# Patient Record
Sex: MALE | Race: OTHER | HISPANIC OR LATINO | URBAN - METROPOLITAN AREA
[De-identification: names, ages, dates, MRNs, and addresses within clinical notes are randomized per-mention and may not be internally consistent; named-entity substitution may affect disease eponyms.]

---

## 2017-04-08 ENCOUNTER — EMERGENCY (EMERGENCY)
Facility: HOSPITAL | Age: 67
LOS: 1 days | Discharge: HOME | End: 2017-04-08
Attending: EMERGENCY MEDICINE | Admitting: EMERGENCY MEDICINE
Payer: COMMERCIAL

## 2017-04-08 VITALS
DIASTOLIC BLOOD PRESSURE: 70 MMHG | RESPIRATION RATE: 18 BRPM | SYSTOLIC BLOOD PRESSURE: 122 MMHG | HEART RATE: 76 BPM | OXYGEN SATURATION: 96 %

## 2017-04-08 VITALS
TEMPERATURE: 99 F | HEART RATE: 88 BPM | OXYGEN SATURATION: 95 % | DIASTOLIC BLOOD PRESSURE: 65 MMHG | SYSTOLIC BLOOD PRESSURE: 138 MMHG | RESPIRATION RATE: 18 BRPM

## 2017-04-08 DIAGNOSIS — J45.901 UNSPECIFIED ASTHMA WITH (ACUTE) EXACERBATION: ICD-10-CM

## 2017-04-08 DIAGNOSIS — Z79.899 OTHER LONG TERM (CURRENT) DRUG THERAPY: ICD-10-CM

## 2017-04-08 DIAGNOSIS — I10 ESSENTIAL (PRIMARY) HYPERTENSION: ICD-10-CM

## 2017-04-08 DIAGNOSIS — E11.9 TYPE 2 DIABETES MELLITUS WITHOUT COMPLICATIONS: ICD-10-CM

## 2017-04-08 DIAGNOSIS — Z98.890 OTHER SPECIFIED POSTPROCEDURAL STATES: Chronic | ICD-10-CM

## 2017-04-08 DIAGNOSIS — Z98.89 OTHER SPECIFIED POSTPROCEDURAL STATES: Chronic | ICD-10-CM

## 2017-04-08 DIAGNOSIS — Z98.890 OTHER SPECIFIED POSTPROCEDURAL STATES: ICD-10-CM

## 2017-04-08 DIAGNOSIS — E78.00 PURE HYPERCHOLESTEROLEMIA, UNSPECIFIED: ICD-10-CM

## 2017-04-08 DIAGNOSIS — J18.9 PNEUMONIA, UNSPECIFIED ORGANISM: ICD-10-CM

## 2017-04-08 DIAGNOSIS — Z87.442 PERSONAL HISTORY OF URINARY CALCULI: Chronic | ICD-10-CM

## 2017-04-08 DIAGNOSIS — R06.02 SHORTNESS OF BREATH: ICD-10-CM

## 2017-04-08 LAB
ANION GAP SERPL CALC-SCNC: 16 MMOL/L — SIGNIFICANT CHANGE UP (ref 5–17)
BASOPHILS # BLD AUTO: 0 K/UL — SIGNIFICANT CHANGE UP (ref 0–0.2)
BASOPHILS NFR BLD AUTO: 0.3 % — SIGNIFICANT CHANGE UP (ref 0–2)
BUN SERPL-MCNC: 16 MG/DL — SIGNIFICANT CHANGE UP (ref 7–23)
CALCIUM SERPL-MCNC: 9.2 MG/DL — SIGNIFICANT CHANGE UP (ref 8.4–10.5)
CHLORIDE SERPL-SCNC: 103 MMOL/L — SIGNIFICANT CHANGE UP (ref 96–108)
CO2 SERPL-SCNC: 23 MMOL/L — SIGNIFICANT CHANGE UP (ref 22–31)
CREAT SERPL-MCNC: 1.12 MG/DL — SIGNIFICANT CHANGE UP (ref 0.5–1.3)
EOSINOPHIL # BLD AUTO: 0.2 K/UL — SIGNIFICANT CHANGE UP (ref 0–0.5)
EOSINOPHIL NFR BLD AUTO: 2.1 % — SIGNIFICANT CHANGE UP (ref 0–6)
GAS PNL BLDV: SIGNIFICANT CHANGE UP
GLUCOSE SERPL-MCNC: 139 MG/DL — HIGH (ref 70–99)
HCT VFR BLD CALC: 35.8 % — LOW (ref 39–50)
HGB BLD-MCNC: 12.4 G/DL — LOW (ref 13–17)
LYMPHOCYTES # BLD AUTO: 1.7 K/UL — SIGNIFICANT CHANGE UP (ref 1–3.3)
LYMPHOCYTES # BLD AUTO: 16.8 % — SIGNIFICANT CHANGE UP (ref 13–44)
MCHC RBC-ENTMCNC: 31.8 PG — SIGNIFICANT CHANGE UP (ref 27–34)
MCHC RBC-ENTMCNC: 34.5 GM/DL — SIGNIFICANT CHANGE UP (ref 32–36)
MCV RBC AUTO: 92.1 FL — SIGNIFICANT CHANGE UP (ref 80–100)
MONOCYTES # BLD AUTO: 0.8 K/UL — SIGNIFICANT CHANGE UP (ref 0–0.9)
MONOCYTES NFR BLD AUTO: 7.8 % — SIGNIFICANT CHANGE UP (ref 2–14)
NEUTROPHILS # BLD AUTO: 7.4 K/UL — SIGNIFICANT CHANGE UP (ref 1.8–7.4)
NEUTROPHILS NFR BLD AUTO: 73 % — SIGNIFICANT CHANGE UP (ref 43–77)
PLATELET # BLD AUTO: 267 K/UL — SIGNIFICANT CHANGE UP (ref 150–400)
POTASSIUM SERPL-MCNC: 3.8 MMOL/L — SIGNIFICANT CHANGE UP (ref 3.5–5.3)
POTASSIUM SERPL-SCNC: 3.8 MMOL/L — SIGNIFICANT CHANGE UP (ref 3.5–5.3)
RBC # BLD: 3.89 M/UL — LOW (ref 4.2–5.8)
RBC # FLD: 12.4 % — SIGNIFICANT CHANGE UP (ref 10.3–14.5)
SODIUM SERPL-SCNC: 142 MMOL/L — SIGNIFICANT CHANGE UP (ref 135–145)
WBC # BLD: 10.1 K/UL — SIGNIFICANT CHANGE UP (ref 3.8–10.5)
WBC # FLD AUTO: 10.1 K/UL — SIGNIFICANT CHANGE UP (ref 3.8–10.5)

## 2017-04-08 PROCEDURE — 94640 AIRWAY INHALATION TREATMENT: CPT

## 2017-04-08 PROCEDURE — 96374 THER/PROPH/DIAG INJ IV PUSH: CPT

## 2017-04-08 PROCEDURE — 84132 ASSAY OF SERUM POTASSIUM: CPT

## 2017-04-08 PROCEDURE — 93010 ELECTROCARDIOGRAM REPORT: CPT

## 2017-04-08 PROCEDURE — 82803 BLOOD GASES ANY COMBINATION: CPT

## 2017-04-08 PROCEDURE — 85027 COMPLETE CBC AUTOMATED: CPT

## 2017-04-08 PROCEDURE — 83605 ASSAY OF LACTIC ACID: CPT

## 2017-04-08 PROCEDURE — 71020: CPT | Mod: 26

## 2017-04-08 PROCEDURE — 84295 ASSAY OF SERUM SODIUM: CPT

## 2017-04-08 PROCEDURE — 99284 EMERGENCY DEPT VISIT MOD MDM: CPT | Mod: 25

## 2017-04-08 PROCEDURE — 85014 HEMATOCRIT: CPT

## 2017-04-08 PROCEDURE — 93005 ELECTROCARDIOGRAM TRACING: CPT

## 2017-04-08 PROCEDURE — 82330 ASSAY OF CALCIUM: CPT

## 2017-04-08 PROCEDURE — 80048 BASIC METABOLIC PNL TOTAL CA: CPT

## 2017-04-08 PROCEDURE — 82435 ASSAY OF BLOOD CHLORIDE: CPT

## 2017-04-08 PROCEDURE — 71046 X-RAY EXAM CHEST 2 VIEWS: CPT

## 2017-04-08 PROCEDURE — 82947 ASSAY GLUCOSE BLOOD QUANT: CPT

## 2017-04-08 RX ORDER — IPRATROPIUM/ALBUTEROL SULFATE 18-103MCG
3 AEROSOL WITH ADAPTER (GRAM) INHALATION ONCE
Qty: 0 | Refills: 0 | Status: COMPLETED | OUTPATIENT
Start: 2017-04-08 | End: 2017-04-08

## 2017-04-08 RX ORDER — SODIUM CHLORIDE 9 MG/ML
1000 INJECTION INTRAMUSCULAR; INTRAVENOUS; SUBCUTANEOUS ONCE
Qty: 0 | Refills: 0 | Status: COMPLETED | OUTPATIENT
Start: 2017-04-08 | End: 2017-04-08

## 2017-04-08 RX ORDER — ALBUTEROL 90 UG/1
4 AEROSOL, METERED ORAL
Qty: 1 | Refills: 0 | OUTPATIENT
Start: 2017-04-08 | End: 2017-05-08

## 2017-04-08 RX ADMIN — Medication 3 MILLILITER(S): at 20:20

## 2017-04-08 RX ADMIN — Medication 3 MILLILITER(S): at 20:41

## 2017-04-08 RX ADMIN — Medication 3 MILLILITER(S): at 20:21

## 2017-04-08 RX ADMIN — Medication 100 MILLIGRAM(S): at 21:13

## 2017-04-08 RX ADMIN — SODIUM CHLORIDE 2000 MILLILITER(S): 9 INJECTION INTRAMUSCULAR; INTRAVENOUS; SUBCUTANEOUS at 20:20

## 2017-04-08 RX ADMIN — Medication 60 MILLIGRAM(S): at 20:20

## 2017-04-08 NOTE — ED PROVIDER NOTE - PROGRESS NOTE DETAILS
still coughing, wheezing improved, left lower lobe PNA will give doxy and d/c. given instructions to avoid high carb/sugar foods due to steroids and DM -Rene DO

## 2017-04-08 NOTE — ED PROVIDER NOTE - PHYSICAL EXAMINATION
Gen: NAD, AOx3  Head: NCAT  HEENT: PERRL, oral mucosa moist, normal conjunctiva, neck supple  Lung: no crackles +wheezing on forced expiration b/l, no respiratory distress, +dry hacking cough  CV: rrr, no murmur, Normal perfusion  Abd: soft, NTND, no CVA tenderness  MSK: No edema, no visible deformities  Neuro: No focal neurologic deficits  Skin: No rash   Psych: normal affect

## 2017-04-08 NOTE — ED PROVIDER NOTE - ATTENDING CONTRIBUTION TO CARE
I have seen and evaluated this patient with the resident.   I agree with the findings  unless other wise stated.  I have made appropriate changes in documentations where needed, After my face to face bedside evaluation, I am further  noting: likely bronchitis with mild asthma exacerbation, no suspicion for cardiac etiology. will check basic labs, EKG, CXR, duoneb/steroids reasses On xray LLL pneumonia pts overall appearance well low risk with acceptable criteria for outpatient treatment Plan d/w pt and family in details and strict return to ED instructions--Schmidt

## 2017-04-08 NOTE — ED ADULT NURSE NOTE - OBJECTIVE STATEMENT
68 yo mal 68 yo male c/o of diff breathing. 66 yo male c/o of diff breathing. x 1week. Presents with dry cough. C/o of chest pain from coughing so frequently. No fever chills, vomiting or diarrhea noted. Reports he has to sleep with several pillows at night. Slight wheeze. Upon ausculation. Recently placed on Mucinex by PCP with little improvement in condition. No edema noted to extremities. MD at bedside. Labs drawn and sent. EKG performed. X-ray done. Safety and support maintained.

## 2017-04-08 NOTE — ED PROVIDER NOTE - MEDICAL DECISION MAKING DETAILS
likely bronchitis with mild asthma exacerbation, no suspicion for cardiac etiology. will check basic labs, EKG, CXR, duoneb/steroids reasses

## 2017-04-08 NOTE — ED PROVIDER NOTE - CARE PLAN
Principal Discharge DX:	Pneumonia of left lower lobe due to infectious organism  Instructions for follow-up, activity and diet:	1. Return to ED for worsening, progressive or any other concerning symptoms   2. Follow up with your primary care doctor in 2-3days   3. Take 40mg of prednisone once a day for 4 days   4. Take 100mg of Doxycycline twice a day for 10 days. Avoid sun exposure, use birth control while taking this medication   5. Take 4-6 puffs of albuterol every 4-6 hours for the next 24-48hours then use as needed for shortness of breathe or cough  Secondary Diagnosis:	Asthma exacerbation

## 2017-04-08 NOTE — ED PROVIDER NOTE - OBJECTIVE STATEMENT
68yo M with difficulty breathing x1 week, +cough nonproductive. +chest congestion. no fever/chills. no sore/throat/running nose. +chest wall and abd pain with coughing, no pain at rest. no vomiting/diarrhea. PCP gave mucinex and tessalon pearls. w/o improvement. +exertional SOB. +orthopnea 3-4 pillows which is new. no LE swelling. using asthma pump 4-5x/day. cough worse at night. and in the cold. had flu vaccine. unknown if UTD pneumonia/Tdap. multiple sick contacts at home.    PMH- HTN, DM, HLD, asthma  PCP- Ysabel Mena  PSH- umbilical hernia repair    ROS:  no n/v/d/c. no abd pain. no rash. no bleeding. no urinary complaints. no weakness. no vision changes. no HA. no neck/back pain. no extremity swelling.

## 2017-04-08 NOTE — ED ADULT NURSE REASSESSMENT NOTE - NS ED NURSE REASSESS COMMENT FT1
Patient d/c in stable condition. No distress noted. Patient reports improvement after breathing treatment. D/c plan reviewed with patient and family. Pt verbalizes understanding. Ambulates with steady gait.

## 2017-04-08 NOTE — ED PROVIDER NOTE - PLAN OF CARE
1. Return to ED for worsening, progressive or any other concerning symptoms   2. Follow up with your primary care doctor in 2-3days   3. Take 40mg of prednisone once a day for 4 days   4. Take 100mg of Doxycycline twice a day for 10 days. Avoid sun exposure, use birth control while taking this medication   5. Take 4-6 puffs of albuterol every 4-6 hours for the next 24-48hours then use as needed for shortness of breathe or cough

## 2018-04-26 ENCOUNTER — APPOINTMENT (OUTPATIENT)
Dept: UROLOGY | Facility: CLINIC | Age: 68
End: 2018-04-26
Payer: MEDICARE

## 2018-04-26 VITALS
HEART RATE: 77 BPM | BODY MASS INDEX: 30.53 KG/M2 | RESPIRATION RATE: 17 BRPM | SYSTOLIC BLOOD PRESSURE: 165 MMHG | DIASTOLIC BLOOD PRESSURE: 96 MMHG | TEMPERATURE: 98.4 F | HEIGHT: 66 IN | WEIGHT: 190 LBS

## 2018-04-26 DIAGNOSIS — Z86.79 PERSONAL HISTORY OF OTHER DISEASES OF THE CIRCULATORY SYSTEM: ICD-10-CM

## 2018-04-26 DIAGNOSIS — Z87.442 PERSONAL HISTORY OF URINARY CALCULI: ICD-10-CM

## 2018-04-26 DIAGNOSIS — Z86.39 PERSONAL HISTORY OF OTHER ENDOCRINE, NUTRITIONAL AND METABOLIC DISEASE: ICD-10-CM

## 2018-04-26 DIAGNOSIS — N40.0 BENIGN PROSTATIC HYPERPLASIA WITHOUT LOWER URINARY TRACT SYMPMS: ICD-10-CM

## 2018-04-26 LAB
ALBUMIN SERPL ELPH-MCNC: 4.4 G/DL
ALP BLD-CCNC: 96 U/L
ALT SERPL-CCNC: 39 U/L
ANION GAP SERPL CALC-SCNC: 17 MMOL/L
APPEARANCE: CLEAR
AST SERPL-CCNC: 30 U/L
BACTERIA: NEGATIVE
BASOPHILS # BLD AUTO: 0.02 K/UL
BASOPHILS NFR BLD AUTO: 0.2 %
BILIRUB SERPL-MCNC: 0.7 MG/DL
BILIRUBIN URINE: NEGATIVE
BLOOD URINE: NEGATIVE
BUN SERPL-MCNC: 14 MG/DL
CALCIUM SERPL-MCNC: 9.2 MG/DL
CHLORIDE SERPL-SCNC: 102 MMOL/L
CO2 SERPL-SCNC: 24 MMOL/L
COLOR: YELLOW
CREAT SERPL-MCNC: 1.11 MG/DL
EOSINOPHIL # BLD AUTO: 0.12 K/UL
EOSINOPHIL NFR BLD AUTO: 1.4 %
GLUCOSE QUALITATIVE U: NEGATIVE MG/DL
GLUCOSE SERPL-MCNC: 136 MG/DL
HBA1C MFR BLD HPLC: 7 %
HCT VFR BLD CALC: 40.2 %
HGB BLD-MCNC: 12.8 G/DL
IMM GRANULOCYTES NFR BLD AUTO: 0.3 %
KETONES URINE: NEGATIVE
LEUKOCYTE ESTERASE URINE: NEGATIVE
LYMPHOCYTES # BLD AUTO: 2.03 K/UL
LYMPHOCYTES NFR BLD AUTO: 23.3 %
MAN DIFF?: NORMAL
MCHC RBC-ENTMCNC: 29.8 PG
MCHC RBC-ENTMCNC: 31.8 GM/DL
MCV RBC AUTO: 93.5 FL
MICROSCOPIC-UA: NORMAL
MONOCYTES # BLD AUTO: 0.48 K/UL
MONOCYTES NFR BLD AUTO: 5.5 %
NEUTROPHILS # BLD AUTO: 6.04 K/UL
NEUTROPHILS NFR BLD AUTO: 69.3 %
NITRITE URINE: NEGATIVE
PH URINE: 7.5
PLATELET # BLD AUTO: 264 K/UL
POTASSIUM SERPL-SCNC: 4.1 MMOL/L
PROT SERPL-MCNC: 7 G/DL
PROTEIN URINE: NEGATIVE MG/DL
RBC # BLD: 4.3 M/UL
RBC # FLD: 14.4 %
RED BLOOD CELLS URINE: 1 /HPF
SODIUM SERPL-SCNC: 143 MMOL/L
SPECIFIC GRAVITY URINE: 1.01
SQUAMOUS EPITHELIAL CELLS: 0 /HPF
UROBILINOGEN URINE: NEGATIVE MG/DL
WBC # FLD AUTO: 8.72 K/UL
WHITE BLOOD CELLS URINE: 0 /HPF

## 2018-04-26 PROCEDURE — 99204 OFFICE O/P NEW MOD 45 MIN: CPT

## 2018-04-26 RX ORDER — OMEPRAZOLE 40 MG/1
40 CAPSULE, DELAYED RELEASE ORAL
Qty: 30 | Refills: 0 | Status: ACTIVE | COMMUNITY
Start: 2017-10-31

## 2018-04-26 RX ORDER — FLUTICASONE PROPIONATE 50 UG/1
50 SPRAY, METERED NASAL
Qty: 16 | Refills: 0 | Status: ACTIVE | COMMUNITY
Start: 2017-11-29

## 2018-04-26 RX ORDER — MELOXICAM 7.5 MG/1
7.5 TABLET ORAL
Qty: 30 | Refills: 0 | Status: ACTIVE | COMMUNITY
Start: 2017-11-29

## 2018-04-26 RX ORDER — ATORVASTATIN CALCIUM 20 MG/1
20 TABLET, FILM COATED ORAL
Qty: 90 | Refills: 0 | Status: ACTIVE | COMMUNITY
Start: 2017-12-23

## 2018-04-26 RX ORDER — BLOOD SUGAR DIAGNOSTIC
STRIP MISCELLANEOUS
Qty: 100 | Refills: 0 | Status: ACTIVE | COMMUNITY
Start: 2018-04-23

## 2018-04-26 RX ORDER — METFORMIN HYDROCHLORIDE 500 MG/1
500 TABLET, COATED ORAL
Refills: 0 | Status: ACTIVE | COMMUNITY

## 2018-04-26 RX ORDER — BLOOD-GLUCOSE METER
W/DEVICE KIT MISCELLANEOUS
Qty: 1 | Refills: 0 | Status: ACTIVE | COMMUNITY
Start: 2017-12-23

## 2018-04-26 RX ORDER — LANCETS
EACH MISCELLANEOUS
Qty: 100 | Refills: 0 | Status: ACTIVE | COMMUNITY
Start: 2018-03-26

## 2018-04-26 RX ORDER — MONTELUKAST 10 MG/1
10 TABLET, FILM COATED ORAL
Qty: 90 | Refills: 0 | Status: ACTIVE | COMMUNITY
Start: 2017-12-23

## 2018-04-26 RX ORDER — TAMSULOSIN HYDROCHLORIDE 0.4 MG/1
0.4 CAPSULE ORAL
Refills: 0 | Status: ACTIVE | COMMUNITY

## 2018-04-26 RX ORDER — AMLODIPINE AND VALSARTAN 5; 160 MG/1; MG/1
5-160 TABLET, FILM COATED ORAL
Qty: 90 | Refills: 0 | Status: COMPLETED | COMMUNITY
Start: 2017-12-23 | End: 2018-04-26

## 2018-04-26 RX ORDER — ATORVASTATIN CALCIUM 20 MG/1
20 TABLET, FILM COATED ORAL
Refills: 0 | Status: COMPLETED | COMMUNITY
End: 2018-04-26

## 2018-04-26 RX ORDER — METFORMIN HYDROCHLORIDE 500 MG/1
500 TABLET, COATED ORAL
Qty: 180 | Refills: 0 | Status: ACTIVE | COMMUNITY
Start: 2017-12-02

## 2018-04-26 RX ORDER — TAMSULOSIN HYDROCHLORIDE 0.4 MG/1
0.4 CAPSULE ORAL
Qty: 90 | Refills: 0 | Status: ACTIVE | COMMUNITY
Start: 2018-01-20

## 2018-04-26 RX ORDER — AMLODIPINE AND VALSARTAN 10; 160 MG/1; MG/1
10-160 TABLET, FILM COATED ORAL
Refills: 0 | Status: ACTIVE | COMMUNITY

## 2018-04-28 LAB
BACTERIA UR CULT: NORMAL
CORE LAB FLUID CYTOLOGY: NORMAL
PSA SERPL-MCNC: 0.49 NG/ML
TESTOST SERPL-MCNC: 277.2 NG/DL

## 2018-06-02 LAB
BASOPHILS # BLD AUTO: 0.02 K/UL
BASOPHILS NFR BLD AUTO: 0.3 %
EOSINOPHIL # BLD AUTO: 0.16 K/UL
EOSINOPHIL NFR BLD AUTO: 2.1 %
HCT VFR BLD CALC: 37.8 %
HGB BLD-MCNC: 12 G/DL
IMM GRANULOCYTES NFR BLD AUTO: 0.3 %
LYMPHOCYTES # BLD AUTO: 2.42 K/UL
LYMPHOCYTES NFR BLD AUTO: 32.4 %
MAN DIFF?: NORMAL
MCHC RBC-ENTMCNC: 29.5 PG
MCHC RBC-ENTMCNC: 31.7 GM/DL
MCV RBC AUTO: 92.9 FL
MONOCYTES # BLD AUTO: 0.55 K/UL
MONOCYTES NFR BLD AUTO: 7.4 %
NEUTROPHILS # BLD AUTO: 4.29 K/UL
NEUTROPHILS NFR BLD AUTO: 57.5 %
PLATELET # BLD AUTO: 253 K/UL
RBC # BLD: 4.07 M/UL
RBC # FLD: 14.6 %
WBC # FLD AUTO: 7.46 K/UL

## 2018-06-14 ENCOUNTER — APPOINTMENT (OUTPATIENT)
Dept: UROLOGY | Facility: CLINIC | Age: 68
End: 2018-06-14
Payer: MEDICARE

## 2018-06-14 VITALS
DIASTOLIC BLOOD PRESSURE: 84 MMHG | TEMPERATURE: 97.6 F | SYSTOLIC BLOOD PRESSURE: 174 MMHG | RESPIRATION RATE: 17 BRPM | HEART RATE: 70 BPM

## 2018-06-14 LAB
APPEARANCE: CLEAR
BACTERIA: NEGATIVE
BILIRUBIN URINE: NEGATIVE
BLOOD URINE: NEGATIVE
COLOR: YELLOW
GLUCOSE QUALITATIVE U: NEGATIVE MG/DL
HYALINE CASTS: 0 /LPF
KETONES URINE: NEGATIVE
LEUKOCYTE ESTERASE URINE: NEGATIVE
MICROSCOPIC-UA: NORMAL
NITRITE URINE: NEGATIVE
PH URINE: 6.5
PROTEIN URINE: NEGATIVE MG/DL
RED BLOOD CELLS URINE: 2 /HPF
SPECIFIC GRAVITY URINE: 1.02
SQUAMOUS EPITHELIAL CELLS: 1 /HPF
UROBILINOGEN URINE: NEGATIVE MG/DL
WHITE BLOOD CELLS URINE: 2 /HPF

## 2018-06-14 PROCEDURE — 99214 OFFICE O/P EST MOD 30 MIN: CPT

## 2018-06-16 LAB
BACTERIA UR CULT: NORMAL
CORE LAB FLUID CYTOLOGY: NORMAL

## 2018-07-05 ENCOUNTER — APPOINTMENT (OUTPATIENT)
Dept: UROLOGY | Facility: CLINIC | Age: 68
End: 2018-07-05
Payer: MEDICARE

## 2018-07-05 DIAGNOSIS — D64.9 ANEMIA, UNSPECIFIED: ICD-10-CM

## 2018-07-05 DIAGNOSIS — Z84.1 FAMILY HISTORY OF DISORDERS OF KIDNEY AND URETER: ICD-10-CM

## 2018-07-05 DIAGNOSIS — Z80.42 FAMILY HISTORY OF MALIGNANT NEOPLASM OF PROSTATE: ICD-10-CM

## 2018-07-05 PROCEDURE — 99214 OFFICE O/P EST MOD 30 MIN: CPT

## 2018-07-08 LAB
APPEARANCE: CLEAR
BACTERIA UR CULT: NORMAL
BACTERIA: NEGATIVE
BASOPHILS # BLD AUTO: 0.01 K/UL
BASOPHILS NFR BLD AUTO: 0.1 %
BILIRUBIN URINE: NEGATIVE
BLOOD URINE: NEGATIVE
CALCIUM OXALATE CRYSTALS: ABNORMAL
COLOR: YELLOW
EOSINOPHIL # BLD AUTO: 0.26 K/UL
EOSINOPHIL NFR BLD AUTO: 3 %
GLUCOSE QUALITATIVE U: NEGATIVE MG/DL
HCT VFR BLD CALC: 38 %
HGB BLD-MCNC: 11.7 G/DL
HYALINE CASTS: 1 /LPF
IMM GRANULOCYTES NFR BLD AUTO: 0.2 %
KETONES URINE: NEGATIVE
LEUKOCYTE ESTERASE URINE: NEGATIVE
LYMPHOCYTES # BLD AUTO: 2.12 K/UL
LYMPHOCYTES NFR BLD AUTO: 24.5 %
MAN DIFF?: NORMAL
MCHC RBC-ENTMCNC: 28.6 PG
MCHC RBC-ENTMCNC: 30.8 GM/DL
MCV RBC AUTO: 92.9 FL
MICROSCOPIC-UA: NORMAL
MONOCYTES # BLD AUTO: 0.54 K/UL
MONOCYTES NFR BLD AUTO: 6.3 %
NEUTROPHILS # BLD AUTO: 5.69 K/UL
NEUTROPHILS NFR BLD AUTO: 65.9 %
NITRITE URINE: NEGATIVE
PH URINE: 6
PLATELET # BLD AUTO: 282 K/UL
PROTEIN URINE: NEGATIVE MG/DL
RBC # BLD: 4.09 M/UL
RBC # FLD: 14.5 %
RED BLOOD CELLS URINE: 3 /HPF
SPECIFIC GRAVITY URINE: 1.02
SQUAMOUS EPITHELIAL CELLS: 1 /HPF
UROBILINOGEN URINE: NEGATIVE MG/DL
WBC # FLD AUTO: 8.64 K/UL
WHITE BLOOD CELLS URINE: 1 /HPF

## 2018-07-10 LAB — CORE LAB FLUID CYTOLOGY: NORMAL

## 2018-10-10 PROBLEM — J45.909 UNSPECIFIED ASTHMA, UNCOMPLICATED: Chronic | Status: ACTIVE | Noted: 2017-04-08

## 2018-10-10 PROBLEM — E11.9 TYPE 2 DIABETES MELLITUS WITHOUT COMPLICATIONS: Chronic | Status: ACTIVE | Noted: 2017-04-08

## 2018-10-10 PROBLEM — E78.00 PURE HYPERCHOLESTEROLEMIA, UNSPECIFIED: Chronic | Status: ACTIVE | Noted: 2017-04-08

## 2018-10-10 PROBLEM — N20.0 CALCULUS OF KIDNEY: Chronic | Status: ACTIVE | Noted: 2017-04-08

## 2018-10-30 ENCOUNTER — APPOINTMENT (OUTPATIENT)
Dept: UROLOGY | Facility: CLINIC | Age: 68
End: 2018-10-30
Payer: MEDICARE

## 2018-10-30 LAB
APPEARANCE: CLEAR
BACTERIA: NEGATIVE
BILIRUBIN URINE: NEGATIVE
BLOOD URINE: NEGATIVE
COLOR: YELLOW
GLUCOSE QUALITATIVE U: NEGATIVE MG/DL
KETONES URINE: NEGATIVE
LEUKOCYTE ESTERASE URINE: NEGATIVE
MICROSCOPIC-UA: NORMAL
NITRITE URINE: NEGATIVE
PH URINE: 6.5
PROTEIN URINE: NEGATIVE MG/DL
RED BLOOD CELLS URINE: 1 /HPF
SPECIFIC GRAVITY URINE: 1.02
SQUAMOUS EPITHELIAL CELLS: 1 /HPF
UROBILINOGEN URINE: NEGATIVE MG/DL
WHITE BLOOD CELLS URINE: 1 /HPF

## 2018-10-30 PROCEDURE — 99214 OFFICE O/P EST MOD 30 MIN: CPT

## 2018-10-31 LAB — BACTERIA UR CULT: NORMAL

## 2018-11-03 LAB — CORE LAB FLUID CYTOLOGY: NORMAL

## 2018-11-04 ENCOUNTER — FORM ENCOUNTER (OUTPATIENT)
Age: 68
End: 2018-11-04

## 2018-11-05 ENCOUNTER — APPOINTMENT (OUTPATIENT)
Dept: UROLOGY | Facility: CLINIC | Age: 68
End: 2018-11-05
Payer: MEDICARE

## 2018-11-05 ENCOUNTER — OUTPATIENT (OUTPATIENT)
Dept: OUTPATIENT SERVICES | Facility: HOSPITAL | Age: 68
LOS: 1 days | End: 2018-11-05
Payer: COMMERCIAL

## 2018-11-05 ENCOUNTER — APPOINTMENT (OUTPATIENT)
Dept: CT IMAGING | Facility: IMAGING CENTER | Age: 68
End: 2018-11-05
Payer: MEDICARE

## 2018-11-05 VITALS
SYSTOLIC BLOOD PRESSURE: 149 MMHG | HEART RATE: 75 BPM | TEMPERATURE: 98.3 F | RESPIRATION RATE: 17 BRPM | DIASTOLIC BLOOD PRESSURE: 81 MMHG

## 2018-11-05 DIAGNOSIS — Z87.442 PERSONAL HISTORY OF URINARY CALCULI: Chronic | ICD-10-CM

## 2018-11-05 DIAGNOSIS — Z98.89 OTHER SPECIFIED POSTPROCEDURAL STATES: Chronic | ICD-10-CM

## 2018-11-05 DIAGNOSIS — Z98.890 OTHER SPECIFIED POSTPROCEDURAL STATES: Chronic | ICD-10-CM

## 2018-11-05 DIAGNOSIS — N40.1 BENIGN PROSTATIC HYPERPLASIA WITH LOWER URINARY TRACT SYMPTOMS: ICD-10-CM

## 2018-11-05 DIAGNOSIS — R31.0 GROSS HEMATURIA: ICD-10-CM

## 2018-11-05 DIAGNOSIS — R35.0 FREQUENCY OF MICTURITION: ICD-10-CM

## 2018-11-05 PROCEDURE — 52000 CYSTOURETHROSCOPY: CPT

## 2018-11-05 PROCEDURE — 74178 CT ABD&PLV WO CNTR FLWD CNTR: CPT | Mod: 26

## 2018-11-05 PROCEDURE — 82565 ASSAY OF CREATININE: CPT

## 2018-11-05 PROCEDURE — 74178 CT ABD&PLV WO CNTR FLWD CNTR: CPT

## 2018-11-05 PROCEDURE — 99215 OFFICE O/P EST HI 40 MIN: CPT | Mod: 25

## 2018-11-12 DIAGNOSIS — R31.0 GROSS HEMATURIA: ICD-10-CM

## 2018-11-12 DIAGNOSIS — N21.0 CALCULUS IN BLADDER: ICD-10-CM

## 2018-11-12 DIAGNOSIS — Q62.5 DUPLICATION OF URETER: ICD-10-CM

## 2018-11-12 DIAGNOSIS — N40.1 BENIGN PROSTATIC HYPERPLASIA WITH LOWER URINARY TRACT SYMPTOMS: ICD-10-CM

## 2018-11-12 DIAGNOSIS — R39.15 URGENCY OF URINATION: ICD-10-CM

## 2018-11-12 DIAGNOSIS — N20.0 CALCULUS OF KIDNEY: ICD-10-CM

## 2018-11-14 LAB
APPEARANCE: CLEAR
BACTERIA UR CULT: NORMAL
BACTERIA: NEGATIVE
BILIRUBIN URINE: NEGATIVE
BLOOD URINE: NEGATIVE
COLOR: YELLOW
CORE LAB FLUID CYTOLOGY: NORMAL
GLUCOSE QUALITATIVE U: NEGATIVE MG/DL
KETONES URINE: NEGATIVE
LEUKOCYTE ESTERASE URINE: NEGATIVE
MICROSCOPIC-UA: NORMAL
NITRITE URINE: NEGATIVE
PH URINE: 6.5
PROTEIN URINE: NEGATIVE MG/DL
RED BLOOD CELLS URINE: 1 /HPF
SPECIFIC GRAVITY URINE: 1.03
SQUAMOUS EPITHELIAL CELLS: 0 /HPF
UROBILINOGEN URINE: NEGATIVE MG/DL
WHITE BLOOD CELLS URINE: 0 /HPF

## 2018-11-21 ENCOUNTER — APPOINTMENT (OUTPATIENT)
Dept: UROLOGY | Facility: CLINIC | Age: 68
End: 2018-11-21
Payer: MEDICARE

## 2018-11-21 DIAGNOSIS — R39.9 UNSPECIFIED SYMPTOMS AND SIGNS INVOLVING THE GENITOURINARY SYSTEM: ICD-10-CM

## 2018-11-21 PROCEDURE — 99215 OFFICE O/P EST HI 40 MIN: CPT

## 2018-11-25 LAB
ALBUMIN SERPL ELPH-MCNC: 4.4 G/DL
ALP BLD-CCNC: 95 U/L
ALT SERPL-CCNC: 27 U/L
ANION GAP SERPL CALC-SCNC: 16 MMOL/L
APPEARANCE: CLEAR
AST SERPL-CCNC: 22 U/L
BACTERIA UR CULT: NORMAL
BACTERIA: NEGATIVE
BASOPHILS # BLD AUTO: 0.02 K/UL
BASOPHILS NFR BLD AUTO: 0.3 %
BILIRUB SERPL-MCNC: 0.7 MG/DL
BILIRUBIN URINE: NEGATIVE
BLOOD URINE: NEGATIVE
BUN SERPL-MCNC: 16 MG/DL
CALCIUM SERPL-MCNC: 9.3 MG/DL
CHLORIDE SERPL-SCNC: 104 MMOL/L
CO2 SERPL-SCNC: 24 MMOL/L
COLOR: YELLOW
CORE LAB FLUID CYTOLOGY: NORMAL
CREAT SERPL-MCNC: 1.12 MG/DL
EOSINOPHIL # BLD AUTO: 0.12 K/UL
EOSINOPHIL NFR BLD AUTO: 1.6 %
GLUCOSE QUALITATIVE U: NEGATIVE MG/DL
GLUCOSE SERPL-MCNC: 114 MG/DL
HCT VFR BLD CALC: 40 %
HGB BLD-MCNC: 13.1 G/DL
HYALINE CASTS: 0 /LPF
IMM GRANULOCYTES NFR BLD AUTO: 0.1 %
KETONES URINE: NEGATIVE
LEUKOCYTE ESTERASE URINE: NEGATIVE
LYMPHOCYTES # BLD AUTO: 2.34 K/UL
LYMPHOCYTES NFR BLD AUTO: 31.2 %
MAN DIFF?: NORMAL
MCHC RBC-ENTMCNC: 30.1 PG
MCHC RBC-ENTMCNC: 32.8 GM/DL
MCV RBC AUTO: 92 FL
MICROSCOPIC-UA: NORMAL
MONOCYTES # BLD AUTO: 0.6 K/UL
MONOCYTES NFR BLD AUTO: 8 %
NEUTROPHILS # BLD AUTO: 4.4 K/UL
NEUTROPHILS NFR BLD AUTO: 58.8 %
NITRITE URINE: NEGATIVE
PH URINE: 6.5
PLATELET # BLD AUTO: 232 K/UL
POTASSIUM SERPL-SCNC: 4.7 MMOL/L
PROT SERPL-MCNC: 6.9 G/DL
PROTEIN URINE: NEGATIVE MG/DL
PSA SERPL-MCNC: 0.33 NG/ML
RBC # BLD: 4.35 M/UL
RBC # FLD: 14.7 %
RED BLOOD CELLS URINE: 0 /HPF
SODIUM SERPL-SCNC: 144 MMOL/L
SPECIFIC GRAVITY URINE: 1.02
SQUAMOUS EPITHELIAL CELLS: 0 /HPF
TESTOST SERPL-MCNC: 339 NG/DL
UROBILINOGEN URINE: NEGATIVE MG/DL
WBC # FLD AUTO: 7.49 K/UL
WHITE BLOOD CELLS URINE: 0 /HPF

## 2018-12-03 ENCOUNTER — OUTPATIENT (OUTPATIENT)
Dept: OUTPATIENT SERVICES | Facility: HOSPITAL | Age: 68
LOS: 1 days | End: 2018-12-03
Payer: MEDICARE

## 2018-12-03 VITALS
SYSTOLIC BLOOD PRESSURE: 140 MMHG | WEIGHT: 190.04 LBS | DIASTOLIC BLOOD PRESSURE: 84 MMHG | RESPIRATION RATE: 16 BRPM | HEIGHT: 63 IN | TEMPERATURE: 99 F | OXYGEN SATURATION: 95 % | HEART RATE: 83 BPM

## 2018-12-03 DIAGNOSIS — N21.0 CALCULUS IN BLADDER: ICD-10-CM

## 2018-12-03 DIAGNOSIS — I10 ESSENTIAL (PRIMARY) HYPERTENSION: ICD-10-CM

## 2018-12-03 DIAGNOSIS — N40.0 BENIGN PROSTATIC HYPERPLASIA WITHOUT LOWER URINARY TRACT SYMPTOMS: ICD-10-CM

## 2018-12-03 DIAGNOSIS — Z87.442 PERSONAL HISTORY OF URINARY CALCULI: Chronic | ICD-10-CM

## 2018-12-03 DIAGNOSIS — E11.9 TYPE 2 DIABETES MELLITUS WITHOUT COMPLICATIONS: ICD-10-CM

## 2018-12-03 DIAGNOSIS — Z98.890 OTHER SPECIFIED POSTPROCEDURAL STATES: Chronic | ICD-10-CM

## 2018-12-03 DIAGNOSIS — Z98.89 OTHER SPECIFIED POSTPROCEDURAL STATES: Chronic | ICD-10-CM

## 2018-12-03 LAB
APPEARANCE UR: CLEAR — SIGNIFICANT CHANGE UP
BILIRUB UR-MCNC: NEGATIVE — SIGNIFICANT CHANGE UP
BLOOD UR QL VISUAL: NEGATIVE — SIGNIFICANT CHANGE UP
COLOR SPEC: SIGNIFICANT CHANGE UP
GLUCOSE UR-MCNC: 70 — SIGNIFICANT CHANGE UP
HBA1C BLD-MCNC: 6.4 % — HIGH (ref 4–5.6)
KETONES UR-MCNC: NEGATIVE — SIGNIFICANT CHANGE UP
LEUKOCYTE ESTERASE UR-ACNC: NEGATIVE — SIGNIFICANT CHANGE UP
NITRITE UR-MCNC: NEGATIVE — SIGNIFICANT CHANGE UP
PH UR: 8 — SIGNIFICANT CHANGE UP (ref 5–8)
PROT UR-MCNC: 10 — SIGNIFICANT CHANGE UP
SP GR SPEC: 1.02 — SIGNIFICANT CHANGE UP (ref 1–1.04)
UROBILINOGEN FLD QL: NORMAL — SIGNIFICANT CHANGE UP

## 2018-12-03 PROCEDURE — 93010 ELECTROCARDIOGRAM REPORT: CPT

## 2018-12-03 RX ORDER — LOVASTATIN 20 MG
0 TABLET ORAL
Qty: 0 | Refills: 0 | COMMUNITY

## 2018-12-03 RX ORDER — ALBUTEROL 90 UG/1
0 AEROSOL, METERED ORAL
Qty: 0 | Refills: 0 | COMMUNITY

## 2018-12-03 RX ORDER — METFORMIN HYDROCHLORIDE 850 MG/1
0 TABLET ORAL
Qty: 0 | Refills: 0 | COMMUNITY

## 2018-12-03 RX ORDER — TAMSULOSIN HYDROCHLORIDE 0.4 MG/1
0 CAPSULE ORAL
Qty: 0 | Refills: 0 | COMMUNITY

## 2018-12-03 NOTE — H&P PST ADULT - PROBLEM SELECTOR PLAN 1
Scheduled for surgery 12/14/18   Preop instructions provided and patient verbalizes understanding.  Labs done and results pending.   OR Booking notified of KAYLA precautions, DM   Repeat FBG ordered am DOS

## 2018-12-03 NOTE — H&P PST ADULT - MUSCULOSKELETAL
details… detailed exam no joint erythema/no joint warmth/no calf tenderness/normal strength/ROM intact/no joint swelling

## 2018-12-03 NOTE — H&P PST ADULT - NEUROLOGICAL DETAILS
normal strength/sensation intact/responds to pain/alert and oriented x 3/responds to verbal commands

## 2018-12-03 NOTE — H&P PST ADULT - NEGATIVE ENMT SYMPTOMS
no nasal congestion/no hearing difficulty/no vertigo/no sinus symptoms/no throat pain/no dysphagia/no tinnitus

## 2018-12-03 NOTE — H&P PST ADULT - GENITOURINARY COMMENTS
Hx of microscopic hematuria and bladder stones for past 3 months - pt to have surgery to remove. Pt denies fever or pain

## 2018-12-03 NOTE — H&P PST ADULT - PMH
BPH (benign prostatic hyperplasia)    HTN (hypertension)    Hyperlipemia Asthma    Bladder stone    BPH (benign prostatic hyperplasia)    Diabetes    HTN (hypertension)    HTN (hypertension)    Hypercholesteremia    Hyperlipemia    Kidney stone

## 2018-12-03 NOTE — H&P PST ADULT - HISTORY OF PRESENT ILLNESS
Pt is a 68 yr old male Pt is a 68 yr old male scheduled for Cysto laser Lithotripsy of Bladder stone suhail Lay 12/14/18. Pt c/o of incomplete bladder emptying and bladder stone for past 3 months. Pt speaks Greek and translation was used.

## 2018-12-03 NOTE — H&P PST ADULT - FAMILY HISTORY
Father  Still living? Unknown  Family history of diabetes mellitus, Age at diagnosis: Age Unknown     Sibling  Still living? Unknown  Family history of diabetes mellitus, Age at diagnosis: Age Unknown     Mother  Still living? Unknown  Family history of cancer, Age at diagnosis: Age Unknown

## 2018-12-03 NOTE — H&P PST ADULT - NSANTHOSAYNRD_GEN_A_CORE
No. KAYLA screening performed.  STOP BANG Legend: 0-2 = LOW Risk; 3-4 = INTERMEDIATE Risk; 5-8 = HIGH Risk

## 2018-12-05 LAB
BACTERIA UR CULT: SIGNIFICANT CHANGE UP
SPECIMEN SOURCE: SIGNIFICANT CHANGE UP

## 2018-12-13 ENCOUNTER — TRANSCRIPTION ENCOUNTER (OUTPATIENT)
Age: 68
End: 2018-12-13

## 2018-12-14 ENCOUNTER — OUTPATIENT (OUTPATIENT)
Dept: OUTPATIENT SERVICES | Facility: HOSPITAL | Age: 68
LOS: 1 days | Discharge: HOME | End: 2018-12-14
Payer: MEDICARE

## 2018-12-14 ENCOUNTER — APPOINTMENT (OUTPATIENT)
Dept: UROLOGY | Facility: AMBULATORY SURGERY CENTER | Age: 68
End: 2018-12-14

## 2018-12-14 VITALS
TEMPERATURE: 98 F | SYSTOLIC BLOOD PRESSURE: 157 MMHG | OXYGEN SATURATION: 96 % | DIASTOLIC BLOOD PRESSURE: 85 MMHG | HEIGHT: 63 IN | RESPIRATION RATE: 15 BRPM | HEART RATE: 78 BPM | WEIGHT: 190.04 LBS

## 2018-12-14 VITALS
DIASTOLIC BLOOD PRESSURE: 76 MMHG | OXYGEN SATURATION: 96 % | RESPIRATION RATE: 16 BRPM | HEART RATE: 71 BPM | SYSTOLIC BLOOD PRESSURE: 126 MMHG

## 2018-12-14 DIAGNOSIS — Z87.442 PERSONAL HISTORY OF URINARY CALCULI: Chronic | ICD-10-CM

## 2018-12-14 DIAGNOSIS — N21.0 CALCULUS IN BLADDER: ICD-10-CM

## 2018-12-14 DIAGNOSIS — Z98.890 OTHER SPECIFIED POSTPROCEDURAL STATES: Chronic | ICD-10-CM

## 2018-12-14 DIAGNOSIS — Z98.89 OTHER SPECIFIED POSTPROCEDURAL STATES: Chronic | ICD-10-CM

## 2018-12-14 LAB — GLUCOSE BLDC GLUCOMTR-MCNC: 101 MG/DL — HIGH (ref 70–99)

## 2018-12-14 PROCEDURE — 52318 REMOVE BLADDER STONE: CPT

## 2018-12-14 RX ORDER — SODIUM CHLORIDE 9 MG/ML
1000 INJECTION, SOLUTION INTRAVENOUS
Qty: 0 | Refills: 0 | Status: DISCONTINUED | OUTPATIENT
Start: 2018-12-14 | End: 2018-12-29

## 2018-12-14 NOTE — ASU DISCHARGE PLAN (ADULT/PEDIATRIC). - SPECIAL INSTRUCTIONS
You may take over the counter tylenol and/or ibuprofen as directed for pain.     Drink plenty of fluids. You may notice blood in your urine for several days, which is normal.    Dr. Lay would like to see you in 2 weeks. Please call the office to schedule/confirm an appointment.    [facility]  [address] [place], [place] [address] [phone]

## 2018-12-14 NOTE — ASU DISCHARGE PLAN (ADULT/PEDIATRIC). - NURSING INSTRUCTIONS
DO NOT take any Tylenol (Acetaminophen) or narcotics containing Tylenol until after  11:20 . You received Tylenol during your operation and it can cause damage to your liver if too much is taken within a 24 hour time period.

## 2018-12-14 NOTE — ASU PATIENT PROFILE, ADULT - PMH
Asthma    Bladder stone    BPH (benign prostatic hyperplasia)    Diabetes    HTN (hypertension)    HTN (hypertension)    Hypercholesteremia    Hyperlipemia    Kidney stone

## 2018-12-14 NOTE — ASU DISCHARGE PLAN (ADULT/PEDIATRIC). - NOTIFY
Bleeding that does not stop/Unable to Urinate/Fever greater than 101/Persistent Nausea and Vomiting/Pain not relieved by Medications Bleeding that does not stop/Pain not relieved by Medications/Swelling that continues/Persistent Nausea and Vomiting/Fever greater than 101/Unable to Urinate

## 2018-12-14 NOTE — ASU DISCHARGE PLAN (ADULT/PEDIATRIC). - MEDICATION SUMMARY - MEDICATIONS TO TAKE
I will START or STAY ON the medications listed below when I get home from the hospital:    finasteride 1 mg oral tablet  -- 1 tab(s) by mouth once a day AM  -- Indication: For BPH    Flomax 0.4 mg oral capsule  -- 1 cap(s) by mouth once a day PM  -- Indication: For BPH    metFORMIN 500 mg oral tablet  -- 1 tab(s) by mouth 2 times a day  -- Indication: For DM    atorvastatin 20 mg oral tablet  -- 1 tab(s) by mouth once a day PM  -- Indication: For HLD    amlodipine-valsartan 5 mg-160 mg oral tablet  -- 1 tab(s) by mouth once a day AM  -- Indication: For HTN    cefadroxil 500 mg oral capsule  -- 1 cap(s) by mouth every 12 hours   -- Finish all this medication unless otherwise directed by prescriber.    -- Indication: For prevent UTI    montelukast 10 mg oral tablet  -- 1 tab(s) by mouth once a day PM  -- Indication: For home med    omeprazole 40 mg oral delayed release capsule  -- 1 cap(s) by mouth once a day PM  -- Indication: For GERD    oxybutynin 10 mg/24 hr oral tablet, extended release  -- 1 tab(s) by mouth once a day AM  -- Indication: For OAB

## 2018-12-15 ENCOUNTER — EMERGENCY (EMERGENCY)
Facility: HOSPITAL | Age: 68
LOS: 1 days | Discharge: HOME | End: 2018-12-15
Attending: EMERGENCY MEDICINE
Payer: COMMERCIAL

## 2018-12-15 VITALS
RESPIRATION RATE: 18 BRPM | DIASTOLIC BLOOD PRESSURE: 79 MMHG | OXYGEN SATURATION: 95 % | SYSTOLIC BLOOD PRESSURE: 132 MMHG | HEART RATE: 78 BPM | TEMPERATURE: 98 F

## 2018-12-15 VITALS
DIASTOLIC BLOOD PRESSURE: 94 MMHG | RESPIRATION RATE: 18 BRPM | SYSTOLIC BLOOD PRESSURE: 178 MMHG | WEIGHT: 188.94 LBS | OXYGEN SATURATION: 95 % | TEMPERATURE: 98 F | HEIGHT: 68 IN | HEART RATE: 84 BPM

## 2018-12-15 DIAGNOSIS — Z98.89 OTHER SPECIFIED POSTPROCEDURAL STATES: Chronic | ICD-10-CM

## 2018-12-15 DIAGNOSIS — Z87.442 PERSONAL HISTORY OF URINARY CALCULI: Chronic | ICD-10-CM

## 2018-12-15 DIAGNOSIS — Z98.890 OTHER SPECIFIED POSTPROCEDURAL STATES: Chronic | ICD-10-CM

## 2018-12-15 LAB
APPEARANCE UR: ABNORMAL
BACTERIA # UR AUTO: NEGATIVE — SIGNIFICANT CHANGE UP
BILIRUB UR-MCNC: NEGATIVE — SIGNIFICANT CHANGE UP
COLOR SPEC: COLORLESS — SIGNIFICANT CHANGE UP
DIFF PNL FLD: ABNORMAL
EPI CELLS # UR: 0 /HPF — SIGNIFICANT CHANGE UP
GLUCOSE UR QL: NEGATIVE — SIGNIFICANT CHANGE UP
HYALINE CASTS # UR AUTO: 2 /LPF — SIGNIFICANT CHANGE UP (ref 0–2)
KETONES UR-MCNC: NEGATIVE — SIGNIFICANT CHANGE UP
LEUKOCYTE ESTERASE UR-ACNC: NEGATIVE — SIGNIFICANT CHANGE UP
NITRITE UR-MCNC: NEGATIVE — SIGNIFICANT CHANGE UP
PH UR: 7 — SIGNIFICANT CHANGE UP (ref 5–8)
PROT UR-MCNC: ABNORMAL
RBC CASTS # UR COMP ASSIST: 1399 /HPF — HIGH (ref 0–4)
SP GR SPEC: 1.01 — SIGNIFICANT CHANGE UP (ref 1.01–1.02)
UROBILINOGEN FLD QL: NEGATIVE — SIGNIFICANT CHANGE UP
WBC UR QL: 3 /HPF — SIGNIFICANT CHANGE UP (ref 0–5)

## 2018-12-15 PROCEDURE — 81001 URINALYSIS AUTO W/SCOPE: CPT

## 2018-12-15 PROCEDURE — 99283 EMERGENCY DEPT VISIT LOW MDM: CPT | Mod: 25

## 2018-12-15 PROCEDURE — 87086 URINE CULTURE/COLONY COUNT: CPT

## 2018-12-15 PROCEDURE — 51702 INSERT TEMP BLADDER CATH: CPT

## 2018-12-15 NOTE — ED ADULT NURSE NOTE - NSIMPLEMENTINTERV_GEN_ALL_ED
Implemented All Universal Safety Interventions:  [place] to call system. Call bell, personal items and telephone within reach. Instruct patient to call for assistance. Room bathroom lighting operational. Non-slip footwear when patient is off stretcher. Physically safe environment: no spills, clutter or unnecessary equipment. Stretcher in lowest position, wheels locked, appropriate side rails in place.

## 2018-12-15 NOTE — ED ADULT NURSE REASSESSMENT NOTE - NS ED NURSE REASSESS COMMENT FT1
discharge teaching was provided to patient on mace care and switching leg bag and mace drainage bag at night. verbal understanding by wife and patient on how to use mace bag and leg bag.

## 2018-12-15 NOTE — ED PROVIDER NOTE - INGUINAL REGION
Patient Seen in: [facility] Emergency Department    History   Patient presents with:  Chest Pain Angina (cardiovascular)    Stated Complaint: chest pain    HPI    63-year-old female presents to the emergency department complaining of sudden onset of Smokeless tobacco: Current User                        Review of Systems    Positive for stated complaint: chest pain  Other systems are as noted in HPI. Constitutional and vital signs reviewed.       All other systems reviewed and negative except as not units/mL. TROPONIN I - Normal   CBC WITH DIFFERENTIAL WITH PLATELET    Narrative: The following orders were created for panel order CBC WITH DIFFERENTIAL WITH PLATELET.   Procedure                               Abnormality         Status will be started on Lovenox. If the stress echo is normal she will be discharged home with clinic follow-up on Friday for pro time and evaluation. MDM     Acute chest pain of uncertain etiology. Venous thromboembolism by history.   Benign positional drea no swelling

## 2018-12-15 NOTE — ED PROVIDER NOTE - MEDICAL DECISION MAKING DETAILS
Pt w/ lithotripsy today now p/w urinary retention, pt also has hx BPH, retention likely 2/2 to procedure vs prostate enlargement, will obtain bladder scan volume, place urine

## 2018-12-15 NOTE — ED PROVIDER NOTE - ATTENDING CONTRIBUTION TO CARE
68 yom s/p lithotripsy earlier today for renal stone, states urinated after procedure but then didn't urinate for 8 hrs. mild suprapubic pain, no f/c. no penile pain. no n/v/d    ROS:   constitutional - no fever, no chills  eyes - no visual changes, no redness  eent - no sore throat, no nasal congestion  cvs - no chest pain, no leg swelling  resp - no shortness of breath, no cough  gi - no abdominal pain, no vomiting, no diarrhea  gu - no dysuria, no hematuria  msk - no acute back pain, no joint swelling  skin - no rashes, no jaundice  neuro - no headache, no focal weakness  psych - no acute mental health issue     Physical Exam:   constitutional - well appearing, awake and alert, oriented x3  head - no external evidence of trauma  cvs - rrr, no murmurs, no peripheral edema  resp - breath sounds clear and equal bilat  gi - abdomen soft and nontender, no rigidity, guarding or rebound, bowel sounds present  msk - moving all extremities spontaneously  neuro - alert and oriented x3, no focal deficits, CNs 2-12 grossly intact  skin- no jaundice, warm and dry  psych - mood and affect wnl, no apparent risk to self or others   mild suprapubic fullness    mace placed w/o incident/ pt will call uro to inform of events in ED and need for earlier f/u. educated on how to empty mace by nursing staff. additional verbal instructions regarding diagnosis, return precautions and follow up plan given to pt and/or family. HUY Palomares MD

## 2018-12-15 NOTE — ED ADULT NURSE NOTE - OBJECTIVE STATEMENT
69 yo F w/ PMHx of Asthma, Bladder stone, BPH. Diabetes, HTN, Hypercholesteremia, Hyperlipemia, Kidney stone presents to ED c/o urinary retention. Pt states he had lithotripsy earlier today for kidney stones, was able to urinate after procedure, but has been unable to urinate for past 8 hours other than small drops. Pt complains of 7/10 suprapubic pain/tenderness. Denies burning w/ urination, constipation, diarrhea. Denies taking any medications at home for symptoms prior to arrival. Pt denies any CP, SOB, N/V, fever, chills, HA, dizziness, weakness. Pt A&Ox4, lungs CTA, +central pulses. Abdomen soft, not tender, not distended. Ambulating w/ steady gait, safety and comfort maintained, no acute distress noted at this time.

## 2018-12-15 NOTE — ED PROVIDER NOTE - NSFOLLOWUPINSTRUCTIONS_ED_ALL_ED_FT
Please follow up with your urologist at your next scheduled appointment to have your mace removed    Please return to the ED if you begin developing worsening pain, if you begin getting fevers/chills, or if you are unable to urinate through your mace

## 2018-12-15 NOTE — ED ADULT NURSE REASSESSMENT NOTE - NS ED NURSE REASSESS COMMENT FT1
Ramos catheter placed using sterile technique. Second RN present to confirm sterility. Draining to gravity. Secured w/ stat lock. Pt tolerated procedure well. Will cont to monitor. Ramos catheter placed using sterile technique. Second RN present to confirm sterility. Draining to gravity. Secured w/ stat lock. Pt tolerated procedure well. Will cont to monitor. Approx 700 mL solange urine drained, UA & UC sent. Pt bladder scanned, revealed 649 mL urine in bladder.   Ramos catheter placed using sterile technique. Second RN present to confirm sterility. Draining to gravity. Secured w/ stat lock. Pt tolerated procedure well. Will cont to monitor. Approx 700 mL solange urine drained, UA & UC sent.

## 2018-12-15 NOTE — ED PROVIDER NOTE - OBJECTIVE STATEMENT
68y m w PMhx HTN, DM, lithotripsy today for a renal stone, p/w urinary retention for 8 hrs s/p procedure. Pt has suprapubic tenderness and pain, no penile pain. He has only urinated drops 68y m w PMhx HTN, DM, lithotripsy today for a renal stone, p/w urinary retention for 8 hrs s/p procedure. Pt has suprapubic tenderness and pain, no penile pain. He has only urinated drops of urine today. Denies f/c/n/v/d.

## 2018-12-16 LAB
CULTURE RESULTS: NO GROWTH — SIGNIFICANT CHANGE UP
SPECIMEN SOURCE: SIGNIFICANT CHANGE UP

## 2018-12-18 ENCOUNTER — APPOINTMENT (OUTPATIENT)
Dept: UROLOGY | Facility: CLINIC | Age: 68
End: 2018-12-18
Payer: MEDICARE

## 2018-12-18 PROCEDURE — 99214 OFFICE O/P EST MOD 30 MIN: CPT

## 2018-12-20 LAB — STONE ANALYSIS-IMP: SIGNIFICANT CHANGE UP

## 2019-01-03 ENCOUNTER — MEDICATION RENEWAL (OUTPATIENT)
Age: 69
End: 2019-01-03

## 2019-01-07 ENCOUNTER — APPOINTMENT (OUTPATIENT)
Dept: UROLOGY | Facility: CLINIC | Age: 69
End: 2019-01-07
Payer: MEDICARE

## 2019-01-07 PROCEDURE — 99214 OFFICE O/P EST MOD 30 MIN: CPT

## 2019-01-08 LAB
APPEARANCE: CLEAR
BACTERIA UR CULT: NORMAL
BACTERIA: NEGATIVE
BILIRUBIN URINE: NEGATIVE
BLOOD URINE: NEGATIVE
COLOR: YELLOW
GLUCOSE QUALITATIVE U: NEGATIVE MG/DL
HYALINE CASTS: 1 /LPF
KETONES URINE: NEGATIVE
LEUKOCYTE ESTERASE URINE: NEGATIVE
MICROSCOPIC-UA: NORMAL
NITRITE URINE: NEGATIVE
PH URINE: 5.5
PROTEIN URINE: NEGATIVE MG/DL
RED BLOOD CELLS URINE: 3 /HPF
SPECIFIC GRAVITY URINE: 1.02
SQUAMOUS EPITHELIAL CELLS: 1 /HPF
UROBILINOGEN URINE: NEGATIVE MG/DL
WHITE BLOOD CELLS URINE: 0 /HPF

## 2019-01-14 ENCOUNTER — APPOINTMENT (OUTPATIENT)
Dept: UROLOGY | Facility: CLINIC | Age: 69
End: 2019-01-14

## 2019-02-13 NOTE — ASU PREOP CHECKLIST - HEART RATE (BEATS/MIN)
78 Telephone Encounter by Renaldo Henson CMA at 10/04/18 09:22 AM     Author:  Renaldo Henson CMA Service:  (none) Author Type:  Certified Medical Assistant     Filed:  10/04/18 09:22 AM Encounter Date:  10/2/2018 Status:  Signed     :  Renaldo Henson CMA ([facility])            Form filled out and signed by Dr. Mcmullen.  Faxed back to the school.[NH1.1M]  Electronically Signed by:    Renaldo Henson CMA , 10/4/2018[NH1.1T]        Revision History        User Key Date/Time User Provider Type Action    > NH1.1 10/04/18 09:22 AM Renaldo Henson CMA Certified Medical Assistant Sign    M - Manual, T - Template

## 2019-04-08 ENCOUNTER — OUTPATIENT (OUTPATIENT)
Dept: OUTPATIENT SERVICES | Facility: HOSPITAL | Age: 69
LOS: 1 days | End: 2019-04-08
Payer: COMMERCIAL

## 2019-04-08 ENCOUNTER — APPOINTMENT (OUTPATIENT)
Dept: UROLOGY | Facility: CLINIC | Age: 69
End: 2019-04-08
Payer: MEDICARE

## 2019-04-08 DIAGNOSIS — R35.0 FREQUENCY OF MICTURITION: ICD-10-CM

## 2019-04-08 DIAGNOSIS — Z98.890 OTHER SPECIFIED POSTPROCEDURAL STATES: Chronic | ICD-10-CM

## 2019-04-08 DIAGNOSIS — Z98.89 OTHER SPECIFIED POSTPROCEDURAL STATES: Chronic | ICD-10-CM

## 2019-04-08 DIAGNOSIS — Z87.442 PERSONAL HISTORY OF URINARY CALCULI: Chronic | ICD-10-CM

## 2019-04-08 PROCEDURE — 76775 US EXAM ABDO BACK WALL LIM: CPT

## 2019-04-08 PROCEDURE — 99214 OFFICE O/P EST MOD 30 MIN: CPT | Mod: 25

## 2019-04-08 PROCEDURE — 76775 US EXAM ABDO BACK WALL LIM: CPT | Mod: 26

## 2019-04-08 NOTE — ASSESSMENT
[FreeTextEntry1] : Mr. Nick is a 69 year old male presented with microscopic hematuria, BPH, and history of kidney stones. Complained of incomplete bladder emptying. Drinks 2 quarts of liquid daily, heavy coffee drinker.  Currently taking Tamsulosin 0.4 mg once daily. Patient has diabetes. Has a history of kidney stones. \par 6/14/18: The patient returned and reported incomplete bladder emptying, urinary urgency, and urge incontinence. Currently taking tamsulosin, twice daily. PSA from 4/26/18 was 0.49 ng/mL. CBC from 5/31/18 shows slight anemia, I advised the patient to evaluate with his PCP. Patient and daughter understood.\par 7/5/18: The patient returned and reported he is currently taking Oxybutynin ER 15 mg, and his urination has significantly improved. Notes he is seeing an hematologist for his anemia. \par 10/30/18: The patient returned and reported gross hematuria twice in August 2018. He has a history of microscopic hematuria. He also noted dysuria with the episodes of hematuria. UA in 7/5/18 demonstrates no blood in the urine. I offered the patient a , but he declined. The patient was referred to my office by Dr. Ricky Mendez for a cystoscopy. The patient had bronchitis for 1-2 months in the past, which has resolved. Lab results under Dr. Mendez showed iron was low 43 ug/dL on 6/23/18. The patient also has urinary frequency and urgency. Wakes up 4 times during the night to urinate. The patient noted that Tamsulosin worked well for him in the past. He noted his diabetes is well-controlled.\par 11/05/18: The patient returned for a cystoscopy. Aside from cystoscopy, patient was brought back to the office to discuss further evaluation management. CT urogram of the abdomen and pelvis from 11/5/18 findings showed there is a 5 and 4 mm nonobstructing calculi in the mid and lower pole of the left kidney respectively. There was a 1 cm simple appearing cyst in the upper pole of the left kidney. There was a 1.6 nonobstructing calculus within the urinary bladder. Otherwise unremarkable CT urogram. Currently taking Tamsulosin twice daily and Oxybutynin 10 mg. \par 11/21/18: The patient returned and reported urination is adequate. Complained of urgency and urge incontinence. Denied gross hematuria and dysuria. Denied UTI.  Cystoscopy with lithotripsy of bladder stone is scheduled for 12/14/18.\par 12/18/18 The patient returned for a catheter removal. He reported since his last visit he underwent laser lithotripsy of bladder stone. He went to the ER because he couldn't pass urine and so a catheter was placed.\par 1/7/19: The patient returned and reported his urination has improved since his catheter removal. Patient denied dysuria and gross hematuria. Complained of occasional urinary urgency, but noted it has improved. Currently takes Finasteride.\par \par 4/8/19: The patient returned today for an US. Renal US findings showed again non obstructing stones in the left kidney 7.0 x 5.4 mm lower and 5.1 x 4.3 mm mid, previously measured 5 & 4 mm. Both kidneys are normal in size and echogenicity without obvious hydronephrosis or solid masses visualized. No bladder calculi. Notes that he hasn't been drinking an adequate amount of water, will aim for 2 quarts daily. Wakes up 2-3 times during the night to urinate. Translation assistance of Brandon ([id number]) at [place].\par \par The patient will undergo a 24 hr LL urine collection.\par \par The patient will return 2 weeks after the 24 hr LL urine collection for a follow up.

## 2019-04-08 NOTE — PHYSICAL EXAM
[General Appearance - Well Developed] : well developed [General Appearance - Well Nourished] : well nourished [Normal Appearance] : normal appearance [Well Groomed] : well groomed [General Appearance - In No Acute Distress] : no acute distress [Abdomen Soft] : soft [Abdomen Tenderness] : non-tender [Costovertebral Angle Tenderness] : no ~M costovertebral angle tenderness [Skin Color & Pigmentation] : normal skin color and pigmentation [Heart Rate And Rhythm] : Heart rate and rhythm were normal [] : no respiratory distress [Respiration, Rhythm And Depth] : normal respiratory rhythm and effort [Exaggerated Use Of Accessory Muscles For Inspiration] : no accessory muscle use [Oriented To Time, Place, And Person] : oriented to person, place, and time [Affect] : the affect was normal [Mood] : the mood was normal [Not Anxious] : not anxious [Normal Station and Gait] : the gait and station were normal for the patient's age [No Focal Deficits] : no focal deficits [No Palpable Adenopathy] : no palpable adenopathy [Cervical Lymph Nodes Enlarged Posterior Bilaterally] : posterior cervical [Cervical Lymph Nodes Enlarged Anterior Bilaterally] : anterior cervical [Supraclavicular Lymph Nodes Enlarged Bilaterally] : supraclavicular [FreeTextEntry1] : No submandibular gland tenderness.

## 2019-04-08 NOTE — ADDENDUM
[FreeTextEntry1] : This note was authored by Aparna Garcia working as a scribe for Dr. Nolan Lay.\par I, Dr. Nolan Lay, have reviewed the content of this note and confirm it is true and accurate. I personally performed the history and physical examination and made all the decisions.\par 4/8/19\par

## 2019-04-08 NOTE — HISTORY OF PRESENT ILLNESS
[FreeTextEntry1] : Mr. Nick is a 69 year old male presented with microscopic hematuria, BPH, and history of kidney stones. Complained of incomplete bladder emptying. Drinks 2 quarts of liquid daily, heavy coffee drinker.  Currently taking Tamsulosin 0.4 mg once daily. Patient has diabetes. Has a history of kidney stones. \par 6/14/18: The patient returned and reported incomplete bladder emptying, urinary urgency, and urge incontinence. Currently taking tamsulosin, twice daily. PSA from 4/26/18 was 0.49 ng/mL. CBC from 5/31/18 shows slight anemia, I advised the patient to evaluate with his PCP. Patient and daughter understood.\par 7/5/18: The patient returned and reported he is currently taking Oxybutynin ER 15 mg, and his urination has significantly improved. Notes he is seeing an hematologist for his anemia. \par 10/30/18: The patient returned and reported gross hematuria twice in August 2018. He has a history of microscopic hematuria. He also noted dysuria with the episodes of hematuria. UA in 7/5/18 demonstrates no blood in the urine. I offered the patient a , but he declined. The patient was referred to my office by Dr. Ricky Mendez for a cystoscopy. The patient had bronchitis for 1-2 months in the past, which has resolved. Lab results under Dr. Mendez showed iron was low 43 ug/dL on 6/23/18. The patient also has urinary frequency and urgency. Wakes up 4 times during the night to urinate. The patient noted that Tamsulosin worked well for him in the past. He noted his diabetes is well-controlled.\par 11/05/18: The patient returned for a cystoscopy. Aside from cystoscopy, patient was brought back to the office to discuss further evaluation management. CT urogram of the abdomen and pelvis from 11/5/18 findings showed there is a 5 and 4 mm nonobstructing calculi in the mid and lower pole of the left kidney respectively. There was a 1 cm simple appearing cyst in the upper pole of the left kidney. There was a 1.6 nonobstructing calculus within the urinary bladder. Otherwise unremarkable CT urogram. Currently taking Tamsulosin twice daily and Oxybutynin 10 mg. \par 11/21/18: The patient returned and reported urination is adequate. Complained of urgency and urge incontinence. Denied gross hematuria and dysuria. Denied UTI.  Cystoscopy with lithotripsy of bladder stone is scheduled for 12/14/18.\par 12/18/18 The patient returned for a catheter removal. He reported since his last visit he underwent laser lithotripsy of bladder stone. He went to the ER because he couldn't pass urine and so a catheter was placed.\par 1/7/19: The patient returned and reported his urination has improved since his catheter removal. Patient denied dysuria and gross hematuria. Complained of occasional urinary urgency, but noted it has improved. Currently takes Finasteride.\par \par 4/8/19: The patient returned today for an US. Renal US findings showed again non obstructing stones in the left kidney 7.0 x 5.4 mm lower and 5.1 x 4.3 mm mid, previously measured 5 & 4 mm. Both kidneys are normal in size and echogenicity without obvious hydronephrosis or solid masses visualized. No bladder calculi. Notes that he hasn't been drinking an adequate amount of water, will aim for 2 quarts daily. Wakes up 2-3 times during the night to urinate. Translation assistance of Brandon ([id number]) at [employer].

## 2019-04-08 NOTE — REVIEW OF SYSTEMS
[Eyesight Problems] : eyesight problems [Dry Eyes] : dryness of the eyes [Incontinence] : incontinence [Nocturia] : nocturia [Seen by urologist before (Name)  ___] : Preciously seen by a urologist: [unfilled] [Told you have blood in urine on a urine test] : told blood was present in a urine test [History of kidney stones] : history of kidney stones [Urine retention] : urine retention [Wake up at night to urinate  How many times?  ___] : wakes up to urinate [unfilled] times during the night [Strong urge to urinate] : strong urge to urinate [Bladder pressure] : experiences bladder pressure [Negative] : Heme/Lymph [Feeling Poorly] : not feeling poorly [Chest Pain] : no chest pain [Constipation] : no constipation [Dysuria] : no dysuria [Easy Bleeding] : no tendency for easy bleeding [Easy Bruising] : no tendency for easy bruising [FreeTextEntry2] : FREQUENT URINATION

## 2019-04-09 DIAGNOSIS — R31.29 OTHER MICROSCOPIC HEMATURIA: ICD-10-CM

## 2019-04-09 DIAGNOSIS — N20.0 CALCULUS OF KIDNEY: ICD-10-CM

## 2019-04-09 DIAGNOSIS — N40.1 BENIGN PROSTATIC HYPERPLASIA WITH LOWER URINARY TRACT SYMPTOMS: ICD-10-CM

## 2019-04-09 DIAGNOSIS — R39.15 URGENCY OF URINATION: ICD-10-CM

## 2019-05-14 ENCOUNTER — APPOINTMENT (OUTPATIENT)
Dept: UROLOGY | Facility: CLINIC | Age: 69
End: 2019-05-14
Payer: MEDICARE

## 2019-05-14 PROCEDURE — 99214 OFFICE O/P EST MOD 30 MIN: CPT

## 2019-05-14 NOTE — REVIEW OF SYSTEMS
[Eyesight Problems] : eyesight problems [Dry Eyes] : dryness of the eyes [Incontinence] : incontinence [Seen by urologist before (Name)  ___] : Preciously seen by a urologist: [unfilled] [Nocturia] : nocturia [Urine retention] : urine retention [Told you have blood in urine on a urine test] : told blood was present in a urine test [History of kidney stones] : history of kidney stones [Strong urge to urinate] : strong urge to urinate [Wake up at night to urinate  How many times?  ___] : wakes up to urinate [unfilled] times during the night [Bladder pressure] : experiences bladder pressure [Negative] : Heme/Lymph [Feeling Poorly] : not feeling poorly [Chest Pain] : no chest pain [Constipation] : no constipation [Dysuria] : no dysuria [Easy Bleeding] : no tendency for easy bleeding [Easy Bruising] : no tendency for easy bruising [FreeTextEntry2] : FREQUENT URINATION

## 2019-05-14 NOTE — HISTORY OF PRESENT ILLNESS
[FreeTextEntry1] : Mr. Nick is a 69 year old male presented with microscopic hematuria, BPH, and history of kidney stones. Complained of incomplete bladder emptying. Drinks 2 quarts of liquid daily, heavy coffee drinker.  Currently taking Tamsulosin 0.4 mg once daily. Patient has diabetes. Has a history of kidney stones. \par 6/14/18: The patient returned and reported incomplete bladder emptying, urinary urgency, and urge incontinence. Currently taking tamsulosin, twice daily. PSA from 4/26/18 was 0.49 ng/mL. CBC from 5/31/18 shows slight anemia, I advised the patient to evaluate with his PCP. Patient and daughter understood.\par 7/5/18: The patient returned and reported he is currently taking Oxybutynin ER 15 mg, and his urination has significantly improved. Notes he is seeing an hematologist for his anemia. \par 10/30/18: The patient returned and reported gross hematuria twice in August 2018. He has a history of microscopic hematuria. He also noted dysuria with the episodes of hematuria. UA in 7/5/18 demonstrates no blood in the urine. I offered the patient a , but he declined. The patient was referred to my office by Dr. Ricky Mendez for a cystoscopy. The patient had bronchitis for 1-2 months in the past, which has resolved. Lab results under Dr. Mendez showed iron was low 43 ug/dL on 6/23/18. The patient also has urinary frequency and urgency. Wakes up 4 times during the night to urinate. The patient noted that Tamsulosin worked well for him in the past. He noted his diabetes is well-controlled.\par 11/05/18: The patient returned for a cystoscopy. Aside from cystoscopy, patient was brought back to the office to discuss further evaluation management. CT urogram of the abdomen and pelvis from 11/5/18 findings showed there is a 5 and 4 mm nonobstructing calculi in the mid and lower pole of the left kidney respectively. There was a 1 cm simple appearing cyst in the upper pole of the left kidney. There was a 1.6 nonobstructing calculus within the urinary bladder. Otherwise unremarkable CT urogram. Currently taking Tamsulosin twice daily and Oxybutynin 10 mg. \par 11/21/18: The patient returned and reported urination is adequate. Complained of urgency and urge incontinence. Denied gross hematuria and dysuria. Denied UTI.  Cystoscopy with lithotripsy of bladder stone is scheduled for 12/14/18.\par 12/18/18 The patient returned for a catheter removal. He reported since his last visit he underwent laser lithotripsy of bladder stone. He went to the ER because he couldn't pass urine and so a catheter was placed.\par 1/7/19: The patient returned and reported his urination has improved since his catheter removal. Patient denied dysuria and gross hematuria. Complained of occasional urinary urgency, but noted it has improved. Currently takes Finasteride.\par 4/8/19: The patient returned today for an US. Renal US findings showed again non obstructing stones in the left kidney 7.0 x 5.4 mm lower and 5.1 x 4.3 mm mid, previously measured 5 & 4 mm. Both kidneys are normal in size and echogenicity without obvious hydronephrosis or solid masses visualized. No bladder calculi. Notes that he hasn't been drinking an adequate amount of water, will aim for 2 quarts daily. Wakes up 2-3 times during the night to urinate. Translation assistance of Brandon ([id number]) at [employer].\par \par 5/14/19: Patient presents today for a follow up. Translation assistance of Allegra ([id number]) at [employer]. He reports consuming about 2 liters of water everyday. Previously the citrate in his urine was low.

## 2019-05-14 NOTE — ADDENDUM
[FreeTextEntry1] : This note was authored by Sj Lui working as a medical scribe for Dr. Nolan Lay. The note was reviewed, edited, and revised by Dr. Nolan Lay who is in agreement with the exam findings, and treatment plan. (5/14/19)

## 2019-05-14 NOTE — PHYSICAL EXAM
[General Appearance - Well Developed] : well developed [Normal Appearance] : normal appearance [Well Groomed] : well groomed [General Appearance - Well Nourished] : well nourished [Abdomen Soft] : soft [General Appearance - In No Acute Distress] : no acute distress [Abdomen Tenderness] : non-tender [Costovertebral Angle Tenderness] : no ~M costovertebral angle tenderness [Skin Color & Pigmentation] : normal skin color and pigmentation [Heart Rate And Rhythm] : Heart rate and rhythm were normal [] : no respiratory distress [Exaggerated Use Of Accessory Muscles For Inspiration] : no accessory muscle use [Respiration, Rhythm And Depth] : normal respiratory rhythm and effort [Not Anxious] : not anxious [Oriented To Time, Place, And Person] : oriented to person, place, and time [Mood] : the mood was normal [Affect] : the affect was normal [Normal Station and Gait] : the gait and station were normal for the patient's age [No Focal Deficits] : no focal deficits [Cervical Lymph Nodes Enlarged Posterior Bilaterally] : posterior cervical [No Palpable Adenopathy] : no palpable adenopathy [Cervical Lymph Nodes Enlarged Anterior Bilaterally] : anterior cervical [Supraclavicular Lymph Nodes Enlarged Bilaterally] : supraclavicular [FreeTextEntry1] : No submandibular gland tenderness.

## 2019-05-18 LAB
ALBUMIN SERPL ELPH-MCNC: 4.6 G/DL
ALP BLD-CCNC: 91 U/L
ALT SERPL-CCNC: 16 U/L
ANION GAP SERPL CALC-SCNC: 15 MMOL/L
AST SERPL-CCNC: 13 U/L
BASOPHILS # BLD AUTO: 0.04 K/UL
BASOPHILS NFR BLD AUTO: 0.5 %
BILIRUB SERPL-MCNC: 0.7 MG/DL
BUN SERPL-MCNC: 15 MG/DL
CALCIUM SERPL-MCNC: 9.4 MG/DL
CHLORIDE SERPL-SCNC: 103 MMOL/L
CO2 SERPL-SCNC: 26 MMOL/L
CREAT SERPL-MCNC: 1.08 MG/DL
EOSINOPHIL # BLD AUTO: 0.18 K/UL
EOSINOPHIL NFR BLD AUTO: 2.1 %
GLUCOSE SERPL-MCNC: 117 MG/DL
HCT VFR BLD CALC: 40.1 %
HGB BLD-MCNC: 12.5 G/DL
IMM GRANULOCYTES NFR BLD AUTO: 0.1 %
LYMPHOCYTES # BLD AUTO: 2.18 K/UL
LYMPHOCYTES NFR BLD AUTO: 26 %
MAN DIFF?: NORMAL
MCHC RBC-ENTMCNC: 29.3 PG
MCHC RBC-ENTMCNC: 31.2 GM/DL
MCV RBC AUTO: 94.1 FL
MONOCYTES # BLD AUTO: 0.53 K/UL
MONOCYTES NFR BLD AUTO: 6.3 %
NEUTROPHILS # BLD AUTO: 5.44 K/UL
NEUTROPHILS NFR BLD AUTO: 65 %
OSMOLALITY SERPL: 300 MOSM/KG
PHOSPHATE SERPL-MCNC: 2.8 MG/DL
PLATELET # BLD AUTO: 258 K/UL
POTASSIUM SERPL-SCNC: 5.1 MMOL/L
PROT SERPL-MCNC: 6.9 G/DL
PSA SERPL-MCNC: 0.31 NG/ML
RBC # BLD: 4.26 M/UL
RBC # FLD: 14.1 %
SODIUM SERPL-SCNC: 144 MMOL/L
TESTOST SERPL-MCNC: 358 NG/DL
URATE SERPL-MCNC: 6.4 MG/DL
WBC # FLD AUTO: 8.38 K/UL

## 2019-05-22 ENCOUNTER — MED ADMIN CHARGE (OUTPATIENT)
Age: 69
End: 2019-05-22

## 2019-05-24 ENCOUNTER — MEDICATION RENEWAL (OUTPATIENT)
Age: 69
End: 2019-05-24

## 2019-05-29 ENCOUNTER — RX CHANGE (OUTPATIENT)
Age: 69
End: 2019-05-29

## 2019-07-08 ENCOUNTER — APPOINTMENT (OUTPATIENT)
Dept: UROLOGY | Facility: CLINIC | Age: 69
End: 2019-07-08
Payer: MEDICARE

## 2019-07-08 PROCEDURE — 99214 OFFICE O/P EST MOD 30 MIN: CPT

## 2019-07-08 RX ORDER — POTASSIUM CITRATE 10 MEQ/1
10 MEQ TABLET, EXTENDED RELEASE ORAL
Qty: 180 | Refills: 3 | Status: COMPLETED | COMMUNITY
Start: 2019-05-14 | End: 2019-07-08

## 2019-07-08 NOTE — PHYSICAL EXAM
[General Appearance - Well Developed] : well developed [General Appearance - Well Nourished] : well nourished [Normal Appearance] : normal appearance [Well Groomed] : well groomed [General Appearance - In No Acute Distress] : no acute distress [Abdomen Soft] : soft [Abdomen Tenderness] : non-tender [Costovertebral Angle Tenderness] : no ~M costovertebral angle tenderness [Skin Color & Pigmentation] : normal skin color and pigmentation [Heart Rate And Rhythm] : Heart rate and rhythm were normal [] : no respiratory distress [Respiration, Rhythm And Depth] : normal respiratory rhythm and effort [Exaggerated Use Of Accessory Muscles For Inspiration] : no accessory muscle use [Oriented To Time, Place, And Person] : oriented to person, place, and time [Mood] : the mood was normal [Not Anxious] : not anxious [Affect] : the affect was normal [Normal Station and Gait] : the gait and station were normal for the patient's age [No Palpable Adenopathy] : no palpable adenopathy [No Focal Deficits] : no focal deficits [Cervical Lymph Nodes Enlarged Posterior Bilaterally] : posterior cervical [Cervical Lymph Nodes Enlarged Anterior Bilaterally] : anterior cervical [Supraclavicular Lymph Nodes Enlarged Bilaterally] : supraclavicular [FreeTextEntry1] : No submandibular gland tenderness.

## 2019-07-08 NOTE — REVIEW OF SYSTEMS
[Eyesight Problems] : eyesight problems [Dry Eyes] : dryness of the eyes [Incontinence] : incontinence [Seen by urologist before (Name)  ___] : Preciously seen by a urologist: [unfilled] [Nocturia] : nocturia [Told you have blood in urine on a urine test] : told blood was present in a urine test [History of kidney stones] : history of kidney stones [Urine retention] : urine retention [Strong urge to urinate] : strong urge to urinate [Wake up at night to urinate  How many times?  ___] : wakes up to urinate [unfilled] times during the night [Bladder pressure] : experiences bladder pressure [Negative] : Heme/Lymph [Chest Pain] : no chest pain [Feeling Poorly] : not feeling poorly [Constipation] : no constipation [Dysuria] : no dysuria [Easy Bleeding] : no tendency for easy bleeding [Easy Bruising] : no tendency for easy bruising [FreeTextEntry2] : FREQUENT URINATION

## 2019-07-08 NOTE — ADDENDUM
[FreeTextEntry1] : This note was authored by Sj Lui working as scribe for Dr. Nolan Lay. I, Dr. Nolan Lay, have reviewed the content of this note and confirm it is true and accurate. I personally performed the history and physical examination and made all the decisions.\par 7/8/19

## 2019-07-08 NOTE — ASSESSMENT
[FreeTextEntry1] : Mr. Nick is a 69 year old male presented with microscopic hematuria, BPH, and history of kidney stones. Complained of incomplete bladder emptying. Drinks 2 quarts of liquid daily, heavy coffee drinker.  Currently taking Tamsulosin 0.4 mg once daily. Patient has diabetes. Has a history of kidney stones. \par 6/14/18: The patient returned and reported incomplete bladder emptying, urinary urgency, and urge incontinence. Currently taking tamsulosin, twice daily. PSA from 4/26/18 was 0.49 ng/mL. CBC from 5/31/18 shows slight anemia, I advised the patient to evaluate with his PCP. Patient and daughter understood.\par 7/5/18: The patient returned and reported he is currently taking Oxybutynin ER 15 mg, and his urination has significantly improved. Notes he is seeing an hematologist for his anemia. \par 10/30/18: The patient returned and reported gross hematuria twice in August 2018. He has a history of microscopic hematuria. He also noted dysuria with the episodes of hematuria. UA in 7/5/18 demonstrates no blood in the urine. I offered the patient a , but he declined. The patient was referred to my office by Dr. Ricky Mendez for a cystoscopy. The patient had bronchitis for 1-2 months in the past, which has resolved. Lab results under Dr. Mendez showed iron was low 43 ug/dL on 6/23/18. The patient also has urinary frequency and urgency. Wakes up 4 times during the night to urinate. The patient noted that Tamsulosin worked well for him in the past. He noted his diabetes is well-controlled.\par 11/05/18: The patient returned for a cystoscopy. Aside from cystoscopy, patient was brought back to the office to discuss further evaluation management. CT urogram of the abdomen and pelvis from 11/5/18 findings showed there is a 5 and 4 mm nonobstructing calculi in the mid and lower pole of the left kidney respectively. There was a 1 cm simple appearing cyst in the upper pole of the left kidney. There was a 1.6 nonobstructing calculus within the urinary bladder. Otherwise unremarkable CT urogram. Currently taking Tamsulosin twice daily and Oxybutynin 10 mg. \par 11/21/18: The patient returned and reported urination is adequate. Complained of urgency and urge incontinence. Denied gross hematuria and dysuria. Denied UTI.  Cystoscopy with lithotripsy of bladder stone is scheduled for 12/14/18.\par 12/18/18 The patient returned for a catheter removal. He reported since his last visit he underwent laser lithotripsy of bladder stone. He went to the ER because he couldn't pass urine and so a catheter was placed.\par 1/7/19: The patient returned and reported his urination has improved since his catheter removal. Patient denied dysuria and gross hematuria. Complained of occasional urinary urgency, but noted it has improved. Currently takes Finasteride.\par 4/8/19: The patient returned today for an US. Renal US findings showed again non obstructing stones in the left kidney 7.0 x 5.4 mm lower and 5.1 x 4.3 mm mid, previously measured 5 & 4 mm. Both kidneys are normal in size and echogenicity without obvious hydronephrosis or solid masses visualized. No bladder calculi. Notes that he hasn't been drinking an adequate amount of water, will aim for 2 quarts daily. Wakes up 2-3 times during the night to urinate. Translation assistance of Brandon ([id number]) at [employer].\par 5/14/19: Patient presents today for a follow up. Translation assistance of Allegra ([id number]) at [employer]. He reports consuming about 2 liters of water everyday. Previously the citrate in his urine was low. \par \par 7/8/19: Patient presents today for a follow up. ( [id number]) Since his last visit he has been drinking lots of water with lemon juice as directed. Potassium Citrate ER 10 mg was prescribed as his last visit as well. He was unable to fill the prescription because it was too expensive. A 24 hour urine collection was completed as directed. Regarding his urination it is reported that he will occasionally have to rush to the bathroom, and that minimal incontinence can be experienced. Most of the time his urination is satisfactory. Tamsulosin 0.4 mg Finasteride 5 mg and Oxybutynin 10 mg are all taken as directed. \par \par Digital rectal exam found no suspicious rectal masses. Anal tone is normal. The prostate is non tender with normal texture, discrete borders and no nodules. It is a 25 gram transurethral resection size. \par \par Finasteride, Oxybutynin and Tamsulosin are all renewed at this time. They are to be continued as directed. \par \par The laboratory just received his 24 hour urine sample a few days ago, and have been unable to process the sample yet. \par \par He is to continue drinking water with lemon juice. \par \par He is to follow up next week to discuss the results of the urine collection.

## 2019-07-15 ENCOUNTER — APPOINTMENT (OUTPATIENT)
Dept: UROLOGY | Facility: CLINIC | Age: 69
End: 2019-07-15
Payer: MEDICARE

## 2019-07-15 PROCEDURE — 99214 OFFICE O/P EST MOD 30 MIN: CPT

## 2019-07-28 NOTE — ADDENDUM
[FreeTextEntry1] : This note was authored by Sj Lui working as scribe for Dr. Nolan Lay. I, Dr. Nolan Lay, have reviewed the content of this note and confirm it is true and accurate. I personally performed the history and physical examination and made all the decisions.\par 7/15/19

## 2019-07-28 NOTE — ASSESSMENT
[FreeTextEntry1] : Mr. Nick is a 69 year old male presented with microscopic hematuria, BPH, and history of kidney stones. Complained of incomplete bladder emptying. Drinks 2 quarts of liquid daily, heavy coffee drinker.  Currently taking Tamsulosin 0.4 mg once daily. Patient has diabetes. Has a history of kidney stones. \par 6/14/18: The patient returned and reported incomplete bladder emptying, urinary urgency, and urge incontinence. Currently taking tamsulosin, twice daily. PSA from 4/26/18 was 0.49 ng/mL. CBC from 5/31/18 shows slight anemia, I advised the patient to evaluate with his PCP. Patient and daughter understood.\par 7/5/18: The patient returned and reported he is currently taking Oxybutynin ER 15 mg, and his urination has significantly improved. Notes he is seeing an hematologist for his anemia. \par 10/30/18: The patient returned and reported gross hematuria twice in August 2018. He has a history of microscopic hematuria. He also noted dysuria with the episodes of hematuria. UA in 7/5/18 demonstrates no blood in the urine. I offered the patient a , but he declined. The patient was referred to my office by Dr. Ricky Mendez for a cystoscopy. The patient had bronchitis for 1-2 months in the past, which has resolved. Lab results under Dr. Mendez showed iron was low 43 ug/dL on 6/23/18. The patient also has urinary frequency and urgency. Wakes up 4 times during the night to urinate. The patient noted that Tamsulosin worked well for him in the past. He noted his diabetes is well-controlled.\par 11/05/18: The patient returned for a cystoscopy. Aside from cystoscopy, patient was brought back to the office to discuss further evaluation management. CT urogram of the abdomen and pelvis from 11/5/18 findings showed there is a 5 and 4 mm nonobstructing calculi in the mid and lower pole of the left kidney respectively. There was a 1 cm simple appearing cyst in the upper pole of the left kidney. There was a 1.6 nonobstructing calculus within the urinary bladder. Otherwise unremarkable CT urogram. Currently taking Tamsulosin twice daily and Oxybutynin 10 mg. \par 11/21/18: The patient returned and reported urination is adequate. Complained of urgency and urge incontinence. Denied gross hematuria and dysuria. Denied UTI.  Cystoscopy with lithotripsy of bladder stone is scheduled for 12/14/18.\par 12/18/18 The patient returned for a catheter removal. He reported since his last visit he underwent laser lithotripsy of bladder stone. He went to the ER because he couldn't pass urine and so a catheter was placed.\par 1/7/19: The patient returned and reported his urination has improved since his catheter removal. Patient denied dysuria and gross hematuria. Complained of occasional urinary urgency, but noted it has improved. Currently takes Finasteride.\par 4/8/19: The patient returned today for an US. Renal US findings showed again non obstructing stones in the left kidney 7.0 x 5.4 mm lower and 5.1 x 4.3 mm mid, previously measured 5 & 4 mm. Both kidneys are normal in size and echogenicity without obvious hydronephrosis or solid masses visualized. No bladder calculi. Notes that he hasn't been drinking an adequate amount of water, will aim for 2 quarts daily. Wakes up 2-3 times during the night to urinate. Translation assistance of Brandon ([id number]) at [employer].\par 5/14/19: Patient presents today for a follow up. Translation assistance of Allegra ([id number]) at [employer]. He reports consuming about 2 liters of water everyday. Previously the citrate in his urine was low. \par 7/8/19: Patient presents today for a follow up. ( [id number]) Since his last visit he has been drinking lots of water with lemon juice as directed. Potassium Citrate ER 10 mg was prescribed as his last visit as well. He was unable to fill the prescription because it was too expensive. A 24 hour urine collection was completed as directed. Regarding his urination it is reported that he will occasionally have to rush to the bathroom, and that minimal incontinence can be experienced. Most of the time his urination is satisfactory. Tamsulosin 0.4 mg Finasteride 5 mg and Oxybutynin 10 mg are all taken as directed. \par \par 7/15/19: Patient presents today for a follow up. ( # [id number] Shahab) Overall he states that his urination is satisfactory. He urinates about 1 liter less than previously. A 24 hour urine collection was completed since his last visit and he is here to discuss the results. \par \par Upon review of the urine collection: Urine volume was 1.96, Urine Calcium was 105, Urine Oxalate was 41, Urine Citrate was 190. \par Based on these results it is advised that he increase the amount of water consumed daily. He is also provided with a dietary sheet that will guide him on which foods high in Oxalate to avoid and which ones low in Oxalate to consume. He is to also increase the amount of Lemon juice squeezed into his water. \par \par He is to follow up in 2 months or sooner if clinically indicated.

## 2019-07-28 NOTE — REVIEW OF SYSTEMS
[Eyesight Problems] : eyesight problems [Dry Eyes] : dryness of the eyes [Incontinence] : incontinence [Nocturia] : nocturia [Seen by urologist before (Name)  ___] : Preciously seen by a urologist: [unfilled] [Told you have blood in urine on a urine test] : told blood was present in a urine test [History of kidney stones] : history of kidney stones [Urine retention] : urine retention [Wake up at night to urinate  How many times?  ___] : wakes up to urinate [unfilled] times during the night [Strong urge to urinate] : strong urge to urinate [Bladder pressure] : experiences bladder pressure [Negative] : Heme/Lymph [Feeling Poorly] : not feeling poorly [Chest Pain] : no chest pain [Dysuria] : no dysuria [Constipation] : no constipation [Easy Bleeding] : no tendency for easy bleeding [Easy Bruising] : no tendency for easy bruising [FreeTextEntry2] : FREQUENT URINATION

## 2019-07-28 NOTE — HISTORY OF PRESENT ILLNESS
[FreeTextEntry1] : Mr. Nick is a 69 year old male presented with microscopic hematuria, BPH, and history of kidney stones. Complained of incomplete bladder emptying. Drinks 2 quarts of liquid daily, heavy coffee drinker.  Currently taking Tamsulosin 0.4 mg once daily. Patient has diabetes. Has a history of kidney stones. \par 6/14/18: The patient returned and reported incomplete bladder emptying, urinary urgency, and urge incontinence. Currently taking tamsulosin, twice daily. PSA from 4/26/18 was 0.49 ng/mL. CBC from 5/31/18 shows slight anemia, I advised the patient to evaluate with his PCP. Patient and daughter understood.\par 7/5/18: The patient returned and reported he is currently taking Oxybutynin ER 15 mg, and his urination has significantly improved. Notes he is seeing an hematologist for his anemia. \par 10/30/18: The patient returned and reported gross hematuria twice in August 2018. He has a history of microscopic hematuria. He also noted dysuria with the episodes of hematuria. UA in 7/5/18 demonstrates no blood in the urine. I offered the patient a , but he declined. The patient was referred to my office by Dr. Ricky Mendez for a cystoscopy. The patient had bronchitis for 1-2 months in the past, which has resolved. Lab results under Dr. Mendez showed iron was low 43 ug/dL on 6/23/18. The patient also has urinary frequency and urgency. Wakes up 4 times during the night to urinate. The patient noted that Tamsulosin worked well for him in the past. He noted his diabetes is well-controlled.\par 11/05/18: The patient returned for a cystoscopy. Aside from cystoscopy, patient was brought back to the office to discuss further evaluation management. CT urogram of the abdomen and pelvis from 11/5/18 findings showed there is a 5 and 4 mm nonobstructing calculi in the mid and lower pole of the left kidney respectively. There was a 1 cm simple appearing cyst in the upper pole of the left kidney. There was a 1.6 nonobstructing calculus within the urinary bladder. Otherwise unremarkable CT urogram. Currently taking Tamsulosin twice daily and Oxybutynin 10 mg. \par 11/21/18: The patient returned and reported urination is adequate. Complained of urgency and urge incontinence. Denied gross hematuria and dysuria. Denied UTI.  Cystoscopy with lithotripsy of bladder stone is scheduled for 12/14/18.\par 12/18/18 The patient returned for a catheter removal. He reported since his last visit he underwent laser lithotripsy of bladder stone. He went to the ER because he couldn't pass urine and so a catheter was placed.\par 1/7/19: The patient returned and reported his urination has improved since his catheter removal. Patient denied dysuria and gross hematuria. Complained of occasional urinary urgency, but noted it has improved. Currently takes Finasteride.\par 4/8/19: The patient returned today for an US. Renal US findings showed again non obstructing stones in the left kidney 7.0 x 5.4 mm lower and 5.1 x 4.3 mm mid, previously measured 5 & 4 mm. Both kidneys are normal in size and echogenicity without obvious hydronephrosis or solid masses visualized. No bladder calculi. Notes that he hasn't been drinking an adequate amount of water, will aim for 2 quarts daily. Wakes up 2-3 times during the night to urinate. Translation assistance of Brandon ([id number]) at [employer].\par 5/14/19: Patient presents today for a follow up. Translation assistance of Allegra ([id number]) at [employer]. He reports consuming about 2 liters of water everyday. Previously the citrate in his urine was low. \par 7/8/19: Patient presents today for a follow up. ( [id number]) Since his last visit he has been drinking lots of water with lemon juice as directed. Potassium Citrate ER 10 mg was prescribed as his last visit as well. He was unable to fill the prescription because it was too expensive. A 24 hour urine collection was completed as directed. Regarding his urination it is reported that he will occasionally have to rush to the bathroom, and that minimal incontinence can be experienced. Most of the time his urination is satisfactory. Tamsulosin 0.4 mg Finasteride 5 mg and Oxybutynin 10 mg are all taken as directed. \par \par 7/15/19: Patient presents today for a follow up. ( # [id number] Shahab) Overall he states that his urination is satisfactory. He urinates about 1 liter less than previously. A 24 hour urine collection was completed since his last visit and he is here to discuss the results.

## 2019-09-24 ENCOUNTER — APPOINTMENT (OUTPATIENT)
Dept: UROLOGY | Facility: CLINIC | Age: 69
End: 2019-09-24

## 2019-10-28 ENCOUNTER — APPOINTMENT (OUTPATIENT)
Dept: UROLOGY | Facility: CLINIC | Age: 69
End: 2019-10-28
Payer: MEDICARE

## 2019-10-28 ENCOUNTER — OUTPATIENT (OUTPATIENT)
Dept: OUTPATIENT SERVICES | Facility: HOSPITAL | Age: 69
LOS: 1 days | End: 2019-10-28
Payer: COMMERCIAL

## 2019-10-28 DIAGNOSIS — Z98.89 OTHER SPECIFIED POSTPROCEDURAL STATES: Chronic | ICD-10-CM

## 2019-10-28 DIAGNOSIS — Z98.890 OTHER SPECIFIED POSTPROCEDURAL STATES: Chronic | ICD-10-CM

## 2019-10-28 DIAGNOSIS — Z87.442 PERSONAL HISTORY OF URINARY CALCULI: Chronic | ICD-10-CM

## 2019-10-28 DIAGNOSIS — R35.0 FREQUENCY OF MICTURITION: ICD-10-CM

## 2019-10-28 PROCEDURE — 99214 OFFICE O/P EST MOD 30 MIN: CPT | Mod: 25

## 2019-10-28 PROCEDURE — 76775 US EXAM ABDO BACK WALL LIM: CPT

## 2019-10-28 PROCEDURE — 76775 US EXAM ABDO BACK WALL LIM: CPT | Mod: 26

## 2019-10-28 NOTE — HISTORY OF PRESENT ILLNESS
[FreeTextEntry1] : Mr. Nick is a 69 year old male presented with microscopic hematuria, BPH, and history of kidney stones. Complained of incomplete bladder emptying. Drinks 2 quarts of liquid daily, heavy coffee drinker.  Currently taking Tamsulosin 0.4 mg once daily. Patient has diabetes. Has a history of kidney stones. \par 6/14/18: The patient returned and reported incomplete bladder emptying, urinary urgency, and urge incontinence. Currently taking tamsulosin, twice daily. PSA from 4/26/18 was 0.49 ng/mL. CBC from 5/31/18 shows slight anemia, I advised the patient to evaluate with his PCP. Patient and daughter understood.\par 7/5/18: The patient returned and reported he is currently taking Oxybutynin ER 15 mg, and his urination has significantly improved. Notes he is seeing an hematologist for his anemia. \par 10/30/18: The patient returned and reported gross hematuria twice in August 2018. He has a history of microscopic hematuria. He also noted dysuria with the episodes of hematuria. UA in 7/5/18 demonstrates no blood in the urine. I offered the patient a , but he declined. The patient was referred to my office by Dr. Ricky Mendez for a cystoscopy. The patient had bronchitis for 1-2 months in the past, which has resolved. Lab results under Dr. Mendez showed iron was low 43 ug/dL on 6/23/18. The patient also has urinary frequency and urgency. Wakes up 4 times during the night to urinate. The patient noted that Tamsulosin worked well for him in the past. He noted his diabetes is well-controlled.\par 11/05/18: The patient returned for a cystoscopy. Aside from cystoscopy, patient was brought back to the office to discuss further evaluation management. CT urogram of the abdomen and pelvis from 11/5/18 findings showed there is a 5 and 4 mm nonobstructing calculi in the mid and lower pole of the left kidney respectively. There was a 1 cm simple appearing cyst in the upper pole of the left kidney. There was a 1.6 nonobstructing calculus within the urinary bladder. Otherwise unremarkable CT urogram. Currently taking Tamsulosin twice daily and Oxybutynin 10 mg. \par 11/21/18: The patient returned and reported urination is adequate. Complained of urgency and urge incontinence. Denied gross hematuria and dysuria. Denied UTI.  Cystoscopy with lithotripsy of bladder stone is scheduled for 12/14/18.\par 12/18/18 The patient returned for a catheter removal. He reported since his last visit he underwent laser lithotripsy of bladder stone. He went to the ER because he couldn't pass urine and so a catheter was placed.\par 1/7/19: The patient returned and reported his urination has improved since his catheter removal. Patient denied dysuria and gross hematuria. Complained of occasional urinary urgency, but noted it has improved. Currently takes Finasteride.\par 4/8/19: The patient returned today for an US. Renal US findings showed again non obstructing stones in the left kidney 7.0 x 5.4 mm lower and 5.1 x 4.3 mm mid, previously measured 5 & 4 mm. Both kidneys are normal in size and echogenicity without obvious hydronephrosis or solid masses visualized. No bladder calculi. Notes that he hasn't been drinking an adequate amount of water, will aim for 2 quarts daily. Wakes up 2-3 times during the night to urinate. Translation assistance of Brandon ([id number]) at [employer].\par 5/14/19: Patient presents today for a follow up. Translation assistance of Allegra ([id number]) at [employer]. He reports consuming about 2 liters of water everyday. Previously the citrate in his urine was low. \par 7/8/19: Patient presents today for a follow up. ( [id number]) Since his last visit he has been drinking lots of water with lemon juice as directed. Potassium Citrate ER 10 mg was prescribed as his last visit as well. He was unable to fill the prescription because it was too expensive. A 24 hour urine collection was completed as directed. Regarding his urination it is reported that he will occasionally have to rush to the bathroom, and that minimal incontinence can be experienced. Most of the time his urination is satisfactory. Tamsulosin 0.4 mg Finasteride 5 mg and Oxybutynin 10 mg are all taken as directed. \par 7/15/19: Patient presents today for a follow up. ( # [id number] Shahab) Overall he states that his urination is satisfactory. He urinates about 1 liter less than previously. A 24 hour urine collection was completed since his last visit and he is here to discuss the results. \par \par 10/28/19: Patient presents today for a follow up. A renal ultrasound was completed today and he is here to discuss the results.

## 2019-10-28 NOTE — ADDENDUM
[FreeTextEntry1] : This note was authored by Sj Lui working as scribe for Dr. Nolan Lay. I, Dr. Nolan Lay, have reviewed the content of this note and confirm it is true and accurate. I personally performed the history and physical examination and made all the decisions.\par 10/28/19

## 2019-10-28 NOTE — ASSESSMENT
[FreeTextEntry1] : Mr. Nick is a 69 year old male presented with microscopic hematuria, BPH, and history of kidney stones. Complained of incomplete bladder emptying. Drinks 2 quarts of liquid daily, heavy coffee drinker.  Currently taking Tamsulosin 0.4 mg once daily. Patient has diabetes. Has a history of kidney stones. \par 6/14/18: The patient returned and reported incomplete bladder emptying, urinary urgency, and urge incontinence. Currently taking tamsulosin, twice daily. PSA from 4/26/18 was 0.49 ng/mL. CBC from 5/31/18 shows slight anemia, I advised the patient to evaluate with his PCP. Patient and daughter understood.\par 7/5/18: The patient returned and reported he is currently taking Oxybutynin ER 15 mg, and his urination has significantly improved. Notes he is seeing an hematologist for his anemia. \par 10/30/18: The patient returned and reported gross hematuria twice in August 2018. He has a history of microscopic hematuria. He also noted dysuria with the episodes of hematuria. UA in 7/5/18 demonstrates no blood in the urine. I offered the patient a , but he declined. The patient was referred to my office by Dr. Ricky Mendez for a cystoscopy. The patient had bronchitis for 1-2 months in the past, which has resolved. Lab results under Dr. Mendez showed iron was low 43 ug/dL on 6/23/18. The patient also has urinary frequency and urgency. Wakes up 4 times during the night to urinate. The patient noted that Tamsulosin worked well for him in the past. He noted his diabetes is well-controlled.\par 11/05/18: The patient returned for a cystoscopy. Aside from cystoscopy, patient was brought back to the office to discuss further evaluation management. CT urogram of the abdomen and pelvis from 11/5/18 findings showed there is a 5 and 4 mm nonobstructing calculi in the mid and lower pole of the left kidney respectively. There was a 1 cm simple appearing cyst in the upper pole of the left kidney. There was a 1.6 nonobstructing calculus within the urinary bladder. Otherwise unremarkable CT urogram. Currently taking Tamsulosin twice daily and Oxybutynin 10 mg. \par 11/21/18: The patient returned and reported urination is adequate. Complained of urgency and urge incontinence. Denied gross hematuria and dysuria. Denied UTI.  Cystoscopy with lithotripsy of bladder stone is scheduled for 12/14/18.\par 12/18/18 The patient returned for a catheter removal. He reported since his last visit he underwent laser lithotripsy of bladder stone. He went to the ER because he couldn't pass urine and so a catheter was placed.\par 1/7/19: The patient returned and reported his urination has improved since his catheter removal. Patient denied dysuria and gross hematuria. Complained of occasional urinary urgency, but noted it has improved. Currently takes Finasteride.\par 4/8/19: The patient returned today for an US. Renal US findings showed again non obstructing stones in the left kidney 7.0 x 5.4 mm lower and 5.1 x 4.3 mm mid, previously measured 5 & 4 mm. Both kidneys are normal in size and echogenicity without obvious hydronephrosis or solid masses visualized. No bladder calculi. Notes that he hasn't been drinking an adequate amount of water, will aim for 2 quarts daily. Wakes up 2-3 times during the night to urinate. Translation assistance of Brandon ([id number]) at "[employer]\par 5/14/19: Patient presents today for a follow up. Translation assistance of Allegra ([id number]) at "[employer] He reports consuming about 2 liters of water everyday. Previously the citrate in his urine was low. \par 7/8/19: Patient presents today for a follow up. ( [id number]) Since his last visit he has been drinking lots of water with lemon juice as directed. Potassium Citrate ER 10 mg was prescribed as his last visit as well. He was unable to fill the prescription because it was too expensive. A 24 hour urine collection was completed as directed. Regarding his urination it is reported that he will occasionally have to rush to the bathroom, and that minimal incontinence can be experienced. Most of the time his urination is satisfactory. Tamsulosin 0.4 mg Finasteride 5 mg and Oxybutynin 10 mg are all taken as directed. \par 7/15/19: Patient presents today for a follow up. ( # [id number] Shahab) Overall he states that his urination is satisfactory. He urinates about 1 liter less than previously. A 24 hour urine collection was completed since his last visit and he is here to discuss the results. \par \par 10/28/19: Patient presents today for a follow up. A renal ultrasound was completed today and he is here to discuss the results. \par \par Upon review of the renal ultrasound it is determined that: There is a 2.8 x 2.2 x 2.0 cm anterior mid vascular lesion in the right kidney. Again visualized a 7.0 x 6.4 x 8.0 cm non obstructing stone in the mid left kidney and a 1.3 x 1.o cm simple exophytic upper pole cyst. Both kidneys are normal in size and echogenicity without obvious hydronephrosis visualized. \par \par After reviewing the renal ultrasound I would like for him to undergo a CT scan of the abdomen and pelvis. \par \par He is to follow up once the CT scan is obtained to discuss the results.

## 2019-10-31 DIAGNOSIS — N40.1 BENIGN PROSTATIC HYPERPLASIA WITH LOWER URINARY TRACT SYMPTOMS: ICD-10-CM

## 2019-10-31 DIAGNOSIS — N20.0 CALCULUS OF KIDNEY: ICD-10-CM

## 2019-10-31 DIAGNOSIS — N21.0 CALCULUS IN BLADDER: ICD-10-CM

## 2019-10-31 DIAGNOSIS — R31.29 OTHER MICROSCOPIC HEMATURIA: ICD-10-CM

## 2019-11-08 ENCOUNTER — FORM ENCOUNTER (OUTPATIENT)
Age: 69
End: 2019-11-08

## 2019-11-09 ENCOUNTER — OUTPATIENT (OUTPATIENT)
Dept: OUTPATIENT SERVICES | Facility: HOSPITAL | Age: 69
LOS: 1 days | End: 2019-11-09
Payer: COMMERCIAL

## 2019-11-09 ENCOUNTER — APPOINTMENT (OUTPATIENT)
Dept: CT IMAGING | Facility: IMAGING CENTER | Age: 69
End: 2019-11-09

## 2019-11-09 DIAGNOSIS — Z98.89 OTHER SPECIFIED POSTPROCEDURAL STATES: Chronic | ICD-10-CM

## 2019-11-09 DIAGNOSIS — R31.0 GROSS HEMATURIA: ICD-10-CM

## 2019-11-09 DIAGNOSIS — Z98.890 OTHER SPECIFIED POSTPROCEDURAL STATES: Chronic | ICD-10-CM

## 2019-11-09 DIAGNOSIS — Z87.442 PERSONAL HISTORY OF URINARY CALCULI: Chronic | ICD-10-CM

## 2019-11-09 PROCEDURE — 74178 CT ABD&PLV WO CNTR FLWD CNTR: CPT | Mod: 26

## 2019-11-09 PROCEDURE — 74178 CT ABD&PLV WO CNTR FLWD CNTR: CPT

## 2019-11-20 ENCOUNTER — APPOINTMENT (OUTPATIENT)
Dept: UROLOGY | Facility: CLINIC | Age: 69
End: 2019-11-20
Payer: MEDICARE

## 2019-11-20 PROCEDURE — 99214 OFFICE O/P EST MOD 30 MIN: CPT

## 2019-11-20 NOTE — ADDENDUM
[FreeTextEntry1] : This note was authored by Gonzalo Grissom working as scribe for Dr. Nolan Lay. I, Dr. Nolan Lay, have reviewed the content of this note and confirm it is true and accurate. I personally performed the history and physical examination and made all the decisions.\par 11/20/19

## 2019-11-20 NOTE — REVIEW OF SYSTEMS
[Dry Eyes] : dryness of the eyes [Eyesight Problems] : eyesight problems [Incontinence] : incontinence [Nocturia] : nocturia [Seen by urologist before (Name)  ___] : Preciously seen by a urologist: [unfilled] [Told you have blood in urine on a urine test] : told blood was present in a urine test [History of kidney stones] : history of kidney stones [Wake up at night to urinate  How many times?  ___] : wakes up to urinate [unfilled] times during the night [Urine retention] : urine retention [Strong urge to urinate] : strong urge to urinate [Bladder pressure] : experiences bladder pressure [Negative] : Endocrine [Feeling Poorly] : not feeling poorly [Chest Pain] : no chest pain [Easy Bleeding] : no tendency for easy bleeding [Constipation] : no constipation [Dysuria] : no dysuria [FreeTextEntry2] : HIGH BLOOD PRESSURE  [Easy Bruising] : no tendency for easy bruising

## 2019-11-20 NOTE — HISTORY OF PRESENT ILLNESS
[FreeTextEntry1] : Mr. Nick is a 69 year old male presented with microscopic hematuria, BPH, and history of kidney stones. Complained of incomplete bladder emptying. Drinks 2 quarts of liquid daily, heavy coffee drinker.  Currently taking Tamsulosin 0.4 mg once daily. Patient has diabetes. Has a history of kidney stones. \par 6/14/18: The patient returned and reported incomplete bladder emptying, urinary urgency, and urge incontinence. Currently taking tamsulosin, twice daily. PSA from 4/26/18 was 0.49 ng/mL. CBC from 5/31/18 shows slight anemia, I advised the patient to evaluate with his PCP. Patient and daughter understood.\par 7/5/18: The patient returned and reported he is currently taking Oxybutynin ER 15 mg, and his urination has significantly improved. Notes he is seeing an hematologist for his anemia. \par 10/30/18: The patient returned and reported gross hematuria twice in August 2018. He has a history of microscopic hematuria. He also noted dysuria with the episodes of hematuria. UA in 7/5/18 demonstrates no blood in the urine. I offered the patient a , but he declined. The patient was referred to my office by Dr. Ricky Mendez for a cystoscopy. The patient had bronchitis for 1-2 months in the past, which has resolved. Lab results under Dr. Mendez showed iron was low 43 ug/dL on 6/23/18. The patient also has urinary frequency and urgency. Wakes up 4 times during the night to urinate. The patient noted that Tamsulosin worked well for him in the past. He noted his diabetes is well-controlled.\par 11/05/18: The patient returned for a cystoscopy. Aside from cystoscopy, patient was brought back to the office to discuss further evaluation management. CT urogram of the abdomen and pelvis from 11/5/18 findings showed there is a 5 and 4 mm nonobstructing calculi in the mid and lower pole of the left kidney respectively. There was a 1 cm simple appearing cyst in the upper pole of the left kidney. There was a 1.6 nonobstructing calculus within the urinary bladder. Otherwise unremarkable CT urogram. Currently taking Tamsulosin twice daily and Oxybutynin 10 mg. \par 11/21/18: The patient returned and reported urination is adequate. Complained of urgency and urge incontinence. Denied gross hematuria and dysuria. Denied UTI.  Cystoscopy with lithotripsy of bladder stone is scheduled for 12/14/18.\par 12/18/18 The patient returned for a catheter removal. He reported since his last visit he underwent laser lithotripsy of bladder stone. He went to the ER because he couldn't pass urine and so a catheter was placed.\par 1/7/19: The patient returned and reported his urination has improved since his catheter removal. Patient denied dysuria and gross hematuria. Complained of occasional urinary urgency, but noted it has improved. Currently takes Finasteride.\par 4/8/19: The patient returned today for an US. Renal US findings showed again non obstructing stones in the left kidney 7.0 x 5.4 mm lower and 5.1 x 4.3 mm mid, previously measured 5 & 4 mm. Both kidneys are normal in size and echogenicity without obvious hydronephrosis or solid masses visualized. No bladder calculi. Notes that he hasn't been drinking an adequate amount of water, will aim for 2 quarts daily. Wakes up 2-3 times during the night to urinate. Translation assistance of Brandon ([id number]) at [employer].\par 5/14/19: Patient presents today for a follow up. Translation assistance of Allegra ([id number]) at [employer]. He reports consuming about 2 liters of water everyday. Previously the citrate in his urine was low. \par 7/8/19: Patient presents today for a follow up. ( [id number]) Since his last visit he has been drinking lots of water with lemon juice as directed. Potassium Citrate ER 10 mg was prescribed as his last visit as well. He was unable to fill the prescription because it was too expensive. A 24 hour urine collection was completed as directed. Regarding his urination it is reported that he will occasionally have to rush to the bathroom, and that minimal incontinence can be experienced. Most of the time his urination is satisfactory. Tamsulosin 0.4 mg Finasteride 5 mg and Oxybutynin 10 mg are all taken as directed. \par 7/15/19: Patient presents today for a follow up. ( # [id number] Shahab) Overall he states that his urination is satisfactory. He urinates about 1 liter less than previously. A 24 hour urine collection was completed since his last visit and he is here to discuss the results. \par 10/28/19: Patient presents today for a follow up. A renal ultrasound was completed today and he is here to discuss the results. \par 11/20/19: Patient presents today for follow-up to discuss CT urogram results. He has been taking potassium citrate and tamsulosin as directed. He offers no urinary complaints.

## 2019-11-20 NOTE — ASSESSMENT
[FreeTextEntry1] : Mr. Nick is a 69 year old male presented with microscopic hematuria, BPH, and history of kidney stones. Complained of incomplete bladder emptying. Drinks 2 quarts of liquid daily, heavy coffee drinker.  Currently taking Tamsulosin 0.4 mg once daily. Patient has diabetes. Has a history of kidney stones. \par 6/14/18: The patient returned and reported incomplete bladder emptying, urinary urgency, and urge incontinence. Currently taking tamsulosin, twice daily. PSA from 4/26/18 was 0.49 ng/mL. CBC from 5/31/18 shows slight anemia, I advised the patient to evaluate with his PCP. Patient and daughter understood.\par 7/5/18: The patient returned and reported he is currently taking Oxybutynin ER 15 mg, and his urination has significantly improved. Notes he is seeing an hematologist for his anemia. \par 10/30/18: The patient returned and reported gross hematuria twice in August 2018. He has a history of microscopic hematuria. He also noted dysuria with the episodes of hematuria. UA in 7/5/18 demonstrates no blood in the urine. I offered the patient a , but he declined. The patient was referred to my office by Dr. Ricky Mendez for a cystoscopy. The patient had bronchitis for 1-2 months in the past, which has resolved. Lab results under Dr. Mendez showed iron was low 43 ug/dL on 6/23/18. The patient also has urinary frequency and urgency. Wakes up 4 times during the night to urinate. The patient noted that Tamsulosin worked well for him in the past. He noted his diabetes is well-controlled.\par 11/05/18: The patient returned for a cystoscopy. Aside from cystoscopy, patient was brought back to the office to discuss further evaluation management. CT urogram of the abdomen and pelvis from 11/5/18 findings showed there is a 5 and 4 mm nonobstructing calculi in the mid and lower pole of the left kidney respectively. There was a 1 cm simple appearing cyst in the upper pole of the left kidney. There was a 1.6 nonobstructing calculus within the urinary bladder. Otherwise unremarkable CT urogram. Currently taking Tamsulosin twice daily and Oxybutynin 10 mg. \par 11/21/18: The patient returned and reported urination is adequate. Complained of urgency and urge incontinence. Denied gross hematuria and dysuria. Denied UTI.  Cystoscopy with lithotripsy of bladder stone is scheduled for 12/14/18.\par 12/18/18 The patient returned for a catheter removal. He reported since his last visit he underwent laser lithotripsy of bladder stone. He went to the ER because he couldn't pass urine and so a catheter was placed.\par 1/7/19: The patient returned and reported his urination has improved since his catheter removal. Patient denied dysuria and gross hematuria. Complained of occasional urinary urgency, but noted it has improved. Currently takes Finasteride.\par 4/8/19: The patient returned today for an US. Renal US findings showed again non obstructing stones in the left kidney 7.0 x 5.4 mm lower and 5.1 x 4.3 mm mid, previously measured 5 & 4 mm. Both kidneys are normal in size and echogenicity without obvious hydronephrosis or solid masses visualized. No bladder calculi. Notes that he hasn't been drinking an adequate amount of water, will aim for 2 quarts daily. Wakes up 2-3 times during the night to urinate. Translation assistance of Brandon ([id number]) at [facility].\par 5/14/19: Patient presents today for a follow up. Translation assistance of Allegra ([id number]) at [facility]. He reports consuming about 2 liters of water everyday. Previously the citrate in his urine was low. \par 7/8/19: Patient presents today for a follow up. ( [id number]) Since his last visit he has been drinking lots of water with lemon juice as directed. Potassium Citrate ER 10 mg was prescribed as his last visit as well. He was unable to fill the prescription because it was too expensive. A 24 hour urine collection was completed as directed. Regarding his urination it is reported that he will occasionally have to rush to the bathroom, and that minimal incontinence can be experienced. Most of the time his urination is satisfactory. Tamsulosin 0.4 mg Finasteride 5 mg and Oxybutynin 10 mg are all taken as directed. \par 7/15/19: Patient presents today for a follow up. ( # [id number] Shahab) Overall he states that his urination is satisfactory. He urinates about 1 liter less than previously. A 24 hour urine collection was completed since his last visit and he is here to discuss the results. \par 10/28/19: Patient presents today for a follow up. A renal ultrasound was completed today and he is here to discuss the results. \par 11/20/19: Patient presents today for follow-up to discuss CT urogram results. He has been taking potassium citrate and tamsulosin as directed. He offers no urinary complaints. Patient was instructed to see general surgery for his left hernia. Patient defers surgery at the moment and understands that he should seek medical attention if he has significant pain.\par \par CT urogram results demonstrate: No suspicious renal mass. Punctate renal calculi. No obstruction. Hypodensities in left kidney were discussed.\par \par Patient will follow-up in 3 months.\par \par

## 2019-11-20 NOTE — PHYSICAL EXAM
[General Appearance - Well Developed] : well developed [General Appearance - Well Nourished] : well nourished [Normal Appearance] : normal appearance [Well Groomed] : well groomed [General Appearance - In No Acute Distress] : no acute distress [Abdomen Tenderness] : non-tender [Abdomen Soft] : soft [Costovertebral Angle Tenderness] : no ~M costovertebral angle tenderness [Skin Color & Pigmentation] : normal skin color and pigmentation [] : no respiratory distress [Heart Rate And Rhythm] : Heart rate and rhythm were normal [Exaggerated Use Of Accessory Muscles For Inspiration] : no accessory muscle use [Respiration, Rhythm And Depth] : normal respiratory rhythm and effort [Oriented To Time, Place, And Person] : oriented to person, place, and time [Affect] : the affect was normal [Mood] : the mood was normal [Normal Station and Gait] : the gait and station were normal for the patient's age [Not Anxious] : not anxious [No Focal Deficits] : no focal deficits [No Palpable Adenopathy] : no palpable adenopathy [Cervical Lymph Nodes Enlarged Posterior Bilaterally] : posterior cervical [Cervical Lymph Nodes Enlarged Anterior Bilaterally] : anterior cervical [Supraclavicular Lymph Nodes Enlarged Bilaterally] : supraclavicular [FreeTextEntry1] : No submandibular gland tenderness.

## 2020-01-01 ENCOUNTER — OUTPATIENT (OUTPATIENT)
Dept: OUTPATIENT SERVICES | Facility: HOSPITAL | Age: 70
LOS: 1 days | End: 2020-01-01
Payer: COMMERCIAL

## 2020-01-01 ENCOUNTER — APPOINTMENT (OUTPATIENT)
Dept: UROLOGY | Facility: CLINIC | Age: 70
End: 2020-01-01

## 2020-01-01 ENCOUNTER — APPOINTMENT (OUTPATIENT)
Dept: UROLOGY | Facility: CLINIC | Age: 70
End: 2020-01-01
Payer: MEDICARE

## 2020-01-01 ENCOUNTER — TRANSCRIPTION ENCOUNTER (OUTPATIENT)
Age: 70
End: 2020-01-01

## 2020-01-01 ENCOUNTER — NON-APPOINTMENT (OUTPATIENT)
Age: 70
End: 2020-01-01

## 2020-01-01 VITALS
HEART RATE: 81 BPM | BODY MASS INDEX: 30.53 KG/M2 | DIASTOLIC BLOOD PRESSURE: 81 MMHG | HEIGHT: 66 IN | SYSTOLIC BLOOD PRESSURE: 157 MMHG | RESPIRATION RATE: 18 BRPM | WEIGHT: 190 LBS

## 2020-01-01 VITALS
WEIGHT: 190 LBS | HEIGHT: 66 IN | HEART RATE: 76 BPM | SYSTOLIC BLOOD PRESSURE: 135 MMHG | DIASTOLIC BLOOD PRESSURE: 76 MMHG | BODY MASS INDEX: 30.53 KG/M2 | RESPIRATION RATE: 17 BRPM

## 2020-01-01 VITALS
DIASTOLIC BLOOD PRESSURE: 80 MMHG | HEIGHT: 66 IN | BODY MASS INDEX: 30.53 KG/M2 | WEIGHT: 190 LBS | TEMPERATURE: 97 F | RESPIRATION RATE: 17 BRPM | HEART RATE: 71 BPM | SYSTOLIC BLOOD PRESSURE: 160 MMHG

## 2020-01-01 VITALS — TEMPERATURE: 98.9 F

## 2020-01-01 DIAGNOSIS — R31.0 GROSS HEMATURIA: ICD-10-CM

## 2020-01-01 DIAGNOSIS — N21.0 CALCULUS IN BLADDER: ICD-10-CM

## 2020-01-01 DIAGNOSIS — N40.1 BENIGN PROSTATIC HYPERPLASIA WITH LOWER URINARY TRACT SYMPTOMS: ICD-10-CM

## 2020-01-01 DIAGNOSIS — Z98.890 OTHER SPECIFIED POSTPROCEDURAL STATES: Chronic | ICD-10-CM

## 2020-01-01 DIAGNOSIS — Z87.442 PERSONAL HISTORY OF URINARY CALCULI: Chronic | ICD-10-CM

## 2020-01-01 DIAGNOSIS — Z98.89 OTHER SPECIFIED POSTPROCEDURAL STATES: Chronic | ICD-10-CM

## 2020-01-01 DIAGNOSIS — R35.0 FREQUENCY OF MICTURITION: ICD-10-CM

## 2020-01-01 DIAGNOSIS — Q62.5 DUPLICATION OF URETER: ICD-10-CM

## 2020-01-01 DIAGNOSIS — R31.29 OTHER MICROSCOPIC HEMATURIA: ICD-10-CM

## 2020-01-01 DIAGNOSIS — E11.9 TYPE 2 DIABETES MELLITUS WITHOUT COMPLICATIONS: ICD-10-CM

## 2020-01-01 DIAGNOSIS — R39.15 URGENCY OF URINATION: ICD-10-CM

## 2020-01-01 DIAGNOSIS — N20.0 CALCULUS OF KIDNEY: ICD-10-CM

## 2020-01-01 LAB
APPEARANCE: CLEAR
BACTERIA UR CULT: NORMAL
BACTERIA: NEGATIVE
BILIRUBIN URINE: NEGATIVE
BLOOD URINE: NEGATIVE
COLOR: COLORLESS
COLOR: COLORLESS
COLOR: NORMAL
GLUCOSE QUALITATIVE U: NEGATIVE
HYALINE CASTS: 0 /LPF
KETONES URINE: NEGATIVE
LEUKOCYTE ESTERASE URINE: NEGATIVE
MICROSCOPIC-UA: NORMAL
NITRITE URINE: NEGATIVE
PH URINE: 7.5
PH URINE: 8
PH URINE: 8
PROTEIN URINE: NEGATIVE
RED BLOOD CELLS URINE: 1 /HPF
RED BLOOD CELLS URINE: 1 /HPF
RED BLOOD CELLS URINE: 2 /HPF
SPECIFIC GRAVITY URINE: 1.01
SPECIFIC GRAVITY URINE: 1.01
SPECIFIC GRAVITY URINE: 1.02
SQUAMOUS EPITHELIAL CELLS: 0 /HPF
URINE CYTOLOGY: NORMAL
UROBILINOGEN URINE: NORMAL
WHITE BLOOD CELLS URINE: 0 /HPF

## 2020-01-01 PROCEDURE — 99072 ADDL SUPL MATRL&STAF TM PHE: CPT

## 2020-01-01 PROCEDURE — 76775 US EXAM ABDO BACK WALL LIM: CPT

## 2020-01-01 PROCEDURE — 88112 CYTOPATH CELL ENHANCE TECH: CPT | Mod: 26

## 2020-01-01 PROCEDURE — 99214 OFFICE O/P EST MOD 30 MIN: CPT

## 2020-01-01 PROCEDURE — 76775 US EXAM ABDO BACK WALL LIM: CPT | Mod: 26

## 2020-01-01 PROCEDURE — 99214 OFFICE O/P EST MOD 30 MIN: CPT | Mod: 25

## 2020-01-01 RX ORDER — OXYBUTYNIN CHLORIDE 15 MG/1
15 TABLET, EXTENDED RELEASE ORAL DAILY
Qty: 30 | Refills: 11 | Status: ACTIVE | COMMUNITY
Start: 2018-06-14 | End: 1900-01-01

## 2020-01-01 RX ORDER — OXYBUTYNIN CHLORIDE 10 MG/1
10 TABLET, EXTENDED RELEASE ORAL
Qty: 60 | Refills: 11 | Status: ACTIVE | COMMUNITY
Start: 2020-01-01 | End: 1900-01-01

## 2020-02-24 ENCOUNTER — APPOINTMENT (OUTPATIENT)
Dept: UROLOGY | Facility: CLINIC | Age: 70
End: 2020-02-24
Payer: MEDICARE

## 2020-02-24 VITALS
HEART RATE: 78 BPM | BODY MASS INDEX: 30.53 KG/M2 | WEIGHT: 190 LBS | SYSTOLIC BLOOD PRESSURE: 149 MMHG | HEIGHT: 66 IN | DIASTOLIC BLOOD PRESSURE: 81 MMHG | TEMPERATURE: 98 F | RESPIRATION RATE: 17 BRPM

## 2020-02-24 PROCEDURE — 99214 OFFICE O/P EST MOD 30 MIN: CPT

## 2020-02-25 LAB
ANION GAP SERPL CALC-SCNC: 15 MMOL/L
APPEARANCE: CLEAR
BASOPHILS # BLD AUTO: 0.04 K/UL
BASOPHILS NFR BLD AUTO: 0.5 %
BILIRUBIN URINE: NEGATIVE
BLOOD URINE: NEGATIVE
BUN SERPL-MCNC: 17 MG/DL
CALCIUM SERPL-MCNC: 9.3 MG/DL
CHLORIDE SERPL-SCNC: 102 MMOL/L
CO2 SERPL-SCNC: 24 MMOL/L
COLOR: NORMAL
CREAT SERPL-MCNC: 1.1 MG/DL
EOSINOPHIL # BLD AUTO: 0.19 K/UL
EOSINOPHIL NFR BLD AUTO: 2.3 %
GLUCOSE QUALITATIVE U: NEGATIVE
GLUCOSE SERPL-MCNC: 130 MG/DL
HCT VFR BLD CALC: 40 %
HGB BLD-MCNC: 12.9 G/DL
IMM GRANULOCYTES NFR BLD AUTO: 0.2 %
KETONES URINE: NEGATIVE
LEUKOCYTE ESTERASE URINE: NEGATIVE
LYMPHOCYTES # BLD AUTO: 2.1 K/UL
LYMPHOCYTES NFR BLD AUTO: 25.6 %
MAN DIFF?: NORMAL
MCHC RBC-ENTMCNC: 29.9 PG
MCHC RBC-ENTMCNC: 32.3 GM/DL
MCV RBC AUTO: 92.8 FL
MONOCYTES # BLD AUTO: 0.57 K/UL
MONOCYTES NFR BLD AUTO: 7 %
NEUTROPHILS # BLD AUTO: 5.28 K/UL
NEUTROPHILS NFR BLD AUTO: 64.4 %
NITRITE URINE: NEGATIVE
PH URINE: 7
PLATELET # BLD AUTO: 259 K/UL
POTASSIUM SERPL-SCNC: 4.4 MMOL/L
PROTEIN URINE: NEGATIVE
PSA SERPL-MCNC: 0.18 NG/ML
RBC # BLD: 4.31 M/UL
RBC # FLD: 13.8 %
SODIUM SERPL-SCNC: 141 MMOL/L
SPECIFIC GRAVITY URINE: 1.02
UROBILINOGEN URINE: NORMAL
WBC # FLD AUTO: 8.2 K/UL

## 2020-02-26 LAB — URINE CYTOLOGY: NORMAL

## 2020-02-26 NOTE — ASSESSMENT
[FreeTextEntry1] : 2/24/20: Patient presents today for a follow up. Pt reports waking up about 4 times at night to urinate. He also admits to urinary urgency and a mild amount of urinary leakage. He denies dysuria, hematuria. Pt reports he has been taking Tamsulosin only once a day. \par \par The patient produced a urine sample which will be sent for urinalysis, urine cytology, and urine culture.\par \par Blood work today included comprehensive metabolic panel, CBC, PSA, and testosterone.\par \par Advised patient to increase Tamsulosin to twice a day after meals. \par \par RTC in 1 month.\par \par

## 2020-02-26 NOTE — PHYSICAL EXAM
[General Appearance - Well Developed] : well developed [General Appearance - Well Nourished] : well nourished [Normal Appearance] : normal appearance [Well Groomed] : well groomed [General Appearance - In No Acute Distress] : no acute distress [Abdomen Soft] : soft [Costovertebral Angle Tenderness] : no ~M costovertebral angle tenderness [Abdomen Tenderness] : non-tender [Skin Color & Pigmentation] : normal skin color and pigmentation [Heart Rate And Rhythm] : Heart rate and rhythm were normal [Respiration, Rhythm And Depth] : normal respiratory rhythm and effort [] : no respiratory distress [Exaggerated Use Of Accessory Muscles For Inspiration] : no accessory muscle use [Oriented To Time, Place, And Person] : oriented to person, place, and time [Affect] : the affect was normal [Mood] : the mood was normal [Not Anxious] : not anxious [Normal Station and Gait] : the gait and station were normal for the patient's age [No Focal Deficits] : no focal deficits [No Palpable Adenopathy] : no palpable adenopathy [Cervical Lymph Nodes Enlarged Posterior Bilaterally] : posterior cervical [Cervical Lymph Nodes Enlarged Anterior Bilaterally] : anterior cervical [Supraclavicular Lymph Nodes Enlarged Bilaterally] : supraclavicular [FreeTextEntry1] : No submandibular gland tenderness.

## 2020-02-26 NOTE — HISTORY OF PRESENT ILLNESS
[FreeTextEntry1] : Mr. Nick is a 69 year old male presented with microscopic hematuria, BPH, and history of kidney stones. Complained of incomplete bladder emptying. Drinks 2 quarts of liquid daily, heavy coffee drinker.  Currently taking Tamsulosin 0.4 mg once daily. Patient has diabetes. Has a history of kidney stones. \par 6/14/18: The patient returned and reported incomplete bladder emptying, urinary urgency, and urge incontinence. Currently taking tamsulosin, twice daily. PSA from 4/26/18 was 0.49 ng/mL. CBC from 5/31/18 shows slight anemia, I advised the patient to evaluate with his PCP. Patient and daughter understood.\par 7/5/18: The patient returned and reported he is currently taking Oxybutynin ER 15 mg, and his urination has significantly improved. Notes he is seeing an hematologist for his anemia. \par 10/30/18: The patient returned and reported gross hematuria twice in August 2018. He has a history of microscopic hematuria. He also noted dysuria with the episodes of hematuria. UA in 7/5/18 demonstrates no blood in the urine. I offered the patient a , but he declined. The patient was referred to my office by Dr. Ricky Mendez for a cystoscopy. The patient had bronchitis for 1-2 months in the past, which has resolved. Lab results under Dr. Mendez showed iron was low 43 ug/dL on 6/23/18. The patient also has urinary frequency and urgency. Wakes up 4 times during the night to urinate. The patient noted that Tamsulosin worked well for him in the past. He noted his diabetes is well-controlled.\par 11/05/18: The patient returned for a cystoscopy. Aside from cystoscopy, patient was brought back to the office to discuss further evaluation management. CT urogram of the abdomen and pelvis from 11/5/18 findings showed there is a 5 and 4 mm nonobstructing calculi in the mid and lower pole of the left kidney respectively. There was a 1 cm simple appearing cyst in the upper pole of the left kidney. There was a 1.6 nonobstructing calculus within the urinary bladder. Otherwise unremarkable CT urogram. Currently taking Tamsulosin twice daily and Oxybutynin 10 mg. \par 11/21/18: The patient returned and reported urination is adequate. Complained of urgency and urge incontinence. Denied gross hematuria and dysuria. Denied UTI.  Cystoscopy with lithotripsy of bladder stone is scheduled for 12/14/18.\par 12/18/18 The patient returned for a catheter removal. He reported since his last visit he underwent laser lithotripsy of bladder stone. He went to the ER because he couldn't pass urine and so a catheter was placed.\par 1/7/19: The patient returned and reported his urination has improved since his catheter removal. Patient denied dysuria and gross hematuria. Complained of occasional urinary urgency, but noted it has improved. Currently takes Finasteride.\par 4/8/19: The patient returned today for an US. Renal US findings showed again non obstructing stones in the left kidney 7.0 x 5.4 mm lower and 5.1 x 4.3 mm mid, previously measured 5 & 4 mm. Both kidneys are normal in size and echogenicity without obvious hydronephrosis or solid masses visualized. No bladder calculi. Notes that he hasn't been drinking an adequate amount of water, will aim for 2 quarts daily. Wakes up 2-3 times during the night to urinate. Translation assistance of Brandon ([id number]) at [facility].\par 5/14/19: Patient presents today for a follow up. Translation assistance of Allegra ([id number]) at [facility]. He reports consuming about 2 liters of water everyday. Previously the citrate in his urine was low. \par 7/8/19: Patient presents today for a follow up. ( [id number]) Since his last visit he has been drinking lots of water with lemon juice as directed. Potassium Citrate ER 10 mg was prescribed as his last visit as well. He was unable to fill the prescription because it was too expensive. A 24 hour urine collection was completed as directed. Regarding his urination it is reported that he will occasionally have to rush to the bathroom, and that minimal incontinence can be experienced. Most of the time his urination is satisfactory. Tamsulosin 0.4 mg Finasteride 5 mg and Oxybutynin 10 mg are all taken as directed. \par 7/15/19: Patient presents today for a follow up. ( # [id number] Shahab) Overall he states that his urination is satisfactory. He urinates about 1 liter less than previously. A 24 hour urine collection was completed since his last visit and he is here to discuss the results. \par 10/28/19: Patient presents today for a follow up. A renal ultrasound was completed today and he is here to discuss the results. \par 11/20/19: Patient presents today for follow-up to discuss CT urogram results. He has been taking potassium citrate and tamsulosin as directed. He offers no urinary complaints. \par \par 2/24/20: Patient presents today for a follow up. Pt reports waking up about 4 times at night to urinate. He also admits to urinary urgency and a mild amount of urinary leakage. He denies dysuria, hematuria. Pt reports he has been taking Tamsulosin only once a day.

## 2020-03-11 NOTE — ASU PREOP CHECKLIST - ALLERGY BAND ON
PCP: Chio Franco PA-C    Chief Complaint   Patient presents with   • NEW PATIENT     RUQ PAIN   • Abdominal Pain     CRAMPING DULL PAIN IN RQP AREA; DEFERRED TO BACK AT ONE POINT IN TIME. PT STILL HAS GALLBLADDER   • Diarrhea     HAS ALWAYS HAD LOOSE STOOL; HAS HAD BRIGHT RED BLOOD IN THE PAST WITH NO ADDITIONAL PAIN ASSOCIATE WITH THIS ISSUE        History of Present Illness:   Chaparro Villegas is a 30 y.o. male who presents to the GI clinic for assess for chronic ruq pain and abnormal imaging. Pain located in the ruq began around 6/2019. The pain comes and goes and is not progressively worsening. The pain is achy and may last minutes or hours. May worsen after eating or exercise. He has been taking cbd oil which may help a little. No vomiting or gib loss. For this pain he has had an u/s gallbladder following a ct scan which may suggest a benign blood vessel growth such as hemangioma. He does have some anxiety about this finding.    Past Medical History:   Diagnosis Date   • Headache        No past surgical history on file.      Current Outpatient Medications:   •  CBD oil (cannabidiol) capsule, Take 60 capsules by mouth Every Morning. For anxiety, Disp: , Rfl:     Allergies   Allergen Reactions   • Penicillins Anaphylaxis       Family History   Problem Relation Age of Onset   • Arthritis Mother    • Colon polyps Mother    • Migraines Mother    • Colon polyps Father    • Hypertension Father    • Learning disabilities Brother    • Mental illness Brother    • Stroke Maternal Aunt    • Mental illness Paternal Uncle    • Arthritis Maternal Grandmother    • Migraines Maternal Grandmother    • Osteoporosis Maternal Grandmother    • Breast cancer Paternal Grandmother    • Diabetes Paternal Grandmother    • Mental illness Paternal Grandmother    • Stroke Paternal Grandfather    • Heart attack Paternal Grandfather        Social History     Socioeconomic History   • Marital status: Single     Spouse name: Not on file    • Number of children: Not on file   • Years of education: Not on file   • Highest education level: Not on file   Tobacco Use   • Smoking status: Former Smoker     Last attempt to quit: 2013     Years since quittin.1   • Smokeless tobacco: Never Used   Substance and Sexual Activity   • Alcohol use: No     Frequency: Never   • Drug use: No       Review of Systems   Constitutional: Positive for fatigue.   Gastrointestinal: Positive for abdominal pain.   Musculoskeletal: Positive for arthralgias.   Neurological: Positive for dizziness (NECK MOVEMENT VERTIGO; LOOKING UP AND DOWN CAUSES DIZZINESS) and headaches (TENSION HEADACHES; NECK MOVEMENT VERTIGO).   Psychiatric/Behavioral: The patient is nervous/anxious.         DEPRESSIVE ISSUES     All other systems reviewed and are negative.    There were no vitals filed for this visit.    Physical Exam  General Appearance:  Vitals as above. no acute distress  Head/face:  Normocephalic, atraumatic  Eyes:   EOMI, no conjunctivitis or icterus   Nose/Sinuses:  Nares patent bilaterally without discharge or lesions  Mouth/Throat:  Posterior pharynx is pink without drainage or exudates;  dentition is in good condition and repair  Neck:  trachea is midline, no thyromegaly  Lungs:  Clear to auscultation bilaterally  Heart:  Regular rate and rhythm without murmur, gallop or rub  Abdomen:  Soft, non-tender to palpation, no obvious masses, bowel sounds positive in four quadrants; no abdominal bruits; no guarding or rebound tenderness  Neurologic:  Alert; no focal deficits; age appropriate behavior and speech  Psychiatric: mood and affect are congruent  Vascular: extremities without edema  Skin: no rash or cyanosis.      Assessment/Plan  1.) Abnormal finding on radiologic exam, 3 cm lesion ruq  I'm not convinced this lesion is causing his pain but the location fits. I think we should better characterize the lesion with MRI liver protocol with dedicated arterial and venous washout.  Will also assess repeat cmp, afp, cea, hep b titers.    2.) Chronic ruq pain  Workup: u/s gallbladder/ ct a/p with iv contrast  Pain may be stretching of hepatic capsule of above lesion but typically this is seen with lesions > 5 cm. His stress may be playing a role. Will have him start prilosec otc for a month trial. Will assess cmp. I do not support use of cbd oil for this indication.        Marciano Soliman MD  3/11/2020  Answers for HPI/ROS submitted by the patient on 3/9/2020   Abdominal pain  What is the primary reason for your visit?: Abdominal Pain  Chronicity: new  Onset: more than 1 month ago  Onset quality: undetermined  Frequency: daily  Episode duration: 4 hours  Progression since onset: waxing and waning  Pain location: RUQ  Pain - numeric: 3/10  Pain quality: aching, dull  Radiates to: does not radiate  Aggravated by: certain positions, eating  Relieved by: certain positions, palpation  Diagnostic workup: CT scan, ultrasound     no known allergies

## 2020-03-26 ENCOUNTER — APPOINTMENT (OUTPATIENT)
Dept: UROLOGY | Facility: CLINIC | Age: 70
End: 2020-03-26

## 2020-07-14 ENCOUNTER — LABORATORY RESULT (OUTPATIENT)
Age: 70
End: 2020-07-14

## 2020-07-14 ENCOUNTER — APPOINTMENT (OUTPATIENT)
Dept: UROLOGY | Facility: CLINIC | Age: 70
End: 2020-07-14
Payer: MEDICARE

## 2020-07-14 VITALS
HEIGHT: 66 IN | RESPIRATION RATE: 17 BRPM | SYSTOLIC BLOOD PRESSURE: 124 MMHG | WEIGHT: 190 LBS | HEART RATE: 77 BPM | BODY MASS INDEX: 30.53 KG/M2 | DIASTOLIC BLOOD PRESSURE: 69 MMHG

## 2020-07-14 VITALS — TEMPERATURE: 97.5 F

## 2020-07-14 PROCEDURE — 99214 OFFICE O/P EST MOD 30 MIN: CPT

## 2020-07-14 PROCEDURE — 88112 CYTOPATH CELL ENHANCE TECH: CPT | Mod: 26

## 2020-07-14 RX ORDER — POTASSIUM CITRATE 10 MEQ/1
10 MEQ TABLET, EXTENDED RELEASE ORAL
Qty: 60 | Refills: 11 | Status: ACTIVE | COMMUNITY
Start: 2019-07-15

## 2020-07-14 RX ORDER — TAMSULOSIN HYDROCHLORIDE 0.4 MG/1
0.4 CAPSULE ORAL
Qty: 180 | Refills: 3 | Status: ACTIVE | COMMUNITY
Start: 2018-04-26 | End: 1900-01-01

## 2020-07-19 LAB
ALP BLD-CCNC: 84 U/L
ANION GAP SERPL CALC-SCNC: 15 MMOL/L
APPEARANCE: ABNORMAL
BASOPHILS # BLD AUTO: 0.04 K/UL
BASOPHILS NFR BLD AUTO: 0.4 %
BILIRUBIN URINE: NEGATIVE
BLOOD URINE: NORMAL
BUN SERPL-MCNC: 14 MG/DL
CALCIUM SERPL-MCNC: 9.7 MG/DL
CHLORIDE SERPL-SCNC: 103 MMOL/L
CO2 SERPL-SCNC: 24 MMOL/L
COLOR: ABNORMAL
CREAT SERPL-MCNC: 1.16 MG/DL
EOSINOPHIL # BLD AUTO: 0.14 K/UL
EOSINOPHIL NFR BLD AUTO: 1.6 %
ESTIMATED AVERAGE GLUCOSE: 131 MG/DL
GLUCOSE QUALITATIVE U: NEGATIVE
GLUCOSE SERPL-MCNC: 115 MG/DL
HBA1C MFR BLD HPLC: 6.2 %
HCT VFR BLD CALC: 41.1 %
HGB BLD-MCNC: 12.5 G/DL
IMM GRANULOCYTES NFR BLD AUTO: 0.2 %
KETONES URINE: NEGATIVE
LEUKOCYTE ESTERASE URINE: NEGATIVE
LYMPHOCYTES # BLD AUTO: 1.7 K/UL
LYMPHOCYTES NFR BLD AUTO: 18.9 %
MAN DIFF?: NORMAL
MCHC RBC-ENTMCNC: 29.6 PG
MCHC RBC-ENTMCNC: 30.4 GM/DL
MCV RBC AUTO: 97.2 FL
MONOCYTES # BLD AUTO: 0.7 K/UL
MONOCYTES NFR BLD AUTO: 7.8 %
NEUTROPHILS # BLD AUTO: 6.38 K/UL
NEUTROPHILS NFR BLD AUTO: 71.1 %
NITRITE URINE: NEGATIVE
OSMOLALITY SERPL: 299 MOSMOL/KG
PH URINE: 6
PLATELET # BLD AUTO: 264 K/UL
POTASSIUM SERPL-SCNC: 4.7 MMOL/L
PROTEIN URINE: NORMAL
PSA SERPL-MCNC: 0.18 NG/ML
RBC # BLD: 4.23 M/UL
RBC # FLD: 14.2 %
SODIUM SERPL-SCNC: 142 MMOL/L
SPECIFIC GRAVITY URINE: 1.03
TESTOST SERPL-MCNC: 240 NG/DL
URINE CYTOLOGY: NORMAL
UROBILINOGEN URINE: NORMAL
WBC # FLD AUTO: 8.98 K/UL

## 2020-07-26 NOTE — PHYSICAL EXAM
[General Appearance - Well Developed] : well developed [Normal Appearance] : normal appearance [General Appearance - Well Nourished] : well nourished [Well Groomed] : well groomed [General Appearance - In No Acute Distress] : no acute distress [Abdomen Tenderness] : non-tender [Abdomen Soft] : soft [Skin Color & Pigmentation] : normal skin color and pigmentation [Costovertebral Angle Tenderness] : no ~M costovertebral angle tenderness [Heart Rate And Rhythm] : Heart rate and rhythm were normal [] : no respiratory distress [Respiration, Rhythm And Depth] : normal respiratory rhythm and effort [Exaggerated Use Of Accessory Muscles For Inspiration] : no accessory muscle use [Affect] : the affect was normal [Oriented To Time, Place, And Person] : oriented to person, place, and time [Not Anxious] : not anxious [Mood] : the mood was normal [Normal Station and Gait] : the gait and station were normal for the patient's age [No Focal Deficits] : no focal deficits [No Palpable Adenopathy] : no palpable adenopathy [Cervical Lymph Nodes Enlarged Posterior Bilaterally] : posterior cervical [Supraclavicular Lymph Nodes Enlarged Bilaterally] : supraclavicular [Cervical Lymph Nodes Enlarged Anterior Bilaterally] : anterior cervical [FreeTextEntry1] : Smile Symmetric. Tongue is Midline. hand  5/5 bilaterally.

## 2020-07-26 NOTE — REVIEW OF SYSTEMS
[Eyesight Problems] : eyesight problems [Dry Eyes] : dryness of the eyes [Incontinence] : incontinence [Seen by urologist before (Name)  ___] : Preciously seen by a urologist: [unfilled] [Nocturia] : nocturia [Told you have blood in urine on a urine test] : told blood was present in a urine test [History of kidney stones] : history of kidney stones [Urine retention] : urine retention [Wake up at night to urinate  How many times?  ___] : wakes up to urinate [unfilled] times during the night [Strong urge to urinate] : strong urge to urinate [Bladder pressure] : experiences bladder pressure [Negative] : Heme/Lymph [Feeling Poorly] : not feeling poorly [Easy Bleeding] : no tendency for easy bleeding [Chest Pain] : no chest pain [Constipation] : no constipation [Dysuria] : no dysuria [Easy Bruising] : no tendency for easy bruising [FreeTextEntry2] : FREQUENT URINATION

## 2020-07-26 NOTE — HISTORY OF PRESENT ILLNESS
[FreeTextEntry1] : Mr. Nick is a 69 year old male presented with microscopic hematuria, BPH, and history of kidney stones. Complained of incomplete bladder emptying. Drinks 2 quarts of liquid daily, heavy coffee drinker.  Currently taking Tamsulosin 0.4 mg once daily. Patient has diabetes. Has a history of kidney stones. \par 6/14/18: The patient returned and reported incomplete bladder emptying, urinary urgency, and urge incontinence. Currently taking tamsulosin, twice daily. PSA from 4/26/18 was 0.49 ng/mL. CBC from 5/31/18 shows slight anemia, I advised the patient to evaluate with his PCP. Patient and daughter understood.\par 7/5/18: The patient returned and reported he is currently taking Oxybutynin ER 15 mg, and his urination has significantly improved. Notes he is seeing an hematologist for his anemia. \par 10/30/18: The patient returned and reported gross hematuria twice in August 2018. He has a history of microscopic hematuria. He also noted dysuria with the episodes of hematuria. UA in 7/5/18 demonstrates no blood in the urine. I offered the patient a , but he declined. The patient was referred to my office by Dr. Ricky Mendez for a cystoscopy. The patient had bronchitis for 1-2 months in the past, which has resolved. Lab results under Dr. Mendez showed iron was low 43 ug/dL on 6/23/18. The patient also has urinary frequency and urgency. Wakes up 4 times during the night to urinate. The patient noted that Tamsulosin worked well for him in the past. He noted his diabetes is well-controlled.\par 11/05/18: The patient returned for a cystoscopy. Aside from cystoscopy, patient was brought back to the office to discuss further evaluation management. CT urogram of the abdomen and pelvis from 11/5/18 findings showed there is a 5 and 4 mm nonobstructing calculi in the mid and lower pole of the left kidney respectively. There was a 1 cm simple appearing cyst in the upper pole of the left kidney. There was a 1.6 nonobstructing calculus within the urinary bladder. Otherwise unremarkable CT urogram. Currently taking Tamsulosin twice daily and Oxybutynin 10 mg. \par 11/21/18: The patient returned and reported urination is adequate. Complained of urgency and urge incontinence. Denied gross hematuria and dysuria. Denied UTI.  Cystoscopy with lithotripsy of bladder stone is scheduled for 12/14/18.\par 12/18/18 The patient returned for a catheter removal. He reported since his last visit he underwent laser lithotripsy of bladder stone. He went to the ER because he couldn't pass urine and so a catheter was placed.\par 1/7/19: The patient returned and reported his urination has improved since his catheter removal. Patient denied dysuria and gross hematuria. Complained of occasional urinary urgency, but noted it has improved. Currently takes Finasteride.\par 4/8/19: The patient returned today for an US. Renal US findings showed again non obstructing stones in the left kidney 7.0 x 5.4 mm lower and 5.1 x 4.3 mm mid, previously measured 5 & 4 mm. Both kidneys are normal in size and echogenicity without obvious hydronephrosis or solid masses visualized. No bladder calculi. Notes that he hasn't been drinking an adequate amount of water, will aim for 2 quarts daily. Wakes up 2-3 times during the night to urinate. Translation assistance of Brandon ([id number]) at [employer].\par 5/14/19: Patient presents today for a follow up. Translation assistance of Allegra ([id number]) at [employer]. He reports consuming about 2 liters of water everyday. Previously the citrate in his urine was low. \par 7/8/19: Patient presents today for a follow up. ( [id number]) Since his last visit he has been drinking lots of water with lemon juice as directed. Potassium Citrate ER 10 mg was prescribed as his last visit as well. He was unable to fill the prescription because it was too expensive. A 24 hour urine collection was completed as directed. Regarding his urination it is reported that he will occasionally have to rush to the bathroom, and that minimal incontinence can be experienced. Most of the time his urination is satisfactory. Tamsulosin 0.4 mg Finasteride 5 mg and Oxybutynin 10 mg are all taken as directed. \par 7/15/19: Patient presents today for a follow up. ( # [id number] Shahab) Overall he states that his urination is satisfactory. He urinates about 1 liter less than previously. A 24 hour urine collection was completed since his last visit and he is here to discuss the results. \par 10/28/19: Patient presents today for a follow up. A renal ultrasound was completed today and he is here to discuss the results. \par 11/20/19: Patient presents today for follow-up to discuss CT urogram results. He has been taking potassium citrate and tamsulosin as directed. He offers no urinary complaints. \par \par 2/24/20: Patient presents today for a follow up. Pt reports waking up about 4 times at night to urinate. He also admits to urinary urgency and a mild amount of urinary leakage. He denies dysuria, hematuria. Pt reports he has been taking Tamsulosin only once a day.

## 2020-11-10 NOTE — ADDENDUM
[FreeTextEntry1] : I, Tatiana Rodriguezin, acted solely as a scribe for Dr. Nolan Lay on this date 11/09/2020.\par \par All medical record entries made by the Scribe were at my, Dr. Nolan Lay, direction and personally dictated by me on 11/09/2020. I have reviewed the chart and agree that the record accurately reflects my personal performance of the history, physical exam, assessment and plan.  I have also personally directed, reviewed and agreed with the chart.

## 2020-11-10 NOTE — ASSESSMENT
[FreeTextEntry1] : Mr. Nick is a 70 year old male presented with microscopic hematuria, BPH, and history of kidney stones. Complained of incomplete bladder emptying. Drinks 2 quarts of liquid daily, heavy coffee drinker.  Currently taking Tamsulosin 0.4 mg once daily. Patient has diabetes. Has a history of kidney stones. \par 6/14/18: The patient returned and reported incomplete bladder emptying, urinary urgency, and urge incontinence. Currently taking tamsulosin, twice daily. PSA from 4/26/18 was 0.49 ng/mL. CBC from 5/31/18 shows slight anemia, I advised the patient to evaluate with his PCP. Patient and daughter understood.\par 7/5/18: The patient returned and reported he is currently taking Oxybutynin ER 15 mg, and his urination has significantly improved. Notes he is seeing an hematologist for his anemia. \par 10/30/18: The patient returned and reported gross hematuria twice in August 2018. He has a history of microscopic hematuria. He also noted dysuria with the episodes of hematuria. UA in 7/5/18 demonstrates no blood in the urine. I offered the patient a , but he declined. The patient was referred to my office by Dr. Ricky Mendez for a cystoscopy. The patient had bronchitis for 1-2 months in the past, which has resolved. Lab results under Dr. Mendez showed iron was low 43 ug/dL on 6/23/18. The patient also has urinary frequency and urgency. Wakes up 4 times during the night to urinate. The patient noted that Tamsulosin worked well for him in the past. He noted his diabetes is well-controlled.\par 11/05/18: The patient returned for a cystoscopy. Aside from cystoscopy, patient was brought back to the office to discuss further evaluation management. CT urogram of the abdomen and pelvis from 11/5/18 findings showed there is a 5 and 4 mm nonobstructing calculi in the mid and lower pole of the left kidney respectively. There was a 1 cm simple appearing cyst in the upper pole of the left kidney. There was a 1.6 nonobstructing calculus within the urinary bladder. Otherwise unremarkable CT urogram. Currently taking Tamsulosin twice daily and Oxybutynin 10 mg. \par 11/21/18: The patient returned and reported urination is adequate. Complained of urgency and urge incontinence. Denied gross hematuria and dysuria. Denied UTI.  Cystoscopy with lithotripsy of bladder stone is scheduled for 12/14/18.\par 12/18/18 The patient returned for a catheter removal. He reported since his last visit he underwent laser lithotripsy of bladder stone. He went to the ER because he couldn't pass urine and so a catheter was placed.\par 1/7/19: The patient returned and reported his urination has improved since his catheter removal. Patient denied dysuria and gross hematuria. Complained of occasional urinary urgency, but noted it has improved. Currently takes Finasteride.\par 4/8/19: The patient returned today for an US. Renal US findings showed again non obstructing stones in the left kidney 7.0 x 5.4 mm lower and 5.1 x 4.3 mm mid, previously measured 5 & 4 mm. Both kidneys are normal in size and echogenicity without obvious hydronephrosis or solid masses visualized. No bladder calculi. Notes that he hasn't been drinking an adequate amount of water, will aim for 2 quarts daily. Wakes up 2-3 times during the night to urinate. Translation assistance of Brandon ([id number]) at [employer]par 5/14/19: Patient presents today for a follow up. Translation assistance of Allegra ([id number]) at [employer] He reports consuming about 2 liters of water everyday. Previously the citrate in his urine was low. \par 7/8/19: Patient presents today for a follow up. ( [id number]) Since his last visit he has been drinking lots of water with lemon juice as directed. Potassium Citrate ER 10 mg was prescribed as his last visit as well. He was unable to fill the prescription because it was too expensive. A 24 hour urine collection was completed as directed. Regarding his urination it is reported that he will occasionally have to rush to the bathroom, and that minimal incontinence can be experienced. Most of the time his urination is satisfactory. Tamsulosin 0.4 mg Finasteride 5 mg and Oxybutynin 10 mg are all taken as directed. \par 7/15/19: Patient presents today for a follow up. ( # [id number] Shahab) Overall he states that his urination is satisfactory. He urinates about 1 liter less than previously. A 24 hour urine collection was completed since his last visit and he is here to discuss the results. \par 10/28/19: Patient presents today for a follow up. A renal ultrasound was completed today and he is here to discuss the results. \par 11/20/19: Patient presents today for follow-up to discuss CT urogram results. He has been taking potassium citrate and tamsulosin as directed. He offers no urinary complaints. \par \par 2/24/20: Patient presents today for a follow up. Pt reports waking up about 4 times at night to urinate. He also admits to urinary urgency and a mild amount of urinary leakage. He denies dysuria, hematuria. Pt reports he has been taking Tamsulosin only once a day. The patient produced a urine sample which will be sent for urinalysis, urine cytology, and urine culture. Blood work today included comprehensive metabolic panel, CBC, PSA, and testosterone. Advised patient to increase Tamsulosin to twice a day after meals. RTC in 1 month.\par \par 07/14/2020: Patient presents today for a follow up. ( [id number] Jeovany) Pt is on Tamsulosin BID, Finasteride, and Oxybutynin. Nocturia x3-4 with good volume. Daytime urination is normal. DM is being managed well. Pt has a good fluid intake and drinks lemon water. Denies chest pain, constipation, dysuria, hematuria, pushing or straining and urgency. US renal today again visualized a hyperechoic lesion in the right midpole measuring 2.2 cm x 2.1 cm with vascularity. Previously mentioned 7 mm stone in the left kidney was not visualized in this exam. There is an exophytic cyst on the left upper pole measuring 0.85 cm x 1.45 cm with no vascularity. Both kidneys are normal in size and echogenicity without hydronephrosis, stones visualized. Prescribed Desmopressin Acetate qhs. Pt will resume K Citrate for stones. Continue Tamsulosin. \par \par 11/09/2020: Patient presents today for follow up. Had renal US today and here to discuss results. Currently takes Desmopressin 0.1mg and Tamsulosin 0.4mg BID. No constipation. Denies gross hematuria, but complains of urinary frequency and incontinence. \par \par US renal findings today: There is a solid 2.3 x 2.1cm renal mass again visualized mid to lower pole of the right kidney, stable in size and nonvascular. No solid masses present in the left kidney. A small 1.4cm simple nonvascular cyst is present in the upper pole cortex of the left kidney. Both kidneys are normal in size and echogenicity without hydronephrosis, stones or solid masses visualized. \par \par I prescribed the patient Oxybutynin 10mg once daily for his urinary frequency and incontinence. I informed the patient of dry mouth and constipation as a side effect. Recommended stool softeners to control constipation. \par \par RTO in 2 weeks for reassessment.

## 2020-11-10 NOTE — HISTORY OF PRESENT ILLNESS
[FreeTextEntry1] : Mr. Nick is a 70 year old male presented with microscopic hematuria, BPH, and history of kidney stones. Complained of incomplete bladder emptying. Drinks 2 quarts of liquid daily, heavy coffee drinker.  Currently taking Tamsulosin 0.4 mg once daily. Patient has diabetes. Has a history of kidney stones. \par 6/14/18: The patient returned and reported incomplete bladder emptying, urinary urgency, and urge incontinence. Currently taking tamsulosin, twice daily. PSA from 4/26/18 was 0.49 ng/mL. CBC from 5/31/18 shows slight anemia, I advised the patient to evaluate with his PCP. Patient and daughter understood.\par 7/5/18: The patient returned and reported he is currently taking Oxybutynin ER 15 mg, and his urination has significantly improved. Notes he is seeing an hematologist for his anemia. \par 10/30/18: The patient returned and reported gross hematuria twice in August 2018. He has a history of microscopic hematuria. He also noted dysuria with the episodes of hematuria. UA in 7/5/18 demonstrates no blood in the urine. I offered the patient a , but he declined. The patient was referred to my office by Dr. Ricky Mendez for a cystoscopy. The patient had bronchitis for 1-2 months in the past, which has resolved. Lab results under Dr. Mendez showed iron was low 43 ug/dL on 6/23/18. The patient also has urinary frequency and urgency. Wakes up 4 times during the night to urinate. The patient noted that Tamsulosin worked well for him in the past. He noted his diabetes is well-controlled.\par 11/05/18: The patient returned for a cystoscopy. Aside from cystoscopy, patient was brought back to the office to discuss further evaluation management. CT urogram of the abdomen and pelvis from 11/5/18 findings showed there is a 5 and 4 mm nonobstructing calculi in the mid and lower pole of the left kidney respectively. There was a 1 cm simple appearing cyst in the upper pole of the left kidney. There was a 1.6 nonobstructing calculus within the urinary bladder. Otherwise unremarkable CT urogram. Currently taking Tamsulosin twice daily and Oxybutynin 10 mg. \par 11/21/18: The patient returned and reported urination is adequate. Complained of urgency and urge incontinence. Denied gross hematuria and dysuria. Denied UTI.  Cystoscopy with lithotripsy of bladder stone is scheduled for 12/14/18.\par 12/18/18 The patient returned for a catheter removal. He reported since his last visit he underwent laser lithotripsy of bladder stone. He went to the ER because he couldn't pass urine and so a catheter was placed.\par 1/7/19: The patient returned and reported his urination has improved since his catheter removal. Patient denied dysuria and gross hematuria. Complained of occasional urinary urgency, but noted it has improved. Currently takes Finasteride.\par 4/8/19: The patient returned today for an US. Renal US findings showed again non obstructing stones in the left kidney 7.0 x 5.4 mm lower and 5.1 x 4.3 mm mid, previously measured 5 & 4 mm. Both kidneys are normal in size and echogenicity without obvious hydronephrosis or solid masses visualized. No bladder calculi. Notes that he hasn't been drinking an adequate amount of water, will aim for 2 quarts daily. Wakes up 2-3 times during the night to urinate. Translation assistance of Brandon ([id number]) at [employer].\par 5/14/19: Patient presents today for a follow up. Translation assistance of Allegra ([id number]) at [employer]. He reports consuming about 2 liters of water everyday. Previously the citrate in his urine was low. \par 7/8/19: Patient presents today for a follow up. ( [id number]) Since his last visit he has been drinking lots of water with lemon juice as directed. Potassium Citrate ER 10 mg was prescribed as his last visit as well. He was unable to fill the prescription because it was too expensive. A 24 hour urine collection was completed as directed. Regarding his urination it is reported that he will occasionally have to rush to the bathroom, and that minimal incontinence can be experienced. Most of the time his urination is satisfactory. Tamsulosin 0.4 mg Finasteride 5 mg and Oxybutynin 10 mg are all taken as directed. \par 7/15/19: Patient presents today for a follow up. ( # [id number] Shahab) Overall he states that his urination is satisfactory. He urinates about 1 liter less than previously. A 24 hour urine collection was completed since his last visit and he is here to discuss the results. \par 10/28/19: Patient presents today for a follow up. A renal ultrasound was completed today and he is here to discuss the results. \par 11/20/19: Patient presents today for follow-up to discuss CT urogram results. He has been taking potassium citrate and tamsulosin as directed. He offers no urinary complaints. \par \par 2/24/20: Patient presents today for a follow up. Pt reports waking up about 4 times at night to urinate. He also admits to urinary urgency and a mild amount of urinary leakage. He denies dysuria, hematuria. Pt reports he has been taking Tamsulosin only once a day. \par \par 07/14/2020: Patient presents today for a follow up. ( [id number] Jeovany) Pt is on Tamsulosin BID, Finasteride, and Oxybutynin. Nocturia x3-4 with good volume. Daytime urination is normal. DM is being managed well. Pt has a good fluid intake and drinks lemon water. Denies chest pain, constipation, dysuria, hematuria, pushing or straining and urgency. US renal today again visualized a hyperechoic lesion in the right midpole measuring 2.2 cm x 2.1 cm with vascularity. Previously mentioned 7 mm stone in the left kidney was not visualized in this exam. There is an exophytic cyst on the left upper pole measuring 0.85 cm x 1.45 cm with no vascularity. Both kidneys are normal in size and echogenicity without hydronephrosis, stones visualized. Prescribed Desmopressin Acetate qhs. Pt will resume K Citrate for stones. Continue Tamsulosin. \par \par 11/09/2020: Patient presents today for follow up. Had renal US today and here to discuss results. Currently takes Desmopressin 0.1mg and Tamsulosin 0.4mg BID. No constipation. Denies gross hematuria, but complains of urinary frequency and incontinence.

## 2020-11-23 NOTE — PHYSICAL EXAM
[General Appearance - Well Developed] : well developed [Normal Appearance] : normal appearance [General Appearance - In No Acute Distress] : no acute distress [Abdomen Soft] : soft [Abdomen Tenderness] : non-tender [Skin Color & Pigmentation] : normal skin color and pigmentation [Edema] : no peripheral edema [] : no respiratory distress [Respiration, Rhythm And Depth] : normal respiratory rhythm and effort [Exaggerated Use Of Accessory Muscles For Inspiration] : no accessory muscle use [Oriented To Time, Place, And Person] : oriented to person, place, and time [Affect] : the affect was normal [Mood] : the mood was normal [Not Anxious] : not anxious [No Focal Deficits] : no focal deficits

## 2020-11-23 NOTE — ASSESSMENT
[FreeTextEntry1] : Mr. Nick is a 70 year old male presented with microscopic hematuria, BPH, and history of kidney stones. Complained of incomplete bladder emptying. Drinks 2 quarts of liquid daily, heavy coffee drinker.  Currently taking Tamsulosin 0.4 mg once daily. Patient has diabetes. Has a history of kidney stones. \par 6/14/18: The patient returned and reported incomplete bladder emptying, urinary urgency, and urge incontinence. Currently taking tamsulosin, twice daily. PSA from 4/26/18 was 0.49 ng/mL. CBC from 5/31/18 shows slight anemia, I advised the patient to evaluate with his PCP. Patient and daughter understood.\par 7/5/18: The patient returned and reported he is currently taking Oxybutynin ER 15 mg, and his urination has significantly improved. Notes he is seeing an hematologist for his anemia. \par 10/30/18: The patient returned and reported gross hematuria twice in August 2018. He has a history of microscopic hematuria. He also noted dysuria with the episodes of hematuria. UA in 7/5/18 demonstrates no blood in the urine. I offered the patient a , but he declined. The patient was referred to my office by Dr. Ricky Mendez for a cystoscopy. The patient had bronchitis for 1-2 months in the past, which has resolved. Lab results under Dr. Mendez showed iron was low 43 ug/dL on 6/23/18. The patient also has urinary frequency and urgency. Wakes up 4 times during the night to urinate. The patient noted that Tamsulosin worked well for him in the past. He noted his diabetes is well-controlled.\par 11/05/18: The patient returned for a cystoscopy. Aside from cystoscopy, patient was brought back to the office to discuss further evaluation management. CT urogram of the abdomen and pelvis from 11/5/18 findings showed there is a 5 and 4 mm nonobstructing calculi in the mid and lower pole of the left kidney respectively. There was a 1 cm simple appearing cyst in the upper pole of the left kidney. There was a 1.6 nonobstructing calculus within the urinary bladder. Otherwise unremarkable CT urogram. Currently taking Tamsulosin twice daily and Oxybutynin 10 mg. \par 11/21/18: The patient returned and reported urination is adequate. Complained of urgency and urge incontinence. Denied gross hematuria and dysuria. Denied UTI.  Cystoscopy with lithotripsy of bladder stone is scheduled for 12/14/18.\par 12/18/18 The patient returned for a catheter removal. He reported since his last visit he underwent laser lithotripsy of bladder stone. He went to the ER because he couldn't pass urine and so a catheter was placed.\par 1/7/19: The patient returned and reported his urination has improved since his catheter removal. Patient denied dysuria and gross hematuria. Complained of occasional urinary urgency, but noted it has improved. Currently takes Finasteride.\par 4/8/19: The patient returned today for an US. Renal US findings showed again non obstructing stones in the left kidney 7.0 x 5.4 mm lower and 5.1 x 4.3 mm mid, previously measured 5 & 4 mm. Both kidneys are normal in size and echogenicity without obvious hydronephrosis or solid masses visualized. No bladder calculi. Notes that he hasn't been drinking an adequate amount of water, will aim for 2 quarts daily. Wakes up 2-3 times during the night to urinate. Translation assistance of Brandon ([id number]) at [employer].\par 5/14/19: Patient presents today for a follow up. Translation assistance of Allegra ([id number]) at [employer]. He reports consuming about 2 liters of water everyday. Previously the citrate in his urine was low. \par 7/8/19: Patient presents today for a follow up. ( [id number]) Since his last visit he has been drinking lots of water with lemon juice as directed. Potassium Citrate ER 10 mg was prescribed as his last visit as well. He was unable to fill the prescription because it was too expensive. A 24 hour urine collection was completed as directed. Regarding his urination it is reported that he will occasionally have to rush to the bathroom, and that minimal incontinence can be experienced. Most of the time his urination is satisfactory. Tamsulosin 0.4 mg Finasteride 5 mg and Oxybutynin 10 mg are all taken as directed. \par 7/15/19: Patient presents today for a follow up. ( # [id number] Shahab) Overall he states that his urination is satisfactory. He urinates about 1 liter less than previously. A 24 hour urine collection was completed since his last visit and he is here to discuss the results. \par 10/28/19: Patient presents today for a follow up. A renal ultrasound was completed today and he is here to discuss the results. \par 11/20/19: Patient presents today for follow-up to discuss CT urogram results. He has been taking potassium citrate and tamsulosin as directed. He offers no urinary complaints. \par \par 2/24/20: Patient presents today for a follow up. Pt reports waking up about 4 times at night to urinate. He also admits to urinary urgency and a mild amount of urinary leakage. He denies dysuria, hematuria. Pt reports he has been taking Tamsulosin only once a day. The patient produced a urine sample which will be sent for urinalysis, urine cytology, and urine culture. Blood work today included comprehensive metabolic panel, CBC, PSA, and testosterone. Advised patient to increase Tamsulosin to twice a day after meals. RTC in 1 month.\par \par 07/14/2020: Patient presents today for a follow up. ( [id number] Jeovany) Pt is on Tamsulosin BID, Finasteride, and Oxybutynin. Nocturia x3-4 with good volume. Daytime urination is normal. DM is being managed well. Pt has a good fluid intake and drinks lemon water. Denies chest pain, constipation, dysuria, hematuria, pushing or straining and urgency. US renal today again visualized a hyperechoic lesion in the right midpole measuring 2.2 cm x 2.1 cm with vascularity. Previously mentioned 7 mm stone in the left kidney was not visualized in this exam. There is an exophytic cyst on the left upper pole measuring 0.85 cm x 1.45 cm with no vascularity. Both kidneys are normal in size and echogenicity without hydronephrosis, stones visualized. Prescribed Desmopressin Acetate qhs. Pt will resume K Citrate for stones. Continue Tamsulosin. \par \par 11/09/2020: Patient presents today for follow up. Had renal US today and here to discuss results. Currently takes Desmopressin 0.1mg and Tamsulosin 0.4mg BID. No constipation. Denies gross hematuria, but complains of urinary frequency and incontinence. US renal findings today: There is a solid 2.3 x 2.1cm renal mass again visualized mid to lower pole of the right kidney, stable in size and nonvascular. No solid masses present in the left kidney. A small 1.4cm simple nonvascular cyst is present in the upper pole cortex of the left kidney. Both kidneys are normal in size and echogenicity without hydronephrosis, stones or solid masses visualized. I prescribed the patient Oxybutynin 10mg once daily for his urinary frequency and incontinence. \par \par 11/23/2020: Patient presents today for follow up. Last visit, was started on Oxybutynin, but is still unable to hold his urine and does leak. Additionally takes Tamsulosin BID and Desmopressin. H/o kidney stones, but has not had any pains. \par \par D/c Oxybutynin. Will prescribe Myrbetriq 25mg, one tablet daily. I informed the patient risk of increasing blood pressure and urinary retention. \par \par The patient produced a urine sample which will be sent for urinalysis, urine cytology, and urine culture. \par \par RTO in 2 weeks for reassessment.

## 2020-11-23 NOTE — HISTORY OF PRESENT ILLNESS
[FreeTextEntry1] : Mr. Nick is a 70 year old male presented with microscopic hematuria, BPH, and history of kidney stones. Complained of incomplete bladder emptying. Drinks 2 quarts of liquid daily, heavy coffee drinker.  Currently taking Tamsulosin 0.4 mg once daily. Patient has diabetes. Has a history of kidney stones. \par 6/14/18: The patient returned and reported incomplete bladder emptying, urinary urgency, and urge incontinence. Currently taking tamsulosin, twice daily. PSA from 4/26/18 was 0.49 ng/mL. CBC from 5/31/18 shows slight anemia, I advised the patient to evaluate with his PCP. Patient and daughter understood.\par 7/5/18: The patient returned and reported he is currently taking Oxybutynin ER 15 mg, and his urination has significantly improved. Notes he is seeing an hematologist for his anemia. \par 10/30/18: The patient returned and reported gross hematuria twice in August 2018. He has a history of microscopic hematuria. He also noted dysuria with the episodes of hematuria. UA in 7/5/18 demonstrates no blood in the urine. I offered the patient a , but he declined. The patient was referred to my office by Dr. Ricky Mendez for a cystoscopy. The patient had bronchitis for 1-2 months in the past, which has resolved. Lab results under Dr. Mendez showed iron was low 43 ug/dL on 6/23/18. The patient also has urinary frequency and urgency. Wakes up 4 times during the night to urinate. The patient noted that Tamsulosin worked well for him in the past. He noted his diabetes is well-controlled.\par 11/05/18: The patient returned for a cystoscopy. Aside from cystoscopy, patient was brought back to the office to discuss further evaluation management. CT urogram of the abdomen and pelvis from 11/5/18 findings showed there is a 5 and 4 mm nonobstructing calculi in the mid and lower pole of the left kidney respectively. There was a 1 cm simple appearing cyst in the upper pole of the left kidney. There was a 1.6 nonobstructing calculus within the urinary bladder. Otherwise unremarkable CT urogram. Currently taking Tamsulosin twice daily and Oxybutynin 10 mg. \par 11/21/18: The patient returned and reported urination is adequate. Complained of urgency and urge incontinence. Denied gross hematuria and dysuria. Denied UTI.  Cystoscopy with lithotripsy of bladder stone is scheduled for 12/14/18.\par 12/18/18 The patient returned for a catheter removal. He reported since his last visit he underwent laser lithotripsy of bladder stone. He went to the ER because he couldn't pass urine and so a catheter was placed.\par 1/7/19: The patient returned and reported his urination has improved since his catheter removal. Patient denied dysuria and gross hematuria. Complained of occasional urinary urgency, but noted it has improved. Currently takes Finasteride.\par 4/8/19: The patient returned today for an US. Renal US findings showed again non obstructing stones in the left kidney 7.0 x 5.4 mm lower and 5.1 x 4.3 mm mid, previously measured 5 & 4 mm. Both kidneys are normal in size and echogenicity without obvious hydronephrosis or solid masses visualized. No bladder calculi. Notes that he hasn't been drinking an adequate amount of water, will aim for 2 quarts daily. Wakes up 2-3 times during the night to urinate. Translation assistance of Brandon ([id number]) at [employer].\par 5/14/19: Patient presents today for a follow up. Translation assistance of Allegra ([id number]) at [employer]. He reports consuming about 2 liters of water everyday. Previously the citrate in his urine was low. \par 7/8/19: Patient presents today for a follow up. ( [id number]) Since his last visit he has been drinking lots of water with lemon juice as directed. Potassium Citrate ER 10 mg was prescribed as his last visit as well. He was unable to fill the prescription because it was too expensive. A 24 hour urine collection was completed as directed. Regarding his urination it is reported that he will occasionally have to rush to the bathroom, and that minimal incontinence can be experienced. Most of the time his urination is satisfactory. Tamsulosin 0.4 mg Finasteride 5 mg and Oxybutynin 10 mg are all taken as directed. \par 7/15/19: Patient presents today for a follow up. ( # [id number] Shahab) Overall he states that his urination is satisfactory. He urinates about 1 liter less than previously. A 24 hour urine collection was completed since his last visit and he is here to discuss the results. \par 10/28/19: Patient presents today for a follow up. A renal ultrasound was completed today and he is here to discuss the results. \par 11/20/19: Patient presents today for follow-up to discuss CT urogram results. He has been taking potassium citrate and tamsulosin as directed. He offers no urinary complaints. \par \par 2/24/20: Patient presents today for a follow up. Pt reports waking up about 4 times at night to urinate. He also admits to urinary urgency and a mild amount of urinary leakage. He denies dysuria, hematuria. Pt reports he has been taking Tamsulosin only once a day. \par \par 07/14/2020: Patient presents today for a follow up. ( [id number] Jeovany) Pt is on Tamsulosin BID, Finasteride, and Oxybutynin. Nocturia x3-4 with good volume. Daytime urination is normal. DM is being managed well. Pt has a good fluid intake and drinks lemon water. Denies chest pain, constipation, dysuria, hematuria, pushing or straining and urgency. US renal today again visualized a hyperechoic lesion in the right midpole measuring 2.2 cm x 2.1 cm with vascularity. Previously mentioned 7 mm stone in the left kidney was not visualized in this exam. There is an exophytic cyst on the left upper pole measuring 0.85 cm x 1.45 cm with no vascularity. Both kidneys are normal in size and echogenicity without hydronephrosis, stones visualized. Prescribed Desmopressin Acetate qhs. Pt will resume K Citrate for stones. Continue Tamsulosin. \par \par 11/09/2020: Patient presents today for follow up. Had renal US today and here to discuss results. Currently takes Desmopressin 0.1mg and Tamsulosin 0.4mg BID. No constipation. Denies gross hematuria, but complains of urinary frequency and incontinence. US renal findings today: There is a solid 2.3 x 2.1cm renal mass again visualized mid to lower pole of the right kidney, stable in size and nonvascular. No solid masses present in the left kidney. A small 1.4cm simple nonvascular cyst is present in the upper pole cortex of the left kidney. Both kidneys are normal in size and echogenicity without hydronephrosis, stones or solid masses visualized. I prescribed the patient Oxybutynin 10mg once daily for his urinary frequency and incontinence. \par \par 11/23/2020: Patient presents today for follow up. Last visit, was started on Oxybutynin, but is still unable to hold his urine and does leak. Additionally takes Tamsulosin BID and Desmopressin. H/o kidney stones, but has not had any pains.

## 2020-11-23 NOTE — REASON FOR VISIT
[Follow-up Visit ___] : a follow-up visit  for [unfilled] [[employer] ] : provided by [employer]   [FreeTextEntry1] : [id number] [FreeTextEntry2] : [employer] [TWNoteComboBox1] : [place]ian

## 2020-11-23 NOTE — ADDENDUM
[FreeTextEntry1] : I, Tatiana Rodriguezin, acted solely as a scribe for Dr. Nolan Lay on this date 11/23/2020.\par \par All medical record entries made by the Scribe were at my, Dr. Nolan Lay, direction and personally dictated by me on 11/23/2020. I have reviewed the chart and agree that the record accurately reflects my personal performance of the history, physical exam, assessment and plan.  I have also personally directed, reviewed and agreed with the chart.

## 2020-12-27 NOTE — HISTORY OF PRESENT ILLNESS
[FreeTextEntry1] : Mr. Nick is a 70 year old male presented with microscopic hematuria, BPH, and history of kidney stones. Complained of incomplete bladder emptying. Drinks 2 quarts of liquid daily, heavy coffee drinker.  Currently taking Tamsulosin 0.4 mg once daily. Patient has diabetes. Has a history of kidney stones. \par 6/14/18: The patient returned and reported incomplete bladder emptying, urinary urgency, and urge incontinence. Currently taking tamsulosin, twice daily. PSA from 4/26/18 was 0.49 ng/mL. CBC from 5/31/18 shows slight anemia, I advised the patient to evaluate with his PCP. Patient and daughter understood.\par 7/5/18: The patient returned and reported he is currently taking Oxybutynin ER 15 mg, and his urination has significantly improved. Notes he is seeing an hematologist for his anemia. \par 10/30/18: The patient returned and reported gross hematuria twice in August 2018. He has a history of microscopic hematuria. He also noted dysuria with the episodes of hematuria. UA in 7/5/18 demonstrates no blood in the urine. I offered the patient a , but he declined. The patient was referred to my office by Dr. Ricky Mendez for a cystoscopy. The patient had bronchitis for 1-2 months in the past, which has resolved. Lab results under Dr. Mendez showed iron was low 43 ug/dL on 6/23/18. The patient also has urinary frequency and urgency. Wakes up 4 times during the night to urinate. The patient noted that Tamsulosin worked well for him in the past. He noted his diabetes is well-controlled.\par 11/05/18: The patient returned for a cystoscopy. Aside from cystoscopy, patient was brought back to the office to discuss further evaluation management. CT urogram of the abdomen and pelvis from 11/5/18 findings showed there is a 5 and 4 mm nonobstructing calculi in the mid and lower pole of the left kidney respectively. There was a 1 cm simple appearing cyst in the upper pole of the left kidney. There was a 1.6 nonobstructing calculus within the urinary bladder. Otherwise unremarkable CT urogram. Currently taking Tamsulosin twice daily and Oxybutynin 10 mg. \par 11/21/18: The patient returned and reported urination is adequate. Complained of urgency and urge incontinence. Denied gross hematuria and dysuria. Denied UTI.  Cystoscopy with lithotripsy of bladder stone is scheduled for 12/14/18.\par 12/18/18 The patient returned for a catheter removal. He reported since his last visit he underwent laser lithotripsy of bladder stone. He went to the ER because he couldn't pass urine and so a catheter was placed.\par 1/7/19: The patient returned and reported his urination has improved since his catheter removal. Patient denied dysuria and gross hematuria. Complained of occasional urinary urgency, but noted it has improved. Currently takes Finasteride.\par 4/8/19: The patient returned today for an US. Renal US findings showed again non obstructing stones in the left kidney 7.0 x 5.4 mm lower and 5.1 x 4.3 mm mid, previously measured 5 & 4 mm. Both kidneys are normal in size and echogenicity without obvious hydronephrosis or solid masses visualized. No bladder calculi. Notes that he hasn't been drinking an adequate amount of water, will aim for 2 quarts daily. Wakes up 2-3 times during the night to urinate. Translation assistance of Brandon ([id number]) at [employer].\par 5/14/19: Patient presents today for a follow up. Translation assistance of Allegra ([id number]) at [employer]. He reports consuming about 2 liters of water everyday. Previously the citrate in his urine was low. \par 7/8/19: Patient presents today for a follow up. ( [id number]) Since his last visit he has been drinking lots of water with lemon juice as directed. Potassium Citrate ER 10 mg was prescribed as his last visit as well. He was unable to fill the prescription because it was too expensive. A 24 hour urine collection was completed as directed. Regarding his urination it is reported that he will occasionally have to rush to the bathroom, and that minimal incontinence can be experienced. Most of the time his urination is satisfactory. Tamsulosin 0.4 mg Finasteride 5 mg and Oxybutynin 10 mg are all taken as directed. \par 7/15/19: Patient presents today for a follow up. ( # [id number] Shahab) Overall he states that his urination is satisfactory. He urinates about 1 liter less than previously. A 24 hour urine collection was completed since his last visit and he is here to discuss the results. \par 10/28/19: Patient presents today for a follow up. A renal ultrasound was completed today and he is here to discuss the results. \par 11/20/19: Patient presents today for follow-up to discuss CT urogram results. He has been taking potassium citrate and tamsulosin as directed. He offers no urinary complaints. \par \par 2/24/20: Patient presents today for a follow up. Pt reports waking up about 4 times at night to urinate. He also admits to urinary urgency and a mild amount of urinary leakage. He denies dysuria, hematuria. Pt reports he has been taking Tamsulosin only once a day. \par \par 07/14/2020: Patient presents today for a follow up. ( [id number] Jeovany) Pt is on Tamsulosin BID, Finasteride, and Oxybutynin. Nocturia x3-4 with good volume. Daytime urination is normal. DM is being managed well. Pt has a good fluid intake and drinks lemon water. Denies chest pain, constipation, dysuria, hematuria, pushing or straining and urgency. US renal today again visualized a hyperechoic lesion in the right midpole measuring 2.2 cm x 2.1 cm with vascularity. Previously mentioned 7 mm stone in the left kidney was not visualized in this exam. There is an exophytic cyst on the left upper pole measuring 0.85 cm x 1.45 cm with no vascularity. Both kidneys are normal in size and echogenicity without hydronephrosis, stones visualized. Prescribed Desmopressin Acetate qhs. Pt will resume K Citrate for stones. Continue Tamsulosin. \par \par 11/09/2020: Patient presents today for follow up. Had renal US today and here to discuss results. Currently takes Desmopressin 0.1mg and Tamsulosin 0.4mg BID. No constipation. Denies gross hematuria, but complains of urinary frequency and incontinence. US renal findings today: There is a solid 2.3 x 2.1cm renal mass again visualized mid to lower pole of the right kidney, stable in size and nonvascular. No solid masses present in the left kidney. A small 1.4cm simple nonvascular cyst is present in the upper pole cortex of the left kidney. Both kidneys are normal in size and echogenicity without hydronephrosis, stones or solid masses visualized. I prescribed the patient Oxybutynin 10mg once daily for his urinary frequency and incontinence. \par \par 11/23/2020: Patient presents today for follow up. Last visit, was started on Oxybutynin, but is still unable to hold his urine and does leak. Additionally takes Tamsulosin BID and Desmopressin. H/o kidney stones, but has not had any pains. \par \par 12/21/2020: Patient presents today for follow up. Pt was prescribed Myrbetriq last visit, however was unable to afford it. He is currently taking Oxybutynin ER 15mg one daily, Tamsulosin BID, potassium citrate, and Finasteride. He continues to have trouble holding urine. Does have dry mouth. Stream is normal.

## 2020-12-27 NOTE — ADDENDUM
[FreeTextEntry1] : I, Tatiana Rodriguezin, acted solely as a scribe for Dr. Nolan Lay on this date 12/21/2020.\par \par All medical record entries made by the Scribe were at my, Dr. Nolan Lay, direction and personally dictated by me on 12/21/2020. I have reviewed the chart and agree that the record accurately reflects my personal performance of the history, physical exam, assessment and plan.  I have also personally directed, reviewed and agreed with the chart.

## 2020-12-27 NOTE — PHYSICAL EXAM
[General Appearance - Well Developed] : well developed [Normal Appearance] : normal appearance [General Appearance - In No Acute Distress] : no acute distress [Abdomen Soft] : soft [Abdomen Tenderness] : non-tender [Skin Color & Pigmentation] : normal skin color and pigmentation [Edema] : no peripheral edema [] : no respiratory distress [Respiration, Rhythm And Depth] : normal respiratory rhythm and effort [Exaggerated Use Of Accessory Muscles For Inspiration] : no accessory muscle use [Oriented To Time, Place, And Person] : oriented to person, place, and time [Affect] : the affect was normal [Mood] : the mood was normal [Not Anxious] : not anxious [Normal Station and Gait] : the gait and station were normal for the patient's age [No Focal Deficits] : no focal deficits [Cervical Lymph Nodes Enlarged Posterior Bilaterally] : posterior cervical [Cervical Lymph Nodes Enlarged Anterior Bilaterally] : anterior cervical [FreeTextEntry1] : No breast tenderness.

## 2020-12-27 NOTE — ASSESSMENT
[FreeTextEntry1] : Mr. Nick is a 70 year old male presented with microscopic hematuria, BPH, and history of kidney stones. Complained of incomplete bladder emptying. Drinks 2 quarts of liquid daily, heavy coffee drinker.  Currently taking Tamsulosin 0.4 mg once daily. Patient has diabetes. Has a history of kidney stones. \par 6/14/18: The patient returned and reported incomplete bladder emptying, urinary urgency, and urge incontinence. Currently taking tamsulosin, twice daily. PSA from 4/26/18 was 0.49 ng/mL. CBC from 5/31/18 shows slight anemia, I advised the patient to evaluate with his PCP. Patient and daughter understood.\par 7/5/18: The patient returned and reported he is currently taking Oxybutynin ER 15 mg, and his urination has significantly improved. Notes he is seeing an hematologist for his anemia. \par 10/30/18: The patient returned and reported gross hematuria twice in August 2018. He has a history of microscopic hematuria. He also noted dysuria with the episodes of hematuria. UA in 7/5/18 demonstrates no blood in the urine. I offered the patient a , but he declined. The patient was referred to my office by Dr. Ricky Mendez for a cystoscopy. The patient had bronchitis for 1-2 months in the past, which has resolved. Lab results under Dr. Mendez showed iron was low 43 ug/dL on 6/23/18. The patient also has urinary frequency and urgency. Wakes up 4 times during the night to urinate. The patient noted that Tamsulosin worked well for him in the past. He noted his diabetes is well-controlled.\par 11/05/18: The patient returned for a cystoscopy. Aside from cystoscopy, patient was brought back to the office to discuss further evaluation management. CT urogram of the abdomen and pelvis from 11/5/18 findings showed there is a 5 and 4 mm nonobstructing calculi in the mid and lower pole of the left kidney respectively. There was a 1 cm simple appearing cyst in the upper pole of the left kidney. There was a 1.6 nonobstructing calculus within the urinary bladder. Otherwise unremarkable CT urogram. Currently taking Tamsulosin twice daily and Oxybutynin 10 mg. \par 11/21/18: The patient returned and reported urination is adequate. Complained of urgency and urge incontinence. Denied gross hematuria and dysuria. Denied UTI.  Cystoscopy with lithotripsy of bladder stone is scheduled for 12/14/18.\par 12/18/18 The patient returned for a catheter removal. He reported since his last visit he underwent laser lithotripsy of bladder stone. He went to the ER because he couldn't pass urine and so a catheter was placed.\par 1/7/19: The patient returned and reported his urination has improved since his catheter removal. Patient denied dysuria and gross hematuria. Complained of occasional urinary urgency, but noted it has improved. Currently takes Finasteride.\par 4/8/19: The patient returned today for an US. Renal US findings showed again non obstructing stones in the left kidney 7.0 x 5.4 mm lower and 5.1 x 4.3 mm mid, previously measured 5 & 4 mm. Both kidneys are normal in size and echogenicity without obvious hydronephrosis or solid masses visualized. No bladder calculi. Notes that he hasn't been drinking an adequate amount of water, will aim for 2 quarts daily. Wakes up 2-3 times during the night to urinate. Translation assistance of Brandon ([id number]) at [facility].\par 5/14/19: Patient presents today for a follow up. Translation assistance of Allegra ([id number]) at [facility]. He reports consuming about 2 liters of water everyday. Previously the citrate in his urine was low. \par 7/8/19: Patient presents today for a follow up. ( [id number]) Since his last visit he has been drinking lots of water with lemon juice as directed. Potassium Citrate ER 10 mg was prescribed as his last visit as well. He was unable to fill the prescription because it was too expensive. A 24 hour urine collection was completed as directed. Regarding his urination it is reported that he will occasionally have to rush to the bathroom, and that minimal incontinence can be experienced. Most of the time his urination is satisfactory. Tamsulosin 0.4 mg Finasteride 5 mg and Oxybutynin 10 mg are all taken as directed. \par 7/15/19: Patient presents today for a follow up. ( # [id number] Shahab) Overall he states that his urination is satisfactory. He urinates about 1 liter less than previously. A 24 hour urine collection was completed since his last visit and he is here to discuss the results. \par 10/28/19: Patient presents today for a follow up. A renal ultrasound was completed today and he is here to discuss the results. \par 11/20/19: Patient presents today for follow-up to discuss CT urogram results. He has been taking potassium citrate and tamsulosin as directed. He offers no urinary complaints. \par \par 2/24/20: Patient presents today for a follow up. Pt reports waking up about 4 times at night to urinate. He also admits to urinary urgency and a mild amount of urinary leakage. He denies dysuria, hematuria. Pt reports he has been taking Tamsulosin only once a day. The patient produced a urine sample which will be sent for urinalysis, urine cytology, and urine culture. Blood work today included comprehensive metabolic panel, CBC, PSA, and testosterone. Advised patient to increase Tamsulosin to twice a day after meals. RTC in 1 month.\par \par 07/14/2020: Patient presents today for a follow up. ( [id number] Jeovany) Pt is on Tamsulosin BID, Finasteride, and Oxybutynin. Nocturia x3-4 with good volume. Daytime urination is normal. DM is being managed well. Pt has a good fluid intake and drinks lemon water. Denies chest pain, constipation, dysuria, hematuria, pushing or straining and urgency. US renal today again visualized a hyperechoic lesion in the right midpole measuring 2.2 cm x 2.1 cm with vascularity. Previously mentioned 7 mm stone in the left kidney was not visualized in this exam. There is an exophytic cyst on the left upper pole measuring 0.85 cm x 1.45 cm with no vascularity. Both kidneys are normal in size and echogenicity without hydronephrosis, stones visualized. Prescribed Desmopressin Acetate qhs. Pt will resume K Citrate for stones. Continue Tamsulosin. \par \par 11/09/2020: Patient presents today for follow up. Had renal US today and here to discuss results. Currently takes Desmopressin 0.1mg and Tamsulosin 0.4mg BID. No constipation. Denies gross hematuria, but complains of urinary frequency and incontinence. US renal findings today: There is a solid 2.3 x 2.1cm renal mass again visualized mid to lower pole of the right kidney, stable in size and nonvascular. No solid masses present in the left kidney. A small 1.4cm simple nonvascular cyst is present in the upper pole cortex of the left kidney. Both kidneys are normal in size and echogenicity without hydronephrosis, stones or solid masses visualized. I prescribed the patient Oxybutynin 10mg once daily for his urinary frequency and incontinence. \par \par 11/23/2020: Patient presents today for follow up. Last visit, was started on Oxybutynin, but is still unable to hold his urine and does leak. Additionally takes Tamsulosin BID and Desmopressin. H/o kidney stones, but has not had any pains. \par \par 12/21/2020: Patient presents today for follow up. Pt was prescribed Myrbetriq last visit, however was unable to afford it. He is currently taking Oxybutynin ER 15mg one daily, Tamsulosin BID, potassium citrate, and Finasteride. He continues to have trouble holding urine. Does have dry mouth. Stream is normal. \par \par Patient had been on Oxybutynin ER 10mg in the past and was increased to 15mg a while ago. Will increase to Oxybutynin ER to 10mg twice daily today. \par \par The patient produced a urine sample which will be sent for urinalysis, urine cytology, and urine culture. \par \par

## 2021-01-01 ENCOUNTER — APPOINTMENT (OUTPATIENT)
Dept: UROLOGY | Facility: CLINIC | Age: 71
End: 2021-01-01
Payer: MEDICARE

## 2021-01-01 ENCOUNTER — APPOINTMENT (OUTPATIENT)
Dept: UROLOGY | Facility: CLINIC | Age: 71
End: 2021-01-01

## 2021-01-01 ENCOUNTER — TRANSCRIPTION ENCOUNTER (OUTPATIENT)
Age: 71
End: 2021-01-01

## 2021-01-01 ENCOUNTER — INPATIENT (INPATIENT)
Facility: HOSPITAL | Age: 71
LOS: 57 days | DRG: 3 | End: 2021-10-09
Attending: STUDENT IN AN ORGANIZED HEALTH CARE EDUCATION/TRAINING PROGRAM | Admitting: HOSPITALIST
Payer: COMMERCIAL

## 2021-01-01 ENCOUNTER — RX RENEWAL (OUTPATIENT)
Age: 71
End: 2021-01-01

## 2021-01-01 VITALS
DIASTOLIC BLOOD PRESSURE: 73 MMHG | RESPIRATION RATE: 17 BRPM | BODY MASS INDEX: 30.05 KG/M2 | SYSTOLIC BLOOD PRESSURE: 139 MMHG | HEART RATE: 84 BPM | TEMPERATURE: 98 F | HEIGHT: 66 IN | WEIGHT: 187 LBS

## 2021-01-01 VITALS
RESPIRATION RATE: 18 BRPM | TEMPERATURE: 102 F | DIASTOLIC BLOOD PRESSURE: 76 MMHG | SYSTOLIC BLOOD PRESSURE: 138 MMHG | HEIGHT: 68 IN | WEIGHT: 179.9 LBS | OXYGEN SATURATION: 87 % | HEART RATE: 99 BPM

## 2021-01-01 VITALS
HEIGHT: 66 IN | TEMPERATURE: 97.7 F | WEIGHT: 187 LBS | HEART RATE: 87 BPM | SYSTOLIC BLOOD PRESSURE: 154 MMHG | BODY MASS INDEX: 30.05 KG/M2 | DIASTOLIC BLOOD PRESSURE: 89 MMHG

## 2021-01-01 VITALS
WEIGHT: 187 LBS | DIASTOLIC BLOOD PRESSURE: 81 MMHG | HEIGHT: 66 IN | SYSTOLIC BLOOD PRESSURE: 152 MMHG | HEART RATE: 81 BPM | TEMPERATURE: 98 F | BODY MASS INDEX: 30.05 KG/M2

## 2021-01-01 VITALS
BODY MASS INDEX: 30.53 KG/M2 | RESPIRATION RATE: 17 BRPM | TEMPERATURE: 98.3 F | HEIGHT: 66 IN | SYSTOLIC BLOOD PRESSURE: 149 MMHG | HEART RATE: 78 BPM | WEIGHT: 190 LBS | DIASTOLIC BLOOD PRESSURE: 83 MMHG

## 2021-01-01 VITALS — OXYGEN SATURATION: 98 %

## 2021-01-01 DIAGNOSIS — Z87.442 PERSONAL HISTORY OF URINARY CALCULI: Chronic | ICD-10-CM

## 2021-01-01 DIAGNOSIS — R82.89 OTHER ABNRM FNDNGS ON CYTOLOGICAL: ICD-10-CM

## 2021-01-01 DIAGNOSIS — Z51.5 ENCOUNTER FOR PALLIATIVE CARE: ICD-10-CM

## 2021-01-01 DIAGNOSIS — Z98.890 OTHER SPECIFIED POSTPROCEDURAL STATES: Chronic | ICD-10-CM

## 2021-01-01 DIAGNOSIS — N40.0 BENIGN PROSTATIC HYPERPLASIA WITHOUT LOWER URINARY TRACT SYMPTOMS: ICD-10-CM

## 2021-01-01 DIAGNOSIS — N20.0 CALCULUS OF KIDNEY: ICD-10-CM

## 2021-01-01 DIAGNOSIS — U07.1 COVID-19: ICD-10-CM

## 2021-01-01 DIAGNOSIS — I46.9 CARDIAC ARREST, CAUSE UNSPECIFIED: ICD-10-CM

## 2021-01-01 DIAGNOSIS — Z71.89 OTHER SPECIFIED COUNSELING: ICD-10-CM

## 2021-01-01 DIAGNOSIS — J96.01 ACUTE RESPIRATORY FAILURE WITH HYPOXIA: ICD-10-CM

## 2021-01-01 DIAGNOSIS — R39.15 URGENCY OF URINATION: ICD-10-CM

## 2021-01-01 DIAGNOSIS — R35.1 NOCTURIA: ICD-10-CM

## 2021-01-01 DIAGNOSIS — R45.1 RESTLESSNESS AND AGITATION: ICD-10-CM

## 2021-01-01 DIAGNOSIS — N40.1 BENIGN PROSTATIC HYPERPLASIA WITH LOWER URINARY TRACT SYMPMS: ICD-10-CM

## 2021-01-01 DIAGNOSIS — R53.2 FUNCTIONAL QUADRIPLEGIA: ICD-10-CM

## 2021-01-01 DIAGNOSIS — E11.9 TYPE 2 DIABETES MELLITUS W/OUT COMPLICATIONS: ICD-10-CM

## 2021-01-01 DIAGNOSIS — Z29.9 ENCOUNTER FOR PROPHYLACTIC MEASURES, UNSPECIFIED: ICD-10-CM

## 2021-01-01 DIAGNOSIS — Z98.89 OTHER SPECIFIED POSTPROCEDURAL STATES: Chronic | ICD-10-CM

## 2021-01-01 DIAGNOSIS — D64.9 ANEMIA, UNSPECIFIED: ICD-10-CM

## 2021-01-01 DIAGNOSIS — R31.0 GROSS HEMATURIA: ICD-10-CM

## 2021-01-01 DIAGNOSIS — N21.0 CALCULUS IN BLADDER: ICD-10-CM

## 2021-01-01 DIAGNOSIS — R35.0 FREQUENCY OF MICTURITION: ICD-10-CM

## 2021-01-01 DIAGNOSIS — E11.9 TYPE 2 DIABETES MELLITUS WITHOUT COMPLICATIONS: ICD-10-CM

## 2021-01-01 DIAGNOSIS — I10 ESSENTIAL (PRIMARY) HYPERTENSION: ICD-10-CM

## 2021-01-01 DIAGNOSIS — K13.79 OTHER LESIONS OF ORAL MUCOSA: ICD-10-CM

## 2021-01-01 DIAGNOSIS — R74.01 ELEVATION OF LEVELS OF LIVER TRANSAMINASE LEVELS: ICD-10-CM

## 2021-01-01 DIAGNOSIS — R31.29 OTHER MICROSCOPIC HEMATURIA: ICD-10-CM

## 2021-01-01 DIAGNOSIS — Q62.5 DUPLICATION OF URETER: ICD-10-CM

## 2021-01-01 DIAGNOSIS — J45.909 UNSPECIFIED ASTHMA, UNCOMPLICATED: ICD-10-CM

## 2021-01-01 DIAGNOSIS — Z93.0 TRACHEOSTOMY STATUS: ICD-10-CM

## 2021-01-01 DIAGNOSIS — N13.8 BENIGN PROSTATIC HYPERPLASIA WITH LOWER URINARY TRACT SYMPMS: ICD-10-CM

## 2021-01-01 DIAGNOSIS — R78.81 BACTEREMIA: ICD-10-CM

## 2021-01-01 DIAGNOSIS — I82.409 ACUTE EMBOLISM AND THROMBOSIS OF UNSPECIFIED DEEP VEINS OF UNSPECIFIED LOWER EXTREMITY: ICD-10-CM

## 2021-01-01 LAB
-  AMIKACIN: SIGNIFICANT CHANGE UP
-  AMIKACIN: SIGNIFICANT CHANGE UP
-  AMOXICILLIN/CLAVULANIC ACID: SIGNIFICANT CHANGE UP
-  AMOXICILLIN/CLAVULANIC ACID: SIGNIFICANT CHANGE UP
-  AMPICILLIN/SULBACTAM: SIGNIFICANT CHANGE UP
-  AMPICILLIN: SIGNIFICANT CHANGE UP
-  AMPICILLIN: SIGNIFICANT CHANGE UP
-  AZTREONAM: SIGNIFICANT CHANGE UP
-  AZTREONAM: SIGNIFICANT CHANGE UP
-  CEFAZOLIN: SIGNIFICANT CHANGE UP
-  CEFEPIME: SIGNIFICANT CHANGE UP
-  CEFEPIME: SIGNIFICANT CHANGE UP
-  CEFOXITIN: SIGNIFICANT CHANGE UP
-  CEFOXITIN: SIGNIFICANT CHANGE UP
-  CEFTRIAXONE: SIGNIFICANT CHANGE UP
-  CEFTRIAXONE: SIGNIFICANT CHANGE UP
-  CIPROFLOXACIN: SIGNIFICANT CHANGE UP
-  CIPROFLOXACIN: SIGNIFICANT CHANGE UP
-  CLINDAMYCIN: SIGNIFICANT CHANGE UP
-  DAPTOMYCIN: SIGNIFICANT CHANGE UP
-  ERTAPENEM: SIGNIFICANT CHANGE UP
-  ERTAPENEM: SIGNIFICANT CHANGE UP
-  ERYTHROMYCIN: SIGNIFICANT CHANGE UP
-  GENTAMICIN: SIGNIFICANT CHANGE UP
-  IMIPENEM: SIGNIFICANT CHANGE UP
-  IMIPENEM: SIGNIFICANT CHANGE UP
-  LEVOFLOXACIN: SIGNIFICANT CHANGE UP
-  LEVOFLOXACIN: SIGNIFICANT CHANGE UP
-  MEROPENEM: SIGNIFICANT CHANGE UP
-  MEROPENEM: SIGNIFICANT CHANGE UP
-  OXACILLIN: SIGNIFICANT CHANGE UP
-  PENICILLIN: SIGNIFICANT CHANGE UP
-  PIPERACILLIN/TAZOBACTAM: SIGNIFICANT CHANGE UP
-  PIPERACILLIN/TAZOBACTAM: SIGNIFICANT CHANGE UP
-  RIFAMPIN: SIGNIFICANT CHANGE UP
-  STAPHYLOCOCCUS EPIDERMIDIS, METHICILLIN RESISTANT: SIGNIFICANT CHANGE UP
-  STAPHYLOCOCCUS EPIDERMIDIS, METHICILLIN RESISTANT: SIGNIFICANT CHANGE UP
-  TETRACYCLINE: SIGNIFICANT CHANGE UP
-  TOBRAMYCIN: SIGNIFICANT CHANGE UP
-  TOBRAMYCIN: SIGNIFICANT CHANGE UP
-  TRIMETHOPRIM/SULFAMETHOXAZOLE: SIGNIFICANT CHANGE UP
-  VANCOMYCIN: SIGNIFICANT CHANGE UP
A1C WITH ESTIMATED AVERAGE GLUCOSE RESULT: 7.3 % — HIGH (ref 4–5.6)
A1C WITH ESTIMATED AVERAGE GLUCOSE RESULT: 7.9 % — HIGH (ref 4–5.6)
ALBUMIN SERPL ELPH-MCNC: 1.9 G/DL — LOW (ref 3.3–5)
ALBUMIN SERPL ELPH-MCNC: 2 G/DL — LOW (ref 3.3–5)
ALBUMIN SERPL ELPH-MCNC: 2 G/DL — LOW (ref 3.3–5)
ALBUMIN SERPL ELPH-MCNC: 2.1 G/DL — LOW (ref 3.3–5)
ALBUMIN SERPL ELPH-MCNC: 2.2 G/DL — LOW (ref 3.3–5)
ALBUMIN SERPL ELPH-MCNC: 2.3 G/DL — LOW (ref 3.3–5)
ALBUMIN SERPL ELPH-MCNC: 2.4 G/DL — LOW (ref 3.3–5)
ALBUMIN SERPL ELPH-MCNC: 2.5 G/DL — LOW (ref 3.3–5)
ALBUMIN SERPL ELPH-MCNC: 2.6 G/DL — LOW (ref 3.3–5)
ALBUMIN SERPL ELPH-MCNC: 2.7 G/DL — LOW (ref 3.3–5)
ALBUMIN SERPL ELPH-MCNC: 2.8 G/DL — LOW (ref 3.3–5)
ALBUMIN SERPL ELPH-MCNC: 2.8 G/DL — LOW (ref 3.3–5)
ALBUMIN SERPL ELPH-MCNC: 2.9 G/DL — LOW (ref 3.3–5)
ALBUMIN SERPL ELPH-MCNC: 3 G/DL — LOW (ref 3.3–5)
ALBUMIN SERPL ELPH-MCNC: 3 G/DL — LOW (ref 3.3–5)
ALBUMIN SERPL ELPH-MCNC: 3.1 G/DL — LOW (ref 3.3–5)
ALBUMIN SERPL ELPH-MCNC: 3.2 G/DL — LOW (ref 3.3–5)
ALBUMIN SERPL ELPH-MCNC: 3.2 G/DL — LOW (ref 3.3–5)
ALBUMIN SERPL ELPH-MCNC: 3.3 G/DL — SIGNIFICANT CHANGE UP (ref 3.3–5)
ALBUMIN SERPL ELPH-MCNC: 3.4 G/DL — SIGNIFICANT CHANGE UP (ref 3.3–5)
ALBUMIN SERPL ELPH-MCNC: 3.5 G/DL — SIGNIFICANT CHANGE UP (ref 3.3–5)
ALBUMIN SERPL ELPH-MCNC: 3.5 G/DL — SIGNIFICANT CHANGE UP (ref 3.3–5)
ALBUMIN SERPL ELPH-MCNC: 3.6 G/DL — SIGNIFICANT CHANGE UP (ref 3.3–5)
ALBUMIN SERPL ELPH-MCNC: 3.9 G/DL — SIGNIFICANT CHANGE UP (ref 3.3–5)
ALP BLD-CCNC: 96 U/L
ALP BONE SERPL-MCNC: 25 % — SIGNIFICANT CHANGE UP (ref 12–68)
ALP FLD-CCNC: 1414 IU/L — HIGH (ref 48–121)
ALP INTEST CFR SERPL: 0 % — SIGNIFICANT CHANGE UP (ref 0–18)
ALP LIVER SERPL-CCNC: 75 % — SIGNIFICANT CHANGE UP (ref 13–88)
ALP SERPL-CCNC: 101 U/L — SIGNIFICANT CHANGE UP (ref 40–120)
ALP SERPL-CCNC: 102 U/L — SIGNIFICANT CHANGE UP (ref 40–120)
ALP SERPL-CCNC: 1035 U/L — HIGH (ref 40–120)
ALP SERPL-CCNC: 1055 U/L — HIGH (ref 40–120)
ALP SERPL-CCNC: 106 U/L — SIGNIFICANT CHANGE UP (ref 40–120)
ALP SERPL-CCNC: 113 U/L — SIGNIFICANT CHANGE UP (ref 40–120)
ALP SERPL-CCNC: 1145 U/L — HIGH (ref 40–120)
ALP SERPL-CCNC: 123 U/L — HIGH (ref 40–120)
ALP SERPL-CCNC: 1253 U/L — HIGH (ref 40–120)
ALP SERPL-CCNC: 1298 U/L — HIGH (ref 40–120)
ALP SERPL-CCNC: 133 U/L — HIGH (ref 40–120)
ALP SERPL-CCNC: 1331 U/L — HIGH (ref 40–120)
ALP SERPL-CCNC: 1357 U/L — HIGH (ref 40–120)
ALP SERPL-CCNC: 1367 U/L — HIGH (ref 40–120)
ALP SERPL-CCNC: 1493 U/L — HIGH (ref 40–120)
ALP SERPL-CCNC: 1516 U/L — HIGH (ref 40–120)
ALP SERPL-CCNC: 1517 U/L — HIGH (ref 40–120)
ALP SERPL-CCNC: 1534 U/L — HIGH (ref 40–120)
ALP SERPL-CCNC: 1575 U/L — HIGH (ref 40–120)
ALP SERPL-CCNC: 1592 U/L — HIGH (ref 40–120)
ALP SERPL-CCNC: 1613 U/L — HIGH (ref 40–120)
ALP SERPL-CCNC: 162 U/L — HIGH (ref 40–120)
ALP SERPL-CCNC: 1624 U/L — HIGH (ref 40–120)
ALP SERPL-CCNC: 1660 U/L — HIGH (ref 40–120)
ALP SERPL-CCNC: 1663 U/L — HIGH (ref 40–120)
ALP SERPL-CCNC: 1689 U/L — HIGH (ref 40–120)
ALP SERPL-CCNC: 1838 U/L — HIGH (ref 40–120)
ALP SERPL-CCNC: 1877 U/L — HIGH (ref 40–120)
ALP SERPL-CCNC: 1917 U/L — HIGH (ref 40–120)
ALP SERPL-CCNC: 1943 U/L — HIGH (ref 40–120)
ALP SERPL-CCNC: 198 U/L — HIGH (ref 40–120)
ALP SERPL-CCNC: 2004 U/L — HIGH (ref 40–120)
ALP SERPL-CCNC: 2141 U/L — HIGH (ref 40–120)
ALP SERPL-CCNC: 2219 U/L — HIGH (ref 40–120)
ALP SERPL-CCNC: 2236 U/L — HIGH (ref 40–120)
ALP SERPL-CCNC: 2375 U/L — HIGH (ref 40–120)
ALP SERPL-CCNC: 2409 U/L — HIGH (ref 40–120)
ALP SERPL-CCNC: 2421 U/L — HIGH (ref 40–120)
ALP SERPL-CCNC: 2487 U/L — HIGH (ref 40–120)
ALP SERPL-CCNC: 2526 U/L — HIGH (ref 40–120)
ALP SERPL-CCNC: 2543 U/L — HIGH (ref 40–120)
ALP SERPL-CCNC: 2587 U/L — HIGH (ref 40–120)
ALP SERPL-CCNC: 2591 U/L — HIGH (ref 40–120)
ALP SERPL-CCNC: 2654 U/L — HIGH (ref 40–120)
ALP SERPL-CCNC: 267 U/L — HIGH (ref 40–120)
ALP SERPL-CCNC: 2694 U/L — HIGH (ref 40–120)
ALP SERPL-CCNC: 2701 U/L — HIGH (ref 40–120)
ALP SERPL-CCNC: 350 U/L — HIGH (ref 40–120)
ALP SERPL-CCNC: 430 U/L — HIGH (ref 40–120)
ALP SERPL-CCNC: 432 U/L — HIGH (ref 40–120)
ALP SERPL-CCNC: 463 U/L — HIGH (ref 40–120)
ALP SERPL-CCNC: 520 U/L — HIGH (ref 40–120)
ALP SERPL-CCNC: 567 U/L — HIGH (ref 40–120)
ALP SERPL-CCNC: 584 U/L — HIGH (ref 40–120)
ALP SERPL-CCNC: 604 U/L — HIGH (ref 40–120)
ALP SERPL-CCNC: 64 U/L — SIGNIFICANT CHANGE UP (ref 40–120)
ALP SERPL-CCNC: 66 U/L — SIGNIFICANT CHANGE UP (ref 40–120)
ALP SERPL-CCNC: 664 U/L — HIGH (ref 40–120)
ALP SERPL-CCNC: 666 U/L — HIGH (ref 40–120)
ALP SERPL-CCNC: 68 U/L — SIGNIFICANT CHANGE UP (ref 40–120)
ALP SERPL-CCNC: 694 U/L — HIGH (ref 40–120)
ALP SERPL-CCNC: 71 U/L — SIGNIFICANT CHANGE UP (ref 40–120)
ALP SERPL-CCNC: 72 U/L — SIGNIFICANT CHANGE UP (ref 40–120)
ALP SERPL-CCNC: 743 U/L — HIGH (ref 40–120)
ALP SERPL-CCNC: 82 U/L — SIGNIFICANT CHANGE UP (ref 40–120)
ALP SERPL-CCNC: 82 U/L — SIGNIFICANT CHANGE UP (ref 40–120)
ALP SERPL-CCNC: 83 U/L — SIGNIFICANT CHANGE UP (ref 40–120)
ALP SERPL-CCNC: 845 U/L — HIGH (ref 40–120)
ALP SERPL-CCNC: 865 U/L — HIGH (ref 40–120)
ALP SERPL-CCNC: 89 U/L — SIGNIFICANT CHANGE UP (ref 40–120)
ALP SERPL-CCNC: 95 U/L — SIGNIFICANT CHANGE UP (ref 40–120)
ALT FLD-CCNC: 102 U/L — HIGH (ref 10–45)
ALT FLD-CCNC: 102 U/L — HIGH (ref 10–45)
ALT FLD-CCNC: 103 U/L — HIGH (ref 10–45)
ALT FLD-CCNC: 107 U/L — HIGH (ref 10–45)
ALT FLD-CCNC: 107 U/L — HIGH (ref 10–45)
ALT FLD-CCNC: 110 U/L — HIGH (ref 10–45)
ALT FLD-CCNC: 112 U/L — HIGH (ref 10–45)
ALT FLD-CCNC: 116 U/L — HIGH (ref 10–45)
ALT FLD-CCNC: 116 U/L — HIGH (ref 10–45)
ALT FLD-CCNC: 119 U/L — HIGH (ref 10–45)
ALT FLD-CCNC: 121 U/L — HIGH (ref 10–45)
ALT FLD-CCNC: 122 U/L — HIGH (ref 10–45)
ALT FLD-CCNC: 124 U/L — HIGH (ref 10–45)
ALT FLD-CCNC: 126 U/L — HIGH (ref 10–45)
ALT FLD-CCNC: 131 U/L — HIGH (ref 10–45)
ALT FLD-CCNC: 133 U/L — HIGH (ref 10–45)
ALT FLD-CCNC: 136 U/L — HIGH (ref 10–45)
ALT FLD-CCNC: 137 U/L — HIGH (ref 10–45)
ALT FLD-CCNC: 139 U/L — HIGH (ref 10–45)
ALT FLD-CCNC: 14 U/L — SIGNIFICANT CHANGE UP (ref 10–45)
ALT FLD-CCNC: 147 U/L — HIGH (ref 10–45)
ALT FLD-CCNC: 15 U/L — SIGNIFICANT CHANGE UP (ref 10–45)
ALT FLD-CCNC: 150 U/L — HIGH (ref 10–45)
ALT FLD-CCNC: 152 U/L — HIGH (ref 10–45)
ALT FLD-CCNC: 155 U/L — HIGH (ref 10–45)
ALT FLD-CCNC: 159 U/L — HIGH (ref 10–45)
ALT FLD-CCNC: 16 U/L — SIGNIFICANT CHANGE UP (ref 10–45)
ALT FLD-CCNC: 166 U/L — HIGH (ref 10–45)
ALT FLD-CCNC: 167 U/L — HIGH (ref 10–45)
ALT FLD-CCNC: 168 U/L — HIGH (ref 10–45)
ALT FLD-CCNC: 17 U/L — SIGNIFICANT CHANGE UP (ref 10–45)
ALT FLD-CCNC: 171 U/L — HIGH (ref 10–45)
ALT FLD-CCNC: 173 U/L — HIGH (ref 10–45)
ALT FLD-CCNC: 18 U/L — SIGNIFICANT CHANGE UP (ref 10–45)
ALT FLD-CCNC: 19 U/L — SIGNIFICANT CHANGE UP (ref 10–45)
ALT FLD-CCNC: 19 U/L — SIGNIFICANT CHANGE UP (ref 10–45)
ALT FLD-CCNC: 193 U/L — HIGH (ref 10–45)
ALT FLD-CCNC: 20 U/L — SIGNIFICANT CHANGE UP (ref 10–45)
ALT FLD-CCNC: 200 U/L — HIGH (ref 10–45)
ALT FLD-CCNC: 202 U/L — HIGH (ref 10–45)
ALT FLD-CCNC: 208 U/L — HIGH (ref 10–45)
ALT FLD-CCNC: 21 U/L — SIGNIFICANT CHANGE UP (ref 10–45)
ALT FLD-CCNC: 217 U/L — HIGH (ref 10–45)
ALT FLD-CCNC: 218 U/L — HIGH (ref 10–45)
ALT FLD-CCNC: 220 U/L — HIGH (ref 10–45)
ALT FLD-CCNC: 225 U/L — HIGH (ref 10–45)
ALT FLD-CCNC: 229 U/L — HIGH (ref 10–45)
ALT FLD-CCNC: 23 U/L — SIGNIFICANT CHANGE UP (ref 10–45)
ALT FLD-CCNC: 242 U/L — HIGH (ref 10–45)
ALT FLD-CCNC: 259 U/L — HIGH (ref 10–45)
ALT FLD-CCNC: 268 U/L — HIGH (ref 10–45)
ALT FLD-CCNC: 268 U/L — HIGH (ref 10–45)
ALT FLD-CCNC: 272 U/L — HIGH (ref 10–45)
ALT FLD-CCNC: 281 U/L — HIGH (ref 10–45)
ALT FLD-CCNC: 29 U/L — SIGNIFICANT CHANGE UP (ref 10–45)
ALT FLD-CCNC: 33 U/L — SIGNIFICANT CHANGE UP (ref 10–45)
ALT FLD-CCNC: 46 U/L — HIGH (ref 10–45)
ALT FLD-CCNC: 54 U/L — HIGH (ref 10–45)
ALT FLD-CCNC: 63 U/L — HIGH (ref 10–45)
ALT FLD-CCNC: 72 U/L — HIGH (ref 10–45)
ALT FLD-CCNC: 90 U/L — HIGH (ref 10–45)
ALT FLD-CCNC: 93 U/L — HIGH (ref 10–45)
ALT FLD-CCNC: 95 U/L — HIGH (ref 10–45)
ALT FLD-CCNC: 96 U/L — HIGH (ref 10–45)
ALT FLD-CCNC: 97 U/L — HIGH (ref 10–45)
AMMONIA BLD-MCNC: 40 UMOL/L — SIGNIFICANT CHANGE UP (ref 11–55)
AMYLASE P1 CFR SERPL: 16 U/L — LOW (ref 25–125)
ANION GAP SERPL CALC-SCNC: 10 MMOL/L — SIGNIFICANT CHANGE UP (ref 5–17)
ANION GAP SERPL CALC-SCNC: 11 MMOL/L — SIGNIFICANT CHANGE UP (ref 5–17)
ANION GAP SERPL CALC-SCNC: 12 MMOL/L — SIGNIFICANT CHANGE UP (ref 5–17)
ANION GAP SERPL CALC-SCNC: 13 MMOL/L
ANION GAP SERPL CALC-SCNC: 13 MMOL/L — SIGNIFICANT CHANGE UP (ref 5–17)
ANION GAP SERPL CALC-SCNC: 14 MMOL/L — SIGNIFICANT CHANGE UP (ref 5–17)
ANION GAP SERPL CALC-SCNC: 15 MMOL/L
ANION GAP SERPL CALC-SCNC: 15 MMOL/L — SIGNIFICANT CHANGE UP (ref 5–17)
ANION GAP SERPL CALC-SCNC: 16 MMOL/L
ANION GAP SERPL CALC-SCNC: 16 MMOL/L — SIGNIFICANT CHANGE UP (ref 5–17)
ANION GAP SERPL CALC-SCNC: 16 MMOL/L — SIGNIFICANT CHANGE UP (ref 5–17)
ANION GAP SERPL CALC-SCNC: 17 MMOL/L — SIGNIFICANT CHANGE UP (ref 5–17)
ANION GAP SERPL CALC-SCNC: 17 MMOL/L — SIGNIFICANT CHANGE UP (ref 5–17)
ANION GAP SERPL CALC-SCNC: 18 MMOL/L — HIGH (ref 5–17)
ANION GAP SERPL CALC-SCNC: 20 MMOL/L — HIGH (ref 5–17)
ANION GAP SERPL CALC-SCNC: 23 MMOL/L — HIGH (ref 5–17)
ANION GAP SERPL CALC-SCNC: 6 MMOL/L — SIGNIFICANT CHANGE UP (ref 5–17)
ANION GAP SERPL CALC-SCNC: 7 MMOL/L — SIGNIFICANT CHANGE UP (ref 5–17)
ANION GAP SERPL CALC-SCNC: 8 MMOL/L — SIGNIFICANT CHANGE UP (ref 5–17)
ANION GAP SERPL CALC-SCNC: 8 MMOL/L — SIGNIFICANT CHANGE UP (ref 5–17)
ANION GAP SERPL CALC-SCNC: 9 MMOL/L — SIGNIFICANT CHANGE UP (ref 5–17)
APPEARANCE UR: ABNORMAL
APPEARANCE UR: CLEAR — SIGNIFICANT CHANGE UP
APPEARANCE: CLEAR
APTT BLD: 23 SEC — LOW (ref 27.5–35.5)
APTT BLD: 24 SEC — LOW (ref 27.5–35.5)
APTT BLD: 25.3 SEC — LOW (ref 27.5–35.5)
APTT BLD: 27.1 SEC — LOW (ref 27.5–35.5)
APTT BLD: 27.1 SEC — LOW (ref 27.5–35.5)
APTT BLD: 28 SEC — SIGNIFICANT CHANGE UP (ref 27.5–35.5)
APTT BLD: 28.2 SEC — SIGNIFICANT CHANGE UP (ref 27.5–35.5)
APTT BLD: 28.2 SEC — SIGNIFICANT CHANGE UP (ref 27.5–35.5)
APTT BLD: 28.4 SEC — SIGNIFICANT CHANGE UP (ref 27.5–35.5)
APTT BLD: 28.6 SEC — SIGNIFICANT CHANGE UP (ref 27.5–35.5)
APTT BLD: 29.4 SEC — SIGNIFICANT CHANGE UP (ref 27.5–35.5)
APTT BLD: 29.4 SEC — SIGNIFICANT CHANGE UP (ref 27.5–35.5)
APTT BLD: 30.2 SEC — SIGNIFICANT CHANGE UP (ref 27.5–35.5)
APTT BLD: 30.4 SEC — SIGNIFICANT CHANGE UP (ref 27.5–35.5)
APTT BLD: 30.7 SEC — SIGNIFICANT CHANGE UP (ref 27.5–35.5)
APTT BLD: 31.3 SEC — SIGNIFICANT CHANGE UP (ref 27.5–35.5)
APTT BLD: 34 SEC — SIGNIFICANT CHANGE UP (ref 27.5–35.5)
APTT BLD: 34.8 SEC — SIGNIFICANT CHANGE UP (ref 27.5–35.5)
APTT BLD: 34.9 SEC — SIGNIFICANT CHANGE UP (ref 27.5–35.5)
APTT BLD: 35.2 SEC — SIGNIFICANT CHANGE UP (ref 27.5–35.5)
APTT BLD: 36.6 SEC — HIGH (ref 27.5–35.5)
APTT BLD: 36.9 SEC — HIGH (ref 27.5–35.5)
APTT BLD: 37.3 SEC — HIGH (ref 27.5–35.5)
APTT BLD: 38 SEC — HIGH (ref 27.5–35.5)
APTT BLD: 38.6 SEC — HIGH (ref 27.5–35.5)
APTT BLD: 38.7 SEC — HIGH (ref 27.5–35.5)
APTT BLD: 39.2 SEC — HIGH (ref 27.5–35.5)
APTT BLD: 39.5 SEC — HIGH (ref 27.5–35.5)
APTT BLD: 39.7 SEC — HIGH (ref 27.5–35.5)
APTT BLD: 39.7 SEC — HIGH (ref 27.5–35.5)
APTT BLD: 42.1 SEC — HIGH (ref 27.5–35.5)
APTT BLD: 44.3 SEC — HIGH (ref 27.5–35.5)
APTT BLD: 46.2 SEC — HIGH (ref 27.5–35.5)
APTT BLD: 47.2 SEC — HIGH (ref 27.5–35.5)
APTT BLD: 49.5 SEC — HIGH (ref 27.5–35.5)
APTT BLD: 49.5 SEC — HIGH (ref 27.5–35.5)
APTT BLD: 50.4 SEC — HIGH (ref 27.5–35.5)
APTT BLD: 50.8 SEC — HIGH (ref 27.5–35.5)
APTT BLD: 51.2 SEC — HIGH (ref 27.5–35.5)
APTT BLD: 52.3 SEC — HIGH (ref 27.5–35.5)
APTT BLD: 52.9 SEC — HIGH (ref 27.5–35.5)
AST SERPL-CCNC: 101 U/L — HIGH (ref 10–40)
AST SERPL-CCNC: 101 U/L — HIGH (ref 10–40)
AST SERPL-CCNC: 107 U/L — HIGH (ref 10–40)
AST SERPL-CCNC: 107 U/L — HIGH (ref 10–40)
AST SERPL-CCNC: 112 U/L — HIGH (ref 10–40)
AST SERPL-CCNC: 115 U/L — HIGH (ref 10–40)
AST SERPL-CCNC: 118 U/L — HIGH (ref 10–40)
AST SERPL-CCNC: 122 U/L — HIGH (ref 10–40)
AST SERPL-CCNC: 125 U/L — HIGH (ref 10–40)
AST SERPL-CCNC: 129 U/L — HIGH (ref 10–40)
AST SERPL-CCNC: 132 U/L — HIGH (ref 10–40)
AST SERPL-CCNC: 133 U/L — HIGH (ref 10–40)
AST SERPL-CCNC: 142 U/L — HIGH (ref 10–40)
AST SERPL-CCNC: 144 U/L — HIGH (ref 10–40)
AST SERPL-CCNC: 145 U/L — HIGH (ref 10–40)
AST SERPL-CCNC: 147 U/L — HIGH (ref 10–40)
AST SERPL-CCNC: 156 U/L — HIGH (ref 10–40)
AST SERPL-CCNC: 156 U/L — HIGH (ref 10–40)
AST SERPL-CCNC: 157 U/L — HIGH (ref 10–40)
AST SERPL-CCNC: 160 U/L — HIGH (ref 10–40)
AST SERPL-CCNC: 161 U/L — HIGH (ref 10–40)
AST SERPL-CCNC: 162 U/L — HIGH (ref 10–40)
AST SERPL-CCNC: 169 U/L — HIGH (ref 10–40)
AST SERPL-CCNC: 181 U/L — HIGH (ref 10–40)
AST SERPL-CCNC: 189 U/L — HIGH (ref 10–40)
AST SERPL-CCNC: 199 U/L — HIGH (ref 10–40)
AST SERPL-CCNC: 206 U/L — HIGH (ref 10–40)
AST SERPL-CCNC: 21 U/L — SIGNIFICANT CHANGE UP (ref 10–40)
AST SERPL-CCNC: 213 U/L — HIGH (ref 10–40)
AST SERPL-CCNC: 222 U/L — HIGH (ref 10–40)
AST SERPL-CCNC: 228 U/L — HIGH (ref 10–40)
AST SERPL-CCNC: 23 U/L — SIGNIFICANT CHANGE UP (ref 10–40)
AST SERPL-CCNC: 237 U/L — HIGH (ref 10–40)
AST SERPL-CCNC: 238 U/L — HIGH (ref 10–40)
AST SERPL-CCNC: 243 U/L — HIGH (ref 10–40)
AST SERPL-CCNC: 259 U/L — HIGH (ref 10–40)
AST SERPL-CCNC: 26 U/L — SIGNIFICANT CHANGE UP (ref 10–40)
AST SERPL-CCNC: 269 U/L — HIGH (ref 10–40)
AST SERPL-CCNC: 28 U/L — SIGNIFICANT CHANGE UP (ref 10–40)
AST SERPL-CCNC: 28 U/L — SIGNIFICANT CHANGE UP (ref 10–40)
AST SERPL-CCNC: 288 U/L — HIGH (ref 10–40)
AST SERPL-CCNC: 30 U/L — SIGNIFICANT CHANGE UP (ref 10–40)
AST SERPL-CCNC: 31 U/L — SIGNIFICANT CHANGE UP (ref 10–40)
AST SERPL-CCNC: 31 U/L — SIGNIFICANT CHANGE UP (ref 10–40)
AST SERPL-CCNC: 32 U/L — SIGNIFICANT CHANGE UP (ref 10–40)
AST SERPL-CCNC: 33 U/L — SIGNIFICANT CHANGE UP (ref 10–40)
AST SERPL-CCNC: 34 U/L — SIGNIFICANT CHANGE UP (ref 10–40)
AST SERPL-CCNC: 351 U/L — HIGH (ref 10–40)
AST SERPL-CCNC: 36 U/L — SIGNIFICANT CHANGE UP (ref 10–40)
AST SERPL-CCNC: 376 U/L — HIGH (ref 10–40)
AST SERPL-CCNC: 40 U/L — SIGNIFICANT CHANGE UP (ref 10–40)
AST SERPL-CCNC: 41 U/L — HIGH (ref 10–40)
AST SERPL-CCNC: 43 U/L — HIGH (ref 10–40)
AST SERPL-CCNC: 44 U/L — HIGH (ref 10–40)
AST SERPL-CCNC: 47 U/L — HIGH (ref 10–40)
AST SERPL-CCNC: 50 U/L — HIGH (ref 10–40)
AST SERPL-CCNC: 56 U/L — HIGH (ref 10–40)
AST SERPL-CCNC: 60 U/L — HIGH (ref 10–40)
AST SERPL-CCNC: 61 U/L — HIGH (ref 10–40)
AST SERPL-CCNC: 65 U/L — HIGH (ref 10–40)
AST SERPL-CCNC: 65 U/L — HIGH (ref 10–40)
AST SERPL-CCNC: 66 U/L — HIGH (ref 10–40)
AST SERPL-CCNC: 70 U/L — HIGH (ref 10–40)
AST SERPL-CCNC: 70 U/L — HIGH (ref 10–40)
AST SERPL-CCNC: 73 U/L — HIGH (ref 10–40)
AST SERPL-CCNC: 87 U/L — HIGH (ref 10–40)
AST SERPL-CCNC: 90 U/L — HIGH (ref 10–40)
AST SERPL-CCNC: 94 U/L — HIGH (ref 10–40)
AST SERPL-CCNC: 95 U/L — HIGH (ref 10–40)
BACTERIA # UR AUTO: 0 — SIGNIFICANT CHANGE UP
BACTERIA # UR AUTO: NEGATIVE — SIGNIFICANT CHANGE UP
BACTERIA UR CULT: NORMAL
BACTERIA: NEGATIVE
BASE EXCESS BLDA CALC-SCNC: -1.8 MMOL/L — SIGNIFICANT CHANGE UP (ref -2–2)
BASE EXCESS BLDA CALC-SCNC: -1.9 MMOL/L — SIGNIFICANT CHANGE UP (ref -2–2)
BASE EXCESS BLDA CALC-SCNC: 0.5 MMOL/L — SIGNIFICANT CHANGE UP (ref -2–3)
BASE EXCESS BLDV CALC-SCNC: -0.4 MMOL/L — SIGNIFICANT CHANGE UP (ref -2–2)
BASE EXCESS BLDV CALC-SCNC: 11.8 MMOL/L — HIGH (ref -2–2)
BASE EXCESS BLDV CALC-SCNC: 12.1 MMOL/L — HIGH (ref -2–2)
BASE EXCESS BLDV CALC-SCNC: 13.3 MMOL/L — HIGH (ref -2–2)
BASE EXCESS BLDV CALC-SCNC: 3.2 MMOL/L — HIGH (ref -2–2)
BASE EXCESS BLDV CALC-SCNC: 3.5 MMOL/L — HIGH (ref -2–2)
BASOPHILS # BLD AUTO: 0.01 K/UL — SIGNIFICANT CHANGE UP (ref 0–0.2)
BASOPHILS # BLD AUTO: 0.02 K/UL — SIGNIFICANT CHANGE UP (ref 0–0.2)
BASOPHILS # BLD AUTO: 0.03 K/UL — SIGNIFICANT CHANGE UP (ref 0–0.2)
BASOPHILS # BLD AUTO: 0.04 K/UL — SIGNIFICANT CHANGE UP (ref 0–0.2)
BASOPHILS # BLD AUTO: 0.05 K/UL — SIGNIFICANT CHANGE UP (ref 0–0.2)
BASOPHILS # BLD AUTO: 0.06 K/UL — SIGNIFICANT CHANGE UP (ref 0–0.2)
BASOPHILS # BLD AUTO: 0.07 K/UL — SIGNIFICANT CHANGE UP (ref 0–0.2)
BASOPHILS # BLD AUTO: 0.07 K/UL — SIGNIFICANT CHANGE UP (ref 0–0.2)
BASOPHILS NFR BLD AUTO: 0.1 % — SIGNIFICANT CHANGE UP (ref 0–2)
BASOPHILS NFR BLD AUTO: 0.2 % — SIGNIFICANT CHANGE UP (ref 0–2)
BASOPHILS NFR BLD AUTO: 0.3 % — SIGNIFICANT CHANGE UP (ref 0–2)
BASOPHILS NFR BLD AUTO: 0.4 % — SIGNIFICANT CHANGE UP (ref 0–2)
BASOPHILS NFR BLD AUTO: 0.5 % — SIGNIFICANT CHANGE UP (ref 0–2)
BILIRUB DIRECT SERPL-MCNC: 0.1 MG/DL — SIGNIFICANT CHANGE UP (ref 0–0.2)
BILIRUB DIRECT SERPL-MCNC: 0.2 MG/DL — SIGNIFICANT CHANGE UP (ref 0–0.2)
BILIRUB DIRECT SERPL-MCNC: 3.5 MG/DL — HIGH (ref 0–0.2)
BILIRUB INDIRECT FLD-MCNC: 0.3 MG/DL — SIGNIFICANT CHANGE UP (ref 0.2–1)
BILIRUB INDIRECT FLD-MCNC: 0.4 MG/DL — SIGNIFICANT CHANGE UP (ref 0.2–1)
BILIRUB INDIRECT FLD-MCNC: 0.4 MG/DL — SIGNIFICANT CHANGE UP (ref 0.2–1)
BILIRUB INDIRECT FLD-MCNC: 0.8 MG/DL — SIGNIFICANT CHANGE UP (ref 0.2–1)
BILIRUB SERPL-MCNC: 0.4 MG/DL — SIGNIFICANT CHANGE UP (ref 0.2–1.2)
BILIRUB SERPL-MCNC: 0.5 MG/DL — SIGNIFICANT CHANGE UP (ref 0.2–1.2)
BILIRUB SERPL-MCNC: 0.5 MG/DL — SIGNIFICANT CHANGE UP (ref 0.2–1.2)
BILIRUB SERPL-MCNC: 0.6 MG/DL — SIGNIFICANT CHANGE UP (ref 0.2–1.2)
BILIRUB SERPL-MCNC: 0.7 MG/DL — SIGNIFICANT CHANGE UP (ref 0.2–1.2)
BILIRUB SERPL-MCNC: 0.8 MG/DL — SIGNIFICANT CHANGE UP (ref 0.2–1.2)
BILIRUB SERPL-MCNC: 0.9 MG/DL — SIGNIFICANT CHANGE UP (ref 0.2–1.2)
BILIRUB SERPL-MCNC: 1 MG/DL — SIGNIFICANT CHANGE UP (ref 0.2–1.2)
BILIRUB SERPL-MCNC: 1.1 MG/DL — SIGNIFICANT CHANGE UP (ref 0.2–1.2)
BILIRUB SERPL-MCNC: 1.2 MG/DL — SIGNIFICANT CHANGE UP (ref 0.2–1.2)
BILIRUB SERPL-MCNC: 1.2 MG/DL — SIGNIFICANT CHANGE UP (ref 0.2–1.2)
BILIRUB SERPL-MCNC: 1.3 MG/DL — HIGH (ref 0.2–1.2)
BILIRUB SERPL-MCNC: 1.4 MG/DL — HIGH (ref 0.2–1.2)
BILIRUB SERPL-MCNC: 1.4 MG/DL — HIGH (ref 0.2–1.2)
BILIRUB SERPL-MCNC: 1.5 MG/DL — HIGH (ref 0.2–1.2)
BILIRUB SERPL-MCNC: 1.5 MG/DL — HIGH (ref 0.2–1.2)
BILIRUB SERPL-MCNC: 1.6 MG/DL — HIGH (ref 0.2–1.2)
BILIRUB SERPL-MCNC: 1.8 MG/DL — HIGH (ref 0.2–1.2)
BILIRUB SERPL-MCNC: 1.9 MG/DL — HIGH (ref 0.2–1.2)
BILIRUB SERPL-MCNC: 2 MG/DL — HIGH (ref 0.2–1.2)
BILIRUB SERPL-MCNC: 2.3 MG/DL — HIGH (ref 0.2–1.2)
BILIRUB SERPL-MCNC: 2.5 MG/DL — HIGH (ref 0.2–1.2)
BILIRUB SERPL-MCNC: 2.5 MG/DL — HIGH (ref 0.2–1.2)
BILIRUB SERPL-MCNC: 2.7 MG/DL — HIGH (ref 0.2–1.2)
BILIRUB SERPL-MCNC: 2.8 MG/DL — HIGH (ref 0.2–1.2)
BILIRUB SERPL-MCNC: 2.8 MG/DL — HIGH (ref 0.2–1.2)
BILIRUB SERPL-MCNC: 3 MG/DL — HIGH (ref 0.2–1.2)
BILIRUB SERPL-MCNC: 4 MG/DL — HIGH (ref 0.2–1.2)
BILIRUB SERPL-MCNC: 4 MG/DL — HIGH (ref 0.2–1.2)
BILIRUB SERPL-MCNC: 4.1 MG/DL — HIGH (ref 0.2–1.2)
BILIRUB SERPL-MCNC: 4.1 MG/DL — HIGH (ref 0.2–1.2)
BILIRUB SERPL-MCNC: 4.2 MG/DL — HIGH (ref 0.2–1.2)
BILIRUB SERPL-MCNC: 4.2 MG/DL — HIGH (ref 0.2–1.2)
BILIRUB SERPL-MCNC: 4.4 MG/DL — HIGH (ref 0.2–1.2)
BILIRUB SERPL-MCNC: 4.5 MG/DL — HIGH (ref 0.2–1.2)
BILIRUB SERPL-MCNC: 4.6 MG/DL — HIGH (ref 0.2–1.2)
BILIRUB SERPL-MCNC: 4.9 MG/DL — HIGH (ref 0.2–1.2)
BILIRUB SERPL-MCNC: 4.9 MG/DL — HIGH (ref 0.2–1.2)
BILIRUB SERPL-MCNC: 5.1 MG/DL — HIGH (ref 0.2–1.2)
BILIRUB SERPL-MCNC: 6.4 MG/DL — HIGH (ref 0.2–1.2)
BILIRUB SERPL-MCNC: 6.8 MG/DL — HIGH (ref 0.2–1.2)
BILIRUB SERPL-MCNC: 7.8 MG/DL — HIGH (ref 0.2–1.2)
BILIRUB UR-MCNC: ABNORMAL
BILIRUB UR-MCNC: ABNORMAL
BILIRUB UR-MCNC: NEGATIVE — SIGNIFICANT CHANGE UP
BILIRUBIN URINE: NEGATIVE
BLD GP AB SCN SERPL QL: NEGATIVE — SIGNIFICANT CHANGE UP
BLOOD URINE: NEGATIVE
BUN SERPL-MCNC: 14 MG/DL
BUN SERPL-MCNC: 14 MG/DL
BUN SERPL-MCNC: 16 MG/DL — SIGNIFICANT CHANGE UP (ref 7–23)
BUN SERPL-MCNC: 17 MG/DL — SIGNIFICANT CHANGE UP (ref 7–23)
BUN SERPL-MCNC: 18 MG/DL — SIGNIFICANT CHANGE UP (ref 7–23)
BUN SERPL-MCNC: 18 MG/DL — SIGNIFICANT CHANGE UP (ref 7–23)
BUN SERPL-MCNC: 19 MG/DL — SIGNIFICANT CHANGE UP (ref 7–23)
BUN SERPL-MCNC: 19 MG/DL — SIGNIFICANT CHANGE UP (ref 7–23)
BUN SERPL-MCNC: 20 MG/DL
BUN SERPL-MCNC: 23 MG/DL — SIGNIFICANT CHANGE UP (ref 7–23)
BUN SERPL-MCNC: 23 MG/DL — SIGNIFICANT CHANGE UP (ref 7–23)
BUN SERPL-MCNC: 24 MG/DL — HIGH (ref 7–23)
BUN SERPL-MCNC: 25 MG/DL — HIGH (ref 7–23)
BUN SERPL-MCNC: 26 MG/DL — HIGH (ref 7–23)
BUN SERPL-MCNC: 28 MG/DL — HIGH (ref 7–23)
BUN SERPL-MCNC: 30 MG/DL — HIGH (ref 7–23)
BUN SERPL-MCNC: 30 MG/DL — HIGH (ref 7–23)
BUN SERPL-MCNC: 31 MG/DL — HIGH (ref 7–23)
BUN SERPL-MCNC: 34 MG/DL — HIGH (ref 7–23)
BUN SERPL-MCNC: 34 MG/DL — HIGH (ref 7–23)
BUN SERPL-MCNC: 37 MG/DL — HIGH (ref 7–23)
BUN SERPL-MCNC: 37 MG/DL — HIGH (ref 7–23)
BUN SERPL-MCNC: 38 MG/DL — HIGH (ref 7–23)
BUN SERPL-MCNC: 40 MG/DL — HIGH (ref 7–23)
BUN SERPL-MCNC: 41 MG/DL — HIGH (ref 7–23)
BUN SERPL-MCNC: 41 MG/DL — HIGH (ref 7–23)
BUN SERPL-MCNC: 42 MG/DL — HIGH (ref 7–23)
BUN SERPL-MCNC: 43 MG/DL — HIGH (ref 7–23)
BUN SERPL-MCNC: 44 MG/DL — HIGH (ref 7–23)
BUN SERPL-MCNC: 44 MG/DL — HIGH (ref 7–23)
BUN SERPL-MCNC: 45 MG/DL — HIGH (ref 7–23)
BUN SERPL-MCNC: 47 MG/DL — HIGH (ref 7–23)
BUN SERPL-MCNC: 48 MG/DL — HIGH (ref 7–23)
BUN SERPL-MCNC: 49 MG/DL — HIGH (ref 7–23)
BUN SERPL-MCNC: 50 MG/DL — HIGH (ref 7–23)
BUN SERPL-MCNC: 51 MG/DL — HIGH (ref 7–23)
BUN SERPL-MCNC: 51 MG/DL — HIGH (ref 7–23)
BUN SERPL-MCNC: 53 MG/DL — HIGH (ref 7–23)
BUN SERPL-MCNC: 54 MG/DL — HIGH (ref 7–23)
BUN SERPL-MCNC: 55 MG/DL — HIGH (ref 7–23)
BUN SERPL-MCNC: 59 MG/DL — HIGH (ref 7–23)
BUN SERPL-MCNC: 60 MG/DL — HIGH (ref 7–23)
BUN SERPL-MCNC: 61 MG/DL — HIGH (ref 7–23)
BUN SERPL-MCNC: 63 MG/DL — HIGH (ref 7–23)
BUN SERPL-MCNC: 64 MG/DL — HIGH (ref 7–23)
BUN SERPL-MCNC: 66 MG/DL — HIGH (ref 7–23)
BUN SERPL-MCNC: 76 MG/DL — HIGH (ref 7–23)
CA-I SERPL-SCNC: 1.11 MMOL/L — LOW (ref 1.15–1.33)
CA-I SERPL-SCNC: 1.11 MMOL/L — LOW (ref 1.15–1.33)
CA-I SERPL-SCNC: 1.12 MMOL/L — SIGNIFICANT CHANGE UP (ref 1.12–1.3)
CA-I SERPL-SCNC: 1.13 MMOL/L — LOW (ref 1.15–1.33)
CA-I SERPL-SCNC: 1.31 MMOL/L — SIGNIFICANT CHANGE UP (ref 1.15–1.33)
CALCIUM SERPL-MCNC: 10 MG/DL — SIGNIFICANT CHANGE UP (ref 8.4–10.5)
CALCIUM SERPL-MCNC: 10.1 MG/DL — SIGNIFICANT CHANGE UP (ref 8.4–10.5)
CALCIUM SERPL-MCNC: 7.9 MG/DL — LOW (ref 8.4–10.5)
CALCIUM SERPL-MCNC: 8 MG/DL — LOW (ref 8.4–10.5)
CALCIUM SERPL-MCNC: 8.2 MG/DL — LOW (ref 8.4–10.5)
CALCIUM SERPL-MCNC: 8.4 MG/DL — SIGNIFICANT CHANGE UP (ref 8.4–10.5)
CALCIUM SERPL-MCNC: 8.5 MG/DL — SIGNIFICANT CHANGE UP (ref 8.4–10.5)
CALCIUM SERPL-MCNC: 8.6 MG/DL — SIGNIFICANT CHANGE UP (ref 8.4–10.5)
CALCIUM SERPL-MCNC: 8.7 MG/DL — SIGNIFICANT CHANGE UP (ref 8.4–10.5)
CALCIUM SERPL-MCNC: 8.8 MG/DL — SIGNIFICANT CHANGE UP (ref 8.4–10.5)
CALCIUM SERPL-MCNC: 8.9 MG/DL — SIGNIFICANT CHANGE UP (ref 8.4–10.5)
CALCIUM SERPL-MCNC: 9 MG/DL — SIGNIFICANT CHANGE UP (ref 8.4–10.5)
CALCIUM SERPL-MCNC: 9.1 MG/DL
CALCIUM SERPL-MCNC: 9.1 MG/DL — SIGNIFICANT CHANGE UP (ref 8.4–10.5)
CALCIUM SERPL-MCNC: 9.2 MG/DL — SIGNIFICANT CHANGE UP (ref 8.4–10.5)
CALCIUM SERPL-MCNC: 9.3 MG/DL
CALCIUM SERPL-MCNC: 9.3 MG/DL — SIGNIFICANT CHANGE UP (ref 8.4–10.5)
CALCIUM SERPL-MCNC: 9.4 MG/DL — SIGNIFICANT CHANGE UP (ref 8.4–10.5)
CALCIUM SERPL-MCNC: 9.5 MG/DL
CALCIUM SERPL-MCNC: 9.5 MG/DL — SIGNIFICANT CHANGE UP (ref 8.4–10.5)
CALCIUM SERPL-MCNC: 9.7 MG/DL — SIGNIFICANT CHANGE UP (ref 8.4–10.5)
CALCIUM SERPL-MCNC: 9.8 MG/DL — SIGNIFICANT CHANGE UP (ref 8.4–10.5)
CALCIUM SERPL-MCNC: 9.8 MG/DL — SIGNIFICANT CHANGE UP (ref 8.4–10.5)
CHLORIDE BLDV-SCNC: 102 MMOL/L — SIGNIFICANT CHANGE UP (ref 96–108)
CHLORIDE BLDV-SCNC: 109 MMOL/L — HIGH (ref 96–108)
CHLORIDE BLDV-SCNC: 98 MMOL/L — SIGNIFICANT CHANGE UP (ref 96–108)
CHLORIDE BLDV-SCNC: 99 MMOL/L — SIGNIFICANT CHANGE UP (ref 96–108)
CHLORIDE BLDV-SCNC: 99 MMOL/L — SIGNIFICANT CHANGE UP (ref 96–108)
CHLORIDE SERPL-SCNC: 100 MMOL/L
CHLORIDE SERPL-SCNC: 100 MMOL/L — SIGNIFICANT CHANGE UP (ref 96–108)
CHLORIDE SERPL-SCNC: 101 MMOL/L
CHLORIDE SERPL-SCNC: 101 MMOL/L — SIGNIFICANT CHANGE UP (ref 96–108)
CHLORIDE SERPL-SCNC: 102 MMOL/L — SIGNIFICANT CHANGE UP (ref 96–108)
CHLORIDE SERPL-SCNC: 103 MMOL/L — SIGNIFICANT CHANGE UP (ref 96–108)
CHLORIDE SERPL-SCNC: 104 MMOL/L — SIGNIFICANT CHANGE UP (ref 96–108)
CHLORIDE SERPL-SCNC: 105 MMOL/L — SIGNIFICANT CHANGE UP (ref 96–108)
CHLORIDE SERPL-SCNC: 106 MMOL/L — SIGNIFICANT CHANGE UP (ref 96–108)
CHLORIDE SERPL-SCNC: 107 MMOL/L — SIGNIFICANT CHANGE UP (ref 96–108)
CHLORIDE SERPL-SCNC: 108 MMOL/L — SIGNIFICANT CHANGE UP (ref 96–108)
CHLORIDE SERPL-SCNC: 109 MMOL/L — HIGH (ref 96–108)
CHLORIDE SERPL-SCNC: 110 MMOL/L — HIGH (ref 96–108)
CHLORIDE SERPL-SCNC: 111 MMOL/L — HIGH (ref 96–108)
CHLORIDE SERPL-SCNC: 112 MMOL/L — HIGH (ref 96–108)
CHLORIDE SERPL-SCNC: 112 MMOL/L — HIGH (ref 96–108)
CHLORIDE SERPL-SCNC: 113 MMOL/L — HIGH (ref 96–108)
CHLORIDE SERPL-SCNC: 113 MMOL/L — HIGH (ref 96–108)
CHLORIDE SERPL-SCNC: 115 MMOL/L — HIGH (ref 96–108)
CHLORIDE SERPL-SCNC: 93 MMOL/L — LOW (ref 96–108)
CHLORIDE SERPL-SCNC: 93 MMOL/L — LOW (ref 96–108)
CHLORIDE SERPL-SCNC: 94 MMOL/L — LOW (ref 96–108)
CHLORIDE SERPL-SCNC: 95 MMOL/L — LOW (ref 96–108)
CHLORIDE SERPL-SCNC: 96 MMOL/L — SIGNIFICANT CHANGE UP (ref 96–108)
CHLORIDE SERPL-SCNC: 97 MMOL/L — SIGNIFICANT CHANGE UP (ref 96–108)
CHLORIDE SERPL-SCNC: 98 MMOL/L — SIGNIFICANT CHANGE UP (ref 96–108)
CHLORIDE SERPL-SCNC: 99 MMOL/L
CHLORIDE SERPL-SCNC: 99 MMOL/L — SIGNIFICANT CHANGE UP (ref 96–108)
CHOLEST SERPL-MCNC: 125 MG/DL — SIGNIFICANT CHANGE UP
CHOLEST SERPL-MCNC: 396 MG/DL — HIGH
CK MB BLD-MCNC: 2.5 % — SIGNIFICANT CHANGE UP (ref 0–3.5)
CK MB CFR SERPL CALC: 1.5 NG/ML — SIGNIFICANT CHANGE UP (ref 0–6.7)
CK MB CFR SERPL CALC: 4.2 NG/ML — SIGNIFICANT CHANGE UP (ref 0–6.7)
CK SERPL-CCNC: 29 U/L — LOW (ref 30–200)
CK SERPL-CCNC: 44 U/L — SIGNIFICANT CHANGE UP (ref 30–200)
CK SERPL-CCNC: 59 U/L — SIGNIFICANT CHANGE UP (ref 30–200)
CK SERPL-CCNC: 62 U/L — SIGNIFICANT CHANGE UP (ref 30–200)
CK SERPL-CCNC: 64 U/L — SIGNIFICANT CHANGE UP (ref 30–200)
CK SERPL-CCNC: 79 U/L — SIGNIFICANT CHANGE UP (ref 30–200)
CK SERPL-CCNC: 80 U/L — SIGNIFICANT CHANGE UP (ref 30–200)
CMV IGM FLD-ACNC: <8 AU/ML — SIGNIFICANT CHANGE UP
CMV IGM SERPL QL: NEGATIVE — SIGNIFICANT CHANGE UP
CO2 BLDA-SCNC: 22 MMOL/L — SIGNIFICANT CHANGE UP (ref 22–30)
CO2 BLDA-SCNC: 24 MMOL/L — SIGNIFICANT CHANGE UP (ref 22–30)
CO2 BLDA-SCNC: 27 MMOL/L — HIGH (ref 19–24)
CO2 BLDV-SCNC: 25 MMOL/L — SIGNIFICANT CHANGE UP (ref 22–30)
CO2 BLDV-SCNC: 32 MMOL/L — HIGH (ref 22–26)
CO2 BLDV-SCNC: 33 MMOL/L — HIGH (ref 22–26)
CO2 BLDV-SCNC: 40 MMOL/L — HIGH (ref 22–26)
CO2 BLDV-SCNC: 41 MMOL/L — HIGH (ref 22–26)
CO2 BLDV-SCNC: 42 MMOL/L — HIGH (ref 22–26)
CO2 SERPL-SCNC: 17 MMOL/L — LOW (ref 22–31)
CO2 SERPL-SCNC: 17 MMOL/L — LOW (ref 22–31)
CO2 SERPL-SCNC: 18 MMOL/L — LOW (ref 22–31)
CO2 SERPL-SCNC: 19 MMOL/L — LOW (ref 22–31)
CO2 SERPL-SCNC: 20 MMOL/L — LOW (ref 22–31)
CO2 SERPL-SCNC: 20 MMOL/L — LOW (ref 22–31)
CO2 SERPL-SCNC: 21 MMOL/L — LOW (ref 22–31)
CO2 SERPL-SCNC: 22 MMOL/L
CO2 SERPL-SCNC: 22 MMOL/L — SIGNIFICANT CHANGE UP (ref 22–31)
CO2 SERPL-SCNC: 23 MMOL/L — SIGNIFICANT CHANGE UP (ref 22–31)
CO2 SERPL-SCNC: 24 MMOL/L — SIGNIFICANT CHANGE UP (ref 22–31)
CO2 SERPL-SCNC: 25 MMOL/L
CO2 SERPL-SCNC: 25 MMOL/L — SIGNIFICANT CHANGE UP (ref 22–31)
CO2 SERPL-SCNC: 26 MMOL/L
CO2 SERPL-SCNC: 26 MMOL/L — SIGNIFICANT CHANGE UP (ref 22–31)
CO2 SERPL-SCNC: 27 MMOL/L — SIGNIFICANT CHANGE UP (ref 22–31)
CO2 SERPL-SCNC: 28 MMOL/L — SIGNIFICANT CHANGE UP (ref 22–31)
CO2 SERPL-SCNC: 29 MMOL/L — SIGNIFICANT CHANGE UP (ref 22–31)
CO2 SERPL-SCNC: 30 MMOL/L — SIGNIFICANT CHANGE UP (ref 22–31)
CO2 SERPL-SCNC: 31 MMOL/L — SIGNIFICANT CHANGE UP (ref 22–31)
CO2 SERPL-SCNC: 32 MMOL/L — HIGH (ref 22–31)
CO2 SERPL-SCNC: 32 MMOL/L — HIGH (ref 22–31)
CO2 SERPL-SCNC: 33 MMOL/L — HIGH (ref 22–31)
CO2 SERPL-SCNC: 34 MMOL/L — HIGH (ref 22–31)
CO2 SERPL-SCNC: 36 MMOL/L — HIGH (ref 22–31)
CO2 SERPL-SCNC: 37 MMOL/L — HIGH (ref 22–31)
COLOR SPEC: ABNORMAL
COLOR SPEC: ABNORMAL
COLOR SPEC: SIGNIFICANT CHANGE UP
COLOR SPEC: YELLOW — SIGNIFICANT CHANGE UP
COLOR: COLORLESS
COLOR: NORMAL
COMMENT - URINE: SIGNIFICANT CHANGE UP
COMMENT - URINE: SIGNIFICANT CHANGE UP
COVID-19 SPIKE DOMAIN AB INTERP: NEGATIVE — SIGNIFICANT CHANGE UP
COVID-19 SPIKE DOMAIN ANTIBODY RESULT: 0.4 U/ML — SIGNIFICANT CHANGE UP
CREAT SERPL-MCNC: 0.47 MG/DL — LOW (ref 0.5–1.3)
CREAT SERPL-MCNC: 0.53 MG/DL — SIGNIFICANT CHANGE UP (ref 0.5–1.3)
CREAT SERPL-MCNC: 0.53 MG/DL — SIGNIFICANT CHANGE UP (ref 0.5–1.3)
CREAT SERPL-MCNC: 0.54 MG/DL — SIGNIFICANT CHANGE UP (ref 0.5–1.3)
CREAT SERPL-MCNC: 0.55 MG/DL — SIGNIFICANT CHANGE UP (ref 0.5–1.3)
CREAT SERPL-MCNC: 0.56 MG/DL — SIGNIFICANT CHANGE UP (ref 0.5–1.3)
CREAT SERPL-MCNC: 0.59 MG/DL — SIGNIFICANT CHANGE UP (ref 0.5–1.3)
CREAT SERPL-MCNC: 0.59 MG/DL — SIGNIFICANT CHANGE UP (ref 0.5–1.3)
CREAT SERPL-MCNC: 0.6 MG/DL — SIGNIFICANT CHANGE UP (ref 0.5–1.3)
CREAT SERPL-MCNC: 0.62 MG/DL — SIGNIFICANT CHANGE UP (ref 0.5–1.3)
CREAT SERPL-MCNC: 0.63 MG/DL — SIGNIFICANT CHANGE UP (ref 0.5–1.3)
CREAT SERPL-MCNC: 0.64 MG/DL — SIGNIFICANT CHANGE UP (ref 0.5–1.3)
CREAT SERPL-MCNC: 0.68 MG/DL — SIGNIFICANT CHANGE UP (ref 0.5–1.3)
CREAT SERPL-MCNC: 0.69 MG/DL — SIGNIFICANT CHANGE UP (ref 0.5–1.3)
CREAT SERPL-MCNC: 0.69 MG/DL — SIGNIFICANT CHANGE UP (ref 0.5–1.3)
CREAT SERPL-MCNC: 0.7 MG/DL — SIGNIFICANT CHANGE UP (ref 0.5–1.3)
CREAT SERPL-MCNC: 0.71 MG/DL — SIGNIFICANT CHANGE UP (ref 0.5–1.3)
CREAT SERPL-MCNC: 0.71 MG/DL — SIGNIFICANT CHANGE UP (ref 0.5–1.3)
CREAT SERPL-MCNC: 0.72 MG/DL — SIGNIFICANT CHANGE UP (ref 0.5–1.3)
CREAT SERPL-MCNC: 0.72 MG/DL — SIGNIFICANT CHANGE UP (ref 0.5–1.3)
CREAT SERPL-MCNC: 0.75 MG/DL — SIGNIFICANT CHANGE UP (ref 0.5–1.3)
CREAT SERPL-MCNC: 0.77 MG/DL — SIGNIFICANT CHANGE UP (ref 0.5–1.3)
CREAT SERPL-MCNC: 0.78 MG/DL — SIGNIFICANT CHANGE UP (ref 0.5–1.3)
CREAT SERPL-MCNC: 0.81 MG/DL — SIGNIFICANT CHANGE UP (ref 0.5–1.3)
CREAT SERPL-MCNC: 0.82 MG/DL — SIGNIFICANT CHANGE UP (ref 0.5–1.3)
CREAT SERPL-MCNC: 0.83 MG/DL — SIGNIFICANT CHANGE UP (ref 0.5–1.3)
CREAT SERPL-MCNC: 0.83 MG/DL — SIGNIFICANT CHANGE UP (ref 0.5–1.3)
CREAT SERPL-MCNC: 0.85 MG/DL — SIGNIFICANT CHANGE UP (ref 0.5–1.3)
CREAT SERPL-MCNC: 0.85 MG/DL — SIGNIFICANT CHANGE UP (ref 0.5–1.3)
CREAT SERPL-MCNC: 0.86 MG/DL — SIGNIFICANT CHANGE UP (ref 0.5–1.3)
CREAT SERPL-MCNC: 0.86 MG/DL — SIGNIFICANT CHANGE UP (ref 0.5–1.3)
CREAT SERPL-MCNC: 0.88 MG/DL — SIGNIFICANT CHANGE UP (ref 0.5–1.3)
CREAT SERPL-MCNC: 0.88 MG/DL — SIGNIFICANT CHANGE UP (ref 0.5–1.3)
CREAT SERPL-MCNC: 0.89 MG/DL — SIGNIFICANT CHANGE UP (ref 0.5–1.3)
CREAT SERPL-MCNC: 0.9 MG/DL — SIGNIFICANT CHANGE UP (ref 0.5–1.3)
CREAT SERPL-MCNC: 0.91 MG/DL — SIGNIFICANT CHANGE UP (ref 0.5–1.3)
CREAT SERPL-MCNC: 0.91 MG/DL — SIGNIFICANT CHANGE UP (ref 0.5–1.3)
CREAT SERPL-MCNC: 0.92 MG/DL — SIGNIFICANT CHANGE UP (ref 0.5–1.3)
CREAT SERPL-MCNC: 0.93 MG/DL — SIGNIFICANT CHANGE UP (ref 0.5–1.3)
CREAT SERPL-MCNC: 0.94 MG/DL — SIGNIFICANT CHANGE UP (ref 0.5–1.3)
CREAT SERPL-MCNC: 0.95 MG/DL — SIGNIFICANT CHANGE UP (ref 0.5–1.3)
CREAT SERPL-MCNC: 0.97 MG/DL — SIGNIFICANT CHANGE UP (ref 0.5–1.3)
CREAT SERPL-MCNC: 0.99 MG/DL — SIGNIFICANT CHANGE UP (ref 0.5–1.3)
CREAT SERPL-MCNC: 1 MG/DL — SIGNIFICANT CHANGE UP (ref 0.5–1.3)
CREAT SERPL-MCNC: 1.01 MG/DL — SIGNIFICANT CHANGE UP (ref 0.5–1.3)
CREAT SERPL-MCNC: 1.02 MG/DL — SIGNIFICANT CHANGE UP (ref 0.5–1.3)
CREAT SERPL-MCNC: 1.04 MG/DL — SIGNIFICANT CHANGE UP (ref 0.5–1.3)
CREAT SERPL-MCNC: 1.05 MG/DL — SIGNIFICANT CHANGE UP (ref 0.5–1.3)
CREAT SERPL-MCNC: 1.06 MG/DL
CREAT SERPL-MCNC: 1.06 MG/DL
CREAT SERPL-MCNC: 1.06 MG/DL — SIGNIFICANT CHANGE UP (ref 0.5–1.3)
CREAT SERPL-MCNC: 1.08 MG/DL — SIGNIFICANT CHANGE UP (ref 0.5–1.3)
CREAT SERPL-MCNC: 1.09 MG/DL — SIGNIFICANT CHANGE UP (ref 0.5–1.3)
CREAT SERPL-MCNC: 1.11 MG/DL — SIGNIFICANT CHANGE UP (ref 0.5–1.3)
CREAT SERPL-MCNC: 1.11 MG/DL — SIGNIFICANT CHANGE UP (ref 0.5–1.3)
CREAT SERPL-MCNC: 1.13 MG/DL — SIGNIFICANT CHANGE UP (ref 0.5–1.3)
CREAT SERPL-MCNC: 1.16 MG/DL — SIGNIFICANT CHANGE UP (ref 0.5–1.3)
CREAT SERPL-MCNC: 1.19 MG/DL — SIGNIFICANT CHANGE UP (ref 0.5–1.3)
CREAT SERPL-MCNC: 1.19 MG/DL — SIGNIFICANT CHANGE UP (ref 0.5–1.3)
CREAT SERPL-MCNC: 1.21 MG/DL
CREAT SERPL-MCNC: 1.31 MG/DL — HIGH (ref 0.5–1.3)
CREAT SERPL-MCNC: 1.5 MG/DL — HIGH (ref 0.5–1.3)
CRP SERPL-MCNC: 15 MG/L — HIGH (ref 0–4)
CRP SERPL-MCNC: 17 MG/L — HIGH (ref 0–4)
CRP SERPL-MCNC: 22 MG/L — HIGH (ref 0–4)
CRP SERPL-MCNC: 34 MG/L — HIGH (ref 0–4)
CRP SERPL-MCNC: 47 MG/L — HIGH (ref 0–4)
CRP SERPL-MCNC: 52 MG/L — HIGH (ref 0–4)
CRP SERPL-MCNC: 6 MG/L — HIGH (ref 0–4)
CRP SERPL-MCNC: 9 MG/L — HIGH (ref 0–4)
CRP SERPL-MCNC: <3 MG/L — SIGNIFICANT CHANGE UP (ref 0–4)
CRP SERPL-MCNC: <3 MG/L — SIGNIFICANT CHANGE UP (ref 0–4)
CULTURE RESULTS: NO GROWTH — SIGNIFICANT CHANGE UP
CULTURE RESULTS: NO GROWTH — SIGNIFICANT CHANGE UP
CULTURE RESULTS: SIGNIFICANT CHANGE UP
D DIMER BLD IA.RAPID-MCNC: 1143 NG/ML DDU — HIGH
D DIMER BLD IA.RAPID-MCNC: 1224 NG/ML DDU — HIGH
D DIMER BLD IA.RAPID-MCNC: 1270 NG/ML DDU — HIGH
D DIMER BLD IA.RAPID-MCNC: 1336 NG/ML DDU — HIGH
D DIMER BLD IA.RAPID-MCNC: 1359 NG/ML DDU — HIGH
D DIMER BLD IA.RAPID-MCNC: 2006 NG/ML DDU — HIGH
D DIMER BLD IA.RAPID-MCNC: 213 NG/ML DDU — SIGNIFICANT CHANGE UP
D DIMER BLD IA.RAPID-MCNC: 251 NG/ML DDU — HIGH
D DIMER BLD IA.RAPID-MCNC: 272 NG/ML DDU — HIGH
D DIMER BLD IA.RAPID-MCNC: 301 NG/ML DDU — HIGH
D DIMER BLD IA.RAPID-MCNC: 3588 NG/ML DDU — HIGH
D DIMER BLD IA.RAPID-MCNC: 368 NG/ML DDU — HIGH
D DIMER BLD IA.RAPID-MCNC: 378 NG/ML DDU — HIGH
D DIMER BLD IA.RAPID-MCNC: 388 NG/ML DDU — HIGH
D DIMER BLD IA.RAPID-MCNC: 423 NG/ML DDU — HIGH
D DIMER BLD IA.RAPID-MCNC: 432 NG/ML DDU — HIGH
D DIMER BLD IA.RAPID-MCNC: 436 NG/ML DDU — HIGH
D DIMER BLD IA.RAPID-MCNC: 551 NG/ML DDU — HIGH
D DIMER BLD IA.RAPID-MCNC: 674 NG/ML DDU — HIGH
D DIMER BLD IA.RAPID-MCNC: 719 NG/ML DDU — HIGH
D DIMER BLD IA.RAPID-MCNC: 883 NG/ML DDU — HIGH
D DIMER BLD IA.RAPID-MCNC: 899 NG/ML DDU — HIGH
DIFF PNL FLD: ABNORMAL
DIFF PNL FLD: NEGATIVE — SIGNIFICANT CHANGE UP
DIFF PNL FLD: NEGATIVE — SIGNIFICANT CHANGE UP
EBV EA AB SER IA-ACNC: <5 U/ML — SIGNIFICANT CHANGE UP
EBV EA AB TITR SER IF: POSITIVE
EBV EA IGG SER-ACNC: NEGATIVE — SIGNIFICANT CHANGE UP
EBV NA IGG SER IA-ACNC: 81.6 U/ML — HIGH
EBV PATRN SPEC IB-IMP: SIGNIFICANT CHANGE UP
EBV VCA IGG AVIDITY SER QL IA: POSITIVE
EBV VCA IGM SER IA-ACNC: 125 U/ML — HIGH
EBV VCA IGM SER IA-ACNC: <10 U/ML — SIGNIFICANT CHANGE UP
EBV VCA IGM TITR FLD: NEGATIVE — SIGNIFICANT CHANGE UP
EOSINOPHIL # BLD AUTO: 0 K/UL — SIGNIFICANT CHANGE UP (ref 0–0.5)
EOSINOPHIL # BLD AUTO: 0.01 K/UL — SIGNIFICANT CHANGE UP (ref 0–0.5)
EOSINOPHIL # BLD AUTO: 0.05 K/UL — SIGNIFICANT CHANGE UP (ref 0–0.5)
EOSINOPHIL # BLD AUTO: 0.05 K/UL — SIGNIFICANT CHANGE UP (ref 0–0.5)
EOSINOPHIL # BLD AUTO: 0.07 K/UL — SIGNIFICANT CHANGE UP (ref 0–0.5)
EOSINOPHIL # BLD AUTO: 0.08 K/UL — SIGNIFICANT CHANGE UP (ref 0–0.5)
EOSINOPHIL # BLD AUTO: 0.09 K/UL — SIGNIFICANT CHANGE UP (ref 0–0.5)
EOSINOPHIL # BLD AUTO: 0.1 K/UL — SIGNIFICANT CHANGE UP (ref 0–0.5)
EOSINOPHIL # BLD AUTO: 0.14 K/UL — SIGNIFICANT CHANGE UP (ref 0–0.5)
EOSINOPHIL # BLD AUTO: 0.15 K/UL — SIGNIFICANT CHANGE UP (ref 0–0.5)
EOSINOPHIL # BLD AUTO: 0.16 K/UL — SIGNIFICANT CHANGE UP (ref 0–0.5)
EOSINOPHIL # BLD AUTO: 0.19 K/UL — SIGNIFICANT CHANGE UP (ref 0–0.5)
EOSINOPHIL # BLD AUTO: 0.21 K/UL — SIGNIFICANT CHANGE UP (ref 0–0.5)
EOSINOPHIL # BLD AUTO: 0.25 K/UL — SIGNIFICANT CHANGE UP (ref 0–0.5)
EOSINOPHIL # BLD AUTO: 0.29 K/UL — SIGNIFICANT CHANGE UP (ref 0–0.5)
EOSINOPHIL # BLD AUTO: 0.29 K/UL — SIGNIFICANT CHANGE UP (ref 0–0.5)
EOSINOPHIL # BLD AUTO: 0.38 K/UL — SIGNIFICANT CHANGE UP (ref 0–0.5)
EOSINOPHIL # BLD AUTO: 0.39 K/UL — SIGNIFICANT CHANGE UP (ref 0–0.5)
EOSINOPHIL # BLD AUTO: 0.43 K/UL — SIGNIFICANT CHANGE UP (ref 0–0.5)
EOSINOPHIL # BLD AUTO: 0.45 K/UL — SIGNIFICANT CHANGE UP (ref 0–0.5)
EOSINOPHIL # BLD AUTO: 0.47 K/UL — SIGNIFICANT CHANGE UP (ref 0–0.5)
EOSINOPHIL # BLD AUTO: 0.52 K/UL — HIGH (ref 0–0.5)
EOSINOPHIL # BLD AUTO: 0.53 K/UL — HIGH (ref 0–0.5)
EOSINOPHIL # BLD AUTO: 0.54 K/UL — HIGH (ref 0–0.5)
EOSINOPHIL # BLD AUTO: 0.62 K/UL — HIGH (ref 0–0.5)
EOSINOPHIL # BLD AUTO: 0.7 K/UL — HIGH (ref 0–0.5)
EOSINOPHIL # BLD AUTO: 0.71 K/UL — HIGH (ref 0–0.5)
EOSINOPHIL # BLD AUTO: 0.74 K/UL — HIGH (ref 0–0.5)
EOSINOPHIL # BLD AUTO: 0.77 K/UL — HIGH (ref 0–0.5)
EOSINOPHIL # BLD AUTO: 0.77 K/UL — HIGH (ref 0–0.5)
EOSINOPHIL # BLD AUTO: 0.81 K/UL — HIGH (ref 0–0.5)
EOSINOPHIL # BLD AUTO: 0.96 K/UL — HIGH (ref 0–0.5)
EOSINOPHIL # BLD AUTO: 1.01 K/UL — HIGH (ref 0–0.5)
EOSINOPHIL # BLD AUTO: 1.17 K/UL — HIGH (ref 0–0.5)
EOSINOPHIL NFR BLD AUTO: 0 % — SIGNIFICANT CHANGE UP (ref 0–6)
EOSINOPHIL NFR BLD AUTO: 0.1 % — SIGNIFICANT CHANGE UP (ref 0–6)
EOSINOPHIL NFR BLD AUTO: 0.4 % — SIGNIFICANT CHANGE UP (ref 0–6)
EOSINOPHIL NFR BLD AUTO: 0.5 % — SIGNIFICANT CHANGE UP (ref 0–6)
EOSINOPHIL NFR BLD AUTO: 0.6 % — SIGNIFICANT CHANGE UP (ref 0–6)
EOSINOPHIL NFR BLD AUTO: 0.7 % — SIGNIFICANT CHANGE UP (ref 0–6)
EOSINOPHIL NFR BLD AUTO: 1 % — SIGNIFICANT CHANGE UP (ref 0–6)
EOSINOPHIL NFR BLD AUTO: 1 % — SIGNIFICANT CHANGE UP (ref 0–6)
EOSINOPHIL NFR BLD AUTO: 1.2 % — SIGNIFICANT CHANGE UP (ref 0–6)
EOSINOPHIL NFR BLD AUTO: 1.5 % — SIGNIFICANT CHANGE UP (ref 0–6)
EOSINOPHIL NFR BLD AUTO: 1.6 % — SIGNIFICANT CHANGE UP (ref 0–6)
EOSINOPHIL NFR BLD AUTO: 1.9 % — SIGNIFICANT CHANGE UP (ref 0–6)
EOSINOPHIL NFR BLD AUTO: 10.3 % — HIGH (ref 0–6)
EOSINOPHIL NFR BLD AUTO: 11.1 % — HIGH (ref 0–6)
EOSINOPHIL NFR BLD AUTO: 11.3 % — HIGH (ref 0–6)
EOSINOPHIL NFR BLD AUTO: 13.1 % — HIGH (ref 0–6)
EOSINOPHIL NFR BLD AUTO: 2 % — SIGNIFICANT CHANGE UP (ref 0–6)
EOSINOPHIL NFR BLD AUTO: 2.2 % — SIGNIFICANT CHANGE UP (ref 0–6)
EOSINOPHIL NFR BLD AUTO: 3.2 % — SIGNIFICANT CHANGE UP (ref 0–6)
EOSINOPHIL NFR BLD AUTO: 3.2 % — SIGNIFICANT CHANGE UP (ref 0–6)
EOSINOPHIL NFR BLD AUTO: 3.6 % — SIGNIFICANT CHANGE UP (ref 0–6)
EOSINOPHIL NFR BLD AUTO: 4 % — SIGNIFICANT CHANGE UP (ref 0–6)
EOSINOPHIL NFR BLD AUTO: 4.1 % — SIGNIFICANT CHANGE UP (ref 0–6)
EOSINOPHIL NFR BLD AUTO: 4.5 % — SIGNIFICANT CHANGE UP (ref 0–6)
EOSINOPHIL NFR BLD AUTO: 4.7 % — SIGNIFICANT CHANGE UP (ref 0–6)
EOSINOPHIL NFR BLD AUTO: 5.8 % — SIGNIFICANT CHANGE UP (ref 0–6)
EOSINOPHIL NFR BLD AUTO: 6.1 % — HIGH (ref 0–6)
EOSINOPHIL NFR BLD AUTO: 6.2 % — HIGH (ref 0–6)
EOSINOPHIL NFR BLD AUTO: 7.1 % — HIGH (ref 0–6)
EOSINOPHIL NFR BLD AUTO: 9.2 % — HIGH (ref 0–6)
EOSINOPHIL NFR BLD AUTO: 9.2 % — HIGH (ref 0–6)
EOSINOPHIL NFR BLD AUTO: 9.3 % — HIGH (ref 0–6)
EPI CELLS # UR: 1 /HPF — SIGNIFICANT CHANGE UP
EPI CELLS # UR: 2 /HPF — SIGNIFICANT CHANGE UP
EPI CELLS # UR: 3 /HPF — SIGNIFICANT CHANGE UP
EPI CELLS # UR: 5 /HPF — SIGNIFICANT CHANGE UP
ERYTHROCYTE [SEDIMENTATION RATE] IN BLOOD: 80 MM/HR — HIGH (ref 0–20)
ESTIMATED AVERAGE GLUCOSE: 146 MG/DL
ESTIMATED AVERAGE GLUCOSE: 163 MG/DL — HIGH (ref 68–114)
ESTIMATED AVERAGE GLUCOSE: 180 MG/DL — HIGH (ref 68–114)
FERRITIN SERPL-MCNC: 258 NG/ML — SIGNIFICANT CHANGE UP (ref 30–400)
FERRITIN SERPL-MCNC: 267 NG/ML — SIGNIFICANT CHANGE UP (ref 30–400)
FERRITIN SERPL-MCNC: 275 NG/ML — SIGNIFICANT CHANGE UP (ref 30–400)
FERRITIN SERPL-MCNC: 277 NG/ML — SIGNIFICANT CHANGE UP (ref 30–400)
FERRITIN SERPL-MCNC: 302 NG/ML — SIGNIFICANT CHANGE UP (ref 30–400)
FERRITIN SERPL-MCNC: 317 NG/ML — SIGNIFICANT CHANGE UP (ref 30–400)
FERRITIN SERPL-MCNC: 331 NG/ML — SIGNIFICANT CHANGE UP (ref 30–400)
FERRITIN SERPL-MCNC: 344 NG/ML — SIGNIFICANT CHANGE UP (ref 30–400)
FERRITIN SERPL-MCNC: 386 NG/ML — SIGNIFICANT CHANGE UP (ref 30–400)
FERRITIN SERPL-MCNC: 395 NG/ML — SIGNIFICANT CHANGE UP (ref 30–400)
FERRITIN SERPL-MCNC: 417 NG/ML — HIGH (ref 30–400)
FERRITIN SERPL-MCNC: 430 NG/ML — HIGH (ref 30–400)
FERRITIN SERPL-MCNC: 432 NG/ML — HIGH (ref 30–400)
FERRITIN SERPL-MCNC: 459 NG/ML — HIGH (ref 30–400)
FERRITIN SERPL-MCNC: 475 NG/ML — HIGH (ref 30–400)
FERRITIN SERPL-MCNC: 482 NG/ML — HIGH (ref 30–400)
FERRITIN SERPL-MCNC: 489 NG/ML — HIGH (ref 30–400)
FERRITIN SERPL-MCNC: 499 NG/ML — HIGH (ref 30–400)
FERRITIN SERPL-MCNC: 547 NG/ML — HIGH (ref 30–400)
FERRITIN SERPL-MCNC: 689 NG/ML — HIGH (ref 30–400)
FUNGITELL: 49 PG/ML — SIGNIFICANT CHANGE UP
FUNGITELL: 87 PG/ML — HIGH
FUNGITELL: <31 PG/ML — SIGNIFICANT CHANGE UP
GAS PNL BLDA: SIGNIFICANT CHANGE UP
GAS PNL BLDV: 134 MMOL/L — LOW (ref 135–145)
GAS PNL BLDV: 137 MMOL/L — SIGNIFICANT CHANGE UP (ref 136–145)
GAS PNL BLDV: 138 MMOL/L — SIGNIFICANT CHANGE UP (ref 136–145)
GAS PNL BLDV: 138 MMOL/L — SIGNIFICANT CHANGE UP (ref 136–145)
GAS PNL BLDV: 140 MMOL/L — SIGNIFICANT CHANGE UP (ref 136–145)
GAS PNL BLDV: SIGNIFICANT CHANGE UP
GLUCOSE BLDC GLUCOMTR-MCNC: 101 MG/DL — HIGH (ref 70–99)
GLUCOSE BLDC GLUCOMTR-MCNC: 101 MG/DL — HIGH (ref 70–99)
GLUCOSE BLDC GLUCOMTR-MCNC: 103 MG/DL — HIGH (ref 70–99)
GLUCOSE BLDC GLUCOMTR-MCNC: 104 MG/DL — HIGH (ref 70–99)
GLUCOSE BLDC GLUCOMTR-MCNC: 109 MG/DL — HIGH (ref 70–99)
GLUCOSE BLDC GLUCOMTR-MCNC: 112 MG/DL — HIGH (ref 70–99)
GLUCOSE BLDC GLUCOMTR-MCNC: 113 MG/DL — HIGH (ref 70–99)
GLUCOSE BLDC GLUCOMTR-MCNC: 116 MG/DL — HIGH (ref 70–99)
GLUCOSE BLDC GLUCOMTR-MCNC: 117 MG/DL — HIGH (ref 70–99)
GLUCOSE BLDC GLUCOMTR-MCNC: 119 MG/DL — HIGH (ref 70–99)
GLUCOSE BLDC GLUCOMTR-MCNC: 120 MG/DL — HIGH (ref 70–99)
GLUCOSE BLDC GLUCOMTR-MCNC: 121 MG/DL — HIGH (ref 70–99)
GLUCOSE BLDC GLUCOMTR-MCNC: 123 MG/DL — HIGH (ref 70–99)
GLUCOSE BLDC GLUCOMTR-MCNC: 123 MG/DL — HIGH (ref 70–99)
GLUCOSE BLDC GLUCOMTR-MCNC: 125 MG/DL — HIGH (ref 70–99)
GLUCOSE BLDC GLUCOMTR-MCNC: 126 MG/DL — HIGH (ref 70–99)
GLUCOSE BLDC GLUCOMTR-MCNC: 128 MG/DL — HIGH (ref 70–99)
GLUCOSE BLDC GLUCOMTR-MCNC: 129 MG/DL — HIGH (ref 70–99)
GLUCOSE BLDC GLUCOMTR-MCNC: 130 MG/DL — HIGH (ref 70–99)
GLUCOSE BLDC GLUCOMTR-MCNC: 131 MG/DL — HIGH (ref 70–99)
GLUCOSE BLDC GLUCOMTR-MCNC: 132 MG/DL — HIGH (ref 70–99)
GLUCOSE BLDC GLUCOMTR-MCNC: 133 MG/DL — HIGH (ref 70–99)
GLUCOSE BLDC GLUCOMTR-MCNC: 133 MG/DL — HIGH (ref 70–99)
GLUCOSE BLDC GLUCOMTR-MCNC: 135 MG/DL — HIGH (ref 70–99)
GLUCOSE BLDC GLUCOMTR-MCNC: 136 MG/DL — HIGH (ref 70–99)
GLUCOSE BLDC GLUCOMTR-MCNC: 136 MG/DL — HIGH (ref 70–99)
GLUCOSE BLDC GLUCOMTR-MCNC: 137 MG/DL — HIGH (ref 70–99)
GLUCOSE BLDC GLUCOMTR-MCNC: 138 MG/DL — HIGH (ref 70–99)
GLUCOSE BLDC GLUCOMTR-MCNC: 138 MG/DL — HIGH (ref 70–99)
GLUCOSE BLDC GLUCOMTR-MCNC: 139 MG/DL — HIGH (ref 70–99)
GLUCOSE BLDC GLUCOMTR-MCNC: 139 MG/DL — HIGH (ref 70–99)
GLUCOSE BLDC GLUCOMTR-MCNC: 141 MG/DL — HIGH (ref 70–99)
GLUCOSE BLDC GLUCOMTR-MCNC: 141 MG/DL — HIGH (ref 70–99)
GLUCOSE BLDC GLUCOMTR-MCNC: 142 MG/DL — HIGH (ref 70–99)
GLUCOSE BLDC GLUCOMTR-MCNC: 142 MG/DL — HIGH (ref 70–99)
GLUCOSE BLDC GLUCOMTR-MCNC: 143 MG/DL — HIGH (ref 70–99)
GLUCOSE BLDC GLUCOMTR-MCNC: 143 MG/DL — HIGH (ref 70–99)
GLUCOSE BLDC GLUCOMTR-MCNC: 147 MG/DL — HIGH (ref 70–99)
GLUCOSE BLDC GLUCOMTR-MCNC: 147 MG/DL — HIGH (ref 70–99)
GLUCOSE BLDC GLUCOMTR-MCNC: 148 MG/DL — HIGH (ref 70–99)
GLUCOSE BLDC GLUCOMTR-MCNC: 148 MG/DL — HIGH (ref 70–99)
GLUCOSE BLDC GLUCOMTR-MCNC: 149 MG/DL — HIGH (ref 70–99)
GLUCOSE BLDC GLUCOMTR-MCNC: 151 MG/DL — HIGH (ref 70–99)
GLUCOSE BLDC GLUCOMTR-MCNC: 152 MG/DL — HIGH (ref 70–99)
GLUCOSE BLDC GLUCOMTR-MCNC: 152 MG/DL — HIGH (ref 70–99)
GLUCOSE BLDC GLUCOMTR-MCNC: 155 MG/DL — HIGH (ref 70–99)
GLUCOSE BLDC GLUCOMTR-MCNC: 155 MG/DL — HIGH (ref 70–99)
GLUCOSE BLDC GLUCOMTR-MCNC: 158 MG/DL — HIGH (ref 70–99)
GLUCOSE BLDC GLUCOMTR-MCNC: 159 MG/DL — HIGH (ref 70–99)
GLUCOSE BLDC GLUCOMTR-MCNC: 160 MG/DL — HIGH (ref 70–99)
GLUCOSE BLDC GLUCOMTR-MCNC: 161 MG/DL — HIGH (ref 70–99)
GLUCOSE BLDC GLUCOMTR-MCNC: 162 MG/DL — HIGH (ref 70–99)
GLUCOSE BLDC GLUCOMTR-MCNC: 164 MG/DL — HIGH (ref 70–99)
GLUCOSE BLDC GLUCOMTR-MCNC: 166 MG/DL — HIGH (ref 70–99)
GLUCOSE BLDC GLUCOMTR-MCNC: 167 MG/DL — HIGH (ref 70–99)
GLUCOSE BLDC GLUCOMTR-MCNC: 167 MG/DL — HIGH (ref 70–99)
GLUCOSE BLDC GLUCOMTR-MCNC: 168 MG/DL — HIGH (ref 70–99)
GLUCOSE BLDC GLUCOMTR-MCNC: 168 MG/DL — HIGH (ref 70–99)
GLUCOSE BLDC GLUCOMTR-MCNC: 169 MG/DL — HIGH (ref 70–99)
GLUCOSE BLDC GLUCOMTR-MCNC: 170 MG/DL — HIGH (ref 70–99)
GLUCOSE BLDC GLUCOMTR-MCNC: 171 MG/DL — HIGH (ref 70–99)
GLUCOSE BLDC GLUCOMTR-MCNC: 171 MG/DL — HIGH (ref 70–99)
GLUCOSE BLDC GLUCOMTR-MCNC: 172 MG/DL — HIGH (ref 70–99)
GLUCOSE BLDC GLUCOMTR-MCNC: 173 MG/DL — HIGH (ref 70–99)
GLUCOSE BLDC GLUCOMTR-MCNC: 174 MG/DL — HIGH (ref 70–99)
GLUCOSE BLDC GLUCOMTR-MCNC: 174 MG/DL — HIGH (ref 70–99)
GLUCOSE BLDC GLUCOMTR-MCNC: 175 MG/DL — HIGH (ref 70–99)
GLUCOSE BLDC GLUCOMTR-MCNC: 176 MG/DL — HIGH (ref 70–99)
GLUCOSE BLDC GLUCOMTR-MCNC: 177 MG/DL — HIGH (ref 70–99)
GLUCOSE BLDC GLUCOMTR-MCNC: 178 MG/DL — HIGH (ref 70–99)
GLUCOSE BLDC GLUCOMTR-MCNC: 179 MG/DL — HIGH (ref 70–99)
GLUCOSE BLDC GLUCOMTR-MCNC: 180 MG/DL — HIGH (ref 70–99)
GLUCOSE BLDC GLUCOMTR-MCNC: 181 MG/DL — HIGH (ref 70–99)
GLUCOSE BLDC GLUCOMTR-MCNC: 182 MG/DL — HIGH (ref 70–99)
GLUCOSE BLDC GLUCOMTR-MCNC: 183 MG/DL — HIGH (ref 70–99)
GLUCOSE BLDC GLUCOMTR-MCNC: 183 MG/DL — HIGH (ref 70–99)
GLUCOSE BLDC GLUCOMTR-MCNC: 184 MG/DL — HIGH (ref 70–99)
GLUCOSE BLDC GLUCOMTR-MCNC: 185 MG/DL — HIGH (ref 70–99)
GLUCOSE BLDC GLUCOMTR-MCNC: 186 MG/DL — HIGH (ref 70–99)
GLUCOSE BLDC GLUCOMTR-MCNC: 189 MG/DL — HIGH (ref 70–99)
GLUCOSE BLDC GLUCOMTR-MCNC: 191 MG/DL — HIGH (ref 70–99)
GLUCOSE BLDC GLUCOMTR-MCNC: 192 MG/DL — HIGH (ref 70–99)
GLUCOSE BLDC GLUCOMTR-MCNC: 193 MG/DL — HIGH (ref 70–99)
GLUCOSE BLDC GLUCOMTR-MCNC: 194 MG/DL — HIGH (ref 70–99)
GLUCOSE BLDC GLUCOMTR-MCNC: 195 MG/DL — HIGH (ref 70–99)
GLUCOSE BLDC GLUCOMTR-MCNC: 195 MG/DL — HIGH (ref 70–99)
GLUCOSE BLDC GLUCOMTR-MCNC: 196 MG/DL — HIGH (ref 70–99)
GLUCOSE BLDC GLUCOMTR-MCNC: 197 MG/DL — HIGH (ref 70–99)
GLUCOSE BLDC GLUCOMTR-MCNC: 197 MG/DL — HIGH (ref 70–99)
GLUCOSE BLDC GLUCOMTR-MCNC: 198 MG/DL — HIGH (ref 70–99)
GLUCOSE BLDC GLUCOMTR-MCNC: 199 MG/DL — HIGH (ref 70–99)
GLUCOSE BLDC GLUCOMTR-MCNC: 199 MG/DL — HIGH (ref 70–99)
GLUCOSE BLDC GLUCOMTR-MCNC: 200 MG/DL — HIGH (ref 70–99)
GLUCOSE BLDC GLUCOMTR-MCNC: 201 MG/DL — HIGH (ref 70–99)
GLUCOSE BLDC GLUCOMTR-MCNC: 202 MG/DL — HIGH (ref 70–99)
GLUCOSE BLDC GLUCOMTR-MCNC: 203 MG/DL — HIGH (ref 70–99)
GLUCOSE BLDC GLUCOMTR-MCNC: 203 MG/DL — HIGH (ref 70–99)
GLUCOSE BLDC GLUCOMTR-MCNC: 204 MG/DL — HIGH (ref 70–99)
GLUCOSE BLDC GLUCOMTR-MCNC: 205 MG/DL — HIGH (ref 70–99)
GLUCOSE BLDC GLUCOMTR-MCNC: 205 MG/DL — HIGH (ref 70–99)
GLUCOSE BLDC GLUCOMTR-MCNC: 208 MG/DL — HIGH (ref 70–99)
GLUCOSE BLDC GLUCOMTR-MCNC: 210 MG/DL — HIGH (ref 70–99)
GLUCOSE BLDC GLUCOMTR-MCNC: 211 MG/DL — HIGH (ref 70–99)
GLUCOSE BLDC GLUCOMTR-MCNC: 212 MG/DL — HIGH (ref 70–99)
GLUCOSE BLDC GLUCOMTR-MCNC: 213 MG/DL — HIGH (ref 70–99)
GLUCOSE BLDC GLUCOMTR-MCNC: 215 MG/DL — HIGH (ref 70–99)
GLUCOSE BLDC GLUCOMTR-MCNC: 215 MG/DL — HIGH (ref 70–99)
GLUCOSE BLDC GLUCOMTR-MCNC: 216 MG/DL — HIGH (ref 70–99)
GLUCOSE BLDC GLUCOMTR-MCNC: 218 MG/DL — HIGH (ref 70–99)
GLUCOSE BLDC GLUCOMTR-MCNC: 220 MG/DL — HIGH (ref 70–99)
GLUCOSE BLDC GLUCOMTR-MCNC: 221 MG/DL — HIGH (ref 70–99)
GLUCOSE BLDC GLUCOMTR-MCNC: 223 MG/DL — HIGH (ref 70–99)
GLUCOSE BLDC GLUCOMTR-MCNC: 223 MG/DL — HIGH (ref 70–99)
GLUCOSE BLDC GLUCOMTR-MCNC: 224 MG/DL — HIGH (ref 70–99)
GLUCOSE BLDC GLUCOMTR-MCNC: 225 MG/DL — HIGH (ref 70–99)
GLUCOSE BLDC GLUCOMTR-MCNC: 226 MG/DL — HIGH (ref 70–99)
GLUCOSE BLDC GLUCOMTR-MCNC: 226 MG/DL — HIGH (ref 70–99)
GLUCOSE BLDC GLUCOMTR-MCNC: 227 MG/DL — HIGH (ref 70–99)
GLUCOSE BLDC GLUCOMTR-MCNC: 229 MG/DL — HIGH (ref 70–99)
GLUCOSE BLDC GLUCOMTR-MCNC: 230 MG/DL — HIGH (ref 70–99)
GLUCOSE BLDC GLUCOMTR-MCNC: 231 MG/DL — HIGH (ref 70–99)
GLUCOSE BLDC GLUCOMTR-MCNC: 232 MG/DL — HIGH (ref 70–99)
GLUCOSE BLDC GLUCOMTR-MCNC: 232 MG/DL — HIGH (ref 70–99)
GLUCOSE BLDC GLUCOMTR-MCNC: 233 MG/DL — HIGH (ref 70–99)
GLUCOSE BLDC GLUCOMTR-MCNC: 234 MG/DL — HIGH (ref 70–99)
GLUCOSE BLDC GLUCOMTR-MCNC: 234 MG/DL — HIGH (ref 70–99)
GLUCOSE BLDC GLUCOMTR-MCNC: 235 MG/DL — HIGH (ref 70–99)
GLUCOSE BLDC GLUCOMTR-MCNC: 241 MG/DL — HIGH (ref 70–99)
GLUCOSE BLDC GLUCOMTR-MCNC: 243 MG/DL — HIGH (ref 70–99)
GLUCOSE BLDC GLUCOMTR-MCNC: 243 MG/DL — HIGH (ref 70–99)
GLUCOSE BLDC GLUCOMTR-MCNC: 244 MG/DL — HIGH (ref 70–99)
GLUCOSE BLDC GLUCOMTR-MCNC: 246 MG/DL — HIGH (ref 70–99)
GLUCOSE BLDC GLUCOMTR-MCNC: 246 MG/DL — HIGH (ref 70–99)
GLUCOSE BLDC GLUCOMTR-MCNC: 247 MG/DL — HIGH (ref 70–99)
GLUCOSE BLDC GLUCOMTR-MCNC: 250 MG/DL — HIGH (ref 70–99)
GLUCOSE BLDC GLUCOMTR-MCNC: 250 MG/DL — HIGH (ref 70–99)
GLUCOSE BLDC GLUCOMTR-MCNC: 251 MG/DL — HIGH (ref 70–99)
GLUCOSE BLDC GLUCOMTR-MCNC: 252 MG/DL — HIGH (ref 70–99)
GLUCOSE BLDC GLUCOMTR-MCNC: 254 MG/DL — HIGH (ref 70–99)
GLUCOSE BLDC GLUCOMTR-MCNC: 256 MG/DL — HIGH (ref 70–99)
GLUCOSE BLDC GLUCOMTR-MCNC: 257 MG/DL — HIGH (ref 70–99)
GLUCOSE BLDC GLUCOMTR-MCNC: 257 MG/DL — HIGH (ref 70–99)
GLUCOSE BLDC GLUCOMTR-MCNC: 261 MG/DL — HIGH (ref 70–99)
GLUCOSE BLDC GLUCOMTR-MCNC: 263 MG/DL — HIGH (ref 70–99)
GLUCOSE BLDC GLUCOMTR-MCNC: 264 MG/DL — HIGH (ref 70–99)
GLUCOSE BLDC GLUCOMTR-MCNC: 264 MG/DL — HIGH (ref 70–99)
GLUCOSE BLDC GLUCOMTR-MCNC: 267 MG/DL — HIGH (ref 70–99)
GLUCOSE BLDC GLUCOMTR-MCNC: 269 MG/DL — HIGH (ref 70–99)
GLUCOSE BLDC GLUCOMTR-MCNC: 269 MG/DL — HIGH (ref 70–99)
GLUCOSE BLDC GLUCOMTR-MCNC: 272 MG/DL — HIGH (ref 70–99)
GLUCOSE BLDC GLUCOMTR-MCNC: 274 MG/DL — HIGH (ref 70–99)
GLUCOSE BLDC GLUCOMTR-MCNC: 276 MG/DL — HIGH (ref 70–99)
GLUCOSE BLDC GLUCOMTR-MCNC: 277 MG/DL — HIGH (ref 70–99)
GLUCOSE BLDC GLUCOMTR-MCNC: 277 MG/DL — HIGH (ref 70–99)
GLUCOSE BLDC GLUCOMTR-MCNC: 279 MG/DL — HIGH (ref 70–99)
GLUCOSE BLDC GLUCOMTR-MCNC: 281 MG/DL — HIGH (ref 70–99)
GLUCOSE BLDC GLUCOMTR-MCNC: 281 MG/DL — HIGH (ref 70–99)
GLUCOSE BLDC GLUCOMTR-MCNC: 282 MG/DL — HIGH (ref 70–99)
GLUCOSE BLDC GLUCOMTR-MCNC: 284 MG/DL — HIGH (ref 70–99)
GLUCOSE BLDC GLUCOMTR-MCNC: 287 MG/DL — HIGH (ref 70–99)
GLUCOSE BLDC GLUCOMTR-MCNC: 290 MG/DL — HIGH (ref 70–99)
GLUCOSE BLDC GLUCOMTR-MCNC: 291 MG/DL — HIGH (ref 70–99)
GLUCOSE BLDC GLUCOMTR-MCNC: 299 MG/DL — HIGH (ref 70–99)
GLUCOSE BLDC GLUCOMTR-MCNC: 302 MG/DL — HIGH (ref 70–99)
GLUCOSE BLDC GLUCOMTR-MCNC: 304 MG/DL — HIGH (ref 70–99)
GLUCOSE BLDC GLUCOMTR-MCNC: 307 MG/DL — HIGH (ref 70–99)
GLUCOSE BLDC GLUCOMTR-MCNC: 310 MG/DL — HIGH (ref 70–99)
GLUCOSE BLDC GLUCOMTR-MCNC: 315 MG/DL — HIGH (ref 70–99)
GLUCOSE BLDC GLUCOMTR-MCNC: 318 MG/DL — HIGH (ref 70–99)
GLUCOSE BLDC GLUCOMTR-MCNC: 323 MG/DL — HIGH (ref 70–99)
GLUCOSE BLDC GLUCOMTR-MCNC: 354 MG/DL — HIGH (ref 70–99)
GLUCOSE BLDC GLUCOMTR-MCNC: 76 MG/DL — SIGNIFICANT CHANGE UP (ref 70–99)
GLUCOSE BLDC GLUCOMTR-MCNC: 77 MG/DL — SIGNIFICANT CHANGE UP (ref 70–99)
GLUCOSE BLDC GLUCOMTR-MCNC: 78 MG/DL — SIGNIFICANT CHANGE UP (ref 70–99)
GLUCOSE BLDC GLUCOMTR-MCNC: 79 MG/DL — SIGNIFICANT CHANGE UP (ref 70–99)
GLUCOSE BLDC GLUCOMTR-MCNC: 82 MG/DL — SIGNIFICANT CHANGE UP (ref 70–99)
GLUCOSE BLDC GLUCOMTR-MCNC: 85 MG/DL — SIGNIFICANT CHANGE UP (ref 70–99)
GLUCOSE BLDC GLUCOMTR-MCNC: 90 MG/DL — SIGNIFICANT CHANGE UP (ref 70–99)
GLUCOSE BLDC GLUCOMTR-MCNC: 97 MG/DL — SIGNIFICANT CHANGE UP (ref 70–99)
GLUCOSE BLDV-MCNC: 176 MG/DL — HIGH (ref 70–99)
GLUCOSE BLDV-MCNC: 241 MG/DL — HIGH (ref 70–99)
GLUCOSE BLDV-MCNC: 246 MG/DL — HIGH (ref 70–99)
GLUCOSE BLDV-MCNC: 251 MG/DL — HIGH (ref 70–99)
GLUCOSE BLDV-MCNC: 260 MG/DL — HIGH (ref 70–99)
GLUCOSE QUALITATIVE U: NEGATIVE
GLUCOSE SERPL-MCNC: 104 MG/DL — HIGH (ref 70–99)
GLUCOSE SERPL-MCNC: 113 MG/DL — HIGH (ref 70–99)
GLUCOSE SERPL-MCNC: 115 MG/DL
GLUCOSE SERPL-MCNC: 116 MG/DL
GLUCOSE SERPL-MCNC: 117 MG/DL — HIGH (ref 70–99)
GLUCOSE SERPL-MCNC: 119 MG/DL — HIGH (ref 70–99)
GLUCOSE SERPL-MCNC: 120 MG/DL
GLUCOSE SERPL-MCNC: 125 MG/DL — HIGH (ref 70–99)
GLUCOSE SERPL-MCNC: 133 MG/DL — HIGH (ref 70–99)
GLUCOSE SERPL-MCNC: 135 MG/DL — HIGH (ref 70–99)
GLUCOSE SERPL-MCNC: 135 MG/DL — HIGH (ref 70–99)
GLUCOSE SERPL-MCNC: 141 MG/DL — HIGH (ref 70–99)
GLUCOSE SERPL-MCNC: 149 MG/DL — HIGH (ref 70–99)
GLUCOSE SERPL-MCNC: 150 MG/DL — HIGH (ref 70–99)
GLUCOSE SERPL-MCNC: 153 MG/DL — HIGH (ref 70–99)
GLUCOSE SERPL-MCNC: 155 MG/DL — HIGH (ref 70–99)
GLUCOSE SERPL-MCNC: 156 MG/DL — HIGH (ref 70–99)
GLUCOSE SERPL-MCNC: 157 MG/DL — HIGH (ref 70–99)
GLUCOSE SERPL-MCNC: 158 MG/DL — HIGH (ref 70–99)
GLUCOSE SERPL-MCNC: 160 MG/DL — HIGH (ref 70–99)
GLUCOSE SERPL-MCNC: 162 MG/DL — HIGH (ref 70–99)
GLUCOSE SERPL-MCNC: 163 MG/DL — HIGH (ref 70–99)
GLUCOSE SERPL-MCNC: 164 MG/DL — HIGH (ref 70–99)
GLUCOSE SERPL-MCNC: 175 MG/DL — HIGH (ref 70–99)
GLUCOSE SERPL-MCNC: 175 MG/DL — HIGH (ref 70–99)
GLUCOSE SERPL-MCNC: 177 MG/DL — HIGH (ref 70–99)
GLUCOSE SERPL-MCNC: 178 MG/DL — HIGH (ref 70–99)
GLUCOSE SERPL-MCNC: 181 MG/DL — HIGH (ref 70–99)
GLUCOSE SERPL-MCNC: 185 MG/DL — HIGH (ref 70–99)
GLUCOSE SERPL-MCNC: 191 MG/DL — HIGH (ref 70–99)
GLUCOSE SERPL-MCNC: 192 MG/DL — HIGH (ref 70–99)
GLUCOSE SERPL-MCNC: 193 MG/DL — HIGH (ref 70–99)
GLUCOSE SERPL-MCNC: 194 MG/DL — HIGH (ref 70–99)
GLUCOSE SERPL-MCNC: 198 MG/DL — HIGH (ref 70–99)
GLUCOSE SERPL-MCNC: 199 MG/DL — HIGH (ref 70–99)
GLUCOSE SERPL-MCNC: 209 MG/DL — HIGH (ref 70–99)
GLUCOSE SERPL-MCNC: 211 MG/DL — HIGH (ref 70–99)
GLUCOSE SERPL-MCNC: 215 MG/DL — HIGH (ref 70–99)
GLUCOSE SERPL-MCNC: 216 MG/DL — HIGH (ref 70–99)
GLUCOSE SERPL-MCNC: 217 MG/DL — HIGH (ref 70–99)
GLUCOSE SERPL-MCNC: 223 MG/DL — HIGH (ref 70–99)
GLUCOSE SERPL-MCNC: 224 MG/DL — HIGH (ref 70–99)
GLUCOSE SERPL-MCNC: 224 MG/DL — HIGH (ref 70–99)
GLUCOSE SERPL-MCNC: 230 MG/DL — HIGH (ref 70–99)
GLUCOSE SERPL-MCNC: 232 MG/DL — HIGH (ref 70–99)
GLUCOSE SERPL-MCNC: 233 MG/DL — HIGH (ref 70–99)
GLUCOSE SERPL-MCNC: 234 MG/DL — HIGH (ref 70–99)
GLUCOSE SERPL-MCNC: 247 MG/DL — HIGH (ref 70–99)
GLUCOSE SERPL-MCNC: 247 MG/DL — HIGH (ref 70–99)
GLUCOSE SERPL-MCNC: 248 MG/DL — HIGH (ref 70–99)
GLUCOSE SERPL-MCNC: 251 MG/DL — HIGH (ref 70–99)
GLUCOSE SERPL-MCNC: 254 MG/DL — HIGH (ref 70–99)
GLUCOSE SERPL-MCNC: 255 MG/DL — HIGH (ref 70–99)
GLUCOSE SERPL-MCNC: 255 MG/DL — HIGH (ref 70–99)
GLUCOSE SERPL-MCNC: 258 MG/DL — HIGH (ref 70–99)
GLUCOSE SERPL-MCNC: 262 MG/DL — HIGH (ref 70–99)
GLUCOSE SERPL-MCNC: 262 MG/DL — HIGH (ref 70–99)
GLUCOSE SERPL-MCNC: 264 MG/DL — HIGH (ref 70–99)
GLUCOSE SERPL-MCNC: 271 MG/DL — HIGH (ref 70–99)
GLUCOSE SERPL-MCNC: 275 MG/DL — HIGH (ref 70–99)
GLUCOSE SERPL-MCNC: 279 MG/DL — HIGH (ref 70–99)
GLUCOSE SERPL-MCNC: 286 MG/DL — HIGH (ref 70–99)
GLUCOSE SERPL-MCNC: 291 MG/DL — HIGH (ref 70–99)
GLUCOSE SERPL-MCNC: 293 MG/DL — HIGH (ref 70–99)
GLUCOSE SERPL-MCNC: 298 MG/DL — HIGH (ref 70–99)
GLUCOSE SERPL-MCNC: 312 MG/DL — HIGH (ref 70–99)
GLUCOSE SERPL-MCNC: 323 MG/DL — HIGH (ref 70–99)
GLUCOSE SERPL-MCNC: 605 MG/DL — CRITICAL HIGH (ref 70–99)
GLUCOSE SERPL-MCNC: 72 MG/DL — SIGNIFICANT CHANGE UP (ref 70–99)
GLUCOSE SERPL-MCNC: 73 MG/DL — SIGNIFICANT CHANGE UP (ref 70–99)
GLUCOSE SERPL-MCNC: 79 MG/DL — SIGNIFICANT CHANGE UP (ref 70–99)
GLUCOSE SERPL-MCNC: 90 MG/DL — SIGNIFICANT CHANGE UP (ref 70–99)
GLUCOSE SERPL-MCNC: 99 MG/DL — SIGNIFICANT CHANGE UP (ref 70–99)
GLUCOSE UR QL: ABNORMAL
GLUCOSE UR QL: NEGATIVE — SIGNIFICANT CHANGE UP
GRAM STN FLD: SIGNIFICANT CHANGE UP
GRAN CASTS # UR COMP ASSIST: ABNORMAL /LPF
HAV IGM SER-ACNC: SIGNIFICANT CHANGE UP
HBA1C MFR BLD HPLC: 6.7 %
HBV CORE IGM SER-ACNC: SIGNIFICANT CHANGE UP
HBV SURFACE AG SER-ACNC: SIGNIFICANT CHANGE UP
HCO3 BLDA-SCNC: 21 MMOL/L — SIGNIFICANT CHANGE UP (ref 21–29)
HCO3 BLDA-SCNC: 22 MMOL/L — SIGNIFICANT CHANGE UP (ref 21–29)
HCO3 BLDA-SCNC: 25 MMOL/L — SIGNIFICANT CHANGE UP (ref 21–28)
HCO3 BLDV-SCNC: 24 MMOL/L — SIGNIFICANT CHANGE UP (ref 21–29)
HCO3 BLDV-SCNC: 31 MMOL/L — HIGH (ref 22–29)
HCO3 BLDV-SCNC: 31 MMOL/L — HIGH (ref 22–29)
HCO3 BLDV-SCNC: 38 MMOL/L — HIGH (ref 22–29)
HCO3 BLDV-SCNC: 39 MMOL/L — HIGH (ref 22–29)
HCO3 BLDV-SCNC: 40 MMOL/L — HIGH (ref 22–29)
HCT VFR BLD CALC: 20.1 % — CRITICAL LOW (ref 39–50)
HCT VFR BLD CALC: 21.6 % — LOW (ref 39–50)
HCT VFR BLD CALC: 23.5 % — LOW (ref 39–50)
HCT VFR BLD CALC: 23.5 % — LOW (ref 39–50)
HCT VFR BLD CALC: 23.7 % — LOW (ref 39–50)
HCT VFR BLD CALC: 23.8 % — LOW (ref 39–50)
HCT VFR BLD CALC: 23.9 % — LOW (ref 39–50)
HCT VFR BLD CALC: 23.9 % — LOW (ref 39–50)
HCT VFR BLD CALC: 24.1 % — LOW (ref 39–50)
HCT VFR BLD CALC: 24.3 % — LOW (ref 39–50)
HCT VFR BLD CALC: 24.4 % — LOW (ref 39–50)
HCT VFR BLD CALC: 24.6 % — LOW (ref 39–50)
HCT VFR BLD CALC: 24.8 % — LOW (ref 39–50)
HCT VFR BLD CALC: 25 % — LOW (ref 39–50)
HCT VFR BLD CALC: 25 % — LOW (ref 39–50)
HCT VFR BLD CALC: 25.2 % — LOW (ref 39–50)
HCT VFR BLD CALC: 25.3 % — LOW (ref 39–50)
HCT VFR BLD CALC: 25.3 % — LOW (ref 39–50)
HCT VFR BLD CALC: 25.5 % — LOW (ref 39–50)
HCT VFR BLD CALC: 25.5 % — LOW (ref 39–50)
HCT VFR BLD CALC: 25.6 % — LOW (ref 39–50)
HCT VFR BLD CALC: 25.7 % — LOW (ref 39–50)
HCT VFR BLD CALC: 25.8 % — LOW (ref 39–50)
HCT VFR BLD CALC: 25.8 % — LOW (ref 39–50)
HCT VFR BLD CALC: 25.9 % — LOW (ref 39–50)
HCT VFR BLD CALC: 26 % — LOW (ref 39–50)
HCT VFR BLD CALC: 26 % — LOW (ref 39–50)
HCT VFR BLD CALC: 26.2 % — LOW (ref 39–50)
HCT VFR BLD CALC: 26.4 % — LOW (ref 39–50)
HCT VFR BLD CALC: 26.4 % — LOW (ref 39–50)
HCT VFR BLD CALC: 26.5 % — LOW (ref 39–50)
HCT VFR BLD CALC: 26.5 % — LOW (ref 39–50)
HCT VFR BLD CALC: 26.6 % — LOW (ref 39–50)
HCT VFR BLD CALC: 27.1 % — LOW (ref 39–50)
HCT VFR BLD CALC: 27.1 % — LOW (ref 39–50)
HCT VFR BLD CALC: 27.4 % — LOW (ref 39–50)
HCT VFR BLD CALC: 27.4 % — LOW (ref 39–50)
HCT VFR BLD CALC: 27.5 % — LOW (ref 39–50)
HCT VFR BLD CALC: 27.6 % — LOW (ref 39–50)
HCT VFR BLD CALC: 28 % — LOW (ref 39–50)
HCT VFR BLD CALC: 28.3 % — LOW (ref 39–50)
HCT VFR BLD CALC: 28.3 % — LOW (ref 39–50)
HCT VFR BLD CALC: 28.4 % — LOW (ref 39–50)
HCT VFR BLD CALC: 28.5 % — LOW (ref 39–50)
HCT VFR BLD CALC: 28.5 % — LOW (ref 39–50)
HCT VFR BLD CALC: 28.6 % — LOW (ref 39–50)
HCT VFR BLD CALC: 28.6 % — LOW (ref 39–50)
HCT VFR BLD CALC: 29 % — LOW (ref 39–50)
HCT VFR BLD CALC: 29.9 % — LOW (ref 39–50)
HCT VFR BLD CALC: 30.9 % — LOW (ref 39–50)
HCT VFR BLD CALC: 33.5 % — LOW (ref 39–50)
HCT VFR BLD CALC: 34.1 % — LOW (ref 39–50)
HCT VFR BLD CALC: 36.3 % — LOW (ref 39–50)
HCT VFR BLD CALC: 37.2 % — LOW (ref 39–50)
HCT VFR BLD CALC: 38.8 % — LOW (ref 39–50)
HCT VFR BLD CALC: 39.6 % — SIGNIFICANT CHANGE UP (ref 39–50)
HCT VFR BLD CALC: 40.1 % — SIGNIFICANT CHANGE UP (ref 39–50)
HCT VFR BLD CALC: 40.3 % — SIGNIFICANT CHANGE UP (ref 39–50)
HCT VFR BLD CALC: 40.4 % — SIGNIFICANT CHANGE UP (ref 39–50)
HCT VFR BLD CALC: 41.2 % — SIGNIFICANT CHANGE UP (ref 39–50)
HCT VFR BLD CALC: 41.2 % — SIGNIFICANT CHANGE UP (ref 39–50)
HCT VFR BLD CALC: 41.3 % — SIGNIFICANT CHANGE UP (ref 39–50)
HCT VFR BLD CALC: 41.8 % — SIGNIFICANT CHANGE UP (ref 39–50)
HCT VFR BLD CALC: 41.9 % — SIGNIFICANT CHANGE UP (ref 39–50)
HCT VFR BLD CALC: 42.3 % — SIGNIFICANT CHANGE UP (ref 39–50)
HCT VFR BLD CALC: 42.3 % — SIGNIFICANT CHANGE UP (ref 39–50)
HCT VFR BLD CALC: 43 % — SIGNIFICANT CHANGE UP (ref 39–50)
HCT VFR BLD CALC: 50.7 % — HIGH (ref 39–50)
HCT VFR BLDA CALC: 23 % — LOW (ref 39–51)
HCT VFR BLDA CALC: 24 % — LOW (ref 39–51)
HCT VFR BLDA CALC: 25 % — LOW (ref 39–51)
HCT VFR BLDA CALC: 26 % — LOW (ref 39–51)
HCT VFR BLDA CALC: 37 % — LOW (ref 39–50)
HCV AB S/CO SERPL IA: 0.08 S/CO — SIGNIFICANT CHANGE UP (ref 0–0.99)
HCV AB S/CO SERPL IA: 0.22 S/CO — SIGNIFICANT CHANGE UP (ref 0–0.99)
HCV AB SERPL-IMP: SIGNIFICANT CHANGE UP
HCV AB SERPL-IMP: SIGNIFICANT CHANGE UP
HDLC SERPL-MCNC: 33 MG/DL — LOW
HDLC SERPL-MCNC: 8 MG/DL — LOW
HEV AB FLD QL: NEGATIVE — SIGNIFICANT CHANGE UP
HGB BLD CALC-MCNC: 12.1 G/DL — LOW (ref 13–17)
HGB BLD CALC-MCNC: 7.8 G/DL — LOW (ref 12.6–17.4)
HGB BLD CALC-MCNC: 8 G/DL — LOW (ref 12.6–17.4)
HGB BLD CALC-MCNC: 8.2 G/DL — LOW (ref 12.6–17.4)
HGB BLD CALC-MCNC: 8.7 G/DL — LOW (ref 12.6–17.4)
HGB BLD-MCNC: 10.3 G/DL — LOW (ref 13–17)
HGB BLD-MCNC: 10.5 G/DL — LOW (ref 13–17)
HGB BLD-MCNC: 11.7 G/DL — LOW (ref 13–17)
HGB BLD-MCNC: 11.9 G/DL — LOW (ref 13–17)
HGB BLD-MCNC: 12.6 G/DL — LOW (ref 13–17)
HGB BLD-MCNC: 12.8 G/DL — LOW (ref 13–17)
HGB BLD-MCNC: 12.8 G/DL — LOW (ref 13–17)
HGB BLD-MCNC: 13.3 G/DL — SIGNIFICANT CHANGE UP (ref 13–17)
HGB BLD-MCNC: 13.6 G/DL — SIGNIFICANT CHANGE UP (ref 13–17)
HGB BLD-MCNC: 13.7 G/DL — SIGNIFICANT CHANGE UP (ref 13–17)
HGB BLD-MCNC: 13.7 G/DL — SIGNIFICANT CHANGE UP (ref 13–17)
HGB BLD-MCNC: 13.8 G/DL — SIGNIFICANT CHANGE UP (ref 13–17)
HGB BLD-MCNC: 14.2 G/DL — SIGNIFICANT CHANGE UP (ref 13–17)
HGB BLD-MCNC: 16.5 G/DL — SIGNIFICANT CHANGE UP (ref 13–17)
HGB BLD-MCNC: 6 G/DL — CRITICAL LOW (ref 13–17)
HGB BLD-MCNC: 6.7 G/DL — CRITICAL LOW (ref 13–17)
HGB BLD-MCNC: 6.8 G/DL — CRITICAL LOW (ref 13–17)
HGB BLD-MCNC: 6.9 G/DL — CRITICAL LOW (ref 13–17)
HGB BLD-MCNC: 7 G/DL — CRITICAL LOW (ref 13–17)
HGB BLD-MCNC: 7.1 G/DL — LOW (ref 13–17)
HGB BLD-MCNC: 7.3 G/DL — LOW (ref 13–17)
HGB BLD-MCNC: 7.3 G/DL — LOW (ref 13–17)
HGB BLD-MCNC: 7.4 G/DL — LOW (ref 13–17)
HGB BLD-MCNC: 7.5 G/DL — LOW (ref 13–17)
HGB BLD-MCNC: 7.5 G/DL — LOW (ref 13–17)
HGB BLD-MCNC: 7.6 G/DL — LOW (ref 13–17)
HGB BLD-MCNC: 7.7 G/DL — LOW (ref 13–17)
HGB BLD-MCNC: 7.7 G/DL — LOW (ref 13–17)
HGB BLD-MCNC: 7.8 G/DL — LOW (ref 13–17)
HGB BLD-MCNC: 7.9 G/DL — LOW (ref 13–17)
HGB BLD-MCNC: 8 G/DL — LOW (ref 13–17)
HGB BLD-MCNC: 8.1 G/DL — LOW (ref 13–17)
HGB BLD-MCNC: 8.1 G/DL — LOW (ref 13–17)
HGB BLD-MCNC: 8.2 G/DL — LOW (ref 13–17)
HGB BLD-MCNC: 8.3 G/DL — LOW (ref 13–17)
HGB BLD-MCNC: 8.5 G/DL — LOW (ref 13–17)
HGB BLD-MCNC: 8.6 G/DL — LOW (ref 13–17)
HGB BLD-MCNC: 8.8 G/DL — LOW (ref 13–17)
HGB BLD-MCNC: 8.9 G/DL — LOW (ref 13–17)
HGB BLD-MCNC: 9.4 G/DL — LOW (ref 13–17)
HGB BLD-MCNC: 9.8 G/DL — LOW (ref 13–17)
HOROWITZ INDEX BLDA+IHG-RTO: 100 — SIGNIFICANT CHANGE UP
HOROWITZ INDEX BLDA+IHG-RTO: 80 — SIGNIFICANT CHANGE UP
HOROWITZ INDEX BLDV+IHG-RTO: 100 — SIGNIFICANT CHANGE UP
HOROWITZ INDEX BLDV+IHG-RTO: 80 — SIGNIFICANT CHANGE UP
HYALINE CASTS # UR AUTO: 2 /LPF — SIGNIFICANT CHANGE UP (ref 0–2)
HYALINE CASTS # UR AUTO: 21 /LPF — HIGH (ref 0–2)
HYALINE CASTS # UR AUTO: 3 /LPF — HIGH (ref 0–2)
HYALINE CASTS # UR AUTO: 5 /LPF — HIGH (ref 0–2)
HYALINE CASTS # UR AUTO: 9 /LPF — HIGH (ref 0–2)
HYALINE CASTS: 0 /LPF
HYALINE CASTS: 1 /LPF
IMM GRANULOCYTES NFR BLD AUTO: 0.2 % — SIGNIFICANT CHANGE UP (ref 0–1.5)
IMM GRANULOCYTES NFR BLD AUTO: 0.4 % — SIGNIFICANT CHANGE UP (ref 0–1.5)
IMM GRANULOCYTES NFR BLD AUTO: 0.6 % — SIGNIFICANT CHANGE UP (ref 0–1.5)
IMM GRANULOCYTES NFR BLD AUTO: 0.6 % — SIGNIFICANT CHANGE UP (ref 0–1.5)
IMM GRANULOCYTES NFR BLD AUTO: 0.9 % — SIGNIFICANT CHANGE UP (ref 0–1.5)
IMM GRANULOCYTES NFR BLD AUTO: 1 % — SIGNIFICANT CHANGE UP (ref 0–1.5)
IMM GRANULOCYTES NFR BLD AUTO: 1 % — SIGNIFICANT CHANGE UP (ref 0–1.5)
IMM GRANULOCYTES NFR BLD AUTO: 1.1 % — SIGNIFICANT CHANGE UP (ref 0–1.5)
IMM GRANULOCYTES NFR BLD AUTO: 1.1 % — SIGNIFICANT CHANGE UP (ref 0–1.5)
IMM GRANULOCYTES NFR BLD AUTO: 1.2 % — SIGNIFICANT CHANGE UP (ref 0–1.5)
IMM GRANULOCYTES NFR BLD AUTO: 1.2 % — SIGNIFICANT CHANGE UP (ref 0–1.5)
IMM GRANULOCYTES NFR BLD AUTO: 1.3 % — SIGNIFICANT CHANGE UP (ref 0–1.5)
IMM GRANULOCYTES NFR BLD AUTO: 1.4 % — SIGNIFICANT CHANGE UP (ref 0–1.5)
IMM GRANULOCYTES NFR BLD AUTO: 1.5 % — SIGNIFICANT CHANGE UP (ref 0–1.5)
IMM GRANULOCYTES NFR BLD AUTO: 1.6 % — HIGH (ref 0–1.5)
IMM GRANULOCYTES NFR BLD AUTO: 1.6 % — HIGH (ref 0–1.5)
IMM GRANULOCYTES NFR BLD AUTO: 1.7 % — HIGH (ref 0–1.5)
IMM GRANULOCYTES NFR BLD AUTO: 1.9 % — HIGH (ref 0–1.5)
IMM GRANULOCYTES NFR BLD AUTO: 2 % — HIGH (ref 0–1.5)
IMM GRANULOCYTES NFR BLD AUTO: 2.1 % — HIGH (ref 0–1.5)
IMM GRANULOCYTES NFR BLD AUTO: 2.2 % — HIGH (ref 0–1.5)
IMM GRANULOCYTES NFR BLD AUTO: 2.3 % — HIGH (ref 0–1.5)
IMM GRANULOCYTES NFR BLD AUTO: 2.3 % — HIGH (ref 0–1.5)
IMM GRANULOCYTES NFR BLD AUTO: 2.4 % — HIGH (ref 0–1.5)
IMM GRANULOCYTES NFR BLD AUTO: 2.5 % — HIGH (ref 0–1.5)
IMM GRANULOCYTES NFR BLD AUTO: 2.8 % — HIGH (ref 0–1.5)
IMM GRANULOCYTES NFR BLD AUTO: 3 % — HIGH (ref 0–1.5)
IMM GRANULOCYTES NFR BLD AUTO: 3.6 % — HIGH (ref 0–1.5)
INR BLD: 0.97 RATIO — SIGNIFICANT CHANGE UP (ref 0.88–1.16)
INR BLD: 0.98 RATIO — SIGNIFICANT CHANGE UP (ref 0.88–1.16)
INR BLD: 0.99 RATIO — SIGNIFICANT CHANGE UP (ref 0.88–1.16)
INR BLD: 1.01 RATIO — SIGNIFICANT CHANGE UP (ref 0.88–1.16)
INR BLD: 1.01 RATIO — SIGNIFICANT CHANGE UP (ref 0.88–1.16)
INR BLD: 1.02 RATIO — SIGNIFICANT CHANGE UP (ref 0.88–1.16)
INR BLD: 1.04 RATIO — SIGNIFICANT CHANGE UP (ref 0.88–1.16)
INR BLD: 1.04 RATIO — SIGNIFICANT CHANGE UP (ref 0.88–1.16)
INR BLD: 1.05 RATIO — SIGNIFICANT CHANGE UP (ref 0.88–1.16)
INR BLD: 1.06 RATIO — SIGNIFICANT CHANGE UP (ref 0.88–1.16)
INR BLD: 1.07 RATIO — SIGNIFICANT CHANGE UP (ref 0.88–1.16)
INR BLD: 1.08 RATIO — SIGNIFICANT CHANGE UP (ref 0.88–1.16)
INR BLD: 1.09 RATIO — SIGNIFICANT CHANGE UP (ref 0.88–1.16)
INR BLD: 1.1 RATIO — SIGNIFICANT CHANGE UP (ref 0.88–1.16)
INR BLD: 1.11 RATIO — SIGNIFICANT CHANGE UP (ref 0.88–1.16)
INR BLD: 1.12 RATIO — SIGNIFICANT CHANGE UP (ref 0.88–1.16)
INR BLD: 1.13 RATIO — SIGNIFICANT CHANGE UP (ref 0.88–1.16)
INR BLD: 1.16 RATIO — SIGNIFICANT CHANGE UP (ref 0.88–1.16)
INR BLD: 1.18 RATIO — HIGH (ref 0.88–1.16)
INR BLD: 1.21 RATIO — HIGH (ref 0.88–1.16)
INR BLD: 1.22 RATIO — HIGH (ref 0.88–1.16)
IRON SATN MFR SERPL: 33 % — SIGNIFICANT CHANGE UP (ref 16–55)
IRON SATN MFR SERPL: 71 UG/DL — SIGNIFICANT CHANGE UP (ref 45–165)
KETONES UR-MCNC: NEGATIVE — SIGNIFICANT CHANGE UP
KETONES UR-MCNC: SIGNIFICANT CHANGE UP
KETONES URINE: NEGATIVE
LACTATE BLDA-MCNC: 1.6 MMOL/L — SIGNIFICANT CHANGE UP (ref 0.7–2)
LACTATE BLDV-MCNC: 1.1 MMOL/L — SIGNIFICANT CHANGE UP (ref 0.7–2)
LACTATE BLDV-MCNC: 1.3 MMOL/L — SIGNIFICANT CHANGE UP (ref 0.7–2)
LACTATE BLDV-MCNC: 1.5 MMOL/L — SIGNIFICANT CHANGE UP (ref 0.7–2)
LACTATE BLDV-MCNC: 1.6 MMOL/L — SIGNIFICANT CHANGE UP (ref 0.7–2)
LACTATE BLDV-MCNC: 1.6 MMOL/L — SIGNIFICANT CHANGE UP (ref 0.7–2)
LACTATE SERPL-SCNC: 1.2 MMOL/L — SIGNIFICANT CHANGE UP (ref 0.7–2)
LACTATE SERPL-SCNC: 1.7 MMOL/L — SIGNIFICANT CHANGE UP (ref 0.7–2)
LACTATE SERPL-SCNC: 2.8 MMOL/L — HIGH (ref 0.7–2)
LDH SERPL L TO P-CCNC: 300 U/L — HIGH (ref 50–242)
LDH SERPL L TO P-CCNC: 486 U/L — HIGH (ref 50–242)
LDH SERPL L TO P-CCNC: 512 U/L — HIGH (ref 50–242)
LDH SERPL L TO P-CCNC: 542 U/L — HIGH (ref 50–242)
LDH SERPL L TO P-CCNC: 546 U/L — HIGH (ref 50–242)
LDH SERPL L TO P-CCNC: 554 U/L — HIGH (ref 50–242)
LDH SERPL L TO P-CCNC: 597 U/L — HIGH (ref 50–242)
LDH SERPL L TO P-CCNC: 732 U/L — HIGH (ref 50–242)
LEUKOCYTE ESTERASE UR-ACNC: NEGATIVE — SIGNIFICANT CHANGE UP
LEUKOCYTE ESTERASE URINE: NEGATIVE
LIDOCAIN IGE QN: 12 U/L — SIGNIFICANT CHANGE UP (ref 7–60)
LIPID PNL WITH DIRECT LDL SERPL: 336 MG/DL — HIGH
LIPID PNL WITH DIRECT LDL SERPL: SIGNIFICANT CHANGE UP MG/DL
LMWH PPP CHRO-ACNC: 1.71 IU/ML — HIGH (ref 0.5–1.1)
LMWH PPP CHRO-ACNC: 1.79 IU/ML — HIGH (ref 0.5–1.1)
LYMPHOCYTES # BLD AUTO: 0.4 K/UL — LOW (ref 1–3.3)
LYMPHOCYTES # BLD AUTO: 0.46 K/UL — LOW (ref 1–3.3)
LYMPHOCYTES # BLD AUTO: 0.48 K/UL — LOW (ref 1–3.3)
LYMPHOCYTES # BLD AUTO: 0.54 K/UL — LOW (ref 1–3.3)
LYMPHOCYTES # BLD AUTO: 0.55 K/UL — LOW (ref 1–3.3)
LYMPHOCYTES # BLD AUTO: 0.56 K/UL — LOW (ref 1–3.3)
LYMPHOCYTES # BLD AUTO: 0.58 K/UL — LOW (ref 1–3.3)
LYMPHOCYTES # BLD AUTO: 0.61 K/UL — LOW (ref 1–3.3)
LYMPHOCYTES # BLD AUTO: 0.65 K/UL — LOW (ref 1–3.3)
LYMPHOCYTES # BLD AUTO: 0.66 K/UL — LOW (ref 1–3.3)
LYMPHOCYTES # BLD AUTO: 0.66 K/UL — LOW (ref 1–3.3)
LYMPHOCYTES # BLD AUTO: 0.7 K/UL — LOW (ref 1–3.3)
LYMPHOCYTES # BLD AUTO: 0.7 K/UL — LOW (ref 1–3.3)
LYMPHOCYTES # BLD AUTO: 0.75 K/UL — LOW (ref 1–3.3)
LYMPHOCYTES # BLD AUTO: 0.78 K/UL — LOW (ref 1–3.3)
LYMPHOCYTES # BLD AUTO: 0.82 K/UL — LOW (ref 1–3.3)
LYMPHOCYTES # BLD AUTO: 0.83 K/UL — LOW (ref 1–3.3)
LYMPHOCYTES # BLD AUTO: 0.83 K/UL — LOW (ref 1–3.3)
LYMPHOCYTES # BLD AUTO: 0.87 K/UL — LOW (ref 1–3.3)
LYMPHOCYTES # BLD AUTO: 0.88 K/UL — LOW (ref 1–3.3)
LYMPHOCYTES # BLD AUTO: 0.88 K/UL — LOW (ref 1–3.3)
LYMPHOCYTES # BLD AUTO: 0.91 K/UL — LOW (ref 1–3.3)
LYMPHOCYTES # BLD AUTO: 0.96 K/UL — LOW (ref 1–3.3)
LYMPHOCYTES # BLD AUTO: 1.01 K/UL — SIGNIFICANT CHANGE UP (ref 1–3.3)
LYMPHOCYTES # BLD AUTO: 1.03 K/UL — SIGNIFICANT CHANGE UP (ref 1–3.3)
LYMPHOCYTES # BLD AUTO: 1.15 K/UL — SIGNIFICANT CHANGE UP (ref 1–3.3)
LYMPHOCYTES # BLD AUTO: 1.18 K/UL — SIGNIFICANT CHANGE UP (ref 1–3.3)
LYMPHOCYTES # BLD AUTO: 1.22 K/UL — SIGNIFICANT CHANGE UP (ref 1–3.3)
LYMPHOCYTES # BLD AUTO: 1.24 K/UL — SIGNIFICANT CHANGE UP (ref 1–3.3)
LYMPHOCYTES # BLD AUTO: 1.29 K/UL — SIGNIFICANT CHANGE UP (ref 1–3.3)
LYMPHOCYTES # BLD AUTO: 1.3 K/UL — SIGNIFICANT CHANGE UP (ref 1–3.3)
LYMPHOCYTES # BLD AUTO: 1.32 K/UL — SIGNIFICANT CHANGE UP (ref 1–3.3)
LYMPHOCYTES # BLD AUTO: 1.37 K/UL — SIGNIFICANT CHANGE UP (ref 1–3.3)
LYMPHOCYTES # BLD AUTO: 1.48 K/UL — SIGNIFICANT CHANGE UP (ref 1–3.3)
LYMPHOCYTES # BLD AUTO: 1.5 K/UL — SIGNIFICANT CHANGE UP (ref 1–3.3)
LYMPHOCYTES # BLD AUTO: 1.62 K/UL — SIGNIFICANT CHANGE UP (ref 1–3.3)
LYMPHOCYTES # BLD AUTO: 10 % — LOW (ref 13–44)
LYMPHOCYTES # BLD AUTO: 10.6 % — LOW (ref 13–44)
LYMPHOCYTES # BLD AUTO: 10.6 % — LOW (ref 13–44)
LYMPHOCYTES # BLD AUTO: 10.8 % — LOW (ref 13–44)
LYMPHOCYTES # BLD AUTO: 11.7 % — LOW (ref 13–44)
LYMPHOCYTES # BLD AUTO: 17 % — SIGNIFICANT CHANGE UP (ref 13–44)
LYMPHOCYTES # BLD AUTO: 2.15 K/UL — SIGNIFICANT CHANGE UP (ref 1–3.3)
LYMPHOCYTES # BLD AUTO: 24.2 % — SIGNIFICANT CHANGE UP (ref 13–44)
LYMPHOCYTES # BLD AUTO: 4.3 % — LOW (ref 13–44)
LYMPHOCYTES # BLD AUTO: 4.4 % — LOW (ref 13–44)
LYMPHOCYTES # BLD AUTO: 4.5 % — LOW (ref 13–44)
LYMPHOCYTES # BLD AUTO: 5.3 % — LOW (ref 13–44)
LYMPHOCYTES # BLD AUTO: 5.8 % — LOW (ref 13–44)
LYMPHOCYTES # BLD AUTO: 6.1 % — LOW (ref 13–44)
LYMPHOCYTES # BLD AUTO: 6.1 % — LOW (ref 13–44)
LYMPHOCYTES # BLD AUTO: 6.2 % — LOW (ref 13–44)
LYMPHOCYTES # BLD AUTO: 6.3 % — LOW (ref 13–44)
LYMPHOCYTES # BLD AUTO: 6.4 % — LOW (ref 13–44)
LYMPHOCYTES # BLD AUTO: 6.5 % — LOW (ref 13–44)
LYMPHOCYTES # BLD AUTO: 6.6 % — LOW (ref 13–44)
LYMPHOCYTES # BLD AUTO: 6.8 % — LOW (ref 13–44)
LYMPHOCYTES # BLD AUTO: 6.9 % — LOW (ref 13–44)
LYMPHOCYTES # BLD AUTO: 7.4 % — LOW (ref 13–44)
LYMPHOCYTES # BLD AUTO: 7.4 % — LOW (ref 13–44)
LYMPHOCYTES # BLD AUTO: 7.5 % — LOW (ref 13–44)
LYMPHOCYTES # BLD AUTO: 7.8 % — LOW (ref 13–44)
LYMPHOCYTES # BLD AUTO: 7.8 % — LOW (ref 13–44)
LYMPHOCYTES # BLD AUTO: 8 % — LOW (ref 13–44)
LYMPHOCYTES # BLD AUTO: 8.1 % — LOW (ref 13–44)
LYMPHOCYTES # BLD AUTO: 8.2 % — LOW (ref 13–44)
LYMPHOCYTES # BLD AUTO: 8.2 % — LOW (ref 13–44)
LYMPHOCYTES # BLD AUTO: 8.3 % — LOW (ref 13–44)
LYMPHOCYTES # BLD AUTO: 8.5 % — LOW (ref 13–44)
LYMPHOCYTES # BLD AUTO: 9.2 % — LOW (ref 13–44)
LYMPHOCYTES # BLD AUTO: 9.3 % — LOW (ref 13–44)
LYMPHOCYTES # BLD AUTO: 9.3 % — LOW (ref 13–44)
LYMPHOCYTES # BLD AUTO: 9.7 % — LOW (ref 13–44)
LYMPHOCYTES # BLD AUTO: 9.8 % — LOW (ref 13–44)
MAGNESIUM SERPL-MCNC: 1.9 MG/DL — SIGNIFICANT CHANGE UP (ref 1.6–2.6)
MAGNESIUM SERPL-MCNC: 2 MG/DL — SIGNIFICANT CHANGE UP (ref 1.6–2.6)
MAGNESIUM SERPL-MCNC: 2.1 MG/DL — SIGNIFICANT CHANGE UP (ref 1.6–2.6)
MAGNESIUM SERPL-MCNC: 2.2 MG/DL — SIGNIFICANT CHANGE UP (ref 1.6–2.6)
MAGNESIUM SERPL-MCNC: 2.3 MG/DL — SIGNIFICANT CHANGE UP (ref 1.6–2.6)
MAGNESIUM SERPL-MCNC: 2.4 MG/DL — SIGNIFICANT CHANGE UP (ref 1.6–2.6)
MAGNESIUM SERPL-MCNC: 2.5 MG/DL — SIGNIFICANT CHANGE UP (ref 1.6–2.6)
MAGNESIUM SERPL-MCNC: 2.6 MG/DL — SIGNIFICANT CHANGE UP (ref 1.6–2.6)
MAGNESIUM SERPL-MCNC: 2.7 MG/DL — HIGH (ref 1.6–2.6)
MAGNESIUM SERPL-MCNC: 2.8 MG/DL — HIGH (ref 1.6–2.6)
MAGNESIUM SERPL-MCNC: 2.9 MG/DL — HIGH (ref 1.6–2.6)
MAGNESIUM SERPL-MCNC: 4.8 MG/DL — HIGH (ref 1.6–2.6)
MCHC RBC-ENTMCNC: 27.7 GM/DL — LOW (ref 32–36)
MCHC RBC-ENTMCNC: 27.7 GM/DL — LOW (ref 32–36)
MCHC RBC-ENTMCNC: 28.3 GM/DL — LOW (ref 32–36)
MCHC RBC-ENTMCNC: 28.6 GM/DL — LOW (ref 32–36)
MCHC RBC-ENTMCNC: 28.7 GM/DL — LOW (ref 32–36)
MCHC RBC-ENTMCNC: 28.7 PG — SIGNIFICANT CHANGE UP (ref 27–34)
MCHC RBC-ENTMCNC: 28.8 GM/DL — LOW (ref 32–36)
MCHC RBC-ENTMCNC: 28.8 GM/DL — LOW (ref 32–36)
MCHC RBC-ENTMCNC: 28.8 PG — SIGNIFICANT CHANGE UP (ref 27–34)
MCHC RBC-ENTMCNC: 28.9 GM/DL — LOW (ref 32–36)
MCHC RBC-ENTMCNC: 28.9 PG — SIGNIFICANT CHANGE UP (ref 27–34)
MCHC RBC-ENTMCNC: 28.9 PG — SIGNIFICANT CHANGE UP (ref 27–34)
MCHC RBC-ENTMCNC: 29 GM/DL — LOW (ref 32–36)
MCHC RBC-ENTMCNC: 29 PG — SIGNIFICANT CHANGE UP (ref 27–34)
MCHC RBC-ENTMCNC: 29.1 GM/DL — LOW (ref 32–36)
MCHC RBC-ENTMCNC: 29.2 GM/DL — LOW (ref 32–36)
MCHC RBC-ENTMCNC: 29.2 PG — SIGNIFICANT CHANGE UP (ref 27–34)
MCHC RBC-ENTMCNC: 29.2 PG — SIGNIFICANT CHANGE UP (ref 27–34)
MCHC RBC-ENTMCNC: 29.3 GM/DL — LOW (ref 32–36)
MCHC RBC-ENTMCNC: 29.3 GM/DL — LOW (ref 32–36)
MCHC RBC-ENTMCNC: 29.3 PG — SIGNIFICANT CHANGE UP (ref 27–34)
MCHC RBC-ENTMCNC: 29.4 GM/DL — LOW (ref 32–36)
MCHC RBC-ENTMCNC: 29.4 GM/DL — LOW (ref 32–36)
MCHC RBC-ENTMCNC: 29.4 PG — SIGNIFICANT CHANGE UP (ref 27–34)
MCHC RBC-ENTMCNC: 29.5 GM/DL — LOW (ref 32–36)
MCHC RBC-ENTMCNC: 29.5 GM/DL — LOW (ref 32–36)
MCHC RBC-ENTMCNC: 29.5 PG — SIGNIFICANT CHANGE UP (ref 27–34)
MCHC RBC-ENTMCNC: 29.6 GM/DL — LOW (ref 32–36)
MCHC RBC-ENTMCNC: 29.6 GM/DL — LOW (ref 32–36)
MCHC RBC-ENTMCNC: 29.6 PG — SIGNIFICANT CHANGE UP (ref 27–34)
MCHC RBC-ENTMCNC: 29.7 GM/DL — LOW (ref 32–36)
MCHC RBC-ENTMCNC: 29.7 GM/DL — LOW (ref 32–36)
MCHC RBC-ENTMCNC: 29.7 PG — SIGNIFICANT CHANGE UP (ref 27–34)
MCHC RBC-ENTMCNC: 29.8 GM/DL — LOW (ref 32–36)
MCHC RBC-ENTMCNC: 29.8 PG — SIGNIFICANT CHANGE UP (ref 27–34)
MCHC RBC-ENTMCNC: 29.9 GM/DL — LOW (ref 32–36)
MCHC RBC-ENTMCNC: 29.9 GM/DL — LOW (ref 32–36)
MCHC RBC-ENTMCNC: 29.9 PG — SIGNIFICANT CHANGE UP (ref 27–34)
MCHC RBC-ENTMCNC: 30 GM/DL — LOW (ref 32–36)
MCHC RBC-ENTMCNC: 30 PG — SIGNIFICANT CHANGE UP (ref 27–34)
MCHC RBC-ENTMCNC: 30.1 GM/DL — LOW (ref 32–36)
MCHC RBC-ENTMCNC: 30.1 PG — SIGNIFICANT CHANGE UP (ref 27–34)
MCHC RBC-ENTMCNC: 30.2 GM/DL — LOW (ref 32–36)
MCHC RBC-ENTMCNC: 30.3 GM/DL — LOW (ref 32–36)
MCHC RBC-ENTMCNC: 30.3 PG — SIGNIFICANT CHANGE UP (ref 27–34)
MCHC RBC-ENTMCNC: 30.4 PG — SIGNIFICANT CHANGE UP (ref 27–34)
MCHC RBC-ENTMCNC: 30.5 GM/DL — LOW (ref 32–36)
MCHC RBC-ENTMCNC: 30.5 PG — SIGNIFICANT CHANGE UP (ref 27–34)
MCHC RBC-ENTMCNC: 30.5 PG — SIGNIFICANT CHANGE UP (ref 27–34)
MCHC RBC-ENTMCNC: 30.6 PG — SIGNIFICANT CHANGE UP (ref 27–34)
MCHC RBC-ENTMCNC: 30.7 GM/DL — LOW (ref 32–36)
MCHC RBC-ENTMCNC: 30.7 PG — SIGNIFICANT CHANGE UP (ref 27–34)
MCHC RBC-ENTMCNC: 30.7 PG — SIGNIFICANT CHANGE UP (ref 27–34)
MCHC RBC-ENTMCNC: 30.8 GM/DL — LOW (ref 32–36)
MCHC RBC-ENTMCNC: 30.8 PG — SIGNIFICANT CHANGE UP (ref 27–34)
MCHC RBC-ENTMCNC: 30.9 GM/DL — LOW (ref 32–36)
MCHC RBC-ENTMCNC: 30.9 GM/DL — LOW (ref 32–36)
MCHC RBC-ENTMCNC: 30.9 PG — SIGNIFICANT CHANGE UP (ref 27–34)
MCHC RBC-ENTMCNC: 31 GM/DL — LOW (ref 32–36)
MCHC RBC-ENTMCNC: 31 GM/DL — LOW (ref 32–36)
MCHC RBC-ENTMCNC: 31 PG — SIGNIFICANT CHANGE UP (ref 27–34)
MCHC RBC-ENTMCNC: 31 PG — SIGNIFICANT CHANGE UP (ref 27–34)
MCHC RBC-ENTMCNC: 31.1 GM/DL — LOW (ref 32–36)
MCHC RBC-ENTMCNC: 31.3 PG — SIGNIFICANT CHANGE UP (ref 27–34)
MCHC RBC-ENTMCNC: 31.4 GM/DL — LOW (ref 32–36)
MCHC RBC-ENTMCNC: 31.4 PG — SIGNIFICANT CHANGE UP (ref 27–34)
MCHC RBC-ENTMCNC: 31.4 PG — SIGNIFICANT CHANGE UP (ref 27–34)
MCHC RBC-ENTMCNC: 31.5 GM/DL — LOW (ref 32–36)
MCHC RBC-ENTMCNC: 31.5 GM/DL — LOW (ref 32–36)
MCHC RBC-ENTMCNC: 31.5 PG — SIGNIFICANT CHANGE UP (ref 27–34)
MCHC RBC-ENTMCNC: 31.6 PG — SIGNIFICANT CHANGE UP (ref 27–34)
MCHC RBC-ENTMCNC: 31.7 GM/DL — LOW (ref 32–36)
MCHC RBC-ENTMCNC: 31.7 GM/DL — LOW (ref 32–36)
MCHC RBC-ENTMCNC: 31.7 PG — SIGNIFICANT CHANGE UP (ref 27–34)
MCHC RBC-ENTMCNC: 31.7 PG — SIGNIFICANT CHANGE UP (ref 27–34)
MCHC RBC-ENTMCNC: 31.8 GM/DL — LOW (ref 32–36)
MCHC RBC-ENTMCNC: 31.9 GM/DL — LOW (ref 32–36)
MCHC RBC-ENTMCNC: 31.9 PG — SIGNIFICANT CHANGE UP (ref 27–34)
MCHC RBC-ENTMCNC: 32 PG — SIGNIFICANT CHANGE UP (ref 27–34)
MCHC RBC-ENTMCNC: 32.2 GM/DL — SIGNIFICANT CHANGE UP (ref 32–36)
MCHC RBC-ENTMCNC: 32.3 GM/DL — SIGNIFICANT CHANGE UP (ref 32–36)
MCHC RBC-ENTMCNC: 32.4 GM/DL — SIGNIFICANT CHANGE UP (ref 32–36)
MCHC RBC-ENTMCNC: 32.4 PG — SIGNIFICANT CHANGE UP (ref 27–34)
MCHC RBC-ENTMCNC: 32.5 GM/DL — SIGNIFICANT CHANGE UP (ref 32–36)
MCHC RBC-ENTMCNC: 32.5 GM/DL — SIGNIFICANT CHANGE UP (ref 32–36)
MCHC RBC-ENTMCNC: 32.8 GM/DL — SIGNIFICANT CHANGE UP (ref 32–36)
MCHC RBC-ENTMCNC: 32.8 GM/DL — SIGNIFICANT CHANGE UP (ref 32–36)
MCHC RBC-ENTMCNC: 33 GM/DL — SIGNIFICANT CHANGE UP (ref 32–36)
MCHC RBC-ENTMCNC: 33.5 GM/DL — SIGNIFICANT CHANGE UP (ref 32–36)
MCHC RBC-ENTMCNC: 33.6 GM/DL — SIGNIFICANT CHANGE UP (ref 32–36)
MCV RBC AUTO: 100 FL — SIGNIFICANT CHANGE UP (ref 80–100)
MCV RBC AUTO: 100 FL — SIGNIFICANT CHANGE UP (ref 80–100)
MCV RBC AUTO: 100.4 FL — HIGH (ref 80–100)
MCV RBC AUTO: 101.3 FL — HIGH (ref 80–100)
MCV RBC AUTO: 101.7 FL — HIGH (ref 80–100)
MCV RBC AUTO: 101.8 FL — HIGH (ref 80–100)
MCV RBC AUTO: 102 FL — HIGH (ref 80–100)
MCV RBC AUTO: 102.6 FL — HIGH (ref 80–100)
MCV RBC AUTO: 102.7 FL — HIGH (ref 80–100)
MCV RBC AUTO: 103 FL — HIGH (ref 80–100)
MCV RBC AUTO: 103.4 FL — HIGH (ref 80–100)
MCV RBC AUTO: 103.5 FL — HIGH (ref 80–100)
MCV RBC AUTO: 104.2 FL — HIGH (ref 80–100)
MCV RBC AUTO: 104.8 FL — HIGH (ref 80–100)
MCV RBC AUTO: 105.9 FL — HIGH (ref 80–100)
MCV RBC AUTO: 106.2 FL — HIGH (ref 80–100)
MCV RBC AUTO: 106.2 FL — HIGH (ref 80–100)
MCV RBC AUTO: 106.7 FL — HIGH (ref 80–100)
MCV RBC AUTO: 106.8 FL — HIGH (ref 80–100)
MCV RBC AUTO: 107.1 FL — HIGH (ref 80–100)
MCV RBC AUTO: 107.2 FL — HIGH (ref 80–100)
MCV RBC AUTO: 107.2 FL — HIGH (ref 80–100)
MCV RBC AUTO: 107.5 FL — HIGH (ref 80–100)
MCV RBC AUTO: 107.8 FL — HIGH (ref 80–100)
MCV RBC AUTO: 107.9 FL — HIGH (ref 80–100)
MCV RBC AUTO: 108.3 FL — HIGH (ref 80–100)
MCV RBC AUTO: 109.1 FL — HIGH (ref 80–100)
MCV RBC AUTO: 109.3 FL — HIGH (ref 80–100)
MCV RBC AUTO: 109.6 FL — HIGH (ref 80–100)
MCV RBC AUTO: 109.7 FL — HIGH (ref 80–100)
MCV RBC AUTO: 110 FL — HIGH (ref 80–100)
MCV RBC AUTO: 111.3 FL — HIGH (ref 80–100)
MCV RBC AUTO: 111.7 FL — HIGH (ref 80–100)
MCV RBC AUTO: 87.4 FL — SIGNIFICANT CHANGE UP (ref 80–100)
MCV RBC AUTO: 87.8 FL — SIGNIFICANT CHANGE UP (ref 80–100)
MCV RBC AUTO: 88.8 FL — SIGNIFICANT CHANGE UP (ref 80–100)
MCV RBC AUTO: 88.8 FL — SIGNIFICANT CHANGE UP (ref 80–100)
MCV RBC AUTO: 89.2 FL — SIGNIFICANT CHANGE UP (ref 80–100)
MCV RBC AUTO: 89.2 FL — SIGNIFICANT CHANGE UP (ref 80–100)
MCV RBC AUTO: 89.3 FL — SIGNIFICANT CHANGE UP (ref 80–100)
MCV RBC AUTO: 89.9 FL — SIGNIFICANT CHANGE UP (ref 80–100)
MCV RBC AUTO: 90 FL — SIGNIFICANT CHANGE UP (ref 80–100)
MCV RBC AUTO: 90.2 FL — SIGNIFICANT CHANGE UP (ref 80–100)
MCV RBC AUTO: 90.6 FL — SIGNIFICANT CHANGE UP (ref 80–100)
MCV RBC AUTO: 90.6 FL — SIGNIFICANT CHANGE UP (ref 80–100)
MCV RBC AUTO: 91.8 FL — SIGNIFICANT CHANGE UP (ref 80–100)
MCV RBC AUTO: 92.4 FL — SIGNIFICANT CHANGE UP (ref 80–100)
MCV RBC AUTO: 92.8 FL — SIGNIFICANT CHANGE UP (ref 80–100)
MCV RBC AUTO: 93 FL — SIGNIFICANT CHANGE UP (ref 80–100)
MCV RBC AUTO: 93.2 FL — SIGNIFICANT CHANGE UP (ref 80–100)
MCV RBC AUTO: 93.4 FL — SIGNIFICANT CHANGE UP (ref 80–100)
MCV RBC AUTO: 93.9 FL — SIGNIFICANT CHANGE UP (ref 80–100)
MCV RBC AUTO: 93.9 FL — SIGNIFICANT CHANGE UP (ref 80–100)
MCV RBC AUTO: 94.2 FL — SIGNIFICANT CHANGE UP (ref 80–100)
MCV RBC AUTO: 94.8 FL — SIGNIFICANT CHANGE UP (ref 80–100)
MCV RBC AUTO: 95.4 FL — SIGNIFICANT CHANGE UP (ref 80–100)
MCV RBC AUTO: 95.6 FL — SIGNIFICANT CHANGE UP (ref 80–100)
MCV RBC AUTO: 95.7 FL — SIGNIFICANT CHANGE UP (ref 80–100)
MCV RBC AUTO: 96.6 FL — SIGNIFICANT CHANGE UP (ref 80–100)
MCV RBC AUTO: 97 FL — SIGNIFICANT CHANGE UP (ref 80–100)
MCV RBC AUTO: 97.4 FL — SIGNIFICANT CHANGE UP (ref 80–100)
MCV RBC AUTO: 97.7 FL — SIGNIFICANT CHANGE UP (ref 80–100)
MCV RBC AUTO: 97.9 FL — SIGNIFICANT CHANGE UP (ref 80–100)
MCV RBC AUTO: 98.4 FL — SIGNIFICANT CHANGE UP (ref 80–100)
MCV RBC AUTO: 98.5 FL — SIGNIFICANT CHANGE UP (ref 80–100)
MCV RBC AUTO: 98.5 FL — SIGNIFICANT CHANGE UP (ref 80–100)
MCV RBC AUTO: 99.2 FL — SIGNIFICANT CHANGE UP (ref 80–100)
MCV RBC AUTO: 99.6 FL — SIGNIFICANT CHANGE UP (ref 80–100)
METHOD TYPE: SIGNIFICANT CHANGE UP
MICROSCOPIC-UA: NORMAL
MONOCYTES # BLD AUTO: 0.3 K/UL — SIGNIFICANT CHANGE UP (ref 0–0.9)
MONOCYTES # BLD AUTO: 0.37 K/UL — SIGNIFICANT CHANGE UP (ref 0–0.9)
MONOCYTES # BLD AUTO: 0.39 K/UL — SIGNIFICANT CHANGE UP (ref 0–0.9)
MONOCYTES # BLD AUTO: 0.4 K/UL — SIGNIFICANT CHANGE UP (ref 0–0.9)
MONOCYTES # BLD AUTO: 0.43 K/UL — SIGNIFICANT CHANGE UP (ref 0–0.9)
MONOCYTES # BLD AUTO: 0.5 K/UL — SIGNIFICANT CHANGE UP (ref 0–0.9)
MONOCYTES # BLD AUTO: 0.53 K/UL — SIGNIFICANT CHANGE UP (ref 0–0.9)
MONOCYTES # BLD AUTO: 0.54 K/UL — SIGNIFICANT CHANGE UP (ref 0–0.9)
MONOCYTES # BLD AUTO: 0.55 K/UL — SIGNIFICANT CHANGE UP (ref 0–0.9)
MONOCYTES # BLD AUTO: 0.56 K/UL — SIGNIFICANT CHANGE UP (ref 0–0.9)
MONOCYTES # BLD AUTO: 0.58 K/UL — SIGNIFICANT CHANGE UP (ref 0–0.9)
MONOCYTES # BLD AUTO: 0.59 K/UL — SIGNIFICANT CHANGE UP (ref 0–0.9)
MONOCYTES # BLD AUTO: 0.59 K/UL — SIGNIFICANT CHANGE UP (ref 0–0.9)
MONOCYTES # BLD AUTO: 0.61 K/UL — SIGNIFICANT CHANGE UP (ref 0–0.9)
MONOCYTES # BLD AUTO: 0.61 K/UL — SIGNIFICANT CHANGE UP (ref 0–0.9)
MONOCYTES # BLD AUTO: 0.62 K/UL — SIGNIFICANT CHANGE UP (ref 0–0.9)
MONOCYTES # BLD AUTO: 0.64 K/UL — SIGNIFICANT CHANGE UP (ref 0–0.9)
MONOCYTES # BLD AUTO: 0.65 K/UL — SIGNIFICANT CHANGE UP (ref 0–0.9)
MONOCYTES # BLD AUTO: 0.67 K/UL — SIGNIFICANT CHANGE UP (ref 0–0.9)
MONOCYTES # BLD AUTO: 0.67 K/UL — SIGNIFICANT CHANGE UP (ref 0–0.9)
MONOCYTES # BLD AUTO: 0.7 K/UL — SIGNIFICANT CHANGE UP (ref 0–0.9)
MONOCYTES # BLD AUTO: 0.71 K/UL — SIGNIFICANT CHANGE UP (ref 0–0.9)
MONOCYTES # BLD AUTO: 0.72 K/UL — SIGNIFICANT CHANGE UP (ref 0–0.9)
MONOCYTES # BLD AUTO: 0.72 K/UL — SIGNIFICANT CHANGE UP (ref 0–0.9)
MONOCYTES # BLD AUTO: 0.73 K/UL — SIGNIFICANT CHANGE UP (ref 0–0.9)
MONOCYTES # BLD AUTO: 0.76 K/UL — SIGNIFICANT CHANGE UP (ref 0–0.9)
MONOCYTES # BLD AUTO: 0.82 K/UL — SIGNIFICANT CHANGE UP (ref 0–0.9)
MONOCYTES # BLD AUTO: 0.88 K/UL — SIGNIFICANT CHANGE UP (ref 0–0.9)
MONOCYTES # BLD AUTO: 0.88 K/UL — SIGNIFICANT CHANGE UP (ref 0–0.9)
MONOCYTES # BLD AUTO: 0.91 K/UL — HIGH (ref 0–0.9)
MONOCYTES # BLD AUTO: 0.95 K/UL — HIGH (ref 0–0.9)
MONOCYTES # BLD AUTO: 0.99 K/UL — HIGH (ref 0–0.9)
MONOCYTES # BLD AUTO: 1.04 K/UL — HIGH (ref 0–0.9)
MONOCYTES # BLD AUTO: 1.08 K/UL — HIGH (ref 0–0.9)
MONOCYTES # BLD AUTO: 1.24 K/UL — HIGH (ref 0–0.9)
MONOCYTES NFR BLD AUTO: 2.9 % — SIGNIFICANT CHANGE UP (ref 2–14)
MONOCYTES NFR BLD AUTO: 3.6 % — SIGNIFICANT CHANGE UP (ref 2–14)
MONOCYTES NFR BLD AUTO: 3.6 % — SIGNIFICANT CHANGE UP (ref 2–14)
MONOCYTES NFR BLD AUTO: 3.8 % — SIGNIFICANT CHANGE UP (ref 2–14)
MONOCYTES NFR BLD AUTO: 3.9 % — SIGNIFICANT CHANGE UP (ref 2–14)
MONOCYTES NFR BLD AUTO: 4.2 % — SIGNIFICANT CHANGE UP (ref 2–14)
MONOCYTES NFR BLD AUTO: 4.3 % — SIGNIFICANT CHANGE UP (ref 2–14)
MONOCYTES NFR BLD AUTO: 4.3 % — SIGNIFICANT CHANGE UP (ref 2–14)
MONOCYTES NFR BLD AUTO: 4.6 % — SIGNIFICANT CHANGE UP (ref 2–14)
MONOCYTES NFR BLD AUTO: 4.6 % — SIGNIFICANT CHANGE UP (ref 2–14)
MONOCYTES NFR BLD AUTO: 4.8 % — SIGNIFICANT CHANGE UP (ref 2–14)
MONOCYTES NFR BLD AUTO: 5 % — SIGNIFICANT CHANGE UP (ref 2–14)
MONOCYTES NFR BLD AUTO: 5.1 % — SIGNIFICANT CHANGE UP (ref 2–14)
MONOCYTES NFR BLD AUTO: 5.2 % — SIGNIFICANT CHANGE UP (ref 2–14)
MONOCYTES NFR BLD AUTO: 5.2 % — SIGNIFICANT CHANGE UP (ref 2–14)
MONOCYTES NFR BLD AUTO: 5.3 % — SIGNIFICANT CHANGE UP (ref 2–14)
MONOCYTES NFR BLD AUTO: 5.7 % — SIGNIFICANT CHANGE UP (ref 2–14)
MONOCYTES NFR BLD AUTO: 5.9 % — SIGNIFICANT CHANGE UP (ref 2–14)
MONOCYTES NFR BLD AUTO: 5.9 % — SIGNIFICANT CHANGE UP (ref 2–14)
MONOCYTES NFR BLD AUTO: 6 % — SIGNIFICANT CHANGE UP (ref 2–14)
MONOCYTES NFR BLD AUTO: 6.3 % — SIGNIFICANT CHANGE UP (ref 2–14)
MONOCYTES NFR BLD AUTO: 6.4 % — SIGNIFICANT CHANGE UP (ref 2–14)
MONOCYTES NFR BLD AUTO: 6.6 % — SIGNIFICANT CHANGE UP (ref 2–14)
MONOCYTES NFR BLD AUTO: 6.8 % — SIGNIFICANT CHANGE UP (ref 2–14)
MONOCYTES NFR BLD AUTO: 7 % — SIGNIFICANT CHANGE UP (ref 2–14)
MONOCYTES NFR BLD AUTO: 7.1 % — SIGNIFICANT CHANGE UP (ref 2–14)
MONOCYTES NFR BLD AUTO: 7.7 % — SIGNIFICANT CHANGE UP (ref 2–14)
MONOCYTES NFR BLD AUTO: 7.9 % — SIGNIFICANT CHANGE UP (ref 2–14)
MONOCYTES NFR BLD AUTO: 8 % — SIGNIFICANT CHANGE UP (ref 2–14)
MONOCYTES NFR BLD AUTO: 8.1 % — SIGNIFICANT CHANGE UP (ref 2–14)
MONOCYTES NFR BLD AUTO: 8.1 % — SIGNIFICANT CHANGE UP (ref 2–14)
MONOCYTES NFR BLD AUTO: 8.2 % — SIGNIFICANT CHANGE UP (ref 2–14)
MONOCYTES NFR BLD AUTO: 8.2 % — SIGNIFICANT CHANGE UP (ref 2–14)
MONOCYTES NFR BLD AUTO: 8.3 % — SIGNIFICANT CHANGE UP (ref 2–14)
MONOCYTES NFR BLD AUTO: 8.5 % — SIGNIFICANT CHANGE UP (ref 2–14)
MONOCYTES NFR BLD AUTO: 9 % — SIGNIFICANT CHANGE UP (ref 2–14)
MONOCYTES NFR BLD AUTO: 9.1 % — SIGNIFICANT CHANGE UP (ref 2–14)
MRSA PCR RESULT.: SIGNIFICANT CHANGE UP
NEUTROPHILS # BLD AUTO: 10.08 K/UL — HIGH (ref 1.8–7.4)
NEUTROPHILS # BLD AUTO: 10.26 K/UL — HIGH (ref 1.8–7.4)
NEUTROPHILS # BLD AUTO: 10.97 K/UL — HIGH (ref 1.8–7.4)
NEUTROPHILS # BLD AUTO: 11.27 K/UL — HIGH (ref 1.8–7.4)
NEUTROPHILS # BLD AUTO: 11.4 K/UL — HIGH (ref 1.8–7.4)
NEUTROPHILS # BLD AUTO: 11.48 K/UL — HIGH (ref 1.8–7.4)
NEUTROPHILS # BLD AUTO: 11.9 K/UL — HIGH (ref 1.8–7.4)
NEUTROPHILS # BLD AUTO: 12.22 K/UL — HIGH (ref 1.8–7.4)
NEUTROPHILS # BLD AUTO: 13.49 K/UL — HIGH (ref 1.8–7.4)
NEUTROPHILS # BLD AUTO: 13.86 K/UL — HIGH (ref 1.8–7.4)
NEUTROPHILS # BLD AUTO: 13.92 K/UL — HIGH (ref 1.8–7.4)
NEUTROPHILS # BLD AUTO: 14.3 K/UL — HIGH (ref 1.8–7.4)
NEUTROPHILS # BLD AUTO: 14.49 K/UL — HIGH (ref 1.8–7.4)
NEUTROPHILS # BLD AUTO: 3.5 K/UL — SIGNIFICANT CHANGE UP (ref 1.8–7.4)
NEUTROPHILS # BLD AUTO: 5.59 K/UL — SIGNIFICANT CHANGE UP (ref 1.8–7.4)
NEUTROPHILS # BLD AUTO: 5.61 K/UL — SIGNIFICANT CHANGE UP (ref 1.8–7.4)
NEUTROPHILS # BLD AUTO: 5.68 K/UL — SIGNIFICANT CHANGE UP (ref 1.8–7.4)
NEUTROPHILS # BLD AUTO: 5.89 K/UL — SIGNIFICANT CHANGE UP (ref 1.8–7.4)
NEUTROPHILS # BLD AUTO: 5.89 K/UL — SIGNIFICANT CHANGE UP (ref 1.8–7.4)
NEUTROPHILS # BLD AUTO: 6.01 K/UL — SIGNIFICANT CHANGE UP (ref 1.8–7.4)
NEUTROPHILS # BLD AUTO: 6.4 K/UL — SIGNIFICANT CHANGE UP (ref 1.8–7.4)
NEUTROPHILS # BLD AUTO: 6.56 K/UL — SIGNIFICANT CHANGE UP (ref 1.8–7.4)
NEUTROPHILS # BLD AUTO: 6.86 K/UL — SIGNIFICANT CHANGE UP (ref 1.8–7.4)
NEUTROPHILS # BLD AUTO: 7.54 K/UL — HIGH (ref 1.8–7.4)
NEUTROPHILS # BLD AUTO: 7.61 K/UL — HIGH (ref 1.8–7.4)
NEUTROPHILS # BLD AUTO: 7.79 K/UL — HIGH (ref 1.8–7.4)
NEUTROPHILS # BLD AUTO: 7.98 K/UL — HIGH (ref 1.8–7.4)
NEUTROPHILS # BLD AUTO: 8.16 K/UL — HIGH (ref 1.8–7.4)
NEUTROPHILS # BLD AUTO: 8.45 K/UL — HIGH (ref 1.8–7.4)
NEUTROPHILS # BLD AUTO: 9.11 K/UL — HIGH (ref 1.8–7.4)
NEUTROPHILS # BLD AUTO: 9.25 K/UL — HIGH (ref 1.8–7.4)
NEUTROPHILS # BLD AUTO: 9.29 K/UL — HIGH (ref 1.8–7.4)
NEUTROPHILS # BLD AUTO: 9.54 K/UL — HIGH (ref 1.8–7.4)
NEUTROPHILS # BLD AUTO: 9.73 K/UL — HIGH (ref 1.8–7.4)
NEUTROPHILS # BLD AUTO: 9.78 K/UL — HIGH (ref 1.8–7.4)
NEUTROPHILS # BLD AUTO: 9.95 K/UL — HIGH (ref 1.8–7.4)
NEUTROPHILS # BLD AUTO: 9.98 K/UL — HIGH (ref 1.8–7.4)
NEUTROPHILS NFR BLD AUTO: 67.5 % — SIGNIFICANT CHANGE UP (ref 43–77)
NEUTROPHILS NFR BLD AUTO: 69.4 % — SIGNIFICANT CHANGE UP (ref 43–77)
NEUTROPHILS NFR BLD AUTO: 69.6 % — SIGNIFICANT CHANGE UP (ref 43–77)
NEUTROPHILS NFR BLD AUTO: 70.3 % — SIGNIFICANT CHANGE UP (ref 43–77)
NEUTROPHILS NFR BLD AUTO: 70.4 % — SIGNIFICANT CHANGE UP (ref 43–77)
NEUTROPHILS NFR BLD AUTO: 71.9 % — SIGNIFICANT CHANGE UP (ref 43–77)
NEUTROPHILS NFR BLD AUTO: 73.9 % — SIGNIFICANT CHANGE UP (ref 43–77)
NEUTROPHILS NFR BLD AUTO: 74.3 % — SIGNIFICANT CHANGE UP (ref 43–77)
NEUTROPHILS NFR BLD AUTO: 74.7 % — SIGNIFICANT CHANGE UP (ref 43–77)
NEUTROPHILS NFR BLD AUTO: 75.1 % — SIGNIFICANT CHANGE UP (ref 43–77)
NEUTROPHILS NFR BLD AUTO: 76.7 % — SIGNIFICANT CHANGE UP (ref 43–77)
NEUTROPHILS NFR BLD AUTO: 77.1 % — HIGH (ref 43–77)
NEUTROPHILS NFR BLD AUTO: 77.3 % — HIGH (ref 43–77)
NEUTROPHILS NFR BLD AUTO: 77.3 % — HIGH (ref 43–77)
NEUTROPHILS NFR BLD AUTO: 78.2 % — HIGH (ref 43–77)
NEUTROPHILS NFR BLD AUTO: 78.3 % — HIGH (ref 43–77)
NEUTROPHILS NFR BLD AUTO: 78.8 % — HIGH (ref 43–77)
NEUTROPHILS NFR BLD AUTO: 78.9 % — HIGH (ref 43–77)
NEUTROPHILS NFR BLD AUTO: 79.8 % — HIGH (ref 43–77)
NEUTROPHILS NFR BLD AUTO: 80.4 % — HIGH (ref 43–77)
NEUTROPHILS NFR BLD AUTO: 81 % — HIGH (ref 43–77)
NEUTROPHILS NFR BLD AUTO: 81.2 % — HIGH (ref 43–77)
NEUTROPHILS NFR BLD AUTO: 82.4 % — HIGH (ref 43–77)
NEUTROPHILS NFR BLD AUTO: 82.5 % — HIGH (ref 43–77)
NEUTROPHILS NFR BLD AUTO: 82.6 % — HIGH (ref 43–77)
NEUTROPHILS NFR BLD AUTO: 83.7 % — HIGH (ref 43–77)
NEUTROPHILS NFR BLD AUTO: 84 % — HIGH (ref 43–77)
NEUTROPHILS NFR BLD AUTO: 84.4 % — HIGH (ref 43–77)
NEUTROPHILS NFR BLD AUTO: 86 % — HIGH (ref 43–77)
NEUTROPHILS NFR BLD AUTO: 86.4 % — HIGH (ref 43–77)
NEUTROPHILS NFR BLD AUTO: 87.8 % — HIGH (ref 43–77)
NEUTROPHILS NFR BLD AUTO: 87.9 % — HIGH (ref 43–77)
NEUTROPHILS NFR BLD AUTO: 87.9 % — HIGH (ref 43–77)
NEUTROPHILS NFR BLD AUTO: 88.5 % — HIGH (ref 43–77)
NEUTROPHILS NFR BLD AUTO: 88.6 % — HIGH (ref 43–77)
NEUTROPHILS NFR BLD AUTO: 89.2 % — HIGH (ref 43–77)
NEUTROPHILS NFR BLD AUTO: 89.8 % — HIGH (ref 43–77)
NITRITE UR-MCNC: NEGATIVE — SIGNIFICANT CHANGE UP
NITRITE URINE: NEGATIVE
NON HDL CHOLESTEROL: 388 MG/DL — HIGH
NON HDL CHOLESTEROL: 92 MG/DL — SIGNIFICANT CHANGE UP
NRBC # BLD: 0 /100 WBCS — SIGNIFICANT CHANGE UP (ref 0–0)
NRBC # BLD: 1 /100 WBCS — HIGH (ref 0–0)
NRBC # BLD: 2 /100 WBCS — HIGH (ref 0–0)
NT-PROBNP SERPL-SCNC: 1763 PG/ML — HIGH (ref 0–300)
OB PNL STL: NEGATIVE — SIGNIFICANT CHANGE UP
ORGANISM # SPEC MICROSCOPIC CNT: SIGNIFICANT CHANGE UP
OSMOLALITY SERPL: 286 MOSMOL/KG
OSMOLALITY SERPL: 291 MOSMOL/KG
OSMOLALITY SERPL: 294 MOSMOL/KG
OSMOLALITY UR: 462 MOSM/KG
PCO2 BLDA: 33 MMHG — SIGNIFICANT CHANGE UP (ref 32–46)
PCO2 BLDA: 39 MMHG — SIGNIFICANT CHANGE UP (ref 32–46)
PCO2 BLDA: 41 MMHG — SIGNIFICANT CHANGE UP (ref 35–48)
PCO2 BLDV: 39 MMHG — SIGNIFICANT CHANGE UP (ref 35–50)
PCO2 BLDV: 58 MMHG — HIGH (ref 42–55)
PCO2 BLDV: 65 MMHG — HIGH (ref 42–55)
PCO2 BLDV: 66 MMHG — HIGH (ref 42–55)
PCO2 BLDV: 66 MMHG — HIGH (ref 42–55)
PCO2 BLDV: 68 MMHG — HIGH (ref 42–55)
PH BLDA: 7.38 — SIGNIFICANT CHANGE UP (ref 7.35–7.45)
PH BLDA: 7.4 — SIGNIFICANT CHANGE UP (ref 7.35–7.45)
PH BLDA: 7.43 — SIGNIFICANT CHANGE UP (ref 7.35–7.45)
PH BLDV: 7.27 — LOW (ref 7.32–7.43)
PH BLDV: 7.33 — SIGNIFICANT CHANGE UP (ref 7.32–7.43)
PH BLDV: 7.38 — SIGNIFICANT CHANGE UP (ref 7.32–7.43)
PH BLDV: 7.38 — SIGNIFICANT CHANGE UP (ref 7.32–7.43)
PH BLDV: 7.39 — SIGNIFICANT CHANGE UP (ref 7.32–7.43)
PH BLDV: 7.4 — SIGNIFICANT CHANGE UP (ref 7.35–7.45)
PH UR: 5.5 — SIGNIFICANT CHANGE UP (ref 5–8)
PH UR: 6 — SIGNIFICANT CHANGE UP (ref 5–8)
PH UR: 6 — SIGNIFICANT CHANGE UP (ref 5–8)
PH UR: 6.5 — SIGNIFICANT CHANGE UP (ref 5–8)
PH UR: 6.5 — SIGNIFICANT CHANGE UP (ref 5–8)
PH UR: 7 — SIGNIFICANT CHANGE UP (ref 5–8)
PH URINE: 7
PH URINE: 7
PH URINE: 7.5
PH URINE: 7.5
PHOSPHATE SERPL-MCNC: 1.8 MG/DL — LOW (ref 2.5–4.5)
PHOSPHATE SERPL-MCNC: 2.1 MG/DL — LOW (ref 2.5–4.5)
PHOSPHATE SERPL-MCNC: 2.2 MG/DL — LOW (ref 2.5–4.5)
PHOSPHATE SERPL-MCNC: 2.3 MG/DL — LOW (ref 2.5–4.5)
PHOSPHATE SERPL-MCNC: 2.5 MG/DL — SIGNIFICANT CHANGE UP (ref 2.5–4.5)
PHOSPHATE SERPL-MCNC: 2.5 MG/DL — SIGNIFICANT CHANGE UP (ref 2.5–4.5)
PHOSPHATE SERPL-MCNC: 2.6 MG/DL — SIGNIFICANT CHANGE UP (ref 2.5–4.5)
PHOSPHATE SERPL-MCNC: 2.6 MG/DL — SIGNIFICANT CHANGE UP (ref 2.5–4.5)
PHOSPHATE SERPL-MCNC: 2.7 MG/DL — SIGNIFICANT CHANGE UP (ref 2.5–4.5)
PHOSPHATE SERPL-MCNC: 2.9 MG/DL — SIGNIFICANT CHANGE UP (ref 2.5–4.5)
PHOSPHATE SERPL-MCNC: 2.9 MG/DL — SIGNIFICANT CHANGE UP (ref 2.5–4.5)
PHOSPHATE SERPL-MCNC: 3 MG/DL — SIGNIFICANT CHANGE UP (ref 2.5–4.5)
PHOSPHATE SERPL-MCNC: 3.1 MG/DL — SIGNIFICANT CHANGE UP (ref 2.5–4.5)
PHOSPHATE SERPL-MCNC: 3.1 MG/DL — SIGNIFICANT CHANGE UP (ref 2.5–4.5)
PHOSPHATE SERPL-MCNC: 3.2 MG/DL — SIGNIFICANT CHANGE UP (ref 2.5–4.5)
PHOSPHATE SERPL-MCNC: 3.3 MG/DL — SIGNIFICANT CHANGE UP (ref 2.5–4.5)
PHOSPHATE SERPL-MCNC: 3.4 MG/DL — SIGNIFICANT CHANGE UP (ref 2.5–4.5)
PHOSPHATE SERPL-MCNC: 3.5 MG/DL — SIGNIFICANT CHANGE UP (ref 2.5–4.5)
PHOSPHATE SERPL-MCNC: 3.6 MG/DL — SIGNIFICANT CHANGE UP (ref 2.5–4.5)
PHOSPHATE SERPL-MCNC: 3.8 MG/DL — SIGNIFICANT CHANGE UP (ref 2.5–4.5)
PHOSPHATE SERPL-MCNC: 3.9 MG/DL — SIGNIFICANT CHANGE UP (ref 2.5–4.5)
PHOSPHATE SERPL-MCNC: 4 MG/DL — SIGNIFICANT CHANGE UP (ref 2.5–4.5)
PHOSPHATE SERPL-MCNC: 4.1 MG/DL — SIGNIFICANT CHANGE UP (ref 2.5–4.5)
PHOSPHATE SERPL-MCNC: 4.2 MG/DL — SIGNIFICANT CHANGE UP (ref 2.5–4.5)
PHOSPHATE SERPL-MCNC: 4.3 MG/DL — SIGNIFICANT CHANGE UP (ref 2.5–4.5)
PHOSPHATE SERPL-MCNC: 4.4 MG/DL — SIGNIFICANT CHANGE UP (ref 2.5–4.5)
PHOSPHATE SERPL-MCNC: 4.4 MG/DL — SIGNIFICANT CHANGE UP (ref 2.5–4.5)
PHOSPHATE SERPL-MCNC: 4.5 MG/DL — SIGNIFICANT CHANGE UP (ref 2.5–4.5)
PHOSPHATE SERPL-MCNC: 4.6 MG/DL — HIGH (ref 2.5–4.5)
PHOSPHATE SERPL-MCNC: 4.6 MG/DL — HIGH (ref 2.5–4.5)
PHOSPHATE SERPL-MCNC: 4.7 MG/DL — HIGH (ref 2.5–4.5)
PHOSPHATE SERPL-MCNC: 4.7 MG/DL — HIGH (ref 2.5–4.5)
PHOSPHATE SERPL-MCNC: 4.8 MG/DL — HIGH (ref 2.5–4.5)
PHOSPHATE SERPL-MCNC: 4.8 MG/DL — HIGH (ref 2.5–4.5)
PHOSPHATE SERPL-MCNC: 7.3 MG/DL — HIGH (ref 2.5–4.5)
PLATELET # BLD AUTO: 153 K/UL — SIGNIFICANT CHANGE UP (ref 150–400)
PLATELET # BLD AUTO: 158 K/UL — SIGNIFICANT CHANGE UP (ref 150–400)
PLATELET # BLD AUTO: 162 K/UL — SIGNIFICANT CHANGE UP (ref 150–400)
PLATELET # BLD AUTO: 165 K/UL — SIGNIFICANT CHANGE UP (ref 150–400)
PLATELET # BLD AUTO: 179 K/UL — SIGNIFICANT CHANGE UP (ref 150–400)
PLATELET # BLD AUTO: 180 K/UL — SIGNIFICANT CHANGE UP (ref 150–400)
PLATELET # BLD AUTO: 181 K/UL — SIGNIFICANT CHANGE UP (ref 150–400)
PLATELET # BLD AUTO: 181 K/UL — SIGNIFICANT CHANGE UP (ref 150–400)
PLATELET # BLD AUTO: 185 K/UL — SIGNIFICANT CHANGE UP (ref 150–400)
PLATELET # BLD AUTO: 186 K/UL — SIGNIFICANT CHANGE UP (ref 150–400)
PLATELET # BLD AUTO: 194 K/UL — SIGNIFICANT CHANGE UP (ref 150–400)
PLATELET # BLD AUTO: 197 K/UL — SIGNIFICANT CHANGE UP (ref 150–400)
PLATELET # BLD AUTO: 200 K/UL — SIGNIFICANT CHANGE UP (ref 150–400)
PLATELET # BLD AUTO: 209 K/UL — SIGNIFICANT CHANGE UP (ref 150–400)
PLATELET # BLD AUTO: 211 K/UL — SIGNIFICANT CHANGE UP (ref 150–400)
PLATELET # BLD AUTO: 214 K/UL — SIGNIFICANT CHANGE UP (ref 150–400)
PLATELET # BLD AUTO: 214 K/UL — SIGNIFICANT CHANGE UP (ref 150–400)
PLATELET # BLD AUTO: 217 K/UL — SIGNIFICANT CHANGE UP (ref 150–400)
PLATELET # BLD AUTO: 219 K/UL — SIGNIFICANT CHANGE UP (ref 150–400)
PLATELET # BLD AUTO: 220 K/UL — SIGNIFICANT CHANGE UP (ref 150–400)
PLATELET # BLD AUTO: 223 K/UL — SIGNIFICANT CHANGE UP (ref 150–400)
PLATELET # BLD AUTO: 228 K/UL — SIGNIFICANT CHANGE UP (ref 150–400)
PLATELET # BLD AUTO: 232 K/UL — SIGNIFICANT CHANGE UP (ref 150–400)
PLATELET # BLD AUTO: 235 K/UL — SIGNIFICANT CHANGE UP (ref 150–400)
PLATELET # BLD AUTO: 235 K/UL — SIGNIFICANT CHANGE UP (ref 150–400)
PLATELET # BLD AUTO: 238 K/UL — SIGNIFICANT CHANGE UP (ref 150–400)
PLATELET # BLD AUTO: 239 K/UL — SIGNIFICANT CHANGE UP (ref 150–400)
PLATELET # BLD AUTO: 249 K/UL — SIGNIFICANT CHANGE UP (ref 150–400)
PLATELET # BLD AUTO: 251 K/UL — SIGNIFICANT CHANGE UP (ref 150–400)
PLATELET # BLD AUTO: 259 K/UL — SIGNIFICANT CHANGE UP (ref 150–400)
PLATELET # BLD AUTO: 263 K/UL — SIGNIFICANT CHANGE UP (ref 150–400)
PLATELET # BLD AUTO: 267 K/UL — SIGNIFICANT CHANGE UP (ref 150–400)
PLATELET # BLD AUTO: 269 K/UL — SIGNIFICANT CHANGE UP (ref 150–400)
PLATELET # BLD AUTO: 272 K/UL — SIGNIFICANT CHANGE UP (ref 150–400)
PLATELET # BLD AUTO: 273 K/UL — SIGNIFICANT CHANGE UP (ref 150–400)
PLATELET # BLD AUTO: 278 K/UL — SIGNIFICANT CHANGE UP (ref 150–400)
PLATELET # BLD AUTO: 279 K/UL — SIGNIFICANT CHANGE UP (ref 150–400)
PLATELET # BLD AUTO: 280 K/UL — SIGNIFICANT CHANGE UP (ref 150–400)
PLATELET # BLD AUTO: 281 K/UL — SIGNIFICANT CHANGE UP (ref 150–400)
PLATELET # BLD AUTO: 291 K/UL — SIGNIFICANT CHANGE UP (ref 150–400)
PLATELET # BLD AUTO: 291 K/UL — SIGNIFICANT CHANGE UP (ref 150–400)
PLATELET # BLD AUTO: 293 K/UL — SIGNIFICANT CHANGE UP (ref 150–400)
PLATELET # BLD AUTO: 293 K/UL — SIGNIFICANT CHANGE UP (ref 150–400)
PLATELET # BLD AUTO: 294 K/UL — SIGNIFICANT CHANGE UP (ref 150–400)
PLATELET # BLD AUTO: 294 K/UL — SIGNIFICANT CHANGE UP (ref 150–400)
PLATELET # BLD AUTO: 295 K/UL — SIGNIFICANT CHANGE UP (ref 150–400)
PLATELET # BLD AUTO: 297 K/UL — SIGNIFICANT CHANGE UP (ref 150–400)
PLATELET # BLD AUTO: 297 K/UL — SIGNIFICANT CHANGE UP (ref 150–400)
PLATELET # BLD AUTO: 298 K/UL — SIGNIFICANT CHANGE UP (ref 150–400)
PLATELET # BLD AUTO: 299 K/UL — SIGNIFICANT CHANGE UP (ref 150–400)
PLATELET # BLD AUTO: 301 K/UL — SIGNIFICANT CHANGE UP (ref 150–400)
PLATELET # BLD AUTO: 303 K/UL — SIGNIFICANT CHANGE UP (ref 150–400)
PLATELET # BLD AUTO: 312 K/UL — SIGNIFICANT CHANGE UP (ref 150–400)
PLATELET # BLD AUTO: 315 K/UL — SIGNIFICANT CHANGE UP (ref 150–400)
PLATELET # BLD AUTO: 316 K/UL — SIGNIFICANT CHANGE UP (ref 150–400)
PLATELET # BLD AUTO: 317 K/UL — SIGNIFICANT CHANGE UP (ref 150–400)
PLATELET # BLD AUTO: 325 K/UL — SIGNIFICANT CHANGE UP (ref 150–400)
PLATELET # BLD AUTO: 325 K/UL — SIGNIFICANT CHANGE UP (ref 150–400)
PLATELET # BLD AUTO: 328 K/UL — SIGNIFICANT CHANGE UP (ref 150–400)
PLATELET # BLD AUTO: 329 K/UL — SIGNIFICANT CHANGE UP (ref 150–400)
PLATELET # BLD AUTO: 336 K/UL — SIGNIFICANT CHANGE UP (ref 150–400)
PLATELET # BLD AUTO: 337 K/UL — SIGNIFICANT CHANGE UP (ref 150–400)
PLATELET # BLD AUTO: 338 K/UL — SIGNIFICANT CHANGE UP (ref 150–400)
PLATELET # BLD AUTO: 341 K/UL — SIGNIFICANT CHANGE UP (ref 150–400)
PLATELET # BLD AUTO: 380 K/UL — SIGNIFICANT CHANGE UP (ref 150–400)
PLATELET # BLD AUTO: 394 K/UL — SIGNIFICANT CHANGE UP (ref 150–400)
PLATELET # BLD AUTO: 394 K/UL — SIGNIFICANT CHANGE UP (ref 150–400)
PLATELET # BLD AUTO: 415 K/UL — HIGH (ref 150–400)
PLATELET # BLD AUTO: 418 K/UL — HIGH (ref 150–400)
PLATELET # BLD AUTO: 458 K/UL — HIGH (ref 150–400)
PLATELET # BLD AUTO: 497 K/UL — HIGH (ref 150–400)
PLATELET # BLD AUTO: 548 K/UL — HIGH (ref 150–400)
PLATELET # BLD AUTO: 556 K/UL — HIGH (ref 150–400)
PLATELET # BLD AUTO: 620 K/UL — HIGH (ref 150–400)
PO2 BLDA: 125 MMHG — HIGH (ref 83–108)
PO2 BLDA: 152 MMHG — HIGH (ref 74–108)
PO2 BLDA: 62 MMHG — LOW (ref 74–108)
PO2 BLDV: 36 MMHG — SIGNIFICANT CHANGE UP (ref 25–45)
PO2 BLDV: 41 MMHG — SIGNIFICANT CHANGE UP (ref 25–45)
PO2 BLDV: 43 MMHG — SIGNIFICANT CHANGE UP (ref 25–45)
PO2 BLDV: 46 MMHG — HIGH (ref 25–45)
PO2 BLDV: 48 MMHG — HIGH (ref 25–45)
PO2 BLDV: 51 MMHG — HIGH (ref 25–45)
POTASSIUM BLDV-SCNC: 4.4 MMOL/L — SIGNIFICANT CHANGE UP (ref 3.5–5.3)
POTASSIUM BLDV-SCNC: 4.6 MMOL/L — SIGNIFICANT CHANGE UP (ref 3.5–5.1)
POTASSIUM BLDV-SCNC: 5.2 MMOL/L — HIGH (ref 3.5–5.1)
POTASSIUM SERPL-MCNC: 3 MMOL/L — LOW (ref 3.5–5.3)
POTASSIUM SERPL-MCNC: 3.4 MMOL/L — LOW (ref 3.5–5.3)
POTASSIUM SERPL-MCNC: 3.5 MMOL/L — SIGNIFICANT CHANGE UP (ref 3.5–5.3)
POTASSIUM SERPL-MCNC: 3.6 MMOL/L — SIGNIFICANT CHANGE UP (ref 3.5–5.3)
POTASSIUM SERPL-MCNC: 3.7 MMOL/L — SIGNIFICANT CHANGE UP (ref 3.5–5.3)
POTASSIUM SERPL-MCNC: 3.7 MMOL/L — SIGNIFICANT CHANGE UP (ref 3.5–5.3)
POTASSIUM SERPL-MCNC: 3.8 MMOL/L — SIGNIFICANT CHANGE UP (ref 3.5–5.3)
POTASSIUM SERPL-MCNC: 3.9 MMOL/L — SIGNIFICANT CHANGE UP (ref 3.5–5.3)
POTASSIUM SERPL-MCNC: 3.9 MMOL/L — SIGNIFICANT CHANGE UP (ref 3.5–5.3)
POTASSIUM SERPL-MCNC: 4 MMOL/L — SIGNIFICANT CHANGE UP (ref 3.5–5.3)
POTASSIUM SERPL-MCNC: 4.1 MMOL/L — SIGNIFICANT CHANGE UP (ref 3.5–5.3)
POTASSIUM SERPL-MCNC: 4.2 MMOL/L — SIGNIFICANT CHANGE UP (ref 3.5–5.3)
POTASSIUM SERPL-MCNC: 4.3 MMOL/L — SIGNIFICANT CHANGE UP (ref 3.5–5.3)
POTASSIUM SERPL-MCNC: 4.4 MMOL/L — SIGNIFICANT CHANGE UP (ref 3.5–5.3)
POTASSIUM SERPL-MCNC: 4.5 MMOL/L — SIGNIFICANT CHANGE UP (ref 3.5–5.3)
POTASSIUM SERPL-MCNC: 4.7 MMOL/L — SIGNIFICANT CHANGE UP (ref 3.5–5.3)
POTASSIUM SERPL-MCNC: 4.7 MMOL/L — SIGNIFICANT CHANGE UP (ref 3.5–5.3)
POTASSIUM SERPL-MCNC: 4.9 MMOL/L — SIGNIFICANT CHANGE UP (ref 3.5–5.3)
POTASSIUM SERPL-MCNC: 4.9 MMOL/L — SIGNIFICANT CHANGE UP (ref 3.5–5.3)
POTASSIUM SERPL-MCNC: 5 MMOL/L — SIGNIFICANT CHANGE UP (ref 3.5–5.3)
POTASSIUM SERPL-MCNC: 5.1 MMOL/L — SIGNIFICANT CHANGE UP (ref 3.5–5.3)
POTASSIUM SERPL-MCNC: 5.1 MMOL/L — SIGNIFICANT CHANGE UP (ref 3.5–5.3)
POTASSIUM SERPL-MCNC: 5.4 MMOL/L — HIGH (ref 3.5–5.3)
POTASSIUM SERPL-SCNC: 3 MMOL/L — LOW (ref 3.5–5.3)
POTASSIUM SERPL-SCNC: 3.4 MMOL/L — LOW (ref 3.5–5.3)
POTASSIUM SERPL-SCNC: 3.5 MMOL/L — SIGNIFICANT CHANGE UP (ref 3.5–5.3)
POTASSIUM SERPL-SCNC: 3.6 MMOL/L — SIGNIFICANT CHANGE UP (ref 3.5–5.3)
POTASSIUM SERPL-SCNC: 3.7 MMOL/L — SIGNIFICANT CHANGE UP (ref 3.5–5.3)
POTASSIUM SERPL-SCNC: 3.7 MMOL/L — SIGNIFICANT CHANGE UP (ref 3.5–5.3)
POTASSIUM SERPL-SCNC: 3.8 MMOL/L — SIGNIFICANT CHANGE UP (ref 3.5–5.3)
POTASSIUM SERPL-SCNC: 3.9 MMOL/L — SIGNIFICANT CHANGE UP (ref 3.5–5.3)
POTASSIUM SERPL-SCNC: 3.9 MMOL/L — SIGNIFICANT CHANGE UP (ref 3.5–5.3)
POTASSIUM SERPL-SCNC: 4 MMOL/L — SIGNIFICANT CHANGE UP (ref 3.5–5.3)
POTASSIUM SERPL-SCNC: 4.1 MMOL/L — SIGNIFICANT CHANGE UP (ref 3.5–5.3)
POTASSIUM SERPL-SCNC: 4.2 MMOL/L — SIGNIFICANT CHANGE UP (ref 3.5–5.3)
POTASSIUM SERPL-SCNC: 4.3 MMOL/L
POTASSIUM SERPL-SCNC: 4.3 MMOL/L — SIGNIFICANT CHANGE UP (ref 3.5–5.3)
POTASSIUM SERPL-SCNC: 4.4 MMOL/L
POTASSIUM SERPL-SCNC: 4.4 MMOL/L — SIGNIFICANT CHANGE UP (ref 3.5–5.3)
POTASSIUM SERPL-SCNC: 4.5 MMOL/L
POTASSIUM SERPL-SCNC: 4.5 MMOL/L — SIGNIFICANT CHANGE UP (ref 3.5–5.3)
POTASSIUM SERPL-SCNC: 4.7 MMOL/L — SIGNIFICANT CHANGE UP (ref 3.5–5.3)
POTASSIUM SERPL-SCNC: 4.7 MMOL/L — SIGNIFICANT CHANGE UP (ref 3.5–5.3)
POTASSIUM SERPL-SCNC: 4.9 MMOL/L — SIGNIFICANT CHANGE UP (ref 3.5–5.3)
POTASSIUM SERPL-SCNC: 4.9 MMOL/L — SIGNIFICANT CHANGE UP (ref 3.5–5.3)
POTASSIUM SERPL-SCNC: 5 MMOL/L — SIGNIFICANT CHANGE UP (ref 3.5–5.3)
POTASSIUM SERPL-SCNC: 5.1 MMOL/L — SIGNIFICANT CHANGE UP (ref 3.5–5.3)
POTASSIUM SERPL-SCNC: 5.1 MMOL/L — SIGNIFICANT CHANGE UP (ref 3.5–5.3)
POTASSIUM SERPL-SCNC: 5.4 MMOL/L — HIGH (ref 3.5–5.3)
PROCALCITONIN SERPL-MCNC: 0.05 NG/ML — SIGNIFICANT CHANGE UP (ref 0.02–0.1)
PROCALCITONIN SERPL-MCNC: 0.07 NG/ML — SIGNIFICANT CHANGE UP (ref 0.02–0.1)
PROCALCITONIN SERPL-MCNC: 0.08 NG/ML — SIGNIFICANT CHANGE UP (ref 0.02–0.1)
PROCALCITONIN SERPL-MCNC: 0.14 NG/ML — HIGH (ref 0.02–0.1)
PROCALCITONIN SERPL-MCNC: 0.18 NG/ML — HIGH (ref 0.02–0.1)
PROCALCITONIN SERPL-MCNC: 0.19 NG/ML — HIGH (ref 0.02–0.1)
PROCALCITONIN SERPL-MCNC: 0.2 NG/ML — HIGH (ref 0.02–0.1)
PROCALCITONIN SERPL-MCNC: 0.26 NG/ML — HIGH (ref 0.02–0.1)
PROCALCITONIN SERPL-MCNC: 0.28 NG/ML — HIGH (ref 0.02–0.1)
PROCALCITONIN SERPL-MCNC: 0.31 NG/ML — HIGH (ref 0.02–0.1)
PROCALCITONIN SERPL-MCNC: 0.32 NG/ML — HIGH (ref 0.02–0.1)
PROCALCITONIN SERPL-MCNC: 0.36 NG/ML — HIGH (ref 0.02–0.1)
PROCALCITONIN SERPL-MCNC: 0.5 NG/ML — HIGH (ref 0.02–0.1)
PROCALCITONIN SERPL-MCNC: 1 NG/ML — HIGH (ref 0.02–0.1)
PROLACTIN SERPL-MCNC: 29.7 NG/ML — HIGH (ref 4.1–18.4)
PROT SERPL-MCNC: 4.9 G/DL — LOW (ref 6–8.3)
PROT SERPL-MCNC: 4.9 G/DL — LOW (ref 6–8.3)
PROT SERPL-MCNC: 5 G/DL — LOW (ref 6–8.3)
PROT SERPL-MCNC: 5.2 G/DL — LOW (ref 6–8.3)
PROT SERPL-MCNC: 5.2 G/DL — LOW (ref 6–8.3)
PROT SERPL-MCNC: 5.4 G/DL — LOW (ref 6–8.3)
PROT SERPL-MCNC: 5.5 G/DL — LOW (ref 6–8.3)
PROT SERPL-MCNC: 5.6 G/DL — LOW (ref 6–8.3)
PROT SERPL-MCNC: 5.7 G/DL — LOW (ref 6–8.3)
PROT SERPL-MCNC: 5.7 G/DL — LOW (ref 6–8.3)
PROT SERPL-MCNC: 5.8 G/DL — LOW (ref 6–8.3)
PROT SERPL-MCNC: 5.9 G/DL — LOW (ref 6–8.3)
PROT SERPL-MCNC: 6 G/DL — SIGNIFICANT CHANGE UP (ref 6–8.3)
PROT SERPL-MCNC: 6.1 G/DL — SIGNIFICANT CHANGE UP (ref 6–8.3)
PROT SERPL-MCNC: 6.1 G/DL — SIGNIFICANT CHANGE UP (ref 6–8.3)
PROT SERPL-MCNC: 6.2 G/DL — SIGNIFICANT CHANGE UP (ref 6–8.3)
PROT SERPL-MCNC: 6.3 G/DL — SIGNIFICANT CHANGE UP (ref 6–8.3)
PROT SERPL-MCNC: 6.4 G/DL — SIGNIFICANT CHANGE UP (ref 6–8.3)
PROT SERPL-MCNC: 6.5 G/DL — SIGNIFICANT CHANGE UP (ref 6–8.3)
PROT SERPL-MCNC: 6.6 G/DL — SIGNIFICANT CHANGE UP (ref 6–8.3)
PROT SERPL-MCNC: 6.7 G/DL — SIGNIFICANT CHANGE UP (ref 6–8.3)
PROT SERPL-MCNC: 6.8 G/DL — SIGNIFICANT CHANGE UP (ref 6–8.3)
PROT SERPL-MCNC: 6.9 G/DL — SIGNIFICANT CHANGE UP (ref 6–8.3)
PROT SERPL-MCNC: 7 G/DL — SIGNIFICANT CHANGE UP (ref 6–8.3)
PROT SERPL-MCNC: 7 G/DL — SIGNIFICANT CHANGE UP (ref 6–8.3)
PROT SERPL-MCNC: 7.6 G/DL — SIGNIFICANT CHANGE UP (ref 6–8.3)
PROT UR-MCNC: ABNORMAL
PROTEIN URINE: NEGATIVE
PROTEIN URINE: NORMAL
PROTHROM AB SERPL-ACNC: 11.7 SEC — SIGNIFICANT CHANGE UP (ref 10.6–13.6)
PROTHROM AB SERPL-ACNC: 11.8 SEC — SIGNIFICANT CHANGE UP (ref 10.6–13.6)
PROTHROM AB SERPL-ACNC: 11.9 SEC — SIGNIFICANT CHANGE UP (ref 10.6–13.6)
PROTHROM AB SERPL-ACNC: 12.1 SEC — SIGNIFICANT CHANGE UP (ref 10.6–13.6)
PROTHROM AB SERPL-ACNC: 12.1 SEC — SIGNIFICANT CHANGE UP (ref 10.6–13.6)
PROTHROM AB SERPL-ACNC: 12.2 SEC — SIGNIFICANT CHANGE UP (ref 10.6–13.6)
PROTHROM AB SERPL-ACNC: 12.4 SEC — SIGNIFICANT CHANGE UP (ref 10.6–13.6)
PROTHROM AB SERPL-ACNC: 12.5 SEC — SIGNIFICANT CHANGE UP (ref 10.6–13.6)
PROTHROM AB SERPL-ACNC: 12.6 SEC — SIGNIFICANT CHANGE UP (ref 10.6–13.6)
PROTHROM AB SERPL-ACNC: 12.7 SEC — SIGNIFICANT CHANGE UP (ref 10.6–13.6)
PROTHROM AB SERPL-ACNC: 12.8 SEC — SIGNIFICANT CHANGE UP (ref 10.6–13.6)
PROTHROM AB SERPL-ACNC: 12.9 SEC — SIGNIFICANT CHANGE UP (ref 10.6–13.6)
PROTHROM AB SERPL-ACNC: 13 SEC — SIGNIFICANT CHANGE UP (ref 10.6–13.6)
PROTHROM AB SERPL-ACNC: 13.1 SEC — SIGNIFICANT CHANGE UP (ref 10.6–13.6)
PROTHROM AB SERPL-ACNC: 13.3 SEC — SIGNIFICANT CHANGE UP (ref 10.6–13.6)
PROTHROM AB SERPL-ACNC: 13.4 SEC — SIGNIFICANT CHANGE UP (ref 10.6–13.6)
PROTHROM AB SERPL-ACNC: 13.5 SEC — SIGNIFICANT CHANGE UP (ref 10.6–13.6)
PROTHROM AB SERPL-ACNC: 13.8 SEC — HIGH (ref 10.6–13.6)
PROTHROM AB SERPL-ACNC: 14 SEC — HIGH (ref 10.6–13.6)
PROTHROM AB SERPL-ACNC: 14.4 SEC — HIGH (ref 10.6–13.6)
PROTHROM AB SERPL-ACNC: 14.5 SEC — HIGH (ref 10.6–13.6)
PSA SERPL-MCNC: 0.18 NG/ML
RBC # BLD: 1.96 M/UL — LOW (ref 4.2–5.8)
RBC # BLD: 2.13 M/UL — LOW (ref 4.2–5.8)
RBC # BLD: 2.19 M/UL — LOW (ref 4.2–5.8)
RBC # BLD: 2.21 M/UL — LOW (ref 4.2–5.8)
RBC # BLD: 2.22 M/UL — LOW (ref 4.2–5.8)
RBC # BLD: 2.22 M/UL — LOW (ref 4.2–5.8)
RBC # BLD: 2.26 M/UL — LOW (ref 4.2–5.8)
RBC # BLD: 2.27 M/UL — LOW (ref 4.2–5.8)
RBC # BLD: 2.27 M/UL — LOW (ref 4.2–5.8)
RBC # BLD: 2.29 M/UL — LOW (ref 4.2–5.8)
RBC # BLD: 2.3 M/UL — LOW (ref 4.2–5.8)
RBC # BLD: 2.36 M/UL — LOW (ref 4.2–5.8)
RBC # BLD: 2.37 M/UL — LOW (ref 4.2–5.8)
RBC # BLD: 2.39 M/UL — LOW (ref 4.2–5.8)
RBC # BLD: 2.39 M/UL — LOW (ref 4.2–5.8)
RBC # BLD: 2.4 M/UL — LOW (ref 4.2–5.8)
RBC # BLD: 2.41 M/UL — LOW (ref 4.2–5.8)
RBC # BLD: 2.46 M/UL — LOW (ref 4.2–5.8)
RBC # BLD: 2.47 M/UL — LOW (ref 4.2–5.8)
RBC # BLD: 2.51 M/UL — LOW (ref 4.2–5.8)
RBC # BLD: 2.52 M/UL — LOW (ref 4.2–5.8)
RBC # BLD: 2.53 M/UL — LOW (ref 4.2–5.8)
RBC # BLD: 2.54 M/UL — LOW (ref 4.2–5.8)
RBC # BLD: 2.55 M/UL — LOW (ref 4.2–5.8)
RBC # BLD: 2.57 M/UL — LOW (ref 4.2–5.8)
RBC # BLD: 2.57 M/UL — LOW (ref 4.2–5.8)
RBC # BLD: 2.58 M/UL — LOW (ref 4.2–5.8)
RBC # BLD: 2.59 M/UL — LOW (ref 4.2–5.8)
RBC # BLD: 2.6 M/UL — LOW (ref 4.2–5.8)
RBC # BLD: 2.63 M/UL — LOW (ref 4.2–5.8)
RBC # BLD: 2.64 M/UL — LOW (ref 4.2–5.8)
RBC # BLD: 2.64 M/UL — LOW (ref 4.2–5.8)
RBC # BLD: 2.65 M/UL — LOW (ref 4.2–5.8)
RBC # BLD: 2.66 M/UL — LOW (ref 4.2–5.8)
RBC # BLD: 2.66 M/UL — LOW (ref 4.2–5.8)
RBC # BLD: 2.67 M/UL — LOW (ref 4.2–5.8)
RBC # BLD: 2.68 M/UL — LOW (ref 4.2–5.8)
RBC # BLD: 2.69 M/UL — LOW (ref 4.2–5.8)
RBC # BLD: 2.69 M/UL — LOW (ref 4.2–5.8)
RBC # BLD: 2.7 M/UL — LOW (ref 4.2–5.8)
RBC # BLD: 2.7 M/UL — LOW (ref 4.2–5.8)
RBC # BLD: 2.71 M/UL — LOW (ref 4.2–5.8)
RBC # BLD: 2.75 M/UL — LOW (ref 4.2–5.8)
RBC # BLD: 2.8 M/UL — LOW (ref 4.2–5.8)
RBC # BLD: 2.82 M/UL — LOW (ref 4.2–5.8)
RBC # BLD: 2.91 M/UL — LOW (ref 4.2–5.8)
RBC # BLD: 3.01 M/UL — LOW (ref 4.2–5.8)
RBC # BLD: 3.2 M/UL — LOW (ref 4.2–5.8)
RBC # BLD: 3.33 M/UL — LOW (ref 4.2–5.8)
RBC # BLD: 3.51 M/UL — LOW (ref 4.2–5.8)
RBC # BLD: 3.63 M/UL — LOW (ref 4.2–5.8)
RBC # BLD: 3.96 M/UL — LOW (ref 4.2–5.8)
RBC # BLD: 4.04 M/UL — LOW (ref 4.2–5.8)
RBC # BLD: 4.31 M/UL — SIGNIFICANT CHANGE UP (ref 4.2–5.8)
RBC # BLD: 4.34 M/UL — SIGNIFICANT CHANGE UP (ref 4.2–5.8)
RBC # BLD: 4.46 M/UL — SIGNIFICANT CHANGE UP (ref 4.2–5.8)
RBC # BLD: 4.49 M/UL — SIGNIFICANT CHANGE UP (ref 4.2–5.8)
RBC # BLD: 4.52 M/UL — SIGNIFICANT CHANGE UP (ref 4.2–5.8)
RBC # BLD: 4.53 M/UL — SIGNIFICANT CHANGE UP (ref 4.2–5.8)
RBC # BLD: 4.54 M/UL — SIGNIFICANT CHANGE UP (ref 4.2–5.8)
RBC # BLD: 4.56 M/UL — SIGNIFICANT CHANGE UP (ref 4.2–5.8)
RBC # BLD: 4.68 M/UL — SIGNIFICANT CHANGE UP (ref 4.2–5.8)
RBC # BLD: 4.69 M/UL — SIGNIFICANT CHANGE UP (ref 4.2–5.8)
RBC # BLD: 4.72 M/UL — SIGNIFICANT CHANGE UP (ref 4.2–5.8)
RBC # BLD: 4.74 M/UL — SIGNIFICANT CHANGE UP (ref 4.2–5.8)
RBC # BLD: 4.84 M/UL — SIGNIFICANT CHANGE UP (ref 4.2–5.8)
RBC # BLD: 5.62 M/UL — SIGNIFICANT CHANGE UP (ref 4.2–5.8)
RBC # FLD: 13.5 % — SIGNIFICANT CHANGE UP (ref 10.3–14.5)
RBC # FLD: 13.6 % — SIGNIFICANT CHANGE UP (ref 10.3–14.5)
RBC # FLD: 13.7 % — SIGNIFICANT CHANGE UP (ref 10.3–14.5)
RBC # FLD: 13.8 % — SIGNIFICANT CHANGE UP (ref 10.3–14.5)
RBC # FLD: 13.9 % — SIGNIFICANT CHANGE UP (ref 10.3–14.5)
RBC # FLD: 13.9 % — SIGNIFICANT CHANGE UP (ref 10.3–14.5)
RBC # FLD: 14 % — SIGNIFICANT CHANGE UP (ref 10.3–14.5)
RBC # FLD: 14 % — SIGNIFICANT CHANGE UP (ref 10.3–14.5)
RBC # FLD: 14.3 % — SIGNIFICANT CHANGE UP (ref 10.3–14.5)
RBC # FLD: 14.3 % — SIGNIFICANT CHANGE UP (ref 10.3–14.5)
RBC # FLD: 14.4 % — SIGNIFICANT CHANGE UP (ref 10.3–14.5)
RBC # FLD: 14.6 % — HIGH (ref 10.3–14.5)
RBC # FLD: 15 % — HIGH (ref 10.3–14.5)
RBC # FLD: 15.2 % — HIGH (ref 10.3–14.5)
RBC # FLD: 15.6 % — HIGH (ref 10.3–14.5)
RBC # FLD: 16.2 % — HIGH (ref 10.3–14.5)
RBC # FLD: 16.3 % — HIGH (ref 10.3–14.5)
RBC # FLD: 16.3 % — HIGH (ref 10.3–14.5)
RBC # FLD: 16.4 % — HIGH (ref 10.3–14.5)
RBC # FLD: 17.5 % — HIGH (ref 10.3–14.5)
RBC # FLD: 18.2 % — HIGH (ref 10.3–14.5)
RBC # FLD: 18.6 % — HIGH (ref 10.3–14.5)
RBC # FLD: 18.6 % — HIGH (ref 10.3–14.5)
RBC # FLD: 18.8 % — HIGH (ref 10.3–14.5)
RBC # FLD: 19.2 % — HIGH (ref 10.3–14.5)
RBC # FLD: 19.6 % — HIGH (ref 10.3–14.5)
RBC # FLD: 19.9 % — HIGH (ref 10.3–14.5)
RBC # FLD: 20.1 % — HIGH (ref 10.3–14.5)
RBC # FLD: 20.7 % — HIGH (ref 10.3–14.5)
RBC # FLD: 20.7 % — HIGH (ref 10.3–14.5)
RBC # FLD: 20.8 % — HIGH (ref 10.3–14.5)
RBC # FLD: 21.1 % — HIGH (ref 10.3–14.5)
RBC # FLD: 21.2 % — HIGH (ref 10.3–14.5)
RBC # FLD: 21.3 % — HIGH (ref 10.3–14.5)
RBC # FLD: 21.4 % — HIGH (ref 10.3–14.5)
RBC # FLD: 21.4 % — HIGH (ref 10.3–14.5)
RBC # FLD: 21.5 % — HIGH (ref 10.3–14.5)
RBC # FLD: 21.6 % — HIGH (ref 10.3–14.5)
RBC # FLD: 22.1 % — HIGH (ref 10.3–14.5)
RBC # FLD: 22.4 % — HIGH (ref 10.3–14.5)
RBC # FLD: 22.5 % — HIGH (ref 10.3–14.5)
RBC # FLD: 22.5 % — HIGH (ref 10.3–14.5)
RBC # FLD: 23.1 % — HIGH (ref 10.3–14.5)
RBC # FLD: 23.2 % — HIGH (ref 10.3–14.5)
RBC # FLD: 23.3 % — HIGH (ref 10.3–14.5)
RBC # FLD: 23.5 % — HIGH (ref 10.3–14.5)
RBC # FLD: 24.3 % — HIGH (ref 10.3–14.5)
RBC # FLD: 24.3 % — HIGH (ref 10.3–14.5)
RBC # FLD: 24.8 % — HIGH (ref 10.3–14.5)
RBC # FLD: 24.8 % — HIGH (ref 10.3–14.5)
RBC # FLD: 25.2 % — HIGH (ref 10.3–14.5)
RBC # FLD: 25.2 % — HIGH (ref 10.3–14.5)
RBC # FLD: 25.6 % — HIGH (ref 10.3–14.5)
RBC # FLD: 25.7 % — HIGH (ref 10.3–14.5)
RBC # FLD: 26.1 % — HIGH (ref 10.3–14.5)
RBC # FLD: 26.4 % — HIGH (ref 10.3–14.5)
RBC # FLD: 26.5 % — HIGH (ref 10.3–14.5)
RBC # FLD: 26.7 % — HIGH (ref 10.3–14.5)
RBC # FLD: 26.9 % — HIGH (ref 10.3–14.5)
RBC # FLD: 26.9 % — HIGH (ref 10.3–14.5)
RBC # FLD: 27 % — HIGH (ref 10.3–14.5)
RBC # FLD: 27.1 % — HIGH (ref 10.3–14.5)
RBC # FLD: 27.3 % — HIGH (ref 10.3–14.5)
RBC # FLD: 27.4 % — HIGH (ref 10.3–14.5)
RBC # FLD: 27.6 % — HIGH (ref 10.3–14.5)
RBC # FLD: 27.7 % — HIGH (ref 10.3–14.5)
RBC # FLD: 27.9 % — HIGH (ref 10.3–14.5)
RBC # FLD: SIGNIFICANT CHANGE UP (ref 10.3–14.5)
RBC CASTS # UR COMP ASSIST: 10 /HPF — HIGH (ref 0–4)
RBC CASTS # UR COMP ASSIST: 10 /HPF — HIGH (ref 0–4)
RBC CASTS # UR COMP ASSIST: 13 /HPF — HIGH (ref 0–4)
RBC CASTS # UR COMP ASSIST: 15 /HPF — HIGH (ref 0–4)
RBC CASTS # UR COMP ASSIST: 2 /HPF — SIGNIFICANT CHANGE UP (ref 0–4)
RBC CASTS # UR COMP ASSIST: 29 /HPF — HIGH (ref 0–4)
RED BLOOD CELLS URINE: 1 /HPF
RED BLOOD CELLS URINE: 2 /HPF
RETICS #: 160.3 K/UL — HIGH (ref 25–125)
RETICS #: 178 K/UL — HIGH (ref 25–125)
RETICS/RBC NFR: 6.9 % — HIGH (ref 0.5–2.5)
RETICS/RBC NFR: 7.1 % — HIGH (ref 0.5–2.5)
RH IG SCN BLD-IMP: POSITIVE — SIGNIFICANT CHANGE UP
S AUREUS DNA NOSE QL NAA+PROBE: DETECTED
S AUREUS DNA NOSE QL NAA+PROBE: SIGNIFICANT CHANGE UP
SAO2 % BLDA: 100 % — HIGH (ref 94–98)
SAO2 % BLDA: 91 % — LOW (ref 92–96)
SAO2 % BLDA: 98 % — HIGH (ref 92–96)
SAO2 % BLDV: 67.6 % — SIGNIFICANT CHANGE UP (ref 67–88)
SAO2 % BLDV: 70.3 % — SIGNIFICANT CHANGE UP (ref 67–88)
SAO2 % BLDV: 76.2 % — SIGNIFICANT CHANGE UP (ref 67–88)
SAO2 % BLDV: 77.1 % — SIGNIFICANT CHANGE UP (ref 67–88)
SAO2 % BLDV: 80.2 % — SIGNIFICANT CHANGE UP (ref 67–88)
SAO2 % BLDV: 82 % — SIGNIFICANT CHANGE UP (ref 67–88)
SARS-COV-2 IGG+IGM SERPL QL IA: 0.4 U/ML — SIGNIFICANT CHANGE UP
SARS-COV-2 IGG+IGM SERPL QL IA: NEGATIVE — SIGNIFICANT CHANGE UP
SARS-COV-2 RNA SPEC QL NAA+PROBE: DETECTED
SARS-COV-2 RNA SPEC QL NAA+PROBE: SIGNIFICANT CHANGE UP
SARS-COV-2 RNA SPEC QL NAA+PROBE: SIGNIFICANT CHANGE UP
SODIUM SERPL-SCNC: 131 MMOL/L — LOW (ref 135–145)
SODIUM SERPL-SCNC: 133 MMOL/L — LOW (ref 135–145)
SODIUM SERPL-SCNC: 134 MMOL/L — LOW (ref 135–145)
SODIUM SERPL-SCNC: 134 MMOL/L — LOW (ref 135–145)
SODIUM SERPL-SCNC: 135 MMOL/L — SIGNIFICANT CHANGE UP (ref 135–145)
SODIUM SERPL-SCNC: 135 MMOL/L — SIGNIFICANT CHANGE UP (ref 135–145)
SODIUM SERPL-SCNC: 136 MMOL/L — SIGNIFICANT CHANGE UP (ref 135–145)
SODIUM SERPL-SCNC: 137 MMOL/L
SODIUM SERPL-SCNC: 137 MMOL/L — SIGNIFICANT CHANGE UP (ref 135–145)
SODIUM SERPL-SCNC: 138 MMOL/L
SODIUM SERPL-SCNC: 138 MMOL/L — SIGNIFICANT CHANGE UP (ref 135–145)
SODIUM SERPL-SCNC: 139 MMOL/L — SIGNIFICANT CHANGE UP (ref 135–145)
SODIUM SERPL-SCNC: 140 MMOL/L
SODIUM SERPL-SCNC: 140 MMOL/L — SIGNIFICANT CHANGE UP (ref 135–145)
SODIUM SERPL-SCNC: 141 MMOL/L — SIGNIFICANT CHANGE UP (ref 135–145)
SODIUM SERPL-SCNC: 142 MMOL/L — SIGNIFICANT CHANGE UP (ref 135–145)
SODIUM SERPL-SCNC: 143 MMOL/L — SIGNIFICANT CHANGE UP (ref 135–145)
SODIUM SERPL-SCNC: 144 MMOL/L — SIGNIFICANT CHANGE UP (ref 135–145)
SODIUM SERPL-SCNC: 145 MMOL/L — SIGNIFICANT CHANGE UP (ref 135–145)
SODIUM SERPL-SCNC: 146 MMOL/L — HIGH (ref 135–145)
SODIUM SERPL-SCNC: 147 MMOL/L — HIGH (ref 135–145)
SODIUM SERPL-SCNC: 148 MMOL/L — HIGH (ref 135–145)
SODIUM SERPL-SCNC: 148 MMOL/L — HIGH (ref 135–145)
SODIUM SERPL-SCNC: 149 MMOL/L — HIGH (ref 135–145)
SODIUM SERPL-SCNC: 151 MMOL/L — HIGH (ref 135–145)
SODIUM SERPL-SCNC: 153 MMOL/L — HIGH (ref 135–145)
SODIUM SERPL-SCNC: 154 MMOL/L — HIGH (ref 135–145)
SP GR SPEC: 1.01 — SIGNIFICANT CHANGE UP (ref 1.01–1.02)
SP GR SPEC: 1.01 — SIGNIFICANT CHANGE UP (ref 1.01–1.02)
SP GR SPEC: 1.02 — SIGNIFICANT CHANGE UP (ref 1.01–1.02)
SP GR SPEC: 1.03 — HIGH (ref 1.01–1.02)
SPECIFIC GRAVITY URINE: 1.01
SPECIFIC GRAVITY URINE: 1.01
SPECIFIC GRAVITY URINE: 1.02
SPECIFIC GRAVITY URINE: 1.02
SPECIMEN SOURCE: SIGNIFICANT CHANGE UP
SQUAMOUS EPITHELIAL CELLS: 0 /HPF
TESTOST SERPL-MCNC: 312 NG/DL
TIBC SERPL-MCNC: 215 UG/DL — LOW (ref 220–430)
TRIGL SERPL-MCNC: 168 MG/DL — HIGH
TRIGL SERPL-MCNC: 194 MG/DL — HIGH
TRIGL SERPL-MCNC: 216 MG/DL — HIGH
TRIGL SERPL-MCNC: 231 MG/DL — HIGH
TRIGL SERPL-MCNC: 234 MG/DL — HIGH
TRIGL SERPL-MCNC: 258 MG/DL — HIGH
TRIGL SERPL-MCNC: 281 MG/DL — HIGH
TRIGL SERPL-MCNC: 292 MG/DL — HIGH
TRIGL SERPL-MCNC: 343 MG/DL — HIGH
TRIGL SERPL-MCNC: 375 MG/DL — HIGH
TRIGL SERPL-MCNC: 427 MG/DL — HIGH
TRIGL SERPL-MCNC: 464 MG/DL — HIGH
TRIGL SERPL-MCNC: 483 MG/DL — HIGH
TRIGL SERPL-MCNC: 538 MG/DL — HIGH
TRIGL SERPL-MCNC: 593 MG/DL — HIGH
TRIGL SERPL-MCNC: 632 MG/DL — HIGH
TROPONIN T, HIGH SENSITIVITY RESULT: 15 NG/L — SIGNIFICANT CHANGE UP (ref 0–51)
TROPONIN T, HIGH SENSITIVITY RESULT: 91 NG/L — HIGH (ref 0–51)
UIBC SERPL-MCNC: 144 UG/DL — SIGNIFICANT CHANGE UP (ref 110–370)
URINE CYTOLOGY: NORMAL
UROBILINOGEN FLD QL: ABNORMAL
UROBILINOGEN FLD QL: NEGATIVE — SIGNIFICANT CHANGE UP
UROBILINOGEN URINE: NORMAL
VANCOMYCIN FLD-MCNC: 13.2 UG/ML — SIGNIFICANT CHANGE UP
VANCOMYCIN FLD-MCNC: 13.9 UG/ML — SIGNIFICANT CHANGE UP
VANCOMYCIN FLD-MCNC: 16.2 UG/ML — SIGNIFICANT CHANGE UP
VANCOMYCIN FLD-MCNC: 16.7 UG/ML — SIGNIFICANT CHANGE UP
VANCOMYCIN FLD-MCNC: 17.6 UG/ML — SIGNIFICANT CHANGE UP
VANCOMYCIN FLD-MCNC: 19.3 UG/ML — SIGNIFICANT CHANGE UP
VANCOMYCIN FLD-MCNC: 27.6 UG/ML
VANCOMYCIN TROUGH SERPL-MCNC: 12.2 UG/ML — SIGNIFICANT CHANGE UP (ref 10–20)
VANCOMYCIN TROUGH SERPL-MCNC: 12.3 UG/ML — SIGNIFICANT CHANGE UP (ref 10–20)
VANCOMYCIN TROUGH SERPL-MCNC: 14.4 UG/ML — SIGNIFICANT CHANGE UP (ref 10–20)
VANCOMYCIN TROUGH SERPL-MCNC: 15.7 UG/ML — SIGNIFICANT CHANGE UP (ref 10–20)
VANCOMYCIN TROUGH SERPL-MCNC: 16 UG/ML — SIGNIFICANT CHANGE UP (ref 10–20)
VANCOMYCIN TROUGH SERPL-MCNC: 16 UG/ML — SIGNIFICANT CHANGE UP (ref 10–20)
VANCOMYCIN TROUGH SERPL-MCNC: 16.9 UG/ML — SIGNIFICANT CHANGE UP (ref 10–20)
VANCOMYCIN TROUGH SERPL-MCNC: 17.2 UG/ML — SIGNIFICANT CHANGE UP (ref 10–20)
VANCOMYCIN TROUGH SERPL-MCNC: 17.5 UG/ML — SIGNIFICANT CHANGE UP (ref 10–20)
VANCOMYCIN TROUGH SERPL-MCNC: 18.2 UG/ML — SIGNIFICANT CHANGE UP (ref 10–20)
VANCOMYCIN TROUGH SERPL-MCNC: 18.6 UG/ML — SIGNIFICANT CHANGE UP (ref 10–20)
VANCOMYCIN TROUGH SERPL-MCNC: 18.7 UG/ML — SIGNIFICANT CHANGE UP (ref 10–20)
VANCOMYCIN TROUGH SERPL-MCNC: 19.6 UG/ML — SIGNIFICANT CHANGE UP (ref 10–20)
VANCOMYCIN TROUGH SERPL-MCNC: 19.7 UG/ML — SIGNIFICANT CHANGE UP (ref 10–20)
VANCOMYCIN TROUGH SERPL-MCNC: 19.8 UG/ML — SIGNIFICANT CHANGE UP (ref 10–20)
VANCOMYCIN TROUGH SERPL-MCNC: 20.8 UG/ML — HIGH (ref 10–20)
VANCOMYCIN TROUGH SERPL-MCNC: 21 UG/ML — HIGH (ref 10–20)
VANCOMYCIN TROUGH SERPL-MCNC: 21.5 UG/ML — HIGH (ref 10–20)
VANCOMYCIN TROUGH SERPL-MCNC: 24.7 UG/ML — HIGH (ref 10–20)
VANCOMYCIN TROUGH SERPL-MCNC: 5.1 UG/ML — LOW (ref 10–20)
VANCOMYCIN TROUGH SERPL-MCNC: 9.7 UG/ML — LOW (ref 10–20)
WBC # BLD: 10.31 K/UL — SIGNIFICANT CHANGE UP (ref 3.8–10.5)
WBC # BLD: 10.42 K/UL — SIGNIFICANT CHANGE UP (ref 3.8–10.5)
WBC # BLD: 10.88 K/UL — HIGH (ref 3.8–10.5)
WBC # BLD: 10.96 K/UL — HIGH (ref 3.8–10.5)
WBC # BLD: 10.99 K/UL — HIGH (ref 3.8–10.5)
WBC # BLD: 11 K/UL — HIGH (ref 3.8–10.5)
WBC # BLD: 11.06 K/UL — HIGH (ref 3.8–10.5)
WBC # BLD: 11.09 K/UL — HIGH (ref 3.8–10.5)
WBC # BLD: 11.12 K/UL — HIGH (ref 3.8–10.5)
WBC # BLD: 11.17 K/UL — HIGH (ref 3.8–10.5)
WBC # BLD: 11.19 K/UL — HIGH (ref 3.8–10.5)
WBC # BLD: 11.32 K/UL — HIGH (ref 3.8–10.5)
WBC # BLD: 11.5 K/UL — HIGH (ref 3.8–10.5)
WBC # BLD: 11.57 K/UL — HIGH (ref 3.8–10.5)
WBC # BLD: 11.76 K/UL — HIGH (ref 3.8–10.5)
WBC # BLD: 11.76 K/UL — HIGH (ref 3.8–10.5)
WBC # BLD: 12 K/UL — HIGH (ref 3.8–10.5)
WBC # BLD: 12.05 K/UL — HIGH (ref 3.8–10.5)
WBC # BLD: 12.07 K/UL — HIGH (ref 3.8–10.5)
WBC # BLD: 12.21 K/UL — HIGH (ref 3.8–10.5)
WBC # BLD: 12.26 K/UL — HIGH (ref 3.8–10.5)
WBC # BLD: 12.29 K/UL — HIGH (ref 3.8–10.5)
WBC # BLD: 12.56 K/UL — HIGH (ref 3.8–10.5)
WBC # BLD: 12.67 K/UL — HIGH (ref 3.8–10.5)
WBC # BLD: 12.72 K/UL — HIGH (ref 3.8–10.5)
WBC # BLD: 12.75 K/UL — HIGH (ref 3.8–10.5)
WBC # BLD: 12.87 K/UL — HIGH (ref 3.8–10.5)
WBC # BLD: 12.88 K/UL — HIGH (ref 3.8–10.5)
WBC # BLD: 12.9 K/UL — HIGH (ref 3.8–10.5)
WBC # BLD: 13.27 K/UL — HIGH (ref 3.8–10.5)
WBC # BLD: 13.39 K/UL — HIGH (ref 3.8–10.5)
WBC # BLD: 13.69 K/UL — HIGH (ref 3.8–10.5)
WBC # BLD: 13.69 K/UL — HIGH (ref 3.8–10.5)
WBC # BLD: 13.94 K/UL — HIGH (ref 3.8–10.5)
WBC # BLD: 14.02 K/UL — HIGH (ref 3.8–10.5)
WBC # BLD: 14.17 K/UL — HIGH (ref 3.8–10.5)
WBC # BLD: 14.34 K/UL — HIGH (ref 3.8–10.5)
WBC # BLD: 14.9 K/UL — HIGH (ref 3.8–10.5)
WBC # BLD: 15.1 K/UL — HIGH (ref 3.8–10.5)
WBC # BLD: 15.15 K/UL — HIGH (ref 3.8–10.5)
WBC # BLD: 15.23 K/UL — HIGH (ref 3.8–10.5)
WBC # BLD: 15.27 K/UL — HIGH (ref 3.8–10.5)
WBC # BLD: 15.32 K/UL — HIGH (ref 3.8–10.5)
WBC # BLD: 15.61 K/UL — HIGH (ref 3.8–10.5)
WBC # BLD: 15.75 K/UL — HIGH (ref 3.8–10.5)
WBC # BLD: 16.42 K/UL — HIGH (ref 3.8–10.5)
WBC # BLD: 16.49 K/UL — HIGH (ref 3.8–10.5)
WBC # BLD: 16.49 K/UL — HIGH (ref 3.8–10.5)
WBC # BLD: 16.56 K/UL — HIGH (ref 3.8–10.5)
WBC # BLD: 16.86 K/UL — HIGH (ref 3.8–10.5)
WBC # BLD: 17.34 K/UL — HIGH (ref 3.8–10.5)
WBC # BLD: 17.62 K/UL — HIGH (ref 3.8–10.5)
WBC # BLD: 17.72 K/UL — HIGH (ref 3.8–10.5)
WBC # BLD: 20.13 K/UL — HIGH (ref 3.8–10.5)
WBC # BLD: 21.25 K/UL — HIGH (ref 3.8–10.5)
WBC # BLD: 23.67 K/UL — HIGH (ref 3.8–10.5)
WBC # BLD: 24.79 K/UL — HIGH (ref 3.8–10.5)
WBC # BLD: 3.2 K/UL — LOW (ref 3.8–10.5)
WBC # BLD: 5.04 K/UL — SIGNIFICANT CHANGE UP (ref 3.8–10.5)
WBC # BLD: 7.1 K/UL — SIGNIFICANT CHANGE UP (ref 3.8–10.5)
WBC # BLD: 7.51 K/UL — SIGNIFICANT CHANGE UP (ref 3.8–10.5)
WBC # BLD: 7.56 K/UL — SIGNIFICANT CHANGE UP (ref 3.8–10.5)
WBC # BLD: 7.56 K/UL — SIGNIFICANT CHANGE UP (ref 3.8–10.5)
WBC # BLD: 7.67 K/UL — SIGNIFICANT CHANGE UP (ref 3.8–10.5)
WBC # BLD: 8.39 K/UL — SIGNIFICANT CHANGE UP (ref 3.8–10.5)
WBC # BLD: 8.47 K/UL — SIGNIFICANT CHANGE UP (ref 3.8–10.5)
WBC # BLD: 8.66 K/UL — SIGNIFICANT CHANGE UP (ref 3.8–10.5)
WBC # BLD: 8.7 K/UL — SIGNIFICANT CHANGE UP (ref 3.8–10.5)
WBC # BLD: 8.83 K/UL — SIGNIFICANT CHANGE UP (ref 3.8–10.5)
WBC # BLD: 8.91 K/UL — SIGNIFICANT CHANGE UP (ref 3.8–10.5)
WBC # BLD: 8.97 K/UL — SIGNIFICANT CHANGE UP (ref 3.8–10.5)
WBC # BLD: 9.1 K/UL — SIGNIFICANT CHANGE UP (ref 3.8–10.5)
WBC # BLD: 9.22 K/UL — SIGNIFICANT CHANGE UP (ref 3.8–10.5)
WBC # BLD: 9.96 K/UL — SIGNIFICANT CHANGE UP (ref 3.8–10.5)
WBC # FLD AUTO: 10.31 K/UL — SIGNIFICANT CHANGE UP (ref 3.8–10.5)
WBC # FLD AUTO: 10.42 K/UL — SIGNIFICANT CHANGE UP (ref 3.8–10.5)
WBC # FLD AUTO: 10.88 K/UL — HIGH (ref 3.8–10.5)
WBC # FLD AUTO: 10.96 K/UL — HIGH (ref 3.8–10.5)
WBC # FLD AUTO: 10.99 K/UL — HIGH (ref 3.8–10.5)
WBC # FLD AUTO: 11 K/UL — HIGH (ref 3.8–10.5)
WBC # FLD AUTO: 11.06 K/UL — HIGH (ref 3.8–10.5)
WBC # FLD AUTO: 11.09 K/UL — HIGH (ref 3.8–10.5)
WBC # FLD AUTO: 11.12 K/UL — HIGH (ref 3.8–10.5)
WBC # FLD AUTO: 11.17 K/UL — HIGH (ref 3.8–10.5)
WBC # FLD AUTO: 11.19 K/UL — HIGH (ref 3.8–10.5)
WBC # FLD AUTO: 11.32 K/UL — HIGH (ref 3.8–10.5)
WBC # FLD AUTO: 11.5 K/UL — HIGH (ref 3.8–10.5)
WBC # FLD AUTO: 11.57 K/UL — HIGH (ref 3.8–10.5)
WBC # FLD AUTO: 11.76 K/UL — HIGH (ref 3.8–10.5)
WBC # FLD AUTO: 11.76 K/UL — HIGH (ref 3.8–10.5)
WBC # FLD AUTO: 12 K/UL — HIGH (ref 3.8–10.5)
WBC # FLD AUTO: 12.05 K/UL — HIGH (ref 3.8–10.5)
WBC # FLD AUTO: 12.07 K/UL — HIGH (ref 3.8–10.5)
WBC # FLD AUTO: 12.21 K/UL — HIGH (ref 3.8–10.5)
WBC # FLD AUTO: 12.26 K/UL — HIGH (ref 3.8–10.5)
WBC # FLD AUTO: 12.29 K/UL — HIGH (ref 3.8–10.5)
WBC # FLD AUTO: 12.56 K/UL — HIGH (ref 3.8–10.5)
WBC # FLD AUTO: 12.67 K/UL — HIGH (ref 3.8–10.5)
WBC # FLD AUTO: 12.72 K/UL — HIGH (ref 3.8–10.5)
WBC # FLD AUTO: 12.75 K/UL — HIGH (ref 3.8–10.5)
WBC # FLD AUTO: 12.87 K/UL — HIGH (ref 3.8–10.5)
WBC # FLD AUTO: 12.88 K/UL — HIGH (ref 3.8–10.5)
WBC # FLD AUTO: 12.9 K/UL — HIGH (ref 3.8–10.5)
WBC # FLD AUTO: 13.27 K/UL — HIGH (ref 3.8–10.5)
WBC # FLD AUTO: 13.39 K/UL — HIGH (ref 3.8–10.5)
WBC # FLD AUTO: 13.69 K/UL — HIGH (ref 3.8–10.5)
WBC # FLD AUTO: 13.69 K/UL — HIGH (ref 3.8–10.5)
WBC # FLD AUTO: 13.94 K/UL — HIGH (ref 3.8–10.5)
WBC # FLD AUTO: 14.02 K/UL — HIGH (ref 3.8–10.5)
WBC # FLD AUTO: 14.17 K/UL — HIGH (ref 3.8–10.5)
WBC # FLD AUTO: 14.34 K/UL — HIGH (ref 3.8–10.5)
WBC # FLD AUTO: 14.9 K/UL — HIGH (ref 3.8–10.5)
WBC # FLD AUTO: 15.1 K/UL — HIGH (ref 3.8–10.5)
WBC # FLD AUTO: 15.15 K/UL — HIGH (ref 3.8–10.5)
WBC # FLD AUTO: 15.23 K/UL — HIGH (ref 3.8–10.5)
WBC # FLD AUTO: 15.27 K/UL — HIGH (ref 3.8–10.5)
WBC # FLD AUTO: 15.32 K/UL — HIGH (ref 3.8–10.5)
WBC # FLD AUTO: 15.61 K/UL — HIGH (ref 3.8–10.5)
WBC # FLD AUTO: 15.75 K/UL — HIGH (ref 3.8–10.5)
WBC # FLD AUTO: 16.42 K/UL — HIGH (ref 3.8–10.5)
WBC # FLD AUTO: 16.49 K/UL — HIGH (ref 3.8–10.5)
WBC # FLD AUTO: 16.49 K/UL — HIGH (ref 3.8–10.5)
WBC # FLD AUTO: 16.56 K/UL — HIGH (ref 3.8–10.5)
WBC # FLD AUTO: 16.86 K/UL — HIGH (ref 3.8–10.5)
WBC # FLD AUTO: 17.34 K/UL — HIGH (ref 3.8–10.5)
WBC # FLD AUTO: 17.62 K/UL — HIGH (ref 3.8–10.5)
WBC # FLD AUTO: 17.72 K/UL — HIGH (ref 3.8–10.5)
WBC # FLD AUTO: 20.13 K/UL — HIGH (ref 3.8–10.5)
WBC # FLD AUTO: 21.25 K/UL — HIGH (ref 3.8–10.5)
WBC # FLD AUTO: 23.67 K/UL — HIGH (ref 3.8–10.5)
WBC # FLD AUTO: 24.79 K/UL — HIGH (ref 3.8–10.5)
WBC # FLD AUTO: 3.2 K/UL — LOW (ref 3.8–10.5)
WBC # FLD AUTO: 5.04 K/UL — SIGNIFICANT CHANGE UP (ref 3.8–10.5)
WBC # FLD AUTO: 7.1 K/UL — SIGNIFICANT CHANGE UP (ref 3.8–10.5)
WBC # FLD AUTO: 7.51 K/UL — SIGNIFICANT CHANGE UP (ref 3.8–10.5)
WBC # FLD AUTO: 7.56 K/UL — SIGNIFICANT CHANGE UP (ref 3.8–10.5)
WBC # FLD AUTO: 7.56 K/UL — SIGNIFICANT CHANGE UP (ref 3.8–10.5)
WBC # FLD AUTO: 7.67 K/UL — SIGNIFICANT CHANGE UP (ref 3.8–10.5)
WBC # FLD AUTO: 8.39 K/UL — SIGNIFICANT CHANGE UP (ref 3.8–10.5)
WBC # FLD AUTO: 8.47 K/UL — SIGNIFICANT CHANGE UP (ref 3.8–10.5)
WBC # FLD AUTO: 8.66 K/UL — SIGNIFICANT CHANGE UP (ref 3.8–10.5)
WBC # FLD AUTO: 8.7 K/UL — SIGNIFICANT CHANGE UP (ref 3.8–10.5)
WBC # FLD AUTO: 8.83 K/UL — SIGNIFICANT CHANGE UP (ref 3.8–10.5)
WBC # FLD AUTO: 8.91 K/UL — SIGNIFICANT CHANGE UP (ref 3.8–10.5)
WBC # FLD AUTO: 8.97 K/UL — SIGNIFICANT CHANGE UP (ref 3.8–10.5)
WBC # FLD AUTO: 9.1 K/UL — SIGNIFICANT CHANGE UP (ref 3.8–10.5)
WBC # FLD AUTO: 9.22 K/UL — SIGNIFICANT CHANGE UP (ref 3.8–10.5)
WBC # FLD AUTO: 9.96 K/UL — SIGNIFICANT CHANGE UP (ref 3.8–10.5)
WBC UR QL: 1 /HPF — SIGNIFICANT CHANGE UP (ref 0–5)
WBC UR QL: 19 /HPF — HIGH (ref 0–5)
WBC UR QL: 5 /HPF — SIGNIFICANT CHANGE UP (ref 0–5)
WBC UR QL: 8 /HPF — HIGH (ref 0–5)
WHITE BLOOD CELLS URINE: 0 /HPF

## 2021-01-01 PROCEDURE — 99291 CRITICAL CARE FIRST HOUR: CPT

## 2021-01-01 PROCEDURE — 99072 ADDL SUPL MATRL&STAF TM PHE: CPT

## 2021-01-01 PROCEDURE — 93010 ELECTROCARDIOGRAM REPORT: CPT

## 2021-01-01 PROCEDURE — 71045 X-RAY EXAM CHEST 1 VIEW: CPT | Mod: 26

## 2021-01-01 PROCEDURE — 99233 SBSQ HOSP IP/OBS HIGH 50: CPT | Mod: GC

## 2021-01-01 PROCEDURE — 31600 PLANNED TRACHEOSTOMY: CPT | Mod: 78

## 2021-01-01 PROCEDURE — 99024 POSTOP FOLLOW-UP VISIT: CPT

## 2021-01-01 PROCEDURE — 93975 VASCULAR STUDY: CPT | Mod: 26

## 2021-01-01 PROCEDURE — 93010 ELECTROCARDIOGRAM REPORT: CPT | Mod: 77

## 2021-01-01 PROCEDURE — 36620 INSERTION CATHETER ARTERY: CPT

## 2021-01-01 PROCEDURE — 70450 CT HEAD/BRAIN W/O DYE: CPT | Mod: 26,MA

## 2021-01-01 PROCEDURE — 99232 SBSQ HOSP IP/OBS MODERATE 35: CPT

## 2021-01-01 PROCEDURE — 70450 CT HEAD/BRAIN W/O DYE: CPT | Mod: 26

## 2021-01-01 PROCEDURE — 99232 SBSQ HOSP IP/OBS MODERATE 35: CPT | Mod: GC

## 2021-01-01 PROCEDURE — 42961 CONTROL THROAT BLEEDING: CPT

## 2021-01-01 PROCEDURE — 99223 1ST HOSP IP/OBS HIGH 75: CPT

## 2021-01-01 PROCEDURE — 70551 MRI BRAIN STEM W/O DYE: CPT | Mod: 26

## 2021-01-01 PROCEDURE — 99222 1ST HOSP IP/OBS MODERATE 55: CPT | Mod: GC

## 2021-01-01 PROCEDURE — 93321 DOPPLER ECHO F-UP/LMTD STD: CPT | Mod: 26

## 2021-01-01 PROCEDURE — 93306 TTE W/DOPPLER COMPLETE: CPT | Mod: 26

## 2021-01-01 PROCEDURE — 99292 CRITICAL CARE ADDL 30 MIN: CPT | Mod: 25

## 2021-01-01 PROCEDURE — 74177 CT ABD & PELVIS W/CONTRAST: CPT | Mod: 26

## 2021-01-01 PROCEDURE — 88112 CYTOPATH CELL ENHANCE TECH: CPT | Mod: 26

## 2021-01-01 PROCEDURE — 76705 ECHO EXAM OF ABDOMEN: CPT | Mod: 26,RT

## 2021-01-01 PROCEDURE — 43246 EGD PLACE GASTROSTOMY TUBE: CPT | Mod: GC

## 2021-01-01 PROCEDURE — 99232 SBSQ HOSP IP/OBS MODERATE 35: CPT | Mod: GC,25

## 2021-01-01 PROCEDURE — 99497 ADVNCD CARE PLAN 30 MIN: CPT | Mod: GC,25

## 2021-01-01 PROCEDURE — 95718 EEG PHYS/QHP 2-12 HR W/VEEG: CPT

## 2021-01-01 PROCEDURE — 93308 TTE F-UP OR LMTD: CPT | Mod: 26,GC

## 2021-01-01 PROCEDURE — 99214 OFFICE O/P EST MOD 30 MIN: CPT

## 2021-01-01 PROCEDURE — 99222 1ST HOSP IP/OBS MODERATE 55: CPT | Mod: 25

## 2021-01-01 PROCEDURE — 42960 CONTROL THROAT BLEEDING: CPT | Mod: 58

## 2021-01-01 PROCEDURE — 93970 EXTREMITY STUDY: CPT | Mod: 26,59

## 2021-01-01 PROCEDURE — 93308 TTE F-UP OR LMTD: CPT | Mod: 26

## 2021-01-01 PROCEDURE — 99233 SBSQ HOSP IP/OBS HIGH 50: CPT | Mod: 25

## 2021-01-01 PROCEDURE — 99233 SBSQ HOSP IP/OBS HIGH 50: CPT

## 2021-01-01 PROCEDURE — 93971 EXTREMITY STUDY: CPT | Mod: 26,GC

## 2021-01-01 PROCEDURE — 36620 INSERTION CATHETER ARTERY: CPT | Mod: GC

## 2021-01-01 PROCEDURE — 43753 TX GASTRO INTUB W/ASP: CPT | Mod: 59

## 2021-01-01 PROCEDURE — 71260 CT THORAX DX C+: CPT | Mod: 26

## 2021-01-01 PROCEDURE — 36556 INSERT NON-TUNNEL CV CATH: CPT | Mod: GC

## 2021-01-01 PROCEDURE — 99231 SBSQ HOSP IP/OBS SF/LOW 25: CPT

## 2021-01-01 PROCEDURE — 36620 INSERTION CATHETER ARTERY: CPT | Mod: GC,59

## 2021-01-01 PROCEDURE — 76604 US EXAM CHEST: CPT | Mod: 26,GC

## 2021-01-01 PROCEDURE — 99291 CRITICAL CARE FIRST HOUR: CPT | Mod: 25

## 2021-01-01 PROCEDURE — 99221 1ST HOSP IP/OBS SF/LOW 40: CPT

## 2021-01-01 PROCEDURE — 93925 LOWER EXTREMITY STUDY: CPT | Mod: 26

## 2021-01-01 PROCEDURE — 12345: CPT | Mod: NC

## 2021-01-01 PROCEDURE — 95720 EEG PHY/QHP EA INCR W/VEEG: CPT

## 2021-01-01 PROCEDURE — 99222 1ST HOSP IP/OBS MODERATE 55: CPT

## 2021-01-01 PROCEDURE — 93970 EXTREMITY STUDY: CPT | Mod: 26

## 2021-01-01 RX ORDER — PROPOFOL 10 MG/ML
20 INJECTION, EMULSION INTRAVENOUS
Qty: 500 | Refills: 0 | Status: DISCONTINUED | OUTPATIENT
Start: 2021-01-01 | End: 2021-01-01

## 2021-01-01 RX ORDER — INSULIN LISPRO 100/ML
VIAL (ML) SUBCUTANEOUS EVERY 6 HOURS
Refills: 0 | Status: DISCONTINUED | OUTPATIENT
Start: 2021-01-01 | End: 2021-01-01

## 2021-01-01 RX ORDER — VANCOMYCIN HCL 1 G
500 VIAL (EA) INTRAVENOUS
Refills: 0 | Status: DISCONTINUED | OUTPATIENT
Start: 2021-01-01 | End: 2021-01-01

## 2021-01-01 RX ORDER — MIDAZOLAM HYDROCHLORIDE 1 MG/ML
2 INJECTION, SOLUTION INTRAMUSCULAR; INTRAVENOUS ONCE
Refills: 0 | Status: DISCONTINUED | OUTPATIENT
Start: 2021-01-01 | End: 2021-01-01

## 2021-01-01 RX ORDER — HYDROMORPHONE HYDROCHLORIDE 2 MG/ML
1 INJECTION INTRAMUSCULAR; INTRAVENOUS; SUBCUTANEOUS ONCE
Refills: 0 | Status: DISCONTINUED | OUTPATIENT
Start: 2021-01-01 | End: 2021-01-01

## 2021-01-01 RX ORDER — ACETAZOLAMIDE 250 MG/1
500 TABLET ORAL ONCE
Refills: 0 | Status: DISCONTINUED | OUTPATIENT
Start: 2021-01-01 | End: 2021-01-01

## 2021-01-01 RX ORDER — DEXMEDETOMIDINE HYDROCHLORIDE IN 0.9% SODIUM CHLORIDE 4 UG/ML
0.2 INJECTION INTRAVENOUS
Qty: 200 | Refills: 0 | Status: DISCONTINUED | OUTPATIENT
Start: 2021-01-01 | End: 2021-01-01

## 2021-01-01 RX ORDER — INSULIN LISPRO 100/ML
VIAL (ML) SUBCUTANEOUS
Refills: 0 | Status: DISCONTINUED | OUTPATIENT
Start: 2021-01-01 | End: 2021-01-01

## 2021-01-01 RX ORDER — DEXTROSE 50 % IN WATER 50 %
15 SYRINGE (ML) INTRAVENOUS ONCE
Refills: 0 | Status: DISCONTINUED | OUTPATIENT
Start: 2021-01-01 | End: 2021-01-01

## 2021-01-01 RX ORDER — PHENYLEPHRINE HYDROCHLORIDE 10 MG/ML
0.5 INJECTION INTRAVENOUS
Qty: 40 | Refills: 0 | Status: DISCONTINUED | OUTPATIENT
Start: 2021-01-01 | End: 2021-01-01

## 2021-01-01 RX ORDER — METHADONE HYDROCHLORIDE 40 MG/1
10 TABLET ORAL
Refills: 0 | Status: DISCONTINUED | OUTPATIENT
Start: 2021-01-01 | End: 2021-01-01

## 2021-01-01 RX ORDER — MORPHINE SULFATE 50 MG/1
2 CAPSULE, EXTENDED RELEASE ORAL EVERY 4 HOURS
Refills: 0 | Status: DISCONTINUED | OUTPATIENT
Start: 2021-01-01 | End: 2021-01-01

## 2021-01-01 RX ORDER — HYDROMORPHONE HYDROCHLORIDE 2 MG/ML
1 INJECTION INTRAMUSCULAR; INTRAVENOUS; SUBCUTANEOUS
Refills: 0 | Status: DISCONTINUED | OUTPATIENT
Start: 2021-01-01 | End: 2021-01-01

## 2021-01-01 RX ORDER — MIDODRINE HYDROCHLORIDE 2.5 MG/1
40 TABLET ORAL EVERY 8 HOURS
Refills: 0 | Status: DISCONTINUED | OUTPATIENT
Start: 2021-01-01 | End: 2021-01-01

## 2021-01-01 RX ORDER — MEROPENEM 1 G/30ML
1000 INJECTION INTRAVENOUS EVERY 8 HOURS
Refills: 0 | Status: DISCONTINUED | OUTPATIENT
Start: 2021-01-01 | End: 2021-01-01

## 2021-01-01 RX ORDER — VASOPRESSIN 20 [USP'U]/ML
0.04 INJECTION INTRAVENOUS
Qty: 50 | Refills: 0 | Status: DISCONTINUED | OUTPATIENT
Start: 2021-01-01 | End: 2021-01-01

## 2021-01-01 RX ORDER — OMEPRAZOLE 10 MG/1
1 CAPSULE, DELAYED RELEASE ORAL
Qty: 0 | Refills: 0 | DISCHARGE

## 2021-01-01 RX ORDER — CISATRACURIUM BESYLATE 2 MG/ML
2 INJECTION INTRAVENOUS
Qty: 200 | Refills: 0 | Status: DISCONTINUED | OUTPATIENT
Start: 2021-01-01 | End: 2021-01-01

## 2021-01-01 RX ORDER — METFORMIN HYDROCHLORIDE 850 MG/1
1 TABLET ORAL
Qty: 0 | Refills: 0 | DISCHARGE

## 2021-01-01 RX ORDER — URSODIOL 250 MG/1
500 TABLET, FILM COATED ORAL
Refills: 0 | Status: DISCONTINUED | OUTPATIENT
Start: 2021-01-01 | End: 2021-01-01

## 2021-01-01 RX ORDER — VANCOMYCIN HCL 1 G
1000 VIAL (EA) INTRAVENOUS ONCE
Refills: 0 | Status: COMPLETED | OUTPATIENT
Start: 2021-01-01 | End: 2021-01-01

## 2021-01-01 RX ORDER — METHADONE HYDROCHLORIDE 40 MG/1
20 TABLET ORAL EVERY 8 HOURS
Refills: 0 | Status: DISCONTINUED | OUTPATIENT
Start: 2021-01-01 | End: 2021-01-01

## 2021-01-01 RX ORDER — VANCOMYCIN HCL 1 G
1000 VIAL (EA) INTRAVENOUS EVERY 12 HOURS
Refills: 0 | Status: DISCONTINUED | OUTPATIENT
Start: 2021-01-01 | End: 2021-01-01

## 2021-01-01 RX ORDER — VANCOMYCIN HCL 1 G
1500 VIAL (EA) INTRAVENOUS
Refills: 0 | Status: DISCONTINUED | OUTPATIENT
Start: 2021-01-01 | End: 2021-01-01

## 2021-01-01 RX ORDER — IBUPROFEN 200 MG
400 TABLET ORAL ONCE
Refills: 0 | Status: COMPLETED | OUTPATIENT
Start: 2021-01-01 | End: 2021-01-01

## 2021-01-01 RX ORDER — REMDESIVIR 5 MG/ML
100 INJECTION INTRAVENOUS EVERY 24 HOURS
Refills: 0 | Status: COMPLETED | OUTPATIENT
Start: 2021-01-01 | End: 2021-01-01

## 2021-01-01 RX ORDER — POTASSIUM CHLORIDE 20 MEQ
40 PACKET (EA) ORAL ONCE
Refills: 0 | Status: COMPLETED | OUTPATIENT
Start: 2021-01-01 | End: 2021-01-01

## 2021-01-01 RX ORDER — DEXMEDETOMIDINE HYDROCHLORIDE IN 0.9% SODIUM CHLORIDE 4 UG/ML
0.5 INJECTION INTRAVENOUS
Qty: 200 | Refills: 0 | Status: DISCONTINUED | OUTPATIENT
Start: 2021-01-01 | End: 2021-01-01

## 2021-01-01 RX ORDER — FENTANYL CITRATE 50 UG/ML
2 INJECTION INTRAVENOUS
Qty: 5000 | Refills: 0 | Status: DISCONTINUED | OUTPATIENT
Start: 2021-01-01 | End: 2021-01-01

## 2021-01-01 RX ORDER — POLYETHYLENE GLYCOL 3350 17 G/17G
17 POWDER, FOR SOLUTION ORAL DAILY
Refills: 0 | Status: DISCONTINUED | OUTPATIENT
Start: 2021-01-01 | End: 2021-01-01

## 2021-01-01 RX ORDER — DESMOPRESSIN ACETATE 0.2 MG/1
0.2 TABLET ORAL
Qty: 30 | Refills: 11 | Status: ACTIVE | COMMUNITY
Start: 2021-01-01 | End: 1900-01-01

## 2021-01-01 RX ORDER — SODIUM CHLORIDE 9 MG/ML
10 INJECTION INTRAMUSCULAR; INTRAVENOUS; SUBCUTANEOUS
Refills: 0 | Status: DISCONTINUED | OUTPATIENT
Start: 2021-01-01 | End: 2021-01-01

## 2021-01-01 RX ORDER — FUROSEMIDE 40 MG
20 TABLET ORAL DAILY
Refills: 0 | Status: DISCONTINUED | OUTPATIENT
Start: 2021-01-01 | End: 2021-01-01

## 2021-01-01 RX ORDER — PIPERACILLIN AND TAZOBACTAM 4; .5 G/20ML; G/20ML
3.38 INJECTION, POWDER, LYOPHILIZED, FOR SOLUTION INTRAVENOUS ONCE
Refills: 0 | Status: COMPLETED | OUTPATIENT
Start: 2021-01-01 | End: 2021-01-01

## 2021-01-01 RX ORDER — OXYBUTYNIN CHLORIDE 5 MG
10 TABLET ORAL DAILY
Refills: 0 | Status: DISCONTINUED | OUTPATIENT
Start: 2021-01-01 | End: 2021-01-01

## 2021-01-01 RX ORDER — ALBUMIN HUMAN 25 %
250 VIAL (ML) INTRAVENOUS ONCE
Refills: 0 | Status: COMPLETED | OUTPATIENT
Start: 2021-01-01 | End: 2021-01-01

## 2021-01-01 RX ORDER — BUMETANIDE 0.25 MG/ML
1 INJECTION INTRAMUSCULAR; INTRAVENOUS
Refills: 0 | Status: DISCONTINUED | OUTPATIENT
Start: 2021-01-01 | End: 2021-01-01

## 2021-01-01 RX ORDER — NOREPINEPHRINE BITARTRATE/D5W 8 MG/250ML
0.05 PLASTIC BAG, INJECTION (ML) INTRAVENOUS
Qty: 16 | Refills: 0 | Status: DISCONTINUED | OUTPATIENT
Start: 2021-01-01 | End: 2021-01-01

## 2021-01-01 RX ORDER — VANCOMYCIN HCL 1 G
500 VIAL (EA) INTRAVENOUS ONCE
Refills: 0 | Status: DISCONTINUED | OUTPATIENT
Start: 2021-01-01 | End: 2021-01-01

## 2021-01-01 RX ORDER — BUDESONIDE AND FORMOTEROL FUMARATE DIHYDRATE 160; 4.5 UG/1; UG/1
2 AEROSOL RESPIRATORY (INHALATION)
Qty: 0 | Refills: 0 | DISCHARGE

## 2021-01-01 RX ORDER — VANCOMYCIN HCL 1 G
500 VIAL (EA) INTRAVENOUS EVERY 12 HOURS
Refills: 0 | Status: DISCONTINUED | OUTPATIENT
Start: 2021-01-01 | End: 2021-01-01

## 2021-01-01 RX ORDER — MIDAZOLAM HYDROCHLORIDE 1 MG/ML
0.02 INJECTION, SOLUTION INTRAMUSCULAR; INTRAVENOUS
Qty: 100 | Refills: 0 | Status: DISCONTINUED | OUTPATIENT
Start: 2021-01-01 | End: 2021-01-01

## 2021-01-01 RX ORDER — MAGNESIUM SULFATE 500 MG/ML
2 VIAL (ML) INJECTION ONCE
Refills: 0 | Status: COMPLETED | OUTPATIENT
Start: 2021-01-01 | End: 2021-01-01

## 2021-01-01 RX ORDER — FUROSEMIDE 40 MG
40 TABLET ORAL ONCE
Refills: 0 | Status: COMPLETED | OUTPATIENT
Start: 2021-01-01 | End: 2021-01-01

## 2021-01-01 RX ORDER — FUROSEMIDE 40 MG
20 TABLET ORAL ONCE
Refills: 0 | Status: COMPLETED | OUTPATIENT
Start: 2021-01-01 | End: 2021-01-01

## 2021-01-01 RX ORDER — ATORVASTATIN CALCIUM 80 MG/1
20 TABLET, FILM COATED ORAL AT BEDTIME
Refills: 0 | Status: DISCONTINUED | OUTPATIENT
Start: 2021-01-01 | End: 2021-01-01

## 2021-01-01 RX ORDER — VANCOMYCIN HCL 1 G
VIAL (EA) INTRAVENOUS
Refills: 0 | Status: DISCONTINUED | OUTPATIENT
Start: 2021-01-01 | End: 2021-01-01

## 2021-01-01 RX ORDER — CALCIUM GLUCONATE 100 MG/ML
2 VIAL (ML) INTRAVENOUS ONCE
Refills: 0 | Status: COMPLETED | OUTPATIENT
Start: 2021-01-01 | End: 2021-01-01

## 2021-01-01 RX ORDER — FENTANYL CITRATE 50 UG/ML
200 INJECTION INTRAVENOUS ONCE
Refills: 0 | Status: DISCONTINUED | OUTPATIENT
Start: 2021-01-01 | End: 2021-01-01

## 2021-01-01 RX ORDER — NOREPINEPHRINE BITARTRATE/D5W 8 MG/250ML
0.05 PLASTIC BAG, INJECTION (ML) INTRAVENOUS
Qty: 8 | Refills: 0 | Status: DISCONTINUED | OUTPATIENT
Start: 2021-01-01 | End: 2021-01-01

## 2021-01-01 RX ORDER — ACETAMINOPHEN 500 MG
650 TABLET ORAL ONCE
Refills: 0 | Status: COMPLETED | OUTPATIENT
Start: 2021-01-01 | End: 2021-01-01

## 2021-01-01 RX ORDER — FUROSEMIDE 40 MG
20 TABLET ORAL ONCE
Refills: 0 | Status: DISCONTINUED | OUTPATIENT
Start: 2021-01-01 | End: 2021-01-01

## 2021-01-01 RX ORDER — MONTELUKAST 4 MG/1
10 TABLET, CHEWABLE ORAL DAILY
Refills: 0 | Status: DISCONTINUED | OUTPATIENT
Start: 2021-01-01 | End: 2021-01-01

## 2021-01-01 RX ORDER — FENTANYL CITRATE 50 UG/ML
100 INJECTION INTRAVENOUS ONCE
Refills: 0 | Status: DISCONTINUED | OUTPATIENT
Start: 2021-01-01 | End: 2021-01-01

## 2021-01-01 RX ORDER — SODIUM CHLORIDE 9 MG/ML
1000 INJECTION, SOLUTION INTRAVENOUS
Refills: 0 | Status: DISCONTINUED | OUTPATIENT
Start: 2021-01-01 | End: 2021-01-01

## 2021-01-01 RX ORDER — SODIUM CHLORIDE 9 MG/ML
3 INJECTION INTRAMUSCULAR; INTRAVENOUS; SUBCUTANEOUS EVERY 6 HOURS
Refills: 0 | Status: DISCONTINUED | OUTPATIENT
Start: 2021-01-01 | End: 2021-01-01

## 2021-01-01 RX ORDER — DESIPRAMINE 10 MG/1
10 TABLET, FILM COATED ORAL
Qty: 60 | Refills: 11 | Status: ACTIVE | COMMUNITY
Start: 2021-01-01 | End: 1900-01-01

## 2021-01-01 RX ORDER — CEFEPIME 1 G/1
2000 INJECTION, POWDER, FOR SOLUTION INTRAMUSCULAR; INTRAVENOUS EVERY 8 HOURS
Refills: 0 | Status: DISCONTINUED | OUTPATIENT
Start: 2021-01-01 | End: 2021-01-01

## 2021-01-01 RX ORDER — ACETAMINOPHEN 500 MG
650 TABLET ORAL EVERY 6 HOURS
Refills: 0 | Status: DISCONTINUED | OUTPATIENT
Start: 2021-01-01 | End: 2021-01-01

## 2021-01-01 RX ORDER — SODIUM CHLORIDE 9 MG/ML
1000 INJECTION INTRAMUSCULAR; INTRAVENOUS; SUBCUTANEOUS ONCE
Refills: 0 | Status: COMPLETED | OUTPATIENT
Start: 2021-01-01 | End: 2021-01-01

## 2021-01-01 RX ORDER — PHENYLEPHRINE HYDROCHLORIDE 10 MG/ML
0.5 INJECTION INTRAVENOUS
Qty: 160 | Refills: 0 | Status: DISCONTINUED | OUTPATIENT
Start: 2021-01-01 | End: 2021-01-01

## 2021-01-01 RX ORDER — METHADONE HYDROCHLORIDE 40 MG/1
30 TABLET ORAL EVERY 8 HOURS
Refills: 0 | Status: DISCONTINUED | OUTPATIENT
Start: 2021-01-01 | End: 2021-01-01

## 2021-01-01 RX ORDER — VANCOMYCIN HCL 1 G
750 VIAL (EA) INTRAVENOUS ONCE
Refills: 0 | Status: COMPLETED | OUTPATIENT
Start: 2021-01-01 | End: 2021-01-01

## 2021-01-01 RX ORDER — FENTANYL CITRATE 50 UG/ML
50 INJECTION INTRAVENOUS ONCE
Refills: 0 | Status: DISCONTINUED | OUTPATIENT
Start: 2021-01-01 | End: 2021-01-01

## 2021-01-01 RX ORDER — METHADONE HYDROCHLORIDE 40 MG/1
10 TABLET ORAL EVERY 8 HOURS
Refills: 0 | Status: DISCONTINUED | OUTPATIENT
Start: 2021-01-01 | End: 2021-01-01

## 2021-01-01 RX ORDER — HEPARIN SODIUM 5000 [USP'U]/ML
5000 INJECTION INTRAVENOUS; SUBCUTANEOUS EVERY 12 HOURS
Refills: 0 | Status: DISCONTINUED | OUTPATIENT
Start: 2021-01-01 | End: 2021-01-01

## 2021-01-01 RX ORDER — ALBUTEROL 90 UG/1
2 AEROSOL, METERED ORAL EVERY 8 HOURS
Refills: 0 | Status: DISCONTINUED | OUTPATIENT
Start: 2021-01-01 | End: 2021-01-01

## 2021-01-01 RX ORDER — DIAZEPAM 5 MG
10 TABLET ORAL EVERY 8 HOURS
Refills: 0 | Status: DISCONTINUED | OUTPATIENT
Start: 2021-01-01 | End: 2021-01-01

## 2021-01-01 RX ORDER — PANTOPRAZOLE SODIUM 20 MG/1
40 TABLET, DELAYED RELEASE ORAL EVERY 12 HOURS
Refills: 0 | Status: DISCONTINUED | OUTPATIENT
Start: 2021-01-01 | End: 2021-01-01

## 2021-01-01 RX ORDER — MIDAZOLAM HYDROCHLORIDE 1 MG/ML
4 INJECTION, SOLUTION INTRAMUSCULAR; INTRAVENOUS ONCE
Refills: 0 | Status: DISCONTINUED | OUTPATIENT
Start: 2021-01-01 | End: 2021-01-01

## 2021-01-01 RX ORDER — METOCLOPRAMIDE HCL 10 MG
5 TABLET ORAL DAILY
Refills: 0 | Status: DISCONTINUED | OUTPATIENT
Start: 2021-01-01 | End: 2021-01-01

## 2021-01-01 RX ORDER — CISATRACURIUM BESYLATE 2 MG/ML
3 INJECTION INTRAVENOUS
Qty: 200 | Refills: 0 | Status: DISCONTINUED | OUTPATIENT
Start: 2021-01-01 | End: 2021-01-01

## 2021-01-01 RX ORDER — MULTIVIT-MIN/FERROUS GLUCONATE 9 MG/15 ML
15 LIQUID (ML) ORAL DAILY
Refills: 0 | Status: DISCONTINUED | OUTPATIENT
Start: 2021-01-01 | End: 2021-01-01

## 2021-01-01 RX ORDER — HYDROMORPHONE HYDROCHLORIDE 2 MG/ML
0.5 INJECTION INTRAMUSCULAR; INTRAVENOUS; SUBCUTANEOUS EVERY 4 HOURS
Refills: 0 | Status: DISCONTINUED | OUTPATIENT
Start: 2021-01-01 | End: 2021-01-01

## 2021-01-01 RX ORDER — ALBUTEROL 90 UG/1
2 AEROSOL, METERED ORAL EVERY 6 HOURS
Refills: 0 | Status: DISCONTINUED | OUTPATIENT
Start: 2021-01-01 | End: 2021-01-01

## 2021-01-01 RX ORDER — AMPICILLIN SODIUM AND SULBACTAM SODIUM 250; 125 MG/ML; MG/ML
INJECTION, POWDER, FOR SUSPENSION INTRAMUSCULAR; INTRAVENOUS
Refills: 0 | Status: COMPLETED | OUTPATIENT
Start: 2021-01-01 | End: 2021-01-01

## 2021-01-01 RX ORDER — HYDROMORPHONE HYDROCHLORIDE 2 MG/ML
0.5 INJECTION INTRAMUSCULAR; INTRAVENOUS; SUBCUTANEOUS ONCE
Refills: 0 | Status: DISCONTINUED | OUTPATIENT
Start: 2021-01-01 | End: 2021-01-01

## 2021-01-01 RX ORDER — FENTANYL CITRATE 50 UG/ML
25 INJECTION INTRAVENOUS ONCE
Refills: 0 | Status: DISCONTINUED | OUTPATIENT
Start: 2021-01-01 | End: 2021-01-01

## 2021-01-01 RX ORDER — HUMAN INSULIN 100 [IU]/ML
6 INJECTION, SUSPENSION SUBCUTANEOUS EVERY 6 HOURS
Refills: 0 | Status: DISCONTINUED | OUTPATIENT
Start: 2021-01-01 | End: 2021-01-01

## 2021-01-01 RX ORDER — VANCOMYCIN HCL 1 G
750 VIAL (EA) INTRAVENOUS EVERY 12 HOURS
Refills: 0 | Status: DISCONTINUED | OUTPATIENT
Start: 2021-01-01 | End: 2021-01-01

## 2021-01-01 RX ORDER — CHLORHEXIDINE GLUCONATE 213 G/1000ML
15 SOLUTION TOPICAL EVERY 12 HOURS
Refills: 0 | Status: DISCONTINUED | OUTPATIENT
Start: 2021-01-01 | End: 2021-01-01

## 2021-01-01 RX ORDER — DAPTOMYCIN 500 MG/10ML
700 INJECTION, POWDER, LYOPHILIZED, FOR SOLUTION INTRAVENOUS ONCE
Refills: 0 | Status: COMPLETED | OUTPATIENT
Start: 2021-01-01 | End: 2021-01-01

## 2021-01-01 RX ORDER — TAMSULOSIN HYDROCHLORIDE 0.4 MG/1
1 CAPSULE ORAL
Qty: 0 | Refills: 0 | DISCHARGE

## 2021-01-01 RX ORDER — PHENYLEPHRINE HYDROCHLORIDE 10 MG/ML
2 INJECTION INTRAVENOUS
Qty: 160 | Refills: 0 | Status: DISCONTINUED | OUTPATIENT
Start: 2021-01-01 | End: 2021-01-01

## 2021-01-01 RX ORDER — FINASTERIDE 5 MG/1
1 TABLET, FILM COATED ORAL
Qty: 0 | Refills: 0 | DISCHARGE

## 2021-01-01 RX ORDER — HUMAN INSULIN 100 [IU]/ML
8 INJECTION, SUSPENSION SUBCUTANEOUS ONCE
Refills: 0 | Status: COMPLETED | OUTPATIENT
Start: 2021-01-01 | End: 2021-01-01

## 2021-01-01 RX ORDER — DEXTROSE 50 % IN WATER 50 %
25 SYRINGE (ML) INTRAVENOUS ONCE
Refills: 0 | Status: DISCONTINUED | OUTPATIENT
Start: 2021-01-01 | End: 2021-01-01

## 2021-01-01 RX ORDER — BUMETANIDE 0.25 MG/ML
1 INJECTION INTRAMUSCULAR; INTRAVENOUS
Qty: 20 | Refills: 0 | Status: DISCONTINUED | OUTPATIENT
Start: 2021-01-01 | End: 2021-01-01

## 2021-01-01 RX ORDER — AMIODARONE HYDROCHLORIDE 400 MG/1
0.5 TABLET ORAL
Qty: 900 | Refills: 0 | Status: COMPLETED | OUTPATIENT
Start: 2021-01-01 | End: 2021-01-01

## 2021-01-01 RX ORDER — HALOPERIDOL DECANOATE 100 MG/ML
1 INJECTION INTRAMUSCULAR ONCE
Refills: 0 | Status: COMPLETED | OUTPATIENT
Start: 2021-01-01 | End: 2021-01-01

## 2021-01-01 RX ORDER — HUMAN INSULIN 100 [IU]/ML
10 INJECTION, SUSPENSION SUBCUTANEOUS EVERY 6 HOURS
Refills: 0 | Status: DISCONTINUED | OUTPATIENT
Start: 2021-01-01 | End: 2021-01-01

## 2021-01-01 RX ORDER — POTASSIUM CHLORIDE 20 MEQ
20 PACKET (EA) ORAL ONCE
Refills: 0 | Status: COMPLETED | OUTPATIENT
Start: 2021-01-01 | End: 2021-01-01

## 2021-01-01 RX ORDER — ACETAMINOPHEN 500 MG
650 TABLET ORAL EVERY 4 HOURS
Refills: 0 | Status: DISCONTINUED | OUTPATIENT
Start: 2021-01-01 | End: 2021-01-01

## 2021-01-01 RX ORDER — MONTELUKAST 4 MG/1
1 TABLET, CHEWABLE ORAL
Qty: 0 | Refills: 0 | DISCHARGE

## 2021-01-01 RX ORDER — ACETAMINOPHEN 500 MG
1000 TABLET ORAL ONCE
Refills: 0 | Status: COMPLETED | OUTPATIENT
Start: 2021-01-01 | End: 2021-01-01

## 2021-01-01 RX ORDER — POTASSIUM PHOSPHATE, MONOBASIC POTASSIUM PHOSPHATE, DIBASIC 236; 224 MG/ML; MG/ML
15 INJECTION, SOLUTION INTRAVENOUS ONCE
Refills: 0 | Status: COMPLETED | OUTPATIENT
Start: 2021-01-01 | End: 2021-01-01

## 2021-01-01 RX ORDER — PROPOFOL 10 MG/ML
5 INJECTION, EMULSION INTRAVENOUS
Qty: 500 | Refills: 0 | Status: DISCONTINUED | OUTPATIENT
Start: 2021-01-01 | End: 2021-01-01

## 2021-01-01 RX ORDER — SODIUM,POTASSIUM PHOSPHATES 278-250MG
2 POWDER IN PACKET (EA) ORAL ONCE
Refills: 0 | Status: DISCONTINUED | OUTPATIENT
Start: 2021-01-01 | End: 2021-01-01

## 2021-01-01 RX ORDER — HUMAN INSULIN 100 [IU]/ML
5 INJECTION, SUSPENSION SUBCUTANEOUS EVERY 6 HOURS
Refills: 0 | Status: DISCONTINUED | OUTPATIENT
Start: 2021-01-01 | End: 2021-01-01

## 2021-01-01 RX ORDER — SODIUM CHLORIDE 9 MG/ML
5 INJECTION INTRAMUSCULAR; INTRAVENOUS; SUBCUTANEOUS EVERY 6 HOURS
Refills: 0 | Status: DISCONTINUED | OUTPATIENT
Start: 2021-01-01 | End: 2021-01-01

## 2021-01-01 RX ORDER — HEPARIN SODIUM 5000 [USP'U]/ML
950 INJECTION INTRAVENOUS; SUBCUTANEOUS
Qty: 25000 | Refills: 0 | Status: DISCONTINUED | OUTPATIENT
Start: 2021-01-01 | End: 2021-01-01

## 2021-01-01 RX ORDER — METHADONE HYDROCHLORIDE 40 MG/1
5 TABLET ORAL
Refills: 0 | Status: DISCONTINUED | OUTPATIENT
Start: 2021-01-01 | End: 2021-01-01

## 2021-01-01 RX ORDER — OXYBUTYNIN CHLORIDE 5 MG
1 TABLET ORAL
Qty: 0 | Refills: 0 | DISCHARGE

## 2021-01-01 RX ORDER — LACTULOSE 10 G/15ML
10 SOLUTION ORAL EVERY 6 HOURS
Refills: 0 | Status: DISCONTINUED | OUTPATIENT
Start: 2021-01-01 | End: 2021-01-01

## 2021-01-01 RX ORDER — FINASTERIDE 5 MG/1
5 TABLET, FILM COATED ORAL DAILY
Qty: 90 | Refills: 3 | Status: ACTIVE | COMMUNITY
Start: 2018-11-05 | End: 1900-01-01

## 2021-01-01 RX ORDER — MIDODRINE HYDROCHLORIDE 2.5 MG/1
20 TABLET ORAL EVERY 8 HOURS
Refills: 0 | Status: DISCONTINUED | OUTPATIENT
Start: 2021-01-01 | End: 2021-01-01

## 2021-01-01 RX ORDER — CISATRACURIUM BESYLATE 2 MG/ML
10 INJECTION INTRAVENOUS ONCE
Refills: 0 | Status: COMPLETED | OUTPATIENT
Start: 2021-01-01 | End: 2021-01-01

## 2021-01-01 RX ORDER — AMPICILLIN SODIUM AND SULBACTAM SODIUM 250; 125 MG/ML; MG/ML
3 INJECTION, POWDER, FOR SUSPENSION INTRAMUSCULAR; INTRAVENOUS EVERY 6 HOURS
Refills: 0 | Status: COMPLETED | OUTPATIENT
Start: 2021-01-01 | End: 2021-01-01

## 2021-01-01 RX ORDER — TAMSULOSIN HYDROCHLORIDE 0.4 MG/1
0.4 CAPSULE ORAL
Refills: 0 | Status: DISCONTINUED | OUTPATIENT
Start: 2021-01-01 | End: 2021-01-01

## 2021-01-01 RX ORDER — VANCOMYCIN HCL 1 G
1200 VIAL (EA) INTRAVENOUS EVERY 12 HOURS
Refills: 0 | Status: DISCONTINUED | OUTPATIENT
Start: 2021-01-01 | End: 2021-01-01

## 2021-01-01 RX ORDER — REMDESIVIR 5 MG/ML
200 INJECTION INTRAVENOUS EVERY 24 HOURS
Refills: 0 | Status: COMPLETED | OUTPATIENT
Start: 2021-01-01 | End: 2021-01-01

## 2021-01-01 RX ORDER — PANTOPRAZOLE SODIUM 20 MG/1
40 TABLET, DELAYED RELEASE ORAL DAILY
Refills: 0 | Status: DISCONTINUED | OUTPATIENT
Start: 2021-01-01 | End: 2021-01-01

## 2021-01-01 RX ORDER — HUMAN INSULIN 100 [IU]/ML
8 INJECTION, SUSPENSION SUBCUTANEOUS EVERY 6 HOURS
Refills: 0 | Status: DISCONTINUED | OUTPATIENT
Start: 2021-01-01 | End: 2021-01-01

## 2021-01-01 RX ORDER — BUMETANIDE 0.25 MG/ML
0.5 INJECTION INTRAMUSCULAR; INTRAVENOUS
Qty: 20 | Refills: 0 | Status: DISCONTINUED | OUTPATIENT
Start: 2021-01-01 | End: 2021-01-01

## 2021-01-01 RX ORDER — AMLODIPINE BESYLATE 2.5 MG/1
5 TABLET ORAL DAILY
Refills: 0 | Status: DISCONTINUED | OUTPATIENT
Start: 2021-01-01 | End: 2021-01-01

## 2021-01-01 RX ORDER — MIDODRINE HYDROCHLORIDE 2.5 MG/1
10 TABLET ORAL EVERY 8 HOURS
Refills: 0 | Status: DISCONTINUED | OUTPATIENT
Start: 2021-01-01 | End: 2021-01-01

## 2021-01-01 RX ORDER — AMIODARONE HYDROCHLORIDE 400 MG/1
150 TABLET ORAL ONCE
Refills: 0 | Status: COMPLETED | OUTPATIENT
Start: 2021-01-01 | End: 2021-01-01

## 2021-01-01 RX ORDER — NALOXEGOL OXALATE 12.5 MG/1
25 TABLET, FILM COATED ORAL DAILY
Refills: 0 | Status: DISCONTINUED | OUTPATIENT
Start: 2021-01-01 | End: 2021-01-01

## 2021-01-01 RX ORDER — DEXTROSE 50 % IN WATER 50 %
12.5 SYRINGE (ML) INTRAVENOUS ONCE
Refills: 0 | Status: DISCONTINUED | OUTPATIENT
Start: 2021-01-01 | End: 2021-01-01

## 2021-01-01 RX ORDER — CHLORHEXIDINE GLUCONATE 213 G/1000ML
1 SOLUTION TOPICAL
Refills: 0 | Status: DISCONTINUED | OUTPATIENT
Start: 2021-01-01 | End: 2021-01-01

## 2021-01-01 RX ORDER — AMPICILLIN SODIUM AND SULBACTAM SODIUM 250; 125 MG/ML; MG/ML
3 INJECTION, POWDER, FOR SUSPENSION INTRAMUSCULAR; INTRAVENOUS ONCE
Refills: 0 | Status: COMPLETED | OUTPATIENT
Start: 2021-01-01 | End: 2021-01-01

## 2021-01-01 RX ORDER — SODIUM,POTASSIUM PHOSPHATES 278-250MG
2 POWDER IN PACKET (EA) ORAL ONCE
Refills: 0 | Status: COMPLETED | OUTPATIENT
Start: 2021-01-01 | End: 2021-01-01

## 2021-01-01 RX ORDER — HUMAN INSULIN 100 [IU]/ML
6 INJECTION, SUSPENSION SUBCUTANEOUS ONCE
Refills: 0 | Status: COMPLETED | OUTPATIENT
Start: 2021-01-01 | End: 2021-01-01

## 2021-01-01 RX ORDER — HUMAN INSULIN 100 [IU]/ML
16 INJECTION, SUSPENSION SUBCUTANEOUS EVERY 6 HOURS
Refills: 0 | Status: DISCONTINUED | OUTPATIENT
Start: 2021-01-01 | End: 2021-01-01

## 2021-01-01 RX ORDER — CHOLECALCIFEROL (VITAMIN D3) 125 MCG
2000 CAPSULE ORAL DAILY
Refills: 0 | Status: DISCONTINUED | OUTPATIENT
Start: 2021-01-01 | End: 2021-01-01

## 2021-01-01 RX ORDER — ACETAZOLAMIDE 250 MG/1
250 TABLET ORAL ONCE
Refills: 0 | Status: COMPLETED | OUTPATIENT
Start: 2021-01-01 | End: 2021-01-01

## 2021-01-01 RX ORDER — HALOPERIDOL DECANOATE 100 MG/ML
0.5 INJECTION INTRAMUSCULAR ONCE
Refills: 0 | Status: COMPLETED | OUTPATIENT
Start: 2021-01-01 | End: 2021-01-01

## 2021-01-01 RX ORDER — REMDESIVIR 5 MG/ML
INJECTION INTRAVENOUS
Refills: 0 | Status: COMPLETED | OUTPATIENT
Start: 2021-01-01 | End: 2021-01-01

## 2021-01-01 RX ORDER — DIAZEPAM 5 MG
15 TABLET ORAL EVERY 8 HOURS
Refills: 0 | Status: DISCONTINUED | OUTPATIENT
Start: 2021-01-01 | End: 2021-01-01

## 2021-01-01 RX ORDER — PANTOPRAZOLE SODIUM 20 MG/1
40 TABLET, DELAYED RELEASE ORAL
Refills: 0 | Status: DISCONTINUED | OUTPATIENT
Start: 2021-01-01 | End: 2021-01-01

## 2021-01-01 RX ORDER — MIRABEGRON 25 MG/1
25 TABLET, FILM COATED, EXTENDED RELEASE ORAL
Qty: 30 | Refills: 11 | Status: COMPLETED | COMMUNITY
Start: 2020-01-01 | End: 2021-01-01

## 2021-01-01 RX ORDER — FENTANYL CITRATE 50 UG/ML
50 INJECTION INTRAVENOUS EVERY 4 HOURS
Refills: 0 | Status: DISCONTINUED | OUTPATIENT
Start: 2021-01-01 | End: 2021-01-01

## 2021-01-01 RX ORDER — METOPROLOL TARTRATE 50 MG
5 TABLET ORAL ONCE
Refills: 0 | Status: COMPLETED | OUTPATIENT
Start: 2021-01-01 | End: 2021-01-01

## 2021-01-01 RX ORDER — METHADONE HYDROCHLORIDE 40 MG/1
30 TABLET ORAL ONCE
Refills: 0 | Status: DISCONTINUED | OUTPATIENT
Start: 2021-01-01 | End: 2021-01-01

## 2021-01-01 RX ORDER — HUMAN INSULIN 100 [IU]/ML
2 INJECTION, SUSPENSION SUBCUTANEOUS EVERY 6 HOURS
Refills: 0 | Status: DISCONTINUED | OUTPATIENT
Start: 2021-01-01 | End: 2021-01-01

## 2021-01-01 RX ORDER — METOPROLOL TARTRATE 50 MG
25 TABLET ORAL ONCE
Refills: 0 | Status: COMPLETED | OUTPATIENT
Start: 2021-01-01 | End: 2021-01-01

## 2021-01-01 RX ORDER — BUMETANIDE 0.25 MG/ML
1 INJECTION INTRAMUSCULAR; INTRAVENOUS ONCE
Refills: 0 | Status: COMPLETED | OUTPATIENT
Start: 2021-01-01 | End: 2021-01-01

## 2021-01-01 RX ORDER — VANCOMYCIN HCL 1 G
1000 VIAL (EA) INTRAVENOUS
Refills: 0 | Status: DISCONTINUED | OUTPATIENT
Start: 2021-01-01 | End: 2021-01-01

## 2021-01-01 RX ORDER — CISATRACURIUM BESYLATE 2 MG/ML
16 INJECTION INTRAVENOUS ONCE
Refills: 0 | Status: COMPLETED | OUTPATIENT
Start: 2021-01-01 | End: 2021-01-01

## 2021-01-01 RX ORDER — BUMETANIDE 0.25 MG/ML
2 INJECTION INTRAMUSCULAR; INTRAVENOUS EVERY 8 HOURS
Refills: 0 | Status: DISCONTINUED | OUTPATIENT
Start: 2021-01-01 | End: 2021-01-01

## 2021-01-01 RX ORDER — HUMAN INSULIN 100 [IU]/ML
14 INJECTION, SUSPENSION SUBCUTANEOUS EVERY 6 HOURS
Refills: 0 | Status: DISCONTINUED | OUTPATIENT
Start: 2021-01-01 | End: 2021-01-01

## 2021-01-01 RX ORDER — DESIPRAMINE HYDROCHLORIDE 100 MG/1
1 TABLET ORAL
Qty: 0 | Refills: 0 | DISCHARGE

## 2021-01-01 RX ORDER — MULTIVIT-MIN/FERROUS GLUCONATE 9 MG/15 ML
30 LIQUID (ML) ORAL DAILY
Refills: 0 | Status: DISCONTINUED | OUTPATIENT
Start: 2021-01-01 | End: 2021-01-01

## 2021-01-01 RX ORDER — IPRATROPIUM/ALBUTEROL SULFATE 18-103MCG
3 AEROSOL WITH ADAPTER (GRAM) INHALATION EVERY 6 HOURS
Refills: 0 | Status: DISCONTINUED | OUTPATIENT
Start: 2021-01-01 | End: 2021-01-01

## 2021-01-01 RX ORDER — DEXAMETHASONE 0.5 MG/5ML
6 ELIXIR ORAL DAILY
Refills: 0 | Status: COMPLETED | OUTPATIENT
Start: 2021-01-01 | End: 2021-01-01

## 2021-01-01 RX ORDER — PHENYLEPHRINE HYDROCHLORIDE 10 MG/ML
4 INJECTION INTRAVENOUS
Qty: 40 | Refills: 0 | Status: DISCONTINUED | OUTPATIENT
Start: 2021-01-01 | End: 2021-01-01

## 2021-01-01 RX ORDER — FINASTERIDE 5 MG/1
5 TABLET, FILM COATED ORAL DAILY
Refills: 0 | Status: DISCONTINUED | OUTPATIENT
Start: 2021-01-01 | End: 2021-01-01

## 2021-01-01 RX ORDER — DEXAMETHASONE 0.5 MG/5ML
6 ELIXIR ORAL ONCE
Refills: 0 | Status: COMPLETED | OUTPATIENT
Start: 2021-01-01 | End: 2021-01-01

## 2021-01-01 RX ORDER — INSULIN LISPRO 100/ML
5 VIAL (ML) SUBCUTANEOUS
Refills: 0 | Status: DISCONTINUED | OUTPATIENT
Start: 2021-01-01 | End: 2021-01-01

## 2021-01-01 RX ORDER — BUMETANIDE 0.25 MG/ML
2 INJECTION INTRAMUSCULAR; INTRAVENOUS
Refills: 0 | Status: DISCONTINUED | OUTPATIENT
Start: 2021-01-01 | End: 2021-01-01

## 2021-01-01 RX ORDER — ACETAZOLAMIDE 250 MG/1
500 TABLET ORAL ONCE
Refills: 0 | Status: COMPLETED | OUTPATIENT
Start: 2021-01-01 | End: 2021-01-01

## 2021-01-01 RX ORDER — ATORVASTATIN CALCIUM 80 MG/1
1 TABLET, FILM COATED ORAL
Qty: 0 | Refills: 0 | DISCHARGE

## 2021-01-01 RX ORDER — NOREPINEPHRINE BITARTRATE/D5W 8 MG/250ML
0.02 PLASTIC BAG, INJECTION (ML) INTRAVENOUS
Qty: 8 | Refills: 0 | Status: DISCONTINUED | OUTPATIENT
Start: 2021-01-01 | End: 2021-01-01

## 2021-01-01 RX ORDER — PIPERACILLIN AND TAZOBACTAM 4; .5 G/20ML; G/20ML
3.38 INJECTION, POWDER, LYOPHILIZED, FOR SOLUTION INTRAVENOUS EVERY 8 HOURS
Refills: 0 | Status: DISCONTINUED | OUTPATIENT
Start: 2021-01-01 | End: 2021-01-01

## 2021-01-01 RX ORDER — FLUTICASONE PROPIONATE 50 MCG
2 SPRAY, SUSPENSION NASAL
Qty: 0 | Refills: 0 | DISCHARGE

## 2021-01-01 RX ORDER — ENOXAPARIN SODIUM 100 MG/ML
40 INJECTION SUBCUTANEOUS DAILY
Refills: 0 | Status: DISCONTINUED | OUTPATIENT
Start: 2021-01-01 | End: 2021-01-01

## 2021-01-01 RX ORDER — TAMSULOSIN HYDROCHLORIDE 0.4 MG/1
0.4 CAPSULE ORAL
Qty: 30 | Refills: 11 | Status: COMPLETED | COMMUNITY
Start: 2018-10-30 | End: 2021-01-01

## 2021-01-01 RX ORDER — HYDROMORPHONE HYDROCHLORIDE 2 MG/ML
1 INJECTION INTRAMUSCULAR; INTRAVENOUS; SUBCUTANEOUS EVERY 4 HOURS
Refills: 0 | Status: DISCONTINUED | OUTPATIENT
Start: 2021-01-01 | End: 2021-01-01

## 2021-01-01 RX ORDER — GLUCAGON INJECTION, SOLUTION 0.5 MG/.1ML
1 INJECTION, SOLUTION SUBCUTANEOUS ONCE
Refills: 0 | Status: DISCONTINUED | OUTPATIENT
Start: 2021-01-01 | End: 2021-01-01

## 2021-01-01 RX ORDER — HUMAN INSULIN 100 [IU]/ML
5 INJECTION, SUSPENSION SUBCUTANEOUS ONCE
Refills: 0 | Status: COMPLETED | OUTPATIENT
Start: 2021-01-01 | End: 2021-01-01

## 2021-01-01 RX ORDER — KETAMINE HYDROCHLORIDE 100 MG/ML
2 INJECTION INTRAMUSCULAR; INTRAVENOUS
Qty: 1000 | Refills: 0 | Status: DISCONTINUED | OUTPATIENT
Start: 2021-01-01 | End: 2021-01-01

## 2021-01-01 RX ORDER — FENTANYL CITRATE 50 UG/ML
25 INJECTION INTRAVENOUS EVERY 4 HOURS
Refills: 0 | Status: DISCONTINUED | OUTPATIENT
Start: 2021-01-01 | End: 2021-01-01

## 2021-01-01 RX ORDER — INSULIN GLARGINE 100 [IU]/ML
20 INJECTION, SOLUTION SUBCUTANEOUS AT BEDTIME
Refills: 0 | Status: DISCONTINUED | OUTPATIENT
Start: 2021-01-01 | End: 2021-01-01

## 2021-01-01 RX ORDER — SENNA PLUS 8.6 MG/1
10 TABLET ORAL AT BEDTIME
Refills: 0 | Status: DISCONTINUED | OUTPATIENT
Start: 2021-01-01 | End: 2021-01-01

## 2021-01-01 RX ORDER — HUMAN INSULIN 100 [IU]/ML
4 INJECTION, SUSPENSION SUBCUTANEOUS EVERY 6 HOURS
Refills: 0 | Status: DISCONTINUED | OUTPATIENT
Start: 2021-01-01 | End: 2021-01-01

## 2021-01-01 RX ORDER — DAPTOMYCIN 500 MG/10ML
700 INJECTION, POWDER, LYOPHILIZED, FOR SOLUTION INTRAVENOUS EVERY 24 HOURS
Refills: 0 | Status: DISCONTINUED | OUTPATIENT
Start: 2021-01-01 | End: 2021-01-01

## 2021-01-01 RX ORDER — INSULIN GLARGINE 100 [IU]/ML
15 INJECTION, SOLUTION SUBCUTANEOUS AT BEDTIME
Refills: 0 | Status: DISCONTINUED | OUTPATIENT
Start: 2021-01-01 | End: 2021-01-01

## 2021-01-01 RX ORDER — HALOPERIDOL DECANOATE 100 MG/ML
1 INJECTION INTRAMUSCULAR EVERY 6 HOURS
Refills: 0 | Status: DISCONTINUED | OUTPATIENT
Start: 2021-01-01 | End: 2021-01-01

## 2021-01-01 RX ORDER — SODIUM CHLORIDE 9 MG/ML
500 INJECTION, SOLUTION INTRAVENOUS ONCE
Refills: 0 | Status: COMPLETED | OUTPATIENT
Start: 2021-01-01 | End: 2021-01-01

## 2021-01-01 RX ORDER — ALBUMIN HUMAN 25 %
100 VIAL (ML) INTRAVENOUS ONCE
Refills: 0 | Status: DISCONTINUED | OUTPATIENT
Start: 2021-01-01 | End: 2021-01-01

## 2021-01-01 RX ORDER — QUETIAPINE FUMARATE 200 MG/1
25 TABLET, FILM COATED ORAL EVERY 12 HOURS
Refills: 0 | Status: DISCONTINUED | OUTPATIENT
Start: 2021-01-01 | End: 2021-01-01

## 2021-01-01 RX ORDER — INSULIN LISPRO 100/ML
VIAL (ML) SUBCUTANEOUS AT BEDTIME
Refills: 0 | Status: DISCONTINUED | OUTPATIENT
Start: 2021-01-01 | End: 2021-01-01

## 2021-01-01 RX ORDER — METHADONE HYDROCHLORIDE 40 MG/1
5 TABLET ORAL EVERY 12 HOURS
Refills: 0 | Status: DISCONTINUED | OUTPATIENT
Start: 2021-01-01 | End: 2021-01-01

## 2021-01-01 RX ORDER — HEPARIN SODIUM 5000 [USP'U]/ML
11 INJECTION INTRAVENOUS; SUBCUTANEOUS
Qty: 25000 | Refills: 0 | Status: DISCONTINUED | OUTPATIENT
Start: 2021-01-01 | End: 2021-01-01

## 2021-01-01 RX ORDER — BACLOFEN 100 %
10 POWDER (GRAM) MISCELLANEOUS EVERY 8 HOURS
Refills: 0 | Status: DISCONTINUED | OUTPATIENT
Start: 2021-01-01 | End: 2021-01-01

## 2021-01-01 RX ORDER — VANCOMYCIN HCL 1 G
1000 VIAL (EA) INTRAVENOUS EVERY 24 HOURS
Refills: 0 | Status: DISCONTINUED | OUTPATIENT
Start: 2021-01-01 | End: 2021-01-01

## 2021-01-01 RX ORDER — ENOXAPARIN SODIUM 100 MG/ML
80 INJECTION SUBCUTANEOUS EVERY 12 HOURS
Refills: 0 | Status: DISCONTINUED | OUTPATIENT
Start: 2021-01-01 | End: 2021-01-01

## 2021-01-01 RX ORDER — ALBUTEROL 90 UG/1
2 AEROSOL, METERED ORAL ONCE
Refills: 0 | Status: COMPLETED | OUTPATIENT
Start: 2021-01-01 | End: 2021-01-01

## 2021-01-01 RX ORDER — PHENYLEPHRINE HYDROCHLORIDE 10 MG/ML
0.4 INJECTION INTRAVENOUS
Qty: 40 | Refills: 0 | Status: DISCONTINUED | OUTPATIENT
Start: 2021-01-01 | End: 2021-01-01

## 2021-01-01 RX ORDER — FUROSEMIDE 40 MG
40 TABLET ORAL DAILY
Refills: 0 | Status: COMPLETED | OUTPATIENT
Start: 2021-01-01 | End: 2021-01-01

## 2021-01-01 RX ORDER — HUMAN INSULIN 100 [IU]/ML
7 INJECTION, SUSPENSION SUBCUTANEOUS EVERY 6 HOURS
Refills: 0 | Status: DISCONTINUED | OUTPATIENT
Start: 2021-01-01 | End: 2021-01-01

## 2021-01-01 RX ORDER — POTASSIUM PHOSPHATE, MONOBASIC POTASSIUM PHOSPHATE, DIBASIC 236; 224 MG/ML; MG/ML
30 INJECTION, SOLUTION INTRAVENOUS ONCE
Refills: 0 | Status: COMPLETED | OUTPATIENT
Start: 2021-01-01 | End: 2021-01-01

## 2021-01-01 RX ORDER — AMLODIPINE AND VALSARTAN 5; 320 MG/1; MG/1
1 TABLET, FILM COATED ORAL
Qty: 0 | Refills: 0 | DISCHARGE

## 2021-01-01 RX ORDER — VANCOMYCIN HCL 1 G
500 VIAL (EA) INTRAVENOUS ONCE
Refills: 0 | Status: COMPLETED | OUTPATIENT
Start: 2021-01-01 | End: 2021-01-01

## 2021-01-01 RX ORDER — FENTANYL CITRATE 50 UG/ML
0.5 INJECTION INTRAVENOUS
Qty: 5000 | Refills: 0 | Status: DISCONTINUED | OUTPATIENT
Start: 2021-01-01 | End: 2021-01-01

## 2021-01-01 RX ORDER — QUETIAPINE FUMARATE 200 MG/1
25 TABLET, FILM COATED ORAL
Refills: 0 | Status: DISCONTINUED | OUTPATIENT
Start: 2021-01-01 | End: 2021-01-01

## 2021-01-01 RX ORDER — POTASSIUM CHLORIDE 20 MEQ
20 PACKET (EA) ORAL
Refills: 0 | Status: COMPLETED | OUTPATIENT
Start: 2021-01-01 | End: 2021-01-01

## 2021-01-01 RX ORDER — ACETAZOLAMIDE 250 MG/1
250 TABLET ORAL ONCE
Refills: 0 | Status: DISCONTINUED | OUTPATIENT
Start: 2021-01-01 | End: 2021-01-01

## 2021-01-01 RX ORDER — DEXTROSE 50 % IN WATER 50 %
25 SYRINGE (ML) INTRAVENOUS ONCE
Refills: 0 | Status: COMPLETED | OUTPATIENT
Start: 2021-01-01 | End: 2021-01-01

## 2021-01-01 RX ORDER — HEPARIN SODIUM 5000 [USP'U]/ML
4 INJECTION INTRAVENOUS; SUBCUTANEOUS
Qty: 25000 | Refills: 0 | Status: DISCONTINUED | OUTPATIENT
Start: 2021-01-01 | End: 2021-01-01

## 2021-01-01 RX ORDER — SENNA PLUS 8.6 MG/1
20 TABLET ORAL AT BEDTIME
Refills: 0 | Status: DISCONTINUED | OUTPATIENT
Start: 2021-01-01 | End: 2021-01-01

## 2021-01-01 RX ORDER — VANCOMYCIN HCL 1 G
750 VIAL (EA) INTRAVENOUS
Refills: 0 | Status: DISCONTINUED | OUTPATIENT
Start: 2021-01-01 | End: 2021-01-01

## 2021-01-01 RX ORDER — TOCILIZUMAB 20 MG/ML
600 INJECTION, SOLUTION, CONCENTRATE INTRAVENOUS ONCE
Refills: 0 | Status: COMPLETED | OUTPATIENT
Start: 2021-01-01 | End: 2021-01-01

## 2021-01-01 RX ORDER — BUMETANIDE 0.25 MG/ML
2 INJECTION INTRAMUSCULAR; INTRAVENOUS ONCE
Refills: 0 | Status: COMPLETED | OUTPATIENT
Start: 2021-01-01 | End: 2021-01-01

## 2021-01-01 RX ORDER — PROPOFOL 10 MG/ML
25 INJECTION, EMULSION INTRAVENOUS
Qty: 1000 | Refills: 0 | Status: DISCONTINUED | OUTPATIENT
Start: 2021-01-01 | End: 2021-01-01

## 2021-01-01 RX ORDER — DIAZEPAM 5 MG
20 TABLET ORAL EVERY 8 HOURS
Refills: 0 | Status: DISCONTINUED | OUTPATIENT
Start: 2021-01-01 | End: 2021-01-01

## 2021-01-01 RX ORDER — ACETAMINOPHEN 500 MG
975 TABLET ORAL ONCE
Refills: 0 | Status: COMPLETED | OUTPATIENT
Start: 2021-01-01 | End: 2021-01-01

## 2021-01-01 RX ORDER — AMIODARONE HYDROCHLORIDE 400 MG/1
1 TABLET ORAL
Qty: 900 | Refills: 0 | Status: DISCONTINUED | OUTPATIENT
Start: 2021-01-01 | End: 2021-01-01

## 2021-01-01 RX ORDER — DEXAMETHASONE 0.5 MG/5ML
6 ELIXIR ORAL DAILY
Refills: 0 | Status: DISCONTINUED | OUTPATIENT
Start: 2021-01-01 | End: 2021-01-01

## 2021-01-01 RX ORDER — HUMAN INSULIN 100 [IU]/ML
12 INJECTION, SUSPENSION SUBCUTANEOUS EVERY 6 HOURS
Refills: 0 | Status: DISCONTINUED | OUTPATIENT
Start: 2021-01-01 | End: 2021-01-01

## 2021-01-01 RX ORDER — ALBUTEROL 90 UG/1
2 AEROSOL, METERED ORAL EVERY 4 HOURS
Refills: 0 | Status: DISCONTINUED | OUTPATIENT
Start: 2021-01-01 | End: 2021-01-01

## 2021-01-01 RX ORDER — BUDESONIDE AND FORMOTEROL FUMARATE DIHYDRATE 160; 4.5 UG/1; UG/1
2 AEROSOL RESPIRATORY (INHALATION)
Refills: 0 | Status: DISCONTINUED | OUTPATIENT
Start: 2021-01-01 | End: 2021-01-01

## 2021-01-01 RX ORDER — MIDAZOLAM HYDROCHLORIDE 1 MG/ML
2 INJECTION, SOLUTION INTRAMUSCULAR; INTRAVENOUS EVERY 4 HOURS
Refills: 0 | Status: DISCONTINUED | OUTPATIENT
Start: 2021-01-01 | End: 2021-01-01

## 2021-01-01 RX ORDER — KETAMINE HYDROCHLORIDE 100 MG/ML
0.5 INJECTION INTRAMUSCULAR; INTRAVENOUS
Qty: 1000 | Refills: 0 | Status: DISCONTINUED | OUTPATIENT
Start: 2021-01-01 | End: 2021-01-01

## 2021-01-01 RX ORDER — METHADONE HYDROCHLORIDE 40 MG/1
5 TABLET ORAL EVERY 8 HOURS
Refills: 0 | Status: DISCONTINUED | OUTPATIENT
Start: 2021-01-01 | End: 2021-01-01

## 2021-01-01 RX ORDER — DEXAMETHASONE 0.5 MG/5ML
10 ELIXIR ORAL DAILY
Refills: 0 | Status: DISCONTINUED | OUTPATIENT
Start: 2021-01-01 | End: 2021-01-01

## 2021-01-01 RX ORDER — HEPARIN SODIUM 5000 [USP'U]/ML
400 INJECTION INTRAVENOUS; SUBCUTANEOUS
Qty: 25000 | Refills: 0 | Status: DISCONTINUED | OUTPATIENT
Start: 2021-01-01 | End: 2021-01-01

## 2021-01-01 RX ORDER — METHADONE HYDROCHLORIDE 40 MG/1
15 TABLET ORAL EVERY 8 HOURS
Refills: 0 | Status: DISCONTINUED | OUTPATIENT
Start: 2021-01-01 | End: 2021-01-01

## 2021-01-01 RX ORDER — KETAMINE HYDROCHLORIDE 100 MG/ML
0.25 INJECTION INTRAMUSCULAR; INTRAVENOUS
Qty: 1000 | Refills: 0 | Status: DISCONTINUED | OUTPATIENT
Start: 2021-01-01 | End: 2021-01-01

## 2021-01-01 RX ORDER — BUMETANIDE 0.25 MG/ML
1 INJECTION INTRAMUSCULAR; INTRAVENOUS EVERY 8 HOURS
Refills: 0 | Status: DISCONTINUED | OUTPATIENT
Start: 2021-01-01 | End: 2021-01-01

## 2021-01-01 RX ORDER — DEXAMETHASONE 0.5 MG/5ML
10 ELIXIR ORAL DAILY
Refills: 0 | Status: COMPLETED | OUTPATIENT
Start: 2021-01-01 | End: 2021-01-01

## 2021-01-01 RX ORDER — MIDODRINE HYDROCHLORIDE 2.5 MG/1
30 TABLET ORAL EVERY 8 HOURS
Refills: 0 | Status: DISCONTINUED | OUTPATIENT
Start: 2021-01-01 | End: 2021-01-01

## 2021-01-01 RX ORDER — VALSARTAN 80 MG/1
160 TABLET ORAL DAILY
Refills: 0 | Status: DISCONTINUED | OUTPATIENT
Start: 2021-01-01 | End: 2021-01-01

## 2021-01-01 RX ORDER — METOPROLOL TARTRATE 50 MG
12.5 TABLET ORAL
Refills: 0 | Status: DISCONTINUED | OUTPATIENT
Start: 2021-01-01 | End: 2021-01-01

## 2021-01-01 RX ORDER — DESMOPRESSIN ACETATE 0.1 MG/1
0.1 TABLET ORAL
Qty: 30 | Refills: 11 | Status: COMPLETED | COMMUNITY
Start: 2020-07-14 | End: 2021-01-01

## 2021-01-01 RX ORDER — IPRATROPIUM/ALBUTEROL SULFATE 18-103MCG
1 AEROSOL WITH ADAPTER (GRAM) INHALATION
Refills: 0 | Status: DISCONTINUED | OUTPATIENT
Start: 2021-01-01 | End: 2021-01-01

## 2021-01-01 RX ORDER — ASCORBIC ACID 60 MG
500 TABLET,CHEWABLE ORAL DAILY
Refills: 0 | Status: DISCONTINUED | OUTPATIENT
Start: 2021-01-01 | End: 2021-01-01

## 2021-01-01 RX ORDER — DAPTOMYCIN 500 MG/10ML
INJECTION, POWDER, LYOPHILIZED, FOR SOLUTION INTRAVENOUS
Refills: 0 | Status: DISCONTINUED | OUTPATIENT
Start: 2021-01-01 | End: 2021-01-01

## 2021-01-01 RX ORDER — HYDROMORPHONE HYDROCHLORIDE 2 MG/ML
2 INJECTION INTRAMUSCULAR; INTRAVENOUS; SUBCUTANEOUS ONCE
Refills: 0 | Status: DISCONTINUED | OUTPATIENT
Start: 2021-01-01 | End: 2021-01-01

## 2021-01-01 RX ORDER — TADALAFIL 5 MG/1
5 TABLET ORAL
Qty: 30 | Refills: 11 | Status: ACTIVE | COMMUNITY
Start: 2021-01-01 | End: 1900-01-01

## 2021-01-01 RX ORDER — SODIUM CHLORIDE 9 MG/ML
1000 INJECTION INTRAMUSCULAR; INTRAVENOUS; SUBCUTANEOUS ONCE
Refills: 0 | Status: DISCONTINUED | OUTPATIENT
Start: 2021-01-01 | End: 2021-01-01

## 2021-01-01 RX ORDER — QUETIAPINE FUMARATE 200 MG/1
25 TABLET, FILM COATED ORAL DAILY
Refills: 0 | Status: DISCONTINUED | OUTPATIENT
Start: 2021-01-01 | End: 2021-01-01

## 2021-01-01 RX ORDER — MIDAZOLAM HYDROCHLORIDE 1 MG/ML
1 INJECTION, SOLUTION INTRAMUSCULAR; INTRAVENOUS ONCE
Refills: 0 | Status: DISCONTINUED | OUTPATIENT
Start: 2021-01-01 | End: 2021-01-01

## 2021-01-01 RX ORDER — INSULIN LISPRO 100/ML
5 VIAL (ML) SUBCUTANEOUS ONCE
Refills: 0 | Status: COMPLETED | OUTPATIENT
Start: 2021-01-01 | End: 2021-01-01

## 2021-01-01 RX ORDER — INSULIN LISPRO 100/ML
VIAL (ML) SUBCUTANEOUS EVERY 4 HOURS
Refills: 0 | Status: DISCONTINUED | OUTPATIENT
Start: 2021-01-01 | End: 2021-01-01

## 2021-01-01 RX ADMIN — Medication 2: at 12:08

## 2021-01-01 RX ADMIN — METHADONE HYDROCHLORIDE 20 MILLIGRAM(S): 40 TABLET ORAL at 22:05

## 2021-01-01 RX ADMIN — CHLORHEXIDINE GLUCONATE 1 APPLICATION(S): 213 SOLUTION TOPICAL at 06:23

## 2021-01-01 RX ADMIN — ENOXAPARIN SODIUM 80 MILLIGRAM(S): 100 INJECTION SUBCUTANEOUS at 17:39

## 2021-01-01 RX ADMIN — Medication 2: at 00:04

## 2021-01-01 RX ADMIN — Medication 2: at 00:59

## 2021-01-01 RX ADMIN — ALBUTEROL 2 PUFF(S): 90 AEROSOL, METERED ORAL at 06:39

## 2021-01-01 RX ADMIN — HUMAN INSULIN 10 UNIT(S): 100 INJECTION, SUSPENSION SUBCUTANEOUS at 18:00

## 2021-01-01 RX ADMIN — Medication 30 MILLILITER(S): at 13:20

## 2021-01-01 RX ADMIN — Medication 30 MILLILITER(S): at 11:53

## 2021-01-01 RX ADMIN — Medication 3: at 11:53

## 2021-01-01 RX ADMIN — BUDESONIDE AND FORMOTEROL FUMARATE DIHYDRATE 2 PUFF(S): 160; 4.5 AEROSOL RESPIRATORY (INHALATION) at 17:26

## 2021-01-01 RX ADMIN — FENTANYL CITRATE 8.16 MICROGRAM(S)/KG/HR: 50 INJECTION INTRAVENOUS at 08:00

## 2021-01-01 RX ADMIN — Medication 5 MILLIGRAM(S): at 05:00

## 2021-01-01 RX ADMIN — NALOXEGOL OXALATE 25 MILLIGRAM(S): 12.5 TABLET, FILM COATED ORAL at 12:25

## 2021-01-01 RX ADMIN — ALBUTEROL 2 PUFF(S): 90 AEROSOL, METERED ORAL at 22:43

## 2021-01-01 RX ADMIN — PANTOPRAZOLE SODIUM 40 MILLIGRAM(S): 20 TABLET, DELAYED RELEASE ORAL at 17:51

## 2021-01-01 RX ADMIN — HUMAN INSULIN 7 UNIT(S): 100 INJECTION, SUSPENSION SUBCUTANEOUS at 01:18

## 2021-01-01 RX ADMIN — ENOXAPARIN SODIUM 80 MILLIGRAM(S): 100 INJECTION SUBCUTANEOUS at 06:54

## 2021-01-01 RX ADMIN — FENTANYL CITRATE 100 MICROGRAM(S): 50 INJECTION INTRAVENOUS at 10:35

## 2021-01-01 RX ADMIN — Medication 30 MILLILITER(S): at 17:41

## 2021-01-01 RX ADMIN — HUMAN INSULIN 10 UNIT(S): 100 INJECTION, SUSPENSION SUBCUTANEOUS at 05:25

## 2021-01-01 RX ADMIN — CHLORHEXIDINE GLUCONATE 15 MILLILITER(S): 213 SOLUTION TOPICAL at 05:32

## 2021-01-01 RX ADMIN — HUMAN INSULIN 10 UNIT(S): 100 INJECTION, SUSPENSION SUBCUTANEOUS at 05:17

## 2021-01-01 RX ADMIN — FENTANYL CITRATE 8.16 MICROGRAM(S)/KG/HR: 50 INJECTION INTRAVENOUS at 11:02

## 2021-01-01 RX ADMIN — Medication 10 MILLIGRAM(S): at 15:06

## 2021-01-01 RX ADMIN — ALBUTEROL 2 PUFF(S): 90 AEROSOL, METERED ORAL at 05:44

## 2021-01-01 RX ADMIN — BUDESONIDE AND FORMOTEROL FUMARATE DIHYDRATE 2 PUFF(S): 160; 4.5 AEROSOL RESPIRATORY (INHALATION) at 05:46

## 2021-01-01 RX ADMIN — BUMETANIDE 1 MILLIGRAM(S): 0.25 INJECTION INTRAMUSCULAR; INTRAVENOUS at 05:24

## 2021-01-01 RX ADMIN — METHADONE HYDROCHLORIDE 20 MILLIGRAM(S): 40 TABLET ORAL at 15:06

## 2021-01-01 RX ADMIN — Medication 6 MILLIGRAM(S): at 05:39

## 2021-01-01 RX ADMIN — HUMAN INSULIN 8 UNIT(S): 100 INJECTION, SUSPENSION SUBCUTANEOUS at 05:25

## 2021-01-01 RX ADMIN — Medication 62.5 MILLIMOLE(S): at 03:58

## 2021-01-01 RX ADMIN — PIPERACILLIN AND TAZOBACTAM 25 GRAM(S): 4; .5 INJECTION, POWDER, LYOPHILIZED, FOR SOLUTION INTRAVENOUS at 02:18

## 2021-01-01 RX ADMIN — Medication 1 DROP(S): at 13:53

## 2021-01-01 RX ADMIN — ATORVASTATIN CALCIUM 20 MILLIGRAM(S): 80 TABLET, FILM COATED ORAL at 22:04

## 2021-01-01 RX ADMIN — PHENYLEPHRINE HYDROCHLORIDE 15.3 MICROGRAM(S)/KG/MIN: 10 INJECTION INTRAVENOUS at 04:12

## 2021-01-01 RX ADMIN — METHADONE HYDROCHLORIDE 20 MILLIGRAM(S): 40 TABLET ORAL at 05:02

## 2021-01-01 RX ADMIN — METHADONE HYDROCHLORIDE 10 MILLIGRAM(S): 40 TABLET ORAL at 05:40

## 2021-01-01 RX ADMIN — Medication 1 DROP(S): at 06:13

## 2021-01-01 RX ADMIN — BUDESONIDE AND FORMOTEROL FUMARATE DIHYDRATE 2 PUFF(S): 160; 4.5 AEROSOL RESPIRATORY (INHALATION) at 05:49

## 2021-01-01 RX ADMIN — Medication 102 MILLIGRAM(S): at 05:21

## 2021-01-01 RX ADMIN — Medication 20 MILLIGRAM(S): at 05:47

## 2021-01-01 RX ADMIN — FENTANYL CITRATE 50 MICROGRAM(S): 50 INJECTION INTRAVENOUS at 18:00

## 2021-01-01 RX ADMIN — Medication 3 MILLILITER(S): at 17:18

## 2021-01-01 RX ADMIN — Medication 100 MILLIEQUIVALENT(S): at 01:30

## 2021-01-01 RX ADMIN — ALBUTEROL 2 PUFF(S): 90 AEROSOL, METERED ORAL at 22:13

## 2021-01-01 RX ADMIN — CHLORHEXIDINE GLUCONATE 1 APPLICATION(S): 213 SOLUTION TOPICAL at 05:06

## 2021-01-01 RX ADMIN — Medication 650 MILLIGRAM(S): at 22:46

## 2021-01-01 RX ADMIN — Medication 5 MILLIGRAM(S): at 17:19

## 2021-01-01 RX ADMIN — METHADONE HYDROCHLORIDE 30 MILLIGRAM(S): 40 TABLET ORAL at 05:35

## 2021-01-01 RX ADMIN — Medication 3: at 08:07

## 2021-01-01 RX ADMIN — URSODIOL 500 MILLIGRAM(S): 250 TABLET, FILM COATED ORAL at 05:22

## 2021-01-01 RX ADMIN — Medication 650 MILLIGRAM(S): at 21:37

## 2021-01-01 RX ADMIN — DEXMEDETOMIDINE HYDROCHLORIDE IN 0.9% SODIUM CHLORIDE 4.08 MICROGRAM(S)/KG/HR: 4 INJECTION INTRAVENOUS at 13:06

## 2021-01-01 RX ADMIN — PANTOPRAZOLE SODIUM 40 MILLIGRAM(S): 20 TABLET, DELAYED RELEASE ORAL at 05:02

## 2021-01-01 RX ADMIN — HUMAN INSULIN 10 UNIT(S): 100 INJECTION, SUSPENSION SUBCUTANEOUS at 23:58

## 2021-01-01 RX ADMIN — CHLORHEXIDINE GLUCONATE 15 MILLILITER(S): 213 SOLUTION TOPICAL at 17:51

## 2021-01-01 RX ADMIN — BUDESONIDE AND FORMOTEROL FUMARATE DIHYDRATE 2 PUFF(S): 160; 4.5 AEROSOL RESPIRATORY (INHALATION) at 05:39

## 2021-01-01 RX ADMIN — TAMSULOSIN HYDROCHLORIDE 0.4 MILLIGRAM(S): 0.4 CAPSULE ORAL at 09:01

## 2021-01-01 RX ADMIN — Medication 500 MILLIGRAM(S): at 12:10

## 2021-01-01 RX ADMIN — Medication 4: at 18:14

## 2021-01-01 RX ADMIN — METHADONE HYDROCHLORIDE 10 MILLIGRAM(S): 40 TABLET ORAL at 13:00

## 2021-01-01 RX ADMIN — MIDODRINE HYDROCHLORIDE 40 MILLIGRAM(S): 2.5 TABLET ORAL at 14:23

## 2021-01-01 RX ADMIN — Medication 200 GRAM(S): at 16:51

## 2021-01-01 RX ADMIN — BUDESONIDE AND FORMOTEROL FUMARATE DIHYDRATE 2 PUFF(S): 160; 4.5 AEROSOL RESPIRATORY (INHALATION) at 05:53

## 2021-01-01 RX ADMIN — PIPERACILLIN AND TAZOBACTAM 25 GRAM(S): 4; .5 INJECTION, POWDER, LYOPHILIZED, FOR SOLUTION INTRAVENOUS at 10:46

## 2021-01-01 RX ADMIN — BUDESONIDE AND FORMOTEROL FUMARATE DIHYDRATE 2 PUFF(S): 160; 4.5 AEROSOL RESPIRATORY (INHALATION) at 05:35

## 2021-01-01 RX ADMIN — Medication 4: at 12:03

## 2021-01-01 RX ADMIN — Medication 3.83 MICROGRAM(S)/KG/MIN: at 20:40

## 2021-01-01 RX ADMIN — URSODIOL 500 MILLIGRAM(S): 250 TABLET, FILM COATED ORAL at 06:49

## 2021-01-01 RX ADMIN — ALBUTEROL 2 PUFF(S): 90 AEROSOL, METERED ORAL at 05:39

## 2021-01-01 RX ADMIN — Medication 650 MILLIGRAM(S): at 17:12

## 2021-01-01 RX ADMIN — MIDODRINE HYDROCHLORIDE 40 MILLIGRAM(S): 2.5 TABLET ORAL at 05:08

## 2021-01-01 RX ADMIN — Medication 250 MILLIGRAM(S): at 01:03

## 2021-01-01 RX ADMIN — CHLORHEXIDINE GLUCONATE 15 MILLILITER(S): 213 SOLUTION TOPICAL at 17:39

## 2021-01-01 RX ADMIN — Medication 1000 MILLIGRAM(S): at 10:00

## 2021-01-01 RX ADMIN — Medication 200 GRAM(S): at 13:19

## 2021-01-01 RX ADMIN — Medication 40 MILLIEQUIVALENT(S): at 01:52

## 2021-01-01 RX ADMIN — ENOXAPARIN SODIUM 80 MILLIGRAM(S): 100 INJECTION SUBCUTANEOUS at 05:36

## 2021-01-01 RX ADMIN — METHADONE HYDROCHLORIDE 30 MILLIGRAM(S): 40 TABLET ORAL at 05:11

## 2021-01-01 RX ADMIN — MIDAZOLAM HYDROCHLORIDE 2 MILLIGRAM(S): 1 INJECTION, SOLUTION INTRAMUSCULAR; INTRAVENOUS at 16:55

## 2021-01-01 RX ADMIN — ALBUTEROL 2 PUFF(S): 90 AEROSOL, METERED ORAL at 17:13

## 2021-01-01 RX ADMIN — PANTOPRAZOLE SODIUM 40 MILLIGRAM(S): 20 TABLET, DELAYED RELEASE ORAL at 11:41

## 2021-01-01 RX ADMIN — VASOPRESSIN 2.4 UNIT(S)/MIN: 20 INJECTION INTRAVENOUS at 08:28

## 2021-01-01 RX ADMIN — ALBUTEROL 2 PUFF(S): 90 AEROSOL, METERED ORAL at 13:27

## 2021-01-01 RX ADMIN — PROPOFOL 12.2 MICROGRAM(S)/KG/MIN: 10 INJECTION, EMULSION INTRAVENOUS at 01:14

## 2021-01-01 RX ADMIN — Medication 1 DROP(S): at 08:12

## 2021-01-01 RX ADMIN — Medication 2000 UNIT(S): at 12:12

## 2021-01-01 RX ADMIN — KETAMINE HYDROCHLORIDE 16.3 MG/KG/HR: 100 INJECTION INTRAMUSCULAR; INTRAVENOUS at 08:28

## 2021-01-01 RX ADMIN — ALBUTEROL 2 PUFF(S): 90 AEROSOL, METERED ORAL at 14:29

## 2021-01-01 RX ADMIN — METHADONE HYDROCHLORIDE 5 MILLIGRAM(S): 40 TABLET ORAL at 21:12

## 2021-01-01 RX ADMIN — HEPARIN SODIUM 5000 UNIT(S): 5000 INJECTION INTRAVENOUS; SUBCUTANEOUS at 17:01

## 2021-01-01 RX ADMIN — AMPICILLIN SODIUM AND SULBACTAM SODIUM 200 GRAM(S): 250; 125 INJECTION, POWDER, FOR SUSPENSION INTRAMUSCULAR; INTRAVENOUS at 18:15

## 2021-01-01 RX ADMIN — ALBUTEROL 2 PUFF(S): 90 AEROSOL, METERED ORAL at 05:46

## 2021-01-01 RX ADMIN — ALBUTEROL 2 PUFF(S): 90 AEROSOL, METERED ORAL at 05:26

## 2021-01-01 RX ADMIN — ALBUTEROL 2 PUFF(S): 90 AEROSOL, METERED ORAL at 05:16

## 2021-01-01 RX ADMIN — URSODIOL 500 MILLIGRAM(S): 250 TABLET, FILM COATED ORAL at 05:52

## 2021-01-01 RX ADMIN — Medication 1: at 05:15

## 2021-01-01 RX ADMIN — Medication 1 DROP(S): at 14:48

## 2021-01-01 RX ADMIN — HUMAN INSULIN 10 UNIT(S): 100 INJECTION, SUSPENSION SUBCUTANEOUS at 23:57

## 2021-01-01 RX ADMIN — ALBUTEROL 2 PUFF(S): 90 AEROSOL, METERED ORAL at 21:56

## 2021-01-01 RX ADMIN — ENOXAPARIN SODIUM 80 MILLIGRAM(S): 100 INJECTION SUBCUTANEOUS at 17:18

## 2021-01-01 RX ADMIN — CHLORHEXIDINE GLUCONATE 15 MILLILITER(S): 213 SOLUTION TOPICAL at 05:11

## 2021-01-01 RX ADMIN — FENTANYL CITRATE 6.12 MICROGRAM(S)/KG/HR: 50 INJECTION INTRAVENOUS at 17:10

## 2021-01-01 RX ADMIN — HUMAN INSULIN 12 UNIT(S): 100 INJECTION, SUSPENSION SUBCUTANEOUS at 17:23

## 2021-01-01 RX ADMIN — HUMAN INSULIN 10 UNIT(S): 100 INJECTION, SUSPENSION SUBCUTANEOUS at 12:24

## 2021-01-01 RX ADMIN — Medication 0.5 MILLIGRAM(S): at 01:12

## 2021-01-01 RX ADMIN — HUMAN INSULIN 5 UNIT(S): 100 INJECTION, SUSPENSION SUBCUTANEOUS at 05:14

## 2021-01-01 RX ADMIN — Medication 1 APPLICATION(S): at 06:08

## 2021-01-01 RX ADMIN — Medication 1: at 17:30

## 2021-01-01 RX ADMIN — HUMAN INSULIN 5 UNIT(S): 100 INJECTION, SUSPENSION SUBCUTANEOUS at 15:23

## 2021-01-01 RX ADMIN — KETAMINE HYDROCHLORIDE 16.3 MG/KG/HR: 100 INJECTION INTRAMUSCULAR; INTRAVENOUS at 20:39

## 2021-01-01 RX ADMIN — ALBUTEROL 2 PUFF(S): 90 AEROSOL, METERED ORAL at 14:06

## 2021-01-01 RX ADMIN — HUMAN INSULIN 7 UNIT(S): 100 INJECTION, SUSPENSION SUBCUTANEOUS at 06:32

## 2021-01-01 RX ADMIN — POLYETHYLENE GLYCOL 3350 17 GRAM(S): 17 POWDER, FOR SOLUTION ORAL at 11:53

## 2021-01-01 RX ADMIN — Medication 100 MILLIEQUIVALENT(S): at 14:20

## 2021-01-01 RX ADMIN — METHADONE HYDROCHLORIDE 20 MILLIGRAM(S): 40 TABLET ORAL at 13:49

## 2021-01-01 RX ADMIN — POTASSIUM PHOSPHATE, MONOBASIC POTASSIUM PHOSPHATE, DIBASIC 62.5 MILLIMOLE(S): 236; 224 INJECTION, SOLUTION INTRAVENOUS at 06:00

## 2021-01-01 RX ADMIN — Medication 250 MILLIGRAM(S): at 05:07

## 2021-01-01 RX ADMIN — PANTOPRAZOLE SODIUM 40 MILLIGRAM(S): 20 TABLET, DELAYED RELEASE ORAL at 17:01

## 2021-01-01 RX ADMIN — DEXMEDETOMIDINE HYDROCHLORIDE IN 0.9% SODIUM CHLORIDE 4.08 MICROGRAM(S)/KG/HR: 4 INJECTION INTRAVENOUS at 19:53

## 2021-01-01 RX ADMIN — Medication 2000 UNIT(S): at 11:10

## 2021-01-01 RX ADMIN — HUMAN INSULIN 8 UNIT(S): 100 INJECTION, SUSPENSION SUBCUTANEOUS at 18:04

## 2021-01-01 RX ADMIN — HUMAN INSULIN 5 UNIT(S): 100 INJECTION, SUSPENSION SUBCUTANEOUS at 00:50

## 2021-01-01 RX ADMIN — Medication 4: at 18:01

## 2021-01-01 RX ADMIN — HUMAN INSULIN 10 UNIT(S): 100 INJECTION, SUSPENSION SUBCUTANEOUS at 11:53

## 2021-01-01 RX ADMIN — ALBUTEROL 2 PUFF(S): 90 AEROSOL, METERED ORAL at 17:06

## 2021-01-01 RX ADMIN — BUDESONIDE AND FORMOTEROL FUMARATE DIHYDRATE 2 PUFF(S): 160; 4.5 AEROSOL RESPIRATORY (INHALATION) at 21:01

## 2021-01-01 RX ADMIN — CISATRACURIUM BESYLATE 14.7 MICROGRAM(S)/KG/MIN: 2 INJECTION INTRAVENOUS at 17:11

## 2021-01-01 RX ADMIN — ENOXAPARIN SODIUM 80 MILLIGRAM(S): 100 INJECTION SUBCUTANEOUS at 17:01

## 2021-01-01 RX ADMIN — Medication 250 MILLIGRAM(S): at 11:30

## 2021-01-01 RX ADMIN — CHLORHEXIDINE GLUCONATE 15 MILLILITER(S): 213 SOLUTION TOPICAL at 05:46

## 2021-01-01 RX ADMIN — URSODIOL 500 MILLIGRAM(S): 250 TABLET, FILM COATED ORAL at 06:13

## 2021-01-01 RX ADMIN — DEXMEDETOMIDINE HYDROCHLORIDE IN 0.9% SODIUM CHLORIDE 4.08 MICROGRAM(S)/KG/HR: 4 INJECTION INTRAVENOUS at 05:08

## 2021-01-01 RX ADMIN — Medication 30 MILLILITER(S): at 11:11

## 2021-01-01 RX ADMIN — KETAMINE HYDROCHLORIDE 16.3 MG/KG/HR: 100 INJECTION INTRAMUSCULAR; INTRAVENOUS at 21:15

## 2021-01-01 RX ADMIN — BUMETANIDE 1 MILLIGRAM(S): 0.25 INJECTION INTRAMUSCULAR; INTRAVENOUS at 17:17

## 2021-01-01 RX ADMIN — HUMAN INSULIN 10 UNIT(S): 100 INJECTION, SUSPENSION SUBCUTANEOUS at 00:51

## 2021-01-01 RX ADMIN — ALBUTEROL 2 PUFF(S): 90 AEROSOL, METERED ORAL at 14:16

## 2021-01-01 RX ADMIN — ALBUTEROL 2 PUFF(S): 90 AEROSOL, METERED ORAL at 05:29

## 2021-01-01 RX ADMIN — Medication 1 DROP(S): at 13:16

## 2021-01-01 RX ADMIN — ALBUTEROL 2 PUFF(S): 90 AEROSOL, METERED ORAL at 22:10

## 2021-01-01 RX ADMIN — POLYETHYLENE GLYCOL 3350 17 GRAM(S): 17 POWDER, FOR SOLUTION ORAL at 11:30

## 2021-01-01 RX ADMIN — MONTELUKAST 10 MILLIGRAM(S): 4 TABLET, CHEWABLE ORAL at 18:21

## 2021-01-01 RX ADMIN — MONTELUKAST 10 MILLIGRAM(S): 4 TABLET, CHEWABLE ORAL at 11:46

## 2021-01-01 RX ADMIN — AMPICILLIN SODIUM AND SULBACTAM SODIUM 200 GRAM(S): 250; 125 INJECTION, POWDER, FOR SUSPENSION INTRAMUSCULAR; INTRAVENOUS at 00:18

## 2021-01-01 RX ADMIN — BUDESONIDE AND FORMOTEROL FUMARATE DIHYDRATE 2 PUFF(S): 160; 4.5 AEROSOL RESPIRATORY (INHALATION) at 05:37

## 2021-01-01 RX ADMIN — ENOXAPARIN SODIUM 80 MILLIGRAM(S): 100 INJECTION SUBCUTANEOUS at 17:48

## 2021-01-01 RX ADMIN — ALBUTEROL 2 PUFF(S): 90 AEROSOL, METERED ORAL at 05:27

## 2021-01-01 RX ADMIN — HUMAN INSULIN 2 UNIT(S): 100 INJECTION, SUSPENSION SUBCUTANEOUS at 05:27

## 2021-01-01 RX ADMIN — CHLORHEXIDINE GLUCONATE 15 MILLILITER(S): 213 SOLUTION TOPICAL at 18:08

## 2021-01-01 RX ADMIN — HUMAN INSULIN 10 UNIT(S): 100 INJECTION, SUSPENSION SUBCUTANEOUS at 11:45

## 2021-01-01 RX ADMIN — BUMETANIDE 1 MILLIGRAM(S): 0.25 INJECTION INTRAMUSCULAR; INTRAVENOUS at 17:00

## 2021-01-01 RX ADMIN — BUDESONIDE AND FORMOTEROL FUMARATE DIHYDRATE 2 PUFF(S): 160; 4.5 AEROSOL RESPIRATORY (INHALATION) at 17:47

## 2021-01-01 RX ADMIN — ENOXAPARIN SODIUM 80 MILLIGRAM(S): 100 INJECTION SUBCUTANEOUS at 05:39

## 2021-01-01 RX ADMIN — Medication 10 MILLIGRAM(S): at 06:28

## 2021-01-01 RX ADMIN — BUDESONIDE AND FORMOTEROL FUMARATE DIHYDRATE 2 PUFF(S): 160; 4.5 AEROSOL RESPIRATORY (INHALATION) at 08:17

## 2021-01-01 RX ADMIN — Medication 1 MILLIGRAM(S): at 17:33

## 2021-01-01 RX ADMIN — ALBUTEROL 2 PUFF(S): 90 AEROSOL, METERED ORAL at 14:31

## 2021-01-01 RX ADMIN — MEROPENEM 100 MILLIGRAM(S): 1 INJECTION INTRAVENOUS at 21:07

## 2021-01-01 RX ADMIN — METHADONE HYDROCHLORIDE 5 MILLIGRAM(S): 40 TABLET ORAL at 21:18

## 2021-01-01 RX ADMIN — METHADONE HYDROCHLORIDE 30 MILLIGRAM(S): 40 TABLET ORAL at 21:28

## 2021-01-01 RX ADMIN — Medication 250 MILLIGRAM(S): at 06:27

## 2021-01-01 RX ADMIN — MIDODRINE HYDROCHLORIDE 10 MILLIGRAM(S): 2.5 TABLET ORAL at 22:52

## 2021-01-01 RX ADMIN — MEROPENEM 100 MILLIGRAM(S): 1 INJECTION INTRAVENOUS at 05:11

## 2021-01-01 RX ADMIN — METHADONE HYDROCHLORIDE 10 MILLIGRAM(S): 40 TABLET ORAL at 21:12

## 2021-01-01 RX ADMIN — METHADONE HYDROCHLORIDE 10 MILLIGRAM(S): 40 TABLET ORAL at 13:38

## 2021-01-01 RX ADMIN — ALBUTEROL 2 PUFF(S): 90 AEROSOL, METERED ORAL at 17:11

## 2021-01-01 RX ADMIN — FENTANYL CITRATE 50 MICROGRAM(S): 50 INJECTION INTRAVENOUS at 21:22

## 2021-01-01 RX ADMIN — Medication 2 MILLIGRAM(S): at 05:39

## 2021-01-01 RX ADMIN — SENNA PLUS 10 MILLILITER(S): 8.6 TABLET ORAL at 21:00

## 2021-01-01 RX ADMIN — MIDODRINE HYDROCHLORIDE 30 MILLIGRAM(S): 2.5 TABLET ORAL at 14:19

## 2021-01-01 RX ADMIN — ALBUTEROL 2 PUFF(S): 90 AEROSOL, METERED ORAL at 14:38

## 2021-01-01 RX ADMIN — CHLORHEXIDINE GLUCONATE 15 MILLILITER(S): 213 SOLUTION TOPICAL at 17:06

## 2021-01-01 RX ADMIN — PIPERACILLIN AND TAZOBACTAM 200 GRAM(S): 4; .5 INJECTION, POWDER, LYOPHILIZED, FOR SOLUTION INTRAVENOUS at 09:58

## 2021-01-01 RX ADMIN — METHADONE HYDROCHLORIDE 10 MILLIGRAM(S): 40 TABLET ORAL at 06:48

## 2021-01-01 RX ADMIN — SODIUM CHLORIDE 2000 MILLILITER(S): 9 INJECTION, SOLUTION INTRAVENOUS at 18:33

## 2021-01-01 RX ADMIN — ALBUTEROL 2 PUFF(S): 90 AEROSOL, METERED ORAL at 15:22

## 2021-01-01 RX ADMIN — Medication 2000 UNIT(S): at 12:29

## 2021-01-01 RX ADMIN — HUMAN INSULIN 7 UNIT(S): 100 INJECTION, SUSPENSION SUBCUTANEOUS at 11:32

## 2021-01-01 RX ADMIN — MONTELUKAST 10 MILLIGRAM(S): 4 TABLET, CHEWABLE ORAL at 11:35

## 2021-01-01 RX ADMIN — AMPICILLIN SODIUM AND SULBACTAM SODIUM 200 GRAM(S): 250; 125 INJECTION, POWDER, FOR SUSPENSION INTRAMUSCULAR; INTRAVENOUS at 17:23

## 2021-01-01 RX ADMIN — MIDODRINE HYDROCHLORIDE 30 MILLIGRAM(S): 2.5 TABLET ORAL at 05:23

## 2021-01-01 RX ADMIN — Medication 40 MILLIGRAM(S): at 05:42

## 2021-01-01 RX ADMIN — MIDAZOLAM HYDROCHLORIDE 2 MILLIGRAM(S): 1 INJECTION, SOLUTION INTRAMUSCULAR; INTRAVENOUS at 00:00

## 2021-01-01 RX ADMIN — POLYETHYLENE GLYCOL 3350 17 GRAM(S): 17 POWDER, FOR SOLUTION ORAL at 11:46

## 2021-01-01 RX ADMIN — FENTANYL CITRATE 50 MICROGRAM(S): 50 INJECTION INTRAVENOUS at 14:30

## 2021-01-01 RX ADMIN — HYDROMORPHONE HYDROCHLORIDE 1 MILLIGRAM(S): 2 INJECTION INTRAMUSCULAR; INTRAVENOUS; SUBCUTANEOUS at 11:00

## 2021-01-01 RX ADMIN — Medication 10 MILLIGRAM(S): at 13:01

## 2021-01-01 RX ADMIN — ALBUTEROL 2 PUFF(S): 90 AEROSOL, METERED ORAL at 14:10

## 2021-01-01 RX ADMIN — METHADONE HYDROCHLORIDE 15 MILLIGRAM(S): 40 TABLET ORAL at 06:30

## 2021-01-01 RX ADMIN — Medication 3.83 MICROGRAM(S)/KG/MIN: at 16:58

## 2021-01-01 RX ADMIN — BUDESONIDE AND FORMOTEROL FUMARATE DIHYDRATE 2 PUFF(S): 160; 4.5 AEROSOL RESPIRATORY (INHALATION) at 17:34

## 2021-01-01 RX ADMIN — Medication 1 MILLIGRAM(S): at 06:22

## 2021-01-01 RX ADMIN — AMIODARONE HYDROCHLORIDE 16.7 MG/MIN: 400 TABLET ORAL at 19:26

## 2021-01-01 RX ADMIN — Medication 1: at 06:32

## 2021-01-01 RX ADMIN — URSODIOL 500 MILLIGRAM(S): 250 TABLET, FILM COATED ORAL at 06:04

## 2021-01-01 RX ADMIN — BUMETANIDE 1 MILLIGRAM(S): 0.25 INJECTION INTRAMUSCULAR; INTRAVENOUS at 17:56

## 2021-01-01 RX ADMIN — HUMAN INSULIN 10 UNIT(S): 100 INJECTION, SUSPENSION SUBCUTANEOUS at 01:18

## 2021-01-01 RX ADMIN — AMPICILLIN SODIUM AND SULBACTAM SODIUM 200 GRAM(S): 250; 125 INJECTION, POWDER, FOR SUSPENSION INTRAMUSCULAR; INTRAVENOUS at 12:22

## 2021-01-01 RX ADMIN — HYDROMORPHONE HYDROCHLORIDE 0.5 MILLIGRAM(S): 2 INJECTION INTRAMUSCULAR; INTRAVENOUS; SUBCUTANEOUS at 10:45

## 2021-01-01 RX ADMIN — MIDAZOLAM HYDROCHLORIDE 2 MILLIGRAM(S): 1 INJECTION, SOLUTION INTRAMUSCULAR; INTRAVENOUS at 12:44

## 2021-01-01 RX ADMIN — Medication 6 MILLIGRAM(S): at 06:55

## 2021-01-01 RX ADMIN — Medication 4: at 11:49

## 2021-01-01 RX ADMIN — Medication 650 MILLIGRAM(S): at 16:02

## 2021-01-01 RX ADMIN — Medication 1 DROP(S): at 13:50

## 2021-01-01 RX ADMIN — BUMETANIDE 1 MILLIGRAM(S): 0.25 INJECTION INTRAMUSCULAR; INTRAVENOUS at 13:06

## 2021-01-01 RX ADMIN — FENTANYL CITRATE 50 MICROGRAM(S): 50 INJECTION INTRAVENOUS at 02:02

## 2021-01-01 RX ADMIN — HUMAN INSULIN 10 UNIT(S): 100 INJECTION, SUSPENSION SUBCUTANEOUS at 00:01

## 2021-01-01 RX ADMIN — MIDODRINE HYDROCHLORIDE 20 MILLIGRAM(S): 2.5 TABLET ORAL at 05:12

## 2021-01-01 RX ADMIN — METHADONE HYDROCHLORIDE 30 MILLIGRAM(S): 40 TABLET ORAL at 21:01

## 2021-01-01 RX ADMIN — PANTOPRAZOLE SODIUM 40 MILLIGRAM(S): 20 TABLET, DELAYED RELEASE ORAL at 12:11

## 2021-01-01 RX ADMIN — Medication 50 GRAM(S): at 08:53

## 2021-01-01 RX ADMIN — CHLORHEXIDINE GLUCONATE 15 MILLILITER(S): 213 SOLUTION TOPICAL at 05:17

## 2021-01-01 RX ADMIN — ENOXAPARIN SODIUM 40 MILLIGRAM(S): 100 INJECTION SUBCUTANEOUS at 11:22

## 2021-01-01 RX ADMIN — MIDODRINE HYDROCHLORIDE 30 MILLIGRAM(S): 2.5 TABLET ORAL at 21:19

## 2021-01-01 RX ADMIN — Medication 2: at 00:44

## 2021-01-01 RX ADMIN — MIDODRINE HYDROCHLORIDE 40 MILLIGRAM(S): 2.5 TABLET ORAL at 21:10

## 2021-01-01 RX ADMIN — PANTOPRAZOLE SODIUM 40 MILLIGRAM(S): 20 TABLET, DELAYED RELEASE ORAL at 11:54

## 2021-01-01 RX ADMIN — SENNA PLUS 10 MILLILITER(S): 8.6 TABLET ORAL at 21:53

## 2021-01-01 RX ADMIN — DEXMEDETOMIDINE HYDROCHLORIDE IN 0.9% SODIUM CHLORIDE 4.08 MICROGRAM(S)/KG/HR: 4 INJECTION INTRAVENOUS at 08:20

## 2021-01-01 RX ADMIN — ALBUTEROL 2 PUFF(S): 90 AEROSOL, METERED ORAL at 05:10

## 2021-01-01 RX ADMIN — QUETIAPINE FUMARATE 25 MILLIGRAM(S): 200 TABLET, FILM COATED ORAL at 13:36

## 2021-01-01 RX ADMIN — CHLORHEXIDINE GLUCONATE 15 MILLILITER(S): 213 SOLUTION TOPICAL at 05:36

## 2021-01-01 RX ADMIN — CHLORHEXIDINE GLUCONATE 15 MILLILITER(S): 213 SOLUTION TOPICAL at 05:25

## 2021-01-01 RX ADMIN — FENTANYL CITRATE 100 MICROGRAM(S): 50 INJECTION INTRAVENOUS at 14:13

## 2021-01-01 RX ADMIN — Medication 30 MILLILITER(S): at 11:08

## 2021-01-01 RX ADMIN — Medication 250 MILLIGRAM(S): at 15:03

## 2021-01-01 RX ADMIN — CHLORHEXIDINE GLUCONATE 15 MILLILITER(S): 213 SOLUTION TOPICAL at 05:14

## 2021-01-01 RX ADMIN — ALBUTEROL 2 PUFF(S): 90 AEROSOL, METERED ORAL at 22:59

## 2021-01-01 RX ADMIN — Medication 2: at 11:24

## 2021-01-01 RX ADMIN — ENOXAPARIN SODIUM 80 MILLIGRAM(S): 100 INJECTION SUBCUTANEOUS at 06:10

## 2021-01-01 RX ADMIN — CHLORHEXIDINE GLUCONATE 15 MILLILITER(S): 213 SOLUTION TOPICAL at 17:52

## 2021-01-01 RX ADMIN — BUMETANIDE 2 MILLIGRAM(S): 0.25 INJECTION INTRAMUSCULAR; INTRAVENOUS at 22:05

## 2021-01-01 RX ADMIN — Medication 40 MILLIGRAM(S): at 05:32

## 2021-01-01 RX ADMIN — CHLORHEXIDINE GLUCONATE 15 MILLILITER(S): 213 SOLUTION TOPICAL at 17:04

## 2021-01-01 RX ADMIN — KETAMINE HYDROCHLORIDE 16.3 MG/KG/HR: 100 INJECTION INTRAMUSCULAR; INTRAVENOUS at 05:15

## 2021-01-01 RX ADMIN — URSODIOL 500 MILLIGRAM(S): 250 TABLET, FILM COATED ORAL at 06:57

## 2021-01-01 RX ADMIN — METHADONE HYDROCHLORIDE 10 MILLIGRAM(S): 40 TABLET ORAL at 05:36

## 2021-01-01 RX ADMIN — HYDROMORPHONE HYDROCHLORIDE 1 MILLIGRAM(S): 2 INJECTION INTRAMUSCULAR; INTRAVENOUS; SUBCUTANEOUS at 17:45

## 2021-01-01 RX ADMIN — Medication 2: at 17:54

## 2021-01-01 RX ADMIN — VASOPRESSIN 2.4 UNIT(S)/MIN: 20 INJECTION INTRAVENOUS at 08:23

## 2021-01-01 RX ADMIN — METHADONE HYDROCHLORIDE 20 MILLIGRAM(S): 40 TABLET ORAL at 22:41

## 2021-01-01 RX ADMIN — BUDESONIDE AND FORMOTEROL FUMARATE DIHYDRATE 2 PUFF(S): 160; 4.5 AEROSOL RESPIRATORY (INHALATION) at 22:04

## 2021-01-01 RX ADMIN — QUETIAPINE FUMARATE 25 MILLIGRAM(S): 200 TABLET, FILM COATED ORAL at 13:03

## 2021-01-01 RX ADMIN — Medication 2000 UNIT(S): at 11:46

## 2021-01-01 RX ADMIN — CHLORHEXIDINE GLUCONATE 15 MILLILITER(S): 213 SOLUTION TOPICAL at 05:40

## 2021-01-01 RX ADMIN — Medication 125 MILLILITER(S): at 17:41

## 2021-01-01 RX ADMIN — Medication 2: at 05:34

## 2021-01-01 RX ADMIN — METHADONE HYDROCHLORIDE 20 MILLIGRAM(S): 40 TABLET ORAL at 22:01

## 2021-01-01 RX ADMIN — Medication 1: at 01:27

## 2021-01-01 RX ADMIN — HUMAN INSULIN 10 UNIT(S): 100 INJECTION, SUSPENSION SUBCUTANEOUS at 18:15

## 2021-01-01 RX ADMIN — Medication 2: at 11:13

## 2021-01-01 RX ADMIN — MIDAZOLAM HYDROCHLORIDE 2 MILLIGRAM(S): 1 INJECTION, SOLUTION INTRAMUSCULAR; INTRAVENOUS at 18:33

## 2021-01-01 RX ADMIN — HYDROMORPHONE HYDROCHLORIDE 0.5 MILLIGRAM(S): 2 INJECTION INTRAMUSCULAR; INTRAVENOUS; SUBCUTANEOUS at 12:04

## 2021-01-01 RX ADMIN — Medication 2: at 17:32

## 2021-01-01 RX ADMIN — PROPOFOL 2.45 MICROGRAM(S)/KG/MIN: 10 INJECTION, EMULSION INTRAVENOUS at 15:38

## 2021-01-01 RX ADMIN — METHADONE HYDROCHLORIDE 30 MILLIGRAM(S): 40 TABLET ORAL at 14:19

## 2021-01-01 RX ADMIN — Medication 250 MILLIGRAM(S): at 06:00

## 2021-01-01 RX ADMIN — MIDODRINE HYDROCHLORIDE 40 MILLIGRAM(S): 2.5 TABLET ORAL at 13:52

## 2021-01-01 RX ADMIN — URSODIOL 500 MILLIGRAM(S): 250 TABLET, FILM COATED ORAL at 17:56

## 2021-01-01 RX ADMIN — ALBUTEROL 2 PUFF(S): 90 AEROSOL, METERED ORAL at 09:27

## 2021-01-01 RX ADMIN — Medication 1 APPLICATION(S): at 14:28

## 2021-01-01 RX ADMIN — ALBUTEROL 2 PUFF(S): 90 AEROSOL, METERED ORAL at 13:31

## 2021-01-01 RX ADMIN — Medication 6 MILLIGRAM(S): at 05:35

## 2021-01-01 RX ADMIN — INSULIN GLARGINE 15 UNIT(S): 100 INJECTION, SOLUTION SUBCUTANEOUS at 21:49

## 2021-01-01 RX ADMIN — NALOXEGOL OXALATE 25 MILLIGRAM(S): 12.5 TABLET, FILM COATED ORAL at 11:30

## 2021-01-01 RX ADMIN — Medication 300 MILLIGRAM(S): at 09:37

## 2021-01-01 RX ADMIN — Medication 2 MILLIGRAM(S): at 17:24

## 2021-01-01 RX ADMIN — Medication 400 MILLIGRAM(S): at 10:49

## 2021-01-01 RX ADMIN — Medication 3: at 11:22

## 2021-01-01 RX ADMIN — Medication 2: at 06:43

## 2021-01-01 RX ADMIN — HUMAN INSULIN 2 UNIT(S): 100 INJECTION, SUSPENSION SUBCUTANEOUS at 23:10

## 2021-01-01 RX ADMIN — ALBUTEROL 2 PUFF(S): 90 AEROSOL, METERED ORAL at 05:17

## 2021-01-01 RX ADMIN — BUMETANIDE 1 MILLIGRAM(S): 0.25 INJECTION INTRAMUSCULAR; INTRAVENOUS at 11:53

## 2021-01-01 RX ADMIN — DAPTOMYCIN 128 MILLIGRAM(S): 500 INJECTION, POWDER, LYOPHILIZED, FOR SOLUTION INTRAVENOUS at 14:34

## 2021-01-01 RX ADMIN — METHADONE HYDROCHLORIDE 30 MILLIGRAM(S): 40 TABLET ORAL at 22:19

## 2021-01-01 RX ADMIN — Medication 2000 UNIT(S): at 11:01

## 2021-01-01 RX ADMIN — Medication 2.5 MILLIGRAM(S): at 07:09

## 2021-01-01 RX ADMIN — ENOXAPARIN SODIUM 80 MILLIGRAM(S): 100 INJECTION SUBCUTANEOUS at 05:00

## 2021-01-01 RX ADMIN — ENOXAPARIN SODIUM 80 MILLIGRAM(S): 100 INJECTION SUBCUTANEOUS at 05:41

## 2021-01-01 RX ADMIN — HUMAN INSULIN 10 UNIT(S): 100 INJECTION, SUSPENSION SUBCUTANEOUS at 00:13

## 2021-01-01 RX ADMIN — METHADONE HYDROCHLORIDE 15 MILLIGRAM(S): 40 TABLET ORAL at 05:27

## 2021-01-01 RX ADMIN — Medication 250 MILLIGRAM(S): at 11:49

## 2021-01-01 RX ADMIN — NALOXEGOL OXALATE 25 MILLIGRAM(S): 12.5 TABLET, FILM COATED ORAL at 11:52

## 2021-01-01 RX ADMIN — AMPICILLIN SODIUM AND SULBACTAM SODIUM 200 GRAM(S): 250; 125 INJECTION, POWDER, FOR SUSPENSION INTRAMUSCULAR; INTRAVENOUS at 23:57

## 2021-01-01 RX ADMIN — MONTELUKAST 10 MILLIGRAM(S): 4 TABLET, CHEWABLE ORAL at 11:57

## 2021-01-01 RX ADMIN — CHLORHEXIDINE GLUCONATE 15 MILLILITER(S): 213 SOLUTION TOPICAL at 05:05

## 2021-01-01 RX ADMIN — BUDESONIDE AND FORMOTEROL FUMARATE DIHYDRATE 2 PUFF(S): 160; 4.5 AEROSOL RESPIRATORY (INHALATION) at 05:17

## 2021-01-01 RX ADMIN — BUDESONIDE AND FORMOTEROL FUMARATE DIHYDRATE 2 PUFF(S): 160; 4.5 AEROSOL RESPIRATORY (INHALATION) at 21:38

## 2021-01-01 RX ADMIN — HEPARIN SODIUM 5000 UNIT(S): 5000 INJECTION INTRAVENOUS; SUBCUTANEOUS at 05:25

## 2021-01-01 RX ADMIN — Medication 100 MILLIGRAM(S): at 01:11

## 2021-01-01 RX ADMIN — DEXMEDETOMIDINE HYDROCHLORIDE IN 0.9% SODIUM CHLORIDE 4.08 MICROGRAM(S)/KG/HR: 4 INJECTION INTRAVENOUS at 11:12

## 2021-01-01 RX ADMIN — Medication 1 MILLIGRAM(S): at 17:44

## 2021-01-01 RX ADMIN — Medication 15 MILLILITER(S): at 13:04

## 2021-01-01 RX ADMIN — ACETAZOLAMIDE 500 MILLIGRAM(S): 250 TABLET ORAL at 02:04

## 2021-01-01 RX ADMIN — Medication 10 MILLIGRAM(S): at 22:01

## 2021-01-01 RX ADMIN — FENTANYL CITRATE 50 MICROGRAM(S): 50 INJECTION INTRAVENOUS at 16:44

## 2021-01-01 RX ADMIN — METHADONE HYDROCHLORIDE 15 MILLIGRAM(S): 40 TABLET ORAL at 14:11

## 2021-01-01 RX ADMIN — Medication 2000 UNIT(S): at 11:51

## 2021-01-01 RX ADMIN — VASOPRESSIN 2.4 UNIT(S)/MIN: 20 INJECTION INTRAVENOUS at 21:15

## 2021-01-01 RX ADMIN — FENTANYL CITRATE 100 MICROGRAM(S): 50 INJECTION INTRAVENOUS at 14:30

## 2021-01-01 RX ADMIN — BUDESONIDE AND FORMOTEROL FUMARATE DIHYDRATE 2 PUFF(S): 160; 4.5 AEROSOL RESPIRATORY (INHALATION) at 17:17

## 2021-01-01 RX ADMIN — POLYETHYLENE GLYCOL 3350 17 GRAM(S): 17 POWDER, FOR SOLUTION ORAL at 11:27

## 2021-01-01 RX ADMIN — MIDODRINE HYDROCHLORIDE 40 MILLIGRAM(S): 2.5 TABLET ORAL at 21:28

## 2021-01-01 RX ADMIN — AMIODARONE HYDROCHLORIDE 600 MILLIGRAM(S): 400 TABLET ORAL at 02:50

## 2021-01-01 RX ADMIN — Medication 1 DROP(S): at 22:15

## 2021-01-01 RX ADMIN — BUMETANIDE 5 MG/HR: 0.25 INJECTION INTRAMUSCULAR; INTRAVENOUS at 02:44

## 2021-01-01 RX ADMIN — MIDAZOLAM HYDROCHLORIDE 2 MILLIGRAM(S): 1 INJECTION, SOLUTION INTRAMUSCULAR; INTRAVENOUS at 03:42

## 2021-01-01 RX ADMIN — Medication 10 MILLIGRAM(S): at 21:09

## 2021-01-01 RX ADMIN — Medication 50 GRAM(S): at 02:48

## 2021-01-01 RX ADMIN — ATORVASTATIN CALCIUM 20 MILLIGRAM(S): 80 TABLET, FILM COATED ORAL at 21:28

## 2021-01-01 RX ADMIN — MIDAZOLAM HYDROCHLORIDE 2 MILLIGRAM(S): 1 INJECTION, SOLUTION INTRAMUSCULAR; INTRAVENOUS at 22:41

## 2021-01-01 RX ADMIN — METHADONE HYDROCHLORIDE 5 MILLIGRAM(S): 40 TABLET ORAL at 05:24

## 2021-01-01 RX ADMIN — Medication 650 MILLIGRAM(S): at 13:00

## 2021-01-01 RX ADMIN — Medication 10 MILLIGRAM(S): at 17:13

## 2021-01-01 RX ADMIN — Medication 2 MILLIGRAM(S): at 06:28

## 2021-01-01 RX ADMIN — Medication 2 MILLIGRAM(S): at 14:28

## 2021-01-01 RX ADMIN — ALBUTEROL 2 PUFF(S): 90 AEROSOL, METERED ORAL at 00:46

## 2021-01-01 RX ADMIN — CISATRACURIUM BESYLATE 10 MILLIGRAM(S): 2 INJECTION INTRAVENOUS at 05:15

## 2021-01-01 RX ADMIN — CHLORHEXIDINE GLUCONATE 15 MILLILITER(S): 213 SOLUTION TOPICAL at 05:41

## 2021-01-01 RX ADMIN — MONTELUKAST 10 MILLIGRAM(S): 4 TABLET, CHEWABLE ORAL at 17:13

## 2021-01-01 RX ADMIN — Medication 10 MILLIGRAM(S): at 13:05

## 2021-01-01 RX ADMIN — Medication 2: at 23:35

## 2021-01-01 RX ADMIN — HUMAN INSULIN 2 UNIT(S): 100 INJECTION, SUSPENSION SUBCUTANEOUS at 00:34

## 2021-01-01 RX ADMIN — MIDODRINE HYDROCHLORIDE 10 MILLIGRAM(S): 2.5 TABLET ORAL at 21:12

## 2021-01-01 RX ADMIN — METHADONE HYDROCHLORIDE 30 MILLIGRAM(S): 40 TABLET ORAL at 06:00

## 2021-01-01 RX ADMIN — ENOXAPARIN SODIUM 80 MILLIGRAM(S): 100 INJECTION SUBCUTANEOUS at 05:53

## 2021-01-01 RX ADMIN — ENOXAPARIN SODIUM 80 MILLIGRAM(S): 100 INJECTION SUBCUTANEOUS at 05:16

## 2021-01-01 RX ADMIN — Medication 1 MILLIGRAM(S): at 05:24

## 2021-01-01 RX ADMIN — CHLORHEXIDINE GLUCONATE 1 APPLICATION(S): 213 SOLUTION TOPICAL at 05:22

## 2021-01-01 RX ADMIN — BUDESONIDE AND FORMOTEROL FUMARATE DIHYDRATE 2 PUFF(S): 160; 4.5 AEROSOL RESPIRATORY (INHALATION) at 17:19

## 2021-01-01 RX ADMIN — SENNA PLUS 10 MILLILITER(S): 8.6 TABLET ORAL at 21:43

## 2021-01-01 RX ADMIN — ALBUTEROL 2 PUFF(S): 90 AEROSOL, METERED ORAL at 13:23

## 2021-01-01 RX ADMIN — MIDODRINE HYDROCHLORIDE 40 MILLIGRAM(S): 2.5 TABLET ORAL at 22:23

## 2021-01-01 RX ADMIN — CHLORHEXIDINE GLUCONATE 15 MILLILITER(S): 213 SOLUTION TOPICAL at 17:12

## 2021-01-01 RX ADMIN — TAMSULOSIN HYDROCHLORIDE 0.4 MILLIGRAM(S): 0.4 CAPSULE ORAL at 05:39

## 2021-01-01 RX ADMIN — METHADONE HYDROCHLORIDE 30 MILLIGRAM(S): 40 TABLET ORAL at 21:19

## 2021-01-01 RX ADMIN — Medication 100 MILLIGRAM(S): at 00:58

## 2021-01-01 RX ADMIN — PANTOPRAZOLE SODIUM 40 MILLIGRAM(S): 20 TABLET, DELAYED RELEASE ORAL at 11:22

## 2021-01-01 RX ADMIN — BUDESONIDE AND FORMOTEROL FUMARATE DIHYDRATE 2 PUFF(S): 160; 4.5 AEROSOL RESPIRATORY (INHALATION) at 05:09

## 2021-01-01 RX ADMIN — FENTANYL CITRATE 8.16 MICROGRAM(S)/KG/HR: 50 INJECTION INTRAVENOUS at 08:28

## 2021-01-01 RX ADMIN — PANTOPRAZOLE SODIUM 40 MILLIGRAM(S): 20 TABLET, DELAYED RELEASE ORAL at 05:36

## 2021-01-01 RX ADMIN — Medication 2: at 05:33

## 2021-01-01 RX ADMIN — CHLORHEXIDINE GLUCONATE 15 MILLILITER(S): 213 SOLUTION TOPICAL at 06:53

## 2021-01-01 RX ADMIN — Medication 30 MILLILITER(S): at 11:28

## 2021-01-01 RX ADMIN — FENTANYL CITRATE 100 MICROGRAM(S): 50 INJECTION INTRAVENOUS at 14:16

## 2021-01-01 RX ADMIN — VALSARTAN 160 MILLIGRAM(S): 80 TABLET ORAL at 05:39

## 2021-01-01 RX ADMIN — CISATRACURIUM BESYLATE 9.79 MICROGRAM(S)/KG/MIN: 2 INJECTION INTRAVENOUS at 08:00

## 2021-01-01 RX ADMIN — CHLORHEXIDINE GLUCONATE 15 MILLILITER(S): 213 SOLUTION TOPICAL at 06:07

## 2021-01-01 RX ADMIN — ENOXAPARIN SODIUM 80 MILLIGRAM(S): 100 INJECTION SUBCUTANEOUS at 05:03

## 2021-01-01 RX ADMIN — HUMAN INSULIN 16 UNIT(S): 100 INJECTION, SUSPENSION SUBCUTANEOUS at 17:17

## 2021-01-01 RX ADMIN — BUDESONIDE AND FORMOTEROL FUMARATE DIHYDRATE 2 PUFF(S): 160; 4.5 AEROSOL RESPIRATORY (INHALATION) at 17:20

## 2021-01-01 RX ADMIN — PANTOPRAZOLE SODIUM 40 MILLIGRAM(S): 20 TABLET, DELAYED RELEASE ORAL at 11:45

## 2021-01-01 RX ADMIN — Medication 2000 UNIT(S): at 13:47

## 2021-01-01 RX ADMIN — URSODIOL 500 MILLIGRAM(S): 250 TABLET, FILM COATED ORAL at 17:01

## 2021-01-01 RX ADMIN — BUDESONIDE AND FORMOTEROL FUMARATE DIHYDRATE 2 PUFF(S): 160; 4.5 AEROSOL RESPIRATORY (INHALATION) at 05:20

## 2021-01-01 RX ADMIN — Medication 6: at 05:27

## 2021-01-01 RX ADMIN — VASOPRESSIN 2.4 UNIT(S)/MIN: 20 INJECTION INTRAVENOUS at 22:00

## 2021-01-01 RX ADMIN — HUMAN INSULIN 10 UNIT(S): 100 INJECTION, SUSPENSION SUBCUTANEOUS at 13:02

## 2021-01-01 RX ADMIN — Medication 1: at 05:48

## 2021-01-01 RX ADMIN — CISATRACURIUM BESYLATE 14.7 MICROGRAM(S)/KG/MIN: 2 INJECTION INTRAVENOUS at 09:24

## 2021-01-01 RX ADMIN — Medication 4: at 00:32

## 2021-01-01 RX ADMIN — Medication 2 MILLIGRAM(S): at 11:19

## 2021-01-01 RX ADMIN — CISATRACURIUM BESYLATE 14.7 MICROGRAM(S)/KG/MIN: 2 INJECTION INTRAVENOUS at 01:14

## 2021-01-01 RX ADMIN — ALBUTEROL 2 PUFF(S): 90 AEROSOL, METERED ORAL at 00:10

## 2021-01-01 RX ADMIN — BUDESONIDE AND FORMOTEROL FUMARATE DIHYDRATE 2 PUFF(S): 160; 4.5 AEROSOL RESPIRATORY (INHALATION) at 17:43

## 2021-01-01 RX ADMIN — ENOXAPARIN SODIUM 80 MILLIGRAM(S): 100 INJECTION SUBCUTANEOUS at 17:30

## 2021-01-01 RX ADMIN — HUMAN INSULIN 10 UNIT(S): 100 INJECTION, SUSPENSION SUBCUTANEOUS at 13:24

## 2021-01-01 RX ADMIN — FENTANYL CITRATE 25 MICROGRAM(S): 50 INJECTION INTRAVENOUS at 16:30

## 2021-01-01 RX ADMIN — AMPICILLIN SODIUM AND SULBACTAM SODIUM 200 GRAM(S): 250; 125 INJECTION, POWDER, FOR SUSPENSION INTRAMUSCULAR; INTRAVENOUS at 11:22

## 2021-01-01 RX ADMIN — Medication 4: at 18:00

## 2021-01-01 RX ADMIN — Medication 1 DROP(S): at 13:26

## 2021-01-01 RX ADMIN — Medication 2: at 23:58

## 2021-01-01 RX ADMIN — Medication 250 MILLIGRAM(S): at 11:12

## 2021-01-01 RX ADMIN — BUMETANIDE 1 MILLIGRAM(S): 0.25 INJECTION INTRAMUSCULAR; INTRAVENOUS at 05:38

## 2021-01-01 RX ADMIN — METHADONE HYDROCHLORIDE 30 MILLIGRAM(S): 40 TABLET ORAL at 05:24

## 2021-01-01 RX ADMIN — Medication 1 DROP(S): at 05:22

## 2021-01-01 RX ADMIN — MIDAZOLAM HYDROCHLORIDE 2 MILLIGRAM(S): 1 INJECTION, SOLUTION INTRAMUSCULAR; INTRAVENOUS at 04:09

## 2021-01-01 RX ADMIN — Medication 400 MILLIGRAM(S): at 09:44

## 2021-01-01 RX ADMIN — MIDAZOLAM HYDROCHLORIDE 1.63 MG/KG/HR: 1 INJECTION, SOLUTION INTRAMUSCULAR; INTRAVENOUS at 11:18

## 2021-01-01 RX ADMIN — Medication 1: at 12:04

## 2021-01-01 RX ADMIN — HUMAN INSULIN 2 UNIT(S): 100 INJECTION, SUSPENSION SUBCUTANEOUS at 05:42

## 2021-01-01 RX ADMIN — FENTANYL CITRATE 50 MICROGRAM(S): 50 INJECTION INTRAVENOUS at 05:15

## 2021-01-01 RX ADMIN — Medication 15 MILLIGRAM(S): at 21:12

## 2021-01-01 RX ADMIN — Medication 1 DROP(S): at 13:01

## 2021-01-01 RX ADMIN — METHADONE HYDROCHLORIDE 5 MILLIGRAM(S): 40 TABLET ORAL at 18:16

## 2021-01-01 RX ADMIN — CHLORHEXIDINE GLUCONATE 15 MILLILITER(S): 213 SOLUTION TOPICAL at 18:11

## 2021-01-01 RX ADMIN — Medication 2 MILLIGRAM(S): at 05:30

## 2021-01-01 RX ADMIN — Medication 2000 UNIT(S): at 11:31

## 2021-01-01 RX ADMIN — MIDAZOLAM HYDROCHLORIDE 4 MILLIGRAM(S): 1 INJECTION, SOLUTION INTRAMUSCULAR; INTRAVENOUS at 03:17

## 2021-01-01 RX ADMIN — HUMAN INSULIN 2 UNIT(S): 100 INJECTION, SUSPENSION SUBCUTANEOUS at 05:36

## 2021-01-01 RX ADMIN — Medication 2: at 18:30

## 2021-01-01 RX ADMIN — METHADONE HYDROCHLORIDE 20 MILLIGRAM(S): 40 TABLET ORAL at 05:33

## 2021-01-01 RX ADMIN — HUMAN INSULIN 5 UNIT(S): 100 INJECTION, SUSPENSION SUBCUTANEOUS at 11:12

## 2021-01-01 RX ADMIN — VASOPRESSIN 2.4 UNIT(S)/MIN: 20 INJECTION INTRAVENOUS at 21:08

## 2021-01-01 RX ADMIN — Medication 2: at 17:17

## 2021-01-01 RX ADMIN — DAPTOMYCIN 128 MILLIGRAM(S): 500 INJECTION, POWDER, LYOPHILIZED, FOR SOLUTION INTRAVENOUS at 12:28

## 2021-01-01 RX ADMIN — SENNA PLUS 20 MILLILITER(S): 8.6 TABLET ORAL at 22:38

## 2021-01-01 RX ADMIN — URSODIOL 500 MILLIGRAM(S): 250 TABLET, FILM COATED ORAL at 05:42

## 2021-01-01 RX ADMIN — HUMAN INSULIN 10 UNIT(S): 100 INJECTION, SUSPENSION SUBCUTANEOUS at 05:54

## 2021-01-01 RX ADMIN — FENTANYL CITRATE 8.16 MICROGRAM(S)/KG/HR: 50 INJECTION INTRAVENOUS at 06:00

## 2021-01-01 RX ADMIN — MIDODRINE HYDROCHLORIDE 40 MILLIGRAM(S): 2.5 TABLET ORAL at 14:17

## 2021-01-01 RX ADMIN — FENTANYL CITRATE 50 MICROGRAM(S): 50 INJECTION INTRAVENOUS at 06:53

## 2021-01-01 RX ADMIN — Medication 3.83 MICROGRAM(S)/KG/MIN: at 19:00

## 2021-01-01 RX ADMIN — AMPICILLIN SODIUM AND SULBACTAM SODIUM 200 GRAM(S): 250; 125 INJECTION, POWDER, FOR SUSPENSION INTRAMUSCULAR; INTRAVENOUS at 12:31

## 2021-01-01 RX ADMIN — Medication 500 MILLIGRAM(S): at 11:43

## 2021-01-01 RX ADMIN — MIDODRINE HYDROCHLORIDE 10 MILLIGRAM(S): 2.5 TABLET ORAL at 21:53

## 2021-01-01 RX ADMIN — POLYETHYLENE GLYCOL 3350 17 GRAM(S): 17 POWDER, FOR SOLUTION ORAL at 18:21

## 2021-01-01 RX ADMIN — FENTANYL CITRATE 50 MICROGRAM(S): 50 INJECTION INTRAVENOUS at 02:20

## 2021-01-01 RX ADMIN — ALBUTEROL 2 PUFF(S): 90 AEROSOL, METERED ORAL at 05:22

## 2021-01-01 RX ADMIN — TAMSULOSIN HYDROCHLORIDE 0.4 MILLIGRAM(S): 0.4 CAPSULE ORAL at 17:40

## 2021-01-01 RX ADMIN — AMPICILLIN SODIUM AND SULBACTAM SODIUM 200 GRAM(S): 250; 125 INJECTION, POWDER, FOR SUSPENSION INTRAMUSCULAR; INTRAVENOUS at 11:07

## 2021-01-01 RX ADMIN — Medication 4: at 05:25

## 2021-01-01 RX ADMIN — METHADONE HYDROCHLORIDE 20 MILLIGRAM(S): 40 TABLET ORAL at 14:26

## 2021-01-01 RX ADMIN — SENNA PLUS 20 MILLILITER(S): 8.6 TABLET ORAL at 21:07

## 2021-01-01 RX ADMIN — CHLORHEXIDINE GLUCONATE 1 APPLICATION(S): 213 SOLUTION TOPICAL at 05:05

## 2021-01-01 RX ADMIN — KETAMINE HYDROCHLORIDE 2.04 MG/KG/HR: 100 INJECTION INTRAMUSCULAR; INTRAVENOUS at 17:11

## 2021-01-01 RX ADMIN — FENTANYL CITRATE 50 MICROGRAM(S): 50 INJECTION INTRAVENOUS at 16:16

## 2021-01-01 RX ADMIN — ALBUTEROL 2 PUFF(S): 90 AEROSOL, METERED ORAL at 05:20

## 2021-01-01 RX ADMIN — REMDESIVIR 500 MILLIGRAM(S): 5 INJECTION INTRAVENOUS at 10:29

## 2021-01-01 RX ADMIN — Medication 4: at 05:32

## 2021-01-01 RX ADMIN — Medication 6: at 11:05

## 2021-01-01 RX ADMIN — ALBUTEROL 2 PUFF(S): 90 AEROSOL, METERED ORAL at 23:19

## 2021-01-01 RX ADMIN — METHADONE HYDROCHLORIDE 10 MILLIGRAM(S): 40 TABLET ORAL at 05:49

## 2021-01-01 RX ADMIN — Medication 3 MILLILITER(S): at 17:36

## 2021-01-01 RX ADMIN — Medication 2000 UNIT(S): at 11:21

## 2021-01-01 RX ADMIN — Medication 1: at 00:30

## 2021-01-01 RX ADMIN — NALOXEGOL OXALATE 25 MILLIGRAM(S): 12.5 TABLET, FILM COATED ORAL at 11:40

## 2021-01-01 RX ADMIN — Medication 2: at 23:24

## 2021-01-01 RX ADMIN — ENOXAPARIN SODIUM 40 MILLIGRAM(S): 100 INJECTION SUBCUTANEOUS at 11:13

## 2021-01-01 RX ADMIN — Medication 650 MILLIGRAM(S): at 14:06

## 2021-01-01 RX ADMIN — HUMAN INSULIN 10 UNIT(S): 100 INJECTION, SUSPENSION SUBCUTANEOUS at 00:34

## 2021-01-01 RX ADMIN — MIDAZOLAM HYDROCHLORIDE 2 MILLIGRAM(S): 1 INJECTION, SOLUTION INTRAMUSCULAR; INTRAVENOUS at 02:10

## 2021-01-01 RX ADMIN — Medication 6 MILLIGRAM(S): at 19:09

## 2021-01-01 RX ADMIN — METHADONE HYDROCHLORIDE 20 MILLIGRAM(S): 40 TABLET ORAL at 13:18

## 2021-01-01 RX ADMIN — HUMAN INSULIN 10 UNIT(S): 100 INJECTION, SUSPENSION SUBCUTANEOUS at 23:53

## 2021-01-01 RX ADMIN — Medication 2 MILLIGRAM(S): at 00:03

## 2021-01-01 RX ADMIN — Medication 3.83 MICROGRAM(S)/KG/MIN: at 21:48

## 2021-01-01 RX ADMIN — URSODIOL 500 MILLIGRAM(S): 250 TABLET, FILM COATED ORAL at 17:44

## 2021-01-01 RX ADMIN — Medication 650 MILLIGRAM(S): at 00:39

## 2021-01-01 RX ADMIN — MIDAZOLAM HYDROCHLORIDE 1.63 MG/KG/HR: 1 INJECTION, SOLUTION INTRAMUSCULAR; INTRAVENOUS at 11:12

## 2021-01-01 RX ADMIN — Medication 40 MILLIEQUIVALENT(S): at 15:26

## 2021-01-01 RX ADMIN — Medication 2000 UNIT(S): at 11:59

## 2021-01-01 RX ADMIN — ENOXAPARIN SODIUM 80 MILLIGRAM(S): 100 INJECTION SUBCUTANEOUS at 05:02

## 2021-01-01 RX ADMIN — BUDESONIDE AND FORMOTEROL FUMARATE DIHYDRATE 2 PUFF(S): 160; 4.5 AEROSOL RESPIRATORY (INHALATION) at 05:44

## 2021-01-01 RX ADMIN — METHADONE HYDROCHLORIDE 10 MILLIGRAM(S): 40 TABLET ORAL at 05:23

## 2021-01-01 RX ADMIN — ENOXAPARIN SODIUM 80 MILLIGRAM(S): 100 INJECTION SUBCUTANEOUS at 05:30

## 2021-01-01 RX ADMIN — VALSARTAN 160 MILLIGRAM(S): 80 TABLET ORAL at 05:27

## 2021-01-01 RX ADMIN — FENTANYL CITRATE 8.16 MICROGRAM(S)/KG/HR: 50 INJECTION INTRAVENOUS at 17:16

## 2021-01-01 RX ADMIN — Medication 3: at 22:35

## 2021-01-01 RX ADMIN — Medication 4: at 18:08

## 2021-01-01 RX ADMIN — ALBUTEROL 2 PUFF(S): 90 AEROSOL, METERED ORAL at 05:47

## 2021-01-01 RX ADMIN — CISATRACURIUM BESYLATE 14.7 MICROGRAM(S)/KG/MIN: 2 INJECTION INTRAVENOUS at 07:46

## 2021-01-01 RX ADMIN — Medication 250 MILLIGRAM(S): at 17:51

## 2021-01-01 RX ADMIN — Medication 3.83 MICROGRAM(S)/KG/MIN: at 08:18

## 2021-01-01 RX ADMIN — MIDODRINE HYDROCHLORIDE 40 MILLIGRAM(S): 2.5 TABLET ORAL at 05:21

## 2021-01-01 RX ADMIN — Medication 1 MILLIGRAM(S): at 13:38

## 2021-01-01 RX ADMIN — Medication 500 MILLIGRAM(S): at 13:17

## 2021-01-01 RX ADMIN — MONTELUKAST 10 MILLIGRAM(S): 4 TABLET, CHEWABLE ORAL at 11:01

## 2021-01-01 RX ADMIN — Medication 2: at 17:05

## 2021-01-01 RX ADMIN — MIDODRINE HYDROCHLORIDE 20 MILLIGRAM(S): 2.5 TABLET ORAL at 21:02

## 2021-01-01 RX ADMIN — ENOXAPARIN SODIUM 80 MILLIGRAM(S): 100 INJECTION SUBCUTANEOUS at 17:04

## 2021-01-01 RX ADMIN — PANTOPRAZOLE SODIUM 40 MILLIGRAM(S): 20 TABLET, DELAYED RELEASE ORAL at 11:32

## 2021-01-01 RX ADMIN — Medication 62.5 MILLIMOLE(S): at 09:52

## 2021-01-01 RX ADMIN — Medication 2: at 09:08

## 2021-01-01 RX ADMIN — Medication 2000 UNIT(S): at 11:27

## 2021-01-01 RX ADMIN — MIDAZOLAM HYDROCHLORIDE 2 MILLIGRAM(S): 1 INJECTION, SOLUTION INTRAMUSCULAR; INTRAVENOUS at 05:55

## 2021-01-01 RX ADMIN — HUMAN INSULIN 7 UNIT(S): 100 INJECTION, SUSPENSION SUBCUTANEOUS at 17:12

## 2021-01-01 RX ADMIN — Medication 500 MILLIGRAM(S): at 17:36

## 2021-01-01 RX ADMIN — URSODIOL 500 MILLIGRAM(S): 250 TABLET, FILM COATED ORAL at 05:26

## 2021-01-01 RX ADMIN — Medication 2 MILLIGRAM(S): at 05:25

## 2021-01-01 RX ADMIN — PANTOPRAZOLE SODIUM 40 MILLIGRAM(S): 20 TABLET, DELAYED RELEASE ORAL at 11:31

## 2021-01-01 RX ADMIN — CISATRACURIUM BESYLATE 14.7 MICROGRAM(S)/KG/MIN: 2 INJECTION INTRAVENOUS at 21:47

## 2021-01-01 RX ADMIN — Medication 650 MILLIGRAM(S): at 21:38

## 2021-01-01 RX ADMIN — BUDESONIDE AND FORMOTEROL FUMARATE DIHYDRATE 2 PUFF(S): 160; 4.5 AEROSOL RESPIRATORY (INHALATION) at 05:29

## 2021-01-01 RX ADMIN — MIDODRINE HYDROCHLORIDE 40 MILLIGRAM(S): 2.5 TABLET ORAL at 21:52

## 2021-01-01 RX ADMIN — HUMAN INSULIN 10 UNIT(S): 100 INJECTION, SUSPENSION SUBCUTANEOUS at 11:22

## 2021-01-01 RX ADMIN — NALOXEGOL OXALATE 25 MILLIGRAM(S): 12.5 TABLET, FILM COATED ORAL at 11:03

## 2021-01-01 RX ADMIN — HUMAN INSULIN 7 UNIT(S): 100 INJECTION, SUSPENSION SUBCUTANEOUS at 12:08

## 2021-01-01 RX ADMIN — Medication 102 MILLIGRAM(S): at 12:15

## 2021-01-01 RX ADMIN — Medication 6: at 23:30

## 2021-01-01 RX ADMIN — Medication 300 MILLIGRAM(S): at 07:48

## 2021-01-01 RX ADMIN — MIDODRINE HYDROCHLORIDE 10 MILLIGRAM(S): 2.5 TABLET ORAL at 05:40

## 2021-01-01 RX ADMIN — MIDAZOLAM HYDROCHLORIDE 1.63 MG/KG/HR: 1 INJECTION, SOLUTION INTRAMUSCULAR; INTRAVENOUS at 16:57

## 2021-01-01 RX ADMIN — METHADONE HYDROCHLORIDE 5 MILLIGRAM(S): 40 TABLET ORAL at 06:12

## 2021-01-01 RX ADMIN — ALBUTEROL 2 PUFF(S): 90 AEROSOL, METERED ORAL at 13:06

## 2021-01-01 RX ADMIN — Medication 250 MILLIGRAM(S): at 23:56

## 2021-01-01 RX ADMIN — CHLORHEXIDINE GLUCONATE 1 APPLICATION(S): 213 SOLUTION TOPICAL at 04:51

## 2021-01-01 RX ADMIN — MEROPENEM 100 MILLIGRAM(S): 1 INJECTION INTRAVENOUS at 05:06

## 2021-01-01 RX ADMIN — CHLORHEXIDINE GLUCONATE 15 MILLILITER(S): 213 SOLUTION TOPICAL at 21:18

## 2021-01-01 RX ADMIN — Medication 2000 UNIT(S): at 11:25

## 2021-01-01 RX ADMIN — NALOXEGOL OXALATE 25 MILLIGRAM(S): 12.5 TABLET, FILM COATED ORAL at 12:12

## 2021-01-01 RX ADMIN — CHLORHEXIDINE GLUCONATE 15 MILLILITER(S): 213 SOLUTION TOPICAL at 17:42

## 2021-01-01 RX ADMIN — BUDESONIDE AND FORMOTEROL FUMARATE DIHYDRATE 2 PUFF(S): 160; 4.5 AEROSOL RESPIRATORY (INHALATION) at 17:11

## 2021-01-01 RX ADMIN — Medication 2 MILLIGRAM(S): at 11:51

## 2021-01-01 RX ADMIN — MIDAZOLAM HYDROCHLORIDE 1.63 MG/KG/HR: 1 INJECTION, SOLUTION INTRAMUSCULAR; INTRAVENOUS at 20:40

## 2021-01-01 RX ADMIN — Medication 650 MILLIGRAM(S): at 15:20

## 2021-01-01 RX ADMIN — ALBUTEROL 2 PUFF(S): 90 AEROSOL, METERED ORAL at 13:02

## 2021-01-01 RX ADMIN — METHADONE HYDROCHLORIDE 20 MILLIGRAM(S): 40 TABLET ORAL at 21:08

## 2021-01-01 RX ADMIN — METHADONE HYDROCHLORIDE 30 MILLIGRAM(S): 40 TABLET ORAL at 13:55

## 2021-01-01 RX ADMIN — CHLORHEXIDINE GLUCONATE 1 APPLICATION(S): 213 SOLUTION TOPICAL at 05:08

## 2021-01-01 RX ADMIN — Medication 20 MILLIGRAM(S): at 13:00

## 2021-01-01 RX ADMIN — METHADONE HYDROCHLORIDE 10 MILLIGRAM(S): 40 TABLET ORAL at 05:26

## 2021-01-01 RX ADMIN — Medication 2 MILLIGRAM(S): at 17:20

## 2021-01-01 RX ADMIN — METHADONE HYDROCHLORIDE 30 MILLIGRAM(S): 40 TABLET ORAL at 21:57

## 2021-01-01 RX ADMIN — BUMETANIDE 1 MILLIGRAM(S): 0.25 INJECTION INTRAMUSCULAR; INTRAVENOUS at 05:17

## 2021-01-01 RX ADMIN — SENNA PLUS 10 MILLILITER(S): 8.6 TABLET ORAL at 21:19

## 2021-01-01 RX ADMIN — POLYETHYLENE GLYCOL 3350 17 GRAM(S): 17 POWDER, FOR SOLUTION ORAL at 11:24

## 2021-01-01 RX ADMIN — METHADONE HYDROCHLORIDE 30 MILLIGRAM(S): 40 TABLET ORAL at 22:00

## 2021-01-01 RX ADMIN — Medication 30 MILLILITER(S): at 13:06

## 2021-01-01 RX ADMIN — PANTOPRAZOLE SODIUM 40 MILLIGRAM(S): 20 TABLET, DELAYED RELEASE ORAL at 11:10

## 2021-01-01 RX ADMIN — Medication 1 MILLIGRAM(S): at 05:19

## 2021-01-01 RX ADMIN — CISATRACURIUM BESYLATE 14.7 MICROGRAM(S)/KG/MIN: 2 INJECTION INTRAVENOUS at 19:00

## 2021-01-01 RX ADMIN — VASOPRESSIN 2.4 UNIT(S)/MIN: 20 INJECTION INTRAVENOUS at 11:25

## 2021-01-01 RX ADMIN — SENNA PLUS 10 MILLILITER(S): 8.6 TABLET ORAL at 22:26

## 2021-01-01 RX ADMIN — METHADONE HYDROCHLORIDE 30 MILLIGRAM(S): 40 TABLET ORAL at 13:26

## 2021-01-01 RX ADMIN — URSODIOL 500 MILLIGRAM(S): 250 TABLET, FILM COATED ORAL at 17:07

## 2021-01-01 RX ADMIN — Medication 6 MILLIGRAM(S): at 05:18

## 2021-01-01 RX ADMIN — HUMAN INSULIN 8 UNIT(S): 100 INJECTION, SUSPENSION SUBCUTANEOUS at 00:54

## 2021-01-01 RX ADMIN — AMPICILLIN SODIUM AND SULBACTAM SODIUM 200 GRAM(S): 250; 125 INJECTION, POWDER, FOR SUSPENSION INTRAMUSCULAR; INTRAVENOUS at 00:01

## 2021-01-01 RX ADMIN — MIDODRINE HYDROCHLORIDE 10 MILLIGRAM(S): 2.5 TABLET ORAL at 13:18

## 2021-01-01 RX ADMIN — METHADONE HYDROCHLORIDE 5 MILLIGRAM(S): 40 TABLET ORAL at 21:40

## 2021-01-01 RX ADMIN — ALBUTEROL 2 PUFF(S): 90 AEROSOL, METERED ORAL at 06:07

## 2021-01-01 RX ADMIN — HUMAN INSULIN 2 UNIT(S): 100 INJECTION, SUSPENSION SUBCUTANEOUS at 17:25

## 2021-01-01 RX ADMIN — ENOXAPARIN SODIUM 80 MILLIGRAM(S): 100 INJECTION SUBCUTANEOUS at 17:15

## 2021-01-01 RX ADMIN — Medication 1 DROP(S): at 21:12

## 2021-01-01 RX ADMIN — MEROPENEM 100 MILLIGRAM(S): 1 INJECTION INTRAVENOUS at 06:11

## 2021-01-01 RX ADMIN — Medication 2000 UNIT(S): at 11:02

## 2021-01-01 RX ADMIN — BUMETANIDE 5 MG/HR: 0.25 INJECTION INTRAMUSCULAR; INTRAVENOUS at 11:18

## 2021-01-01 RX ADMIN — ENOXAPARIN SODIUM 80 MILLIGRAM(S): 100 INJECTION SUBCUTANEOUS at 17:40

## 2021-01-01 RX ADMIN — Medication 2: at 00:02

## 2021-01-01 RX ADMIN — METHADONE HYDROCHLORIDE 20 MILLIGRAM(S): 40 TABLET ORAL at 14:17

## 2021-01-01 RX ADMIN — Medication 40 MILLIEQUIVALENT(S): at 02:21

## 2021-01-01 RX ADMIN — ALBUTEROL 2 PUFF(S): 90 AEROSOL, METERED ORAL at 13:08

## 2021-01-01 RX ADMIN — KETAMINE HYDROCHLORIDE 2.04 MG/KG/HR: 100 INJECTION INTRAMUSCULAR; INTRAVENOUS at 11:39

## 2021-01-01 RX ADMIN — Medication 20 MILLIGRAM(S): at 02:10

## 2021-01-01 RX ADMIN — Medication 4: at 18:04

## 2021-01-01 RX ADMIN — POLYETHYLENE GLYCOL 3350 17 GRAM(S): 17 POWDER, FOR SOLUTION ORAL at 11:23

## 2021-01-01 RX ADMIN — Medication 2000 UNIT(S): at 11:45

## 2021-01-01 RX ADMIN — PROPOFOL 12.2 MICROGRAM(S)/KG/MIN: 10 INJECTION, EMULSION INTRAVENOUS at 05:12

## 2021-01-01 RX ADMIN — Medication 3: at 07:56

## 2021-01-01 RX ADMIN — ENOXAPARIN SODIUM 80 MILLIGRAM(S): 100 INJECTION SUBCUTANEOUS at 05:27

## 2021-01-01 RX ADMIN — PANTOPRAZOLE SODIUM 40 MILLIGRAM(S): 20 TABLET, DELAYED RELEASE ORAL at 17:35

## 2021-01-01 RX ADMIN — CHLORHEXIDINE GLUCONATE 1 APPLICATION(S): 213 SOLUTION TOPICAL at 05:17

## 2021-01-01 RX ADMIN — BUDESONIDE AND FORMOTEROL FUMARATE DIHYDRATE 2 PUFF(S): 160; 4.5 AEROSOL RESPIRATORY (INHALATION) at 05:41

## 2021-01-01 RX ADMIN — NALOXEGOL OXALATE 25 MILLIGRAM(S): 12.5 TABLET, FILM COATED ORAL at 11:42

## 2021-01-01 RX ADMIN — BUDESONIDE AND FORMOTEROL FUMARATE DIHYDRATE 2 PUFF(S): 160; 4.5 AEROSOL RESPIRATORY (INHALATION) at 17:24

## 2021-01-01 RX ADMIN — AMLODIPINE BESYLATE 5 MILLIGRAM(S): 2.5 TABLET ORAL at 05:40

## 2021-01-01 RX ADMIN — DEXMEDETOMIDINE HYDROCHLORIDE IN 0.9% SODIUM CHLORIDE 4.08 MICROGRAM(S)/KG/HR: 4 INJECTION INTRAVENOUS at 11:18

## 2021-01-01 RX ADMIN — Medication 4: at 11:13

## 2021-01-01 RX ADMIN — MIDAZOLAM HYDROCHLORIDE 1.63 MG/KG/HR: 1 INJECTION, SOLUTION INTRAMUSCULAR; INTRAVENOUS at 10:42

## 2021-01-01 RX ADMIN — HUMAN INSULIN 10 UNIT(S): 100 INJECTION, SUSPENSION SUBCUTANEOUS at 18:13

## 2021-01-01 RX ADMIN — Medication 4: at 05:27

## 2021-01-01 RX ADMIN — LACTULOSE 10 GRAM(S): 10 SOLUTION ORAL at 13:06

## 2021-01-01 RX ADMIN — BUDESONIDE AND FORMOTEROL FUMARATE DIHYDRATE 2 PUFF(S): 160; 4.5 AEROSOL RESPIRATORY (INHALATION) at 17:02

## 2021-01-01 RX ADMIN — Medication 2: at 10:43

## 2021-01-01 RX ADMIN — Medication 30 MILLILITER(S): at 11:57

## 2021-01-01 RX ADMIN — HUMAN INSULIN 5 UNIT(S): 100 INJECTION, SUSPENSION SUBCUTANEOUS at 21:11

## 2021-01-01 RX ADMIN — VASOPRESSIN 2.4 UNIT(S)/MIN: 20 INJECTION INTRAVENOUS at 11:01

## 2021-01-01 RX ADMIN — FINASTERIDE 5 MILLIGRAM(S): 5 TABLET, FILM COATED ORAL at 17:12

## 2021-01-01 RX ADMIN — BUDESONIDE AND FORMOTEROL FUMARATE DIHYDRATE 2 PUFF(S): 160; 4.5 AEROSOL RESPIRATORY (INHALATION) at 21:33

## 2021-01-01 RX ADMIN — BUMETANIDE 1 MILLIGRAM(S): 0.25 INJECTION INTRAMUSCULAR; INTRAVENOUS at 22:00

## 2021-01-01 RX ADMIN — ALBUTEROL 2 PUFF(S): 90 AEROSOL, METERED ORAL at 17:33

## 2021-01-01 RX ADMIN — Medication 20 MILLIGRAM(S): at 17:29

## 2021-01-01 RX ADMIN — POLYETHYLENE GLYCOL 3350 17 GRAM(S): 17 POWDER, FOR SOLUTION ORAL at 11:02

## 2021-01-01 RX ADMIN — CHLORHEXIDINE GLUCONATE 1 APPLICATION(S): 213 SOLUTION TOPICAL at 05:19

## 2021-01-01 RX ADMIN — ENOXAPARIN SODIUM 80 MILLIGRAM(S): 100 INJECTION SUBCUTANEOUS at 18:37

## 2021-01-01 RX ADMIN — CHLORHEXIDINE GLUCONATE 1 APPLICATION(S): 213 SOLUTION TOPICAL at 06:13

## 2021-01-01 RX ADMIN — Medication 250 MILLIGRAM(S): at 17:11

## 2021-01-01 RX ADMIN — BUMETANIDE 1 MILLIGRAM(S): 0.25 INJECTION INTRAMUSCULAR; INTRAVENOUS at 07:59

## 2021-01-01 RX ADMIN — MIDODRINE HYDROCHLORIDE 40 MILLIGRAM(S): 2.5 TABLET ORAL at 05:01

## 2021-01-01 RX ADMIN — Medication 1 MILLIGRAM(S): at 22:37

## 2021-01-01 RX ADMIN — FENTANYL CITRATE 2.04 MICROGRAM(S)/KG/HR: 50 INJECTION INTRAVENOUS at 08:07

## 2021-01-01 RX ADMIN — METHADONE HYDROCHLORIDE 10 MILLIGRAM(S): 40 TABLET ORAL at 20:17

## 2021-01-01 RX ADMIN — ALBUTEROL 2 PUFF(S): 90 AEROSOL, METERED ORAL at 11:34

## 2021-01-01 RX ADMIN — VALSARTAN 160 MILLIGRAM(S): 80 TABLET ORAL at 05:43

## 2021-01-01 RX ADMIN — ENOXAPARIN SODIUM 40 MILLIGRAM(S): 100 INJECTION SUBCUTANEOUS at 11:14

## 2021-01-01 RX ADMIN — ALBUTEROL 2 PUFF(S): 90 AEROSOL, METERED ORAL at 05:49

## 2021-01-01 RX ADMIN — MIDODRINE HYDROCHLORIDE 20 MILLIGRAM(S): 2.5 TABLET ORAL at 14:04

## 2021-01-01 RX ADMIN — BUDESONIDE AND FORMOTEROL FUMARATE DIHYDRATE 2 PUFF(S): 160; 4.5 AEROSOL RESPIRATORY (INHALATION) at 21:56

## 2021-01-01 RX ADMIN — Medication 250 MILLIGRAM(S): at 05:06

## 2021-01-01 RX ADMIN — CHLORHEXIDINE GLUCONATE 15 MILLILITER(S): 213 SOLUTION TOPICAL at 17:40

## 2021-01-01 RX ADMIN — KETAMINE HYDROCHLORIDE 16.3 MG/KG/HR: 100 INJECTION INTRAMUSCULAR; INTRAVENOUS at 21:11

## 2021-01-01 RX ADMIN — CHLORHEXIDINE GLUCONATE 15 MILLILITER(S): 213 SOLUTION TOPICAL at 05:26

## 2021-01-01 RX ADMIN — NALOXEGOL OXALATE 25 MILLIGRAM(S): 12.5 TABLET, FILM COATED ORAL at 12:24

## 2021-01-01 RX ADMIN — BUDESONIDE AND FORMOTEROL FUMARATE DIHYDRATE 2 PUFF(S): 160; 4.5 AEROSOL RESPIRATORY (INHALATION) at 18:07

## 2021-01-01 RX ADMIN — URSODIOL 500 MILLIGRAM(S): 250 TABLET, FILM COATED ORAL at 18:29

## 2021-01-01 RX ADMIN — ALBUTEROL 2 PUFF(S): 90 AEROSOL, METERED ORAL at 14:33

## 2021-01-01 RX ADMIN — ALBUTEROL 2 PUFF(S): 90 AEROSOL, METERED ORAL at 21:40

## 2021-01-01 RX ADMIN — DAPTOMYCIN 128 MILLIGRAM(S): 500 INJECTION, POWDER, LYOPHILIZED, FOR SOLUTION INTRAVENOUS at 13:08

## 2021-01-01 RX ADMIN — BUDESONIDE AND FORMOTEROL FUMARATE DIHYDRATE 2 PUFF(S): 160; 4.5 AEROSOL RESPIRATORY (INHALATION) at 06:45

## 2021-01-01 RX ADMIN — Medication 40 MILLIEQUIVALENT(S): at 05:21

## 2021-01-01 RX ADMIN — MONTELUKAST 10 MILLIGRAM(S): 4 TABLET, CHEWABLE ORAL at 11:31

## 2021-01-01 RX ADMIN — METHADONE HYDROCHLORIDE 10 MILLIGRAM(S): 40 TABLET ORAL at 21:09

## 2021-01-01 RX ADMIN — NALOXEGOL OXALATE 25 MILLIGRAM(S): 12.5 TABLET, FILM COATED ORAL at 13:05

## 2021-01-01 RX ADMIN — Medication 2 MILLIGRAM(S): at 11:02

## 2021-01-01 RX ADMIN — METHADONE HYDROCHLORIDE 10 MILLIGRAM(S): 40 TABLET ORAL at 06:40

## 2021-01-01 RX ADMIN — HEPARIN SODIUM 8 UNIT(S)/HR: 5000 INJECTION INTRAVENOUS; SUBCUTANEOUS at 11:41

## 2021-01-01 RX ADMIN — BUDESONIDE AND FORMOTEROL FUMARATE DIHYDRATE 2 PUFF(S): 160; 4.5 AEROSOL RESPIRATORY (INHALATION) at 18:04

## 2021-01-01 RX ADMIN — HALOPERIDOL DECANOATE 0.5 MILLIGRAM(S): 100 INJECTION INTRAMUSCULAR at 18:17

## 2021-01-01 RX ADMIN — NALOXEGOL OXALATE 25 MILLIGRAM(S): 12.5 TABLET, FILM COATED ORAL at 12:01

## 2021-01-01 RX ADMIN — CHLORHEXIDINE GLUCONATE 15 MILLILITER(S): 213 SOLUTION TOPICAL at 06:03

## 2021-01-01 RX ADMIN — Medication 2: at 17:21

## 2021-01-01 RX ADMIN — CISATRACURIUM BESYLATE 14.7 MICROGRAM(S)/KG/MIN: 2 INJECTION INTRAVENOUS at 08:28

## 2021-01-01 RX ADMIN — Medication 1 DROP(S): at 21:52

## 2021-01-01 RX ADMIN — AMPICILLIN SODIUM AND SULBACTAM SODIUM 200 GRAM(S): 250; 125 INJECTION, POWDER, FOR SUSPENSION INTRAMUSCULAR; INTRAVENOUS at 11:31

## 2021-01-01 RX ADMIN — METHADONE HYDROCHLORIDE 5 MILLIGRAM(S): 40 TABLET ORAL at 10:18

## 2021-01-01 RX ADMIN — POLYETHYLENE GLYCOL 3350 17 GRAM(S): 17 POWDER, FOR SOLUTION ORAL at 11:32

## 2021-01-01 RX ADMIN — Medication 6 MILLIGRAM(S): at 05:17

## 2021-01-01 RX ADMIN — Medication 30 MILLILITER(S): at 14:57

## 2021-01-01 RX ADMIN — HUMAN INSULIN 7 UNIT(S): 100 INJECTION, SUSPENSION SUBCUTANEOUS at 17:42

## 2021-01-01 RX ADMIN — PIPERACILLIN AND TAZOBACTAM 25 GRAM(S): 4; .5 INJECTION, POWDER, LYOPHILIZED, FOR SOLUTION INTRAVENOUS at 17:42

## 2021-01-01 RX ADMIN — NALOXEGOL OXALATE 25 MILLIGRAM(S): 12.5 TABLET, FILM COATED ORAL at 12:10

## 2021-01-01 RX ADMIN — MIDODRINE HYDROCHLORIDE 40 MILLIGRAM(S): 2.5 TABLET ORAL at 05:52

## 2021-01-01 RX ADMIN — CHLORHEXIDINE GLUCONATE 15 MILLILITER(S): 213 SOLUTION TOPICAL at 17:29

## 2021-01-01 RX ADMIN — Medication 20 MILLIGRAM(S): at 05:45

## 2021-01-01 RX ADMIN — Medication 102 MILLIGRAM(S): at 08:00

## 2021-01-01 RX ADMIN — MONTELUKAST 10 MILLIGRAM(S): 4 TABLET, CHEWABLE ORAL at 11:15

## 2021-01-01 RX ADMIN — HUMAN INSULIN 10 UNIT(S): 100 INJECTION, SUSPENSION SUBCUTANEOUS at 00:45

## 2021-01-01 RX ADMIN — URSODIOL 500 MILLIGRAM(S): 250 TABLET, FILM COATED ORAL at 18:16

## 2021-01-01 RX ADMIN — MORPHINE SULFATE 2 MILLIGRAM(S): 50 CAPSULE, EXTENDED RELEASE ORAL at 13:20

## 2021-01-01 RX ADMIN — AMPICILLIN SODIUM AND SULBACTAM SODIUM 200 GRAM(S): 250; 125 INJECTION, POWDER, FOR SUSPENSION INTRAMUSCULAR; INTRAVENOUS at 17:51

## 2021-01-01 RX ADMIN — Medication 4: at 05:48

## 2021-01-01 RX ADMIN — Medication 2: at 12:23

## 2021-01-01 RX ADMIN — MONTELUKAST 10 MILLIGRAM(S): 4 TABLET, CHEWABLE ORAL at 11:13

## 2021-01-01 RX ADMIN — FENTANYL CITRATE 50 MICROGRAM(S): 50 INJECTION INTRAVENOUS at 17:00

## 2021-01-01 RX ADMIN — METHADONE HYDROCHLORIDE 20 MILLIGRAM(S): 40 TABLET ORAL at 21:52

## 2021-01-01 RX ADMIN — Medication 10 MILLIGRAM(S): at 13:18

## 2021-01-01 RX ADMIN — HUMAN INSULIN 6 UNIT(S): 100 INJECTION, SUSPENSION SUBCUTANEOUS at 17:13

## 2021-01-01 RX ADMIN — URSODIOL 500 MILLIGRAM(S): 250 TABLET, FILM COATED ORAL at 05:01

## 2021-01-01 RX ADMIN — BUDESONIDE AND FORMOTEROL FUMARATE DIHYDRATE 2 PUFF(S): 160; 4.5 AEROSOL RESPIRATORY (INHALATION) at 18:49

## 2021-01-01 RX ADMIN — ENOXAPARIN SODIUM 80 MILLIGRAM(S): 100 INJECTION SUBCUTANEOUS at 05:10

## 2021-01-01 RX ADMIN — CHLORHEXIDINE GLUCONATE 1 APPLICATION(S): 213 SOLUTION TOPICAL at 06:10

## 2021-01-01 RX ADMIN — HUMAN INSULIN 10 UNIT(S): 100 INJECTION, SUSPENSION SUBCUTANEOUS at 12:23

## 2021-01-01 RX ADMIN — Medication 100 MILLIGRAM(S): at 00:50

## 2021-01-01 RX ADMIN — SENNA PLUS 10 MILLILITER(S): 8.6 TABLET ORAL at 23:21

## 2021-01-01 RX ADMIN — AMIODARONE HYDROCHLORIDE 33.3 MG/MIN: 400 TABLET ORAL at 08:00

## 2021-01-01 RX ADMIN — MONTELUKAST 10 MILLIGRAM(S): 4 TABLET, CHEWABLE ORAL at 11:11

## 2021-01-01 RX ADMIN — HUMAN INSULIN 10 UNIT(S): 100 INJECTION, SUSPENSION SUBCUTANEOUS at 17:52

## 2021-01-01 RX ADMIN — CHLORHEXIDINE GLUCONATE 15 MILLILITER(S): 213 SOLUTION TOPICAL at 06:16

## 2021-01-01 RX ADMIN — NALOXEGOL OXALATE 25 MILLIGRAM(S): 12.5 TABLET, FILM COATED ORAL at 11:12

## 2021-01-01 RX ADMIN — ENOXAPARIN SODIUM 80 MILLIGRAM(S): 100 INJECTION SUBCUTANEOUS at 05:05

## 2021-01-01 RX ADMIN — ENOXAPARIN SODIUM 80 MILLIGRAM(S): 100 INJECTION SUBCUTANEOUS at 17:22

## 2021-01-01 RX ADMIN — CHLORHEXIDINE GLUCONATE 15 MILLILITER(S): 213 SOLUTION TOPICAL at 18:28

## 2021-01-01 RX ADMIN — KETAMINE HYDROCHLORIDE 2.04 MG/KG/HR: 100 INJECTION INTRAMUSCULAR; INTRAVENOUS at 14:43

## 2021-01-01 RX ADMIN — MIDAZOLAM HYDROCHLORIDE 2 MILLIGRAM(S): 1 INJECTION, SOLUTION INTRAMUSCULAR; INTRAVENOUS at 18:30

## 2021-01-01 RX ADMIN — METHADONE HYDROCHLORIDE 10 MILLIGRAM(S): 40 TABLET ORAL at 05:27

## 2021-01-01 RX ADMIN — Medication 5 UNIT(S): at 13:00

## 2021-01-01 RX ADMIN — KETAMINE HYDROCHLORIDE 16.3 MG/KG/HR: 100 INJECTION INTRAMUSCULAR; INTRAVENOUS at 05:59

## 2021-01-01 RX ADMIN — Medication 6: at 11:10

## 2021-01-01 RX ADMIN — VALSARTAN 160 MILLIGRAM(S): 80 TABLET ORAL at 06:55

## 2021-01-01 RX ADMIN — BUDESONIDE AND FORMOTEROL FUMARATE DIHYDRATE 2 PUFF(S): 160; 4.5 AEROSOL RESPIRATORY (INHALATION) at 17:58

## 2021-01-01 RX ADMIN — Medication 650 MILLIGRAM(S): at 13:20

## 2021-01-01 RX ADMIN — FENTANYL CITRATE 8.16 MICROGRAM(S)/KG/HR: 50 INJECTION INTRAVENOUS at 10:08

## 2021-01-01 RX ADMIN — Medication 1 APPLICATION(S): at 13:01

## 2021-01-01 RX ADMIN — PANTOPRAZOLE SODIUM 40 MILLIGRAM(S): 20 TABLET, DELAYED RELEASE ORAL at 05:23

## 2021-01-01 RX ADMIN — Medication 15 MILLIGRAM(S): at 05:16

## 2021-01-01 RX ADMIN — SODIUM CHLORIDE 3 MILLILITER(S): 9 INJECTION INTRAMUSCULAR; INTRAVENOUS; SUBCUTANEOUS at 17:38

## 2021-01-01 RX ADMIN — Medication 50 MILLIEQUIVALENT(S): at 05:58

## 2021-01-01 RX ADMIN — PANTOPRAZOLE SODIUM 40 MILLIGRAM(S): 20 TABLET, DELAYED RELEASE ORAL at 11:12

## 2021-01-01 RX ADMIN — HUMAN INSULIN 8 UNIT(S): 100 INJECTION, SUSPENSION SUBCUTANEOUS at 12:02

## 2021-01-01 RX ADMIN — Medication 40 MILLIEQUIVALENT(S): at 03:57

## 2021-01-01 RX ADMIN — PANTOPRAZOLE SODIUM 40 MILLIGRAM(S): 20 TABLET, DELAYED RELEASE ORAL at 11:01

## 2021-01-01 RX ADMIN — ALBUTEROL 2 PUFF(S): 90 AEROSOL, METERED ORAL at 11:02

## 2021-01-01 RX ADMIN — KETAMINE HYDROCHLORIDE 2.04 MG/KG/HR: 100 INJECTION INTRAMUSCULAR; INTRAVENOUS at 05:09

## 2021-01-01 RX ADMIN — FENTANYL CITRATE 8.16 MICROGRAM(S)/KG/HR: 50 INJECTION INTRAVENOUS at 07:34

## 2021-01-01 RX ADMIN — NALOXEGOL OXALATE 25 MILLIGRAM(S): 12.5 TABLET, FILM COATED ORAL at 11:47

## 2021-01-01 RX ADMIN — Medication 7.65 MICROGRAM(S)/KG/MIN: at 19:53

## 2021-01-01 RX ADMIN — Medication 15 MILLILITER(S): at 12:09

## 2021-01-01 RX ADMIN — Medication 1000 MILLIGRAM(S): at 18:51

## 2021-01-01 RX ADMIN — MEROPENEM 100 MILLIGRAM(S): 1 INJECTION INTRAVENOUS at 14:05

## 2021-01-01 RX ADMIN — AMPICILLIN SODIUM AND SULBACTAM SODIUM 200 GRAM(S): 250; 125 INJECTION, POWDER, FOR SUSPENSION INTRAMUSCULAR; INTRAVENOUS at 17:52

## 2021-01-01 RX ADMIN — NALOXEGOL OXALATE 25 MILLIGRAM(S): 12.5 TABLET, FILM COATED ORAL at 17:07

## 2021-01-01 RX ADMIN — Medication 4: at 00:47

## 2021-01-01 RX ADMIN — METHADONE HYDROCHLORIDE 10 MILLIGRAM(S): 40 TABLET ORAL at 14:04

## 2021-01-01 RX ADMIN — Medication 250 MILLIGRAM(S): at 05:39

## 2021-01-01 RX ADMIN — Medication 250 MILLIGRAM(S): at 12:02

## 2021-01-01 RX ADMIN — QUETIAPINE FUMARATE 25 MILLIGRAM(S): 200 TABLET, FILM COATED ORAL at 22:39

## 2021-01-01 RX ADMIN — ALBUTEROL 2 PUFF(S): 90 AEROSOL, METERED ORAL at 05:37

## 2021-01-01 RX ADMIN — MIDODRINE HYDROCHLORIDE 20 MILLIGRAM(S): 2.5 TABLET ORAL at 06:40

## 2021-01-01 RX ADMIN — Medication 2: at 05:08

## 2021-01-01 RX ADMIN — URSODIOL 500 MILLIGRAM(S): 250 TABLET, FILM COATED ORAL at 17:21

## 2021-01-01 RX ADMIN — Medication 8: at 17:56

## 2021-01-01 RX ADMIN — HYDROMORPHONE HYDROCHLORIDE 1 MILLIGRAM(S): 2 INJECTION INTRAMUSCULAR; INTRAVENOUS; SUBCUTANEOUS at 11:59

## 2021-01-01 RX ADMIN — MIDAZOLAM HYDROCHLORIDE 2 MILLIGRAM(S): 1 INJECTION, SOLUTION INTRAMUSCULAR; INTRAVENOUS at 04:59

## 2021-01-01 RX ADMIN — ENOXAPARIN SODIUM 80 MILLIGRAM(S): 100 INJECTION SUBCUTANEOUS at 17:57

## 2021-01-01 RX ADMIN — Medication 3.83 MICROGRAM(S)/KG/MIN: at 07:30

## 2021-01-01 RX ADMIN — KETAMINE HYDROCHLORIDE 16.3 MG/KG/HR: 100 INJECTION INTRAMUSCULAR; INTRAVENOUS at 10:42

## 2021-01-01 RX ADMIN — BUDESONIDE AND FORMOTEROL FUMARATE DIHYDRATE 2 PUFF(S): 160; 4.5 AEROSOL RESPIRATORY (INHALATION) at 17:33

## 2021-01-01 RX ADMIN — CHLORHEXIDINE GLUCONATE 1 APPLICATION(S): 213 SOLUTION TOPICAL at 04:50

## 2021-01-01 RX ADMIN — TAMSULOSIN HYDROCHLORIDE 0.4 MILLIGRAM(S): 0.4 CAPSULE ORAL at 05:27

## 2021-01-01 RX ADMIN — BUDESONIDE AND FORMOTEROL FUMARATE DIHYDRATE 2 PUFF(S): 160; 4.5 AEROSOL RESPIRATORY (INHALATION) at 17:08

## 2021-01-01 RX ADMIN — HUMAN INSULIN 12 UNIT(S): 100 INJECTION, SUSPENSION SUBCUTANEOUS at 05:33

## 2021-01-01 RX ADMIN — Medication 2 MILLIGRAM(S): at 05:36

## 2021-01-01 RX ADMIN — BUDESONIDE AND FORMOTEROL FUMARATE DIHYDRATE 2 PUFF(S): 160; 4.5 AEROSOL RESPIRATORY (INHALATION) at 10:23

## 2021-01-01 RX ADMIN — SODIUM CHLORIDE 1000 MILLILITER(S): 9 INJECTION INTRAMUSCULAR; INTRAVENOUS; SUBCUTANEOUS at 15:10

## 2021-01-01 RX ADMIN — Medication 1 DROP(S): at 05:03

## 2021-01-01 RX ADMIN — CHLORHEXIDINE GLUCONATE 1 APPLICATION(S): 213 SOLUTION TOPICAL at 07:10

## 2021-01-01 RX ADMIN — ALBUTEROL 2 PUFF(S): 90 AEROSOL, METERED ORAL at 22:11

## 2021-01-01 RX ADMIN — CHLORHEXIDINE GLUCONATE 15 MILLILITER(S): 213 SOLUTION TOPICAL at 05:28

## 2021-01-01 RX ADMIN — CHLORHEXIDINE GLUCONATE 1 APPLICATION(S): 213 SOLUTION TOPICAL at 06:14

## 2021-01-01 RX ADMIN — HUMAN INSULIN 14 UNIT(S): 100 INJECTION, SUSPENSION SUBCUTANEOUS at 18:05

## 2021-01-01 RX ADMIN — CHLORHEXIDINE GLUCONATE 15 MILLILITER(S): 213 SOLUTION TOPICAL at 17:56

## 2021-01-01 RX ADMIN — ALBUTEROL 2 PUFF(S): 90 AEROSOL, METERED ORAL at 05:53

## 2021-01-01 RX ADMIN — Medication 8: at 05:36

## 2021-01-01 RX ADMIN — PIPERACILLIN AND TAZOBACTAM 25 GRAM(S): 4; .5 INJECTION, POWDER, LYOPHILIZED, FOR SOLUTION INTRAVENOUS at 05:47

## 2021-01-01 RX ADMIN — Medication 1: at 04:02

## 2021-01-01 RX ADMIN — HUMAN INSULIN 10 UNIT(S): 100 INJECTION, SUSPENSION SUBCUTANEOUS at 05:27

## 2021-01-01 RX ADMIN — Medication 30 MILLILITER(S): at 11:33

## 2021-01-01 RX ADMIN — HUMAN INSULIN 10 UNIT(S): 100 INJECTION, SUSPENSION SUBCUTANEOUS at 17:55

## 2021-01-01 RX ADMIN — Medication 3 MILLILITER(S): at 05:16

## 2021-01-01 RX ADMIN — HUMAN INSULIN 2 UNIT(S): 100 INJECTION, SUSPENSION SUBCUTANEOUS at 00:56

## 2021-01-01 RX ADMIN — BUDESONIDE AND FORMOTEROL FUMARATE DIHYDRATE 2 PUFF(S): 160; 4.5 AEROSOL RESPIRATORY (INHALATION) at 17:35

## 2021-01-01 RX ADMIN — ENOXAPARIN SODIUM 80 MILLIGRAM(S): 100 INJECTION SUBCUTANEOUS at 05:12

## 2021-01-01 RX ADMIN — HUMAN INSULIN 10 UNIT(S): 100 INJECTION, SUSPENSION SUBCUTANEOUS at 17:43

## 2021-01-01 RX ADMIN — Medication 2: at 06:15

## 2021-01-01 RX ADMIN — PHENYLEPHRINE HYDROCHLORIDE 7.65 MICROGRAM(S)/KG/MIN: 10 INJECTION INTRAVENOUS at 07:56

## 2021-01-01 RX ADMIN — CHLORHEXIDINE GLUCONATE 15 MILLILITER(S): 213 SOLUTION TOPICAL at 17:16

## 2021-01-01 RX ADMIN — CHLORHEXIDINE GLUCONATE 1 APPLICATION(S): 213 SOLUTION TOPICAL at 05:26

## 2021-01-01 RX ADMIN — BUDESONIDE AND FORMOTEROL FUMARATE DIHYDRATE 2 PUFF(S): 160; 4.5 AEROSOL RESPIRATORY (INHALATION) at 22:16

## 2021-01-01 RX ADMIN — AMPICILLIN SODIUM AND SULBACTAM SODIUM 200 GRAM(S): 250; 125 INJECTION, POWDER, FOR SUSPENSION INTRAMUSCULAR; INTRAVENOUS at 06:13

## 2021-01-01 RX ADMIN — NALOXEGOL OXALATE 25 MILLIGRAM(S): 12.5 TABLET, FILM COATED ORAL at 11:09

## 2021-01-01 RX ADMIN — Medication 10 MILLIGRAM(S): at 22:51

## 2021-01-01 RX ADMIN — CHLORHEXIDINE GLUCONATE 15 MILLILITER(S): 213 SOLUTION TOPICAL at 17:53

## 2021-01-01 RX ADMIN — Medication 4 MILLIGRAM(S): at 15:18

## 2021-01-01 RX ADMIN — Medication 1: at 17:13

## 2021-01-01 RX ADMIN — HALOPERIDOL DECANOATE 0.5 MILLIGRAM(S): 100 INJECTION INTRAMUSCULAR at 08:44

## 2021-01-01 RX ADMIN — Medication 30 MILLILITER(S): at 11:12

## 2021-01-01 RX ADMIN — ALBUTEROL 2 PUFF(S): 90 AEROSOL, METERED ORAL at 22:32

## 2021-01-01 RX ADMIN — Medication 300 MILLIGRAM(S): at 06:05

## 2021-01-01 RX ADMIN — BUDESONIDE AND FORMOTEROL FUMARATE DIHYDRATE 2 PUFF(S): 160; 4.5 AEROSOL RESPIRATORY (INHALATION) at 17:38

## 2021-01-01 RX ADMIN — ALBUTEROL 2 PUFF(S): 90 AEROSOL, METERED ORAL at 21:59

## 2021-01-01 RX ADMIN — Medication 1 MILLIGRAM(S): at 17:06

## 2021-01-01 RX ADMIN — URSODIOL 500 MILLIGRAM(S): 250 TABLET, FILM COATED ORAL at 18:13

## 2021-01-01 RX ADMIN — CISATRACURIUM BESYLATE 14.7 MICROGRAM(S)/KG/MIN: 2 INJECTION INTRAVENOUS at 00:58

## 2021-01-01 RX ADMIN — HUMAN INSULIN 10 UNIT(S): 100 INJECTION, SUSPENSION SUBCUTANEOUS at 00:21

## 2021-01-01 RX ADMIN — HYDROMORPHONE HYDROCHLORIDE 2 MILLIGRAM(S): 2 INJECTION INTRAMUSCULAR; INTRAVENOUS; SUBCUTANEOUS at 15:39

## 2021-01-01 RX ADMIN — NALOXEGOL OXALATE 25 MILLIGRAM(S): 12.5 TABLET, FILM COATED ORAL at 11:26

## 2021-01-01 RX ADMIN — Medication 1 MILLIGRAM(S): at 22:07

## 2021-01-01 RX ADMIN — ALBUTEROL 2 PUFF(S): 90 AEROSOL, METERED ORAL at 12:55

## 2021-01-01 RX ADMIN — BUDESONIDE AND FORMOTEROL FUMARATE DIHYDRATE 2 PUFF(S): 160; 4.5 AEROSOL RESPIRATORY (INHALATION) at 05:43

## 2021-01-01 RX ADMIN — Medication 1 DROP(S): at 14:19

## 2021-01-01 RX ADMIN — Medication 6: at 00:34

## 2021-01-01 RX ADMIN — POLYETHYLENE GLYCOL 3350 17 GRAM(S): 17 POWDER, FOR SOLUTION ORAL at 11:13

## 2021-01-01 RX ADMIN — NALOXEGOL OXALATE 25 MILLIGRAM(S): 12.5 TABLET, FILM COATED ORAL at 11:18

## 2021-01-01 RX ADMIN — URSODIOL 500 MILLIGRAM(S): 250 TABLET, FILM COATED ORAL at 17:17

## 2021-01-01 RX ADMIN — URSODIOL 500 MILLIGRAM(S): 250 TABLET, FILM COATED ORAL at 18:01

## 2021-01-01 RX ADMIN — FENTANYL CITRATE 50 MICROGRAM(S): 50 INJECTION INTRAVENOUS at 08:00

## 2021-01-01 RX ADMIN — DEXMEDETOMIDINE HYDROCHLORIDE IN 0.9% SODIUM CHLORIDE 4.08 MICROGRAM(S)/KG/HR: 4 INJECTION INTRAVENOUS at 10:00

## 2021-01-01 RX ADMIN — ENOXAPARIN SODIUM 80 MILLIGRAM(S): 100 INJECTION SUBCUTANEOUS at 17:42

## 2021-01-01 RX ADMIN — Medication 2 MILLIGRAM(S): at 23:53

## 2021-01-01 RX ADMIN — Medication 400 MILLIGRAM(S): at 17:06

## 2021-01-01 RX ADMIN — BUDESONIDE AND FORMOTEROL FUMARATE DIHYDRATE 2 PUFF(S): 160; 4.5 AEROSOL RESPIRATORY (INHALATION) at 05:26

## 2021-01-01 RX ADMIN — QUETIAPINE FUMARATE 25 MILLIGRAM(S): 200 TABLET, FILM COATED ORAL at 23:20

## 2021-01-01 RX ADMIN — ALBUTEROL 2 PUFF(S): 90 AEROSOL, METERED ORAL at 21:43

## 2021-01-01 RX ADMIN — PIPERACILLIN AND TAZOBACTAM 200 GRAM(S): 4; .5 INJECTION, POWDER, LYOPHILIZED, FOR SOLUTION INTRAVENOUS at 21:28

## 2021-01-01 RX ADMIN — HUMAN INSULIN 2 UNIT(S): 100 INJECTION, SUSPENSION SUBCUTANEOUS at 01:28

## 2021-01-01 RX ADMIN — Medication 2 MILLIGRAM(S): at 13:23

## 2021-01-01 RX ADMIN — DAPTOMYCIN 128 MILLIGRAM(S): 500 INJECTION, POWDER, LYOPHILIZED, FOR SOLUTION INTRAVENOUS at 12:11

## 2021-01-01 RX ADMIN — ALBUTEROL 2 PUFF(S): 90 AEROSOL, METERED ORAL at 11:45

## 2021-01-01 RX ADMIN — HUMAN INSULIN 7 UNIT(S): 100 INJECTION, SUSPENSION SUBCUTANEOUS at 06:10

## 2021-01-01 RX ADMIN — CHLORHEXIDINE GLUCONATE 1 APPLICATION(S): 213 SOLUTION TOPICAL at 05:52

## 2021-01-01 RX ADMIN — CHLORHEXIDINE GLUCONATE 15 MILLILITER(S): 213 SOLUTION TOPICAL at 05:52

## 2021-01-01 RX ADMIN — AMLODIPINE BESYLATE 5 MILLIGRAM(S): 2.5 TABLET ORAL at 05:27

## 2021-01-01 RX ADMIN — Medication 6 MILLIGRAM(S): at 05:46

## 2021-01-01 RX ADMIN — CHLORHEXIDINE GLUCONATE 1 APPLICATION(S): 213 SOLUTION TOPICAL at 05:42

## 2021-01-01 RX ADMIN — BUDESONIDE AND FORMOTEROL FUMARATE DIHYDRATE 2 PUFF(S): 160; 4.5 AEROSOL RESPIRATORY (INHALATION) at 05:25

## 2021-01-01 RX ADMIN — HUMAN INSULIN 12 UNIT(S): 100 INJECTION, SUSPENSION SUBCUTANEOUS at 06:25

## 2021-01-01 RX ADMIN — KETAMINE HYDROCHLORIDE 2.04 MG/KG/HR: 100 INJECTION INTRAMUSCULAR; INTRAVENOUS at 17:56

## 2021-01-01 RX ADMIN — POLYETHYLENE GLYCOL 3350 17 GRAM(S): 17 POWDER, FOR SOLUTION ORAL at 11:04

## 2021-01-01 RX ADMIN — CHLORHEXIDINE GLUCONATE 15 MILLILITER(S): 213 SOLUTION TOPICAL at 17:11

## 2021-01-01 RX ADMIN — METHADONE HYDROCHLORIDE 30 MILLIGRAM(S): 40 TABLET ORAL at 16:09

## 2021-01-01 RX ADMIN — POLYETHYLENE GLYCOL 3350 17 GRAM(S): 17 POWDER, FOR SOLUTION ORAL at 11:11

## 2021-01-01 RX ADMIN — POLYETHYLENE GLYCOL 3350 17 GRAM(S): 17 POWDER, FOR SOLUTION ORAL at 11:31

## 2021-01-01 RX ADMIN — Medication 30 MILLILITER(S): at 11:01

## 2021-01-01 RX ADMIN — Medication 400 MILLIGRAM(S): at 11:48

## 2021-01-01 RX ADMIN — HYDROMORPHONE HYDROCHLORIDE 2 MILLIGRAM(S): 2 INJECTION INTRAMUSCULAR; INTRAVENOUS; SUBCUTANEOUS at 16:00

## 2021-01-01 RX ADMIN — POTASSIUM PHOSPHATE, MONOBASIC POTASSIUM PHOSPHATE, DIBASIC 62.5 MILLIMOLE(S): 236; 224 INJECTION, SOLUTION INTRAVENOUS at 02:21

## 2021-01-01 RX ADMIN — ALBUTEROL 2 PUFF(S): 90 AEROSOL, METERED ORAL at 17:47

## 2021-01-01 RX ADMIN — Medication 1: at 05:41

## 2021-01-01 RX ADMIN — Medication 2000 UNIT(S): at 12:24

## 2021-01-01 RX ADMIN — Medication 2: at 21:52

## 2021-01-01 RX ADMIN — FENTANYL CITRATE 8.16 MICROGRAM(S)/KG/HR: 50 INJECTION INTRAVENOUS at 21:14

## 2021-01-01 RX ADMIN — Medication 250 MILLIGRAM(S): at 12:26

## 2021-01-01 RX ADMIN — KETAMINE HYDROCHLORIDE 2.04 MG/KG/HR: 100 INJECTION INTRAMUSCULAR; INTRAVENOUS at 21:47

## 2021-01-01 RX ADMIN — BUDESONIDE AND FORMOTEROL FUMARATE DIHYDRATE 2 PUFF(S): 160; 4.5 AEROSOL RESPIRATORY (INHALATION) at 08:48

## 2021-01-01 RX ADMIN — HUMAN INSULIN 7 UNIT(S): 100 INJECTION, SUSPENSION SUBCUTANEOUS at 00:04

## 2021-01-01 RX ADMIN — Medication 1 DROP(S): at 22:00

## 2021-01-01 RX ADMIN — CHLORHEXIDINE GLUCONATE 15 MILLILITER(S): 213 SOLUTION TOPICAL at 17:23

## 2021-01-01 RX ADMIN — MIDAZOLAM HYDROCHLORIDE 2 MILLIGRAM(S): 1 INJECTION, SOLUTION INTRAMUSCULAR; INTRAVENOUS at 14:30

## 2021-01-01 RX ADMIN — ENOXAPARIN SODIUM 80 MILLIGRAM(S): 100 INJECTION SUBCUTANEOUS at 17:16

## 2021-01-01 RX ADMIN — ALBUTEROL 2 PUFF(S): 90 AEROSOL, METERED ORAL at 21:58

## 2021-01-01 RX ADMIN — HUMAN INSULIN 7 UNIT(S): 100 INJECTION, SUSPENSION SUBCUTANEOUS at 17:30

## 2021-01-01 RX ADMIN — CHLORHEXIDINE GLUCONATE 1 APPLICATION(S): 213 SOLUTION TOPICAL at 06:49

## 2021-01-01 RX ADMIN — CHLORHEXIDINE GLUCONATE 1 APPLICATION(S): 213 SOLUTION TOPICAL at 06:08

## 2021-01-01 RX ADMIN — Medication 2: at 01:03

## 2021-01-01 RX ADMIN — POLYETHYLENE GLYCOL 3350 17 GRAM(S): 17 POWDER, FOR SOLUTION ORAL at 12:11

## 2021-01-01 RX ADMIN — CHLORHEXIDINE GLUCONATE 1 APPLICATION(S): 213 SOLUTION TOPICAL at 05:25

## 2021-01-01 RX ADMIN — Medication 5: at 20:30

## 2021-01-01 RX ADMIN — Medication 7.65 MICROGRAM(S)/KG/MIN: at 08:28

## 2021-01-01 RX ADMIN — FENTANYL CITRATE 100 MICROGRAM(S): 50 INJECTION INTRAVENOUS at 14:15

## 2021-01-01 RX ADMIN — Medication 15 MILLILITER(S): at 11:49

## 2021-01-01 RX ADMIN — CHLORHEXIDINE GLUCONATE 15 MILLILITER(S): 213 SOLUTION TOPICAL at 05:00

## 2021-01-01 RX ADMIN — CHLORHEXIDINE GLUCONATE 15 MILLILITER(S): 213 SOLUTION TOPICAL at 18:17

## 2021-01-01 RX ADMIN — BUMETANIDE 1 MILLIGRAM(S): 0.25 INJECTION INTRAMUSCULAR; INTRAVENOUS at 17:19

## 2021-01-01 RX ADMIN — Medication 1 APPLICATION(S): at 13:19

## 2021-01-01 RX ADMIN — DEXMEDETOMIDINE HYDROCHLORIDE IN 0.9% SODIUM CHLORIDE 4.08 MICROGRAM(S)/KG/HR: 4 INJECTION INTRAVENOUS at 16:13

## 2021-01-01 RX ADMIN — Medication 100 MILLIGRAM(S): at 00:51

## 2021-01-01 RX ADMIN — Medication 1 DROP(S): at 06:12

## 2021-01-01 RX ADMIN — MEROPENEM 100 MILLIGRAM(S): 1 INJECTION INTRAVENOUS at 13:09

## 2021-01-01 RX ADMIN — ALBUTEROL 2 PUFF(S): 90 AEROSOL, METERED ORAL at 21:15

## 2021-01-01 RX ADMIN — Medication 10 MILLIGRAM(S): at 11:15

## 2021-01-01 RX ADMIN — Medication 6 MILLIGRAM(S): at 05:07

## 2021-01-01 RX ADMIN — Medication 1 MILLIGRAM(S): at 06:48

## 2021-01-01 RX ADMIN — HUMAN INSULIN 5 UNIT(S): 100 INJECTION, SUSPENSION SUBCUTANEOUS at 00:29

## 2021-01-01 RX ADMIN — ALBUTEROL 2 PUFF(S): 90 AEROSOL, METERED ORAL at 19:05

## 2021-01-01 RX ADMIN — Medication 650 MILLIGRAM(S): at 00:22

## 2021-01-01 RX ADMIN — Medication 4: at 23:19

## 2021-01-01 RX ADMIN — PANTOPRAZOLE SODIUM 40 MILLIGRAM(S): 20 TABLET, DELAYED RELEASE ORAL at 11:44

## 2021-01-01 RX ADMIN — Medication 2000 UNIT(S): at 13:05

## 2021-01-01 RX ADMIN — Medication 10 MILLIGRAM(S): at 05:04

## 2021-01-01 RX ADMIN — MONTELUKAST 10 MILLIGRAM(S): 4 TABLET, CHEWABLE ORAL at 11:12

## 2021-01-01 RX ADMIN — TAMSULOSIN HYDROCHLORIDE 0.4 MILLIGRAM(S): 0.4 CAPSULE ORAL at 05:43

## 2021-01-01 RX ADMIN — PANTOPRAZOLE SODIUM 40 MILLIGRAM(S): 20 TABLET, DELAYED RELEASE ORAL at 05:25

## 2021-01-01 RX ADMIN — Medication 4: at 11:17

## 2021-01-01 RX ADMIN — Medication 3: at 12:17

## 2021-01-01 RX ADMIN — BUDESONIDE AND FORMOTEROL FUMARATE DIHYDRATE 2 PUFF(S): 160; 4.5 AEROSOL RESPIRATORY (INHALATION) at 05:45

## 2021-01-01 RX ADMIN — CHLORHEXIDINE GLUCONATE 15 MILLILITER(S): 213 SOLUTION TOPICAL at 06:10

## 2021-01-01 RX ADMIN — Medication 1 APPLICATION(S): at 00:45

## 2021-01-01 RX ADMIN — BUDESONIDE AND FORMOTEROL FUMARATE DIHYDRATE 2 PUFF(S): 160; 4.5 AEROSOL RESPIRATORY (INHALATION) at 09:08

## 2021-01-01 RX ADMIN — Medication 1 APPLICATION(S): at 21:09

## 2021-01-01 RX ADMIN — Medication 650 MILLIGRAM(S): at 16:16

## 2021-01-01 RX ADMIN — Medication 15 MILLIGRAM(S): at 13:03

## 2021-01-01 RX ADMIN — MEROPENEM 100 MILLIGRAM(S): 1 INJECTION INTRAVENOUS at 13:54

## 2021-01-01 RX ADMIN — Medication 30 MILLILITER(S): at 17:36

## 2021-01-01 RX ADMIN — Medication 10 MILLIGRAM(S): at 13:37

## 2021-01-01 RX ADMIN — HUMAN INSULIN 5 UNIT(S): 100 INJECTION, SUSPENSION SUBCUTANEOUS at 17:38

## 2021-01-01 RX ADMIN — Medication 2000 UNIT(S): at 11:19

## 2021-01-01 RX ADMIN — Medication 10 MILLIGRAM(S): at 13:47

## 2021-01-01 RX ADMIN — Medication 2 MILLIGRAM(S): at 01:16

## 2021-01-01 RX ADMIN — LACTULOSE 10 GRAM(S): 10 SOLUTION ORAL at 17:13

## 2021-01-01 RX ADMIN — FENTANYL CITRATE 2.04 MICROGRAM(S)/KG/HR: 50 INJECTION INTRAVENOUS at 17:02

## 2021-01-01 RX ADMIN — Medication 650 MILLIGRAM(S): at 16:14

## 2021-01-01 RX ADMIN — Medication 4: at 12:04

## 2021-01-01 RX ADMIN — PROPOFOL 2.45 MICROGRAM(S)/KG/MIN: 10 INJECTION, EMULSION INTRAVENOUS at 11:45

## 2021-01-01 RX ADMIN — URSODIOL 500 MILLIGRAM(S): 250 TABLET, FILM COATED ORAL at 05:18

## 2021-01-01 RX ADMIN — Medication 2000 UNIT(S): at 11:12

## 2021-01-01 RX ADMIN — ALBUTEROL 2 PUFF(S): 90 AEROSOL, METERED ORAL at 11:18

## 2021-01-01 RX ADMIN — Medication 2 MILLIGRAM(S): at 22:04

## 2021-01-01 RX ADMIN — BUDESONIDE AND FORMOTEROL FUMARATE DIHYDRATE 2 PUFF(S): 160; 4.5 AEROSOL RESPIRATORY (INHALATION) at 06:03

## 2021-01-01 RX ADMIN — BUDESONIDE AND FORMOTEROL FUMARATE DIHYDRATE 2 PUFF(S): 160; 4.5 AEROSOL RESPIRATORY (INHALATION) at 17:05

## 2021-01-01 RX ADMIN — MIDODRINE HYDROCHLORIDE 10 MILLIGRAM(S): 2.5 TABLET ORAL at 05:46

## 2021-01-01 RX ADMIN — SENNA PLUS 20 MILLILITER(S): 8.6 TABLET ORAL at 22:30

## 2021-01-01 RX ADMIN — Medication 40 MILLIGRAM(S): at 06:47

## 2021-01-01 RX ADMIN — KETAMINE HYDROCHLORIDE 2.04 MG/KG/HR: 100 INJECTION INTRAMUSCULAR; INTRAVENOUS at 11:25

## 2021-01-01 RX ADMIN — SODIUM CHLORIDE 3 MILLILITER(S): 9 INJECTION INTRAMUSCULAR; INTRAVENOUS; SUBCUTANEOUS at 05:21

## 2021-01-01 RX ADMIN — ENOXAPARIN SODIUM 80 MILLIGRAM(S): 100 INJECTION SUBCUTANEOUS at 17:29

## 2021-01-01 RX ADMIN — MIDODRINE HYDROCHLORIDE 40 MILLIGRAM(S): 2.5 TABLET ORAL at 13:03

## 2021-01-01 RX ADMIN — METHADONE HYDROCHLORIDE 30 MILLIGRAM(S): 40 TABLET ORAL at 05:26

## 2021-01-01 RX ADMIN — Medication 2: at 17:22

## 2021-01-01 RX ADMIN — ENOXAPARIN SODIUM 80 MILLIGRAM(S): 100 INJECTION SUBCUTANEOUS at 17:23

## 2021-01-01 RX ADMIN — Medication 1 APPLICATION(S): at 22:52

## 2021-01-01 RX ADMIN — HUMAN INSULIN 12 UNIT(S): 100 INJECTION, SUSPENSION SUBCUTANEOUS at 17:25

## 2021-01-01 RX ADMIN — ALBUTEROL 2 PUFF(S): 90 AEROSOL, METERED ORAL at 23:45

## 2021-01-01 RX ADMIN — DEXMEDETOMIDINE HYDROCHLORIDE IN 0.9% SODIUM CHLORIDE 4.08 MICROGRAM(S)/KG/HR: 4 INJECTION INTRAVENOUS at 21:30

## 2021-01-01 RX ADMIN — Medication 1: at 06:08

## 2021-01-01 RX ADMIN — Medication 3.83 MICROGRAM(S)/KG/MIN: at 22:00

## 2021-01-01 RX ADMIN — FENTANYL CITRATE 100 MICROGRAM(S): 50 INJECTION INTRAVENOUS at 13:48

## 2021-01-01 RX ADMIN — HUMAN INSULIN 2 UNIT(S): 100 INJECTION, SUSPENSION SUBCUTANEOUS at 17:29

## 2021-01-01 RX ADMIN — NALOXEGOL OXALATE 25 MILLIGRAM(S): 12.5 TABLET, FILM COATED ORAL at 11:21

## 2021-01-01 RX ADMIN — Medication 250 MILLIGRAM(S): at 20:00

## 2021-01-01 RX ADMIN — BUDESONIDE AND FORMOTEROL FUMARATE DIHYDRATE 2 PUFF(S): 160; 4.5 AEROSOL RESPIRATORY (INHALATION) at 21:49

## 2021-01-01 RX ADMIN — Medication 1 MILLIGRAM(S): at 23:21

## 2021-01-01 RX ADMIN — FENTANYL CITRATE 50 MICROGRAM(S): 50 INJECTION INTRAVENOUS at 18:31

## 2021-01-01 RX ADMIN — Medication 1 MILLIGRAM(S): at 18:16

## 2021-01-01 RX ADMIN — MEROPENEM 100 MILLIGRAM(S): 1 INJECTION INTRAVENOUS at 05:03

## 2021-01-01 RX ADMIN — SENNA PLUS 20 MILLILITER(S): 8.6 TABLET ORAL at 21:08

## 2021-01-01 RX ADMIN — Medication 2 MILLIGRAM(S): at 05:02

## 2021-01-01 RX ADMIN — Medication 1 DROP(S): at 05:06

## 2021-01-01 RX ADMIN — CISATRACURIUM BESYLATE 10 MILLIGRAM(S): 2 INJECTION INTRAVENOUS at 09:24

## 2021-01-01 RX ADMIN — METHADONE HYDROCHLORIDE 5 MILLIGRAM(S): 40 TABLET ORAL at 13:26

## 2021-01-01 RX ADMIN — TOCILIZUMAB 100 MILLIGRAM(S): 20 INJECTION, SOLUTION, CONCENTRATE INTRAVENOUS at 22:22

## 2021-01-01 RX ADMIN — DEXMEDETOMIDINE HYDROCHLORIDE IN 0.9% SODIUM CHLORIDE 10.2 MICROGRAM(S)/KG/HR: 4 INJECTION INTRAVENOUS at 07:50

## 2021-01-01 RX ADMIN — HYDROMORPHONE HYDROCHLORIDE 0.5 MILLIGRAM(S): 2 INJECTION INTRAMUSCULAR; INTRAVENOUS; SUBCUTANEOUS at 05:00

## 2021-01-01 RX ADMIN — PANTOPRAZOLE SODIUM 40 MILLIGRAM(S): 20 TABLET, DELAYED RELEASE ORAL at 11:02

## 2021-01-01 RX ADMIN — HUMAN INSULIN 6 UNIT(S): 100 INJECTION, SUSPENSION SUBCUTANEOUS at 11:18

## 2021-01-01 RX ADMIN — BUMETANIDE 1 MILLIGRAM(S): 0.25 INJECTION INTRAMUSCULAR; INTRAVENOUS at 18:36

## 2021-01-01 RX ADMIN — FENTANYL CITRATE 100 MICROGRAM(S): 50 INJECTION INTRAVENOUS at 15:00

## 2021-01-01 RX ADMIN — REMDESIVIR 500 MILLIGRAM(S): 5 INJECTION INTRAVENOUS at 12:23

## 2021-01-01 RX ADMIN — Medication 1 DROP(S): at 21:51

## 2021-01-01 RX ADMIN — Medication 1 DROP(S): at 14:11

## 2021-01-01 RX ADMIN — SENNA PLUS 10 MILLILITER(S): 8.6 TABLET ORAL at 21:35

## 2021-01-01 RX ADMIN — Medication 250 MILLIGRAM(S): at 05:19

## 2021-01-01 RX ADMIN — MEROPENEM 100 MILLIGRAM(S): 1 INJECTION INTRAVENOUS at 08:20

## 2021-01-01 RX ADMIN — METHADONE HYDROCHLORIDE 5 MILLIGRAM(S): 40 TABLET ORAL at 19:37

## 2021-01-01 RX ADMIN — HUMAN INSULIN 7 UNIT(S): 100 INJECTION, SUSPENSION SUBCUTANEOUS at 11:14

## 2021-01-01 RX ADMIN — Medication 4: at 05:31

## 2021-01-01 RX ADMIN — CISATRACURIUM BESYLATE 14.7 MICROGRAM(S)/KG/MIN: 2 INJECTION INTRAVENOUS at 08:40

## 2021-01-01 RX ADMIN — ALBUTEROL 2 PUFF(S): 90 AEROSOL, METERED ORAL at 11:14

## 2021-01-01 RX ADMIN — QUETIAPINE FUMARATE 25 MILLIGRAM(S): 200 TABLET, FILM COATED ORAL at 21:16

## 2021-01-01 RX ADMIN — FENTANYL CITRATE 25 MICROGRAM(S): 50 INJECTION INTRAVENOUS at 20:23

## 2021-01-01 RX ADMIN — ALBUTEROL 2 PUFF(S): 90 AEROSOL, METERED ORAL at 22:33

## 2021-01-01 RX ADMIN — Medication 15 MILLILITER(S): at 11:45

## 2021-01-01 RX ADMIN — ALBUTEROL 2 PUFF(S): 90 AEROSOL, METERED ORAL at 17:19

## 2021-01-01 RX ADMIN — CHLORHEXIDINE GLUCONATE 1 APPLICATION(S): 213 SOLUTION TOPICAL at 06:16

## 2021-01-01 RX ADMIN — ALBUTEROL 2 PUFF(S): 90 AEROSOL, METERED ORAL at 06:45

## 2021-01-01 RX ADMIN — Medication 1 DROP(S): at 21:22

## 2021-01-01 RX ADMIN — PANTOPRAZOLE SODIUM 40 MILLIGRAM(S): 20 TABLET, DELAYED RELEASE ORAL at 11:03

## 2021-01-01 RX ADMIN — ALBUTEROL 2 PUFF(S): 90 AEROSOL, METERED ORAL at 22:07

## 2021-01-01 RX ADMIN — HUMAN INSULIN 10 UNIT(S): 100 INJECTION, SUSPENSION SUBCUTANEOUS at 23:47

## 2021-01-01 RX ADMIN — HUMAN INSULIN 2 UNIT(S): 100 INJECTION, SUSPENSION SUBCUTANEOUS at 17:18

## 2021-01-01 RX ADMIN — CISATRACURIUM BESYLATE 14.7 MICROGRAM(S)/KG/MIN: 2 INJECTION INTRAVENOUS at 21:14

## 2021-01-01 RX ADMIN — METHADONE HYDROCHLORIDE 30 MILLIGRAM(S): 40 TABLET ORAL at 06:04

## 2021-01-01 RX ADMIN — HUMAN INSULIN 5 UNIT(S): 100 INJECTION, SUSPENSION SUBCUTANEOUS at 17:22

## 2021-01-01 RX ADMIN — CHLORHEXIDINE GLUCONATE 15 MILLILITER(S): 213 SOLUTION TOPICAL at 17:47

## 2021-01-01 RX ADMIN — Medication 6: at 05:24

## 2021-01-01 RX ADMIN — CHLORHEXIDINE GLUCONATE 15 MILLILITER(S): 213 SOLUTION TOPICAL at 17:50

## 2021-01-01 RX ADMIN — MEROPENEM 100 MILLIGRAM(S): 1 INJECTION INTRAVENOUS at 20:47

## 2021-01-01 RX ADMIN — Medication 650 MILLIGRAM(S): at 15:54

## 2021-01-01 RX ADMIN — Medication 2 MILLIGRAM(S): at 23:09

## 2021-01-01 RX ADMIN — MIDODRINE HYDROCHLORIDE 40 MILLIGRAM(S): 2.5 TABLET ORAL at 21:24

## 2021-01-01 RX ADMIN — CHLORHEXIDINE GLUCONATE 15 MILLILITER(S): 213 SOLUTION TOPICAL at 17:22

## 2021-01-01 RX ADMIN — KETAMINE HYDROCHLORIDE 16.3 MG/KG/HR: 100 INJECTION INTRAMUSCULAR; INTRAVENOUS at 22:42

## 2021-01-01 RX ADMIN — PANTOPRAZOLE SODIUM 40 MILLIGRAM(S): 20 TABLET, DELAYED RELEASE ORAL at 18:01

## 2021-01-01 RX ADMIN — HUMAN INSULIN 10 UNIT(S): 100 INJECTION, SUSPENSION SUBCUTANEOUS at 06:43

## 2021-01-01 RX ADMIN — CHLORHEXIDINE GLUCONATE 1 APPLICATION(S): 213 SOLUTION TOPICAL at 05:02

## 2021-01-01 RX ADMIN — Medication 4: at 18:16

## 2021-01-01 RX ADMIN — ALBUTEROL 2 PUFF(S): 90 AEROSOL, METERED ORAL at 22:49

## 2021-01-01 RX ADMIN — KETAMINE HYDROCHLORIDE 16.3 MG/KG/HR: 100 INJECTION INTRAMUSCULAR; INTRAVENOUS at 12:54

## 2021-01-01 RX ADMIN — ATORVASTATIN CALCIUM 20 MILLIGRAM(S): 80 TABLET, FILM COATED ORAL at 21:01

## 2021-01-01 RX ADMIN — Medication 2000 UNIT(S): at 11:55

## 2021-01-01 RX ADMIN — AMPICILLIN SODIUM AND SULBACTAM SODIUM 200 GRAM(S): 250; 125 INJECTION, POWDER, FOR SUSPENSION INTRAMUSCULAR; INTRAVENOUS at 23:35

## 2021-01-01 RX ADMIN — URSODIOL 500 MILLIGRAM(S): 250 TABLET, FILM COATED ORAL at 18:14

## 2021-01-01 RX ADMIN — Medication 1 DROP(S): at 13:03

## 2021-01-01 RX ADMIN — HUMAN INSULIN 10 UNIT(S): 100 INJECTION, SUSPENSION SUBCUTANEOUS at 05:32

## 2021-01-01 RX ADMIN — PANTOPRAZOLE SODIUM 40 MILLIGRAM(S): 20 TABLET, DELAYED RELEASE ORAL at 11:51

## 2021-01-01 RX ADMIN — Medication 4: at 00:54

## 2021-01-01 RX ADMIN — Medication 20 MILLIGRAM(S): at 18:34

## 2021-01-01 RX ADMIN — REMDESIVIR 500 MILLIGRAM(S): 5 INJECTION INTRAVENOUS at 09:56

## 2021-01-01 RX ADMIN — HUMAN INSULIN 8 UNIT(S): 100 INJECTION, SUSPENSION SUBCUTANEOUS at 11:11

## 2021-01-01 RX ADMIN — HYDROMORPHONE HYDROCHLORIDE 0.5 MILLIGRAM(S): 2 INJECTION INTRAMUSCULAR; INTRAVENOUS; SUBCUTANEOUS at 04:15

## 2021-01-01 RX ADMIN — DAPTOMYCIN 128 MILLIGRAM(S): 500 INJECTION, POWDER, LYOPHILIZED, FOR SOLUTION INTRAVENOUS at 13:46

## 2021-01-01 RX ADMIN — BUDESONIDE AND FORMOTEROL FUMARATE DIHYDRATE 2 PUFF(S): 160; 4.5 AEROSOL RESPIRATORY (INHALATION) at 21:53

## 2021-01-01 RX ADMIN — BUDESONIDE AND FORMOTEROL FUMARATE DIHYDRATE 2 PUFF(S): 160; 4.5 AEROSOL RESPIRATORY (INHALATION) at 05:12

## 2021-01-01 RX ADMIN — ENOXAPARIN SODIUM 80 MILLIGRAM(S): 100 INJECTION SUBCUTANEOUS at 05:28

## 2021-01-01 RX ADMIN — CHLORHEXIDINE GLUCONATE 15 MILLILITER(S): 213 SOLUTION TOPICAL at 05:23

## 2021-01-01 RX ADMIN — METHADONE HYDROCHLORIDE 10 MILLIGRAM(S): 40 TABLET ORAL at 23:21

## 2021-01-01 RX ADMIN — HYDROMORPHONE HYDROCHLORIDE 0.5 MILLIGRAM(S): 2 INJECTION INTRAMUSCULAR; INTRAVENOUS; SUBCUTANEOUS at 18:16

## 2021-01-01 RX ADMIN — Medication 2 MILLIGRAM(S): at 23:24

## 2021-01-01 RX ADMIN — ALBUTEROL 2 PUFF(S): 90 AEROSOL, METERED ORAL at 21:44

## 2021-01-01 RX ADMIN — METHADONE HYDROCHLORIDE 20 MILLIGRAM(S): 40 TABLET ORAL at 05:40

## 2021-01-01 RX ADMIN — PANTOPRAZOLE SODIUM 40 MILLIGRAM(S): 20 TABLET, DELAYED RELEASE ORAL at 12:31

## 2021-01-01 RX ADMIN — MIDODRINE HYDROCHLORIDE 30 MILLIGRAM(S): 2.5 TABLET ORAL at 13:15

## 2021-01-01 RX ADMIN — MIDAZOLAM HYDROCHLORIDE 2 MILLIGRAM(S): 1 INJECTION, SOLUTION INTRAMUSCULAR; INTRAVENOUS at 13:40

## 2021-01-01 RX ADMIN — Medication 2: at 08:27

## 2021-01-01 RX ADMIN — Medication 2 MILLIGRAM(S): at 17:15

## 2021-01-01 RX ADMIN — HUMAN INSULIN 10 UNIT(S): 100 INJECTION, SUSPENSION SUBCUTANEOUS at 05:01

## 2021-01-01 RX ADMIN — Medication 30 MILLILITER(S): at 11:22

## 2021-01-01 RX ADMIN — HEPARIN SODIUM 8 UNIT(S)/HR: 5000 INJECTION INTRAVENOUS; SUBCUTANEOUS at 07:58

## 2021-01-01 RX ADMIN — BUDESONIDE AND FORMOTEROL FUMARATE DIHYDRATE 2 PUFF(S): 160; 4.5 AEROSOL RESPIRATORY (INHALATION) at 17:49

## 2021-01-01 RX ADMIN — KETAMINE HYDROCHLORIDE 16.3 MG/KG/HR: 100 INJECTION INTRAMUSCULAR; INTRAVENOUS at 20:58

## 2021-01-01 RX ADMIN — ALBUTEROL 2 PUFF(S): 90 AEROSOL, METERED ORAL at 05:45

## 2021-01-01 RX ADMIN — MONTELUKAST 10 MILLIGRAM(S): 4 TABLET, CHEWABLE ORAL at 11:33

## 2021-01-01 RX ADMIN — CHLORHEXIDINE GLUCONATE 15 MILLILITER(S): 213 SOLUTION TOPICAL at 06:39

## 2021-01-01 RX ADMIN — DEXMEDETOMIDINE HYDROCHLORIDE IN 0.9% SODIUM CHLORIDE 4.08 MICROGRAM(S)/KG/HR: 4 INJECTION INTRAVENOUS at 15:48

## 2021-01-01 RX ADMIN — URSODIOL 500 MILLIGRAM(S): 250 TABLET, FILM COATED ORAL at 05:04

## 2021-01-01 RX ADMIN — Medication 1: at 11:21

## 2021-01-01 RX ADMIN — Medication 4: at 17:52

## 2021-01-01 RX ADMIN — MIDAZOLAM HYDROCHLORIDE 1.63 MG/KG/HR: 1 INJECTION, SOLUTION INTRAMUSCULAR; INTRAVENOUS at 21:39

## 2021-01-01 RX ADMIN — Medication 40 MILLIEQUIVALENT(S): at 10:19

## 2021-01-01 RX ADMIN — Medication 1: at 18:38

## 2021-01-01 RX ADMIN — MIDODRINE HYDROCHLORIDE 10 MILLIGRAM(S): 2.5 TABLET ORAL at 05:17

## 2021-01-01 RX ADMIN — NALOXEGOL OXALATE 25 MILLIGRAM(S): 12.5 TABLET, FILM COATED ORAL at 11:56

## 2021-01-01 RX ADMIN — CHLORHEXIDINE GLUCONATE 1 APPLICATION(S): 213 SOLUTION TOPICAL at 06:55

## 2021-01-01 RX ADMIN — ENOXAPARIN SODIUM 80 MILLIGRAM(S): 100 INJECTION SUBCUTANEOUS at 05:23

## 2021-01-01 RX ADMIN — HUMAN INSULIN 10 UNIT(S): 100 INJECTION, SUSPENSION SUBCUTANEOUS at 11:28

## 2021-01-01 RX ADMIN — Medication 30 MILLILITER(S): at 11:51

## 2021-01-01 RX ADMIN — Medication 2000 UNIT(S): at 11:40

## 2021-01-01 RX ADMIN — CHLORHEXIDINE GLUCONATE 15 MILLILITER(S): 213 SOLUTION TOPICAL at 17:07

## 2021-01-01 RX ADMIN — Medication 2 MILLIGRAM(S): at 00:33

## 2021-01-01 RX ADMIN — Medication 2: at 18:14

## 2021-01-01 RX ADMIN — Medication 7.65 MICROGRAM(S)/KG/MIN: at 22:42

## 2021-01-01 RX ADMIN — CISATRACURIUM BESYLATE 14.7 MICROGRAM(S)/KG/MIN: 2 INJECTION INTRAVENOUS at 11:40

## 2021-01-01 RX ADMIN — SENNA PLUS 20 MILLILITER(S): 8.6 TABLET ORAL at 21:28

## 2021-01-01 RX ADMIN — PANTOPRAZOLE SODIUM 40 MILLIGRAM(S): 20 TABLET, DELAYED RELEASE ORAL at 11:46

## 2021-01-01 RX ADMIN — Medication 1: at 11:25

## 2021-01-01 RX ADMIN — KETAMINE HYDROCHLORIDE 16.3 MG/KG/HR: 100 INJECTION INTRAMUSCULAR; INTRAVENOUS at 00:49

## 2021-01-01 RX ADMIN — Medication 650 MILLIGRAM(S): at 15:00

## 2021-01-01 RX ADMIN — ALBUTEROL 2 PUFF(S): 90 AEROSOL, METERED ORAL at 00:09

## 2021-01-01 RX ADMIN — ENOXAPARIN SODIUM 80 MILLIGRAM(S): 100 INJECTION SUBCUTANEOUS at 05:06

## 2021-01-01 RX ADMIN — HUMAN INSULIN 10 UNIT(S): 100 INJECTION, SUSPENSION SUBCUTANEOUS at 17:47

## 2021-01-01 RX ADMIN — BUMETANIDE 1 MILLIGRAM(S): 0.25 INJECTION INTRAMUSCULAR; INTRAVENOUS at 05:40

## 2021-01-01 RX ADMIN — HUMAN INSULIN 5 UNIT(S): 100 INJECTION, SUSPENSION SUBCUTANEOUS at 05:26

## 2021-01-01 RX ADMIN — SODIUM CHLORIDE 3 MILLILITER(S): 9 INJECTION INTRAMUSCULAR; INTRAVENOUS; SUBCUTANEOUS at 17:18

## 2021-01-01 RX ADMIN — Medication 10 MILLIGRAM(S): at 06:47

## 2021-01-01 RX ADMIN — Medication 2: at 12:31

## 2021-01-01 RX ADMIN — FENTANYL CITRATE 50 MICROGRAM(S): 50 INJECTION INTRAVENOUS at 13:15

## 2021-01-01 RX ADMIN — Medication 400 MILLIGRAM(S): at 01:22

## 2021-01-01 RX ADMIN — Medication 3.83 MICROGRAM(S)/KG/MIN: at 17:10

## 2021-01-01 RX ADMIN — HUMAN INSULIN 10 UNIT(S): 100 INJECTION, SUSPENSION SUBCUTANEOUS at 11:25

## 2021-01-01 RX ADMIN — FENTANYL CITRATE 8.16 MICROGRAM(S)/KG/HR: 50 INJECTION INTRAVENOUS at 07:57

## 2021-01-01 RX ADMIN — METHADONE HYDROCHLORIDE 30 MILLIGRAM(S): 40 TABLET ORAL at 13:02

## 2021-01-01 RX ADMIN — ALBUTEROL 2 PUFF(S): 90 AEROSOL, METERED ORAL at 18:49

## 2021-01-01 RX ADMIN — Medication 2 MILLIGRAM(S): at 05:31

## 2021-01-01 RX ADMIN — FENTANYL CITRATE 50 MICROGRAM(S): 50 INJECTION INTRAVENOUS at 08:15

## 2021-01-01 RX ADMIN — HEPARIN SODIUM 4 UNIT(S)/HR: 5000 INJECTION INTRAVENOUS; SUBCUTANEOUS at 15:59

## 2021-01-01 RX ADMIN — Medication 10 MILLIGRAM(S): at 21:16

## 2021-01-01 RX ADMIN — KETAMINE HYDROCHLORIDE 2.04 MG/KG/HR: 100 INJECTION INTRAMUSCULAR; INTRAVENOUS at 05:23

## 2021-01-01 RX ADMIN — BUDESONIDE AND FORMOTEROL FUMARATE DIHYDRATE 2 PUFF(S): 160; 4.5 AEROSOL RESPIRATORY (INHALATION) at 17:28

## 2021-01-01 RX ADMIN — QUETIAPINE FUMARATE 25 MILLIGRAM(S): 200 TABLET, FILM COATED ORAL at 11:02

## 2021-01-01 RX ADMIN — CHLORHEXIDINE GLUCONATE 15 MILLILITER(S): 213 SOLUTION TOPICAL at 17:05

## 2021-01-01 RX ADMIN — HYDROMORPHONE HYDROCHLORIDE 1 MILLIGRAM(S): 2 INJECTION INTRAMUSCULAR; INTRAVENOUS; SUBCUTANEOUS at 20:11

## 2021-01-01 RX ADMIN — HYDROMORPHONE HYDROCHLORIDE 1 MILLIGRAM(S): 2 INJECTION INTRAMUSCULAR; INTRAVENOUS; SUBCUTANEOUS at 11:25

## 2021-01-01 RX ADMIN — Medication 1: at 05:14

## 2021-01-01 RX ADMIN — Medication 6 MILLIGRAM(S): at 21:36

## 2021-01-01 RX ADMIN — Medication 1 MILLIGRAM(S): at 17:53

## 2021-01-01 RX ADMIN — HUMAN INSULIN 2 UNIT(S): 100 INJECTION, SUSPENSION SUBCUTANEOUS at 05:31

## 2021-01-01 RX ADMIN — ALBUTEROL 2 PUFF(S): 90 AEROSOL, METERED ORAL at 21:34

## 2021-01-01 RX ADMIN — MIDODRINE HYDROCHLORIDE 40 MILLIGRAM(S): 2.5 TABLET ORAL at 22:05

## 2021-01-01 RX ADMIN — MIDODRINE HYDROCHLORIDE 40 MILLIGRAM(S): 2.5 TABLET ORAL at 14:26

## 2021-01-01 RX ADMIN — HEPARIN SODIUM 8 UNIT(S)/HR: 5000 INJECTION INTRAVENOUS; SUBCUTANEOUS at 19:36

## 2021-01-01 RX ADMIN — AMLODIPINE BESYLATE 5 MILLIGRAM(S): 2.5 TABLET ORAL at 07:05

## 2021-01-01 RX ADMIN — CHLORHEXIDINE GLUCONATE 1 APPLICATION(S): 213 SOLUTION TOPICAL at 06:17

## 2021-01-01 RX ADMIN — PANTOPRAZOLE SODIUM 40 MILLIGRAM(S): 20 TABLET, DELAYED RELEASE ORAL at 18:15

## 2021-01-01 RX ADMIN — BUDESONIDE AND FORMOTEROL FUMARATE DIHYDRATE 2 PUFF(S): 160; 4.5 AEROSOL RESPIRATORY (INHALATION) at 05:14

## 2021-01-01 RX ADMIN — METHADONE HYDROCHLORIDE 10 MILLIGRAM(S): 40 TABLET ORAL at 13:03

## 2021-01-01 RX ADMIN — HUMAN INSULIN 8 UNIT(S): 100 INJECTION, SUSPENSION SUBCUTANEOUS at 23:06

## 2021-01-01 RX ADMIN — Medication 2 MILLIGRAM(S): at 18:13

## 2021-01-01 RX ADMIN — METHADONE HYDROCHLORIDE 10 MILLIGRAM(S): 40 TABLET ORAL at 22:42

## 2021-01-01 RX ADMIN — HUMAN INSULIN 7 UNIT(S): 100 INJECTION, SUSPENSION SUBCUTANEOUS at 12:13

## 2021-01-01 RX ADMIN — SENNA PLUS 10 MILLILITER(S): 8.6 TABLET ORAL at 21:11

## 2021-01-01 RX ADMIN — Medication 2 MILLIGRAM(S): at 17:02

## 2021-01-01 RX ADMIN — Medication 15 MILLILITER(S): at 12:29

## 2021-01-01 RX ADMIN — NALOXEGOL OXALATE 25 MILLIGRAM(S): 12.5 TABLET, FILM COATED ORAL at 11:02

## 2021-01-01 RX ADMIN — Medication 1 MILLIGRAM(S): at 05:42

## 2021-01-01 RX ADMIN — Medication 1 DROP(S): at 22:06

## 2021-01-01 RX ADMIN — Medication 40 MILLIEQUIVALENT(S): at 05:17

## 2021-01-01 RX ADMIN — FENTANYL CITRATE 50 MICROGRAM(S): 50 INJECTION INTRAVENOUS at 18:15

## 2021-01-01 RX ADMIN — HUMAN INSULIN 10 UNIT(S): 100 INJECTION, SUSPENSION SUBCUTANEOUS at 01:02

## 2021-01-01 RX ADMIN — HEPARIN SODIUM 8 UNIT(S)/HR: 5000 INJECTION INTRAVENOUS; SUBCUTANEOUS at 07:37

## 2021-01-01 RX ADMIN — KETAMINE HYDROCHLORIDE 16.3 MG/KG/HR: 100 INJECTION INTRAMUSCULAR; INTRAVENOUS at 21:40

## 2021-01-01 RX ADMIN — CHLORHEXIDINE GLUCONATE 15 MILLILITER(S): 213 SOLUTION TOPICAL at 06:13

## 2021-01-01 RX ADMIN — Medication 40 MILLIGRAM(S): at 17:45

## 2021-01-01 RX ADMIN — Medication 2: at 11:30

## 2021-01-01 RX ADMIN — MIDODRINE HYDROCHLORIDE 40 MILLIGRAM(S): 2.5 TABLET ORAL at 22:06

## 2021-01-01 RX ADMIN — AMIODARONE HYDROCHLORIDE 16.7 MG/MIN: 400 TABLET ORAL at 11:29

## 2021-01-01 RX ADMIN — METHADONE HYDROCHLORIDE 10 MILLIGRAM(S): 40 TABLET ORAL at 13:02

## 2021-01-01 RX ADMIN — CISATRACURIUM BESYLATE 16 MILLIGRAM(S): 2 INJECTION INTRAVENOUS at 21:28

## 2021-01-01 RX ADMIN — DAPTOMYCIN 128 MILLIGRAM(S): 500 INJECTION, POWDER, LYOPHILIZED, FOR SOLUTION INTRAVENOUS at 11:50

## 2021-01-01 RX ADMIN — HUMAN INSULIN 10 UNIT(S): 100 INJECTION, SUSPENSION SUBCUTANEOUS at 18:02

## 2021-01-01 RX ADMIN — Medication 6: at 17:38

## 2021-01-01 RX ADMIN — AMPICILLIN SODIUM AND SULBACTAM SODIUM 200 GRAM(S): 250; 125 INJECTION, POWDER, FOR SUSPENSION INTRAMUSCULAR; INTRAVENOUS at 23:22

## 2021-01-01 RX ADMIN — PANTOPRAZOLE SODIUM 40 MILLIGRAM(S): 20 TABLET, DELAYED RELEASE ORAL at 11:24

## 2021-01-01 RX ADMIN — Medication 2000 UNIT(S): at 13:40

## 2021-01-01 RX ADMIN — PIPERACILLIN AND TAZOBACTAM 25 GRAM(S): 4; .5 INJECTION, POWDER, LYOPHILIZED, FOR SOLUTION INTRAVENOUS at 13:02

## 2021-01-01 RX ADMIN — URSODIOL 500 MILLIGRAM(S): 250 TABLET, FILM COATED ORAL at 05:14

## 2021-01-01 RX ADMIN — ALBUTEROL 2 PUFF(S): 90 AEROSOL, METERED ORAL at 15:24

## 2021-01-01 RX ADMIN — URSODIOL 500 MILLIGRAM(S): 250 TABLET, FILM COATED ORAL at 06:22

## 2021-01-01 RX ADMIN — SENNA PLUS 10 MILLILITER(S): 8.6 TABLET ORAL at 22:07

## 2021-01-01 RX ADMIN — Medication 6: at 23:07

## 2021-01-01 RX ADMIN — ALBUTEROL 2 PUFF(S): 90 AEROSOL, METERED ORAL at 11:57

## 2021-01-01 RX ADMIN — INSULIN GLARGINE 20 UNIT(S): 100 INJECTION, SOLUTION SUBCUTANEOUS at 22:38

## 2021-01-01 RX ADMIN — HYDROMORPHONE HYDROCHLORIDE 1 MILLIGRAM(S): 2 INJECTION INTRAMUSCULAR; INTRAVENOUS; SUBCUTANEOUS at 10:26

## 2021-01-01 RX ADMIN — CHLORHEXIDINE GLUCONATE 15 MILLILITER(S): 213 SOLUTION TOPICAL at 17:19

## 2021-01-01 RX ADMIN — NALOXEGOL OXALATE 25 MILLIGRAM(S): 12.5 TABLET, FILM COATED ORAL at 12:43

## 2021-01-01 RX ADMIN — PROPOFOL 2.45 MICROGRAM(S)/KG/MIN: 10 INJECTION, EMULSION INTRAVENOUS at 07:41

## 2021-01-01 RX ADMIN — TAMSULOSIN HYDROCHLORIDE 0.4 MILLIGRAM(S): 0.4 CAPSULE ORAL at 21:43

## 2021-01-01 RX ADMIN — QUETIAPINE FUMARATE 25 MILLIGRAM(S): 200 TABLET, FILM COATED ORAL at 20:53

## 2021-01-01 RX ADMIN — BUDESONIDE AND FORMOTEROL FUMARATE DIHYDRATE 2 PUFF(S): 160; 4.5 AEROSOL RESPIRATORY (INHALATION) at 18:38

## 2021-01-01 RX ADMIN — CHLORHEXIDINE GLUCONATE 15 MILLILITER(S): 213 SOLUTION TOPICAL at 18:36

## 2021-01-01 RX ADMIN — MIDAZOLAM HYDROCHLORIDE 2 MILLIGRAM(S): 1 INJECTION, SOLUTION INTRAMUSCULAR; INTRAVENOUS at 05:26

## 2021-01-01 RX ADMIN — FENTANYL CITRATE 8.16 MICROGRAM(S)/KG/HR: 50 INJECTION INTRAVENOUS at 01:07

## 2021-01-01 RX ADMIN — HUMAN INSULIN 10 UNIT(S): 100 INJECTION, SUSPENSION SUBCUTANEOUS at 23:17

## 2021-01-01 RX ADMIN — POLYETHYLENE GLYCOL 3350 17 GRAM(S): 17 POWDER, FOR SOLUTION ORAL at 13:01

## 2021-01-01 RX ADMIN — BUDESONIDE AND FORMOTEROL FUMARATE DIHYDRATE 2 PUFF(S): 160; 4.5 AEROSOL RESPIRATORY (INHALATION) at 15:27

## 2021-01-01 RX ADMIN — CHLORHEXIDINE GLUCONATE 1 APPLICATION(S): 213 SOLUTION TOPICAL at 05:32

## 2021-01-01 RX ADMIN — Medication 2: at 17:02

## 2021-01-01 RX ADMIN — Medication 30 MILLILITER(S): at 11:46

## 2021-01-01 RX ADMIN — QUETIAPINE FUMARATE 25 MILLIGRAM(S): 200 TABLET, FILM COATED ORAL at 05:05

## 2021-01-01 RX ADMIN — Medication 2 MILLIGRAM(S): at 08:18

## 2021-01-01 RX ADMIN — HUMAN INSULIN 10 UNIT(S): 100 INJECTION, SUSPENSION SUBCUTANEOUS at 18:17

## 2021-01-01 RX ADMIN — METHADONE HYDROCHLORIDE 5 MILLIGRAM(S): 40 TABLET ORAL at 21:48

## 2021-01-01 RX ADMIN — Medication 2: at 08:17

## 2021-01-01 RX ADMIN — MIDODRINE HYDROCHLORIDE 10 MILLIGRAM(S): 2.5 TABLET ORAL at 13:00

## 2021-01-01 RX ADMIN — ALBUTEROL 2 PUFF(S): 90 AEROSOL, METERED ORAL at 11:48

## 2021-01-01 RX ADMIN — MIDODRINE HYDROCHLORIDE 40 MILLIGRAM(S): 2.5 TABLET ORAL at 13:49

## 2021-01-01 RX ADMIN — Medication 2: at 12:10

## 2021-01-01 RX ADMIN — Medication 10 MILLIGRAM(S): at 05:28

## 2021-01-01 RX ADMIN — NALOXEGOL OXALATE 25 MILLIGRAM(S): 12.5 TABLET, FILM COATED ORAL at 13:03

## 2021-01-01 RX ADMIN — HUMAN INSULIN 14 UNIT(S): 100 INJECTION, SUSPENSION SUBCUTANEOUS at 23:32

## 2021-01-01 RX ADMIN — Medication 3: at 17:29

## 2021-01-01 RX ADMIN — SODIUM CHLORIDE 1000 MILLILITER(S): 9 INJECTION INTRAMUSCULAR; INTRAVENOUS; SUBCUTANEOUS at 00:44

## 2021-01-01 RX ADMIN — HUMAN INSULIN 10 UNIT(S): 100 INJECTION, SUSPENSION SUBCUTANEOUS at 06:49

## 2021-01-01 RX ADMIN — CISATRACURIUM BESYLATE 14.7 MICROGRAM(S)/KG/MIN: 2 INJECTION INTRAVENOUS at 22:42

## 2021-01-01 RX ADMIN — ACETAZOLAMIDE 250 MILLIGRAM(S): 250 TABLET ORAL at 05:27

## 2021-01-01 RX ADMIN — Medication 1: at 17:40

## 2021-01-01 RX ADMIN — HUMAN INSULIN 12 UNIT(S): 100 INJECTION, SUSPENSION SUBCUTANEOUS at 00:57

## 2021-01-01 RX ADMIN — FINASTERIDE 5 MILLIGRAM(S): 5 TABLET, FILM COATED ORAL at 12:23

## 2021-01-01 RX ADMIN — Medication 102 MILLIGRAM(S): at 05:10

## 2021-01-01 RX ADMIN — METHADONE HYDROCHLORIDE 5 MILLIGRAM(S): 40 TABLET ORAL at 05:06

## 2021-01-01 RX ADMIN — Medication 2: at 08:26

## 2021-01-01 RX ADMIN — FENTANYL CITRATE 100 MICROGRAM(S): 50 INJECTION INTRAVENOUS at 16:37

## 2021-01-01 RX ADMIN — Medication 2: at 23:16

## 2021-01-01 RX ADMIN — ENOXAPARIN SODIUM 80 MILLIGRAM(S): 100 INJECTION SUBCUTANEOUS at 05:40

## 2021-01-01 RX ADMIN — Medication 1: at 08:36

## 2021-01-01 RX ADMIN — HUMAN INSULIN 10 UNIT(S): 100 INJECTION, SUSPENSION SUBCUTANEOUS at 05:07

## 2021-01-01 RX ADMIN — Medication 10 MILLIGRAM(S): at 22:30

## 2021-01-01 RX ADMIN — KETAMINE HYDROCHLORIDE 16.3 MG/KG/HR: 100 INJECTION INTRAMUSCULAR; INTRAVENOUS at 00:33

## 2021-01-01 RX ADMIN — Medication 2000 UNIT(S): at 11:03

## 2021-01-01 RX ADMIN — Medication 5 MILLIGRAM(S): at 11:12

## 2021-01-01 RX ADMIN — Medication 3: at 17:12

## 2021-01-01 RX ADMIN — AMPICILLIN SODIUM AND SULBACTAM SODIUM 200 GRAM(S): 250; 125 INJECTION, POWDER, FOR SUSPENSION INTRAMUSCULAR; INTRAVENOUS at 05:18

## 2021-01-01 RX ADMIN — Medication 30 MILLILITER(S): at 17:29

## 2021-01-01 RX ADMIN — Medication 4: at 11:32

## 2021-01-01 RX ADMIN — Medication 30 MILLILITER(S): at 11:56

## 2021-01-01 RX ADMIN — QUETIAPINE FUMARATE 25 MILLIGRAM(S): 200 TABLET, FILM COATED ORAL at 11:03

## 2021-01-01 RX ADMIN — CHLORHEXIDINE GLUCONATE 1 APPLICATION(S): 213 SOLUTION TOPICAL at 03:30

## 2021-01-01 RX ADMIN — Medication 650 MILLIGRAM(S): at 21:00

## 2021-01-01 RX ADMIN — CHLORHEXIDINE GLUCONATE 15 MILLILITER(S): 213 SOLUTION TOPICAL at 17:36

## 2021-01-01 RX ADMIN — URSODIOL 500 MILLIGRAM(S): 250 TABLET, FILM COATED ORAL at 05:25

## 2021-01-01 RX ADMIN — MEROPENEM 100 MILLIGRAM(S): 1 INJECTION INTRAVENOUS at 14:35

## 2021-01-01 RX ADMIN — PROPOFOL 2.45 MICROGRAM(S)/KG/MIN: 10 INJECTION, EMULSION INTRAVENOUS at 17:37

## 2021-01-01 RX ADMIN — Medication 8: at 18:46

## 2021-01-01 RX ADMIN — PANTOPRAZOLE SODIUM 40 MILLIGRAM(S): 20 TABLET, DELAYED RELEASE ORAL at 11:52

## 2021-01-01 RX ADMIN — VASOPRESSIN 2.4 UNIT(S)/MIN: 20 INJECTION INTRAVENOUS at 00:58

## 2021-01-01 RX ADMIN — METHADONE HYDROCHLORIDE 10 MILLIGRAM(S): 40 TABLET ORAL at 08:15

## 2021-01-01 RX ADMIN — Medication 50 MILLIEQUIVALENT(S): at 00:42

## 2021-01-01 RX ADMIN — BUMETANIDE 1 MILLIGRAM(S): 0.25 INJECTION INTRAMUSCULAR; INTRAVENOUS at 17:11

## 2021-01-01 RX ADMIN — Medication 1: at 11:32

## 2021-01-01 RX ADMIN — FENTANYL CITRATE 50 MICROGRAM(S): 50 INJECTION INTRAVENOUS at 22:41

## 2021-01-01 RX ADMIN — METHADONE HYDROCHLORIDE 10 MILLIGRAM(S): 40 TABLET ORAL at 13:26

## 2021-01-01 RX ADMIN — NALOXEGOL OXALATE 25 MILLIGRAM(S): 12.5 TABLET, FILM COATED ORAL at 11:10

## 2021-01-01 RX ADMIN — Medication 3.83 MICROGRAM(S)/KG/MIN: at 08:00

## 2021-01-01 RX ADMIN — Medication 250 MILLIGRAM(S): at 05:47

## 2021-01-01 RX ADMIN — ALBUTEROL 2 PUFF(S): 90 AEROSOL, METERED ORAL at 06:26

## 2021-01-01 RX ADMIN — METHADONE HYDROCHLORIDE 5 MILLIGRAM(S): 40 TABLET ORAL at 22:50

## 2021-01-01 RX ADMIN — DAPTOMYCIN 128 MILLIGRAM(S): 500 INJECTION, POWDER, LYOPHILIZED, FOR SOLUTION INTRAVENOUS at 11:29

## 2021-01-01 RX ADMIN — Medication 1 DROP(S): at 05:35

## 2021-01-01 RX ADMIN — Medication 2: at 06:26

## 2021-01-01 RX ADMIN — Medication 2: at 17:42

## 2021-01-01 RX ADMIN — Medication 650 MILLIGRAM(S): at 20:20

## 2021-01-01 RX ADMIN — HUMAN INSULIN 10 UNIT(S): 100 INJECTION, SUSPENSION SUBCUTANEOUS at 01:04

## 2021-01-01 RX ADMIN — URSODIOL 500 MILLIGRAM(S): 250 TABLET, FILM COATED ORAL at 17:36

## 2021-01-01 RX ADMIN — SENNA PLUS 20 MILLILITER(S): 8.6 TABLET ORAL at 21:20

## 2021-01-01 RX ADMIN — FENTANYL CITRATE 50 MICROGRAM(S): 50 INJECTION INTRAVENOUS at 19:00

## 2021-01-01 RX ADMIN — MIDAZOLAM HYDROCHLORIDE 1.63 MG/KG/HR: 1 INJECTION, SOLUTION INTRAMUSCULAR; INTRAVENOUS at 10:34

## 2021-01-01 RX ADMIN — Medication 0.5 MILLIGRAM(S): at 11:11

## 2021-01-01 RX ADMIN — AMPICILLIN SODIUM AND SULBACTAM SODIUM 200 GRAM(S): 250; 125 INJECTION, POWDER, FOR SUSPENSION INTRAMUSCULAR; INTRAVENOUS at 11:25

## 2021-01-01 RX ADMIN — MIDODRINE HYDROCHLORIDE 30 MILLIGRAM(S): 2.5 TABLET ORAL at 13:58

## 2021-01-01 RX ADMIN — Medication 4: at 17:00

## 2021-01-01 RX ADMIN — Medication 2: at 23:57

## 2021-01-01 RX ADMIN — Medication 6: at 11:19

## 2021-01-01 RX ADMIN — BUDESONIDE AND FORMOTEROL FUMARATE DIHYDRATE 2 PUFF(S): 160; 4.5 AEROSOL RESPIRATORY (INHALATION) at 17:09

## 2021-01-01 RX ADMIN — METHADONE HYDROCHLORIDE 10 MILLIGRAM(S): 40 TABLET ORAL at 21:16

## 2021-01-01 RX ADMIN — METHADONE HYDROCHLORIDE 20 MILLIGRAM(S): 40 TABLET ORAL at 13:05

## 2021-01-01 RX ADMIN — BUDESONIDE AND FORMOTEROL FUMARATE DIHYDRATE 2 PUFF(S): 160; 4.5 AEROSOL RESPIRATORY (INHALATION) at 09:07

## 2021-01-01 RX ADMIN — ALBUTEROL 2 PUFF(S): 90 AEROSOL, METERED ORAL at 05:11

## 2021-01-01 RX ADMIN — Medication 50 MILLIEQUIVALENT(S): at 03:06

## 2021-01-01 RX ADMIN — CHLORHEXIDINE GLUCONATE 1 APPLICATION(S): 213 SOLUTION TOPICAL at 05:14

## 2021-01-01 RX ADMIN — INSULIN GLARGINE 20 UNIT(S): 100 INJECTION, SOLUTION SUBCUTANEOUS at 22:04

## 2021-01-01 RX ADMIN — HUMAN INSULIN 14 UNIT(S): 100 INJECTION, SUSPENSION SUBCUTANEOUS at 11:24

## 2021-01-01 RX ADMIN — BUDESONIDE AND FORMOTEROL FUMARATE DIHYDRATE 2 PUFF(S): 160; 4.5 AEROSOL RESPIRATORY (INHALATION) at 21:55

## 2021-01-01 RX ADMIN — Medication 8: at 01:14

## 2021-01-01 RX ADMIN — Medication 1 APPLICATION(S): at 05:31

## 2021-01-01 RX ADMIN — METHADONE HYDROCHLORIDE 20 MILLIGRAM(S): 40 TABLET ORAL at 21:42

## 2021-01-01 RX ADMIN — FENTANYL CITRATE 25 MICROGRAM(S): 50 INJECTION INTRAVENOUS at 05:32

## 2021-01-01 RX ADMIN — MIDODRINE HYDROCHLORIDE 30 MILLIGRAM(S): 2.5 TABLET ORAL at 21:57

## 2021-01-01 RX ADMIN — ATORVASTATIN CALCIUM 20 MILLIGRAM(S): 80 TABLET, FILM COATED ORAL at 21:52

## 2021-01-01 RX ADMIN — Medication 1 MILLIGRAM(S): at 13:23

## 2021-01-01 RX ADMIN — Medication 3: at 12:13

## 2021-01-01 RX ADMIN — Medication 40 MILLIGRAM(S): at 16:30

## 2021-01-01 RX ADMIN — ALBUTEROL 2 PUFF(S): 90 AEROSOL, METERED ORAL at 23:56

## 2021-01-01 RX ADMIN — ENOXAPARIN SODIUM 40 MILLIGRAM(S): 100 INJECTION SUBCUTANEOUS at 11:04

## 2021-01-01 RX ADMIN — TAMSULOSIN HYDROCHLORIDE 0.4 MILLIGRAM(S): 0.4 CAPSULE ORAL at 06:55

## 2021-01-01 RX ADMIN — PANTOPRAZOLE SODIUM 40 MILLIGRAM(S): 20 TABLET, DELAYED RELEASE ORAL at 06:39

## 2021-01-01 RX ADMIN — BUDESONIDE AND FORMOTEROL FUMARATE DIHYDRATE 2 PUFF(S): 160; 4.5 AEROSOL RESPIRATORY (INHALATION) at 05:38

## 2021-01-01 RX ADMIN — FINASTERIDE 5 MILLIGRAM(S): 5 TABLET, FILM COATED ORAL at 11:12

## 2021-01-01 RX ADMIN — NALOXEGOL OXALATE 25 MILLIGRAM(S): 12.5 TABLET, FILM COATED ORAL at 11:24

## 2021-01-01 RX ADMIN — Medication 250 MILLIGRAM(S): at 05:09

## 2021-01-01 RX ADMIN — FENTANYL CITRATE 50 MICROGRAM(S): 50 INJECTION INTRAVENOUS at 17:39

## 2021-01-01 RX ADMIN — POLYETHYLENE GLYCOL 3350 17 GRAM(S): 17 POWDER, FOR SOLUTION ORAL at 11:45

## 2021-01-01 RX ADMIN — NALOXEGOL OXALATE 25 MILLIGRAM(S): 12.5 TABLET, FILM COATED ORAL at 11:28

## 2021-01-01 RX ADMIN — Medication 2: at 17:40

## 2021-01-01 RX ADMIN — MIDODRINE HYDROCHLORIDE 40 MILLIGRAM(S): 2.5 TABLET ORAL at 05:24

## 2021-01-01 RX ADMIN — ALBUTEROL 2 PUFF(S): 90 AEROSOL, METERED ORAL at 11:23

## 2021-01-01 RX ADMIN — Medication 1: at 00:38

## 2021-01-01 RX ADMIN — Medication 650 MILLIGRAM(S): at 02:05

## 2021-01-01 RX ADMIN — KETAMINE HYDROCHLORIDE 16.3 MG/KG/HR: 100 INJECTION INTRAMUSCULAR; INTRAVENOUS at 08:22

## 2021-01-01 RX ADMIN — HUMAN INSULIN 10 UNIT(S): 100 INJECTION, SUSPENSION SUBCUTANEOUS at 00:44

## 2021-01-01 RX ADMIN — CHLORHEXIDINE GLUCONATE 1 APPLICATION(S): 213 SOLUTION TOPICAL at 05:43

## 2021-01-01 RX ADMIN — Medication 650 MILLIGRAM(S): at 11:57

## 2021-01-01 RX ADMIN — Medication 2: at 06:11

## 2021-01-01 RX ADMIN — HUMAN INSULIN 7 UNIT(S): 100 INJECTION, SUSPENSION SUBCUTANEOUS at 00:30

## 2021-01-01 RX ADMIN — Medication 650 MILLIGRAM(S): at 12:00

## 2021-01-01 RX ADMIN — HUMAN INSULIN 7 UNIT(S): 100 INJECTION, SUSPENSION SUBCUTANEOUS at 05:15

## 2021-01-01 RX ADMIN — DEXMEDETOMIDINE HYDROCHLORIDE IN 0.9% SODIUM CHLORIDE 4.08 MICROGRAM(S)/KG/HR: 4 INJECTION INTRAVENOUS at 10:59

## 2021-01-01 RX ADMIN — HEPARIN SODIUM 9 UNIT(S)/HR: 5000 INJECTION INTRAVENOUS; SUBCUTANEOUS at 08:13

## 2021-01-01 RX ADMIN — URSODIOL 500 MILLIGRAM(S): 250 TABLET, FILM COATED ORAL at 17:12

## 2021-01-01 RX ADMIN — HUMAN INSULIN 7 UNIT(S): 100 INJECTION, SUSPENSION SUBCUTANEOUS at 00:38

## 2021-01-01 RX ADMIN — Medication 2000 UNIT(S): at 11:47

## 2021-01-01 RX ADMIN — Medication 650 MILLIGRAM(S): at 00:43

## 2021-01-01 RX ADMIN — REMDESIVIR 500 MILLIGRAM(S): 5 INJECTION INTRAVENOUS at 11:11

## 2021-01-01 RX ADMIN — Medication 3: at 11:50

## 2021-01-01 RX ADMIN — ENOXAPARIN SODIUM 80 MILLIGRAM(S): 100 INJECTION SUBCUTANEOUS at 05:47

## 2021-01-01 RX ADMIN — Medication 1 DROP(S): at 21:26

## 2021-01-01 RX ADMIN — FENTANYL CITRATE 8.16 MICROGRAM(S)/KG/HR: 50 INJECTION INTRAVENOUS at 22:43

## 2021-01-01 RX ADMIN — Medication 2: at 01:02

## 2021-01-01 RX ADMIN — KETAMINE HYDROCHLORIDE 16.3 MG/KG/HR: 100 INJECTION INTRAMUSCULAR; INTRAVENOUS at 22:00

## 2021-01-01 RX ADMIN — Medication 1 DROP(S): at 13:48

## 2021-01-01 RX ADMIN — BUDESONIDE AND FORMOTEROL FUMARATE DIHYDRATE 2 PUFF(S): 160; 4.5 AEROSOL RESPIRATORY (INHALATION) at 05:23

## 2021-01-01 RX ADMIN — FENTANYL CITRATE 100 MICROGRAM(S): 50 INJECTION INTRAVENOUS at 10:10

## 2021-01-01 RX ADMIN — CHLORHEXIDINE GLUCONATE 15 MILLILITER(S): 213 SOLUTION TOPICAL at 18:19

## 2021-01-01 RX ADMIN — DEXMEDETOMIDINE HYDROCHLORIDE IN 0.9% SODIUM CHLORIDE 4.08 MICROGRAM(S)/KG/HR: 4 INJECTION INTRAVENOUS at 18:04

## 2021-01-01 RX ADMIN — HUMAN INSULIN 10 UNIT(S): 100 INJECTION, SUSPENSION SUBCUTANEOUS at 11:48

## 2021-01-01 RX ADMIN — Medication 7.65 MICROGRAM(S)/KG/MIN: at 11:17

## 2021-01-01 RX ADMIN — CHLORHEXIDINE GLUCONATE 15 MILLILITER(S): 213 SOLUTION TOPICAL at 17:33

## 2021-01-01 RX ADMIN — ENOXAPARIN SODIUM 80 MILLIGRAM(S): 100 INJECTION SUBCUTANEOUS at 17:12

## 2021-01-01 RX ADMIN — SODIUM CHLORIDE 3 MILLILITER(S): 9 INJECTION INTRAMUSCULAR; INTRAVENOUS; SUBCUTANEOUS at 11:10

## 2021-01-01 RX ADMIN — METHADONE HYDROCHLORIDE 5 MILLIGRAM(S): 40 TABLET ORAL at 13:47

## 2021-01-01 RX ADMIN — BUDESONIDE AND FORMOTEROL FUMARATE DIHYDRATE 2 PUFF(S): 160; 4.5 AEROSOL RESPIRATORY (INHALATION) at 05:48

## 2021-01-01 RX ADMIN — Medication 1: at 00:58

## 2021-01-01 RX ADMIN — MIDAZOLAM HYDROCHLORIDE 2 MILLIGRAM(S): 1 INJECTION, SOLUTION INTRAMUSCULAR; INTRAVENOUS at 11:52

## 2021-01-01 RX ADMIN — CHLORHEXIDINE GLUCONATE 15 MILLILITER(S): 213 SOLUTION TOPICAL at 05:39

## 2021-01-01 RX ADMIN — PHENYLEPHRINE HYDROCHLORIDE 12.2 MICROGRAM(S)/KG/MIN: 10 INJECTION INTRAVENOUS at 19:00

## 2021-01-01 RX ADMIN — Medication 25 MILLILITER(S): at 02:48

## 2021-01-01 RX ADMIN — Medication 2: at 17:55

## 2021-01-01 RX ADMIN — ALBUTEROL 2 PUFF(S): 90 AEROSOL, METERED ORAL at 11:29

## 2021-01-01 RX ADMIN — METHADONE HYDROCHLORIDE 5 MILLIGRAM(S): 40 TABLET ORAL at 05:19

## 2021-01-01 RX ADMIN — MIDAZOLAM HYDROCHLORIDE 2 MILLIGRAM(S): 1 INJECTION, SOLUTION INTRAMUSCULAR; INTRAVENOUS at 20:16

## 2021-01-01 RX ADMIN — HUMAN INSULIN 10 UNIT(S): 100 INJECTION, SUSPENSION SUBCUTANEOUS at 01:15

## 2021-01-01 RX ADMIN — Medication 3.83 MICROGRAM(S)/KG/MIN: at 08:13

## 2021-01-01 RX ADMIN — BUDESONIDE AND FORMOTEROL FUMARATE DIHYDRATE 2 PUFF(S): 160; 4.5 AEROSOL RESPIRATORY (INHALATION) at 12:55

## 2021-01-01 RX ADMIN — BUDESONIDE AND FORMOTEROL FUMARATE DIHYDRATE 2 PUFF(S): 160; 4.5 AEROSOL RESPIRATORY (INHALATION) at 17:23

## 2021-01-01 RX ADMIN — PROPOFOL 12.2 MICROGRAM(S)/KG/MIN: 10 INJECTION, EMULSION INTRAVENOUS at 21:07

## 2021-01-01 RX ADMIN — HUMAN INSULIN 7 UNIT(S): 100 INJECTION, SUSPENSION SUBCUTANEOUS at 17:40

## 2021-01-01 RX ADMIN — Medication 125 MILLILITER(S): at 04:22

## 2021-01-01 RX ADMIN — Medication 40 MILLIGRAM(S): at 08:27

## 2021-01-01 RX ADMIN — Medication 2 MILLIGRAM(S): at 05:52

## 2021-01-01 RX ADMIN — Medication 250 MILLIGRAM(S): at 18:37

## 2021-01-01 RX ADMIN — CHLORHEXIDINE GLUCONATE 15 MILLILITER(S): 213 SOLUTION TOPICAL at 05:06

## 2021-01-01 RX ADMIN — Medication 2000 UNIT(S): at 18:21

## 2021-01-01 RX ADMIN — Medication 30 MILLILITER(S): at 01:18

## 2021-01-01 RX ADMIN — Medication 10 MILLIGRAM(S): at 14:52

## 2021-01-01 RX ADMIN — Medication 1 MILLIGRAM(S): at 21:35

## 2021-01-01 RX ADMIN — HEPARIN SODIUM 9.5 UNIT(S)/HR: 5000 INJECTION INTRAVENOUS; SUBCUTANEOUS at 00:50

## 2021-01-01 RX ADMIN — Medication 50 MILLIEQUIVALENT(S): at 05:13

## 2021-01-01 RX ADMIN — Medication 3.83 MICROGRAM(S)/KG/MIN: at 10:42

## 2021-01-01 RX ADMIN — Medication 2 MILLIGRAM(S): at 21:06

## 2021-01-01 RX ADMIN — PHENYLEPHRINE HYDROCHLORIDE 15.3 MICROGRAM(S)/KG/MIN: 10 INJECTION INTRAVENOUS at 21:19

## 2021-01-01 RX ADMIN — HUMAN INSULIN 10 UNIT(S): 100 INJECTION, SUSPENSION SUBCUTANEOUS at 23:35

## 2021-01-01 RX ADMIN — Medication 1 MILLIGRAM(S): at 05:22

## 2021-01-01 RX ADMIN — FENTANYL CITRATE 50 MICROGRAM(S): 50 INJECTION INTRAVENOUS at 14:45

## 2021-01-01 RX ADMIN — PANTOPRAZOLE SODIUM 40 MILLIGRAM(S): 20 TABLET, DELAYED RELEASE ORAL at 13:04

## 2021-01-01 RX ADMIN — METHADONE HYDROCHLORIDE 10 MILLIGRAM(S): 40 TABLET ORAL at 13:35

## 2021-01-01 RX ADMIN — HYDROMORPHONE HYDROCHLORIDE 0.5 MILLIGRAM(S): 2 INJECTION INTRAMUSCULAR; INTRAVENOUS; SUBCUTANEOUS at 16:20

## 2021-01-01 RX ADMIN — Medication 10 MILLIGRAM(S): at 11:35

## 2021-01-01 RX ADMIN — HYDROMORPHONE HYDROCHLORIDE 1 MILLIGRAM(S): 2 INJECTION INTRAMUSCULAR; INTRAVENOUS; SUBCUTANEOUS at 06:04

## 2021-01-01 RX ADMIN — AMPICILLIN SODIUM AND SULBACTAM SODIUM 200 GRAM(S): 250; 125 INJECTION, POWDER, FOR SUSPENSION INTRAMUSCULAR; INTRAVENOUS at 11:46

## 2021-01-01 RX ADMIN — Medication 30 MILLILITER(S): at 11:03

## 2021-01-01 RX ADMIN — ACETAZOLAMIDE 500 MILLIGRAM(S): 250 TABLET ORAL at 06:45

## 2021-01-01 RX ADMIN — CHLORHEXIDINE GLUCONATE 1 APPLICATION(S): 213 SOLUTION TOPICAL at 05:37

## 2021-01-01 RX ADMIN — HEPARIN SODIUM 9.5 UNIT(S)/HR: 5000 INJECTION INTRAVENOUS; SUBCUTANEOUS at 08:18

## 2021-01-01 RX ADMIN — Medication 2: at 00:48

## 2021-01-01 RX ADMIN — CHLORHEXIDINE GLUCONATE 15 MILLILITER(S): 213 SOLUTION TOPICAL at 18:01

## 2021-01-01 RX ADMIN — Medication 30 MILLILITER(S): at 11:21

## 2021-01-01 RX ADMIN — METHADONE HYDROCHLORIDE 10 MILLIGRAM(S): 40 TABLET ORAL at 22:38

## 2021-01-01 RX ADMIN — HUMAN INSULIN 10 UNIT(S): 100 INJECTION, SUSPENSION SUBCUTANEOUS at 18:09

## 2021-01-01 RX ADMIN — CHLORHEXIDINE GLUCONATE 1 APPLICATION(S): 213 SOLUTION TOPICAL at 05:53

## 2021-01-01 RX ADMIN — MIDODRINE HYDROCHLORIDE 30 MILLIGRAM(S): 2.5 TABLET ORAL at 05:27

## 2021-01-01 RX ADMIN — Medication 500 MILLIGRAM(S): at 12:30

## 2021-01-01 RX ADMIN — Medication 30 MILLILITER(S): at 22:38

## 2021-01-01 RX ADMIN — PANTOPRAZOLE SODIUM 40 MILLIGRAM(S): 20 TABLET, DELAYED RELEASE ORAL at 18:13

## 2021-01-01 RX ADMIN — Medication 2000 UNIT(S): at 11:11

## 2021-01-01 RX ADMIN — TAMSULOSIN HYDROCHLORIDE 0.4 MILLIGRAM(S): 0.4 CAPSULE ORAL at 17:30

## 2021-01-01 RX ADMIN — HYDROMORPHONE HYDROCHLORIDE 1 MILLIGRAM(S): 2 INJECTION INTRAMUSCULAR; INTRAVENOUS; SUBCUTANEOUS at 17:55

## 2021-01-01 RX ADMIN — MIDODRINE HYDROCHLORIDE 20 MILLIGRAM(S): 2.5 TABLET ORAL at 13:02

## 2021-01-01 RX ADMIN — METHADONE HYDROCHLORIDE 5 MILLIGRAM(S): 40 TABLET ORAL at 05:22

## 2021-01-01 RX ADMIN — Medication 1: at 17:42

## 2021-01-01 RX ADMIN — METHADONE HYDROCHLORIDE 20 MILLIGRAM(S): 40 TABLET ORAL at 05:51

## 2021-01-01 RX ADMIN — ALBUTEROL 2 PUFF(S): 90 AEROSOL, METERED ORAL at 13:05

## 2021-01-01 RX ADMIN — Medication 6 MILLIGRAM(S): at 06:54

## 2021-01-01 RX ADMIN — URSODIOL 500 MILLIGRAM(S): 250 TABLET, FILM COATED ORAL at 05:02

## 2021-01-01 RX ADMIN — MIDODRINE HYDROCHLORIDE 20 MILLIGRAM(S): 2.5 TABLET ORAL at 13:48

## 2021-01-01 RX ADMIN — ALBUTEROL 2 PUFF(S): 90 AEROSOL, METERED ORAL at 11:43

## 2021-01-01 RX ADMIN — ALBUTEROL 2 PUFF(S): 90 AEROSOL, METERED ORAL at 05:40

## 2021-01-01 RX ADMIN — MIDODRINE HYDROCHLORIDE 30 MILLIGRAM(S): 2.5 TABLET ORAL at 22:00

## 2021-01-01 RX ADMIN — ALBUTEROL 2 PUFF(S): 90 AEROSOL, METERED ORAL at 21:39

## 2021-01-01 RX ADMIN — Medication 40 MILLIGRAM(S): at 11:12

## 2021-01-01 RX ADMIN — BUDESONIDE AND FORMOTEROL FUMARATE DIHYDRATE 2 PUFF(S): 160; 4.5 AEROSOL RESPIRATORY (INHALATION) at 17:13

## 2021-01-01 RX ADMIN — Medication 1 MILLIGRAM(S): at 05:04

## 2021-01-01 RX ADMIN — URSODIOL 500 MILLIGRAM(S): 250 TABLET, FILM COATED ORAL at 17:06

## 2021-01-01 RX ADMIN — Medication 100 MILLIEQUIVALENT(S): at 16:51

## 2021-01-01 RX ADMIN — METHADONE HYDROCHLORIDE 10 MILLIGRAM(S): 40 TABLET ORAL at 05:16

## 2021-01-01 RX ADMIN — ALBUTEROL 2 PUFF(S): 90 AEROSOL, METERED ORAL at 15:19

## 2021-01-01 RX ADMIN — CHLORHEXIDINE GLUCONATE 15 MILLILITER(S): 213 SOLUTION TOPICAL at 17:35

## 2021-01-01 RX ADMIN — Medication 3: at 11:55

## 2021-01-01 RX ADMIN — Medication 1: at 05:11

## 2021-01-01 RX ADMIN — DEXMEDETOMIDINE HYDROCHLORIDE IN 0.9% SODIUM CHLORIDE 4.08 MICROGRAM(S)/KG/HR: 4 INJECTION INTRAVENOUS at 00:27

## 2021-01-01 RX ADMIN — Medication 2 MILLIGRAM(S): at 17:01

## 2021-01-01 RX ADMIN — KETAMINE HYDROCHLORIDE 16.3 MG/KG/HR: 100 INJECTION INTRAMUSCULAR; INTRAVENOUS at 12:47

## 2021-01-01 RX ADMIN — ALBUTEROL 2 PUFF(S): 90 AEROSOL, METERED ORAL at 21:33

## 2021-01-01 RX ADMIN — QUETIAPINE FUMARATE 25 MILLIGRAM(S): 200 TABLET, FILM COATED ORAL at 13:08

## 2021-01-01 RX ADMIN — CHLORHEXIDINE GLUCONATE 15 MILLILITER(S): 213 SOLUTION TOPICAL at 05:18

## 2021-01-01 RX ADMIN — MIDODRINE HYDROCHLORIDE 40 MILLIGRAM(S): 2.5 TABLET ORAL at 13:25

## 2021-01-01 RX ADMIN — HUMAN INSULIN 10 UNIT(S): 100 INJECTION, SUSPENSION SUBCUTANEOUS at 05:23

## 2021-01-01 RX ADMIN — CHLORHEXIDINE GLUCONATE 1 APPLICATION(S): 213 SOLUTION TOPICAL at 05:23

## 2021-01-01 RX ADMIN — CHLORHEXIDINE GLUCONATE 15 MILLILITER(S): 213 SOLUTION TOPICAL at 05:47

## 2021-01-01 RX ADMIN — HUMAN INSULIN 16 UNIT(S): 100 INJECTION, SUSPENSION SUBCUTANEOUS at 00:58

## 2021-01-01 RX ADMIN — Medication 1 MILLIGRAM(S): at 05:13

## 2021-01-01 RX ADMIN — CISATRACURIUM BESYLATE 14.7 MICROGRAM(S)/KG/MIN: 2 INJECTION INTRAVENOUS at 01:17

## 2021-01-01 RX ADMIN — FENTANYL CITRATE 8.16 MICROGRAM(S)/KG/HR: 50 INJECTION INTRAVENOUS at 20:39

## 2021-01-01 RX ADMIN — SENNA PLUS 10 MILLILITER(S): 8.6 TABLET ORAL at 22:52

## 2021-01-01 RX ADMIN — MIDAZOLAM HYDROCHLORIDE 2 MILLIGRAM(S): 1 INJECTION, SOLUTION INTRAMUSCULAR; INTRAVENOUS at 07:49

## 2021-01-01 RX ADMIN — HYDROMORPHONE HYDROCHLORIDE 0.5 MILLIGRAM(S): 2 INJECTION INTRAMUSCULAR; INTRAVENOUS; SUBCUTANEOUS at 14:15

## 2021-01-01 RX ADMIN — URSODIOL 500 MILLIGRAM(S): 250 TABLET, FILM COATED ORAL at 17:26

## 2021-01-01 RX ADMIN — Medication 6 MILLIGRAM(S): at 05:12

## 2021-01-01 RX ADMIN — URSODIOL 500 MILLIGRAM(S): 250 TABLET, FILM COATED ORAL at 17:51

## 2021-01-01 RX ADMIN — METHADONE HYDROCHLORIDE 20 MILLIGRAM(S): 40 TABLET ORAL at 13:01

## 2021-01-01 RX ADMIN — Medication 1 DROP(S): at 21:57

## 2021-01-01 RX ADMIN — HYDROMORPHONE HYDROCHLORIDE 0.5 MILLIGRAM(S): 2 INJECTION INTRAMUSCULAR; INTRAVENOUS; SUBCUTANEOUS at 18:09

## 2021-01-01 RX ADMIN — AMPICILLIN SODIUM AND SULBACTAM SODIUM 200 GRAM(S): 250; 125 INJECTION, POWDER, FOR SUSPENSION INTRAMUSCULAR; INTRAVENOUS at 05:00

## 2021-01-01 RX ADMIN — Medication 1 DROP(S): at 05:47

## 2021-01-01 RX ADMIN — CHLORHEXIDINE GLUCONATE 15 MILLILITER(S): 213 SOLUTION TOPICAL at 05:01

## 2021-01-01 RX ADMIN — MIDODRINE HYDROCHLORIDE 40 MILLIGRAM(S): 2.5 TABLET ORAL at 05:02

## 2021-01-01 RX ADMIN — DEXMEDETOMIDINE HYDROCHLORIDE IN 0.9% SODIUM CHLORIDE 4.08 MICROGRAM(S)/KG/HR: 4 INJECTION INTRAVENOUS at 00:30

## 2021-01-01 RX ADMIN — PANTOPRAZOLE SODIUM 40 MILLIGRAM(S): 20 TABLET, DELAYED RELEASE ORAL at 17:21

## 2021-01-01 RX ADMIN — PANTOPRAZOLE SODIUM 40 MILLIGRAM(S): 20 TABLET, DELAYED RELEASE ORAL at 13:06

## 2021-01-01 RX ADMIN — HUMAN INSULIN 10 UNIT(S): 100 INJECTION, SUSPENSION SUBCUTANEOUS at 18:08

## 2021-01-01 RX ADMIN — CHLORHEXIDINE GLUCONATE 15 MILLILITER(S): 213 SOLUTION TOPICAL at 05:03

## 2021-01-01 RX ADMIN — ALBUTEROL 2 PUFF(S): 90 AEROSOL, METERED ORAL at 05:48

## 2021-01-01 RX ADMIN — Medication 250 MILLIGRAM(S): at 18:00

## 2021-01-01 RX ADMIN — METHADONE HYDROCHLORIDE 5 MILLIGRAM(S): 40 TABLET ORAL at 15:13

## 2021-01-01 RX ADMIN — HUMAN INSULIN 10 UNIT(S): 100 INJECTION, SUSPENSION SUBCUTANEOUS at 05:36

## 2021-01-01 RX ADMIN — PANTOPRAZOLE SODIUM 40 MILLIGRAM(S): 20 TABLET, DELAYED RELEASE ORAL at 05:28

## 2021-01-01 RX ADMIN — Medication 100 MILLIGRAM(S): at 02:21

## 2021-01-01 RX ADMIN — Medication 2: at 12:19

## 2021-01-01 RX ADMIN — Medication 30 MILLILITER(S): at 12:53

## 2021-01-01 RX ADMIN — HUMAN INSULIN 2 UNIT(S): 100 INJECTION, SUSPENSION SUBCUTANEOUS at 00:20

## 2021-01-01 RX ADMIN — Medication 100 MILLIGRAM(S): at 00:48

## 2021-01-01 RX ADMIN — Medication 1 MILLIGRAM(S): at 06:26

## 2021-01-01 RX ADMIN — METHADONE HYDROCHLORIDE 10 MILLIGRAM(S): 40 TABLET ORAL at 14:28

## 2021-01-01 RX ADMIN — BUDESONIDE AND FORMOTEROL FUMARATE DIHYDRATE 2 PUFF(S): 160; 4.5 AEROSOL RESPIRATORY (INHALATION) at 08:56

## 2021-01-01 RX ADMIN — ENOXAPARIN SODIUM 80 MILLIGRAM(S): 100 INJECTION SUBCUTANEOUS at 05:08

## 2021-01-01 RX ADMIN — Medication 1 MILLIGRAM(S): at 06:04

## 2021-01-01 RX ADMIN — Medication 2: at 18:13

## 2021-01-01 RX ADMIN — Medication 2: at 17:19

## 2021-01-01 RX ADMIN — PANTOPRAZOLE SODIUM 40 MILLIGRAM(S): 20 TABLET, DELAYED RELEASE ORAL at 11:04

## 2021-01-01 RX ADMIN — ALBUTEROL 2 PUFF(S): 90 AEROSOL, METERED ORAL at 21:45

## 2021-01-01 RX ADMIN — BUDESONIDE AND FORMOTEROL FUMARATE DIHYDRATE 2 PUFF(S): 160; 4.5 AEROSOL RESPIRATORY (INHALATION) at 06:10

## 2021-01-01 RX ADMIN — CHLORHEXIDINE GLUCONATE 15 MILLILITER(S): 213 SOLUTION TOPICAL at 03:30

## 2021-01-01 RX ADMIN — HEPARIN SODIUM 8 UNIT(S)/HR: 5000 INJECTION INTRAVENOUS; SUBCUTANEOUS at 08:06

## 2021-01-01 RX ADMIN — MORPHINE SULFATE 2 MILLIGRAM(S): 50 CAPSULE, EXTENDED RELEASE ORAL at 13:35

## 2021-01-01 RX ADMIN — DAPTOMYCIN 128 MILLIGRAM(S): 500 INJECTION, POWDER, LYOPHILIZED, FOR SOLUTION INTRAVENOUS at 11:52

## 2021-01-01 RX ADMIN — FENTANYL CITRATE 8.16 MICROGRAM(S)/KG/HR: 50 INJECTION INTRAVENOUS at 19:27

## 2021-01-01 RX ADMIN — HUMAN INSULIN 14 UNIT(S): 100 INJECTION, SUSPENSION SUBCUTANEOUS at 05:49

## 2021-01-01 RX ADMIN — AMPICILLIN SODIUM AND SULBACTAM SODIUM 200 GRAM(S): 250; 125 INJECTION, POWDER, FOR SUSPENSION INTRAMUSCULAR; INTRAVENOUS at 05:14

## 2021-01-01 RX ADMIN — ALBUTEROL 2 PUFF(S): 90 AEROSOL, METERED ORAL at 21:09

## 2021-01-01 RX ADMIN — BUMETANIDE 1 MILLIGRAM(S): 0.25 INJECTION INTRAMUSCULAR; INTRAVENOUS at 17:12

## 2021-01-01 RX ADMIN — ENOXAPARIN SODIUM 80 MILLIGRAM(S): 100 INJECTION SUBCUTANEOUS at 05:37

## 2021-01-01 RX ADMIN — Medication 12.5 MILLIGRAM(S): at 18:16

## 2021-01-01 RX ADMIN — Medication 15 MILLIGRAM(S): at 05:18

## 2021-01-01 RX ADMIN — Medication 3: at 17:09

## 2021-01-01 RX ADMIN — METHADONE HYDROCHLORIDE 10 MILLIGRAM(S): 40 TABLET ORAL at 13:04

## 2021-01-01 RX ADMIN — ATORVASTATIN CALCIUM 20 MILLIGRAM(S): 80 TABLET, FILM COATED ORAL at 21:43

## 2021-01-01 RX ADMIN — METHADONE HYDROCHLORIDE 30 MILLIGRAM(S): 40 TABLET ORAL at 16:48

## 2021-01-01 RX ADMIN — METHADONE HYDROCHLORIDE 20 MILLIGRAM(S): 40 TABLET ORAL at 22:52

## 2021-01-01 RX ADMIN — Medication 1 MILLIGRAM(S): at 22:39

## 2021-01-01 RX ADMIN — Medication 10 MILLIGRAM(S): at 11:12

## 2021-01-01 RX ADMIN — HUMAN INSULIN 2 UNIT(S): 100 INJECTION, SUSPENSION SUBCUTANEOUS at 05:40

## 2021-01-01 RX ADMIN — HUMAN INSULIN 8 UNIT(S): 100 INJECTION, SUSPENSION SUBCUTANEOUS at 13:07

## 2021-01-01 RX ADMIN — Medication 1 APPLICATION(S): at 13:07

## 2021-01-01 RX ADMIN — Medication 7.65 MICROGRAM(S)/KG/MIN: at 05:02

## 2021-01-01 RX ADMIN — ACETAZOLAMIDE 250 MILLIGRAM(S): 250 TABLET ORAL at 02:35

## 2021-01-01 RX ADMIN — CHLORHEXIDINE GLUCONATE 15 MILLILITER(S): 213 SOLUTION TOPICAL at 05:55

## 2021-01-01 RX ADMIN — URSODIOL 500 MILLIGRAM(S): 250 TABLET, FILM COATED ORAL at 17:27

## 2021-01-01 RX ADMIN — CHLORHEXIDINE GLUCONATE 15 MILLILITER(S): 213 SOLUTION TOPICAL at 05:10

## 2021-01-01 RX ADMIN — Medication 4: at 17:13

## 2021-01-01 RX ADMIN — Medication 6: at 11:17

## 2021-01-01 RX ADMIN — HUMAN INSULIN 12 UNIT(S): 100 INJECTION, SUSPENSION SUBCUTANEOUS at 11:17

## 2021-01-01 RX ADMIN — HUMAN INSULIN 2 UNIT(S): 100 INJECTION, SUSPENSION SUBCUTANEOUS at 11:54

## 2021-01-01 RX ADMIN — POTASSIUM PHOSPHATE, MONOBASIC POTASSIUM PHOSPHATE, DIBASIC 83.33 MILLIMOLE(S): 236; 224 INJECTION, SOLUTION INTRAVENOUS at 02:04

## 2021-01-01 RX ADMIN — DEXMEDETOMIDINE HYDROCHLORIDE IN 0.9% SODIUM CHLORIDE 4.08 MICROGRAM(S)/KG/HR: 4 INJECTION INTRAVENOUS at 03:15

## 2021-01-01 RX ADMIN — Medication 2 MILLIGRAM(S): at 11:11

## 2021-01-01 RX ADMIN — Medication 2: at 23:56

## 2021-01-01 RX ADMIN — URSODIOL 500 MILLIGRAM(S): 250 TABLET, FILM COATED ORAL at 06:25

## 2021-01-01 RX ADMIN — Medication 650 MILLIGRAM(S): at 16:30

## 2021-01-01 RX ADMIN — Medication 1 DROP(S): at 14:57

## 2021-01-01 RX ADMIN — FENTANYL CITRATE 8.16 MICROGRAM(S)/KG/HR: 50 INJECTION INTRAVENOUS at 10:42

## 2021-01-01 RX ADMIN — URSODIOL 500 MILLIGRAM(S): 250 TABLET, FILM COATED ORAL at 17:53

## 2021-01-01 RX ADMIN — Medication 6 MILLIGRAM(S): at 05:40

## 2021-01-01 RX ADMIN — Medication 1 DROP(S): at 21:38

## 2021-01-01 RX ADMIN — ALBUTEROL 2 PUFF(S): 90 AEROSOL, METERED ORAL at 23:20

## 2021-01-01 RX ADMIN — Medication 30 MILLILITER(S): at 12:24

## 2021-01-01 RX ADMIN — MEROPENEM 100 MILLIGRAM(S): 1 INJECTION INTRAVENOUS at 14:00

## 2021-01-01 RX ADMIN — CISATRACURIUM BESYLATE 14.7 MICROGRAM(S)/KG/MIN: 2 INJECTION INTRAVENOUS at 05:09

## 2021-01-01 RX ADMIN — CHLORHEXIDINE GLUCONATE 15 MILLILITER(S): 213 SOLUTION TOPICAL at 17:17

## 2021-01-01 RX ADMIN — DAPTOMYCIN 128 MILLIGRAM(S): 500 INJECTION, POWDER, LYOPHILIZED, FOR SOLUTION INTRAVENOUS at 13:32

## 2021-01-01 RX ADMIN — Medication 6 MILLIGRAM(S): at 05:11

## 2021-01-01 RX ADMIN — HYDROMORPHONE HYDROCHLORIDE 0.5 MILLIGRAM(S): 2 INJECTION INTRAMUSCULAR; INTRAVENOUS; SUBCUTANEOUS at 12:57

## 2021-01-01 RX ADMIN — Medication 30 MILLILITER(S): at 15:06

## 2021-01-01 RX ADMIN — URSODIOL 500 MILLIGRAM(S): 250 TABLET, FILM COATED ORAL at 05:28

## 2021-01-01 RX ADMIN — HALOPERIDOL DECANOATE 0.5 MILLIGRAM(S): 100 INJECTION INTRAMUSCULAR at 23:17

## 2021-01-01 RX ADMIN — SODIUM CHLORIDE 1000 MILLILITER(S): 9 INJECTION INTRAMUSCULAR; INTRAVENOUS; SUBCUTANEOUS at 16:30

## 2021-01-01 RX ADMIN — Medication 2000 UNIT(S): at 11:24

## 2021-01-01 RX ADMIN — Medication 1 MILLIGRAM(S): at 15:30

## 2021-01-01 RX ADMIN — METHADONE HYDROCHLORIDE 10 MILLIGRAM(S): 40 TABLET ORAL at 16:31

## 2021-01-01 RX ADMIN — ENOXAPARIN SODIUM 40 MILLIGRAM(S): 100 INJECTION SUBCUTANEOUS at 12:22

## 2021-01-01 RX ADMIN — NALOXEGOL OXALATE 25 MILLIGRAM(S): 12.5 TABLET, FILM COATED ORAL at 11:23

## 2021-01-01 RX ADMIN — CHLORHEXIDINE GLUCONATE 1 APPLICATION(S): 213 SOLUTION TOPICAL at 06:42

## 2021-01-01 RX ADMIN — Medication 1 MILLIGRAM(S): at 13:02

## 2021-01-01 RX ADMIN — HUMAN INSULIN 12 UNIT(S): 100 INJECTION, SUSPENSION SUBCUTANEOUS at 00:47

## 2021-01-01 RX ADMIN — Medication 3.83 MICROGRAM(S)/KG/MIN: at 05:09

## 2021-01-01 RX ADMIN — AMPICILLIN SODIUM AND SULBACTAM SODIUM 200 GRAM(S): 250; 125 INJECTION, POWDER, FOR SUSPENSION INTRAMUSCULAR; INTRAVENOUS at 00:51

## 2021-01-01 RX ADMIN — PIPERACILLIN AND TAZOBACTAM 25 GRAM(S): 4; .5 INJECTION, POWDER, LYOPHILIZED, FOR SOLUTION INTRAVENOUS at 01:00

## 2021-01-01 RX ADMIN — BUDESONIDE AND FORMOTEROL FUMARATE DIHYDRATE 2 PUFF(S): 160; 4.5 AEROSOL RESPIRATORY (INHALATION) at 17:36

## 2021-01-01 RX ADMIN — Medication 1 MILLIGRAM(S): at 06:13

## 2021-01-01 RX ADMIN — HUMAN INSULIN 4 UNIT(S): 100 INJECTION, SUSPENSION SUBCUTANEOUS at 11:50

## 2021-01-01 RX ADMIN — Medication 30 MILLILITER(S): at 12:03

## 2021-01-01 RX ADMIN — PANTOPRAZOLE SODIUM 40 MILLIGRAM(S): 20 TABLET, DELAYED RELEASE ORAL at 17:08

## 2021-01-01 RX ADMIN — AMPICILLIN SODIUM AND SULBACTAM SODIUM 200 GRAM(S): 250; 125 INJECTION, POWDER, FOR SUSPENSION INTRAMUSCULAR; INTRAVENOUS at 18:02

## 2021-01-01 RX ADMIN — Medication 1 APPLICATION(S): at 05:08

## 2021-01-01 RX ADMIN — Medication 4: at 17:22

## 2021-01-01 RX ADMIN — SENNA PLUS 10 MILLILITER(S): 8.6 TABLET ORAL at 21:12

## 2021-01-01 RX ADMIN — HUMAN INSULIN 8 UNIT(S): 100 INJECTION, SUSPENSION SUBCUTANEOUS at 23:15

## 2021-01-01 RX ADMIN — MIDODRINE HYDROCHLORIDE 10 MILLIGRAM(S): 2.5 TABLET ORAL at 22:01

## 2021-01-01 RX ADMIN — ALBUTEROL 2 PUFF(S): 90 AEROSOL, METERED ORAL at 14:04

## 2021-01-01 RX ADMIN — HYDROMORPHONE HYDROCHLORIDE 0.5 MILLIGRAM(S): 2 INJECTION INTRAMUSCULAR; INTRAVENOUS; SUBCUTANEOUS at 08:06

## 2021-01-01 RX ADMIN — FENTANYL CITRATE 50 MICROGRAM(S): 50 INJECTION INTRAVENOUS at 04:59

## 2021-01-01 RX ADMIN — Medication 6 MILLIGRAM(S): at 05:42

## 2021-01-01 RX ADMIN — FENTANYL CITRATE 25 MICROGRAM(S): 50 INJECTION INTRAVENOUS at 22:25

## 2021-01-01 RX ADMIN — METHADONE HYDROCHLORIDE 10 MILLIGRAM(S): 40 TABLET ORAL at 14:22

## 2021-01-01 RX ADMIN — Medication 250 MILLIGRAM(S): at 07:33

## 2021-01-01 RX ADMIN — HYDROMORPHONE HYDROCHLORIDE 1 MILLIGRAM(S): 2 INJECTION INTRAMUSCULAR; INTRAVENOUS; SUBCUTANEOUS at 17:23

## 2021-01-01 RX ADMIN — METHADONE HYDROCHLORIDE 15 MILLIGRAM(S): 40 TABLET ORAL at 21:25

## 2021-01-01 RX ADMIN — ALBUTEROL 2 PUFF(S): 90 AEROSOL, METERED ORAL at 13:03

## 2021-01-01 RX ADMIN — Medication 40 MILLIEQUIVALENT(S): at 06:30

## 2021-01-01 RX ADMIN — ALBUTEROL 2 PUFF(S): 90 AEROSOL, METERED ORAL at 16:10

## 2021-01-01 RX ADMIN — ALBUTEROL 2 PUFF(S): 90 AEROSOL, METERED ORAL at 15:34

## 2021-01-01 RX ADMIN — Medication 650 MILLIGRAM(S): at 10:41

## 2021-01-01 RX ADMIN — HUMAN INSULIN 16 UNIT(S): 100 INJECTION, SUSPENSION SUBCUTANEOUS at 11:13

## 2021-01-01 RX ADMIN — ALBUTEROL 2 PUFF(S): 90 AEROSOL, METERED ORAL at 05:41

## 2021-01-01 RX ADMIN — HUMAN INSULIN 10 UNIT(S): 100 INJECTION, SUSPENSION SUBCUTANEOUS at 17:22

## 2021-01-01 RX ADMIN — Medication 1: at 06:09

## 2021-01-01 RX ADMIN — Medication 100 MILLIEQUIVALENT(S): at 01:12

## 2021-01-01 RX ADMIN — Medication 975 MILLIGRAM(S): at 17:49

## 2021-01-01 RX ADMIN — METHADONE HYDROCHLORIDE 15 MILLIGRAM(S): 40 TABLET ORAL at 22:05

## 2021-01-01 RX ADMIN — CHLORHEXIDINE GLUCONATE 1 APPLICATION(S): 213 SOLUTION TOPICAL at 06:07

## 2021-01-01 RX ADMIN — Medication 2 MILLIGRAM(S): at 11:30

## 2021-01-01 RX ADMIN — MEROPENEM 100 MILLIGRAM(S): 1 INJECTION INTRAVENOUS at 15:02

## 2021-01-01 RX ADMIN — HUMAN INSULIN 10 UNIT(S): 100 INJECTION, SUSPENSION SUBCUTANEOUS at 05:21

## 2021-01-01 RX ADMIN — HUMAN INSULIN 8 UNIT(S): 100 INJECTION, SUSPENSION SUBCUTANEOUS at 18:30

## 2021-01-01 RX ADMIN — Medication 10 MILLIGRAM(S): at 05:52

## 2021-01-01 RX ADMIN — DEXMEDETOMIDINE HYDROCHLORIDE IN 0.9% SODIUM CHLORIDE 10.2 MICROGRAM(S)/KG/HR: 4 INJECTION INTRAVENOUS at 07:56

## 2021-01-01 RX ADMIN — AMLODIPINE BESYLATE 5 MILLIGRAM(S): 2.5 TABLET ORAL at 05:43

## 2021-01-01 RX ADMIN — Medication 6: at 23:23

## 2021-01-01 RX ADMIN — HUMAN INSULIN 10 UNIT(S): 100 INJECTION, SUSPENSION SUBCUTANEOUS at 23:08

## 2021-01-01 RX ADMIN — BUDESONIDE AND FORMOTEROL FUMARATE DIHYDRATE 2 PUFF(S): 160; 4.5 AEROSOL RESPIRATORY (INHALATION) at 06:26

## 2021-01-01 RX ADMIN — BUDESONIDE AND FORMOTEROL FUMARATE DIHYDRATE 2 PUFF(S): 160; 4.5 AEROSOL RESPIRATORY (INHALATION) at 17:15

## 2021-01-01 RX ADMIN — ALBUTEROL 2 PUFF(S): 90 AEROSOL, METERED ORAL at 13:57

## 2021-01-01 RX ADMIN — METHADONE HYDROCHLORIDE 30 MILLIGRAM(S): 40 TABLET ORAL at 13:05

## 2021-01-01 RX ADMIN — Medication 2 MILLIGRAM(S): at 17:08

## 2021-01-01 RX ADMIN — Medication 250 MILLIGRAM(S): at 17:43

## 2021-01-01 RX ADMIN — FENTANYL CITRATE 50 MICROGRAM(S): 50 INJECTION INTRAVENOUS at 06:02

## 2021-01-01 RX ADMIN — ALBUTEROL 2 PUFF(S): 90 AEROSOL, METERED ORAL at 18:07

## 2021-01-01 RX ADMIN — SENNA PLUS 10 MILLILITER(S): 8.6 TABLET ORAL at 21:10

## 2021-01-01 RX ADMIN — MONTELUKAST 10 MILLIGRAM(S): 4 TABLET, CHEWABLE ORAL at 11:24

## 2021-01-01 RX ADMIN — MIDODRINE HYDROCHLORIDE 10 MILLIGRAM(S): 2.5 TABLET ORAL at 13:05

## 2021-01-01 RX ADMIN — Medication 1 MILLIGRAM(S): at 13:10

## 2021-01-01 RX ADMIN — Medication 2 MILLIGRAM(S): at 14:11

## 2021-01-01 RX ADMIN — QUETIAPINE FUMARATE 25 MILLIGRAM(S): 200 TABLET, FILM COATED ORAL at 12:11

## 2021-01-01 RX ADMIN — ENOXAPARIN SODIUM 80 MILLIGRAM(S): 100 INJECTION SUBCUTANEOUS at 17:24

## 2021-01-01 RX ADMIN — ENOXAPARIN SODIUM 80 MILLIGRAM(S): 100 INJECTION SUBCUTANEOUS at 05:25

## 2021-01-01 RX ADMIN — FENTANYL CITRATE 50 MICROGRAM(S): 50 INJECTION INTRAVENOUS at 21:49

## 2021-01-01 RX ADMIN — ENOXAPARIN SODIUM 80 MILLIGRAM(S): 100 INJECTION SUBCUTANEOUS at 18:28

## 2021-01-01 RX ADMIN — Medication 650 MILLIGRAM(S): at 22:50

## 2021-01-01 RX ADMIN — Medication 2000 UNIT(S): at 11:43

## 2021-01-01 RX ADMIN — Medication 10 MILLIGRAM(S): at 05:40

## 2021-01-01 RX ADMIN — BUMETANIDE 1 MILLIGRAM(S): 0.25 INJECTION INTRAMUSCULAR; INTRAVENOUS at 05:11

## 2021-01-01 RX ADMIN — Medication 250 MILLIGRAM(S): at 05:51

## 2021-01-01 RX ADMIN — VALSARTAN 160 MILLIGRAM(S): 80 TABLET ORAL at 09:01

## 2021-01-01 RX ADMIN — CHLORHEXIDINE GLUCONATE 1 APPLICATION(S): 213 SOLUTION TOPICAL at 04:28

## 2021-01-01 RX ADMIN — PIPERACILLIN AND TAZOBACTAM 25 GRAM(S): 4; .5 INJECTION, POWDER, LYOPHILIZED, FOR SOLUTION INTRAVENOUS at 08:34

## 2021-01-01 RX ADMIN — ALBUTEROL 2 PUFF(S): 90 AEROSOL, METERED ORAL at 05:43

## 2021-01-01 RX ADMIN — METHADONE HYDROCHLORIDE 5 MILLIGRAM(S): 40 TABLET ORAL at 14:08

## 2021-01-01 RX ADMIN — HUMAN INSULIN 16 UNIT(S): 100 INJECTION, SUSPENSION SUBCUTANEOUS at 11:59

## 2021-01-01 RX ADMIN — KETAMINE HYDROCHLORIDE 16.3 MG/KG/HR: 100 INJECTION INTRAMUSCULAR; INTRAVENOUS at 21:08

## 2021-01-01 RX ADMIN — MIDODRINE HYDROCHLORIDE 10 MILLIGRAM(S): 2.5 TABLET ORAL at 21:41

## 2021-01-01 RX ADMIN — FENTANYL CITRATE 8.16 MICROGRAM(S)/KG/HR: 50 INJECTION INTRAVENOUS at 08:41

## 2021-01-01 RX ADMIN — Medication 250 MILLIGRAM(S): at 17:13

## 2021-01-01 RX ADMIN — ALBUTEROL 2 PUFF(S): 90 AEROSOL, METERED ORAL at 06:16

## 2021-01-01 RX ADMIN — HUMAN INSULIN 2 UNIT(S): 100 INJECTION, SUSPENSION SUBCUTANEOUS at 12:10

## 2021-01-01 RX ADMIN — FENTANYL CITRATE 2.04 MICROGRAM(S)/KG/HR: 50 INJECTION INTRAVENOUS at 19:00

## 2021-01-01 RX ADMIN — DEXMEDETOMIDINE HYDROCHLORIDE IN 0.9% SODIUM CHLORIDE 10.2 MICROGRAM(S)/KG/HR: 4 INJECTION INTRAVENOUS at 04:12

## 2021-01-01 RX ADMIN — Medication 7.65 MICROGRAM(S)/KG/MIN: at 11:02

## 2021-01-01 RX ADMIN — ALBUTEROL 2 PUFF(S): 90 AEROSOL, METERED ORAL at 06:24

## 2021-01-01 RX ADMIN — VASOPRESSIN 2.4 UNIT(S)/MIN: 20 INJECTION INTRAVENOUS at 02:10

## 2021-01-01 RX ADMIN — Medication 2 MILLIGRAM(S): at 05:00

## 2021-01-01 RX ADMIN — CHLORHEXIDINE GLUCONATE 15 MILLILITER(S): 213 SOLUTION TOPICAL at 17:27

## 2021-01-01 RX ADMIN — Medication 50 MILLIEQUIVALENT(S): at 02:47

## 2021-01-01 RX ADMIN — SODIUM CHLORIDE 100 MILLILITER(S): 9 INJECTION, SOLUTION INTRAVENOUS at 11:09

## 2021-01-01 RX ADMIN — PANTOPRAZOLE SODIUM 40 MILLIGRAM(S): 20 TABLET, DELAYED RELEASE ORAL at 05:52

## 2021-01-01 RX ADMIN — PIPERACILLIN AND TAZOBACTAM 25 GRAM(S): 4; .5 INJECTION, POWDER, LYOPHILIZED, FOR SOLUTION INTRAVENOUS at 13:08

## 2021-01-01 RX ADMIN — BUDESONIDE AND FORMOTEROL FUMARATE DIHYDRATE 2 PUFF(S): 160; 4.5 AEROSOL RESPIRATORY (INHALATION) at 06:16

## 2021-01-01 RX ADMIN — Medication 30 MILLILITER(S): at 11:10

## 2021-01-01 RX ADMIN — BUDESONIDE AND FORMOTEROL FUMARATE DIHYDRATE 2 PUFF(S): 160; 4.5 AEROSOL RESPIRATORY (INHALATION) at 08:38

## 2021-01-01 RX ADMIN — ALBUTEROL 2 PUFF(S): 90 AEROSOL, METERED ORAL at 14:23

## 2021-01-01 RX ADMIN — Medication 102 MILLIGRAM(S): at 15:04

## 2021-01-01 RX ADMIN — Medication 2: at 05:17

## 2021-01-01 RX ADMIN — ALBUTEROL 2 PUFF(S): 90 AEROSOL, METERED ORAL at 05:51

## 2021-01-01 RX ADMIN — METHADONE HYDROCHLORIDE 20 MILLIGRAM(S): 40 TABLET ORAL at 05:05

## 2021-01-01 RX ADMIN — BUDESONIDE AND FORMOTEROL FUMARATE DIHYDRATE 2 PUFF(S): 160; 4.5 AEROSOL RESPIRATORY (INHALATION) at 09:43

## 2021-01-01 RX ADMIN — PHENYLEPHRINE HYDROCHLORIDE 7.65 MICROGRAM(S)/KG/MIN: 10 INJECTION INTRAVENOUS at 19:27

## 2021-01-01 RX ADMIN — Medication 650 MILLIGRAM(S): at 00:04

## 2021-01-01 RX ADMIN — SENNA PLUS 10 MILLILITER(S): 8.6 TABLET ORAL at 21:03

## 2021-01-01 RX ADMIN — HUMAN INSULIN 7 UNIT(S): 100 INJECTION, SUSPENSION SUBCUTANEOUS at 00:59

## 2021-01-01 RX ADMIN — METHADONE HYDROCHLORIDE 20 MILLIGRAM(S): 40 TABLET ORAL at 22:30

## 2021-01-01 RX ADMIN — HUMAN INSULIN 2 UNIT(S): 100 INJECTION, SUSPENSION SUBCUTANEOUS at 11:44

## 2021-01-01 RX ADMIN — Medication 250 MILLIGRAM(S): at 00:00

## 2021-01-01 RX ADMIN — Medication 20 MILLIGRAM(S): at 16:48

## 2021-01-01 RX ADMIN — CHLORHEXIDINE GLUCONATE 1 APPLICATION(S): 213 SOLUTION TOPICAL at 06:12

## 2021-01-01 RX ADMIN — Medication 2: at 00:50

## 2021-01-01 RX ADMIN — KETAMINE HYDROCHLORIDE 16.3 MG/KG/HR: 100 INJECTION INTRAMUSCULAR; INTRAVENOUS at 11:02

## 2021-01-01 RX ADMIN — MIDAZOLAM HYDROCHLORIDE 2 MILLIGRAM(S): 1 INJECTION, SOLUTION INTRAMUSCULAR; INTRAVENOUS at 02:29

## 2021-01-01 RX ADMIN — ALBUTEROL 2 PUFF(S): 90 AEROSOL, METERED ORAL at 15:37

## 2021-01-01 RX ADMIN — Medication 1 DROP(S): at 05:17

## 2021-01-01 RX ADMIN — DEXMEDETOMIDINE HYDROCHLORIDE IN 0.9% SODIUM CHLORIDE 4.08 MICROGRAM(S)/KG/HR: 4 INJECTION INTRAVENOUS at 18:48

## 2021-01-01 RX ADMIN — SODIUM CHLORIDE 50 MILLILITER(S): 9 INJECTION, SOLUTION INTRAVENOUS at 00:17

## 2021-01-01 RX ADMIN — HUMAN INSULIN 7 UNIT(S): 100 INJECTION, SUSPENSION SUBCUTANEOUS at 11:26

## 2021-01-01 RX ADMIN — METHADONE HYDROCHLORIDE 5 MILLIGRAM(S): 40 TABLET ORAL at 05:13

## 2021-01-01 RX ADMIN — URSODIOL 500 MILLIGRAM(S): 250 TABLET, FILM COATED ORAL at 05:35

## 2021-01-01 RX ADMIN — PROPOFOL 2.45 MICROGRAM(S)/KG/MIN: 10 INJECTION, EMULSION INTRAVENOUS at 16:54

## 2021-01-01 RX ADMIN — HUMAN INSULIN 10 UNIT(S): 100 INJECTION, SUSPENSION SUBCUTANEOUS at 11:19

## 2021-01-01 RX ADMIN — HUMAN INSULIN 10 UNIT(S): 100 INJECTION, SUSPENSION SUBCUTANEOUS at 17:08

## 2021-01-01 RX ADMIN — ENOXAPARIN SODIUM 80 MILLIGRAM(S): 100 INJECTION SUBCUTANEOUS at 17:50

## 2021-01-01 RX ADMIN — HUMAN INSULIN 10 UNIT(S): 100 INJECTION, SUSPENSION SUBCUTANEOUS at 17:34

## 2021-01-01 RX ADMIN — Medication 2 MILLIGRAM(S): at 23:56

## 2021-01-01 RX ADMIN — HUMAN INSULIN 10 UNIT(S): 100 INJECTION, SUSPENSION SUBCUTANEOUS at 12:43

## 2021-01-01 RX ADMIN — HYDROMORPHONE HYDROCHLORIDE 1 MILLIGRAM(S): 2 INJECTION INTRAMUSCULAR; INTRAVENOUS; SUBCUTANEOUS at 17:27

## 2021-01-01 RX ADMIN — Medication 650 MILLIGRAM(S): at 17:15

## 2021-01-01 RX ADMIN — Medication 3.83 MICROGRAM(S)/KG/MIN: at 08:42

## 2021-01-01 RX ADMIN — Medication 3.83 MICROGRAM(S)/KG/MIN: at 08:20

## 2021-01-01 RX ADMIN — MIDODRINE HYDROCHLORIDE 40 MILLIGRAM(S): 2.5 TABLET ORAL at 14:56

## 2021-01-01 RX ADMIN — METHADONE HYDROCHLORIDE 10 MILLIGRAM(S): 40 TABLET ORAL at 13:46

## 2021-01-01 RX ADMIN — Medication 3: at 01:17

## 2021-01-01 RX ADMIN — Medication 1 MILLIGRAM(S): at 10:17

## 2021-01-01 RX ADMIN — Medication 650 MILLIGRAM(S): at 15:32

## 2021-01-01 RX ADMIN — Medication 5: at 12:11

## 2021-01-01 RX ADMIN — PIPERACILLIN AND TAZOBACTAM 200 GRAM(S): 4; .5 INJECTION, POWDER, LYOPHILIZED, FOR SOLUTION INTRAVENOUS at 12:44

## 2021-01-01 RX ADMIN — Medication 4: at 23:35

## 2021-01-01 RX ADMIN — Medication 1 APPLICATION(S): at 13:05

## 2021-01-01 RX ADMIN — HUMAN INSULIN 10 UNIT(S): 100 INJECTION, SUSPENSION SUBCUTANEOUS at 23:36

## 2021-01-01 RX ADMIN — ENOXAPARIN SODIUM 80 MILLIGRAM(S): 100 INJECTION SUBCUTANEOUS at 17:32

## 2021-01-01 RX ADMIN — Medication 250 MILLIGRAM(S): at 17:50

## 2021-01-01 RX ADMIN — ALBUTEROL 2 PUFF(S): 90 AEROSOL, METERED ORAL at 06:34

## 2021-01-01 RX ADMIN — Medication 1000 MILLIGRAM(S): at 07:09

## 2021-01-01 RX ADMIN — CHLORHEXIDINE GLUCONATE 1 APPLICATION(S): 213 SOLUTION TOPICAL at 05:47

## 2021-01-01 RX ADMIN — AMPICILLIN SODIUM AND SULBACTAM SODIUM 200 GRAM(S): 250; 125 INJECTION, POWDER, FOR SUSPENSION INTRAMUSCULAR; INTRAVENOUS at 23:08

## 2021-01-01 RX ADMIN — SENNA PLUS 10 MILLILITER(S): 8.6 TABLET ORAL at 22:41

## 2021-01-01 RX ADMIN — PANTOPRAZOLE SODIUM 40 MILLIGRAM(S): 20 TABLET, DELAYED RELEASE ORAL at 12:09

## 2021-01-01 RX ADMIN — CHLORHEXIDINE GLUCONATE 15 MILLILITER(S): 213 SOLUTION TOPICAL at 17:01

## 2021-01-01 RX ADMIN — BUDESONIDE AND FORMOTEROL FUMARATE DIHYDRATE 2 PUFF(S): 160; 4.5 AEROSOL RESPIRATORY (INHALATION) at 06:33

## 2021-01-01 RX ADMIN — HUMAN INSULIN 8 UNIT(S): 100 INJECTION, SUSPENSION SUBCUTANEOUS at 17:00

## 2021-01-01 RX ADMIN — HUMAN INSULIN 8 UNIT(S): 100 INJECTION, SUSPENSION SUBCUTANEOUS at 05:20

## 2021-01-01 RX ADMIN — Medication 25 MILLIGRAM(S): at 12:29

## 2021-01-01 RX ADMIN — Medication 7.65 MICROGRAM(S)/KG/MIN: at 19:00

## 2021-01-01 RX ADMIN — Medication 1 DROP(S): at 05:30

## 2021-01-01 RX ADMIN — POLYETHYLENE GLYCOL 3350 17 GRAM(S): 17 POWDER, FOR SOLUTION ORAL at 11:59

## 2021-01-01 RX ADMIN — HYDROMORPHONE HYDROCHLORIDE 0.5 MILLIGRAM(S): 2 INJECTION INTRAMUSCULAR; INTRAVENOUS; SUBCUTANEOUS at 23:00

## 2021-01-01 RX ADMIN — HUMAN INSULIN 10 UNIT(S): 100 INJECTION, SUSPENSION SUBCUTANEOUS at 06:15

## 2021-01-01 RX ADMIN — INSULIN GLARGINE 20 UNIT(S): 100 INJECTION, SOLUTION SUBCUTANEOUS at 22:15

## 2021-01-01 RX ADMIN — URSODIOL 500 MILLIGRAM(S): 250 TABLET, FILM COATED ORAL at 18:33

## 2021-01-01 RX ADMIN — CHLORHEXIDINE GLUCONATE 1 APPLICATION(S): 213 SOLUTION TOPICAL at 08:06

## 2021-01-01 RX ADMIN — Medication 2000 UNIT(S): at 11:53

## 2021-01-01 RX ADMIN — BUDESONIDE AND FORMOTEROL FUMARATE DIHYDRATE 2 PUFF(S): 160; 4.5 AEROSOL RESPIRATORY (INHALATION) at 08:27

## 2021-01-01 RX ADMIN — Medication 7.65 MICROGRAM(S)/KG/MIN: at 21:30

## 2021-01-01 RX ADMIN — MIDAZOLAM HYDROCHLORIDE 1.63 MG/KG/HR: 1 INJECTION, SOLUTION INTRAMUSCULAR; INTRAVENOUS at 08:20

## 2021-01-01 RX ADMIN — FENTANYL CITRATE 25 MICROGRAM(S): 50 INJECTION INTRAVENOUS at 20:57

## 2021-01-01 RX ADMIN — SODIUM CHLORIDE 50 MILLILITER(S): 9 INJECTION, SOLUTION INTRAVENOUS at 02:02

## 2021-01-01 RX ADMIN — TAMSULOSIN HYDROCHLORIDE 0.4 MILLIGRAM(S): 0.4 CAPSULE ORAL at 17:14

## 2021-01-01 RX ADMIN — Medication 1 DROP(S): at 05:04

## 2021-01-01 RX ADMIN — Medication 8: at 05:26

## 2021-01-01 RX ADMIN — HUMAN INSULIN 10 UNIT(S): 100 INJECTION, SUSPENSION SUBCUTANEOUS at 05:24

## 2021-01-01 RX ADMIN — Medication 1 DROP(S): at 05:01

## 2021-01-01 RX ADMIN — HUMAN INSULIN 2 UNIT(S): 100 INJECTION, SUSPENSION SUBCUTANEOUS at 11:06

## 2021-01-01 RX ADMIN — Medication 250 MILLIGRAM(S): at 00:10

## 2021-01-01 RX ADMIN — Medication 250 MILLIGRAM(S): at 17:56

## 2021-01-01 RX ADMIN — AMLODIPINE BESYLATE 5 MILLIGRAM(S): 2.5 TABLET ORAL at 09:01

## 2021-01-01 RX ADMIN — POLYETHYLENE GLYCOL 3350 17 GRAM(S): 17 POWDER, FOR SOLUTION ORAL at 11:03

## 2021-01-01 RX ADMIN — HYDROMORPHONE HYDROCHLORIDE 1 MILLIGRAM(S): 2 INJECTION INTRAMUSCULAR; INTRAVENOUS; SUBCUTANEOUS at 11:43

## 2021-01-01 RX ADMIN — Medication 4: at 00:44

## 2021-01-01 RX ADMIN — BUMETANIDE 1 MILLIGRAM(S): 0.25 INJECTION INTRAMUSCULAR; INTRAVENOUS at 05:23

## 2021-01-01 RX ADMIN — METHADONE HYDROCHLORIDE 10 MILLIGRAM(S): 40 TABLET ORAL at 21:40

## 2021-01-01 RX ADMIN — HUMAN INSULIN 10 UNIT(S): 100 INJECTION, SUSPENSION SUBCUTANEOUS at 05:51

## 2021-01-01 RX ADMIN — PHENYLEPHRINE HYDROCHLORIDE 15.3 MICROGRAM(S)/KG/MIN: 10 INJECTION INTRAVENOUS at 08:00

## 2021-01-01 RX ADMIN — HUMAN INSULIN 5 UNIT(S): 100 INJECTION, SUSPENSION SUBCUTANEOUS at 11:15

## 2021-01-01 RX ADMIN — Medication 3.06 MICROGRAM(S)/KG/MIN: at 11:30

## 2021-01-01 RX ADMIN — HALOPERIDOL DECANOATE 1 MILLIGRAM(S): 100 INJECTION INTRAMUSCULAR at 10:30

## 2021-01-01 RX ADMIN — AMLODIPINE BESYLATE 5 MILLIGRAM(S): 2.5 TABLET ORAL at 06:55

## 2021-01-01 RX ADMIN — ENOXAPARIN SODIUM 80 MILLIGRAM(S): 100 INJECTION SUBCUTANEOUS at 17:51

## 2021-01-01 RX ADMIN — ALBUTEROL 2 PUFF(S): 90 AEROSOL, METERED ORAL at 16:21

## 2021-01-01 RX ADMIN — ALBUTEROL 2 PUFF(S): 90 AEROSOL, METERED ORAL at 22:54

## 2021-01-01 RX ADMIN — Medication 6 MILLIGRAM(S): at 21:31

## 2021-01-01 RX ADMIN — CHLORHEXIDINE GLUCONATE 15 MILLILITER(S): 213 SOLUTION TOPICAL at 17:18

## 2021-01-01 RX ADMIN — ALBUTEROL 2 PUFF(S): 90 AEROSOL, METERED ORAL at 05:13

## 2021-01-01 RX ADMIN — BUMETANIDE 2 MILLIGRAM(S): 0.25 INJECTION INTRAMUSCULAR; INTRAVENOUS at 13:24

## 2021-01-01 RX ADMIN — Medication 1 DROP(S): at 05:16

## 2021-01-01 RX ADMIN — Medication 2 PACKET(S): at 10:40

## 2021-01-01 RX ADMIN — QUETIAPINE FUMARATE 25 MILLIGRAM(S): 200 TABLET, FILM COATED ORAL at 05:42

## 2021-01-01 RX ADMIN — HUMAN INSULIN 10 UNIT(S): 100 INJECTION, SUSPENSION SUBCUTANEOUS at 17:24

## 2021-01-01 RX ADMIN — QUETIAPINE FUMARATE 25 MILLIGRAM(S): 200 TABLET, FILM COATED ORAL at 10:10

## 2021-01-01 RX ADMIN — Medication 650 MILLIGRAM(S): at 18:39

## 2021-01-01 RX ADMIN — MEROPENEM 100 MILLIGRAM(S): 1 INJECTION INTRAVENOUS at 21:20

## 2021-01-01 RX ADMIN — Medication 2: at 05:25

## 2021-01-01 RX ADMIN — KETAMINE HYDROCHLORIDE 2.04 MG/KG/HR: 100 INJECTION INTRAMUSCULAR; INTRAVENOUS at 01:14

## 2021-01-01 RX ADMIN — Medication 20 MILLIGRAM(S): at 11:53

## 2021-01-01 RX ADMIN — CHLORHEXIDINE GLUCONATE 1 APPLICATION(S): 213 SOLUTION TOPICAL at 02:06

## 2021-01-01 RX ADMIN — FENTANYL CITRATE 50 MICROGRAM(S): 50 INJECTION INTRAVENOUS at 21:30

## 2021-01-01 RX ADMIN — BUDESONIDE AND FORMOTEROL FUMARATE DIHYDRATE 2 PUFF(S): 160; 4.5 AEROSOL RESPIRATORY (INHALATION) at 06:39

## 2021-01-01 RX ADMIN — PANTOPRAZOLE SODIUM 40 MILLIGRAM(S): 20 TABLET, DELAYED RELEASE ORAL at 19:19

## 2021-01-01 RX ADMIN — HUMAN INSULIN 10 UNIT(S): 100 INJECTION, SUSPENSION SUBCUTANEOUS at 11:46

## 2021-01-01 RX ADMIN — Medication 1 DROP(S): at 21:43

## 2021-01-01 RX ADMIN — Medication 2000 UNIT(S): at 11:23

## 2021-01-01 RX ADMIN — Medication 1 DROP(S): at 22:31

## 2021-01-01 RX ADMIN — KETAMINE HYDROCHLORIDE 16.3 MG/KG/HR: 100 INJECTION INTRAMUSCULAR; INTRAVENOUS at 08:41

## 2021-01-01 RX ADMIN — HUMAN INSULIN 7 UNIT(S): 100 INJECTION, SUSPENSION SUBCUTANEOUS at 05:27

## 2021-01-01 RX ADMIN — CHLORHEXIDINE GLUCONATE 1 APPLICATION(S): 213 SOLUTION TOPICAL at 05:09

## 2021-01-01 RX ADMIN — Medication 1: at 00:56

## 2021-01-01 RX ADMIN — Medication 2: at 00:03

## 2021-01-01 RX ADMIN — HEPARIN SODIUM 9 UNIT(S)/HR: 5000 INJECTION INTRAVENOUS; SUBCUTANEOUS at 15:19

## 2021-01-01 RX ADMIN — CHLORHEXIDINE GLUCONATE 1 APPLICATION(S): 213 SOLUTION TOPICAL at 06:53

## 2021-01-01 RX ADMIN — Medication 20 MILLIGRAM(S): at 21:54

## 2021-01-01 RX ADMIN — Medication 2 MILLIGRAM(S): at 05:26

## 2021-01-01 RX ADMIN — SENNA PLUS 10 MILLILITER(S): 8.6 TABLET ORAL at 22:06

## 2021-01-01 RX ADMIN — Medication 100 MILLIGRAM(S): at 20:15

## 2021-01-01 RX ADMIN — Medication 100 MILLIGRAM(S): at 01:21

## 2021-01-01 RX ADMIN — DEXMEDETOMIDINE HYDROCHLORIDE IN 0.9% SODIUM CHLORIDE 4.08 MICROGRAM(S)/KG/HR: 4 INJECTION INTRAVENOUS at 11:50

## 2021-01-01 RX ADMIN — AMPICILLIN SODIUM AND SULBACTAM SODIUM 200 GRAM(S): 250; 125 INJECTION, POWDER, FOR SUSPENSION INTRAMUSCULAR; INTRAVENOUS at 05:36

## 2021-01-01 RX ADMIN — CHLORHEXIDINE GLUCONATE 1 APPLICATION(S): 213 SOLUTION TOPICAL at 06:04

## 2021-01-01 RX ADMIN — CHLORHEXIDINE GLUCONATE 15 MILLILITER(S): 213 SOLUTION TOPICAL at 06:14

## 2021-01-01 RX ADMIN — FENTANYL CITRATE 8.16 MICROGRAM(S)/KG/HR: 50 INJECTION INTRAVENOUS at 21:39

## 2021-01-01 RX ADMIN — FENTANYL CITRATE 8.16 MICROGRAM(S)/KG/HR: 50 INJECTION INTRAVENOUS at 19:00

## 2021-01-01 RX ADMIN — Medication 650 MILLIGRAM(S): at 20:59

## 2021-01-01 RX ADMIN — METHADONE HYDROCHLORIDE 30 MILLIGRAM(S): 40 TABLET ORAL at 05:18

## 2021-01-01 RX ADMIN — Medication 7.65 MICROGRAM(S)/KG/MIN: at 21:15

## 2021-01-01 RX ADMIN — ALBUTEROL 2 PUFF(S): 90 AEROSOL, METERED ORAL at 06:02

## 2021-01-01 RX ADMIN — Medication 4: at 17:25

## 2021-01-01 RX ADMIN — HYDROMORPHONE HYDROCHLORIDE 0.5 MILLIGRAM(S): 2 INJECTION INTRAMUSCULAR; INTRAVENOUS; SUBCUTANEOUS at 12:19

## 2021-01-01 RX ADMIN — FENTANYL CITRATE 8.16 MICROGRAM(S)/KG/HR: 50 INJECTION INTRAVENOUS at 16:58

## 2021-01-01 RX ADMIN — METHADONE HYDROCHLORIDE 10 MILLIGRAM(S): 40 TABLET ORAL at 06:27

## 2021-01-01 RX ADMIN — ALBUTEROL 2 PUFF(S): 90 AEROSOL, METERED ORAL at 21:42

## 2021-01-01 RX ADMIN — HUMAN INSULIN 10 UNIT(S): 100 INJECTION, SUSPENSION SUBCUTANEOUS at 11:31

## 2021-01-01 RX ADMIN — CHLORHEXIDINE GLUCONATE 15 MILLILITER(S): 213 SOLUTION TOPICAL at 04:22

## 2021-01-01 RX ADMIN — Medication 1: at 17:28

## 2021-01-01 RX ADMIN — Medication 2000 UNIT(S): at 12:25

## 2021-01-01 RX ADMIN — METHADONE HYDROCHLORIDE 30 MILLIGRAM(S): 40 TABLET ORAL at 22:15

## 2021-01-01 RX ADMIN — Medication 2 MILLIGRAM(S): at 05:11

## 2021-01-01 RX ADMIN — HUMAN INSULIN 12 UNIT(S): 100 INJECTION, SUSPENSION SUBCUTANEOUS at 11:59

## 2021-01-01 RX ADMIN — Medication 110 MILLIGRAM(S): at 06:52

## 2021-01-01 RX ADMIN — ALBUTEROL 2 PUFF(S): 90 AEROSOL, METERED ORAL at 06:06

## 2021-01-01 RX ADMIN — POLYETHYLENE GLYCOL 3350 17 GRAM(S): 17 POWDER, FOR SOLUTION ORAL at 13:06

## 2021-01-01 RX ADMIN — FENTANYL CITRATE 50 MICROGRAM(S): 50 INJECTION INTRAVENOUS at 01:30

## 2021-01-01 RX ADMIN — BUDESONIDE AND FORMOTEROL FUMARATE DIHYDRATE 2 PUFF(S): 160; 4.5 AEROSOL RESPIRATORY (INHALATION) at 06:35

## 2021-01-01 RX ADMIN — Medication 1: at 00:20

## 2021-01-01 RX ADMIN — Medication 2 MILLIGRAM(S): at 06:14

## 2021-01-01 RX ADMIN — Medication 83.33 MILLIMOLE(S): at 02:51

## 2021-01-01 RX ADMIN — PHENYLEPHRINE HYDROCHLORIDE 7.65 MICROGRAM(S)/KG/MIN: 10 INJECTION INTRAVENOUS at 11:15

## 2021-01-01 RX ADMIN — MONTELUKAST 10 MILLIGRAM(S): 4 TABLET, CHEWABLE ORAL at 12:23

## 2021-01-01 RX ADMIN — Medication 250 MILLIGRAM(S): at 01:31

## 2021-01-01 RX ADMIN — AMIODARONE HYDROCHLORIDE 600 MILLIGRAM(S): 400 TABLET ORAL at 06:30

## 2021-01-01 RX ADMIN — KETAMINE HYDROCHLORIDE 16.3 MG/KG/HR: 100 INJECTION INTRAMUSCULAR; INTRAVENOUS at 08:18

## 2021-01-01 RX ADMIN — Medication 1 DROP(S): at 14:27

## 2021-01-01 RX ADMIN — Medication 650 MILLIGRAM(S): at 19:07

## 2021-01-01 RX ADMIN — Medication 2 MILLIGRAM(S): at 17:50

## 2021-01-01 RX ADMIN — HUMAN INSULIN 7 UNIT(S): 100 INJECTION, SUSPENSION SUBCUTANEOUS at 17:58

## 2021-01-01 RX ADMIN — HUMAN INSULIN 10 UNIT(S): 100 INJECTION, SUSPENSION SUBCUTANEOUS at 17:02

## 2021-01-01 RX ADMIN — Medication 1 DROP(S): at 14:00

## 2021-01-01 RX ADMIN — ALBUTEROL 2 PUFF(S): 90 AEROSOL, METERED ORAL at 21:41

## 2021-01-01 RX ADMIN — Medication 250 MILLIGRAM(S): at 05:18

## 2021-01-01 RX ADMIN — METHADONE HYDROCHLORIDE 10 MILLIGRAM(S): 40 TABLET ORAL at 21:03

## 2021-01-01 RX ADMIN — Medication 30 MILLILITER(S): at 11:24

## 2021-01-01 RX ADMIN — HUMAN INSULIN 2 UNIT(S): 100 INJECTION, SUSPENSION SUBCUTANEOUS at 21:50

## 2021-01-01 RX ADMIN — Medication 4: at 00:13

## 2021-01-01 RX ADMIN — ALBUTEROL 2 PUFF(S): 90 AEROSOL, METERED ORAL at 22:36

## 2021-01-01 RX ADMIN — HUMAN INSULIN 10 UNIT(S): 100 INJECTION, SUSPENSION SUBCUTANEOUS at 18:46

## 2021-01-01 RX ADMIN — Medication 1 DROP(S): at 06:44

## 2021-01-01 RX ADMIN — Medication 6 MILLIGRAM(S): at 05:00

## 2021-01-01 RX ADMIN — Medication 125 MILLILITER(S): at 20:02

## 2021-01-01 RX ADMIN — PIPERACILLIN AND TAZOBACTAM 25 GRAM(S): 4; .5 INJECTION, POWDER, LYOPHILIZED, FOR SOLUTION INTRAVENOUS at 16:13

## 2021-01-01 RX ADMIN — CHLORHEXIDINE GLUCONATE 1 APPLICATION(S): 213 SOLUTION TOPICAL at 05:01

## 2021-01-01 RX ADMIN — AMIODARONE HYDROCHLORIDE 600 MILLIGRAM(S): 400 TABLET ORAL at 01:45

## 2021-01-01 RX ADMIN — Medication 3.83 MICROGRAM(S)/KG/MIN: at 21:39

## 2021-01-01 RX ADMIN — METHADONE HYDROCHLORIDE 5 MILLIGRAM(S): 40 TABLET ORAL at 05:00

## 2021-01-01 RX ADMIN — ALBUTEROL 2 PUFF(S): 90 AEROSOL, METERED ORAL at 21:08

## 2021-01-01 RX ADMIN — FENTANYL CITRATE 50 MICROGRAM(S): 50 INJECTION INTRAVENOUS at 18:33

## 2021-01-01 RX ADMIN — REMDESIVIR 500 MILLIGRAM(S): 5 INJECTION INTRAVENOUS at 11:14

## 2021-01-01 RX ADMIN — Medication 250 MILLIGRAM(S): at 05:15

## 2021-01-01 RX ADMIN — HUMAN INSULIN 2 UNIT(S): 100 INJECTION, SUSPENSION SUBCUTANEOUS at 18:46

## 2021-01-01 RX ADMIN — MIDODRINE HYDROCHLORIDE 30 MILLIGRAM(S): 2.5 TABLET ORAL at 21:09

## 2021-01-01 RX ADMIN — HEPARIN SODIUM 8 UNIT(S)/HR: 5000 INJECTION INTRAVENOUS; SUBCUTANEOUS at 10:18

## 2021-01-01 RX ADMIN — MIDODRINE HYDROCHLORIDE 40 MILLIGRAM(S): 2.5 TABLET ORAL at 05:15

## 2021-01-01 RX ADMIN — Medication 2 MILLIGRAM(S): at 11:10

## 2021-01-01 RX ADMIN — FENTANYL CITRATE 25 MICROGRAM(S): 50 INJECTION INTRAVENOUS at 04:40

## 2021-01-01 RX ADMIN — Medication 2 MILLIGRAM(S): at 12:25

## 2021-01-01 RX ADMIN — Medication 40 MILLIEQUIVALENT(S): at 08:07

## 2021-01-01 RX ADMIN — Medication 2 MILLIGRAM(S): at 17:55

## 2021-01-01 RX ADMIN — Medication 1 DROP(S): at 21:54

## 2021-01-01 RX ADMIN — Medication 110 MILLIGRAM(S): at 19:29

## 2021-01-01 RX ADMIN — Medication 650 MILLIGRAM(S): at 14:33

## 2021-01-01 RX ADMIN — KETAMINE HYDROCHLORIDE 2.04 MG/KG/HR: 100 INJECTION INTRAMUSCULAR; INTRAVENOUS at 08:07

## 2021-01-01 RX ADMIN — HYDROMORPHONE HYDROCHLORIDE 0.5 MILLIGRAM(S): 2 INJECTION INTRAMUSCULAR; INTRAVENOUS; SUBCUTANEOUS at 08:30

## 2021-01-01 RX ADMIN — CISATRACURIUM BESYLATE 14.7 MICROGRAM(S)/KG/MIN: 2 INJECTION INTRAVENOUS at 22:00

## 2021-01-01 RX ADMIN — MIDODRINE HYDROCHLORIDE 40 MILLIGRAM(S): 2.5 TABLET ORAL at 05:07

## 2021-01-01 RX ADMIN — AMPICILLIN SODIUM AND SULBACTAM SODIUM 200 GRAM(S): 250; 125 INJECTION, POWDER, FOR SUSPENSION INTRAMUSCULAR; INTRAVENOUS at 06:42

## 2021-01-01 RX ADMIN — KETAMINE HYDROCHLORIDE 16.3 MG/KG/HR: 100 INJECTION INTRAMUSCULAR; INTRAVENOUS at 05:27

## 2021-01-01 RX ADMIN — Medication 2000 UNIT(S): at 13:03

## 2021-01-01 RX ADMIN — PIPERACILLIN AND TAZOBACTAM 25 GRAM(S): 4; .5 INJECTION, POWDER, LYOPHILIZED, FOR SOLUTION INTRAVENOUS at 21:28

## 2021-01-01 RX ADMIN — CISATRACURIUM BESYLATE 14.7 MICROGRAM(S)/KG/MIN: 2 INJECTION INTRAVENOUS at 11:25

## 2021-01-01 RX ADMIN — CISATRACURIUM BESYLATE 14.7 MICROGRAM(S)/KG/MIN: 2 INJECTION INTRAVENOUS at 02:09

## 2021-01-01 RX ADMIN — Medication 2 MILLIGRAM(S): at 06:40

## 2021-01-01 RX ADMIN — KETAMINE HYDROCHLORIDE 2.04 MG/KG/HR: 100 INJECTION INTRAMUSCULAR; INTRAVENOUS at 15:12

## 2021-01-01 RX ADMIN — Medication 50 MILLIEQUIVALENT(S): at 05:51

## 2021-01-01 RX ADMIN — BUMETANIDE 2 MILLIGRAM(S): 0.25 INJECTION INTRAMUSCULAR; INTRAVENOUS at 21:07

## 2021-01-01 RX ADMIN — Medication 10 MILLIGRAM(S): at 13:02

## 2021-01-01 RX ADMIN — BUDESONIDE AND FORMOTEROL FUMARATE DIHYDRATE 2 PUFF(S): 160; 4.5 AEROSOL RESPIRATORY (INHALATION) at 17:29

## 2021-01-01 RX ADMIN — Medication 1 DROP(S): at 05:24

## 2021-01-01 RX ADMIN — MIDODRINE HYDROCHLORIDE 40 MILLIGRAM(S): 2.5 TABLET ORAL at 05:25

## 2021-01-01 RX ADMIN — ENOXAPARIN SODIUM 80 MILLIGRAM(S): 100 INJECTION SUBCUTANEOUS at 05:31

## 2021-01-01 RX ADMIN — HUMAN INSULIN 2 UNIT(S): 100 INJECTION, SUSPENSION SUBCUTANEOUS at 05:48

## 2021-01-01 RX ADMIN — PANTOPRAZOLE SODIUM 40 MILLIGRAM(S): 20 TABLET, DELAYED RELEASE ORAL at 11:29

## 2021-01-01 RX ADMIN — Medication 20 MILLIGRAM(S): at 05:41

## 2021-01-01 RX ADMIN — HUMAN INSULIN 10 UNIT(S): 100 INJECTION, SUSPENSION SUBCUTANEOUS at 12:00

## 2021-01-01 RX ADMIN — HYDROMORPHONE HYDROCHLORIDE 0.5 MILLIGRAM(S): 2 INJECTION INTRAMUSCULAR; INTRAVENOUS; SUBCUTANEOUS at 10:24

## 2021-01-01 RX ADMIN — INSULIN GLARGINE 20 UNIT(S): 100 INJECTION, SOLUTION SUBCUTANEOUS at 21:56

## 2021-01-01 RX ADMIN — SENNA PLUS 10 MILLILITER(S): 8.6 TABLET ORAL at 21:40

## 2021-01-01 RX ADMIN — Medication 250 MILLIGRAM(S): at 17:18

## 2021-01-01 RX ADMIN — Medication 3 MILLILITER(S): at 11:11

## 2021-01-01 RX ADMIN — MIDODRINE HYDROCHLORIDE 10 MILLIGRAM(S): 2.5 TABLET ORAL at 16:32

## 2021-01-01 RX ADMIN — ENOXAPARIN SODIUM 80 MILLIGRAM(S): 100 INJECTION SUBCUTANEOUS at 17:17

## 2021-01-01 RX ADMIN — Medication 10 MILLIGRAM(S): at 22:41

## 2021-01-01 RX ADMIN — Medication 30 MILLILITER(S): at 12:00

## 2021-01-01 RX ADMIN — SODIUM CHLORIDE 50 MILLILITER(S): 9 INJECTION, SOLUTION INTRAVENOUS at 15:53

## 2021-01-01 RX ADMIN — PROPOFOL 2.45 MICROGRAM(S)/KG/MIN: 10 INJECTION, EMULSION INTRAVENOUS at 09:36

## 2021-01-01 RX ADMIN — Medication 20 MILLIGRAM(S): at 15:22

## 2021-01-01 RX ADMIN — CHLORHEXIDINE GLUCONATE 1 APPLICATION(S): 213 SOLUTION TOPICAL at 06:50

## 2021-01-01 RX ADMIN — Medication 0.25 MILLIGRAM(S): at 15:52

## 2021-01-01 RX ADMIN — Medication 2000 UNIT(S): at 11:52

## 2021-01-01 RX ADMIN — BUDESONIDE AND FORMOTEROL FUMARATE DIHYDRATE 2 PUFF(S): 160; 4.5 AEROSOL RESPIRATORY (INHALATION) at 21:29

## 2021-01-01 RX ADMIN — Medication 650 MILLIGRAM(S): at 16:19

## 2021-01-01 RX ADMIN — CISATRACURIUM BESYLATE 14.7 MICROGRAM(S)/KG/MIN: 2 INJECTION INTRAVENOUS at 11:07

## 2021-01-01 RX ADMIN — HYDROMORPHONE HYDROCHLORIDE 0.5 MILLIGRAM(S): 2 INJECTION INTRAMUSCULAR; INTRAVENOUS; SUBCUTANEOUS at 03:54

## 2021-01-01 RX ADMIN — Medication 6 MILLIGRAM(S): at 05:25

## 2021-01-01 RX ADMIN — METHADONE HYDROCHLORIDE 15 MILLIGRAM(S): 40 TABLET ORAL at 13:23

## 2021-01-01 RX ADMIN — QUETIAPINE FUMARATE 25 MILLIGRAM(S): 200 TABLET, FILM COATED ORAL at 21:14

## 2021-01-01 RX ADMIN — Medication 30 MILLILITER(S): at 11:04

## 2021-01-01 RX ADMIN — HUMAN INSULIN 10 UNIT(S): 100 INJECTION, SUSPENSION SUBCUTANEOUS at 06:10

## 2021-01-01 RX ADMIN — Medication 2: at 12:42

## 2021-01-01 RX ADMIN — MIDODRINE HYDROCHLORIDE 40 MILLIGRAM(S): 2.5 TABLET ORAL at 14:12

## 2021-01-01 RX ADMIN — Medication 2: at 05:27

## 2021-01-01 RX ADMIN — METHADONE HYDROCHLORIDE 10 MILLIGRAM(S): 40 TABLET ORAL at 05:51

## 2021-01-01 RX ADMIN — KETAMINE HYDROCHLORIDE 16.3 MG/KG/HR: 100 INJECTION INTRAMUSCULAR; INTRAVENOUS at 00:58

## 2021-01-01 RX ADMIN — POLYETHYLENE GLYCOL 3350 17 GRAM(S): 17 POWDER, FOR SOLUTION ORAL at 11:41

## 2021-01-01 RX ADMIN — Medication 20 MILLIGRAM(S): at 16:31

## 2021-01-01 RX ADMIN — MIDODRINE HYDROCHLORIDE 40 MILLIGRAM(S): 2.5 TABLET ORAL at 22:40

## 2021-01-01 RX ADMIN — METHADONE HYDROCHLORIDE 10 MILLIGRAM(S): 40 TABLET ORAL at 21:54

## 2021-01-01 RX ADMIN — HUMAN INSULIN 10 UNIT(S): 100 INJECTION, SUSPENSION SUBCUTANEOUS at 12:27

## 2021-01-01 RX ADMIN — BUDESONIDE AND FORMOTEROL FUMARATE DIHYDRATE 2 PUFF(S): 160; 4.5 AEROSOL RESPIRATORY (INHALATION) at 18:35

## 2021-01-01 RX ADMIN — BUDESONIDE AND FORMOTEROL FUMARATE DIHYDRATE 2 PUFF(S): 160; 4.5 AEROSOL RESPIRATORY (INHALATION) at 06:30

## 2021-01-01 RX ADMIN — CHLORHEXIDINE GLUCONATE 1 APPLICATION(S): 213 SOLUTION TOPICAL at 05:36

## 2021-01-01 RX ADMIN — CHLORHEXIDINE GLUCONATE 15 MILLILITER(S): 213 SOLUTION TOPICAL at 17:44

## 2021-01-01 RX ADMIN — ALBUTEROL 2 PUFF(S): 90 AEROSOL, METERED ORAL at 22:19

## 2021-01-01 RX ADMIN — Medication 400 MILLIGRAM(S): at 07:08

## 2021-01-01 RX ADMIN — Medication 1 APPLICATION(S): at 22:02

## 2021-01-01 RX ADMIN — MIDAZOLAM HYDROCHLORIDE 1.63 MG/KG/HR: 1 INJECTION, SOLUTION INTRAMUSCULAR; INTRAVENOUS at 05:10

## 2021-01-01 RX ADMIN — BUDESONIDE AND FORMOTEROL FUMARATE DIHYDRATE 2 PUFF(S): 160; 4.5 AEROSOL RESPIRATORY (INHALATION) at 18:01

## 2021-01-01 RX ADMIN — Medication 650 MILLIGRAM(S): at 01:00

## 2021-01-01 RX ADMIN — Medication 2: at 18:10

## 2021-01-01 RX ADMIN — Medication 250 MILLIGRAM(S): at 17:17

## 2021-01-01 RX ADMIN — Medication 40 MILLIGRAM(S): at 06:23

## 2021-01-01 RX ADMIN — BUMETANIDE 2 MILLIGRAM(S): 0.25 INJECTION INTRAMUSCULAR; INTRAVENOUS at 02:30

## 2021-01-01 RX ADMIN — FENTANYL CITRATE 8.16 MICROGRAM(S)/KG/HR: 50 INJECTION INTRAVENOUS at 08:19

## 2021-01-01 RX ADMIN — QUETIAPINE FUMARATE 25 MILLIGRAM(S): 200 TABLET, FILM COATED ORAL at 20:59

## 2021-01-01 RX ADMIN — Medication 2: at 05:41

## 2021-01-01 RX ADMIN — Medication 1 DROP(S): at 05:26

## 2021-01-01 RX ADMIN — HUMAN INSULIN 10 UNIT(S): 100 INJECTION, SUSPENSION SUBCUTANEOUS at 06:03

## 2021-01-01 RX ADMIN — KETAMINE HYDROCHLORIDE 16.3 MG/KG/HR: 100 INJECTION INTRAMUSCULAR; INTRAVENOUS at 08:00

## 2021-01-01 RX ADMIN — CHLORHEXIDINE GLUCONATE 15 MILLILITER(S): 213 SOLUTION TOPICAL at 05:35

## 2021-01-01 RX ADMIN — Medication 250 MILLIGRAM(S): at 17:07

## 2021-01-01 RX ADMIN — Medication 1: at 11:42

## 2021-01-01 RX ADMIN — Medication 250 MILLIGRAM(S): at 17:39

## 2021-01-01 RX ADMIN — Medication 2000 UNIT(S): at 11:08

## 2021-01-01 RX ADMIN — BUMETANIDE 1 MILLIGRAM(S): 0.25 INJECTION INTRAMUSCULAR; INTRAVENOUS at 05:30

## 2021-01-01 RX ADMIN — ALBUTEROL 2 PUFF(S): 90 AEROSOL, METERED ORAL at 23:58

## 2021-01-01 RX ADMIN — URSODIOL 500 MILLIGRAM(S): 250 TABLET, FILM COATED ORAL at 18:12

## 2021-01-01 RX ADMIN — ENOXAPARIN SODIUM 80 MILLIGRAM(S): 100 INJECTION SUBCUTANEOUS at 17:06

## 2021-01-01 RX ADMIN — Medication 2: at 05:07

## 2021-01-01 RX ADMIN — Medication 1 DROP(S): at 14:23

## 2021-01-01 RX ADMIN — Medication 4: at 01:12

## 2021-01-01 RX ADMIN — BUDESONIDE AND FORMOTEROL FUMARATE DIHYDRATE 2 PUFF(S): 160; 4.5 AEROSOL RESPIRATORY (INHALATION) at 17:31

## 2021-01-01 RX ADMIN — POLYETHYLENE GLYCOL 3350 17 GRAM(S): 17 POWDER, FOR SOLUTION ORAL at 12:23

## 2021-01-01 RX ADMIN — METHADONE HYDROCHLORIDE 5 MILLIGRAM(S): 40 TABLET ORAL at 06:25

## 2021-01-01 RX ADMIN — PANTOPRAZOLE SODIUM 40 MILLIGRAM(S): 20 TABLET, DELAYED RELEASE ORAL at 11:43

## 2021-01-01 RX ADMIN — ALBUTEROL 2 PUFF(S): 90 AEROSOL, METERED ORAL at 13:53

## 2021-01-01 RX ADMIN — Medication 1000 MILLIGRAM(S): at 02:00

## 2021-01-01 RX ADMIN — CHLORHEXIDINE GLUCONATE 15 MILLILITER(S): 213 SOLUTION TOPICAL at 18:14

## 2021-01-01 RX ADMIN — Medication 1 DROP(S): at 05:39

## 2021-01-01 RX ADMIN — POLYETHYLENE GLYCOL 3350 17 GRAM(S): 17 POWDER, FOR SOLUTION ORAL at 11:52

## 2021-01-01 RX ADMIN — Medication 30 MILLILITER(S): at 11:25

## 2021-01-01 RX ADMIN — Medication 2 MILLIGRAM(S): at 11:56

## 2021-01-01 RX ADMIN — MIDODRINE HYDROCHLORIDE 40 MILLIGRAM(S): 2.5 TABLET ORAL at 13:27

## 2021-01-01 RX ADMIN — CHLORHEXIDINE GLUCONATE 1 APPLICATION(S): 213 SOLUTION TOPICAL at 21:46

## 2021-01-01 RX ADMIN — Medication 4: at 05:20

## 2021-01-01 RX ADMIN — HUMAN INSULIN 8 UNIT(S): 100 INJECTION, SUSPENSION SUBCUTANEOUS at 17:13

## 2021-01-01 RX ADMIN — Medication 4: at 23:46

## 2021-01-01 RX ADMIN — HUMAN INSULIN 10 UNIT(S): 100 INJECTION, SUSPENSION SUBCUTANEOUS at 11:08

## 2021-01-01 RX ADMIN — HYDROMORPHONE HYDROCHLORIDE 0.5 MILLIGRAM(S): 2 INJECTION INTRAMUSCULAR; INTRAVENOUS; SUBCUTANEOUS at 14:30

## 2021-01-01 RX ADMIN — DEXMEDETOMIDINE HYDROCHLORIDE IN 0.9% SODIUM CHLORIDE 4.08 MICROGRAM(S)/KG/HR: 4 INJECTION INTRAVENOUS at 22:15

## 2021-01-01 RX ADMIN — Medication 102 MILLIGRAM(S): at 05:47

## 2021-01-01 RX ADMIN — METHADONE HYDROCHLORIDE 10 MILLIGRAM(S): 40 TABLET ORAL at 21:38

## 2021-01-01 RX ADMIN — Medication 1 DROP(S): at 13:04

## 2021-01-01 RX ADMIN — Medication 2 MILLIGRAM(S): at 18:01

## 2021-01-01 RX ADMIN — URSODIOL 500 MILLIGRAM(S): 250 TABLET, FILM COATED ORAL at 05:16

## 2021-01-01 RX ADMIN — Medication 40 MILLIEQUIVALENT(S): at 14:26

## 2021-01-01 RX ADMIN — Medication 250 MILLIGRAM(S): at 05:22

## 2021-01-01 RX ADMIN — KETAMINE HYDROCHLORIDE 2.04 MG/KG/HR: 100 INJECTION INTRAMUSCULAR; INTRAVENOUS at 02:09

## 2021-01-01 RX ADMIN — ALBUTEROL 2 PUFF(S): 90 AEROSOL, METERED ORAL at 13:32

## 2021-01-01 RX ADMIN — MONTELUKAST 10 MILLIGRAM(S): 4 TABLET, CHEWABLE ORAL at 11:44

## 2021-01-01 RX ADMIN — Medication 650 MILLIGRAM(S): at 13:30

## 2021-01-01 RX ADMIN — DEXMEDETOMIDINE HYDROCHLORIDE IN 0.9% SODIUM CHLORIDE 4.08 MICROGRAM(S)/KG/HR: 4 INJECTION INTRAVENOUS at 12:43

## 2021-01-01 RX ADMIN — SENNA PLUS 20 MILLILITER(S): 8.6 TABLET ORAL at 21:11

## 2021-01-01 RX ADMIN — BUDESONIDE AND FORMOTEROL FUMARATE DIHYDRATE 2 PUFF(S): 160; 4.5 AEROSOL RESPIRATORY (INHALATION) at 05:54

## 2021-01-01 RX ADMIN — CHLORHEXIDINE GLUCONATE 1 APPLICATION(S): 213 SOLUTION TOPICAL at 06:00

## 2021-01-01 RX ADMIN — Medication 6 MILLIGRAM(S): at 06:00

## 2021-01-01 RX ADMIN — CHLORHEXIDINE GLUCONATE 1 APPLICATION(S): 213 SOLUTION TOPICAL at 17:57

## 2021-01-01 RX ADMIN — FENTANYL CITRATE 8.16 MICROGRAM(S)/KG/HR: 50 INJECTION INTRAVENOUS at 21:59

## 2021-01-01 RX ADMIN — ALBUTEROL 2 PUFF(S): 90 AEROSOL, METERED ORAL at 00:20

## 2021-01-01 RX ADMIN — CISATRACURIUM BESYLATE 9.79 MICROGRAM(S)/KG/MIN: 2 INJECTION INTRAVENOUS at 07:50

## 2021-01-01 RX ADMIN — Medication 1 DROP(S): at 21:06

## 2021-01-01 RX ADMIN — Medication 2: at 00:21

## 2021-01-01 RX ADMIN — BUDESONIDE AND FORMOTEROL FUMARATE DIHYDRATE 2 PUFF(S): 160; 4.5 AEROSOL RESPIRATORY (INHALATION) at 09:02

## 2021-01-01 RX ADMIN — KETAMINE HYDROCHLORIDE 2.04 MG/KG/HR: 100 INJECTION INTRAMUSCULAR; INTRAVENOUS at 01:17

## 2021-01-01 RX ADMIN — PHENYLEPHRINE HYDROCHLORIDE 12.2 MICROGRAM(S)/KG/MIN: 10 INJECTION INTRAVENOUS at 16:55

## 2021-01-01 RX ADMIN — Medication 30 MILLILITER(S): at 12:11

## 2021-01-01 RX ADMIN — Medication 4: at 17:24

## 2021-01-01 RX ADMIN — HUMAN INSULIN 10 UNIT(S): 100 INJECTION, SUSPENSION SUBCUTANEOUS at 00:47

## 2021-01-01 RX ADMIN — DEXMEDETOMIDINE HYDROCHLORIDE IN 0.9% SODIUM CHLORIDE 4.08 MICROGRAM(S)/KG/HR: 4 INJECTION INTRAVENOUS at 16:00

## 2021-01-01 RX ADMIN — METHADONE HYDROCHLORIDE 10 MILLIGRAM(S): 40 TABLET ORAL at 22:25

## 2021-01-01 RX ADMIN — BUMETANIDE 1 MILLIGRAM(S): 0.25 INJECTION INTRAMUSCULAR; INTRAVENOUS at 17:16

## 2021-01-01 RX ADMIN — BUDESONIDE AND FORMOTEROL FUMARATE DIHYDRATE 2 PUFF(S): 160; 4.5 AEROSOL RESPIRATORY (INHALATION) at 05:59

## 2021-01-01 RX ADMIN — Medication 2 MILLIGRAM(S): at 05:08

## 2021-01-01 RX ADMIN — MIDODRINE HYDROCHLORIDE 30 MILLIGRAM(S): 2.5 TABLET ORAL at 05:30

## 2021-01-01 RX ADMIN — Medication 30 MILLILITER(S): at 11:07

## 2021-01-01 RX ADMIN — Medication 2000 UNIT(S): at 11:33

## 2021-01-01 RX ADMIN — HALOPERIDOL DECANOATE 1 MILLIGRAM(S): 100 INJECTION INTRAMUSCULAR at 09:37

## 2021-01-01 RX ADMIN — MIDAZOLAM HYDROCHLORIDE 1.63 MG/KG/HR: 1 INJECTION, SOLUTION INTRAMUSCULAR; INTRAVENOUS at 20:58

## 2021-01-01 RX ADMIN — AMPICILLIN SODIUM AND SULBACTAM SODIUM 200 GRAM(S): 250; 125 INJECTION, POWDER, FOR SUSPENSION INTRAMUSCULAR; INTRAVENOUS at 18:12

## 2021-01-01 RX ADMIN — DEXMEDETOMIDINE HYDROCHLORIDE IN 0.9% SODIUM CHLORIDE 10.2 MICROGRAM(S)/KG/HR: 4 INJECTION INTRAVENOUS at 19:27

## 2021-01-01 RX ADMIN — FINASTERIDE 5 MILLIGRAM(S): 5 TABLET, FILM COATED ORAL at 11:34

## 2021-01-01 RX ADMIN — Medication 6: at 00:28

## 2021-01-01 RX ADMIN — KETAMINE HYDROCHLORIDE 16.3 MG/KG/HR: 100 INJECTION INTRAMUSCULAR; INTRAVENOUS at 05:04

## 2021-01-01 RX ADMIN — Medication 20 MILLIEQUIVALENT(S): at 17:45

## 2021-01-01 RX ADMIN — FENTANYL CITRATE 50 MICROGRAM(S): 50 INJECTION INTRAVENOUS at 16:52

## 2021-01-01 RX ADMIN — METHADONE HYDROCHLORIDE 5 MILLIGRAM(S): 40 TABLET ORAL at 05:17

## 2021-01-01 RX ADMIN — Medication 200 GRAM(S): at 02:11

## 2021-01-01 RX ADMIN — HYDROMORPHONE HYDROCHLORIDE 1 MILLIGRAM(S): 2 INJECTION INTRAMUSCULAR; INTRAVENOUS; SUBCUTANEOUS at 15:00

## 2021-01-01 RX ADMIN — METHADONE HYDROCHLORIDE 20 MILLIGRAM(S): 40 TABLET ORAL at 05:37

## 2021-01-01 RX ADMIN — Medication 4: at 17:49

## 2021-01-01 RX ADMIN — HYDROMORPHONE HYDROCHLORIDE 1 MILLIGRAM(S): 2 INJECTION INTRAMUSCULAR; INTRAVENOUS; SUBCUTANEOUS at 11:54

## 2021-01-01 RX ADMIN — QUETIAPINE FUMARATE 25 MILLIGRAM(S): 200 TABLET, FILM COATED ORAL at 12:30

## 2021-01-01 RX ADMIN — FENTANYL CITRATE 25 MICROGRAM(S): 50 INJECTION INTRAVENOUS at 22:50

## 2021-01-01 RX ADMIN — DEXMEDETOMIDINE HYDROCHLORIDE IN 0.9% SODIUM CHLORIDE 10.2 MICROGRAM(S)/KG/HR: 4 INJECTION INTRAVENOUS at 08:00

## 2021-01-01 RX ADMIN — NALOXEGOL OXALATE 25 MILLIGRAM(S): 12.5 TABLET, FILM COATED ORAL at 12:30

## 2021-01-01 RX ADMIN — URSODIOL 500 MILLIGRAM(S): 250 TABLET, FILM COATED ORAL at 18:25

## 2021-01-01 RX ADMIN — Medication 2 MILLIGRAM(S): at 23:45

## 2021-01-01 RX ADMIN — ALBUTEROL 2 PUFF(S): 90 AEROSOL, METERED ORAL at 05:35

## 2021-01-01 RX ADMIN — SODIUM CHLORIDE 1000 MILLILITER(S): 9 INJECTION INTRAMUSCULAR; INTRAVENOUS; SUBCUTANEOUS at 07:23

## 2021-01-01 RX ADMIN — Medication 10 MILLIGRAM(S): at 21:00

## 2021-01-01 RX ADMIN — FENTANYL CITRATE 100 MICROGRAM(S): 50 INJECTION INTRAVENOUS at 08:53

## 2021-01-01 RX ADMIN — HYDROMORPHONE HYDROCHLORIDE 0.5 MILLIGRAM(S): 2 INJECTION INTRAMUSCULAR; INTRAVENOUS; SUBCUTANEOUS at 18:24

## 2021-01-01 RX ADMIN — Medication 2: at 05:51

## 2021-01-01 RX ADMIN — Medication 2000 UNIT(S): at 11:09

## 2021-01-01 RX ADMIN — BUDESONIDE AND FORMOTEROL FUMARATE DIHYDRATE 2 PUFF(S): 160; 4.5 AEROSOL RESPIRATORY (INHALATION) at 05:28

## 2021-01-01 RX ADMIN — FENTANYL CITRATE 50 MICROGRAM(S): 50 INJECTION INTRAVENOUS at 18:46

## 2021-01-01 RX ADMIN — Medication 1 DROP(S): at 22:41

## 2021-01-01 RX ADMIN — HUMAN INSULIN 10 UNIT(S): 100 INJECTION, SUSPENSION SUBCUTANEOUS at 18:14

## 2021-01-01 RX ADMIN — MIDAZOLAM HYDROCHLORIDE 1.63 MG/KG/HR: 1 INJECTION, SOLUTION INTRAMUSCULAR; INTRAVENOUS at 10:08

## 2021-01-01 RX ADMIN — Medication 2: at 18:02

## 2021-01-01 RX ADMIN — ENOXAPARIN SODIUM 80 MILLIGRAM(S): 100 INJECTION SUBCUTANEOUS at 17:41

## 2021-01-01 RX ADMIN — Medication 4: at 06:49

## 2021-01-01 RX ADMIN — Medication 650 MILLIGRAM(S): at 21:15

## 2021-01-01 RX ADMIN — Medication 1 DROP(S): at 22:04

## 2021-01-01 RX ADMIN — Medication 10 MILLIGRAM(S): at 14:27

## 2021-01-01 RX ADMIN — CHLORHEXIDINE GLUCONATE 1 APPLICATION(S): 213 SOLUTION TOPICAL at 04:23

## 2021-01-01 RX ADMIN — HUMAN INSULIN 10 UNIT(S): 100 INJECTION, SUSPENSION SUBCUTANEOUS at 05:35

## 2021-01-01 RX ADMIN — Medication 2: at 23:09

## 2021-01-01 RX ADMIN — HYDROMORPHONE HYDROCHLORIDE 0.5 MILLIGRAM(S): 2 INJECTION INTRAMUSCULAR; INTRAVENOUS; SUBCUTANEOUS at 22:22

## 2021-01-01 RX ADMIN — FENTANYL CITRATE 2.04 MICROGRAM(S)/KG/HR: 50 INJECTION INTRAVENOUS at 02:09

## 2021-01-01 RX ADMIN — HUMAN INSULIN 7 UNIT(S): 100 INJECTION, SUSPENSION SUBCUTANEOUS at 05:36

## 2021-01-01 RX ADMIN — ALBUTEROL 2 PUFF(S): 90 AEROSOL, METERED ORAL at 21:12

## 2021-01-01 RX ADMIN — MIDAZOLAM HYDROCHLORIDE 2 MILLIGRAM(S): 1 INJECTION, SOLUTION INTRAMUSCULAR; INTRAVENOUS at 07:39

## 2021-01-01 RX ADMIN — POLYETHYLENE GLYCOL 3350 17 GRAM(S): 17 POWDER, FOR SOLUTION ORAL at 11:21

## 2021-01-01 RX ADMIN — Medication 6 MILLIGRAM(S): at 05:30

## 2021-01-01 RX ADMIN — BUDESONIDE AND FORMOTEROL FUMARATE DIHYDRATE 2 PUFF(S): 160; 4.5 AEROSOL RESPIRATORY (INHALATION) at 06:02

## 2021-01-01 RX ADMIN — ALBUTEROL 2 PUFF(S): 90 AEROSOL, METERED ORAL at 15:11

## 2021-01-01 RX ADMIN — Medication 1: at 17:08

## 2021-01-01 RX ADMIN — Medication 1 MILLIGRAM(S): at 18:13

## 2021-01-01 RX ADMIN — ALBUTEROL 2 PUFF(S): 90 AEROSOL, METERED ORAL at 05:32

## 2021-01-01 RX ADMIN — CHLORHEXIDINE GLUCONATE 1 APPLICATION(S): 213 SOLUTION TOPICAL at 06:51

## 2021-01-01 RX ADMIN — URSODIOL 500 MILLIGRAM(S): 250 TABLET, FILM COATED ORAL at 18:09

## 2021-01-01 RX ADMIN — VASOPRESSIN 2.4 UNIT(S)/MIN: 20 INJECTION INTRAVENOUS at 19:00

## 2021-01-01 RX ADMIN — HUMAN INSULIN 5 UNIT(S): 100 INJECTION, SUSPENSION SUBCUTANEOUS at 05:35

## 2021-01-01 RX ADMIN — SENNA PLUS 20 MILLILITER(S): 8.6 TABLET ORAL at 21:17

## 2021-01-01 RX ADMIN — HYDROMORPHONE HYDROCHLORIDE 1 MILLIGRAM(S): 2 INJECTION INTRAMUSCULAR; INTRAVENOUS; SUBCUTANEOUS at 15:20

## 2021-01-01 RX ADMIN — Medication 30 MILLILITER(S): at 11:44

## 2021-01-01 RX ADMIN — Medication 1 APPLICATION(S): at 21:17

## 2021-01-01 RX ADMIN — Medication 2 MILLIGRAM(S): at 05:27

## 2021-01-01 RX ADMIN — Medication 2 MILLIGRAM(S): at 17:21

## 2021-01-01 RX ADMIN — CHLORHEXIDINE GLUCONATE 1 APPLICATION(S): 213 SOLUTION TOPICAL at 05:39

## 2021-01-01 RX ADMIN — HUMAN INSULIN 7 UNIT(S): 100 INJECTION, SUSPENSION SUBCUTANEOUS at 17:08

## 2021-01-01 RX ADMIN — Medication 20 MILLIGRAM(S): at 21:40

## 2021-01-01 RX ADMIN — Medication 2: at 17:24

## 2021-01-01 RX ADMIN — KETAMINE HYDROCHLORIDE 16.3 MG/KG/HR: 100 INJECTION INTRAMUSCULAR; INTRAVENOUS at 08:20

## 2021-01-01 RX ADMIN — ALBUTEROL 2 PUFF(S): 90 AEROSOL, METERED ORAL at 15:13

## 2021-01-01 RX ADMIN — POTASSIUM PHOSPHATE, MONOBASIC POTASSIUM PHOSPHATE, DIBASIC 62.5 MILLIMOLE(S): 236; 224 INJECTION, SOLUTION INTRAVENOUS at 02:48

## 2021-01-01 RX ADMIN — Medication 250 MILLIGRAM(S): at 17:16

## 2021-01-01 RX ADMIN — NALOXEGOL OXALATE 25 MILLIGRAM(S): 12.5 TABLET, FILM COATED ORAL at 11:46

## 2021-01-01 RX ADMIN — Medication 6: at 11:57

## 2021-01-01 RX ADMIN — DEXMEDETOMIDINE HYDROCHLORIDE IN 0.9% SODIUM CHLORIDE 4.08 MICROGRAM(S)/KG/HR: 4 INJECTION INTRAVENOUS at 05:02

## 2021-01-01 RX ADMIN — HUMAN INSULIN 10 UNIT(S): 100 INJECTION, SUSPENSION SUBCUTANEOUS at 06:37

## 2021-01-01 RX ADMIN — ALBUTEROL 2 PUFF(S): 90 AEROSOL, METERED ORAL at 06:33

## 2021-01-01 RX ADMIN — SENNA PLUS 20 MILLILITER(S): 8.6 TABLET ORAL at 23:16

## 2021-01-01 RX ADMIN — MIDODRINE HYDROCHLORIDE 40 MILLIGRAM(S): 2.5 TABLET ORAL at 06:14

## 2021-01-01 RX ADMIN — HYDROMORPHONE HYDROCHLORIDE 1 MILLIGRAM(S): 2 INJECTION INTRAMUSCULAR; INTRAVENOUS; SUBCUTANEOUS at 09:22

## 2021-01-01 RX ADMIN — HUMAN INSULIN 10 UNIT(S): 100 INJECTION, SUSPENSION SUBCUTANEOUS at 06:54

## 2021-01-01 RX ADMIN — URSODIOL 500 MILLIGRAM(S): 250 TABLET, FILM COATED ORAL at 17:47

## 2021-01-01 RX ADMIN — METHADONE HYDROCHLORIDE 10 MILLIGRAM(S): 40 TABLET ORAL at 05:30

## 2021-01-01 RX ADMIN — PHENYLEPHRINE HYDROCHLORIDE 7.65 MICROGRAM(S)/KG/MIN: 10 INJECTION INTRAVENOUS at 07:50

## 2021-01-01 RX ADMIN — Medication 2 MILLIGRAM(S): at 17:06

## 2021-01-01 RX ADMIN — Medication 1 MILLIGRAM(S): at 18:15

## 2021-01-01 RX ADMIN — Medication 4: at 12:25

## 2021-01-01 RX ADMIN — HUMAN INSULIN 2 UNIT(S): 100 INJECTION, SUSPENSION SUBCUTANEOUS at 11:14

## 2021-01-01 RX ADMIN — CHLORHEXIDINE GLUCONATE 15 MILLILITER(S): 213 SOLUTION TOPICAL at 05:19

## 2021-01-01 RX ADMIN — Medication 3: at 17:13

## 2021-01-01 RX ADMIN — ALBUTEROL 2 PUFF(S): 90 AEROSOL, METERED ORAL at 05:28

## 2021-01-01 RX ADMIN — BUMETANIDE 2 MILLIGRAM(S): 0.25 INJECTION INTRAMUSCULAR; INTRAVENOUS at 02:02

## 2021-01-01 RX ADMIN — BUDESONIDE AND FORMOTEROL FUMARATE DIHYDRATE 2 PUFF(S): 160; 4.5 AEROSOL RESPIRATORY (INHALATION) at 18:05

## 2021-01-01 RX ADMIN — Medication 1 APPLICATION(S): at 05:41

## 2021-01-01 RX ADMIN — Medication 2 MILLIGRAM(S): at 17:52

## 2021-01-01 RX ADMIN — FENTANYL CITRATE 50 MICROGRAM(S): 50 INJECTION INTRAVENOUS at 22:56

## 2021-01-01 RX ADMIN — ALBUTEROL 2 PUFF(S): 90 AEROSOL, METERED ORAL at 21:57

## 2021-01-01 RX ADMIN — BUDESONIDE AND FORMOTEROL FUMARATE DIHYDRATE 2 PUFF(S): 160; 4.5 AEROSOL RESPIRATORY (INHALATION) at 05:13

## 2021-01-01 RX ADMIN — HEPARIN SODIUM 6 UNIT(S)/HR: 5000 INJECTION INTRAVENOUS; SUBCUTANEOUS at 00:30

## 2021-01-01 RX ADMIN — HEPARIN SODIUM 9.5 UNIT(S)/HR: 5000 INJECTION INTRAVENOUS; SUBCUTANEOUS at 12:50

## 2021-01-01 RX ADMIN — Medication 8: at 01:18

## 2021-01-01 RX ADMIN — HUMAN INSULIN 10 UNIT(S): 100 INJECTION, SUSPENSION SUBCUTANEOUS at 18:19

## 2021-01-01 RX ADMIN — METHADONE HYDROCHLORIDE 20 MILLIGRAM(S): 40 TABLET ORAL at 14:56

## 2021-01-01 RX ADMIN — MIDODRINE HYDROCHLORIDE 40 MILLIGRAM(S): 2.5 TABLET ORAL at 14:06

## 2021-01-01 RX ADMIN — AMPICILLIN SODIUM AND SULBACTAM SODIUM 200 GRAM(S): 250; 125 INJECTION, POWDER, FOR SUSPENSION INTRAMUSCULAR; INTRAVENOUS at 06:53

## 2021-01-01 RX ADMIN — ALBUTEROL 2 PUFF(S): 90 AEROSOL, METERED ORAL at 11:30

## 2021-01-01 RX ADMIN — HUMAN INSULIN 10 UNIT(S): 100 INJECTION, SUSPENSION SUBCUTANEOUS at 23:24

## 2021-01-01 RX ADMIN — Medication 2: at 05:22

## 2021-01-01 RX ADMIN — HUMAN INSULIN 7 UNIT(S): 100 INJECTION, SUSPENSION SUBCUTANEOUS at 12:06

## 2021-01-01 RX ADMIN — Medication 2 MILLIGRAM(S): at 05:07

## 2021-01-01 RX ADMIN — Medication 1: at 08:19

## 2021-01-01 RX ADMIN — METHADONE HYDROCHLORIDE 5 MILLIGRAM(S): 40 TABLET ORAL at 18:18

## 2021-01-01 RX ADMIN — HUMAN INSULIN 10 UNIT(S): 100 INJECTION, SUSPENSION SUBCUTANEOUS at 12:31

## 2021-01-01 RX ADMIN — Medication 10 MILLIGRAM(S): at 05:30

## 2021-01-01 RX ADMIN — KETAMINE HYDROCHLORIDE 16.3 MG/KG/HR: 100 INJECTION INTRAMUSCULAR; INTRAVENOUS at 14:28

## 2021-01-01 RX ADMIN — METHADONE HYDROCHLORIDE 10 MILLIGRAM(S): 40 TABLET ORAL at 22:23

## 2021-01-01 RX ADMIN — Medication 2 MILLIGRAM(S): at 23:58

## 2021-01-01 RX ADMIN — VASOPRESSIN 2.4 UNIT(S)/MIN: 20 INJECTION INTRAVENOUS at 08:06

## 2021-01-01 RX ADMIN — Medication 4: at 00:18

## 2021-01-01 RX ADMIN — Medication 30 MILLILITER(S): at 13:02

## 2021-01-01 RX ADMIN — FENTANYL CITRATE 50 MICROGRAM(S): 50 INJECTION INTRAVENOUS at 01:05

## 2021-01-01 RX ADMIN — Medication 1 DROP(S): at 21:01

## 2021-01-01 RX ADMIN — HUMAN INSULIN 10 UNIT(S): 100 INJECTION, SUSPENSION SUBCUTANEOUS at 17:04

## 2021-01-01 RX ADMIN — HUMAN INSULIN 10 UNIT(S): 100 INJECTION, SUSPENSION SUBCUTANEOUS at 12:08

## 2021-01-01 RX ADMIN — SENNA PLUS 10 MILLILITER(S): 8.6 TABLET ORAL at 22:15

## 2021-01-01 RX ADMIN — METHADONE HYDROCHLORIDE 20 MILLIGRAM(S): 40 TABLET ORAL at 05:23

## 2021-01-01 RX ADMIN — Medication 650 MILLIGRAM(S): at 16:44

## 2021-01-01 RX ADMIN — Medication 1 APPLICATION(S): at 05:52

## 2021-01-01 RX ADMIN — PROPOFOL 2.45 MICROGRAM(S)/KG/MIN: 10 INJECTION, EMULSION INTRAVENOUS at 13:30

## 2021-01-01 RX ADMIN — HUMAN INSULIN 10 UNIT(S): 100 INJECTION, SUSPENSION SUBCUTANEOUS at 17:32

## 2021-01-01 RX ADMIN — HYDROMORPHONE HYDROCHLORIDE 1 MILLIGRAM(S): 2 INJECTION INTRAMUSCULAR; INTRAVENOUS; SUBCUTANEOUS at 14:57

## 2021-01-01 RX ADMIN — METHADONE HYDROCHLORIDE 5 MILLIGRAM(S): 40 TABLET ORAL at 13:22

## 2021-01-01 RX ADMIN — Medication 2: at 05:42

## 2021-01-01 RX ADMIN — HYDROMORPHONE HYDROCHLORIDE 1 MILLIGRAM(S): 2 INJECTION INTRAMUSCULAR; INTRAVENOUS; SUBCUTANEOUS at 14:27

## 2021-01-01 RX ADMIN — BUDESONIDE AND FORMOTEROL FUMARATE DIHYDRATE 2 PUFF(S): 160; 4.5 AEROSOL RESPIRATORY (INHALATION) at 17:37

## 2021-01-01 RX ADMIN — FINASTERIDE 5 MILLIGRAM(S): 5 TABLET, FILM COATED ORAL at 11:15

## 2021-01-01 RX ADMIN — POLYETHYLENE GLYCOL 3350 17 GRAM(S): 17 POWDER, FOR SOLUTION ORAL at 11:47

## 2021-01-01 RX ADMIN — Medication 1 APPLICATION(S): at 15:07

## 2021-01-01 RX ADMIN — HUMAN INSULIN 10 UNIT(S): 100 INJECTION, SUSPENSION SUBCUTANEOUS at 17:01

## 2021-01-01 RX ADMIN — QUETIAPINE FUMARATE 25 MILLIGRAM(S): 200 TABLET, FILM COATED ORAL at 06:47

## 2021-01-01 RX ADMIN — Medication 1: at 01:19

## 2021-01-01 RX ADMIN — Medication 2: at 01:11

## 2021-01-01 RX ADMIN — HUMAN INSULIN 2 UNIT(S): 100 INJECTION, SUSPENSION SUBCUTANEOUS at 17:55

## 2021-01-01 RX ADMIN — AMPICILLIN SODIUM AND SULBACTAM SODIUM 200 GRAM(S): 250; 125 INJECTION, POWDER, FOR SUSPENSION INTRAMUSCULAR; INTRAVENOUS at 17:33

## 2021-01-01 RX ADMIN — ALBUTEROL 2 PUFF(S): 90 AEROSOL, METERED ORAL at 14:59

## 2021-01-01 RX ADMIN — Medication 2000 UNIT(S): at 12:10

## 2021-01-01 RX ADMIN — ALBUTEROL 2 PUFF(S): 90 AEROSOL, METERED ORAL at 00:26

## 2021-01-01 RX ADMIN — FENTANYL CITRATE 100 MICROGRAM(S): 50 INJECTION INTRAVENOUS at 09:00

## 2021-01-01 RX ADMIN — Medication 650 MILLIGRAM(S): at 01:33

## 2021-01-01 RX ADMIN — ALBUTEROL 2 PUFF(S): 90 AEROSOL, METERED ORAL at 05:58

## 2021-01-01 RX ADMIN — Medication 2 MILLIGRAM(S): at 17:58

## 2021-01-01 RX ADMIN — ENOXAPARIN SODIUM 40 MILLIGRAM(S): 100 INJECTION SUBCUTANEOUS at 11:34

## 2021-01-01 RX ADMIN — CHLORHEXIDINE GLUCONATE 15 MILLILITER(S): 213 SOLUTION TOPICAL at 04:50

## 2021-01-01 RX ADMIN — Medication 2 MILLIGRAM(S): at 17:35

## 2021-01-01 RX ADMIN — TAMSULOSIN HYDROCHLORIDE 0.4 MILLIGRAM(S): 0.4 CAPSULE ORAL at 17:12

## 2021-01-01 RX ADMIN — BUMETANIDE 1 MILLIGRAM(S): 0.25 INJECTION INTRAMUSCULAR; INTRAVENOUS at 17:44

## 2021-01-01 RX ADMIN — HUMAN INSULIN 8 UNIT(S): 100 INJECTION, SUSPENSION SUBCUTANEOUS at 05:42

## 2021-01-01 RX ADMIN — HYDROMORPHONE HYDROCHLORIDE 1 MILLIGRAM(S): 2 INJECTION INTRAMUSCULAR; INTRAVENOUS; SUBCUTANEOUS at 09:02

## 2021-01-01 RX ADMIN — CHLORHEXIDINE GLUCONATE 1 APPLICATION(S): 213 SOLUTION TOPICAL at 05:18

## 2021-01-01 RX ADMIN — FENTANYL CITRATE 50 MICROGRAM(S): 50 INJECTION INTRAVENOUS at 13:04

## 2021-01-01 RX ADMIN — Medication 2 MILLIGRAM(S): at 21:25

## 2021-01-12 NOTE — PHYSICAL EXAM
[General Appearance - Well Developed] : well developed [Normal Appearance] : normal appearance [General Appearance - In No Acute Distress] : no acute distress [Abdomen Soft] : soft [Abdomen Tenderness] : non-tender [Edema] : no peripheral edema [] : no respiratory distress [Respiration, Rhythm And Depth] : normal respiratory rhythm and effort [Exaggerated Use Of Accessory Muscles For Inspiration] : no accessory muscle use [Affect] : the affect was normal [Oriented To Time, Place, And Person] : oriented to person, place, and time [Mood] : the mood was normal [Not Anxious] : not anxious [Normal Station and Gait] : the gait and station were normal for the patient's age [No Focal Deficits] : no focal deficits

## 2021-01-18 NOTE — ASSESSMENT
[FreeTextEntry1] : 4/26/2018: Mr. Nick is a 70 year old male presented with microscopic hematuria, BPH, and history of kidney stones. Complained of incomplete bladder emptying. Drinks 2 quarts of liquid daily, heavy coffee drinker.  Currently taking Tamsulosin 0.4 mg once daily. Patient has diabetes. Has a history of kidney stones. \par 6/14/18: The patient returned and reported incomplete bladder emptying, urinary urgency, and urge incontinence. Currently taking tamsulosin, twice daily. PSA from 4/26/18 was 0.49 ng/mL. CBC from 5/31/18 shows slight anemia, I advised the patient to evaluate with his PCP. Patient and daughter understood.\par 7/5/18: The patient returned and reported he is currently taking Oxybutynin ER 15 mg, and his urination has significantly improved. Notes he is seeing an hematologist for his anemia. \par 10/30/18: The patient returned and reported gross hematuria twice in August 2018. He has a history of microscopic hematuria. He also noted dysuria with the episodes of hematuria. UA in 7/5/18 demonstrates no blood in the urine. I offered the patient a , but he declined. The patient was referred to my office by Dr. Ricky Mendez for a cystoscopy. The patient had bronchitis for 1-2 months in the past, which has resolved. Lab results under Dr. Mendez showed iron was low 43 ug/dL on 6/23/18. The patient also has urinary frequency and urgency. Wakes up 4 times during the night to urinate. The patient noted that Tamsulosin worked well for him in the past. He noted his diabetes is well-controlled.\par 11/05/18: The patient returned for a cystoscopy. Aside from cystoscopy, patient was brought back to the office to discuss further evaluation management. CT urogram of the abdomen and pelvis from 11/5/18 findings showed there is a 5 and 4 mm nonobstructing calculi in the mid and lower pole of the left kidney respectively. There was a 1 cm simple appearing cyst in the upper pole of the left kidney. There was a 1.6 nonobstructing calculus within the urinary bladder. Otherwise unremarkable CT urogram. Currently taking Tamsulosin twice daily and Oxybutynin 10 mg. \par 11/21/18: The patient returned and reported urination is adequate. Complained of urgency and urge incontinence. Denied gross hematuria and dysuria. Denied UTI.  Cystoscopy with lithotripsy of bladder stone is scheduled for 12/14/18.\par 12/18/18 The patient returned for a catheter removal. He reported since his last visit he underwent laser lithotripsy of bladder stone. He went to the ER because he couldn't pass urine and so a catheter was placed.\par 1/7/19: The patient returned and reported his urination has improved since his catheter removal. Patient denied dysuria and gross hematuria. Complained of occasional urinary urgency, but noted it has improved. Currently takes Finasteride.\par 4/8/19: The patient returned today for an US. Renal US findings showed again non obstructing stones in the left kidney 7.0 x 5.4 mm lower and 5.1 x 4.3 mm mid, previously measured 5 & 4 mm. Both kidneys are normal in size and echogenicity without obvious hydronephrosis or solid masses visualized. No bladder calculi. Notes that he hasn't been drinking an adequate amount of water, will aim for 2 quarts daily. Wakes up 2-3 times during the night to urinate. Translation assistance of Brandon ([id number]) at [facility].\par 5/14/19: Patient presents today for a follow up. Translation assistance of Allegra ([id number]) at [facility]. He reports consuming about 2 liters of water everyday. Previously the citrate in his urine was low. \par 7/8/19: Patient presents today for a follow up. ( [id number]) Since his last visit he has been drinking lots of water with lemon juice as directed. Potassium Citrate ER 10 mg was prescribed as his last visit as well. He was unable to fill the prescription because it was too expensive. A 24 hour urine collection was completed as directed. Regarding his urination it is reported that he will occasionally have to rush to the bathroom, and that minimal incontinence can be experienced. Most of the time his urination is satisfactory. Tamsulosin 0.4 mg Finasteride 5 mg and Oxybutynin 10 mg are all taken as directed. \par 7/15/19: Patient presents today for a follow up. ( # [id number] Shahab) Overall he states that his urination is satisfactory. He urinates about 1 liter less than previously. A 24 hour urine collection was completed since his last visit and he is here to discuss the results. \par 10/28/19: Patient presents today for a follow up. A renal ultrasound was completed today and he is here to discuss the results. \par 11/20/19: Patient presents today for follow-up to discuss CT urogram results. He has been taking potassium citrate and tamsulosin as directed. He offers no urinary complaints. \par \par 2/24/20: Patient presents today for a follow up. Pt reports waking up about 4 times at night to urinate. He also admits to urinary urgency and a mild amount of urinary leakage. He denies dysuria, hematuria. Pt reports he has been taking Tamsulosin only once a day. The patient produced a urine sample which will be sent for urinalysis, urine cytology, and urine culture. Blood work today included comprehensive metabolic panel, CBC, PSA, and testosterone. Advised patient to increase Tamsulosin to twice a day after meals. RTC in 1 month.\par \par 07/14/2020: Patient presents today for a follow up. ( [id number] Jeovany) Pt is on Tamsulosin BID, Finasteride, and Oxybutynin. Nocturia x3-4 with good volume. Daytime urination is normal. DM is being managed well. Pt has a good fluid intake and drinks lemon water. Denies chest pain, constipation, dysuria, hematuria, pushing or straining and urgency. US renal today again visualized a hyperechoic lesion in the right midpole measuring 2.2 cm x 2.1 cm with vascularity. Previously mentioned 7 mm stone in the left kidney was not visualized in this exam. There is an exophytic cyst on the left upper pole measuring 0.85 cm x 1.45 cm with no vascularity. Both kidneys are normal in size and echogenicity without hydronephrosis, stones visualized. Prescribed Desmopressin Acetate qhs. Pt will resume K Citrate for stones. Continue Tamsulosin. \par \par 11/09/2020: Patient presents today for follow up. Had renal US today and here to discuss results. Currently takes Desmopressin 0.1mg and Tamsulosin 0.4mg BID. No constipation. Denies gross hematuria, but complains of urinary frequency and incontinence. US renal findings today: There is a solid 2.3 x 2.1cm renal mass again visualized mid to lower pole of the right kidney, stable in size and nonvascular. No solid masses present in the left kidney. A small 1.4cm simple nonvascular cyst is present in the upper pole cortex of the left kidney. Both kidneys are normal in size and echogenicity without hydronephrosis, stones or solid masses visualized. I prescribed the patient Oxybutynin 10mg once daily for his urinary frequency and incontinence. \par \par 11/23/2020: Patient presents today for follow up. Last visit, was started on Oxybutynin, but is still unable to hold his urine and does leak. Additionally takes Tamsulosin BID and Desmopressin. H/o kidney stones, but has not had any pains. \par \par 12/21/2020: Patient presents today for follow up. Pt was prescribed Myrbetriq last visit, however was unable to afford it. He is currently taking Oxybutynin ER 15mg one daily, Tamsulosin BID, potassium citrate, and Finasteride. He continues to have trouble holding urine. Does have dry mouth. Stream is normal. \par \par Patient had been on Oxybutynin ER 10mg in the past and was increased to 15mg a while ago. Will increase to Oxybutynin ER to 10mg twice daily today. The patient produced a urine sample which will be sent for urinalysis, urine cytology, and urine culture. \par \par \par 01/12/2021: The patient presents today for a follow up of his urinary frequency and urgency. He has been taking oxybutynin ER 15 mg twice daily even though he was prescribed 10 mg BID. He takes tamsulosin 0.4 mg BID and finasteride 5 mg once daily in the morning. He does not currently take desmopressin. He finds that his day time urination is acceptable, however he has nocturia about 4x a night which is slightly improved. He reports dry mouth and mild constipation. \par \par I prescribed the patient Desipramine 10 mg once daily at bedtime. I informed the patient that in larger dosages, desipramine was previously used as an antidepressant. Side effects can include dry mouth and constipation. \par \par The patient produced a urine sample which will be sent for urinalysis, urine cytology, and urine culture. \par \par Patient will RTO in 3 weeks.

## 2021-01-18 NOTE — ADDENDUM
[FreeTextEntry1] : This note was authored by Jazzmine Mcginnis working as a scribe for Dr.Gary Lay. I, Dr. Nolan Lay have reviewed the content of this note and confirm it is true and accurate. I personally performed the history and physical examination and made all the decisions 01/12/2021.

## 2021-03-09 NOTE — PHYSICAL EXAM
[General Appearance - Well Developed] : well developed [General Appearance - Well Nourished] : well nourished [Normal Appearance] : normal appearance [Well Groomed] : well groomed [General Appearance - In No Acute Distress] : no acute distress [Abdomen Soft] : soft [Abdomen Tenderness] : non-tender [Skin Color & Pigmentation] : normal skin color and pigmentation [Edema] : no peripheral edema [] : no respiratory distress [Respiration, Rhythm And Depth] : normal respiratory rhythm and effort [Exaggerated Use Of Accessory Muscles For Inspiration] : no accessory muscle use [Oriented To Time, Place, And Person] : oriented to person, place, and time [Affect] : the affect was normal [Mood] : the mood was normal [Not Anxious] : not anxious [Normal Station and Gait] : the gait and station were normal for the patient's age [No Focal Deficits] : no focal deficits

## 2021-03-14 PROBLEM — Q62.5: Status: ACTIVE | Noted: 2018-11-05

## 2021-03-14 NOTE — HISTORY OF PRESENT ILLNESS
[FreeTextEntry1] : 4/26/2018: Mr. Nick is a 71 year old male presented with microscopic hematuria, BPH, and history of kidney stones. Complained of incomplete bladder emptying. Drinks 2 quarts of liquid daily, heavy coffee drinker.  Currently taking Tamsulosin 0.4 mg once daily. Patient has diabetes. Has a history of kidney stones. \par 6/14/18: The patient returned and reported incomplete bladder emptying, urinary urgency, and urge incontinence. Currently taking tamsulosin, twice daily. PSA from 4/26/18 was 0.49 ng/mL. CBC from 5/31/18 shows slight anemia, I advised the patient to evaluate with his PCP. Patient and daughter understood.\par 7/5/18: The patient returned and reported he is currently taking Oxybutynin ER 15 mg, and his urination has significantly improved. Notes he is seeing an hematologist for his anemia. \par 10/30/18: The patient returned and reported gross hematuria twice in August 2018. He has a history of microscopic hematuria. He also noted dysuria with the episodes of hematuria. UA in 7/5/18 demonstrates no blood in the urine. I offered the patient a , but he declined. The patient was referred to my office by Dr. Ricky Mendez for a cystoscopy. The patient had bronchitis for 1-2 months in the past, which has resolved. Lab results under Dr. Mendez showed iron was low 43 ug/dL on 6/23/18. The patient also has urinary frequency and urgency. Wakes up 4 times during the night to urinate. The patient noted that Tamsulosin worked well for him in the past. He noted his diabetes is well-controlled.\par 11/05/18: The patient returned for a cystoscopy. Aside from cystoscopy, patient was brought back to the office to discuss further evaluation management. CT urogram of the abdomen and pelvis from 11/5/18 findings showed there is a 5 and 4 mm nonobstructing calculi in the mid and lower pole of the left kidney respectively. There was a 1 cm simple appearing cyst in the upper pole of the left kidney. There was a 1.6 nonobstructing calculus within the urinary bladder. Otherwise unremarkable CT urogram. Currently taking Tamsulosin twice daily and Oxybutynin 10 mg. \par 11/21/18: The patient returned and reported urination is adequate. Complained of urgency and urge incontinence. Denied gross hematuria and dysuria. Denied UTI.  Cystoscopy with lithotripsy of bladder stone is scheduled for 12/14/18.\par 12/18/18 The patient returned for a catheter removal. He reported since his last visit he underwent laser lithotripsy of bladder stone. He went to the ER because he couldn't pass urine and so a catheter was placed.\par 1/7/19: The patient returned and reported his urination has improved since his catheter removal. Patient denied dysuria and gross hematuria. Complained of occasional urinary urgency, but noted it has improved. Currently takes Finasteride.\par 4/8/19: The patient returned today for an US. Renal US findings showed again non obstructing stones in the left kidney 7.0 x 5.4 mm lower and 5.1 x 4.3 mm mid, previously measured 5 & 4 mm. Both kidneys are normal in size and echogenicity without obvious hydronephrosis or solid masses visualized. No bladder calculi. Notes that he hasn't been drinking an adequate amount of water, will aim for 2 quarts daily. Wakes up 2-3 times during the night to urinate. Translation assistance of Brandon ([id number]) at [employer].\par 5/14/19: Patient presents today for a follow up. Translation assistance of Allegra ([id number]) at [employer]. He reports consuming about 2 liters of water everyday. Previously the citrate in his urine was low. \par 7/8/19: Patient presents today for a follow up. ( [id number]) Since his last visit he has been drinking lots of water with lemon juice as directed. Potassium Citrate ER 10 mg was prescribed as his last visit as well. He was unable to fill the prescription because it was too expensive. A 24 hour urine collection was completed as directed. Regarding his urination it is reported that he will occasionally have to rush to the bathroom, and that minimal incontinence can be experienced. Most of the time his urination is satisfactory. Tamsulosin 0.4 mg Finasteride 5 mg and Oxybutynin 10 mg are all taken as directed. \par 7/15/19: Patient presents today for a follow up. ( # [id number] Shahab) Overall he states that his urination is satisfactory. He urinates about 1 liter less than previously. A 24 hour urine collection was completed since his last visit and he is here to discuss the results. \par 10/28/19: Patient presents today for a follow up. A renal ultrasound was completed today and he is here to discuss the results. \par 11/20/19: Patient presents today for follow-up to discuss CT urogram results. He has been taking potassium citrate and tamsulosin as directed. He offers no urinary complaints. \par \par 2/24/20: Patient presents today for a follow up. Pt reports waking up about 4 times at night to urinate. He also admits to urinary urgency and a mild amount of urinary leakage. He denies dysuria, hematuria. Pt reports he has been taking Tamsulosin only once a day. \par \par 07/14/2020: Patient presents today for a follow up. ( [id number] Jeovany) Pt is on Tamsulosin BID, Finasteride, and Oxybutynin. Nocturia x3-4 with good volume. Daytime urination is normal. DM is being managed well. Pt has a good fluid intake and drinks lemon water. Denies chest pain, constipation, dysuria, hematuria, pushing or straining and urgency. US renal today again visualized a hyperechoic lesion in the right midpole measuring 2.2 cm x 2.1 cm with vascularity. Previously mentioned 7 mm stone in the left kidney was not visualized in this exam. There is an exophytic cyst on the left upper pole measuring 0.85 cm x 1.45 cm with no vascularity. Both kidneys are normal in size and echogenicity without hydronephrosis, stones visualized. Prescribed Desmopressin Acetate qhs. Pt will resume K Citrate for stones. Continue Tamsulosin. \par \par 11/09/2020: Patient presents today for follow up. Had renal US today and here to discuss results. Currently takes Desmopressin 0.1mg and Tamsulosin 0.4mg BID. No constipation. Denies gross hematuria, but complains of urinary frequency and incontinence. US renal findings today: There is a solid 2.3 x 2.1cm renal mass again visualized mid to lower pole of the right kidney, stable in size and nonvascular. No solid masses present in the left kidney. A small 1.4cm simple nonvascular cyst is present in the upper pole cortex of the left kidney. Both kidneys are normal in size and echogenicity without hydronephrosis, stones or solid masses visualized. I prescribed the patient Oxybutynin 10mg once daily for his urinary frequency and incontinence. \par \par 11/23/2020: Patient presents today for follow up. Last visit, was started on Oxybutynin, but is still unable to hold his urine and does leak. Additionally takes Tamsulosin BID and Desmopressin. H/o kidney stones, but has not had any pains. \par \par 12/21/2020: Patient presents today for follow up. Pt was prescribed Myrbetriq last visit, however was unable to afford it. He is currently taking Oxybutynin ER 15mg one daily, Tamsulosin BID, potassium citrate, and Finasteride. He continues to have trouble holding urine. Does have dry mouth. Stream is normal. \par \par 01/12/2021: The patient presents today for a follow up of his urinary frequency and urgency. He has been taking oxybutynin ER 15 mg twice daily even though he was prescribed 10 mg BID. He takes tamsulosin 0.4 mg BID and finasteride 5 mg once daily in the morning. He does not currently take desmopressin. He finds that his day time urination is acceptable, however he has nocturia about 4x a night which is slightly improved. He reports dry mouth and mild constipation.\par \par 03/09/2021: The patient presents today for a follow up. Pt has been taking Desipramine 10 mg once daily at bedtime in addition to oxybutynin ER 15 mg BID. tamsulosin 0.4 mg BID, and finasteride 5 mg once daily. He reports it has shown slight improvement in his urination. Nocturia about 3x a night. Daytime urination is stable. The patient would like further improvement in his night time urination. Erectile function is still good.

## 2021-03-14 NOTE — ASSESSMENT
[FreeTextEntry1] : 4/26/2018: Mr. Nick is a 71 year old male presented with microscopic hematuria, BPH, and history of kidney stones. Complained of incomplete bladder emptying. Drinks 2 quarts of liquid daily, heavy coffee drinker.  Currently taking Tamsulosin 0.4 mg once daily. Patient has diabetes. Has a history of kidney stones. \par 6/14/18: The patient returned and reported incomplete bladder emptying, urinary urgency, and urge incontinence. Currently taking tamsulosin, twice daily. PSA from 4/26/18 was 0.49 ng/mL. CBC from 5/31/18 shows slight anemia, I advised the patient to evaluate with his PCP. Patient and daughter understood.\par 7/5/18: The patient returned and reported he is currently taking Oxybutynin ER 15 mg, and his urination has significantly improved. Notes he is seeing an hematologist for his anemia. \par 10/30/18: The patient returned and reported gross hematuria twice in August 2018. He has a history of microscopic hematuria. He also noted dysuria with the episodes of hematuria. UA in 7/5/18 demonstrates no blood in the urine. I offered the patient a , but he declined. The patient was referred to my office by Dr. Ricky Mendez for a cystoscopy. The patient had bronchitis for 1-2 months in the past, which has resolved. Lab results under Dr. Mendez showed iron was low 43 ug/dL on 6/23/18. The patient also has urinary frequency and urgency. Wakes up 4 times during the night to urinate. The patient noted that Tamsulosin worked well for him in the past. He noted his diabetes is well-controlled.\par 11/05/18: The patient returned for a cystoscopy. Aside from cystoscopy, patient was brought back to the office to discuss further evaluation management. CT urogram of the abdomen and pelvis from 11/5/18 findings showed there is a 5 and 4 mm nonobstructing calculi in the mid and lower pole of the left kidney respectively. There was a 1 cm simple appearing cyst in the upper pole of the left kidney. There was a 1.6 nonobstructing calculus within the urinary bladder. Otherwise unremarkable CT urogram. Currently taking Tamsulosin twice daily and Oxybutynin 10 mg. \par 11/21/18: The patient returned and reported urination is adequate. Complained of urgency and urge incontinence. Denied gross hematuria and dysuria. Denied UTI.  Cystoscopy with lithotripsy of bladder stone is scheduled for 12/14/18.\par 12/18/18 The patient returned for a catheter removal. He reported since his last visit he underwent laser lithotripsy of bladder stone. He went to the ER because he couldn't pass urine and so a catheter was placed.\par 1/7/19: The patient returned and reported his urination has improved since his catheter removal. Patient denied dysuria and gross hematuria. Complained of occasional urinary urgency, but noted it has improved. Currently takes Finasteride.\par 4/8/19: The patient returned today for an US. Renal US findings showed again non obstructing stones in the left kidney 7.0 x 5.4 mm lower and 5.1 x 4.3 mm mid, previously measured 5 & 4 mm. Both kidneys are normal in size and echogenicity without obvious hydronephrosis or solid masses visualized. No bladder calculi. Notes that he hasn't been drinking an adequate amount of water, will aim for 2 quarts daily. Wakes up 2-3 times during the night to urinate. Translation assistance of Brandon ([id number]) at [facility].\par 5/14/19: Patient presents today for a follow up. Translation assistance of Allegra ([id number]) at [facility]. He reports consuming about 2 liters of water everyday. Previously the citrate in his urine was low. \par 7/8/19: Patient presents today for a follow up. ( [id number]) Since his last visit he has been drinking lots of water with lemon juice as directed. Potassium Citrate ER 10 mg was prescribed as his last visit as well. He was unable to fill the prescription because it was too expensive. A 24 hour urine collection was completed as directed. Regarding his urination it is reported that he will occasionally have to rush to the bathroom, and that minimal incontinence can be experienced. Most of the time his urination is satisfactory. Tamsulosin 0.4 mg Finasteride 5 mg and Oxybutynin 10 mg are all taken as directed. \par 7/15/19: Patient presents today for a follow up. ( # [id number] Shahab) Overall he states that his urination is satisfactory. He urinates about 1 liter less than previously. A 24 hour urine collection was completed since his last visit and he is here to discuss the results. \par 10/28/19: Patient presents today for a follow up. A renal ultrasound was completed today and he is here to discuss the results. \par 11/20/19: Patient presents today for follow-up to discuss CT urogram results. He has been taking potassium citrate and tamsulosin as directed. He offers no urinary complaints. \par \par 2/24/20: Patient presents today for a follow up. Pt reports waking up about 4 times at night to urinate. He also admits to urinary urgency and a mild amount of urinary leakage. He denies dysuria, hematuria. Pt reports he has been taking Tamsulosin only once a day. The patient produced a urine sample which will be sent for urinalysis, urine cytology, and urine culture. Blood work today included comprehensive metabolic panel, CBC, PSA, and testosterone. Advised patient to increase Tamsulosin to twice a day after meals. RTC in 1 month.\par \par 07/14/2020: Patient presents today for a follow up. ( [id number] Jeovany) Pt is on Tamsulosin BID, Finasteride, and Oxybutynin. Nocturia x3-4 with good volume. Daytime urination is normal. DM is being managed well. Pt has a good fluid intake and drinks lemon water. Denies chest pain, constipation, dysuria, hematuria, pushing or straining and urgency. US renal today again visualized a hyperechoic lesion in the right midpole measuring 2.2 cm x 2.1 cm with vascularity. Previously mentioned 7 mm stone in the left kidney was not visualized in this exam. There is an exophytic cyst on the left upper pole measuring 0.85 cm x 1.45 cm with no vascularity. Both kidneys are normal in size and echogenicity without hydronephrosis, stones visualized. Prescribed Desmopressin Acetate qhs. Pt will resume K Citrate for stones. Continue Tamsulosin. \par \par 11/09/2020: Patient presents today for follow up. Had renal US today and here to discuss results. Currently takes Desmopressin 0.1mg and Tamsulosin 0.4mg BID. No constipation. Denies gross hematuria, but complains of urinary frequency and incontinence. US renal findings today: There is a solid 2.3 x 2.1cm renal mass again visualized mid to lower pole of the right kidney, stable in size and nonvascular. No solid masses present in the left kidney. A small 1.4cm simple nonvascular cyst is present in the upper pole cortex of the left kidney. Both kidneys are normal in size and echogenicity without hydronephrosis, stones or solid masses visualized. I prescribed the patient Oxybutynin 10mg once daily for his urinary frequency and incontinence. \par \par 11/23/2020: Patient presents today for follow up. Last visit, was started on Oxybutynin, but is still unable to hold his urine and does leak. Additionally takes Tamsulosin BID and Desmopressin. H/o kidney stones, but has not had any pains. \par \par 12/21/2020: Patient presents today for follow up. Pt was prescribed Myrbetriq last visit, however was unable to afford it. He is currently taking Oxybutynin ER 15mg one daily, Tamsulosin BID, potassium citrate, and Finasteride. He continues to have trouble holding urine. Does have dry mouth. Stream is normal. \par \par Patient had been on Oxybutynin ER 10mg in the past and was increased to 15mg a while ago. Will increase to Oxybutynin ER to 10mg twice daily today. The patient produced a urine sample which will be sent for urinalysis, urine cytology, and urine culture. \par \par \par 01/12/2021: The patient presents today for a follow up of his urinary frequency and urgency. He has been taking oxybutynin ER 15 mg twice daily even though he was prescribed 10 mg BID. He takes tamsulosin 0.4 mg BID and finasteride 5 mg once daily in the morning. He does not currently take desmopressin. He finds that his day time urination is acceptable, however he has nocturia about 4x a night which is slightly improved. He reports dry mouth and mild constipation. \par \par I prescribed the patient Desipramine 10 mg once daily at bedtime. I informed the patient that in larger dosages, desipramine was previously used as an antidepressant. Side effects can include dry mouth and constipation. \par The patient produced a urine sample which will be sent for urinalysis, urine cytology, and urine culture. \par Patient will RTO in 3 weeks. \par \par 03/09/2021: The patient presents today for a follow up. Pt has been taking Desipramine 10 mg once daily at bedtime in addition to oxybutynin ER 15 mg BID. tamsulosin 0.4 mg BID, and finasteride 5 mg once daily. He reports it has shown slight improvement in his urination. Nocturia about 3x a night. Daytime urination is stable. The patient would like further improvement in his night time urination. Erectile function is still good. \par \par I am prescribing the patient tadalafil 5 mg once daily. I informed him he should expect to see improvement in about one week.I informed him of the possible side effects of heart burn, tingling feeling on the skin, back ache, and on rare occasion visual and auditory problems. \par \par Patient will RTO in two weeks to evaluate the efficacy of tadalafil on his urination.\par \par Preparation, in person office visit, and coordination of care: 21 minutes.

## 2021-03-14 NOTE — REVIEW OF SYSTEMS
[Nocturia] : nocturia [Feeling Poorly] : not feeling poorly [Difficulty Walking] : no difficulty walking [Erectile Dysfunction] : no erectile dysfunction

## 2021-05-01 NOTE — ASSESSMENT
[FreeTextEntry1] : 4/26/2018: Mr. Nick is a 71 year old male presented with microscopic hematuria, BPH, and history of kidney stones. Complained of incomplete bladder emptying. Drinks 2 quarts of liquid daily, heavy coffee drinker.  Currently taking Tamsulosin 0.4 mg once daily. Patient has diabetes. Has a history of kidney stones. \par 6/14/18: The patient returned and reported incomplete bladder emptying, urinary urgency, and urge incontinence. Currently taking tamsulosin, twice daily. PSA from 4/26/18 was 0.49 ng/mL. CBC from 5/31/18 shows slight anemia, I advised the patient to evaluate with his PCP. Patient and daughter understood.\par 7/5/18: The patient returned and reported he is currently taking Oxybutynin ER 15 mg, and his urination has significantly improved. Notes he is seeing an hematologist for his anemia. \par 10/30/18: The patient returned and reported gross hematuria twice in August 2018. He has a history of microscopic hematuria. He also noted dysuria with the episodes of hematuria. UA in 7/5/18 demonstrates no blood in the urine. I offered the patient a , but he declined. The patient was referred to my office by Dr. Ricky Mendez for a cystoscopy. The patient had bronchitis for 1-2 months in the past, which has resolved. Lab results under Dr. Mendez showed iron was low 43 ug/dL on 6/23/18. The patient also has urinary frequency and urgency. Wakes up 4 times during the night to urinate. The patient noted that Tamsulosin worked well for him in the past. He noted his diabetes is well-controlled.\par 11/05/18: The patient returned for a cystoscopy. Aside from cystoscopy, patient was brought back to the office to discuss further evaluation management. CT urogram of the abdomen and pelvis from 11/5/18 findings showed there is a 5 and 4 mm nonobstructing calculi in the mid and lower pole of the left kidney respectively. There was a 1 cm simple appearing cyst in the upper pole of the left kidney. There was a 1.6 nonobstructing calculus within the urinary bladder. Otherwise unremarkable CT urogram. Currently taking Tamsulosin twice daily and Oxybutynin 10 mg. \par 11/21/18: The patient returned and reported urination is adequate. Complained of urgency and urge incontinence. Denied gross hematuria and dysuria. Denied UTI.  Cystoscopy with lithotripsy of bladder stone is scheduled for 12/14/18.\par 12/18/18 The patient returned for a catheter removal. He reported since his last visit he underwent laser lithotripsy of bladder stone. He went to the ER because he couldn't pass urine and so a catheter was placed.\par 1/7/19: The patient returned and reported his urination has improved since his catheter removal. Patient denied dysuria and gross hematuria. Complained of occasional urinary urgency, but noted it has improved. Currently takes Finasteride.\par 4/8/19: The patient returned today for an US. Renal US findings showed again non obstructing stones in the left kidney 7.0 x 5.4 mm lower and 5.1 x 4.3 mm mid, previously measured 5 & 4 mm. Both kidneys are normal in size and echogenicity without obvious hydronephrosis or solid masses visualized. No bladder calculi. Notes that he hasn't been drinking an adequate amount of water, will aim for 2 quarts daily. Wakes up 2-3 times during the night to urinate. Translation assistance of [place] ([id number]) at [facility]tras.\par 5/14/19: Patient presents today for a follow up. Translation assistance of [employer] ([id number]) at [facility]tras. He reports consuming about 2 liters of water everyday. Previously the citrate in his urine was low. \par 7/8/19: Patient presents today for a follow up. ( [id number]) Since his last visit he has been drinking lots of water with lemon juice as directed. Potassium Citrate ER 10 mg was prescribed as his last visit as well. He was unable to fill the prescription because it was too expensive. A 24 hour urine collection was completed as directed. Regarding his urination it is reported that he will occasionally have to rush to the bathroom, and that minimal incontinence can be experienced. Most of the time his urination is satisfactory. Tamsulosin 0.4 mg Finasteride 5 mg and Oxybutynin 10 mg are all taken as directed. \par 7/15/19: Patient presents today for a follow up. ( # [id number] Shahab) Overall he states that his urination is satisfactory. He urinates about 1 liter less than previously. A 24 hour urine collection was completed since his last visit and he is here to discuss the results. \par 10/28/19: Patient presents today for a follow up. A renal ultrasound was completed today and he is here to discuss the results. \par 11/20/19: Patient presents today for follow-up to discuss CT urogram results. He has been taking potassium citrate and tamsulosin as directed. He offers no urinary complaints. \par \par 2/24/20: Patient presents today for a follow up. Pt reports waking up about 4 times at night to urinate. He also admits to urinary urgency and a mild amount of urinary leakage. He denies dysuria, hematuria. Pt reports he has been taking Tamsulosin only once a day. The patient produced a urine sample which will be sent for urinalysis, urine cytology, and urine culture. Blood work today included comprehensive metabolic panel, CBC, PSA, and testosterone. Advised patient to increase Tamsulosin to twice a day after meals. RTC in 1 month.\par \par 07/14/2020: Patient presents today for a follow up. ( [id number] Jeovany) Pt is on Tamsulosin BID, Finasteride, and Oxybutynin. Nocturia x3-4 with good volume. Daytime urination is normal. DM is being managed well. Pt has a good fluid intake and drinks lemon water. Denies chest pain, constipation, dysuria, hematuria, pushing or straining and urgency. US renal today again visualized a hyperechoic lesion in the right midpole measuring 2.2 cm x 2.1 cm with vascularity. Previously mentioned 7 mm stone in the left kidney was not visualized in this exam. There is an exophytic cyst on the left upper pole measuring 0.85 cm x 1.45 cm with no vascularity. Both kidneys are normal in size and echogenicity without hydronephrosis, stones visualized. Prescribed Desmopressin Acetate qhs. Pt will resume K Citrate for stones. Continue Tamsulosin. \par \par 11/09/2020: Patient presents today for follow up. Had renal US today and here to discuss results. Currently takes Desmopressin 0.1mg and Tamsulosin 0.4mg BID. No constipation. Denies gross hematuria, but complains of urinary frequency and incontinence. US renal findings today: There is a solid 2.3 x 2.1cm renal mass again visualized mid to lower pole of the right kidney, stable in size and nonvascular. No solid masses present in the left kidney. A small 1.4cm simple nonvascular cyst is present in the upper pole cortex of the left kidney. Both kidneys are normal in size and echogenicity without hydronephrosis, stones or solid masses visualized. I prescribed the patient Oxybutynin 10mg once daily for his urinary frequency and incontinence. \par \par 11/23/2020: Patient presents today for follow up. Last visit, was started on Oxybutynin, but is still unable to hold his urine and does leak. Additionally takes Tamsulosin BID and Desmopressin. H/o kidney stones, but has not had any pains. \par \par 12/21/2020: Patient presents today for follow up. Pt was prescribed Myrbetriq last visit, however was unable to afford it. He is currently taking Oxybutynin ER 15mg one daily, Tamsulosin BID, potassium citrate, and Finasteride. He continues to have trouble holding urine. Does have dry mouth. Stream is normal. \par \par 01/12/2021: The patient presents today for a follow up of his urinary frequency and urgency. He has been taking oxybutynin ER 15 mg twice daily even though he was prescribed 10 mg BID. He takes tamsulosin 0.4 mg BID and finasteride 5 mg once daily in the morning. He does not currently take desmopressin. He finds that his day time urination is acceptable, however he has nocturia about 4x a night which is slightly improved. He reports dry mouth and mild constipation. \par \par 03/09/2021: The patient presents today for a follow up. Pt has been taking Desipramine 10 mg once daily at bedtime in addition to oxybutynin ER 15 mg BID. tamsulosin 0.4 mg BID, and finasteride 5 mg once daily. He reports it has shown slight improvement in his urination. Nocturia about 3x a night. Daytime urination is stable. The patient would like further improvement in his night time urination. Erectile function is still good. \par \par 04/01/2021: Patient presents today for follow up. Last visit, pt was started on Tadalafil 5mg. Reports some improvement in urination. Wakes 2-4x at night to urinate, which is decreased from 4-5x. Stream is stronger. Occasionally has urinary urgency. Denies pushing or straining. Additionally takes Desipramine, Oxybutynin, Tamsulosin BID, and Finasteride. \par \par I instructed the patient to increase Desipramine 10mg from once daily to twice daily. Continue Tadalafil, Oxybutynin, Tamsulosin BID, and Finasteride. \par \par RTO in 3 weeks for reassessment. \par \par Preparation, in-person office visit, and coordination of care took: 30 minutes

## 2021-05-01 NOTE — HISTORY OF PRESENT ILLNESS
[FreeTextEntry1] : 4/26/2018: Mr. Nick is a 71 year old male presented with microscopic hematuria, BPH, and history of kidney stones. Complained of incomplete bladder emptying. Drinks 2 quarts of liquid daily, heavy coffee drinker.  Currently taking Tamsulosin 0.4 mg once daily. Patient has diabetes. Has a history of kidney stones. \par 6/14/18: The patient returned and reported incomplete bladder emptying, urinary urgency, and urge incontinence. Currently taking tamsulosin, twice daily. PSA from 4/26/18 was 0.49 ng/mL. CBC from 5/31/18 shows slight anemia, I advised the patient to evaluate with his PCP. Patient and daughter understood.\par 7/5/18: The patient returned and reported he is currently taking Oxybutynin ER 15 mg, and his urination has significantly improved. Notes he is seeing an hematologist for his anemia. \par 10/30/18: The patient returned and reported gross hematuria twice in August 2018. He has a history of microscopic hematuria. He also noted dysuria with the episodes of hematuria. UA in 7/5/18 demonstrates no blood in the urine. I offered the patient a , but he declined. The patient was referred to my office by Dr. Ricky Mendez for a cystoscopy. The patient had bronchitis for 1-2 months in the past, which has resolved. Lab results under Dr. Mendez showed iron was low 43 ug/dL on 6/23/18. The patient also has urinary frequency and urgency. Wakes up 4 times during the night to urinate. The patient noted that Tamsulosin worked well for him in the past. He noted his diabetes is well-controlled.\par 11/05/18: The patient returned for a cystoscopy. Aside from cystoscopy, patient was brought back to the office to discuss further evaluation management. CT urogram of the abdomen and pelvis from 11/5/18 findings showed there is a 5 and 4 mm nonobstructing calculi in the mid and lower pole of the left kidney respectively. There was a 1 cm simple appearing cyst in the upper pole of the left kidney. There was a 1.6 nonobstructing calculus within the urinary bladder. Otherwise unremarkable CT urogram. Currently taking Tamsulosin twice daily and Oxybutynin 10 mg. \par 11/21/18: The patient returned and reported urination is adequate. Complained of urgency and urge incontinence. Denied gross hematuria and dysuria. Denied UTI.  Cystoscopy with lithotripsy of bladder stone is scheduled for 12/14/18.\par 12/18/18 The patient returned for a catheter removal. He reported since his last visit he underwent laser lithotripsy of bladder stone. He went to the ER because he couldn't pass urine and so a catheter was placed.\par 1/7/19: The patient returned and reported his urination has improved since his catheter removal. Patient denied dysuria and gross hematuria. Complained of occasional urinary urgency, but noted it has improved. Currently takes Finasteride.\par 4/8/19: The patient returned today for an US. Renal US findings showed again non obstructing stones in the left kidney 7.0 x 5.4 mm lower and 5.1 x 4.3 mm mid, previously measured 5 & 4 mm. Both kidneys are normal in size and echogenicity without obvious hydronephrosis or solid masses visualized. No bladder calculi. Notes that he hasn't been drinking an adequate amount of water, will aim for 2 quarts daily. Wakes up 2-3 times during the night to urinate. Translation assistance of Brandon ([id number]) at [employer].\par 5/14/19: Patient presents today for a follow up. Translation assistance of [employer] ([id number]) at [employer]. He reports consuming about 2 liters of water everyday. Previously the citrate in his urine was low. \par 7/8/19: Patient presents today for a follow up. ( [id number]) Since his last visit he has been drinking lots of water with lemon juice as directed. Potassium Citrate ER 10 mg was prescribed as his last visit as well. He was unable to fill the prescription because it was too expensive. A 24 hour urine collection was completed as directed. Regarding his urination it is reported that he will occasionally have to rush to the bathroom, and that minimal incontinence can be experienced. Most of the time his urination is satisfactory. Tamsulosin 0.4 mg Finasteride 5 mg and Oxybutynin 10 mg are all taken as directed. \par 7/15/19: Patient presents today for a follow up. ( # [id number] Shahab) Overall he states that his urination is satisfactory. He urinates about 1 liter less than previously. A 24 hour urine collection was completed since his last visit and he is here to discuss the results. \par 10/28/19: Patient presents today for a follow up. A renal ultrasound was completed today and he is here to discuss the results. \par 11/20/19: Patient presents today for follow-up to discuss CT urogram results. He has been taking potassium citrate and tamsulosin as directed. He offers no urinary complaints. \par \par 2/24/20: Patient presents today for a follow up. Pt reports waking up about 4 times at night to urinate. He also admits to urinary urgency and a mild amount of urinary leakage. He denies dysuria, hematuria. Pt reports he has been taking Tamsulosin only once a day. \par \par 07/14/2020: Patient presents today for a follow up. ( [id number] Jeovany) Pt is on Tamsulosin BID, Finasteride, and Oxybutynin. Nocturia x3-4 with good volume. Daytime urination is normal. DM is being managed well. Pt has a good fluid intake and drinks lemon water. Denies chest pain, constipation, dysuria, hematuria, pushing or straining and urgency. US renal today again visualized a hyperechoic lesion in the right midpole measuring 2.2 cm x 2.1 cm with vascularity. Previously mentioned 7 mm stone in the left kidney was not visualized in this exam. There is an exophytic cyst on the left upper pole measuring 0.85 cm x 1.45 cm with no vascularity. Both kidneys are normal in size and echogenicity without hydronephrosis, stones visualized. Prescribed Desmopressin Acetate qhs. Pt will resume K Citrate for stones. Continue Tamsulosin. \par \par 11/09/2020: Patient presents today for follow up. Had renal US today and here to discuss results. Currently takes Desmopressin 0.1mg and Tamsulosin 0.4mg BID. No constipation. Denies gross hematuria, but complains of urinary frequency and incontinence. US renal findings today: There is a solid 2.3 x 2.1cm renal mass again visualized mid to lower pole of the right kidney, stable in size and nonvascular. No solid masses present in the left kidney. A small 1.4cm simple nonvascular cyst is present in the upper pole cortex of the left kidney. Both kidneys are normal in size and echogenicity without hydronephrosis, stones or solid masses visualized. I prescribed the patient Oxybutynin 10mg once daily for his urinary frequency and incontinence. \par \par 11/23/2020: Patient presents today for follow up. Last visit, was started on Oxybutynin, but is still unable to hold his urine and does leak. Additionally takes Tamsulosin BID and Desmopressin. H/o kidney stones, but has not had any pains. \par \par 12/21/2020: Patient presents today for follow up. Pt was prescribed Myrbetriq last visit, however was unable to afford it. He is currently taking Oxybutynin ER 15mg one daily, Tamsulosin BID, potassium citrate, and Finasteride. He continues to have trouble holding urine. Does have dry mouth. Stream is normal. \par \par 01/12/2021: The patient presents today for a follow up of his urinary frequency and urgency. He has been taking oxybutynin ER 15 mg twice daily even though he was prescribed 10 mg BID. He takes tamsulosin 0.4 mg BID and finasteride 5 mg once daily in the morning. He does not currently take desmopressin. He finds that his day time urination is acceptable, however he has nocturia about 4x a night which is slightly improved. He reports dry mouth and mild constipation.\par \par 03/09/2021: The patient presents today for a follow up. Pt has been taking Desipramine 10 mg once daily at bedtime in addition to oxybutynin ER 15 mg BID. tamsulosin 0.4 mg BID, and finasteride 5 mg once daily. He reports it has shown slight improvement in his urination. Nocturia about 3x a night. Daytime urination is stable. The patient would like further improvement in his night time urination. Erectile function is still good. \par \par 04/01/2021: Patient presents today for follow up. Last visit, pt was started on Tadalafil 5mg. Reports some improvement in urination. Wakes 2-4x at night to urinate, which is decreased from 4-5x. Stream is stronger. Occasionally has urinary urgency. Denies pushing or straining. Additionally takes Desipramine, Oxybutynin, Tamsulosin BID, and Finasteride.

## 2021-05-01 NOTE — ADDENDUM
[FreeTextEntry1] : I, Tatiana Rodriguezin, acted solely as a scribe for Dr. Nolan Lay on this date 04/01/2021.\par \par All medical record entries made by the Scribe were at my, Dr. Nolan Lay, direction and personally dictated by me on 04/01/2021. I have reviewed the chart and agree that the record accurately reflects my personal performance of the history, physical exam, assessment and plan.  I have also personally directed, reviewed and agreed with the chart.

## 2021-05-04 PROBLEM — R31.29 MICROSCOPIC HEMATURIA: Status: ACTIVE | Noted: 2018-04-26

## 2021-05-05 NOTE — ADDENDUM
[FreeTextEntry1] : This note was authored by Jazzmine Mcginnis working as a scribe for Dr.Gary Lay. I, Dr. Nolan Lay have reviewed the content of this note and confirm it is true and accurate. I personally performed the history and physical examination and made all the decisions 05/04/2021.

## 2021-05-05 NOTE — HISTORY OF PRESENT ILLNESS
[FreeTextEntry1] : 4/26/2018: Mr. Nick is a 71 year old male presented with microscopic hematuria, BPH, and history of kidney stones. Complained of incomplete bladder emptying. Drinks 2 quarts of liquid daily, heavy coffee drinker.  Currently taking Tamsulosin 0.4 mg once daily. Patient has diabetes. Has a history of kidney stones. \par 6/14/18: The patient returned and reported incomplete bladder emptying, urinary urgency, and urge incontinence. Currently taking tamsulosin, twice daily. PSA from 4/26/18 was 0.49 ng/mL. CBC from 5/31/18 shows slight anemia, I advised the patient to evaluate with his PCP. Patient and daughter understood.\par 7/5/18: The patient returned and reported he is currently taking Oxybutynin ER 15 mg, and his urination has significantly improved. Notes he is seeing an hematologist for his anemia. \par 10/30/18: The patient returned and reported gross hematuria twice in August 2018. He has a history of microscopic hematuria. He also noted dysuria with the episodes of hematuria. UA in 7/5/18 demonstrates no blood in the urine. I offered the patient a , but he declined. The patient was referred to my office by Dr. Ricky Mendez for a cystoscopy. The patient had bronchitis for 1-2 months in the past, which has resolved. Lab results under Dr. Mendez showed iron was low 43 ug/dL on 6/23/18. The patient also has urinary frequency and urgency. Wakes up 4 times during the night to urinate. The patient noted that Tamsulosin worked well for him in the past. He noted his diabetes is well-controlled.\par 11/05/18: The patient returned for a cystoscopy. Aside from cystoscopy, patient was brought back to the office to discuss further evaluation management. CT urogram of the abdomen and pelvis from 11/5/18 findings showed there is a 5 and 4 mm nonobstructing calculi in the mid and lower pole of the left kidney respectively. There was a 1 cm simple appearing cyst in the upper pole of the left kidney. There was a 1.6 nonobstructing calculus within the urinary bladder. Otherwise unremarkable CT urogram. Currently taking Tamsulosin twice daily and Oxybutynin 10 mg. \par 11/21/18: The patient returned and reported urination is adequate. Complained of urgency and urge incontinence. Denied gross hematuria and dysuria. Denied UTI.  Cystoscopy with lithotripsy of bladder stone is scheduled for 12/14/18.\par 12/18/18 The patient returned for a catheter removal. He reported since his last visit he underwent laser lithotripsy of bladder stone. He went to the ER because he couldn't pass urine and so a catheter was placed.\par 1/7/19: The patient returned and reported his urination has improved since his catheter removal. Patient denied dysuria and gross hematuria. Complained of occasional urinary urgency, but noted it has improved. Currently takes Finasteride.\par 4/8/19: The patient returned today for an US. Renal US findings showed again non obstructing stones in the left kidney 7.0 x 5.4 mm lower and 5.1 x 4.3 mm mid, previously measured 5 & 4 mm. Both kidneys are normal in size and echogenicity without obvious hydronephrosis or solid masses visualized. No bladder calculi. Notes that he hasn't been drinking an adequate amount of water, will aim for 2 quarts daily. Wakes up 2-3 times during the night to urinate. Translation assistance of Brandon ([id number]) at [employer].\par 5/14/19: Patient presents today for a follow up. Translation assistance of Allegra ([id number]) at [employer]. He reports consuming about 2 liters of water everyday. Previously the citrate in his urine was low. \par 7/8/19: Patient presents today for a follow up. ( [id number]) Since his last visit he has been drinking lots of water with lemon juice as directed. Potassium Citrate ER 10 mg was prescribed as his last visit as well. He was unable to fill the prescription because it was too expensive. A 24 hour urine collection was completed as directed. Regarding his urination it is reported that he will occasionally have to rush to the bathroom, and that minimal incontinence can be experienced. Most of the time his urination is satisfactory. Tamsulosin 0.4 mg Finasteride 5 mg and Oxybutynin 10 mg are all taken as directed. \par 7/15/19: Patient presents today for a follow up. ( # [id number] Shahab) Overall he states that his urination is satisfactory. He urinates about 1 liter less than previously. A 24 hour urine collection was completed since his last visit and he is here to discuss the results. \par 10/28/19: Patient presents today for a follow up. A renal ultrasound was completed today and he is here to discuss the results. \par 11/20/19: Patient presents today for follow-up to discuss CT urogram results. He has been taking potassium citrate and tamsulosin as directed. He offers no urinary complaints. \par \par 2/24/20: Patient presents today for a follow up. Pt reports waking up about 4 times at night to urinate. He also admits to urinary urgency and a mild amount of urinary leakage. He denies dysuria, hematuria. Pt reports he has been taking Tamsulosin only once a day. \par \par 07/14/2020: Patient presents today for a follow up. ( [id number] Jeovany) Pt is on Tamsulosin BID, Finasteride, and Oxybutynin. Nocturia x3-4 with good volume. Daytime urination is normal. DM is being managed well. Pt has a good fluid intake and drinks lemon water. Denies chest pain, constipation, dysuria, hematuria, pushing or straining and urgency. US renal today again visualized a hyperechoic lesion in the right midpole measuring 2.2 cm x 2.1 cm with vascularity. Previously mentioned 7 mm stone in the left kidney was not visualized in this exam. There is an exophytic cyst on the left upper pole measuring 0.85 cm x 1.45 cm with no vascularity. Both kidneys are normal in size and echogenicity without hydronephrosis, stones visualized. Prescribed Desmopressin Acetate qhs. Pt will resume K Citrate for stones. Continue Tamsulosin. \par \par 11/09/2020: Patient presents today for follow up. Had renal US today and here to discuss results. Currently takes Desmopressin 0.1mg and Tamsulosin 0.4mg BID. No constipation. Denies gross hematuria, but complains of urinary frequency and incontinence. US renal findings today: There is a solid 2.3 x 2.1cm renal mass again visualized mid to lower pole of the right kidney, stable in size and nonvascular. No solid masses present in the left kidney. A small 1.4cm simple nonvascular cyst is present in the upper pole cortex of the left kidney. Both kidneys are normal in size and echogenicity without hydronephrosis, stones or solid masses visualized. I prescribed the patient Oxybutynin 10mg once daily for his urinary frequency and incontinence. \par \par 11/23/2020: Patient presents today for follow up. Last visit, was started on Oxybutynin, but is still unable to hold his urine and does leak. Additionally takes Tamsulosin BID and Desmopressin. H/o kidney stones, but has not had any pains. \par \par 12/21/2020: Patient presents today for follow up. Pt was prescribed Myrbetriq last visit, however was unable to afford it. He is currently taking Oxybutynin ER 15mg one daily, Tamsulosin BID, potassium citrate, and Finasteride. He continues to have trouble holding urine. Does have dry mouth. Stream is normal. \par \par 01/12/2021: The patient presents today for a follow up of his urinary frequency and urgency. He has been taking oxybutynin ER 15 mg twice daily even though he was prescribed 10 mg BID. He takes tamsulosin 0.4 mg BID and finasteride 5 mg once daily in the morning. He does not currently take desmopressin. He finds that his day time urination is acceptable, however he has nocturia about 4x a night which is slightly improved. He reports dry mouth and mild constipation.\par \par 03/09/2021: The patient presents today for a follow up. Pt has been taking Desipramine 10 mg once daily at bedtime in addition to oxybutynin ER 15 mg BID. tamsulosin 0.4 mg BID, and finasteride 5 mg once daily. He reports it has shown slight improvement in his urination. Nocturia about 3x a night. Daytime urination is stable. The patient would like further improvement in his night time urination. Erectile function is still good. \par \par 04/01/2021: Patient presents today for follow up. Last visit, pt was started on Tadalafil 5mg. Reports some improvement in urination. Wakes 2-4x at night to urinate, which is decreased from 4-5x. Stream is stronger. Occasionally has urinary urgency. Denies pushing or straining. Additionally takes Desipramine, Oxybutynin, Tamsulosin BID, and Finasteride. \par \par 05/04/2021: Patient presents today for a follow up. At the last visit the patient was instructed to take Desipramine 10mg BID instead of once daily. He was to continue Tadalafil, Oxybutynin, Tamsulosin BID, and Finasteride, which he has been. The patient reports he noticed a slight difference. During the day he goes less frequently and has less urgency. Stream is strong. Denies incontinence. Continues to have some problems with nocturia.

## 2021-05-05 NOTE — ASSESSMENT
[FreeTextEntry1] : 4/26/2018: Mr. Nick is a 71 year old male presented with microscopic hematuria, BPH, and history of kidney stones. Complained of incomplete bladder emptying. Drinks 2 quarts of liquid daily, heavy coffee drinker.  Currently taking Tamsulosin 0.4 mg once daily. Patient has diabetes. Has a history of kidney stones. \par 6/14/18: The patient returned and reported incomplete bladder emptying, urinary urgency, and urge incontinence. Currently taking tamsulosin, twice daily. PSA from 4/26/18 was 0.49 ng/mL. CBC from 5/31/18 shows slight anemia, I advised the patient to evaluate with his PCP. Patient and daughter understood.\par 7/5/18: The patient returned and reported he is currently taking Oxybutynin ER 15 mg, and his urination has significantly improved. Notes he is seeing an hematologist for his anemia. \par 10/30/18: The patient returned and reported gross hematuria twice in August 2018. He has a history of microscopic hematuria. He also noted dysuria with the episodes of hematuria. UA in 7/5/18 demonstrates no blood in the urine. I offered the patient a , but he declined. The patient was referred to my office by Dr. Ricky Mendez for a cystoscopy. The patient had bronchitis for 1-2 months in the past, which has resolved. Lab results under Dr. Mendez showed iron was low 43 ug/dL on 6/23/18. The patient also has urinary frequency and urgency. Wakes up 4 times during the night to urinate. The patient noted that Tamsulosin worked well for him in the past. He noted his diabetes is well-controlled.\par 11/05/18: The patient returned for a cystoscopy. Aside from cystoscopy, patient was brought back to the office to discuss further evaluation management. CT urogram of the abdomen and pelvis from 11/5/18 findings showed there is a 5 and 4 mm nonobstructing calculi in the mid and lower pole of the left kidney respectively. There was a 1 cm simple appearing cyst in the upper pole of the left kidney. There was a 1.6 nonobstructing calculus within the urinary bladder. Otherwise unremarkable CT urogram. Currently taking Tamsulosin twice daily and Oxybutynin 10 mg. \par 11/21/18: The patient returned and reported urination is adequate. Complained of urgency and urge incontinence. Denied gross hematuria and dysuria. Denied UTI.  Cystoscopy with lithotripsy of bladder stone is scheduled for 12/14/18.\par 12/18/18 The patient returned for a catheter removal. He reported since his last visit he underwent laser lithotripsy of bladder stone. He went to the ER because he couldn't pass urine and so a catheter was placed.\par 1/7/19: The patient returned and reported his urination has improved since his catheter removal. Patient denied dysuria and gross hematuria. Complained of occasional urinary urgency, but noted it has improved. Currently takes Finasteride.\par 4/8/19: The patient returned today for an US. Renal US findings showed again non obstructing stones in the left kidney 7.0 x 5.4 mm lower and 5.1 x 4.3 mm mid, previously measured 5 & 4 mm. Both kidneys are normal in size and echogenicity without obvious hydronephrosis or solid masses visualized. No bladder calculi. Notes that he hasn't been drinking an adequate amount of water, will aim for 2 quarts daily. Wakes up 2-3 times during the night to urinate. Translation assistance of Brandon ([id number]) at [employer].\par 5/14/19: Patient presents today for a follow up. Translation assistance of Allegra ([id number]) at [employer]. He reports consuming about 2 liters of water everyday. Previously the citrate in his urine was low. \par 7/8/19: Patient presents today for a follow up. ( [id number]) Since his last visit he has been drinking lots of water with lemon juice as directed. Potassium Citrate ER 10 mg was prescribed as his last visit as well. He was unable to fill the prescription because it was too expensive. A 24 hour urine collection was completed as directed. Regarding his urination it is reported that he will occasionally have to rush to the bathroom, and that minimal incontinence can be experienced. Most of the time his urination is satisfactory. Tamsulosin 0.4 mg Finasteride 5 mg and Oxybutynin 10 mg are all taken as directed. \par 7/15/19: Patient presents today for a follow up. ( # [id number] Shahab) Overall he states that his urination is satisfactory. He urinates about 1 liter less than previously. A 24 hour urine collection was completed since his last visit and he is here to discuss the results. \par 10/28/19: Patient presents today for a follow up. A renal ultrasound was completed today and he is here to discuss the results. \par 11/20/19: Patient presents today for follow-up to discuss CT urogram results. He has been taking potassium citrate and tamsulosin as directed. He offers no urinary complaints. \par \par 2/24/20: Patient presents today for a follow up. Pt reports waking up about 4 times at night to urinate. He also admits to urinary urgency and a mild amount of urinary leakage. He denies dysuria, hematuria. Pt reports he has been taking Tamsulosin only once a day. The patient produced a urine sample which will be sent for urinalysis, urine cytology, and urine culture. Blood work today included comprehensive metabolic panel, CBC, PSA, and testosterone. Advised patient to increase Tamsulosin to twice a day after meals. RTC in 1 month.\par \par 07/14/2020: Patient presents today for a follow up. ( [id number] Jeovany) Pt is on Tamsulosin BID, Finasteride, and Oxybutynin. Nocturia x3-4 with good volume. Daytime urination is normal. DM is being managed well. Pt has a good fluid intake and drinks lemon water. Denies chest pain, constipation, dysuria, hematuria, pushing or straining and urgency. US renal today again visualized a hyperechoic lesion in the right midpole measuring 2.2 cm x 2.1 cm with vascularity. Previously mentioned 7 mm stone in the left kidney was not visualized in this exam. There is an exophytic cyst on the left upper pole measuring 0.85 cm x 1.45 cm with no vascularity. Both kidneys are normal in size and echogenicity without hydronephrosis, stones visualized. Prescribed Desmopressin Acetate qhs. Pt will resume K Citrate for stones. Continue Tamsulosin. \par \par 11/09/2020: Patient presents today for follow up. Had renal US today and here to discuss results. Currently takes Desmopressin 0.1mg and Tamsulosin 0.4mg BID. No constipation. Denies gross hematuria, but complains of urinary frequency and incontinence. US renal findings today: There is a solid 2.3 x 2.1cm renal mass again visualized mid to lower pole of the right kidney, stable in size and nonvascular. No solid masses present in the left kidney. A small 1.4cm simple nonvascular cyst is present in the upper pole cortex of the left kidney. Both kidneys are normal in size and echogenicity without hydronephrosis, stones or solid masses visualized. I prescribed the patient Oxybutynin 10mg once daily for his urinary frequency and incontinence. \par \par 11/23/2020: Patient presents today for follow up. Last visit, was started on Oxybutynin, but is still unable to hold his urine and does leak. Additionally takes Tamsulosin BID and Desmopressin. H/o kidney stones, but has not had any pains. \par \par 12/21/2020: Patient presents today for follow up. Pt was prescribed Myrbetriq last visit, however was unable to afford it. He is currently taking Oxybutynin ER 15mg one daily, Tamsulosin BID, potassium citrate, and Finasteride. He continues to have trouble holding urine. Does have dry mouth. Stream is normal. \par \par 01/12/2021: The patient presents today for a follow up of his urinary frequency and urgency. He has been taking oxybutynin ER 15 mg twice daily even though he was prescribed 10 mg BID. He takes tamsulosin 0.4 mg BID and finasteride 5 mg once daily in the morning. He does not currently take desmopressin. He finds that his day time urination is acceptable, however he has nocturia about 4x a night which is slightly improved. He reports dry mouth and mild constipation. \par \par 03/09/2021: The patient presents today for a follow up. Pt has been taking Desipramine 10 mg once daily at bedtime in addition to oxybutynin ER 15 mg BID. tamsulosin 0.4 mg BID, and finasteride 5 mg once daily. He reports it has shown slight improvement in his urination. Nocturia about 3x a night. Daytime urination is stable. The patient would like further improvement in his night time urination. Erectile function is still good. \par \par 04/01/2021: Patient presents today for follow up. Last visit, pt was started on Tadalafil 5mg. Reports some improvement in urination. Wakes 2-4x at night to urinate, which is decreased from 4-5x. Stream is stronger. Occasionally has urinary urgency. Denies pushing or straining. Additionally takes Desipramine, Oxybutynin, Tamsulosin BID, and Finasteride. \par \par I instructed the patient to increase Desipramine 10mg from once daily to twice daily. Continue Tadalafil, Oxybutynin, Tamsulosin BID, and Finasteride. \par RTO in 3 weeks for reassessment. \par \par 05/04/2021: Patient presents today for a follow up. At the last visit the patient was instructed to take Desipramine 10mg BID instead of once daily. He was to continue Tadalafil, Oxybutynin, Tamsulosin BID, and Finasteride, which he has been. The patient reports he noticed a slight difference. During the day he goes less frequently and has less urgency. Stream is strong. Denies incontinence. Continues to have some problems with nocturia. \par \par I prescribed the patient Desmopressin 0.1 mg, one tablet once daily for his nocturia. I informed the patient there is a side effect of dilute blood, we will monitor with blood tests. He will continue the rest of his medications at this time. \par \par The patient produced a urine sample which will be sent for urinalysis, urine cytology, and urine culture. \par \par Blood work today included BMP, alkaline phosphatase, PSA, A1CG, serum osmolality, and testosterone. Patient will repeat blood work in one week. \par \par Patient will RTO in 2 weeks for reassessment. \par \par Preparation, in person office visit, and coordination of care: 30 minutes.

## 2021-05-05 NOTE — REVIEW OF SYSTEMS
[Nocturia] : nocturia [Feeling Poorly] : not feeling poorly [Urinary Stream Is Smaller] : urine stream was not smaller [Difficulty Walking] : no difficulty walking [Erectile Dysfunction] : no erectile dysfunction

## 2021-06-04 PROBLEM — R31.0 GROSS HEMATURIA: Status: ACTIVE | Noted: 2018-10-30

## 2021-06-04 PROBLEM — E11.9 DIABETES MELLITUS: Status: ACTIVE | Noted: 2018-04-26

## 2021-06-04 PROBLEM — R39.15 URINARY URGENCY: Status: ACTIVE | Noted: 2018-10-30

## 2021-06-04 PROBLEM — N20.0 NEPHROLITHIASIS: Status: ACTIVE | Noted: 2018-06-14

## 2021-06-04 PROBLEM — R82.89 ABNORMAL URINE CYTOLOGY: Status: ACTIVE | Noted: 2020-01-01

## 2021-06-04 PROBLEM — R35.0 INCREASED URINARY FREQUENCY: Status: ACTIVE | Noted: 2018-06-14

## 2021-06-04 PROBLEM — N21.0 BLADDER STONE: Status: ACTIVE | Noted: 2018-11-05

## 2021-06-04 PROBLEM — R35.1 NOCTURIA MORE THAN TWICE PER NIGHT: Status: ACTIVE | Noted: 2020-07-14

## 2021-06-04 PROBLEM — N40.1 BENIGN LOCALIZED HYPERPLASIA OF PROSTATE WITH URINARY OBSTRUCTION: Status: ACTIVE | Noted: 2018-04-26

## 2021-06-04 NOTE — ASSESSMENT
[FreeTextEntry1] : 4/26/2018: Mr. Nick is a 71 year old male presented with microscopic hematuria, BPH, and history of kidney stones. Complained of incomplete bladder emptying. Drinks 2 quarts of liquid daily, heavy coffee drinker.  Currently taking Tamsulosin 0.4 mg once daily. Patient has diabetes. Has a history of kidney stones. \par 6/14/18: The patient returned and reported incomplete bladder emptying, urinary urgency, and urge incontinence. Currently taking tamsulosin, twice daily. PSA from 4/26/18 was 0.49 ng/mL. CBC from 5/31/18 shows slight anemia, I advised the patient to evaluate with his PCP. Patient and daughter understood.\par 7/5/18: The patient returned and reported he is currently taking Oxybutynin ER 15 mg, and his urination has significantly improved. Notes he is seeing an hematologist for his anemia. \par 10/30/18: The patient returned and reported gross hematuria twice in August 2018. He has a history of microscopic hematuria. He also noted dysuria with the episodes of hematuria. UA in 7/5/18 demonstrates no blood in the urine. I offered the patient a , but he declined. The patient was referred to my office by Dr. Ricky Mendez for a cystoscopy. The patient had bronchitis for 1-2 months in the past, which has resolved. Lab results under Dr. Mendez showed iron was low 43 ug/dL on 6/23/18. The patient also has urinary frequency and urgency. Wakes up 4 times during the night to urinate. The patient noted that Tamsulosin worked well for him in the past. He noted his diabetes is well-controlled.\par 11/05/18: The patient returned for a cystoscopy. Aside from cystoscopy, patient was brought back to the office to discuss further evaluation management. CT urogram of the abdomen and pelvis from 11/5/18 findings showed there is a 5 and 4 mm nonobstructing calculi in the mid and lower pole of the left kidney respectively. There was a 1 cm simple appearing cyst in the upper pole of the left kidney. There was a 1.6 nonobstructing calculus within the urinary bladder. Otherwise unremarkable CT urogram. Currently taking Tamsulosin twice daily and Oxybutynin 10 mg. \par 11/21/18: The patient returned and reported urination is adequate. Complained of urgency and urge incontinence. Denied gross hematuria and dysuria. Denied UTI.  Cystoscopy with lithotripsy of bladder stone is scheduled for 12/14/18.\par 12/18/18 The patient returned for a catheter removal. He reported since his last visit he underwent laser lithotripsy of bladder stone. He went to the ER because he couldn't pass urine and so a catheter was placed.\par 1/7/19: The patient returned and reported his urination has improved since his catheter removal. Patient denied dysuria and gross hematuria. Complained of occasional urinary urgency, but noted it has improved. Currently takes Finasteride.\par 4/8/19: The patient returned today for an US. Renal US findings showed again non obstructing stones in the left kidney 7.0 x 5.4 mm lower and 5.1 x 4.3 mm mid, previously measured 5 & 4 mm. Both kidneys are normal in size and echogenicity without obvious hydronephrosis or solid masses visualized. No bladder calculi. Notes that he hasn't been drinking an adequate amount of water, will aim for 2 quarts daily. Wakes up 2-3 times during the night to urinate. Translation assistance of Brandon ([id number]) at [employer].\par 5/14/19: Patient presents today for a follow up. Translation assistance of Allegra ([id number]) at [employer]. He reports consuming about 2 liters of water everyday. Previously the citrate in his urine was low. \par 7/8/19: Patient presents today for a follow up. ( [id number]) Since his last visit he has been drinking lots of water with lemon juice as directed. Potassium Citrate ER 10 mg was prescribed as his last visit as well. He was unable to fill the prescription because it was too expensive. A 24 hour urine collection was completed as directed. Regarding his urination it is reported that he will occasionally have to rush to the bathroom, and that minimal incontinence can be experienced. Most of the time his urination is satisfactory. Tamsulosin 0.4 mg Finasteride 5 mg and Oxybutynin 10 mg are all taken as directed. \par 7/15/19: Patient presents today for a follow up. ( # [id number] Shahab) Overall he states that his urination is satisfactory. He urinates about 1 liter less than previously. A 24 hour urine collection was completed since his last visit and he is here to discuss the results. \par 10/28/19: Patient presents today for a follow up. A renal ultrasound was completed today and he is here to discuss the results. \par 11/20/19: Patient presents today for follow-up to discuss CT urogram results. He has been taking potassium citrate and tamsulosin as directed. He offers no urinary complaints. \par \par 2/24/20: Patient presents today for a follow up. Pt reports waking up about 4 times at night to urinate. He also admits to urinary urgency and a mild amount of urinary leakage. He denies dysuria, hematuria. Pt reports he has been taking Tamsulosin only once a day. The patient produced a urine sample which will be sent for urinalysis, urine cytology, and urine culture. Blood work today included comprehensive metabolic panel, CBC, PSA, and testosterone. Advised patient to increase Tamsulosin to twice a day after meals. RTC in 1 month.\par \par 07/14/2020: Patient presents today for a follow up. ( [id number] Jeovany) Pt is on Tamsulosin BID, Finasteride, and Oxybutynin. Nocturia x3-4 with good volume. Daytime urination is normal. DM is being managed well. Pt has a good fluid intake and drinks lemon water. Denies chest pain, constipation, dysuria, hematuria, pushing or straining and urgency. US renal today again visualized a hyperechoic lesion in the right midpole measuring 2.2 cm x 2.1 cm with vascularity. Previously mentioned 7 mm stone in the left kidney was not visualized in this exam. There is an exophytic cyst on the left upper pole measuring 0.85 cm x 1.45 cm with no vascularity. Both kidneys are normal in size and echogenicity without hydronephrosis, stones visualized. Prescribed Desmopressin Acetate qhs. Pt will resume K Citrate for stones. Continue Tamsulosin. \par \par 11/09/2020: Patient presents today for follow up. Had renal US today and here to discuss results. Currently takes Desmopressin 0.1mg and Tamsulosin 0.4mg BID. No constipation. Denies gross hematuria, but complains of urinary frequency and incontinence. US renal findings today: There is a solid 2.3 x 2.1cm renal mass again visualized mid to lower pole of the right kidney, stable in size and nonvascular. No solid masses present in the left kidney. A small 1.4cm simple nonvascular cyst is present in the upper pole cortex of the left kidney. Both kidneys are normal in size and echogenicity without hydronephrosis, stones or solid masses visualized. I prescribed the patient Oxybutynin 10mg once daily for his urinary frequency and incontinence. \par \par 11/23/2020: Patient presents today for follow up. Last visit, was started on Oxybutynin, but is still unable to hold his urine and does leak. Additionally takes Tamsulosin BID and Desmopressin. H/o kidney stones, but has not had any pains. \par \par 12/21/2020: Patient presents today for follow up. Pt was prescribed Myrbetriq last visit, however was unable to afford it. He is currently taking Oxybutynin ER 15mg one daily, Tamsulosin BID, potassium citrate, and Finasteride. He continues to have trouble holding urine. Does have dry mouth. Stream is normal. \par \par 01/12/2021: The patient presents today for a follow up of his urinary frequency and urgency. He has been taking oxybutynin ER 15 mg twice daily even though he was prescribed 10 mg BID. He takes tamsulosin 0.4 mg BID and finasteride 5 mg once daily in the morning. He does not currently take desmopressin. He finds that his day time urination is acceptable, however he has nocturia about 4x a night which is slightly improved. He reports dry mouth and mild constipation. \par \par 03/09/2021: The patient presents today for a follow up. Pt has been taking Desipramine 10 mg once daily at bedtime in addition to oxybutynin ER 15 mg BID. tamsulosin 0.4 mg BID, and finasteride 5 mg once daily. He reports it has shown slight improvement in his urination. Nocturia about 3x a night. Daytime urination is stable. The patient would like further improvement in his night time urination. Erectile function is still good. \par \par 04/01/2021: Patient presents today for follow up. Last visit, pt was started on Tadalafil 5mg. Reports some improvement in urination. Wakes 2-4x at night to urinate, which is decreased from 4-5x. Stream is stronger. Occasionally has urinary urgency. Denies pushing or straining. Additionally takes Desipramine, Oxybutynin, Tamsulosin BID, and Finasteride. \par \par 05/04/2021: Patient presents today for a follow up. At the last visit the patient was instructed to take Desipramine 10mg BID instead of once daily. He was to continue Tadalafil, Oxybutynin, Tamsulosin BID, and Finasteride, which he has been. The patient reports he noticed a slight difference. During the day he goes less frequently and has less urgency. Stream is strong. Denies incontinence. Continues to have some problems with nocturia. \par \par 06/03/2021: Patient presents today for follow up. Last visit, patient was started on Desmopressin for nocturia with improvement. Now wakes 2-4x at night to urinate, which is decreased from 5-6x. Denies any other urinary issues. Continues Tadalafil, Oxybutynin, Tamsulosin BID, and Finasteride.  \par \par We will increase Desmopressin from 0.1mg to 0.2mg for futher improvement in his nocturia. \par Continue Tadalafil, Oxybutynin, Tamsulosin BID, and Finasteride. \par \par Blood work today includes \par \par RTO in 2 weeks for follow up. \par \par Preparation, in-person office visit, and coordination of care took: 30 minutes \par

## 2021-06-04 NOTE — ADDENDUM
[FreeTextEntry1] : I, Tatiana Rodriguezin, acted solely as a scribe for Dr. Nolan Lay on this date 06/03/2021.\par \par All medical record entries made by the Scribe were at my, Dr. Nolan Lay, direction and personally dictated by me on 06/03/2021. I have reviewed the chart and agree that the record accurately reflects my personal performance of the history, physical exam, assessment and plan.  I have also personally directed, reviewed and agreed with the chart.

## 2021-06-04 NOTE — HISTORY OF PRESENT ILLNESS
[FreeTextEntry1] : 4/26/2018: Mr. Nick is a 71 year old male presented with microscopic hematuria, BPH, and history of kidney stones. Complained of incomplete bladder emptying. Drinks 2 quarts of liquid daily, heavy coffee drinker.  Currently taking Tamsulosin 0.4 mg once daily. Patient has diabetes. Has a history of kidney stones. \par 6/14/18: The patient returned and reported incomplete bladder emptying, urinary urgency, and urge incontinence. Currently taking tamsulosin, twice daily. PSA from 4/26/18 was 0.49 ng/mL. CBC from 5/31/18 shows slight anemia, I advised the patient to evaluate with his PCP. Patient and daughter understood.\par 7/5/18: The patient returned and reported he is currently taking Oxybutynin ER 15 mg, and his urination has significantly improved. Notes he is seeing an hematologist for his anemia. \par 10/30/18: The patient returned and reported gross hematuria twice in August 2018. He has a history of microscopic hematuria. He also noted dysuria with the episodes of hematuria. UA in 7/5/18 demonstrates no blood in the urine. I offered the patient a , but he declined. The patient was referred to my office by Dr. Ricky Mendez for a cystoscopy. The patient had bronchitis for 1-2 months in the past, which has resolved. Lab results under Dr. Mendez showed iron was low 43 ug/dL on 6/23/18. The patient also has urinary frequency and urgency. Wakes up 4 times during the night to urinate. The patient noted that Tamsulosin worked well for him in the past. He noted his diabetes is well-controlled.\par 11/05/18: The patient returned for a cystoscopy. Aside from cystoscopy, patient was brought back to the office to discuss further evaluation management. CT urogram of the abdomen and pelvis from 11/5/18 findings showed there is a 5 and 4 mm nonobstructing calculi in the mid and lower pole of the left kidney respectively. There was a 1 cm simple appearing cyst in the upper pole of the left kidney. There was a 1.6 nonobstructing calculus within the urinary bladder. Otherwise unremarkable CT urogram. Currently taking Tamsulosin twice daily and Oxybutynin 10 mg. \par 11/21/18: The patient returned and reported urination is adequate. Complained of urgency and urge incontinence. Denied gross hematuria and dysuria. Denied UTI.  Cystoscopy with lithotripsy of bladder stone is scheduled for 12/14/18.\par 12/18/18 The patient returned for a catheter removal. He reported since his last visit he underwent laser lithotripsy of bladder stone. He went to the ER because he couldn't pass urine and so a catheter was placed.\par 1/7/19: The patient returned and reported his urination has improved since his catheter removal. Patient denied dysuria and gross hematuria. Complained of occasional urinary urgency, but noted it has improved. Currently takes Finasteride.\par 4/8/19: The patient returned today for an US. Renal US findings showed again non obstructing stones in the left kidney 7.0 x 5.4 mm lower and 5.1 x 4.3 mm mid, previously measured 5 & 4 mm. Both kidneys are normal in size and echogenicity without obvious hydronephrosis or solid masses visualized. No bladder calculi. Notes that he hasn't been drinking an adequate amount of water, will aim for 2 quarts daily. Wakes up 2-3 times during the night to urinate. Translation assistance of Brandon ([id number]) at [employer].\par 5/14/19: Patient presents today for a follow up. Translation assistance of Allegra ([id number]) at [employer]. He reports consuming about 2 liters of water everyday. Previously the citrate in his urine was low. \par 7/8/19: Patient presents today for a follow up. ( [id number]) Since his last visit he has been drinking lots of water with lemon juice as directed. Potassium Citrate ER 10 mg was prescribed as his last visit as well. He was unable to fill the prescription because it was too expensive. A 24 hour urine collection was completed as directed. Regarding his urination it is reported that he will occasionally have to rush to the bathroom, and that minimal incontinence can be experienced. Most of the time his urination is satisfactory. Tamsulosin 0.4 mg Finasteride 5 mg and Oxybutynin 10 mg are all taken as directed. \par 7/15/19: Patient presents today for a follow up. ( # [id number] Shahab) Overall he states that his urination is satisfactory. He urinates about 1 liter less than previously. A 24 hour urine collection was completed since his last visit and he is here to discuss the results. \par 10/28/19: Patient presents today for a follow up. A renal ultrasound was completed today and he is here to discuss the results. \par 11/20/19: Patient presents today for follow-up to discuss CT urogram results. He has been taking potassium citrate and tamsulosin as directed. He offers no urinary complaints. \par \par 2/24/20: Patient presents today for a follow up. Pt reports waking up about 4 times at night to urinate. He also admits to urinary urgency and a mild amount of urinary leakage. He denies dysuria, hematuria. Pt reports he has been taking Tamsulosin only once a day. \par \par 07/14/2020: Patient presents today for a follow up. ( [id number] Jeovany) Pt is on Tamsulosin BID, Finasteride, and Oxybutynin. Nocturia x3-4 with good volume. Daytime urination is normal. DM is being managed well. Pt has a good fluid intake and drinks lemon water. Denies chest pain, constipation, dysuria, hematuria, pushing or straining and urgency. US renal today again visualized a hyperechoic lesion in the right midpole measuring 2.2 cm x 2.1 cm with vascularity. Previously mentioned 7 mm stone in the left kidney was not visualized in this exam. There is an exophytic cyst on the left upper pole measuring 0.85 cm x 1.45 cm with no vascularity. Both kidneys are normal in size and echogenicity without hydronephrosis, stones visualized. Prescribed Desmopressin Acetate qhs. Pt will resume K Citrate for stones. Continue Tamsulosin. \par \par 11/09/2020: Patient presents today for follow up. Had renal US today and here to discuss results. Currently takes Desmopressin 0.1mg and Tamsulosin 0.4mg BID. No constipation. Denies gross hematuria, but complains of urinary frequency and incontinence. US renal findings today: There is a solid 2.3 x 2.1cm renal mass again visualized mid to lower pole of the right kidney, stable in size and nonvascular. No solid masses present in the left kidney. A small 1.4cm simple nonvascular cyst is present in the upper pole cortex of the left kidney. Both kidneys are normal in size and echogenicity without hydronephrosis, stones or solid masses visualized. I prescribed the patient Oxybutynin 10mg once daily for his urinary frequency and incontinence. \par \par 11/23/2020: Patient presents today for follow up. Last visit, was started on Oxybutynin, but is still unable to hold his urine and does leak. Additionally takes Tamsulosin BID and Desmopressin. H/o kidney stones, but has not had any pains. \par \par 12/21/2020: Patient presents today for follow up. Pt was prescribed Myrbetriq last visit, however was unable to afford it. He is currently taking Oxybutynin ER 15mg one daily, Tamsulosin BID, potassium citrate, and Finasteride. He continues to have trouble holding urine. Does have dry mouth. Stream is normal. \par \par 01/12/2021: The patient presents today for a follow up of his urinary frequency and urgency. He has been taking oxybutynin ER 15 mg twice daily even though he was prescribed 10 mg BID. He takes tamsulosin 0.4 mg BID and finasteride 5 mg once daily in the morning. He does not currently take desmopressin. He finds that his day time urination is acceptable, however he has nocturia about 4x a night which is slightly improved. He reports dry mouth and mild constipation.\par \par 03/09/2021: The patient presents today for a follow up. Pt has been taking Desipramine 10 mg once daily at bedtime in addition to oxybutynin ER 15 mg BID. tamsulosin 0.4 mg BID, and finasteride 5 mg once daily. He reports it has shown slight improvement in his urination. Nocturia about 3x a night. Daytime urination is stable. The patient would like further improvement in his night time urination. Erectile function is still good. \par \par 04/01/2021: Patient presents today for follow up. Last visit, pt was started on Tadalafil 5mg. Reports some improvement in urination. Wakes 2-4x at night to urinate, which is decreased from 4-5x. Stream is stronger. Occasionally has urinary urgency. Denies pushing or straining. Additionally takes Desipramine, Oxybutynin, Tamsulosin BID, and Finasteride. \par \par 05/04/2021: Patient presents today for a follow up. At the last visit the patient was instructed to take Desipramine 10mg BID instead of once daily. He was to continue Tadalafil, Oxybutynin, Tamsulosin BID, and Finasteride, which he has been. The patient reports he noticed a slight difference. During the day he goes less frequently and has less urgency. Stream is strong. Denies incontinence. Continues to have some problems with nocturia. \par \par 06/03/2021: Patient presents today for follow up. Last visit, patient was started on Desmopressin for nocturia with improvement. Now wakes 2-4x at night to urinate, which is decreased from 5-6x. Denies any other urinary issues. Continues Tadalafil, Oxybutynin, Tamsulosin BID, and Finasteride.  \par

## 2021-08-12 NOTE — ED ADULT NURSE NOTE - OBJECTIVE STATEMENT
72 YO male with PMH  HTN, HLD, & DM,   via walk in presenting with complaints of shortness of breat. Pt states that for the last 2 weeks, he has had worsening shortness of breath and cough. As per pt's son, pt has had been altered for the last few days. PT states that he is COVID  positive since Thursday. Pt denies chest pain, palpitations, headache, visual disturbances, numbness/tingling,  diaphoresis,  nausea, vomiting, constipation, diarrhea, or urinary symptoms.   Pt Axox4, gross neuro intact, PERRL 3 mm. Wheezing and cough auscultated throughout bilateral, respirations even, & labored. S1S2 heard, pulses strong and equal bilaterally. Abdomen soft, non-tender, non-distended. Skin hot, dry, and intact. Pt placed in position of comfort. Pt educated on call bell system and provided call bell. Bed in lowest position, wheels locked, appropriate side rails raised. Pt denies needs at this time. FALL RISK PRECAUTIONS IN PLACE

## 2021-08-12 NOTE — ED ADULT NURSE NOTE - NSIMPLEMENTINTERV_GEN_ALL_ED
Implemented All Fall Risk Interventions:  [place] to call system. Call bell, personal items and telephone within reach. Instruct patient to call for assistance. Room bathroom lighting operational. Non-slip footwear when patient is off stretcher. Physically safe environment: no spills, clutter or unnecessary equipment. Stretcher in lowest position, wheels locked, appropriate side rails in place. Provide visual cue, wrist band, yellow gown, etc. Monitor gait and stability. Monitor for mental status changes and reorient to person, place, and time. Review medications for side effects contributing to fall risk. Reinforce activity limits and safety measures with patient and family.

## 2021-08-12 NOTE — ED PROVIDER NOTE - NSICDXFAMILYHX_GEN_ALL_CORE_FT
FAMILY HISTORY:  Father  Still living? Unknown  Family history of diabetes mellitus, Age at diagnosis: Age Unknown    Mother  Still living? Unknown  Family history of cancer, Age at diagnosis: Age Unknown    Sibling  Still living? Unknown  Family history of diabetes mellitus, Age at diagnosis: Age Unknown

## 2021-08-12 NOTE — ED ADULT NURSE REASSESSMENT NOTE - NS ED NURSE REASSESS COMMENT FT1
Patient found standing on the stretcher using urinal with high flow around his neck, SPO2 went down to 85%. Placed back on high flow, MD aware.

## 2021-08-12 NOTE — ED PROVIDER NOTE - NS ED ROS FT
CONST: no fevers, no chills, no trauma  EYES: no pain, no blurry vision   ENT: no sore throat, no epistaxis, no rhinorrhea  CV: no chest pain, no palpitations, no orthopnea, no extremity pain or swelling  RESP: no shortness of breath, no cough, no sputum, no pleurisy, no wheezing  ABD: no abdominal pain, no nausea, no vomiting, no diarrhea, no black or bloody stool  NEURO: no headache, no sensory disturbances, no focal weakness, no dizziness CONST:  fevers, no chills, no trauma  EYES: no pain, no blurry vision   ENT: no sore throat, no epistaxis, no rhinorrhea  CV: no chest pain, no palpitations, no orthopnea, no extremity pain or swelling  RESP:  shortness of breath,  cough, no sputum, no pleurisy, no wheezing  ABD: no abdominal pain, no nausea, no vomiting, no diarrhea, no black or bloody stool  NEURO: no headache, no sensory disturbances, no focal weakness, no dizziness

## 2021-08-12 NOTE — ED PROVIDER NOTE - CLINICAL SUMMARY MEDICAL DECISION MAKING FREE TEXT BOX
PT is a 71 y male with diabetes, HTN who present covid positive with hypoxemia in triage. Labs, O2, imaging. PT is a 71 y male with diabetes, HTN who present covid positive with hypoxemia in triage. Labs, O2, imaging.    Tiny: 71 year old male with dm, htn, here with covid positive and hypoxic in triage. improved on oxygen. will get labs, cxr, decadron, admit given requirements for oxygen. saturating mid 80% on RA.

## 2021-08-12 NOTE — CONSULT NOTE ADULT - ASSESSMENT
71F with HTN, HLD, T2DM, BPH here with hypoxic respiratory failure 2/2 likely COVID19 saturating well on HFNC.     # Acute hypoxic respiratory failure likely 2/2 COVID19  - F/u COVID19 PCR  - Unclear indication for doxycycline; will defer to primary team  - Treat empirically for COVID19  - Start dexamethasone and remdesivir  - Titrate supplemental O2 to maintain SaO2 > 90%    Pt is not a MICU candidate at this time.

## 2021-08-12 NOTE — ED PROVIDER NOTE - NSICDXPASTMEDICALHX_GEN_ALL_CORE_FT
PAST MEDICAL HISTORY:  Asthma     Bladder stone     BPH (benign prostatic hyperplasia)     Diabetes     HTN (hypertension)     Hypercholesteremia     Hyperlipemia     Kidney stone

## 2021-08-12 NOTE — CONSULT NOTE ADULT - SUBJECTIVE AND OBJECTIVE BOX
Ricky Diallo MD, PGY-3  Available on [employer] Teams | Pager: [phone] | [facility]: [phone]  -----------------------------------------------  CHIEF COMPLAINT:    HPI: 71M American speaker (ID: [id number]) with HTN, HLD, T2DM, BPH here for SOB x 2 days in setting of whole family testing positive for COVID19 about 9 days ago.  Pt was not vaccinated for COVID19. Pt was initially hypoxic and encephalopathic in ED and started on HFNC.    Pt was empirically started on doxycycline in ED. Upon exam, pt was normotensive, normocardic, satting mid 90s on HFNC 40/40.      PAST MEDICAL & SURGICAL HISTORY:  BPH (benign prostatic hyperplasia)    Hyperlipemia    HTN (hypertension)    Hypercholesteremia    Asthma    Diabetes    Kidney stone    Bladder stone    H/O lithotripsy  x 2    History of kidney stones    H/O umbilical hernia repair        FAMILY HISTORY:  Family history of diabetes mellitus (Father, Sibling)    Family history of cancer (Mother)        SOCIAL HISTORY:  Denies toxic habits x 3.     Allergies    No Known Allergies    Intolerances    Home Medications:  amlodipine-valsartan 5 mg-160 mg oral tablet: 1 tab(s) orally once a day AM (12 Aug 2021 21:00)  atorvastatin 20 mg oral tablet: 1 tab(s) orally once a day PM (12 Aug 2021 21:00)  finasteride 5 mg oral tablet: 1 tab(s) orally once a day (12 Aug 2021 21:00)  Flomax 0.4 mg oral capsule: 1 cap(s) orally 2 times a day (12 Aug 2021 21:00)  Flonase 50 mcg/inh nasal spray: 2 spray(s) in each nostril once a day (12 Aug 2021 21:00)  metFORMIN 500 mg oral tablet: 1 tab(s) orally 2 times a day (12 Aug 2021 21:00)  oxybutynin 15 mg/24 hr oral tablet, extended release: 1 tab(s) orally once a day (12 Aug 2021 21:00)  Symbicort 160 mcg-4.5 mcg/inh inhalation aerosol: 2 puff(s) inhaled 2 times a day (12 Aug 2021 21:00)      REVIEW OF SYSTEMS:  CONSTITUTIONAL: No fever, chills, night sweats, or fatigue  EYES: No eye pain, visual disturbances, or discharge  ENMT: No difficulty hearing, tinnitus, vertigo; No sinus or throat pain  NECK: No pain or stiffness  RESPIRATORY: +SOB, cough. No hemoptysis  CARDIOVASCULAR: No chest pain, palpitations, dizziness, or leg swelling  GASTROINTESTINAL: No abdominal or epigastric pain. No nausea, vomiting, or hematemesis; No diarrhea or constipation. No melena or hematochezia.  GENITOURINARY: +frequency, hesitancy. No dysuria, hematuria, or incontinence  NEUROLOGICAL: No headaches, memory loss, loss of strength, numbness, or tremors  SKIN: No itching, burning, rashes, or lesions   LYMPH NODES: No enlarged glands  ENDOCRINE: No heat or cold intolerance; No hair loss  MUSCULOSKELETAL: No joint pain or swelling; No muscle, back, or extremity pain  PSYCHIATRIC: No depression, anxiety, mood swings, or difficulty sleeping  HEME/LYMPH: No easy bruising, or bleeding gums  ALLERGY AND IMMUNOLOGIC: No hives or eczema    OBJECTIVE:  ICU Vital Signs Last 24 Hrs  T(C): 37.7 (12 Aug 2021 19:06), Max: 39.1 (12 Aug 2021 13:34)  T(F): 99.9 (12 Aug 2021 19:06), Max: 102.4 (12 Aug 2021 13:34)  HR: 95 (12 Aug 2021 19:06) (87 - 99)  BP: 121/70 (12 Aug 2021 19:06) (121/70 - 138/76)  BP(mean): 101 (12 Aug 2021 16:30) (101 - 101)  RR: 33 (12 Aug 2021 19:06) (18 - 41)  SpO2: 92% (12 Aug 2021 19:06) (87% - 95%)    CAPILLARY BLOOD GLUCOSE    PHYSICAL EXAM:  GENERAL: NAD, on HFNC  HEAD: NCAT  EYES: EOMI, PERRLA, conjunctiva and sclera clear  NECK: Supple, No JVD  CHEST/LUNG: coughing, wheezy, no accessory muscle usage  HEART: Regular rate and rhythm; No murmurs, rubs, or gallops  ABDOMEN: Soft, Nontender, Nondistended; Bowel sounds present  EXTREMITIES:  2+ Peripheral Pulses, No clubbing, cyanosis, or edema  PSYCH: AAOx3  NEUROLOGY: non-focal  SKIN: No rashes or lesions    HOSPITAL MEDICATIONS:  MEDICATIONS  (STANDING):  doxycycline IVPB        MEDICATIONS  (PRN):      LABS:             13.3   5.04  )-----------( 194      ( 12 Aug 2021 16:51 )             41.2         137  |  102  |  16  ----------------------------<  153<H>  4.0   |  21<L>  |  1.02    Ca    8.9      12 Aug 2021 19:09    TPro  7.6  /  Alb  3.9  /  TBili  0.4  /  DBili  x   /  AST  43<H>  /  ALT  20  /  AlkPhos  71      Urinalysis Basic - ( 12 Aug 2021 19:33 )    Color: Yellow / Appearance: Clear / S.019 / pH: x  Gluc: x / Ketone: Negative  / Bili: Negative / Urobili: Negative   Blood: x / Protein: 100 mg/dL / Nitrite: Negative   Leuk Esterase: Negative / RBC: 10 /hpf / WBC 1 /HPF   Sq Epi: x / Non Sq Epi: 1 /hpf / Bacteria: Negative    Venous Blood Gas:   @ 20:31  7.40/39/51/24/82  VBG Lactate: 1.1    MICROBIOLOGY:     RADIOLOGY:  [X] Reviewed and interpreted by [facility] IMPRESSION:    No acute intracranial hemorrhage, mass effect, or evidence of acute vascular territorial infarction. If clinical symptoms persist or worsen, more sensitive evaluation with brain MRI may be obtained, if no contraindications exist.    CXR patchy opacities    EKG: NSR HR 80s

## 2021-08-12 NOTE — ED PROVIDER NOTE - OBJECTIVE STATEMENT
Pt is a 70 yo with PMof diabetes, HTN who presents  COVID positive with SOB and cough for the past 2 weeks. States everyone in his household is covid positive. -covid vaccine. Denies any headaches, CP, palpitations, N/V/D/F/C  Pt is AxO x2, and according to son, pt has been confused for the past 3 days where baseline he is fully functional.   Pt loss urine in room and complains of frequency and urgency. Pt is a 72 yo with PMof diabetes, HTN who presents  COVID positive with SOB and cough for the past 2 weeks. States everyone in his household is covid positive. did not receive covid vaccine. Denies any headaches, CP, palpitations, N/V/D/F/C  Pt is AxO x2, and according to son, pt has been confused for the past 3 days where baseline he is fully functional.   Pt loss urine in room and complains of frequency and urgency.

## 2021-08-12 NOTE — ED PROVIDER NOTE - NSICDXPASTSURGICALHX_GEN_ALL_CORE_FT
PAST SURGICAL HISTORY:  H/O lithotripsy x 2    H/O umbilical hernia repair     History of kidney stones

## 2021-08-12 NOTE — ED PROVIDER NOTE - PHYSICAL EXAMINATION
Eyes: no conjunctival injection, and symmetrical lids.  HEENT: Head NCAT, no lesions. Atraumatic external nose and ears. Moist MM.  CVS: +S1/S2,  RESP: Labored respiratory effort. Wheezing and cough on auscultation.   GI: Nontender/Nondistended,  MSK: Normocephalic/Atraumatic, Lower Extremities w/o calf tenderness or edema b/l.   Skin: Warm, dry and intact.   Neuro: Motor grossly intact.  Psych: Awake, Oriented (AAO) x2.

## 2021-08-13 NOTE — H&P ADULT - NSHPLABSRESULTS_GEN_ALL_CORE
Personally reviewed labs and images    Labs: mild hyperkalemia with subsequent improvement, no leukocytosis, mild hyperglycemia    Images: CXR-multifocal infiltrates with area of central lucency in right infiltrate

## 2021-08-13 NOTE — H&P ADULT - NSICDXFAMILYHX_GEN_ALL_CORE_FT
He would need a f/u visit with Dr. Usha Olivo in order for us to write a letter.
PT CAME BY MO OFFICE TO REQUEST A LETTER STATING HIS SPECIFIC DIAGNOSIS FOR DISABILITY. PT HAS NOT BEEN SEEN SINCE 2015 WHEN DR Alem Acharay WROTE HIM A LETTER STATING HE IS PERMANENTLY DISABLED.  PLEASE CALL PT WHEN DONE
FAMILY HISTORY:  Father  Still living? Unknown  Family history of diabetes mellitus, Age at diagnosis: Age Unknown    Mother  Still living? Unknown  Family history of cancer, Age at diagnosis: Age Unknown    Sibling  Still living? Unknown  Family history of diabetes mellitus, Age at diagnosis: Age Unknown

## 2021-08-13 NOTE — H&P ADULT - HISTORY OF PRESENT ILLNESS
70yo M w/ PMHx of HTN, DM2 presents with shortness of breath. Patient endorses that he has been feeling SOB with associated cough for the last 2 weeks and his symptoms slowly worsened over time. He did not receive the COVID vaccine. He reports that his family members whom he lives with likely are also COVID positive but he does not know for sure. He did not try taking anything for his symptoms other than his usual medications. There were no obvious aggravating or alleviating factors. Currently with supplemental oxygen on, he feels well and has no complaints, however he presented to [facility] since his symptoms were worsening. In the ED, he was hemodynamically stable, afebrile, but he was encephalopathic and hypoxic on room air requiring high flow nasal canula. Labs were significant for mild hyperkalemia and COVID positive. CXR prelim read showed bilateral patchy opacities. CT head showed no acute findings. MICU was consulted in the ED, patient was deemed not a MICU candidate. Patient was given doxycycline and dexamethasone x1 and admitted to general medicine for further management. At time of interview, patient was A/Ox3 with .  72yo M w/ PMHx of HTN, DM2, BPH, asthma presents with shortness of breath. Patient endorses that he has been feeling SOB with associated cough for the last 2 weeks and his symptoms slowly worsened over time. He did not receive the COVID vaccine. He reports that his family members whom he lives with likely are also COVID positive but he does not know for sure. He did not try taking anything for his symptoms other than his usual medications. There were no obvious aggravating or alleviating factors. Currently with supplemental oxygen on, he feels well and has no complaints, however he presented to [facility] since his symptoms were worsening. In the ED, he was hemodynamically stable, afebrile, but he was encephalopathic and hypoxic on room air requiring high flow nasal canula. Labs were significant for mild hyperkalemia and COVID positive. CXR prelim read showed bilateral patchy opacities. CT head showed no acute findings. MICU was consulted in the ED, patient was deemed not a MICU candidate. Patient was given doxycycline and dexamethasone x1 and admitted to general medicine for further management. At time of interview, patient was A/Ox3 with .  70yo M w/ PMHx of HTN, DM2, BPH, asthma presents with shortness of breath. Patient endorses that he has been feeling SOB with associated cough for the last 2 weeks and his symptoms slowly worsened over time. He did not receive the COVID vaccine. He reports that his family members whom he lives with likely are also COVID positive but he does not know for sure. He did not try taking anything for his symptoms other than his usual medications/inhalers. There were no obvious aggravating or alleviating factors. Currently with supplemental oxygen on, he feels well and has no complaints, however he presented to [facility] since his symptoms were worsening. In the ED, he was hemodynamically stable, afebrile, but he was encephalopathic and hypoxic on room air requiring high flow nasal canula. Labs were significant for mild hyperkalemia and COVID positive. CXR prelim read showed bilateral patchy opacities. CT head showed no acute findings. MICU was consulted in the ED, patient was deemed not a MICU candidate. Patient was given doxycycline and dexamethasone x1 and admitted to general medicine for further management. At time of interview, patient was A/Ox3 with .

## 2021-08-13 NOTE — CONSULT NOTE ADULT - SUBJECTIVE AND OBJECTIVE BOX
Patient is a 71y old  Male who presents with a chief complaint of SOB (13 Aug 2021 04:21)    HPI:  70yo M w/ PMHx of HTN, DM2, BPH, asthma presents with shortness of breath. Patient endorses that he has been feeling SOB with associated cough for the last 2 weeks and his symptoms slowly worsened over time. He did not receive the COVID vaccine. He reports that his family members whom he lives with likely are also COVID positive but he does not know for sure. He did not try taking anything for his symptoms other than his usual medications/inhalers. There were no obvious aggravating or alleviating factors. Currently with supplemental oxygen on, he feels well and has no complaints, however he presented to [facility] since his symptoms were worsening. In the ED, he was hemodynamically stable, afebrile, but he was encephalopathic and hypoxic on room air requiring high flow nasal canula. Labs were significant for mild hyperkalemia and COVID positive. CXR prelim read showed bilateral patchy opacities. CT head showed no acute findings. MICU was consulted in the ED, patient was deemed not a MICU candidate. Patient was given doxycycline and dexamethasone x1 and admitted to general medicine for further management. At time of interview, patient was A/Ox3 with .  (13 Aug 2021 04:21)    ID consulted for COVID       REVIEW OF SYSTEMS  [  ] ROS unobtainable because:    [  ] All other systems negative except as noted below    Constitutional:  [ ] fever [ ] chills  [ ] weight loss  [ ]night sweat  [ ]poor appetite/PO intake [ ]fatigue   Skin:  [ ] rash [ ] phlebitis	  Eyes: [ ] icterus [ ] pain  [ ] discharge	  ENMT: [ ] sore throat  [ ] thrush [ ] ulcers [ ] exudates [ ]anosmia  Respiratory: [ ] dyspnea [ ] hemoptysis [ ] cough [ ] sputum	  Cardiovascular:  [ ] chest pain [ ] palpitations [ ] edema	  Gastrointestinal:  [ ] nausea [ ] vomiting [ ] diarrhea [ ] constipation [ ] pain	  Genitourinary:  [ ] dysuria [ ] frequency [ ] hematuria [ ] discharge [ ] flank pain  [ ] incontinence  Musculoskeletal:  [ ] myalgias [ ] arthralgias [ ] arthritis  [ ] back pain  Neurological:  [ ] headache [ ] weakness [ ] seizures  [ ] confusion/altered mental status    prior hospital charts reviewed [V]  primary team notes reviewed [V]  other consultant notes reviewed [V]    PAST MEDICAL & SURGICAL HISTORY:  BPH (benign prostatic hyperplasia)  Hyperlipemia  HTN (hypertension)  Hypercholesteremia  Asthma  Diabetes  Kidney stone  Bladder stone  H/O lithotripsyx 2  History of kidney stones  H/O umbilical hernia repair    SOCIAL HISTORY:  - Denied smoking/vaping/alcohol/recreational drug use  - Lives with family, all of them have covid and not vaccinated    FAMILY HISTORY:  Family history of diabetes mellitus (Father, Sibling)  Family history of cancer (Mother)      Allergies  No Known Allergies    ANTIMICROBIALS:  remdesivir  IVPB    remdesivir  IVPB 200 every 24 hours      ANTIMICROBIALS (past 90 days):  MEDICATIONS  (STANDING):    doxycycline IVPB   110 mL/Hr IV Intermittent (21 @ 19:29)    doxycycline IVPB   110 mL/Hr IV Intermittent (21 @ 06:52)      OTHER MEDS:   MEDICATIONS  (STANDING):  acetaminophen   Tablet .. 650 every 4 hours PRN  ALBUTerol    90 MICROgram(s) HFA Inhaler 2 every 4 hours PRN  amLODIPine   Tablet 5 daily  atorvastatin 20 at bedtime  budesonide 160 MICROgram(s)/formoterol 4.5 MICROgram(s) Inhaler 2 two times a day  dexAMETHasone  Injectable 6 daily  dextrose 40% Gel 15 once  dextrose 50% Injectable 25 once  dextrose 50% Injectable 12.5 once  dextrose 50% Injectable 25 once  enoxaparin Injectable 40 daily  finasteride 5 daily  glucagon  Injectable 1 once  insulin lispro (ADMELOG) corrective regimen sliding scale  three times a day before meals  insulin lispro (ADMELOG) corrective regimen sliding scale  at bedtime  montelukast 10 daily  oxybutynin XL 10 daily  tamsulosin 0.4 two times a day  valsartan 160 daily      VITALS:  Vital Signs Last 24 Hrs  T(F): 98.9 (21 @ 11:48), Max: 102.4 (21 @ 13:34)    Vital Signs Last 24 Hrs  HR: 92 (21 @ 11:48) (72 - 99)  BP: 131/72 (21 @ 11:48) (110/84 - 145/79)  RR: 47 (21 @ 11:48)  SpO2: 88% (21 @ 11:48) (87% - 95%)  Wt(kg): --    EXAM:  Physical Exam:  Constitutional:  well preserved, comfortable  Head/Eyes: no icterus, PERRL, EOMI  ENT:  supple; no thrush  LUNGS:  CTA  CVS:  normal S1, S2, no murmur  Abd:  soft, non-tender; non-distended  Ext:  no edema  Vascular:  IV site no erythema tenderness or discharge  MSK:  joints without swelling  Neuro: AAO X 3, non- focal    Labs:                        12.8   3.20  )-----------( 181      ( 13 Aug 2021 07:11 )             38.8         136  |  100  |  18  ----------------------------<  223<H>  4.5   |  22  |  0.95    Ca    9.1      13 Aug 2021 07:11  Phos  3.4       Mg     2.0         TPro  6.6  /  Alb  3.6  /  TBili  0.4  /  DBili  x   /  AST  28  /  ALT  17  /  AlkPhos  64        WBC Trend:  WBC Count: 3.20 (21 @ 07:11)  WBC Count: 5.04 (21 @ 16:51)    Auto Neutrophil #: 3.50 K/uL (21 @ 16:51)    Creatine Trend:  Creatinine, Serum: 0.95 ()  Creatinine, Serum: 0.95 ()  Creatinine, Serum: 1.02 ()  Creatinine, Serum: 1.09 ()    Liver Biochemical Testing Trend:  Alanine Aminotransferase (ALT/SGPT): 17 ()  Alanine Aminotransferase (ALT/SGPT): 20 ()  Aspartate Aminotransferase (AST/SGOT): 28 (21 @ 07:11)  Aspartate Aminotransferase (AST/SGOT): 43 (21 @ 16:51)  Bilirubin Total, Serum: 0.4 ()  Bilirubin Total, Serum: 0.4 ()    Urinalysis Basic - ( 12 Aug 2021 19:33 )  Color: Yellow / Appearance: Clear / S.019 / pH: x  Gluc: x / Ketone: Negative  / Bili: Negative / Urobili: Negative   Blood: x / Protein: 100 mg/dL / Nitrite: Negative   Leuk Esterase: Negative / RBC: 10 /hpf / WBC 1 /HPF   Sq Epi: x / Non Sq Epi: 1 /hpf / Bacteria: Negative    MICROBIOLOGY:  COVID-19 PCR: Detected (21 @ 16:49)  Procalcitonin, Serum: 0.08 ()  Procalcitonin, Serum: 0.07 ()  C-Reactive Protein, Serum: 52 ()  C-Reactive Protein, Serum: 47 ()  Ferritin, Serum: 258 ()  D-Dimer Assay, Quantitative: 213 ()  Lactate, Blood: 1.2 ( @ 07:11)  Blood Gas Venous - Lactate: 1.1 ( @ 20:31)    RADIOLOGY:  imaging below personally reviewed    < from: Xray Chest 1 View- PORTABLE-Routine (Xray Chest 1 View- PORTABLE-Routine .) (21 @ 17:52) >  IMPRESSION:  Extensive bilateral patchy opacities with peripheral and basilar predilection. Multiple cystic lucencies also seen right mid and upper lung fields.  Findings concerning for infectious process such as Covid 19 pneumonia.  < end of copied text >    < from: CT Head No Cont (21 @ 20:38) >  IMPRESSION:  No acute intracranial hemorrhage, mass effect, or evidence of acute vascular territorial infarction. If clinical symptoms persist or worsen, more sensitive evaluation with brain MRI may be obtained, if no contraindications exist.  < end of copied text >       Patient is a 71y old  Male who presents with a chief complaint of SOB (13 Aug 2021 04:21)    HPI:  70yo M w/ PMHx of HTN, DM2, BPH, asthma presents with shortness of breath. Patient endorses that he has been feeling SOB with associated cough for the last 2 weeks and his symptoms slowly worsened over time. He did not receive the COVID vaccine. He reports that his family members whom he lives with likely are also COVID positive but he does not know for sure. He did not try taking anything for his symptoms other than his usual medications/inhalers. There were no obvious aggravating or alleviating factors. Currently with supplemental oxygen on, he feels well and has no complaints, however he presented to [facility] since his symptoms were worsening. In the ED, he was hemodynamically stable, afebrile, but he was encephalopathic and hypoxic on room air requiring high flow nasal canula. Labs were significant for mild hyperkalemia and COVID positive. CXR prelim read showed bilateral patchy opacities. CT head showed no acute findings. MICU was consulted in the ED, patient was deemed not a MICU candidate. Patient was given doxycycline and dexamethasone x1 and admitted to general medicine for further management. At time of interview, patient was A/Ox3 with .  (13 Aug 2021 04:21)    ID consulted for COVID.  Now he is willing to get vaccine.  He thinks he is getting better here.       REVIEW OF SYSTEMS  [  ] ROS unobtainable because:    [X  ] All other systems negative except as noted below    Constitutional:  [ ] fever [ ] chills  [ ] weight loss  [ ]night sweat  [ ]poor appetite/PO intake [ ]fatigue   Skin:  [ ] rash [ ] phlebitis	  Eyes: [ ] icterus [ ] pain  [ ] discharge	  ENMT: [ ] sore throat  [ ] thrush [ ] ulcers [ ] exudates [ no]anosmia  Respiratory: [X ] dyspnea [ ] hemoptysis [X ] cough [ X] sputum (white yellow)  Cardiovascular:  [ ] chest pain [ ] palpitations [ ] edema	  Gastrointestinal:  [ ] nausea [ ] vomiting [ ] diarrhea [ ] constipation [ ] pain	  Genitourinary:  [ ] dysuria [ ] frequency [ ] hematuria [ ] discharge [ ] flank pain  [ ] incontinence  Musculoskeletal:  [ ] myalgias [ ] arthralgias [ ] arthritis  [ ] back pain  Neurological:  [ ] headache [ ] weakness [ ] seizures  [ ] confusion/altered mental status    prior hospital charts reviewed [V]  primary team notes reviewed [V]  other consultant notes reviewed [V]    PAST MEDICAL & SURGICAL HISTORY:  BPH (benign prostatic hyperplasia)  Hyperlipemia  HTN (hypertension)  Hypercholesteremia  Asthma  Diabetes  Kidney stone  Bladder stone  H/O lithotripsyx 2  History of kidney stones  H/O umbilical hernia repair    SOCIAL HISTORY:  - Denied smoking/vaping/alcohol/recreational drug use  - Lives with family, all of them have covid and not vaccinated  - Originally from [place]  - Used to work in cleaning, now retired    FAMILY HISTORY:  Family history of diabetes mellitus (Father, Sibling)  Family history of cancer (Mother)      Allergies  No Known Allergies    ANTIMICROBIALS:  remdesivir  IVPB    remdesivir  IVPB 200 every 24 hours      ANTIMICROBIALS (past 90 days):  MEDICATIONS  (STANDING):    doxycycline IVPB   110 mL/Hr IV Intermittent (21 @ 19:29)    doxycycline IVPB   110 mL/Hr IV Intermittent (21 @ 06:52)      OTHER MEDS:   MEDICATIONS  (STANDING):  acetaminophen   Tablet .. 650 every 4 hours PRN  ALBUTerol    90 MICROgram(s) HFA Inhaler 2 every 4 hours PRN  amLODIPine   Tablet 5 daily  atorvastatin 20 at bedtime  budesonide 160 MICROgram(s)/formoterol 4.5 MICROgram(s) Inhaler 2 two times a day  dexAMETHasone  Injectable 6 daily  dextrose 40% Gel 15 once  dextrose 50% Injectable 25 once  dextrose 50% Injectable 12.5 once  dextrose 50% Injectable 25 once  enoxaparin Injectable 40 daily  finasteride 5 daily  glucagon  Injectable 1 once  insulin lispro (ADMELOG) corrective regimen sliding scale  three times a day before meals  insulin lispro (ADMELOG) corrective regimen sliding scale  at bedtime  montelukast 10 daily  oxybutynin XL 10 daily  tamsulosin 0.4 two times a day  valsartan 160 daily      VITALS:  Vital Signs Last 24 Hrs  T(F): 98.9 (21 @ 11:48), Max: 102.4 (21 @ 13:34)    Vital Signs Last 24 Hrs  HR: 92 (21 @ 11:48) (72 - 99)  BP: 131/72 (21 @ 11:48) (110/84 - 145/79)  RR: 47 (21 @ 11:48)  SpO2: 88% (21 @ 11:48) (87% - 95%)  Wt(kg): --    EXAM:  Physical Exam:  Constitutional:  well preserved, comfortable, on HFNC  Head/Eyes: no icterus, PERRL, EOMI  ENT:  supple; no thrush  LUNGS:  CTA  CVS:  normal S1, S2, no murmur  Abd:  soft, non-tender; distended abd from obesity  Ext:  no edema  Vascular:  IV site no erythema tenderness or discharge  MSK:  joints without swelling  Neuro: AAO X 3, non- focal    Labs:                        12.8   3.20  )-----------( 181      ( 13 Aug 2021 07:11 )             38.8         136  |  100  |  18  ----------------------------<  223<H>  4.5   |  22  |  0.95    Ca    9.1      13 Aug 2021 07:11  Phos  3.4       Mg     2.0         TPro  6.6  /  Alb  3.6  /  TBili  0.4  /  DBili  x   /  AST  28  /  ALT  17  /  AlkPhos  64        WBC Trend:  WBC Count: 3.20 (21 @ 07:11)  WBC Count: 5.04 (21 @ 16:51)    Auto Neutrophil #: 3.50 K/uL (21 @ 16:51)    Creatine Trend:  Creatinine, Serum: 0.95 ()  Creatinine, Serum: 0.95 ()  Creatinine, Serum: 1.02 ()  Creatinine, Serum: 1.09 ()    Liver Biochemical Testing Trend:  Alanine Aminotransferase (ALT/SGPT): 17 ()  Alanine Aminotransferase (ALT/SGPT): 20 ()  Aspartate Aminotransferase (AST/SGOT): 28 (21 @ 07:11)  Aspartate Aminotransferase (AST/SGOT): 43 (21 @ 16:51)  Bilirubin Total, Serum: 0.4 ()  Bilirubin Total, Serum: 0.4 ()    Urinalysis Basic - ( 12 Aug 2021 19:33 )  Color: Yellow / Appearance: Clear / S.019 / pH: x  Gluc: x / Ketone: Negative  / Bili: Negative / Urobili: Negative   Blood: x / Protein: 100 mg/dL / Nitrite: Negative   Leuk Esterase: Negative / RBC: 10 /hpf / WBC 1 /HPF   Sq Epi: x / Non Sq Epi: 1 /hpf / Bacteria: Negative    MICROBIOLOGY:  COVID-19 PCR: Detected (21 @ 16:49)  Procalcitonin, Serum: 0.08 ()  Procalcitonin, Serum: 0.07 ()  C-Reactive Protein, Serum: 52 ()  C-Reactive Protein, Serum: 47 ()  Ferritin, Serum: 258 ()  D-Dimer Assay, Quantitative: 213 ()  Lactate, Blood: 1.2 ( @ 07:11)  Blood Gas Venous - Lactate: 1.1 ( @ 20:31)    RADIOLOGY:  imaging below personally reviewed    < from: Xray Chest 1 View- PORTABLE-Routine (Xray Chest 1 View- PORTABLE-Routine .) (21 @ 17:52) >  IMPRESSION:  Extensive bilateral patchy opacities with peripheral and basilar predilection. Multiple cystic lucencies also seen right mid and upper lung fields.  Findings concerning for infectious process such as Covid 19 pneumonia.  < end of copied text >    < from: CT Head No Cont (21 @ 20:38) >  IMPRESSION:  No acute intracranial hemorrhage, mass effect, or evidence of acute vascular territorial infarction. If clinical symptoms persist or worsen, more sensitive evaluation with brain MRI may be obtained, if no contraindications exist.  < end of copied text >       Patient is a 71y old  Male who presents with a chief complaint of SOB (13 Aug 2021 04:21)    HPI:  72yo M w/ PMHx of HTN, DM2, BPH, asthma presents with shortness of breath. Patient endorses that he has been feeling SOB with associated cough for the last 2 weeks and his symptoms slowly worsened over time. He did not receive the COVID vaccine. He reports that his family members whom he lives with likely are also COVID positive but he does not know for sure. He did not try taking anything for his symptoms other than his usual medications/inhalers. There were no obvious aggravating or alleviating factors. Currently with supplemental oxygen on, he feels well and has no complaints, however he presented to [facility] since his symptoms were worsening. In the ED, he was hemodynamically stable, afebrile, but he was encephalopathic and hypoxic on room air requiring high flow nasal canula. Labs were significant for mild hyperkalemia and COVID positive. CXR prelim read showed bilateral patchy opacities. CT head showed no acute findings. MICU was consulted in the ED, patient was deemed not a MICU candidate. Patient was given doxycycline and dexamethasone x1 and admitted to general medicine for further management. At time of interview, patient was A/Ox3 with .  (13 Aug 2021 04:21)    ID consulted for COVID.  Now he is willing to get vaccine.  He thinks he is getting better here.       REVIEW OF SYSTEMS  [  ] ROS unobtainable because:    [X  ] All other systems negative except as noted below    Constitutional:  [ ] fever [ ] chills  [ ] weight loss  [ ]night sweat  [ ]poor appetite/PO intake [ ]fatigue   Skin:  [ ] rash [ ] phlebitis	  Eyes: [ ] icterus [ ] pain  [ ] discharge	  ENMT: [ ] sore throat  [ ] thrush [ ] ulcers [ ] exudates [ no]anosmia  Respiratory: [X ] dyspnea [ ] hemoptysis [X ] cough [ X] sputum (white yellow)  Cardiovascular:  [ ] chest pain [ ] palpitations [ ] edema	  Gastrointestinal:  [ ] nausea [ ] vomiting [ ] diarrhea [ ] constipation [ ] pain	  Genitourinary:  [ ] dysuria [ ] frequency [ ] hematuria [ ] discharge [ ] flank pain  [ ] incontinence  Musculoskeletal:  [ ] myalgias [ ] arthralgias [ ] arthritis  [ ] back pain  Neurological:  [ ] headache [ ] weakness [ ] seizures  [ ] confusion/altered mental status    prior hospital charts reviewed [V]  primary team notes reviewed [V]  other consultant notes reviewed [V]    PAST MEDICAL & SURGICAL HISTORY:  BPH (benign prostatic hyperplasia)  Hyperlipemia  HTN (hypertension)  Hypercholesteremia  Asthma  Diabetes  Kidney stone  Bladder stone  H/O lithotripsyx 2  History of kidney stones  H/O umbilical hernia repair    SOCIAL HISTORY:  - Denied smoking/vaping/alcohol/recreational drug use  - Lives with family, all of them have covid and not vaccinated  - Originally from [place] on [place]  - Used to work in cleaning, now retired    FAMILY HISTORY:  Family history of diabetes mellitus (Father, Sibling)  Family history of cancer (Mother)      Allergies  No Known Allergies    ANTIMICROBIALS:  remdesivir  IVPB    remdesivir  IVPB 200 every 24 hours      ANTIMICROBIALS (past 90 days):  MEDICATIONS  (STANDING):    doxycycline IVPB   110 mL/Hr IV Intermittent (21 @ 19:29)    doxycycline IVPB   110 mL/Hr IV Intermittent (21 @ 06:52)      OTHER MEDS:   MEDICATIONS  (STANDING):  acetaminophen   Tablet .. 650 every 4 hours PRN  ALBUTerol    90 MICROgram(s) HFA Inhaler 2 every 4 hours PRN  amLODIPine   Tablet 5 daily  atorvastatin 20 at bedtime  budesonide 160 MICROgram(s)/formoterol 4.5 MICROgram(s) Inhaler 2 two times a day  dexAMETHasone  Injectable 6 daily  dextrose 40% Gel 15 once  dextrose 50% Injectable 25 once  dextrose 50% Injectable 12.5 once  dextrose 50% Injectable 25 once  enoxaparin Injectable 40 daily  finasteride 5 daily  glucagon  Injectable 1 once  insulin lispro (ADMELOG) corrective regimen sliding scale  three times a day before meals  insulin lispro (ADMELOG) corrective regimen sliding scale  at bedtime  montelukast 10 daily  oxybutynin XL 10 daily  tamsulosin 0.4 two times a day  valsartan 160 daily      VITALS:  Vital Signs Last 24 Hrs  T(F): 98.9 (21 @ 11:48), Max: 102.4 (21 @ 13:34)    Vital Signs Last 24 Hrs  HR: 92 (21 @ 11:48) (72 - 99)  BP: 131/72 (21 @ 11:48) (110/84 - 145/79)  RR: 47 (21 @ 11:48)  SpO2: 88% (21 @ 11:48) (87% - 95%)  Wt(kg): --    EXAM:  Physical Exam:  Constitutional:  well preserved, comfortable, on HFNC  Head/Eyes: no icterus, PERRL, EOMI  ENT:  supple; no thrush  LUNGS:  nonlabored on high flow, fair air entry bilaterally, grossly clear   CVS:  normal rate   Abd:  soft, non-tender; distended abd from obesity  Ext:  no edema  Vascular:  IV site no erythema tenderness or discharge  MSK:  joints without swelling  Neuro: AAO X 3, non- focal    Labs:                        12.8   3.20  )-----------( 181      ( 13 Aug 2021 07:11 )             38.8         136  |  100  |  18  ----------------------------<  223<H>  4.5   |  22  |  0.95    Ca    9.1      13 Aug 2021 07:11  Phos  3.4       Mg     2.0         TPro  6.6  /  Alb  3.6  /  TBili  0.4  /  DBili  x   /  AST  28  /  ALT  17  /  AlkPhos  64        WBC Trend:  WBC Count: 3.20 (21 @ 07:11)  WBC Count: 5.04 (21 @ 16:51)    Auto Neutrophil #: 3.50 K/uL (21 @ 16:51)    Creatine Trend:  Creatinine, Serum: 0.95 ()  Creatinine, Serum: 0.95 ()  Creatinine, Serum: 1.02 ()  Creatinine, Serum: 1.09 ()    Liver Biochemical Testing Trend:  Alanine Aminotransferase (ALT/SGPT): 17 ()  Alanine Aminotransferase (ALT/SGPT): 20 ()  Aspartate Aminotransferase (AST/SGOT): 28 (21 @ 07:11)  Aspartate Aminotransferase (AST/SGOT): 43 (21 @ 16:51)  Bilirubin Total, Serum: 0.4 ()  Bilirubin Total, Serum: 0.4 (08-12)    Urinalysis Basic - ( 12 Aug 2021 19:33 )  Color: Yellow / Appearance: Clear / S.019 / pH: x  Gluc: x / Ketone: Negative  / Bili: Negative / Urobili: Negative   Blood: x / Protein: 100 mg/dL / Nitrite: Negative   Leuk Esterase: Negative / RBC: 10 /hpf / WBC 1 /HPF   Sq Epi: x / Non Sq Epi: 1 /hpf / Bacteria: Negative    MICROBIOLOGY:  COVID-19 PCR: Detected (21 @ 16:49)  Procalcitonin, Serum: 0.08 ()  Procalcitonin, Serum: 0.07 ()  C-Reactive Protein, Serum: 52 ()  C-Reactive Protein, Serum: 47 ()  Ferritin, Serum: 258 ()  D-Dimer Assay, Quantitative: 213 ()  Lactate, Blood: 1.2 ( @ 07:11)  Blood Gas Venous - Lactate: 1.1 ( @ 20:31)    RADIOLOGY:  imaging below personally reviewed  Xray Chest 1 View- PORTABLE-Routine (Xray Chest 1 View- PORTABLE-Routine .) (21 @ 17:52)   IMPRESSION:  Extensive bilateral patchy opacities with peripheral and basilar predilection. Multiple cystic lucencies also seen right mid and upper lung fields.  Findings concerning for infectious process such as Covid 19 pneumonia.    CT Head No Cont (21 @ 20:38)   IMPRESSION:  No acute intracranial hemorrhage, mass effect, or evidence of acute vascular territorial infarction. If clinical symptoms persist or worsen, more sensitive evaluation with brain MRI may be obtained, if no contraindications exist.

## 2021-08-13 NOTE — H&P ADULT - PROBLEM SELECTOR PLAN 1
-denies vaccination, CXR consistent with covid pneumonia with risk of development to acute respiratory distress syndrome  -c/w dexamethasone daily  -albuterol MDI q4h  -start remdesivir   -wean oxygen as tolerated  -daily inflammatory markers  -DV -denies vaccination, CXR consistent with covid pneumonia with risk of development to acute respiratory distress syndrome  -c/w dexamethasone daily  -albuterol MDI q4h  -start remdesivir   -wean oxygen as tolerated  -daily inflammatory markers  -DVT prophylaxis with lovenox -denies vaccination, CXR consistent with acute hypoxic respiratory failure secondary to covid pneumonia with risk of development to acute respiratory distress syndrome  -c/w dexamethasone daily  -albuterol MDI q4h  -start remdesivir   -wean oxygen as tolerated  -daily inflammatory markers  -DVT prophylaxis with lovenox  -would hold other antibiotics for now (s/p 1 dose doxycyline in ED) -denies vaccination, CXR consistent with acute hypoxic respiratory failure secondary to covid pneumonia with risk of development to acute respiratory distress syndrome  -sepsis present on admission due to COVID  -c/w dexamethasone daily  -albuterol MDI q4h  -start remdesivir   -wean oxygen as tolerated  -daily inflammatory markers  -DVT prophylaxis with lovenox  -would hold other antibiotics for now (s/p 1 dose doxycyline in ED)

## 2021-08-13 NOTE — CHART NOTE - NSCHARTNOTEFT_GEN_A_CORE
Patient seen and examined.    Vital Signs (24 Hrs):  T(C): 37.2 (08-13-21 @ 11:48), Max: 37.7 (08-12-21 @ 19:06)  HR: 93 (08-13-21 @ 15:22) (72 - 98)  BP: 131/72 (08-13-21 @ 11:48) (110/84 - 145/79)  RR: 24 (08-13-21 @ 15:22) (23 - 47)  SpO2: 92% (08-13-21 @ 15:22) (88% - 95%)  Wt(kg): --  Daily     Daily     I&O's Summary    12 Aug 2021 07:01  -  13 Aug 2021 07:00  --------------------------------------------------------  IN: 0 mL / OUT: 300 mL / NET: -300 mL    13 Aug 2021 07:01  -  13 Aug 2021 17:09  --------------------------------------------------------  IN: 120 mL / OUT: 500 mL / NET: -380 mL      Mildly tachypneic, but otherwise comfortable appearing.    On remdesivir/dexamethasone    Continue with HFNC, wean as able    Consult ID, consider TOCI

## 2021-08-13 NOTE — H&P ADULT - PROBLEM SELECTOR PLAN 2
-hold home metformin   -insulin sliding scale  -monitor glucose closely as will likely worsen in setting of steroids  -c/w atorvastatin

## 2021-08-13 NOTE — H&P ADULT - PROBLEM SELECTOR PLAN 3
-albuterol MDI q4h as above  -c/w home symbicort  -c/w home montelukast -albuterol MDI q4h as above  -c/w home Symbicort  -c/w home montelukast

## 2021-08-13 NOTE — CONSULT NOTE ADULT - ASSESSMENT
70 yo M w/ PMHx of HTN, DM2, BPH, asthma presents with shortness of breath. Patient endorses that he has been feeling SOB with associated cough for the last 2 weeks and his symptoms slowly worsened over time. He did not receive the COVID vaccine. Son is now hospitalized for COVID. COVID PCR+. CXR bilateral opacities. Bimr=149.4. Procal neg. On HFNC.    #COVID  - Remdesivir and Dexamthasone  - Trend CRP, ferritin, LDH, D-dimer q48h  - Given fever, he should get BCx  -  72 yo M w/ PMHx of HTN, DM2, BPH, asthma presents with shortness of breath. Patient endorses that he has been feeling SOB with associated cough for the last 2 weeks and his symptoms slowly worsened over time. He did not receive the COVID vaccine. Son is now hospitalized for COVID. COVID PCR+. CXR bilateral opacities. Qxnm=412.4. Procal neg. On HFNC.    #COVID  - Remdesivir and Dexamthasone  - Trend CRP, ferritin, LDH, D-dimer q48h  - Given fever, he should get BCx  - He is improving symptoms on HFNC, with CRP<75, can continue to monitor symptoms and re-assess tocilizumab again  - No antibacterial abx for now    #DM  - Check A1c  - Management per primary team    Sebastian Wong MD, PGY5  ID fellow  Pager: [phone]  [facility] pager ID: [phone] (would prefer to text page for any new consult or question, please include name/location and best call back number)  After 5pm/weekends call [phone]   70 yo M w/ PMHx of HTN, DM2, BPH, asthma presents with shortness of breath. Patient endorses that he has been feeling SOB with associated cough for the last 2 weeks and his symptoms slowly worsened over time. He did not receive the COVID vaccine. Son is now hospitalized for COVID. COVID PCR+. CXR bilateral opacities. Gtxs=062.4. Procal neg. On HFNC.    #COVID  - Remdesivir and Dexamthasone  - Trend CRP, ferritin, LDH, D-dimer q48h  - Given fever, he should get BCx  - He is improving symptoms on HFNC, with CRP<75, can continue to monitor symptoms and re-assess tocilizumab again  - No antibacterial abx for now    #DM  - Check A1c  - Management per primary team    Sebastian Wong MD, PGY5  ID fellow  Pager: [phone]  [facility] pager ID: [phone] (would prefer to text page for any new consult or question, please include name/location and best call back number)  After 5pm/weekends call [phone]    d/w Dr. ANNA Paul  Recs conveyed to primary team     72 yo M w/ PMHx of HTN, DM2, BPH, asthma presents with shortness of breath. Patient endorses that he has been feeling SOB with associated cough for the last 2 weeks and his symptoms slowly worsened over time. He did not receive the COVID vaccine. Son is now hospitalized for COVID. COVID PCR+. CXR bilateral opacities. Usgf=233.4. Procal neg. On HFNC.    #COVID  - Remdesivir and Dexamthasone  - Trend CRP, ferritin, LDH, D-dimer q48h  - Given fever, check BCx to rule out bacteremia   - He is improving symptoms on HFNC, with CRP<75 and normal ferritin, wouldn't give tocilizumab now   - No antibacterial abx for now    #DM  - Check A1c  - Management per primary team    Sebastian Wong MD, PGY5  ID fellow  Pager: [phone]  [facility] pager ID: [phone] (would prefer to text page for any new consult or question, please include name/location and best call back number)  After 5pm/weekends call [phone]    d/w Dr. ANNA Paul  Recs conveyed to primary team

## 2021-08-14 NOTE — PROGRESS NOTE ADULT - PROBLEM SELECTOR PLAN 1
-denies vaccination, CXR consistent with acute hypoxic respiratory failure secondary to covid pneumonia with risk of development to acute respiratory distress syndrome  - Patient 88% on 60 L 70% [place] NC this am. Tachypneic on exam  - continue oxygen supplementation to maintain sats >92%, increase FiO2  - ID consult for tocilizumab  - Check chest xray, ABG, alctate. MICU consult  -c/w dexamethasone daily, remdesivir   -albuterol MDI q4h  -daily inflammatory markers  -DVT prophylaxis with lovenox

## 2021-08-14 NOTE — CONSULT NOTE ADULT - ASSESSMENT
MICU is consulted for worsening hypoxia.     - On dex and remdesivir  - Toci as per ID   - increase fio2 on HFNC  - Obtain ABG 1 hour post HFNC   - Obtain pro-BNP  - cw IS     Not a MICU candidate at this time. 72yo M w/ PMHx of HTN, DM2, BPH, asthma presents with covid19. MICU is consulted for worsening hypoxia.     #COVID19  - Protecting airway. Answering questions on HFNC.   - On dex and remdesivir  - Toci as per ID   - increase fio2 on HFNC  - Obtain ABG 1 hour post HFNC   - Obtain pro-BNP  - cw IS     Not a MICU candidate at this time. 70yo M w/ PMHx of HTN, DM2, BPH, asthma presents with covid19. MICU is consulted for worsening hypoxia.     #COVID19  - Protecting airway. Answering questions on HFNC.   - Increase HFNC to keep sats >90%  - On dex and remdesivir  - Toci as per ID.   - increase fio2 on HFNC  - Obtain ABG 1 hour post HFNC   - Obtain pro-BNP  - cw IS     Continue current care. No indication to transfer to COVID-ICU at this time.

## 2021-08-14 NOTE — CONSULT NOTE ADULT - SUBJECTIVE AND OBJECTIVE BOX
Reason for Consultation:  Chief Complaint:  Date of Admission:     HPI on Admission:   HPI:  70yo M w/ PMHx of HTN, DM2, BPH, asthma presents with shortness of breath. Patient endorses that he has been feeling SOB with associated cough for the last 2 weeks and his symptoms slowly worsened over time. He did not receive the COVID vaccine. He reports that his family members whom he lives with likely are also COVID positive but he does not know for sure. He did not try taking anything for his symptoms other than his usual medications/inhalers. There were no obvious aggravating or alleviating factors. Currently with supplemental oxygen on, he feels well and has no complaints, however he presented to [facility] since his symptoms were worsening. In the ED, he was hemodynamically stable, afebrile, but he was encephalopathic and hypoxic on room air requiring high flow nasal canula. Labs were significant for mild hyperkalemia and COVID positive. CXR prelim read showed bilateral patchy opacities. CT head showed no acute findings. MICU was consulted in the ED, patient was deemed not a MICU candidate. Patient was given doxycycline and dexamethasone x1 and admitted to general medicine for further management. At time of interview, patient was A/Ox3 with .  (13 Aug 2021 04:21)    Hospital Course and Subjective:   MICU is consulted for hypoxia.      Past Medical History:   BPH (benign prostatic hyperplasia)    Hyperlipemia    HTN (hypertension)    HTN (hypertension)    Hypercholesteremia    Asthma    Diabetes    Kidney stone    Bladder stone        Past Surgical History:   H/O lithotripsy    History of kidney stones    H/O umbilical hernia repair        Medications:   acetaminophen   Tablet .. 650 milliGRAM(s) Oral every 4 hours PRN  ALBUTerol    90 MICROgram(s) HFA Inhaler 2 Puff(s) Inhalation every 4 hours PRN  amLODIPine   Tablet 5 milliGRAM(s) Oral daily  atorvastatin 20 milliGRAM(s) Oral at bedtime  budesonide 160 MICROgram(s)/formoterol 4.5 MICROgram(s) Inhaler 2 Puff(s) Inhalation two times a day  chlorhexidine 2% Cloths 1 Application(s) Topical <User Schedule>  dexAMETHasone  Injectable 6 milliGRAM(s) IV Push daily  dextrose 40% Gel 15 Gram(s) Oral once  dextrose 5%. 1000 milliLiter(s) IV Continuous <Continuous>  dextrose 5%. 1000 milliLiter(s) IV Continuous <Continuous>  dextrose 50% Injectable 25 Gram(s) IV Push once  dextrose 50% Injectable 12.5 Gram(s) IV Push once  dextrose 50% Injectable 25 Gram(s) IV Push once  enoxaparin Injectable 40 milliGRAM(s) SubCutaneous daily  finasteride 5 milliGRAM(s) Oral daily  glucagon  Injectable 1 milliGRAM(s) IntraMuscular once  insulin lispro (ADMELOG) corrective regimen sliding scale   SubCutaneous three times a day before meals  insulin lispro (ADMELOG) corrective regimen sliding scale   SubCutaneous at bedtime  montelukast 10 milliGRAM(s) Oral daily  oxybutynin XL 10 milliGRAM(s) Oral daily  remdesivir  IVPB   IV Intermittent   remdesivir  IVPB 100 milliGRAM(s) IV Intermittent every 24 hours  tamsulosin 0.4 milliGRAM(s) Oral two times a day  valsartan 160 milliGRAM(s) Oral daily      Allergies:  No Known Allergies      FAMILY HISTORY:  Family history of diabetes mellitus (Father, Sibling)    Family history of cancer (Mother)      No FH of SCD or early onset heart failure    Social History:  Smoking History: Denied  Alcohol Use: Denied   Drug Use: Denied    Review of Systems:  Constitutional: [ ] Fever [ ] Chills [ ] Fatigue [ ] Weight change   HEENT: [ ] Blurred vision [ ] Eye Pain [ ] Headache [ ] Runny nose [ ] Sore Throat   Respiratory: [ ] Cough [ ] Wheezing [ ] Shortness of breath  Cardiovascular: [ ] Chest Pain [ ] Palpitations [ ] FLEMING [ ] PND [ ] Orthopnea  Gastrointestinal: [ ] Abdominal Pain [ ] Diarrhea [ ] Constipation [ ] Hemorrhoids [ ] Nausea [ ] Vomiting  Genitourinary: [ ] Nocturia [ ] Dysuria [ ] Incontinence  Extremities: [ ] Swelling [ ] Joint Pain  Neurologic: [ ] Focal deficit [ ] Paresthesias [ ] Syncope  Lymphatic: [ ] Swelling [ ] Lymphadenopathy   Skin: [ ] Rash [ ] Ecchymoses [ ] Wounds [ ] Lesions  Psychiatry: [ ] Depression [ ] Suicidal/Homicidal Ideation [ ] Anxiety [ ] Sleep Disturbances  [ ] 10 point review of systems is otherwise negative except as mentioned above              [x ] Unable to obtain    Vital Signs Last 24 Hrs  T(C): 36.7 (14 Aug 2021 11:22), Max: 36.7 (14 Aug 2021 04:55)  T(F): 98 (14 Aug 2021 11:22), Max: 98.1 (14 Aug 2021 04:55)  HR: 92 (14 Aug 2021 11:22) (74 - 93)  BP: 138/72 (14 Aug 2021 11:22) (123/68 - 138/72)  BP(mean): --  RR: 20 (14 Aug 2021 11:22) (18 - 24)  SpO2: 88% (14 Aug 2021 11:22) (88% - 95%)    I&O's Summary    13 Aug 2021 07:01  -  14 Aug 2021 07:00  --------------------------------------------------------  IN: 280 mL / OUT: 1300 mL / NET: -1020 mL    14 Aug 2021 07:01  -  14 Aug 2021 14:23  --------------------------------------------------------  IN: 250 mL / OUT: 0 mL / NET: 250 mL        Physical Exam:  General: No distress. On HFNC   HEENT: EOMI	  Neck: JVP not elevated  Chest: Clear to auscultation bilaterally  CV: RRR. Normal S1 and S2. No murmurs, rubs, or gallops. No edema.  Abdomen: Soft, non-distended, non-tender  Skin: No rashes, ecchymoses, or skin breakdown  Extremities: Warm.  Neuro: Alert and oriented x 3. No focal deficits.  Psych: Mood and affect appropriate    Labs:                         13.3   7.10  )-----------( 228      ( 14 Aug 2021 06:18 )             39.6     08-14    131<L>  |  98  |  23  ----------------------------<  264<H>  4.3   |  18<L>  |  0.92    Ca    9.1      14 Aug 2021 06:16  Phos  3.4     08-13  Mg     2.0     08-13    TPro  6.5  /  Alb  3.4  /  TBili  0.4  /  DBili  0.1  /  AST  30  /  ALT  18  /  AlkPhos  68  08-14    PT/INR - ( 14 Aug 2021 06:16 )   PT: 11.9 sec;   INR: 0.99 ratio     HPI on Admission:   HPI:  72yo M w/ PMHx of HTN, DM2, BPH, asthma presents with shortness of breath. Patient endorses that he has been feeling SOB with associated cough for the last 2 weeks and his symptoms slowly worsened over time. He did not receive the COVID vaccine. He reports that his family members whom he lives with likely are also COVID positive but he does not know for sure. He did not try taking anything for his symptoms other than his usual medications/inhalers. There were no obvious aggravating or alleviating factors. Currently with supplemental oxygen on, he feels well and has no complaints, however he presented to [facility] since his symptoms were worsening. In the ED, he was hemodynamically stable, afebrile, but he was encephalopathic and hypoxic on room air requiring high flow nasal canula. Labs were significant for mild hyperkalemia and COVID positive. CXR prelim read showed bilateral patchy opacities. CT head showed no acute findings. MICU was consulted in the ED, patient was deemed not a MICU candidate. Patient was given doxycycline and dexamethasone x1 and admitted to general medicine for further management. At time of interview, patient was A/Ox3 with .  (13 Aug 2021 04:21)    Hospital Course and Subjective:   MICU is consulted for hypoxia.      Past Medical History:   BPH (benign prostatic hyperplasia)    Hyperlipemia    HTN (hypertension)    HTN (hypertension)    Hypercholesteremia    Asthma    Diabetes    Kidney stone    Bladder stone        Past Surgical History:   H/O lithotripsy    History of kidney stones    H/O umbilical hernia repair        Medications:   acetaminophen   Tablet .. 650 milliGRAM(s) Oral every 4 hours PRN  ALBUTerol    90 MICROgram(s) HFA Inhaler 2 Puff(s) Inhalation every 4 hours PRN  amLODIPine   Tablet 5 milliGRAM(s) Oral daily  atorvastatin 20 milliGRAM(s) Oral at bedtime  budesonide 160 MICROgram(s)/formoterol 4.5 MICROgram(s) Inhaler 2 Puff(s) Inhalation two times a day  chlorhexidine 2% Cloths 1 Application(s) Topical <User Schedule>  dexAMETHasone  Injectable 6 milliGRAM(s) IV Push daily  dextrose 40% Gel 15 Gram(s) Oral once  dextrose 5%. 1000 milliLiter(s) IV Continuous <Continuous>  dextrose 5%. 1000 milliLiter(s) IV Continuous <Continuous>  dextrose 50% Injectable 25 Gram(s) IV Push once  dextrose 50% Injectable 12.5 Gram(s) IV Push once  dextrose 50% Injectable 25 Gram(s) IV Push once  enoxaparin Injectable 40 milliGRAM(s) SubCutaneous daily  finasteride 5 milliGRAM(s) Oral daily  glucagon  Injectable 1 milliGRAM(s) IntraMuscular once  insulin lispro (ADMELOG) corrective regimen sliding scale   SubCutaneous three times a day before meals  insulin lispro (ADMELOG) corrective regimen sliding scale   SubCutaneous at bedtime  montelukast 10 milliGRAM(s) Oral daily  oxybutynin XL 10 milliGRAM(s) Oral daily  remdesivir  IVPB   IV Intermittent   remdesivir  IVPB 100 milliGRAM(s) IV Intermittent every 24 hours  tamsulosin 0.4 milliGRAM(s) Oral two times a day  valsartan 160 milliGRAM(s) Oral daily      Allergies:  No Known Allergies      FAMILY HISTORY:  Family history of diabetes mellitus (Father, Sibling)    Family history of cancer (Mother)      No FH of SCD or early onset heart failure    Social History:  Smoking History: Denied  Alcohol Use: Denied   Drug Use: Denied    Review of Systems:  Constitutional: [ ] Fever [ ] Chills [ ] Fatigue [ ] Weight change   HEENT: [ ] Blurred vision [ ] Eye Pain [ ] Headache [ ] Runny nose [ ] Sore Throat   Respiratory: [ ] Cough [ ] Wheezing [ ] Shortness of breath  Cardiovascular: [ ] Chest Pain [ ] Palpitations [ ] FLEMING [ ] PND [ ] Orthopnea  Gastrointestinal: [ ] Abdominal Pain [ ] Diarrhea [ ] Constipation [ ] Hemorrhoids [ ] Nausea [ ] Vomiting  Genitourinary: [ ] Nocturia [ ] Dysuria [ ] Incontinence  Extremities: [ ] Swelling [ ] Joint Pain  Neurologic: [ ] Focal deficit [ ] Paresthesias [ ] Syncope  Lymphatic: [ ] Swelling [ ] Lymphadenopathy   Skin: [ ] Rash [ ] Ecchymoses [ ] Wounds [ ] Lesions  Psychiatry: [ ] Depression [ ] Suicidal/Homicidal Ideation [ ] Anxiety [ ] Sleep Disturbances  [ ] 10 point review of systems is otherwise negative except as mentioned above              [x ] Unable to obtain    Vital Signs Last 24 Hrs  T(C): 36.7 (14 Aug 2021 11:22), Max: 36.7 (14 Aug 2021 04:55)  T(F): 98 (14 Aug 2021 11:22), Max: 98.1 (14 Aug 2021 04:55)  HR: 92 (14 Aug 2021 11:22) (74 - 93)  BP: 138/72 (14 Aug 2021 11:22) (123/68 - 138/72)  BP(mean): --  RR: 20 (14 Aug 2021 11:22) (18 - 24)  SpO2: 88% (14 Aug 2021 11:22) (88% - 95%)    I&O's Summary    13 Aug 2021 07:01  -  14 Aug 2021 07:00  --------------------------------------------------------  IN: 280 mL / OUT: 1300 mL / NET: -1020 mL    14 Aug 2021 07:01  -  14 Aug 2021 14:23  --------------------------------------------------------  IN: 250 mL / OUT: 0 mL / NET: 250 mL        Physical Exam:  General: No distress. On HFNC   HEENT: EOMI	  Neck: JVP not elevated  Chest: Clear to auscultation bilaterally  CV: RRR. Normal S1 and S2. No murmurs, rubs, or gallops. No edema.  Abdomen: Soft, non-distended, non-tender  Skin: No rashes, ecchymoses, or skin breakdown  Extremities: Warm.  Neuro: Alert and oriented x 3. No focal deficits.  Psych: Mood and affect appropriate    Labs:                         13.3   7.10  )-----------( 228      ( 14 Aug 2021 06:18 )             39.6     08-14    131<L>  |  98  |  23  ----------------------------<  264<H>  4.3   |  18<L>  |  0.92    Ca    9.1      14 Aug 2021 06:16  Phos  3.4     08-13  Mg     2.0     08-13    TPro  6.5  /  Alb  3.4  /  TBili  0.4  /  DBili  0.1  /  AST  30  /  ALT  18  /  AlkPhos  68  08-14    PT/INR - ( 14 Aug 2021 06:16 )   PT: 11.9 sec;   INR: 0.99 ratio

## 2021-08-14 NOTE — CHART NOTE - NSCHARTNOTEFT_GEN_A_CORE
Medicine NP ([id number])    MICU notified([id number]) consult per Dr. Cao request due to worsened hypoxia/tachypnea  -ABG ordered  -CXR urgent portable ordered   -ID service called re. followup for Toci ;await callback  -Increase HFNC FIO2 to 100% then obtain ABG

## 2021-08-14 NOTE — PHYSICAL THERAPY INITIAL EVALUATION ADULT - PERTINENT HX OF CURRENT PROBLEM, REHAB EVAL
70 y/o Haitian speaking M with h/o HTN, DM2, BPH, asthma p/w SOB. Pt reports his family members whom he lives with are also likely COVID positive. Pt a/w COVID pneumonia. CHEST XRAY: Extensive bilateral patchy opacities with peripheral and basilar predilection. Multiple cystic lucencies also seen right mid and upper lung fields. CT HEAD (-).

## 2021-08-14 NOTE — PROGRESS NOTE ADULT - PROBLEM SELECTOR PLAN 5
-c/w oxybutynin  -c/w tamsulosin  -c/w finasteride   -of note patient reports taking desipramine and prednisone for "urinary problems" and that they are prescribed by a neurologist, desipramine likely for overactive bladder but unclear the role of the prednisone, need to clarify these medications, would otherwise hold prednisone regardless in setting of dexamethasone

## 2021-08-14 NOTE — PROGRESS NOTE ADULT - SUBJECTIVE AND OBJECTIVE BOX
[facility] Division of Hospital Medicine  Amaury Cao MD  Pager (DELILAH-KARLEY, 1X-0E): [phone]  Other Times:  [phone]    Patient is a 71y old  Male who presents with a chief complaint of SOB (14 Aug 2021 14:21)      SUBJECTIVE / OVERNIGHT EVENTS:    Patient was examined this morning Appears uncomfortable on exam. Continues to be dyspneic.       ADDITIONAL REVIEW OF SYSTEMS:    MEDICATIONS  (STANDING):  amLODIPine   Tablet 5 milliGRAM(s) Oral daily  atorvastatin 20 milliGRAM(s) Oral at bedtime  budesonide 160 MICROgram(s)/formoterol 4.5 MICROgram(s) Inhaler 2 Puff(s) Inhalation two times a day  chlorhexidine 2% Cloths 1 Application(s) Topical <User Schedule>  dexAMETHasone  Injectable 6 milliGRAM(s) IV Push daily  dextrose 40% Gel 15 Gram(s) Oral once  dextrose 5%. 1000 milliLiter(s) (50 mL/Hr) IV Continuous <Continuous>  dextrose 5%. 1000 milliLiter(s) (100 mL/Hr) IV Continuous <Continuous>  dextrose 50% Injectable 25 Gram(s) IV Push once  dextrose 50% Injectable 12.5 Gram(s) IV Push once  dextrose 50% Injectable 25 Gram(s) IV Push once  enoxaparin Injectable 40 milliGRAM(s) SubCutaneous daily  finasteride 5 milliGRAM(s) Oral daily  glucagon  Injectable 1 milliGRAM(s) IntraMuscular once  insulin lispro (ADMELOG) corrective regimen sliding scale   SubCutaneous three times a day before meals  insulin lispro (ADMELOG) corrective regimen sliding scale   SubCutaneous at bedtime  montelukast 10 milliGRAM(s) Oral daily  oxybutynin XL 10 milliGRAM(s) Oral daily  remdesivir  IVPB   IV Intermittent   remdesivir  IVPB 100 milliGRAM(s) IV Intermittent every 24 hours  tamsulosin 0.4 milliGRAM(s) Oral two times a day  valsartan 160 milliGRAM(s) Oral daily    MEDICATIONS  (PRN):  acetaminophen   Tablet .. 650 milliGRAM(s) Oral every 4 hours PRN Temp greater or equal to 38.5C (101.3F)  ALBUTerol    90 MICROgram(s) HFA Inhaler 2 Puff(s) Inhalation every 4 hours PRN Shortness of Breath and/or Wheezing      CAPILLARY BLOOD GLUCOSE      POCT Blood Glucose.: 315 mg/dL (14 Aug 2021 17:06)  POCT Blood Glucose.: 276 mg/dL (14 Aug 2021 12:11)  POCT Blood Glucose.: 290 mg/dL (14 Aug 2021 08:03)  POCT Blood Glucose.: 168 mg/dL (13 Aug 2021 21:23)    I&O's Summary    13 Aug 2021 07:01  -  14 Aug 2021 07:00  --------------------------------------------------------  IN: 280 mL / OUT: 1300 mL / NET: -1020 mL    14 Aug 2021 07:01  -  14 Aug 2021 18:19  --------------------------------------------------------  IN: 250 mL / OUT: 0 mL / NET: 250 mL        PHYSICAL EXAM:  Vital Signs Last 24 Hrs  T(C): 36.7 (14 Aug 2021 11:22), Max: 36.7 (14 Aug 2021 04:55)  T(F): 98 (14 Aug 2021 11:22), Max: 98.1 (14 Aug 2021 04:55)  HR: 92 (14 Aug 2021 11:22) (74 - 92)  BP: 138/72 (14 Aug 2021 11:22) (123/68 - 138/72)  BP(mean): --  RR: 33 (14 Aug 2021 15:20) (18 - 33)  SpO2: 90% (14 Aug 2021 15:20) (88% - 95%)      CONSTITUTIONAL: respiratory distress, well-developed  EYES: PERRL; conjunctiva and sclera clear  ENMT: Moist oral mucosa, no pharyngeal injection or exudates;   NECK: Supple,   RESPIRATORY: tachypneic, labored breathing, faint rhonchi BL  CARDIOVASCULAR: Regular rate and rhythm, normal S1 and S2, no murmur/rub/gallop; No lower extremity edema; Peripheral pulses are 2+ bilaterally  ABDOMEN: Nontender to palpation, normoactive bowel sounds, no rebound/guarding;   MUSCULOSKELETAL:  no clubbing or cyanosis of digits; no joint swelling or tenderness to palpation  PSYCH: A+O to person, place, and time; affect appropriate  NEUROLOGY: CN 2-12 are intact and symmetric; no gross sensory/motor deficits   SKIN: No rashes; no palpable lesions        LABS:                        13.3   7.10  )-----------( 228      ( 14 Aug 2021 06:18 )             39.6     08-14    131<L>  |  98  |  23  ----------------------------<  264<H>  4.3   |  18<L>  |  0.92    Ca    9.1      14 Aug 2021 06:16  Phos  3.4     08-13  Mg     2.0     08-13    TPro  6.5  /  Alb  3.4  /  TBili  0.4  /  DBili  0.1  /  AST  30  /  ALT  18  /  AlkPhos  68  08-14    PT/INR - ( 14 Aug 2021 06:16 )   PT: 11.9 sec;   INR: 0.99 ratio               Urinalysis Basic - ( 12 Aug 2021 19:33 )    Color: Yellow / Appearance: Clear / S.019 / pH: x  Gluc: x / Ketone: Negative  / Bili: Negative / Urobili: Negative   Blood: x / Protein: 100 mg/dL / Nitrite: Negative   Leuk Esterase: Negative / RBC: 10 /hpf / WBC 1 /HPF   Sq Epi: x / Non Sq Epi: 1 /hpf / Bacteria: Negative        Culture - Urine (collected 12 Aug 2021 22:09)  Source: Clean Catch Clean Catch (Midstream)  Final Report (13 Aug 2021 22:21):    No growth        RADIOLOGY & ADDITIONAL TESTS:  Results Reviewed:   Imaging Personally Reviewed:  Electrocardiogram Personally Reviewed:    COORDINATION OF CARE:  Care Discussed with Consultants/Other Providers [Y/N]:  Prior or Outpatient Records Reviewed [Y/N]:

## 2021-08-15 NOTE — PROVIDER CONTACT NOTE (OTHER) - ACTION/TREATMENT ORDERED:
Referred by: Becky Swanson MD; Medical Diagnosis (from order):    Diagnosis Information      Diagnosis    719.45 (ICD-9-CM) - M25.551, M25.552 (ICD-10-CM) - Bilateral hip pain                Physical Therapy -  Daily Treatment Note -  Discharge Summary    Visit:  6     SUBJECTIVE                                                                                                             Patient states that she has been doing good. She states that overall she feels about 70 % improved with therapy.   Functional Change: Improved sitting tolerance, improved standing tolerance, stairs are improved, able to do more with less pain.   Current functional limitations: Standing for to long and sitting for to long.       OBJECTIVE                                                                                                                      Strength  (out of 5 unless noted, standard test position unless noted, lbs tested with hand held dynamometer)   Hip:    - Flexion:        • Left: 5        • Right: 5    - Extension:        • Left: 4        • Right: 4    - Abduction:        • Left: 4        • Right: 4    - External Rotation:        • Left: 4+        • Right: 4+  Knee:    - Flexion:        • Left: 5        • Right: 5    - Extension:        • Left: 5        • Right: 5  Ankle:    - Dorsiflexion:        • Left: 5        • Right: 5    - Plantar Flexion:        • Left: 5        • Right: 5        Comments / Details: Outcome Measures:   Lower Extremity Functional Scale: LEFS Calculated Total: 67 (0=extreme difficulty; 80=no difficulty) see flowsheet for additional documentation    TREATMENT                                                                                                                  Therapeutic Exercise:  NuStep level 5 x 6 min LE only (carpal tunnel)  Discussed HEP   Goal reassessment and objective measures.     Manual Therapy:  IT band rolling bilaterally with  in sidelying x8 mins total.      Skilled input: verbal instruction/cues    Writer verbally educated and received verbal consent for hand placement, positioning of patient, and techniques to be performed today from patient     Home Exercise Program: Access Code: [email or web address]  URL: [email or web address]  Date: 02/17/2021  Prepared by: Brock Arango    Exercises  Supine Bridge - 10 reps - 1 sets - 1x daily - 7x weekly  Clamshell - 10 reps - 1 sets - 1x daily - 7x weekly  Side Stepping with Resistance at Feet - 10 reps - 15 feet - 1x daily - 7x weekly  Mini Squat - 10 reps - 2 sets - 1x daily - 7x weekly     ASSESSMENT                                                                                                             Patient has made good progress in therapy. She reports being about 70% improved from when she started therapy. She has improved tolerance to standing, walking, and sitting. She does continue to have hip discomfort near the glute med tendon insertion and lateral hip but notes that it is better than it was. She was educated on importance of continuation of exercises to ensure she maintains the gains made in therapy. Educated patient to call clinic or MD if symptoms worsen.   Pain/symptoms after session: 1    Patient Education:   Results of above outlined education: Verbalizes understanding, Demonstrates understanding and Needs reinforcement      PLAN                                                                                                                           Discharge from skilled therapy with instructions/recommendations to continue home exercise program    Suggestions for next session as indicated: Discharge from therapy      GOALS                                                                                                                           Decrease pain/symptoms to 3/10  Improve involved strength to 4/5 hip abduction; 4/5 hip extension     The above improvements in impairments to assist in  obtaining goals listed below  Long Term Goals: to be met be end of plan of care  1. Patient will report being able to stand for 60 minutes for cook/eat a meal Status: MET  2. Patient will report no difficulty with ascending and descending stairs in order to navigate her household with laundry  Status: MET  3. Patient will report being able to sit for 45 minutes to have an easier time sitting in the car Status: MET  4. Patient will be independent with progressed and modified home exercise program.  Status: MET  5. Lower Extremity Functional Scale: Patient will complete form to reflect an improved raw score to greater than or equal to 51/80 to indicate patient reported improvement in function/disability/impairment (minimal detectable change: 9 points). Status: MET       Procedures and total treatment time documented Time Entry flowsheet.   NP aware continue to monitor

## 2021-08-15 NOTE — PROGRESS NOTE ADULT - SUBJECTIVE AND OBJECTIVE BOX
[facility] Division of Hospital Medicine  Amaury Cao MD  Pager (M-F, 3M-4L): [phone]  Other Times:  [phone]    Patient is a 71y old  Male who presents with a chief complaint of SOB (14 Aug 2021 18:19)      SUBJECTIVE / OVERNIGHT EVENTS:    Patient was examined this morning. Lethargic, on Bipap. Opens eyes to respond but not talking.     ADDITIONAL REVIEW OF SYSTEMS: unable to obtain full ros given cognitive state     MEDICATIONS  (STANDING):  amLODIPine   Tablet 5 milliGRAM(s) Oral daily  atorvastatin 20 milliGRAM(s) Oral at bedtime  budesonide 160 MICROgram(s)/formoterol 4.5 MICROgram(s) Inhaler 2 Puff(s) Inhalation two times a day  chlorhexidine 2% Cloths 1 Application(s) Topical <User Schedule>  dexAMETHasone  Injectable 6 milliGRAM(s) IV Push daily  dextrose 40% Gel 15 Gram(s) Oral once  dextrose 5%. 1000 milliLiter(s) (50 mL/Hr) IV Continuous <Continuous>  dextrose 5%. 1000 milliLiter(s) (100 mL/Hr) IV Continuous <Continuous>  dextrose 50% Injectable 25 Gram(s) IV Push once  dextrose 50% Injectable 12.5 Gram(s) IV Push once  dextrose 50% Injectable 25 Gram(s) IV Push once  enoxaparin Injectable 40 milliGRAM(s) SubCutaneous daily  finasteride 5 milliGRAM(s) Oral daily  glucagon  Injectable 1 milliGRAM(s) IntraMuscular once  insulin lispro (ADMELOG) corrective regimen sliding scale   SubCutaneous three times a day before meals  insulin lispro (ADMELOG) corrective regimen sliding scale   SubCutaneous at bedtime  montelukast 10 milliGRAM(s) Oral daily  oxybutynin XL 10 milliGRAM(s) Oral daily  remdesivir  IVPB   IV Intermittent   remdesivir  IVPB 100 milliGRAM(s) IV Intermittent every 24 hours  tamsulosin 0.4 milliGRAM(s) Oral two times a day  valsartan 160 milliGRAM(s) Oral daily    MEDICATIONS  (PRN):  acetaminophen   Tablet .. 650 milliGRAM(s) Oral every 4 hours PRN Temp greater or equal to 38.5C (101.3F)  ALBUTerol    90 MICROgram(s) HFA Inhaler 2 Puff(s) Inhalation every 4 hours PRN Shortness of Breath and/or Wheezing      CAPILLARY BLOOD GLUCOSE      POCT Blood Glucose.: 281 mg/dL (15 Aug 2021 16:51)  POCT Blood Glucose.: 264 mg/dL (15 Aug 2021 11:21)  POCT Blood Glucose.: 220 mg/dL (15 Aug 2021 08:13)  POCT Blood Glucose.: 234 mg/dL (14 Aug 2021 21:31)    I&O's Summary    14 Aug 2021 07:01  -  15 Aug 2021 07:00  --------------------------------------------------------  IN: 250 mL / OUT: 0 mL / NET: 250 mL    15 Aug 2021 07:01  -  15 Aug 2021 17:42  --------------------------------------------------------  IN: 0 mL / OUT: 475 mL / NET: -475 mL        PHYSICAL EXAM:  Vital Signs Last 24 Hrs  T(C): 36.4 (15 Aug 2021 14:36), Max: 37.2 (15 Aug 2021 11:39)  T(F): 97.5 (15 Aug 2021 14:36), Max: 99 (15 Aug 2021 11:39)  HR: 82 (15 Aug 2021 15:26) (74 - 84)  BP: 151/84 (15 Aug 2021 14:36) (128/66 - 151/84)  BP(mean): --  RR: 40 (15 Aug 2021 14:36) (20 - 49)  SpO2: 91% (15 Aug 2021 15:26) (90% - 100%)      CONSTITUTIONAL: respiratory distress, well-developed  EYES: PERRL; conjunctiva and sclera clear  ENMT: Moist oral mucosa  NECK: Supple,   RESPIRATORY: tachypneic, labored breathing, faint rhonchi BL, BiPAP mask on  CARDIOVASCULAR: Regular rate and rhythm, normal S1 and S2, no murmur/rub/gallop; No lower extremity edema; Peripheral pulses are 2+ bilaterally  ABDOMEN: Nontender to palpation, normoactive bowel sounds  PSYCH/NEURO: lethargic  SKIN: No rashes; no palpable lesions        LABS:                        13.6   9.22  )-----------( 294      ( 15 Aug 2021 15:39 )             41.9     08-15    135  |  100  |  28<H>  ----------------------------<  291<H>  4.4   |  22  |  0.90    Ca    9.2      15 Aug 2021 15:39    TPro  6.6  /  Alb  3.4  /  TBili  0.4  /  DBili  x   /  AST  31  /  ALT  15  /  AlkPhos  72  08-15    PT/INR - ( 15 Aug 2021 06:44 )   PT: 11.8 sec;   INR: 0.98 ratio                   Culture - Blood (collected 13 Aug 2021 19:12)  Source: .Blood Blood-Peripheral  Preliminary Report (14 Aug 2021 20:01):    No growth to date.    Culture - Blood (collected 13 Aug 2021 19:12)  Source: .Blood Blood-Peripheral  Preliminary Report (14 Aug 2021 20:01):    No growth to date.    Culture - Urine (collected 12 Aug 2021 22:09)  Source: Clean Catch Clean Catch (Midstream)  Final Report (13 Aug 2021 22:21):    No growth        RADIOLOGY & ADDITIONAL TESTS:  Results Reviewed:   Imaging Personally Reviewed:  Electrocardiogram Personally Reviewed:    COORDINATION OF CARE:  Care Discussed with Consultants/Other Providers [Y/N]:  Prior or Outpatient Records Reviewed [Y/N]:

## 2021-08-15 NOTE — PROGRESS NOTE ADULT - PROBLEM SELECTOR PLAN 1
-denies vaccination, CXR consistent with acute hypoxic respiratory failure secondary to covid pneumonia with risk of development to acute respiratory distress syndrome  - HiFloNC FIO2 was increase to 100% yesterday. This morning, patient is lethargic, sontiues to be tachypneic, 90% sats. Switch to BiPAP  - continue oxygen supplementation to maintain sats >92%  - Tocilizumab ordered, awaiting approval. Discussed with ID team   - Check ABG, lactate, inflammatory markers  - MICU recs appreciated  - c/w dexamethasone daily, remdesivir   - albuterol MDI q4h  - daily CMP, inflammatory markers  - DVT prophylaxis with lovenox  - Neuro checks, aspiration precautions

## 2021-08-16 NOTE — PROVIDER CONTACT NOTE (OTHER) - ACTION/TREATMENT ORDERED:
Provider notified. Provider states that BIPAP will be ordered. Respiratory notified. Will continue to monitor.

## 2021-08-16 NOTE — PROGRESS NOTE ADULT - SUBJECTIVE AND OBJECTIVE BOX
[facility] Division of Hospital Medicine  Mau Sparrow MD, NISREEN  Pager (M-F, 8A-5P): [phone]  Other Times:  [phone]    Patient is a 71y old  Male who presents with a chief complaint of SOB (16 Aug 2021 17:13)      SUBJECTIVE / OVERNIGHT EVENTS:  No events overnight. No new complaints. Per RN, the patient requires intermittent turning in bed as he becomes hypoxic in the same position even while on NC high flow O2 supplementation.     MEDICATIONS  (STANDING):  amLODIPine   Tablet 5 milliGRAM(s) Oral daily  atorvastatin 20 milliGRAM(s) Oral at bedtime  budesonide 160 MICROgram(s)/formoterol 4.5 MICROgram(s) Inhaler 2 Puff(s) Inhalation two times a day  chlorhexidine 2% Cloths 1 Application(s) Topical <User Schedule>  dexAMETHasone  Injectable 6 milliGRAM(s) IV Push daily  enoxaparin Injectable 40 milliGRAM(s) SubCutaneous daily  finasteride 5 milliGRAM(s) Oral daily  glucagon  Injectable 1 milliGRAM(s) IntraMuscular once  insulin lispro (ADMELOG) corrective regimen sliding scale   SubCutaneous three times a day before meals  insulin lispro (ADMELOG) corrective regimen sliding scale   SubCutaneous at bedtime  montelukast 10 milliGRAM(s) Oral daily  oxybutynin XL 10 milliGRAM(s) Oral daily  remdesivir  IVPB   IV Intermittent   remdesivir  IVPB 100 milliGRAM(s) IV Intermittent every 24 hours  tamsulosin 0.4 milliGRAM(s) Oral two times a day  valsartan 160 milliGRAM(s) Oral daily    MEDICATIONS  (PRN):  acetaminophen   Tablet .. 650 milliGRAM(s) Oral every 4 hours PRN Temp greater or equal to 38.5C (101.3F)  ALBUTerol    90 MICROgram(s) HFA Inhaler 2 Puff(s) Inhalation every 4 hours PRN Shortness of Breath and/or Wheezing    CAPILLARY BLOOD GLUCOSE      POCT Blood Glucose.: 252 mg/dL (16 Aug 2021 16:26)  POCT Blood Glucose.: 318 mg/dL (16 Aug 2021 11:38)  POCT Blood Glucose.: 261 mg/dL (16 Aug 2021 07:35)  POCT Blood Glucose.: 310 mg/dL (15 Aug 2021 21:28)    I&O's Summary    15 Aug 2021 07:01  -  16 Aug 2021 07:00  --------------------------------------------------------  IN: 0 mL / OUT: 1025 mL / NET: -1025 mL    16 Aug 2021 07:01  -  16 Aug 2021 18:05  --------------------------------------------------------  IN: 360 mL / OUT: 450 mL / NET: -90 mL        PHYSICAL EXAM:  Vital Signs Last 24 Hrs  T(C): 37.3 (16 Aug 2021 11:20), Max: 37.3 (16 Aug 2021 11:20)  T(F): 99.1 (16 Aug 2021 11:20), Max: 99.1 (16 Aug 2021 11:20)  HR: 83 (16 Aug 2021 16:15) (66 - 93)  BP: 127/73 (16 Aug 2021 11:20) (116/70 - 127/73)  BP(mean): --  RR: 20 (16 Aug 2021 15:38) (20 - 40)  SpO2: 93% (16 Aug 2021 16:15) (84% - 94%)    CONSTITUTIONAL: mild respiratory distress, on NCHF, well-developed  EYES: conjunctiva and sclera clear  ENMT: Moist oral mucosa  NECK: Supple  RESPIRATORY: tachypneic, faint rhonchi BL  CARDIOVASCULAR: Regular rate and rhythm, normal S1 and S2, no murmur/rub/gallop; No lower extremity edema  ABDOMEN: Nontender to palpation, normoactive bowel sounds  PSYCH/NEURO: calm, cooperative   SKIN: No rashes; no palpable lesions    LABS:                        13.8   8.66  )-----------( 329      ( 16 Aug 2021 10:21 )             41.2     08-16    133<L>  |  101  |  31<H>  ----------------------------<  293<H>  4.7   |  19<L>  |  0.89    Ca    9.3      16 Aug 2021 05:20    TPro  6.7  /  Alb  3.2<L>  /  TBili  0.5  /  DBili  0.1  /  AST  32  /  ALT  19  /  AlkPhos  82  08-16    PT/INR - ( 16 Aug 2021 05:20 )   PT: 11.8 sec;   INR: 0.98 ratio         Culture - Blood (collected 13 Aug 2021 19:12)  Source: .Blood Blood-Peripheral  Preliminary Report (14 Aug 2021 20:01):    No growth to date.    Culture - Blood (collected 13 Aug 2021 19:12)  Source: .Blood Blood-Peripheral  Preliminary Report (14 Aug 2021 20:01):    No growth to date.        RADIOLOGY & ADDITIONAL TESTS:    Lab Results Reviewed: pseudo-hyponatremia (glucose elevated), no leukocytosis

## 2021-08-16 NOTE — PROGRESS NOTE ADULT - SUBJECTIVE AND OBJECTIVE BOX
Follow Up:  COVID pneumonia    Interval History/ROS: Feels ok, better than yesterday. No pain. Cough but not much. No diarrhea. No fevers.     Allergies  No Known Allergies        ANTIMICROBIALS:  remdesivir  IVPB    remdesivir  IVPB 100 every 24 hours      OTHER MEDS:  acetaminophen   Tablet .. 650 milliGRAM(s) Oral every 4 hours PRN  ALBUTerol    90 MICROgram(s) HFA Inhaler 2 Puff(s) Inhalation every 4 hours PRN  amLODIPine   Tablet 5 milliGRAM(s) Oral daily  atorvastatin 20 milliGRAM(s) Oral at bedtime  budesonide 160 MICROgram(s)/formoterol 4.5 MICROgram(s) Inhaler 2 Puff(s) Inhalation two times a day  chlorhexidine 2% Cloths 1 Application(s) Topical <User Schedule>  dexAMETHasone  Injectable 6 milliGRAM(s) IV Push daily  dextrose 40% Gel 15 Gram(s) Oral once  dextrose 5%. 1000 milliLiter(s) IV Continuous <Continuous>  dextrose 5%. 1000 milliLiter(s) IV Continuous <Continuous>  dextrose 50% Injectable 25 Gram(s) IV Push once  dextrose 50% Injectable 12.5 Gram(s) IV Push once  dextrose 50% Injectable 25 Gram(s) IV Push once  enoxaparin Injectable 40 milliGRAM(s) SubCutaneous daily  finasteride 5 milliGRAM(s) Oral daily  glucagon  Injectable 1 milliGRAM(s) IntraMuscular once  insulin lispro (ADMELOG) corrective regimen sliding scale   SubCutaneous three times a day before meals  insulin lispro (ADMELOG) corrective regimen sliding scale   SubCutaneous at bedtime  montelukast 10 milliGRAM(s) Oral daily  oxybutynin XL 10 milliGRAM(s) Oral daily  tamsulosin 0.4 milliGRAM(s) Oral two times a day  valsartan 160 milliGRAM(s) Oral daily      Vital Signs Last 24 Hrs  T(C): 37.3 (16 Aug 2021 11:20), Max: 37.3 (16 Aug 2021 11:20)  T(F): 99.1 (16 Aug 2021 11:20), Max: 99.1 (16 Aug 2021 11:20)  HR: 83 (16 Aug 2021 16:15) (66 - 93)  BP: 127/73 (16 Aug 2021 11:20) (116/70 - 127/73)  BP(mean): --  RR: 20 (16 Aug 2021 15:38) (20 - 40)  SpO2: 93% (16 Aug 2021 16:15) (84% - 94%)    Physical Exam:  General: awake, alert, non toxic  Head: atraumatic, normocephalic  Eye: normal sclera and conjunctiva  Cardio: regular rate   Respiratory: nonlabored on high flow, grossly clear bilaterally, no wheezing  abd: soft, no tenderness  Neurologic: no focal deficit  psych: normal affect                          13.8   8.66  )-----------( 329      ( 16 Aug 2021 10:21 )             41.2       08-16    133<L>  |  101  |  31<H>  ----------------------------<  293<H>  4.7   |  19<L>  |  0.89    Ca    9.3      16 Aug 2021 05:20    TPro  6.7  /  Alb  3.2<L>  /  TBili  0.5  /  DBili  0.1  /  AST  32  /  ALT  19  /  AlkPhos  82  08-16          MICROBIOLOGY:  Culture - Blood (collected 08-13-21 @ 19:12)  Source: .Blood Blood-Peripheral  Preliminary Report (08-14-21 @ 20:01):    No growth to date.    Culture - Blood (collected 08-13-21 @ 19:12)  Source: .Blood Blood-Peripheral  Preliminary Report (08-14-21 @ 20:01):    No growth to date.    Culture - Urine (collected 08-12-21 @ 22:09)  Source: Clean Catch Clean Catch (Midstream)  Final Report (08-13-21 @ 22:21):    No growth    RADIOLOGY:  Images below reviewed personally  Xray Chest 1 View- PORTABLE-Urgent (Xray Chest 1 View- PORTABLE-Urgent .) (08.14.21 @ 15:04)   Heart is top normal in size.  Mild improvement in mid and lower right lung infiltrates.  Mild progression of mid and lower left lung infiltrates.  No pleural effusion or pneumothorax.

## 2021-08-16 NOTE — PROGRESS NOTE ADULT - PROBLEM SELECTOR PLAN 1
-acute hypoxic respiratory failure secondary to covid-19 pneumonia with risk of development to acute respiratory distress syndrome  - s/p Tocilizumab on 8/15.   - Continue HiFloNC FIO2. Doesn't seem to be tolerating BiPAP- removes it.   - ID follow up appreciated.   - c/w dexamethasone daily, remdesivir day 4  - albuterol MDI q4h  - daily CMP, inflammatory markers  - DVT prophylaxis with lovenox  - Neuro checks, aspiration precautions

## 2021-08-16 NOTE — PROGRESS NOTE ADULT - ASSESSMENT
71M with diabetes and asthma, admitted 8/12/21 with COVID pneumonia.   Not vaccinated.   Requiring high flow nasal cannula.   Received Tocilizumab 8/15.   Respiratory status tenuous but he looks comfortable, feels better and CRP downtrending.     Suggest  -Remdesivir day 4 and decadron day 5   -monitor renal function and liver enzymes   -trend inflammatory markers   -rest of care per protocol     Will follow as needed, call back sooner if change in status     Patrick Paul MD   Infectious Disease   Pager [phone]   After 5PM and on weekends please page fellow on call or call [phone] 71M with diabetes and asthma, admitted 8/12/21 with COVID pneumonia.   Not vaccinated.   Requiring high flow nasal cannula.   Received Tocilizumab 8/15.   Respiratory status tenuous but he looks comfortable, feels better and CRP downtrending.     Suggest  -Remdesivir day 4 and decadron day 5   -monitor renal function and liver enzymes   -trend inflammatory markers   -rest of care per protocol     I will be away tomorrow, returning Wed. Please call [phone] if follow up is needed tomorrow.    Patrick Paul MD   Infectious Disease   Pager [phone]   After 5PM and on weekends please page fellow on call or call [phone]

## 2021-08-17 NOTE — PROGRESS NOTE ADULT - PROBLEM SELECTOR PLAN 1
-acute hypoxic respiratory failure secondary to covid-19 pneumonia with risk of development to acute respiratory distress syndrome  - s/p Tocilizumab on 8/15.   - Continue HiFloNC FIO2. Doesn't seem to be tolerating BiPAP- removes it.   - ID follow up appreciated.   - c/w dexamethasone daily, remdesivir day 5  - albuterol MDI q4h  - daily CMP, inflammatory markers  - DVT prophylaxis with lovenox  - Neuro checks, aspiration precautions

## 2021-08-17 NOTE — CHART NOTE - NSCHARTNOTEFT_GEN_A_CORE
Pt Hyperglycemic    POCT Blood Glucose.: 299 mg/dL (17 Aug 2021 16:14)  POCT Blood Glucose.: 243 mg/dL (17 Aug 2021 11:22)  POCT Blood Glucose.: 241 mg/dL (17 Aug 2021 07:35)  POCT Blood Glucose.: 223 mg/dL (16 Aug 2021 21:21)    Increased Lantus to 20 units at bedtime and added Admelog 5 units premeal per discussion with Dr. Sparrow    [id number]

## 2021-08-17 NOTE — PROGRESS NOTE ADULT - SUBJECTIVE AND OBJECTIVE BOX
[facility] Division of Hospital Medicine  Mau Sparrow MD, NISREEN  Pager (M-F, 8A-5P): [phone]  Other Times:  [phone]    Patient is a 71y old  Male who presents with a chief complaint of SOB (16 Aug 2021 18:05)      SUBJECTIVE / OVERNIGHT EVENTS:  No events overnight. No new complaints. Denies dyspnea.     MEDICATIONS  (STANDING):  amLODIPine   Tablet 5 milliGRAM(s) Oral daily  atorvastatin 20 milliGRAM(s) Oral at bedtime  budesonide 160 MICROgram(s)/formoterol 4.5 MICROgram(s) Inhaler 2 Puff(s) Inhalation two times a day  chlorhexidine 2% Cloths 1 Application(s) Topical <User Schedule>  dexAMETHasone  Injectable 6 milliGRAM(s) IV Push daily  enoxaparin Injectable 40 milliGRAM(s) SubCutaneous daily  finasteride 5 milliGRAM(s) Oral daily  glucagon  Injectable 1 milliGRAM(s) IntraMuscular once  insulin glargine Injectable (LANTUS) 15 Unit(s) SubCutaneous at bedtime  insulin lispro (ADMELOG) corrective regimen sliding scale   SubCutaneous three times a day before meals  insulin lispro (ADMELOG) corrective regimen sliding scale   SubCutaneous at bedtime  montelukast 10 milliGRAM(s) Oral daily  oxybutynin XL 10 milliGRAM(s) Oral daily  tamsulosin 0.4 milliGRAM(s) Oral two times a day  valsartan 160 milliGRAM(s) Oral daily    MEDICATIONS  (PRN):  acetaminophen   Tablet .. 650 milliGRAM(s) Oral every 4 hours PRN Temp greater or equal to 38.5C (101.3F)  ALBUTerol    90 MICROgram(s) HFA Inhaler 2 Puff(s) Inhalation every 4 hours PRN Shortness of Breath and/or Wheezing    CAPILLARY BLOOD GLUCOSE  POCT Blood Glucose.: 299 mg/dL (17 Aug 2021 16:14)  POCT Blood Glucose.: 243 mg/dL (17 Aug 2021 11:22)  POCT Blood Glucose.: 241 mg/dL (17 Aug 2021 07:35)  POCT Blood Glucose.: 223 mg/dL (16 Aug 2021 21:21)    I&O's Summary    16 Aug 2021 07:01  -  17 Aug 2021 07:00  --------------------------------------------------------  IN: 360 mL / OUT: 850 mL / NET: -490 mL    17 Aug 2021 07:01  -  17 Aug 2021 18:46  --------------------------------------------------------  IN: 360 mL / OUT: 200 mL / NET: 160 mL        PHYSICAL EXAM:  Vital Signs Last 24 Hrs  T(C): 37.1 (17 Aug 2021 10:41), Max: 37.1 (17 Aug 2021 10:41)  T(F): 98.8 (17 Aug 2021 10:41), Max: 98.8 (17 Aug 2021 10:41)  HR: 69 (17 Aug 2021 18:18) (64 - 81)  BP: 128/76 (17 Aug 2021 10:41) (125/68 - 153/98)  BP(mean): --  RR: 24 (17 Aug 2021 11:40) (22 - 24)  SpO2: 91% (17 Aug 2021 18:18) (89% - 97%)    CONSTITUTIONAL: mild respiratory distress, on HFNC, well-developed  EYES: conjunctiva and sclera clear  ENMT: Moist oral mucosa  NECK: Supple  RESPIRATORY: tachypneic, faint rhonchi BL  CARDIOVASCULAR: Regular rate and rhythm, normal S1 and S2, no murmur/rub/gallop; No lower extremity edema  ABDOMEN: Nontender to palpation, normoactive bowel sounds  PSYCH/NEURO: calm, cooperative   SKIN: No rashes; no palpable lesions    LABS:                        13.7   11.09 )-----------( 316      ( 17 Aug 2021 05:47 )             41.8     08-17    139  |  103  |  34<H>  ----------------------------<  248<H>  4.3   |  22  |  0.93    Ca    9.4      17 Aug 2021 05:47    TPro  6.6  /  Alb  3.4  /  TBili  0.6  /  DBili  0.2  /  AST  34  /  ALT  19  /  AlkPhos  83  08-17    PT/INR - ( 17 Aug 2021 05:47 )   PT: 12.2 sec;   INR: 1.02 ratio         RADIOLOGY & ADDITIONAL TESTS:    Lab Results Reviewed: ferritin downtrending

## 2021-08-18 NOTE — CONSULT NOTE ADULT - ASSESSMENT
70yo M w/ PMHx of HTN, DM2, BPH, asthma presents with covid-19 (sp remdesivir and toci). MICU consulted for tachypnea. VSS, pt afebrile and O2 sat > 95%. Mental status intact and pt protecting airway.    Recommendations:   - sp remdesivir and toci   - cw dexamethosone 6qd   - cw IS   - cxr for possible r/o superimposed pna   - Check procal  - cw GOC discussion re: DNR DNI status       Not a MICU candidate at this time. Please reconsult should status change  72yo M w/ PMHx of HTN, DM2, BPH, asthma presents with covid-19 (sp remdesivir and toci). MICU consulted for tachypnea.     Recommendations:   - Appears stable and comfortable on BIPAP. No accessory muscle use. Acceptable ABG   - VSS, pt afebrile and O2 sat > 95%. Mental status intact and pt protecting airway.  - sp remdesivir and toci   - cw dexamethosone 6qd   - CXR wo focal consolidations  - Check LE duplex given elevated d-dimer   - cw GOC discussion re: DNR DNI status   - cw IS       Not a MICU candidate at this time. Please reconsult should status change     Case discussed with Dr. Ma

## 2021-08-18 NOTE — DIETITIAN INITIAL EVALUATION ADULT. - PROBLEM SELECTOR PLAN 1
-denies vaccination, CXR consistent with acute hypoxic respiratory failure secondary to covid pneumonia with risk of development to acute respiratory distress syndrome  -c/w dexamethasone daily  -albuterol MDI q4h  -start remdesivir   -wean oxygen as tolerated  -daily inflammatory markers  -DVT prophylaxis with lovenox  -would hold other antibiotics for now (s/p 1 dose doxycyline in ED)

## 2021-08-18 NOTE — CONSULT NOTE ADULT - SUBJECTIVE AND OBJECTIVE BOX
Reason for Consultation:  Chief Complaint:  Date of Admission:     HPI on Admission:   HPI:  72yo M w/ PMHx of HTN, DM2, BPH, asthma presents with shortness of breath. Patient endorses that he has been feeling SOB with associated cough for the last 2 weeks and his symptoms slowly worsened over time. He did not receive the COVID vaccine. He reports that his family members whom he lives with likely are also COVID positive but he does not know for sure. He did not try taking anything for his symptoms other than his usual medications/inhalers. There were no obvious aggravating or alleviating factors. Currently with supplemental oxygen on, he feels well and has no complaints, however he presented to [facility] since his symptoms were worsening. In the ED, he was hemodynamically stable, afebrile, but he was encephalopathic and hypoxic on room air requiring high flow nasal canula. Labs were significant for mild hyperkalemia and COVID positive. CXR prelim read showed bilateral patchy opacities. CT head showed no acute findings. MICU was consulted in the ED, patient was deemed not a MICU candidate. Patient was given doxycycline and dexamethasone x1 and admitted to general medicine for further management. At time of interview, patient was A/Ox3 with .  (13 Aug 2021 04:21)       Hospital Course and Subjective:   sp remdesivir and toci.    RRT called for tachypnea to 60s as reported by primary RN although 40s noted on arrival. Pt on BiPAP for past 24h without significant improvement in WOB. On arrival, VSS, pt afebrile and O2 sat > 95%. Mental status intact and pt protecting airway. Based on report from primary team, GOC ongoing, however currently full code. RR improved to 30s spontaneously. Decision was made to monitor, continue GOC, consider MICU re-consult.      Past Medical History:   BPH (benign prostatic hyperplasia)    Hyperlipemia    HTN (hypertension)    HTN (hypertension)    Hypercholesteremia    Asthma    Diabetes    Kidney stone    Bladder stone        Past Surgical History:   H/O lithotripsy    History of kidney stones    H/O umbilical hernia repair        Medications:   acetaminophen   Tablet .. 650 milliGRAM(s) Oral every 4 hours PRN  ALBUTerol    90 MICROgram(s) HFA Inhaler 2 Puff(s) Inhalation every 4 hours PRN  amLODIPine   Tablet 5 milliGRAM(s) Oral daily  atorvastatin 20 milliGRAM(s) Oral at bedtime  budesonide 160 MICROgram(s)/formoterol 4.5 MICROgram(s) Inhaler 2 Puff(s) Inhalation two times a day  chlorhexidine 2% Cloths 1 Application(s) Topical <User Schedule>  dexAMETHasone  Injectable 6 milliGRAM(s) IV Push daily  dextrose 40% Gel 15 Gram(s) Oral once  dextrose 5%. 1000 milliLiter(s) IV Continuous <Continuous>  dextrose 5%. 1000 milliLiter(s) IV Continuous <Continuous>  dextrose 50% Injectable 25 Gram(s) IV Push once  dextrose 50% Injectable 12.5 Gram(s) IV Push once  dextrose 50% Injectable 25 Gram(s) IV Push once  enoxaparin Injectable 40 milliGRAM(s) SubCutaneous daily  finasteride 5 milliGRAM(s) Oral daily  glucagon  Injectable 1 milliGRAM(s) IntraMuscular once  insulin glargine Injectable (LANTUS) 20 Unit(s) SubCutaneous at bedtime  insulin lispro (ADMELOG) corrective regimen sliding scale   SubCutaneous three times a day before meals  insulin lispro (ADMELOG) corrective regimen sliding scale   SubCutaneous at bedtime  insulin lispro Injectable (ADMELOG) 5 Unit(s) SubCutaneous three times a day before meals  montelukast 10 milliGRAM(s) Oral daily  oxybutynin XL 10 milliGRAM(s) Oral daily  tamsulosin 0.4 milliGRAM(s) Oral two times a day  valsartan 160 milliGRAM(s) Oral daily      Allergies:  No Known Allergies      FAMILY HISTORY:  Family history of diabetes mellitus (Father, Sibling)    Family history of cancer (Mother)      No FH of SCD or early onset heart failure    Social History:  Smoking History: Denied  Alcohol Use: Denied   Drug Use: Denied    Review of Systems:  Constitutional: [ ] Fever [ ] Chills [x ] Fatigue [ ] Weight change   HEENT: [ ] Blurred vision [ ] Eye Pain [ ] Headache [ ] Runny nose [ ] Sore Throat   Respiratory: [ ] Cough [ ] Wheezing [ x] Shortness of breath  Cardiovascular: [ ] Chest Pain [ ] Palpitations [ ] FLEMING [ ] PND [ ] Orthopnea  Gastrointestinal: [ ] Abdominal Pain [ ] Diarrhea [ ] Constipation [ ] Hemorrhoids [ ] Nausea [ ] Vomiting  Genitourinary: [ ] Nocturia [ ] Dysuria [ ] Incontinence  Extremities: [ ] Swelling [ ] Joint Pain  Neurologic: [ ] Focal deficit [ ] Paresthesias [ ] Syncope  Lymphatic: [ ] Swelling [ ] Lymphadenopathy   Skin: [ ] Rash [ ] Ecchymoses [ ] Wounds [ ] Lesions  Psychiatry: [ ] Depression [ ] Suicidal/Homicidal Ideation [ ] Anxiety [ ] Sleep Disturbances  [ ] 10 point review of systems is otherwise negative except as mentioned above              [ ] Unable to obtain    Vital Signs Last 24 Hrs  T(C): 36.9 (18 Aug 2021 10:51), Max: 36.9 (18 Aug 2021 10:51)  T(F): 98.5 (18 Aug 2021 10:51), Max: 98.5 (18 Aug 2021 10:51)  HR: 72 (18 Aug 2021 11:04) (57 - 82)  BP: 146/83 (18 Aug 2021 10:51) (131/77 - 160/95)  BP(mean): --  RR: 32 (18 Aug 2021 10:51) (19 - 32)  SpO2: 95% (18 Aug 2021 11:04) (91% - 100%)    I&O's Summary    17 Aug 2021 07:01  -  18 Aug 2021 07:00  --------------------------------------------------------  IN: 600 mL / OUT: 900 mL / NET: -300 mL        Physical Exam:  General: No distress. Comfortable on BIPap   HEENT: EOMI	  Neck: JVP not elevated  Chest: Clear to auscultation bilaterally. Tachypnea.   CV: RRR. Normal S1 and S2. No murmurs, rubs, or gallops. No edema.  Abdomen: Soft, non-distended, non-tender  Skin: No rashes, ecchymoses, or skin breakdown  Extremities: Warm.  Neuro: Alert and oriented x 3. No focal deficits.  Psych: Mood and affect appropriate    Labs:                         13.7   11.19 )-----------( 293      ( 18 Aug 2021 05:30 )             42.3     08-18    142  |  107  |  38<H>  ----------------------------<  224<H>  4.1   |  20<L>  |  0.93    Ca    9.3      18 Aug 2021 05:30    TPro  6.3  /  Alb  3.4  /  TBili  0.7  /  DBili  x   /  AST  31  /  ALT  20  /  AlkPhos  89  08-18    PT/INR - ( 18 Aug 2021 05:30 )   PT: 12.7 sec;   INR: 1.06 ratio                           Reason for Consultation:  Chief Complaint:  Date of Admission:     HPI on Admission:   HPI:  70yo M w/ PMHx of HTN, DM2, BPH, asthma presents with shortness of breath. Patient endorses that he has been feeling SOB with associated cough for the last 2 weeks and his symptoms slowly worsened over time. He did not receive the COVID vaccine. He reports that his family members whom he lives with likely are also COVID positive but he does not know for sure. He did not try taking anything for his symptoms other than his usual medications/inhalers. There were no obvious aggravating or alleviating factors. Currently with supplemental oxygen on, he feels well and has no complaints, however he presented to [facility] since his symptoms were worsening. In the ED, he was hemodynamically stable, afebrile, but he was encephalopathic and hypoxic on room air requiring high flow nasal canula. Labs were significant for mild hyperkalemia and COVID positive. CXR prelim read showed bilateral patchy opacities. CT head showed no acute findings. MICU was consulted in the ED, patient was deemed not a MICU candidate. Patient was given doxycycline and dexamethasone x1 and admitted to general medicine for further management. At time of interview, patient was A/Ox3 with .  (13 Aug 2021 04:21)       Hospital Course and Subjective:   sp remdesivir and toci.   RRT called for tachypnea to 60s   Pt on BiPAP for past 24h without significant improvement in WOB. On arrival, VSS, pt afebrile and O2 sat > 95%. Mental status intact and pt protecting airway. Endorses discomfort with bipap mask       Past Medical History:   BPH (benign prostatic hyperplasia)    Hyperlipemia    HTN (hypertension)    HTN (hypertension)    Hypercholesteremia    Asthma    Diabetes    Kidney stone    Bladder stone        Past Surgical History:   H/O lithotripsy    History of kidney stones    H/O umbilical hernia repair        Medications:   acetaminophen   Tablet .. 650 milliGRAM(s) Oral every 4 hours PRN  ALBUTerol    90 MICROgram(s) HFA Inhaler 2 Puff(s) Inhalation every 4 hours PRN  amLODIPine   Tablet 5 milliGRAM(s) Oral daily  atorvastatin 20 milliGRAM(s) Oral at bedtime  budesonide 160 MICROgram(s)/formoterol 4.5 MICROgram(s) Inhaler 2 Puff(s) Inhalation two times a day  chlorhexidine 2% Cloths 1 Application(s) Topical <User Schedule>  dexAMETHasone  Injectable 6 milliGRAM(s) IV Push daily  dextrose 40% Gel 15 Gram(s) Oral once  dextrose 5%. 1000 milliLiter(s) IV Continuous <Continuous>  dextrose 5%. 1000 milliLiter(s) IV Continuous <Continuous>  dextrose 50% Injectable 25 Gram(s) IV Push once  dextrose 50% Injectable 12.5 Gram(s) IV Push once  dextrose 50% Injectable 25 Gram(s) IV Push once  enoxaparin Injectable 40 milliGRAM(s) SubCutaneous daily  finasteride 5 milliGRAM(s) Oral daily  glucagon  Injectable 1 milliGRAM(s) IntraMuscular once  insulin glargine Injectable (LANTUS) 20 Unit(s) SubCutaneous at bedtime  insulin lispro (ADMELOG) corrective regimen sliding scale   SubCutaneous three times a day before meals  insulin lispro (ADMELOG) corrective regimen sliding scale   SubCutaneous at bedtime  insulin lispro Injectable (ADMELOG) 5 Unit(s) SubCutaneous three times a day before meals  montelukast 10 milliGRAM(s) Oral daily  oxybutynin XL 10 milliGRAM(s) Oral daily  tamsulosin 0.4 milliGRAM(s) Oral two times a day  valsartan 160 milliGRAM(s) Oral daily      Allergies:  No Known Allergies      FAMILY HISTORY:  Family history of diabetes mellitus (Father, Sibling)    Family history of cancer (Mother)      No FH of SCD or early onset heart failure    Social History:  Smoking History: Denied  Alcohol Use: Denied   Drug Use: Denied    Review of Systems:  Constitutional: [ ] Fever [ ] Chills [x ] Fatigue [ ] Weight change   HEENT: [ ] Blurred vision [ ] Eye Pain [ ] Headache [ ] Runny nose [ ] Sore Throat   Respiratory: [ ] Cough [ ] Wheezing [ x] Shortness of breath  Cardiovascular: [ ] Chest Pain [ ] Palpitations [ ] FLEMING [ ] PND [ ] Orthopnea  Gastrointestinal: [ ] Abdominal Pain [ ] Diarrhea [ ] Constipation [ ] Hemorrhoids [ ] Nausea [ ] Vomiting  Genitourinary: [ ] Nocturia [ ] Dysuria [ ] Incontinence  Extremities: [ ] Swelling [ ] Joint Pain  Neurologic: [ ] Focal deficit [ ] Paresthesias [ ] Syncope  Lymphatic: [ ] Swelling [ ] Lymphadenopathy   Skin: [ ] Rash [ ] Ecchymoses [ ] Wounds [ ] Lesions  Psychiatry: [ ] Depression [ ] Suicidal/Homicidal Ideation [ ] Anxiety [ ] Sleep Disturbances  [ ] 10 point review of systems is otherwise negative except as mentioned above              [ ] Unable to obtain    Vital Signs Last 24 Hrs  T(C): 36.9 (18 Aug 2021 10:51), Max: 36.9 (18 Aug 2021 10:51)  T(F): 98.5 (18 Aug 2021 10:51), Max: 98.5 (18 Aug 2021 10:51)  HR: 72 (18 Aug 2021 11:04) (57 - 82)  BP: 146/83 (18 Aug 2021 10:51) (131/77 - 160/95)  BP(mean): --  RR: 32 (18 Aug 2021 10:51) (19 - 32)  SpO2: 95% (18 Aug 2021 11:04) (91% - 100%)    I&O's Summary    17 Aug 2021 07:01  -  18 Aug 2021 07:00  --------------------------------------------------------  IN: 600 mL / OUT: 900 mL / NET: -300 mL        Physical Exam:  General: No distress. Comfortable on BIPap   HEENT: EOMI	  Neck: JVP not elevated  Chest: Clear to auscultation bilaterally. Tachypnea.   CV: RRR. Normal S1 and S2. No murmurs, rubs, or gallops. No edema.  Abdomen: Soft, non-distended, non-tender  Skin: No rashes, ecchymoses, or skin breakdown  Extremities: Warm.  Neuro: Alert and oriented x 3. No focal deficits.  Psych: Mood and affect appropriate    Labs:                         13.7   11.19 )-----------( 293      ( 18 Aug 2021 05:30 )             42.3     08-18    142  |  107  |  38<H>  ----------------------------<  224<H>  4.1   |  20<L>  |  0.93    Ca    9.3      18 Aug 2021 05:30    TPro  6.3  /  Alb  3.4  /  TBili  0.7  /  DBili  x   /  AST  31  /  ALT  20  /  AlkPhos  89  08-18

## 2021-08-18 NOTE — CHART NOTE - NSCHARTNOTEFT_GEN_A_CORE
RRT called in am for tachypnea 40's, up to 60's, O2 saturation RRT called in am for tachypnea 40's, up to 60's, O2 saturation 92- 95%, '/60's. Afebrile  Pt A&O x 2, reports SOB.    On exam, patient appeared to have increased WOB on BIPAP 14/10.  Otherwise patient alert.    During RRT, family called, daughter , Judy [phone] and she confirmed to keep patient full code,but would want to discuss with family as well.   At this point, patient more calm RR improved to 20-30's, remains on BIPAP. O2 sat > 90%  Medicine attending made aware. Concern that patient need intubation.   Called family back and confirmed again, they wish to continue full code at this time.   ABG with lactate 1.5, 7.43/37/25  MICU reevaluation consult called. Per their assessment pt Appears stable and comfortable on BIPAP. No accessory muscle use. Acceptable ABG   -VSS, pt afebrile and O2 sat > 95%. Mental status intact and pt protecting airway. No MICU candidate at this time.     CXR:  Diffuse airspace opacity seen throughout post lungs which appears to be improved when compared to previous study done on August 14, 2021 at 2:56 PM. Changes compatible with multiple focal pneumonia. Interstitial fluid cannot be ruled out as well.    Pt continued on dexamethasone. On review of labs, d-dimer increased yesterday 1000's, lovenox increased to 80 BID for tx dose AC.    LE dopplers ordered and radiology reporting patient b/l DVT. Medicine attending made aware. Will continue lovenox 80 BID.     Assessment:  71M w/ PMHx of asthma, DM, HTN who presents COVID positive w/ SOB and cough for the past 2 wks + increased confusion. Not vaccinated. Pt with increased O2 requirement, status post remdesivir, on day 6/10 dexa, status post toci 8/15 now with worsening tachypnea, hypoxia requiring continuous BIPAP, found to have bilateral DVTs, started on tx dose AC and continue on dexa for COVID tx. To be monitored closely.     Plan:  - if any further decompensation or decrease in mental status, repeat ABG, reconsult MICU  - continue dexa as ordered  - continue lovenox 80 bid  - prone as tolerated  - NPO while on BIPAP  - if remains stable, will wean off BIPAP  - follow up inflammatory markers tomorrow including D-dimer  - VSS stable

## 2021-08-18 NOTE — PROGRESS NOTE ADULT - SUBJECTIVE AND OBJECTIVE BOX
[facility] Division of Hospital Medicine  Mau Sparrow MD, NISREEN  Pager (M-F, 4H-0R): [phone]  Other Times:  [phone]    Patient is a 71y old  Male who presents with a chief complaint of SOB (18 Aug 2021 11:16)      SUBJECTIVE / OVERNIGHT EVENTS:  The patient had an RRT this morning due to worsening respiratory status on BiPAP. Was tachypnic to 40s-60s/min. If being closely followed by the MICU team. Family want FULL CODE for now; still deciding.     MEDICATIONS  (STANDING):  amLODIPine   Tablet 5 milliGRAM(s) Oral daily  atorvastatin 20 milliGRAM(s) Oral at bedtime  budesonide 160 MICROgram(s)/formoterol 4.5 MICROgram(s) Inhaler 2 Puff(s) Inhalation two times a day  chlorhexidine 2% Cloths 1 Application(s) Topical <User Schedule>  dexAMETHasone  Injectable 6 milliGRAM(s) IV Push daily  dextrose 40% Gel 15 Gram(s) Oral once  dextrose 5%. 1000 milliLiter(s) (50 mL/Hr) IV Continuous <Continuous>  dextrose 5%. 1000 milliLiter(s) (100 mL/Hr) IV Continuous <Continuous>  dextrose 50% Injectable 25 Gram(s) IV Push once  dextrose 50% Injectable 25 Gram(s) IV Push once  dextrose 50% Injectable 12.5 Gram(s) IV Push once  enoxaparin Injectable 80 milliGRAM(s) SubCutaneous every 12 hours  finasteride 5 milliGRAM(s) Oral daily  glucagon  Injectable 1 milliGRAM(s) IntraMuscular once  insulin glargine Injectable (LANTUS) 20 Unit(s) SubCutaneous at bedtime  insulin lispro (ADMELOG) corrective regimen sliding scale   SubCutaneous three times a day before meals  insulin lispro (ADMELOG) corrective regimen sliding scale   SubCutaneous at bedtime  insulin lispro Injectable (ADMELOG) 5 Unit(s) SubCutaneous three times a day before meals  montelukast 10 milliGRAM(s) Oral daily  oxybutynin XL 10 milliGRAM(s) Oral daily  tamsulosin 0.4 milliGRAM(s) Oral two times a day  valsartan 160 milliGRAM(s) Oral daily    MEDICATIONS  (PRN):  acetaminophen   Tablet .. 650 milliGRAM(s) Oral every 4 hours PRN Temp greater or equal to 38.5C (101.3F)  ALBUTerol    90 MICROgram(s) HFA Inhaler 2 Puff(s) Inhalation every 4 hours PRN Shortness of Breath and/or Wheezing    CAPILLARY BLOOD GLUCOSE      POCT Blood Glucose.: 252 mg/dL (18 Aug 2021 16:35)  POCT Blood Glucose.: 254 mg/dL (18 Aug 2021 11:32)  POCT Blood Glucose.: 257 mg/dL (18 Aug 2021 09:59)  POCT Blood Glucose.: 215 mg/dL (18 Aug 2021 07:41)  POCT Blood Glucose.: 203 mg/dL (17 Aug 2021 21:38)    I&O's Summary    17 Aug 2021 07:01  -  18 Aug 2021 07:00  --------------------------------------------------------  IN: 600 mL / OUT: 900 mL / NET: -300 mL    18 Aug 2021 07:01  -  18 Aug 2021 19:58  --------------------------------------------------------  IN: 0 mL / OUT: 850 mL / NET: -850 mL        PHYSICAL EXAM:  Vital Signs Last 24 Hrs  T(C): 36.9 (18 Aug 2021 10:51), Max: 36.9 (18 Aug 2021 10:51)  T(F): 98.5 (18 Aug 2021 10:51), Max: 98.5 (18 Aug 2021 10:51)  HR: 70 (18 Aug 2021 18:05) (57 - 82)  BP: 146/83 (18 Aug 2021 10:51) (131/77 - 160/95)  BP(mean): --  RR: 32 (18 Aug 2021 10:51) (19 - 32)  SpO2: 96% (18 Aug 2021 18:05) (93% - 100%)    CONSTITUTIONAL: moderate respiratory distress, tachypnea on BiPAP  EYES: conjunctiva and sclera clear  ENMT: Moist oral mucosa  NECK: Supple  RESPIRATORY: tachypneic, faint rhonchi BL  CARDIOVASCULAR: Regular rate and rhythm, normal S1 and S2, no murmur/rub/gallop  ABDOMEN: Nontender to palpation, normoactive bowel sounds  PSYCH/NEURO: cooperative   SKIN: No rashes; no palpable lesions        LABS:                        13.7   11.19 )-----------( 293      ( 18 Aug 2021 05:30 )             42.3     08-18    142  |  107  |  38<H>  ----------------------------<  224<H>  4.1   |  20<L>  |  0.93    Ca    9.3      18 Aug 2021 05:30    TPro  6.3  /  Alb  3.4  /  TBili  0.7  /  DBili  x   /  AST  31  /  ALT  20  /  AlkPhos  89  08-18    PT/INR - ( 18 Aug 2021 05:30 )   PT: 12.7 sec;   INR: 1.06 ratio          RADIOLOGY & ADDITIONAL TESTS:    Lab Results Reviewed    Imaging Personally Reviewed:   LE Duplex - bilateral LE DVTs  CXR- multifocal pneumonia

## 2021-08-18 NOTE — DIETITIAN INITIAL EVALUATION ADULT. - ORAL INTAKE PTA/DIET HISTORY
Pt reports good appetite and PO intake at home. Confirms NKFA. Pt reports not taking any vitamins or nutritional supplements PTA. Pt reports not following any type of diet or restriction at home, unable to provide further details. Denies monitoring BG and states taking Metformin at home; HbA1c (08/14) 7.3% - indicates good BG control.

## 2021-08-18 NOTE — DIETITIAN INITIAL EVALUATION ADULT. - PHYSCIAL ASSESSMENT
Skin: no noted pressure injuries as per documentation.  No visual signs of muscle/fat loss noted. overweight

## 2021-08-18 NOTE — PROVIDER CONTACT NOTE (CHANGE IN STATUS NOTIFICATION) - SITUATION
Pt on bipap, tachypneic at rest. RR between 45-50's most of the morning with a brief episode of tachypnea up to 60 breaths per minute. pt currently on max setting on bipap

## 2021-08-18 NOTE — DIETITIAN INITIAL EVALUATION ADULT. - REASON INDICATOR FOR ASSESSMENT
Pt seen for length of stay initial assessment.  Information obtained from: medical record, pt, and RN.   Pt [place]-Speaking - dietitian fluent in [place].   Pt disoriented to time as per documentation; unable to reach reference contact -?accuracy of information obtained.

## 2021-08-18 NOTE — DIETITIAN INITIAL EVALUATION ADULT. - ADD RECOMMEND
1. Will continue to monitor PO intake, weight, labs, skin, GI status, diet. 2. Gently encourage PO intake and provide food preferences. 3. Consider Multivitamin if medically feasible to optimize nutrient intake. 4. Provided recommendations to optimize PO and protein intake as tolerated - made aware RD remains available.

## 2021-08-18 NOTE — DIETITIAN INITIAL EVALUATION ADULT. - CONTINUE CURRENT NUTRITION CARE PLAN
1. Continue Consistent Carbohydrate with snack diet. Will continue to monitor as able and adjust as needed. 2. Recommend Glucerna Shake 240mls 3x daily (660kcals, 30g protein) and continue Sugar Free Mighty Shakes 3xday to optimize kcal and protein intake. Spoke to PA./yes

## 2021-08-18 NOTE — DIETITIAN INITIAL EVALUATION ADULT. - OTHER INFO
Confirmed information with RN. Pt reports decreased appetite and PO intake x 2 days in house, unable to provide further details. RN states pt with fair PO intake due to difficulty breathing, worsened since yesterday. Noted 0% PO intake x 1 yesterday (08/17) as per flow sheets, previously 78-80%. Pt reports drinking Sugar Free Mighty Shakes, agreed to add Glucerna. Pt denies difficulty chewing/swallowing. Pt denies nausea, vomiting, diarrhea, or constipation, reports last BM 2 days ago (08/16).     Pt denies weight changes PTA, reports UBW approximately 169 pounds. Weight as per flow sheets (08/12) 179 pounds -?accuracy. Obtained bed scale weight (08/18) 169 pounds.     Provided recommendations to optimize PO and protein intake as tolerated, recommended nutrient-dense snacks, non-perishable food, or nutritional supplements between meals and to start with protein; reviewed foods with protein and nutrient-dense snacks. Pt denies having further questions/concerns about diet and nutrition - made aware RD remains available.

## 2021-08-18 NOTE — PROGRESS NOTE ADULT - PROBLEM SELECTOR PLAN 1
-acute hypoxic respiratory failure secondary to covid-19 pneumonia with risk of development to acute respiratory distress syndrome  - s/p Tocilizumab on 8/15.   - RRT this morning (8/18) while on the BiPAP. Tachypneic. Followed closely by MICU team. If shows signs of further respiratory decompensation, will likely require intubation with mechanical ventilation. FULL CODE per family.   - ID follow up appreciated.   - c/w dexamethasone daily. Remdesivir completed.   - albuterol MDI q4h  - daily CMP, inflammatory markers  - Found to have acute bilateral LE DVTs - increased lovenox to 80mg sc BID  - Neuro checks, aspiration precautions

## 2021-08-18 NOTE — PROVIDER CONTACT NOTE (CHANGE IN STATUS NOTIFICATION) - ASSESSMENT
Pt on bipap, tachypneic at rest. RR between 45-50's most of the morning with a brief episode of tachypnea up to 60 breaths per minute. Pt has audible grunts upon exertion, and displays diaphragmatic respirations. pt currently on max setting on bipap. Other VSS, afebrile. o2 sat 95%

## 2021-08-18 NOTE — DIETITIAN INITIAL EVALUATION ADULT. - REASON FOR ADMISSION
Pt 72 y/o M with PMH as per chart: HTN, DM2, BPH, asthma, HLD, kidney stone, admitted with SOB, found with acute hypoxic respiratory failure COVID pneumonia.

## 2021-08-18 NOTE — DIETITIAN INITIAL EVALUATION ADULT. - PROBLEM SELECTOR PLAN 5
-c/w oxybutynin  -c/w tamsulosin  -c/w finasteride   -of note patient reports taking desipramine and prednisone for "urinary problems" and that they are prescribed by a neurologist, desipramine likely for overactive bladder but unclear the role of the prednisone, would clarify these medications in AM, would otherwise hold prednisone regardless in setting of dexamethasone

## 2021-08-19 NOTE — CHART NOTE - NSCHARTNOTEFT_GEN_A_CORE
The ABG in am 7.38/39/99/23. He continued to be on BIPAP 14/10 100%, however would desat when pt tries to pull off mask to 70-80% O 2 sat. Pt intermittently tachypneic 30-50's, given ativan .25 IV. D5 50 cc/hr started  	- RR continues to be 30- 50's during day, given ativan .25 mg IV  	- will start on D5 50 cchr given still bipap dependent  	- per family, only want intubation as last resort, want pt to be reoriented if mask removed. THey believe intubation would be premature. They only want BIPAP in the scenario that pt removed mask, desats and despite placing mask back on he does not recover. MICU reports it now primary team to decide about intubation and GOC, can call anesthesia if intubation is ultimate decision. Dr. Sparrow called family back. FAmily still wants intubation as last resort. Decided on giving haldol .5, psych to be consulted  	-   , pt calm at 7 pm, will hold off on 2nd dose  	- psych still has not called back- tried 4 times  	- Repeat ABG in afternoon: 7.43/37/100/25 The ABG in am 7.38/39/99/23. He continued to be on BIPAP 14/10 100%, however would desat when pt tries to pull off mask to 70-80% O 2 sat. Pt intermittently tachypneic 30-50's, given ativan .25 IV. D5 50 cc/hr started give BIPAP ongoing x 48 hours.   As patient continued to be intermittently tachypneic and patient continues ot pull mask off and desaturating, MICU called to consider intubation  Repeat ABG in afternoon: 7.43/37/100/25  Called family at this time who reports that they only want intubation as last resort, want pt to be reoriented if mask removed. They believe intubation would be premature. They only want intubation in the scenario that pt removed mask, desats and despite placing mask back on he does not recover. MICU reports it now primary team to decide about intubation and GOC, can call anesthesia if intubation is ultimate decision. Dr. Sparrow called family back. FAmily still wants intubation as last resort. Decided on giving haldol .5, qtc 440, psych to be consulted, tried 4 times without response.  Pt now calm at 7 pm, will hold on 2nd dose.    Discussed with medicine attending, if agitation continues and patient continues to pull mask off resulting in desaturation, give additional haldol IV .5 q 15 min until patient calm. Will try to avoid restraints especially on BIPAP.     Overnight team informed of these events and plan.    Plan:  - continue 1:1  - VS q 4 hours  - if agitation continues, haldol IV .5 q 15 min until calm , reassess prior to each dose  - please attempt to call psych in am  - continue BIPAP 14/10

## 2021-08-19 NOTE — PROGRESS NOTE ADULT - ASSESSMENT
71M with diabetes and asthma, admitted 8/12/21 with COVID pneumonia.   Not vaccinated.   Received Tocilizumab 8/15. Completed Remdesivir 8/17. Decadron day 8.   CRP normalized and Ferritin downtrending but requiring BIPAP now.   I think he has a PE given marked rise in D-Dimer and bilateral leg DVTs.     Suggest  -decadron day 8 of 10   -trend inflammatory markers   -supportive care and anticoagulation per protocol     Patrick Paul MD   Infectious Disease   Pager [phone]   After 5PM and on weekends please page fellow on call or call [phone]

## 2021-08-19 NOTE — CHART NOTE - NSCHARTNOTEFT_GEN_A_CORE
Internal Medicine PA Note    HPI:  72yo M w/ PMHx of HTN, DM2, BPH, asthma presents with COVID pneumonia        Patient is s/p RRT for tachypnea RR 50s - 60s, remains on BIPAP o2 sat > 90%. s/p Ativan and IV Tylenol. See RRT note for further details.   Post RRT, patient is calm, RR mid 20s - low 30s.   F/u with CBC, CMP, mag, phos, ABG  Family made aware - [facility].   Discussed with RN  Discussed with Dr. Jc ([facility])   Continue to monitor  Will endorse to AM team      Jean Guzman PA  #[phone] Internal Medicine PA Note    HPI:  70yo M w/ PMHx of HTN, DM2, BPH, asthma presents with COVID pneumonia        Patient is s/p RRT for tachypnea RR 50s - 60s, remains on BIPAP o2 sat > 90%. s/p Ativan and IV Tylenol. See RRT note for further details.   Post RRT, patient is calm, RR mid 20s - low 30s.   F/u with CBC, CMP, mag, phos, ABG  Family made aware - [facility]. Patient remains FULL CODE.    Discussed with RN  Discussed with Dr. Jc ([facility])   Continue to monitor  Will endorse to AM team      Jean Guzman PA  #[phone] Internal Medicine PA Note    HPI:  72yo M w/ PMHx of HTN, DM2, BPH, asthma presents with COVID pneumonia     RRT called for tachypnea RR 50s - 60s and increased work of breathing while on BIPAP 14/10 FiO2 100% with O2 sat > 90%.   During RRT patient received Ativan 2mg IVP + IV tylenol. Please see RRT note for further details.    Post RRT, patient's RR improved to mid 20s - low 30s, with decreased work of breathing.   F/u with CBC, CMP, mag, phos, ABG, CXR.  Family made aware - Judy (daughter), confirms patient is FULL CODE.   Given patient with multiple attempts to remove BIPAP, will order constant observation, per RRT recs.   Discussed with primary RN  Discussed with Dr. Jc ([facility])   Continue to monitor  Endorsed to AM Team.       Complete Blood Count + Automated Diff in AM (08.19.21 @ 05:38)    WBC Count: 12.05 K/uL    RBC Count: 4.84 M/uL    Hemoglobin: 14.2 g/dL    Hematocrit: 43.0 %    Mean Cell Volume: 88.8 fl    Mean Cell Hemoglobin: 29.3 pg    Mean Cell Hemoglobin Conc: 33.0 gm/dL    Red Cell Distrib Width: 13.9 %    Platelet Count - Automated: 251 K/uL    Nucleated RBC: 0 /100 WBCs    Comprehensive Metabolic Panel in AM (08.19.21 @ 05:38)    Sodium, Serum: 145 mmol/L    Potassium, Serum: 4.3 mmol/L    Chloride, Serum: 109 mmol/L    Carbon Dioxide, Serum: 19 mmol/L    Anion Gap, Serum: 17 mmol/L    Blood Urea Nitrogen, Serum: 47 mg/dL    Creatinine, Serum: 1.16 mg/dL    Glucose, Serum: 209 mg/dL    Calcium, Total Serum: 9.2 mg/dL    Protein Total, Serum: 6.3 g/dL    Albumin, Serum: 3.5 g/dL    Bilirubin Total, Serum: 0.8 mg/dL    Alkaline Phosphatase, Serum: 95 U/L    Aspartate Aminotransferase (AST/SGOT): 26 U/L    Alanine Aminotransferase (ALT/SGPT): 16 U/L      ABG - ( 19 Aug 2021 06:27 )  pH, Arterial: 7.38  pH, Blood: x     /  pCO2: 39    /  pO2: 99    / HCO3: 23    / Base Excess: -1.8  /  SaO2: 98.2        < from: Xray Chest 1 View- PORTABLE-Urgent (Xray Chest 1 View- PORTABLE-Urgent .) (08.19.21 @ 05:44) >    INTERPRETATION:  rapid response for tachypnea. bilateral airspace opacities consistent with COVID-19 history. no pneumothorax or pleural effusion. no significant change from prior scan. discussed findings with Dr. Meneses at 5:45 a.m.    < end of copied text >    Jean PA  #[phone] Internal Medicine PA Note    HPI:  70yo M w/ PMHx of HTN, DM2, BPH, asthma presents with COVID pneumonia     RRT called for tachypnea RR 50s - 60s and increased work of breathing while on BIPAP 14/10 FiO2 100% with O2 sat > 90%.   During RRT patient received Ativan 2mg IVP + IV tylenol. Please see RRT note for further details.    Post RRT, patient's RR improved to mid 20s - low 30s, with decreased work of breathing.   F/u with CBC, CMP, mag, phos, ABG, CXR.  Family made aware - Judy (daughter), confirms patient is FULL CODE.   Given patient with multiple attempts to remove BIPAP, will order constant observation, per RRT recs.   C/w tele + continuous pulse ox  Wean O2 as patient can tolerate.   Will monitor closely for any signs/symptoms of increased respiratory distress/hemodynamic instability.   Discussed with primary RN  Discussed with Dr. Jc ([facility])   Continue to monitor  Endorsed to AM Team.       Complete Blood Count + Automated Diff in AM (08.19.21 @ 05:38)    WBC Count: 12.05 K/uL    RBC Count: 4.84 M/uL    Hemoglobin: 14.2 g/dL    Hematocrit: 43.0 %    Mean Cell Volume: 88.8 fl    Mean Cell Hemoglobin: 29.3 pg    Mean Cell Hemoglobin Conc: 33.0 gm/dL    Red Cell Distrib Width: 13.9 %    Platelet Count - Automated: 251 K/uL    Nucleated RBC: 0 /100 WBCs    Comprehensive Metabolic Panel in AM (08.19.21 @ 05:38)    Sodium, Serum: 145 mmol/L    Potassium, Serum: 4.3 mmol/L    Chloride, Serum: 109 mmol/L    Carbon Dioxide, Serum: 19 mmol/L    Anion Gap, Serum: 17 mmol/L    Blood Urea Nitrogen, Serum: 47 mg/dL    Creatinine, Serum: 1.16 mg/dL    Glucose, Serum: 209 mg/dL    Calcium, Total Serum: 9.2 mg/dL    Protein Total, Serum: 6.3 g/dL    Albumin, Serum: 3.5 g/dL    Bilirubin Total, Serum: 0.8 mg/dL    Alkaline Phosphatase, Serum: 95 U/L    Aspartate Aminotransferase (AST/SGOT): 26 U/L    Alanine Aminotransferase (ALT/SGPT): 16 U/L      ABG - ( 19 Aug 2021 06:27 )  pH, Arterial: 7.38  pH, Blood: x     /  pCO2: 39    /  pO2: 99    / HCO3: 23    / Base Excess: -1.8  /  SaO2: 98.2        < from: Xray Chest 1 View- PORTABLE-Urgent (Xray Chest 1 View- PORTABLE-Urgent .) (08.19.21 @ 05:44) >    INTERPRETATION:  rapid response for tachypnea. bilateral airspace opacities consistent with COVID-19 history. no pneumothorax or pleural effusion. no significant change from prior scan. discussed findings with Dr. Meneses at 5:45 a.m.    < end of copied text >    Jean PA  #[phone]

## 2021-08-19 NOTE — PROGRESS NOTE ADULT - SUBJECTIVE AND OBJECTIVE BOX
Follow Up: COVID    Interval History/ROS: Requiring BIPAP. D-Dimer elevated, DVT found, on treatment lovenox.     Allergies  No Known Allergies        ANTIMICROBIALS:      OTHER MEDS:  acetaminophen   Tablet .. 650 milliGRAM(s) Oral every 4 hours PRN  ALBUTerol    90 MICROgram(s) HFA Inhaler 2 Puff(s) Inhalation every 4 hours PRN  amLODIPine   Tablet 5 milliGRAM(s) Oral daily  atorvastatin 20 milliGRAM(s) Oral at bedtime  budesonide 160 MICROgram(s)/formoterol 4.5 MICROgram(s) Inhaler 2 Puff(s) Inhalation two times a day  chlorhexidine 2% Cloths 1 Application(s) Topical <User Schedule>  dexAMETHasone  Injectable 6 milliGRAM(s) IV Push daily  dextrose 40% Gel 15 Gram(s) Oral once  dextrose 5%. 1000 milliLiter(s) IV Continuous <Continuous>  dextrose 5%. 1000 milliLiter(s) IV Continuous <Continuous>  dextrose 5%. 1000 milliLiter(s) IV Continuous <Continuous>  dextrose 50% Injectable 25 Gram(s) IV Push once  dextrose 50% Injectable 12.5 Gram(s) IV Push once  dextrose 50% Injectable 25 Gram(s) IV Push once  enoxaparin Injectable 80 milliGRAM(s) SubCutaneous every 12 hours  finasteride 5 milliGRAM(s) Oral daily  glucagon  Injectable 1 milliGRAM(s) IntraMuscular once  insulin glargine Injectable (LANTUS) 20 Unit(s) SubCutaneous at bedtime  insulin lispro (ADMELOG) corrective regimen sliding scale   SubCutaneous three times a day before meals  insulin lispro (ADMELOG) corrective regimen sliding scale   SubCutaneous at bedtime  insulin lispro Injectable (ADMELOG) 5 Unit(s) SubCutaneous three times a day before meals  montelukast 10 milliGRAM(s) Oral daily  oxybutynin XL 10 milliGRAM(s) Oral daily  tamsulosin 0.4 milliGRAM(s) Oral two times a day  valsartan 160 milliGRAM(s) Oral daily      Vital Signs Last 24 Hrs  T(C): 36.7 (19 Aug 2021 11:06), Max: 36.7 (19 Aug 2021 11:06)  T(F): 98.1 (19 Aug 2021 11:06), Max: 98.1 (19 Aug 2021 11:06)  HR: 77 (19 Aug 2021 11:06) (70 - 87)  BP: 123/77 (19 Aug 2021 11:06) (123/77 - 137/87)  BP(mean): --  RR: 17 (19 Aug 2021 11:06) (17 - 22)  SpO2: 97% (19 Aug 2021 11:06) (93% - 99%)    Physical Exam:  General: non toxic  Head: atraumatic, normocephalic  Cardio: regular rate   Respiratory: nonlabored on BIPAP   abd: soft, no tenderness  Skin: no rash                          14.2   12.05 )-----------( 251      ( 19 Aug 2021 05:38 )             43.0       08-19    145  |  109<H>  |  47<H>  ----------------------------<  209<H>  4.3   |  19<L>  |  1.16    Ca    9.2      19 Aug 2021 05:38  Mg     2.6     08-19    TPro  6.3  /  Alb  3.5  /  TBili  0.8  /  DBili  x   /  AST  26  /  ALT  16  /  AlkPhos  95  08-19          MICROBIOLOGY:  Culture - Blood (collected 08-13-21 @ 19:12)  Source: .Blood Blood-Peripheral  Final Report (08-18-21 @ 20:01):    No Growth Final    Culture - Blood (collected 08-13-21 @ 19:12)  Source: .Blood Blood-Peripheral  Final Report (08-18-21 @ 20:01):    No Growth Final    Culture - Urine (collected 08-12-21 @ 22:09)  Source: Clean Catch Clean Catch (Midstream)  Final Report (08-13-21 @ 22:21):    No growth    RADIOLOGY:  Images below reviewed personally  Xray Chest 1 View- PORTABLE-Urgent (Xray Chest 1 View- PORTABLE-Urgent .) (08.19.21 @ 05:44)   Bilateral airspace opacities consistent with known history of Covid 19 infection, likely not significantly changed allowing for greater degree of inspiration on the current image.    VA Duplex Lower Ext Vein Scan, Bilat (08.18.21 @ 16:21)   Acute above and below the knee deep venous thrombosis involving the LEFT popliteal, peroneal, gastrocnemius,and soleal veins.  Acute below the knee deep venous thrombosis involving the RIGHT peroneal and soleal veins.    Xray Chest 1 View- PORTABLE-Urgent (Xray Chest 1 View- PORTABLE-Urgent .) (08.14.21 @ 15:04)   Heart is top normal in size.  Mild improvement in mid and lower right lung infiltrates.  Mild progression of mid and lower left lung infiltrates.  No pleural effusion or pneumothorax.

## 2021-08-19 NOTE — RAPID RESPONSE TEAM SUMMARY - NSOTHERINTERVENTIONSRRT_GEN_ALL_CORE
Primary team to f/u on ABG, if unremarkable, can continue with ativan 1mg IVP PRN for agitation  1:1 if pt is attempting to pull off Bipap mask and frequent reorientation

## 2021-08-19 NOTE — PROGRESS NOTE ADULT - PROBLEM SELECTOR PLAN 1
-acute hypoxic respiratory failure secondary to covid-19 pneumonia with risk of development to acute respiratory distress syndrome  - s/p Tocilizumab on 8/15.   - 2 RRTs on 8/18 and 8/19 while on the BiPAP. Tachypneic. Followed closely by MICU team. Will likely require intubation with mechanical ventilation. FULL CODE per family confirmed again today with family.   - ID follow up appreciated.   - c/w dexamethasone daily. Remdesivir completed.   - albuterol MDI q4h  - daily CMP, inflammatory markers  - Found to have acute bilateral LE DVTs - increased lovenox to 80mg sc BID  - Neuro checks, aspiration precautions

## 2021-08-19 NOTE — PROGRESS NOTE ADULT - SUBJECTIVE AND OBJECTIVE BOX
[facility] Division of Hospital Medicine  Mau Sparrow MD, NISREEN  Pager (M-F, 8A-5P): [phone]  Other Times:  [phone]    Patient is a 71y old  Male who presents with a chief complaint of SOB (19 Aug 2021 15:54)      SUBJECTIVE / OVERNIGHT EVENTS:  No events overnight. Remains on BiPAP, very confused, removing it intermittently abd quickly becomes hypoxic.     MEDICATIONS  (STANDING):  amLODIPine   Tablet 5 milliGRAM(s) Oral daily  atorvastatin 20 milliGRAM(s) Oral at bedtime  budesonide 160 MICROgram(s)/formoterol 4.5 MICROgram(s) Inhaler 2 Puff(s) Inhalation two times a day  chlorhexidine 2% Cloths 1 Application(s) Topical <User Schedule>  dexAMETHasone  Injectable 6 milliGRAM(s) IV Push daily  enoxaparin Injectable 80 milliGRAM(s) SubCutaneous every 12 hours  finasteride 5 milliGRAM(s) Oral daily  glucagon  Injectable 1 milliGRAM(s) IntraMuscular once  insulin glargine Injectable (LANTUS) 20 Unit(s) SubCutaneous at bedtime  insulin lispro (ADMELOG) corrective regimen sliding scale   SubCutaneous three times a day before meals  insulin lispro (ADMELOG) corrective regimen sliding scale   SubCutaneous at bedtime  insulin lispro Injectable (ADMELOG) 5 Unit(s) SubCutaneous three times a day before meals  montelukast 10 milliGRAM(s) Oral daily  oxybutynin XL 10 milliGRAM(s) Oral daily  tamsulosin 0.4 milliGRAM(s) Oral two times a day  valsartan 160 milliGRAM(s) Oral daily    MEDICATIONS  (PRN):  acetaminophen   Tablet .. 650 milliGRAM(s) Oral every 4 hours PRN Temp greater or equal to 38.5C (101.3F)  ALBUTerol    90 MICROgram(s) HFA Inhaler 2 Puff(s) Inhalation every 4 hours PRN Shortness of Breath and/or Wheezing    CAPILLARY BLOOD GLUCOSE  POCT Blood Glucose.: 220 mg/dL (19 Aug 2021 11:47)  POCT Blood Glucose.: 194 mg/dL (19 Aug 2021 07:20)  POCT Blood Glucose.: 181 mg/dL (19 Aug 2021 05:15)  POCT Blood Glucose.: 201 mg/dL (18 Aug 2021 21:54)    I&O's Summary    18 Aug 2021 07:01  -  19 Aug 2021 07:00  --------------------------------------------------------  IN: 0 mL / OUT: 850 mL / NET: -850 mL        PHYSICAL EXAM:  Vital Signs Last 24 Hrs  T(C): 36.8 (19 Aug 2021 15:53), Max: 36.8 (19 Aug 2021 15:53)  T(F): 98.3 (19 Aug 2021 15:53), Max: 98.3 (19 Aug 2021 15:53)  HR: 80 (19 Aug 2021 15:53) (70 - 87)  BP: 126/79 (19 Aug 2021 15:53) (123/77 - 137/87)  BP(mean): --  RR: 17 (19 Aug 2021 15:53) (17 - 22)  SpO2: 93% (19 Aug 2021 15:53) (93% - 99%)    CONSTITUTIONAL: moderate respiratory distress, tachypnea on BiPAP  EYES: conjunctiva and sclera clear  ENMT: Moist oral mucosa  NECK: Supple  RESPIRATORY: tachypneic, faint rhonchi BL  CARDIOVASCULAR: Regular rate and rhythm, normal S1 and S2, no murmur/rub/gallop  ABDOMEN: Nontender to palpation, normoactive bowel sounds  PSYCH/NEURO: cooperative   SKIN: No rashes; no palpable lesions    LABS:                        14.2   12.05 )-----------( 251      ( 19 Aug 2021 05:38 )             43.0     08-19    145  |  109<H>  |  47<H>  ----------------------------<  209<H>  4.3   |  19<L>  |  1.16    Ca    9.2      19 Aug 2021 05:38  Mg     2.6     08-19    TPro  6.3  /  Alb  3.5  /  TBili  0.8  /  DBili  x   /  AST  26  /  ALT  16  /  AlkPhos  95  08-19    PT/INR - ( 18 Aug 2021 05:30 )   PT: 12.7 sec;   INR: 1.06 ratio         RADIOLOGY & ADDITIONAL TESTS:    Lab Results Reviewed     Imaging Personally Reviewed: CXR- COVID PNA, unchanged

## 2021-08-20 NOTE — BH CONSULTATION LIAISON ASSESSMENT NOTE - SUMMARY
72yo  Turkish speaking male, with no formal PPHx, w/ PMHx of HTN, DM2, BPH, asthma presents with shortness of breath. Patient endorses that he has been feeling SOB with associated cough for the last 2 weeks and his symptoms slowly worsened over time. He did not receive the COVID vaccine. He reports that his family members whom he lives with likely are also COVID positive but he does not know for sure. He did not try taking anything for his symptoms other than his usual medications/inhalers. There were no obvious aggravating or alleviating factors. Currently with supplemental oxygen on, he feels well and has no complaints, however he presented to [facility] since his symptoms were worsening, admitted to medicine for management of COVID pneumonia.  Psychiatry consulted to assess for management of agitation. 70yo  Russian speaking male, with no formal PPHx, w/ PMHx of HTN, DM2, BPH, asthma presents with shortness of breath. Patient endorses that he has been feeling SOB with associated cough for the last 2 weeks and his symptoms slowly worsened over time. He did not receive the COVID vaccine. He reports that his family members whom he lives with likely are also COVID positive but he does not know for sure. He did not try taking anything for his symptoms other than his usual medications/inhalers. There were no obvious aggravating or alleviating factors. Currently with supplemental oxygen on, he feels well and has no complaints, however he presented to [facility] since his symptoms were worsening, admitted to medicine for management of COVID pneumonia.  Psychiatry consulted to assess for management of agitation.  Seen and evaluated, restless, with bipap mask, breathing rapidly, unable to speak, per primary team, patient agitated trying to remove bipap mask causing desaturations.  Patient medicated with multiple one time doses or Haldol, Ativan with some short them positive effects.  Patient unable to engage in a meaningful interview due to respiratory distress.  Would consider using Ativan 0.5mg IV PRN q4 for acute/agitation given together with Haldol 0.5mg IV PRN q4 for acute agitation. 70yo  Angolan speaking  man, with no formal PPHx, w/ PMHx of HTN, DM2, BPH, asthma presents with shortness of breath. Patient endorses that he has been feeling SOB with associated cough for the last 2 weeks and his symptoms slowly worsened over time. He did not receive the COVID vaccine. He reports that his family members whom he lives with likely are also COVID positive but he does not know for sure. He did not try taking anything for his symptoms other than his usual medications/inhalers. There were no obvious aggravating or alleviating factors. Currently with supplemental oxygen on, he feels well and has no complaints, however he presented to [facility] since his symptoms were worsening, admitted to medicine for management of COVID pneumonia.  Psychiatry consulted to assess for management of agitation.  Seen and evaluated, restless, with bipap mask, breathing rapidly, unable to speak, per primary team, patient agitated trying to remove bipap mask causing desaturations.  Patient medicated with multiple one time doses or Haldol, Ativan with some short them positive effects.  Patient unable to engage in a meaningful interview due to respiratory distress.  Would consider using Ativan 0.5mg IV PRN q4 for acute/agitation given together with Haldol 0.5mg IV PRN q4 for acute agitation.

## 2021-08-20 NOTE — BH CONSULTATION LIAISON ASSESSMENT NOTE - CASE SUMMARY
72yo  Hungarian speaking  man, with no formal PPHx, w/ PMHx of HTN, DM2, BPH, asthma presents with shortness of breath. Patient endorses that he has been feeling SOB with associated cough for the last 2 weeks and his symptoms slowly worsened over time. He did not receive the COVID vaccine. He reports that his family members whom he lives with likely are also COVID positive but he does not know for sure. He did not try taking anything for his symptoms other than his usual medications/inhalers. There were no obvious aggravating or alleviating factors. Currently with supplemental oxygen on, he feels well and has no complaints, however he presented to [facility] since his symptoms were worsening, admitted to medicine for management of COVID pneumonia.  Psychiatry consulted to assess for management of agitation.  Seen and evaluated, restless, with bipap mask, breathing rapidly, unable to speak, per primary team, patient agitated trying to remove bipap mask causing desaturations.  Patient medicated with multiple one time doses or Haldol, Ativan with some short them positive effects.  Patient unable to engage in a meaningful interview due to respiratory distress.  I agree with using Ativan 0.5mg IV PRN q4 for acute/agitation given together with Haldol 0.5mg IV PRN q4 for acute agitation.

## 2021-08-20 NOTE — PROVIDER CONTACT NOTE (MEDICATION) - SITUATION
Pt unable to received ditropan, singular and proscar. Pt is on bipap
Pt unable to received budesonide. Pt is on bipap

## 2021-08-20 NOTE — BH CONSULTATION LIAISON ASSESSMENT NOTE - NSBHCHARTREVIEWINVESTIGATE_PSY_A_CORE FT
< from: 12 Lead ECG (08.19.21 @ 05:49) >      Ventricular Rate 82 BPM    Atrial Rate 82 BPM    P-R Interval 136 ms    QRS Duration 76 ms    Q-T Interval 384 ms    QTC Calculation(Bazett) 448 ms    R Axis 1 degrees    T Axis 99 degrees    Diagnosis Line NORMAL SINUS RHYTHM  NONSPECIFIC ST AND TWAVE ABNORMALITY  ABNORMAL ECG  WHEN COMPARED WITH ECG OF 12-AUG-2021 16:19,  NO SIGNIFICANT CHANGE WAS FOUND  Confirmed by MD ALIYAH, TYRONE ([id number]) on 8/19/2021 4:59:08 PM    < end of copied text >

## 2021-08-20 NOTE — BH CONSULTATION LIAISON ASSESSMENT NOTE - CURRENT MEDICATION
MEDICATIONS  (STANDING):  amLODIPine   Tablet 5 milliGRAM(s) Oral daily  atorvastatin 20 milliGRAM(s) Oral at bedtime  budesonide 160 MICROgram(s)/formoterol 4.5 MICROgram(s) Inhaler 2 Puff(s) Inhalation two times a day  chlorhexidine 2% Cloths 1 Application(s) Topical <User Schedule>  dexAMETHasone  Injectable 6 milliGRAM(s) IV Push daily  dextrose 40% Gel 15 Gram(s) Oral once  dextrose 5%. 1000 milliLiter(s) (50 mL/Hr) IV Continuous <Continuous>  dextrose 5%. 1000 milliLiter(s) (100 mL/Hr) IV Continuous <Continuous>  dextrose 5%. 1000 milliLiter(s) (50 mL/Hr) IV Continuous <Continuous>  dextrose 50% Injectable 25 Gram(s) IV Push once  dextrose 50% Injectable 12.5 Gram(s) IV Push once  dextrose 50% Injectable 25 Gram(s) IV Push once  enoxaparin Injectable 80 milliGRAM(s) SubCutaneous every 12 hours  finasteride 5 milliGRAM(s) Oral daily  glucagon  Injectable 1 milliGRAM(s) IntraMuscular once  insulin glargine Injectable (LANTUS) 20 Unit(s) SubCutaneous at bedtime  insulin lispro (ADMELOG) corrective regimen sliding scale   SubCutaneous three times a day before meals  insulin lispro (ADMELOG) corrective regimen sliding scale   SubCutaneous at bedtime  montelukast 10 milliGRAM(s) Oral daily  oxybutynin XL 10 milliGRAM(s) Oral daily  tamsulosin 0.4 milliGRAM(s) Oral two times a day  valsartan 160 milliGRAM(s) Oral daily    MEDICATIONS  (PRN):  acetaminophen   Tablet .. 650 milliGRAM(s) Oral every 4 hours PRN Temp greater or equal to 38.5C (101.3F)  ALBUTerol    90 MICROgram(s) HFA Inhaler 2 Puff(s) Inhalation every 4 hours PRN Shortness of Breath and/or Wheezing  haloperidol    Injectable 1 milliGRAM(s) IV Push every 6 hours PRN Agitation  LORazepam   Injectable 0.5 milliGRAM(s) IV Push every 4 hours PRN Agitation

## 2021-08-20 NOTE — BH CONSULTATION LIAISON ASSESSMENT NOTE - NSBHCHARTREVIEWLAB_PSY_A_CORE FT
16.5   10.88 )-----------( 279      ( 20 Aug 2021 06:31 )             50.7     08-20    140  |  109<H>  |  47<H>  ----------------------------<  160<H>  5.1   |  17<L>  |  0.97    Ca    9.3      20 Aug 2021 06:31  Mg     2.7     08-20    TPro  6.4  /  Alb  3.2<L>  /  TBili  0.8  /  DBili  0.1  /  AST  40  /  ALT  16  /  AlkPhos  123<H>  08-20

## 2021-08-20 NOTE — PROGRESS NOTE ADULT - PROBLEM SELECTOR PLAN 1
-acute hypoxic respiratory failure secondary to covid-19 pneumonia with risk of development to acute respiratory distress syndrome  - s/p Tocilizumab on 8/15.   - 2 RRTs on 8/18 and 8/19 while on the BiPAP. Tachypneic to 50s, delirious. Consulted psych to help optimize his medication regimen to keep him more calm. We cannot use physical restraints on [employer]. If he remains very agitated, removing BiPAP, may have to escalate to ICU.   - Ideally the patient needs to be intubated, however after multiple long discussions with the patient's family, at this time they want him to remain on BiPAP and to intubate only as a last resort. FULL CODE.   - c/w dexamethasone daily. Remdesivir completed.   - albuterol MDI q4h  - daily CMP, inflammatory markers  - Found to have acute bilateral LE DVTs - lovenox to 80mg sc BID  - Neuro checks, aspiration precautions

## 2021-08-20 NOTE — PROGRESS NOTE ADULT - SUBJECTIVE AND OBJECTIVE BOX
[facility] Division of Hospital Medicine  Mau Sparrow MD, NISREEN  Pager (M-F, 8A-5P): [phone]  Other Times:  [phone]    Patient is a 71y old  Male who presents with a chief complaint of SOB (19 Aug 2021 17:05)      SUBJECTIVE / OVERNIGHT EVENTS:  The patient is very confused, agitated, restless, frequently removing his BiPAP.     MEDICATIONS  (STANDING):  amLODIPine   Tablet 5 milliGRAM(s) Oral daily  atorvastatin 20 milliGRAM(s) Oral at bedtime  budesonide 160 MICROgram(s)/formoterol 4.5 MICROgram(s) Inhaler 2 Puff(s) Inhalation two times a day  chlorhexidine 2% Cloths 1 Application(s) Topical <User Schedule>  dexAMETHasone  Injectable 6 milliGRAM(s) IV Push daily  enoxaparin Injectable 80 milliGRAM(s) SubCutaneous every 12 hours  finasteride 5 milliGRAM(s) Oral daily  glucagon  Injectable 1 milliGRAM(s) IntraMuscular once  insulin glargine Injectable (LANTUS) 20 Unit(s) SubCutaneous at bedtime  insulin lispro (ADMELOG) corrective regimen sliding scale   SubCutaneous three times a day before meals  insulin lispro (ADMELOG) corrective regimen sliding scale   SubCutaneous at bedtime  montelukast 10 milliGRAM(s) Oral daily  oxybutynin XL 10 milliGRAM(s) Oral daily  tamsulosin 0.4 milliGRAM(s) Oral two times a day  valsartan 160 milliGRAM(s) Oral daily    MEDICATIONS  (PRN):  acetaminophen   Tablet .. 650 milliGRAM(s) Oral every 4 hours PRN Temp greater or equal to 38.5C (101.3F)  ALBUTerol    90 MICROgram(s) HFA Inhaler 2 Puff(s) Inhalation every 4 hours PRN Shortness of Breath and/or Wheezing  haloperidol    Injectable 1 milliGRAM(s) IV Push every 6 hours PRN Agitation  LORazepam   Injectable 0.5 milliGRAM(s) IV Push every 4 hours PRN Agitation    CAPILLARY BLOOD GLUCOSE  POCT Blood Glucose.: 212 mg/dL (20 Aug 2021 12:15)  POCT Blood Glucose.: 185 mg/dL (20 Aug 2021 08:05)  POCT Blood Glucose.: 223 mg/dL (19 Aug 2021 21:34)  POCT Blood Glucose.: 213 mg/dL (19 Aug 2021 17:13)    I&O's Summary    19 Aug 2021 07:01  -  20 Aug 2021 07:00  --------------------------------------------------------  IN: 0 mL / OUT: 300 mL / NET: -300 mL        PHYSICAL EXAM:  Vital Signs Last 24 Hrs  T(C): 36.5 (20 Aug 2021 11:02), Max: 36.8 (19 Aug 2021 15:53)  T(F): 97.7 (20 Aug 2021 11:02), Max: 98.3 (19 Aug 2021 15:53)  HR: 105 (20 Aug 2021 11:02) (72 - 105)  BP: 128/86 (20 Aug 2021 11:02) (126/79 - 146/97)  BP(mean): --  RR: 55 (20 Aug 2021 11:02) (17 - 55)  SpO2: 94% (20 Aug 2021 11:02) (92% - 95%)    CONSTITUTIONAL: moderate respiratory distress, tachypnea on BiPAP  EYES: conjunctiva and sclera clear  ENMT: Moist oral mucosa  NECK: Supple  RESPIRATORY: tachypneic, faint rhonchi BL  CARDIOVASCULAR: Regular rate and rhythm, normal S1 and S2, no murmur/rub/gallop  ABDOMEN: Nontender to palpation, normoactive bowel sounds  PSYCH/NEURO: cooperative   SKIN: No rashes; no palpable lesions      LABS:                        16.5   10.88 )-----------( 279      ( 20 Aug 2021 06:31 )             50.7     08-20    140  |  109<H>  |  47<H>  ----------------------------<  160<H>  5.1   |  17<L>  |  0.97    Ca    9.3      20 Aug 2021 06:31  Mg     2.7     08-20    TPro  6.4  /  Alb  3.2<L>  /  TBili  0.8  /  DBili  0.1  /  AST  40  /  ALT  16  /  AlkPhos  123<H>  08-20    PT/INR - ( 20 Aug 2021 08:24 )   PT: 12.9 sec;   INR: 1.08 ratio           RADIOLOGY & ADDITIONAL TESTS:    Lab Results Reviewed:

## 2021-08-20 NOTE — BH CONSULTATION LIAISON ASSESSMENT NOTE - NSBHCHARTREVIEWVS_PSY_A_CORE FT
Vital Signs Last 24 Hrs  T(C): 36.5 (20 Aug 2021 11:02), Max: 36.8 (19 Aug 2021 15:53)  T(F): 97.7 (20 Aug 2021 11:02), Max: 98.3 (19 Aug 2021 15:53)  HR: 105 (20 Aug 2021 11:02) (72 - 105)  BP: 128/86 (20 Aug 2021 11:02) (126/79 - 146/97)  BP(mean): --  RR: 55 (20 Aug 2021 11:02) (17 - 55)  SpO2: 94% (20 Aug 2021 11:02) (92% - 95%)

## 2021-08-20 NOTE — PROVIDER CONTACT NOTE (MEDICATION) - ASSESSMENT
Pt A+O1. Surinamese speaker. No chest pain or SOB noted. Pt is on constant observation
Pt A+O1. Cambodian speaker. No chest pain or SOB noted. Pt is on constant observation

## 2021-08-20 NOTE — CHART NOTE - NSCHARTNOTEFT_GEN_A_CORE
CHIEF COMPLAINT: SOB     HPI:  72yo  Cymro speaking male w/ PMHx of HTN, DM2, BPH, asthma presented on 8/12/21 from home with worsening shortness of breath x 2 weeks in setting of COVID-19 PNA . While managed on the medical floor, pt continues to experienced worsening hypoxic respiratory failure while on NIPPV , c/b worsening delirium/ agitation  despite medical interventions. Pt is s/post multiple RRT 's for hypoxia / agitation/ delirium and non-compliance / not tolerating NIPPV .   Today, pt's conditions has progressed and family has agreed to proceed with intubation. Post intubation pt had cardiac arrest    Now, pt is transferred to COVID-ICU for furthure monitoring and management.     Based on initial intake , pt did not receive the COVID-19 vaccine. Also, family members whom he lives with likely are also COVID-19 positive.     ICU Vital Signs Last 24 Hrs  T(C): 36.5 (20 Aug 2021 11:02), Max: 36.8 (19 Aug 2021 15:53)  T(F): 97.7 (20 Aug 2021 11:02), Max: 98.3 (19 Aug 2021 15:53)  HR: 105 (20 Aug 2021 11:02) (72 - 105)  BP: 128/86 (20 Aug 2021 11:02) (126/79 - 146/97)  RR: 55 (20 Aug 2021 11:02) (17 - 55)  SpO2: 94% (20 Aug 2021 11:02) (92% - 95%)        PAST MEDICAL & SURGICAL HISTORY:  BPH (benign prostatic hyperplasia)    Hyperlipemia    HTN (hypertension)    Hypercholesteremia    Asthma    Diabetes    Kidney stone    Bladder stone    H/O lithotripsy  x 2    History of kidney stones    H/O umbilical hernia repair        FAMILY HISTORY:  Family history of diabetes mellitus (Father, Sibling)    Family history of cancer (Mother)        SOCIAL HISTORY: Unable to obtain pt is intubated and sedated .   Advance Directives: FULL     Allergies    No Known Allergies    Intolerances        HOME MEDICATIONS:    REVIEW OF SYSTEMS:    [ x] Unable to assess ROS because ________Unable to obtain pt is intubated and sedated .         08-19 @ 07:01  -  08-20 @ 07:00  --------------------------------------------------------  IN: 0 mL / OUT: 300 mL / NET: -300 mL      CAPILLARY BLOOD GLUCOSE      POCT Blood Glucose.: 203 mg/dL (20 Aug 2021 12:56)      PHYSICAL EXAM:  General:   HEENT:   Lymph Nodes:  Neck:   Respiratory:   Cardiovascular:   Abdomen:   Extremities:   Skin:   Neurological:  Psychiatry:    LINES:     HOSPITAL MEDICATIONS:  enoxaparin Injectable 80 milliGRAM(s) SubCutaneous every 12 hours      amLODIPine   Tablet 5 milliGRAM(s) Oral daily  tamsulosin 0.4 milliGRAM(s) Oral two times a day  valsartan 160 milliGRAM(s) Oral daily    atorvastatin 20 milliGRAM(s) Oral at bedtime  dexAMETHasone  Injectable 6 milliGRAM(s) IV Push daily  dextrose 40% Gel 15 Gram(s) Oral once  dextrose 50% Injectable 25 Gram(s) IV Push once  dextrose 50% Injectable 12.5 Gram(s) IV Push once  dextrose 50% Injectable 25 Gram(s) IV Push once  finasteride 5 milliGRAM(s) Oral daily  glucagon  Injectable 1 milliGRAM(s) IntraMuscular once  insulin glargine Injectable (LANTUS) 20 Unit(s) SubCutaneous at bedtime  insulin lispro (ADMELOG) corrective regimen sliding scale   SubCutaneous three times a day before meals  insulin lispro (ADMELOG) corrective regimen sliding scale   SubCutaneous at bedtime    ALBUTerol    90 MICROgram(s) HFA Inhaler 2 Puff(s) Inhalation every 4 hours PRN  budesonide 160 MICROgram(s)/formoterol 4.5 MICROgram(s) Inhaler 2 Puff(s) Inhalation two times a day  montelukast 10 milliGRAM(s) Oral daily    acetaminophen   Tablet .. 650 milliGRAM(s) Oral every 4 hours PRN  haloperidol    Injectable 1 milliGRAM(s) IV Push every 6 hours PRN  LORazepam   Injectable 0.5 milliGRAM(s) IV Push every 4 hours PRN        oxybutynin XL 10 milliGRAM(s) Oral daily    dextrose 5%. 1000 milliLiter(s) IV Continuous <Continuous>  dextrose 5%. 1000 milliLiter(s) IV Continuous <Continuous>  dextrose 5%. 1000 milliLiter(s) IV Continuous <Continuous>      chlorhexidine 2% Cloths 1 Application(s) Topical <User Schedule>        LABS:                        16.5   10.88 )-----------( 279      ( 20 Aug 2021 06:31 )             50.7     Hgb Trend: 16.5<--, 14.2<--, 13.7<--, 13.7<--, 13.8<--  08-20    140  |  109<H>  |  47<H>  ----------------------------<  160<H>  5.1   |  17<L>  |  0.97    Ca    9.3      20 Aug 2021 06:31  Mg     2.7     08-20    TPro  6.4  /  Alb  3.2<L>  /  TBili  0.8  /  DBili  0.1  /  AST  40  /  ALT  16  /  AlkPhos  123<H>  08-20    Creatinine Trend: 0.97<--, 1.16<--, 0.93<--, 0.93<--, 0.89<--, 0.90<--  PT/INR - ( 20 Aug 2021 08:24 )   PT: 12.9 sec;   INR: 1.08 ratio             Arterial Blood Gas:  08-19 @ 17:54  7.43/37/100/25/99.1/0.5  ABG lactate: --  Arterial Blood Gas:  08-19 @ 06:27  7.38/39/99/23/98.2/-1.8  ABG lactate: --  Arterial Blood Gas:  08-19 @ 05:30  7.40/41/125/25/100.0/0.5  ABG lactate: --        MICROBIOLOGY:     Culture - Blood (08.13.21 @ 19:12)    Specimen Source: .Blood Blood-Peripheral    Culture Results:   No Growth Final        RADIOLOGY:  < from: Xray Chest 1 View- PORTABLE-Urgent (Xray Chest 1 View- PORTABLE-Urgent .) (08.19.21 @ 05:44) >      FINDINGS:    The heart size and mediastinum is not well evaluated in this projection.  Bilateral airspace opacities, likely not significantly changed allowing for a greater degree of inspiration on the current image.  There is no pneumothorax or pleural effusion.  No acute bony abnormality.    IMPRESSION:  Bilateral airspace opacities consistent with known history of Covid 19 infection, likely not significantly changed allowing for greater degree of inspiration on the current image.    < end of copied text >    < from: VA Duplex Lower Ext Vein Scan, Bilat (08.18.21 @ 16:21) >    IMPRESSION:  Acute above and below the knee deep venous thrombosis involving the LEFT popliteal, peroneal, gastrocnemius,and soleal veins.    Acute below the knee deep venous thrombosis involving the RIGHT peroneal and soleal veins.    < end of copied text >        EKG:    < from: 12 Lead ECG (08.19.21 @ 05:49) >    Diagnosis Line NORMAL SINUS RHYTHM  NONSPECIFIC ST AND TWAVE ABNORMALITY  ABNORMAL ECG  WHEN COMPARED WITH ECG OF 12-AUG-2021 16:19,  NO SIGNIFICANT CHANGE WAS FOUND    < end of copied text >    ASSESSMENT :    72yo  Cymro speaking male w/ PMHx of HTN, DM2, BPH, asthma presented on 8/12/21 from home with worsening shortness of breath x 2 weeks in setting of COVID-19 PNA now, with progressive worsening hypoxic respiratory failure , failed NIPPV support , s/p intubation and transfer to [facility] ICU for monitoring and management.     PLAN :    Neuro  -Baseline AOX3, Cymro speaking now intubated and sedated   -sedation;  -wean sedation as tolerated    -neuro checks per ICU    Pulm  Acute hypoxic respiratory failure 2/2 COVID-19 PNA now with ARDS , r/o PE, r/o other infections process   -PMHx of asthma   -failed NIPPV now, intubated ; employee  lung protective ventilation in setting of ARDS ; permissive hypercarbia   -ABG  -CXR  -wean off vent as tolerated   -completed remdesevir 8/13-8/17  -c/w dexamethasone 8/13-8/23  -c/w neb tx   -chest PT , oral care , pulmonary toileting   -Prone 16 hrs and supine 8 hrs   -with worsening hypoxia will PAN cx   -known + DVT ; r/f PE ; will c/w FULL AC   -trend inflammatory markers & D -dimer     ASTHMA   -c/w home Symbicort  -c/w home montelukast  -c/w duoneb tx      CV  Septic shock / hypovolemic shock/ distributive shock , + DVT , r/p PE   -c/w IV pressor to maintain MAP >65   -wean as tolerated   -obtain central line access and A-line for continues monitoring    -PAN cx   -POCUS   -IVF hydration   -strict i&o's   -will hold all antihypertensive in setting of shock   -TTE  -CE , proBNP   -c/w full AC on Lovenox sq       Stable BUN/Cr  -will trend daily   -rodri for strict i&o's   -daily weight   -monitor lights     BPH (benign prostatic hyperplasia).   -c/w oxybutynin  -c/w tamsulosin  -c/w finasteride    GI  Oropharyngeal dysphagia   -OGT for TF   -aspiration precaution   -oral care     ENDO  TF w/ glucerna   -FS q 6 hrs   -ISS q 6 hrs   -check a1c , lipid panel     Heme   + DVT   -likely in setting of CVOID-19 illness/ hypercoagulable state   -c/w FULL AC   -trend DD  -bleeding precaution   -repeat [facility] duplex with in the week of last   -Heme consult     ID   Septic shock in setting of COVID-19 PNA   -IV pressors support to maintain MAP>65  -PAN cx , MRSA , sputum cx , fungitel   -empiric abx     COVID-19 PNA   - s/p Tocilizumab on 8/15  -s/p remdesevir 8/13  -c/w dexamethasone 8/13-8/23   -ID consult     PPX   DVT ppx ; on FULL AC with lovenox SQ  Gi ppx; protonix IVP q d     GOC  FULL code CHIEF COMPLAINT: SOB     HPI:  72yo  Australian speaking male w/ PMHx of HTN, DM2, BPH, asthma presented on 8/12/21 from home with worsening shortness of breath x 2 weeks in setting of COVID-19 PNA . While managed on the medical floor, pt continues to experienced worsening hypoxic respiratory failure while on NIPPV , c/b worsening delirium/ agitation  despite medical interventions. Pt is s/post multiple RRT 's for hypoxia / agitation/ delirium and non-compliance / not tolerating NIPPV .   Today, pt's conditions has progressed and family has agreed to proceed with intubation. Post intubation pt is s/p  cardiac arrest ; with ROSC 6 min, VT x 2 s/p electrical shock x 2 and 2 epi's.    Now, pt is transferred to COVID-ICU for further monitoring and management.   On arrival to the unit ; pt hemodynamic instability ; chemically resuscitated .  FAmily called for GOC.      Based on initial intake , pt did not receive the COVID-19 vaccine. Also, family members whom he lives with likely are also COVID-19 positive.     ICU Vital Signs Last 24 Hrs  T(C): 36.5 (20 Aug 2021 11:02), Max: 36.8 (19 Aug 2021 15:53)  T(F): 97.7 (20 Aug 2021 11:02), Max: 98.3 (19 Aug 2021 15:53)  HR: 105 (20 Aug 2021 11:02) (72 - 105)  BP: 128/86 (20 Aug 2021 11:02) (126/79 - 146/97)  RR: 55 (20 Aug 2021 11:02) (17 - 55)  SpO2: 94% (20 Aug 2021 11:02) (92% - 95%)        PAST MEDICAL & SURGICAL HISTORY:  BPH (benign prostatic hyperplasia)    Hyperlipemia    HTN (hypertension)    Hypercholesteremia    Asthma    Diabetes    Kidney stone    Bladder stone    H/O lithotripsy  x 2    History of kidney stones    H/O umbilical hernia repair        FAMILY HISTORY:  Family history of diabetes mellitus (Father, Sibling)    Family history of cancer (Mother)        SOCIAL HISTORY: Unable to obtain pt is intubated and sedated .   Advance Directives: FULL     Allergies    No Known Allergies    Intolerances        HOME MEDICATIONS:    REVIEW OF SYSTEMS:    [ x] Unable to assess ROS because ________Unable to obtain pt is intubated and sedated .         08-19 @ 07:01  -  08-20 @ 07:00  --------------------------------------------------------  IN: 0 mL / OUT: 300 mL / NET: -300 mL      CAPILLARY BLOOD GLUCOSE      POCT Blood Glucose.: 203 mg/dL (20 Aug 2021 12:56)      PHYSICAL EXAM:  General:   HEENT:   Lymph Nodes:  Neck:   Respiratory:   Cardiovascular:   Abdomen:   Extremities:   Skin:   Neurological:  Psychiatry:    LINES:     HOSPITAL MEDICATIONS:  enoxaparin Injectable 80 milliGRAM(s) SubCutaneous every 12 hours      amLODIPine   Tablet 5 milliGRAM(s) Oral daily  tamsulosin 0.4 milliGRAM(s) Oral two times a day  valsartan 160 milliGRAM(s) Oral daily    atorvastatin 20 milliGRAM(s) Oral at bedtime  dexAMETHasone  Injectable 6 milliGRAM(s) IV Push daily  dextrose 40% Gel 15 Gram(s) Oral once  dextrose 50% Injectable 25 Gram(s) IV Push once  dextrose 50% Injectable 12.5 Gram(s) IV Push once  dextrose 50% Injectable 25 Gram(s) IV Push once  finasteride 5 milliGRAM(s) Oral daily  glucagon  Injectable 1 milliGRAM(s) IntraMuscular once  insulin glargine Injectable (LANTUS) 20 Unit(s) SubCutaneous at bedtime  insulin lispro (ADMELOG) corrective regimen sliding scale   SubCutaneous three times a day before meals  insulin lispro (ADMELOG) corrective regimen sliding scale   SubCutaneous at bedtime    ALBUTerol    90 MICROgram(s) HFA Inhaler 2 Puff(s) Inhalation every 4 hours PRN  budesonide 160 MICROgram(s)/formoterol 4.5 MICROgram(s) Inhaler 2 Puff(s) Inhalation two times a day  montelukast 10 milliGRAM(s) Oral daily    acetaminophen   Tablet .. 650 milliGRAM(s) Oral every 4 hours PRN  haloperidol    Injectable 1 milliGRAM(s) IV Push every 6 hours PRN  LORazepam   Injectable 0.5 milliGRAM(s) IV Push every 4 hours PRN        oxybutynin XL 10 milliGRAM(s) Oral daily    dextrose 5%. 1000 milliLiter(s) IV Continuous <Continuous>  dextrose 5%. 1000 milliLiter(s) IV Continuous <Continuous>  dextrose 5%. 1000 milliLiter(s) IV Continuous <Continuous>      chlorhexidine 2% Cloths 1 Application(s) Topical <User Schedule>        LABS:                        16.5   10.88 )-----------( 279      ( 20 Aug 2021 06:31 )             50.7     Hgb Trend: 16.5<--, 14.2<--, 13.7<--, 13.7<--, 13.8<--  08-20    140  |  109<H>  |  47<H>  ----------------------------<  160<H>  5.1   |  17<L>  |  0.97    Ca    9.3      20 Aug 2021 06:31  Mg     2.7     08-20    TPro  6.4  /  Alb  3.2<L>  /  TBili  0.8  /  DBili  0.1  /  AST  40  /  ALT  16  /  AlkPhos  123<H>  08-20    Creatinine Trend: 0.97<--, 1.16<--, 0.93<--, 0.93<--, 0.89<--, 0.90<--  PT/INR - ( 20 Aug 2021 08:24 )   PT: 12.9 sec;   INR: 1.08 ratio             Arterial Blood Gas:  08-19 @ 17:54  7.43/37/100/25/99.1/0.5  ABG lactate: --  Arterial Blood Gas:  08-19 @ 06:27  7.38/39/99/23/98.2/-1.8  ABG lactate: --  Arterial Blood Gas:  08-19 @ 05:30  7.40/41/125/25/100.0/0.5  ABG lactate: --        MICROBIOLOGY:     Culture - Blood (08.13.21 @ 19:12)    Specimen Source: .Blood Blood-Peripheral    Culture Results:   No Growth Final        RADIOLOGY:  < from: Xray Chest 1 View- PORTABLE-Urgent (Xray Chest 1 View- PORTABLE-Urgent .) (08.19.21 @ 05:44) >      FINDINGS:    The heart size and mediastinum is not well evaluated in this projection.  Bilateral airspace opacities, likely not significantly changed allowing for a greater degree of inspiration on the current image.  There is no pneumothorax or pleural effusion.  No acute bony abnormality.    IMPRESSION:  Bilateral airspace opacities consistent with known history of Covid 19 infection, likely not significantly changed allowing for greater degree of inspiration on the current image.    < end of copied text >    < from: VA Duplex Lower Ext Vein Scan, Bilat (08.18.21 @ 16:21) >    IMPRESSION:  Acute above and below the knee deep venous thrombosis involving the LEFT popliteal, peroneal, gastrocnemius,and soleal veins.    Acute below the knee deep venous thrombosis involving the RIGHT peroneal and soleal veins.    < end of copied text >        EKG:    < from: 12 Lead ECG (08.19.21 @ 05:49) >    Diagnosis Line NORMAL SINUS RHYTHM  NONSPECIFIC ST AND TWAVE ABNORMALITY  ABNORMAL ECG  WHEN COMPARED WITH ECG OF 12-AUG-2021 16:19,  NO SIGNIFICANT CHANGE WAS FOUND    < end of copied text >    ASSESSMENT :    72yo  Australian speaking male w/ PMHx of HTN, DM2, BPH, asthma presented on 8/12/21 from home with worsening shortness of breath x 2 weeks in setting of COVID-19 PNA now, with progressive worsening hypoxic respiratory failure , failed NIPPV support , s/p intubation and transfer to [facility] ICU for monitoring and management.     PLAN :    Neuro  -Baseline AOX3, Australian speaking now intubated and sedated   -sedation;  -wean sedation as tolerated    -neuro checks per ICU    Pulm  Acute hypoxic respiratory failure 2/2 COVID-19 PNA now with ARDS , r/o PE, r/o other infections process   -PMHx of asthma   -failed NIPPV now, intubated ; employee  lung protective ventilation in setting of ARDS ; permissive hypercarbia   -ABG  -CXR  -wean off vent as tolerated   -completed remdesevir 8/13-8/17  -c/w dexamethasone 8/13-8/23  -c/w neb tx   -chest PT , oral care , pulmonary toileting   -Prone 16 hrs and supine 8 hrs   -with worsening hypoxia will PAN cx   -known + DVT ; r/f PE ; will c/w FULL AC   -trend inflammatory markers & D -dimer     ASTHMA   -c/w home Symbicort  -c/w home montelukast  -c/w duoneb tx      CV  cardiogenic shock s/p PEA followed by VT x 2 s/p ROSC,  Septic shock / hypovolemic shock/ distributive shock , + DVT , r/o PE   -chemically resuscitated on arrival to COVID -ICU   -c/w IV pressor to maintain MAP >65   -wean as tolerated   -obtain central line access and A-line for continues monitoring    -PAN cx   -POCUS ; IVC large variable, lungs B lines b/l , with b/l consolidations. Unable to assess cardiac ; poor view   -IVF hydration   -strict i&o's   -will hold all antihypertensive in setting of shock   -TTE  -CE , proBNP   -c/w full AC on Lovenox sq       Stable BUN/Cr  -will trend daily   -mace for strict i&o's   -daily weight   -monitor lights     BPH (benign prostatic hyperplasia).   -c/w oxybutynin  -c/w tamsulosin  -c/w finasteride    GI  Oropharyngeal dysphagia   -OGT for TF   -aspiration precaution   -oral care     ENDO  TF w/ glucerna   -FS q 6 hrs   -ISS q 6 hrs   -check a1c , lipid panel     Heme   + DVT   -likely in setting of CVOID-19 illness/ hypercoagulable state   -c/w FULL AC   -trend DD  -bleeding precaution   -repeat VA duplex with in the week of last   -Heme consult     ID   Septic shock in setting of COVID-19 PNA   -IV pressors support to maintain MAP>65  -PAN cx , MRSA , sputum cx , fungitel   -empiric abx     COVID-19 PNA   - s/p Tocilizumab on 8/15  -s/p remdesevir 8/13  -c/w dexamethasone 8/13-8/23   -ID consult     PPX   DVT ppx ; on FULL AC with lovenox SQ  Gi ppx; protonix IVP q d     GOC  FULL code at this time . Spoke with son Terence CERDA ; will speak with rest of the family and notify ICU team regarding CODE status CHIEF COMPLAINT: SOB     HPI:  70yo  Burkinan speaking male w/ PMHx of HTN, DM2, BPH, asthma presented on 8/12/21 from home with worsening shortness of breath x 2 weeks in setting of COVID-19 PNA . While managed on the medical floor, pt continues to experienced worsening hypoxic respiratory failure while on NIPPV , c/b worsening delirium/ agitation  despite medical interventions. Pt is s/post multiple RRT 's for hypoxia / agitation/ delirium and non-compliance / not tolerating NIPPV .   Today, pt's conditions has progressed and family has agreed to proceed with intubation. Post intubation pt is s/p  cardiac arrest ; with ROSC 6 min, VT x 2 s/p electrical shock x 2 and 2 epi's.    Now, pt is transferred to COVID-ICU for further monitoring and management.   On arrival to the unit ; pt hemodynamic instability ; chemically resuscitated .  FAmily called for GOC.      Based on initial intake , pt did not receive the COVID-19 vaccine. Also, family members whom he lives with likely are also COVID-19 positive.     ICU Vital Signs Last 24 Hrs  T(C): 36.5 (20 Aug 2021 11:02), Max: 36.8 (19 Aug 2021 15:53)  T(F): 97.7 (20 Aug 2021 11:02), Max: 98.3 (19 Aug 2021 15:53)  HR: 105 (20 Aug 2021 11:02) (72 - 105)  BP: 128/86 (20 Aug 2021 11:02) (126/79 - 146/97)  RR: 55 (20 Aug 2021 11:02) (17 - 55)  SpO2: 94% (20 Aug 2021 11:02) (92% - 95%)        PAST MEDICAL & SURGICAL HISTORY:  BPH (benign prostatic hyperplasia)    Hyperlipemia    HTN (hypertension)    Hypercholesteremia    Asthma    Diabetes    Kidney stone    Bladder stone    H/O lithotripsy  x 2    History of kidney stones    H/O umbilical hernia repair        FAMILY HISTORY:  Family history of diabetes mellitus (Father, Sibling)    Family history of cancer (Mother)        SOCIAL HISTORY: Unable to obtain pt is intubated and sedated .   Advance Directives: FULL     Allergies    No Known Allergies    Intolerances        HOME MEDICATIONS:    REVIEW OF SYSTEMS:    [ x] Unable to assess ROS because ________Unable to obtain pt is intubated and sedated .         08-19 @ 07:01  -  08-20 @ 07:00  --------------------------------------------------------  IN: 0 mL / OUT: 300 mL / NET: -300 mL      CAPILLARY BLOOD GLUCOSE      POCT Blood Glucose.: 203 mg/dL (20 Aug 2021 12:56)      PHYSICAL EXAM:  General: intubated, sedated   HEENT:  PERRLA b/l   Neck: b/l + SQ  air in neck   Respiratory:  intubated , diminished BS b/l , + crepitus   Cardiovascular: + s1 & s2 , A fib with aberrancy   Abdomen: distended, soft , hypoactive BS x 4   Extremities: no edema , + pulses   Skin: warm and dry   Neurological: unable to assess mental status as pt is intubated and had sedation IVP. no purposeful movement. Is overbreathing the vent .   Psychiatry: unable to assess ; intubated and sedated     LINES:     HOSPITAL MEDICATIONS:  enoxaparin Injectable 80 milliGRAM(s) SubCutaneous every 12 hours      amLODIPine   Tablet 5 milliGRAM(s) Oral daily  tamsulosin 0.4 milliGRAM(s) Oral two times a day  valsartan 160 milliGRAM(s) Oral daily    atorvastatin 20 milliGRAM(s) Oral at bedtime  dexAMETHasone  Injectable 6 milliGRAM(s) IV Push daily  dextrose 40% Gel 15 Gram(s) Oral once  dextrose 50% Injectable 25 Gram(s) IV Push once  dextrose 50% Injectable 12.5 Gram(s) IV Push once  dextrose 50% Injectable 25 Gram(s) IV Push once  finasteride 5 milliGRAM(s) Oral daily  glucagon  Injectable 1 milliGRAM(s) IntraMuscular once  insulin glargine Injectable (LANTUS) 20 Unit(s) SubCutaneous at bedtime  insulin lispro (ADMELOG) corrective regimen sliding scale   SubCutaneous three times a day before meals  insulin lispro (ADMELOG) corrective regimen sliding scale   SubCutaneous at bedtime    ALBUTerol    90 MICROgram(s) HFA Inhaler 2 Puff(s) Inhalation every 4 hours PRN  budesonide 160 MICROgram(s)/formoterol 4.5 MICROgram(s) Inhaler 2 Puff(s) Inhalation two times a day  montelukast 10 milliGRAM(s) Oral daily    acetaminophen   Tablet .. 650 milliGRAM(s) Oral every 4 hours PRN  haloperidol    Injectable 1 milliGRAM(s) IV Push every 6 hours PRN  LORazepam   Injectable 0.5 milliGRAM(s) IV Push every 4 hours PRN        oxybutynin XL 10 milliGRAM(s) Oral daily    dextrose 5%. 1000 milliLiter(s) IV Continuous <Continuous>  dextrose 5%. 1000 milliLiter(s) IV Continuous <Continuous>  dextrose 5%. 1000 milliLiter(s) IV Continuous <Continuous>      chlorhexidine 2% Cloths 1 Application(s) Topical <User Schedule>        LABS:                        16.5   10.88 )-----------( 279      ( 20 Aug 2021 06:31 )             50.7     Hgb Trend: 16.5<--, 14.2<--, 13.7<--, 13.7<--, 13.8<--  08-20    140  |  109<H>  |  47<H>  ----------------------------<  160<H>  5.1   |  17<L>  |  0.97    Ca    9.3      20 Aug 2021 06:31  Mg     2.7     08-20    TPro  6.4  /  Alb  3.2<L>  /  TBili  0.8  /  DBili  0.1  /  AST  40  /  ALT  16  /  AlkPhos  123<H>  08-20    Creatinine Trend: 0.97<--, 1.16<--, 0.93<--, 0.93<--, 0.89<--, 0.90<--  PT/INR - ( 20 Aug 2021 08:24 )   PT: 12.9 sec;   INR: 1.08 ratio             Arterial Blood Gas:  08-19 @ 17:54  7.43/37/100/25/99.1/0.5  ABG lactate: --  Arterial Blood Gas:  08-19 @ 06:27  7.38/39/99/23/98.2/-1.8  ABG lactate: --  Arterial Blood Gas:  08-19 @ 05:30  7.40/41/125/25/100.0/0.5  ABG lactate: --        MICROBIOLOGY:     Culture - Blood (08.13.21 @ 19:12)    Specimen Source: .Blood Blood-Peripheral    Culture Results:   No Growth Final        RADIOLOGY:  < from: Xray Chest 1 View- PORTABLE-Urgent (Xray Chest 1 View- PORTABLE-Urgent .) (08.19.21 @ 05:44) >      FINDINGS:    The heart size and mediastinum is not well evaluated in this projection.  Bilateral airspace opacities, likely not significantly changed allowing for a greater degree of inspiration on the current image.  There is no pneumothorax or pleural effusion.  No acute bony abnormality.    IMPRESSION:  Bilateral airspace opacities consistent with known history of Covid 19 infection, likely not significantly changed allowing for greater degree of inspiration on the current image.    < end of copied text >    < from: VA Duplex Lower Ext Vein Scan, Bilat (08.18.21 @ 16:21) >    IMPRESSION:  Acute above and below the knee deep venous thrombosis involving the LEFT popliteal, peroneal, gastrocnemius,and soleal veins.    Acute below the knee deep venous thrombosis involving the RIGHT peroneal and soleal veins.    < end of copied text >        EKG:    < from: 12 Lead ECG (08.19.21 @ 05:49) >    Diagnosis Line NORMAL SINUS RHYTHM  NONSPECIFIC ST AND TWAVE ABNORMALITY  ABNORMAL ECG  WHEN COMPARED WITH ECG OF 12-AUG-2021 16:19,  NO SIGNIFICANT CHANGE WAS FOUND    < end of copied text >    ASSESSMENT :    70yo  Burkinan speaking male w/ PMHx of HTN, DM2, BPH, asthma presented on 8/12/21 from home with worsening shortness of breath x 2 weeks in setting of COVID-19 PNA now, with progressive worsening hypoxic respiratory failure , failed NIPPV support , s/[facility]ubation [facility].     PLAN :    Neuro  -Baseline AOX3, Burkinan speaking now intubated and sedated   -sedation;  -wean sedation as tolerated    -neuro checks per ICU    Pulm  Acute hypoxic respiratory failure 2/2 COVID-19 PNA now with ARDS , r/o PE, r/o other infections process   -PMHx of asthma   -failed NIPPV now, intubated ; employee  lung protective ventilation in setting of ARDS ; permissive hypercarbia   -ABG  -CXR; ARDS , L IJ TLC, OGT & ET  in place , SQ emphysema in b/l neck R>L.   -wean off vent as tolerated   -completed remdesevir 8/13-8/17  -c/w dexamethasone 8/13-8/23  -c/w neb tx   -chest PT , oral care , pulmonary toileting   -Prone 16 hrs and supine 8 hrs   -with worsening hypoxia will PAN cx   -known + DVT ; r/f PE ; will c/w FULL AC   -trend inflammatory markers & D -dimer     ASTHMA   -c/w home Symbicort  -c/w home montelukast  -c/w duoneb tx      CV  cardiogenic shock s/p PEA followed by VT x 2 s/p ROSC,  Septic shock / hypovolemic shock/ distributive shock , + DVT , r/o PE   -chemically resuscitated on arrival to COVID -ICU   -c/w IV pressor to maintain MAP >65   -wean as tolerated   -obtain central line access and A-line for continues monitoring    -PAN cx   -POCUS ; IVC large variable, lungs B lines b/l , with b/l consolidations. Unable to assess cardiac ; poor view   -IVF hydration   -strict i&o's   -will hold all antihypertensive in setting of shock   -TTE  -CE , proBNP   -c/w full AC on Lovenox sq       Stable BUN/Cr  -will trend daily   -mace for strict i&o's   -daily weight   -monitor lights     BPH (benign prostatic hyperplasia).   -c/w oxybutynin  -c/w tamsulosin  -c/w finasteride    GI  Oropharyngeal dysphagia   -OGT for TF   -aspiration precaution   -oral care     ENDO  TF w/ glucerna   -FS q 6 hrs   -ISS q 6 hrs   -check a1c , lipid panel     Heme   + DVT   -likely in setting of CVOID-19 illness/ hypercoagulable state   -c/w FULL AC   -trend DD  -bleeding precaution   -repeat [facility] duplex with in the week of last   -Heme consult     ID   Septic shock in setting of COVID-19 PNA   -IV pressors support to maintain MAP>65  -PAN cx , MRSA , sputum cx , fungitel   -empiric abx     COVID-19 PNA   - s/p Tocilizumab on 8/15  -s/p remdesevir 8/13  -c/w dexamethasone 8/13-8/23   -ID consult     PPX   DVT ppx ; on FULL AC with lovenox SQ  Gi ppx; protonix IVP q d     GOC  FULL code at this time . Spoke with son Terence CERDA ; will speak with rest of the family and notify ICU team regarding CODE status CHIEF COMPLAINT: SOB     HPI:  70yo  Gabonese speaking male w/ PMHx of HTN, DM2, BPH, asthma presented on 8/12/21 from home with worsening shortness of breath x 2 weeks in setting of COVID-19 PNA . While managed on the medical floor, pt continues to experienced worsening hypoxic respiratory failure while on NIPPV , c/b worsening delirium/ agitation  despite medical interventions. Pt is s/post multiple RRT 's for hypoxia / agitation/ delirium and non-compliance / not tolerating NIPPV .   Today, pt's conditions has progressed and family has agreed to proceed with intubation. Post intubation pt is s/p  cardiac arrest ; with ROSC 6 min, VT x 2 s/p electrical shock x 2 and 2 epi's.    Now, pt is transferred to COVID-ICU for further monitoring and management.   On arrival to the unit ; pt hemodynamic instability ; chemically resuscitated .  FAmily called for GOC.      Based on initial intake , pt did not receive the COVID-19 vaccine. Also, family members whom he lives with likely are also COVID-19 positive.     ICU Vital Signs Last 24 Hrs  T(C): 36.5 (20 Aug 2021 11:02), Max: 36.8 (19 Aug 2021 15:53)  T(F): 97.7 (20 Aug 2021 11:02), Max: 98.3 (19 Aug 2021 15:53)  HR: 105 (20 Aug 2021 11:02) (72 - 105)  BP: 128/86 (20 Aug 2021 11:02) (126/79 - 146/97)  RR: 55 (20 Aug 2021 11:02) (17 - 55)  SpO2: 94% (20 Aug 2021 11:02) (92% - 95%)        PAST MEDICAL & SURGICAL HISTORY:  BPH (benign prostatic hyperplasia)    Hyperlipemia    HTN (hypertension)    Hypercholesteremia    Asthma    Diabetes    Kidney stone    Bladder stone    H/O lithotripsy  x 2    History of kidney stones    H/O umbilical hernia repair        FAMILY HISTORY:  Family history of diabetes mellitus (Father, Sibling)    Family history of cancer (Mother)        SOCIAL HISTORY: Unable to obtain pt is intubated and sedated .   Advance Directives: FULL     Allergies    No Known Allergies    Intolerances        HOME MEDICATIONS:    REVIEW OF SYSTEMS:    [ x] Unable to assess ROS because ________Unable to obtain pt is intubated and sedated .         08-19 @ 07:01  -  08-20 @ 07:00  --------------------------------------------------------  IN: 0 mL / OUT: 300 mL / NET: -300 mL      CAPILLARY BLOOD GLUCOSE      POCT Blood Glucose.: 203 mg/dL (20 Aug 2021 12:56)      PHYSICAL EXAM:  General: intubated, sedated   HEENT:  PERRLA b/l   Neck: b/l + SQ  air in neck   Respiratory:  intubated , diminished BS b/l , + crepitus   Cardiovascular: + s1 & s2 , A fib with aberrancy   Abdomen: distended, soft , hypoactive BS x 4   Extremities: no edema , + pulses   Skin: warm and dry   Neurological: unable to assess mental status as pt is intubated and had sedation IVP. no purposeful movement. Is overbreathing the vent .   Psychiatry: unable to assess ; intubated and sedated     LINES:     HOSPITAL MEDICATIONS:  enoxaparin Injectable 80 milliGRAM(s) SubCutaneous every 12 hours      amLODIPine   Tablet 5 milliGRAM(s) Oral daily  tamsulosin 0.4 milliGRAM(s) Oral two times a day  valsartan 160 milliGRAM(s) Oral daily    atorvastatin 20 milliGRAM(s) Oral at bedtime  dexAMETHasone  Injectable 6 milliGRAM(s) IV Push daily  dextrose 40% Gel 15 Gram(s) Oral once  dextrose 50% Injectable 25 Gram(s) IV Push once  dextrose 50% Injectable 12.5 Gram(s) IV Push once  dextrose 50% Injectable 25 Gram(s) IV Push once  finasteride 5 milliGRAM(s) Oral daily  glucagon  Injectable 1 milliGRAM(s) IntraMuscular once  insulin glargine Injectable (LANTUS) 20 Unit(s) SubCutaneous at bedtime  insulin lispro (ADMELOG) corrective regimen sliding scale   SubCutaneous three times a day before meals  insulin lispro (ADMELOG) corrective regimen sliding scale   SubCutaneous at bedtime    ALBUTerol    90 MICROgram(s) HFA Inhaler 2 Puff(s) Inhalation every 4 hours PRN  budesonide 160 MICROgram(s)/formoterol 4.5 MICROgram(s) Inhaler 2 Puff(s) Inhalation two times a day  montelukast 10 milliGRAM(s) Oral daily    acetaminophen   Tablet .. 650 milliGRAM(s) Oral every 4 hours PRN  haloperidol    Injectable 1 milliGRAM(s) IV Push every 6 hours PRN  LORazepam   Injectable 0.5 milliGRAM(s) IV Push every 4 hours PRN        oxybutynin XL 10 milliGRAM(s) Oral daily    dextrose 5%. 1000 milliLiter(s) IV Continuous <Continuous>  dextrose 5%. 1000 milliLiter(s) IV Continuous <Continuous>  dextrose 5%. 1000 milliLiter(s) IV Continuous <Continuous>      chlorhexidine 2% Cloths 1 Application(s) Topical <User Schedule>        LABS:                        16.5   10.88 )-----------( 279      ( 20 Aug 2021 06:31 )             50.7     Hgb Trend: 16.5<--, 14.2<--, 13.7<--, 13.7<--, 13.8<--  08-20    140  |  109<H>  |  47<H>  ----------------------------<  160<H>  5.1   |  17<L>  |  0.97    Ca    9.3      20 Aug 2021 06:31  Mg     2.7     08-20    TPro  6.4  /  Alb  3.2<L>  /  TBili  0.8  /  DBili  0.1  /  AST  40  /  ALT  16  /  AlkPhos  123<H>  08-20    Creatinine Trend: 0.97<--, 1.16<--, 0.93<--, 0.93<--, 0.89<--, 0.90<--  PT/INR - ( 20 Aug 2021 08:24 )   PT: 12.9 sec;   INR: 1.08 ratio             Arterial Blood Gas:  08-19 @ 17:54  7.43/37/100/25/99.1/0.5  ABG lactate: --  Arterial Blood Gas:  08-19 @ 06:27  7.38/39/99/23/98.2/-1.8  ABG lactate: --  Arterial Blood Gas:  08-19 @ 05:30  7.40/41/125/25/100.0/0.5  ABG lactate: --        MICROBIOLOGY:     Culture - Blood (08.13.21 @ 19:12)    Specimen Source: .Blood Blood-Peripheral    Culture Results:   No Growth Final        RADIOLOGY:  < from: Xray Chest 1 View- PORTABLE-Urgent (Xray Chest 1 View- PORTABLE-Urgent .) (08.19.21 @ 05:44) >      FINDINGS:    The heart size and mediastinum is not well evaluated in this projection.  Bilateral airspace opacities, likely not significantly changed allowing for a greater degree of inspiration on the current image.  There is no pneumothorax or pleural effusion.  No acute bony abnormality.    IMPRESSION:  Bilateral airspace opacities consistent with known history of Covid 19 infection, likely not significantly changed allowing for greater degree of inspiration on the current image.    < end of copied text >    < from: VA Duplex Lower Ext Vein Scan, Bilat (08.18.21 @ 16:21) >    IMPRESSION:  Acute above and below the knee deep venous thrombosis involving the LEFT popliteal, peroneal, gastrocnemius,and soleal veins.    Acute below the knee deep venous thrombosis involving the RIGHT peroneal and soleal veins.    < end of copied text >        EKG:    < from: 12 Lead ECG (08.19.21 @ 05:49) >    Diagnosis Line NORMAL SINUS RHYTHM  NONSPECIFIC ST AND TWAVE ABNORMALITY  ABNORMAL ECG  WHEN COMPARED WITH ECG OF 12-AUG-2021 16:19,  NO SIGNIFICANT CHANGE WAS FOUND    < end of copied text >    ASSESSMENT :    70yo  Gabonese speaking male w/ PMHx of HTN, DM2, BPH, asthma presented on 8/12/21 from home with worsening shortness of breath x 2 weeks in setting of COVID-19 PNA now, with progressive worsening hypoxic respiratory failure , failed NIPPV support , [facility].     PLAN :    Neuro  -Baseline AOX3, Gabonese speaking now intubated and sedated   -sedation;  -wean sedation as tolerated    -neuro checks per ICU    Pulm  Acute hypoxic respiratory failure 2/2 COVID-19 PNA now with ARDS , r/o PE, r/o other infections process   -PMHx of asthma   -failed NIPPV now, intubated ; employee  lung protective ventilation in setting of ARDS ; permissive hypercarbia   -ABG  -CXR; ARDS , L IJ TLC, OGT & ET  in place , SQ emphysema in b/l neck R>L.   -wean off vent as tolerated   -completed remdesevir 8/13-8/17  -c/w dexamethasone 8/13-8/23  -c/w neb tx   -chest PT , oral care , pulmonary toileting   -Prone 16 hrs and supine 8 hrs   -with worsening hypoxia will PAN cx   -known + DVT ; r/f PE ; will c/w FULL AC   -trend inflammatory markers & D -dimer     ASTHMA   -c/w home Symbicort  -c/w home montelukast  -c/w duoneb tx      CV  cardiogenic shock s/p PEA followed by VT x 2 s/p ROSC,  Septic shock / hypovolemic shock/ distributive shock , + DVT , r/o PE   -chemically resuscitated on arrival to COVID -ICU   -c/w IV pressor to maintain MAP >65   -wean as tolerated   -obtain central line access and A-line for continues monitoring    -PAN cx   -POCUS ; IVC large variable, lungs B lines b/l , with b/l consolidations. Unable to assess cardiac ; poor view   -IVF hydration   -strict i&o's   -will hold all antihypertensive in setting of shock   -TTE  -CE , proBNP   -c/w full AC on Lovenox sq       Stable BUN/Cr  -will trend daily   -mace for strict i&o's   -daily weight   -monitor lights     BPH (benign prostatic hyperplasia).   -c/w oxybutynin  -c/w tamsulosin  -c/w finasteride    GI  Oropharyngeal dysphagia   -OGT for TF   -bowel regimen   -PPI q d   -aspiration precaution   -oral care     ENDO  TF w/ glucerna   -FS q 6 hrs   -ISS q 6 hrs & Lantus   -check a1c , lipid panel     Heme   + DVT   -likely in setting of CVOID-19 illness/ hypercoagulable state   -c/w FULL AC   -trend DD  -bleeding precaution   -repeat [facility] duplex with in the week of last   -Heme consult     ID   Septic shock in setting of COVID-19 PNA   -IV pressors support to maintain MAP>65  -PAN cx , MRSA , sputum cx , fungitel   -empiric abx     COVID-19 PNA   - s/p Tocilizumab on 8/15  -s/p remdesevir 8/13  -c/w dexamethasone 8/13-8/23   -ID consult     PPX   DVT ppx ; on FULL AC with lovenox SQ  Gi ppx; protonix IVP q d     GOC  FULL code at this time . Spoke with son Terence CERDA ; will speak with rest of the family and notify ICU team regarding CODE status CHIEF COMPLAINT: SOB     HPI:  70yo  Guyanese speaking male w/ PMHx of HTN, DM2, BPH, asthma presented on 8/12/21 from home with worsening shortness of breath x 2 weeks in setting of COVID-19 PNA . While managed on the medical floor, pt continues to experienced worsening hypoxic respiratory failure while on NIPPV , c/b worsening delirium/ agitation  despite medical interventions. Pt is s/post multiple RRT 's for hypoxia / agitation/ delirium and non-compliance / not tolerating NIPPV .   Today, pt's conditions has progressed and family has agreed to proceed with intubation. Post intubation pt is s/p  cardiac arrest ; with ROSC 6 min, VT x 2 s/p electrical shock x 2 and 2 epi's.    Now, pt is transferred to COVID-ICU for further monitoring and management.   On arrival to the unit ; pt hemodynamic instability ; chemically resuscitated .  FAmily called for GOC.      Based on initial intake , pt did not receive the COVID-19 vaccine. Also, family members whom he lives with likely are also COVID-19 positive.     ICU Vital Signs Last 24 Hrs  T(C): 36.5 (20 Aug 2021 11:02), Max: 36.8 (19 Aug 2021 15:53)  T(F): 97.7 (20 Aug 2021 11:02), Max: 98.3 (19 Aug 2021 15:53)  HR: 105 (20 Aug 2021 11:02) (72 - 105)  BP: 128/86 (20 Aug 2021 11:02) (126/79 - 146/97)  RR: 55 (20 Aug 2021 11:02) (17 - 55)  SpO2: 94% (20 Aug 2021 11:02) (92% - 95%)        PAST MEDICAL & SURGICAL HISTORY:  BPH (benign prostatic hyperplasia)    Hyperlipemia    HTN (hypertension)    Hypercholesteremia    Asthma    Diabetes    Kidney stone    Bladder stone    H/O lithotripsy  x 2    History of kidney stones    H/O umbilical hernia repair        FAMILY HISTORY:  Family history of diabetes mellitus (Father, Sibling)    Family history of cancer (Mother)        SOCIAL HISTORY: Unable to obtain pt is intubated and sedated .   Advance Directives: FULL     Allergies    No Known Allergies    Intolerances        HOME MEDICATIONS:    REVIEW OF SYSTEMS:    [ x] Unable to assess ROS because ________Unable to obtain pt is intubated and sedated .         08-19 @ 07:01  -  08-20 @ 07:00  --------------------------------------------------------  IN: 0 mL / OUT: 300 mL / NET: -300 mL      CAPILLARY BLOOD GLUCOSE      POCT Blood Glucose.: 203 mg/dL (20 Aug 2021 12:56)      PHYSICAL EXAM:  General: intubated, sedated   HEENT:  PERRLA b/l   Neck: b/l + SQ  air in neck   Respiratory:  intubated , diminished BS b/l , + crepitus   Cardiovascular: + s1 & s2 , A fib with aberrancy   Abdomen: distended, soft , hypoactive BS x 4   Extremities: no edema , + pulses   Skin: warm and dry   Neurological: unable to assess mental status as pt is intubated and had sedation IVP. no purposeful movement. Is overbreathing the vent .   Psychiatry: unable to assess ; intubated and sedated     LINES:     HOSPITAL MEDICATIONS:  enoxaparin Injectable 80 milliGRAM(s) SubCutaneous every 12 hours      amLODIPine   Tablet 5 milliGRAM(s) Oral daily  tamsulosin 0.4 milliGRAM(s) Oral two times a day  valsartan 160 milliGRAM(s) Oral daily    atorvastatin 20 milliGRAM(s) Oral at bedtime  dexAMETHasone  Injectable 6 milliGRAM(s) IV Push daily  dextrose 40% Gel 15 Gram(s) Oral once  dextrose 50% Injectable 25 Gram(s) IV Push once  dextrose 50% Injectable 12.5 Gram(s) IV Push once  dextrose 50% Injectable 25 Gram(s) IV Push once  finasteride 5 milliGRAM(s) Oral daily  glucagon  Injectable 1 milliGRAM(s) IntraMuscular once  insulin glargine Injectable (LANTUS) 20 Unit(s) SubCutaneous at bedtime  insulin lispro (ADMELOG) corrective regimen sliding scale   SubCutaneous three times a day before meals  insulin lispro (ADMELOG) corrective regimen sliding scale   SubCutaneous at bedtime    ALBUTerol    90 MICROgram(s) HFA Inhaler 2 Puff(s) Inhalation every 4 hours PRN  budesonide 160 MICROgram(s)/formoterol 4.5 MICROgram(s) Inhaler 2 Puff(s) Inhalation two times a day  montelukast 10 milliGRAM(s) Oral daily    acetaminophen   Tablet .. 650 milliGRAM(s) Oral every 4 hours PRN  haloperidol    Injectable 1 milliGRAM(s) IV Push every 6 hours PRN  LORazepam   Injectable 0.5 milliGRAM(s) IV Push every 4 hours PRN        oxybutynin XL 10 milliGRAM(s) Oral daily    dextrose 5%. 1000 milliLiter(s) IV Continuous <Continuous>  dextrose 5%. 1000 milliLiter(s) IV Continuous <Continuous>  dextrose 5%. 1000 milliLiter(s) IV Continuous <Continuous>      chlorhexidine 2% Cloths 1 Application(s) Topical <User Schedule>        LABS:                        16.5   10.88 )-----------( 279      ( 20 Aug 2021 06:31 )             50.7     Hgb Trend: 16.5<--, 14.2<--, 13.7<--, 13.7<--, 13.8<--  08-20    140  |  109<H>  |  47<H>  ----------------------------<  160<H>  5.1   |  17<L>  |  0.97    Ca    9.3      20 Aug 2021 06:31  Mg     2.7     08-20    TPro  6.4  /  Alb  3.2<L>  /  TBili  0.8  /  DBili  0.1  /  AST  40  /  ALT  16  /  AlkPhos  123<H>  08-20    Creatinine Trend: 0.97<--, 1.16<--, 0.93<--, 0.93<--, 0.89<--, 0.90<--  PT/INR - ( 20 Aug 2021 08:24 )   PT: 12.9 sec;   INR: 1.08 ratio             Arterial Blood Gas:  08-19 @ 17:54  7.43/37/100/25/99.1/0.5  ABG lactate: --  Arterial Blood Gas:  08-19 @ 06:27  7.38/39/99/23/98.2/-1.8  ABG lactate: --  Arterial Blood Gas:  08-19 @ 05:30  7.40/41/125/25/100.0/0.5  ABG lactate: --        MICROBIOLOGY:     Culture - Blood (08.13.21 @ 19:12)    Specimen Source: .Blood Blood-Peripheral    Culture Results:   No Growth Final        RADIOLOGY:  < from: Xray Chest 1 View- PORTABLE-Urgent (Xray Chest 1 View- PORTABLE-Urgent .) (08.19.21 @ 05:44) >      FINDINGS:    The heart size and mediastinum is not well evaluated in this projection.  Bilateral airspace opacities, likely not significantly changed allowing for a greater degree of inspiration on the current image.  There is no pneumothorax or pleural effusion.  No acute bony abnormality.    IMPRESSION:  Bilateral airspace opacities consistent with known history of Covid 19 infection, likely not significantly changed allowing for greater degree of inspiration on the current image.    < end of copied text >    < from: VA Duplex Lower Ext Vein Scan, Bilat (08.18.21 @ 16:21) >    IMPRESSION:  Acute above and below the knee deep venous thrombosis involving the LEFT popliteal, peroneal, gastrocnemius,and soleal veins.    Acute below the knee deep venous thrombosis involving the RIGHT peroneal and soleal veins.    < end of copied text >        EKG:    < from: 12 Lead ECG (08.19.21 @ 05:49) >    Diagnosis Line NORMAL SINUS RHYTHM  NONSPECIFIC ST AND TWAVE ABNORMALITY  ABNORMAL ECG  WHEN COMPARED WITH ECG OF 12-AUG-2021 16:19,  NO SIGNIFICANT CHANGE WAS FOUND    < end of copied text >    ASSESSMENT :    70yo  Guyanese speaking male w/ PMHx of HTN, DM2, BPH, asthma presented on 8/12/21 from home with worsening shortness of breath x 2 weeks in setting of COVID-19 PNA now, with progressive worsening hypoxic respiratory failure , failed NIPPV support , s[facility].     PLAN :    Neuro  -Baseline AOX3, Guyanese speaking now intubated and sedated   -sedation;  -wean sedation as tolerated    -neuro checks per ICU    Pulm  Acute hypoxic respiratory failure 2/2 COVID-19 PNA now with ARDS , r/o PE, r/o other infections process   -PMHx of asthma   -failed NIPPV now, intubated ; employee  lung protective ventilation in setting of ARDS ; permissive hypercarbia   -ABG  -CXR; ARDS , L IJ TLC, OGT & ET  in place , SQ emphysema in b/l neck R>L.   -wean off vent as tolerated   -completed remdesevir 8/13-8/17  -c/w dexamethasone 8/13-8/23  -c/w neb tx   -chest PT , oral care , pulmonary toileting   -Prone 16 hrs and supine 8 hrs   -with worsening hypoxia will PAN cx   -known + DVT ; r/f PE ; will c/w FULL AC   -trend inflammatory markers & D -dimer     ASTHMA   -c/w home Symbicort  -c/w home montelukast  -c/w duoneb tx      CV  cardiogenic shock s/p PEA followed by VT x 2 s/p ROSC,  Septic shock / hypovolemic shock/ distributive shock , + DVT , r/o PE   -chemically resuscitated on arrival to COVID -ICU   -c/w IV pressor to maintain MAP >65   -wean as tolerated   -obtain central line access and A-line for continues monitoring    -PAN cx   -POCUS ; IVC large variable, lungs B lines b/l , with b/l consolidations. Unable to assess cardiac ; poor view   -IVF hydration   -strict i&o's   -will hold all antihypertensive in setting of shock   -TTE  -CE , proBNP   -c/w full AC on Lovenox sq       Stable BUN/Cr  -will trend daily   -mace for strict i&o's   -daily weight   -monitor lights     BPH (benign prostatic hyperplasia).   -c/w oxybutynin  -c/w tamsulosin  -c/w finasteride    GI  Oropharyngeal dysphagia   -OGT for TF   -bowel regimen   -PPI q d   -aspiration precaution   -oral care     ENDO  TF w/ glucerna   -FS q 6 hrs   -ISS q 6 hrs & Lantus   -check a1c , lipid panel     Heme   + DVT   -likely in setting of CVOID-19 illness/ hypercoagulable state   -c/w FULL AC   -trend DD  -bleeding precaution   -repeat VA duplex with in the week of last   -Heme consult     ID   Septic shock in setting of COVID-19 PNA   -IV pressors support to maintain MAP>65  -PAN cx , MRSA , sputum cx , fungitel   -empiric abx     COVID-19 PNA   - s/p Tocilizumab on 8/15  -s/p remdesevir 8/13  -c/w dexamethasone 8/13-8/23   -ID consult     PPX   DVT ppx ; on FULL AC with lovenox SQ  Gi ppx; protonix IVP q d     GOC  FULL code at this time . Spoke with son Terence CERDA ; will speak with rest of the family and notify ICU team regarding CODE status      Attending Attestation:  Agree with above. Patient critically ill requiring frequent bedside visits with therapy change. Patient DM2, HTN, and BPH who presented with COVID-19 pneumonia. He is unfortunately unvaccinated and suffering more severe disease. He has had progressive hypoxemia despite BIPAP. His family reportedly did not want him intubated until absolutely necessary and he was intubated emergently by anesthesia today (8/20/21). He had a subsequent cardiac arrest requiring CPR and then was [facility] once "stabilized" for further monitoring and care.    1. Acute Hypoxemic Respiratory Failure - ARDS due to COVID-19. Continue mechanical ventilation. Check CXR and ABG to ensure appropriate ventilation. Given tenuous cardiac status unclear if patient is stable enough to be proned. Check sputum culture.  2. Cardiovascular - patient in shock and on multiple vasopressors with goal MAP > 65. POCUS unable to obtain cardiac views - ? mediastinal air after CPR. Upon presentation to ICU patient was hypotensive 50/30 requiring multiple pushes of Neosynephrine, Bicarb, Epi, and D50 while on 3 vasopressors. Also required amio for significant ectopy. Patient was on monitor. No further CPR needed at this time but patient at high risk for recurrent arrest (this was discussed with daughter by me).  - Official echo ordered   3. Oropharyngeal dysphagia - will place OGT and start feeds when stable  4. Renal function - previously normal but patient now with significant hypotension and increased lactate. Likely with anion gap acidosis. WIll check BMP. Place mace catheter and monitor urine output.  5. Neurology - patient currently on low dose of propofol which we will increase as able based on vasopressor requirements. Will avoid paralytics until/unless we are able to properly sedate patient as paralytics without sedation would be cruel.  6. Hem/Onc - patient with LE DVT noted on Duplex. Continue therapeutic AC with Lovenox. If renal failure will switch to Heparin.  7. GOC - patient is FULL Code - I personally discussed with patients daughter over the phone and family continues to want all interventions. We discussed patients high risk of mortality. Family requesting visit - will need to ensure family not acutely ill.    - DVT proph  - Full Code - GOC discussed  - GDE performed - unable to obtain cardiac views    Patient is critically ill and high risk of death. I have provided 90 minutes of noncontinuous critical care time independent of procedures and/or teaching services.

## 2021-08-20 NOTE — CHART NOTE - NSCHARTNOTEFT_GEN_A_CORE
: Romeo August    INDICATION: Shock, Hypoxemic Respiratory Failure    PROCEDURE:  [ ] LIMITED ECHO  [ ] LIMITED CHEST  [ ] LIMITED RETROPERITONEAL  [ ] LIMITED ABDOMINAL  [ ] LIMITED DVT      FINDINGS:  Lungs: Bilateral B-lines, subpleural consolidation noted left anterior, bilateral lung sliding, no large pleural effusion  Cardiac: Difficult views post cardiac arrest  DVT:      INTERPRETATION: : Romeo August    INDICATION: Shock, Hypoxemic Respiratory Failure    PROCEDURE:  [x ] LIMITED ECHO  [x ] LIMITED CHEST  [x ] LIMITED DVT      FINDINGS:  Lungs: Bilateral B-lines, subpleural consolidation noted left anterior, bilateral lung sliding, no large pleural effusion, air over mediastinum (?)  Cardiac: Difficult views post cardiac arrest and possibly mediastinal air, IVC 1.8 and significant variation with ventilator dyssynhcrony  DVT: no DVT noted by compression right LE, no DVT noted by standard compression of LLE      INTERPRETATION:  Alveolar interstitial pattern consistent with COVID-19 pneumonia, no evidence of pneumothorax  Unable to obtain cardiac views - possible subcutaneous emphysema/mediastinal air post CPR?   No DVT noted - prior LLE popliteal DVT noted on 8/18 not seen on current study - patient will remain on full dose AC    Images saved to [employer]

## 2021-08-20 NOTE — BH CONSULTATION LIAISON ASSESSMENT NOTE - RISK ASSESSMENT
Risk factors: acute/chronic cognitive impairments, acute/chronic medical issues, acute agitation while in the hospital 2/2 to medical issues    Protective factors: no formal PPHx, social supports    Overall, pt is a low risk of harm to self/others and does not require psychiatric admission for safety and stabilization.

## 2021-08-20 NOTE — PROVIDER CONTACT NOTE (MEDICATION) - ACTION/TREATMENT ORDERED:
No new orders at this moment. Continue monitor patient
No new orders at this moment. Continue monitor patient

## 2021-08-20 NOTE — BH CONSULTATION LIAISON ASSESSMENT NOTE - NSBHROSSYSTEMS_PSY_ALL_CORE
Respiratory... Constitutional Symptoms.../Ears/Nose/Throat/Mouth.../Cardiovascular.../Respiratory.../Gastrointestinal.../Genitourinary.../Musculoskeletal.../Skin.../Neurological.../Hematologic/Lymphatic...

## 2021-08-20 NOTE — BH CONSULTATION LIAISON ASSESSMENT NOTE - NSBHCONSULTRECOMMENDOTHER_PSY_A_CORE FT
PRN: Ativan 0.5mg IV PRN q4 for agitation given in combination with Haldol 0.5mg IV PRN q6 for agitation (monitor qtc <500ms on ekg)

## 2021-08-21 NOTE — PROGRESS NOTE ADULT - SUBJECTIVE AND OBJECTIVE BOX
CC. worsening SOB    70yo  Tajik speaking male w/ PMHx of HTN, DM2, BPH, asthma presented on 21 from home with worsening shortness of breath x 2 weeks in setting of COVID-19 PNA . While managed on the medical floor, pt continues to experienced worsening hypoxic respiratory failure while on NIPPV , c/b worsening delirium/ agitation  despite medical interventions. Pt is s/post multiple RRT 's for hypoxia / agitation/ delirium and non-compliance / not tolerating NIPPV .   Today, pt's conditions has progressed and family has agreed to proceed with intubation. Post intubation pt is s/p  cardiac arrest ; with ROSC 6 min, VT x 2 s/p electrical shock x 2 and 2 epi's. On arrival to the COVID-icu unit ; pt hemodynamic instability ; chemically resuscitated . FAmily called for GOC; pt is full code.    Based on initial intake , pt did not receive the COVID-19 vaccine. Also, family members whom he lives with are likely also COVID-19 positive.     ICU Vital Signs Last 24 Hrs  ICU Vital Signs Last 24 Hrs  T(C): 36.6 (21 Aug 2021 03:00), Max: 36.6 (21 Aug 2021 03:00)  T(F): 97.9 (21 Aug 2021 03:00), Max: 97.9 (21 Aug 2021 03:00)  HR: 60 (21 Aug 2021 06:45) (51 - 154)  BP: 148/81 (20 Aug 2021 19:00) (101/53 - 229/117)  BP(mean): 108 (20 Aug 2021 19:00) (74 - 159)  ABP: 68/44 (21 Aug 2021 06:45) (68/44 - 162/91)  ABP(mean): 55 (21 Aug 2021 06:45) (55 - 119)  RR: 36 (21 Aug 2021 06:45) (0 - 55)  SpO2: 100% (21 Aug 2021 06:45) (76% - 100%)          PAST MEDICAL & SURGICAL HISTORY:  BPH (benign prostatic hyperplasia)    Hyperlipemia    HTN (hypertension)    Hypercholesteremia    Asthma    Diabetes    Kidney stone    Bladder stone    H/O lithotripsy  x 2    History of kidney stones    H/O umbilical hernia repair        FAMILY HISTORY:  Family history of diabetes mellitus (Father, Sibling)    Family history of cancer (Mother)        SOCIAL HISTORY: Unable to obtain pt is intubated and sedated .   Advance Directives: FULL     Allergies    No Known Allergies    Intolerances        HOME MEDICATIONS:    REVIEW OF SYSTEMS:    [ x] Unable to assess ROS because ________Unable to obtain pt is intubated and sedated .       I&O's Summary    20 Aug 2021 07:01  -  21 Aug 2021 07:00  --------------------------------------------------------  IN: 5681 mL / OUT: 1350 mL / NET: 4331 mL          CAPILLARY BLOOD GLUCOSE        POCT Blood Glucose.: 171 mg/dL (21 Aug 2021 05:10)        PHYSICAL EXAM:  General: intubated, sedated   HEENT:  PERRLA b/l   Neck: b/l + SQ  air in neck   Respiratory:  intubated , diminished BS b/l , + crepitus   Cardiovascular: + s1 & s2 , NSR 60   Abdomen: mild distended, soft , hypoactive BS x 4   Extremities: no edema , + pulses   Skin: warm and dry   Neurological: unable to assess mental status as pt is intubated and had sedation IVP. no purposeful movement. Is overbreathing the vent .   Psychiatry: unable to assess ; intubated and sedated     LINES: L LIJTLC , L radial gricel      HOSPITAL MEDICATIONS:  MEDICATIONS  (STANDING):  ALBUTerol    90 MICROgram(s) HFA Inhaler 2 Puff(s) Inhalation every 6 hours  budesonide 160 MICROgram(s)/formoterol 4.5 MICROgram(s) Inhaler 2 Puff(s) Inhalation two times a day  chlorhexidine 0.12% Liquid 15 milliLiter(s) Oral Mucosa every 12 hours  chlorhexidine 4% Liquid 1 Application(s) Topical <User Schedule>  cholecalciferol 2000 Unit(s) Oral daily  dexAMETHasone  Injectable 6 milliGRAM(s) IV Push daily  enoxaparin Injectable 80 milliGRAM(s) SubCutaneous every 12 hours  fentaNYL   Infusion... 0.5 MICROgram(s)/kG/Hr (2.04 mL/Hr) IV Continuous <Continuous>  insulin glargine Injectable (LANTUS) 20 Unit(s) SubCutaneous at bedtime  insulin lispro (ADMELOG) corrective regimen sliding scale   SubCutaneous every 4 hours  meropenem  IVPB 1000 milliGRAM(s) IV Intermittent every 8 hours  montelukast 10 milliGRAM(s) Oral daily  multivitamin/minerals Oral Solution (WELLESSE) 30 milliLiter(s) Enteral Tube daily  norepinephrine Infusion 0.05 MICROgram(s)/kG/Min (7.65 mL/Hr) IV Continuous <Continuous>  norepinephrine Infusion 0.05 MICROgram(s)/kG/Min (3.83 mL/Hr) IV Continuous <Continuous>  pantoprazole  Injectable 40 milliGRAM(s) IV Push daily  phenylephrine    Infusion 0.4 MICROgram(s)/kG/Min (12.2 mL/Hr) IV Continuous <Continuous>  polyethylene glycol 3350 17 Gram(s) Oral daily  propofol Infusion 5.004 MICROgram(s)/kG/Min (2.45 mL/Hr) IV Continuous <Continuous>  senna Syrup 20 milliLiter(s) Oral at bedtime  vasopressin Infusion 0.04 Unit(s)/Min (2.4 mL/Hr) IV Continuous <Continuous>    MEDICATIONS  (PRN):          oxybutynin XL 10 milliGRAM(s) Oral daily    dextrose 5%. 1000 milliLiter(s) IV Continuous <Continuous>  dextrose 5%. 1000 milliLiter(s) IV Continuous <Continuous>  dextrose 5%. 1000 milliLiter(s) IV Continuous <Continuous>      chlorhexidine 2% Cloths 1 Application(s) Topical <User Schedule>        LABS:                                             13.3   21.25 )-----------( 328      ( 21 Aug 2021 00:28 )             41.3     Hgb Trend: 13.3<--, 13.3<--, 16.5<--, 14.2<--, 13.7<--  08-21    146<H>  |  115<H>  |  40<H>  ----------------------------<  286<H>  4.2   |  17<L>  |  1.04    Ca    8.4      21 Aug 2021 00:28  Phos  3.3     08  Mg     2.7         TPro  5.0<L>  /  Alb  2.6<L>  /  TBili  0.8  /  DBili  x   /  AST  36  /  ALT  29  /  AlkPhos  106      Creatinine Trend: 1.04<--, 1.31<--, 0.97<--, 1.16<--, 0.93<--, 0.93<--  PT/INR - ( 21 Aug 2021 00:28 )   PT: 12.5 sec;   INR: 1.04 ratio         PTT - ( 21 Aug 2021 00:28 )  PTT:28.2 sec  CARDIAC MARKERS ( 20 Aug 2021 15:53 )  x     / x     / 59 U/L / x     / 1.5 ng/mL      Urinalysis Basic - ( 20 Aug 2021 16:27 )    Color: Light Yellow / Appearance: Clear / S.017 / pH: x  Gluc: x / Ketone: Trace  / Bili: Negative / Urobili: Negative   Blood: x / Protein: 30 mg/dL / Nitrite: Negative   Leuk Esterase: Negative / RBC: 10 /hpf / WBC 5 /HPF   Sq Epi: x / Non Sq Epi: 3 /hpf / Bacteria: Negative                 Arterial Blood Gas:  Blood Gas Profile - Arterial (21 @ 00:21)    pH, Arterial: 7.39    pCO2, Arterial: 33 mmHg    pO2, Arterial: 95 mmHg    HCO3, Arterial: 20 mmol/L    Base Excess, Arterial: -4.1 mmol/L    Oxygen Saturation, Arterial: 98.8 %    Total CO2, Arterial: 21 mmol/L    FIO2, Arterial: 100    Blood Gas Source Arterial: Arterial       @ 17:54  7.43/37/100/25/99.1/0.5  ABG lactate: --  Arterial Blood Gas:   @ 06:27  7.38/39/99/23/98.2/-1.8  ABG lactate: --  Arterial Blood Gas:   @ 05:30  7.40/41/125/25/100.0/0.5  ABG lactate: --        MICROBIOLOGY:     Culture - Blood (21 @ 19:12)    Specimen Source: .Blood Blood-Peripheral    Culture Results:   No Growth Final        RADIOLOGY:      < from: Xray Chest 1 View-PORTABLE IMMEDIATE (Xray Chest 1 View-PORTABLE IMMEDIATE .) (21 @ 15:11) >  EXAM:  XR CHEST PORTABLE IMMED 1V                            PROCEDURE DATE:  2021            INTERPRETATION:  CLINICAL INFORMATION: Rapid response    EXAM: Frontal view of the chest.    COMPARISON: Chest radiograph from 2021.    FINDINGS:    Endotracheal tube with tip above the evette. Left IJ catheter tip in the SVC. Enteric tube traveling below the diaphragm, with tip not seen on this projection.    The heart is normal in size.    Bilateral patchy opacities, minimally increased from 2021.    No pleural effusion or pneumothorax.    Subcutaneous emphysema within the visualized soft tissues of the neck.    IMPRESSION:  Mild pulmonary edema, minimally increased from 2021.    < end of copied text >    < from: Xray Chest 1 View- PORTABLE-Urgent (Xray Chest 1 View- PORTABLE-Urgent .) (21 @ 05:44) >      FINDINGS:    The heart size and mediastinum is not well evaluated in this projection.  Bilateral airspace opacities, likely not significantly changed allowing for a greater degree of inspiration on the current image.  There is no pneumothorax or pleural effusion.  No acute bony abnormality.    IMPRESSION:  Bilateral airspace opacities consistent with known history of Covid 19 infection, likely not significantly changed allowing for greater degree of inspiration on the current image.    < end of copied text >    < from: VA Duplex Lower Ext Vein Scan, Bilat (21 @ 16:21) >    IMPRESSION:  Acute above and below the knee deep venous thrombosis involving the LEFT popliteal, peroneal, gastrocnemius,and soleal veins.    Acute below the knee deep venous thrombosis involving the RIGHT peroneal and soleal veins.    < end of copied text >        EKG:    < from: 12 Lead ECG (21 @ 05:49) >    Diagnosis Line NORMAL SINUS RHYTHM  NONSPECIFIC ST AND TWAVE ABNORMALITY  ABNORMAL ECG  WHEN COMPARED WITH ECG OF 12-AUG-2021 16:19,  NO SIGNIFICANT CHANGE WAS FOUND    < end of copied text >

## 2021-08-21 NOTE — CHART NOTE - NSCHARTNOTEFT_GEN_A_CORE
Nutrition Follow Up Note  Patient seen for: consult received for tube feeding    Hospital course as per chart: Pt is a 70 yo male with PMH of HTN, DM2, BPH, and asthma who presented on 8/12/21 from home with worsening SOB x 2 weeks in setting of COVID-19 PNA. Admitted 8/12. "While managed on the medical floor, pt continued to experience worsening hypoxic respiratory failure while on NIPPV, c/b worsening delirium/agitation despite medical interventions. Pt is s/post multiple RRT's for hypoxia/agitation/delirium and non-compliance/not tolerating NIPPV." Pt's conditions progressed and family agreed to proceed with intubation. S/p intubation yesterday (8/20), post-intubation pt s/p cardiac arrest "with ROSC 6 min, VT x 2 s/p electrical shock x2 and 2 epi's. On arrival to the COVID-icu unit; pt hemodynamic instability; chemically resuscitated."     Chart reviewed, events noted.    Source: [] Patient       [x] EMR        [] RN        [] Family at bedside       [] Other:    -If unable to interview patient: [x] Trach/Vent/BiPAP  [x] Disoriented/confused/inappropriate to interview    Nutrition-Related Events:   - Pressors:  [] Yes    [] No   - Propofol:  [] Yes    [] No         - Rate: __mL/hr. If maintained x 24 hours, propofol will provide:     Diet Order:   Diet, NPO with Tube Feed:   Tube Feeding Modality: Orogastric  Glucerna 1.2 Stephen (GLUCERNARTH)  Total Volume for 24 Hours (mL): 240  Continuous  Starting Tube Feed Rate {mL per Hour}: 10  Until Goal Tube Feed Rate (mL per Hour): 10  Tube Feed Duration (in Hours): 24  Tube Feed Start Time: 04:45 (08-21-21)    EN Order Provides:    - Total volume: 240 ml    - Kcal:  288 (3.5 kcal/kg based on dosing wt 81.6 kg)    - 14.4 Gm Protein  (0.2 g/kg based on dosing wt 81.6 kg)    - mL free water: 193  Current Pump Rate:  EN provision: ___% EN volume goal provided in past __ days    Is current diet order appropriate/adequate? [] Yes  [x]  No:     Nutrition-related concerns:    GI:  Last BM ___.   Bowel Regimen? [] Yes   [] No  IV Fluids:    Weights:   Daily     MEDICATIONS  (STANDING):  cholecalciferol  dexAMETHasone  Injectable  insulin glargine Injectable (LANTUS)  insulin lispro (ADMELOG) corrective regimen sliding scale  meropenem  IVPB  multivitamin/minerals Oral Solution (WELLESSE)  norepinephrine Infusion  norepinephrine Infusion  pantoprazole  Injectable  phenylephrine    Infusion  polyethylene glycol 3350  senna Syrup  vasopressin Infusion    Pertinent Labs: 08-21 @ 00:28: Na 146<H>, BUN 40<H>, Cr 1.04, <H>, K+ 4.2, Phos 3.3, Mg 2.7<H>, Alk Phos 106, ALT/SGPT 29, AST/SGOT 36, HbA1c --  08-20 @ 15:53: Na 149<H>, BUN 48<H>, Cr 1.31<H>, <HH>, K+ 3.7, Phos 7.3<H>, Mg 4.8<H>, Alk Phos 102, ALT/SGPT 33, AST/SGOT 47<H>, HbA1c --    A1C with Estimated Average Glucose Result: 7.3 % (08-14-21 @ 14:36)    Finger Sticks:  POCT Blood Glucose.: 171 mg/dL (08-21 @ 05:10)  POCT Blood Glucose.: 186 mg/dL (08-21 @ 03:59)  POCT Blood Glucose.: 272 mg/dL (08-20 @ 22:34)  POCT Blood Glucose.: 203 mg/dL (08-20 @ 12:56)  POCT Blood Glucose.: 212 mg/dL (08-20 @ 12:15)    Skin per nursing documentation: no pressure injuries  Edema: none    Estimated needs recalculated in setting of intubation; based on ( )   Estimated Energy Needs:   Estimated Protein Needs:  Estimated Fluid Needs:   [place] State Equation:    Previous Nutrition Diagnosis: Inadequate Oral Intake  Nutrition Diagnosis is: [x] ongoing  [] resolved [] not applicable   Pt now intubated and sedated, nutrition provided via EN    New Nutrition Diagnosis: [] Not applicable    Nutrition Interventions:     Education Provided:       [] Yes:  [x] No: inappropriate at this time, pt intubated and sedated       Recommendations:         [] Continue current diet order            [] Add oral nutrition supplement:     [] Discontinue current diet order. Recommend:      [] Add micronutrient supplementation:      [] Continue current micronutrient supplementation:      [] Other:     Monitoring and Evaluation:   Continue to monitor nutritional intake, tolerance to diet prescription, weights, labs, skin integrity    RD remains available upon request and will follow up per protocol.  Nancie Cardoso MS, RD [facility] Pager #[phone] Nutrition Follow Up Note  Patient seen for: consult received for tube feeding    Hospital course as per chart: Pt is a 70 yo male with PMH of HTN, DM2, BPH, and asthma who presented on 8/12/21 from home with worsening SOB x 2 weeks in setting of COVID-19 PNA. Admitted 8/12. "While managed on the medical floor, pt continued to experience worsening hypoxic respiratory failure while on NIPPV, c/b worsening delirium/agitation despite medical interventions. Pt is s/post multiple RRT's for hypoxia/agitation/delirium and non-compliance/not tolerating NIPPV." Pt's conditions progressed and family agreed to proceed with intubation. S/p intubation yesterday (8/20), post-intubation pt s/p cardiac arrest "with ROSC 6 min, VT x 2 s/p electrical shock x2 and 2 epi's. On arrival to the COVID-icu unit; pt hemodynamic instability; chemically resuscitated."     Chart reviewed, events noted.    Source: [] Patient       [x] EMR        [x] RN        [] Family at bedside       [] Other:    -If unable to interview patient: [x] Trach/Vent/BiPAP  [x] Disoriented/confused/inappropriate to interview    Nutrition-Related Events:   - Pressors:  [x] Yes    [] No   - Propofol:  [x] Yes    [] No         - Rate: __mL/hr. If maintained x 24 hours, propofol will provide:     Diet Order:   Diet, NPO with Tube Feed:   Tube Feeding Modality: Orogastric  Glucerna 1.2 Stephen (GLUCERNARTH)  Total Volume for 24 Hours (mL): 240  Continuous  Starting Tube Feed Rate {mL per Hour}: 10  Until Goal Tube Feed Rate (mL per Hour): 10  Tube Feed Duration (in Hours): 24  Tube Feed Start Time: 04:45 (08-21-21)    EN Order Provides:    - Total volume: 240 ml    - Kcal:  288 (3.5 kcal/kg based on dosing wt 81.6 kg)    - 14.4 Gm Protein  (0.2 g/kg based on dosing wt 81.6 kg)    - mL free water: 193  Current Pump Rate:  EN provision: ___% EN volume goal provided in past __ days    Is current diet order appropriate/adequate? [] Yes  [x]  No:     Nutrition-related concerns:    GI:  Last BM unknown, none documented, per RN no BMs so far this shift.  Bowel Regimen? [x] Yes   [] No    Weights: no new weights to assess  Will continue to monitor and trend weights as able    MEDICATIONS  (STANDING):  cholecalciferol  dexAMETHasone  Injectable  insulin glargine Injectable (LANTUS)  insulin lispro (ADMELOG) corrective regimen sliding scale  meropenem  IVPB  multivitamin/minerals Oral Solution (WELLESSE)  norepinephrine Infusion  norepinephrine Infusion  pantoprazole  Injectable  phenylephrine    Infusion  polyethylene glycol 3350  senna Syrup  vasopressin Infusion    Pertinent Labs: 08-21 @ 00:28: Na 146<H>, BUN 40<H>, Cr 1.04, <H>, K+ 4.2, Phos 3.3, Mg 2.7<H>, Alk Phos 106, ALT/SGPT 29, AST/SGOT 36, HbA1c --  08-20 @ 15:53: Na 149<H>, BUN 48<H>, Cr 1.31<H>, <HH>, K+ 3.7, Phos 7.3<H>, Mg 4.8<H>, Alk Phos 102, ALT/SGPT 33, AST/SGOT 47<H>, HbA1c --    A1C with Estimated Average Glucose Result: 7.3 % (08-14-21 @ 14:36)    Finger Sticks:  POCT Blood Glucose.: 171 mg/dL (08-21 @ 05:10)  POCT Blood Glucose.: 186 mg/dL (08-21 @ 03:59)  POCT Blood Glucose.: 272 mg/dL (08-20 @ 22:34)  POCT Blood Glucose.: 203 mg/dL (08-20 @ 12:56)  POCT Blood Glucose.: 212 mg/dL (08-20 @ 12:15)    Skin per nursing documentation: no pressure injuries  Edema: none    Estimated needs recalculated in setting of intubation; based on ( )   Estimated Energy Needs:   Estimated Protein Needs:  Estimated Fluid Needs:   [place] State Equation:    Previous Nutrition Diagnosis: Inadequate Oral Intake  Nutrition Diagnosis is: [x] ongoing  [] resolved [] not applicable   Pt now intubated and sedated, nutrition currently provided via EN    New Nutrition Diagnosis: Not applicable    Nutrition Interventions:     Education Provided:       [] Yes:  [x] No: inappropriate at this time, pt intubated and sedated    Recommendations:     Monitoring and Evaluation: Monitor enteral nutrition provision and tolerance, weight, labs, skin, GI status    RD remains available upon request and will follow up per protocol.  Nancie Cardoso MS RD CDN [facility] Pager #[phone] Nutrition Follow Up Note  Patient seen for: consult received for tube feeding    Hospital course as per chart: Pt is a 70 yo male with PMH of HTN, DM2, BPH, and asthma who presented on 8/12/21 from home with worsening SOB x 2 weeks in setting of COVID-19 PNA. Admitted 8/12. "While managed on the medical floor, pt continued to experience worsening hypoxic respiratory failure while on NIPPV, c/b worsening delirium/agitation despite medical interventions. Pt is s/post multiple RRT's for hypoxia/agitation/delirium and non-compliance/not tolerating NIPPV." Pt's conditions progressed and family agreed to proceed with intubation. S/p intubation yesterday (8/20), post-intubation pt s/p cardiac arrest "with ROSC 6 min, VT x 2 s/p electrical shock x2 and 2 epi's. On arrival to the COVID-icu unit; pt hemodynamic instability; chemically resuscitated." OGT placed yesterday 8/20.     Chart reviewed, events noted.    Source: [] Patient       [x] EMR        [x] RN        [] Family at bedside       [] Other:    -If unable to interview patient: [x] Trach/Vent/BiPAP  [x] Disoriented/confused/inappropriate to interview    Nutrition-Related Events:   - Pressors:  [x] Yes: norepinephrine, phenylephrine, vasopressin    [] No   - Propofol:  [x] Yes    [] No         - Rate: 24.6mL/hr. If maintained x 24 hours, propofol will provide: 649 kcal    Diet Order:   Diet, NPO with Tube Feed:   Tube Feeding Modality: Orogastric  Glucerna 1.2 Stephen (GLUCERNARTH)  Total Volume for 24 Hours (mL): 240  Continuous  Starting Tube Feed Rate {mL per Hour}: 10  Until Goal Tube Feed Rate (mL per Hour): 10  Tube Feed Duration (in Hours): 24  Tube Feed Start Time: 04:45 (08-21-21)    EN Order Provides:    - Total volume: 240 ml    - Kcal:  288 (3.5 kcal/kg based on dosing wt 81.6 kg)    - 14.4 Gm Protein  (0.2 g/kg based on dosing wt 81.6 kg)    - mL free water: 193  Current Pump Rate: 10 ml/hr  EN provision: 20 ml provided x 24 hours per flowsheets    Is current diet order appropriate/adequate? [] Yes  [x]  No:     GI:  Last BM unknown, none documented, per RN no BMs so far this shift.  Bowel Regimen? [x] Yes   [] No    Weights: no new weights to assess  Will continue to monitor and trend weights as able    MEDICATIONS  (STANDING):  cholecalciferol  dexAMETHasone  Injectable  insulin glargine Injectable (LANTUS)  insulin lispro (ADMELOG) corrective regimen sliding scale  meropenem  IVPB  multivitamin/minerals Oral Solution (WELLESSE)  norepinephrine Infusion  norepinephrine Infusion  pantoprazole  Injectable  phenylephrine    Infusion  polyethylene glycol 3350  senna Syrup  vasopressin Infusion    Pertinent Labs: 08-21 @ 00:28: Na 146<H>, BUN 40<H>, Cr 1.04, <H>, K+ 4.2, Phos 3.3, Mg 2.7<H>, Alk Phos 106, ALT/SGPT 29, AST/SGOT 36, HbA1c --  08-20 @ 15:53: Na 149<H>, BUN 48<H>, Cr 1.31<H>, <HH>, K+ 3.7, Phos 7.3<H>, Mg 4.8<H>, Alk Phos 102, ALT/SGPT 33, AST/SGOT 47<H>, HbA1c --    A1C with Estimated Average Glucose Result: 7.3 % (08-14-21 @ 14:36)    Finger Sticks:  POCT Blood Glucose.: 171 mg/dL (08-21 @ 05:10)  POCT Blood Glucose.: 186 mg/dL (08-21 @ 03:59)  POCT Blood Glucose.: 272 mg/dL (08-20 @ 22:34)  POCT Blood Glucose.: 203 mg/dL (08-20 @ 12:56)  POCT Blood Glucose.: 212 mg/dL (08-20 @ 12:15)    Skin per nursing documentation: no pressure injuries  Edema: none    Estimated needs recalculated in setting of intubation; based on dosing weight 81.6 kg  Estimated Energy Needs (20-25 kcal/kg): 8312-7809 kcal/kg  Estimated Protein Needs (1.2-2 g/kg):  g/day  The [place] State Equation (PSU) 2003b was used to calculate resting energy expenditure: 1813    Previous Nutrition Diagnosis: Inadequate Oral Intake  Nutrition Diagnosis is: [x] ongoing  [] resolved [] not applicable   Pt now intubated and sedated, nutrition currently provided via EN    New Nutrition Diagnosis: Not applicable    Nutrition Interventions:     Education Provided:       [] Yes:  [x] No: inappropriate at this time, pt intubated and sedated    Recommendations:   1) Recommend change to Vital AF 1.2 at 10 ml/hr x 24 hours as medically feasible in setting of multiple vasopressors (provides 240 ml total volume, 288 kcal, 18 g protein, 195 ml water, defer free water flushes to team).   2) Continue multivitamin/minerals as medically feasible    Monitoring and Evaluation: Monitor enteral nutrition provision and tolerance, weight, labs, skin, GI status    RD remains available upon request and will follow up per protocol.  Nancie Cardoso MS RD CDN [facility] Pager #[phone] Nutrition Follow Up Note  Patient seen for: consult received for tube feeding    Hospital course as per chart: Pt is a 70 yo male with PMH of HTN, DM2, BPH, and asthma who presented on 8/12/21 from home with worsening SOB x 2 weeks in setting of COVID-19 PNA. Admitted 8/12. "While managed on the medical floor, pt continued to experience worsening hypoxic respiratory failure while on NIPPV, c/b worsening delirium/agitation despite medical interventions. Pt is s/post multiple RRT's for hypoxia/agitation/delirium and non-compliance/not tolerating NIPPV." Pt's conditions progressed and family agreed to proceed with intubation. S/p intubation yesterday (8/20), post-intubation pt s/p cardiac arrest "with ROSC 6 min, VT x 2 s/p electrical shock x2 and 2 epi's. On arrival to the COVID-icu unit; pt hemodynamic instability; chemically resuscitated." OGT placed yesterday 8/20.     Chart reviewed, events noted.    Source: [] Patient       [x] EMR        [x] RN        [] Family at bedside       [] Other:    -If unable to interview patient: [x] Trach/Vent/BiPAP  [x] Disoriented/confused/inappropriate to interview    Nutrition-Related Events:   - Pressors:  [x] Yes: norepinephrine, phenylephrine, vasopressin    [] No   - Propofol:  [x] Yes    [] No         - Rate: 24.6mL/hr. If maintained x 24 hours, propofol will provide: 649 kcal    Diet Order:   Diet, NPO with Tube Feed:   Tube Feeding Modality: Orogastric  Glucerna 1.2 Stephen (GLUCERNARTH)  Total Volume for 24 Hours (mL): 240  Continuous  Starting Tube Feed Rate {mL per Hour}: 10  Until Goal Tube Feed Rate (mL per Hour): 10  Tube Feed Duration (in Hours): 24  Tube Feed Start Time: 04:45 (08-21-21)    EN Order Provides:    - Total volume: 240 ml    - Kcal:  288 (3.5 kcal/kg based on dosing wt 81.6 kg) w/ propofol --> 937 kcal (11 kcal/kg based on dosing wt 81.6 kg)    - 14.4 Gm Protein  (0.2 g/kg based on dosing wt 81.6 kg)    - mL free water: 193  Current Pump Rate: 10 ml/hr  EN provision: 20 ml provided x 24 hours per flowsheets    Is current diet order appropriate/adequate? [] Yes  [x]  No:     GI:  Last BM unknown, none documented, per RN no BMs so far this shift.  Bowel Regimen? [x] Yes   [] No    Weights: no new weights to assess  Will continue to monitor and trend weights as able    MEDICATIONS  (STANDING):  cholecalciferol  dexAMETHasone  Injectable  insulin glargine Injectable (LANTUS)  insulin lispro (ADMELOG) corrective regimen sliding scale  meropenem  IVPB  multivitamin/minerals Oral Solution (WELLESSE)  norepinephrine Infusion  norepinephrine Infusion  pantoprazole  Injectable  phenylephrine    Infusion  polyethylene glycol 3350  senna Syrup  vasopressin Infusion    Pertinent Labs: 08-21 @ 00:28: Na 146<H>, BUN 40<H>, Cr 1.04, <H>, K+ 4.2, Phos 3.3, Mg 2.7<H>, Alk Phos 106, ALT/SGPT 29, AST/SGOT 36, HbA1c --  08-20 @ 15:53: Na 149<H>, BUN 48<H>, Cr 1.31<H>, <HH>, K+ 3.7, Phos 7.3<H>, Mg 4.8<H>, Alk Phos 102, ALT/SGPT 33, AST/SGOT 47<H>, HbA1c --    A1C with Estimated Average Glucose Result: 7.3 % (08-14-21 @ 14:36)    Finger Sticks:  POCT Blood Glucose.: 171 mg/dL (08-21 @ 05:10)  POCT Blood Glucose.: 186 mg/dL (08-21 @ 03:59)  POCT Blood Glucose.: 272 mg/dL (08-20 @ 22:34)  POCT Blood Glucose.: 203 mg/dL (08-20 @ 12:56)  POCT Blood Glucose.: 212 mg/dL (08-20 @ 12:15)    Skin per nursing documentation: no pressure injuries  Edema: none    Estimated needs recalculated in setting of intubation; based on dosing weight 81.6 kg  Estimated Energy Needs (15-20 kcal/kg): 2702-3522 kcal/kg  Estimated Protein Needs (1.2-2 g/kg):  g/day  The [place] State Equation (PSU) 2003b was used to calculate resting energy expenditure: 1813    Previous Nutrition Diagnosis: Inadequate Oral Intake  Nutrition Diagnosis is: [x] ongoing  [] resolved [] not applicable   Pt now intubated and sedated, nutrition currently provided via EN    New Nutrition Diagnosis: Not applicable    Nutrition Interventions:     Education Provided:       [] Yes:  [x] No: inappropriate at this time, pt intubated and sedated    Recommendations:   1) Recommend feeds of Vital AF 1.2 starting at 10 ml/hr x 24 hours, advancing as medically feasible to goal rate 20 ml/hr x 24 hours with No Carb Prosource daily  - At goal, regimen provides 1225 kcal (with consideration for calories from propofol), 389 ml water  - Defer free water flushes to team  - RD remains available to adjust TF regimen PRN  2) Continue multivitamin/minerals as medically feasible    Monitoring and Evaluation: Monitor enteral nutrition provision and tolerance, weight, labs, skin, GI status    RD remains available upon request and will follow up per protocol.  Nancie Cardoso MS RD CDN [facility] Pager #[phone] Nutrition Follow Up Note  Patient seen for: consult received for tube feeding    Hospital course as per chart: Pt is a 72 yo male with PMH of HTN, DM2, BPH, and asthma who presented on 8/12/21 from home with worsening SOB x 2 weeks in setting of COVID-19 PNA. Admitted 8/12. "While managed on the medical floor, pt continued to experience worsening hypoxic respiratory failure while on NIPPV, c/b worsening delirium/agitation despite medical interventions. Pt is s/post multiple RRT's for hypoxia/agitation/delirium and non-compliance/not tolerating NIPPV." Pt's conditions progressed and family agreed to proceed with intubation. S/p intubation yesterday (8/20), post-intubation pt s/p cardiac arrest "with ROSC 6 min, VT x 2 s/p electrical shock x2 and 2 epi's. On arrival to the COVID-icu unit; pt hemodynamic instability; chemically resuscitated." OGT placed yesterday 8/20.     Chart reviewed, events noted.    Source: [] Patient       [x] EMR        [x] RN        [] Family at bedside       [] Other:    -If unable to interview patient: [x] Trach/Vent/BiPAP  [x] Disoriented/confused/inappropriate to interview    Nutrition-Related Events:   - Pressors:  [x] Yes: norepinephrine, phenylephrine, vasopressin    [] No   - Propofol:  [x] Yes    [] No         - Rate: 24.6mL/hr. If maintained x 24 hours, propofol will provide: 649 kcal    Diet Order:   Diet, NPO with Tube Feed:   Tube Feeding Modality: Orogastric  Glucerna 1.2 Stephen (GLUCERNARTH)  Total Volume for 24 Hours (mL): 240  Continuous  Starting Tube Feed Rate {mL per Hour}: 10  Until Goal Tube Feed Rate (mL per Hour): 10  Tube Feed Duration (in Hours): 24  Tube Feed Start Time: 04:45 (08-21-21)    EN Order Provides:    - Total volume: 240 ml    - Kcal:  288 (3.5 kcal/kg based on dosing wt 81.6 kg) w/ propofol --> 937 kcal (11 kcal/kg based on dosing wt 81.6 kg)    - 14.4 Gm Protein  (0.2 g/kg based on dosing wt 81.6 kg)    - mL free water: 193  Current Pump Rate: 10 ml/hr  EN provision: 20 ml provided x 24 hours per flowsheets    Is current diet order appropriate/adequate? [] Yes  [x]  No:     GI:  Last BM unknown, none documented, per RN no BMs so far this shift.  Bowel Regimen? [x] Yes   [] No    Weights: no new weights to assess  Will continue to monitor and trend weights as able    MEDICATIONS  (STANDING):  cholecalciferol  dexAMETHasone  Injectable  insulin glargine Injectable (LANTUS)  insulin lispro (ADMELOG) corrective regimen sliding scale  meropenem  IVPB  multivitamin/minerals Oral Solution (WELLESSE)  norepinephrine Infusion  norepinephrine Infusion  pantoprazole  Injectable  phenylephrine    Infusion  polyethylene glycol 3350  senna Syrup  vasopressin Infusion    Pertinent Labs: 08-21 @ 00:28: Na 146<H>, BUN 40<H>, Cr 1.04, <H>, K+ 4.2, Phos 3.3, Mg 2.7<H>, Alk Phos 106, ALT/SGPT 29, AST/SGOT 36, HbA1c --  08-20 @ 15:53: Na 149<H>, BUN 48<H>, Cr 1.31<H>, <HH>, K+ 3.7, Phos 7.3<H>, Mg 4.8<H>, Alk Phos 102, ALT/SGPT 33, AST/SGOT 47<H>, HbA1c --    A1C with Estimated Average Glucose Result: 7.3 % (08-14-21 @ 14:36)    Finger Sticks:  POCT Blood Glucose.: 171 mg/dL (08-21 @ 05:10)  POCT Blood Glucose.: 186 mg/dL (08-21 @ 03:59)  POCT Blood Glucose.: 272 mg/dL (08-20 @ 22:34)  POCT Blood Glucose.: 203 mg/dL (08-20 @ 12:56)  POCT Blood Glucose.: 212 mg/dL (08-20 @ 12:15)    Skin per nursing documentation: no pressure injuries  Edema: none    Estimated needs recalculated in setting of intubation; based on dosing weight 81.6 kg  Estimated Energy Needs (15-20 kcal/kg): 1980-1367 kcal/kg  Estimated Protein Needs (1.2-2 g/kg):  g/day  The [place] State Equation (PSU) 2003b was used to calculate resting energy expenditure: 1813    Previous Nutrition Diagnosis: Inadequate Oral Intake  Nutrition Diagnosis is: [x] ongoing  [] resolved [] not applicable   Pt now intubated and sedated, nutrition currently provided via EN    New Nutrition Diagnosis: Not applicable    Nutrition Interventions:     Education Provided:       [] Yes:  [x] No: inappropriate at this time, pt intubated and sedated    Recommendations:   1) Recommend feeds of Vital AF 1.2 starting at 10 ml/hr x 24 hours, advancing as medically feasible to goal rate 20 ml/hr x 24 hours with No Carb Prosource TF 3 daily  - At goal, regimen provides 1345 kcal (with consideration for calories from propofol), 69 g protein, 389 ml water (~16 kcal/kg, 0.8 g/kg protein based on dosing wt 81.6 kg)  - Defer free water flushes to team  - RD remains available to adjust TF regimen PRN  2) Continue multivitamin/minerals as medically feasible    Monitoring and Evaluation: Monitor enteral nutrition provision and tolerance, weight, labs, skin, GI status    RD remains available upon request and will follow up per protocol.  Nancie Cardoso MS RD CDN [facility] Pager #[phone]

## 2021-08-21 NOTE — PROGRESS NOTE ADULT - ATTENDING COMMENTS
Agree with above. Patient critically ill requiring frequent bedside visits with therapy change. Patient DM2, HTN, and BPH who presented with COVID-19 pneumonia. He is unfortunately unvaccinated and suffering more severe disease. He has had progressive hypoxemia despite BIPAP. His family reportedly did not want him intubated until absolutely necessary and he was intubated emergently by anesthesia 8/20/21. He had a subsequent cardiac arrest requiring CPR and then was transferred to [facility]ICU once "stabilized" for further monitoring and care.    1. Acute Hypoxemic Respiratory Failure - ARDS due to COVID-19. Continue mechanical ventilation. Plan for proning today for ARDS.   2. Cardiovascular - shock state improved - currently down to 2 vasopressors. No significant ectopy at this time.   - Official echo noted - unable to obtain views  3. Oropharyngeal dysphagia - feeds via OGT  4. Renal function - improved. Continue to monitor urine output  5. Neurology - continue sedatives and paralytics  6. Infectious Disease - continue antibiotics. check nasal MRSA. Trend lactate  7. Hem/Onc - patient with LE DVT noted on Duplex. Continue therapeutic AC with Lovenox.   8. GOC - patient is FULL Code     Long term prognosis very poor.

## 2021-08-21 NOTE — PROGRESS NOTE ADULT - ASSESSMENT
72yo  Italian speaking male w/ PMHx of HTN, DM2, BPH, asthma presented on 8/12/21 from home with worsening shortness of breath x 2 weeks in setting of COVID-19 PNA now, with progressive worsening hypoxic respiratory failure , failed NIPPV support , s/p intubation s/p  cardiac arrest ; with ROSC 6 min, VT x 2 s/p electrical shock x 2 and 2 epi's. and transfer to [facility] ICU for monitoring and management.     PLAN :    Neuro  -Baseline AOX3, Italian speaking now intubated and sedated   -sedation; Propofol @ 50, Fent @ 0.5  -wean sedation as tolerated    -neuro checks per ICU    Pulm  Acute hypoxic respiratory failure 2/2 COVID-19 PNA now in ARDS   ARDS protocol ; permissive hypercarbia  -AC 36/400/100/14  -ABG; 7.39/33/95/20/98; will repeat ABG if no improvement P/F R will prone if tolerated   -CXR; ARDS , L IJ TLC, OGT & ET  in place , SQ emphysema in b/l neck R>L.   -repeat CXR to eval extension of SQ air   -wean  vent settings as tolerated   -completed remdesevir 8/13-8/17  -c/w dexamethasone 8/13-8/23  -c/w neb tx   -chest PT , oral care , pulmonary toileting   -Prone 16 hrs and supine 8 hrs     r/o PE  Pt with + DVT , high r/f PE in setting of hypercoagulable state with COVID -19 infection   -Pt not hemodynamically  stable for CTA chest .   -it would not change  management as pt is already on full AC w/ Lovenox 80 BID for + DVT:( stable renal functions )  -bleeding precaution   -trend DD,   - CE, ProBNP   -vent support   -TTE 8/20 ;  there are no interpretable images 2/2 SQ air       r/o other intrapulmonary infections process   - PAN cx , MRSA , UA, sputum cx   -Broad spectrum abx     ASTHMA   -c/w home Symbicort  -c/w home montelukast  -c/w duoneb tx      CV  Cardiogenic shock s/p PEA followed by VT x 2 s/p ROSC,  Septic shock / hypovolemic shock/ distributive shock , + DVT , r/o PE  -pt is post cardiac arrest post intubation with 6 minutes achieved ROSC; c/b VT x 2 s/p electrical shock   x2   -on arrival to ICU s/p chemically resuscitated with no loss of pulse   -resuscitated with 2 L NS bolus ; was able to wean down off 3 max dose pressors  to Vaso 0.04 and Jaspal 1.9   -over night was challenged w/ lasix 40 IVP x 1 for low urine output 20-50 ml /hr ; responded well  ml however drop in BP / HR; increase in pressor requirements and added Levo for dmitry 50's  ; s/p 1 L NS bolus ; with great improvement; weaned off jaspal , titrate Levo as tolerated to MAP >65 , and VAso at 0.04   -PAN cx sent , broad spectrum abx   -procal 0.08  -POCUS ; IVC 1.6 variable with respirations, unable to assess cardiac function   -assess for fluid resuscitation   -strict i&o's   -will hold all antihypertensive in setting of shock   -TTE; limited to due SQ air   -CE , proBNP   -c/w full AC on Lovenox sq 80 BID for + DVT and presumed PE   -will repeat duplex       TAYLOR likely 2/2 hypovolemia   -IVF resuscitation / hydration   -trend BUN/Cr, K   -mace for strict i&o's   -daily weight   -monitor lights     BPH (benign prostatic hyperplasia).   -holding all meds due to hypotension .  -mace placed for i&o's   -will resume when stable     GI  Oropharyngeal dysphagia   -OGT for TF   -bowel regimen   -PPI q d   -aspiration precaution   -oral care     ENDO  TF w/ glucerna   -FS q 6 hrs   -ISS q 6 hrs & Lantus   -check a1c , lipid panel     Heme   + DVT , presumed  PE  - in setting of CVOID-19 illness/ hypercoagulable state   -c/w FULL AC 80 BID   -DD 1144--> 3588 ; will trend   -bleeding precaution   -repeat VA duplex pending   -Heme consult     ID   Septic shock in setting of COVID-19 PNA   -IV pressors support to maintain MAP>65  -UA negative 8/20  -PAN cx , MRSA , sputum cx , fungitel   -empiric abx ; meropenem 1g TID (8/20-) , Vanco 1 g x 1 (8/20) ; will check level and dose while waiting for MRSA results  -if worsening will add fungal coverage      COVID-19 PNA   - s/p Tocilizumab on 8/15  -s/p remdesevir 8/13-8/17  -c/w dexamethasone 8/13-8/23   -trend inflammatory markers   -ID consult . on board     PPX   DVT ppx ; on FULL AC with lovenox SQ  Gi ppx; protonix IVP q d     GOC  FULL code at this time .   D/w son and 2 daughters      72yo  Sammarinese speaking male w/ PMHx of HTN, DM2, BPH, asthma presented on 8/12/21 from home with worsening shortness of breath x 2 weeks in setting of COVID-19 PNA now, with progressive worsening hypoxic respiratory failure , failed NIPPV support , s/p intubation s/p  cardiac arrest ; with ROSC 6 min, VT x 2 s/p electrical shock x 2 and 2 epi's. and transfer to [facility] ICU for monitoring and management.     PLAN :    Neuro  -Baseline AOX3, Sammarinese speaking now intubated and sedated   -sedation; Propofol @ 50, Fent @ 0.5  -wean sedation as tolerated    -neuro checks per ICU    Pulm  Acute hypoxic respiratory failure 2/2 COVID-19 PNA now in ARDS   ARDS protocol ; permissive hypercarbia  -AC 36/400/100/14  -ABG; 7.39/33/95/20/98; will repeat ABG if no improvement P/F R will prone if tolerated   -CXR; ARDS , L IJ TLC, OGT & ET  in place , SQ emphysema in b/l neck R>L.   -repeat CXR to eval extension of SQ air   -wean  vent settings as tolerated   -completed remdesevir 8/13-8/17  -c/w dexamethasone 8/13-8/23  -c/w neb tx   -chest PT , oral care , pulmonary toileting   -Prone 16 hrs and supine 8 hrs     r/o PE  Pt with + DVT , high r/f PE in setting of hypercoagulable state with COVID -19 infection   -Pt not hemodynamically  stable for CTA chest .   -it would not change  management as pt is already on full AC w/ Lovenox 80 BID for + DVT:( stable renal functions )  -bleeding precaution   -trend DD,   - CE, ProBNP   -vent support   -TTE 8/20 ;  there are no interpretable images 2/2 SQ air       r/o other intrapulmonary infections process   - PAN cx , MRSA , UA, sputum cx   -Broad spectrum abx     ASTHMA   -c/w home Symbicort  -c/w home montelukast  -c/w duoneb tx      CV  Cardiogenic shock s/p PEA followed by VT x 2 s/p ROSC,  Septic shock / hypovolemic shock/ distributive shock , + DVT , r/o PE, new onset A fib   -pt is post cardiac arrest post intubation with 6 minutes achieved ROSC; c/b VT x 2 s/p electrical shock   x2   -new onset of A fib RVR w/ hemodynamic instability s/p amio 300 x1 ; converted to NSR   -on arrival to ICU s/p chemically resuscitated with no loss of pulse   -resuscitated with 2 L NS bolus ; was able to wean down off 3 max dose pressors  to Vaso 0.04 and Jaspal 1.9   -over night was challenged w/ lasix 40 IVP x 1 for low urine output 20-50 ml /hr ; responded well  ml however drop in BP / HR; increase in pressor requirements and added Levo for dmitry 50's  ; s/p 1 L NS bolus ; with great improvement; weaned off jaspal , titrate Levo as tolerated to MAP >65 , and VAso at 0.04   -PAN cx sent , broad spectrum abx   -procal 0.08  -POCUS ; IVC 1.6 variable with respirations, unable to assess cardiac function   -assess for fluid resuscitation   -strict i&o's   -will hold all antihypertensive in setting of shock   -TTE; limited to due SQ air   -CE , proBNP   -c/w full AC on Lovenox sq 80 BID for + DVT and presumed PE   -will repeat duplex       TAYLOR likely 2/2 hypovolemia   -IVF resuscitation / hydration   -trend BUN/Cr, K   -mace for strict i&o's   -daily weight   -monitor lights     BPH (benign prostatic hyperplasia).   -holding all meds due to hypotension .  -mace placed for i&o's   -will resume when stable     GI  Oropharyngeal dysphagia   -OGT for TF   -bowel regimen   -PPI q d   -aspiration precaution   -oral care     ENDO  TF w/ glucerna   -FS q 6 hrs   -ISS q 6 hrs & Lantus   -check a1c , lipid panel     Heme   + DVT , presumed  PE  - in setting of CVOID-19 illness/ hypercoagulable state   -c/w FULL AC 80 BID   -DD 1144--> 3588 ; will trend   -bleeding precaution   -repeat VA duplex pending   -Heme consult     ID   Septic shock in setting of COVID-19 PNA   -IV pressors support to maintain MAP>65  -UA negative 8/20  -PAN cx , MRSA , sputum cx , fungitel   -empiric abx ; meropenem 1g TID (8/20-) , Vanco 1 g x 1 (8/20) ; will check level and dose while waiting for MRSA results  -if worsening will add fungal coverage  -trend WBc  -trend T curve       COVID-19 PNA   - s/p Tocilizumab on 8/15  -s/p remdesevir 8/13-8/17  -c/w dexamethasone 8/13-8/23   -trend inflammatory markers   -ID consult . on board     PPX   DVT ppx ; on FULL AC with lovenox SQ  Gi ppx; protonix IVP q d     GOC  FULL code at this time .   D/w son and 2 daughters      70yo  Botswanan speaking male w/ PMHx of HTN, DM2, BPH, asthma presented on 8/12/21 from home with worsening shortness of breath x 2 weeks in setting of COVID-19 PNA now, with progressive worsening hypoxic respiratory failure , failed NIPPV support , s/p intubation s/p  cardiac arrest ; with ROSC 6 min, VT x 2 s/p electrical shock x 2 and 2 epi's. and transfer to [facility] ICU for monitoring and management.     PLAN :    Neuro  -Baseline AOX3, Botswanan speaking now intubated and sedated   -sedation; Propofol @ 50, Fent @ 0.5  -wean sedation as tolerated    -neuro checks per ICU    Pulm  Acute hypoxic respiratory failure 2/2 COVID-19 PNA now in ARDS   ARDS protocol ; permissive hypercarbia  -AC 36/400/100/14  -ABG; 7.39/33/95/20/98; will repeat ABG if no improvement P/F R will prone if tolerated   -CXR; ARDS , L IJ TLC, OGT & ET  in place , SQ emphysema in b/l neck R>L.   -repeat CXR to eval extension of SQ air ; preliminary results with R >L neck SQ air , pneumomediastinum   -wean  vent settings as tolerated   -completed remdesevir 8/13-8/17  -c/w dexamethasone 8/13-8/23  -c/w neb tx   -chest PT , oral care , pulmonary toileting   -Prone 16 hrs and supine 8 hrs     r/o PE  Pt with + DVT , high r/f PE in setting of hypercoagulable state with COVID -19 infection   -Pt not hemodynamically  stable for CTA chest .   -it would not change  management as pt is already on full AC w/ Lovenox 80 BID for + DVT:( stable renal functions )  -bleeding precaution   -trend DD,   - CE, ProBNP   -vent support   -TTE 8/20 ;  there are no interpretable images 2/2 SQ air       r/o other intrapulmonary infections process   - PAN cx , MRSA , UA, sputum cx   -Broad spectrum abx     ASTHMA   -c/w home Symbicort  -c/w home montelukast  -c/w duoneb tx      CV  Cardiogenic shock s/p PEA followed by VT x 2 s/p ROSC,  Septic shock / hypovolemic shock/ distributive shock , + DVT , r/o PE, new onset A fib   -pt is post cardiac arrest post intubation with 6 minutes achieved ROSC; c/b VT x 2 s/p electrical shock   x2   -new onset of A fib RVR w/ hemodynamic instability s/p amio 300 x1 ; converted to NSR   -on arrival to ICU s/p chemically resuscitated with no loss of pulse   -resuscitated with 2 L NS bolus ; was able to wean down off 3 max dose pressors  to Vaso 0.04 and Jaspal 1.9   -over night was challenged w/ lasix 40 IVP x 1 for low urine output 20-50 ml /hr ; responded well  ml however drop in BP / HR; increase in pressor requirements and added Levo for dmitry 50's  ; s/p 1 L NS bolus ; with great improvement; weaned off jaspal , titrate Levo as tolerated to MAP >65 , and VAso at 0.04   -PAN cx sent , broad spectrum abx   -procal 0.08  -POCUS ; IVC 1.6 variable with respirations, unable to assess cardiac function   -assess for fluid resuscitation   -strict i&o's   -will hold all antihypertensive in setting of shock   -TTE; limited to due SQ air   -CE , proBNP   -c/w full AC on Lovenox sq 80 BID for + DVT and presumed PE   -will repeat duplex       TAYLOR likely 2/2 hypovolemia   -IVF resuscitation / hydration   -trend BUN/Cr, K   -mace for strict i&o's   -daily weight   -monitor lights     BPH (benign prostatic hyperplasia).   -holding all meds due to hypotension .  -mace placed for i&o's   -will resume when stable     GI  Oropharyngeal dysphagia   -OGT for TF   -bowel regimen   -PPI q d   -aspiration precaution   -oral care   -LFT's stable     ENDO  A1c 7.3  TF w/ glucerna   -FS q 6 hrs   -ISS q 6 hrs & Lantus   -repeat  a1c , lipid panel   -pending lipid panel ; will start STATIN     Heme   + DVT , presumed  PE  - in setting of CVOID-19 illness/ hypercoagulable state   -c/w FULL AC 80 BID   -DD 1144--> 3588 ; will trend   -bleeding precaution   -repeat VA duplex pending   -Heme consult     ID   Septic shock in setting of COVID-19 PNA   -IV pressors support to maintain MAP>65  -UA negative 8/20  -PAN cx , MRSA , sputum cx , fungitel   -empiric abx ; meropenem 1g TID (8/20-) , Vanco 1 g x 1 (8/20) ; will check level and dose while waiting for MRSA results  -if worsening will add fungal coverage  -trend WBc  -trend T curve       COVID-19 PNA   - s/p Tocilizumab on 8/15  -s/p remdesevir 8/13-8/17  -c/w dexamethasone 8/13-8/23   -trend inflammatory markers   -ID consult . on board     PPX   DVT ppx ; on FULL AC with lovenox SQ  Gi ppx; protonix IVP q d     GOC  FULL code at this time .   D/w son and 2 daughters      70yo  [place] speaking male w/ PMHx of HTN, DM2, BPH, asthma presented on 8/12/21 from home with worsening shortness of breath x 2 weeks in setting of COVID-19 PNA now, with progressive worsening hypoxic respiratory failure , failed NIPPV support , s/p intubation s/p  cardiac arrest ; with ROSC 6 min, VT x 2 s/p electrical shock x 2 and 2 epi's. and transfer to [facility] ICU for monitoring and management.     PLAN :    Neuro  -Baseline AOX3, [place] speaking now intubated and sedated   -sedation; Propofol @ 50, Fent @ 0.5  -wean sedation as tolerated   -in setting of worsening PF ratio ; will employee paralysis and prone   -nimbex 10mg IVP x1, followed by gtt    -neuro checks per ICU    Pulm  Acute hypoxic respiratory failure 2/2 COVID-19 PNA now in ARDS   ARDS protocol ; permissive hypercarbia  -AC 36/400/100/14  -ABG; 7.39/33/95/20/98; will repeat ABG if no improvement P/F R will prone if tolerated   -CXR; ARDS , L IJ TLC, OGT & ET  in place , SQ emphysema in b/l neck R>L.   -repeat CXR to eval extension of SQ air ; preliminary results with R >L neck SQ air , pneumomediastinum   -wean  vent settings as tolerated   -completed remdesevir 8/13-8/17  -c/w dexamethasone 8/13-8/23  -c/w neb tx   -chest PT , oral care , pulmonary toileting   -worsening P/F r 71 will Prone 18 hrs and supine 6 hrs     r/o PE  Pt with + DVT , high r/f PE in setting of hypercoagulable state with COVID -19 infection   -Pt not hemodynamically  stable for CTA chest .   -it would not change  management as pt is already on full AC w/ Lovenox 80 BID for + DVT:( stable renal functions )  -bleeding precaution   -trend DD,   - CE, ProBNP   -vent support   -TTE 8/20 ;  there are no interpretable images 2/2 SQ air       r/o other intrapulmonary infections process   - PAN cx , MRSA , UA, sputum cx   -Broad spectrum abx     ASTHMA   -c/w home Symbicort  -c/w home montelukast  -c/w duoneb tx      CV  Cardiogenic shock s/p PEA followed by VT x 2 s/p ROSC,  Septic shock / hypovolemic shock/ distributive shock , + DVT , r/o PE, new onset A fib   -pt is post cardiac arrest post intubation with 6 minutes achieved ROSC; c/b VT x 2 s/p electrical shock   x2   -new onset of A fib RVR w/ hemodynamic instability s/p amio 300 x1 ; converted to NSR   -on arrival to ICU s/p chemically resuscitated with no loss of pulse   -resuscitated with 2 L NS bolus ; was able to wean down off 3 max dose pressors  to Vaso 0.04 and Jaspal 1.9   -over night was challenged w/ lasix 40 IVP x 1 for low urine output 20-50 ml /hr ; responded well  ml however drop in BP / HR; increase in pressor requirements and added Levo for dmitry 50's  ; s/p 1 L NS bolus ; with great improvement; weaned off jaspal , titrate Levo as tolerated to MAP >65 , and VAso at 0.04   -PAN cx sent , broad spectrum abx   -procal 0.08  -POCUS ; IVC 1.6 variable with respirations, unable to assess cardiac function   -assess for fluid resuscitation   -strict i&o's   -will hold all antihypertensive in setting of shock   -TTE; limited to due SQ air   -CE , proBNP   -EKG; NSR @ 67, prolonged ; will get daily EKG and PRN , avoid conduction slowing agents . Give Mg 2 g IVPB x 1   -c/w full AC on Lovenox sq 80 BID for + DVT and presumed PE   -will repeat duplex       TAYLOR likely 2/2 hypovolemia   -IVF resuscitation / hydration   -trend BUN/Cr, K   -mace for strict i&o's   -daily weight   -monitor lights     BPH (benign prostatic hyperplasia).   -holding all meds due to hypotension .  -mace placed for i&o's   -will resume when stable     GI  Oropharyngeal dysphagia   -OGT for TF   -bowel regimen   -PPI q d   -aspiration precaution   -oral care   -LFT's stable     ENDO  A1c 7.3  TF w/ glucerna   -FS q 6 hrs   -ISS q 6 hrs & Lantus   -repeat  a1c , lipid panel   -pending lipid panel ; will start STATIN     Heme   + DVT , presumed  PE  - in setting of CVOID-19 illness/ hypercoagulable state   -c/w FULL AC 80 BID   -DD 1144--> 3588 ; will trend   -bleeding precaution   -repeat VA duplex pending   -Heme consult     ID   Septic shock in setting of COVID-19 PNA   -IV pressors support to maintain MAP>65  -UA negative 8/20  -PAN cx , MRSA , sputum cx , fungitel   -empiric abx ; meropenem 1g TID (8/20-) , Vanco 1 g x 1 (8/20) ; will check level and dose while waiting for MRSA results  -if worsening will add fungal coverage  -trend WBc  -trend T curve       COVID-19 PNA   - s/p Tocilizumab on 8/15  -s/p remdesevir 8/13-8/17  -c/w dexamethasone 8/13-8/23   -trend inflammatory markers   -ID consult . on board     PPX   DVT ppx ; on FULL AC with lovenox SQ  Gi ppx; protonix IVP q d     GOC  FULL code at this time .   D/w son and 2 daughters      70yo  Icelandic speaking male w/ PMHx of HTN, DM2, BPH, asthma presented on 8/12/21 from home with worsening shortness of breath x 2 weeks in setting of COVID-19 PNA now, with progressive worsening hypoxic respiratory failure , failed NIPPV support , s/p intubation s/p  cardiac arrest ; with ROSC 6 min, VT x 2 s/p electrical shock x 2 and 2 epi's. and transfer to [facility] ICU for monitoring and management.     PLAN :    Neuro  -Baseline AOX3, Icelandic speaking now intubated and sedated   -sedation; Propofol @ 50, Fent @ 0.5  -wean sedation as tolerated   -in setting of worsening PF ratio ; will employee paralysis and prone   -nimbex 10mg IVP x1, followed by gtt  goal TOF 2/4.   -neuro checks per ICU    Pulm  Acute hypoxic respiratory failure 2/2 COVID-19 PNA now in ARDS   ARDS protocol ; permissive hypercarbia  -AC 36/400/100/14  -ABG; 7.39/33/95/20/98; will repeat ABG if no improvement P/F R will prone if tolerated   -CXR; ARDS , L IJ TLC, OGT & ET  in place , SQ emphysema in b/l neck R>L.   -repeat CXR to eval extension of SQ air ; preliminary results with R >L neck SQ air , pneumomediastinum   -wean  vent settings as tolerated   -completed remdesevir 8/13-8/17  -c/w dexamethasone 8/13-8/23  -c/w neb tx   -chest PT , oral care , pulmonary toileting   -worsening P/F r 71 will Prone 18 hrs and supine 6 hrs     r/o PE  Pt with + DVT , high r/f PE in setting of hypercoagulable state with COVID -19 infection   -Pt not hemodynamically  stable for CTA chest .   -it would not change  management as pt is already on full AC w/ Lovenox 80 BID for + DVT:( stable renal functions )  -bleeding precaution   -trend DD,   - CE, ProBNP   -vent support   -TTE 8/20 ;  there are no interpretable images 2/2 SQ air       r/o other intrapulmonary infections process   - PAN cx , MRSA , UA, sputum cx   -Broad spectrum abx     ASTHMA   -c/w home Symbicort  -c/w home montelukast  -c/w duoneb tx      CV  Cardiogenic shock s/p PEA followed by VT x 2 s/p ROSC,  Septic shock / hypovolemic shock/ distributive shock , + DVT , r/o PE, new onset A fib   -pt is post cardiac arrest post intubation with 6 minutes achieved ROSC; c/b VT x 2 s/p electrical shock   x2   -new onset of A fib RVR w/ hemodynamic instability s/p amio 300 x1 ; converted to NSR   -on arrival to ICU s/p chemically resuscitated with no loss of pulse   -resuscitated with 2 L NS bolus ; was able to wean down off 3 max dose pressors  to Vaso 0.04 and Jaspal 1.9   -over night was challenged w/ lasix 40 IVP x 1 for low urine output 20-50 ml /hr ; responded well  ml however drop in BP / HR; increase in pressor requirements and added Levo for dmitry 50's  ; s/p 1 L NS bolus ; with great improvement; weaned off jaspal , titrate Levo as tolerated to MAP >65 , and VAso at 0.04   -PAN cx sent , broad spectrum abx   -procal 0.08  -POCUS ; IVC 1.6 variable with respirations, unable to assess cardiac function   -assess for fluid resuscitation   -strict i&o's   -will hold all antihypertensive in setting of shock   -TTE; limited to due SQ air   -CE , proBNP   -EKG; NSR @ 67, prolonged ; ( prior ) downtrending ; will get daily EKG and PRN , avoid conduction slowing agents . Give Mg 2 g IVPB x 1   -c/w full AC on Lovenox sq 80 BID for + DVT and presumed PE   -will repeat duplex       TAYLOR likely 2/2 hypovolemia   -IVF resuscitation / hydration   -trend BUN/Cr, K   -mace for strict i&o's   -daily weight   -monitor lights     BPH (benign prostatic hyperplasia).   -holding all meds due to hypotension .  -mace placed for i&o's   -will resume when stable     GI  Oropharyngeal dysphagia   -OGT for TF   -bowel regimen   -PPI q d   -aspiration precaution   -oral care   -LFT's stable     ENDO  A1c 7.3  TF w/ glucerna   -FS q 6 hrs   -ISS q 6 hrs & Lantus   -repeat  a1c , lipid panel   -pending lipid panel ; will start STATIN     Heme   + DVT , presumed  PE  - in setting of CVOID-19 illness/ hypercoagulable state   -c/w FULL AC 80 BID   -DD 1144--> 3588 ; will trend   -bleeding precaution   -repeat VA duplex pending   -Heme consult     ID   Septic shock in setting of COVID-19 PNA   -IV pressors support to maintain MAP>65  -UA negative 8/20  -PAN cx , MRSA , sputum cx , fungitel   -empiric abx ; meropenem 1g TID (8/20-) , Vanco 1 g x 1 (8/20) ; will check level @ 4 pm and dose while waiting for MRSA results  -if worsening will add fungal coverage  -trend WBc  -trend T curve       COVID-19 PNA   - s/p Tocilizumab on 8/15  -s/p remdesevir 8/13-8/17  -c/w dexamethasone 8/13-8/23   -trend inflammatory markers   -ID consult . on board     PPX   DVT ppx ; on FULL AC with lovenox SQ  Gi ppx; protonix IVP q d     GOC  FULL code at this time .   D/w son and 2 daughters

## 2021-08-22 NOTE — PROGRESS NOTE ADULT - SUBJECTIVE AND OBJECTIVE BOX
CC. worsening SOB    72yo  Greek speaking male w/ PMHx of HTN, DM2, BPH, asthma presented on 21 from home with worsening shortness of breath x 2 weeks in setting of COVID-19 PNA . While managed on the medical floor, pt continues to experienced worsening hypoxic respiratory failure while on NIPPV , c/b worsening delirium/ agitation  despite medical interventions. Pt is s/post multiple RRT 's for hypoxia / agitation/ delirium and non-compliance / not tolerating NIPPV .   Today, pt's conditions has progressed and family has agreed to proceed with intubation. Post intubation pt is s/p  cardiac arrest ; with ROSC 6 min, VT x 2 s/p electrical shock x 2 and 2 epi's. On arrival to the COVID-icu unit ; pt hemodynamic instability ; chemically resuscitated . FAmily called for GOC; pt is full code.    Based on initial intake , pt did not receive the COVID-19 vaccine. Also, family members whom he lives with are likely also COVID-19 positive.       ICU Vital Signs Last 24 Hrs  T(C): 36.5 (22 Aug 2021 06:00), Max: 37.2 (21 Aug 2021 23:00)  T(F): 97.7 (22 Aug 2021 06:00), Max: 99 (21 Aug 2021 23:00)  HR: 57 (22 Aug 2021 06:15) (49 - 78)  ABP: 118/58 (22 Aug 2021 06:15) (-48/-48 - 231/98)  ABP(mean): 80 (22 Aug 2021 06:15) (-48 - 146)  RR: 32 (22 Aug 2021 06:15) (10 - 36)  SpO2: 100% (22 Aug 2021 06:15) (90% - 100%)      PAST MEDICAL & SURGICAL HISTORY:  BPH (benign prostatic hyperplasia)    Hyperlipemia    HTN (hypertension)    Hypercholesteremia    Asthma    Diabetes    Kidney stone    Bladder stone    H/O lithotripsy  x 2    History of kidney stones    H/O umbilical hernia repair        FAMILY HISTORY:  Family history of diabetes mellitus (Father, Sibling)    Family history of cancer (Mother)        SOCIAL HISTORY: Unable to obtain pt is intubated and sedated .   Advance Directives: FULL     Allergies    No Known Allergies    Intolerances        HOME MEDICATIONS:    REVIEW OF SYSTEMS:    [ x] Unable to assess ROS because ________Unable to obtain pt is intubated and sedated .       I&O's Summary    20 Aug 2021 07:01  -  21 Aug 2021 07:00  --------------------------------------------------------  IN: 5681 mL / OUT: 1350 mL / NET: 4331 mL          CAPILLARY BLOOD GLUCOSE        POCT Blood Glucose.: 171 mg/dL (21 Aug 2021 05:10)        PHYSICAL EXAM:  General: intubated, sedated   HEENT:  PERRLA b/l   Neck: b/l + SQ  air in neck   Respiratory:  intubated , diminished BS b/l , + crepitus   Cardiovascular: + s1 & s2 , NSR 60   Abdomen: mild distended, soft , hypoactive BS x 4   Extremities: no edema , + pulses   Skin: warm and dry   Neurological: unable to assess mental status as pt is intubated and had sedation IVP. no purposeful movement. Is overbreathing the vent .   Psychiatry: unable to assess ; intubated and sedated     LINES: L [facility] , L radial gricel      HOSPITAL MEDICATIONS:  MEDICATIONS  (STANDING):  ALBUTerol    90 MICROgram(s) HFA Inhaler 2 Puff(s) Inhalation every 6 hours  budesonide 160 MICROgram(s)/formoterol 4.5 MICROgram(s) Inhaler 2 Puff(s) Inhalation two times a day  chlorhexidine 0.12% Liquid 15 milliLiter(s) Oral Mucosa every 12 hours  chlorhexidine 4% Liquid 1 Application(s) Topical <User Schedule>  cholecalciferol 2000 Unit(s) Oral daily  dexAMETHasone  Injectable 6 milliGRAM(s) IV Push daily  enoxaparin Injectable 80 milliGRAM(s) SubCutaneous every 12 hours  fentaNYL   Infusion... 0.5 MICROgram(s)/kG/Hr (2.04 mL/Hr) IV Continuous <Continuous>  insulin glargine Injectable (LANTUS) 20 Unit(s) SubCutaneous at bedtime  insulin lispro (ADMELOG) corrective regimen sliding scale   SubCutaneous every 4 hours  meropenem  IVPB 1000 milliGRAM(s) IV Intermittent every 8 hours  montelukast 10 milliGRAM(s) Oral daily  multivitamin/minerals Oral Solution (WELLESSE) 30 milliLiter(s) Enteral Tube daily  norepinephrine Infusion 0.05 MICROgram(s)/kG/Min (7.65 mL/Hr) IV Continuous <Continuous>  norepinephrine Infusion 0.05 MICROgram(s)/kG/Min (3.83 mL/Hr) IV Continuous <Continuous>  pantoprazole  Injectable 40 milliGRAM(s) IV Push daily  phenylephrine    Infusion 0.4 MICROgram(s)/kG/Min (12.2 mL/Hr) IV Continuous <Continuous>  polyethylene glycol 3350 17 Gram(s) Oral daily  propofol Infusion 5.004 MICROgram(s)/kG/Min (2.45 mL/Hr) IV Continuous <Continuous>  senna Syrup 20 milliLiter(s) Oral at bedtime  vasopressin Infusion 0.04 Unit(s)/Min (2.4 mL/Hr) IV Continuous <Continuous>    MEDICATIONS  (PRN):          oxybutynin XL 10 milliGRAM(s) Oral daily    dextrose 5%. 1000 milliLiter(s) IV Continuous <Continuous>  dextrose 5%. 1000 milliLiter(s) IV Continuous <Continuous>  dextrose 5%. 1000 milliLiter(s) IV Continuous <Continuous>      chlorhexidine 2% Cloths 1 Application(s) Topical <User Schedule>        LABS:                                             13.3   21.25 )-----------( 328      ( 21 Aug 2021 00:28 )             41.3     Hgb Trend: 13.3<--, 13.3<--, 16.5<--, 14.2<--, 13.7<--  08    146<H>  |  115<H>  |  40<H>  ----------------------------<  286<H>  4.2   |  17<L>  |  1.04    Ca    8.4      21 Aug 2021 00:28  Phos  3.3       Mg     2.7         TPro  5.0<L>  /  Alb  2.6<L>  /  TBili  0.8  /  DBili  x   /  AST  36  /  ALT  29  /  AlkPhos  106      Creatinine Trend: 1.04<--, 1.31<--, 0.97<--, 1.16<--, 0.93<--, 0.93<--  PT/INR - ( 21 Aug 2021 00:28 )   PT: 12.5 sec;   INR: 1.04 ratio         PTT - ( 21 Aug 2021 00:28 )  PTT:28.2 sec  CARDIAC MARKERS ( 20 Aug 2021 15:53 )  x     / x     / 59 U/L / x     / 1.5 ng/mL      Urinalysis Basic - ( 20 Aug 2021 16:27 )    Color: Light Yellow / Appearance: Clear / S.017 / pH: x  Gluc: x / Ketone: Trace  / Bili: Negative / Urobili: Negative   Blood: x / Protein: 30 mg/dL / Nitrite: Negative   Leuk Esterase: Negative / RBC: 10 /hpf / WBC 5 /HPF   Sq Epi: x / Non Sq Epi: 3 /hpf / Bacteria: Negative                 Arterial Blood Gas:  Blood Gas Profile - Arterial (21 @ 00:21)    pH, Arterial: 7.39    pCO2, Arterial: 33 mmHg    pO2, Arterial: 95 mmHg    HCO3, Arterial: 20 mmol/L    Base Excess, Arterial: -4.1 mmol/L    Oxygen Saturation, Arterial: 98.8 %    Total CO2, Arterial: 21 mmol/L    FIO2, Arterial: 100    Blood Gas Source Arterial: Arterial       @ 17:54  7.43/37/100/25/99.1/0.5  ABG lactate: --  Arterial Blood Gas:   @ 06:27  7.38/39/99/23/98.2/-1.8  ABG lactate: --  Arterial Blood Gas:   @ 05:30  7.40/41/125/25/100.0/0.5  ABG lactate: --        MICROBIOLOGY:     Culture - Blood (21 @ 19:12)    Specimen Source: .Blood Blood-Peripheral    Culture Results:   No Growth Final        RADIOLOGY:      < from: Xray Chest 1 View-PORTABLE IMMEDIATE (Xray Chest 1 View-PORTABLE IMMEDIATE .) (21 @ 15:11) >  EXAM:  XR CHEST PORTABLE IMMED 1V                            PROCEDURE DATE:  2021            INTERPRETATION:  CLINICAL INFORMATION: Rapid response    EXAM: Frontal view of the chest.    COMPARISON: Chest radiograph from 2021.    FINDINGS:    Endotracheal tube with tip above the evette. Left IJ catheter tip in the SVC. Enteric tube traveling below the diaphragm, with tip not seen on this projection.    The heart is normal in size.    Bilateral patchy opacities, minimally increased from 2021.    No pleural effusion or pneumothorax.    Subcutaneous emphysema within the visualized soft tissues of the neck.    IMPRESSION:  Mild pulmonary edema, minimally increased from 2021.    < end of copied text >    < from: Xray Chest 1 View- PORTABLE-Urgent (Xray Chest 1 View- PORTABLE-Urgent .) (21 @ 05:44) >      FINDINGS:    The heart size and mediastinum is not well evaluated in this projection.  Bilateral airspace opacities, likely not significantly changed allowing for a greater degree of inspiration on the current image.  There is no pneumothorax or pleural effusion.  No acute bony abnormality.    IMPRESSION:  Bilateral airspace opacities consistent with known history of Covid 19 infection, likely not significantly changed allowing for greater degree of inspiration on the current image.    < end of copied text >    < from: VA Duplex Lower Ext Vein Scan, Bilat (21 @ 16:21) >    IMPRESSION:  Acute above and below the knee deep venous thrombosis involving the LEFT popliteal, peroneal, gastrocnemius,and soleal veins.    Acute below the knee deep venous thrombosis involving the RIGHT peroneal and soleal veins.    < end of copied text >        EKG:    < from: 12 Lead ECG (21 @ 05:49) >    Diagnosis Line NORMAL SINUS RHYTHM  NONSPECIFIC ST AND TWAVE ABNORMALITY  ABNORMAL ECG  WHEN COMPARED WITH ECG OF 12-AUG-2021 16:19,  NO SIGNIFICANT CHANGE WAS FOUND    < end of copied text >     CC. worsening SOB    72yo  Bulgarian speaking male w/ PMHx of HTN, DM2, BPH, asthma presented on 21 from home with worsening shortness of breath x 2 weeks in setting of COVID-19 PNA . While managed on the medical floor, pt continues to experienced worsening hypoxic respiratory failure while on NIPPV , c/b worsening delirium/ agitation  despite medical interventions. Pt is s/post multiple RRT 's for hypoxia / agitation/ delirium and non-compliance / not tolerating NIPPV .   Today, pt's conditions has progressed and family has agreed to proceed with intubation. Post intubation pt is s/p  cardiac arrest ; with ROSC 6 min, VT x 2 s/p electrical shock x 2 and 2 epi's. On arrival to the COVID-icu unit ; pt hemodynamic instability ; chemically resuscitated . FAmily called for GOC; pt is full code.    Based on initial intake , pt did not receive the COVID-19 vaccine. Also, family members whom he lives with are likely also COVID-19 positive.       ICU Vital Signs Last 24 Hrs  T(C): 36.5 (22 Aug 2021 06:00), Max: 37.2 (21 Aug 2021 23:00)  T(F): 97.7 (22 Aug 2021 06:00), Max: 99 (21 Aug 2021 23:00)  HR: 57 (22 Aug 2021 06:15) (49 - 78)  ABP: 118/58 (22 Aug 2021 06:15) (-48/-48 - 231/98)  ABP(mean): 80 (22 Aug 2021 06:15) (-48 - 146)  RR: 32 (22 Aug 2021 06:15) (10 - 36)  SpO2: 100% (22 Aug 2021 06:15) (90% - 100%)      PAST MEDICAL & SURGICAL HISTORY:  BPH (benign prostatic hyperplasia)    Hyperlipemia    HTN (hypertension)    Hypercholesteremia    Asthma    Diabetes    Kidney stone    Bladder stone    H/O lithotripsy  x 2    History of kidney stones    H/O umbilical hernia repair        FAMILY HISTORY:  Family history of diabetes mellitus (Father, Sibling)    Family history of cancer (Mother)        SOCIAL HISTORY: Unable to obtain pt is intubated and sedated .   Advance Directives: FULL     Allergies    No Known Allergies    Intolerances        HOME MEDICATIONS:    REVIEW OF SYSTEMS:    [ x] Unable to assess ROS because ________Unable to obtain pt is intubated and sedated .       I&O's Summary    20 Aug 2021 07:  -  21 Aug 2021 07:00  --------------------------------------------------------  IN: 5681 mL / OUT: 1350 mL / NET: 4331 mL    21 Aug 2021 07:  -  22 Aug 2021 06:29  --------------------------------------------------------  IN: 2112.8 mL / OUT: 2115 mL / NET: -2.3 mL          CAPILLARY BLOOD GLUCOSE    POCT Blood Glucose.: 196 mg/dL (21 @ 05:13)          PHYSICAL EXAM:  General: intubated, sedated and paralyzed   HEENT:  PERRLA b/l   Neck: stable b/l + SQ  air in neck   Respiratory:  intubated , diminished BS b/l , + crepitus   Cardiovascular: + s1 & s2 , SB 50's   Abdomen: mild distended, soft , hypoactive  BS x 4   Extremities: no edema , + pulses   Skin: warm and dry   Neurological: unable to assess mental status as pt is intubated and sedated    Psychiatry: unable to assess ; intubated and sedated     LINES: L [facility] , L radial gricel      HOSPITAL MEDICATIONS:    MEDICATIONS  (STANDING):  ALBUTerol    90 MICROgram(s) HFA Inhaler 2 Puff(s) Inhalation every 6 hours  budesonide 160 MICROgram(s)/formoterol 4.5 MICROgram(s) Inhaler 2 Puff(s) Inhalation two times a day  chlorhexidine 0.12% Liquid 15 milliLiter(s) Oral Mucosa every 12 hours  chlorhexidine 4% Liquid 1 Application(s) Topical <User Schedule>  cholecalciferol 2000 Unit(s) Oral daily  cisatracurium Infusion 3 MICROgram(s)/kG/Min (14.7 mL/Hr) IV Continuous <Continuous>  enoxaparin Injectable 80 milliGRAM(s) SubCutaneous every 12 hours  fentaNYL   Infusion... 0.5 MICROgram(s)/kG/Hr (2.04 mL/Hr) IV Continuous <Continuous>  insulin lispro (ADMELOG) corrective regimen sliding scale   SubCutaneous every 6 hours  insulin NPH human recombinant 5 Unit(s) SubCutaneous every 6 hours  ketamine Infusion. 0.25 mG/kG/Hr (2.04 mL/Hr) IV Continuous <Continuous>  meropenem  IVPB 1000 milliGRAM(s) IV Intermittent every 8 hours  montelukast 10 milliGRAM(s) Oral daily  multivitamin/minerals Oral Solution (WELLESSE) 30 milliLiter(s) Enteral Tube daily  norepinephrine Infusion 0.05 MICROgram(s)/kG/Min (3.83 mL/Hr) IV Continuous <Continuous>  pantoprazole  Injectable 40 milliGRAM(s) IV Push daily  polyethylene glycol 3350 17 Gram(s) Oral daily  propofol Infusion 25 MICROgram(s)/kG/Min (12.2 mL/Hr) IV Continuous <Continuous>  senna Syrup 20 milliLiter(s) Oral at bedtime  vasopressin Infusion 0.04 Unit(s)/Min (2.4 mL/Hr) IV Continuous <Continuous>    MEDICATIONS  (PRN):            LABS:                                       11.9   15.27 )-----------( 267      ( 22 Aug 2021 00:22 )             36.3     Hgb Trend: 11.9<--, 13.3<--, 13.3<--, 16.5<--, 14.2<--  08    144  |  111<H>  |  37<H>  ----------------------------<  234<H>  4.0   |  19<L>  |  0.91    Ca    8.0<L>      22 Aug 2021 00:22  Phos  2.9       Mg     2.4         TPro  5.0<L>  /  Alb  2.5<L>  /  TBili  0.6  /  DBili  x   /  AST  21  /  ALT  21  /  AlkPhos  101      Creatinine Trend: 0.91<--, 1.04<--, 1.31<--, 0.97<--, 1.16<--, 0.93<--  PT/INR - ( 22 Aug 2021 00:22 )   PT: 13.0 sec;   INR: 1.09 ratio         PTT - ( 22 Aug 2021 00:22 )  PTT:30.4 sec  CARDIAC MARKERS ( 21 Aug 2021 08:31 )  x     / x     / 80 U/L / x     / 4.2 ng/mL  CARDIAC MARKERS ( 20 Aug 2021 15:53 )  x     / x     / 59 U/L / x     / 1.5 ng/mL      Urinalysis Basic - ( 20 Aug 2021 16:27 )    Color: Light Yellow / Appearance: Clear / S.017 / pH: x  Gluc: x / Ketone: Trace  / Bili: Negative / Urobili: Negative   Blood: x / Protein: 30 mg/dL / Nitrite: Negative   Leuk Esterase: Negative / RBC: 10 /hpf / WBC 5 /HPF   Sq Epi: x / Non Sq Epi: 3 /hpf / Bacteria: Negative        Arterial Blood Gas:    Blood Gas Profile - Arterial (21 @ 01:23)    pH, Arterial: 7.46    pCO2, Arterial: 31 mmHg    pO2, Arterial: 116 mmHg    HCO3, Arterial: 22 mmol/L    Base Excess, Arterial: -1.1 mmol/L    Oxygen Saturation, Arterial: 100.0 %    Total CO2, Arterial: 23 mmol/L    FIO2, Arterial: 60    MICROBIOLOGY:     Culture - Blood (21 @ 18:41)    Specimen Source: .Blood Blood-Peripheral    Culture Results:   No growth to date.    Culture - Urine (21 @ 20:57)    Specimen Source: Catheterized Catheterized    Culture Results:   No growth    Culture - Blood (21 @ 18:41)    Specimen Source: .Blood Blood-Peripheral    Culture Results:   No growth to date.    MRSA/MSSA PCR (21 @ 09:50)    MRSA PCR Result.: NotDetec: MRSA/MSSA PCR   Staph Aureus PCR Result: NotDetec        RADIOLOGY:    < from: Xray Chest 1 View- PORTABLE-Urgent (Xray Chest 1 View- PORTABLE-Urgent .) (21 @ 07:33) >  IMPRESSION:  Bilateral patchy opacities again seen consistent with pulmonary edema.    New pneumomediastinum when compared to prior chest x-ray. Recommend CT chest for further evaluation. Findings were discussed with ordering provider.    < end of copied text >    < from: Xray Chest 1 View-PORTABLE IMMEDIATE (Xray Chest 1 View-PORTABLE IMMEDIATE .) (21 @ 15:11) >  EXAM:  XR CHEST PORTABLE IMMED 1V                            PROCEDURE DATE:  2021        INTERPRETATION:  CLINICAL INFORMATION: Rapid response    EXAM: Frontal view of the chest.    COMPARISON: Chest radiograph from 2021.    FINDINGS:    Endotracheal tube with tip above the evette. Left IJ catheter tip in the SVC. Enteric tube traveling below the diaphragm, with tip not seen on this projection.    The heart is normal in size.    Bilateral patchy opacities, minimally increased from 2021.    No pleural effusion or pneumothorax.    Subcutaneous emphysema within the visualized soft tissues of the neck.    IMPRESSION:  Mild pulmonary edema, minimally increased from 2021.    < end of copied text >    < from: Xray Chest 1 View- PORTABLE-Urgent (Xray Chest 1 View- PORTABLE-Urgent .) (21 @ 05:44) >      FINDINGS:    The heart size and mediastinum is not well evaluated in this projection.  Bilateral airspace opacities, likely not significantly changed allowing for a greater degree of inspiration on the current image.  There is no pneumothorax or pleural effusion.  No acute bony abnormality.    IMPRESSION:  Bilateral airspace opacities consistent with known history of Covid 19 infection, likely not significantly changed allowing for greater degree of inspiration on the current image.    < end of copied text >    < from: VA Duplex Lower Ext Vein Scan, Bilat (21 @ 16:21) >    IMPRESSION:  Acute above and below the knee deep venous thrombosis involving the LEFT popliteal, peroneal, gastrocnemius,and soleal veins.    Acute below the knee deep venous thrombosis involving the RIGHT peroneal and soleal veins.    < end of copied text >      EKG:      < from: 12 Lead ECG (21 @ 15:26) >  Diagnosis Line ATRIAL FIBRILLATION WITH RAPID VENTRICULAR RESPONSE WITH PREMATURE VENTRICULAR OR ABERRANTLY CONDUCTED COMPLEXES  LOW VOLTAGE QRS  RIGHT BUNDLE BRANCH BLOCK  ABNORMAL ECG    < end of copied text >    < from: 12 Lead ECG (21 @ 05:49) >    Diagnosis Line NORMAL SINUS RHYTHM  NONSPECIFIC ST AND TWAVE ABNORMALITY  ABNORMAL ECG  WHEN COMPARED WITH ECG OF 12-AUG-2021 16:19,  NO SIGNIFICANT CHANGE WAS FOUND    < end of copied text >     CC. worsening SOB    72yo  Azeri speaking male w/ PMHx of HTN, DM2, BPH, asthma presented on 21 from home with worsening shortness of breath x 2 weeks in setting of COVID-19 PNA . While managed on the medical floor, pt continues to experienced worsening hypoxic respiratory failure while on NIPPV , c/b worsening delirium/ agitation  despite medical interventions. Pt is s/post multiple RRT 's for hypoxia / agitation/ delirium and non-compliance / not tolerating NIPPV .   Today, pt's conditions has progressed and family has agreed to proceed with intubation. Post intubation pt is s/p  cardiac arrest ; with ROSC 6 min, VT x 2 s/p electrical shock x 2 and 2 epi's. On arrival to the COVID-icu unit ; pt hemodynamic instability ; chemically resuscitated . FAmily called for GOC; pt is full code.    Based on initial intake , pt did not receive the COVID-19 vaccine. Also, family members whom he lives with are likely also COVID-19 positive.       ICU Vital Signs Last 24 Hrs  T(C): 36.5 (22 Aug 2021 06:00), Max: 37.2 (21 Aug 2021 23:00)  T(F): 97.7 (22 Aug 2021 06:00), Max: 99 (21 Aug 2021 23:00)  HR: 57 (22 Aug 2021 06:15) (49 - 78)  ABP: 118/58 (22 Aug 2021 06:15) (-48/-48 - 231/98)  ABP(mean): 80 (22 Aug 2021 06:15) (-48 - 146)  RR: 32 (22 Aug 2021 06:15) (10 - 36)  SpO2: 100% (22 Aug 2021 06:15) (90% - 100%)      PAST MEDICAL & SURGICAL HISTORY:  BPH (benign prostatic hyperplasia)    Hyperlipemia    HTN (hypertension)    Hypercholesteremia    Asthma    Diabetes    Kidney stone    Bladder stone    H/O lithotripsy  x 2    History of kidney stones    H/O umbilical hernia repair        FAMILY HISTORY:  Family history of diabetes mellitus (Father, Sibling)    Family history of cancer (Mother)        SOCIAL HISTORY: Unable to obtain pt is intubated and sedated .   Advance Directives: FULL     Allergies    No Known Allergies    Intolerances        HOME MEDICATIONS:    REVIEW OF SYSTEMS:    [ x] Unable to assess ROS because ________Unable to obtain pt is intubated and sedated .       I&O's Summary    20 Aug 2021 07:  -  21 Aug 2021 07:00  --------------------------------------------------------  IN: 5681 mL / OUT: 1350 mL / NET: 4331 mL    21 Aug 2021 07:  -  22 Aug 2021 06:29  --------------------------------------------------------  IN: 2112.8 mL / OUT: 2115 mL / NET: -2.3 mL          CAPILLARY BLOOD GLUCOSE    POCT Blood Glucose.: 196 mg/dL (21 @ 05:13)          PHYSICAL EXAM:  General: intubated, sedated and paralyzed   HEENT:  PERRLA b/l   Neck: stable b/l + SQ  air in neck   Respiratory:  intubated , diminished BS b/l base , no wheezing   Cardiovascular: + s1 & s2 , SB 50's   Abdomen: mild distended, soft , hypoactive  BS x 4   Extremities: no edema , + pulses   Skin: warm and dry   Neurological: unable to assess mental status as pt is intubated and sedated    Psychiatry: unable to assess ; intubated and sedated     LINES: L [facility] , L radial gricel      HOSPITAL MEDICATIONS:    MEDICATIONS  (STANDING):  ALBUTerol    90 MICROgram(s) HFA Inhaler 2 Puff(s) Inhalation every 6 hours  budesonide 160 MICROgram(s)/formoterol 4.5 MICROgram(s) Inhaler 2 Puff(s) Inhalation two times a day  chlorhexidine 0.12% Liquid 15 milliLiter(s) Oral Mucosa every 12 hours  chlorhexidine 4% Liquid 1 Application(s) Topical <User Schedule>  cholecalciferol 2000 Unit(s) Oral daily  cisatracurium Infusion 3 MICROgram(s)/kG/Min (14.7 mL/Hr) IV Continuous <Continuous>  enoxaparin Injectable 80 milliGRAM(s) SubCutaneous every 12 hours  fentaNYL   Infusion... 0.5 MICROgram(s)/kG/Hr (2.04 mL/Hr) IV Continuous <Continuous>  insulin lispro (ADMELOG) corrective regimen sliding scale   SubCutaneous every 6 hours  insulin NPH human recombinant 5 Unit(s) SubCutaneous every 6 hours  ketamine Infusion. 0.25 mG/kG/Hr (2.04 mL/Hr) IV Continuous <Continuous>  meropenem  IVPB 1000 milliGRAM(s) IV Intermittent every 8 hours  montelukast 10 milliGRAM(s) Oral daily  multivitamin/minerals Oral Solution (WELLESSE) 30 milliLiter(s) Enteral Tube daily  norepinephrine Infusion 0.05 MICROgram(s)/kG/Min (3.83 mL/Hr) IV Continuous <Continuous>  pantoprazole  Injectable 40 milliGRAM(s) IV Push daily  polyethylene glycol 3350 17 Gram(s) Oral daily  propofol Infusion 25 MICROgram(s)/kG/Min (12.2 mL/Hr) IV Continuous <Continuous>  senna Syrup 20 milliLiter(s) Oral at bedtime  vasopressin Infusion 0.04 Unit(s)/Min (2.4 mL/Hr) IV Continuous <Continuous>    MEDICATIONS  (PRN):            LABS:                                       11.9   15.27 )-----------( 267      ( 22 Aug 2021 00:22 )             36.3     Hgb Trend: 11.9<--, 13.3<--, 13.3<--, 16.5<--, 14.2<--  08    144  |  111<H>  |  37<H>  ----------------------------<  234<H>  4.0   |  19<L>  |  0.91    Ca    8.0<L>      22 Aug 2021 00:22  Phos  2.9     08  Mg     2.4         TPro  5.0<L>  /  Alb  2.5<L>  /  TBili  0.6  /  DBili  x   /  AST  21  /  ALT  21  /  AlkPhos  101      Creatinine Trend: 0.91<--, 1.04<--, 1.31<--, 0.97<--, 1.16<--, 0.93<--  PT/INR - ( 22 Aug 2021 00:22 )   PT: 13.0 sec;   INR: 1.09 ratio         PTT - ( 22 Aug 2021 00:22 )  PTT:30.4 sec  CARDIAC MARKERS ( 21 Aug 2021 08:31 )  x     / x     / 80 U/L / x     / 4.2 ng/mL  CARDIAC MARKERS ( 20 Aug 2021 15:53 )  x     / x     / 59 U/L / x     / 1.5 ng/mL      Urinalysis Basic - ( 20 Aug 2021 16:27 )    Color: Light Yellow / Appearance: Clear / S.017 / pH: x  Gluc: x / Ketone: Trace  / Bili: Negative / Urobili: Negative   Blood: x / Protein: 30 mg/dL / Nitrite: Negative   Leuk Esterase: Negative / RBC: 10 /hpf / WBC 5 /HPF   Sq Epi: x / Non Sq Epi: 3 /hpf / Bacteria: Negative        Arterial Blood Gas:    Blood Gas Profile - Arterial (21 @ 01:23)    pH, Arterial: 7.46    pCO2, Arterial: 31 mmHg    pO2, Arterial: 116 mmHg    HCO3, Arterial: 22 mmol/L    Base Excess, Arterial: -1.1 mmol/L    Oxygen Saturation, Arterial: 100.0 %    Total CO2, Arterial: 23 mmol/L    FIO2, Arterial: 60    MICROBIOLOGY:     Culture - Blood (21 @ 18:41)    Specimen Source: .Blood Blood-Peripheral    Culture Results:   No growth to date.    Culture - Urine (21 @ 20:57)    Specimen Source: Catheterized Catheterized    Culture Results:   No growth    Culture - Blood (21 @ 18:41)    Specimen Source: .Blood Blood-Peripheral    Culture Results:   No growth to date.    MRSA/MSSA PCR (21 @ 09:50)    MRSA PCR Result.: NotDetec: MRSA/MSSA PCR   Staph Aureus PCR Result: NotDetec        RADIOLOGY:    < from: Xray Chest 1 View- PORTABLE-Urgent (Xray Chest 1 View- PORTABLE-Urgent .) (21 @ 07:33) >  IMPRESSION:  Bilateral patchy opacities again seen consistent with pulmonary edema.    New pneumomediastinum when compared to prior chest x-ray. Recommend CT chest for further evaluation. Findings were discussed with ordering provider.    < end of copied text >    < from: Xray Chest 1 View-PORTABLE IMMEDIATE (Xray Chest 1 View-PORTABLE IMMEDIATE .) (21 @ 15:11) >  EXAM:  XR CHEST PORTABLE IMMED 1V                            PROCEDURE DATE:  2021        INTERPRETATION:  CLINICAL INFORMATION: Rapid response    EXAM: Frontal view of the chest.    COMPARISON: Chest radiograph from 2021.    FINDINGS:    Endotracheal tube with tip above the evette. Left IJ catheter tip in the SVC. Enteric tube traveling below the diaphragm, with tip not seen on this projection.    The heart is normal in size.    Bilateral patchy opacities, minimally increased from 2021.    No pleural effusion or pneumothorax.    Subcutaneous emphysema within the visualized soft tissues of the neck.    IMPRESSION:  Mild pulmonary edema, minimally increased from 2021.    < end of copied text >    < from: Xray Chest 1 View- PORTABLE-Urgent (Xray Chest 1 View- PORTABLE-Urgent .) (21 @ 05:44) >      FINDINGS:    The heart size and mediastinum is not well evaluated in this projection.  Bilateral airspace opacities, likely not significantly changed allowing for a greater degree of inspiration on the current image.  There is no pneumothorax or pleural effusion.  No acute bony abnormality.    IMPRESSION:  Bilateral airspace opacities consistent with known history of Covid 19 infection, likely not significantly changed allowing for greater degree of inspiration on the current image.    < end of copied text >    < from: VA Duplex Lower Ext Vein Scan, Bilat (21 @ 16:21) >    IMPRESSION:  Acute above and below the knee deep venous thrombosis involving the LEFT popliteal, peroneal, gastrocnemius,and soleal veins.    Acute below the knee deep venous thrombosis involving the RIGHT peroneal and soleal veins.    < end of copied text >      EKG:      < from: 12 Lead ECG (21 @ 15:26) >  Diagnosis Line ATRIAL FIBRILLATION WITH RAPID VENTRICULAR RESPONSE WITH PREMATURE VENTRICULAR OR ABERRANTLY CONDUCTED COMPLEXES  LOW VOLTAGE QRS  RIGHT BUNDLE BRANCH BLOCK  ABNORMAL ECG    < end of copied text >    < from: 12 Lead ECG (21 @ 05:49) >    Diagnosis Line NORMAL SINUS RHYTHM  NONSPECIFIC ST AND TWAVE ABNORMALITY  ABNORMAL ECG  WHEN COMPARED WITH ECG OF 12-AUG-2021 16:19,  NO SIGNIFICANT CHANGE WAS FOUND    < end of copied text >

## 2021-08-22 NOTE — PROGRESS NOTE ADULT - ASSESSMENT
70yo  Norwegian speaking male w/ PMHx of HTN, DM2, BPH, asthma presented on 8/12/21 from home with worsening shortness of breath x 2 weeks in setting of COVID-19 PNA now, with progressive worsening hypoxic respiratory failure , failed NIPPV support , s/p intubation s/p  cardiac arrest ; with ROSC 6 min, VT x 2 s/p electrical shock x 2 and 2 epi's. and transfer to [facility] ICU for monitoring and management.     PLAN :    Neuro  -Baseline AOX3, Norwegian speaking now intubated and sedated   -sedation; Propofol @ 50, Fent @ 0.5  -wean sedation as tolerated   -in setting of worsening PF ratio ; will employee paralysis and prone   -nimbex 10mg IVP x1, followed by gtt  goal TOF 2/4.   -neuro checks per ICU    Pulm  Acute hypoxic respiratory failure 2/2 COVID-19 PNA now in ARDS   ARDS protocol ; permissive hypercarbia  -AC 36/400/100/14  -ABG; 7.39/33/95/20/98; will repeat ABG if no improvement P/F R will prone if tolerated   -CXR; ARDS , L IJ TLC, OGT & ET  in place , SQ emphysema in b/l neck R>L.   -repeat CXR to eval extension of SQ air ; preliminary results with R >L neck SQ air , pneumomediastinum   -wean  vent settings as tolerated   -completed remdesevir 8/13-8/17  -c/w dexamethasone 8/13-8/23  -c/w neb tx   -chest PT , oral care , pulmonary toileting   -worsening P/F r 71 will Prone 18 hrs and supine 6 hrs     r/o PE  Pt with + DVT , high r/f PE in setting of hypercoagulable state with COVID -19 infection   -Pt not hemodynamically  stable for CTA chest .   -it would not change  management as pt is already on full AC w/ Lovenox 80 BID for + DVT:( stable renal functions )  -bleeding precaution   -trend DD,   - CE, ProBNP   -vent support   -TTE 8/20 ;  there are no interpretable images 2/2 SQ air       r/o other intrapulmonary infections process   - PAN cx , MRSA , UA, sputum cx   -Broad spectrum abx     ASTHMA   -c/w home Symbicort  -c/w home montelukast  -c/w duoneb tx      CV  Cardiogenic shock s/p PEA followed by VT x 2 s/p ROSC,  Septic shock / hypovolemic shock/ distributive shock , + DVT , r/o PE, new onset A fib   -pt is post cardiac arrest post intubation with 6 minutes achieved ROSC; c/b VT x 2 s/p electrical shock   x2   -new onset of A fib RVR w/ hemodynamic instability s/p amio 300 x1 ; converted to NSR   -on arrival to ICU s/p chemically resuscitated with no loss of pulse   -resuscitated with 2 L NS bolus ; was able to wean down off 3 max dose pressors  to Vaso 0.04 and Jaspal 1.9   -over night was challenged w/ lasix 40 IVP x 1 for low urine output 20-50 ml /hr ; responded well  ml however drop in BP / HR; increase in pressor requirements and added Levo for dmitry 50's  ; s/p 1 L NS bolus ; with great improvement; weaned off jaspal , titrate Levo as tolerated to MAP >65 , and VAso at 0.04   -PAN cx sent , broad spectrum abx   -procal 0.08  -POCUS ; IVC 1.6 variable with respirations, unable to assess cardiac function   -assess for fluid resuscitation   -strict i&o's   -will hold all antihypertensive in setting of shock   -TTE; limited to due SQ air   -CE , proBNP   -EKG; NSR @ 67, prolonged ; ( prior ) downtrending ; will get daily EKG and PRN , avoid conduction slowing agents . Give Mg 2 g IVPB x 1   -c/w full AC on Lovenox sq 80 BID for + DVT and presumed PE   -will repeat duplex       TAYLOR likely 2/2 hypovolemia   -IVF resuscitation / hydration   -trend BUN/Cr, K   -mace for strict i&o's   -daily weight   -monitor lights     BPH (benign prostatic hyperplasia).   -holding all meds due to hypotension .  -mace placed for i&o's   -will resume when stable     GI  Oropharyngeal dysphagia   -OGT for TF   -bowel regimen   -PPI q d   -aspiration precaution   -oral care   -LFT's stable     ENDO  A1c 7.3  TF w/ glucerna   -FS q 6 hrs   -ISS q 6 hrs & Lantus   -repeat  a1c , lipid panel   -pending lipid panel ; will start STATIN     Heme   + DVT , presumed  PE  - in setting of CVOID-19 illness/ hypercoagulable state   -c/w FULL AC 80 BID   -DD 1144--> 3588 ; will trend   -bleeding precaution   -repeat VA duplex pending   -Heme consult     ID   Septic shock in setting of COVID-19 PNA   -IV pressors support to maintain MAP>65  -UA negative 8/20  -PAN cx , MRSA , sputum cx , fungitel   -empiric abx ; meropenem 1g TID (8/20-) , Vanco 1 g x 1 (8/20) ; will check level @ 4 pm and dose while waiting for MRSA results  -if worsening will add fungal coverage  -trend WBc  -trend T curve       COVID-19 PNA   - s/p Tocilizumab on 8/15  -s/p remdesevir 8/13-8/17  -c/w dexamethasone 8/13-8/23   -trend inflammatory markers   -ID consult . on board     PPX   DVT ppx ; on FULL AC with lovenox SQ  Gi ppx; protonix IVP q d     GOC  FULL code at this time .   D/w son and 2 daughters      70yo  Congolese speaking male w/ PMHx of HTN, DM2, BPH, asthma presented on 8/12/21 from home with worsening shortness of breath x 2 weeks in setting of COVID-19 PNA now, with progressive worsening hypoxic respiratory failure , failed NIPPV support , s/p intubation s/p  cardiac arrest ; with ROSC 6 min, VT x 2 s/p electrical shock x 2 and 2 epi's. and transfer to [facility] ICU for monitoring and management.     PLAN :    Neuro  -Baseline AOX3, Congolese speaking now intubated and sedated   -sedation; Propofol @ 40, Ketamine @ 2, Fent @ 1,   -nimbex @ 2; followed by gtt  goal TOF 2/4.   -neuro checks per ICU    Pulm  Acute hypoxic respiratory failure 2/2 COVID-19 PNA now in ARDS   ARDS protocol ; permissive hypercarbia  -pt prone (#1) 8/21 @ 10 am -improved   -ABG 7.46/31/116/22/100  -vent setting changed to AC 32/400/60/10  -CXR; ARDS , L IJ TLC, OGT & ET  in place , SQ emphysema in b/l neck R>L.   -8/21 CXR to eval extension of SQ air ; preliminary results with R >L neck SQ air , pneumomediastinum ; pending repeat CXR 8/22   -wean  vent settings as tolerated   -completed remdesevir 8/13-8/17  -c/w dexamethasone 8/13-8/23  -c/w neb tx   -chest PT , oral care , pulmonary toileting     r/o PE  Pt with + DVT , high r/f PE in setting of hypercoagulable state with COVID -19 infection   -Pt not hemodynamically  stable for CTA chest .   -it would not change  management as pt is already on full AC w/ Lovenox 80 BID for + DVT:( stable renal functions )  -bleeding precaution   -trend DD,   - CE, ProBNP   -mechanical vent support   -TTE 8/20 ;  there are no interpretable images 2/2 SQ air       r/o other intrapulmonary infections process   - Cx pending results   -c/w Broad spectrum abx     ASTHMA   -c/w home Symbicort  -c/w home montelukast  -c/w duoneb tx      CV  Cardiogenic shock s/p PEA followed by VT x 2 s/p ROSC,  Septic shock / hypovolemic shock/ distributive shock , + DVT , r/o PE, new onset A fib   -pt is post cardiac arrest post intubation with 6 minutes achieved ROSC; c/b VT x 2 s/p electrical shock   x2   -new onset of A fib RVR w/ hemodynamic instability s/p amio 300 x1 ; converted to NSR   -on arrival to ICU s/p chemically resuscitated with no loss of pulse (8/19)   -remains in Shock with pressors support ; Vaso 0.04, Levo 0.035  -titrating to MAP >65   -TTE; limited to due SQ air   -daily EKG; trend QTC    -c/w full AC on Lovenox sq 80 BID for + DVT and presumed PE   -pending repeat duplex       TAYLOR   -resolved   -c/t trend BUN/Cr, K   -mace for strict i&o's ; making good urine , now net even   -daily weight   -monitor lights     BPH (benign prostatic hyperplasia).   -holding all meds due to hypotension .  -mace placed for i&o's   -will resume when stable     GI  Oropharyngeal dysphagia   -OGT for TF @ 10 ml / hr while in prone .  -bowel regimen   -PPI q d   -aspiration precaution   -oral care   -LFT's stable     Hypertriglyceridemia   -likely in setting of propofol use   -titrate propofol off   -repeat triglycerides daily       ENDO  A1c 7.8  TF w/ glucerna   -FS q 6 hrs   -ISS & NPH q 6 hrs       Heme   + DVT , presumed  PE  - in setting of CVOID-19 illness/ hypercoagulable state   -c/w FULL AC 80 BID   -DD 1144--> 3588-->1336  ; will trend M/W/F  -bleeding precaution   -repeat VA duplex pending       ID   Septic shock in setting of COVID-19 PNA   -IV pressors; levo 0.035, Vaso 0.04  support to maintain MAP>65  -UA & Ucx negative 8/20  -MRSA negative 8/21   -BC negative x 2 8/20  -sputum cx pending collection   - fungitel pending results   -empiric abx ; meropenem 1g TID (8/20-) , Vanco 1 g x 1 (8/21)   -if worsening will add fungal coverage  -trend WBc 21-->15  -trend T curve       COVID-19 PNA   - s/p Tocilizumab on 8/15  -s/p remdesevir 8/13-8/17  -c/w dexamethasone 8/13-8/23   -trend inflammatory markers   -ID consult . on board     PPX   DVT ppx ; on FULL AC with lovenox SQ  Gi ppx; protonix IVP q d     GOC  FULL code at this time .   D/w son and 2 daughters      70yo  Romanian speaking male w/ PMHx of HTN, DM2, BPH, asthma presented on 8/12/21 from home with worsening shortness of breath x 2 weeks in setting of COVID-19 PNA now, with progressive worsening hypoxic respiratory failure , failed NIPPV support , s/p intubation s/p  cardiac arrest ; with ROSC 6 min, VT x 2 s/p electrical shock x 2 and 2 epi's. and transfer to [facility] ICU for monitoring and management.     PLAN :    Neuro  -Baseline AOX3, Romanian speaking now intubated and sedated   -sedation; Propofol @ 40, Ketamine @ 2, Fent @ 1, ; titrated propofol down to 10, ketamine up to 3, fentanyl  @ 2.   -nimbex @ 2; followed by gtt  goal TOF 2/4. ; if ABG remains stable will start weaning off paralytics   -neuro checks per ICU    Pulm  Acute hypoxic respiratory failure 2/2 COVID-19 PNA now in ARDS   ARDS protocol ; permissive hypercarbia  -pt prone (#1) 8/21 @ 10 am -improved   -ABG 7.46/31/116/22/100  -vent setting changed to AC 32/400/60/10  -CXR; ARDS , L IJ TLC, OGT & ET  in place , SQ emphysema in b/l neck R>L.   -8/21 CXR to eval extension of SQ air ; preliminary results with R >L neck SQ air , pneumomediastinum ; pending repeat CXR 8/22   -wean  vent settings as tolerated   -completed remdesevir 8/13-8/17  -c/w dexamethasone 8/13-8/23  -c/w neb tx   -chest PT , oral care , pulmonary toileting     r/o PE  Pt with + DVT , high r/f PE in setting of hypercoagulable state with COVID -19 infection   -Pt not hemodynamically  stable for CTA chest .   -it would not change  management as pt is already on full AC w/ Lovenox 80 BID for + DVT:( stable renal functions )  -bleeding precaution   -trend DD,   - CE, ProBNP   -mechanical vent support   -TTE 8/20 ;  there are no interpretable images 2/2 SQ air       r/o other intrapulmonary infections process   - Cx pending results   -c/w Broad spectrum abx     ASTHMA   -c/w home Symbicort  -c/w home montelukast  -c/w duoneb tx      CV  Cardiogenic shock s/p PEA followed by VT x 2 s/p ROSC,  Septic shock / hypovolemic shock/ distributive shock , + DVT , r/o PE, new onset A fib   -pt is post cardiac arrest post intubation with 6 minutes achieved ROSC; c/b VT x 2 s/p electrical shock   x2   -new onset of A fib RVR w/ hemodynamic instability s/p amio 300 x1 ; converted to NSR   -on arrival to ICU s/p chemically resuscitated with no loss of pulse (8/19)   -remains in Shock with pressors support ; Vaso 0.04, Levo 0.035  -titrating to MAP >65   -TTE; limited to due SQ air   -daily EKG; trend QTC    -c/w full AC on Lovenox sq 80 BID for + DVT and presumed PE   -pending repeat duplex       TAYLOR   -resolved   -c/t trend BUN/Cr, K   -mace for strict i&o's ; making good urine , now net even   -daily weight   -monitor lights     BPH (benign prostatic hyperplasia).   -holding all meds due to hypotension .  -mace placed for i&o's   -will resume when stable     GI  Oropharyngeal dysphagia   -OGT for TF @ 10 ml / hr while in prone .  -bowel regimen   -PPI q d   -aspiration precaution   -oral care   -LFT's stable     Hypertriglyceridemia   -likely in setting of propofol use   -titrate propofol off   -repeat triglycerides daily       ENDO  A1c 7.8  TF w/ glucerna   -FS q 6 hrs   -ISS & NPH q 6 hrs       Heme   + DVT , presumed  PE  - in setting of CVOID-19 illness/ hypercoagulable state   -c/w FULL AC 80 BID   -DD 1144--> 3588-->1336  ; will trend M/W/F  -bleeding precaution   -repeat VA duplex pending       ID   Septic shock in setting of COVID-19 PNA   -IV pressors; levo 0.035, Vaso 0.04  support to maintain MAP>65  -UA & Ucx negative 8/20  -MRSA negative 8/21   -BC negative x 2 8/20  -sputum cx pending collection   - fungitel pending results   -empiric abx ; meropenem 1g TID (8/20-) , Vanco 1 g x 1 (8/21)   -if worsening will add fungal coverage  -trend WBc 21-->15  -trend T curve       COVID-19 PNA   - s/p Tocilizumab on 8/15  -s/p remdesevir 8/13-8/17  -c/w dexamethasone 8/13-8/23   -trend inflammatory markers   -ID consult . on board     PPX   DVT ppx ; on FULL AC with lovenox SQ  Gi ppx; protonix IVP q d     GOC  FULL code at this time .   D/w son and 2 daughters      72yo  Swiss speaking male w/ PMHx of HTN, DM2, BPH, asthma presented on 8/12/21 from home with worsening shortness of breath x 2 weeks in setting of COVID-19 PNA now, with progressive worsening hypoxic respiratory failure , failed NIPPV support , s/p intubation s/p  cardiac arrest ; with ROSC 6 min, VT x 2 s/p electrical shock x 2 and 2 epi's. and transfer to [facility] ICU for monitoring and management.     PLAN :    Neuro  -Baseline AOX3, Swiss speaking now intubated and sedated   -sedation; received on  Propofol @ 40, Ketamine @ 2, Fent @ 1, ; titrated propofol off, ketamine up to 3, fentanyl  @ 2.   -nimbex @ 2; followed by gtt  goal TOF 2/4. ; if ABG remains stable will start weaning off paralytics   -neuro checks per ICU    Pulm  Acute hypoxic respiratory failure 2/2 COVID-19 PNA now in ARDS   ARDS protocol ; permissive hypercarbia  -pt prone (#1) 8/21 @ 10 am -improved   -ABG 7.46/31/116/22/100  -vent setting changed to AC 32/400/60/10  -CXR; ARDS , L IJ TLC, OGT & ET  in place , SQ emphysema in b/l neck R>L.   -8/21 CXR to eval extension of SQ air ; preliminary results with R >L neck SQ air , pneumomediastinum ; pending repeat CXR 8/22   -wean  vent settings as tolerated   -completed remdesevir 8/13-8/17  -c/w dexamethasone 8/13-8/23  -c/w neb tx   -chest PT , oral care , pulmonary toileting     r/o PE  Pt with + DVT , high r/f PE in setting of hypercoagulable state with COVID -19 infection   -Pt not hemodynamically  stable for CTA chest .   -it would not change  management as pt is already on full AC w/ Lovenox 80 BID for + DVT:( stable renal functions )  -bleeding precaution   -trend DD,   - CE, ProBNP   -mechanical vent support   -TTE 8/20 ;  there are no interpretable images 2/2 SQ air       r/o other intrapulmonary infections process   - Cx pending results   -c/w Broad spectrum abx     ASTHMA   -c/w home Symbicort  -c/w home montelukast  -c/w duoneb tx      CV  Cardiogenic shock s/p PEA followed by VT x 2 s/p ROSC,  Septic shock / hypovolemic shock/ distributive shock , + DVT , r/o PE, new onset A fib   -pt is post cardiac arrest post intubation with 6 minutes achieved ROSC; c/b VT x 2 s/p electrical shock   x2   -new onset of A fib RVR w/ hemodynamic instability s/p amio 300 x1 ; converted to NSR   -on arrival to ICU s/p chemically resuscitated with no loss of pulse (8/19)   -remains in Shock with pressors support ; Vaso 0.04, Levo 0.035  -titrating to MAP >65   -TTE; limited to due SQ air   -daily EKG; trend QTC    -c/w full AC on Lovenox sq 80 BID for + DVT and presumed PE   -pending repeat duplex       TAYLOR   -resolved   -c/t trend BUN/Cr, K   -mace for strict i&o's ; making good urine , now net even   -daily weight   -monitor lights     BPH (benign prostatic hyperplasia).   -holding all meds due to hypotension .  -mace placed for i&o's   -will resume when stable     GI  Oropharyngeal dysphagia   -OGT for TF @ 10 ml / hr while in prone .  -bowel regimen   -PPI q d   -aspiration precaution   -oral care   -LFT's stable     Hypertriglyceridemia   -likely in setting of propofol use   -titrate propofol off   -repeat triglycerides daily       ENDO  A1c 7.8  TF w/ glucerna   -FS q 6 hrs   -ISS & NPH q 6 hrs       Heme   + DVT , presumed  PE  - in setting of CVOID-19 illness/ hypercoagulable state   -c/w FULL AC 80 BID   -DD 1144--> 3588-->1336  ; will trend M/W/F  -bleeding precaution   -repeat VA duplex pending       ID   Septic shock in setting of COVID-19 PNA   -IV pressors; levo 0.035, Vaso 0.04  support to maintain MAP>65  -UA & Ucx negative 8/20  -MRSA negative 8/21   -BC negative x 2 8/20  -sputum cx pending collection   - fungitel pending results   -empiric abx ; meropenem 1g TID (8/20-) , Vanco 1 g x 1 (8/21)   -if worsening will add fungal coverage  -trend WBc 21-->15  -trend T curve       COVID-19 PNA   - s/p Tocilizumab on 8/15  -s/p remdesevir 8/13-8/17  -c/w dexamethasone 8/13-8/23   -trend inflammatory markers   -ID consult . on board     PPX   DVT ppx ; on FULL AC with lovenox SQ  Gi ppx; protonix IVP q d     GOC  FULL code at this time .   D/w son and 2 daughters      72yo  Kittitian speaking male w/ PMHx of HTN, DM2, BPH, asthma presented on 8/12/21 from home with worsening shortness of breath x 2 weeks in setting of COVID-19 PNA now, with progressive worsening hypoxic respiratory failure , failed NIPPV support , s/p intubation s/p  cardiac arrest ; with ROSC 6 min, VT x 2 s/p electrical shock x 2 and 2 epi's. and transfer to [facility] ICU for monitoring and management.     PLAN :    Neuro  -Baseline AOX3, Kittitian speaking now intubated and sedated   -sedation; received on  Propofol @ 40, Ketamine @ 2, Fent @ 1, ; titrated propofol off, ketamine up to 3, fentanyl  @ 2.   -nimbex @ 2; followed by gtt  goal TOF 2/4. ; if ABG remains stable will start weaning off paralytics   -neuro checks per ICU    Pulm  Acute hypoxic respiratory failure 2/2 COVID-19 PNA now in ARDS   ARDS protocol ; permissive hypercarbia  -pt prone (#1) 8/21 @ 10 am - ABG improved   -pt prone (#2) 8/22 @ 2;30pm   -ABG  -vent setting changed to AC 32/400/60/10  -CXR  -8/21 CXR to eval extension of SQ air ; preliminary results with R >L neck SQ air , pneumomediastinum ; pending repeat CXR 8/22   -wean  vent settings as tolerated   -completed remdesevir 8/13-8/17  -c/w dexamethasone 8/13-8/23  -c/w neb tx   -chest PT , oral care , pulmonary toileting     r/o PE  Pt with + DVT , high r/f PE in setting of hypercoagulable state with COVID -19 infection   -Pt not hemodynamically  stable for CTA chest .   -it would not change  management as pt is already on full AC w/ Lovenox 80 BID for + DVT:( stable renal functions )  -bleeding precaution   -trend DD,   - CE, ProBNP   -mechanical vent support   -TTE 8/20 ;  there are no interpretable images 2/2 SQ air       r/o other intrapulmonary infections process   - Cx pending results   -c/w Broad spectrum abx     ASTHMA   -c/w home Symbicort  -c/w home montelukast  -c/w duoneb tx      CV  Cardiogenic shock s/p PEA followed by VT x 2 s/p ROSC,  Septic shock / hypovolemic shock/ distributive shock , + DVT , r/o PE, new onset A fib   -pt is post cardiac arrest post intubation with 6 minutes achieved ROSC; c/b VT x 2 s/p electrical shock   x2   -new onset of A fib RVR w/ hemodynamic instability s/p amio 300 x1 ; converted to NSR   -on arrival to ICU s/p chemically resuscitated with no loss of pulse (8/19)   -remains in Shock with pressors support ; Vaso 0.04, Levo 0.035  -titrating to MAP >65   -TTE; limited to due SQ air   -daily EKG; trend QTC    -c/w full AC on Lovenox sq 80 BID for + DVT and presumed PE   -pending repeat duplex       TAYLOR   -resolved   -c/t trend BUN/Cr, K   -mace for strict i&o's ; making good urine , now net even   -daily weight   -monitor lights     BPH (benign prostatic hyperplasia).   -holding all meds due to hypotension .  -mace placed for i&o's   -will resume when stable     GI  Oropharyngeal dysphagia   -OGT for TF @ 10 ml / hr while in prone .  -bowel regimen   -PPI q d   -aspiration precaution   -oral care   -LFT's stable     Hypertriglyceridemia   -likely in setting of propofol use   -titrate propofol off   -repeat triglycerides daily       ENDO  A1c 7.8  TF w/ glucerna   -FS q 6 hrs   -ISS & NPH q 6 hrs       Heme   + DVT , presumed  PE  - in setting of CVOID-19 illness/ hypercoagulable state   -c/w FULL AC 80 BID   -DD 1144--> 3588-->1336  ; will trend M/W/F  -bleeding precaution   -repeat VA duplex pending       ID   Septic shock in setting of COVID-19 PNA   -IV pressors; levo 0.035, Vaso 0.04  support to maintain MAP>65  -UA & Ucx negative 8/20  -MRSA negative 8/21   -BC negative x 2 8/20  -sputum cx pending collection   - fungitel pending results   -empiric abx ; meropenem 1g TID (8/20-) , Vanco 1 g x 1 (8/21)   -if worsening will add fungal coverage  -trend WBc 21-->15  -trend T curve       COVID-19 PNA   - s/p Tocilizumab on 8/15  -s/p remdesevir 8/13-8/17  -c/w dexamethasone 8/13-8/23   -trend inflammatory markers   -ID consult . on board     PPX   DVT ppx ; on FULL AC with lovenox SQ  Gi ppx; protonix IVP q d     GOC  FULL code at this time .   D/w son and 2 daughters

## 2021-08-22 NOTE — PROGRESS NOTE ADULT - ATTENDING COMMENTS
Agree with above. Patient critically ill requiring frequent bedside visits with therapy change. Patient DM2, HTN, and BPH who presented with COVID-19 pneumonia. He is unfortunately unvaccinated and suffering more severe disease. He has had progressive hypoxemia despite BIPAP. His family reportedly did not want him intubated until absolutely necessary and he was intubated emergently by anesthesia 8/20/21. He had a subsequent cardiac arrest requiring CPR and then was transferred to [facility]ICU for further monitoring and care.    1. Acute Hypoxemic Respiratory Failure - ARDS due to COVID-19. Continue mechanical ventilation. Continue proning today for ARDS.   2. Cardiovascular - shock state improved - continued decrease in vasopressor requirements. No significant ectopy at this time.   - Official echo noted - unable to obtain views  3. Oropharyngeal dysphagia - feeds via OGT  4. Renal function - improved. Continue to monitor urine output  5. Neurology - continue sedatives and paralytics  6. Infectious Disease - continue antibiotics. trend lactate  7. Hem/Onc - patient with LE DVT noted on Duplex. Continue therapeutic AC with Lovenox.   8. Endo - increase NPH for hyperglycemia with goal FS < 180  9. GOC - patient is FULL Code     Long term prognosis very poor.

## 2021-08-23 NOTE — CONSULT NOTE ADULT - ASSESSMENT
Impression:    High risk for pressure injury   Pressure injury prophylaxis    Recommend:  1.) topical therapy; apply prophylactic foam dressing on bony prominences  2.) Utilize Spry proning system for proning  3.) Maintain on an alternating air with low air loss surface  4.) Turn and reposition q 2 hours  5.) Incontinence management - incontinence pads, cleanser, pericare BID  6.) Offload heels/feet with complete cair air fluidized boots    Care as per medicine. Will not actively follow but will remain available. Please recall for new issues.  Upon discharge [facility] [address] [place] [address] [phone]  Discussed with clinical nurse  Thank you for this consult  Neisha Cohen, NP-C, CWOCN [id number]

## 2021-08-23 NOTE — PROGRESS NOTE ADULT - SUBJECTIVE AND OBJECTIVE BOX
Follow Up: COVID    Interval History/ROS: Remains intubated. Afebrile. Meropenem started.     Allergies  No Known Allergies        ANTIMICROBIALS:  meropenem  IVPB 1000 every 8 hours      OTHER MEDS:  ALBUTerol    90 MICROgram(s) HFA Inhaler 2 Puff(s) Inhalation every 6 hours  budesonide 160 MICROgram(s)/formoterol 4.5 MICROgram(s) Inhaler 2 Puff(s) Inhalation two times a day  chlorhexidine 0.12% Liquid 15 milliLiter(s) Oral Mucosa every 12 hours  chlorhexidine 4% Liquid 1 Application(s) Topical <User Schedule>  cholecalciferol 2000 Unit(s) Oral daily  cisatracurium Infusion 3 MICROgram(s)/kG/Min IV Continuous <Continuous>  enoxaparin Injectable 80 milliGRAM(s) SubCutaneous every 12 hours  fentaNYL   Infusion... 0.5 MICROgram(s)/kG/Hr IV Continuous <Continuous>  insulin lispro (ADMELOG) corrective regimen sliding scale   SubCutaneous every 6 hours  insulin NPH human recombinant 7 Unit(s) SubCutaneous every 6 hours  ketamine Infusion. 0.25 mG/kG/Hr IV Continuous <Continuous>  montelukast 10 milliGRAM(s) Oral daily  multivitamin/minerals Oral Solution (WELLESSE) 30 milliLiter(s) Enteral Tube daily  norepinephrine Infusion 0.05 MICROgram(s)/kG/Min IV Continuous <Continuous>  pantoprazole  Injectable 40 milliGRAM(s) IV Push daily  polyethylene glycol 3350 17 Gram(s) Oral daily  senna Syrup 20 milliLiter(s) Oral at bedtime  vasopressin Infusion 0.04 Unit(s)/Min IV Continuous <Continuous>      Vital Signs Last 24 Hrs  T(C): 36.7 (23 Aug 2021 07:15), Max: 37.6 (22 Aug 2021 18:00)  T(F): 98.1 (23 Aug 2021 07:15), Max: 99.7 (22 Aug 2021 18:00)  HR: 78 (23 Aug 2021 15:45) (71 - 91)  BP: --  BP(mean): --  RR: 28 (23 Aug 2021 15:45) (28 - 32)  SpO2: 97% (23 Aug 2021 15:45) (90% - 100%)    Physical Exam:  General: intubated sedated   Head: atraumatic, normocephalic  Cardio: regular rate   Respiratory: mechanically ventilated   abd: soft, nondistended   : mace  vascular: left IJ CVC, left radial arterial line. no phlebitis   Skin: no rash                          12.8   16.49 )-----------( 259      ( 23 Aug 2021 00:36 )             40.4       08-23    144  |  112<H>  |  41<H>  ----------------------------<  177<H>  4.1   |  18<L>  |  0.95    Ca    8.6      23 Aug 2021 00:36  Phos  4.0     08-23  Mg     2.4     08-23    TPro  5.5<L>  /  Alb  3.0<L>  /  TBili  0.6  /  DBili  x   /  AST  23  /  ALT  23  /  AlkPhos  113  08-23          MICROBIOLOGY:  Culture - Sputum (collected 08-22-21 @ 17:32)  Source: .Sputum Sputum  Gram Stain (08-22-21 @ 19:31):    Numerous polymorphonuclear leukocytes per low power field    Few Squamous epithelial cells per low power field    Few Gram positive cocci in pairs per oil power field  Preliminary Report (08-23-21 @ 16:38):    Normal Respiratory Liane present    Culture - Urine (collected 08-20-21 @ 20:57)  Source: Catheterized Catheterized  Final Report (08-21-21 @ 19:20):    No growth    Culture - Blood (collected 08-20-21 @ 20:56)  Source: .Blood Blood-Peripheral  Preliminary Report (08-21-21 @ 21:01):    No growth to date.    Culture - Blood (collected 08-20-21 @ 18:41)  Source: .Blood Blood-Peripheral  Preliminary Report (08-21-21 @ 19:02):    No growth to date.      RADIOLOGY:  Images below reviewed personally  Xray Chest 1 View- PORTABLE-Urgent (Xray Chest 1 View- PORTABLE-Urgent .) (08.22.21 @ 10:30)   Impression: ETT tip above evette. NG tip below diaphragm. Left IJ tip at the SVC. Heart unremarkable. Diffuse opacification of the lungs, mild-moderate. Pneumomediastinum resolved.

## 2021-08-23 NOTE — PROGRESS NOTE ADULT - ATTENDING COMMENTS
Agree with above  COVID unvaccinated with ARDS pneumonia  c/b cardiac arrest and intubation  Now on 28/400/50%/+10  D/C antibiotics  Now supined  Check anti-Xa level  I/O net even, target net negative 1L over next 24 hours  Paralyzed on ketamine and fent  c/w vaso / Levo to maintain MAP =65    I have personally provided 50 minutes of critical care time.

## 2021-08-23 NOTE — CONSULT NOTE ADULT - SUBJECTIVE AND OBJECTIVE BOX
Wound Surgery Consult NOte:    HPI:  70yo M w/ PMHx of HTN, DM2, BPH, asthma presents with shortness of breath. Patient endorses that he has been feeling SOB with associated cough for the last 2 weeks and his symptoms slowly worsened over time. He did not receive the COVID vaccine. He reports that his family members whom he lives with likely are also COVID positive but he does not know for sure. He did not try taking anything for his symptoms other than his usual medications/inhalers. There were no obvious aggravating or alleviating factors. Currently with supplemental oxygen on, he feels well and has no complaints, however he presented to [facility] since his symptoms were worsening. In the ED, he was hemodynamically stable, afebrile, but he was encephalopathic and hypoxic on room air requiring high flow nasal canula. Labs were significant for mild hyperkalemia and COVID positive. CXR prelim read showed bilateral patchy opacities. CT head showed no acute findings. MICU was consulted in the ED, patient was deemed not a MICU candidate. Patient was given doxycycline and dexamethasone x1 and admitted to general medicine for further management. At time of interview, patient was A/Ox3 with .  (13 Aug 2021 04:21)    Request for wound care consult for this patient who is high risk for pressure injury development due to need for proning. Mr. Nick is s/p proning and has not pressure injuries noted.    PAST MEDICAL & SURGICAL HISTORY:  BPH (benign prostatic hyperplasia)  Hyperlipemia  HTN (hypertension)  Hypercholesteremia  Asthma  Diabetes  Kidney stone  Bladder stone  H/O lithotripsy x 2  History of kidney stones  H/O umbilical hernia repair    MEDICATIONS  (STANDING):  ALBUTerol    90 MICROgram(s) HFA Inhaler 2 Puff(s) Inhalation every 6 hours  budesonide 160 MICROgram(s)/formoterol 4.5 MICROgram(s) Inhaler 2 Puff(s) Inhalation two times a day  chlorhexidine 0.12% Liquid 15 milliLiter(s) Oral Mucosa every 12 hours  chlorhexidine 4% Liquid 1 Application(s) Topical <User Schedule>  cholecalciferol 2000 Unit(s) Oral daily  cisatracurium Infusion 3 MICROgram(s)/kG/Min (14.7 mL/Hr) IV Continuous <Continuous>  enoxaparin Injectable 80 milliGRAM(s) SubCutaneous every 12 hours  fentaNYL   Infusion... 0.5 MICROgram(s)/kG/Hr (2.04 mL/Hr) IV Continuous <Continuous>  insulin lispro (ADMELOG) corrective regimen sliding scale   SubCutaneous every 6 hours  insulin NPH human recombinant 7 Unit(s) SubCutaneous every 6 hours  ketamine Infusion. 0.25 mG/kG/Hr (2.04 mL/Hr) IV Continuous <Continuous>  meropenem  IVPB 1000 milliGRAM(s) IV Intermittent every 8 hours  montelukast 10 milliGRAM(s) Oral daily  multivitamin/minerals Oral Solution (WELLESSE) 30 milliLiter(s) Enteral Tube daily  norepinephrine Infusion 0.05 MICROgram(s)/kG/Min (3.83 mL/Hr) IV Continuous <Continuous>  pantoprazole  Injectable 40 milliGRAM(s) IV Push daily  polyethylene glycol 3350 17 Gram(s) Oral daily  senna Syrup 20 milliLiter(s) Oral at bedtime  vasopressin Infusion 0.04 Unit(s)/Min (2.4 mL/Hr) IV Continuous <Continuous>    MEDICATIONS  (PRN):    Allergies    No Known Allergies    Intolerances    Vital Signs Last 24 Hrs  T(C): 36.7 (23 Aug 2021 07:15), Max: 37.6 (22 Aug 2021 18:00)  T(F): 98.1 (23 Aug 2021 07:15), Max: 99.7 (22 Aug 2021 18:00)  HR: 76 (23 Aug 2021 13:45) (71 - 87)  BP: --  BP(mean): --  RR: 28 (23 Aug 2021 13:45) (28 - 32)  SpO2: 94% (23 Aug 2021 13:45) (90% - 100%)    Physical Exam:  General: sedated, Obese   ENMT: orally intubaed  Respiratory: vented  Gastrointestinal: soft NT/ND  Neurology: nonverbal, not following commands  Musculoskeletal: no contractures  Vascular: BLE edema equal  Skin:  skin intact, moist w/ good turgor  No odor, erythema, increased warmth, tenderness, induration, fluctuance    LABS:  08-23    144  |  112<H>  |  41<H>  ----------------------------<  177<H>  4.1   |  18<L>  |  0.95    Ca    8.6      23 Aug 2021 00:36  Phos  4.0     08-23  Mg     2.4     08-23    TPro  5.5<L>  /  Alb  3.0<L>  /  TBili  0.6  /  DBili  x   /  AST  23  /  ALT  23  /  AlkPhos  113  08-23                          12.8   16.49 )-----------( 259      ( 23 Aug 2021 00:36 )             40.4     PT/INR - ( 22 Aug 2021 00:22 )   PT: 13.0 sec;   INR: 1.09 ratio         PTT - ( 22 Aug 2021 00:22 )  PTT:30.4 sec

## 2021-08-23 NOTE — PROGRESS NOTE ADULT - SUBJECTIVE AND OBJECTIVE BOX
72yo  Portuguese speaking male w/ PMHx of HTN, DM2, BPH, asthma presented on 8/12/21 from home with worsening shortness of breath x 2 weeks in setting of COVID-19 PNA . While managed on the medical floor, pt continues to experienced worsening hypoxic respiratory failure while on NIPPV, c/b worsening delirium/ agitation despite medical interventions. Pt is s/post multiple RRT 's for hypoxia / agitation/ delirium and non-compliance / not tolerating NIPPV .   Patient developed worsening respiratory failured and family has agreed to proceed with intubation. Post intubation pt is s/p cardiac arrest ; with ROSC 6 min, VT x 2 s/p electrical shock x 2 and 2 epi's. On arrival to the COVID-icu unit ; pt hemodynamic instability; chemically resuscitated . Family called for GOC; pt is full code.    Based on initial report, pt did not receive the COVID-19 vaccine. Also, family members whom he lives with are likely also COVID-19 positive.     INTERVAL HPI/OVER NIGHT EVENTS: Patient remained stable, continued to be proned overnight.    SUBJECTIVE: Patient seen and examined at bedside.       VITAL SIGNS:  ICU Vital Signs Last 24 Hrs  T(C): 37.1 (23 Aug 2021 04:00), Max: 37.6 (22 Aug 2021 18:00)  T(F): 98.8 (23 Aug 2021 04:00), Max: 99.7 (22 Aug 2021 18:00)  HR: 77 (23 Aug 2021 07:00) (61 - 87)  ABP: 97/51 (23 Aug 2021 07:00) (96/49 - 347/347)  ABP(mean): 68 (23 Aug 2021 07:00) (66 - 347)  RR: 28 (23 Aug 2021 07:00) (28 - 32)  SpO2: 96% (23 Aug 2021 07:00) (92% - 100%)    Mode: AC/ CMV (Assist Control/ Continuous Mandatory Ventilation), RR (machine): 28, TV (machine): 400, FiO2: 50, PEEP: 10,  Plateau pressure: 32  P/F ratio: 160    08-22 @ 07:01  -  08-23 @ 07:00  --------------------------------------------------------  IN: 1535.5 mL / OUT: 1565 mL / NET: -29.5 mL      POCT Blood Glucose.: 196 mg/dL (23 Aug 2021 06:07)    ECG: Ventricular Rate 119 BPM    Atrial Rate 119 BPM    QRS Duration 118 ms    Q-T Interval 366 ms    QTC Calculation(Bazett) 514 ms    R Axis 72 degrees    T Axis 71 degrees    Diagnosis Line ATRIAL FIBRILLATION WITH RAPID VENTRICULAR RESPONSE WITH PREMATURE VENTRICULAR OR ABERRANTLY CONDUCTED COMPLEXES  LOW VOLTAGE QRS  RIGHT BUNDLE BRANCH BLOCK  ABNORMAL ECG  Confirmed by TOD Mclaughlin Moussa ([id number]) on 8/21/2021 5:37:38 PM      PHYSICAL EXAM:  General: intubated, sedated and paralyzed, proned  HEENT: pupils sluggish   Neck: stable   Respiratory:  intubated, no mech. vent, diminished BS b/l base, no wheezing   Cardiovascular: + s1 & s2,   Abdomen: non distended, soft , hypoactive  BS x 4   Extremities: no edema , + pulses   Skin: warm and dry   Neurological: unable to assess mental status as pt is intubated and sedated and paralyzed   Psychiatry: unable to assess ; intubated and sedated     LINES: L LIJ TLC 8/20, L radial gricel 8/20    MEDICATIONS:  MEDICATIONS  (STANDING):  ALBUTerol    90 MICROgram(s) HFA Inhaler 2 Puff(s) Inhalation every 6 hours  budesonide 160 MICROgram(s)/formoterol 4.5 MICROgram(s) Inhaler 2 Puff(s) Inhalation two times a day  chlorhexidine 0.12% Liquid 15 milliLiter(s) Oral Mucosa every 12 hours  chlorhexidine 4% Liquid 1 Application(s) Topical <User Schedule>  cholecalciferol 2000 Unit(s) Oral daily  cisatracurium Infusion 3 MICROgram(s)/kG/Min (14.7 mL/Hr) IV Continuous <Continuous>  enoxaparin Injectable 80 milliGRAM(s) SubCutaneous every 12 hours  fentaNYL   Infusion... 0.5 MICROgram(s)/kG/Hr (2.04 mL/Hr) IV Continuous <Continuous>  insulin lispro (ADMELOG) corrective regimen sliding scale   SubCutaneous every 6 hours  insulin NPH human recombinant 7 Unit(s) SubCutaneous every 6 hours  ketamine Infusion. 0.25 mG/kG/Hr (2.04 mL/Hr) IV Continuous <Continuous>  meropenem  IVPB 1000 milliGRAM(s) IV Intermittent every 8 hours  montelukast 10 milliGRAM(s) Oral daily  multivitamin/minerals Oral Solution (WELLESSE) 30 milliLiter(s) Enteral Tube daily  norepinephrine Infusion 0.05 MICROgram(s)/kG/Min (3.83 mL/Hr) IV Continuous <Continuous>  pantoprazole  Injectable 40 milliGRAM(s) IV Push daily  polyethylene glycol 3350 17 Gram(s) Oral daily  senna Syrup 20 milliLiter(s) Oral at bedtime  vasopressin Infusion 0.04 Unit(s)/Min (2.4 mL/Hr) IV Continuous <Continuous>    MEDICATIONS  (PRN):      ALLERGIES:  No Known Allergies    LABS:                        12.8   16.49 )-----------( 259      ( 23 Aug 2021 00:36 )             40.4     08-23    144  |  112<H>  |  41<H>  ----------------------------<  177<H>  4.1   |  18<L>  |  0.95    Ca    8.6      23 Aug 2021 00:36  Phos  4.0     08-23  Mg     2.4     08-23    TPro  5.5<L>  /  Alb  3.0<L>  /  TBili  0.6  /  DBili  x   /  AST  23  /  ALT  23  /  AlkPhos  113  08-23    PT/INR - ( 22 Aug 2021 00:22 )   PT: 13.0 sec;   INR: 1.09 ratio         PTT - ( 22 Aug 2021 00:22 )  PTT:30.4 sec      RADIOLOGY & ADDITIONAL TESTS: Reviewed.    Assessment and Plan:   · Assessment	  72yo  Portuguese speaking male w/ PMHx of HTN, DM2, BPH, asthma presented on 8/12/21 from home with worsening shortness of breath x 2 weeks in setting of COVID-19 PNA now, with progressive worsening hypoxic respiratory failure , failed NIPPV support , s/p intubation and s/p  cardiac arrest afterwards; with ROSC 6 min, VT x 2 s/p electrical shock x 2 and 2 epi's. and transfer to [facility] ICU for monitoring and management. Patient remains intubated for respiratory failure, currently sedated, paralyzed and proned.     PLAN :    Neuro  -Baseline AOX3, Portuguese speaking now intubated, sedated and paralyzed  -sedation; Ketamine @ 3, Fent @ 2; titrated propofol off as pt with high triglycerides,  -nimbex @ 2; followed by gtt  goal TOF 2/4. ; if ABG remains stable will start weaning off paralytics   -neuro checks per ICU    Pulm  Acute hypoxic respiratory failure 2/2 COVID-19 PNA now in ARDS   ARDS protocol ; permissive hypercarbia  -pt prone (#1) 8/21 @ 10 am - ABG improved   -pt prone (#2) 8/22 @ 2;30pm   -pt proned overnight to be resupined this AM 8/23 at 8:30 AM  -vent setting changed to AC 28/400/50/10  -8/21 CXR to eval extension of SQ air ; preliminary results with R >L neck SQ air , pneumomediastinum ;  repeat CXR 8/22 with Diffuse opacification of the lungs, mild-moderate. Pneumomediastinum resolved.     -wean  vent settings as tolerated   -completed remdesevir 8/13-8/17  -c/w dexamethasone 8/13-8/23  -c/w neb tx   -chest PT , oral care , pulmonary toileting     r/o PE  Pt with + DVT , high r/f PE in setting of hypercoagulable state with COVID -19 infection   -Pt not hemodynamically  stable for CTA chest .   -it would not change  management as pt is already on full AC w/ Lovenox 80 BID for + DVT: ( stable renal functions )  -bleeding precaution   -trend DD,   - CE, ProBNP   -mechanical vent support   -TTE 8/20 ;  there are no interpretable images 2/2 SQ air       r/o other intrapulmonary infections process   - Cx sputum with few gram positive cocci in pairs, MRSA negative nasal swap   -continue empiric coverage with Meropenem      ASTHMA   -c/w home Symbicort  -c/w home montelukast  -c/w duoneb tx      CV  Cardiogenic shock s/p PEA followed by VT x 2 s/p ROSC,  Septic shock / hypovolemic shock/ distributive shock , + DVT , r/o PE, new onset A fib   -pt is post cardiac arrest post intubation with 6 minutes achieved ROSC; c/b VT x 2 s/p electrical shock x2   -new onset of A fib RVR w/ hemodynamic instability s/p amio 300 x1 ; converted to NSR   -on arrival to ICU s/p chemically resuscitated with no loss of pulse (8/19)   -remains in Shock with pressors support ; Vaso 0.04, Levo added again this morning  -titrating to MAP >65   -TTE; limited to due SQ air   -daily EKG; trend QTC    -c/w full AC on Lovenox sq 80 BID for + DVT and presumed PE   -pending repeat duplex       TAYLOR   -resolved   -c/t trend BUN/Cr, K   -mace for strict i&o's ; making good urine , s/p lasix 20 mg overnight with good OU response  -daily weight   -monitor lights     BPH (benign prostatic hyperplasia).   -holding all meds due to hypotension .  -mace placed for i&o's   -will resume when stable     GI  Oropharyngeal dysphagia   -OGT for TF @ 10 ml / hr while in prone .  -bowel regimen   -PPI q d   -aspiration precaution   -oral care   -LFT's stable     Hypertriglyceridemia   -likely in setting of propofol use   -titrated off propofol  -repeat triglycerides daily       ENDO  A1c 7.8  TF w/ Glucerna   -FS q 6 hrs   -ISS & NPH q 6 hrs       Heme   + DVT , presumed  PE  - in setting of CVOID-19 illness/ hypercoagulable state   -c/w FULL AC 80 BID   -DD 1144--> 3588-->1336-->1270 this AM  ; will trend M/W/F  -bleeding precaution   -repeat [facility] duplex pending       ID   Septic shock in setting of COVID-19 PNA   -IV pressors; levo restarted this AM, Vaso 0.04  support to maintain MAP>65  -UA & Ucx negative 8/20  -MRSA negative 8/21   -BC negative x 2 8/20  -sputum cx with few gram positive cocci   - fungitel pending results   -empiric abx ; meropenem 1g TID (8/20-) , Vanco 1 g x 1 (8/21)   -if worsening will add fungal coverage  -trend WBc 21-->15-->16  -trend T curve       COVID-19 PNA   - s/p Tocilizumab on 8/15  -s/p remdesevir 8/13-8/17  -c/w dexamethasone 8/13-8/23   -trend inflammatory markers   -ID consult on board     PPX   DVT ppx ; on FULL AC with lovenox SQ  Gi ppx; protonix IVP q d     GOC  FULL code at this time .   D/w son and 2 daughters    70yo  Ukrainian speaking male w/ PMHx of HTN, DM2, BPH, asthma presented on 8/12/21 from home with worsening shortness of breath x 2 weeks in setting of COVID-19 PNA . While managed on the medical floor, pt continues to experienced worsening hypoxic respiratory failure while on NIPPV, c/b worsening delirium/ agitation despite medical interventions. Pt is s/post multiple RRT 's for hypoxia / agitation/ delirium and non-compliance / not tolerating NIPPV .   Patient developed worsening respiratory failured and family has agreed to proceed with intubation. Post intubation pt is s/p cardiac arrest ; with ROSC 6 min, VT x 2 s/p electrical shock x 2 and 2 epi's. On arrival to the COVID-icu unit ; pt hemodynamic instability; chemically resuscitated . Family called for GOC; pt is full code.    Based on initial report, pt did not receive the COVID-19 vaccine. Also, family members whom he lives with are likely also COVID-19 positive.     INTERVAL HPI/OVER NIGHT EVENTS: Patient remained stable, continued to be proned overnight.    SUBJECTIVE: Patient seen and examined at bedside.       VITAL SIGNS:  ICU Vital Signs Last 24 Hrs  T(C): 37.1 (23 Aug 2021 04:00), Max: 37.6 (22 Aug 2021 18:00)  T(F): 98.8 (23 Aug 2021 04:00), Max: 99.7 (22 Aug 2021 18:00)  HR: 77 (23 Aug 2021 07:00) (61 - 87)  ABP: 97/51 (23 Aug 2021 07:00) (96/49 - 347/347)  ABP(mean): 68 (23 Aug 2021 07:00) (66 - 347)  RR: 28 (23 Aug 2021 07:00) (28 - 32)  SpO2: 96% (23 Aug 2021 07:00) (92% - 100%)    Mode: AC/ CMV (Assist Control/ Continuous Mandatory Ventilation), RR (machine): 28, TV (machine): 400, FiO2: 50, PEEP: 10,  Plateau pressure: 32  P/F ratio: 160    08-22 @ 07:01  -  08-23 @ 07:00  --------------------------------------------------------  IN: 1535.5 mL / OUT: 1565 mL / NET: -29.5 mL      POCT Blood Glucose.: 196 mg/dL (23 Aug 2021 06:07)    ECG: Ventricular Rate 119 BPM    Atrial Rate 119 BPM    QRS Duration 118 ms    Q-T Interval 366 ms    QTC Calculation(Bazett) 514 ms    R Axis 72 degrees    T Axis 71 degrees    Diagnosis Line ATRIAL FIBRILLATION WITH RAPID VENTRICULAR RESPONSE WITH PREMATURE VENTRICULAR OR ABERRANTLY CONDUCTED COMPLEXES  LOW VOLTAGE QRS  RIGHT BUNDLE BRANCH BLOCK  ABNORMAL ECG  Confirmed by TOD Mclaughlin Moussa ([id number]) on 8/21/2021 5:37:38 PM      PHYSICAL EXAM:  General: intubated, sedated and paralyzed, proned  HEENT: pupils sluggish   Neck: stable   Respiratory:  intubated, no mech. vent, diminished BS b/l base, no wheezing   Cardiovascular: + s1 & s2,   Abdomen: non distended, soft , hypoactive  BS x 4   Extremities: no edema , + pulses   Skin: warm and dry   Neurological: unable to assess mental status as pt is intubated and sedated and paralyzed   Psychiatry: unable to assess ; intubated and sedated     LINES: L LIJ TLC 8/20, L radial gricel 8/20    MEDICATIONS:  MEDICATIONS  (STANDING):  ALBUTerol    90 MICROgram(s) HFA Inhaler 2 Puff(s) Inhalation every 6 hours  budesonide 160 MICROgram(s)/formoterol 4.5 MICROgram(s) Inhaler 2 Puff(s) Inhalation two times a day  chlorhexidine 0.12% Liquid 15 milliLiter(s) Oral Mucosa every 12 hours  chlorhexidine 4% Liquid 1 Application(s) Topical <User Schedule>  cholecalciferol 2000 Unit(s) Oral daily  cisatracurium Infusion 3 MICROgram(s)/kG/Min (14.7 mL/Hr) IV Continuous <Continuous>  enoxaparin Injectable 80 milliGRAM(s) SubCutaneous every 12 hours  fentaNYL   Infusion... 0.5 MICROgram(s)/kG/Hr (2.04 mL/Hr) IV Continuous <Continuous>  insulin lispro (ADMELOG) corrective regimen sliding scale   SubCutaneous every 6 hours  insulin NPH human recombinant 7 Unit(s) SubCutaneous every 6 hours  ketamine Infusion. 0.25 mG/kG/Hr (2.04 mL/Hr) IV Continuous <Continuous>  meropenem  IVPB 1000 milliGRAM(s) IV Intermittent every 8 hours  montelukast 10 milliGRAM(s) Oral daily  multivitamin/minerals Oral Solution (WELLESSE) 30 milliLiter(s) Enteral Tube daily  norepinephrine Infusion 0.05 MICROgram(s)/kG/Min (3.83 mL/Hr) IV Continuous <Continuous>  pantoprazole  Injectable 40 milliGRAM(s) IV Push daily  polyethylene glycol 3350 17 Gram(s) Oral daily  senna Syrup 20 milliLiter(s) Oral at bedtime  vasopressin Infusion 0.04 Unit(s)/Min (2.4 mL/Hr) IV Continuous <Continuous>    MEDICATIONS  (PRN):      ALLERGIES:  No Known Allergies    LABS:                        12.8   16.49 )-----------( 259      ( 23 Aug 2021 00:36 )             40.4     08-23    144  |  112<H>  |  41<H>  ----------------------------<  177<H>  4.1   |  18<L>  |  0.95    Ca    8.6      23 Aug 2021 00:36  Phos  4.0     08-23  Mg     2.4     08-23    TPro  5.5<L>  /  Alb  3.0<L>  /  TBili  0.6  /  DBili  x   /  AST  23  /  ALT  23  /  AlkPhos  113  08-23    PT/INR - ( 22 Aug 2021 00:22 )   PT: 13.0 sec;   INR: 1.09 ratio         PTT - ( 22 Aug 2021 00:22 )  PTT:30.4 sec      RADIOLOGY & ADDITIONAL TESTS: Reviewed.    Assessment and Plan:   · Assessment	  70yo  Ukrainian speaking male w/ PMHx of HTN, DM2, BPH, asthma presented on 8/12/21 from home with worsening shortness of breath x 2 weeks in setting of COVID-19 PNA now, with progressive worsening hypoxic respiratory failure , failed NIPPV support , s/p intubation and s/p  cardiac arrest afterwards; with ROSC 6 min, VT x 2 s/p electrical shock x 2 and 2 epi's. and transfer to [facility] ICU for monitoring and management. Patient remains intubated for respiratory failure, currently sedated, paralyzed and proned.     PLAN :    Neuro  -Baseline AOX3, Ukrainian speaking now intubated, sedated and paralyzed, trile of Nimbex  -sedation; Ketamine @ 3, Fent @ 2; titrated propofol off as pt with high triglycerides,  -nimbex @ 2; followed by gtt  goal TOF 2/4. ; if ABG remains stable will start weaning off paralytics   -neuro checks per ICU    Pulm  Acute hypoxic respiratory failure 2/2 COVID-19 PNA now in ARDS   ARDS protocol ; permissive hypercarbia  -pt prone (#1) 8/21 @ 10 am - ABG improved   -pt prone (#2) 8/22 @ 2;30pm   -pt proned overnight to be resupined this AM 8/23 at 8:30 AM  -vent setting changed to AC 28/400/50/10  -8/21 CXR to eval extension of SQ air ; preliminary results with R >L neck SQ air , pneumomediastinum ;  repeat CXR 8/22 with Diffuse opacification of the lungs, mild-moderate. Pneumomediastinum resolved.     -wean  vent settings as tolerated   -completed remdesevir 8/13-8/17  -c/w dexamethasone 8/13-8/23  -c/w neb tx   -chest PT , oral care , pulmonary toileting     r/o PE  Pt with + DVT , high r/f PE in setting of hypercoagulable state with COVID -19 infection   -Pt not hemodynamically  stable for CTA chest .   -it would not change  management as pt is already on full AC w/ Lovenox 80 BID for + DVT: ( stable renal functions )  -bleeding precaution   -trend DD,   - CE, ProBNP   -mechanical vent support   -TTE 8/20 ;  there are no interpretable images 2/2 SQ air       r/o other intrapulmonary infections process   - Cx sputum with few gram positive cocci in pairs, MRSA negative nasal swap   -continue empiric coverage with Meropenem      ASTHMA   -c/w home Symbicort  -c/w home montelukast  -c/w duoneb tx      CV  Cardiogenic shock s/p PEA followed by VT x 2 s/p ROSC,  Septic shock / hypovolemic shock/ distributive shock , + DVT , r/o PE, new onset A fib   -pt is post cardiac arrest post intubation with 6 minutes achieved ROSC; c/b VT x 2 s/p electrical shock x2   -new onset of A fib RVR w/ hemodynamic instability s/p amio 300 x1 ; converted to NSR   -on arrival to ICU s/p chemically resuscitated with no loss of pulse (8/19)   -remains in Shock with pressors support ; Vaso 0.04, Levo added again this morning  -titrating to MAP >65   -TTE; limited to due SQ air   -daily EKG; trend QTC    -c/w full AC on Lovenox sq 80 BID for + DVT and presumed PE   -pending repeat duplex       TAYLOR   -resolved   -c/t trend BUN/Cr, K   -mace for strict i&o's ; making good urine , s/p lasix 20 mg overnight with good OU response, goal of negative 1 L net fluid status in next 24 hrs, repeat Lasix IVP as needed  -daily weight   -monitor lights     BPH (benign prostatic hyperplasia).   -holding all meds due to hypotension .  -mace placed for i&o's   -will resume when stable     GI  Oropharyngeal dysphagia   -OGT for TF @ 10 ml / hr while in prone .  -bowel regimen   -PPI q d   -aspiration precaution   -oral care   -LFT's stable     Hypertriglyceridemia   -likely in setting of propofol use   -titrated off propofol  -repeat triglycerides daily       ENDO  A1c 7.8  TF w/ Glucerna   -FS q 6 hrs   -ISS & NPH q 6 hrs       Heme   + DVT , presumed  PE  - in setting of CVOID-19 illness/ hypercoagulable state   -c/w FULL AC 80 BID   -DD 1144--> 3588-->1336-->1270 this AM  ; will trend M/W/F  -bleeding precaution   -repeat [facility] duplex pending       ID   Septic shock in setting of COVID-19 PNA   -IV pressors; levo restarted this AM, Vaso 0.04  support to maintain MAP>65  -UA & Ucx negative 8/20  -MRSA negative 8/21   -BC negative x 2 8/20  -sputum cx with few gram positive cocci   - fungitel pending results   -empiric abx ; meropenem 1g TID (8/20-) , Vanco 1 g x 1 (8/21)   -if worsening will add fungal coverage  -trend WBc 21-->15-->16  -trend T curve       COVID-19 PNA   - s/p Tocilizumab on 8/15  -s/p remdesevir 8/13-8/17  -c/w dexamethasone 8/13-8/23   -trend inflammatory markers   -ID consult on board     PPX   DVT ppx ; on FULL AC with lovenox SQ  Gi ppx; protonix IVP q d     GOC  FULL code at this time .   D/w son and 2 daughters

## 2021-08-23 NOTE — PROGRESS NOTE ADULT - ASSESSMENT
71M with diabetes and asthma, admitted 8/12/21 with COVID pneumonia.   Not vaccinated.   Tocilizumab 8/15. Completed Remdesivir 8/17 and Decadron 8/22.   Decompensated, intubated 8/20, suspect PE given bilateral leg DVTs.     Suggest  -supportive care and anticoagulation per protocol   -would stop Meropenem - afebrile, blood and sputum cultures negative to date, no consolidation on XR     Discussed with medicine     Patrick Paul MD   Infectious Disease   Pager [phone]   After 5PM and on weekends please page fellow on call or call [phone]

## 2021-08-24 NOTE — PROGRESS NOTE ADULT - ASSESSMENT
71M with diabetes and asthma, admitted 8/12/21 with COVID pneumonia.   Not vaccinated.   Tocilizumab 8/15. Completed Remdesivir 8/17 and Decadron 8/22.   Decompensated, intubated 8/20, suspect PE given bilateral leg DVTs.   Fever overnight raises concern for secondary infection given immunosuppressive therapy.     Suggest  -monitor cultures   -repeat two sets of blood cultures   -repeat CXR - continued opacities, left costophrenic angle obscured   -empiric Zosyn 3.375GM IV q8h, I don't think we need to start with a carbapenem   -supportive care and anticoagulation per protocol     Discussed with ICU     Patrick Paul MD   Infectious Disease   Pager [phone]   After 5PM and on weekends please page fellow on call or call [phone] 71M with diabetes and asthma, admitted 8/12/21 with COVID pneumonia.   Not vaccinated.   Tocilizumab 8/15. Completed Remdesivir 8/17 and Decadron 8/22.   Decompensated, intubated 8/20, suspect PE given bilateral leg DVTs.   Fever overnight and increased leukocytosis raises concern for secondary infection given immunosuppressive therapy.     Suggest  -monitor cultures   -repeat two sets of blood cultures   -repeat CXR - continued opacities, left costophrenic angle obscured   -empiric Zosyn 3.375GM IV q8h, I don't think we need to start with a carbapenem   -supportive care and anticoagulation per protocol     Discussed with ICU     Patrick Paul MD   Infectious Disease   Pager [phone]   After 5PM and on weekends please page fellow on call or call [phone]

## 2021-08-24 NOTE — PROGRESS NOTE ADULT - ATTENDING COMMENTS
Agree with above  Remains intubated 28/400/50%/+10.   O/N: FiO2 titrated, with range 50–70% overnight. Febrile to 38.5°C  Removed paralytics and that required increase in oxygen requirements, so paralytics were resumed  Remains supine  Inflammatory markers increased. Will resume dexamethasone @10mg  Anti-Xa levels are therapeutic, although d-dimer increasing  Procal stable / low  Empirically resumed Zosyn, even though bacterial infection is less likely given negative cultures and meropenem course. Can F/U repeat cultures.    I have personally provided 50 minutes of critical care time.

## 2021-08-24 NOTE — PROGRESS NOTE ADULT - ASSESSMENT
· Assessment	  72yo  Greek speaking male w/ PMHx of HTN, DM2, BPH, asthma presented on 8/12/21 from home with worsening shortness of breath x 2 weeks in setting of COVID-19 PNA now, with progressive worsening hypoxic respiratory failure , failed NIPPV support , s/p intubation and s/p cardiac arrest afterwards; with ROSC after 6 min, VT x 2 s/p electrical shock x 2 and 2 epi's. and transfer to [facility] ICU for monitoring and management. Patient remains intubated for respiratory failure, currently on mechanical ventilator, sedated and paralyzed.     PLAN :    Neuro  -Baseline AOX3, Greek speaking now intubated, sedated and paralyzed, trial of Nimbex  -sedation; Ketamine @ 4, Fent @ 2; titrated propofol off as pt with high triglycerides,  -nimbex @ 3; followed by gtt  goal TOF 2/4. ; if ABG remains stable will start weaning off paralytics   -neuro checks per ICU    Pulm  Acute hypoxic respiratory failure 2/2 COVID-19 PNA now in ARDS   ARDS protocol ; permissive hypercarbia  -pt prone (#1) 8/21 @ 10 am - ABG improved   -pt prone (#2) 8/22 @ 2:30pm   -pt proned overnight to be resupined this AM 8/23 at 8:30 AM, remains supined, prone if hypoxia worsens  -vent setting changed to AC 28/400/50/10  -8/21 CXR to eval extension of SQ air ; preliminary results with R >L neck SQ air , pneumomediastinum ;  repeat CXR 8/22 with Diffuse opacification of the lungs, mild-moderate. Pneumomediastinum resolved.     -wean vent settings as tolerated   -completed remdesevir 8/13-8/17  -completed dexamethasone 8/13-8/23  -c/w neb tx   -chest PT, oral care, pulmonary toileting     r/o PE  Pt with + DVT on repeated duplex 9/23, high r/f PE in setting of hypercoagulable state with COVID -19 infection   -Pt not hemodynamically stable for CTA chest.   -it would not  as pt is already on full AC w/ Lovenox 80 BID for + DVT, Anti 10a factor level 1.7  -bleeding precaution   -trend DD,   -CE, ProBNP   -mechanical vent support   -TTE 8/20;  there are no interpretable images 2/2 SQ air     r/o other intrapulmonary infections process   - Cx sputum with normal stephane with few gram positive cocci in pairs, MRSA negative nasal swap   - empiric coverage with Meropenem stopped 3/23 after consulting with ID as blood cultures, urine cultures sputum cultures remain negative and CXR with no signs of PNA, today WBC 24 from 16 and spike of fever overnight,     ASTHMA   -c/w home Symbicort  -c/w home montelukast  -c/w duoneb tx      CV  Cardiogenic shock s/p PEA followed by VT x 2 s/p ROSC,  Septic shock / hypovolemic shock/ distributive shock , + DVT , r/o PE, new onset A fib   -pt is post cardiac arrest post intubation with 6 minutes achieved ROSC; c/b VT x 2 s/p electrical shock x2   -new onset of A fib RVR w/ hemodynamic instability s/p amio 300 x1 ; converted to NSR   -on arrival to ICU s/p chemically resuscitated with no loss of pulse (8/19)   -remains in Shock with pressors support ; Vaso 0.04, continue Levo, currently on 0.18  -titrating to MAP >65   -TTE; limited to due SQ air   -daily EKG; trend QTC    -c/w full AC on Lovenox sq 80 BID for + DVT and presumed PE   - repeated duplex on 8/23 with persisting lower extremity DVT b/l      TAYLOR   -resolved initially, Cr/BUN increased today, patient s/p Lasix on 8/23 to achieve goal of negative 1 L net fluid status  -c/t trend BUN/Cr, K  -mace for strict i&o's ; making good urine , s/p lasix on 8/23 with good OU response, goal of negative 1 L net fluid status  -daily weight   -monitor lights     BPH (benign prostatic hyperplasia).   -holding all meds due to hypotension.  -mace placed for i&o's   -will resume when stable     GI  Oropharyngeal dysphagia   -OGT for TF @ 10 ml / hr while in prone, increase to TF goal while supined.  -bowel regimen   -PPI q d   -aspiration precaution   -oral care   -LFT's increased today     Hypertriglyceridemia   -likely in setting of propofol use   -titrated off propofol  -repeat triglycerides daily      ENDO  A1c 7.8  TF w/ Glucerna   -FS q 6 hrs   -ISS & NPH q 6 hrs       Heme   + DVT , presumed  PE  - in setting of CVOID-19 illness/ hypercoagulable state   -c/w FULL AC 80 BID, anti 10A factor 1.7 on 8/23  -DD 1144--> 3588-->1336-->1270-->up trended to 2006 this AM; will trend M/W/F  -bleeding precaution   -repeat [facility] duplex pending       ID   Septic shock in setting of COVID-19 PNA   -IV pressors; levo, Vaso support to maintain MAP>65  -UA & Ucx negative 8/20  -MRSA negative 8/21   -BC negative x 2 8/20, repeat B.C. x 2 today as pt febrile over night and WBC uptrended  to 24.79 from 16.49  -sputum cx with normal stephane few gram positive cocci   - fungitel pending results   -empiric abx ; meropenem 1g TID (8/20-8/23) , Vanco 1 g x 1 (8/21), restart ABX coverage with Zosyn, case discussed with ID consultant  -if worsening will add fungal coverage  -trend WBc 21-->15-->16-->24  -trend T curve       COVID-19 PNA   - s/p Tocilizumab on 8/15  -s/p remdesevir 8/13-8/17  -c/w dexamethasone 8/13-8/23   -trend inflammatory markers   -ID consult on board     PPX   DVT ppx ; on FULL AC with lovenox SQ  Gi ppx; protonix IVP q d     GOC  FULL code at this time .   D/w son and 2 daughters  · Assessment	  72yo  Mohawk speaking male w/ PMHx of HTN, DM2, BPH, asthma presented on 8/12/21 from home with worsening shortness of breath x 2 weeks in setting of COVID-19 PNA now, with progressive worsening hypoxic respiratory failure , failed NIPPV support , s/p intubation and s/p cardiac arrest afterwards; with ROSC after 6 min, VT x 2 s/p electrical shock x 2 and 2 epi's. and transfer to [facility] ICU for monitoring and management. Patient remains intubated for respiratory failure, currently on mechanical ventilator, sedated and paralyzed.     PLAN :    Neuro  -Baseline AOX3, Mohawk speaking now intubated, sedated and paralyzed, trial of Nimbex  -sedation; Ketamine @ 4, Fent @ 2; titrated propofol off as pt with high triglycerides,  -nimbex @ 3; followed by gtt  goal TOF 2/4; if ABG remains stable will start weaning off paralytics   -neuro checks per ICU    Pulm  Acute hypoxic respiratory failure 2/2 COVID-19 PNA now in ARDS   ARDS protocol ; permissive hypercarbia  -pt prone (#1) 8/21 @ 10 am - ABG improved   -pt prone (#2) 8/22 @ 2:30pm   -pt proned overnight to be resupined this AM 8/23 at 8:30 AM, remains supined, prone if hypoxia worsens  -vent setting changed to AC 28/400/50/10  -8/21 CXR to eval extension of SQ air ; preliminary results with R >L neck SQ air , pneumomediastinum ;  repeat CXR 8/22 with Diffuse opacification of the lungs, mild-moderate. Pneumomediastinum resolved.     -wean vent settings as tolerated   -completed remdesevir 8/13-8/17  -completed dexamethasone 8/13-8/23  -c/w neb tx   -chest PT, oral care, pulmonary toileting     r/o PE  Pt with + DVT on repeated duplex 9/23, high r/f PE in setting of hypercoagulable state with COVID -19 infection   -Pt not hemodynamically stable for CTA chest.   -it would not  as pt is already on full AC w/ Lovenox 80 BID for + DVT, Anti 10a factor level 1.7  -bleeding precaution   -trend DD,   -CE, ProBNP   -mechanical vent support   -TTE 8/20;  there are no interpretable images 2/2 SQ air     r/o other intrapulmonary infections process   - Cx sputum with normal stephane with few gram positive cocci in pairs, MRSA negative nasal swap   - empiric coverage with Meropenem stopped 3/23 after consulting with ID as blood cultures, urine cultures sputum cultures remain negative and CXR with no signs of PNA, today WBC 24 from 16 and spike of fever overnight,     ASTHMA   -c/w home Symbicort  -c/w home montelukast  -c/w duoneb tx      CV  Cardiogenic shock s/p PEA followed by VT x 2 s/p ROSC,  Septic shock / hypovolemic shock/ distributive shock , + DVT , r/o PE, new onset A fib   -pt is post cardiac arrest post intubation with 6 minutes achieved ROSC; c/b VT x 2 s/p electrical shock x2   -new onset of A fib RVR w/ hemodynamic instability s/p amio 300mg x1 ; converted to NSR   -on arrival to ICU s/p chemically resuscitated with no loss of pulse (8/19)   -remains in Shock with pressors support ; Vaso 0.04, continue Levo, currently on 0.18  -titrating to MAP >65   -TTE; limited to due SQ air   -daily EKG; trend QTC    -c/w full AC on Lovenox sq 80 BID for + DVT and presumed PE   - repeated duplex on 8/23 with persisting lower extremity DVT b/l      TAYLOR   -resolved initially, Cr/BUN increased today, patient s/p Lasix on 8/23 to achieve goal of negative 1 L net fluid status  -c/t trend BUN/Cr, K  -mace for strict i&o's ; making good urine , s/p lasix on 8/23 with good OU response, goal of negative 1 L net fluid status  -daily weight   -monitor lights     BPH (benign prostatic hyperplasia).   -holding all meds due to hypotension.  -mace placed for i&o's   -will resume when stable     GI  Oropharyngeal dysphagia   -OGT for TF @ 10 ml / hr while in prone, increase to TF goal while supined.  -bowel regimen   -PPI q d   -aspiration precaution   -oral care   -LFT's increased today     Hypertriglyceridemia   -likely in setting of propofol use   -titrated off propofol  -repeat triglycerides daily      ENDO  A1c 7.8  TF w/ Glucerna   -FS q 6 hrs   -ISS & NPH q 6 hrs       Heme   + DVT , presumed  PE  - in setting of CVOID-19 illness/ hypercoagulable state   -c/w FULL AC 80 BID, anti 10A factor 1.7 on 8/23  -DD 1144--> 3588-->1336-->1270-->up trended to 2006 this AM; will trend M/W/F  -bleeding precaution   -repeat [facility] duplex pending       ID   Septic shock in setting of COVID-19 PNA   -IV pressors; levo, Vaso support to maintain MAP>65  -UA & Ucx negative 8/20  -MRSA negative 8/21   -BC negative x 2 8/20, repeat B.C. x 2 today as pt febrile over night and WBC uptrended  to 24.79 from 16.49  -sputum cx with normal stephane few gram positive cocci   - fungitel pending results   -empiric abx ; meropenem 1g TID (8/20-8/23) , Vanco 1 g x 1 (8/21), restart ABX coverage with Zosyn, case also discussed with ID consultant  -if worsening will add fungal coverage  -trend WBc 21-->15-->16-->24  -trend T curve       COVID-19 PNA   - s/p Tocilizumab on 8/15  -s/p remdesevir 8/13-8/17  -c/w dexamethasone 8/13-8/23   -trend inflammatory markers   -ID consult on board     PPX   DVT ppx ; on FULL AC with lovenox SQ  Gi ppx; protonix IVP q d     GOC  FULL code at this time.   Goals of care d/w son and 2 daughters  · Assessment	  70yo  Thai speaking male w/ PMHx of HTN, DM2, BPH, asthma presented on 8/12/21 from home with worsening shortness of breath x 2 weeks in setting of COVID-19 PNA now, with progressive worsening hypoxic respiratory failure , failed NIPPV support , s/p intubation and s/p cardiac arrest afterwards; with ROSC after 6 min, VT x 2 s/p electrical shock x 2 and 2 epi's. and transfer to [facility] ICU for monitoring and management. Patient remains intubated for respiratory failure, currently on mechanical ventilator, sedated and paralyzed.     PLAN :    Neuro  -Baseline AOX3, Thai speaking now intubated, sedated and paralyzed, trial of Nimbex  -sedation; Ketamine @ 4, Fent @ 2; titrated propofol off as pt with high triglycerides,  -nimbex @ 3; followed by gtt  goal TOF 2/4; if ABG remains stable will start weaning off paralytics, currently doing well on Nimbex of 2  -neuro checks per ICU    Pulm  Acute hypoxic respiratory failure 2/2 COVID-19 PNA now in ARDS   ARDS protocol ; permissive hypercarbia  -pt prone (#1) 8/21 @ 10 am - ABG improved   -pt prone (#2) 8/22 @ 2:30pm   -pt proned overnight to be resupined this AM 8/23 at 8:30 AM, remains supined, prone if hypoxia worsens  -vent setting changed to AC 28/400/50/10  -8/21 CXR to eval extension of SQ air ; preliminary results with R >L neck SQ air , pneumomediastinum ;  repeat CXR 8/22 with Diffuse opacification of the lungs, mild-moderate. Pneumomediastinum resolved, repeat CXR today     -wean vent settings as tolerated   -completed remdesevir 8/13-8/17  -completed dexamethasone 8/13-8/23  -c/w neb tx   -chest PT, oral care, pulmonary toileting     r/o PE  Pt with + DVT on repeated duplex 9/23, high r/f PE in setting of hypercoagulable state with COVID -19 infection   -Pt not hemodynamically stable for CTA chest.   -it would not  as pt is already on full AC w/ Lovenox 80 BID for + DVT, Anti 10a factor level 1.7  -bleeding precaution   -trend DD,   -CE, ProBNP   -mechanical vent support   -TTE 8/20;  there are no interpretable images 2/2 SQ air     r/o other intrapulmonary infections process   - Cx sputum with normal stephane with few gram positive cocci in pairs, MRSA negative nasal swap   - empiric coverage with Meropenem stopped 3/23 after consulting with ID as blood cultures, urine cultures sputum cultures remain negative and CXR with no signs of PNA, today WBC 24 from 16 and spike of fever overnight,     ASTHMA   -c/w home Symbicort  -c/w home montelukast  -c/w duoneb tx      CV  Cardiogenic shock s/p PEA followed by VT x 2 s/p ROSC,  Septic shock / hypovolemic shock/ distributive shock , + DVT , r/o PE, new onset A fib   -pt is post cardiac arrest post intubation with 6 minutes achieved ROSC; c/b VT x 2 s/p electrical shock x2   -new onset of A fib RVR w/ hemodynamic instability s/p amio 300mg x1 ; converted to NSR   -on arrival to ICU s/p chemically resuscitated with no loss of pulse (8/19)   -remains in Shock with pressors support ; Vaso 0.04, continue Levo, currently on 0.18  -titrating to MAP >65   -TTE; limited to due SQ air   -repeat EKG today with normal sinus rhythm and improved QTC,   -c/w full AC on Lovenox sq 80 BID for + DVT and presumed PE   - repeated duplex on 8/23 with persisting lower extremity DVT b/l      TAYLOR   -resolved initially, Cr/BUN increased today, patient s/p Lasix on 8/23 to achieve goal of negative 1 L net fluid status,   -c/t trend BUN/Cr, K  -mace for strict i&o's ; making good urine , s/p lasix on 8/23 with good OU response, continue goal of negative 1 L net fluid status  -daily weight   -monitor lights     BPH (benign prostatic hyperplasia).   -holding all meds due to hypotension.  -mace placed for i&o's   -will resume when stable     GI  Oropharyngeal dysphagia   -OGT for TF @ 10 ml / hr while in prone, increase to TF goal to 40 ml/hr while supined.  -bowel regimen   -PPI q d   -aspiration precaution   -oral care   -LFT's increased today     Hypertriglyceridemia   -likely in setting of propofol use   -titrated off propofol  -repeat triglycerides daily      ENDO  A1c 7.8  TF w/ Glucerna   -FS q 6 hrs   -ISS & NPH q 6 hrs       Heme   + DVT , presumed  PE  - in setting of CVOID-19 illness/ hypercoagulable state   -c/w FULL AC 80 BID, anti 10A factor 1.7 on 8/23  -DD 1144--> 3588-->1336-->1270-->up trended to 2006 this AM; will trend M/W/F  -bleeding precaution   -repeat [facility] duplex pending       ID   Septic shock in setting of COVID-19 PNA   -IV pressors; levo, Vaso support to maintain MAP>65  -UA & Ucx negative 8/20  -MRSA negative 8/21   -BC negative x 2 8/20, repeat B.C. x 2 today as pt febrile over night and WBC uptrended  to 24.79 from 16.49  -sputum cx with normal stephane few gram positive cocci   - fungitel pending results   -empiric abx ; meropenem 1g TID (8/20-8/23) , Vanco 1 g x 1 (8/21), restart ABX coverage with Zosyn, repeat CXR today, case also discussed with ID consultant  -if worsening will add fungal coverage  -trend WBc 21-->15-->16-->24  -trend T curve       COVID-19 PNA   - s/p Tocilizumab on 8/15  -s/p remdesevir 8/13-8/17  -c/w dexamethasone 8/13-8/23   -trend inflammatory markers   -ID consult on board     PPX   DVT ppx ; on FULL AC with lovenox SQ  Gi ppx; protonix IVP q d     GOC  FULL code at this time.   Goals of care d/w son and 2 daughters

## 2021-08-24 NOTE — PROGRESS NOTE ADULT - SUBJECTIVE AND OBJECTIVE BOX
72yo  Mongolian speaking male w/ PMHx of HTN, DM2, BPH, asthma presented on 8/12/21 from home with worsening shortness of breath x 2 weeks in setting of COVID-19 PNA . While managed on the medical floor, pt continues to experienced worsening hypoxic respiratory failure while on NIPPV, c/b worsening delirium/ agitation despite medical interventions. Pt is s/post multiple RRT 's for hypoxia / agitation/ delirium and non-compliance / not tolerating NIPPV .   Patient developed worsening respiratory failure and family has agreed to proceed with intubation. Post intubation pt had cardiac arrest; with ROSC after 6 min, VT x 2 s/p electrical shock x 2 and 2 epi's. On arrival to the COVID-icu unit ; pt hemodynamic instability; chemically resuscitated. Family called for GOC; pt is full code.    Based on initial report, pt did not receive the COVID-19 vaccine. Also, family members whom he lives with are likely also COVID-19 positive.     INTERVAL HPI/ OVER NIGHT EVENTS: Patient remained stable, but due to increased Fio2 requirement pt was placed back on NIMBEX gtt. Patient also spiked fever of 38.6, was given Tylenol, this AM WBC up trended to 25 from 16.    SUBJECTIVE: Patient seen and examined at bedside.       VITAL SIGNS:  ICU Vital Signs Last 24 Hrs  T(C): 38.1 (24 Aug 2021 04:00), Max: 38.5 (24 Aug 2021 00:00)  T(F): 100.6 (24 Aug 2021 04:00), Max: 101.3 (24 Aug 2021 00:00)  HR: 90 (24 Aug 2021 06:45) (71 - 100)  ABP: 98/51 (24 Aug 2021 06:45) (86/47 - 163/77)  ABP(mean): 69 (24 Aug 2021 06:45) (61 - 110)  RR: 28 (24 Aug 2021 06:00) (12 - 28)  SpO2: 98% (24 Aug 2021 06:45) (89% - 100%)    Mode: AC/ CMV (Assist Control/ Continuous Mandatory Ventilation), RR (machine): 28, TV (machine): 400, FiO2: 60, PEEP: 10, ITime: 1, MAP: 16, PIP: 32  Plateau pressure: 29  P/F ratio: 134    08-22 @ 07:01  -  08-23 @ 07:00  --------------------------------------------------------  IN: 1593.5 mL / OUT: 1580 mL / NET: 13.5 mL    08-23 @ 07:01  - 08-24 @ 06:57  --------------------------------------------------------  IN: 1371.6 mL / OUT: 2225 mL / NET: -853.3 mL      CAPILLARY BLOOD GLUCOSE  POCT Blood Glucose.: 169 mg/dL (23 Aug 2021 17:37)    ECG< from: 12 Lead ECG (08.20.21 @ 15:26) >  Ventricular Rate 119 BPM    Atrial Rate 119 BPM    QRS Duration 118 ms    Q-T Interval 366 ms    QTC Calculation(Bazett) 514 ms    R Axis 72 degrees    T Axis 71 degrees    Diagnosis Line ATRIAL FIBRILLATION WITH RAPID VENTRICULAR RESPONSE WITH PREMATURE VENTRICULAR OR ABERRANTLY CONDUCTED COMPLEXES  LOW VOLTAGE QRS  RIGHT BUNDLE BRANCH BLOCK  ABNORMAL ECG  Confirmed by TOD Mclaughlin Moussa ([id number]) on 8/21/2021 5:37:38 PM    < end of copied text >    PHYSICAL EXAM:  General: intubated, sedated and paralyzed  HEENT: pupils sluggish   Neck: stable   Respiratory:  intubated, on mechanical vent, diminished BS b/l base, no wheezing   Cardiovascular: + s1 & s2 present  Abdomen: non distended, soft , hypoactive  BS x 4   Extremities: no edema , + pulses   Skin: warm and dry  Neurological: unable to assess mental status as pt is intubated and sedated and paralyzed   Psychiatry: unable to assess; intubated and sedated     LINES: L LIJ TLC 8/20, L radial gricel 8/20      MEDICATIONS:  MEDICATIONS  (STANDING):  ALBUTerol    90 MICROgram(s) HFA Inhaler 2 Puff(s) Inhalation every 6 hours  budesonide 160 MICROgram(s)/formoterol 4.5 MICROgram(s) Inhaler 2 Puff(s) Inhalation two times a day  chlorhexidine 0.12% Liquid 15 milliLiter(s) Oral Mucosa every 12 hours  chlorhexidine 4% Liquid 1 Application(s) Topical <User Schedule>  cholecalciferol 2000 Unit(s) Oral daily  cisatracurium Infusion 3 MICROgram(s)/kG/Min (14.7 mL/Hr) IV Continuous <Continuous>  enoxaparin Injectable 80 milliGRAM(s) SubCutaneous every 12 hours  fentaNYL   Infusion... 0.5 MICROgram(s)/kG/Hr (2.04 mL/Hr) IV Continuous <Continuous>  insulin lispro (ADMELOG) corrective regimen sliding scale   SubCutaneous every 6 hours  insulin NPH human recombinant 7 Unit(s) SubCutaneous every 6 hours  ketamine Infusion. 0.25 mG/kG/Hr (2.04 mL/Hr) IV Continuous <Continuous>  montelukast 10 milliGRAM(s) Oral daily  multivitamin/minerals Oral Solution (WELLESSE) 30 milliLiter(s) Enteral Tube daily  norepinephrine Infusion 0.05 MICROgram(s)/kG/Min (3.83 mL/Hr) IV Continuous <Continuous>  pantoprazole  Injectable 40 milliGRAM(s) IV Push daily  polyethylene glycol 3350 17 Gram(s) Oral daily  senna Syrup 20 milliLiter(s) Oral at bedtime  vasopressin Infusion 0.04 Unit(s)/Min (2.4 mL/Hr) IV Continuous <Continuous>    ALLERGIES:  No Known Allergies  Intolerances      LABS:                        12.6   24.79 )-----------( 297      ( 24 Aug 2021 00:50 )             40.1     08-24    148<H>  |  111<H>  |  47<H>  ----------------------------<  150<H>  4.2   |  22  |  1.19    Ca    8.5      24 Aug 2021 00:50  Phos  4.3     08-24  Mg     2.4     08-24    TPro  5.7<L>  /  Alb  3.1<L>  /  TBili  0.7  /  DBili  x   /  AST  47<H>  /  ALT  46<H>  /  AlkPhos  162<H>  08-24    PT/INR - ( 24 Aug 2021 00:50 )   PT: 12.8 sec;   INR: 1.07 ratio         RADIOLOGY & ADDITIONAL TESTS: Reviewed. 70yo  Danish speaking male w/ PMHx of HTN, DM2, BPH, asthma presented on 8/12/21 from home with worsening shortness of breath x 2 weeks in setting of COVID-19 PNA . While managed on the medical floor, pt continues to experienced worsening hypoxic respiratory failure while on NIPPV, c/b worsening delirium/ agitation despite medical interventions. Pt is s/post multiple RRT 's for hypoxia / agitation/ delirium and non-compliance / not tolerating NIPPV .   Patient developed worsening respiratory failure and family has agreed to proceed with intubation. Post intubation pt had cardiac arrest; with ROSC after 6 min, VT x 2 s/p electrical shock x 2 and 2 epi's. On arrival to the COVID-icu unit ; pt hemodynamic instability; chemically resuscitated. Family called for GOC; pt is full code.    Based on initial report, pt did not receive the COVID-19 vaccine. Also, family members whom he lives with are likely also COVID-19 positive.     INTERVAL HPI/ OVER NIGHT EVENTS: Patient remained stable, but due to increased Fio2 requirement pt was placed back on NIMBEX gtt. Patient also spiked fever of 38.6, was given Tylenol, this AM WBC up trended to 25 from 16.    SUBJECTIVE: Patient seen and examined at bedside.       VITAL SIGNS:  ICU Vital Signs Last 24 Hrs  T(C): 38.1 (24 Aug 2021 04:00), Max: 38.5 (24 Aug 2021 00:00)  T(F): 100.6 (24 Aug 2021 04:00), Max: 101.3 (24 Aug 2021 00:00)  HR: 90 (24 Aug 2021 06:45) (71 - 100)  ABP: 98/51 (24 Aug 2021 06:45) (86/47 - 163/77)  ABP(mean): 69 (24 Aug 2021 06:45) (61 - 110)  RR: 28 (24 Aug 2021 06:00) (12 - 28)  SpO2: 98% (24 Aug 2021 06:45) (89% - 100%)    Mode: AC/ CMV (Assist Control/ Continuous Mandatory Ventilation), RR (machine): 28, TV (machine): 400, FiO2: 60, PEEP: 10, ITime: 1, MAP: 16, PIP: 32  Plateau pressure: 29  P/F ratio: 134    08-22 @ 07:01  -  08-23 @ 07:00  --------------------------------------------------------  IN: 1593.5 mL / OUT: 1580 mL / NET: 13.5 mL    08-23 @ 07:01 - 08-24 @ 06:57  --------------------------------------------------------  IN: 1371.6 mL / OUT: 2225 mL / NET: -853.3 mL      CAPILLARY BLOOD GLUCOSE  POCT Blood Glucose.: 169 mg/dL (23 Aug 2021 17:37)      PHYSICAL EXAM:  General: intubated, sedated and paralyzed  HEENT: pupils sluggish   Neck: stable   Respiratory:  intubated, on mechanical vent, diminished BS b/l base, no wheezing   Cardiovascular: + s1 & s2 present  Abdomen: non distended, soft , hypoactive  BS x 4   Extremities: no edema , + pulses   Skin: warm and dry  Neurological: unable to assess mental status as pt is intubated and sedated and paralyzed   Psychiatry: unable to assess; intubated and sedated     LINES: L LIJ TLC 8/20, L radial gricel 8/20      MEDICATIONS:  MEDICATIONS  (STANDING):  ALBUTerol    90 MICROgram(s) HFA Inhaler 2 Puff(s) Inhalation every 6 hours  budesonide 160 MICROgram(s)/formoterol 4.5 MICROgram(s) Inhaler 2 Puff(s) Inhalation two times a day  chlorhexidine 0.12% Liquid 15 milliLiter(s) Oral Mucosa every 12 hours  chlorhexidine 4% Liquid 1 Application(s) Topical <User Schedule>  cholecalciferol 2000 Unit(s) Oral daily  cisatracurium Infusion 3 MICROgram(s)/kG/Min (14.7 mL/Hr) IV Continuous <Continuous>  enoxaparin Injectable 80 milliGRAM(s) SubCutaneous every 12 hours  fentaNYL   Infusion... 0.5 MICROgram(s)/kG/Hr (2.04 mL/Hr) IV Continuous <Continuous>  insulin lispro (ADMELOG) corrective regimen sliding scale   SubCutaneous every 6 hours  insulin NPH human recombinant 7 Unit(s) SubCutaneous every 6 hours  ketamine Infusion. 0.25 mG/kG/Hr (2.04 mL/Hr) IV Continuous <Continuous>  montelukast 10 milliGRAM(s) Oral daily  multivitamin/minerals Oral Solution (WELLESSE) 30 milliLiter(s) Enteral Tube daily  norepinephrine Infusion 0.05 MICROgram(s)/kG/Min (3.83 mL/Hr) IV Continuous <Continuous>  pantoprazole  Injectable 40 milliGRAM(s) IV Push daily  polyethylene glycol 3350 17 Gram(s) Oral daily  senna Syrup 20 milliLiter(s) Oral at bedtime  vasopressin Infusion 0.04 Unit(s)/Min (2.4 mL/Hr) IV Continuous <Continuous>    ALLERGIES:  No Known Allergies  Intolerances      LABS:                        12.6   24.79 )-----------( 297      ( 24 Aug 2021 00:50 )             40.1     08-24    148<H>  |  111<H>  |  47<H>  ----------------------------<  150<H>  4.2   |  22  |  1.19    Ca    8.5      24 Aug 2021 00:50  Phos  4.3     08-24  Mg     2.4     08-24    TPro  5.7<L>  /  Alb  3.1<L>  /  TBili  0.7  /  DBili  x   /  AST  47<H>  /  ALT  46<H>  /  AlkPhos  162<H>  08-24    PT/INR - ( 24 Aug 2021 00:50 )   PT: 12.8 sec;   INR: 1.07 ratio         RADIOLOGY & ADDITIONAL TESTS: Reviewed.

## 2021-08-24 NOTE — PROGRESS NOTE ADULT - SUBJECTIVE AND OBJECTIVE BOX
Follow Up:  COVID    Interval History/ROS: Fevers overnight into this morning. Remains intubated, on pressors.     Allergies  No Known Allergies        ANTIMICROBIALS:  piperacillin/tazobactam IVPB.. 3.375 every 8 hours      OTHER MEDS:  ALBUTerol    90 MICROgram(s) HFA Inhaler 2 Puff(s) Inhalation every 6 hours  budesonide 160 MICROgram(s)/formoterol 4.5 MICROgram(s) Inhaler 2 Puff(s) Inhalation two times a day  chlorhexidine 0.12% Liquid 15 milliLiter(s) Oral Mucosa every 12 hours  chlorhexidine 4% Liquid 1 Application(s) Topical <User Schedule>  cholecalciferol 2000 Unit(s) Oral daily  cisatracurium Infusion 3 MICROgram(s)/kG/Min IV Continuous <Continuous>  dexAMETHasone  IVPB 10 milliGRAM(s) IV Intermittent daily  enoxaparin Injectable 80 milliGRAM(s) SubCutaneous every 12 hours  fentaNYL   Infusion... 0.5 MICROgram(s)/kG/Hr IV Continuous <Continuous>  insulin lispro (ADMELOG) corrective regimen sliding scale   SubCutaneous every 6 hours  insulin NPH human recombinant 7 Unit(s) SubCutaneous every 6 hours  ketamine Infusion. 0.25 mG/kG/Hr IV Continuous <Continuous>  montelukast 10 milliGRAM(s) Oral daily  multivitamin/minerals Oral Solution (WELLESSE) 30 milliLiter(s) Enteral Tube daily  norepinephrine Infusion 0.05 MICROgram(s)/kG/Min IV Continuous <Continuous>  pantoprazole  Injectable 40 milliGRAM(s) IV Push daily  polyethylene glycol 3350 17 Gram(s) Oral daily  senna Syrup 20 milliLiter(s) Oral at bedtime  vasopressin Infusion 0.04 Unit(s)/Min IV Continuous <Continuous>      Vital Signs Last 24 Hrs  T(C): 37.4 (24 Aug 2021 15:00), Max: 38.5 (24 Aug 2021 00:00)  T(F): 99.3 (24 Aug 2021 15:00), Max: 101.3 (24 Aug 2021 00:00)  HR: 84 (24 Aug 2021 16:00) (75 - 100)  BP: --  BP(mean): --  RR: 28 (24 Aug 2021 16:00) (12 - 28)  SpO2: 99% (24 Aug 2021 16:00) (89% - 100%)    Physical Exam:  General: sedated, intubated   Head: atraumatic, normocephalic  ENT: ET and OG tubes   Cardio: regular rate   Respiratory: mechanically ventilated   abd: soft, bowel sounds present, no tenderness  : mace  Musculoskeletal: no focal joint swelling   vascular: no phlebitis                           12.6   24.79 )-----------( 297      ( 24 Aug 2021 00:50 )             40.1       08-24    148<H>  |  111<H>  |  47<H>  ----------------------------<  150<H>  4.2   |  22  |  1.19    Ca    8.5      24 Aug 2021 00:50  Phos  4.3     08-24  Mg     2.4     08-24    TPro  5.7<L>  /  Alb  3.1<L>  /  TBili  0.7  /  DBili  x   /  AST  47<H>  /  ALT  46<H>  /  AlkPhos  162<H>  08-24          MICROBIOLOGY:  Culture - Sputum (collected 08-22-21 @ 17:32)  Source: .Sputum Sputum  Gram Stain (08-22-21 @ 19:31):    Numerous polymorphonuclear leukocytes per low power field    Few Squamous epithelial cells per low power field    Few Gram positive cocci in pairs per oil power field  Preliminary Report (08-23-21 @ 16:38):    Normal Respiratory Liane present    Culture - Urine (collected 08-20-21 @ 20:57)  Source: Catheterized Catheterized  Final Report (08-21-21 @ 19:20):    No growth    Culture - Blood (collected 08-20-21 @ 20:56)  Source: .Blood Blood-Peripheral  Preliminary Report (08-21-21 @ 21:01):    No growth to date.    Culture - Blood (collected 08-20-21 @ 18:41)  Source: .Blood Blood-Peripheral  Preliminary Report (08-21-21 @ 19:02):    No growth to date.    RADIOLOGY:  Images below reviewed personally  Xray Chest 1 View- PORTABLE-Urgent (Xray Chest 1 View- PORTABLE-Urgent .) (08.24.21 @ 12:05)   Diffuse opacities in bilateral lungs with increased obscuration of the left hemidiaphragm. New focal consolidation cannot be ruled out. Recommend CT chest for further evaluation.  Small right pleural effusion.    VA Duplex Lower Ext Vein Scan, Bilat (08.23.21 @ 15:28)   There is persistent right below the knee DVT affecting the peroneal and soleal veins without proximal propagation.  There is DVT affecting the left popliteal vein above the knee, and there is below the knee DVT affecting the posterior tibial peroneal trunk, peroneal, gastrocnemius and soleal veins.

## 2021-08-25 NOTE — PROGRESS NOTE ADULT - SUBJECTIVE AND OBJECTIVE BOX
Follow Up: COVID    Interval History/ROS: Afebrile overnight. Remains intubated. Off pressors.     Allergies  No Known Allergies        ANTIMICROBIALS:  piperacillin/tazobactam IVPB.. 3.375 every 8 hours      OTHER MEDS:  ALBUTerol    90 MICROgram(s) HFA Inhaler 2 Puff(s) Inhalation every 6 hours  budesonide 160 MICROgram(s)/formoterol 4.5 MICROgram(s) Inhaler 2 Puff(s) Inhalation two times a day  chlorhexidine 0.12% Liquid 15 milliLiter(s) Oral Mucosa every 12 hours  chlorhexidine 4% Liquid 1 Application(s) Topical <User Schedule>  cholecalciferol 2000 Unit(s) Oral daily  cisatracurium Infusion 3 MICROgram(s)/kG/Min IV Continuous <Continuous>  dexAMETHasone  IVPB 10 milliGRAM(s) IV Intermittent daily  enoxaparin Injectable 80 milliGRAM(s) SubCutaneous every 12 hours  fentaNYL   Infusion... 0.5 MICROgram(s)/kG/Hr IV Continuous <Continuous>  insulin lispro (ADMELOG) corrective regimen sliding scale   SubCutaneous every 6 hours  insulin NPH human recombinant 7 Unit(s) SubCutaneous every 6 hours  ketamine Infusion. 0.25 mG/kG/Hr IV Continuous <Continuous>  montelukast 10 milliGRAM(s) Oral daily  multivitamin/minerals Oral Solution (WELLESSE) 30 milliLiter(s) Enteral Tube daily  norepinephrine Infusion 0.05 MICROgram(s)/kG/Min IV Continuous <Continuous>  pantoprazole  Injectable 40 milliGRAM(s) IV Push daily  polyethylene glycol 3350 17 Gram(s) Oral daily  senna Syrup 20 milliLiter(s) Oral at bedtime  vasopressin Infusion 0.04 Unit(s)/Min IV Continuous <Continuous>      Vital Signs Last 24 Hrs  T(C): 36.7 (25 Aug 2021 08:00), Max: 37.5 (24 Aug 2021 17:00)  T(F): 98.1 (25 Aug 2021 08:00), Max: 99.5 (24 Aug 2021 17:00)  HR: 87 (25 Aug 2021 14:00) (70 - 90)  BP: --  BP(mean): --  RR: 19 (25 Aug 2021 14:00) (0 - 28)  SpO2: 97% (25 Aug 2021 14:00) (88% - 100%)    Physical Exam:  General: intubated, sedated   Head: atraumatic, normocephalic  ENT: ET and OG tubes   Cardio: regular rate   Respiratory: mechanically ventilated   abd: soft, nondistended   : mace  Musculoskeletal: no focal joint swelling, no edema  vascular: no phlebitis   Skin: no rash  Neurologic: no focal deficit  psych: normal affect                          11.7   17.62 )-----------( 235      ( 25 Aug 2021 00:07 )             37.2       08-25    145  |  112<H>  |  48<H>  ----------------------------<  217<H>  4.0   |  22  |  1.01    Ca    8.7      25 Aug 2021 00:07  Phos  4.1     08-25  Mg     2.5     08-25    TPro  5.5<L>  /  Alb  2.8<L>  /  TBili  1.0  /  DBili  x   /  AST  50<H>  /  ALT  54<H>  /  AlkPhos  198<H>  08-25          MICROBIOLOGY:  v          RADIOLOGY:  Images below reviewed personally   Follow Up: COVID    Interval History/ROS: Afebrile overnight. Remains intubated. Off pressors.     Allergies  No Known Allergies        ANTIMICROBIALS:  piperacillin/tazobactam IVPB.. 3.375 every 8 hours      OTHER MEDS:  ALBUTerol    90 MICROgram(s) HFA Inhaler 2 Puff(s) Inhalation every 6 hours  budesonide 160 MICROgram(s)/formoterol 4.5 MICROgram(s) Inhaler 2 Puff(s) Inhalation two times a day  chlorhexidine 0.12% Liquid 15 milliLiter(s) Oral Mucosa every 12 hours  chlorhexidine 4% Liquid 1 Application(s) Topical <User Schedule>  cholecalciferol 2000 Unit(s) Oral daily  cisatracurium Infusion 3 MICROgram(s)/kG/Min IV Continuous <Continuous>  dexAMETHasone  IVPB 10 milliGRAM(s) IV Intermittent daily  enoxaparin Injectable 80 milliGRAM(s) SubCutaneous every 12 hours  fentaNYL   Infusion... 0.5 MICROgram(s)/kG/Hr IV Continuous <Continuous>  insulin lispro (ADMELOG) corrective regimen sliding scale   SubCutaneous every 6 hours  insulin NPH human recombinant 7 Unit(s) SubCutaneous every 6 hours  ketamine Infusion. 0.25 mG/kG/Hr IV Continuous <Continuous>  montelukast 10 milliGRAM(s) Oral daily  multivitamin/minerals Oral Solution (WELLESSE) 30 milliLiter(s) Enteral Tube daily  norepinephrine Infusion 0.05 MICROgram(s)/kG/Min IV Continuous <Continuous>  pantoprazole  Injectable 40 milliGRAM(s) IV Push daily  polyethylene glycol 3350 17 Gram(s) Oral daily  senna Syrup 20 milliLiter(s) Oral at bedtime  vasopressin Infusion 0.04 Unit(s)/Min IV Continuous <Continuous>      Vital Signs Last 24 Hrs  T(C): 36.7 (25 Aug 2021 08:00), Max: 37.5 (24 Aug 2021 17:00)  T(F): 98.1 (25 Aug 2021 08:00), Max: 99.5 (24 Aug 2021 17:00)  HR: 87 (25 Aug 2021 14:00) (70 - 90)  BP: --  BP(mean): --  RR: 19 (25 Aug 2021 14:00) (0 - 28)  SpO2: 97% (25 Aug 2021 14:00) (88% - 100%)    Physical Exam:  General: intubated, sedated   Head: atraumatic, normocephalic  ENT: ET and OG tubes   Cardio: regular rate   Respiratory: mechanically ventilated   abd: soft, nondistended   : mace  Musculoskeletal: no focal joint swelling  vascular: left IJ CVC, left radial arterial line, peripheral IVs right arm, no phlebitis   Skin: no rash                          11.7   17.62 )-----------( 235      ( 25 Aug 2021 00:07 )             37.2       08-25    145  |  112<H>  |  48<H>  ----------------------------<  217<H>  4.0   |  22  |  1.01    Ca    8.7      25 Aug 2021 00:07  Phos  4.1     08-25  Mg     2.5     08-25    TPro  5.5<L>  /  Alb  2.8<L>  /  TBili  1.0  /  DBili  x   /  AST  50<H>  /  ALT  54<H>  /  AlkPhos  198<H>  08-25          MICROBIOLOGY:  Culture - Blood (collected 08-24-21 @ 13:20)  Source: .Blood Blood-Peripheral  Gram Stain (08-25-21 @ 13:51):    Growth in aerobic bottle: Gram Positive Cocci in Clusters    Growth in anaerobic bottle: Gram Positive Cocci in Clusters  Preliminary Report (08-25-21 @ 13:51):    Growth in aerobic bottle: Gram Positive Cocci in Clusters    Growth in anaerobic bottle: Gram Positive Cocci in Clusters    ***Blood Panel PCR results on this specimen are available    approximately 3 hours after the Gram stain result.***    Gram stain, PCR, and/or culture results may not always    correspond due to difference in methodologies.    ************************************************************    This PCR assay was performed by multiplex PCR. This    Assay tests for 66 bacterial and resistance gene targets.    Please refer to the [facility] test directory    at [email or web address] for details.  Organism: Blood Culture PCR (08-25-21 @ 13:35)  Organism: Blood Culture PCR (08-25-21 @ 13:35)      -  Staphylococcus epidermidis, Methicillin resistant: Detec      Method Type: PCR    Culture - Blood (collected 08-24-21 @ 13:20)  Source: .Blood Blood-Peripheral  Gram Stain (08-25-21 @ 13:52):    Growth in anaerobic bottle: Gram Positive Cocci in Clusters  Preliminary Report (08-25-21 @ 13:52):    Growth in anaerobic bottle: Gram Positive Cocci in Clusters    Culture - Sputum (collected 08-22-21 @ 17:32)  Source: .Sputum Sputum  Gram Stain (08-22-21 @ 19:31):    Numerous polymorphonuclear leukocytes per low power field    Few Squamous epithelial cells per low power field    Few Gram positive cocci in pairs per oil power field  Final Report (08-24-21 @ 18:02):    Normal Respiratory Liane present    Culture - Urine (collected 08-20-21 @ 20:57)  Source: Catheterized Catheterized  Final Report (08-21-21 @ 19:20):    No growth    Culture - Blood (collected 08-20-21 @ 20:56)  Source: .Blood Blood-Peripheral  Preliminary Report (08-21-21 @ 21:01):    No growth to date.    Culture - Blood (collected 08-20-21 @ 18:41)  Source: .Blood Blood-Peripheral  Preliminary Report (08-21-21 @ 19:02):    No growth to date.    RADIOLOGY:  Images below reviewed personally  Xray Chest 1 View- PORTABLE-Urgent (Xray Chest 1 View- PORTABLE-Urgent .) (08.24.21 @ 12:05)   Diffuse opacities in bilateral lungs with increased obscuration of the left hemidiaphragm. New focal consolidation cannot be ruled out. Recommend CT chest for further evaluation.  Small right pleural effusion.

## 2021-08-25 NOTE — PROGRESS NOTE ADULT - SUBJECTIVE AND OBJECTIVE BOX
CHIEF COMPLAINT:    Interval Events: Received Lasix 20mg x 1 , weaned off of norepinephrine    REVIEW OF SYSTEMS:  Constitutional: [ ] fevers [ ] chills [ ] weight loss [ ] weight gain  HEENT: [ ] dry eyes [ ] eye irritation [ ] postnasal drip [ ] nasal congestion  CV: [ ] chest pain [ ] orthopnea [ ] palpitations [ ] murmur  Resp: [ ] cough [ ] shortness of breath [ ] dyspnea [ ] wheezing [ ] sputum [ ] hemoptysis  GI: [ ] nausea [ ] vomiting [ ] diarrhea [ ] constipation [ ] abd pain [ ] dysphagia   : [ ] dysuria [ ] nocturia [ ] hematuria [ ] increased urinary frequency  Musculoskeletal: [ ] back pain [ ] myalgias [ ] arthralgias [ ] fracture  Skin: [ ] rash [ ] itch  Neurological: [ ] headache [ ] dizziness [ ] syncope [ ] weakness [ ] numbness  Psychiatric: [ ] anxiety [ ] depression  Endocrine: [ ] diabetes [ ] thyroid problem  Hematologic/Lymphatic: [ ] anemia [ ] bleeding problem  Allergic/Immunologic: [ ] itchy eyes [ ] nasal discharge [ ] hives [ ] angioedema  [ ] All other systems negative  [ ] Unable to assess ROS because ________    OBJECTIVE:  ICU Vital Signs Last 24 Hrs  T(C): 36.8 (25 Aug 2021 04:00), Max: 38 (24 Aug 2021 10:00)  T(F): 98.2 (25 Aug 2021 04:00), Max: 100.4 (24 Aug 2021 10:00)  HR: 82 (25 Aug 2021 07:00) (70 - 100)  BP: --  BP(mean): --  ABP: 145/60 (25 Aug 2021 07:00) (74/39 - 169/74)  ABP(mean): 87 (25 Aug 2021 07:00) (52 - 109)  RR: 28 (25 Aug 2021 07:00) (0 - 28)  SpO2: 97% (25 Aug 2021 07:00) (88% - 100%)    Mode: AC/ CMV (Assist Control/ Continuous Mandatory Ventilation), RR (machine): 28, TV (machine): 400, FiO2: 50, PEEP: 10, ITime: 1, MAP: 16, PIP: 32  I & O's    08-24 @ 07:01  -  08-25 @ 07:00  --------------------------------------------------------  IN: 2364.7 mL / OUT: 2260 mL / NET: 104.7 mL      CAPILLARY BLOOD GLUCOSE      POCT Blood Glucose.: 162 mg/dL (25 Aug 2021 05:54)      PHYSICAL EXAM:  General:   HEENT:   Lymph Nodes:  Neck:   Respiratory:   Cardiovascular:   Abdomen:   Extremities:   Skin:   Neurological:  Psychiatry:    LINES:    HOSPITAL MEDICATIONS:  MEDICATIONS  (STANDING):  ALBUTerol    90 MICROgram(s) HFA Inhaler 2 Puff(s) Inhalation every 6 hours  budesonide 160 MICROgram(s)/formoterol 4.5 MICROgram(s) Inhaler 2 Puff(s) Inhalation two times a day  chlorhexidine 0.12% Liquid 15 milliLiter(s) Oral Mucosa every 12 hours  chlorhexidine 4% Liquid 1 Application(s) Topical <User Schedule>  cholecalciferol 2000 Unit(s) Oral daily  cisatracurium Infusion 3 MICROgram(s)/kG/Min (14.7 mL/Hr) IV Continuous <Continuous>  dexAMETHasone  IVPB 10 milliGRAM(s) IV Intermittent daily  enoxaparin Injectable 80 milliGRAM(s) SubCutaneous every 12 hours  fentaNYL   Infusion... 0.5 MICROgram(s)/kG/Hr (2.04 mL/Hr) IV Continuous <Continuous>  insulin lispro (ADMELOG) corrective regimen sliding scale   SubCutaneous every 6 hours  insulin NPH human recombinant 7 Unit(s) SubCutaneous every 6 hours  ketamine Infusion. 0.25 mG/kG/Hr (2.04 mL/Hr) IV Continuous <Continuous>  montelukast 10 milliGRAM(s) Oral daily  multivitamin/minerals Oral Solution (WELLESSE) 30 milliLiter(s) Enteral Tube daily  norepinephrine Infusion 0.05 MICROgram(s)/kG/Min (3.83 mL/Hr) IV Continuous <Continuous>  pantoprazole  Injectable 40 milliGRAM(s) IV Push daily  piperacillin/tazobactam IVPB.. 3.375 Gram(s) IV Intermittent every 8 hours  polyethylene glycol 3350 17 Gram(s) Oral daily  senna Syrup 20 milliLiter(s) Oral at bedtime  vasopressin Infusion 0.04 Unit(s)/Min (2.4 mL/Hr) IV Continuous <Continuous>    MEDICATIONS  (PRN):      LABS:                        11.7   17.62 )-----------( 235      ( 25 Aug 2021 00:07 )             37.2     Hgb Trend: 11.7<--, 12.6<--, 12.8<--, 11.9<--, 13.3<--  08-25    145  |  112<H>  |  48<H>  ----------------------------<  217<H>  4.0   |  22  |  1.01    Ca    8.7      25 Aug 2021 00:07  Phos  4.1     08-25  Mg     2.5     08-25    TPro  5.5<L>  /  Alb  2.8<L>  /  TBili  1.0  /  DBili  x   /  AST  50<H>  /  ALT  54<H>  /  AlkPhos  198<H>  08-25    Creatinine Trend: 1.01<--, 1.19<--, 0.95<--, 0.91<--, 1.04<--, 1.31<--  PT/INR - ( 25 Aug 2021 00:07 )   PT: 12.9 sec;   INR: 1.08 ratio         PTT - ( 25 Aug 2021 00:07 )  PTT:37.3 sec    Arterial Blood Gas:  08-24 @ 23:56  7.39/42/63/25/93.4/0.3  ABG lactate: --  Arterial Blood Gas:  08-24 @ 05:26  7.39/41/67/25/94.0/-0.2  ABG lactate: --  Arterial Blood Gas:  08-24 @ 00:34  7.36/46/67/26/93.8/0.1  ABG lactate: --  Arterial Blood Gas:  08-23 @ 10:20  7.39/40/67/24/93.9/-0.7  ABG lactate: --        MICROBIOLOGY:     RADIOLOGY:  [ ] Reviewed and interpreted by me    EKG: CHIEF COMPLAINT:    Interval Events: Received Lasix 20mg x 1 , weaned off of norepinephrine    REVIEW OF SYSTEMS:  Constitutional: [ ] fevers [ ] chills [ ] weight loss [ ] weight gain  HEENT: [ ] dry eyes [ ] eye irritation [ ] postnasal drip [ ] nasal congestion  CV: [ ] chest pain [ ] orthopnea [ ] palpitations [ ] murmur  Resp: [ ] cough [ ] shortness of breath [ ] dyspnea [ ] wheezing [ ] sputum [ ] hemoptysis  GI: [ ] nausea [ ] vomiting [ ] diarrhea [ ] constipation [ ] abd pain [ ] dysphagia   : [ ] dysuria [ ] nocturia [ ] hematuria [ ] increased urinary frequency  Musculoskeletal: [ ] back pain [ ] myalgias [ ] arthralgias [ ] fracture  Skin: [ ] rash [ ] itch  Neurological: [ ] headache [ ] dizziness [ ] syncope [ ] weakness [ ] numbness  Psychiatric: [ ] anxiety [ ] depression  Endocrine: [ ] diabetes [ ] thyroid problem  Hematologic/Lymphatic: [ ] anemia [ ] bleeding problem  Allergic/Immunologic: [ ] itchy eyes [ ] nasal discharge [ ] hives [ ] angioedema  [ ] All other systems negative  [ ] Unable to assess ROS because ________    OBJECTIVE:  ICU Vital Signs Last 24 Hrs  T(C): 36.8 (25 Aug 2021 04:00), Max: 38 (24 Aug 2021 10:00)  T(F): 98.2 (25 Aug 2021 04:00), Max: 100.4 (24 Aug 2021 10:00)  HR: 82 (25 Aug 2021 07:00) (70 - 100)  BP: --  BP(mean): --  ABP: 145/60 (25 Aug 2021 07:00) (74/39 - 169/74)  ABP(mean): 87 (25 Aug 2021 07:00) (52 - 109)  RR: 28 (25 Aug 2021 07:00) (0 - 28)  SpO2: 97% (25 Aug 2021 07:00) (88% - 100%)    Mode: AC/ CMV (Assist Control/ Continuous Mandatory Ventilation), RR (machine): 28, TV (machine): 400, FiO2: 50, PEEP: 10, ITime: 1, MAP: 16, PIP: 32  I & O's    08-24 @ 07:01  -  08-25 @ 07:00  --------------------------------------------------------  IN: 2364.7 mL / OUT: 2260 mL / NET: 104.7 mL      CAPILLARY BLOOD GLUCOSE  POCT Blood Glucose.: 162 mg/dL (25 Aug 2021 05:54)      PHYSICAL EXAM:  General:   HEENT:   Lymph Nodes:  Neck:   Respiratory:   Cardiovascular:   Abdomen:   Extremities:   Skin:   Neurological:  Psychiatry:    LINES:    HOSPITAL MEDICATIONS:  MEDICATIONS  (STANDING):  ALBUTerol    90 MICROgram(s) HFA Inhaler 2 Puff(s) Inhalation every 6 hours  budesonide 160 MICROgram(s)/formoterol 4.5 MICROgram(s) Inhaler 2 Puff(s) Inhalation two times a day  chlorhexidine 0.12% Liquid 15 milliLiter(s) Oral Mucosa every 12 hours  chlorhexidine 4% Liquid 1 Application(s) Topical <User Schedule>  cholecalciferol 2000 Unit(s) Oral daily  cisatracurium Infusion 3 MICROgram(s)/kG/Min (14.7 mL/Hr) IV Continuous <Continuous>  dexAMETHasone  IVPB 10 milliGRAM(s) IV Intermittent daily  enoxaparin Injectable 80 milliGRAM(s) SubCutaneous every 12 hours  fentaNYL   Infusion... 0.5 MICROgram(s)/kG/Hr (2.04 mL/Hr) IV Continuous <Continuous>  insulin lispro (ADMELOG) corrective regimen sliding scale   SubCutaneous every 6 hours  insulin NPH human recombinant 7 Unit(s) SubCutaneous every 6 hours  ketamine Infusion. 0.25 mG/kG/Hr (2.04 mL/Hr) IV Continuous <Continuous>  montelukast 10 milliGRAM(s) Oral daily  multivitamin/minerals Oral Solution (WELLESSE) 30 milliLiter(s) Enteral Tube daily  norepinephrine Infusion 0.05 MICROgram(s)/kG/Min (3.83 mL/Hr) IV Continuous <Continuous>  pantoprazole  Injectable 40 milliGRAM(s) IV Push daily  piperacillin/tazobactam IVPB.. 3.375 Gram(s) IV Intermittent every 8 hours  polyethylene glycol 3350 17 Gram(s) Oral daily  senna Syrup 20 milliLiter(s) Oral at bedtime  vasopressin Infusion 0.04 Unit(s)/Min (2.4 mL/Hr) IV Continuous <Continuous>    MEDICATIONS  (PRN):      LABS:                        11.7   17.62 )-----------( 235      ( 25 Aug 2021 00:07 )             37.2     Hgb Trend: 11.7<--, 12.6<--, 12.8<--, 11.9<--, 13.3<--  08-25    145  |  112<H>  |  48<H>  ----------------------------<  217<H>  4.0   |  22  |  1.01    Ca    8.7      25 Aug 2021 00:07  Phos  4.1     08-25  Mg     2.5     08-25    TPro  5.5<L>  /  Alb  2.8<L>  /  TBili  1.0  /  DBili  x   /  AST  50<H>  /  ALT  54<H>  /  AlkPhos  198<H>  08-25    Creatinine Trend: 1.01<--, 1.19<--, 0.95<--, 0.91<--, 1.04<--, 1.31<--  PT/INR - ( 25 Aug 2021 00:07 )   PT: 12.9 sec;   INR: 1.08 ratio         PTT - ( 25 Aug 2021 00:07 )  PTT:37.3 sec    Arterial Blood Gas:  08-24 @ 23:56  7.39/42/63/25/93.4/0.3  ABG lactate: --  Arterial Blood Gas:  08-24 @ 05:26  7.39/41/67/25/94.0/-0.2  ABG lactate: --  Arterial Blood Gas:  08-24 @ 00:34  7.36/46/67/26/93.8/0.1  ABG lactate: --  Arterial Blood Gas:  08-23 @ 10:20  7.39/40/67/24/93.9/-0.7  ABG lactate: --    MICROBIOLOGY:     Culture - Blood (collected 24 Aug 2021 13:20)  Source: .Blood Blood-Peripheral  Gram Stain (25 Aug 2021 13:51):    Growth in aerobic bottle: Gram Positive Cocci in Clusters    Growth in anaerobic bottle: Gram Positive Cocci in Clusters  Preliminary Report (25 Aug 2021 13:51):    Growth in aerobic bottle: Gram Positive Cocci in Clusters    Growth in anaerobic bottle: Gram Positive Cocci in Clusters    ***Blood Panel PCR results on this specimen are available    approximately 3 hours after the Gram stain result.***    Gram stain, PCR, and/or culture results may not always    correspond due to difference in methodologies.    ************************************************************    This PCR assay was performed by multiplex PCR. This    Assay tests for 66 bacterial and resistance gene targets.    Please refer to the [facility] test directory    at [email or web address] for details.  Organism: Blood Culture PCR (25 Aug 2021 13:35)  Organism: Blood Culture PCR (25 Aug 2021 13:35)    Culture - Blood (collected 24 Aug 2021 13:20)  Source: .Blood Blood-Peripheral  Gram Stain (25 Aug 2021 13:52):    Growth in anaerobic bottle: Gram Positive Cocci in Clusters  Preliminary Report (25 Aug 2021 13:52):    Growth in anaerobic bottle: Gram Positive Cocci in Clusters    Culture - Sputum (collected 22 Aug 2021 17:32)  Source: .Sputum Sputum  Gram Stain (22 Aug 2021 19:31):    Numerous polymorphonuclear leukocytes per low power field    Few Squamous epithelial cells per low power field    Few Gram positive cocci in pairs per oil power field  Final Report (24 Aug 2021 18:02):    Normal Respiratory Liane present      RADIOLOGY:  < from: CT Head No Cont (08.12.21 @ 20:38) >  EXAM:  CT BRAIN                        PROCEDURE DATE:  08/12/2021    INTERPRETATION:  CLINICAL INFORMATION: Confusion.  TECHNIQUE: Noncontrast axial CT images were acquired through the head. Two-dimensional sagittal and coronal reformats were obtaine  COMPARISON: None  FINDINGS:  There is no acute intracranial hemorrhage, extra-axial fluid collection, hydrocephalus, mass effect or midline shift.  Mild patchy hypodensities within the periventricular and subcortical white matter, although nonspecific, likely reflect chronic microvascular disease. Cerebral volume loss contributes to mild prominence of the ventricles and sulci. Basal cisterns are patent.  Paranasal sinuses and mastoid air cells are clear. Calvarium is intact.    IMPRESSION:  No acute intracranial hemorrhage, mass effect, or evidence of acute vascular territorial infarction. If clinical symptoms persist or worsen, more sensitive evaluation with brain MRI may be obtained, if no contraindications exist.          EKG: CC: Hypoxic Respiratory Failure 2/2 Covid    72yo  [place] speaking male w/ PMHx of HTN, DM2, BPH, asthma presented on 8/12/21 from home with worsening shortness of breath x 2 weeks in setting of COVID-19 PNA . While managed on the medical floor, pt continues to experienced worsening hypoxic respiratory failure while on NIPPV, c/b worsening delirium/ agitation despite medical interventions. Pt is s/post multiple RRT 's for hypoxia / agitation/ delirium and non-compliance / not tolerating NIPPV . Patient developed worsening respiratory failure and family has agreed to proceed with intubation. Post intubation pt had cardiac arrest; with ROSC after 6 min, VT x 2 s/p electrical shock x 2 and 2 epi's. On arrival to the COVID-icu unit ; pt hemodynamic instability; chemically resuscitated. Family called for GOC; pt is full code.  Based on initial report, pt did not receive the COVID-19 vaccine. Also, family members whom he lives with are likely also COVID-19 positive.     INTERVAL: Received Lasix 20 Mg x 1 overnight  now net neg 500cc    Mode: AC/ CMV 28, , FiO2: 50, PEEP: 10,  PIP: 31  Plateau pressure: 28 P/F ratio: 134      REVIEW OF SYSTEMS:  [x ] Unable to assess ROS because __pt is sedated and chemically paralyzed     OBJECTIVE:  ICU Vital Signs Last 24 Hrs  T(C): 36.8 (25 Aug 2021 04:00), Max: 38 (24 Aug 2021 10:00)  T(F): 98.2 (25 Aug 2021 04:00), Max: 100.4 (24 Aug 2021 10:00)  HR: 82 (25 Aug 2021 07:00) (70 - 100)  BP: --  BP(mean): --  ABP: 145/60 (25 Aug 2021 07:00) (74/39 - 169/74)  ABP(mean): 87 (25 Aug 2021 07:00) (52 - 109)  RR: 28 (25 Aug 2021 07:00) (0 - 28)  SpO2: 97% (25 Aug 2021 07:00) (88% - 100%)    Mode: AC/ CMV (Assist Control/ Continuous Mandatory Ventilation), RR (machine): 28, TV (machine): 400, FiO2: 50, PEEP: 10, ITime: 1, MAP: 16, PIP: 32  I & O's    08-24 @ 07:01  -  08-25 @ 07:00  --------------------------------------------------------  IN: 2364.7 mL / OUT: 2260 mL / NET: 104.7 mL    CAPILLARY BLOOD GLUCOSE  POCT Blood Glucose.: 162 mg/dL (25 Aug 2021 05:54)      PHYSICAL EXAM:  General: intubated, sedated and paralyzed  HEENT: pupils sluggish   Neck: stable   Respiratory:  intubated, on mechanical vent, diminished BS b/l base, no wheezing   Cardiovascular: + s1 & s2 present  Abdomen: non distended, soft , hypoactive  BS x 4   Extremities: no edema , + pulses   Skin: warm and dry  Neurological: unable to assess mental status as pt is intubated and sedated and paralyzed   Psychiatry: unable to assess; intubated and sedated     LINES: L LIJ TLC 8/20, L radial gricel 8/20      HOSPITAL MEDICATIONS:  MEDICATIONS  (STANDING):  ALBUTerol    90 MICROgram(s) HFA Inhaler 2 Puff(s) Inhalation every 6 hours  budesonide 160 MICROgram(s)/formoterol 4.5 MICROgram(s) Inhaler 2 Puff(s) Inhalation two times a day  chlorhexidine 0.12% Liquid 15 milliLiter(s) Oral Mucosa every 12 hours  chlorhexidine 4% Liquid 1 Application(s) Topical <User Schedule>  cholecalciferol 2000 Unit(s) Oral daily  cisatracurium Infusion 3 MICROgram(s)/kG/Min (14.7 mL/Hr) IV Continuous <Continuous>  dexAMETHasone  IVPB 10 milliGRAM(s) IV Intermittent daily  enoxaparin Injectable 80 milliGRAM(s) SubCutaneous every 12 hours  fentaNYL   Infusion... 0.5 MICROgram(s)/kG/Hr (2.04 mL/Hr) IV Continuous <Continuous>  insulin lispro (ADMELOG) corrective regimen sliding scale   SubCutaneous every 6 hours  insulin NPH human recombinant 7 Unit(s) SubCutaneous every 6 hours  ketamine Infusion. 0.25 mG/kG/Hr (2.04 mL/Hr) IV Continuous <Continuous>  montelukast 10 milliGRAM(s) Oral daily  multivitamin/minerals Oral Solution (WELLESSE) 30 milliLiter(s) Enteral Tube daily  norepinephrine Infusion 0.05 MICROgram(s)/kG/Min (3.83 mL/Hr) IV Continuous <Continuous>  pantoprazole  Injectable 40 milliGRAM(s) IV Push daily  piperacillin/tazobactam IVPB.. 3.375 Gram(s) IV Intermittent every 8 hours  polyethylene glycol 3350 17 Gram(s) Oral daily  senna Syrup 20 milliLiter(s) Oral at bedtime  vasopressin Infusion 0.04 Unit(s)/Min (2.4 mL/Hr) IV Continuous <Continuous>    MEDICATIONS  (PRN):      LABS:                        11.7   17.62 )-----------( 235      ( 25 Aug 2021 00:07 )             37.2     Hgb Trend: 11.7<--, 12.6<--, 12.8<--, 11.9<--, 13.3<--  08-25    145  |  112<H>  |  48<H>  ----------------------------<  217<H>  4.0   |  22  |  1.01    Ca    8.7      25 Aug 2021 00:07  Phos  4.1     08-25  Mg     2.5     08-25    TPro  5.5<L>  /  Alb  2.8<L>  /  TBili  1.0  /  DBili  x   /  AST  50<H>  /  ALT  54<H>  /  AlkPhos  198<H>  08-25    Creatinine Trend: 1.01<--, 1.19<--, 0.95<--, 0.91<--, 1.04<--, 1.31<--  PT/INR - ( 25 Aug 2021 00:07 )   PT: 12.9 sec;   INR: 1.08 ratio    PTT - ( 25 Aug 2021 00:07 )  PTT:37.3 sec    Arterial Blood Gas:  08-24 @ 23:56  7.39/42/63/25/93.4/0.3  ABG lactate: --  Arterial Blood Gas:  08-24 @ 05:26  7.39/41/67/25/94.0/-0.2  ABG lactate: --  Arterial Blood Gas:  08-24 @ 00:34  7.36/46/67/26/93.8/0.1  ABG lactate: --  Arterial Blood Gas:  08-23 @ 10:20  7.39/40/67/24/93.9/-0.7  ABG lactate: --    MICROBIOLOGY:   Culture - Blood (collected 24 Aug 2021 13:20)  Source: .Blood Blood-Peripheral  Gram Stain (25 Aug 2021 13:51):    Growth in aerobic bottle: Gram Positive Cocci in Clusters    Growth in anaerobic bottle: Gram Positive Cocci in Clusters  Preliminary Report (25 Aug 2021 13:51):    Growth in aerobic bottle: Gram Positive Cocci in Clusters    Growth in anaerobic bottle: Gram Positive Cocci in Clusters    ***Blood Panel PCR results on this specimen are available    approximately 3 hours after the Gram stain result.***    Gram stain, PCR, and/or culture results may not always    correspond due to difference in methodologies.    ************************************************************    This PCR assay was performed by multiplex PCR. This    Assay tests for 66 bacterial and resistance gene targets.    Please refer to the [facility] test directory    at [email or web address] for details.  Organism: Blood Culture PCR (25 Aug 2021 13:35)  Organism: Blood Culture PCR (25 Aug 2021 13:35)    Culture - Blood (collected 24 Aug 2021 13:20)  Source: .Blood Blood-Peripheral  Gram Stain (25 Aug 2021 13:52):    Growth in anaerobic bottle: Gram Positive Cocci in Clusters  Preliminary Report (25 Aug 2021 13:52):    Growth in anaerobic bottle: Gram Positive Cocci in Clusters    Culture - Sputum (collected 22 Aug 2021 17:32)  Source: .Sputum Sputum  Gram Stain (22 Aug 2021 19:31):    Numerous polymorphonuclear leukocytes per low power field    Few Squamous epithelial cells per low power field    Few Gram positive cocci in pairs per oil power field  Final Report (24 Aug 2021 18:02):    Normal Respiratory Liane present    Culture - Blood (collected 24 Aug 2021 13:20)  Source: .Blood Blood-Peripheral  Gram Stain (25 Aug 2021 13:51):    Growth in aerobic bottle: Gram Positive Cocci in Clusters    Growth in anaerobic bottle: Gram Positive Cocci in Clusters  Preliminary Report (25 Aug 2021 13:51):    Growth in aerobic bottle: Gram Positive Cocci in Clusters    Growth in anaerobic bottle: Gram Positive Cocci in Clusters    ***Blood Panel PCR results on this specimen are available    approximately 3 hours after the Gram stain result.***    Gram stain, PCR, and/or culture results may not always    correspond due to difference in methodologies.    ************************************************************    This PCR assay was performed by multiplex PCR. This    Assay tests for 66 bacterial and resistance gene targets.    Please refer to the [facility] test directory    at [email or web address] for details.  Organism: Blood Culture PCR (25 Aug 2021 13:35)  Organism: Blood Culture PCR (25 Aug 2021 13:35)    Culture - Blood (collected 24 Aug 2021 13:20)  Source: .Blood Blood-Peripheral  Gram Stain (25 Aug 2021 13:52):    Growth in anaerobic bottle: Gram Positive Cocci in Clusters  Preliminary Report (25 Aug 2021 13:52):    Growth in anaerobic bottle: Gram Positive Cocci in Clusters    Culture - Sputum (collected 22 Aug 2021 17:32)  Source: .Sputum Sputum  Gram Stain (22 Aug 2021 19:31):    Numerous polymorphonuclear leukocytes per low power field    Few Squamous epithelial cells per low power field    Few Gram positive cocci in pairs per oil power field  Final Report (24 Aug 2021 18:02):    Normal Respiratory Liane present      RADIOLOGY:  < from: CT Head No Cont (08.12.21 @ 20:38) >  EXAM:  CT BRAIN                        PROCEDURE DATE:  08/12/2021    INTERPRETATION:  CLINICAL INFORMATION: Confusion.  TECHNIQUE: Noncontrast axial CT images were acquired through the head. Two-dimensional sagittal and coronal reformats were obtaine  COMPARISON: None  FINDINGS:  There is no acute intracranial hemorrhage, extra-axial fluid collection, hydrocephalus, mass effect or midline shift.  Mild patchy hypodensities within the periventricular and subcortical white matter, although nonspecific, likely reflect chronic microvascular disease. Cerebral volume loss contributes to mild prominence of the ventricles and sulci. Basal cisterns are patent.  Paranasal sinuses and mastoid air cells are clear. Calvarium is intact.    IMPRESSION:  No acute intracranial hemorrhage, mass effect, or evidence of acute vascular territorial infarction. If clinical symptoms persist or worsen, more sensitive evaluation with brain MRI may be obtained, if no contraindications exist.        eho< from: Limited Transthoracic Echo (w/Cont) (08.20.21 @ 15:27) >  -----  Observations:  Aortic Valve/Aorta:  Left Ventricle: Endocardial visualization enhanced with  intravenous injection of Ultrasonic Enhancing Agent  (Definity).  Despite the use of Definity, there are no interpretable  images.  ------------------------------------------------------------------------  Conclusions:  Endocardial visualization enhanced with intravenous  injection of Ultrasonic Enhancing Agent (Definity).  Despite the use of Definity, there are no interpretable  images.  ------------------------------------------------------------------------  Confirmed on  8/20/2021 - 17:21:48 by Madan Cordova MD, FASE  ------------------------------------------------------------------------    < end of copied text >

## 2021-08-25 NOTE — PROGRESS NOTE ADULT - ASSESSMENT
70yo  Hebrew speaking male w/ PMHx of HTN, DM2, BPH, asthma presented on 8/12/21 from home with worsening shortness of breath x 2 weeks in setting of COVID-19 PNA now, with progressive worsening hypoxic respiratory failure , failed NIPPV support , s/p intubation and s/p cardiac arrest afterwards; with ROSC after 6 min, VT x 2 s/p electrical shock x 2 and 2 epi's. and transfer to [facility] ICU for monitoring and management. Patient remains intubated for respiratory failure, currently on mechanical ventilator, sedated and paralyzed.     PLAN :  Neuro: sedated with Ketamine, Fentanyl, and paralyzed with Nimbex- baseline mental status A&O x 3  -remains off of propofol- triglycerides trending down  -c/w ketamine  -c/w fentanyl  -neuro checks per ICU    Pulm: Hypoxic Respiratory Failure 2/2 COVID PNA c/b ARDS  ARDS protocol ; permissive hypercarbia  -pt prone (#1) 8/21 @ 10 am - ABG improved , (#2) 8/22 @ 2:30pm   -pt proned overnight to be resupined this AM 8/23 at 8:30 AM, remains in the supine position since- will  prone if hypoxia worsens- however stable at this time  - AC 28/400/50/10- pPlat 28 / PP 31- will f/u to assess possibility of weaning down on the vent at all  -"8/21 CXR to eval extension of SQ air ; preliminary results with R >L neck SQ air , pneumomediastinum ;  repeat CXR 8/22 with Diffuse opacification of the lungs, mild-moderate. Pneumomediastinum resolved,"     -wean vent settings as tolerated   -completed remdesevir 8/13-8/17  -completed dexamethasone 8/13-8/23  -c/w neb tx   -chest PT, oral care, pulmonary toileting     r/o PE  Pt with + DVT on repeated duplex 9/23, high r/f PE in setting of hypercoagulable state with COVID -19 infection   -Pt not hemodynamically stable for CTA chest.   -it would not  as pt is already on full AC w/ Lovenox 80 BID for + DVT, Anti 10a factor level 1.7  -bleeding precaution   -trend DD,   -CE, ProBNP   -mechanical vent support   -TTE 8/20;  there are no interpretable images 2/2 SQ air     r/o other intrapulmonary infections process   - Cx sputum with normal stephane with few gram positive cocci in pairs, MRSA negative nasal swap   - empiric coverage with Meropenem stopped 3/23 after consulting with ID as blood cultures, urine cultures sputum cultures remain negative and CXR with no signs of PNA, today WBC 24 from 16 and spike of fever overnight,     ASTHMA   -c/w home Symbicort  -c/w home montelukast  -c/w duoneb tx    CV  Cardiogenic shock s/p PEA followed by VT x 2 s/p ROSC,  Septic shock / hypovolemic shock/ distributive shock , + DVT , r/o PE, new onset A fib   -pt is post cardiac arrest post intubation with 6 minutes achieved ROSC; c/b VT x 2 s/p electrical shock x2   -new onset of A fib RVR w/ hemodynamic instability s/p amio 300mg x1 ; converted to NSR   -on arrival to ICU s/p chemically resuscitated with no loss of pulse (8/19)   -remains in Shock with pressors support ; Vaso 0.04, continue Levo, currently on 0.18  -titrating to MAP >65   -TTE; limited to due SQ air   -c/w full AC on Lovenox sq 80 BID for + DVT and presumed PE   - repeated duplex on 8/23 with persisting lower extremity DVT b/l  -c/w Vasopressin, add Norepinephrine if pt becomes hypotensive    /Renal" TAYLOR resolved  -s/p lasix 20mg IV overnight- -500cc  -c/w trending renal function and electrolytes/   optimize electrolyte balance   -daily weight   BPH (benign prostatic hyperplasia).   -holding all meds due to hypotension.  -mace placed c/w strict  i&o's       GI  Oropharyngeal dysphagia   -OGT for TF @ 10 ml / hr while in prone, increase to TF goal to 40 ml/hr while supined.  -bowel regimen   -PPI q d   -aspiration precaution   -oral care   -LFT's increased today     Hypertriglyceridemia   -likely in setting of propofol use   -titrated off propofol  -repeat triglycerides daily      ENDO  A1c 7.8  TF w/ Glucerna   -FS q 6 hrs   -ISS & NPH q 6 hrs       Heme   + DVT , presumed  PE  - in setting of CVOID-19 illness/ hypercoagulable state   -c/w FULL AC 80 BID, anti 10A factor 1.7 on 8/23  -DD 1144--> 3588-->1336-->1270-->up trended to 2006 this AM; will trend M/W/F  -bleeding precaution   -repeat [facility] duplex pending       ID   Septic shock in setting of COVID-19 PNA   -IV pressors; levo, Vaso support to maintain MAP>65  -UA & Ucx negative 8/20  -MRSA negative 8/21   -BC negative x 2 8/20, repeat B.C. x 2 today as pt febrile over night and WBC uptrended  to 24.79 from 16.49  -sputum cx with normal stephane few gram positive cocci   - fungitel pending results   -empiric abx ; meropenem 1g TID (8/20-8/23) , Vanco 1 g x 1 (8/21), restart ABX coverage with Zosyn, repeat CXR today, case also discussed with ID consultant  -if worsening will add fungal coverage  -trend WBc 21-->15-->16-->24  -trend T curve       COVID-19 PNA   - s/p Tocilizumab on 8/15  -s/p remdesevir 8/13-8/17  -c/w dexamethasone 8/13-8/23   -trend inflammatory markers   -ID consult on board     PPX   DVT ppx ; on FULL AC with lovenox SQ  Gi ppx; protonix IVP q d     GOC  FULL code at this time.   Goals of care d/w son and 2 daughters    70yo  Sinhala speaking male w/ PMHx of HTN, DM2, BPH, asthma presented on 8/12/21 from home with worsening shortness of breath x 2 weeks in setting of COVID-19 PNA now, with progressive worsening hypoxic respiratory failure , failed NIPPV support , s/p intubation and s/p cardiac arrest afterwards; with ROSC after 6 min, VT x 2 s/p electrical shock x 2 and 2 epi's. and transfer to [facility] ICU for monitoring and management. Patient remains intubated for respiratory failure, currently on mechanical ventilator, sedated and paralyzed.     PLAN :  Neuro: sedated with Ketamine, Fentanyl, and paralyzed with Nimbex- baseline mental status A&O x 3  -remains off of propofol- triglycerides trending down  -c/w ketamine  -c/w fentanyl  -neuro checks per ICU    Pulm: Hypoxic Respiratory Failure 2/2 COVID PNA c/b ARDS  ARDS protocol ; permissive hypercarbia  -pt prone (#1) 8/21 @ 10 am - ABG improved , (#2) 8/22 @ 2:30pm   -pt proned overnight to be resupined this AM 8/23 at 8:30 AM, remains in the supine position since- will  prone if hypoxia worsens- however stable at this time  - AC 28/400/50/10- pPlat 28 / PP 31- will f/u to assess possibility of weaning down on the vent at all  -"8/21 CXR to eval extension of SQ air ; preliminary results with R >L neck SQ air , pneumomediastinum ;  repeat CXR 8/22 with Diffuse opacification of the lungs, mild-moderate. Pneumomediastinum resolved,"     -wean vent settings as tolerated   -completed remdesevir 8/13-8/17  -completed dexamethasone 8/13-8/23  -c/w neb tx   -chest PT, oral care, pulmonary toileting     r/o PE  Pt with + DVT on repeated duplex 9/23, high r/f PE in setting of hypercoagulable state with COVID -19 infection   -Pt not hemodynamically stable for CTA chest.   -it would not  as pt is already on full AC w/ Lovenox 80 BID for + DVT, Anti 10a factor level 1.7  -bleeding precaution   -trend DD,   -TTE 8/20;  there are no interpretable images 2/2 SQ air,     HX ASTHMA   -c/w home Symbicort  -c/w home montelukast  -c/w duoneb tx    CV  Cardiogenic shock s/p PEA followed by VT x 2 s/p ROSC,  Septic shock / hypovolemic shock/ distributive shock , + DVT , r/o PE, new onset A fib   -s/p  cardiac arrest post intubation with 6 minutes achieved ROSC; c/b VT x 2 s/p electrical shock x2   -new onset of A fib RVR w/ hemodynamic instability s/p amio 300mg x1 ; converted to NSR   -on arrival to ICU s/p chemically resuscitated with no loss of pulse (8/19)   -remains in Shock with pressors support ; Vaso 0.04, add Norepinephrine back if needed , titrate  to maintain a MAP >65   -TTE; limited to due SQ air   -c/w full AC on Lovenox sq 80 BID for + DVT and presumed PE   - repeated duplex on 8/23 with persisting lower extremity DVT b/l  -c/w Vasopressin, add Norepinephrine if pt becomes hypotensive    /Renal" TAYLOR resolved  -s/p lasix 20mg IV overnight- -500cc  -c/w trending renal function and electrolytes/   optimize electrolyte balance   -daily weight   BPH (benign prostatic hyperplasia).   -c/w holding all meds due to hypotension.  -c/w mace - strict  i&o's       GI Oropharyngeal dysphagia   -OGT for TF- tolerating well- increase goal ot 60cc/hr-  -c/w bowel regimen   -PPI qd   -aspiration precaution   -oral care      Hypertriglyceridemia   -likely in setting of propofol use   -titrated off propofol  -repeat triglycerides daily      ENDO  A1c 7.8  TF w/ Glucerna   -FS q 6 hrs   -ISS & NPH q 6 hrs   -endocrine consult appreciated      Heme   + DVT , presumed  PE- in setting of CVOID-19 illness/ hypercoagulable state   -c/w FULL AC 80 BID Lovenox, anti 10A factor 1.7 on 8/23  -c/w trending Ddimer   -bleeding precaution     ID   Septic shock in setting of COVID-19 PNA   -IV pressors; levo, Vaso support to maintain MAP>65  -UA & Ucx negative 8/20, repeat today 8/25 if UA + change mace and draw urine culture  -MRSA negative 8/21   -BC negative x 2 8/20, repeat B.C. x 2 today as pt febrile over night and WBC uptrended  to 24.79 from 16.49  -sputum copious amount- send sputum cx  - fungitel pending results   -empiric abx ; meropenem 1g TID (8/20-8/23) , Vanco 1 g x 1 (8/21), restart ABX coverage with Zosyn d/c today   -8/24 BC x 2 - +MRSA- d/c zosyn / start Vanco    COVID-19 PNA   - s/p Tocilizumab on 8/15  -s/p remdesevir 8/13-8/17  -c/w dexamethasone 8/13-8/23   -trend inflammatory markers   -ID consult on board     PPX   DVT ppx ; on FULL AC with lovenox SQ  Gi ppx; protonix IVP q d     GOC  FULL code at this time.   Goals of care d/w son and 2 daughters

## 2021-08-25 NOTE — DIETITIAN NUTRITION RISK NOTIFICATION - TREATMENT: THE FOLLOWING DIET HAS BEEN RECOMMENDED
Diet, NPO with Tube Feed:   Tube Feeding Modality: Nasogastric  Glucerna 1.2 Stephen (GLUCERNARTH)  Continuous  Increase Tube Feed Rate by (mL): 10     Every 3 hours  Until Goal Tube Feed Rate (mL per Hour): 40  Tube Feed Duration (in Hours): 24  Tube Feed Start Time: 13:24 (08-24-21 @ 13:24) [Active]

## 2021-08-25 NOTE — PROGRESS NOTE ADULT - ATTENDING COMMENTS
Agree with above  Remains intubated 28/450/50%/+10. p:F 126, plateau 26  Sedation with ketamine / fentanyl  Pressors with vaso / Levo  Trial off Nimbex and determine mental status  c/w dexamethasone @10  afebrile MAP =65  MRSA blood culture positive, add vancomycin  c/w therapeutic Lovenox, anti-Xa confirms therapeutic levels    I have personally provided 60 minutes of critical care time.

## 2021-08-25 NOTE — CHART NOTE - NSCHARTNOTEFT_GEN_A_CORE
Nutrition Follow Up Note  Patient seen for: Nutrition Follow up on 5ICU    Chart reviewed, events noted. "70 y/o Male with PMH diabetes and asthma, not COVID vaccinated admitted 8/12/21 with COVID pneumonia, intubated 8/20." Pt now weaned off of norepinephrine and propofol.    Source:     [x] EMR        [x] RN          -If unable to interview patient: [x] Trach/Vent/BiPAP     Nutrition-Related Events:   - Pressors:  [x] Yes - vasopressin, weaned off levophed early this morning   - Propofol:     [x] No - stopped on 8/22 secondary to elevated triglycerides, pt ordered for nimbex, fentanyl, ketamine    Diet Order:   Diet, NPO with Tube Feed:   Tube Feeding Modality: Nasogastric  Glucerna 1.2 Stephen (GLUCERNARTH)  Continuous  Increase Tube Feed Rate by (mL): 10     Every 3 hours  Until Goal Tube Feed Rate (mL per Hour): 40  Tube Feed Duration (in Hours): 24  Tube Feed Start Time: 13:24 (08-24-21)    EN Order Provides:    - Total volume: 960 ml    - Kcal:  1152  (14 kcal/kg based on dosing weight of 81.6 g)    - Gm Protein: 58 gm  (0.7 g/kg based on dosing weight of 81.6 kg)    - mL free water: 773 ml  Current Pump Rate: 40 ml/hr    EN provision:   (08/22): 240 ml (100% of 10/hr goal)  (08/23): 230 ml (95% of 10 ml/hr goal)  (08/24): 540 ml (56% of 40ml/hr goal)  (08/25): 480 ml (50% of 40 ml/hr goal so far today)    -Noted pt was previously ordered for Glucerna 1.2 @ 10 ml/hr and rate was increased to goal of 40 yesterday 8/24.    Is current diet order adequate? [x] No, pt not meeting within 75% EER at this time.    Nutrition-related concerns:  - Per discussion with nurse pt tolerating enteral nutrition at this time  - No GI intolerance noted at this time, however pt has not had a bowel movement in 4 days    GI:  Last BM 8/21.   Bowel Regimen? [x] Yes     No daily weights noted; will monitor trends as available    MEDICATIONS  (STANDING):  cholecalciferol  dexAMETHasone  IVPB  insulin lispro (ADMELOG) corrective regimen sliding scale  insulin NPH human recombinant  multivitamin/minerals Oral Solution (WELLESSE)  norepinephrine Infusion  pantoprazole  Injectable  polyethylene glycol 3350  senna Syrup  vancomycin  IVPB  vancomycin  IVPB  vasopressin Infusion    Pertinent Labs: 08-25 @ 00:07: Na 145, BUN 48<H>, Cr 1.01, <H>, K+ 4.0, Phos 4.1, Mg 2.5, Alk Phos 198<H>, ALT/SGPT 54<H>, AST/SGOT 50<H>    A1C with Estimated Average Glucose Result: 7.9 % (08-21-21 @ 16:09)  A1C with Estimated Average Glucose Result: 7.3 % (08-14-21 @ 14:36)    Finger Sticks:  POCT Blood Glucose.: 208 mg/dL (08-25 @ 11:09)  POCT Blood Glucose.: 162 mg/dL (08-25 @ 05:54)  POCT Blood Glucose.: 196 mg/dL (08-24 @ 17:05)    Triglycerides, Serum: 375 mg/dL (08-25-21 @ 00:07)  Triglycerides, Serum: 427 mg/dL (08-24-21 @ 00:50)  Triglycerides, Serum: 464 mg/dL (08-23-21 @ 00:36)  Triglycerides, Serum: 632 mg/dL (08-21-21 @ 16:11)  --> Triglycerides seems to be downtrending, will continue to monitor trends    Skin per nursing documentation: no pressure injuries per flow sheets, noted wound care team is following as pt "high risk for pressure injury" per 8/23 wound care note  Edema: 1+ generalized per flow sheets    Estimated Energy Needs: (20-25 kcal/kg): 4179-5758 kcal based on dosing weight of 81.6 kg  Estimated Protein Needs: (1.2-1.4 g/kg):  gm based on dosing weight of 81.6 kg  Defer fluid needs to team  The [place] State Equation (PSU) 2003b was used to calculate resting energy expenditure: 1884 kcal    Previous Nutrition Diagnosis: Inadequate oral intake  Nutrition Diagnosis is: [x] ongoing, being advanced to malnutrition as below    New Nutrition Diagnosis: Moderate, acute malnutrition related to decreased ability to consume sufficient energy/protein needs secondary to current medical condition as evidenced by pt meeting <50% EER > 5 days, mild edema.    Nutrition Care Plan:  [x] In Progress    Nutrition Interventions:     Education Provided:        [x] No: pt remains intubated & sedated at this time    Recommendations:   1. Recommend continuing to increase enteral nutrition of Glucerna 1.2 by 10 ml/hr every 4 hours to NEW goal rate of 60 ml/hr x 24 hours. At goal, feeding would provide 1440 ml total volume, 1728 kcals, 86 gm protein and 1159 ml free water.   - Recommend adding 1 No Carb Prosource 1x/day as well for additional 40 kcal and 11 gm protein per packet  - Feeding at goal with 1 No Carb Prosource/day meets 21.7 kcal/kg and 1.2 g/kg protein per/day  2. Water flushes per team  3. Continue with multivitamin + Vit D supplement daily as appropriate  4. RD remains available to adjust enteral nutrition regimen as needed    Monitoring and Evaluation:   Continue to monitor nutritional intake, tolerance to diet prescription, weights, labs, skin integrity    RD remains available upon request and will follow up per protocol  Alyson Hernandez MS, RD, CDN, [facility] pgr #[phone] Nutrition Follow Up Note  Patient seen for: Nutrition Follow up on 5ICU    Chart reviewed, events noted. "70 y/o Male with PMH diabetes and asthma, not COVID vaccinated admitted 8/12/21 with COVID pneumonia, intubated 8/20." Pt now weaned off of norepinephrine and propofol.    Source:     [x] EMR        [x] RN          -If unable to interview patient: [x] Trach/Vent/BiPAP     Nutrition-Related Events:   - Pressors:  [x] Yes - vasopressin, weaned off levophed early this morning   - Propofol:     [x] No - stopped on 8/22 secondary to elevated triglycerides, pt ordered for nimbex, fentanyl, ketamine    Diet Order:   Diet, NPO with Tube Feed:   Tube Feeding Modality: Nasogastric  Glucerna 1.2 Stephen (GLUCERNARTH)  Continuous  Increase Tube Feed Rate by (mL): 10     Every 3 hours  Until Goal Tube Feed Rate (mL per Hour): 40  Tube Feed Duration (in Hours): 24  Tube Feed Start Time: 13:24 (08-24-21)    EN Order Provides:    - Total volume: 960 ml    - Kcal:  1152  (14 kcal/kg based on dosing weight of 81.6 g)    - Gm Protein: 58 gm  (0.7 g/kg based on dosing weight of 81.6 kg)    - mL free water: 773 ml  Current Pump Rate: 40 ml/hr    EN provision:   (08/22): 240 ml (100% of 10/hr goal)  (08/23): 230 ml (95% of 10 ml/hr goal)  (08/24): 540 ml (56% of 40ml/hr goal)  (08/25): 480 ml (50% of 40 ml/hr goal so far today)    -Noted pt was previously ordered for Glucerna 1.2 @ 10 ml/hr and rate was increased to goal of 40 yesterday 8/24.    Is current diet order adequate? [x] No, pt not meeting within 75% EER at this time.    Nutrition-related concerns:  - Per discussion with nurse pt tolerating enteral nutrition at this time  - No GI intolerance noted at this time, however pt has not had a bowel movement in 4 days    GI:  Last BM 8/21.   Bowel Regimen? [x] Yes     No daily weights noted; will monitor trends as available    MEDICATIONS  (STANDING):  cholecalciferol  dexAMETHasone  IVPB  insulin lispro (ADMELOG) corrective regimen sliding scale  insulin NPH human recombinant  multivitamin/minerals Oral Solution (WELLESSE)  norepinephrine Infusion  pantoprazole  Injectable  polyethylene glycol 3350  senna Syrup  vancomycin  IVPB  vancomycin  IVPB  vasopressin Infusion    Pertinent Labs: 08-25 @ 00:07: Na 145, BUN 48<H>, Cr 1.01, <H>, K+ 4.0, Phos 4.1, Mg 2.5, Alk Phos 198<H>, ALT/SGPT 54<H>, AST/SGOT 50<H>    A1C with Estimated Average Glucose Result: 7.9 % (08-21-21 @ 16:09)  A1C with Estimated Average Glucose Result: 7.3 % (08-14-21 @ 14:36)    Finger Sticks:  POCT Blood Glucose.: 208 mg/dL (08-25 @ 11:09)  POCT Blood Glucose.: 162 mg/dL (08-25 @ 05:54)  POCT Blood Glucose.: 196 mg/dL (08-24 @ 17:05)    Triglycerides, Serum: 375 mg/dL (08-25-21 @ 00:07)  Triglycerides, Serum: 427 mg/dL (08-24-21 @ 00:50)  Triglycerides, Serum: 464 mg/dL (08-23-21 @ 00:36)  Triglycerides, Serum: 632 mg/dL (08-21-21 @ 16:11)  --> Triglycerides seems to be downtrending, will continue to monitor trends    Skin per nursing documentation: no pressure injuries per flow sheets, noted wound care team is following as pt "high risk for pressure injury" per 8/23 wound care note  Edema: 1+ generalized per flow sheets    Estimated Energy Needs: (20-25 kcal/kg): 0835-1247 kcal based on dosing weight of 81.6 kg  Estimated Protein Needs: (1.2-1.4 g/kg):  gm based on dosing weight of 81.6 kg  Defer fluid needs to team  The [place] State Equation (PSU) 2003b was used to calculate resting energy expenditure: 1884 kcal    Previous Nutrition Diagnosis: Inadequate oral intake  Nutrition Diagnosis is: [x] ongoing, being advanced to malnutrition as below    New Nutrition Diagnosis: Moderate, acute malnutrition related to decreased ability to consume sufficient energy/protein needs secondary to current medical condition as evidenced by pt meeting <50% EER > 5 days, mild edema.    Nutrition Care Plan:  [x] In Progress    Nutrition Interventions:     Education Provided:        [x] No: pt remains intubated & sedated at this time    Recommendations:   1. Recommend continuing to increase enteral nutrition of Glucerna 1.2 by 10 ml/hr every 4 hours to NEW goal rate of 60 ml/hr x 24 hours. At goal, feeding would provide 1440 ml total volume, 1728 kcals, 86 gm protein and 1159 ml free water.   - Recommend adding 1 No Carb Prosource 1x/day as well for additional 40 kcal and 11 gm protein per packet  - Feeding at goal with 1 No Carb Prosource/day meets 21.7 kcal/kg and 1.2 g/kg protein per/day  2. Water flushes per team  3. Continue with multivitamin + Vit D supplement daily as appropriate  4. RD remains available to adjust enteral nutrition regimen as needed  5. Consider additional bowel regimen for pt with no bowel movement x 4 days    Monitoring and Evaluation:   Continue to monitor nutritional intake, tolerance to diet prescription, weights, labs, skin integrity    RD remains available upon request and will follow up per protocol  Alyson Hernandez, MS, RD, CDN, [facility] pgr #[phone] Nutrition Follow Up Note  Patient seen for: Nutrition Follow up on 5ICU    Chart reviewed, events noted. "70 y/o Male with PMH diabetes and asthma, not COVID vaccinated admitted 8/12/21 with COVID pneumonia, intubated 8/20." Pt now weaned off of norepinephrine and propofol.    Source:     [x] EMR        [x] RN          -If unable to interview patient: [x] Trach/Vent/BiPAP     Nutrition-Related Events:   - Pressors:  [x] Yes - vasopressin, weaned off levophed early this morning   - Propofol:     [x] No - stopped on 8/22 secondary to elevated triglycerides, pt ordered for nimbex, fentanyl, ketamine    Diet Order:   Diet, NPO with Tube Feed:   Tube Feeding Modality: Nasogastric  Glucerna 1.2 Stephen (GLUCERNARTH)  Continuous  Increase Tube Feed Rate by (mL): 10     Every 3 hours  Until Goal Tube Feed Rate (mL per Hour): 40  Tube Feed Duration (in Hours): 24  Tube Feed Start Time: 13:24 (08-24-21)    EN Order Provides:    - Total volume: 960 ml    - Kcal:  1152  (14 kcal/kg based on dosing weight of 81.6 g)    - Gm Protein: 58 gm  (0.7 g/kg based on dosing weight of 81.6 kg)    - mL free water: 773 ml  Current Pump Rate: 40 ml/hr    EN provision:   (08/22): 240 ml (100% of 10/hr goal)  (08/23): 230 ml (95% of 10 ml/hr goal)  (08/24): 540 ml (56% of 40ml/hr goal)  (08/25): 480 ml (50% of 40 ml/hr goal so far today)    -Noted pt was previously ordered for Glucerna 1.2 @ 10 ml/hr and rate was increased to goal of 40 yesterday 8/24.    Is current diet order adequate? [x] No, pt not meeting within 75% EER at this time.    Nutrition-related concerns:  - Per discussion with nurse pt tolerating enteral nutrition at this time  - No GI intolerance noted at this time, however pt has not had a bowel movement in 4 days    GI:  Last BM 8/21.   Bowel Regimen? [x] Yes     No daily weights noted; will monitor trends as available    MEDICATIONS  (STANDING):  cholecalciferol  dexAMETHasone  IVPB  insulin lispro (ADMELOG) corrective regimen sliding scale  insulin NPH human recombinant  multivitamin/minerals Oral Solution (WELLESSE)  norepinephrine Infusion  pantoprazole  Injectable  polyethylene glycol 3350  senna Syrup  vancomycin  IVPB  vancomycin  IVPB  vasopressin Infusion    Pertinent Labs: 08-25 @ 00:07: Na 145, BUN 48<H>, Cr 1.01, <H>, K+ 4.0, Phos 4.1, Mg 2.5, Alk Phos 198<H>, ALT/SGPT 54<H>, AST/SGOT 50<H>    A1C with Estimated Average Glucose Result: 7.9 % (08-21-21 @ 16:09)  A1C with Estimated Average Glucose Result: 7.3 % (08-14-21 @ 14:36)    Finger Sticks:  POCT Blood Glucose.: 208 mg/dL (08-25 @ 11:09)  POCT Blood Glucose.: 162 mg/dL (08-25 @ 05:54)  POCT Blood Glucose.: 196 mg/dL (08-24 @ 17:05)    Triglycerides, Serum: 375 mg/dL (08-25-21 @ 00:07)  Triglycerides, Serum: 427 mg/dL (08-24-21 @ 00:50)  Triglycerides, Serum: 464 mg/dL (08-23-21 @ 00:36)  Triglycerides, Serum: 632 mg/dL (08-21-21 @ 16:11)  --> Triglycerides seems to be downtrending, will continue to monitor trends    Skin per nursing documentation: no pressure injuries per flow sheets, noted wound care team is following as pt "high risk for pressure injury" per 8/23 wound care note  Edema: 1+ generalized per flow sheets    Estimated Energy Needs: (20-25 kcal/kg): 3314-0449 kcal based on dosing weight of 81.6 kg  Estimated Protein Needs: (1.2-1.4 g/kg):  gm based on dosing weight of 81.6 kg  Defer fluid needs to team  The [place] State Equation (PSU) 2003b was used to calculate resting energy expenditure: 1884 kcal    Previous Nutrition Diagnosis: Inadequate oral intake  Nutrition Diagnosis is: [x] ongoing, being advanced to malnutrition as below    New Nutrition Diagnosis: Moderate, acute malnutrition related to decreased ability to consume sufficient energy/protein needs secondary to current medical condition as evidenced by pt meeting <50% EER > 5 days, mild edema.    Nutrition Care Plan:  [x] In Progress    Nutrition Interventions:     Education Provided:        [x] No: pt remains intubated & sedated at this time    Recommendations:   1. Recommend continuing to increase enteral nutrition of Glucerna 1.2 by 10 ml/hr every 4 hours to NEW goal rate of 60 ml/hr x 24 hours. At goal, feeding would provide 1440 ml total volume, 1728 kcals, 86 gm protein and 1159 ml free water.   - Recommend adding 1 No Carb Prosource 1x/day as well for additional 40 kcal and 11 gm protein per packet  - Feeding at goal with 1 No Carb Prosource/day meets 21.7 kcal/kg and 1.2 g/kg protein per/day  2. Water flushes per team  3. Continue with multivitamin + Vit D supplement daily as appropriate  4. RD remains available to adjust enteral nutrition regimen as needed  5. Consider additional bowel regimen for pt with no bowel movement x 4 days  6. Malnutrition notification placed in chart     Monitoring and Evaluation:   Continue to monitor nutritional intake, tolerance to diet prescription, weights, labs, skin integrity    RD remains available upon request and will follow up per protocol  Alyson Hernandez, MS, RD, CDN, [facility] pgr #[phone]

## 2021-08-26 NOTE — PROGRESS NOTE ADULT - SUBJECTIVE AND OBJECTIVE BOX
Follow Up: COVID, MRSE    Interval History/ROS: Fevers resolved. Otherwise clinically unchanged.     Allergies  No Known Allergies        ANTIMICROBIALS:  vancomycin  IVPB    vancomycin  IVPB 1000 every 12 hours      OTHER MEDS:  ALBUTerol    90 MICROgram(s) HFA Inhaler 2 Puff(s) Inhalation every 6 hours  budesonide 160 MICROgram(s)/formoterol 4.5 MICROgram(s) Inhaler 2 Puff(s) Inhalation two times a day  chlorhexidine 0.12% Liquid 15 milliLiter(s) Oral Mucosa every 12 hours  chlorhexidine 4% Liquid 1 Application(s) Topical <User Schedule>  cholecalciferol 2000 Unit(s) Oral daily  cisatracurium Infusion 3 MICROgram(s)/kG/Min IV Continuous <Continuous>  dexAMETHasone  IVPB 10 milliGRAM(s) IV Intermittent daily  enoxaparin Injectable 80 milliGRAM(s) SubCutaneous every 12 hours  fentaNYL    Injectable 25 MICROGram(s) IV Push every 4 hours PRN  fentaNYL   Infusion... 1.5 MICROgram(s)/kG/Hr IV Continuous <Continuous>  furosemide   Injectable 20 milliGRAM(s) IV Push daily  insulin lispro (ADMELOG) corrective regimen sliding scale   SubCutaneous every 6 hours  insulin NPH human recombinant 7 Unit(s) SubCutaneous every 6 hours  ketamine Infusion. 0.25 mG/kG/Hr IV Continuous <Continuous>  metolazone 5 milliGRAM(s) Oral daily  midazolam Injectable 2 milliGRAM(s) IV Push every 4 hours PRN  montelukast 10 milliGRAM(s) Oral daily  multivitamin/minerals Oral Solution (WELLESSE) 30 milliLiter(s) Enteral Tube daily  norepinephrine Infusion 0.05 MICROgram(s)/kG/Min IV Continuous <Continuous>  pantoprazole  Injectable 40 milliGRAM(s) IV Push daily  polyethylene glycol 3350 17 Gram(s) Oral daily  senna Syrup 20 milliLiter(s) Oral at bedtime  vasopressin Infusion 0.04 Unit(s)/Min IV Continuous <Continuous>      Vital Signs Last 24 Hrs  T(C): 37.6 (26 Aug 2021 16:00), Max: 37.6 (26 Aug 2021 12:00)  T(F): 99.7 (26 Aug 2021 16:00), Max: 99.7 (26 Aug 2021 12:00)  HR: 103 (26 Aug 2021 16:15) (85 - 113)  BP: --  BP(mean): --  RR: 30 (26 Aug 2021 16:15) (0 - 51)  SpO2: 95% (26 Aug 2021 16:15) (88% - 100%)    Physical Exam:  General: intubated sedated   Head: atraumatic, normocephalic  Eye: normal sclera and conjunctiva  Cardio: regular rate   Respiratory: mechanically ventilated   abd: soft, bowel sounds present  : mace  Musculoskeletal: no focal joint swelling   vascular: right IJ CVC. right radial arterial line. peripheral IVs. no phlebitis                           10.5   23.67 )-----------( 214      ( 26 Aug 2021 00:28 )             34.1           146<H>  |  113<H>  |  50<H>  ----------------------------<  194<H>  4.7   |  22  |  0.92    Ca    8.9      26 Aug 2021 00:28  Phos  4.7       Mg     2.7         TPro  5.5<L>  /  Alb  2.9<L>  /  TBili  0.8  /  DBili  x   /  AST  56<H>  /  ALT  63<H>  /  AlkPhos  267<H>        Urinalysis Basic - ( 25 Aug 2021 16:30 )    Color: Yellow / Appearance: Clear / S.032 / pH: x  Gluc: x / Ketone: Negative  / Bili: Negative / Urobili: Negative   Blood: x / Protein: 30 mg/dL / Nitrite: Negative   Leuk Esterase: Negative / RBC: 13 /hpf / WBC 8 /HPF   Sq Epi: x / Non Sq Epi: 2 /hpf / Bacteria: Negative        MICROBIOLOGY:  Culture - Sputum (collected 21 @ 18:36)  Source: .Sputum Sputum  Gram Stain (21 @ 23:35):    No polymorphonuclear leukocytes per low power field    No Squamous epithelial cells per low power field    Rare Yeast like cells per oil power field    Culture - Blood (collected 21 @ 13:20)  Source: .Blood Blood-Peripheral  Gram Stain (21 @ 13:51):    Growth in aerobic bottle: Gram Positive Cocci in Clusters    Growth in anaerobic bottle: Gram Positive Cocci in Clusters  Preliminary Report (21 @ 14:50):    Growth in aerobic and anaerobic bottles: Staphylococcus epidermidis    ***Blood Panel PCR results on this specimen are available    approximately 3 hours after the Gram stain result.***    Gram stain, PCR, and/or culture results may not always    correspond due to difference in methodologies.    ************************************************************    This PCR assay was performed by multiplex PCR. This    Assay tests for 66 bacterial and resistance gene targets.    Please refer to the [facility] test directory    at [email or web address] for details.  Organism: Blood Culture PCR (21 @ 13:35)  Organism: Blood Culture PCR (21 @ 13:35)      -  Staphylococcus epidermidis, Methicillin resistant: Detec      Method Type: PCR    Culture - Blood (collected 21 @ 13:20)  Source: .Blood Blood-Peripheral  Gram Stain (21 @ 17:01):    Growth in anaerobic bottle: Gram Positive Cocci in Clusters    Growth in aerobic bottle: Gram Positive Cocci in Clusters  Preliminary Report (21 @ 14:56):    Growth in anaerobic bottle: Staphylococcus epidermidis    Growth in aerobic bottle: Gram Positive Cocci in Clusters    Culture - Sputum (collected 21 @ 17:32)  Source: .Sputum Sputum  Gram Stain (21 @ 19:31):    Numerous polymorphonuclear leukocytes per low power field    Few Squamous epithelial cells per low power field    Few Gram positive cocci in pairs per oil power field  Final Report (21 @ 18:02):    Normal Respiratory Liane present    Culture - Urine (collected 21 @ 20:57)  Source: Catheterized Catheterized  Final Report (21 @ 19:20):    No growth    Culture - Blood (collected 21 @ 20:56)  Source: .Blood Blood-Peripheral  Final Report (21 @ 21:00):    No Growth Final    Culture - Blood (collected 21 @ 18:41)  Source: .Blood Blood-Peripheral  Final Report (21 @ 19:01):    No Growth Final    RADIOLOGY:  Images below reviewed personally  Xray Chest 1 View- PORTABLE-Urgent (Xray Chest 1 View- PORTABLE-Urgent .) (21 @ 19:42)   Frontal expiratory view of the chest shows the heart to be similar in size. Endotracheal tube, left jugular line and nasogastric tube remain present. New right jugular line reaches the lower superior vena cava.  The lungs show similar bilateral patchy infiltrates and there is no evidence of pneumothorax nor definite pleural effusion.

## 2021-08-26 NOTE — PROGRESS NOTE ADULT - ASSESSMENT
71M with diabetes and asthma, not COVID vaccinated admitted 8/12/21 with COVID pneumonia.   Tocilizumab 8/15. Completed Remdesivir 8/17 and Decadron 8/22.   Decompensated, intubated 8/20.   Suspect PE given bilateral leg DVTs.   Sepsis 8/23 with high grade MRSE bacteremia. Maybe CLABSI although no clear phlebitis (left IJ 8/20, changed to right IJ 8/25). No wounds.   Guarded prognosis.     Suggest  -Vancomycin 1GM IV q12h, monitor troughs and creatinine   -monitor blood cultures - yesterday's in lab   -TTE   -supportive care and anticoagulation per protocol     Discussed with ICU     Patrick Paul MD   Infectious Disease   Pager [phone]   After 5PM and on weekends please page fellow on call or call [phone]

## 2021-08-26 NOTE — PROGRESS NOTE ADULT - ATTENDING COMMENTS
Agree with above  Remains intubated 60%/+5 peak 23 / plat 17  Increased residuals, held but now resumed  Got albumin overnight for transient hypotension  However, remains net positive, and Na rising to 146, so will start metolazone daily for gentle diuresis  Blood culture positive x 2 sets for MRSE, resumed vancomycin. Will likely need surveillance cultures  Will stay in ICU while in ARDS / COVID pneumonia    I have personally provided 50 minutes of critical care time.

## 2021-08-26 NOTE — PROGRESS NOTE ADULT - SUBJECTIVE AND OBJECTIVE BOX
INTERVAL HPI/OVERNIGHT EVENTS: Increased residual from TF > 200, held for an hour and restarted at a lower rate 40cc/hr    This is a 72yo  Nepali speaking unvaccinated male w/ PMHx of HTN, DM2, BPH, asthma presented on 21 from home with worsening shortness of breath x 2 weeks in setting of COVID-19 PNA . On the medical floor, pt with multiple RRTs for worsening hypoxic respiratory failure while on NIPPV, agitation/ delirium and non-compliance / not tolerating NIPPV c/b worsening delirium/ agitation despite medical interventions. Patient intubated for respiratory failure. Post intubation pt had cardiac arrest; with ROSC after 6 min, VT x 2 s/p electrical shock x 2 and 2 epi's. Pt transferred to [facility] ICU for closer monitoring and ventilator management. Patient remains intubated  sedated and paralyzed.     VITAL SIGNS:  ICU Vital Signs Last 24 Hrs  T(C): 36.9 (26 Aug 2021 03:00), Max: 37.3 (25 Aug 2021 19:15)  T(F): 98.4 (26 Aug 2021 03:00), Max: 99.1 (25 Aug 2021 19:15)  HR: 96 (26 Aug 2021 07:15) (78 - 113)  BP: --  BP(mean): --  ABP: 127/54 (26 Aug 2021 07:15) (78/42 - 180/72)  ABP(mean): 77 (26 Aug 2021 07:15) (55 - 110)  RR: 1 (26 Aug 2021 07:15) (0 - 51)  SpO2: 97% (26 Aug 2021 07:15) (88% - 100%)    Mode: AC/ CMV (Assist Control/ Continuous Mandatory Ventilation), RR (machine): 28, TV (machine): 400, FiO2: 60, PEEP: 8, ITime: 1, MAP: 16, PIP: 28     @ 07:01  -   @ 07:00  --------------------------------------------------------  IN: 2803.9 mL / OUT: 2145 mL / NET: 658.9 mL     @ 07: @ 08:08  --------------------------------------------------------  IN: 90 mL / OUT: 170 mL / NET: -80 mL        PHYSICAL EXAM:  GENERAL: Laying comfortably, NAD  EYES: EOMI, PERRL, no scleral icterus  NECK: No JVD  LUNG: Clear to auscultation bilaterally; No wheeze, crackles or rhonci  HEART: Regular rate and rhythm; No murmurs, rubs, or gallops  ABDOMEN: Soft, Nontender, Nondistended  EXTREMITIES:  No LE edema, 2+ Peripheral Pulses, No clubbing, cyanosis, or edema  PSYCH: AAOx3  NEUROLOGY: non-focal, strength 5/5 in all extremities, sensation intact  SKIN: No rashes or lesions              MEDICATIONS:  MEDICATIONS  (STANDING):  ALBUTerol    90 MICROgram(s) HFA Inhaler 2 Puff(s) Inhalation every 6 hours  budesonide 160 MICROgram(s)/formoterol 4.5 MICROgram(s) Inhaler 2 Puff(s) Inhalation two times a day  chlorhexidine 0.12% Liquid 15 milliLiter(s) Oral Mucosa every 12 hours  chlorhexidine 4% Liquid 1 Application(s) Topical <User Schedule>  cholecalciferol 2000 Unit(s) Oral daily  cisatracurium Infusion 3 MICROgram(s)/kG/Min (14.7 mL/Hr) IV Continuous <Continuous>  dexAMETHasone  IVPB 10 milliGRAM(s) IV Intermittent daily  enoxaparin Injectable 80 milliGRAM(s) SubCutaneous every 12 hours  fentaNYL   Infusion... 1.5 MICROgram(s)/kG/Hr (6.12 mL/Hr) IV Continuous <Continuous>  insulin lispro (ADMELOG) corrective regimen sliding scale   SubCutaneous every 6 hours  insulin NPH human recombinant 7 Unit(s) SubCutaneous every 6 hours  ketamine Infusion. 0.25 mG/kG/Hr (2.04 mL/Hr) IV Continuous <Continuous>  montelukast 10 milliGRAM(s) Oral daily  multivitamin/minerals Oral Solution (WELLESSE) 30 milliLiter(s) Enteral Tube daily  norepinephrine Infusion 0.05 MICROgram(s)/kG/Min (3.83 mL/Hr) IV Continuous <Continuous>  pantoprazole  Injectable 40 milliGRAM(s) IV Push daily  polyethylene glycol 3350 17 Gram(s) Oral daily  senna Syrup 20 milliLiter(s) Oral at bedtime  vancomycin  IVPB      vancomycin  IVPB 1000 milliGRAM(s) IV Intermittent every 12 hours  vasopressin Infusion 0.04 Unit(s)/Min (2.4 mL/Hr) IV Continuous <Continuous>    MEDICATIONS  (PRN):  fentaNYL    Injectable 25 MICROGram(s) IV Push every 4 hours PRN pain  midazolam Injectable 2 milliGRAM(s) IV Push every 4 hours PRN agitation      ALLERGIES:  Allergies    No Known Allergies    Intolerances        LABS:                        10.5   23.67 )-----------( 214      ( 26 Aug 2021 00:28 )             34.1         146<H>  |  113<H>  |  50<H>  ----------------------------<  194<H>  4.7   |  22  |  0.92    Ca    8.9      26 Aug 2021 00:28  Phos  4.7       Mg     2.7         TPro  5.5<L>  /  Alb  2.9<L>  /  TBili  0.8  /  DBili  x   /  AST  56<H>  /  ALT  63<H>  /  AlkPhos  267<H>      PT/INR - ( 26 Aug 2021 00:28 )   PT: 12.2 sec;   INR: 1.02 ratio         PTT - ( 26 Aug 2021 00:28 )  PTT:38.7 sec  Urinalysis Basic - ( 25 Aug 2021 16:30 )    Color: Yellow / Appearance: Clear / S.032 / pH: x  Gluc: x / Ketone: Negative  / Bili: Negative / Urobili: Negative   Blood: x / Protein: 30 mg/dL / Nitrite: Negative   Leuk Esterase: Negative / RBC: 13 /hpf / WBC 8 /HPF   Sq Epi: x / Non Sq Epi: 2 /hpf / Bacteria: Negative        Culture - Sputum (collected 21 @ 18:36)  Source: .Sputum Sputum  Gram Stain (21 @ 23:35):    No polymorphonuclear leukocytes per low power field    No Squamous epithelial cells per low power field    Rare Yeast like cells per oil power field      CAPILLARY BLOOD GLUCOSE      POCT Blood Glucose.: 185 mg/dL (26 Aug 2021 05:14)      RADIOLOGY & ADDITIONAL TESTS: Reviewed. INTERVAL HPI/OVERNIGHT EVENTS: Increased residual from TF > 200, held for an hour and restarted at a lower rate 40cc/hr    This is a 70yo  Armenian speaking unvaccinated male w/ PMHx of HTN, DM2, BPH, asthma presented on 21 from home with worsening shortness of breath x 2 weeks in setting of COVID-19 PNA . On the medical floor, pt with multiple RRTs for worsening hypoxic respiratory failure while on NIPPV, agitation/ delirium and non-compliance / not tolerating NIPPV c/b worsening delirium/ agitation despite medical interventions. Patient intubated for respiratory failure. Post intubation pt had cardiac arrest; with ROSC after 6 min, VT x 2 s/p electrical shock x 2 and 2 epi's. Pt transferred to [facility] ICU for closer monitoring and ventilator management. Patient remains intubated  sedated and paralyzed.     VITAL SIGNS:  ICU Vital Signs Last 24 Hrs  T(C): 36.9 (26 Aug 2021 03:00), Max: 37.3 (25 Aug 2021 19:15)  T(F): 98.4 (26 Aug 2021 03:00), Max: 99.1 (25 Aug 2021 19:15)  HR: 96 (26 Aug 2021 07:15) (78 - 113)  BP: --  BP(mean): --  ABP: 127/54 (26 Aug 2021 07:15) (78/42 - 180/72)  ABP(mean): 77 (26 Aug 2021 07:15) (55 - 110)  RR: 1 (26 Aug 2021 07:15) (0 - 51)  SpO2: 97% (26 Aug 2021 07:15) (88% - 100%)    Mode: AC/ CMV (Assist Control/ Continuous Mandatory Ventilation), RR (machine): 28, TV (machine): 400, FiO2: 60, PEEP: 8, ITime: 1, MAP: 16, PIP: 28     @ 07:01  -   @ 07:00  --------------------------------------------------------  IN: 2803.9 mL / OUT: 2145 mL / NET: 658.9 mL     @ 07: @ 08:08  --------------------------------------------------------  IN: 90 mL / OUT: 170 mL / NET: -80 mL        PHYSICAL EXAM:  General: intubated, sedated and paralyzed  HEENT: pupils sluggish   Neck: stable   Respiratory:  intubated, on mechanical vent, diminished BS b/l base, no wheezing   Cardiovascular: + s1 & s2 present  Abdomen: non distended, soft , hypoactive  BS x 4   Extremities: no edema , + pulses   Skin: warm and dry  Neurological: unable to assess mental status as pt is intubated and sedated and paralyzed   Psychiatry: unable to assess; intubated and sedated       MEDICATIONS:  MEDICATIONS  (STANDING):  ALBUTerol    90 MICROgram(s) HFA Inhaler 2 Puff(s) Inhalation every 6 hours  budesonide 160 MICROgram(s)/formoterol 4.5 MICROgram(s) Inhaler 2 Puff(s) Inhalation two times a day  chlorhexidine 0.12% Liquid 15 milliLiter(s) Oral Mucosa every 12 hours  chlorhexidine 4% Liquid 1 Application(s) Topical <User Schedule>  cholecalciferol 2000 Unit(s) Oral daily  cisatracurium Infusion 3 MICROgram(s)/kG/Min (14.7 mL/Hr) IV Continuous <Continuous>  dexAMETHasone  IVPB 10 milliGRAM(s) IV Intermittent daily  enoxaparin Injectable 80 milliGRAM(s) SubCutaneous every 12 hours  fentaNYL   Infusion... 1.5 MICROgram(s)/kG/Hr (6.12 mL/Hr) IV Continuous <Continuous>  insulin lispro (ADMELOG) corrective regimen sliding scale   SubCutaneous every 6 hours  insulin NPH human recombinant 7 Unit(s) SubCutaneous every 6 hours  ketamine Infusion. 0.25 mG/kG/Hr (2.04 mL/Hr) IV Continuous <Continuous>  montelukast 10 milliGRAM(s) Oral daily  multivitamin/minerals Oral Solution (WELLESSE) 30 milliLiter(s) Enteral Tube daily  norepinephrine Infusion 0.05 MICROgram(s)/kG/Min (3.83 mL/Hr) IV Continuous <Continuous>  pantoprazole  Injectable 40 milliGRAM(s) IV Push daily  polyethylene glycol 3350 17 Gram(s) Oral daily  senna Syrup 20 milliLiter(s) Oral at bedtime  vancomycin  IVPB      vancomycin  IVPB 1000 milliGRAM(s) IV Intermittent every 12 hours  vasopressin Infusion 0.04 Unit(s)/Min (2.4 mL/Hr) IV Continuous <Continuous>    MEDICATIONS  (PRN):  fentaNYL    Injectable 25 MICROGram(s) IV Push every 4 hours PRN pain  midazolam Injectable 2 milliGRAM(s) IV Push every 4 hours PRN agitation      ALLERGIES:  Allergies    No Known Allergies    Intolerances        LABS:                        10.5   23.67 )-----------( 214      ( 26 Aug 2021 00:28 )             34.1         146<H>  |  113<H>  |  50<H>  ----------------------------<  194<H>  4.7   |  22  |  0.92    Ca    8.9      26 Aug 2021 00:28  Phos  4.7       Mg     2.7         TPro  5.5<L>  /  Alb  2.9<L>  /  TBili  0.8  /  DBili  x   /  AST  56<H>  /  ALT  63<H>  /  AlkPhos  267<H>      PT/INR - ( 26 Aug 2021 00:28 )   PT: 12.2 sec;   INR: 1.02 ratio         PTT - ( 26 Aug 2021 00:28 )  PTT:38.7 sec  Urinalysis Basic - ( 25 Aug 2021 16:30 )    Color: Yellow / Appearance: Clear / S.032 / pH: x  Gluc: x / Ketone: Negative  / Bili: Negative / Urobili: Negative   Blood: x / Protein: 30 mg/dL / Nitrite: Negative   Leuk Esterase: Negative / RBC: 13 /hpf / WBC 8 /HPF   Sq Epi: x / Non Sq Epi: 2 /hpf / Bacteria: Negative        Culture - Sputum (collected 21 @ 18:36)  Source: .Sputum Sputum  Gram Stain (21 @ 23:35):    No polymorphonuclear leukocytes per low power field    No Squamous epithelial cells per low power field    Rare Yeast like cells per oil power field      CAPILLARY BLOOD GLUCOSE      POCT Blood Glucose.: 185 mg/dL (26 Aug 2021 05:14)      RADIOLOGY & ADDITIONAL TESTS: Reviewed.

## 2021-08-27 NOTE — CHART NOTE - NSCHARTNOTEFT_GEN_A_CORE
Nutrition Follow Up Note  Patient seen for: Malnutrition Follow Up     Chart reviewed, events noted.  "70 y/o Male with PMH diabetes and asthma, not COVID vaccinated admitted 8/12/21 with COVID pneumonia, intubated 8/20." Noted pt continues to be on Lopressor, Fentanyl and Norepinephrine was changed to phenylephrine. Noted pt also on Ketamine. Pt continues to be intubated, sedated and paralyzed.     Source: [] Patient       [x] EMR        [] RN        [] Family at bedside       [] Other:    -If unable to interview patient: [x] Trach/Vent/BiPAP  [] Disoriented/confused/inappropriate to interview    Nutrition-Related Events:   - Pressors:  [x] Yes    [] No   - Propofol:  [] Yes    [x] No             Diet Order:   Diet, NPO with Tube Feed:   Tube Feeding Modality: Nasogastric  Glucerna 1.2 Stephen (GLUCERNARTH)  Total Volume for 24 Hours (mL): 1440  Continuous  Increase Tube Feed Rate by (mL): 10     Every 3 hours  Until Goal Tube Feed Rate (mL per Hour): 60  Tube Feed Duration (in Hours): 24  Tube Feed Start Time: 13:24 (08-25-21)      EN Order Provides: at goal provides: 1728cal, 86 Gm Prot; 21 stephen/kg and ~1.05 Gm Prot/kg based on wt of 86.2kg    Nutrition-related concerns:  -noted pt was increased to current goal rate as per recommendation  -EN provision as follows: 8/25: 920ml, 8/26: 1000ml; so far today, pt has continued to be at goal rate; over the last 3 days, pt has been able to receive >60% of goal volume  -noted feeds had been held for an hour on 8/25 due to 200ml of residuals  -noted pt c 4 BMs on 8/26 after not having BMs for several days    GI:  Last BM 8/27   Bowel Regimen? [x] Yes   [] No    No new wt     MEDICATIONS  (STANDING):  cholecalciferol  dexAMETHasone  IVPB  furosemide   Injectable  insulin lispro (ADMELOG) corrective regimen sliding scale  insulin lispro Injectable (ADMELOG).  insulin NPH human recombinant  metolazone  multivitamin/minerals Oral Solution (WELLESSE)  norepinephrine Infusion  pantoprazole  Injectable  polyethylene glycol 3350  senna Syrup  vancomycin  IVPB  vancomycin  IVPB  vasopressin Infusion    Pertinent Labs: 08-27 @ 00:45: Na 143, BUN 47<H>, Cr 0.81, <H>, K+ 5.0, Phos 3.5, Mg 2.5, Alk Phos 350<H>, ALT/SGPT 72<H>, AST/SGOT 61<H>, HbA1c --    A1C with Estimated Average Glucose Result: 7.9 % (08-21-21 @ 16:09)  A1C with Estimated Average Glucose Result: 7.3 % (08-14-21 @ 14:36)    Finger Sticks:  POCT Blood Glucose.: 354 mg/dL (08-27 @ 12:10)  POCT Blood Glucose.: 210 mg/dL (08-27 @ 05:25)  POCT Blood Glucose.: 192 mg/dL (08-26 @ 17:08)    Triglycerides, Serum: 483 mg/dL (08-27-21 @ 05:51)  Triglycerides, Serum: 538 mg/dL (08-27-21 @ 00:45)  Triglycerides, Serum: 343 mg/dL (08-26-21 @ 00:28)  Triglycerides, Serum: 375 mg/dL (08-25-21 @ 00:07)  Triglycerides, Serum: 427 mg/dL (08-24-21 @ 00:50)  Triglycerides, Serum: 464 mg/dL (08-23-21 @ 00:36)  Triglycerides, Serum: 632 mg/dL (08-21-21 @ 16:11)      Skin per nursing documentation: no pressure injuries   Edema:  +1 generalized     Estimated Energy Needs: (20-25 kcal/kg): 7439-6165 kcal based on dosing weight of 81.6 kg  Estimated Protein Needs: (1.2-1.4 g/kg):  gm based on dosing weight of 81.6 kg  Defer fluid needs to team    Previous Nutrition Diagnosis: acute moderate malnutrition   Nutrition Diagnosis is: [x] ongoing-addressed c tube feeds at goal rate     New Nutrition Diagnosis: [x] Not applicable    Nutrition Care Plan:  [x] In Progress  [] Achieved  [] Not applicable         Recommendations:      1. Recommend continue c Glucerna 1.2 at 60ml/hr x24 hours. Recommend addition of 1 packet of "No Carb Prosource TF (1 pkg=11gm protein)" (together pt will receive: 1768cal, 97 Gm Prot; 21.7cal/kg and 1.2 Gm Prot/kg based on wt of 86.2kg).  2. RD remains available for further nutritional interventions as needed.     Monitoring and Evaluation:   Continue to monitor nutritional intake, tolerance to diet prescription, weights, labs, skin integrity      RD remains available upon request and will follow up per protocol  MS JENNIFER [facility], Pager #[phone]

## 2021-08-27 NOTE — PROGRESS NOTE ADULT - ASSESSMENT
70yo  Khmer speaking male w/ PMHx of HTN, DM2, BPH, asthma presented on 8/12/21 from home with worsening shortness of breath x 2 weeks in setting of COVID-19 PNA now, with progressive worsening hypoxic respiratory failure , failed NIPPV support , s/p intubation and s/p cardiac arrest afterwards; with ROSC after 6 min, VT x 2 s/p electrical shock x 2 and 2 epi's. and transfer to [facility] ICU for monitoring and management. Patient remains intubated for respiratory failure, currently on mechanical ventilator, sedated and paralyzed.     Assessment and Plans:    #Neuro:   -sedated with Ketamine 3, Fentanyl 1, and paralyzed with Nimbex2 - baseline mental status A&O x 3  -remains off of propofol- triglycerides trending down  -c/w ketamine  -c/w fentanyl  -neuro checks per ICU    #Pulm: Hypoxic Respiratory Failure 2/2 COVID PNA c/b ARDS  ARDS protocol ; permissive hypercarbia  -s/p prone multiple times  - Pt currently on PC rate 30, Ins. Pressure 22, peep 8 and fio2 60%- pPlat 28 / PP 31- will weaning down as tolerates  -8/21 CXR to eval extension of SQ air ; preliminary results with R >L neck SQ air , pneumomediastinum ;  repeat CXR 8/22 with Diffuse opacification of the lungs, mild-moderate. Pneumomediastinum resolved,  -completed remdesevir 8/13-8/17  -completed dexamethasone 8/13-8/23  -c/w neb tx   -chest PT, oral care, pulmonary toileting     r/o PE  Pt with + DVT on repeated duplex 9/23, high r/f PE in setting of hypercoagulable state with COVID -19 infection   -Pt not hemodynamically stable for CTA chest.   -it would not  as pt is already on full AC w/ Lovenox 80 BID for + DVT, Anti 10a factor level 1.7  -bleeding precaution   -trend DD,   -TTE 8/20;  there are no interpretable images 2/2 SQ air,     HX ASTHMA   -c/w home Symbicort  -c/w home montelukast  -c/w duoneb tx    #CV:  Cardiogenic shock s/p PEA followed by VT x 2 s/p ROSC,  Septic shock / hypovolemic shock/ distributive shock , + DVT , r/o PE, new onset A fib   -s/p  cardiac arrest post intubation with 6 minutes achieved ROSC; c/b VT x 2 s/p electrical shock x2   -Overnight pt in SVT/Afib s/p lopressor 5mg x 1, amio 150mg x1, converted to NSR   -remains in Shock with pressors support, changed from Norepi to phenylephrine, titrate  to maintain a MAP >65   -TTE; limited to due SQ air   -c/w full AC on Lovenox sq 80 BID for + DVT and presumed PE   -repeated duplex on 8/23 with persisting lower extremity DVT b/l       #/Renal:   -TAYLOR resolved  -c/w trending renal function and electrolytes/   -optimize electrolyte balance   -Started on Zaroxolyn with Lasix, held AM lasix dose 2/2 hypotension  -daily weight   BPH (benign prostatic hyperplasia).   -c/w holding all meds due to hypotension.  -c/w mace - strict  i&o's     #GI: Oropharyngeal dysphagia   -OGT for TF(Glucerna)- tolerating well- increase goal ot 60cc/hr-  -c/w bowel regimen, last BM 8/26  -PPI qd   -aspiration precaution   -oral care      Hypertriglyceridemia   -likely in setting of propofol use   -titrated off propofol  -repeat triglycerides daily      #ENDO:  A1c 7.8  TF w/ Glucerna   -FS q 6 hrs   -ISS & NPH q 6 hrs   -endocrine consult appreciated      #Heme:  + DVT , presumed  PE- in setting of CVOID-19 illness/ hypercoagulable state   -c/w FULL AC 80 BID Lovenox, anti 10A factor 1.7 on 8/23  -c/w trending Ddimer   -bleeding precaution     #ID:   Septic shock in setting of COVID-19 PNA   -UA & Ucx negative 8/20, repeat today 8/25 if UA + change mace and draw urine culture  -MRSA negative 8/21   -BC negative x 2 8/20, repeat B.C. x 2 today as pt febrile over night and WBC uptrended  to 24.79 from 16.49  -sputum copious amount- send sputum cx  -fungitel pending results   -empiric abx ; meropenem 1g TID (8/20-8/23) , Vanco 1 g x 1 (8/21), restart ABX coverage with Zosyn d/c today   -8/24 BC x 2 - +MRSE bacteremia- d/c zosyn / currently on Vancomycin   -sputum cx with rare yeast like cells    COVID-19 PNA   - s/p Tocilizumab on 8/15  -s/p remdesevir 8/13-8/17  -c/w dexamethasone 8/13-8/23   -trend inflammatory markers   -ID consult on board     #PPX:  DVT ppx ; on FULL AC with lovenox SQ  Gi ppx; protonix IVP q d     #GOC  FULL code at this time.   Goals of care d/w son and 2 daughters    72yo  Swedish speaking male w/ PMHx of HTN, DM2, BPH, asthma presented on 8/12/21 from home with worsening shortness of breath x 2 weeks in setting of COVID-19 PNA now, with progressive worsening hypoxic respiratory failure , failed NIPPV support , s/p intubation and s/p cardiac arrest afterwards; with ROSC after 6 min, VT x 2 s/p electrical shock x 2 and 2 epi's. and transfer to [facility] ICU for monitoring and management. Patient remains intubated for respiratory failure, currently on mechanical ventilator, sedated and paralyzed.     Assessment and Plans:    #Neuro:   -sedated with Ketamine 3, Fentanyl 1, and paralyzed with Nimbex2 - baseline mental status A&O x 3  -remains off of propofol- triglycerides trending down  -c/w ketamine  -c/w fentanyl  -neuro checks per ICU    #Pulm: Hypoxic Respiratory Failure 2/2 COVID PNA c/b ARDS  ARDS protocol ; permissive hypercarbia  -s/p prone multiple times  -(8/27) P/F = 166 currently on PC rate 30, Ins. Pressure 22, peep 8 and fio2 60%- pPlat 28 / PP 31- will weaning down as tolerates  -8/21 CXR to eval extension of SQ air ; preliminary results with R >L neck SQ air , pneumomediastinum ;  repeat CXR 8/22 with Diffuse opacification of the lungs, mild-moderate. Pneumomediastinum resolved,  -completed remdesevir 8/13-8/17  -completed dexamethasone 8/13-8/23  -c/w neb tx   -chest PT, oral care, pulmonary toileting     r/o PE  Pt with + DVT on repeated duplex 9/23, high r/f PE in setting of hypercoagulable state with COVID -19 infection   -Pt not hemodynamically stable for CTA chest.   -it would not  as pt is already on full AC w/ Lovenox 80 BID for + DVT, Anti 10a factor level 1.7  -bleeding precaution   -trend DD,   -TTE 8/20;  there are no interpretable images 2/2 SQ air,     HX ASTHMA   -c/w home Symbicort  -c/w home montelukast  -c/w duoneb tx    #CV:  Cardiogenic shock s/p PEA followed by VT x 2 s/p ROSC,  Septic shock / hypovolemic shock/ distributive shock , + DVT , r/o PE, new onset A fib   -s/p  cardiac arrest post intubation with 6 minutes achieved ROSC; c/b VT x 2 s/p electrical shock x2   -Overnight (8/26) pt in SVT/Afib s/p lopressor 5mg x 1, amio 150mg x1, converted to NSR   -remains in Shock with pressors support, titrate  to maintain a MAP >65   -TTE; limited to due SQ air   -c/w full AC on Lovenox sq 80 BID for + DVT and presumed PE   -repeated duplex on 8/23 with persisting lower extremity DVT b/l       #/Renal:   -TAYLOR resolved  -c/w trending renal function and electrolytes/   -optimize electrolyte balance   -started on Zaroxolyn with Lasix, held AM lasix dose 2/2 hypotension  -daily weight   BPH (benign prostatic hyperplasia).   -c/w holding all meds due to hypotension.  -c/w mace - strict  i&o's     #GI: Oropharyngeal dysphagia   -OGT for TF(Glucerna)- tolerating well- increase goal ot 60cc/hr-  -c/w bowel regimen, last BM 8/26  -PPI qd   -aspiration precaution   -oral care      Hypertriglyceridemia   -likely in setting of propofol use   -titrated off propofol  -repeat triglycerides daily      #ENDO:  A1c 7.8  TF w/ Glucerna   -FS q 6 hrs   -ISS & NPH q 6 hrs   -endocrine consult appreciated      #Heme:  + DVT , presumed  PE- in setting of CVOID-19 illness/ hypercoagulable state   -c/w FULL AC 80 BID Lovenox, anti 10A factor 1.7 on 8/23  -c/w trending Ddimer   -bleeding precaution     #ID:   Septic shock in setting of COVID-19 PNA   -UA & Ucx negative 8/20, repeat today 8/25 if UA + change mace and draw urine culture  -MRSA negative 8/21   -BC negative x 2 8/20, repeat B.C. x 2 today as pt febrile over night and WBC uptrended  to 24.79 from 16.49  -sputum copious amount- send sputum cx  -fungitel pending results   -empiric abx ; meropenem 1g TID (8/20-8/23) , Vanco 1 g x 1 (8/21), restart ABX coverage with Zosyn d/c today   -8/24 BC x 2 - +MRSE bacteremia- d/c zosyn / currently on Vancomycin   -sputum cx with rare yeast like cells    COVID-19 PNA   - s/p Tocilizumab on 8/15  -s/p remdesevir 8/13-8/17  -c/w dexamethasone 8/13-8/23   -trend inflammatory markers   -ID consult on board     #PPX:  DVT ppx ; on FULL AC with lovenox SQ  Gi ppx; protonix IVP q d     #GOC  FULL code at this time.   Goals of care d/w son and 2 daughters    72yo  Yoruba speaking male w/ PMHx of HTN, DM2, BPH, asthma presented on 8/12/21 from home with worsening shortness of breath x 2 weeks in setting of COVID-19 PNA now, with progressive worsening hypoxic respiratory failure , failed NIPPV support , s/p intubation and s/p cardiac arrest afterwards; with ROSC after 6 min, VT x 2 s/p electrical shock x 2 and 2 epi's. and transfer to [facility] ICU for monitoring and management. Patient remains intubated for respiratory failure, currently on mechanical ventilator, sedated and paralyzed.     Assessment and Plans:    #Neuro:   -sedated with Ketamine 3, Fentanyl 1, and paralyzed with Nimbex2 - baseline mental status A&O x 3  -remains off of propofol- triglycerides trending down  -c/w ketamine  -c/w fentanyl  -neuro checks per ICU    #Pulm: Hypoxic Respiratory Failure 2/2 COVID PNA c/b ARDS  ARDS protocol ; permissive hypercarbia  -s/p prone multiple times  -(8/27) P/F = 166 currently on PC rate 24, Ins. Pressure 22, peep 8 and fio2 50%- pPlat 28 / PP 31- will weaning down as tolerates  -8/21 CXR to eval extension of SQ air ; preliminary results with R >L neck SQ air , pneumomediastinum ;  repeat CXR 8/22 with Diffuse opacification of the lungs, mild-moderate. Pneumomediastinum resolved,  -completed remdesevir 8/13-8/17  -completed dexamethasone 8/13-8/23  -c/w neb tx   -chest PT, oral care, pulmonary toileting     r/o PE  Pt with + DVT on repeated duplex 9/23, high r/f PE in setting of hypercoagulable state with COVID -19 infection   -Pt not hemodynamically stable for CTA chest.   -it would not  as pt is already on full AC w/ Lovenox 80 BID for + DVT, Anti 10a factor level 1.7  -bleeding precaution   -trend DD,   -TTE 8/20;  there are no interpretable images 2/2 SQ air,     HX ASTHMA   -c/w home Symbicort  -c/w home montelukast  -c/w duoneb tx    #CV:  Cardiogenic shock s/p PEA followed by VT x 2 s/p ROSC,  Septic shock / hypovolemic shock/ distributive shock , + DVT , r/o PE, new onset A fib   -s/p  cardiac arrest post intubation with 6 minutes achieved ROSC; c/b VT x 2 s/p electrical shock x2   -Overnight (8/26) pt in SVT/Afib s/p lopressor 5mg x 1, amio 150mg x1, converted to NSR   -remains in Shock with pressors support, titrate  to maintain a MAP >65   -TTE; limited to due SQ air   -c/w full AC on Lovenox sq 80 BID for + DVT and presumed PE   -repeated duplex on 8/23 with persisting lower extremity DVT b/l       #/Renal:   -TAYLOR resolved  -c/w trending renal function and electrolytes/   -optimize electrolyte balance   -started on Zaroxolyn with Lasix, held AM lasix dose 2/2 hypotension  -daily weight   BPH (benign prostatic hyperplasia).   -c/w holding all meds due to hypotension.  -c/w mace - strict  i&o's     #GI: Oropharyngeal dysphagia   -OGT for TF(Glucerna)- tolerating well- increase goal ot 60cc/hr-  -c/w bowel regimen, last BM 8/26  -PPI qd   -aspiration precaution   -oral care      Hypertriglyceridemia   -likely in setting of propofol use   -titrated off propofol  -repeat triglycerides daily      #ENDO:  A1c 7.8  TF w/ Glucerna   -FS q 6 hrs   -ISS & NPH q 6 hrs   -endocrine consult appreciated      #Heme:  + DVT , presumed  PE- in setting of CVOID-19 illness/ hypercoagulable state   -c/w FULL AC 80 BID Lovenox, anti 10A factor 1.7 on 8/23  -c/w trending Ddimer   -bleeding precaution     #ID:   Septic shock in setting of COVID-19 PNA   -UA & Ucx negative 8/20, repeat today 8/25 if UA + change mace and draw urine culture  -MRSA negative 8/21   -BC negative x 2 8/20, repeat B.C. x 2 today as pt febrile over night and WBC uptrended  to 24.79 from 16.49  -sputum copious amount- send sputum cx  -fungitel pending results   -empiric abx ; meropenem 1g TID (8/20-8/23) , Vanco 1 g x 1 (8/21), restart ABX coverage with Zosyn d/c today   -8/24 BC x 2 - +MRSE bacteremia- d/c zosyn / currently on Vancomycin   -sputum cx with rare yeast like cells    COVID-19 PNA   - s/p Tocilizumab on 8/15  -s/p remdesevir 8/13-8/17  -c/w dexamethasone 8/13-8/23   -trend inflammatory markers   -ID consult on board     #PPX:  DVT ppx ; on FULL AC with lovenox SQ  Gi ppx; protonix IVP q d     #GOC  FULL code at this time.   Goals of care d/w son and 2 daughters

## 2021-08-27 NOTE — PROGRESS NOTE ADULT - SUBJECTIVE AND OBJECTIVE BOX
INTERVAL HPI/OVERNIGHT EVENTS: Overnight pt in SVT/Afib, received lopressor 5mg x 1, fentanyl 50mg x 1, and amiodarone 150x 1, changed Norepi to phenylephrine, one set blood cx sent for MRSE bacteremia, held AM lasix dose    This is a 70yo  Mongolian speaking unvaccinated male w/ PMHx of HTN, DM2, BPH, asthma presented on 21 from home with worsening shortness of breath x 2 weeks in setting of COVID-19 PNA . On the medical floor, pt with multiple RRTs for worsening hypoxic respiratory failure while on NIPPV, agitation/ delirium and non-compliance / not tolerating NIPPV c/b worsening delirium/ agitation despite medical interventions. Patient intubated for respiratory failure. Post intubation pt had cardiac arrest; with ROSC after 6 min, VT x 2 s/p electrical shock x 2 and 2 epi's. Pt transferred to [facility] ICU for closer monitoring and ventilator management. Patient remains intubated  sedated and paralyzed.      VITAL SIGNS:  ICU Vital Signs Last 24 Hrs  T(C): 37.8 (27 Aug 2021 03:00), Max: 37.8 (27 Aug 2021 03:00)  T(F): 100 (27 Aug 2021 03:00), Max: 100 (27 Aug 2021 03:00)  HR: 112 (27 Aug 2021 06:30) (95 - 133)  BP: --  BP(mean): --  ABP: 113/53 (27 Aug 2021 06:30) (91/45 - 160/68)  ABP(mean): 73 (27 Aug 2021 06:30) (60 - 95)  RR: 30 (27 Aug 2021 06:30) (0 - 32)  SpO2: 98% (27 Aug 2021 06:30) (82% - 100%)    Mode: AC/ CMV (Assist Control/ Continuous Mandatory Ventilation), RR (machine): 30, FiO2: 50, PEEP: 8, ITime: 1, MAP: 17, PC: 17, PIP: 26     @ 07:01  -   @ 07:00  --------------------------------------------------------  IN: 3085.3 mL / OUT: 3245 mL / NET: -159.7 mL     @ 07: @ 08:08  --------------------------------------------------------  IN: 98.5 mL / OUT: 100 mL / NET: -1.5 mL        PHYSICAL EXAM:  GENERAL: Laying comfortably, NAD  EYES: EOMI, PERRL, no scleral icterus  NECK: No JVD  LUNG: Clear to auscultation bilaterally; No wheeze, crackles or rhonci  HEART: Regular rate and rhythm; No murmurs, rubs, or gallops  ABDOMEN: Soft, Nontender, Nondistended  EXTREMITIES:  No LE edema, 2+ Peripheral Pulses, No clubbing, cyanosis, or edema  PSYCH: AAOx3  NEUROLOGY: non-focal, strength 5/5 in all extremities, sensation intact  SKIN: No rashes or lesions              MEDICATIONS:  MEDICATIONS  (STANDING):  ALBUTerol    90 MICROgram(s) HFA Inhaler 2 Puff(s) Inhalation every 6 hours  budesonide 160 MICROgram(s)/formoterol 4.5 MICROgram(s) Inhaler 2 Puff(s) Inhalation two times a day  chlorhexidine 0.12% Liquid 15 milliLiter(s) Oral Mucosa every 12 hours  chlorhexidine 4% Liquid 1 Application(s) Topical <User Schedule>  cholecalciferol 2000 Unit(s) Oral daily  cisatracurium Infusion 2 MICROgram(s)/kG/Min (9.79 mL/Hr) IV Continuous <Continuous>  dexAMETHasone  IVPB 10 milliGRAM(s) IV Intermittent daily  enoxaparin Injectable 80 milliGRAM(s) SubCutaneous every 12 hours  fentaNYL   Infusion... 2 MICROgram(s)/kG/Hr (8.16 mL/Hr) IV Continuous <Continuous>  furosemide   Injectable 20 milliGRAM(s) IV Push daily  insulin lispro (ADMELOG) corrective regimen sliding scale   SubCutaneous every 6 hours  insulin NPH human recombinant 7 Unit(s) SubCutaneous every 6 hours  ketamine Infusion. 0.25 mG/kG/Hr (2.04 mL/Hr) IV Continuous <Continuous>  metolazone 5 milliGRAM(s) Oral daily  montelukast 10 milliGRAM(s) Oral daily  multivitamin/minerals Oral Solution (WELLESSE) 30 milliLiter(s) Enteral Tube daily  pantoprazole  Injectable 40 milliGRAM(s) IV Push daily  phenylephrine    Infusion 2 MICROgram(s)/kG/Min (30.6 mL/Hr) IV Continuous <Continuous>  polyethylene glycol 3350 17 Gram(s) Oral daily  senna Syrup 20 milliLiter(s) Oral at bedtime  vancomycin  IVPB      vancomycin  IVPB 1000 milliGRAM(s) IV Intermittent every 12 hours  vasopressin Infusion 0.04 Unit(s)/Min (2.4 mL/Hr) IV Continuous <Continuous>    MEDICATIONS  (PRN):  fentaNYL    Injectable 25 MICROGram(s) IV Push every 4 hours PRN pain  midazolam Injectable 2 milliGRAM(s) IV Push every 4 hours PRN agitation      ALLERGIES:  Allergies    No Known Allergies    Intolerances        LABS:                        10.3   16.42 )-----------( 235      ( 27 Aug 2021 00:45 )             33.5         143  |  108  |  47<H>  ----------------------------<  262<H>  5.0   |  23  |  0.81    Ca    9.0      27 Aug 2021 00:45  Phos  3.5       Mg     2.5         TPro  5.5<L>  /  Alb  2.7<L>  /  TBili  0.5  /  DBili  x   /  AST  61<H>  /  ALT  72<H>  /  AlkPhos  350<H>      PT/INR - ( 27 Aug 2021 00:45 )   PT: 11.9 sec;   INR: 0.99 ratio         PTT - ( 27 Aug 2021 00:45 )  PTT:39.5 sec  Urinalysis Basic - ( 25 Aug 2021 16:30 )    Color: Yellow / Appearance: Clear / S.032 / pH: x  Gluc: x / Ketone: Negative  / Bili: Negative / Urobili: Negative   Blood: x / Protein: 30 mg/dL / Nitrite: Negative   Leuk Esterase: Negative / RBC: 13 /hpf / WBC 8 /HPF   Sq Epi: x / Non Sq Epi: 2 /hpf / Bacteria: Negative        CAPILLARY BLOOD GLUCOSE      POCT Blood Glucose.: 210 mg/dL (27 Aug 2021 05:25)      RADIOLOGY & ADDITIONAL TESTS: Reviewed. INTERVAL HPI/OVERNIGHT EVENTS: Overnight pt in SVT/Afib, received lopressor 5mg x 1, fentanyl 50mg x 1, and amiodarone 150x 1, changed Norepi to phenylephrine, one set blood cx sent for MRSE bacteremia, held AM lasix dose    This is a 70yo  Romansh speaking unvaccinated male w/ PMHx of HTN, DM2, BPH, asthma presented on 21 from home with worsening shortness of breath x 2 weeks in setting of COVID-19 PNA . On the medical floor, pt with multiple RRTs for worsening hypoxic respiratory failure while on NIPPV, agitation/ delirium and non-compliance / not tolerating NIPPV c/b worsening delirium/ agitation despite medical interventions. Patient intubated for respiratory failure. Post intubation pt had cardiac arrest; with ROSC after 6 min, VT x 2 s/p electrical shock x 2 and 2 epi's. Pt transferred to [facility] ICU for closer monitoring and ventilator management. Patient remains intubated  sedated and paralyzed.      VITAL SIGNS:  ICU Vital Signs Last 24 Hrs  T(C): 37.8 (27 Aug 2021 03:00), Max: 37.8 (27 Aug 2021 03:00)  T(F): 100 (27 Aug 2021 03:00), Max: 100 (27 Aug 2021 03:00)  HR: 112 (27 Aug 2021 06:30) (95 - 133)  BP: --  BP(mean): --  ABP: 113/53 (27 Aug 2021 06:30) (91/45 - 160/68)  ABP(mean): 73 (27 Aug 2021 06:30) (60 - 95)  RR: 30 (27 Aug 2021 06:30) (0 - 32)  SpO2: 98% (27 Aug 2021 06:30) (82% - 100%)    Mode: AC/ CMV (Assist Control/ Continuous Mandatory Ventilation), RR (machine): 30, FiO2: 50, PEEP: 8, ITime: 1, MAP: 17, PC: 17, PIP: 26     @ 07:01  -   @ 07:00  --------------------------------------------------------  IN: 3085.3 mL / OUT: 3245 mL / NET: -159.7 mL     @ 07: @ 08:08  --------------------------------------------------------  IN: 98.5 mL / OUT: 100 mL / NET: -1.5 mL        PHYSICAL EXAM:  General: intubated, sedated and paralyzed  HEENT: pupils sluggish   Neck: stable   Respiratory:  intubated, on mechanical vent, diminished BS b/l base, no wheezing   Cardiovascular: + s1 & s2 present  Abdomen: non distended, soft , hypoactive  BS x 4   Extremities: no edema , + pulses   Skin: warm and dry  Neurological: unable to assess mental status as pt is intubated and sedated and paralyzed   Psychiatry: unable to assess; intubated and sedated     MEDICATIONS:  MEDICATIONS  (STANDING):  ALBUTerol    90 MICROgram(s) HFA Inhaler 2 Puff(s) Inhalation every 6 hours  budesonide 160 MICROgram(s)/formoterol 4.5 MICROgram(s) Inhaler 2 Puff(s) Inhalation two times a day  chlorhexidine 0.12% Liquid 15 milliLiter(s) Oral Mucosa every 12 hours  chlorhexidine 4% Liquid 1 Application(s) Topical <User Schedule>  cholecalciferol 2000 Unit(s) Oral daily  cisatracurium Infusion 2 MICROgram(s)/kG/Min (9.79 mL/Hr) IV Continuous <Continuous>  dexAMETHasone  IVPB 10 milliGRAM(s) IV Intermittent daily  enoxaparin Injectable 80 milliGRAM(s) SubCutaneous every 12 hours  fentaNYL   Infusion... 2 MICROgram(s)/kG/Hr (8.16 mL/Hr) IV Continuous <Continuous>  furosemide   Injectable 20 milliGRAM(s) IV Push daily  insulin lispro (ADMELOG) corrective regimen sliding scale   SubCutaneous every 6 hours  insulin NPH human recombinant 7 Unit(s) SubCutaneous every 6 hours  ketamine Infusion. 0.25 mG/kG/Hr (2.04 mL/Hr) IV Continuous <Continuous>  metolazone 5 milliGRAM(s) Oral daily  montelukast 10 milliGRAM(s) Oral daily  multivitamin/minerals Oral Solution (WELLESSE) 30 milliLiter(s) Enteral Tube daily  pantoprazole  Injectable 40 milliGRAM(s) IV Push daily  phenylephrine    Infusion 2 MICROgram(s)/kG/Min (30.6 mL/Hr) IV Continuous <Continuous>  polyethylene glycol 3350 17 Gram(s) Oral daily  senna Syrup 20 milliLiter(s) Oral at bedtime  vancomycin  IVPB      vancomycin  IVPB 1000 milliGRAM(s) IV Intermittent every 12 hours  vasopressin Infusion 0.04 Unit(s)/Min (2.4 mL/Hr) IV Continuous <Continuous>    MEDICATIONS  (PRN):  fentaNYL    Injectable 25 MICROGram(s) IV Push every 4 hours PRN pain  midazolam Injectable 2 milliGRAM(s) IV Push every 4 hours PRN agitation      ALLERGIES:  Allergies    No Known Allergies    Intolerances        LABS:                        10.3   16.42 )-----------( 235      ( 27 Aug 2021 00:45 )             33.5         143  |  108  |  47<H>  ----------------------------<  262<H>  5.0   |  23  |  0.81    Ca    9.0      27 Aug 2021 00:45  Phos  3.5       Mg     2.5         TPro  5.5<L>  /  Alb  2.7<L>  /  TBili  0.5  /  DBili  x   /  AST  61<H>  /  ALT  72<H>  /  AlkPhos  350<H>      PT/INR - ( 27 Aug 2021 00:45 )   PT: 11.9 sec;   INR: 0.99 ratio         PTT - ( 27 Aug 2021 00:45 )  PTT:39.5 sec  Urinalysis Basic - ( 25 Aug 2021 16:30 )    Color: Yellow / Appearance: Clear / S.032 / pH: x  Gluc: x / Ketone: Negative  / Bili: Negative / Urobili: Negative   Blood: x / Protein: 30 mg/dL / Nitrite: Negative   Leuk Esterase: Negative / RBC: 13 /hpf / WBC 8 /HPF   Sq Epi: x / Non Sq Epi: 2 /hpf / Bacteria: Negative        CAPILLARY BLOOD GLUCOSE      POCT Blood Glucose.: 210 mg/dL (27 Aug 2021 05:25)      RADIOLOGY & ADDITIONAL TESTS: Reviewed.

## 2021-08-27 NOTE — PROGRESS NOTE ADULT - ATTENDING COMMENTS
Agree with above  Remains intubated AC/PC 30/+22/60%/+8  Overnight SVT, found AF given Lopressor and fentanyl, then went back up. Given amio and back in NSR  Small doses of Levo  Blood cultures drawn  Held Lasix    I have personally provided 50 minutes of critical care time.

## 2021-08-28 NOTE — PROGRESS NOTE ADULT - SUBJECTIVE AND OBJECTIVE BOX
ULYSSES OSPINA  MRN-[id number]  Patient is a 71y old  Male who presents with a chief complaint of SOB (27 Aug 2021 08:07)    HPI:  70yo M w/ PMHx of HTN, DM2, BPH, asthma presents with shortness of breath. Patient endorses that he has been feeling SOB with associated cough for the last 2 weeks and his symptoms slowly worsened over time. He did not receive the COVID vaccine. He reports that his family members whom he lives with likely are also COVID positive but he does not know for sure. He did not try taking anything for his symptoms other than his usual medications/inhalers. There were no obvious aggravating or alleviating factors. Currently with supplemental oxygen on, he feels well and has no complaints, however he presented to [facility] since his symptoms were worsening. In the ED, he was hemodynamically stable, afebrile, but he was encephalopathic and hypoxic on room air requiring high flow nasal canula. Labs were significant for mild hyperkalemia and COVID positive. CXR prelim read showed bilateral patchy opacities. CT head showed no acute findings. MICU was consulted in the ED, patient was deemed not a MICU candidate. Patient was given doxycycline and dexamethasone x1 and admitted to general medicine for further management. At time of interview, patient was A/Ox3 with .  (13 Aug 2021 04:21)    Today: Patient intubated, sedate and paralyzed.     REVIEW OF SYSTEMS:  unable to obtain      Physical Exam:  Vital Signs Last 24 Hrs  T(C): 37.5 (28 Aug 2021 08:00), Max: 37.9 (27 Aug 2021 19:00)  T(F): 99.5 (28 Aug 2021 08:00), Max: 100.2 (27 Aug 2021 19:00)  HR: 77 (28 Aug 2021 09:16) (70 - 134)  RR: 24 (28 Aug 2021 09:15) (23 - 30)  SpO2: 100% (28 Aug 2021 09:16) (95% - 100%)  CNS: non focal 	  Neck: no JVD  RES : clear , no wheezing                    CVS: Regular  rhythm. Normal S1/S2  Abd: Soft, non-distended. Bowel sounds present.  Skin: No rash.  Ext:  no edema, A Line    ============================I/O===========================   I&O's Detail    27 Aug 2021 07:01  -  28 Aug 2021 07:00  --------------------------------------------------------  IN:    Albumin 5%  - 250 mL: 250 mL    Amiodarone: 99.9 mL    Cisatracurium: 117.6 mL    Cisatracurium: 39.2 mL    Dexmedetomidine: 30.6 mL    FentaNYL: 147.6 mL    Glucerna: 1440 mL    IV PiggyBack: 125 mL    IV PiggyBack: 500 mL    IV PiggyBack: 150 mL    Ketamine: 295.2 mL    Norepinephrine: 285.2 mL    Phenylephrine: 116.9 mL    Vasopressin: 11 mL  Total IN: 3608.2 mL    OUT:    Indwelling Catheter - Urethral (mL): 2265 mL  Total OUT: 2265 mL    Total NET: 1343.2 mL      28 Aug 2021 07:01  -  28 Aug 2021 09:55  --------------------------------------------------------  IN:    Amiodarone: 66.6 mL    Cisatracurium: 29.4 mL    Dexmedetomidine: 30.6 mL    FentaNYL: 24.6 mL    Glucerna: 180 mL    IV PiggyBack: 62.5 mL    Phenylephrine: 82.8 mL  Total IN: 476.5 mL    OUT:    Indwelling Catheter - Urethral (mL): 235 mL  Total OUT: 235 mL    Total NET: 241.5 mL        ============================ LABS =========================                        9.8    17.72 )-----------( 249      ( 28 Aug 2021 01:11 )             30.9     08-28    146<H>  |  106  |  47<H>  ----------------------------<  323<H>  4.1   |  27  |  0.82    Ca    9.5      28 Aug 2021 01:10  Phos  2.2     08-28  Mg     2.5     08-28    TPro  5.4<L>  /  Alb  2.8<L>  /  TBili  0.7  /  DBili  x   /  AST  87<H>  /  ALT  133<H>  /  AlkPhos  430<H>  08-28    LIVER FUNCTIONS - ( 28 Aug 2021 01:10 )  Alb: 2.8 g/dL / Pro: 5.4 g/dL / ALK PHOS: 430 U/L / ALT: 133 U/L / AST: 87 U/L / GGT: x           PT/INR - ( 28 Aug 2021 01:11 )   PT: 12.6 sec;   INR: 1.05 ratio         PTT - ( 28 Aug 2021 01:11 )  PTT:34.9 sec  ABG - ( 28 Aug 2021 01:06 )  pH, Arterial: 7.50  pH, Blood: x     /  pCO2: 42    /  pO2: 69    / HCO3: 33    / Base Excess: 8.8   /  SaO2: 97.5

## 2021-08-28 NOTE — PROGRESS NOTE ADULT - ASSESSMENT
70yo  Irish speaking male w/ PMHx of HTN, DM2, BPH, asthma presented on 8/12/21 from home with worsening shortness of breath x 2 weeks in setting of COVID-19 PNA now, with progressive worsening hypoxic respiratory failure , failed NIPPV support , s/p intubation and s/p cardiac arrest afterwards; with ROSC after 6 min, VT x 2 s/p electrical shock x 2 and 2 epi's. and transfer to [facility] ICU for monitoring and management. Patient remains intubated for respiratory failure, currently on mechanical ventilator, sedated and paralyzed.     Assessment and Plans:    #Neuro:   -sedated with Precedex 0.5, Fentanyl 2, and paralyzed with Nimbex2 - baseline mental status A&O x 3  -remains off of propofol- triglycerides trending down  -c/w ketamine  -c/w fentanyl  -neuro checks per ICU    #Pulm: Hypoxic Respiratory Failure 2/2 COVID PNA c/b ARDS  ARDS protocol ; permissive hypercarbia  -s/p prone multiple times  -(8/27) P/F = 166 currently on PC rate 24, Ins. Pressure 22, peep 8 and fio2 50%- pPlat 28 / PP 31- will weaning down as tolerates  -8/21 CXR to eval extension of SQ air ; preliminary results with R >L neck SQ air , pneumomediastinum ;  repeat CXR 8/22 with Diffuse opacification of the lungs, mild-moderate. Pneumomediastinum resolved,  -completed remdesevir 8/13-8/17  -completed dexamethasone 8/13-8/23  -c/w neb tx   -chest PT, oral care, pulmonary toileting     r/o PE  Pt with + DVT on repeated duplex 9/23, high r/f PE in setting of hypercoagulable state with COVID -19 infection   -Pt not hemodynamically stable for CTA chest.   -it would not  as pt is already on full AC w/ Lovenox 80 BID for + DVT, Anti 10a factor level 1.7  -bleeding precaution   -trend DD,   -TTE 8/20;  there are no interpretable images 2/2 SQ air,     HX ASTHMA   -c/w home Symbicort  -c/w home montelukast  -c/w duoneb tx    #CV:  Cardiogenic shock s/p PEA followed by VT x 2 s/p ROSC,  Septic shock / hypovolemic shock/ distributive shock , + DVT , r/o PE, new onset A fib   -s/p  cardiac arrest post intubation with 6 minutes achieved ROSC; c/b VT x 2 s/p electrical shock x2   -Overnight (8/26) pt in SVT/Afib s/p lopressor 5mg x 1, amio 150mg x1, converted to NSR   -remains in Shock with pressors support, titrate  to maintain a MAP >65   -TTE; limited to due SQ air   -c/w full AC on Lovenox sq 80 BID for + DVT and presumed PE   -repeated duplex on 8/23 with persisting lower extremity DVT b/l       #/Renal:   -TAYLOR resolved  -c/w trending renal function and electrolytes/   -optimize electrolyte balance   -Hold diuresis for today. Lactate 2,9   -daily weight   BPH (benign prostatic hyperplasia).   -c/w holding all meds due to hypotension.  -c/w mace - strict  i&o's     #GI: Oropharyngeal dysphagia   -OGT for TF(Glucerna)- tolerating well- increase goal ot 60cc/hr-  -c/w bowel regimen, last BM 8/26  -PPI qd   -aspiration precaution   -oral care      Hypertriglyceridemia   -likely in setting of propofol use   -titrated off propofol  -repeat triglycerides daily      #ENDO:  A1c 7.8  TF w/ Glucerna   -FS q 6 hrs   -ISS & NPH q 6 hrs   -endocrine consult appreciated      #Heme:  + DVT , presumed  PE- in setting of CVOID-19 illness/ hypercoagulable state   -c/w FULL AC 80 BID Lovenox, anti 10A factor 1.7 on 8/23  -c/w trending Ddimer   -bleeding precaution     #ID:   Septic shock in setting of COVID-19 PNA   -UA & Ucx negative 8/20, repeat today 8/25 if UA + change mace and draw urine culture  -MRSA negative 8/21   -BC negative x 2 8/20, repeat B.C. x 2 today as pt febrile over night and WBC uptrended  to 24.79 from 16.49  -sputum copious amount- send sputum cx  -fungitel pending results   -empiric abx ; meropenem 1g TID (8/20-8/23) , Vanco 1 g x 1 (8/21), restart ABX coverage with Zosyn d/c today   -8/24 BC x 2 - +MRSE bacteremia- d/c zosyn / currently on Vancomycin   -sputum cx with rare yeast like cells    COVID-19 PNA   - s/p Tocilizumab on 8/15  -s/p remdesevir 8/13-8/17  -c/w dexamethasone 8/13-8/23   -trend inflammatory markers   -ID consult on board     #PPX:  DVT ppx ; on FULL AC with lovenox SQ  Gi ppx; protonix IVP q d     #GOC  FULL code at this time.   Goals of care d/w son and 2 daughters

## 2021-08-28 NOTE — PROGRESS NOTE ADULT - ATTENDING COMMENTS
1. Acute hypoxemia due to covid pneumonia and ARDs, Continue current PC  vent settings. Pt remains sedated and medically paralyzed. FIO2 decreasing.  2. ID . Pt received remdesivir, Toci, and dexamethasone. On multiple rounds of abx for bacterial pneumonia. Continue vancomycin.  3. DVT. LE. Continue therapeutic Lovenox,  4. Paroxysmal Afib. Pt received b b locker and amiodarone bolus last night. Now in sinus rhythm.

## 2021-08-29 NOTE — PROGRESS NOTE ADULT - ASSESSMENT
COVID pneumonia. Not vaccinated.   Tocilizumab 8/15. Completed Remdesivir 8/17 and Decadron 8/22.   Decompensated, intubated 8/20.   Suspect PE given bilateral leg DVTs.   Sepsis 8/23 with high grade MRSE bacteremia. Maybe CLABSI although no clear phlebitis (left IJ 8/20, changed to right IJ 8/25). No wounds.   Guarded prognosis.     Suggest  -lower Vancomycin to 1.5GM IV q24h, trough of 18.7 this morning doesn't need to be that high  -if TTE negative, aim to complete Wed 9/1 for a one week course   -monitor blood cultures   -check RUQ US - rising liver enzymes   -COVID care per protocol     Discussed with ICU     Patrick Paul MD   Infectious Disease   Pager [phone]   After 5PM and on weekends please page fellow on call or call [phone]

## 2021-08-29 NOTE — PROGRESS NOTE ADULT - ATTENDING COMMENTS
1. Acute hypoxemia due to covid pneumonia and ARDs, Continue current PC  vent settings.  Attempt to lower peep and insp pressure today did not work.Pt remains sedated and medically paralyzed. FIO2 decreasing. Trial off nimbex.  2. ID . Pt received remdesivir, Toci, and dexamethasone. On multiple rounds of abx for bacterial pneumonia. Continue vancomycin for MRSE bacteremia..  3. DVT. LE. Continue therapeutic Lovenox,  4. Paroxysmal Afib.

## 2021-08-29 NOTE — PROGRESS NOTE ADULT - ASSESSMENT
72yo  Kiswahili speaking male w/ PMHx of HTN, DM2, BPH, and asthma admitted on 8/12/21 for COVID-19-associated respiratory failure. Intubated on 8/20. VT arrest on 8/20, at the time of intubation, with ROSC after shock x2 and epi x2. Hospitalization further complicated by afib w/ RVR, now rhythm controlled on amiodarone, and BLE DVT. Proned and supinated multiple times with minimal effect. Remains intubated and paralyzed on ventilator. Not requiring any circulatory support.     #Neuro:   - baseline mental status A&O x 3  -sedated with Precedex 1, Fentanyl 2, and paralyzed with Nimbex 2   -remains off of propofol- triglycerides trending down  -neuro checks per ICU    #Pulm: Hypoxic Respiratory Failure 2/2 COVID PNA c/b ARDS  ARDS protocol ; permissive hypercarbia  -s/p prone multiple times  -(8/28) P/F = 162.5 currently on PC rate 24, Ins. Pressure 22, peep 8 and fio2 40%- pPlat 31 / PP 33 - P:F and ventilator settings relatively unchanged x24 hours (Fio2 50% --> 40%)  - 8/21 CXR to eval extension of SQ air ; preliminary results with R >L neck SQ air , pneumomediastinum ;  repeat CXR 8/22 with Diffuse opacification of the lungs, mild-moderate. Pneumomediastinum resolved,  -completed remdesevir 8/13-8/17  -completed dexamethasone 8/13-8/23  -c/w neb tx   -chest PT, oral care, pulmonary toileting     r/o PE  Pt with + DVT on repeated duplex 9/23, high r/f PE in setting of hypercoagulable state with COVID -19 infection   -Pt not hemodynamically stable for CTA chest.   -it would not  as pt is already on full AC w/ Lovenox 80 BID for + DVT, Anti 10a factor level 1.7  -TTE 8/20;  there are no interpretable images 2/2 SQ air; will need repeat     HX ASTHMA   -c/w home Symbicort  -c/w home montelukast  -c/w duoneb tx    #CV:  Cardiogenic shock s/p PEA followed by VT x 2 s/p ROSC,  Septic shock / hypovolemic shock/ distributive shock , + DVT , r/o PE, new onset A fib   -s/p  cardiac arrest post intubation with 6 minutes achieved ROSC; c/b VT x 2 s/p electrical shock x2   -Overnight (8/26) pt in SVT/Afib s/p lopressor 5mg x 1, amio 150mg x1, converted to NSR   - No longer requiring pressor support   -c/w full AC on Lovenox sq 80 BID for + DVT and presumed PE   -repeated duplex on 8/23 with persisting lower extremity DVT b/l       #/Renal:   -TAYLOR resolved  -c/w trending renal function and electrolytes/   -optimize electrolyte balance   - Diuresis held on 8/28 2/2 elevated lactate (2.8); lactate now cleared (1.8)  BPH (benign prostatic hyperplasia).   -c/w holding all meds due to hypotension.  -c/w mace - strict  i&o's     #GI: Oropharyngeal dysphagia   -OGT for TF(Glucerna)- tolerating well- increase goal ot 60cc/hr  -c/w bowel regimen, last BM 8/26  -PPI qd   -aspiration precaution   -oral care      Hypertriglyceridemia   -likely in setting of propofol use   -titrated off propofol  -repeat triglycerides daily      #ENDO:  A1c 7.8  TF w/ Glucerna   -FS q 6 hrs   -ISS & NPH q 6 hrs   -endocrine consult appreciated      #Heme:  + DVT , presumed  PE- in setting of CVOID-19 illness/ hypercoagulable state   -c/w FULL AC 80 BID Lovenox, anti 10A factor 1.7 on 8/23  -c/w trending Ddimer   -bleeding precaution   - Given temperature differential in feet, BLE arterial duplex    #ID:   Septic shock in setting of COVID-19 PNA   -UA & Ucx negative 8/20, repeat today 8/25 if UA + change mace and draw urine culture  -MRSA negative 8/21   -BC negative x 2 8/20, repeat B.C. x 2 today as pt febrile 2/28 and WBC uptrended  to 24.79 from 16.49  -sputum copious amount- send sputum cx  -fungitel pending results   -empiric abx ; meropenem 1g TID (8/20-8/23) , Vanco 1 g x 1 (8/21), restart ABX coverage with Zosyn d/c today   -8/24 BC x 2 - +MRSE bacteremia- d/c zosyn / currently on Vancomycin   -sputum cx with rare yeast like cells    COVID-19 PNA   - s/p Tocilizumab on 8/15  -s/p remdesevir 8/13-8/17  -c/w dexamethasone 8/13-8/23   -trend inflammatory markers   -ID consult on board     #PPX:  DVT ppx ; on FULL AC with lovenox SQ  Gi ppx; protonix IVP q d     #GOC  FULL code at this time.   Goals of care d/w son and 2 daughters

## 2021-08-29 NOTE — PROGRESS NOTE ADULT - SUBJECTIVE AND OBJECTIVE BOX
HISTORY  71y Male    24 HOUR EVENTS:    SUBJECTIVE/ROS:  [ ] A ten-point review of systems was otherwise negative except as noted.  [ ] Due to altered mental status/intubation, subjective information were not able to be obtained from the patient. History was obtained, to the extent possible, from review of the chart and collateral sources of information.      NEURO  RASS:     GCS:     CAM ICU:  Exam: awake, alert, oriented  Meds: cisatracurium Infusion 2 MICROgram(s)/kG/Min IV Continuous <Continuous>  dexMEDEtomidine Infusion 0.5 MICROgram(s)/kG/Hr IV Continuous <Continuous>  fentaNYL    Injectable 25 MICROGram(s) IV Push every 4 hours PRN pain  fentaNYL   Infusion... 2 MICROgram(s)/kG/Hr IV Continuous <Continuous>  midazolam Injectable 2 milliGRAM(s) IV Push every 4 hours PRN agitation    [x] Adequacy of sedation and pain control has been assessed and adjusted      RESPIRATORY  RR: 24 (08-29-21 @ 06:45) (22 - 25)  SpO2: 93% (08-29-21 @ 06:45) (92% - 100%)  Wt(kg): --  Exam: unlabored, clear to auscultation bilaterally  Mechanical Ventilation: Mode: AC/ CMV (Assist Control/ Continuous Mandatory Ventilation), RR (machine): 24, RR (patient): 24, FiO2: 40, PEEP: 8, ITime: 1, MAP: 18, PC: 22, PIP: 28  ABG - ( 29 Aug 2021 00:31 )  pH: 7.38  /  pCO2: 48    /  pO2: 65    / HCO3: 28    / Base Excess: 2.7   /  SaO2: 93.7    Lactate: x                [N/A] Extubation Readiness Assessed  Meds: ALBUTerol    90 MICROgram(s) HFA Inhaler 2 Puff(s) Inhalation every 6 hours  budesonide 160 MICROgram(s)/formoterol 4.5 MICROgram(s) Inhaler 2 Puff(s) Inhalation two times a day  montelukast 10 milliGRAM(s) Oral daily        CARDIOVASCULAR  HR: 81 (08-29-21 @ 06:45) (65 - 88)  BP: --  BP(mean): --  ABP: 149/58 (08-29-21 @ 06:45) (87/44 - 220/91)  ABP(mean): 88 (08-29-21 @ 06:45) (56 - 141)  Wt(kg): --  CVP(cm H2O): --      Exam: regular rate and rhythm  Cardiac Rhythm: sinus  Perfusion     [x]Adequate   [ ]Inadequate  Mentation   [x]Normal       [ ]Reduced  Extremities  [x]Warm         [ ]Cool  Volume Status [ ]Hypervolemic [x]Euvolemic [ ]Hypovolemic  Meds: aMIOdarone Infusion 0.5 mG/Min IV Continuous <Continuous>  phenylephrine    Infusion 0.5 MICROgram(s)/kG/Min IV Continuous <Continuous>        GI/NUTRITION  Exam: soft, nontender, nondistended, incision C/D/I  Diet:  Meds: pantoprazole  Injectable 40 milliGRAM(s) IV Push daily  polyethylene glycol 3350 17 Gram(s) Oral daily  senna Syrup 20 milliLiter(s) Oral at bedtime      GENITOURINARY  I&O's Detail    08-28 @ 07:01  -  08-29 @ 07:00  --------------------------------------------------------  IN:    Amiodarone: 66.6 mL    Amiodarone: 167 mL    Cisatracurium: 225.4 mL    Dexmedetomidine: 254.2 mL    FentaNYL: 188.6 mL    Glucerna: 1380 mL    IV PiggyBack: 62.5 mL    IV PiggyBack: 250 mL    Phenylephrine: 107.3 mL    Phenylephrine: 106.2 mL  Total IN: 2807.8 mL    OUT:    Indwelling Catheter - Urethral (mL): 1785 mL    Norepinephrine: 0 mL  Total OUT: 1785 mL    Total NET: 1022.8 mL          08-29    143  |  106  |  43<H>  ----------------------------<  251<H>  4.0   |  24  |  0.72    Ca    9.3      29 Aug 2021 00:36  Phos  4.2     08-29  Mg     2.5     08-29    TPro  5.6<L>  /  Alb  3.1<L>  /  TBili  1.4<H>  /  DBili  x   /  AST  157<H>  /  ALT  200<H>  /  AlkPhos  432<H>  08-29    [ ] Ramos catheter, indication: N/A  Meds: cholecalciferol 2000 Unit(s) Oral daily  multivitamin/minerals Oral Solution (WELLESSE) 30 milliLiter(s) Enteral Tube daily        HEMATOLOGIC  Meds: enoxaparin Injectable 80 milliGRAM(s) SubCutaneous every 12 hours    [x] VTE Prophylaxis                        9.4    13.94 )-----------( 185      ( 29 Aug 2021 00:36 )             29.9     PT/INR - ( 29 Aug 2021 00:36 )   PT: 12.1 sec;   INR: 1.01 ratio         PTT - ( 29 Aug 2021 00:36 )  PTT:39.7 sec  Transfusion     [ ] PRBC   [ ] Platelets   [ ] FFP   [ ] Cryoprecipitate      INFECTIOUS DISEASES  WBC Count: 13.94 K/uL (08-29 @ 00:36)    RECENT CULTURES:  Specimen Source: .Blood Blood  Date/Time: 08-27 @ 03:23  Culture Results:   No growth to date.  Gram Stain: --  Organism: --  Specimen Source: .Blood Blood  Date/Time: 08-25 @ 22:56  Culture Results:   No growth to date.  Gram Stain: --  Organism: --  Specimen Source: .Sputum Sputum  Date/Time: 08-25 @ 18:36  Culture Results:   Normal Respiratory Liane present  Gram Stain:   No polymorphonuclear leukocytes per low power field  No Squamous epithelial cells per low power field  Rare Yeast like cells per oil power field  Organism: --  Specimen Source: .Blood Blood-Peripheral  Date/Time: 08-24 @ 13:20  Culture Results:   Growth in anaerobic bottle: Staphylococcus epidermidis  Growth in aerobic bottle: Staphylococcus hominis "Susceptibilities not  performed"  Gram Stain:   Growth in anaerobic bottle: Gram Positive Cocci in Clusters  Growth in aerobic bottle: Gram Positive Cocci in Clusters  Organism: Blood Culture PCR  Staphylococcus epidermidis    Meds: vancomycin  IVPB      vancomycin  IVPB 1000 milliGRAM(s) IV Intermittent every 12 hours        ENDOCRINE  CAPILLARY BLOOD GLUCOSE      POCT Blood Glucose.: 233 mg/dL (29 Aug 2021 05:10)  POCT Blood Glucose.: 231 mg/dL (28 Aug 2021 23:13)  POCT Blood Glucose.: 247 mg/dL (28 Aug 2021 17:19)  POCT Blood Glucose.: 216 mg/dL (28 Aug 2021 11:30)    Meds: insulin lispro (ADMELOG) corrective regimen sliding scale   SubCutaneous every 6 hours  insulin NPH human recombinant 10 Unit(s) SubCutaneous every 6 hours        ACCESS DEVICES:  [ ] Peripheral IV  [ ] Central Venous Line	[ ] R	[ ] L	[ ] IJ	[ ] Fem	[ ] SC	Placed:   [ ] Arterial Line		[ ] R	[ ] L	[ ] Fem	[ ] Rad	[ ] Ax	Placed:   [ ] PICC:					[ ] Mediport  [ ] Urinary Catheter, Date Placed:   [x] Necessity of urinary, arterial, and venous catheters discussed    OTHER MEDICATIONS:  chlorhexidine 0.12% Liquid 15 milliLiter(s) Oral Mucosa every 12 hours  chlorhexidine 4% Liquid 1 Application(s) Topical <User Schedule>      CODE STATUS:      IMAGING: HISTORY  70yo M w/ PMHx of HTN, DM2, BPH, asthma Symbicort montelukast at home) who was admitted on 8/12 for SOB and cough, found to be COVID-19-positive (unvaccinated). Patient stated that his symptoms began 2 weeks prior to admission and worsened, finally prompting him to go to the ER. In the ER, the patient was found to be hypoxic and altered, prompting CT head, which was negative; patient is AOx3 at his baseline. Patient was given doxycycline and dexamethasone x1 and admitted to general medicine for further management. At time of interview, patient was A/Ox3 with . The patient had worsening respiratory function, and was finally intubated on 8/20. At the time of intubation, the patient had a VT arrest, requiring 2 shocks and 2 rounds of epinephrine prior to ROSC. Following his arrest, the patient developed atrial fibrillation with RVR, requiring amiodarone for rhythm control. The patient has been proned and supinated multiple times, with limited improvement of his respiratory status. The patient's hospitalization has been further complicated by bilateral lower extremity DVTs, now fully anticoagulated on Lovenox.     24 HOUR EVENTS:  - Converted to a sinus rhythm on amiodarone with a rate 100-120.   - No significant changes in respiratory status    SUBJECTIVE/ROS:  [ ] A ten-point review of systems was otherwise negative except as noted.  [x] Due to altered mental status/intubation, subjective information were not able to be obtained from the patient. History was obtained, to the extent possible, from review of the chart and collateral sources of information.      NEURO  Exam: sedated, paralyzed, pupils equal and reactive to light  Meds: cisatracurium Infusion 2 MICROgram(s)/kG/Min IV Continuous <Continuous>  dexMEDEtomidine Infusion 0.5 MICROgram(s)/kG/Hr IV Continuous <Continuous>  fentaNYL    Injectable 25 MICROGram(s) IV Push every 4 hours PRN pain  fentaNYL   Infusion... 2 MICROgram(s)/kG/Hr IV Continuous <Continuous>  midazolam Injectable 2 milliGRAM(s) IV Push every 4 hours PRN agitation    [x] Adequacy of sedation and pain control has been assessed and adjusted      RESPIRATORY  RR: 24 (08-29-21 @ 06:45) (22 - 25)  SpO2: 93% (08-29-21 @ 06:45) (92% - 100%)  Exam: coarse, ventilator dependent, peak pressure of 33 and plateau pressure of 31, P:F = 162.5  Mechanical Ventilation: Mode: AC/ CMV (Assist Control/ Continuous Mandatory Ventilation), RR (machine): 24, RR (patient): 24, FiO2: 40, PEEP: 8, ITime: 1, MAP: 18, PC: 22, PIP: 28  ABG - ( 29 Aug 2021 00:31 )  pH: 7.38  /  pCO2: 48    /  pO2: 65    / HCO3: 28    / Base Excess: 2.7   /  SaO2: 93.7      [x] Extubation Readiness Assessment: not appropriate for extubation at this time   Meds: ALBUTerol    90 MICROgram(s) HFA Inhaler 2 Puff(s) Inhalation every 6 hours  budesonide 160 MICROgram(s)/formoterol 4.5 MICROgram(s) Inhaler 2 Puff(s) Inhalation two times a day  Montelukast 10 milliGRAM(s) Oral daily      CARDIOVASCULAR  HR: 81 (08-29-21 @ 06:45) (65 - 88)  ABP: 149/58 (08-29-21 @ 06:45) (87/44 - 220/91)  ABP(mean): 88 (08-29-21 @ 06:45) (56 - 141)      Exam: tachycardic, regular rhythm with present P-waves on monitor (appears sinus)  Perfusion     [ ]Adequate   [ ]Inadequate  Mentation   [ ]Normal       [x]Reduced - sedated   Extremities  [ ]Warm         [x]Cool - Rt foot is cool compared to left; DP and PT are palpable bilaterally, but less pronounced on Rt  Volume Status [ ]Hypervolemic [x]Euvolemic [ ]Hypovolemic  Meds: aMIOdarone Infusion 0.5 mG/Min IV Continuous <Continuous>  phenylephrine    Infusion 0.5 MICROgram(s)/kG/Min IV Continuous <Continuous>      GI/NUTRITION  Exam: soft, nondistended   Diet: glucerna @ 60 mL/hr   Meds: pantoprazole  Injectable 40 milliGRAM(s) IV Push daily  polyethylene glycol 3350 17 Gram(s) Oral daily  senna Syrup 20 milliLiter(s) Oral at bedtime      GENITOURINARY  I&O's Detail    08-28 @ 07:01  -  08-29 @ 07:00  --------------------------------------------------------  IN:    Amiodarone: 66.6 mL    Amiodarone: 167 mL    Cisatracurium: 225.4 mL    Dexmedetomidine: 254.2 mL    FentaNYL: 188.6 mL    Glucerna: 1380 mL    IV PiggyBack: 62.5 mL    IV PiggyBack: 250 mL    Phenylephrine: 107.3 mL    Phenylephrine: 106.2 mL  Total IN: 2807.8 mL    OUT:    Indwelling Catheter - Urethral (mL): 1785 mL    Norepinephrine: 0 mL  Total OUT: 1785 mL    Total NET: 1022.8 mL      08-29    143  |  106  |  43<H>  ----------------------------<  251<H>  4.0   |  24  |  0.72    Ca    9.3      29 Aug 2021 00:36  Phos  4.2     08-29  Mg     2.5     08-29    TPro  5.6<L>  /  Alb  3.1<L>  /  TBili  1.4<H>  /  DBili  x   /  AST  157<H>  /  ALT  200<H>  /  AlkPhos  432<H>  08-29    [x] Ramos catheter, indication: monitoring in critical illness   Meds: cholecalciferol 2000 Unit(s) Oral daily  multivitamin/minerals Oral Solution (WELLESSE) 30 milliLiter(s) Enteral Tube daily      HEMATOLOGIC  Meds: enoxaparin Injectable 80 milliGRAM(s) SubCutaneous every 12 hours    [x] VTE Prophylaxis                        9.4    13.94 )-----------( 185      ( 29 Aug 2021 00:36 )             29.9     PT/INR - ( 29 Aug 2021 00:36 )   PT: 12.1 sec;   INR: 1.01 ratio         PTT - ( 29 Aug 2021 00:36 )  PTT:39.7 sec  Transfusion     [ ] PRBC   [ ] Platelets   [ ] FFP   [ ] Cryoprecipitate      INFECTIOUS DISEASES  WBC Count: 13.94 K/uL (08-29 @ 00:36)    RECENT CULTURES:  Specimen Source: .Blood Blood  Date/Time: 08-27 @ 03:23  Culture Results:   No growth to date.  Gram Stain: --  Organism: --  Specimen Source: .Blood Blood  Date/Time: 08-25 @ 22:56  Culture Results:   No growth to date.  Gram Stain: --  Organism: --  Specimen Source: .Sputum Sputum  Date/Time: 08-25 @ 18:36  Culture Results:   Normal Respiratory Liane present  Gram Stain:   No polymorphonuclear leukocytes per low power field  No Squamous epithelial cells per low power field  Rare Yeast like cells per oil power field  Organism: --  Specimen Source: .Blood Blood-Peripheral  Date/Time: 08-24 @ 13:20  Culture Results:   Growth in anaerobic bottle: Staphylococcus epidermidis  Growth in aerobic bottle: Staphylococcus hominis "Susceptibilities not  performed"  Gram Stain:   Growth in anaerobic bottle: Gram Positive Cocci in Clusters  Growth in aerobic bottle: Gram Positive Cocci in Clusters  Organism: Blood Culture PCR  Staphylococcus epidermidis    Meds: vancomycin  IVPB      vancomycin  IVPB 1000 milliGRAM(s) IV Intermittent every 12 hours        ENDOCRINE  CAPILLARY BLOOD GLUCOSE      POCT Blood Glucose.: 233 mg/dL (29 Aug 2021 05:10)  POCT Blood Glucose.: 231 mg/dL (28 Aug 2021 23:13)  POCT Blood Glucose.: 247 mg/dL (28 Aug 2021 17:19)  POCT Blood Glucose.: 216 mg/dL (28 Aug 2021 11:30)    Meds: insulin lispro (ADMELOG) corrective regimen sliding scale   SubCutaneous every 6 hours  insulin NPH human recombinant 10 Unit(s) SubCutaneous every 6 hours        ACCESS DEVICES:  [x] Peripheral IV  [x] Central Venous Line	[x] R	[ ] L	[x] IJ	[ ] Fem	[ ] SC	Placed: 8/25/2021  [x] Arterial Line		[x] R	[ ] L	[ ] Fem	[x] Rad	[ ] Ax	Placed: 8/25/2021  [ ] PICC:					[ ] Mediport  [x] Urinary Catheter  [x] Necessity of urinary, arterial, and venous catheters discussed    OTHER MEDICATIONS:  chlorhexidine 0.12% Liquid 15 milliLiter(s) Oral Mucosa every 12 hours  chlorhexidine 4% Liquid 1 Application(s) Topical <User Schedule>      CODE STATUS: Full       IMAGING:  BLE Venous Duplex (8/23): There is persistent right below the knee DVT affecting the peroneal and soleal veins without proximal propagation.    There is DVT affecting the left popliteal vein above the knee, and there is below the knee DVT affecting the posterior tibial peroneal trunk, peroneal, gastrocnemius and soleal veins.

## 2021-08-29 NOTE — PROGRESS NOTE ADULT - SUBJECTIVE AND OBJECTIVE BOX
Follow Up: COVID, CoNS bacteremia    Interval History/ROS: Afebrile. Remains critically ill     Allergies  No Known Allergies        ANTIMICROBIALS:  vancomycin  IVPB    vancomycin  IVPB 1000 every 12 hours      OTHER MEDS:  ALBUTerol    90 MICROgram(s) HFA Inhaler 2 Puff(s) Inhalation every 8 hours  budesonide 160 MICROgram(s)/formoterol 4.5 MICROgram(s) Inhaler 2 Puff(s) Inhalation two times a day  chlorhexidine 0.12% Liquid 15 milliLiter(s) Oral Mucosa every 12 hours  chlorhexidine 4% Liquid 1 Application(s) Topical <User Schedule>  cholecalciferol 2000 Unit(s) Oral daily  cisatracurium Infusion 2 MICROgram(s)/kG/Min IV Continuous <Continuous>  dexMEDEtomidine Infusion 0.5 MICROgram(s)/kG/Hr IV Continuous <Continuous>  enoxaparin Injectable 80 milliGRAM(s) SubCutaneous every 12 hours  fentaNYL    Injectable 25 MICROGram(s) IV Push every 4 hours PRN  fentaNYL   Infusion... 2 MICROgram(s)/kG/Hr IV Continuous <Continuous>  insulin lispro (ADMELOG) corrective regimen sliding scale   SubCutaneous every 6 hours  insulin NPH human recombinant 12 Unit(s) SubCutaneous every 6 hours  midazolam Injectable 2 milliGRAM(s) IV Push every 4 hours PRN  montelukast 10 milliGRAM(s) Oral daily  multivitamin/minerals Oral Solution (WELLESSE) 30 milliLiter(s) Enteral Tube daily  pantoprazole  Injectable 40 milliGRAM(s) IV Push daily  phenylephrine    Infusion 0.5 MICROgram(s)/kG/Min IV Continuous <Continuous>  polyethylene glycol 3350 17 Gram(s) Oral daily  senna Syrup 20 milliLiter(s) Oral at bedtime      Vital Signs Last 24 Hrs  T(C): 36.4 (29 Aug 2021 07:00), Max: 36.9 (28 Aug 2021 20:00)  T(F): 97.5 (29 Aug 2021 07:00), Max: 98.4 (28 Aug 2021 20:00)  HR: 61 (29 Aug 2021 13:00) (56 - 88)  BP: --  BP(mean): --  RR: 25 (29 Aug 2021 13:00) (22 - 25)  SpO2: 89% (29 Aug 2021 13:00) (89% - 100%)    Physical Exam:  General: intubated sedated   Eye: normal sclera and conjunctiva  ENT: ET and OG tubes   Cardio: regular rate   Respiratory: mechanically ventilated   abd: soft, bowel sounds present, no tenderness  Musculoskeletal: no focal joint swelling  vascular: anasarca. no phlebitis   Skin: no rash                          9.4    13.94 )-----------( 185      ( 29 Aug 2021 00:36 )             29.9       08-29    143  |  106  |  43<H>  ----------------------------<  251<H>  4.0   |  24  |  0.72    Ca    9.3      29 Aug 2021 00:36  Phos  4.2     08-29  Mg     2.5     08-29    TPro  5.6<L>  /  Alb  3.1<L>  /  TBili  1.4<H>  /  DBili  x   /  AST  157<H>  /  ALT  200<H>  /  AlkPhos  432<H>  08-29          MICROBIOLOGY:  Vancomycin Level, Trough: 18.7 ug/mL (08-29-21 @ 05:49)    Culture - Blood (collected 08-27-21 @ 03:23)  Source: .Blood Blood  Preliminary Report (08-28-21 @ 04:01):    No growth to date.    Culture - Blood (collected 08-25-21 @ 22:56)  Source: .Blood Blood  Preliminary Report (08-26-21 @ 23:01):    No growth to date.    Culture - Blood (collected 08-25-21 @ 22:56)  Source: .Blood Blood  Preliminary Report (08-26-21 @ 23:01):    No growth to date.    Culture - Sputum (collected 08-25-21 @ 18:36)  Source: .Sputum Sputum  Gram Stain (08-25-21 @ 23:35):    No polymorphonuclear leukocytes per low power field    No Squamous epithelial cells per low power field    Rare Yeast like cells per oil power field  Final Report (08-27-21 @ 19:33):    Normal Respiratory Liane present    Culture - Blood (collected 08-24-21 @ 13:20)  Source: .Blood Blood-Peripheral  Gram Stain (08-25-21 @ 13:51):    Growth in aerobic bottle: Gram Positive Cocci in Clusters    Growth in anaerobic bottle: Gram Positive Cocci in Clusters  Final Report (08-27-21 @ 09:44):    Growth in aerobic and anaerobic bottles: Staphylococcus epidermidis    ***Blood Panel PCR results on this specimen are available    approximately 3 hours after the Gram stain result.***    Gram stain, PCR, and/or culture results may not always    correspond due to difference in methodologies.    ************************************************************    This PCR assay was performed by multiplex PCR. This    Assay tests for 66 bacterial and resistance gene targets.    Please refer to the [facility] test directory    at [email or web address] for details.  Organism: Blood Culture PCR  Staphylococcus epidermidis (08-27-21 @ 09:44)  Organism: Staphylococcus epidermidis (08-27-21 @ 09:44)      -  Ampicillin/Sulbactam: R <=8/4      -  Cefazolin: R <=4      -  Clindamycin: R >4      -  Erythromycin: R >4      -  Gentamicin: S <=1 Should not be used as monotherapy      -  Oxacillin: R >2      -  Penicillin: R 8      -  RIF- Rifampin: S <=1 Should not be used as monotherapy      -  Tetra/Doxy: R >8      -  Trimethoprim/Sulfamethoxazole: S <=0.5/9.5      -  Vancomycin: S 2      Method Type: GEETA  Organism: Blood Culture PCR (08-27-21 @ 09:44)      -  Staphylococcus epidermidis, Methicillin resistant: Detec      Method Type: PCR    Culture - Blood (collected 08-24-21 @ 13:20)  Source: .Blood Blood-Peripheral  Gram Stain (08-25-21 @ 17:01):    Growth in anaerobic bottle: Gram Positive Cocci in Clusters    Growth in aerobic bottle: Gram Positive Cocci in Clusters  Final Report (08-26-21 @ 21:49):    Growth in anaerobic bottle: Staphylococcus epidermidis    Growth in aerobic bottle: Staphylococcus hominis "Susceptibilities not    performed"    Culture - Sputum (collected 08-22-21 @ 17:32)  Source: .Sputum Sputum  Gram Stain (08-22-21 @ 19:31):    Numerous polymorphonuclear leukocytes per low power field    Few Squamous epithelial cells per low power field    Few Gram positive cocci in pairs per oil power field  Final Report (08-24-21 @ 18:02):    Normal Respiratory Liane present    RADIOLOGY:  Images below reviewed personally  Xray Chest 1 View- PORTABLE-Urgent (Xray Chest 1 View- PORTABLE-Urgent .) (08.25.21 @ 19:42)   Frontal expiratory view of the chest shows the heart to be similar in size. Endotracheal tube, left jugular line and nasogastric tube remain present. New right jugular line reaches the lower superior vena cava.  The lungs show similar bilateral patchy infiltrates and there is no evidence of pneumothorax nor definite pleural effusion.

## 2021-08-30 NOTE — PROCEDURE NOTE - NSPOSTPRCRAD_GEN_A_CORE
post-procedure radiography performed
central line located in the superior vena cava/line adjusted to depth of insertion/no pneumothorax/post procedure radiography not performed
central line located in the superior vena cava/no pneumothorax/post-procedure radiography performed
post-procedure radiography performed

## 2021-08-30 NOTE — PROCEDURE NOTE - NSPOSTCAREGUIDE_GEN_A_CORE
Care for catheter as per unit/ICU protocols
as per unit protocols
Care for catheter as per unit/ICU protocols

## 2021-08-30 NOTE — PROGRESS NOTE ADULT - SUBJECTIVE AND OBJECTIVE BOX
INTERVAL HPI/OVERNIGHT EVENTS:    This is a 72yo  Czech speaking unvaccinated male w/ PMHx of HTN, DM2, BPH, asthma presented on 8/12/21 from home with worsening shortness of breath x 2 weeks in setting of COVID-19 PNA . On the medical floor, pt with multiple RRTs for worsening hypoxic respiratory failure while on NIPPV, agitation/ delirium and non-compliance / not tolerating NIPPV c/b worsening delirium/ agitation despite medical interventions. Patient intubated for respiratory failure. Post intubation pt had cardiac arrest; with ROSC after 6 min, VT x 2 s/p electrical shock x 2 and 2 epi's. Pt transferred to [facility] ICU for closer monitoring and ventilator management. Patient remains intubated  sedated and paralyzed.    VITAL SIGNS:  ICU Vital Signs Last 24 Hrs  T(C): 38 (30 Aug 2021 00:00), Max: 38.1 (29 Aug 2021 20:00)  T(F): 100.4 (30 Aug 2021 00:00), Max: 100.6 (29 Aug 2021 20:00)  HR: 101 (30 Aug 2021 05:54) (56 - 101)  BP: --  BP(mean): --  ABP: 126/47 (30 Aug 2021 04:00) (85/42 - 202/82)  ABP(mean): 67 (30 Aug 2021 04:00) (53 - 125)  RR: 24 (30 Aug 2021 04:00) (24 - 42)  SpO2: 93% (30 Aug 2021 05:54) (89% - 100%)    Mode: AC/ CMV (Assist Control/ Continuous Mandatory Ventilation), RR (machine): 24, FiO2: 60, PEEP: 8, ITime: 1.8, MAP: 22, PC: 22, PIP: 33    08-28 @ 07:01  -  08-29 @ 07:00  --------------------------------------------------------  IN: 2939.2 mL / OUT: 1910 mL / NET: 1029.2 mL    08-29 @ 07:01 - 08-30 @ 06:42  --------------------------------------------------------  IN: 2932.6 mL / OUT: 1795 mL / NET: 1137.6 mL        PHYSICAL EXAM:  General: intubated, sedated and paralyzed  HEENT: pupils sluggish   Neck: stable   Respiratory:  intubated, on mechanical vent, diminished BS b/l base, no wheezing   Cardiovascular: + s1 & s2 present  Abdomen: non distended, soft , hypoactive  BS x 4   Extremities: no edema , + pulses   Skin: warm and dry  Neurological: unable to assess mental status as pt is intubated and sedated and paralyzed   Psychiatry: unable to assess; intubated and sedated     MEDICATIONS:  MEDICATIONS  (STANDING):  ALBUTerol    90 MICROgram(s) HFA Inhaler 2 Puff(s) Inhalation every 8 hours  budesonide 160 MICROgram(s)/formoterol 4.5 MICROgram(s) Inhaler 2 Puff(s) Inhalation two times a day  chlorhexidine 0.12% Liquid 15 milliLiter(s) Oral Mucosa every 12 hours  chlorhexidine 4% Liquid 1 Application(s) Topical <User Schedule>  cholecalciferol 2000 Unit(s) Oral daily  dexMEDEtomidine Infusion 0.5 MICROgram(s)/kG/Hr (10.2 mL/Hr) IV Continuous <Continuous>  diazepam    Tablet 10 milliGRAM(s) Oral every 8 hours  enoxaparin Injectable 80 milliGRAM(s) SubCutaneous every 12 hours  fentaNYL   Infusion... 2 MICROgram(s)/kG/Hr (8.16 mL/Hr) IV Continuous <Continuous>  insulin lispro (ADMELOG) corrective regimen sliding scale   SubCutaneous every 6 hours  insulin NPH human recombinant 12 Unit(s) SubCutaneous every 6 hours  methadone    Tablet 10 milliGRAM(s) Oral every 8 hours  montelukast 10 milliGRAM(s) Oral daily  multivitamin/minerals Oral Solution (WELLESSE) 30 milliLiter(s) Enteral Tube daily  pantoprazole  Injectable 40 milliGRAM(s) IV Push daily  phenylephrine    Infusion 0.5 MICROgram(s)/kG/Min (7.65 mL/Hr) IV Continuous <Continuous>  polyethylene glycol 3350 17 Gram(s) Oral daily  QUEtiapine 25 milliGRAM(s) Oral daily  senna Syrup 20 milliLiter(s) Oral at bedtime  vancomycin  IVPB 1500 milliGRAM(s) IV Intermittent <User Schedule>    MEDICATIONS  (PRN):  fentaNYL    Injectable 50 MICROGram(s) IV Push every 4 hours PRN pain  midazolam Injectable 2 milliGRAM(s) IV Push every 4 hours PRN agitation      ALLERGIES:  Allergies    No Known Allergies    Intolerances        LABS:                        8.9    15.23 )-----------( 186      ( 30 Aug 2021 01:08 )             28.0     08-30    144  |  109<H>  |  37<H>  ----------------------------<  135<H>  4.0   |  24  |  0.69    Ca    9.0      30 Aug 2021 01:10  Phos  2.7     08-30  Mg     2.3     08-30    TPro  5.2<L>  /  Alb  2.7<L>  /  TBili  1.8<H>  /  DBili  x   /  AST  199<H>  /  ALT  281<H>  /  AlkPhos  463<H>  08-30    PT/INR - ( 29 Aug 2021 00:36 )   PT: 12.1 sec;   INR: 1.01 ratio         PTT - ( 30 Aug 2021 01:09 )  PTT:35.2 sec      CAPILLARY BLOOD GLUCOSE      POCT Blood Glucose.: 193 mg/dL (30 Aug 2021 06:13)      RADIOLOGY & ADDITIONAL TESTS: Reviewed. INTERVAL HPI/OVERNIGHT EVENTS: Required multiple PRN doses of fentanyl (50mch x 3) & Versed(2mg x 3), pt on Methadone, Valium and Seroquel - off Nimbex for few days    This is a 72yo  Serbian speaking unvaccinated male w/ PMHx of HTN, DM2, BPH, asthma presented on 8/12/21 from home with worsening shortness of breath x 2 weeks in setting of COVID-19 PNA . On the medical floor, pt with multiple RRTs for worsening hypoxic respiratory failure while on NIPPV, agitation/ delirium and non-compliance / not tolerating NIPPV c/b worsening delirium/ agitation despite medical interventions. Patient intubated for respiratory failure. Post intubation pt had cardiac arrest; with ROSC after 6 min, VT x 2 s/p electrical shock x 2 and 2 epi's. Pt transferred to [facility] ICU for closer monitoring and ventilator management. Patient remains intubated  sedated and paralyzed.    VITAL SIGNS:  ICU Vital Signs Last 24 Hrs  T(C): 38 (30 Aug 2021 00:00), Max: 38.1 (29 Aug 2021 20:00)  T(F): 100.4 (30 Aug 2021 00:00), Max: 100.6 (29 Aug 2021 20:00)  HR: 101 (30 Aug 2021 05:54) (56 - 101)  BP: --  BP(mean): --  ABP: 126/47 (30 Aug 2021 04:00) (85/42 - 202/82)  ABP(mean): 67 (30 Aug 2021 04:00) (53 - 125)  RR: 24 (30 Aug 2021 04:00) (24 - 42)  SpO2: 93% (30 Aug 2021 05:54) (89% - 100%)    Mode: AC/ CMV (Assist Control/ Continuous Mandatory Ventilation), RR (machine): 24, FiO2: 60, PEEP: 8, ITime: 1.8, MAP: 22, PC: 22, PIP: 33    08-28 @ 07:01  -  08-29 @ 07:00  --------------------------------------------------------  IN: 2939.2 mL / OUT: 1910 mL / NET: 1029.2 mL    08-29 @ 07:01 - 08-30 @ 06:42  --------------------------------------------------------  IN: 2932.6 mL / OUT: 1795 mL / NET: 1137.6 mL        PHYSICAL EXAM:  General: intubated, sedated and paralyzed  HEENT: pupils sluggish   Neck: stable   Respiratory:  intubated, on mechanical vent, diminished BS b/l base, no wheezing   Cardiovascular: + s1 & s2 present  Abdomen: non distended, soft , hypoactive  BS x 4   Extremities: no edema , + pulses   Skin: warm and dry  Neurological: unable to assess mental status as pt is intubated and sedated and paralyzed   Psychiatry: unable to assess; intubated and sedated     MEDICATIONS:  MEDICATIONS  (STANDING):  ALBUTerol    90 MICROgram(s) HFA Inhaler 2 Puff(s) Inhalation every 8 hours  budesonide 160 MICROgram(s)/formoterol 4.5 MICROgram(s) Inhaler 2 Puff(s) Inhalation two times a day  chlorhexidine 0.12% Liquid 15 milliLiter(s) Oral Mucosa every 12 hours  chlorhexidine 4% Liquid 1 Application(s) Topical <User Schedule>  cholecalciferol 2000 Unit(s) Oral daily  dexMEDEtomidine Infusion 0.5 MICROgram(s)/kG/Hr (10.2 mL/Hr) IV Continuous <Continuous>  diazepam    Tablet 10 milliGRAM(s) Oral every 8 hours  enoxaparin Injectable 80 milliGRAM(s) SubCutaneous every 12 hours  fentaNYL   Infusion... 2 MICROgram(s)/kG/Hr (8.16 mL/Hr) IV Continuous <Continuous>  insulin lispro (ADMELOG) corrective regimen sliding scale   SubCutaneous every 6 hours  insulin NPH human recombinant 12 Unit(s) SubCutaneous every 6 hours  methadone    Tablet 10 milliGRAM(s) Oral every 8 hours  montelukast 10 milliGRAM(s) Oral daily  multivitamin/minerals Oral Solution (WELLESSE) 30 milliLiter(s) Enteral Tube daily  pantoprazole  Injectable 40 milliGRAM(s) IV Push daily  phenylephrine    Infusion 0.5 MICROgram(s)/kG/Min (7.65 mL/Hr) IV Continuous <Continuous>  polyethylene glycol 3350 17 Gram(s) Oral daily  QUEtiapine 25 milliGRAM(s) Oral daily  senna Syrup 20 milliLiter(s) Oral at bedtime  vancomycin  IVPB 1500 milliGRAM(s) IV Intermittent <User Schedule>    MEDICATIONS  (PRN):  fentaNYL    Injectable 50 MICROGram(s) IV Push every 4 hours PRN pain  midazolam Injectable 2 milliGRAM(s) IV Push every 4 hours PRN agitation      ALLERGIES:  Allergies    No Known Allergies    Intolerances        LABS:                        8.9    15.23 )-----------( 186      ( 30 Aug 2021 01:08 )             28.0     08-30    144  |  109<H>  |  37<H>  ----------------------------<  135<H>  4.0   |  24  |  0.69    Ca    9.0      30 Aug 2021 01:10  Phos  2.7     08-30  Mg     2.3     08-30    TPro  5.2<L>  /  Alb  2.7<L>  /  TBili  1.8<H>  /  DBili  x   /  AST  199<H>  /  ALT  281<H>  /  AlkPhos  463<H>  08-30    PT/INR - ( 29 Aug 2021 00:36 )   PT: 12.1 sec;   INR: 1.01 ratio         PTT - ( 30 Aug 2021 01:09 )  PTT:35.2 sec      CAPILLARY BLOOD GLUCOSE      POCT Blood Glucose.: 193 mg/dL (30 Aug 2021 06:13)      RADIOLOGY & ADDITIONAL TESTS: Reviewed.

## 2021-08-30 NOTE — PROGRESS NOTE ADULT - SUBJECTIVE AND OBJECTIVE BOX
Follow Up: COVID, CoNS     Interval History/ROS: Remains critically ill     Allergies  No Known Allergies        ANTIMICROBIALS:  vancomycin  IVPB 1500 <User Schedule>      OTHER MEDS:  ALBUTerol    90 MICROgram(s) HFA Inhaler 2 Puff(s) Inhalation every 8 hours  budesonide 160 MICROgram(s)/formoterol 4.5 MICROgram(s) Inhaler 2 Puff(s) Inhalation two times a day  chlorhexidine 0.12% Liquid 15 milliLiter(s) Oral Mucosa every 12 hours  chlorhexidine 4% Liquid 1 Application(s) Topical <User Schedule>  cholecalciferol 2000 Unit(s) Oral daily  dexMEDEtomidine Infusion 0.5 MICROgram(s)/kG/Hr IV Continuous <Continuous>  diazepam    Tablet 10 milliGRAM(s) Oral every 8 hours  enoxaparin Injectable 80 milliGRAM(s) SubCutaneous every 12 hours  fentaNYL    Injectable 50 MICROGram(s) IV Push every 4 hours PRN  fentaNYL   Infusion... 2 MICROgram(s)/kG/Hr IV Continuous <Continuous>  insulin lispro (ADMELOG) corrective regimen sliding scale   SubCutaneous every 6 hours  insulin NPH human recombinant 12 Unit(s) SubCutaneous every 6 hours  ketamine Infusion. 2 mG/kG/Hr IV Continuous <Continuous>  methadone    Tablet 10 milliGRAM(s) Oral every 8 hours  midazolam Injectable 2 milliGRAM(s) IV Push every 4 hours PRN  montelukast 10 milliGRAM(s) Oral daily  multivitamin/minerals Oral Solution (WELLESSE) 30 milliLiter(s) Enteral Tube daily  pantoprazole  Injectable 40 milliGRAM(s) IV Push daily  phenylephrine    Infusion 0.5 MICROgram(s)/kG/Min IV Continuous <Continuous>  polyethylene glycol 3350 17 Gram(s) Oral daily  QUEtiapine 25 milliGRAM(s) Oral daily  senna Syrup 20 milliLiter(s) Oral at bedtime      Vital Signs Last 24 Hrs  T(C): 37.6 (30 Aug 2021 16:00), Max: 38.3 (30 Aug 2021 08:00)  T(F): 99.7 (30 Aug 2021 16:00), Max: 100.9 (30 Aug 2021 08:00)  HR: 56 (30 Aug 2021 16:45) (56 - 101)  BP: --  BP(mean): --  RR: 23 (30 Aug 2021 16:45) (19 - 42)  SpO2: 93% (30 Aug 2021 16:45) (80% - 100%)    Physical Exam:  General: sedated intubated   Head: atraumatic, normocephalic  ENT: ET and OG tubes   Cardio: regular rate   Respiratory: mechanically ventilated   abd: soft, no tenderness  vascular: no phlebitis   Skin: no rash                          8.9    15.23 )-----------( 186      ( 30 Aug 2021 01:08 )             28.0       08-30    144  |  109<H>  |  37<H>  ----------------------------<  135<H>  4.0   |  24  |  0.69    Ca    9.0      30 Aug 2021 01:10  Phos  2.7     08-30  Mg     2.3     08-30    TPro  5.2<L>  /  Alb  2.7<L>  /  TBili  1.8<H>  /  DBili  x   /  AST  199<H>  /  ALT  281<H>  /  AlkPhos  463<H>  08-30      MICROBIOLOGY:  Culture - Blood (collected 08-27-21 @ 03:23)  Source: .Blood Blood  Preliminary Report (08-28-21 @ 04:01):    No growth to date.    Culture - Blood (collected 08-25-21 @ 22:56)  Source: .Blood Blood  Preliminary Report (08-26-21 @ 23:01):    No growth to date.    Culture - Blood (collected 08-25-21 @ 22:56)  Source: .Blood Blood  Preliminary Report (08-26-21 @ 23:01):    No growth to date.    Culture - Sputum (collected 08-25-21 @ 18:36)  Source: .Sputum Sputum  Gram Stain (08-25-21 @ 23:35):    No polymorphonuclear leukocytes per low power field    No Squamous epithelial cells per low power field    Rare Yeast like cells per oil power field  Final Report (08-27-21 @ 19:33):    Normal Respiratory Liane present    Culture - Blood (collected 08-24-21 @ 13:20)  Source: .Blood Blood-Peripheral  Gram Stain (08-25-21 @ 13:51):    Growth in aerobic bottle: Gram Positive Cocci in Clusters    Growth in anaerobic bottle: Gram Positive Cocci in Clusters  Final Report (08-27-21 @ 09:44):    Growth in aerobic and anaerobic bottles: Staphylococcus epidermidis    ***Blood Panel PCR results on this specimen are available    approximately 3 hours after the Gram stain result.***    Gram stain, PCR, and/or culture results may not always    correspond due to difference in methodologies.    ************************************************************    This PCR assay was performed by multiplex PCR. This    Assay tests for 66 bacterial and resistance gene targets.    Please refer to the [facility] test directory    at [email or web address] for details.  Organism: Blood Culture PCR  Staphylococcus epidermidis (08-27-21 @ 09:44)  Organism: Staphylococcus epidermidis (08-27-21 @ 09:44)      -  Ampicillin/Sulbactam: R <=8/4      -  Cefazolin: R <=4      -  Clindamycin: R >4      -  Erythromycin: R >4      -  Gentamicin: S <=1 Should not be used as monotherapy      -  Oxacillin: R >2      -  Penicillin: R 8      -  RIF- Rifampin: S <=1 Should not be used as monotherapy      -  Tetra/Doxy: R >8      -  Trimethoprim/Sulfamethoxazole: S <=0.5/9.5      -  Vancomycin: S 2      Method Type: GEETA  Organism: Blood Culture PCR (08-27-21 @ 09:44)      -  Staphylococcus epidermidis, Methicillin resistant: Detec      Method Type: PCR    Culture - Blood (collected 08-24-21 @ 13:20)  Source: .Blood Blood-Peripheral  Gram Stain (08-25-21 @ 17:01):    Growth in anaerobic bottle: Gram Positive Cocci in Clusters    Growth in aerobic bottle: Gram Positive Cocci in Clusters  Final Report (08-26-21 @ 21:49):    Growth in anaerobic bottle: Staphylococcus epidermidis    Growth in aerobic bottle: Staphylococcus hominis "Susceptibilities not    performed"    RADIOLOGY:  Images below reviewed personally  VA Duplex Lower Extrem Arterial, Bilat (08.30.21 @ 16:21)   No stenosis or occlusion. All imaged vessels are patent.    Xray Chest 1 View- PORTABLE-Urgent (Xray Chest 1 View- PORTABLE-Urgent .) (08.30.21 @ 15:20)   The heart is normal in size. Small right pleural effusion. No change in appearance of the lungs when compared to previous study done on August 25, 2021 at 7:41 PM. Endotracheal tube is about 1 cm above the evette. NG tube is in the stomach however its tip is not seen on the current study. A central line is seen on the left and the tip is in superior vena cava. No pneumothorax.

## 2021-08-30 NOTE — PROCEDURE NOTE - SUPERVISORY STATEMENT
Emergent procedure. Patient sharath-arrest with undetectable blood pressure but noted pulses on exam. [facility]
No immediate complications.
No immediate complications.

## 2021-08-30 NOTE — PROGRESS NOTE ADULT - ATTENDING COMMENTS
Agree with above. Patient critically ill requiring frequent bedside visits with therapy change. Patient DM2, HTN, and BPH who presented with COVID-19 pneumonia. He is unfortunately unvaccinated and suffering more severe disease. He has had progressive hypoxemia despite BIPAP. His family reportedly did not want him intubated until absolutely necessary and he was intubated emergently by anesthesia 8/20/21. He had a subsequent cardiac arrest requiring CPR and then was transferred to [facility]ICU for further monitoring and care.    1. Acute Hypoxemic Respiratory Failure - ARDS due to COVID-19. Continue mechanical ventilation and wean as tolerated. Was proned previously.   - Currently on PEEP 8 and FiO2 80%  2. Cardiovascular - continue vasopressors as needed to maintain MAP >65. No significant ectopy at this time.   - Afib with RVR now improved  3. Oropharyngeal dysphagia - feeds via OGT  - Increased AST/ALT possibly due to COVID cholangiopathy - check RUQ sono  4. Renal function - improved. Continue to monitor urine output  5. Neurology - continue sedatives, has been off paralytics  6. Infectious Disease - continue antibiotics. repeat cultures sent.  - Persistent fevers - will change TLC  - Prior MRSE bacteremia with repeat cultures cleared  - For COVID-19 previously received Remdesivir, Dexamethasone, and Toci  7. Hem/Onc - patient with LE DVT noted on Duplex. Continue therapeutic AC with Lovenox.   8. Endo - increase NPH for hyperglycemia with goal FS < 180  9. GOC - patient is FULL Code     Patient may end up requiring tracheostomy if unable to wean further. Will attempt to lighten sedation and start PSV if able to decrease FiO2 requirements  Long term prognosis poor

## 2021-08-30 NOTE — PROGRESS NOTE ADULT - ASSESSMENT
COVID pneumonia. Not vaccinated.   Tocilizumab 8/15. Completed Remdesivir 8/17 and Decadron 8/22.   Decompensated, intubated 8/20, suspect PE given bilateral leg DVTs.   Sepsis 8/23 with high grade MRSE bacteremia. Maybe CLABSI although no clear phlebitis (left IJ 8/20, changed to right IJ 8/25). No wounds.   Fevers again overnight and today.   Poor prognosis.     Suggest  -f/u repeat blood cultures from today   -would check sputum culture also although not much secretions   -RUQ US for rising liver enzymes   -low threshold to add Zosyn back on   -continue Vancomycin 1.5GM IV q24h for MRSE bacteremia   -monitor troughs and creatinine   -f/u TTE   -COVID care per protocol     Discussed with ICU     Patrick Paul MD   Infectious Disease   Pager [phone]   After 5PM and on weekends please page fellow on call or call [phone]

## 2021-08-30 NOTE — PROGRESS NOTE ADULT - ASSESSMENT
70yo  Urdu speaking male w/ PMHx of HTN, DM2, BPH, asthma presented on 8/12/21 from home with worsening shortness of breath x 2 weeks in setting of COVID-19 PNA now, with progressive worsening hypoxic respiratory failure , failed NIPPV support , s/p intubation and s/p cardiac arrest afterwards; with ROSC after 6 min, VT x 2 s/p electrical shock x 2 and 2 epi's. and transfer to [facility] [place] ICU for monitoring and management. Patient remains intubated for respiratory failure, currently on mechanical ventilator, sedated and paralyzed.     Assessment and Plans:    #Neuro:   -sedated with Ketamine 3, Fentanyl 1, and paralyzed with Nimbex2 - baseline mental status A&O x 3  -remains off of propofol- triglycerides trending down  -c/w ketamine  -c/w fentanyl  -neuro checks per ICU    #Pulm: Hypoxic Respiratory Failure 2/2 COVID PNA c/b ARDS  ARDS protocol ; permissive hypercarbia  -s/p prone multiple times  -(8/27) P/F = 166 currently on PC rate 24, Ins. Pressure 22, peep 8 and fio2 50%- pPlat 28 / PP 31- will weaning down as tolerates  -8/21 CXR to eval extension of SQ air ; preliminary results with R >L neck SQ air , pneumomediastinum ;  repeat CXR 8/22 with Diffuse opacification of the lungs, mild-moderate. Pneumomediastinum resolved,  -completed remdesevir 8/13-8/17  -completed dexamethasone 8/13-8/23  -c/w neb tx   -chest PT, oral care, pulmonary toileting     r/o PE  Pt with + DVT on repeated duplex 9/23, high r/f PE in setting of hypercoagulable state with COVID -19 infection   -Pt not hemodynamically stable for CTA chest.   -it would not  as pt is already on full AC w/ Lovenox 80 BID for + DVT, Anti 10a factor level 1.7  -bleeding precaution   -trend DD,   -TTE 8/20;  there are no interpretable images 2/2 SQ air,     HX ASTHMA   -c/w home Symbicort  -c/w home montelukast  -c/w duoneb tx    #CV:  Cardiogenic shock s/p PEA followed by VT x 2 s/p ROSC,  Septic shock / hypovolemic shock/ distributive shock , + DVT , r/o PE, new onset A fib   -s/p  cardiac arrest post intubation with 6 minutes achieved ROSC; c/b VT x 2 s/p electrical shock x2   -Overnight (8/26) pt in SVT/Afib s/p lopressor 5mg x 1, amio 150mg x1, converted to NSR   -remains in Shock with pressors support, titrate  to maintain a MAP >65   -TTE; limited to due SQ air   -c/w full AC on Lovenox sq 80 BID for + DVT and presumed PE   -repeated duplex on 8/23 with persisting lower extremity DVT b/l       #/Renal:   -TAYLOR resolved  -c/w trending renal function and electrolytes/   -optimize electrolyte balance   -started on Zaroxolyn with Lasix, held AM lasix dose 2/2 hypotension  -daily weight   BPH (benign prostatic hyperplasia).   -c/w holding all meds due to hypotension.  -c/w mace - strict  i&o's     #GI: Oropharyngeal dysphagia   -OGT for TF(Glucerna)- tolerating well- increase goal ot 60cc/hr-  -c/w bowel regimen, last BM 8/26  -PPI qd   -aspiration precaution   -oral care      Hypertriglyceridemia   -likely in setting of propofol use   -titrated off propofol  -repeat triglycerides daily      #ENDO:  A1c 7.8  TF w/ Glucerna   -FS q 6 hrs   -ISS & NPH q 6 hrs   -endocrine consult appreciated      #Heme:  + DVT , presumed  PE- in setting of CVOID-19 illness/ hypercoagulable state   -c/w FULL AC 80 BID Lovenox, anti 10A factor 1.7 on 8/23  -c/w trending Ddimer   -bleeding precaution     #ID:   Septic shock in setting of COVID-19 PNA   -UA & Ucx negative 8/20, repeat today 8/25 if UA + change mace and draw urine culture  -MRSA negative 8/21   -BC negative x 2 8/20, repeat B.C. x 2 today as pt febrile over night and WBC uptrended  to 24.79 from 16.49  -sputum copious amount- send sputum cx  -fungitel pending results   -empiric abx ; meropenem 1g TID (8/20-8/23) , Vanco 1 g x 1 (8/21), restart ABX coverage with Zosyn d/c today   -8/24 BC x 2 - +MRSE bacteremia- d/c zosyn / currently on Vancomycin   -sputum cx with rare yeast like cells    COVID-19 PNA   - s/p Tocilizumab on 8/15  -s/p remdesevir 8/13-8/17  -c/w dexamethasone 8/13-8/23   -trend inflammatory markers   -ID consult on board     #PPX:  DVT ppx ; on FULL AC with lovenox SQ  Gi ppx; protonix IVP q d     #GOC  FULL code at this time.   Goals of care d/w son and 2 daughters    72yo  English speaking male w/ PMHx of HTN, DM2, BPH, asthma presented on 8/12/21 from home with worsening shortness of breath x 2 weeks in setting of COVID-19 PNA now, with progressive worsening hypoxic respiratory failure , failed NIPPV support , s/p intubation and s/p cardiac arrest afterwards; with ROSC after 6 min, VT x 2 s/p electrical shock x 2 and 2 epi's. and transfer to [facility] ICU for monitoring and management. Patient remains intubated for respiratory failure, currently on mechanical ventilator, sedated and paralyzed.     Assessment and Plans:    #Neuro:   -sedated with Ketamine 3, Fentanyl 1,  - baseline mental status A&O x 3  -remains off of propofol (triglycerides trending down 500s --> 231 8/30) and Nimbex  -c/w ketamine  -c/w fentanyl  -neuro checks per ICU    #Pulm: Hypoxic Respiratory Failure 2/2 COVID PNA c/b ARDS  ARDS protocol ; permissive hypercarbia  -s/p prone multiple times  -(8/27) P/F = 166 currently on PC rate 24, Ins. Pressure 22, peep 8 and fio2 50%- pPlat 28 / PP 31- will weaning down as tolerates  -8/21 CXR to eval extension of SQ air ; preliminary results with R >L neck SQ air , pneumomediastinum ;  repeat CXR 8/22 with Diffuse opacification of the lungs, mild-moderate. Pneumomediastinum resolved,  -completed remdesevir 8/13-8/17  -completed dexamethasone 8/13-8/23  -c/w neb tx   -chest PT, oral care, pulmonary toileting     r/o PE  Pt with + DVT on repeated duplex 9/23, high r/f PE in setting of hypercoagulable state with COVID -19 infection   -Pt not hemodynamically stable for CTA chest.   -it would not  as pt is already on full AC w/ Lovenox 80 BID for + DVT, Anti 10a factor level 1.7  -bleeding precaution   -trend DD,   -TTE 8/20;  there are no interpretable images 2/2 SQ air,     HX ASTHMA   -c/w home Symbicort  -c/w home montelukast  -c/w duoneb tx    #CV:  Cardiogenic shock s/p PEA followed by VT x 2 s/p ROSC,  Septic shock / hypovolemic shock/ distributive shock , + DVT , r/o PE, new onset A fib   -s/p  cardiac arrest post intubation with 6 minutes achieved ROSC; c/b VT x 2 s/p electrical shock x2   - Pt in SVT/Afib s/p lopressor 5mg x 1, amio 150mg x1, converted to NSR   -remains in Shock with pressors support, titrate  to maintain a MAP >65   -TTE; limited to due SQ air   -c/w full AC on Lovenox sq 80 BID for + DVT and presumed PE   -repeated duplex on 8/23 with persisting lower extremity DVT b/l       #/Renal:   -TAYLOR resolved  -c/w trending renal function and electrolytes/   -optimize electrolyte balance   -Was on Zaroxolyn & Lasix   -daily weight   BPH (benign prostatic hyperplasia).   -c/w holding all meds due to hypotension.  -c/w mace - strict  i&o's     #GI: Oropharyngeal dysphagia   -OGT for TF(Glucerna)- tolerating well- increase goal ot 60cc/hr-  -c/w bowel regimen, last BM 8/26  -8/30 transaminitis AST/ALT = 199/281, Alk.phos = 463  -8/30 Hyperbilirubinemia 1.8  -Triglycerides resolving  -PPI qd   -aspiration precaution   -oral care       #ENDO:  A1c 7.8  TF w/ Glucerna   -FS q 6 hrs   -ISS & NPH q 6 hrs   -endocrine consult appreciated      #Heme:  + DVT , presumed  PE- in setting of CVOID-19 illness/ hypercoagulable state   -c/w FULL AC 80 BID Lovenox, anti 10A factor 1.7 on 8/23  -c/w trending Ddimer   -bleeding precaution     #ID:   Septic shock in setting of COVID-19 PNA   -UA & Ucx negative 8/20, repeat today 8/25 if UA + change mace and draw urine culture  -MRSA negative 8/21   -BC negative x 2 8/20, repeat B.C. x 2 today as pt febrile over night and WBC uptrended  to 24.79 from 16.49  -sputum copious amount- send sputum cx  -fungitel pending results   -empiric abx ; meropenem 1g TID (8/20-8/23) , Vanco 1 g x 1 (8/21), restart ABX coverage with Zosyn d/c today   -8/24 BC x 2 - +MRSE bacteremia- d/c zosyn / currently on Vancomycin   -sputum cx with rare yeast like cells    COVID-19 PNA   - s/p Tocilizumab on 8/15  -s/p remdesevir 8/13-8/17  -c/w dexamethasone 8/13-8/23   -trend inflammatory markers   -ID consult on board     #PPX:  DVT ppx ; on FULL AC with lovenox SQ  Gi ppx; protonix IVP q d     #GOC  FULL code at this time.   Goals of care d/w son and 2 daughters    70yo  Kyrgyz speaking male w/ PMHx of HTN, DM2, BPH, asthma presented on 8/12/21 from home with worsening shortness of breath x 2 weeks in setting of COVID-19 PNA now, with progressive worsening hypoxic respiratory failure , failed NIPPV support , s/p intubation and s/p cardiac arrest afterwards; with ROSC after 6 min, VT x 2 s/p electrical shock x 2 and 2 epi's. and transfer to [facility] ICU for monitoring and management. Patient remains intubated for respiratory failure, currently on mechanical ventilator, sedated and paralyzed.     Assessment and Plans:    #Neuro:   -sedated with Precedex 1.5, Fentanyl 3,  - baseline mental status A&O x 3  -remains off of propofol (triglycerides trending down 500s --> 231 8/30) and Nimbex  -c/w fentanyl & Precedex drip + PRN pushes  -Pt on Methadone, Valium and Seroquel  -neuro checks per ICU    #Pulm: Hypoxic Respiratory Failure 2/2 COVID PNA c/b ARDS  ARDS protocol ; permissive hypercarbia  -s/p prone multiple times  -(8/30) P/F = 157 currently on PC rate 24, Ins. Pressure 22, peep 8 and fio2 80%- Peak/plt = 32/29- will weaning down as tolerates  -8/21 CXR to eval extension of SQ air ; preliminary results with R >L neck SQ air , pneumomediastinum ;  repeat CXR 8/22 with Diffuse opacification of the lungs, mild-moderate. Pneumomediastinum resolved,  -completed remdesevir 8/13-8/17  -completed dexamethasone 8/13-8/23  -c/w neb tx   -chest PT, oral care, pulmonary toileting     r/o PE  Pt with + DVT on repeated duplex 9/23, high r/f PE in setting of hypercoagulable state with COVID -19 infection   -Pt not hemodynamically stable for CTA chest.   -it would not  as pt is already on full AC w/ Lovenox 80 BID for + DVT, Anti 10a factor level 1.7  -bleeding precaution   -trend DD,   -TTE 8/20;  there are no interpretable images 2/2 SQ air,     HX ASTHMA   -c/w home Symbicort  -c/w home montelukast  -c/w duoneb tx    #CV:  Cardiogenic shock s/p PEA followed by VT x 2 s/p ROSC,  Septic shock / hypovolemic shock/ distributive shock , + DVT , r/o PE, new onset A fib   -s/p  cardiac arrest post intubation with 6 minutes achieved ROSC; c/b VT x 2 s/p electrical shock x2   - Pt in SVT/Afib s/p lopressor 5mg x 1, amio 150mg x1, converted to NSR   -remains in Shock with pressors support Phenylephrine 0.8 (8/30), titrate  to maintain a MAP >65   -TTE; limited to due SQ air   -c/w full AC on Lovenox sq 80 BID for + DVT and presumed PE   -repeated duplex on 8/23 with persisting lower extremity DVT b/l       #/Renal:   -TAYLOR resolved  -c/w trending renal function and electrolytes/   -optimize electrolyte balance   -Was on Zaroxolyn & Lasix   -daily weight   BPH (benign prostatic hyperplasia).   -c/w holding all meds due to hypotension.  -c/w mace - strict  i&o's     #GI: Oropharyngeal dysphagia   -OGT for TF(Glucerna)- tolerating well- increase goal ot 60cc/hr-  -c/w bowel regimen, last BM 8/26  -8/30 transaminitis AST/ALT = 199/281, Alk.phos = 463  -8/30 Hyperbilirubinemia 1.8  -Triglycerides 231 resolving  -PPI qd   -aspiration precaution   -oral care       #ENDO:  A1c 7.8  TF w/ Glucerna   -FS q 6 hrs   -ISS & NPH q 6 hrs   -endocrine consult appreciated      #Heme:  + DVT , presumed  PE- in setting of CVOID-19 illness/ hypercoagulable state   -c/w FULL AC 80 BID Lovenox, anti 10A factor 1.7 on 8/23  -c/w trending Ddimer   -bleeding precaution     #ID: MRSE bacteremia  Septic shock in setting of COVID-19 PNA   -UA & Ucx negative 8/20, repeat today 8/25 if UA + change mace and draw urine culture  -MRSA negative 8/21   -BC negative x 2 8/20, repeat B.C. x 2 today as pt febrile over night and WBC uptrended  to 24.79 from 16.49  -empiric abx ; meropenem 1g TID (8/20-8/23) , Vanco 1 g x 1 (8/21), restart ABX coverage with Zosyn d/c today   -8/24 BC x 2 - +MRSE bacteremia- d/c zosyn / currently on Vancomycin   -sputum cx with rare yeast like cells  -New blood cx from 8/30 pending results    COVID-19 PNA   - s/p Tocilizumab on 8/15  -s/p remdesevir 8/13-8/17  -c/w dexamethasone 8/13-8/23   -trend inflammatory markers   -ID consult on board     #PPX:  DVT ppx ; on FULL AC with lovenox SQ  Gi ppx; protonix IVP q d     #GOC  FULL code at this time.   Goals of care d/w son and 2 daughters    72yo  Kazakh speaking male w/ PMHx of HTN, DM2, BPH, asthma presented on 8/12/21 from home with worsening shortness of breath x 2 weeks in setting of COVID-19 PNA now, with progressive worsening hypoxic respiratory failure , failed NIPPV support , s/p intubation and s/p cardiac arrest afterwards; with ROSC after 6 min, VT x 2 s/p electrical shock x 2 and 2 epi's. and transfer to [facility] ICU for monitoring and management. Patient remains intubated for respiratory failure, currently on mechanical ventilator, sedated and paralyzed.     Assessment and Plans:    #Neuro:   -sedated with Precedex 1.5, Fentanyl 3, will add Ketamine and titrate down fentanyl - baseline mental status A&O x 3  -remains off of propofol (triglycerides trending down 500s --> 231 8/30) and Nimbex  -c/w fentanyl & Precedex drip + PRN pushes  -Pt on Methadone, Valium and Seroquel  -neuro checks per ICU    #Pulm: Hypoxic Respiratory Failure 2/2 COVID PNA c/b ARDS  ARDS protocol ; permissive hypercarbia  -s/p prone multiple times  -(8/30) P/F = 157 currently on PC rate 24, Ins. Pressure 22, peep 8 and fio2 80%- Peak/plt = 32/29- will weaning down as tolerates  -8/21 CXR to eval extension of SQ air ; preliminary results with R >L neck SQ air , pneumomediastinum ;  repeat CXR 8/22 with Diffuse opacification of the lungs, mild-moderate. Pneumomediastinum resolved,  -completed remdesevir 8/13-8/17  -completed dexamethasone 8/13-8/23  -c/w neb tx   -chest PT, oral care, pulmonary toileting     r/o PE  Pt with + DVT on repeated duplex 9/23, high r/f PE in setting of hypercoagulable state with COVID -19 infection   -Pt not hemodynamically stable for CTA chest.   -it would not  as pt is already on full AC w/ Lovenox 80 BID for + DVT, Anti 10a factor level 1.7  -bleeding precaution   -trend DD,   -TTE 8/20;  there are no interpretable images 2/2 SQ air,     HX ASTHMA   -c/w home Symbicort  -c/w home montelukast  -c/w duoneb tx    #CV:  Cardiogenic shock s/p PEA followed by VT x 2 s/p ROSC,  Septic shock / hypovolemic shock/ distributive shock , + DVT , r/o PE, new onset A fib   -s/p  cardiac arrest post intubation with 6 minutes achieved ROSC; c/b VT x 2 s/p electrical shock x2   - Pt in SVT/Afib s/p lopressor 5mg x 1, amio 150mg x1, converted to NSR   -remains in Shock with pressors support Phenylephrine 0.8 (8/30), titrate  to maintain a MAP >65   -TTE; limited to due SQ air   -c/w full AC on Lovenox sq 80 BID for + DVT and presumed PE   -repeated duplex on 8/23 with persisting lower extremity DVT b/l       #/Renal:   -TAYLOR resolved  -c/w trending renal function and electrolytes/   -optimize electrolyte balance   -Was on Zaroxolyn & Lasix   -daily weight   BPH (benign prostatic hyperplasia).   -c/w holding all meds due to hypotension.  -c/w mace - strict  i&o's     #GI: Oropharyngeal dysphagia   -OGT for TF(Glucerna)- tolerating well- increase goal ot 60cc/hr-  -c/w bowel regimen, last BM 8/26  -8/30 transaminitis AST/ALT = 199/281, Alk.phos = 463  -8/30 Hyperbilirubinemia 1.8  -Triglycerides 231 resolving  -PPI qd   -aspiration precaution   -oral care       #ENDO:  A1c 7.8  TF w/ Glucerna   -FS q 6 hrs   -ISS & NPH q 6 hrs   -endocrine consult appreciated      #Heme:  + DVT , presumed  PE- in setting of CVOID-19 illness/ hypercoagulable state   -c/w FULL AC 80 BID Lovenox, anti 10A factor 1.7 on 8/23  -c/w trending Ddimer   -bleeding precaution     #ID: MRSE bacteremia  Septic shock in setting of COVID-19 PNA   -UA & Ucx negative 8/20, repeat today 8/25 if UA + change mace and draw urine culture  -MRSA negative 8/21   -BC negative x 2 8/20, repeat B.C. x 2 today as pt febrile over night and WBC uptrended  to 24.79 from 16.49  -empiric abx ; meropenem 1g TID (8/20-8/23) , Vanco 1 g x 1 (8/21), restart ABX coverage with Zosyn d/c today   -8/24 BC x 2 - +MRSE bacteremia- d/c zosyn / currently on Vancomycin   -sputum cx with rare yeast like cells  -New blood cx from 8/30 pending results    COVID-19 PNA   - s/p Tocilizumab on 8/15  -s/p remdesevir 8/13-8/17  -c/w dexamethasone 8/13-8/23   -trend inflammatory markers   -ID consult on board     #PPX:  DVT ppx ; on FULL AC with lovenox SQ  Gi ppx; protonix IVP q d     #GOC  FULL code at this time.   Goals of care d/w son and 2 daughters

## 2021-08-31 NOTE — PROGRESS NOTE ADULT - ASSESSMENT
COVID pneumonia. Not vaccinated. s/p Tocilizumab, Remdesivir, Decadron.     Decompensated, intubated 8/20, suspect PE given bilateral leg DVTs.     Sepsis 8/23 with high grade MRSE bacteremia, cultures positive again 8/30.   Maybe CLABSI although no clear phlebitis (left IJ 8/20, changed to right IJ 8/25, back to left 8/30).   No open wounds.     Elevated liver enzymes. Obstructive pattern but US reassuring.     Poor prognosis.     Suggest  -repeat two sets of blood cultures tomorrow   -continue Vancomycin 1.5GM IV q24h for MRSE bacteremia   -monitor troughs and creatinine   -f/u TTE   -f/u sputum culture - if negative would stop Zosyn   -supportive care     Discussed with ICU     Patrick Paul MD   Infectious Disease   Pager [phone]   After 5PM and on weekends please page fellow on call or call [phone] COVID pneumonia. Not vaccinated. s/p Tocilizumab, Remdesivir, Decadron.     Decompensated, intubated 8/20, suspect PE given bilateral leg DVTs.     Sepsis 8/23 with high grade MRSE bacteremia, cultures positive again 8/30.   Maybe CLABSI although no clear phlebitis (left IJ 8/20, changed to right IJ 8/25, back to left 8/30).   No open wounds.     Elevated liver enzymes. Obstructive pattern but US reassuring.     Poor prognosis.     Suggest  -repeat two sets of blood cultures tomorrow   -continue Vancomycin 1.5GM IV q24h for MRSE bacteremia   -monitor troughs and creatinine, level of 12.2 this morning should be adequate   -f/u TTE   -f/u sputum culture - if negative would stop Zosyn   -supportive care     Discussed with ICU     Patrick Paul MD   Infectious Disease   Pager [phone]   After 5PM and on weekends please page fellow on call or call [phone]

## 2021-08-31 NOTE — PROGRESS NOTE ADULT - ASSESSMENT
70yo  Georgian speaking male w/ PMHx of HTN, DM2, BPH, asthma presented on 8/12/21 from home with worsening shortness of breath x 2 weeks in setting of COVID-19 PNA now, with progressive worsening hypoxic respiratory failure , failed NIPPV support , s/p intubation and s/p cardiac arrest afterwards; with ROSC after 6 min, VT x 2 s/p electrical shock x 2 and 2 epi's. and transfer to [facility] ICU for monitoring and management. Patient remains intubated for respiratory failure, currently on mechanical ventilator, sedated and paralyzed.     Assessment and Plans:    #Neuro:   -sedated with Precedex 1.5, Fentanyl 3, will add Ketamine and titrate down fentanyl - baseline mental status A&O x 3  -remains off of propofol (triglycerides trending down 500s --> 231 8/30) and Nimbex  -c/w fentanyl & Precedex drip + PRN pushes  -Pt on Methadone, Valium and Seroquel  -neuro checks per ICU    #Pulm: Hypoxic Respiratory Failure 2/2 COVID PNA c/b ARDS  ARDS protocol ; permissive hypercarbia  -s/p prone multiple times  -(8/30) P/F = 157 currently on PC rate 24, Ins. Pressure 22, peep 8 and fio2 80%- Peak/plt = 32/29- will weaning down as tolerates  -8/21 CXR to eval extension of SQ air ; preliminary results with R >L neck SQ air , pneumomediastinum ;  repeat CXR 8/22 with Diffuse opacification of the lungs, mild-moderate. Pneumomediastinum resolved,  -completed remdesevir 8/13-8/17  -completed dexamethasone 8/13-8/23  -c/w neb tx   -chest PT, oral care, pulmonary toileting     r/o PE  Pt with + DVT on repeated duplex 9/23, high r/f PE in setting of hypercoagulable state with COVID -19 infection   -Pt not hemodynamically stable for CTA chest.   -it would not  as pt is already on full AC w/ Lovenox 80 BID for + DVT, Anti 10a factor level 1.7  -bleeding precaution   -trend DD,   -TTE 8/20;  there are no interpretable images 2/2 SQ air,     HX ASTHMA   -c/w home Symbicort  -c/w home montelukast  -c/w duoneb tx    #CV:  Cardiogenic shock s/p PEA followed by VT x 2 s/p ROSC,  Septic shock / hypovolemic shock/ distributive shock , + DVT , r/o PE, new onset A fib   -s/p  cardiac arrest post intubation with 6 minutes achieved ROSC; c/b VT x 2 s/p electrical shock x2   - Pt in SVT/Afib s/p lopressor 5mg x 1, amio 150mg x1, converted to NSR   -remains in Shock with pressors support Phenylephrine 0.8 (8/30), titrate  to maintain a MAP >65   -TTE; limited to due SQ air   -c/w full AC on Lovenox sq 80 BID for + DVT and presumed PE   -repeated duplex on 8/23 with persisting lower extremity DVT b/l       #/Renal:   -TAYLOR resolved  -c/w trending renal function and electrolytes/   -optimize electrolyte balance   -Was on Zaroxolyn & Lasix   -daily weight   BPH (benign prostatic hyperplasia).   -c/w holding all meds due to hypotension.  -c/w mace - strict  i&o's     #GI: Oropharyngeal dysphagia   -OGT for TF(Glucerna)- tolerating well- increase goal ot 60cc/hr-  -c/w bowel regimen, last BM 8/26  -8/30 transaminitis AST/ALT = 199/281, Alk.phos = 463  -8/30 Hyperbilirubinemia 1.8  -Triglycerides 231 resolving  -PPI qd   -aspiration precaution   -oral care       #ENDO:  A1c 7.8  TF w/ Glucerna   -FS q 6 hrs   -ISS & NPH q 6 hrs   -endocrine consult appreciated      #Heme:  + DVT , presumed  PE- in setting of CVOID-19 illness/ hypercoagulable state   -c/w FULL AC 80 BID Lovenox, anti 10A factor 1.7 on 8/23  -c/w trending Ddimer   -bleeding precaution     #ID: MRSE bacteremia  Septic shock in setting of COVID-19 PNA   -UA & Ucx negative 8/20, repeat today 8/25 if UA + change mace and draw urine culture  -MRSA negative 8/21   -BC negative x 2 8/20, repeat B.C. x 2 today as pt febrile over night and WBC uptrended  to 24.79 from 16.49  -empiric abx ; meropenem 1g TID (8/20-8/23) , Vanco 1 g x 1 (8/21), restart ABX coverage with Zosyn d/c today   -8/24 BC x 2 - +MRSE bacteremia- d/c zosyn / currently on Vancomycin   -sputum cx with rare yeast like cells  -New blood cx from 8/30 pending results    COVID-19 PNA   - s/p Tocilizumab on 8/15  -s/p remdesevir 8/13-8/17  -c/w dexamethasone 8/13-8/23   -trend inflammatory markers   -ID consult on board     #PPX:  DVT ppx ; on FULL AC with lovenox SQ  Gi ppx; protonix IVP q d     #GOC  FULL code at this time.   Goals of care d/w son and 2 daughters    70yo  Polish speaking male w/ PMHx of HTN, DM2, BPH, asthma presented on 8/12/21 from home with worsening shortness of breath x 2 weeks in setting of COVID-19 PNA now, with progressive worsening hypoxic respiratory failure , failed NIPPV support , s/p intubation and s/p cardiac arrest afterwards; with ROSC after 6 min, VT x 2 s/p electrical shock x 2 and 2 epi's. and transfer to [facility] ICU for monitoring and management. Patient remains intubated for respiratory failure, currently on mechanical ventilator, sedated and paralyzed.     Assessment and Plans:    #Neuro:   Sedation  - Sedated with Fent and Ketamine.   - Off Prop for triglycerides and off Precedex for bradycardia.   - Versed 2 mg IVP q4 and Fent 50 mcg IVP q4 PRNs.   - On Methadone 10 q8, Valium 10 q8, and Seroquel 25 q12.   - Re-started Nimbex (8/30) due to severe hypoxia.   - Baseline mentation A+Ox4.     #Pulm: Hypoxic Respiratory Failure 2/2 COVID PNA c/b ARDS  - Mechanical Ventilation: PC 30/24/12/100%  - Status post prone/supine mutiple times.  Will reprone today if p/f<125  Pnumomediastinum  - Occured 8/21 now resolved.   Rule Out: PE  - Positive DVT suspected PE. Too unstable for CTA.   - Continue Lovenox 80 BID for full AC.   - Anti factor Xa theraputic at 1.7.   Asthma  - Home Symbicort and albuterol     #CV:  Cardiogenic shock s/p PEA followed by VT x 2 s/p ROSC,  Septic shock / hypovolemic shock/ distributive shock , + DVT , r/o PE, new onset A fib   -s/p  cardiac arrest post intubation with 6 minutes achieved ROSC; c/b VT x 2 s/p electrical shock x2   - Status post AFib with RVR treated with Amio now resolved.   Hypotension-Sepsis vs. Hypovolemic  - Levo and Vaso gtts for MAP>=65     #/Renal:   - TAYLOR resolved  - Previously on diuretics now being held due to hypotension.     #GI: Oropharyngeal dysphagia   - OGT with TF per nutrition recs  - Bowel regimen with Senna, Miralax, and Movantik  - RUQ U/S for rising LFTs.   - PPI qd     #ENDO:  A1c 7.8  - TF w/ Glucerna   - FS q 6 hrs   - ISS & NPH q 6 hrs   - endocrine consult appreciated    #Heme:  + DVT , presumed  PE- in setting of CVOID-19 illness/ hypercoagulable state   - c/w FULL AC 80 BID Lovenox, anti 10A factor 1.7 on 8/23    #ID:   MRSE Bactremia/Sepsis  - Vancomycin (8/25 - ___) for MRSE (8/24).   - Re-start Zosyn for hypotension/hypothermia (8/30 - __). Lines changed (8/30).   - BCx NGTD (8/25, 8/27) re-sent 8/30.   - MRSA PCR negative (8/21).   - RUQ ultrasound pending.   - Status post Sebastián (8/20 - 23) for empiric coverage.   - ID consult on board   COVID-19 PNA   - s/p Tocilizumab on 8/15  - s/p remdesevir 8/13-8/17  - c/w dexamethasone 8/13-8/23   - trend inflammatory markers     #PPX:  DVT ppx ; on FULL AC with lovenox SQ  Gi ppx; protonix IVP q d     #GOC  - FULL code at this time.   - Goals of care d/w son and 2 daughters

## 2021-08-31 NOTE — ADVANCED PRACTICE NURSE CONSULT - REASON FOR CONSULT
Requested by staff to assess skin status: buttocks. Staff is concerned that the pt has developed a deep tissue injury. PMH is noted:  70yo M w/ PMHx of HTN, DM2, BPH, asthma Symbicort montelukast at home) who was admitted on 8/12 for SOB and cough, found to be COVID-19-positive (unvaccinated). Patient stated that his symptoms began 2 weeks prior to admission and worsened, finally prompting him to go to the ER. In the ER, the patient was found to be hypoxic and altered, prompting CT head, which was negative; patient is AOx3 at his baseline. Patient was given doxycycline and dexamethasone x1 and admitted to general medicine for further management. At time of interview, patient was A/Ox3 with . The patient had worsening respiratory function, and was finally intubated on 8/20. At the time of intubation, the patient had a VT arrest, requiring 2 shocks and 2 rounds of epinephrine prior to ROSC. Following his arrest, the patient developed atrial fibrillation with RVR, requiring amiodarone for rhythm control. The patient has been proned and supinated multiple times, with limited improvement of his respiratory status. The patient's hospitalization has been further complicated by bilateral lower extremity DVTs, now fully anticoagulated on Lovenox.

## 2021-08-31 NOTE — ADVANCED PRACTICE NURSE CONSULT - ASSESSMENT
The pt was encountered in the 5ICU- he remains intubated, sedated , paralyzed on full ventilator support; on pressor support for hemodynamic instability. Mr Nick is on a [employer] support surface and needs  assistance with turning  and positioning.   staff have applied Complete Cair boots to off-load the heels.  The pt receives enteral feeds and is being followed by nutrition for a diagnosis of moderate protein calorie malnutrition.   Upon assessment, the pt was incontinent of a small amount of pasty brown stool and pericare was provided. On the b/l buttocks and gluteal cleft, an evolving deep tissue injury was noted. The area in question measures 8cm x 9cm x0cm. Within this area, epidermal slippage has begun and approximately 20% is open, moist pink tissue. the remaining 80% of the wound presents as intact dark skin, the skin being darker than the pts overall skin tone.  Staff had applied a foam dressing which was contaminated with stool and removed. will recommend to d/c the foam dressing and to apply Advanced CAvilon M-W-F.  This will lay down a protective coating on the skin against the incontinence.  The pt remains critically ill with multiorgan system failure - he has a hx of cardiac arrest  and cardiogenic shock, acute kidney injury which is resolving, and respiratory failure requiring intubation. Question if the skin may be failing as well or if this  may be a manifestation of skin changes that can occur secondary to Covid.

## 2021-08-31 NOTE — PROGRESS NOTE ADULT - SUBJECTIVE AND OBJECTIVE BOX
Follow Up:  COVID, Bacteremia    Interval History/ROS: Afebrile today. Remains critically ill     Allergies  No Known Allergies        ANTIMICROBIALS:  piperacillin/tazobactam IVPB.. 3.375 every 8 hours  vancomycin  IVPB 1500 <User Schedule>      OTHER MEDS:  ALBUTerol    90 MICROgram(s) HFA Inhaler 2 Puff(s) Inhalation every 8 hours  budesonide 160 MICROgram(s)/formoterol 4.5 MICROgram(s) Inhaler 2 Puff(s) Inhalation two times a day  chlorhexidine 0.12% Liquid 15 milliLiter(s) Oral Mucosa every 12 hours  chlorhexidine 4% Liquid 1 Application(s) Topical <User Schedule>  cholecalciferol 2000 Unit(s) Oral daily  cisatracurium Infusion 3 MICROgram(s)/kG/Min IV Continuous <Continuous>  diazepam    Tablet 10 milliGRAM(s) Oral every 8 hours  enoxaparin Injectable 80 milliGRAM(s) SubCutaneous every 12 hours  fentaNYL    Injectable 50 MICROGram(s) IV Push every 4 hours PRN  fentaNYL   Infusion... 2 MICROgram(s)/kG/Hr IV Continuous <Continuous>  insulin lispro (ADMELOG) corrective regimen sliding scale   SubCutaneous every 6 hours  insulin NPH human recombinant 14 Unit(s) SubCutaneous every 6 hours  ketamine Infusion. 1.998 mG/kG/Hr IV Continuous <Continuous>  methadone    Tablet 10 milliGRAM(s) Oral every 8 hours  midazolam Injectable 2 milliGRAM(s) IV Push every 4 hours PRN  multivitamin/minerals Oral Solution (WELLESSE) 30 milliLiter(s) Enteral Tube daily  naloxegol 25 milliGRAM(s) Oral daily  norepinephrine Infusion 0.05 MICROgram(s)/kG/Min IV Continuous <Continuous>  pantoprazole  Injectable 40 milliGRAM(s) IV Push daily  petrolatum Ophthalmic Ointment 1 Application(s) Both EYES every 8 hours  polyethylene glycol 3350 17 Gram(s) Oral daily  QUEtiapine 25 milliGRAM(s) Oral every 12 hours  senna Syrup 20 milliLiter(s) Oral at bedtime  vasopressin Infusion 0.04 Unit(s)/Min IV Continuous <Continuous>      Vital Signs Last 24 Hrs  T(C): 37.5 (31 Aug 2021 12:00), Max: 37.5 (31 Aug 2021 12:00)  T(F): 99.5 (31 Aug 2021 12:00), Max: 99.5 (31 Aug 2021 12:00)  HR: 91 (31 Aug 2021 15:45) (43 - 104)  BP: --  BP(mean): --  RR: 30 (31 Aug 2021 15:45) (0 - 30)  SpO2: 94% (31 Aug 2021 15:45) (70% - 96%)    Physical Exam:  General: sedated intubated   Head: atraumatic, normocephalic  ENT: ET and OG tubes   Cardio: regular rate   Respiratory: mechanically ventilated   abd: nondistended   : mace                          8.3    15.75 )-----------( 162      ( 31 Aug 2021 00:54 )             27.6       08-31    145  |  108  |  30<H>  ----------------------------<  279<H>  4.2   |  25  |  0.63    Ca    9.0      31 Aug 2021 00:54  Phos  4.6     08-31  Mg     2.3     08-31    TPro  5.0<L>  /  Alb  2.7<L>  /  TBili  1.9<H>  /  DBili  x   /  AST  144<H>  /  ALT  272<H>  /  AlkPhos  520<H>  08-31      MICROBIOLOGY:  Vancomycin Level, Trough: 12.2 ug/mL (08-31-21 @ 07:12)    Culture - Sputum (collected 08-31-21 @ 00:37)  Source: .Sputum Sputum  Gram Stain (08-31-21 @ 07:31):    Rare polymorphonuclear leukocytes per low power field    No Squamous epithelial cells per low power field    Rare Yeast like cells seen per oil power field    Second set of blood cultures 8/30 in lab     Culture - Blood (collected 08-30-21 @ 11:17)  Source: .Blood Blood-Peripheral  Gram Stain (08-31-21 @ 08:24):    Growth in aerobic bottle: Gram Positive Cocci in Clusters  Preliminary Report (08-31-21 @ 08:24):    Growth in aerobic bottle: Gram Positive Cocci in Clusters  Organism: Blood Culture PCR (08-31-21 @ 15:43)  Organism: Blood Culture PCR (08-31-21 @ 15:43)      -  Staphylococcus epidermidis, Methicillin resistant: Detec      Method Type: PCR    Culture - Blood (collected 08-27-21 @ 03:23)  Source: .Blood Blood  Preliminary Report (08-28-21 @ 04:01):    No growth to date.    Culture - Blood (collected 08-25-21 @ 22:56)  Source: .Blood Blood  Final Report (08-30-21 @ 23:01):    No Growth Final    Culture - Blood (collected 08-25-21 @ 22:56)  Source: .Blood Blood  Final Report (08-30-21 @ 23:01):    No Growth Final    Culture - Sputum (collected 08-25-21 @ 18:36)  Source: .Sputum Sputum  Gram Stain (08-25-21 @ 23:35):    No polymorphonuclear leukocytes per low power field    No Squamous epithelial cells per low power field    Rare Yeast like cells per oil power field  Final Report (08-27-21 @ 19:33):    Normal Respiratory Liane present      RADIOLOGY:  Images below reviewed personally  US Abdomen Upper Quadrant Right (08.31.21 @ 11:33)   No evidence of cholecystitis.  Right pleural effusion.  Trace perihepatic ascites.    Xray Chest 1 View- PORTABLE-Urgent (Xray Chest 1 View- PORTABLE-Urgent .) (08.30.21 @ 20:02)   The heart is normal in size. Smallright pleural effusion. Diffuse airspace opacities are seen throughout post lungs which remain unchanged when compared to previous study done the same date at 3:19 PM. Endotracheal tube is in good position. NG tube is in the stomach. A central line is seen on the left and the tip is in superior vena cava. No pneumothorax.

## 2021-08-31 NOTE — PROGRESS NOTE ADULT - ATTENDING COMMENTS
Agree with above. Patient critically ill requiring frequent bedside visits with therapy change. Patient DM2, HTN, and BPH who presented with COVID-19 pneumonia. He is unfortunately unvaccinated and suffering more severe disease. He has had progressive hypoxemia despite BIPAP. His family reportedly did not want him intubated until absolutely necessary and he was intubated emergently by anesthesia 8/20/21. He had a subsequent cardiac arrest requiring CPR and then was transferred to [facility] for further monitoring and care.    1. Acute Hypoxemic Respiratory Failure - ARDS due to COVID-19. Continue mechanical ventilation and wean as tolerated. Was proned previously.   - Currently on PEEP 8 and FiO2 100%  - May need to attempt reproning  2. Cardiovascular - continue vasopressors as needed to maintain MAP >65. No significant ectopy at this time.   - Afib with RVR now improved  3. Oropharyngeal dysphagia - feeds via OGT  - Increased AST/ALT possibly due to COVID cholangiopathy - followup RUQ sono  4. Renal function - improved. Continue to monitor urine output  5. Neurology - continue sedatives, has been off paralytics  6. Infectious Disease - continue antibiotics. repeat cultures sent.  - Persistent fevers - TLC and [place] changed 8/30  - Prior MRSE bacteremia on Vanco  - Followup repeat culture  - For COVID-19 previously received Remdesivir, Dexamethasone, and Toci  7. Hem/Onc - patient with LE DVT noted on Duplex. Continue therapeutic AC with Lovenox.   8. Endo - increase NPH for hyperglycemia with goal FS < 180  9. GOC - patient is FULL Code     Patient may end up requiring tracheostomy if stabilizes with vent requirements and unable to wean further. Long term prognosis poor

## 2021-08-31 NOTE — ADVANCED PRACTICE NURSE CONSULT - RECOMMEDATIONS
Will recommend the followin. B/l buttocks: continue to monitor, apply Advanced CAvilon M-W-F. Routine pericare between applications.  2. Turning and positioning  3. Complete Cair boots  4. Nutrition support as pt condition allows  Tx plan discussed with RN

## 2021-08-31 NOTE — BH CONSULTATION LIAISON ASSESSMENT NOTE - NSHPLANGPREFER_GEN_A_CORE
Transplant Social Work Services Progress Note      Date of Initial Social Work Evaluation: 8/26/2021  Collaborated with: Cards 2    Data: Pt is s/p heart transplant on 5/6/2021. Pt admitted 8/24 positive for Covid w/ symptoms.   Updated by medical team that pt is anticipated to be ready for discharge in next 1-2 days. Writer called pt multiple times on his cell phone and room phone with no answer. Writer attempted to contact pt's wife, Cate, via phone but she did not answer.   Anticipated pt will discharge home but wanted to review this plan with pt and wife to see if they had any concerns.     Intervention: Discharge Planning   Assessment: Writer unable to reach pt or wife to discuss discharge plan. As pt is Covid positive writer is unable to go into pt's room.   Writer will attempt to reach pt and wife tomorrow.   Education provided by SW: Ongoing Social Work support   Plan:    Discharge Plans in Progress: Anticipate home in 1-2 days     Barriers to d/c plan: Medical Readiness     Follow up Plan: SW to attempt to contact pt and wife tomorrow.      [place]

## 2021-08-31 NOTE — PROGRESS NOTE ADULT - SUBJECTIVE AND OBJECTIVE BOX
CHIEF COMPLAINT:  Patient is a 71y old  Male who presents with a chief complaint of SOB (30 Aug 2021 16:52)    HPI:  72yo M w/ PMHx of HTN, DM2, BPH, asthma presents with shortness of breath. Patient endorses that he has been feeling SOB with associated cough for the last 2 weeks and his symptoms slowly worsened over time. He did not receive the COVID vaccine. He reports that his family members whom he lives with likely are also COVID positive but he does not know for sure. He did not try taking anything for his symptoms other than his usual medications/inhalers. There were no obvious aggravating or alleviating factors. Currently with supplemental oxygen on, he feels well and has no complaints, however he presented to [facility] since his symptoms were worsening. In the ED, he was hemodynamically stable, afebrile, but he was encephalopathic and hypoxic on room air requiring high flow nasal canula. Labs were significant for mild hyperkalemia and COVID positive. CXR prelim read showed bilateral patchy opacities. CT head showed no acute findings. MICU was consulted in the ED, patient was deemed not a MICU candidate. Patient was given doxycycline and dexamethasone x1 and admitted to general medicine for further management. At time of interview, patient was A/Ox3 with .  (13 Aug 2021 04:21)      Interval Events:      REVIEW OF SYSTEMS:          OBJECTIVE:  ICU Vital Signs Last 24 Hrs  T(C): 37.3 (31 Aug 2021 07:00), Max: 38.3 (30 Aug 2021 12:00)  T(F): 99.1 (31 Aug 2021 07:00), Max: 100.9 (30 Aug 2021 12:00)  HR: 97 (31 Aug 2021 09:30) (43 - 104)  BP: --  BP(mean): --  ABP: 161/69 (31 Aug 2021 09:30) (78/40 - 175/59)  ABP(mean): 103 (31 Aug 2021 09:30) (53 - 105)  RR: 0 (31 Aug 2021 09:30) (0 - 41)  SpO2: 95% (31 Aug 2021 09:30) (70% - 97%)    Mode: AC/ CMV (Assist Control/ Continuous Mandatory Ventilation), RR (machine): 30, FiO2: 100, PEEP: 12, ITime: 0.8, MAP: 22, PC: 24, PIP: 36    08-30-21 @ 07:01  -  08-31-21 @ 07:00  --------------------------------------------------------  IN: 4240 mL / OUT: 1965 mL / NET: 2275 mL    08-31-21 @ 07:01  - 08-31-21 @ 10:10  --------------------------------------------------------  IN: 852.5 mL / OUT: 75 mL / NET: 777.5 mL      CAPILLARY BLOOD GLUCOSE      POCT Blood Glucose.: 230 mg/dL (31 Aug 2021 05:27)          PHYSICAL EXAM:          HOSPITAL MEDICATIONS:  MEDICATIONS  (STANDING):  ALBUTerol    90 MICROgram(s) HFA Inhaler 2 Puff(s) Inhalation every 8 hours  budesonide 160 MICROgram(s)/formoterol 4.5 MICROgram(s) Inhaler 2 Puff(s) Inhalation two times a day  chlorhexidine 0.12% Liquid 15 milliLiter(s) Oral Mucosa every 12 hours  chlorhexidine 4% Liquid 1 Application(s) Topical <User Schedule>  cholecalciferol 2000 Unit(s) Oral daily  cisatracurium Infusion 3 MICROgram(s)/kG/Min (14.7 mL/Hr) IV Continuous <Continuous>  diazepam    Tablet 10 milliGRAM(s) Oral every 8 hours  enoxaparin Injectable 80 milliGRAM(s) SubCutaneous every 12 hours  fentaNYL   Infusion... 2 MICROgram(s)/kG/Hr (8.16 mL/Hr) IV Continuous <Continuous>  insulin lispro (ADMELOG) corrective regimen sliding scale   SubCutaneous every 6 hours  insulin NPH human recombinant 14 Unit(s) SubCutaneous every 6 hours  ketamine Infusion. 2 mG/kG/Hr (16.3 mL/Hr) IV Continuous <Continuous>  methadone    Tablet 10 milliGRAM(s) Oral every 8 hours  montelukast 10 milliGRAM(s) Oral daily  multivitamin/minerals Oral Solution (WELLESSE) 30 milliLiter(s) Enteral Tube daily  norepinephrine Infusion 0.05 MICROgram(s)/kG/Min (7.65 mL/Hr) IV Continuous <Continuous>  pantoprazole  Injectable 40 milliGRAM(s) IV Push daily  piperacillin/tazobactam IVPB.. 3.375 Gram(s) IV Intermittent every 8 hours  polyethylene glycol 3350 17 Gram(s) Oral daily  QUEtiapine 25 milliGRAM(s) Oral daily  senna Syrup 20 milliLiter(s) Oral at bedtime  vancomycin  IVPB 1500 milliGRAM(s) IV Intermittent <User Schedule>  vasopressin Infusion 0.04 Unit(s)/Min (2.4 mL/Hr) IV Continuous <Continuous>    MEDICATIONS  (PRN):  fentaNYL    Injectable 50 MICROGram(s) IV Push every 4 hours PRN pain  midazolam Injectable 2 milliGRAM(s) IV Push every 4 hours PRN agitation      LABS:                        8.3    15.75 )-----------( 162      ( 31 Aug 2021 00:54 )             27.6     Hgb Trend: 8.3<--, 8.9<--, 9.4<--, 9.8<--, 10.3<--  08-31    145  |  108  |  30<H>  ----------------------------<  279<H>  4.2   |  25  |  0.63    Ca    9.0      31 Aug 2021 00:54  Phos  4.6     08-31  Mg     2.3     08-31    TPro  5.0<L>  /  Alb  2.7<L>  /  TBili  1.9<H>  /  DBili  x   /  AST  144<H>  /  ALT  272<H>  /  AlkPhos  520<H>  08-31    LIVER FUNCTIONS - ( 31 Aug 2021 00:54 )  Alb: 2.7 g/dL / Pro: 5.0 g/dL / ALK PHOS: 520 U/L / ALT: 272 U/L / AST: 144 U/L / GGT: x           Creatinine Trend: 0.63<--, 0.69<--, 0.72<--, 0.82<--, 0.81<--, 0.92<--  PTT - ( 30 Aug 2021 01:09 )  PTT:35.2 sec    Arterial Blood Gas:  08-31 @ 08:46  7.28/61/93/29/98.9/1.3  ABG lactate: --  Arterial Blood Gas:  08-31 @ 05:54  7.29/58/77/28/97.6/0.8  ABG lactate: --  Arterial Blood Gas:  08-31 @ 00:49  7.22/67/120/27/98.3/-0.9  ABG lactate: --  Arterial Blood Gas:  08-30 @ 20:18  7.30/53/64/26/91.7/-0.7  ABG lactate: --  Arterial Blood Gas:  08-30 @ 11:38  7.39/46/68/28/95.7/2.5  ABG lactate: --  Arterial Blood Gas:  08-30 @ 01:01  7.44/43/63/29/93.9/4.6  ABG lactate: --  Arterial Blood Gas:  08-29 @ 12:09  7.44/42/59/28/93.2/4.0  ABG lactate: --    Venous Blood Gas:  08-30 @ 15:34  7.33/58/41/31/70.3  VBG Lactate: --      MICROBIOLOGY: Reviewed    Culture - Sputum (collected 08-31-21 @ 00:37)  Source: .Sputum Sputum  Gram Stain (08-31-21 @ 07:31):    Rare polymorphonuclear leukocytes per low power field    No Squamous epithelial cells per low power field    Rare Yeast like cells seen per oil power field    Culture - Blood (collected 08-30-21 @ 11:17)  Source: .Blood Blood-Peripheral  Gram Stain (08-31-21 @ 08:24):    Growth in aerobic bottle: Gram Positive Cocci in Clusters  Preliminary Report (08-31-21 @ 08:24):    Growth in aerobic bottle: Gram Positive Cocci in Clusters        RADIOLOGY: Reviewed and interpreted by me    EKG:   CHIEF COMPLAINT:  Patient is a 71y old  Male who presents with a chief complaint of SOB (30 Aug 2021 16:52)    HPI:  70yo M w/ PMHx of HTN, DM2, BPH, asthma presents with shortness of breath. Patient endorses that he has been feeling SOB with associated cough for the last 2 weeks and his symptoms slowly worsened over time. He did not receive the COVID vaccine. He reports that his family members whom he lives with likely are also COVID positive but he does not know for sure. He did not try taking anything for his symptoms other than his usual medications/inhalers. There were no obvious aggravating or alleviating factors. Currently with supplemental oxygen on, he feels well and has no complaints, however he presented to [facility] since his symptoms were worsening. In the ED, he was hemodynamically stable, afebrile, but he was encephalopathic and hypoxic on room air requiring high flow nasal canula. Labs were significant for mild hyperkalemia and COVID positive. CXR prelim read showed bilateral patchy opacities. CT head showed no acute findings. MICU was consulted in the ED, patient was deemed not a MICU candidate. Patient was given doxycycline and dexamethasone x1 and admitted to general medicine for further management. At time of interview, patient was A/Ox3 with .  (13 Aug 2021 04:21)      Interval Events:      OBJECTIVE:  ICU Vital Signs Last 24 Hrs  T(C): 37.3 (31 Aug 2021 07:00), Max: 38.3 (30 Aug 2021 12:00)  T(F): 99.1 (31 Aug 2021 07:00), Max: 100.9 (30 Aug 2021 12:00)  HR: 97 (31 Aug 2021 09:30) (43 - 104)  BP: --  BP(mean): --  ABP: 161/69 (31 Aug 2021 09:30) (78/40 - 175/59)  ABP(mean): 103 (31 Aug 2021 09:30) (53 - 105)  RR: 0 (31 Aug 2021 09:30) (0 - 41)  SpO2: 95% (31 Aug 2021 09:30) (70% - 97%)    Mode: AC/ CMV (Assist Control/ Continuous Mandatory Ventilation), RR (machine): 30, FiO2: 100, PEEP: 12, ITime: 0.8, MAP: 22, PC: 24, PIP: 36    08-30-21 @ 07:01  -  08-31-21 @ 07:00  --------------------------------------------------------  IN: 4240 mL / OUT: 1965 mL / NET: 2275 mL    08-31-21 @ 07:01 - 08-31-21 @ 10:10  --------------------------------------------------------  IN: 852.5 mL / OUT: 75 mL / NET: 777.5 mL      CAPILLARY BLOOD GLUCOSE      POCT Blood Glucose.: 230 mg/dL (31 Aug 2021 05:27)          PHYSICAL EXAM:          HOSPITAL MEDICATIONS:  MEDICATIONS  (STANDING):  ALBUTerol    90 MICROgram(s) HFA Inhaler 2 Puff(s) Inhalation every 8 hours  budesonide 160 MICROgram(s)/formoterol 4.5 MICROgram(s) Inhaler 2 Puff(s) Inhalation two times a day  chlorhexidine 0.12% Liquid 15 milliLiter(s) Oral Mucosa every 12 hours  chlorhexidine 4% Liquid 1 Application(s) Topical <User Schedule>  cholecalciferol 2000 Unit(s) Oral daily  cisatracurium Infusion 3 MICROgram(s)/kG/Min (14.7 mL/Hr) IV Continuous <Continuous>  diazepam    Tablet 10 milliGRAM(s) Oral every 8 hours  enoxaparin Injectable 80 milliGRAM(s) SubCutaneous every 12 hours  fentaNYL   Infusion... 2 MICROgram(s)/kG/Hr (8.16 mL/Hr) IV Continuous <Continuous>  insulin lispro (ADMELOG) corrective regimen sliding scale   SubCutaneous every 6 hours  insulin NPH human recombinant 14 Unit(s) SubCutaneous every 6 hours  ketamine Infusion. 2 mG/kG/Hr (16.3 mL/Hr) IV Continuous <Continuous>  methadone    Tablet 10 milliGRAM(s) Oral every 8 hours  montelukast 10 milliGRAM(s) Oral daily  multivitamin/minerals Oral Solution (WELLESSE) 30 milliLiter(s) Enteral Tube daily  norepinephrine Infusion 0.05 MICROgram(s)/kG/Min (7.65 mL/Hr) IV Continuous <Continuous>  pantoprazole  Injectable 40 milliGRAM(s) IV Push daily  piperacillin/tazobactam IVPB.. 3.375 Gram(s) IV Intermittent every 8 hours  polyethylene glycol 3350 17 Gram(s) Oral daily  QUEtiapine 25 milliGRAM(s) Oral daily  senna Syrup 20 milliLiter(s) Oral at bedtime  vancomycin  IVPB 1500 milliGRAM(s) IV Intermittent <User Schedule>  vasopressin Infusion 0.04 Unit(s)/Min (2.4 mL/Hr) IV Continuous <Continuous>    MEDICATIONS  (PRN):  fentaNYL    Injectable 50 MICROGram(s) IV Push every 4 hours PRN pain  midazolam Injectable 2 milliGRAM(s) IV Push every 4 hours PRN agitation      LABS:                        8.3    15.75 )-----------( 162      ( 31 Aug 2021 00:54 )             27.6     Hgb Trend: 8.3<--, 8.9<--, 9.4<--, 9.8<--, 10.3<--  08-31    145  |  108  |  30<H>  ----------------------------<  279<H>  4.2   |  25  |  0.63    Ca    9.0      31 Aug 2021 00:54  Phos  4.6     08-31  Mg     2.3     08-31    TPro  5.0<L>  /  Alb  2.7<L>  /  TBili  1.9<H>  /  DBili  x   /  AST  144<H>  /  ALT  272<H>  /  AlkPhos  520<H>  08-31    LIVER FUNCTIONS - ( 31 Aug 2021 00:54 )  Alb: 2.7 g/dL / Pro: 5.0 g/dL / ALK PHOS: 520 U/L / ALT: 272 U/L / AST: 144 U/L / GGT: x           Creatinine Trend: 0.63<--, 0.69<--, 0.72<--, 0.82<--, 0.81<--, 0.92<--  PTT - ( 30 Aug 2021 01:09 )  PTT:35.2 sec    Arterial Blood Gas:  08-31 @ 08:46  7.28/61/93/29/98.9/1.3  ABG lactate: --  Arterial Blood Gas:  08-31 @ 05:54  7.29/58/77/28/97.6/0.8  ABG lactate: --  Arterial Blood Gas:  08-31 @ 00:49  7.22/67/120/27/98.3/-0.9  ABG lactate: --  Arterial Blood Gas:  08-30 @ 20:18  7.30/53/64/26/91.7/-0.7  ABG lactate: --  Arterial Blood Gas:  08-30 @ 11:38  7.39/46/68/28/95.7/2.5  ABG lactate: --  Arterial Blood Gas:  08-30 @ 01:01  7.44/43/63/29/93.9/4.6  ABG lactate: --  Arterial Blood Gas:  08-29 @ 12:09  7.44/42/59/28/93.2/4.0  ABG lactate: --    Venous Blood Gas:  08-30 @ 15:34  7.33/58/41/31/70.3  VBG Lactate: --      MICROBIOLOGY: Reviewed    Culture - Sputum (collected 08-31-21 @ 00:37)  Source: .Sputum Sputum  Gram Stain (08-31-21 @ 07:31):    Rare polymorphonuclear leukocytes per low power field    No Squamous epithelial cells per low power field    Rare Yeast like cells seen per oil power field    Culture - Blood (collected 08-30-21 @ 11:17)  Source: .Blood Blood-Peripheral  Gram Stain (08-31-21 @ 08:24):    Growth in aerobic bottle: Gram Positive Cocci in Clusters  Preliminary Report (08-31-21 @ 08:24):    Growth in aerobic bottle: Gram Positive Cocci in Clusters        RADIOLOGY: Reviewed and interpreted by me    EKG:   CHIEF COMPLAINT:  Patient is a 71y old  Male who presents with a chief complaint of SOB (30 Aug 2021 16:52)    HPI:  70yo M w/ PMHx of HTN, DM2, BPH, asthma presents with shortness of breath. Patient endorses that he has been feeling SOB with associated cough for the last 2 weeks and his symptoms slowly worsened over time. He did not receive the COVID vaccine. He reports that his family members whom he lives with likely are also COVID positive but he does not know for sure. He did not try taking anything for his symptoms other than his usual medications/inhalers. There were no obvious aggravating or alleviating factors. Currently with supplemental oxygen on, he feels well and has no complaints, however he presented to [facility] since his symptoms were worsening. In the ED, he was hemodynamically stable, afebrile, but he was encephalopathic and hypoxic on room air requiring high flow nasal canula. Labs were significant for mild hyperkalemia and COVID positive. CXR prelim read showed bilateral patchy opacities. CT head showed no acute findings. MICU was consulted in the ED, patient was deemed not a MICU candidate. Patient was given doxycycline and dexamethasone x1 and admitted to general medicine for further management. At time of interview, patient was A/Ox3 with .  (13 Aug 2021 04:21)    Interval Events:  -episode of bradycardia (junctional in 40s), hypotension, and hypothermia (tmin 34.7)-->restarted Nimbex with resolution    OBJECTIVE:  ICU Vital Signs Last 24 Hrs  T(C): 37.3 (31 Aug 2021 07:00), Max: 38.3 (30 Aug 2021 12:00)  T(F): 99.1 (31 Aug 2021 07:00), Max: 100.9 (30 Aug 2021 12:00)  HR: 97 (31 Aug 2021 09:30) (43 - 104)  BP: --  BP(mean): --  ABP: 161/69 (31 Aug 2021 09:30) (78/40 - 175/59)  ABP(mean): 103 (31 Aug 2021 09:30) (53 - 105)  RR: 0 (31 Aug 2021 09:30) (0 - 41)  SpO2: 95% (31 Aug 2021 09:30) (70% - 97%)    Mode: AC/ CMV (Assist Control/ Continuous Mandatory Ventilation), RR (machine): 30, FiO2: 100, PEEP: 12, ITime: 0.8, MAP: 22, PC: 24, PIP: 36    08-30-21 @ 07:01  -  08-31-21 @ 07:00  --------------------------------------------------------  IN: 4240 mL / OUT: 1965 mL / NET: 2275 mL    08-31-21 @ 07:01  -  08-31-21 @ 10:10  --------------------------------------------------------  IN: 852.5 mL / OUT: 75 mL / NET: 777.5 mL      CAPILLARY BLOOD GLUCOSE  POCT Blood Glucose.: 230 mg/dL (31 Aug 2021 05:27)    PHYSICAL EXAM:  General: intubated, sedated and paralyzed  HEENT: PERRL, sluggish   Neck: trachea midline, no jvd  Respiratory:  intubated, on mechanical vent, diminished BS b/l base, no wheezing   Cardiovascular: + s1 & s2 present  Abdomen: non distended, soft , hypoactive  BS x 4   Extremities: no edema , + pulses   Skin: warm and dry  Neurological: unable to assess mental status as pt is intubated and sedated and paralyzed   Psychiatry: unable to assess; intubated and sedated     HOSPITAL MEDICATIONS:  MEDICATIONS  (STANDING):  ALBUTerol    90 MICROgram(s) HFA Inhaler 2 Puff(s) Inhalation every 8 hours  budesonide 160 MICROgram(s)/formoterol 4.5 MICROgram(s) Inhaler 2 Puff(s) Inhalation two times a day  chlorhexidine 0.12% Liquid 15 milliLiter(s) Oral Mucosa every 12 hours  chlorhexidine 4% Liquid 1 Application(s) Topical <User Schedule>  cholecalciferol 2000 Unit(s) Oral daily  cisatracurium Infusion 3 MICROgram(s)/kG/Min (14.7 mL/Hr) IV Continuous <Continuous>  diazepam    Tablet 10 milliGRAM(s) Oral every 8 hours  enoxaparin Injectable 80 milliGRAM(s) SubCutaneous every 12 hours  fentaNYL   Infusion... 2 MICROgram(s)/kG/Hr (8.16 mL/Hr) IV Continuous <Continuous>  insulin lispro (ADMELOG) corrective regimen sliding scale   SubCutaneous every 6 hours  insulin NPH human recombinant 14 Unit(s) SubCutaneous every 6 hours  ketamine Infusion. 2 mG/kG/Hr (16.3 mL/Hr) IV Continuous <Continuous>  methadone    Tablet 10 milliGRAM(s) Oral every 8 hours  montelukast 10 milliGRAM(s) Oral daily  multivitamin/minerals Oral Solution (WELLESSE) 30 milliLiter(s) Enteral Tube daily  norepinephrine Infusion 0.05 MICROgram(s)/kG/Min (7.65 mL/Hr) IV Continuous <Continuous>  pantoprazole  Injectable 40 milliGRAM(s) IV Push daily  piperacillin/tazobactam IVPB.. 3.375 Gram(s) IV Intermittent every 8 hours  polyethylene glycol 3350 17 Gram(s) Oral daily  QUEtiapine 25 milliGRAM(s) Oral daily  senna Syrup 20 milliLiter(s) Oral at bedtime  vancomycin  IVPB 1500 milliGRAM(s) IV Intermittent <User Schedule>  vasopressin Infusion 0.04 Unit(s)/Min (2.4 mL/Hr) IV Continuous <Continuous>    MEDICATIONS  (PRN):  fentaNYL    Injectable 50 MICROGram(s) IV Push every 4 hours PRN pain  midazolam Injectable 2 milliGRAM(s) IV Push every 4 hours PRN agitation      LABS:                        8.3    15.75 )-----------( 162      ( 31 Aug 2021 00:54 )             27.6     Hgb Trend: 8.3<--, 8.9<--, 9.4<--, 9.8<--, 10.3<--  08-31    145  |  108  |  30<H>  ----------------------------<  279<H>  4.2   |  25  |  0.63    Ca    9.0      31 Aug 2021 00:54  Phos  4.6     08-31  Mg     2.3     08-31    TPro  5.0<L>  /  Alb  2.7<L>  /  TBili  1.9<H>  /  DBili  x   /  AST  144<H>  /  ALT  272<H>  /  AlkPhos  520<H>  08-31    LIVER FUNCTIONS - ( 31 Aug 2021 00:54 )  Alb: 2.7 g/dL / Pro: 5.0 g/dL / ALK PHOS: 520 U/L / ALT: 272 U/L / AST: 144 U/L / GGT: x           Creatinine Trend: 0.63<--, 0.69<--, 0.72<--, 0.82<--, 0.81<--, 0.92<--  PTT - ( 30 Aug 2021 01:09 )  PTT:35.2 sec    Arterial Blood Gas:  08-31 @ 08:46  7.28/61/93/29/98.9/1.3  ABG lactate: --  Arterial Blood Gas:  08-31 @ 05:54  7.29/58/77/28/97.6/0.8  ABG lactate: --  Arterial Blood Gas:  08-31 @ 00:49  7.22/67/120/27/98.3/-0.9  ABG lactate: --  Arterial Blood Gas:  08-30 @ 20:18  7.30/53/64/26/91.7/-0.7  ABG lactate: --  Arterial Blood Gas:  08-30 @ 11:38  7.39/46/68/28/95.7/2.5  ABG lactate: --  Arterial Blood Gas:  08-30 @ 01:01  7.44/43/63/29/93.9/4.6  ABG lactate: --  Arterial Blood Gas:  08-29 @ 12:09  7.44/42/59/28/93.2/4.0  ABG lactate: --    Venous Blood Gas:  08-30 @ 15:34  7.33/58/41/31/70.3  VBG Lactate: --      MICROBIOLOGY: Reviewed    Culture - Sputum (collected 08-31-21 @ 00:37)  Source: .Sputum Sputum  Gram Stain (08-31-21 @ 07:31):    Rare polymorphonuclear leukocytes per low power field    No Squamous epithelial cells per low power field    Rare Yeast like cells seen per oil power field    Culture - Blood (collected 08-30-21 @ 11:17)  Source: .Blood Blood-Peripheral  Gram Stain (08-31-21 @ 08:24):    Growth in aerobic bottle: Gram Positive Cocci in Clusters  Preliminary Report (08-31-21 @ 08:24):    Growth in aerobic bottle: Gram Positive Cocci in Clusters        RADIOLOGY: Reviewed and interpreted by me    EKG: patti

## 2021-09-01 NOTE — PROGRESS NOTE ADULT - SUBJECTIVE AND OBJECTIVE BOX
ULYSSES OSPINA  MRN-[id number]  Patient is a 71y old  Male who presents with a chief complaint of SOB (31 Aug 2021 16:26)    HPI:  70yo M w/ PMHx of HTN, DM2, BPH, asthma presents with shortness of breath. Patient endorses that he has been feeling SOB with associated cough for the last 2 weeks and his symptoms slowly worsened over time. He did not receive the COVID vaccine. He reports that his family members whom he lives with likely are also COVID positive but he does not know for sure. He did not try taking anything for his symptoms other than his usual medications/inhalers. There were no obvious aggravating or alleviating factors. Currently with supplemental oxygen on, he feels well and has no complaints, however he presented to [facility] since his symptoms were worsening. In the ED, he was hemodynamically stable, afebrile, but he was encephalopathic and hypoxic on room air requiring high flow nasal canula. Labs were significant for mild hyperkalemia and COVID positive. CXR prelim read showed bilateral patchy opacities. CT head showed no acute findings. MICU was consulted in the ED, patient was deemed not a MICU candidate. Patient was given doxycycline and dexamethasone x1 and admitted to general medicine for further management. At time of interview, patient was A/Ox3 with .  (13 Aug 2021 04:21)      24 HOUR EVENTS:    REVIEW OF SYSTEMS:    CONSTITUTIONAL: No weakness, fevers or chills  EYES/ENT: No visual changes;  No vertigo or throat pain   NECK: No pain or stiffness  RESPIRATORY: No cough, wheezing, hemoptysis; No shortness of breath  CARDIOVASCULAR: No chest pain or palpitations  GASTROINTESTINAL: No abdominal or epigastric pain. No nausea, vomiting, or hematemesis; No diarrhea or constipation. No melena or hematochezia.  GENITOURINARY: No dysuria, frequency or hematuria  NEUROLOGICAL: No numbness or weakness  SKIN: No itching, rashes      ICU Vital Signs Last 24 Hrs  T(C): 36.9 (01 Sep 2021 06:00), Max: 38 (31 Aug 2021 19:00)  T(F): 98.4 (01 Sep 2021 06:00), Max: 100.4 (31 Aug 2021 19:00)  HR: 95 (01 Sep 2021 06:30) (86 - 104)  BP: --  BP(mean): --  ABP: 116/47 (01 Sep 2021 06:30) (93/41 - 168/71)  ABP(mean): 71 (01 Sep 2021 06:30) (59 - 105)  RR: 34 (01 Sep 2021 06:30) (0 - 34)  SpO2: 91% (01 Sep 2021 06:30) (86% - 100%)    Mode: AC/ CMV (Assist Control/ Continuous Mandatory Ventilation), RR (machine): 34, TV (machine): 320, FiO2: 70, PEEP: 10, ITime: 0.7  CVP(mm Hg): --  CO: --  CI: --  PA: --  PA(mean): --  PA(direct): --  PCWP: --  LA: --  RA: --  SVR: --  SVRI: --  PVR: --  PVRI: --  I&O's Summary    30 Aug 2021 07:01  -  31 Aug 2021 07:00  --------------------------------------------------------  IN: 4240 mL / OUT: 1965 mL / NET: 2275 mL    31 Aug 2021 07:01  -  01 Sep 2021 06:46  --------------------------------------------------------  IN: 2834.3 mL / OUT: 2125 mL / NET: 709.3 mL        CAPILLARY BLOOD GLUCOSE    CAPILLARY BLOOD GLUCOSE      POCT Blood Glucose.: 177 mg/dL (31 Aug 2021 11:21)      PHYSICAL EXAM:  GENERAL: No acute distress, well-developed  HEAD:  Atraumatic, Normocephalic  EYES: EOMI, PERRLA, conjunctiva and sclera clear  NECK: Supple, no lymphadenopathy, no JVD  CHEST/LUNG: CTAB; No wheezes, rales, or rhonchi  HEART: Regular rate and rhythm. Normal S1/S2. No murmurs, rubs, or gallops  ABDOMEN: Soft, non-tender, non-distended; normal bowel sounds, no organomegaly  EXTREMITIES:  2+ peripheral pulses b/l, No clubbing, cyanosis, or edema  NEUROLOGY: A&O x 3, no focal deficits  SKIN: No rashes or lesions    ============================I/O===========================   I&O's Detail    30 Aug 2021 07:01  -  31 Aug 2021 07:00  --------------------------------------------------------  IN:    Albumin 5%  - 250 mL: 250 mL    Cisatracurium: 176.4 mL    Dexmedetomidine: 368.2 mL    Enteral Tube Flush: 210 mL    FentaNYL: 228.8 mL    Glucerna: 1440 mL    IV PiggyBack: 200 mL    IV PiggyBack: 500 mL    IV PiggyBack: 100 mL    Ketamine: 408.1 mL    Norepinephrine: 200.6 mL    Phenylephrine: 153.1 mL    Vasopressin: 4.8 mL  Total IN: 4240 mL    OUT:    Indwelling Catheter - Urethral (mL): 1965 mL  Total OUT: 1965 mL    Total NET: 2275 mL      31 Aug 2021 07:01  -  01 Sep 2021 06:46  --------------------------------------------------------  IN:    Cisatracurium: 88.2 mL    Cisatracurium: 313.6 mL    Enteral Tube Flush: 60 mL    FentaNYL: 188.6 mL    Glucerna: 520 mL    IV PiggyBack: 700 mL    Ketamine: 147 mL    Ketamine: 416.5 mL    Norepinephrine: 345.2 mL    Vasopressin: 14.4 mL    Vasopressin: 40.8 mL  Total IN: 2834.3 mL    OUT:    Indwelling Catheter - Urethral (mL): 2125 mL  Total OUT: 2125 mL    Total NET: 709.3 mL        ============================ LABS =========================                        7.7    12.75 )-----------( 179      ( 01 Sep 2021 00:53 )             25.5     09-01    141  |  107  |  24<H>  ----------------------------<  233<H>  4.5   |  28  |  0.68    Ca    9.0      01 Sep 2021 00:53  Phos  3.2     09-01  Mg     2.2     09-01    TPro  5.0<L>  /  Alb  2.7<L>  /  TBili  1.2  /  DBili  x   /  AST  107<H>  /  ALT  268<H>  /  AlkPhos  604<H>  09-01                LIVER FUNCTIONS - ( 01 Sep 2021 00:53 )  Alb: 2.7 g/dL / Pro: 5.0 g/dL / ALK PHOS: 604 U/L / ALT: 268 U/L / AST: 107 U/L / GGT: x           PT/INR - ( 01 Sep 2021 00:53 )   PT: 12.7 sec;   INR: 1.06 ratio         PTT - ( 01 Sep 2021 00:53 )  PTT:34.0 sec  ABG - ( 01 Sep 2021 05:30 )  pH, Arterial: 7.32  pH, Blood: x     /  pCO2: 61    /  pO2: 80    / HCO3: 31    / Base Excess: 4.4   /  SaO2: 98.4              Blood Gas Arterial, Lactate: 0.9 mmol/L (09-01-21 @ 05:30)  Blood Gas Arterial, Lactate: 0.9 mmol/L (09-01-21 @ 03:22)  Blood Gas Arterial, Lactate: 1.0 mmol/L (09-01-21 @ 00:47)  Blood Gas Venous - Lactate: 1.3 mmol/L (08-31-21 @ 22:58)  Blood Gas Arterial, Lactate: 0.8 mmol/L (08-31-21 @ 20:06)  Blood Gas Arterial, Lactate: 1.0 mmol/L (08-31-21 @ 17:54)  Blood Gas Arterial, Lactate: 1.2 mmol/L (08-31-21 @ 14:31)  Blood Gas Arterial, Lactate: 1.4 mmol/L (08-31-21 @ 08:46)  Blood Gas Arterial, Lactate: 1.2 mmol/L (08-31-21 @ 05:54)  Blood Gas Arterial, Lactate: 1.6 mmol/L (08-31-21 @ 00:49)  Blood Gas Arterial, Lactate: 1.5 mmol/L (08-30-21 @ 20:18)  Blood Gas Arterial, Lactate: 2.0 mmol/L (08-30-21 @ 11:38)  Blood Gas Arterial, Lactate: 1.4 mmol/L (08-30-21 @ 01:01)  Blood Gas Arterial, Lactate: 1.6 mmol/L (08-29-21 @ 12:09)      ======================Micro/Rad/Cardio=================  Telemetry: Reviewed   EKG: Reviewed  CXR: Reviewed  Culture: Reviewed   Echo: Limited Transthoracic Echo (w/Cont):   Patient name: ULYSSES OSPINA  YOB: 1950   Age: 71 (M)   MR#: [id number]  Study Date: 8/20/2021  Location: [address] [place], [place] [id number]: Thalia Rubio NILA  Study quality: difficult due to lung interfer  Referring Physician: Mau Sparrow MD  Blood Pressure: 130/85 mmHg  Height: 173 cm  Weight: 82 kg  BSA: 2 m2  ------------------------------------------------------------------------  PROCEDURE: Limited transthoracic echocardiogram with 2-D.  M-Mode and spectral and color flow Doppler. Verbal consent  was obtained for injection of  Ultrasonic Enhancing Agent  following a discussion of risks and benefits. Following  intravenous injection of Ultrasonic Enhancing Agent ,  harmonic imaging was performed.  INDICATION: Cardiac arrest due to other underlying  condition (I46.8)  ------------------------------------------------------------------------  Observations:  Aortic Valve/Aorta:  Left Ventricle: Endocardial visualization enhanced with  intravenous injection of Ultrasonic Enhancing Agent  (Definity).  Despite the use of Definity, there are no interpretable  images.  ------------------------------------------------------------------------  Conclusions:  Endocardial visualization enhanced with intravenous  injection of Ultrasonic Enhancing Agent (Definity).  Despite the use of Definity, there are no interpretable  images.  ------------------------------------------------------------------------  Confirmed on  8/20/2021 - 17:21:48 by Madan Cordova MD, FASE  ------------------------------------------------------------------------ (08-20-21 @ 15:27)    Cath:   ======================================================  PAST MEDICAL & SURGICAL HISTORY:  BPH (benign prostatic hyperplasia)    Hyperlipemia    HTN (hypertension)    Hypercholesteremia    Asthma    Diabetes    Kidney stone    Bladder stone    H/O lithotripsy  x 2    History of kidney stones    H/O umbilical hernia repair      ====================ASSESSMENT AND PLAN==============      ====================CARDIOVASCULAR==================    Mechanical Circulatory Support:  [ ] IABP   [ ] Impella 2.5   [ ] Impella CP  Settings:  Augmented Diastolic Pressure:  Impella Flow:     ==Hemodynamics==     (Date) CVP:      PCWP:        PA S/D:            Cardiac Output:             Cardiac Index:            SVR:    Preload (fluids, diuretics):  Afterload (anti-hypertensives, pressors):  Inotropes:    ==Pump==    TTE Date:  LVEF:                              Regional Wall Motion Abnormailities?:  [ ]Yes   [ ] No   If Yes, Details  Diastolic function:  RV function:   Any change from prior?: [ ] Yes   [ ] No   If Yes, Details:   Volume status:   [ ] Hypovolemia    [ ] Euvolemic    [ ] Hypervolemic    ==Coronaries==    Last ischemic workup:   Antiplatelet regimen:   Anticoagulant:   Statin:   Beta blocker:    ==Rhythm==    Current rhythm:  AM EKG Interpretation:   Anti-arrhythmic therapies:   TVP with settings:     furosemide   Injectable 20 milliGRAM(s) IV Push once  norepinephrine Infusion 0.05 MICROgram(s)/kG/Min (7.65 mL/Hr) IV Continuous <Continuous>      ====================== NEUROLOGY=====================  Sedation [ ]Yes   [ ] No   If Yes, Medication/Dose:   CAM ICU [ ] Positive  [ ] Negative    cisatracurium Infusion 3 MICROgram(s)/kG/Min (14.7 mL/Hr) IV Continuous <Continuous>  diazepam    Tablet 10 milliGRAM(s) Oral every 8 hours  fentaNYL    Injectable 50 MICROGram(s) IV Push every 4 hours PRN pain  fentaNYL   Infusion... 2 MICROgram(s)/kG/Hr (8.16 mL/Hr) IV Continuous <Continuous>  ketamine Infusion. 1.998 mG/kG/Hr (16.3 mL/Hr) IV Continuous <Continuous>  methadone    Tablet 10 milliGRAM(s) Oral every 8 hours  midazolam Injectable 2 milliGRAM(s) IV Push every 4 hours PRN agitation  QUEtiapine 25 milliGRAM(s) Oral every 12 hours    ==================== RESPIRATORY======================  [ ] Invasive Mechanical Ventilation   [ ] BIPAP    [ ] HFNC    [ ] NC   Non-invasive Mechanical Ventilation/ HFNC Settings:   Mode: AC/ CMV (Assist Control/ Continuous Mandatory Ventilation)  RR (machine): 34  TV (machine): 320  FiO2: 70  PEEP: 10  ITime: 0.7  MAP: 20  PC: 26  PIP: 34    ABG - ( 01 Sep 2021 05:30 )  pH, Arterial: 7.32  pH, Blood: x     /  pCO2: 61    /  pO2: 80    / HCO3: 31    / Base Excess: 4.4   /  SaO2: 98.4                    ALBUTerol    90 MICROgram(s) HFA Inhaler 2 Puff(s) Inhalation every 8 hours  budesonide 160 MICROgram(s)/formoterol 4.5 MICROgram(s) Inhaler 2 Puff(s) Inhalation two times a day    ===================== RENAL =========================    08-30-21 @ 07:01 - 08-31-21 @ 07:00  --------------------------------------------------------  IN: 4240 mL / OUT: 1965 mL / NET: 2275 mL    08-31-21 @ 07:01  - 09-01-21 @ 06:47  --------------------------------------------------------  IN: 2834.3 mL / OUT: 2125 mL / NET: 709.3 mL      Renal Replacement Therapy:  [ ] CRRT      [ ] IHD   Last Session:    Fluid removal:     [ ] Diuretic therapy, Regimen:       ==================== GASTROINTESTINAL===================    Diet:  Last BM:   Indication for Stress Ulcer Prophylaxis, [ ] Yes    [ ] No   If Yes, Medication:     cholecalciferol 2000 Unit(s) Oral daily  multivitamin/minerals Oral Solution (WELLESSE) 30 milliLiter(s) Enteral Tube daily  naloxegol 25 milliGRAM(s) Oral daily  pantoprazole  Injectable 40 milliGRAM(s) IV Push daily  polyethylene glycol 3350 17 Gram(s) Oral daily  senna Syrup 20 milliLiter(s) Oral at bedtime    ========================INFECTIOUS DISEASE================  T(C): 36.9 (09-01-21 @ 06:00), Max: 38 (08-31-21 @ 19:00)  WBC Count: 12.75 K/uL (09-01-21 @ 00:53)  WBC Count: 15.75 K/uL (08-31-21 @ 00:54)  WBC Count: 15.23 K/uL (08-30-21 @ 01:08)      Culture - Sputum (collected 08-31-21 @ 00:37)  Source: .Sputum Sputum  Gram Stain (08-31-21 @ 07:31):    Rare polymorphonuclear leukocytes per low power field    No Squamous epithelial cells per low power field    Rare Yeast like cells seen per oil power field  Preliminary Report (08-31-21 @ 19:05):    Normal Respiratory Liane present    Culture - Blood (collected 08-30-21 @ 18:39)  Source: .Blood Blood  Preliminary Report (08-31-21 @ 19:02):    No growth to date.    Culture - Blood (collected 08-30-21 @ 11:17)  Source: .Blood Blood-Peripheral  Gram Stain (08-31-21 @ 08:24):    Growth in aerobic bottle: Gram Positive Cocci in Clusters  Preliminary Report (08-31-21 @ 08:24):    Growth in aerobic bottle: Gram Positive Cocci in Clusters  Organism: Blood Culture PCR (08-31-21 @ 15:43)  Organism: Blood Culture PCR (08-31-21 @ 15:43)      -  Staphylococcus epidermidis, Methicillin resistant: Detec      Method Type: PCR    Culture - Blood (collected 08-27-21 @ 03:23)  Source: .Blood Blood  Final Report (09-01-21 @ 04:01):    No Growth Final    Culture - Blood (collected 08-25-21 @ 22:56)  Source: .Blood Blood  Final Report (08-30-21 @ 23:01):    No Growth Final    Culture - Blood (collected 08-25-21 @ 22:56)  Source: .Blood Blood  Final Report (08-30-21 @ 23:01):    No Growth Final    Culture - Sputum (collected 08-25-21 @ 18:36)  Source: .Sputum Sputum  Gram Stain (08-25-21 @ 23:35):    No polymorphonuclear leukocytes per low power field    No Squamous epithelial cells per low power field    Rare Yeast like cells per oil power field  Final Report (08-27-21 @ 19:33):    Normal Respiratory Liane present        Current Antibiotics/Antifungals/ Antivirals with start date:     vancomycin  IVPB 1500 milliGRAM(s) IV Intermittent <User Schedule>    ===================HEMATOLOGIC/ONC ===================  Hemoglobin: 7.7 g/dL (09-01-21 @ 00:53)  Hemoglobin: 8.3 g/dL (08-31-21 @ 00:54)  Hemoglobin: 8.9 g/dL (08-30-21 @ 01:08)    Platelet Count - Automated: 179 K/uL (09-01-21 @ 00:53)  Platelet Count - Automated: 162 K/uL (08-31-21 @ 00:54)  Platelet Count - Automated: 186 K/uL (08-30-21 @ 01:08)    Chemical VTE Prophylaxis:  [ ] Lovenox    [ ] SQH   [ ]NA  Systemic Anticogaulation:  [ ] Yes    [ ] No,  If Yes, Medication and Indication:     enoxaparin Injectable 80 milliGRAM(s) SubCutaneous every 12 hours    =======================    ENDOCRINE  =====================  Insulin drip  [ ] Yes    [ ] No  Basal Insulin [ ] Yes    [ ] No, Details:   Bolus insulin [ ] Yes    [ ] No  Sliding Scale  [ ] Yes    [ ] No    POCT Blood Glucose.: 177 mg/dL (08-31-21 @ 11:21)        insulin lispro (ADMELOG) corrective regimen sliding scale   SubCutaneous every 6 hours  insulin NPH human recombinant 14 Unit(s) SubCutaneous every 6 hours  vasopressin Infusion 0.04 Unit(s)/Min (2.4 mL/Hr) IV Continuous <Continuous>    ======================= LINES/TUBES  =====================  [place]: (Date placed)  Central Line: (Date placed)  HD Catheter: (Date placed)  Arterial Line: (Date placed)  Endotracheal Tube: (Date placed)  [place]: (Date placed)  ======================= DISPOSITION  =====================  [ ] Critical   [ ] Guarded    [ ] Stable    [ ] Maintain in CICU  [ ] Downgrade to [place]  [ ] Discharge Home   ULYSSES OSPINA  MRN-[id number]  Patient is a 71y old  Male who presents with a chief complaint of SOB (31 Aug 2021 16:26)    HPI: 71M Hx HTN, DM2, BPH, asthma presents with shortness of breath. Patient endorses that he has been feeling SOB with associated cough for the last 2 weeks and his symptoms slowly worsened over time. He did not receive the COVID vaccine. He reports that his family members whom he lives with likely are also COVID positive but he does not know for sure. He did not try taking anything for his symptoms other than his usual medications/inhalers. There were no obvious aggravating or alleviating factors. Currently with supplemental oxygen on, he feels well and has no complaints, however he presented to [facility] since his symptoms were worsening. In the ED, he was hemodynamically stable, afebrile, but he was encephalopathic and hypoxic on room air requiring high flow nasal canula. Labs were significant for mild hyperkalemia and COVID positive. CXR prelim read showed bilateral patchy opacities. CT head showed no acute findings. MICU was consulted in the ED, patient was deemed not a MICU candidate. Patient was given doxycycline and dexamethasone x1 and admitted to general medicine for further management. At time of interview, patient was A/Ox3 with .  (13 Aug 2021 04:21)      REVIEW OF SYSTEMS:  - Unable to obtain, intubated/sedated/paralyzed     ICU Vital Signs Last 24 Hrs  T(C): 36.9 (01 Sep 2021 06:00), Max: 38 (31 Aug 2021 19:00)  T(F): 98.4 (01 Sep 2021 06:00), Max: 100.4 (31 Aug 2021 19:00)  HR: 95 (01 Sep 2021 06:30) (86 - 104)  ABP: 116/47 (01 Sep 2021 06:30) (93/41 - 168/71)  ABP(mean): 71 (01 Sep 2021 06:30) (59 - 105)  RR: 34 (01 Sep 2021 06:30) (0 - 34)  SpO2: 91% (01 Sep 2021 06:30) (86% - 100%)    Mode: AC/ CMV (Assist Control/ Continuous Mandatory Ventilation)  RR (machine): 34, TV (machine): 320, FiO2: 70, PEEP: 10, ITime: 0.7      I&O's Summary    30 Aug 2021 07:01  -  31 Aug 2021 07:00  --------------------------------------------------------  IN: 4240 mL / OUT: 1965 mL / NET: 2275 mL    31 Aug 2021 07:01  -  01 Sep 2021 06:46  --------------------------------------------------------  IN: 2834.3 mL / OUT: 2125 mL / NET: 709.3 mL      CAPILLARY BLOOD GLUCOSE  POCT Blood Glucose.: 177 mg/dL (31 Aug 2021 11:21)    PHYSICAL EXAM:  General: intubated, sedated and paralyzed  HEENT: PERRL, sluggish   Neck: trachea midline, no jvd  Respiratory:  intubated, on mechanical vent, diminished BS b/l base, no wheezing   Cardiovascular: + s1 & s2 present  Abdomen: non distended, soft , hypoactive  BS x 4   Extremities: no edema , + pulses   Skin: warm and dry  Neurological: unable to assess mental status as pt is intubated and sedated and paralyzed   Psychiatry: unable to assess; intubated and sedated   ============================I/O===========================   I&O's Detail    30 Aug 2021 07:01  -  31 Aug 2021 07:00  --------------------------------------------------------  IN:    Albumin 5%  - 250 mL: 250 mL    Cisatracurium: 176.4 mL    Dexmedetomidine: 368.2 mL    Enteral Tube Flush: 210 mL    FentaNYL: 228.8 mL    Glucerna: 1440 mL    IV PiggyBack: 200 mL    IV PiggyBack: 500 mL    IV PiggyBack: 100 mL    Ketamine: 408.1 mL    Norepinephrine: 200.6 mL    Phenylephrine: 153.1 mL    Vasopressin: 4.8 mL  Total IN: 4240 mL    OUT:    Indwelling Catheter - Urethral (mL): 1965 mL  Total OUT: 1965 mL    Total NET: 2275 mL      31 Aug 2021 07:01  -  01 Sep 2021 06:46  --------------------------------------------------------  IN:    Cisatracurium: 88.2 mL    Cisatracurium: 313.6 mL    Enteral Tube Flush: 60 mL    FentaNYL: 188.6 mL    Glucerna: 520 mL    IV PiggyBack: 700 mL    Ketamine: 147 mL    Ketamine: 416.5 mL    Norepinephrine: 345.2 mL    Vasopressin: 14.4 mL    Vasopressin: 40.8 mL  Total IN: 2834.3 mL    OUT:    Indwelling Catheter - Urethral (mL): 2125 mL  Total OUT: 2125 mL    Total NET: 709.3 mL  ============================ LABS =========================                    7.7    12.75 )-----------( 179      ( 01 Sep 2021 00:53 )             25.5     09-01    141  |  107  |  24<H>  ----------------------------<  233<H>  4.5   |  28  |  0.68    Ca    9.0      01 Sep 2021 00:53  Phos  3.2     09-01  Mg     2.2     09-01    TPro  5.0<L>  /  Alb  2.7<L>  /  TBili  1.2  /  DBili  x   /  AST  107<H>  /  ALT  268<H>  /  AlkPhos  604<H>  09-01    LIVER FUNCTIONS - ( 01 Sep 2021 00:53 )  Alb: 2.7 g/dL / Pro: 5.0 g/dL / ALK PHOS: 604 U/L / ALT: 268 U/L / AST: 107 U/L / GGT: x           PT/INR - ( 01 Sep 2021 00:53 )   PT: 12.7 sec;   INR: 1.06 ratio    PTT - ( 01 Sep 2021 00:53 )  PTT:34.0 sec  ABG - ( 01 Sep 2021 05:30 )  pH, Arterial: 7.32  pH, Blood: x     /  pCO2: 61    /  pO2: 80    / HCO3: 31    / Base Excess: 4.4   /  SaO2: 98.4      Blood Gas Arterial, Lactate: 0.9 mmol/L (09-01-21 @ 05:30)  Blood Gas Arterial, Lactate: 0.9 mmol/L (09-01-21 @ 03:22)  Blood Gas Arterial, Lactate: 1.0 mmol/L (09-01-21 @ 00:47)  Blood Gas Venous - Lactate: 1.3 mmol/L (08-31-21 @ 22:58)  Blood Gas Arterial, Lactate: 0.8 mmol/L (08-31-21 @ 20:06)  Blood Gas Arterial, Lactate: 1.0 mmol/L (08-31-21 @ 17:54)  Blood Gas Arterial, Lactate: 1.2 mmol/L (08-31-21 @ 14:31)  Blood Gas Arterial, Lactate: 1.4 mmol/L (08-31-21 @ 08:46)  Blood Gas Arterial, Lactate: 1.2 mmol/L (08-31-21 @ 05:54)  Blood Gas Arterial, Lactate: 1.6 mmol/L (08-31-21 @ 00:49)  Blood Gas Arterial, Lactate: 1.5 mmol/L (08-30-21 @ 20:18)  Blood Gas Arterial, Lactate: 2.0 mmol/L (08-30-21 @ 11:38)  Blood Gas Arterial, Lactate: 1.4 mmol/L (08-30-21 @ 01:01)  Blood Gas Arterial, Lactate: 1.6 mmol/L (08-29-21 @ 12:09)  ======================Micro/Rad/Cardio=================  Telemetry: Reviewed   EKG: Reviewed  CXR: Reviewed  Culture: Reviewed   Echo: Limited Transthoracic Echo (w/Cont):   Patient name: ULYSSES OSPINA  YOB: 1950   Age: 71 (M)   MR#: [id number]  Study Date: 8/20/2021  [id number] Thalia Rubio RUST  Study quality: difficult due to lung interfer  Referring Physician: Mau Sparrow MD  Blood Pressure: 130/85 mmHg  Height: 173 cm  Weight: 82 kg  BSA: 2 m2  ------------------------------------------------------------------------  PROCEDURE: Limited transthoracic echocardiogram with 2-D.  M-Mode and spectral and color flow Doppler. Verbal consent  was obtained for injection of  Ultrasonic Enhancing Agent  following a discussion of risks and benefits. Following  intravenous injection of Ultrasonic Enhancing Agent ,  harmonic imaging was performed.  INDICATION: Cardiac arrest due to other underlying  condition (I46.8)  ------------------------------------------------------------------------  Observations:  Aortic Valve/Aorta:  Left Ventricle: Endocardial visualization enhanced with  intravenous injection of Ultrasonic Enhancing Agent  (Definity).  Despite the use of Definity, there are no interpretable  images.  ------------------------------------------------------------------------  Conclusions:  Endocardial visualization enhanced with intravenous  injection of Ultrasonic Enhancing Agent (Definity).  Despite the use of Definity, there are no interpretable  images.  ======================================================  PAST MEDICAL & SURGICAL HISTORY:  BPH (benign prostatic hyperplasia)  Hyperlipemia  HTN (hypertension)  Hypercholesteremia  Asthma  Diabetes  Kidney stone  Bladder stone  H/O lithotripsy  History of kidney stones  H/O umbilical hernia repair ULYSSES OSPNIA  MRN-[id number]  Patient is a 71y old  Male who presents with a chief complaint of SOB (31 Aug 2021 16:26)    HPI: 71M Hx HTN, DM2, BPH, asthma presents with shortness of breath. Patient endorses that he has been feeling SOB with associated cough for the last 2 weeks and his symptoms slowly worsened over time. He did not receive the COVID vaccine. He reports that his family members whom he lives with likely are also COVID positive but he does not know for sure. He did not try taking anything for his symptoms other than his usual medications/inhalers. There were no obvious aggravating or alleviating factors. Currently with supplemental oxygen on, he feels well and has no complaints, however he presented to [facility] since his symptoms were worsening. In the ED, he was hemodynamically stable, afebrile, but he was encephalopathic and hypoxic on room air requiring high flow nasal canula. Labs were significant for mild hyperkalemia and COVID positive. CXR prelim read showed bilateral patchy opacities. CT head showed no acute findings. MICU was consulted in the ED, patient was deemed not a MICU candidate. Patient was given doxycycline and dexamethasone x1 and admitted to general medicine for further management. At time of interview, patient was A/Ox3 with .  (13 Aug 2021 04:21)      REVIEW OF SYSTEMS:  - Unable to obtain, intubated/sedated/paralyzed     ICU Vital Signs Last 24 Hrs  T(C): 36.9 (01 Sep 2021 06:00), Max: 38 (31 Aug 2021 19:00)  T(F): 98.4 (01 Sep 2021 06:00), Max: 100.4 (31 Aug 2021 19:00)  HR: 95 (01 Sep 2021 06:30) (86 - 104)  ABP: 116/47 (01 Sep 2021 06:30) (93/41 - 168/71)  ABP(mean): 71 (01 Sep 2021 06:30) (59 - 105)  RR: 34 (01 Sep 2021 06:30) (0 - 34)  SpO2: 91% (01 Sep 2021 06:30) (86% - 100%)    Mode: AC/ CMV (Assist Control/ Continuous Mandatory Ventilation)  RR (machine): 34, TV (machine): 320, FiO2: 70, PEEP: 10, ITime: 0.7      I&O's Summary    30 Aug 2021 07:01  -  31 Aug 2021 07:00  --------------------------------------------------------  IN: 4240 mL / OUT: 1965 mL / NET: 2275 mL    31 Aug 2021 07:01  -  01 Sep 2021 06:46  --------------------------------------------------------  IN: 2834.3 mL / OUT: 2125 mL / NET: 709.3 mL      CAPILLARY BLOOD GLUCOSE  POCT Blood Glucose.: 177 mg/dL (31 Aug 2021 11:21)    PHYSICAL EXAM:  General: intubated, sedated/paralyzed  HEENT: PERRL, sluggish   Neck: trachea midline, no jvd  Respiratory:  diminished BS b/l base, no wheezing   Cardiovascular: NSR, S1S2 wnl, no murmurs   Abdomen: non distended, soft , hypoactive  BS x 4   Extremities: no edema , + pulses   Neurological: sedated/paralyzed   ============================I/O===========================   I&O's Detail    30 Aug 2021 07:01  -  31 Aug 2021 07:00  --------------------------------------------------------  IN:    Albumin 5%  - 250 mL: 250 mL    Cisatracurium: 176.4 mL    Dexmedetomidine: 368.2 mL    Enteral Tube Flush: 210 mL    FentaNYL: 228.8 mL    Glucerna: 1440 mL    IV PiggyBack: 200 mL    IV PiggyBack: 500 mL    IV PiggyBack: 100 mL    Ketamine: 408.1 mL    Norepinephrine: 200.6 mL    Phenylephrine: 153.1 mL    Vasopressin: 4.8 mL  Total IN: 4240 mL    OUT:    Indwelling Catheter - Urethral (mL): 1965 mL  Total OUT: 1965 mL    Total NET: 2275 mL      31 Aug 2021 07:01  -  01 Sep 2021 06:46  --------------------------------------------------------  IN:    Cisatracurium: 88.2 mL    Cisatracurium: 313.6 mL    Enteral Tube Flush: 60 mL    FentaNYL: 188.6 mL    Glucerna: 520 mL    IV PiggyBack: 700 mL    Ketamine: 147 mL    Ketamine: 416.5 mL    Norepinephrine: 345.2 mL    Vasopressin: 14.4 mL    Vasopressin: 40.8 mL  Total IN: 2834.3 mL    OUT:    Indwelling Catheter - Urethral (mL): 2125 mL  Total OUT: 2125 mL    Total NET: 709.3 mL  ============================ LABS =========================                    7.7    12.75 )-----------( 179      ( 01 Sep 2021 00:53 )             25.5     09-01    141  |  107  |  24<H>  ----------------------------<  233<H>  4.5   |  28  |  0.68    Ca    9.0      01 Sep 2021 00:53  Phos  3.2     09-01  Mg     2.2     09-01    TPro  5.0<L>  /  Alb  2.7<L>  /  TBili  1.2  /  DBili  x   /  AST  107<H>  /  ALT  268<H>  /  AlkPhos  604<H>  09-01    LIVER FUNCTIONS - ( 01 Sep 2021 00:53 )  Alb: 2.7 g/dL / Pro: 5.0 g/dL / ALK PHOS: 604 U/L / ALT: 268 U/L / AST: 107 U/L / GGT: x           PT/INR - ( 01 Sep 2021 00:53 )   PT: 12.7 sec;   INR: 1.06 ratio    PTT - ( 01 Sep 2021 00:53 )  PTT:34.0 sec  ABG - ( 01 Sep 2021 05:30 )  pH, Arterial: 7.32  pH, Blood: x     /  pCO2: 61    /  pO2: 80    / HCO3: 31    / Base Excess: 4.4   /  SaO2: 98.4      Blood Gas Arterial, Lactate: 0.9 mmol/L (09-01-21 @ 05:30)  Blood Gas Arterial, Lactate: 0.9 mmol/L (09-01-21 @ 03:22)  Blood Gas Arterial, Lactate: 1.0 mmol/L (09-01-21 @ 00:47)  Blood Gas Venous - Lactate: 1.3 mmol/L (08-31-21 @ 22:58)  Blood Gas Arterial, Lactate: 0.8 mmol/L (08-31-21 @ 20:06)  Blood Gas Arterial, Lactate: 1.0 mmol/L (08-31-21 @ 17:54)  Blood Gas Arterial, Lactate: 1.2 mmol/L (08-31-21 @ 14:31)  Blood Gas Arterial, Lactate: 1.4 mmol/L (08-31-21 @ 08:46)  Blood Gas Arterial, Lactate: 1.2 mmol/L (08-31-21 @ 05:54)  Blood Gas Arterial, Lactate: 1.6 mmol/L (08-31-21 @ 00:49)  Blood Gas Arterial, Lactate: 1.5 mmol/L (08-30-21 @ 20:18)  Blood Gas Arterial, Lactate: 2.0 mmol/L (08-30-21 @ 11:38)  Blood Gas Arterial, Lactate: 1.4 mmol/L (08-30-21 @ 01:01)  Blood Gas Arterial, Lactate: 1.6 mmol/L (08-29-21 @ 12:09)  ======================Micro/Rad/Cardio=================  Telemetry: Reviewed   EKG: Reviewed  CXR: Reviewed  Culture: Reviewed   Echo: Limited Transthoracic Echo (w/Cont):   Patient name: ULYSSES OSPINA  YOB: 1950   Age: 71 (M)   MR#: [id number]  Study Date: 8/20/2021  Location: [address] [place], [place] [id number]: Thalia Rubio [facility]  Study quality: difficult due to lung interfer  Referring Physician: Mau Sparrow MD  Blood Pressure: 130/85 mmHg  Height: 173 cm  Weight: 82 kg  BSA: 2 m2  ------------------------------------------------------------------------  PROCEDURE: Limited transthoracic echocardiogram with 2-D.  M-Mode and spectral and color flow Doppler. Verbal consent  was obtained for injection of  Ultrasonic Enhancing Agent  following a discussion of risks and benefits. Following  intravenous injection of Ultrasonic Enhancing Agent ,  harmonic imaging was performed.  INDICATION: Cardiac arrest due to other underlying  condition (I46.8)  ------------------------------------------------------------------------  Observations:  Aortic Valve/Aorta:  Left Ventricle: Endocardial visualization enhanced with  intravenous injection of Ultrasonic Enhancing Agent  (Definity).  Despite the use of Definity, there are no interpretable  images.  ------------------------------------------------------------------------  Conclusions:  Endocardial visualization enhanced with intravenous  injection of Ultrasonic Enhancing Agent (Definity).  Despite the use of Definity, there are no interpretable  images.  ======================================================  PAST MEDICAL & SURGICAL HISTORY:  BPH (benign prostatic hyperplasia)  Hyperlipemia  HTN (hypertension)  Hypercholesteremia  Asthma  Diabetes  Kidney stone  Bladder stone  H/O lithotripsy  History of kidney stones  H/O umbilical hernia repair

## 2021-09-01 NOTE — PROGRESS NOTE ADULT - SUBJECTIVE AND OBJECTIVE BOX
Follow Up: COVID, CoNS bacteremia    Interval History/ROS: Febrile overnight again. Still on pressors, critically ill.     Allergies  No Known Allergies        ANTIMICROBIALS:  vancomycin  IVPB    vancomycin  IVPB 1000 once      OTHER MEDS:  ALBUTerol    90 MICROgram(s) HFA Inhaler 2 Puff(s) Inhalation every 8 hours  budesonide 160 MICROgram(s)/formoterol 4.5 MICROgram(s) Inhaler 2 Puff(s) Inhalation two times a day  chlorhexidine 0.12% Liquid 15 milliLiter(s) Oral Mucosa every 12 hours  chlorhexidine 4% Liquid 1 Application(s) Topical <User Schedule>  cholecalciferol 2000 Unit(s) Oral daily  cisatracurium Infusion 3 MICROgram(s)/kG/Min IV Continuous <Continuous>  diazepam    Tablet 10 milliGRAM(s) Oral every 8 hours  enoxaparin Injectable 80 milliGRAM(s) SubCutaneous every 12 hours  fentaNYL    Injectable 50 MICROGram(s) IV Push every 4 hours PRN  fentaNYL   Infusion... 2 MICROgram(s)/kG/Hr IV Continuous <Continuous>  insulin lispro (ADMELOG) corrective regimen sliding scale   SubCutaneous every 6 hours  insulin NPH human recombinant 16 Unit(s) SubCutaneous every 6 hours  ketamine Infusion. 1.998 mG/kG/Hr IV Continuous <Continuous>  methadone    Tablet 20 milliGRAM(s) Oral every 8 hours  midazolam Injectable 2 milliGRAM(s) IV Push every 4 hours PRN  multivitamin/minerals Oral Solution (WELLESSE) 30 milliLiter(s) Enteral Tube daily  naloxegol 25 milliGRAM(s) Oral daily  norepinephrine Infusion 0.05 MICROgram(s)/kG/Min IV Continuous <Continuous>  pantoprazole  Injectable 40 milliGRAM(s) IV Push daily  petrolatum Ophthalmic Ointment 1 Application(s) Both EYES every 8 hours  polyethylene glycol 3350 17 Gram(s) Oral daily  senna Syrup 20 milliLiter(s) Oral at bedtime  vasopressin Infusion 0.04 Unit(s)/Min IV Continuous <Continuous>      Vital Signs Last 24 Hrs  T(C): 38 (01 Sep 2021 16:15), Max: 38.4 (01 Sep 2021 15:00)  T(F): 100.4 (01 Sep 2021 16:15), Max: 101.1 (01 Sep 2021 15:00)  HR: 89 (01 Sep 2021 16:45) (85 - 99)  BP: --  BP(mean): --  RR: 34 (01 Sep 2021 16:45) (20 - 34)  SpO2: 87% (01 Sep 2021 16:45) (86% - 100%)    Physical Exam:  General: intubated sedated   Head: atraumatic, normocephalic  ENT: ET and OG tubes   Cardio: regular rate   Respiratory: mechanically ventilated   abd: soft, no tenderness  : mace  Musculoskeletal: anasarca. passive ROM intact, no focal joint swelling  vascular: left IJ CVC, other peripheral lines, no phlebitis                           7.9    12.88 )-----------( 180      ( 01 Sep 2021 13:12 )             26.5       09-01    141  |  107  |  24<H>  ----------------------------<  233<H>  4.5   |  28  |  0.68    Ca    9.0      01 Sep 2021 00:53  Phos  3.2     09-01  Mg     2.2     09-01    TPro  5.0<L>  /  Alb  2.7<L>  /  TBili  1.2  /  DBili  x   /  AST  107<H>  /  ALT  268<H>  /  AlkPhos  604<H>  09-01      MICROBIOLOGY:  Vancomycin Level, Trough: 9.7 ug/mL (09-01-21 @ 04:34)    Culture - Sputum (collected 08-31-21 @ 00:37)  Source: .Sputum Sputum  Gram Stain (08-31-21 @ 07:31):    Rare polymorphonuclear leukocytes per low power field    No Squamous epithelial cells per low power field    Rare Yeast like cells seen per oil power field  Preliminary Report (08-31-21 @ 19:05):    Normal Respiratory Liane present    Culture - Blood (collected 08-30-21 @ 18:39)  Source: .Blood Blood  Preliminary Report (08-31-21 @ 19:02):    No growth to date.    Culture - Blood (collected 08-30-21 @ 11:17)  Source: .Blood Blood-Peripheral  Gram Stain (08-31-21 @ 08:24):    Growth in aerobic bottle: Gram Positive Cocci in Clusters  Preliminary Report (09-01-21 @ 12:18):    Growth in aerobic bottle: Staphylococcus epidermidis    Susceptibility to follow.    Growth in aerobic bottle: Staphylococcus haemolyticus    Coag Negative Staphylococcus    Single set isolate, possible contaminant. Contact    Microbiology if susceptibility testing clinically    indicated.  Organism: Blood Culture PCR (08-31-21 @ 15:43)  Organism: Blood Culture PCR (08-31-21 @ 15:43)      -  Staphylococcus epidermidis, Methicillin resistant: Detec      Method Type: PCR    Culture - Blood (collected 08-27-21 @ 03:23)  Source: .Blood Blood  Final Report (09-01-21 @ 04:01):    No Growth Final    Culture - Blood (collected 08-25-21 @ 22:56)  Source: .Blood Blood  Final Report (08-30-21 @ 23:01):    No Growth Final    Culture - Blood (collected 08-25-21 @ 22:56)  Source: .Blood Blood  Final Report (08-30-21 @ 23:01):    No Growth Final    Culture - Sputum (collected 08-25-21 @ 18:36)  Source: .Sputum Sputum  Gram Stain (08-25-21 @ 23:35):    No polymorphonuclear leukocytes per low power field    No Squamous epithelial cells per low power field    Rare Yeast like cells per oil power field  Final Report (08-27-21 @ 19:33):    Normal Respiratory Liane present    RADIOLOGY:  Images below reviewed personally  US Abdomen Upper Quadrant Right (08.31.21 @ 11:33)   No evidence of cholecystitis.  Right pleural effusion.  Trace perihepatic ascites.    Xray Chest 1 View- PORTABLE-Urgent (Xray Chest 1 View- PORTABLE-Urgent .) (08.30.21 @ 20:02)   The heart is normal in size. Smallright pleural effusion. Diffuse airspace opacities are seen throughout post lungs which remain unchanged when compared to previous study done the same date at 3:19 PM. Endotracheal tube is in good position. NG tube is in the stomach. A central line is seen on the left and the tip is in superior vena cava. No pneumothorax.

## 2021-09-01 NOTE — CHART NOTE - NSCHARTNOTEFT_GEN_A_CORE
: Cade Rosario MD, Romeo August MD    INDICATION: ARDS    PROCEDURE:  [ x ] LIMITED ECHO  [ x ] LIMITED CHEST  [ ] LIMITED RETROPERITONEAL  [ ] LIMITED ABDOMINAL  [ x ] LIMITED DVT  [ ] NEEDLE GUIDANCE VASCULAR  [ ] NEEDLE GUIDANCE THORACENTESIS  [ ] NEEDLE GUIDANCE PARACENTESIS  [ ] NEEDLE GUIDANCE PERICARDIOCENTESIS  [ ] OTHER    FINDINGS: Diffuse anterior b-lines with areas of both regular and irregular pleura. No large consolidations. Normal LV function and normal LV to RV ratio. IVC about 2 cm. No evidence of DVT.    INTERPRETATION: Findings consistent with COVID-19 ARDS, possible volume overload. : Cade Rosario MD, Romeo August MD    INDICATION: ARDS and shock    PROCEDURE:  [ x ] LIMITED ECHO  [ x ] LIMITED CHEST  [ ] LIMITED RETROPERITONEAL  [ ] LIMITED ABDOMINAL  [ x ] LIMITED DVT  [ ] NEEDLE GUIDANCE VASCULAR  [ ] NEEDLE GUIDANCE THORACENTESIS  [ ] NEEDLE GUIDANCE PARACENTESIS  [ ] NEEDLE GUIDANCE PERICARDIOCENTESIS  [ ] OTHER    FINDINGS: Diffuse anterior b-lines with areas of both regular and irregular pleura. No large consolidations. Normal LV function and normal LV to RV ratio. IVC about 2 cm. No evidence of DVT.    INTERPRETATION: Findings consistent with COVID-19 ARDS, possible volume overload.  Shock state not due to cardiogenic or obstructive shock.  No DVT noted on this exam.    Attending Attestation:  Agree with above. I was present for the entire procedure. Images reviewed    Images saved to better.

## 2021-09-01 NOTE — PROGRESS NOTE ADULT - ASSESSMENT
A/P: 71M Egyptian speaking Hx HTN, DM2, BPH, Asthma admit 8/12 Covid-19 PNA with progressive worsening hypoxic respiratory failure s/p intubation (8/20) c/b VT arrest s/p shock/epi with ROSC ~6mins, remains sedated and paralyzed     Neuro:   #Sedation  - Sedated with Fent and Ketamine.   - Off Prop for triglycerides and off Precedex for bradycardia.   - Versed 2 mg IVP q4 and Fent 50 mcg IVP q4 PRNs.   - On Methadone 10 q8, Valium 10 q8, and Seroquel 25 q12.   - Re-started Nimbex (8/30) due to severe hypoxia.   - Baseline mentation A+Ox4.     Pulm:   #Hypoxic Respiratory Failure 2/2 COVID PNA c/b ARDS  - Intubated (8/20):   - Mechanical Ventilation: PC 30/24/12/100%  - Status post prone/supine mutiple times.  Will reprone today if p/f<125  #Pnumomediastinum  - Occured 8/21 now resolved.   #Rule Out: PE  - Positive DVT suspected PE. Too unstable for CTA.   - Continue Lovenox 80 BID for full AC.   - Anti factor Xa theraputic at 1.7.   #Hx Asthma  - Home Symbicort and albuterol     CV  #Cardiogenic shock s/p PEA followed by VT x 2 s/p ROSC,  Septic shock / hypovolemic shock/ distributive shock , + DVT , r/o PE, new onset A fib   -s/p  cardiac arrest post intubation with 6 minutes achieved ROSC; c/b VT x 2 s/p electrical shock x2   - Status post AFib with RVR treated with Amio now resolved.   #Hypotension-Sepsis vs. Hypovolemic  - Levo and Vaso gtts for MAP>=65     /Renal:   #TAYLOR, resolved  - Previously on diuretics now being held due to hypotension.     GI:  #Oropharyngeal dysphagia   - OGT with TF per nutrition recs  - Bowel regimen with Senna, Miralax, and Movantik  - RUQ U/S for rising LFTs.   - PPI qd     ENDO:  #Hx T2DM, A1c 7.8  - TF w/ Glucerna   - FS q 6 hrs   - ISS & NPH q 6 hrs   - endocrine consult appreciated    Heme:  #DVT  + DVT , presumed  PE- in setting of CVOID-19 illness/ hypercoagulable state   - c/w FULL AC 80 BID Lovenox, anti 10A factor 1.7 on 8/23    ID   #MRSE Bacteremia  - c/w Vancomycin 1.5GM IV q24h x 7 dyas (8/25)  - Trough 12.2 -> 9.7 (monitor trough/SCr)  - Repeat BCx x 2 sets today per ID recs  - Sputum Cx neg -> Zosyn discontinued   - RUQ sono results noted (8/31)   - Lines changed (8/30)      #COVID-19 PNA (not vaccinated)  - s/p Toci (8/15), RDV (8/13-17), Dex (8/13-23)  - Inflammatory markers q72h               #PPX:  DVT ppx ; on FULL AC with lovenox SQ  Gi ppx; protonix IVP q d     #GOC  - FULL code at this time.   - Goals of care d/w son and 2 daughters   ======================= DISPOSITION  =====================  [X ] Critical   [ ] Guarded    [ ] Stable    [X ] Maintain in ICU  [ ] Downgrade to Medicine   [ ] Discharge Home      Naomi Michelle [facility]  ICU [id number] A/P: 71M Tuvaluan speaking Hx HTN, DM2, BPH, Asthma admit 8/12 Covid-19 PNA with progressive worsening hypoxic respiratory failure s/p intubation (8/20) c/b VT arrest s/p shock/epi with ROSC ~6mins, remains sedated and paralyzed     Neuro:   #Sedation  - Paralyzed: Nimbex 4 / Sedated: Fentanyl 2, Ketamine 3  - Remains off Propofol in setting of hypertriglyceridemia   - c/w Versed 2 mg IVP q4 and Fent 50 mcg IVP q4 PRNs  - c/w Methadone 10 TID, Valium 10 TID, Seroquel 25 BID  - Baseline mentation A+Ox4     Pulm:   #Acute Hypoxic Respiratory Failure 2/2 COVID PNA c/b ARDS  - Intubated (8/20): PRVC 34/320/10/70 p/f: 114 peak: 34 Plat: 32  - Currently prone, due to be supinated at 9am, f/u repeat ABG  - s/p Toci (8/15), RDV (8/13-17), Dex (8/13-23)  - Vent management per ABGs    #Pnumomediastinum  - Occurred 8/21, now resolved    #Rule Out: PE  - Positive DVT suspected PE. Too unstable for CTA.   - Continue Lovenox 80 BID for full AC.   - Anti factor Xa theraputic at 1.7    #Hx Asthma  - c/w home Symbicort and albuterol     CV  #Hypotension in setting of vasodilatory shock vs. distributive vs. medication induced   - c/w Levophed/Vaso, wide pulse pressures, titrate to SBP > 100  - s/p PEA vs. VT arrest upon intubation requiring shock/epi w/ ROSC  - Pending TTE, will POCUS today    #PAF, episode Afib RVR requiring Amio bolus   - in NSR, on full AC, will monitor on telemetry   - Hold further Amio for now      /Renal:   #TAYLOR, resolved  - SCr 0.68 c/w strict I/Os, target net even     GI:  #Oropharyngeal dysphagia   - Tolerating TF at goal: Glucerna 20ml/hr   - c/w bowel regimen, GI ppx: PPI   - s/p RUQ Sono: trace ascites      #Transaminase  - Downtrending, trend LFTs daily, avoid hepatotoxins     ENDO:  #Hx T2DM, A1c 7.8  - Hyperglycemic with basal/bolus coverage on TF a goal   - Increase NPH to 16u q6hr, ISS mod, trend FS closely    - Hypoglycemia protocol     Heme:  #Hypercoagulable state in setting of Covid   - [facility] Duplex (8/21): +DVT L popiteal, RLE below knee  - c/w full AC Lovenox, Anti 10A factor 1.7    ID   #MRSE Bacteremia  - c/w Vancomycin 1.5GM IV q24h x 7 dyas (8/25)  - Trough 12.2 -> 9.7 (monitor trough/SCr)  - Repeat BCx x 2 sets today per ID recs  - Sputum Cx neg -> Zosyn discontinued   - RUQ sono results noted (8/31)   - Lines changed (8/30)      #COVID-19 PNA (not vaccinated)  - s/p Toci (8/15), RDV (8/13-17), Dex (8/13-23)  - Inflammatory markers q72h    #GOC  - FULL code at this time.   - Goals of care d/w son and 2 daughters   ======================= DISPOSITION  =====================  [X] Critical   [ ] Guarded    [ ] Stable    [X] Maintain in ICU  [ ] Downgrade to Medicine   [ ] Discharge Home      Naomi Michelle [facility]  ICU [id number] A/P: 71M Ugandan speaking Hx HTN, DM2, BPH, Asthma admit 8/12 Covid-19 PNA with progressive worsening hypoxic respiratory failure s/p intubation (8/20) c/b VT arrest s/p shock/epi with ROSC ~6mins, remains sedated and paralyzed     Neuro:   #Sedation  - Paralyzed: Nimbex 4 / Sedated: Fentanyl 2, Ketamine 3  - Remains off Propofol in setting of hypertriglyceridemia   - c/w Versed 2 mg IVP q4 and Fent 50 mcg IVP q4 PRNs  - c/w Methadone 10 TID, Valium 10 TID, Seroquel 25 BID  - Baseline mentation A+Ox4     Pulm:   #Acute Hypoxic Respiratory Failure 2/2 COVID PNA c/b ARDS  - Intubated (8/20): PRVC 34/320/10/70 p/f: 114 peak: 34 Plat: 32  - Currently prone, due to be supinated at 9am, f/u repeat ABG  - s/p Toci (8/15), RDV (8/13-17), Dex (8/13-23)  - Vent management per ABGs    #Pnumomediastinum  - Occurred 8/21, now resolved    #Rule Out: PE  - Positive DVT suspected PE. Too unstable for CTA.   - Continue Lovenox 80 BID for full AC.   - Anti factor Xa theraputic at 1.7    #Hx Asthma  - c/w home Symbicort and albuterol     CV  #Hypotension in setting of vasodilatory shock vs. distributive vs. medication induced   - c/w Levophed/Vaso, wide pulse pressures, titrate to SBP > 100  - s/p PEA vs. VT arrest upon intubation requiring shock/epi w/ ROSC  - Pending TTE, will POCUS today    #PAF, episode Afib RVR requiring Amio bolus   - in NSR, on full AC, will monitor on telemetry   - Hold further Amio for now      /Renal:   #TAYLOR, resolved  - SCr 0.68 c/w strict I/Os, target net even     GI:  #Oropharyngeal dysphagia   - Tolerating TF at goal: Glucerna 20ml/hr   - c/w bowel regimen, GI ppx: PPI   - s/p RUQ Sono: trace ascites      #Transaminase  - Downtrending, trend LFTs daily, avoid hepatotoxins     ENDO:  #Hx T2DM, A1c 7.8  - Hyperglycemic with basal/bolus coverage on TF a goal   - Increase NPH to 16u q6hr, ISS mod, trend FS closely    - Hypoglycemia protocol     Heme:  #Hypercoagulable state in setting of Covid   - [facility] Duplex (8/21): +DVT L popiteal, RLE below knee  - c/w full AC Lovenox, Anti 10A factor 1.7    ID   #MRSE Bacteremia  - c/w Vancomycin 1.5GM IV q24h x 7 dyas (8/25)  - Trough 12.2 -> 9.7 (drawn too early)  - Check trough prior to next 4th dose   - Repeat BCx x 2 sets today per ID recs  - Sputum Cx neg -> Zosyn discontinued   - RUQ sono results noted (8/31)   - Lines changed (8/30)      #COVID-19 PNA (not vaccinated)  - s/p Toci (8/15), RDV (8/13-17), Dex (8/13-23)  - Inflammatory markers q72h    #GOC  - FULL code at this time.   - Goals of care d/w son and 2 daughters   ======================= DISPOSITION  =====================  [X] Critical   [ ] Guarded    [ ] Stable    [X] Maintain in ICU  [ ] Downgrade to Medicine   [ ] Discharge Home      Naomi Michelle [facility]  ICU [phone] A/P: 71M Panamanian speaking Hx HTN, DM2, BPH, Asthma admit 8/12 Covid-19 PNA with progressive worsening hypoxic respiratory failure s/p intubation (8/20) c/b VT arrest s/p shock/epi with ROSC ~6mins, remains sedated and paralyzed     Neuro:   #Sedation  - Paralyzed: Nimbex 4 / Sedated: Fentanyl 2, Ketamine 3  - Remains off Propofol in setting of hypertriglyceridemia   - c/w Versed 2 mg IVP q4 and Fent 50 mcg IVP q4 PRNs  - c/w Valium 10 TID, increase Methadone to 20 TID  - DC Seroquel given no benefits     Pulm:   #Acute Hypoxic Respiratory Failure 2/2 COVID PNA c/b ARDS  - Intubated (8/20): PRVC 34/320/10/70 p/f: 114 peak: 34 Plat: 32  - Currently prone, due to be supinated at 9am, f/u repeat ABG  - s/p Toci (8/15), RDV (8/13-17), Dex (8/13-23)  - Vent management per ABGs    #Pnumomediastinum  - Occurred 8/21, now resolved    #Rule Out: PE  - Positive DVT suspected PE. Too unstable for CTA.   - Continue Lovenox 80 BID for full AC.   - Anti factor Xa theraputic at 1.7    #Hx Asthma  - c/w home Symbicort and albuterol     CV  #Hypotension in setting of vasodilatory shock vs. distributive vs. medication induced   - c/w Levophed/Vaso, wide pulse pressures, titrate to SBP > 100  - s/p PEA vs. VT arrest upon intubation requiring shock/epi w/ ROSC  - Pending TTE, will POCUS today    #PAF, episode Afib RVR requiring Amio bolus   - in NSR, on full AC, will monitor on telemetry   - Hold further Amio for now      /Renal:   #TAYLOR, resolved  - SCr 0.68 c/w strict I/Os, target net even     GI:  #Oropharyngeal dysphagia   - Tolerating TF at goal: Glucerna 20ml/hr   - c/w bowel regimen, GI ppx: PPI   - s/p RUQ Sono: trace ascites      #Transaminase  - Downtrending, trend LFTs daily, avoid hepatotoxins     ENDO:  #Hx T2DM, A1c 7.8  - Hyperglycemic with basal/bolus coverage on TF a goal   - Increase NPH to 16u q6hr, ISS mod, trend FS closely    - Hypoglycemia protocol     Heme:  #Hypercoagulable state in setting of Covid   - [facility] Duplex (8/21): +DVT L popiteal, RLE below knee  - c/w full AC Lovenox, Anti 10A factor 1.7    ID   #MRSE Bacteremia  - c/w Vancomycin 1.5GM IV q24h x 7 dyas (8/25)  - Trough 12.2 -> 9.7 (drawn too early)  - Check trough prior to next 4th dose   - Repeat BCx x 2 sets today per ID recs  - Sputum Cx neg -> Zosyn discontinued   - RUQ sono results noted (8/31)   - Lines changed (8/30)      #COVID-19 PNA (not vaccinated)  - s/p Toci (8/15), RDV (8/13-17), Dex (8/13-23)  - Inflammatory markers q72h    #GOC  - FULL code at this time.   - Goals of care d/w son and 2 daughters   ======================= DISPOSITION  =====================  [X] Critical   [ ] Guarded    [ ] Stable    [X] Maintain in ICU  [ ] Downgrade to Medicine   [ ] Discharge Home      Naomi Michelle [facility]  ICU [phone] A/P: 71M German speaking Hx HTN, DM2, BPH, Asthma admit 8/12 Covid-19 PNA with progressive worsening hypoxic respiratory failure s/p intubation (8/20) c/b VT arrest s/p shock/epi with ROSC ~6mins, remains sedated and paralyzed     Neuro:   #Sedation  - Paralyzed: Nimbex 4 / Sedated: Fentanyl 2, Ketamine 3  - Remains off Propofol in setting of hypertriglyceridemia   - c/w Versed 2 mg IVP q4 and Fent 50 mcg IVP q4 PRNs  - c/w Valium 10 TID, increase Methadone to 20 TID  - DC Seroquel given no benefits     Pulm:   #Acute Hypoxic Respiratory Failure 2/2 COVID PNA c/b ARDS  - Intubated (8/20): PRVC 34/320/10/70 p/f: 114 peak: 34 Plat: 32  - Currently prone, due to be supinated at 9am, f/u repeat ABG  - s/p Toci (8/15), RDV (8/13-17), Dex (8/13-23)  - Vent management per ABGs    #Pnumomediastinum  - Occurred 8/21, now resolved    #Rule Out: PE  - Positive DVT suspected PE. Too unstable for CTA.   - Continue Lovenox 80 BID for full AC.   - Anti factor Xa theraputic at 1.7    #Hx Asthma  - c/w home Symbicort and albuterol     CV  #Hypotension in setting of vasodilatory shock vs. distributive vs. medication induced   - c/w Levophed/Vaso, wide pulse pressures, titrate to SBP > 100  - s/p PEA vs. VT arrest upon intubation requiring shock/epi w/ ROSC  - Pending TTE, will POCUS today    #PAF, episode Afib RVR requiring Amio bolus   - in NSR, on full AC, will monitor on telemetry   - Hold further Amio for now      /Renal:   #TAYLOR, resolved  - SCr 0.68 c/w strict I/Os, target net even     GI:  #Oropharyngeal dysphagia   - Tolerating TF at goal: Glucerna 20ml/hr   - c/w bowel regimen, GI ppx: PPI   - s/p RUQ Sono: trace ascites      #Transaminase  - Downtrending, trend LFTs daily, avoid hepatotoxins     ENDO:  #Hx T2DM, A1c 7.8  - Hyperglycemic with basal/bolus coverage on TF a goal   - Increase NPH to 16u q6hr, ISS mod, trend FS closely    - Hypoglycemia protocol     Heme:  #Hypercoagulable state in setting of Covid   - [facility] Duplex (8/21): +DVT L popiteal, RLE below knee  - c/w full AC Lovenox, Anti 10A factor 1.7    ID   #MRSE Bacteremia  - Persistent Staph BCx ->start Vanco 1gm IV q12h   - AM trough noted, 9.2 (drawn too early)  - Check trough prior to 4th dose per ID  - Sputum Cx neg -> Zosyn discontinued   - RUQ sono results noted (8/31)   - Lines changed (8/30)  - BCx x 2 in AM  - TTE pending       #COVID-19 PNA (not vaccinated)  - s/p Toci (8/15), RDV (8/13-17), Dex (8/13-23)  - Inflammatory markers q72h    #GOC  - FULL code at this time.   - Goals of care d/w son and 2 daughters   ======================= DISPOSITION  =====================  [X] Critical   [ ] Guarded    [ ] Stable    [X] Maintain in ICU  [ ] Downgrade to Medicine   [ ] Discharge Home      Naomi Michelle [facility]  ICU [phone]

## 2021-09-01 NOTE — PROGRESS NOTE ADULT - ATTENDING COMMENTS
Agree with above. Patient critically ill requiring frequent bedside visits with therapy change. Patient DM2, HTN, and BPH who presented with COVID-19 pneumonia. He is unfortunately unvaccinated and suffering more severe disease. He has had progressive hypoxemia despite BIPAP. His family reportedly did not want him intubated until absolutely necessary and he was intubated emergently by anesthesia 8/20/21. He had a subsequent cardiac arrest requiring CPR and then was transferred to [facility]ICU for further monitoring and care.    1. Acute Hypoxemic Respiratory Failure - ARDS due to COVID-19. Continue mechanical ventilation and wean as tolerated. Was proned previously.   - Currently on PEEP 8 and FiO2 100% - will adjust settings as able  - May need to attempt reproning - as he did have some improvement overnight  - Patient is not a candidate for ECMO - he has been hypoxemic on high BIPAP and vent settings since 8/14 - as well as cardiac arrest on day of intubation  2. Cardiovascular - continue vasopressors as needed to maintain MAP >65. No significant ectopy at this time.   - Afib with RVR now improved  3. 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 Renal function - improved. Continue to monitor urine output  5. Neurology - continue sedatives, has been off paralytics  6. Infectious Disease - continue antibiotics. repeat cultures sent.  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 Hem/Onc - patient with LE DVT noted on Duplex. Continue therapeutic AC with Lovenox.   8. Endo - continue NPH for hyperglycemia with goal FS < 180  9. GOC - patient is FULL Code     Patient may end up requiring tracheostomy if stabilizes with vent requirements and unable to wean further. Long term prognosis very poor. Will continue proning as long as it provides benefit. Agree with above. Patient critically ill requiring frequent bedside visits with therapy change. Patient DM2, HTN, and BPH who presented with COVID-19 pneumonia. He is unfortunately unvaccinated and suffering more severe disease. He has had progressive hypoxemia despite BIPAP. His family reportedly did not want him intubated until absolutely necessary and he was intubated emergently by anesthesia 8/20/21. He had a subsequent cardiac arrest requiring CPR and then was transferred to [facility]-ICU for further monitoring and care.    1. Acute Hypoxemic Respiratory Failure - ARDS due to COVID-19. Continue mechanical ventilation and wean as tolerated. Has been proned previously.   - Currently on PEEP 8 and FiO2 100% - will adjust settings as able   - May need to attempt reproning - as he did have some improvement overnight  - Patient is not a candidate for ECMO - he has been hypoxemic on high BIPAP and vent settings since 8/14 - as well as cardiac arrest on day of intubation  2. Cardiovascular - continue vasopressors as needed to maintain MAP >65. No significant ectopy at this time.   - Afib with RVR now improved  3. 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 Renal function - improved. Continue to monitor urine output  5. Neurology - continue sedatives, has been off paralytics  6. Infectious Disease - continue antibiotics. repeat cultures sent.  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 Hem/Onc - patient with LE DVT noted on Duplex. Continue therapeutic AC with Lovenox.   8. Endo - continue NPH for hyperglycemia with goal FS < 180  9. GOC - patient is FULL Code     Patient may end up requiring tracheostomy if stabilizes with vent requirements and unable to wean further. Long term prognosis very poor. Will continue proning as long as it provides benefit.

## 2021-09-01 NOTE — PROGRESS NOTE ADULT - ASSESSMENT
COVID pneumonia. Not vaccinated. Received Tocilizumab, Remdesivir and Decadron and still decompensated, intubated 8/20, suspect PE given bilateral leg DVTs.     Sepsis 8/23 with MRSE bacteremia (one Staph hominis).   Maybe CLABSI although no clear phlebitis (left IJ 8/20, changed to right IJ 8/25, back to left 8/30).   Unclear significance of positive cultures again 8/30, could be contaminant, had Staph haemolyticus too.   Uncommon cause of native valve endocarditis.   No open wounds.     Elevated liver enzymes. Obstructive pattern but US reassuring.     Sputum cultures negative.     Poor prognosis. Remains critically ill.     Suggest  -change Vancomycin back to 1GM IV q12h for better AUC for bacteremia   -monitor troughs and creatinine  -f/u blood cultures from this morning   -check TTE   -agree with stopping Zosyn   -supportive care     Discussed with ICU     Patrick Paul MD   Infectious Disease   Pager [phone]   After 5PM and on weekends please page fellow on call or call [phone]

## 2021-09-01 NOTE — PHARMACOTHERAPY INTERVENTION NOTE - COMMENTS
ULYSSES OSPINA, 71y Male with MRSE bacteremia, being followed by ID, was on vancomycin 1500 mg IV Q24H, trough was noted to be 9.7 mg/L on 9/1/2021 @4:30 AM, with correlating AUC of ~360.    Recommendation(s):  1) Would suggest changing vancomycin dose to 1000 mg IV Q12H, which should get into therapeutic AUC range of 400 - 600.  2) Check trough prior to 4th consecutive dose    With kind regards,  Erwin Gupta, ZainD  Infectious Diseases Clinical Pharmacist  .

## 2021-09-01 NOTE — CHART NOTE - NSCHARTNOTEFT_GEN_A_CORE
Nutrition Follow Up Note  Patient seen for: Malnutrition Follow up on 5ICU    Chart reviewed, events noted. "72 y/o Male with PMH diabetes and asthma, not COVID vaccinated admitted 21 with COVID pneumonia, intubated ." Pt remains intubated, sedated and paralyzed. Pt pending TTE.    Source:     [x] EMR          -If unable to interview patient: [x] Trach/Vent/BiPAP      Nutrition-Related Events:   - Pressors:  [x] Yes - pt ordered for vasopressin, norepinephrine   - Propofol:  [x] No        Diet Order:   Diet, NPO with Tube Feed:   Tube Feeding Modality: Nasogastric  Glucerna 1.2 Stephen (GLUCERNARTH)  Total Volume for 24 Hours (mL): 1440  Continuous  Increase Tube Feed Rate by (mL): 10     Every 3 hours  Until Goal Tube Feed Rate (mL per Hour): 60  Tube Feed Duration (in Hours): 24  Tube Feed Start Time: 13:24  No Carb Prosource TF     Qty per Day:  2 (21)    EN Order Provides: 1808 stephen, 108 Gm Prot; 22 stepehn/kg and ~1.32 Gm Prot/kg based on wt of 81.6 kg including prosource modulars 2/day    EN Provision:  : 1440 ml (100% of goal)  : 1440 ml (100% of goal)  : 1440 ml (100% of goal)  : 540 ml so far today (held for proning)    Is current diet order appropriate/adequate? [x] Yes     Nutrition-related concerns:  - pt had bee meeting 100% of goal volume over past 3 days  - feeds held early this morning/last night secondary to proning and now being titrated back up to goal  - No GI distress noted  - Last bowel movement  per flow sheets    Weights:   Daily Weight in k.5 (); daily weight noted; increase may be secondary to edema, will continue to monitor trends as able    MEDICATIONS  (STANDING):  cholecalciferol  insulin lispro (ADMELOG) corrective regimen sliding scale  insulin NPH human recombinant  multivitamin/minerals Oral Solution (WELLESSE)  naloxegol  norepinephrine Infusion  pantoprazole  Injectable  polyethylene glycol 3350  senna Syrup  vancomycin  IVPB  vasopressin Infusion    Pertinent Labs:  @ 00:53: Na 141, BUN 24<H>, Cr 0.68, <H>, K+ 4.5, Phos 3.2, Mg 2.2, Alk Phos 604<H>, ALT/SGPT 268<H>, AST/SGOT 107<H>, HbA1c --    A1C with Estimated Average Glucose Result: 7.9 % (21 @ 16:09)  A1C with Estimated Average Glucose Result: 7.3 % (21 @ 14:36)    Finger Sticks:  POCT Blood Glucose.: 177 mg/dL ( @ 11:21)    Triglycerides, Serum: 231 mg/dL (21 @ 01:10)  Triglycerides, Serum: 593 mg/dL (21 @ 01:10)  Triglycerides, Serum: 483 mg/dL (21 @ 05:51)  Triglycerides, Serum: 538 mg/dL (21 @ 00:45)  Triglycerides, Serum: 343 mg/dL (21 @ 00:28)  Triglycerides, Serum: 375 mg/dL (21 @ 00:07)  Triglycerides, Serum: 427 mg/dL (21 @ 00:50)  Triglycerides, Serum: 464 mg/dL (21 @ 00:36)  Triglycerides, Serum: 632 mg/dL (21 @ 16:11)  --> Triglycerides continue to trend down    Skin per nursing documentation: right buttock suspected deep tissue injury per flow sheets  Edema: 2+ right arm, left arm, left leg, right leg per flow sheets    Estimated Nutritional needs based on 81.6 kg:  Estimated Energy Needs: (20-25 kcal/kg): 1908-7381 kcal based on dosing weight of 81.6 kg  Estimated Protein Needs: (1.2-1.4 g/kg):  gm based on dosing weight of 81.6 kg  Defer fluid needs to team  [place] State Equation: The [place] State Equation (PSU) 2003b was used to calculate resting energy expenditure: 1946 kcal    Previous Nutrition Diagnosis: Acute, moderate malnutrition  Nutrition Diagnosis is: [x] ongoing, being addressed with enteral nutrition    New Nutrition Diagnosis: Increased nutrient need (energy, protein) related to metabolic demand as evidenced by pt with suspected deep tissue injury to buttocks.    Nutrition Care Plan:  [x] In Progress    Nutrition Interventions:     Education Provided:         [x] No: pt intubated, sedated    Recommendations:  1. Recommend as appropriate, to increase enteral nutrition of Glucerna 1.2 to NEW goal rate of Glucerna 1.2 at 65 ml/hr x 24 hours with 2 No Carb Prosource/day. At goal, feeds + ProSource x2 will provide 1560 ml total volume, 1952 kcal, 115 gm protein and 1256 ml water. Meets 24 kcal/kg and 1.4 g/kg protein based on 81.6 g/kg dosing weight  2. Free water flushes per team  3. Continue with micronutrient supplementation daily to aid in wound healing  4. RD remains available for further interventions as needed       Monitoring and Evaluation:   Continue to monitor nutritional intake, tolerance to diet prescription, weights, labs, skin integrity    RD remains available upon request and will follow up per protocol  Alyson Hernandez MS, RD, CDN, [facility] pgr #[phone] Nutrition Follow Up Note  Patient seen for: Malnutrition Follow up on 5ICU    Chart reviewed, events noted. "70 y/o Male with PMH diabetes and asthma, not COVID vaccinated admitted 21 with COVID pneumonia, intubated ." Pt remains intubated, sedated and paralyzed. Pt pending TTE; for POCUS today.    Source:     [x] EMR          -If unable to interview patient: [x] Trach/Vent/BiPAP      Nutrition-Related Events:   - Pressors:  [x] Yes - pt ordered for vasopressin, norepinephrine   - Propofol:  [x] No        Diet Order:   Diet, NPO with Tube Feed:   Tube Feeding Modality: Nasogastric  Glucerna 1.2 Stephen (GLUCERNARTH)  Total Volume for 24 Hours (mL): 1440  Continuous  Increase Tube Feed Rate by (mL): 10     Every 3 hours  Until Goal Tube Feed Rate (mL per Hour): 60  Tube Feed Duration (in Hours): 24  Tube Feed Start Time: 13:24  No Carb Prosource TF     Qty per Day:  2 (21)    EN Order Provides: 1808 stephen, 108 Gm Prot; 22 stephen/kg and ~1.32 Gm Prot/kg based on wt of 81.6 kg including prosource modulars 2/day    EN Provision:  : 1440 ml (100% of goal)  : 1440 ml (100% of goal)  : 1440 ml (100% of goal)  : 540 ml so far today (held for proning)    Is current diet order appropriate/adequate? [x] Yes     Nutrition-related concerns:  - pt had bee meeting 100% of goal volume over past 3 days  - feeds held early this morning/last night secondary to proning and now being titrated back up to goal  - No GI distress noted  - Last bowel movement  per flow sheets    Weights:   Daily Weight in k.5 (); daily weight noted; increase may be secondary to edema, will continue to monitor trends as able    MEDICATIONS  (STANDING):  cholecalciferol  insulin lispro (ADMELOG) corrective regimen sliding scale  insulin NPH human recombinant  multivitamin/minerals Oral Solution (WELLESSE)  naloxegol  norepinephrine Infusion  pantoprazole  Injectable  polyethylene glycol 3350  senna Syrup  vancomycin  IVPB  vasopressin Infusion    Pertinent Labs:  @ 00:53: Na 141, BUN 24<H>, Cr 0.68, <H>, K+ 4.5, Phos 3.2, Mg 2.2, Alk Phos 604<H>, ALT/SGPT 268<H>, AST/SGOT 107<H>, HbA1c --    A1C with Estimated Average Glucose Result: 7.9 % (21 @ 16:09)  A1C with Estimated Average Glucose Result: 7.3 % (21 @ 14:36)    Finger Sticks:  POCT Blood Glucose.: 177 mg/dL ( @ 11:21)    Triglycerides, Serum: 231 mg/dL (21 @ 01:10)  Triglycerides, Serum: 593 mg/dL (21 @ 01:10)  Triglycerides, Serum: 483 mg/dL (21 @ 05:51)  Triglycerides, Serum: 538 mg/dL (21 @ 00:45)  Triglycerides, Serum: 343 mg/dL (21 @ 00:28)  Triglycerides, Serum: 375 mg/dL (21 @ 00:07)  Triglycerides, Serum: 427 mg/dL (21 @ 00:50)  Triglycerides, Serum: 464 mg/dL (21 @ 00:36)  Triglycerides, Serum: 632 mg/dL (21 @ 16:11)  --> Triglycerides continue to trend down    Skin per nursing documentation: right buttock suspected deep tissue injury per flow sheets  Edema: 2+ right arm, left arm, left leg, right leg per flow sheets    Estimated Nutritional needs based on 81.6 kg:  Estimated Energy Needs: (20-25 kcal/kg): 1099-4603 kcal based on dosing weight of 81.6 kg  Estimated Protein Needs: (1.2-1.4 g/kg):  gm based on dosing weight of 81.6 kg  Defer fluid needs to team  [place] State Equation: The [place] State Equation (PSU) 2003b was used to calculate resting energy expenditure: 1946 kcal    Previous Nutrition Diagnosis: Acute, moderate malnutrition  Nutrition Diagnosis is: [x] ongoing, being addressed with enteral nutrition    New Nutrition Diagnosis: Increased nutrient need (energy, protein) related to metabolic demand as evidenced by pt with suspected deep tissue injury to buttocks.    Nutrition Care Plan:  [x] In Progress    Nutrition Interventions:     Education Provided:         [x] No: pt intubated, sedated    Recommendations:  1. Recommend as appropriate, to increase enteral nutrition of Glucerna 1.2 to NEW goal rate of Glucerna 1.2 at 65 ml/hr x 24 hours with 2 No Carb Prosource/day. At goal, feeds + ProSource x2 will provide 1560 ml total volume, 1952 kcal, 115 gm protein and 1256 ml water. Meets 24 kcal/kg and 1.4 g/kg protein based on 81.6 g/kg dosing weight  2. Free water flushes per team  3. Continue with micronutrient supplementation daily to aid in wound healing  4. RD remains available for further interventions as needed       Monitoring and Evaluation:   Continue to monitor nutritional intake, tolerance to diet prescription, weights, labs, skin integrity    RD remains available upon request and will follow up per protocol  Alyson Hernandez, MS, RD, CDN, [facility] pgr #[phone] Nutrition Follow Up Note  Patient seen for: Malnutrition Follow up on 5ICU    Chart reviewed, events noted. "70 y/o Male with PMH diabetes and asthma, not COVID vaccinated admitted 21 with COVID pneumonia, intubated ." Pt remains intubated, sedated and paralyzed. Pt pending TTE; for POCUS today.    Source:     [x] EMR          -If unable to interview patient: [x] Trach/Vent/BiPAP      Nutrition-Related Events:   - Pressors:  [x] Yes - pt ordered for vasopressin, norepinephrine   - Propofol:  [x] No        Diet Order:   Diet, NPO with Tube Feed:   Tube Feeding Modality: Nasogastric  Glucerna 1.2 Stephen (GLUCERNARTH)  Total Volume for 24 Hours (mL): 1440  Continuous  Increase Tube Feed Rate by (mL): 10     Every 3 hours  Until Goal Tube Feed Rate (mL per Hour): 60  Tube Feed Duration (in Hours): 24  Tube Feed Start Time: 13:24  No Carb Prosource TF     Qty per Day:  2 (21)    EN Order Provides: 1808 stephen, 108 Gm Prot; 22 stephen/kg and ~1.32 Gm Prot/kg based on wt of 81.6 kg including prosource modulars 2/day    EN Provision:  : 1440 ml (100% of goal)  : 1440 ml (100% of goal)  : 1440 ml (100% of goal)  : 540 ml so far today (held for proning)    Is current diet order appropriate/adequate? [x] Yes     Nutrition-related concerns:  - pt had bee meeting 100% of goal volume over past 3 days  - feeds held early this morning/last night secondary to proning and now being titrated back up to goal  - No GI distress noted  - Last bowel movement  per flow sheets    Weights:   Daily Weight in k.5 (); daily weight noted; increase may be secondary to edema, will continue to monitor trends as able    MEDICATIONS  (STANDING):  cholecalciferol  insulin lispro (ADMELOG) corrective regimen sliding scale  insulin NPH human recombinant  multivitamin/minerals Oral Solution (WELLESSE)  naloxegol  norepinephrine Infusion  pantoprazole  Injectable  polyethylene glycol 3350  senna Syrup  vancomycin  IVPB  vasopressin Infusion    Pertinent Labs:  @ 00:53: Na 141, BUN 24<H>, Cr 0.68, <H>, K+ 4.5, Phos 3.2, Mg 2.2, Alk Phos 604<H>, ALT/SGPT 268<H>, AST/SGOT 107<H>, HbA1c --    A1C with Estimated Average Glucose Result: 7.9 % (21 @ 16:09)  A1C with Estimated Average Glucose Result: 7.3 % (21 @ 14:36)    Finger Sticks:  POCT Blood Glucose.: 177 mg/dL ( @ 11:21)    Triglycerides, Serum: 231 mg/dL (21 @ 01:10)  Triglycerides, Serum: 593 mg/dL (21 @ 01:10)  Triglycerides, Serum: 483 mg/dL (21 @ 05:51)  Triglycerides, Serum: 538 mg/dL (21 @ 00:45)  Triglycerides, Serum: 343 mg/dL (21 @ 00:28)  Triglycerides, Serum: 375 mg/dL (21 @ 00:07)  Triglycerides, Serum: 427 mg/dL (21 @ 00:50)  Triglycerides, Serum: 464 mg/dL (21 @ 00:36)  Triglycerides, Serum: 632 mg/dL (21 @ 16:11)  --> Triglycerides continue to trend down    Skin per nursing documentation: right buttock suspected deep tissue injury per flow sheets  Edema: 2+ right arm, left arm, left leg, right leg per flow sheets    Estimated Nutritional needs based on 81.6 kg:  Estimated Energy Needs: (20-25 kcal/kg): 4265-3441 kcal based on dosing weight of 81.6 kg  Estimated Protein Needs: (1.2-1.4 g/kg):  gm based on dosing weight of 81.6 kg  Defer fluid needs to team  [place] State Equation: The [place] State Equation (PSU) 2003b was used to calculate resting energy expenditure: 1946 kcal    Previous Nutrition Diagnosis: Acute, moderate malnutrition  Nutrition Diagnosis is: [x] ongoing, being addressed with enteral nutrition    New Nutrition Diagnosis: Increased nutrient need (energy, protein) related to metabolic demand as evidenced by pt with suspected deep tissue injury to buttocks.    Nutrition Care Plan:  [x] In Progress    Nutrition Interventions:     Education Provided:         [x] No: pt intubated, sedated    Recommendations:  1. Recommend as appropriate and medically feasible, to increase enteral nutrition of Glucerna 1.2 to NEW goal rate of Glucerna 1.2 at 65 ml/hr x 24 hours with 2 No Carb Prosource/day. At goal, feeds + ProSource x2 will provide 1560 ml total volume, 1952 kcal, 115 gm protein and 1256 ml water. Meets 24 kcal/kg and 1.4 g/kg protein based on 81.6 g/kg dosing weight  2. Free water flushes per team  3. Continue with micronutrient supplementation daily to aid in wound healing  4. RD remains available for further interventions as needed       Monitoring and Evaluation:   Continue to monitor nutritional intake, tolerance to diet prescription, weights, labs, skin integrity    RD remains available upon request and will follow up per protocol  Alyson Hernandez, MS, RD, CDN, [facility] pgr #[phone]

## 2021-09-02 NOTE — PROGRESS NOTE ADULT - SUBJECTIVE AND OBJECTIVE BOX
ULYSSES OSPINA  MRN-[id number]  Patient is a 71y old  Male who presents with a chief complaint of SOB (01 Sep 2021 16:53)    HPI: 71M Hx HTN, DM2, BPH, asthma presents with shortness of breath. Patient endorses that he has been feeling SOB with associated cough for the last 2 weeks and his symptoms slowly worsened over time. He did not receive the COVID vaccine. He reports that his family members whom he lives with likely are also COVID positive but he does not know for sure. He did not try taking anything for his symptoms other than his usual medications/inhalers. There were no obvious aggravating or alleviating factors. Currently with supplemental oxygen on, he feels well and has no complaints, however he presented to [facility] since his symptoms were worsening. In the ED, he was hemodynamically stable, afebrile, but he was encephalopathic and hypoxic on room air requiring high flow nasal canula. Labs were significant for mild hyperkalemia and COVID positive. CXR prelim read showed bilateral patchy opacities. CT head showed no acute findings. MICU was consulted in the ED, patient was deemed not a MICU candidate. Patient was given doxycycline and dexamethasone x1 and admitted to general medicine for further management. At time of interview, patient was A/Ox3 with .  (13 Aug 2021 04:21)    REVIEW OF SYSTEMS:  - Unable to obtain, intubated/sedated/paralyzed     ICU Vital Signs Last 24 Hrs  T(C): 37.1 (02 Sep 2021 03:00), Max: 38.4 (01 Sep 2021 15:00)  T(F): 98.8 (02 Sep 2021 03:00), Max: 101.1 (01 Sep 2021 15:00)  HR: 84 (02 Sep 2021 06:30) (74 - 101)  ABP: 133/58 (02 Sep 2021 06:30) (84/44 - 162/76)  ABP(mean): 84 (02 Sep 2021 06:30) (55 - 102)  RR: 19 (02 Sep 2021 06:30) (19 - 35)  SpO2: 92% (02 Sep 2021 06:30) (85% - 100%)    Mode: AC/ CMV (Assist Control/ Continuous Mandatory Ventilation)  RR (machine): 34, TV (machine): 320, FiO2: 80, PEEP: 10, ITime: 0.7      I&O's Summary    31 Aug 2021 07:01  -  01 Sep 2021 07:00  --------------------------------------------------------  IN: 2927.4 mL / OUT: 2250 mL / NET: 677.4 mL    01 Sep 2021 07:01  -  02 Sep 2021 06:46  --------------------------------------------------------  IN: 2779 mL / OUT: 2785 mL / NET: -6 mL      CAPILLARY BLOOD GLUCOSE  POCT Blood Glucose.: 152 mg/dL (02 Sep 2021 05:06)      PHYSICAL EXAM:  General: intubated, sedated/paralyzed  HEENT: PERRL, sluggish   Neck: trachea midline, no jvd  Respiratory:  diminished BS b/l base, no wheezing   Cardiovascular: NSR, S1S2 wnl, no murmurs   Abdomen: non distended, soft , hypoactive  BS x 4   Extremities: no edema , + pulses   Neurological: sedated/paralyzed   ============================I/O===========================   I&O's Detail    31 Aug 2021 07:01  -  01 Sep 2021 07:00  --------------------------------------------------------  IN:    Cisatracurium: 88.2 mL    Cisatracurium: 333.2 mL    Enteral Tube Flush: 60 mL    FentaNYL: 196.8 mL    Glucerna: 540 mL    IV PiggyBack: 700 mL    Ketamine: 147 mL    Ketamine: 441 mL    Norepinephrine: 363.6 mL    Vasopressin: 43.2 mL    Vasopressin: 14.4 mL  Total IN: 2927.4 mL    OUT:    Indwelling Catheter - Urethral (mL): 2250 mL  Total OUT: 2250 mL    Total NET: 677.4 mL      01 Sep 2021 07:01  -  02 Sep 2021 06:46  --------------------------------------------------------  IN:    Cisatracurium: 450.8 mL    Enteral Tube Flush: 305 mL    FentaNYL: 73.8 mL    FentaNYL: 114.8 mL    Glucerna: 390 mL    IV PiggyBack: 600 mL    Ketamine: 563.5 mL    Norepinephrine: 225.9 mL    Vasopressin: 55.2 mL  Total IN: 2779 mL    OUT:    Indwelling Catheter - Urethral (mL): 2785 mL  Total OUT: 2785 mL    Total NET: -6 mL  ============================ LABS =========================               7.3    8.97  )-----------( 158      ( 02 Sep 2021 01:03 )             24.4     09-02    138  |  102  |  26<H>  ----------------------------<  198<H>  3.9   |  28  |  0.72    Ca    8.5      02 Sep 2021 01:03  Phos  3.5     09-02  Mg     2.2     09-02    TPro  5.2<L>  /  Alb  2.7<L>  /  TBili  1.1  /  DBili  x   /  AST  73<H>  /  ALT  217<H>  /  AlkPhos  584<H>  09-02    LIVER FUNCTIONS - ( 02 Sep 2021 01:03 )  Alb: 2.7 g/dL / Pro: 5.2 g/dL / ALK PHOS: 584 U/L / ALT: 217 U/L / AST: 73 U/L / GGT: x           PT/INR - ( 01 Sep 2021 00:53 )   PT: 12.7 sec;   INR: 1.06 ratio    PTT - ( 01 Sep 2021 00:53 )  PTT:34.0 sec  ABG - ( 02 Sep 2021 05:40 )  pH, Arterial: 7.32  pH, Blood: x     /  pCO2: 62    /  pO2: 121   / HCO3: 32    / Base Excess: 5.1   /  SaO2: 99.6      Blood Gas Arterial, Lactate: 1.0 mmol/L (09-02-21 @ 05:40)  Blood Gas Arterial, Lactate: 1.1 mmol/L (09-02-21 @ 01:53)  Blood Gas Arterial, Lactate: 1.0 mmol/L (09-02-21 @ 00:57)  Blood Gas Arterial, Lactate: 1.1 mmol/L (09-01-21 @ 19:32)  Blood Gas Arterial, Lactate: 1.0 mmol/L (09-01-21 @ 17:56)  Blood Gas Arterial, Lactate: 1.4 mmol/L (09-01-21 @ 13:08)  Blood Gas Arterial, Lactate: 1.1 mmol/L (09-01-21 @ 10:09)  Blood Gas Arterial, Lactate: 0.9 mmol/L (09-01-21 @ 05:30)  Blood Gas Arterial, Lactate: 0.9 mmol/L (09-01-21 @ 03:22)  Blood Gas Arterial, Lactate: 1.0 mmol/L (09-01-21 @ 00:47)  Blood Gas Venous - Lactate: 1.3 mmol/L (08-31-21 @ 22:58)  Blood Gas Arterial, Lactate: 0.8 mmol/L (08-31-21 @ 20:06)  Blood Gas Arterial, Lactate: 1.0 mmol/L (08-31-21 @ 17:54)  Blood Gas Arterial, Lactate: 1.2 mmol/L (08-31-21 @ 14:31)  Blood Gas Arterial, Lactate: 1.4 mmol/L (08-31-21 @ 08:46)  Blood Gas Arterial, Lactate: 1.2 mmol/L (08-31-21 @ 05:54)  Blood Gas Arterial, Lactate: 1.6 mmol/L (08-31-21 @ 00:49)  Blood Gas Arterial, Lactate: 1.5 mmol/L (08-30-21 @ 20:18)  Blood Gas Arterial, Lactate: 2.0 mmol/L (08-30-21 @ 11:38)  ======================Micro/Rad/Cardio=================  Telemetry: Reviewed   EKG: Reviewed  CXR: Reviewed  Culture: Reviewed   Echo: Limited Transthoracic Echo (w/Cont):   Patient name: ULYSSES OSPINA  YOB: 1950   Age: 71 (M)   MR#: [id number]  Study Date: 8/20/2021  [id number]  Study quality: difficult due to lung interfer  Referring Physician: Mau Sparrow MD  Blood Pressure: 130/85 mmHg  Height: 173 cm  Weight: 82 kg  BSA: 2 m2  ------------------------------------------------------------------------  PROCEDURE: Limited transthoracic echocardiogram with 2-D.  M-Mode and spectral and color flow Doppler. Verbal consent  was obtained for injection of  Ultrasonic Enhancing Agent  following a discussion of risks and benefits. Following  intravenous injection of Ultrasonic Enhancing Agent ,  harmonic imaging was performed.  INDICATION: Cardiac arrest due to other underlying  condition (I46.8)  ------------------------------------------------------------------------  Observations:  Aortic Valve/Aorta:  Left Ventricle: Endocardial visualization enhanced with  intravenous injection of Ultrasonic Enhancing Agent  (Definity).  Despite the use of Definity, there are no interpretable  images.  ------------------------------------------------------------------------  Conclusions:  Endocardial visualization enhanced with intravenous  injection of Ultrasonic Enhancing Agent (Definity).  Despite the use of Definity, there are no interpretable  images.  ======================================================  PAST MEDICAL & SURGICAL HISTORY:  BPH (benign prostatic hyperplasia)  Hyperlipemia  HTN (hypertension)  Hypercholesteremia  Asthma  Diabetes  Kidney stone  Bladder stone  H/O lithotripsy  History of kidney stones  H/O umbilical hernia repair ULYSSES OSPINA  MRN-[id number]  Patient is a 71y old  Male who presents with a chief complaint of SOB (01 Sep 2021 16:53)    HPI: 71M Hx HTN, DM2, BPH, asthma presents with shortness of breath. Patient endorses that he has been feeling SOB with associated cough for the last 2 weeks and his symptoms slowly worsened over time. He did not receive the COVID vaccine. He reports that his family members whom he lives with likely are also COVID positive but he does not know for sure. He did not try taking anything for his symptoms other than his usual medications/inhalers. There were no obvious aggravating or alleviating factors. Currently with supplemental oxygen on, he feels well and has no complaints, however he presented to [facility] since his symptoms were worsening. In the ED, he was hemodynamically stable, afebrile, but he was encephalopathic and hypoxic on room air requiring high flow nasal canula. Labs were significant for mild hyperkalemia and COVID positive. CXR prelim read showed bilateral patchy opacities. CT head showed no acute findings. MICU was consulted in the ED, patient was deemed not a MICU candidate. Patient was given doxycycline and dexamethasone x1 and admitted to general medicine for further management. At time of interview, patient was A/Ox3 with .  (13 Aug 2021 04:21)    REVIEW OF SYSTEMS:  - Unable to obtain, intubated/sedated/paralyzed     ICU Vital Signs Last 24 Hrs  T(C): 37.1 (02 Sep 2021 03:00), Max: 38.4 (01 Sep 2021 15:00)  T(F): 98.8 (02 Sep 2021 03:00), Max: 101.1 (01 Sep 2021 15:00)  HR: 84 (02 Sep 2021 06:30) (74 - 101)  ABP: 133/58 (02 Sep 2021 06:30) (84/44 - 162/76)  ABP(mean): 84 (02 Sep 2021 06:30) (55 - 102)  RR: 19 (02 Sep 2021 06:30) (19 - 35)  SpO2: 92% (02 Sep 2021 06:30) (85% - 100%)    Mode: AC/ CMV (Assist Control/ Continuous Mandatory Ventilation)  RR (machine): 34, TV (machine): 320, FiO2: 80, PEEP: 10, ITime: 0.7      I&O's Summary    31 Aug 2021 07:01  -  01 Sep 2021 07:00  --------------------------------------------------------  IN: 2927.4 mL / OUT: 2250 mL / NET: 677.4 mL    01 Sep 2021 07:01  -  02 Sep 2021 06:46  --------------------------------------------------------  IN: 2779 mL / OUT: 2785 mL / NET: -6 mL      CAPILLARY BLOOD GLUCOSE  POCT Blood Glucose.: 152 mg/dL (02 Sep 2021 05:06)      PHYSICAL EXAM:  General: intubated, sedated/paralyzed  HEENT: PERRL, sluggish   Neck: deferred, patient prone   Respiratory:  diminished BS b/l base, no wheezing   Cardiovascular: deferred, patient prone   Abdomen: deferred, patient prone   Extremities: no edema , + pulses   Neurological: sedated/paralyzed   ============================I/O===========================   I&O's Detail    31 Aug 2021 07:01  -  01 Sep 2021 07:00  --------------------------------------------------------  IN:    Cisatracurium: 88.2 mL    Cisatracurium: 333.2 mL    Enteral Tube Flush: 60 mL    FentaNYL: 196.8 mL    Glucerna: 540 mL    IV PiggyBack: 700 mL    Ketamine: 147 mL    Ketamine: 441 mL    Norepinephrine: 363.6 mL    Vasopressin: 43.2 mL    Vasopressin: 14.4 mL  Total IN: 2927.4 mL    OUT:    Indwelling Catheter - Urethral (mL): 2250 mL  Total OUT: 2250 mL    Total NET: 677.4 mL      01 Sep 2021 07:01  -  02 Sep 2021 06:46  --------------------------------------------------------  IN:    Cisatracurium: 450.8 mL    Enteral Tube Flush: 305 mL    FentaNYL: 73.8 mL    FentaNYL: 114.8 mL    Glucerna: 390 mL    IV PiggyBack: 600 mL    Ketamine: 563.5 mL    Norepinephrine: 225.9 mL    Vasopressin: 55.2 mL  Total IN: 2779 mL    OUT:    Indwelling Catheter - Urethral (mL): 2785 mL  Total OUT: 2785 mL    Total NET: -6 mL  ============================ LABS =========================               7.3    8.97  )-----------( 158      ( 02 Sep 2021 01:03 )             24.4     09-02    138  |  102  |  26<H>  ----------------------------<  198<H>  3.9   |  28  |  0.72    Ca    8.5      02 Sep 2021 01:03  Phos  3.5     09-02  Mg     2.2     09-02    TPro  5.2<L>  /  Alb  2.7<L>  /  TBili  1.1  /  DBili  x   /  AST  73<H>  /  ALT  217<H>  /  AlkPhos  584<H>  09-02    LIVER FUNCTIONS - ( 02 Sep 2021 01:03 )  Alb: 2.7 g/dL / Pro: 5.2 g/dL / ALK PHOS: 584 U/L / ALT: 217 U/L / AST: 73 U/L / GGT: x           PT/INR - ( 01 Sep 2021 00:53 )   PT: 12.7 sec;   INR: 1.06 ratio    PTT - ( 01 Sep 2021 00:53 )  PTT:34.0 sec  ABG - ( 02 Sep 2021 05:40 )  pH, Arterial: 7.32  pH, Blood: x     /  pCO2: 62    /  pO2: 121   / HCO3: 32    / Base Excess: 5.1   /  SaO2: 99.6      Blood Gas Arterial, Lactate: 1.0 mmol/L (09-02-21 @ 05:40)  Blood Gas Arterial, Lactate: 1.1 mmol/L (09-02-21 @ 01:53)  Blood Gas Arterial, Lactate: 1.0 mmol/L (09-02-21 @ 00:57)  Blood Gas Arterial, Lactate: 1.1 mmol/L (09-01-21 @ 19:32)  Blood Gas Arterial, Lactate: 1.0 mmol/L (09-01-21 @ 17:56)  Blood Gas Arterial, Lactate: 1.4 mmol/L (09-01-21 @ 13:08)  Blood Gas Arterial, Lactate: 1.1 mmol/L (09-01-21 @ 10:09)  Blood Gas Arterial, Lactate: 0.9 mmol/L (09-01-21 @ 05:30)  Blood Gas Arterial, Lactate: 0.9 mmol/L (09-01-21 @ 03:22)  Blood Gas Arterial, Lactate: 1.0 mmol/L (09-01-21 @ 00:47)  Blood Gas Venous - Lactate: 1.3 mmol/L (08-31-21 @ 22:58)  Blood Gas Arterial, Lactate: 0.8 mmol/L (08-31-21 @ 20:06)  Blood Gas Arterial, Lactate: 1.0 mmol/L (08-31-21 @ 17:54)  Blood Gas Arterial, Lactate: 1.2 mmol/L (08-31-21 @ 14:31)  Blood Gas Arterial, Lactate: 1.4 mmol/L (08-31-21 @ 08:46)  Blood Gas Arterial, Lactate: 1.2 mmol/L (08-31-21 @ 05:54)  Blood Gas Arterial, Lactate: 1.6 mmol/L (08-31-21 @ 00:49)  Blood Gas Arterial, Lactate: 1.5 mmol/L (08-30-21 @ 20:18)  Blood Gas Arterial, Lactate: 2.0 mmol/L (08-30-21 @ 11:38)  ======================Micro/Rad/Cardio=================  Telemetry: Reviewed   EKG: Reviewed  CXR: Reviewed  Culture: Reviewed   Echo: Limited Transthoracic Echo (w/Cont):   Patient name: ULYSSES OSPINA  YOB: 1950   Age: 71 (M)   MR#: [id number]  Study Date: 8/20/2021  Location: [address] [place], [place] [id number]: [facility]  Study quality: difficult due to lung interfer  Referring Physician: Mau Sparrow MD  Blood Pressure: 130/85 mmHg  Height: 173 cm  Weight: 82 kg  BSA: 2 m2  ------------------------------------------------------------------------  PROCEDURE: Limited transthoracic echocardiogram with 2-D.  M-Mode and spectral and color flow Doppler. Verbal consent  was obtained for injection of  Ultrasonic Enhancing Agent  following a discussion of risks and benefits. Following  intravenous injection of Ultrasonic Enhancing Agent ,  harmonic imaging was performed.  INDICATION: Cardiac arrest due to other underlying  condition (I46.8)  ------------------------------------------------------------------------  Observations:  Aortic Valve/Aorta:  Left Ventricle: Endocardial visualization enhanced with  intravenous injection of Ultrasonic Enhancing Agent  (Definity).  Despite the use of Definity, there are no interpretable  images.  ------------------------------------------------------------------------  Conclusions:  Endocardial visualization enhanced with intravenous  injection of Ultrasonic Enhancing Agent (Definity).  Despite the use of Definity, there are no interpretable  images.  ======================================================  PAST MEDICAL & SURGICAL HISTORY:  BPH (benign prostatic hyperplasia)  Hyperlipemia  HTN (hypertension)  Hypercholesteremia  Asthma  Diabetes  Kidney stone  Bladder stone  H/O lithotripsy  History of kidney stones  H/O umbilical hernia repair

## 2021-09-02 NOTE — PROGRESS NOTE ADULT - SUBJECTIVE AND OBJECTIVE BOX
Follow Up: COVID, Bacteremia    Interval History/ROS: Febrile again yesterday afternoon. Remains critically ill.     Allergies  No Known Allergies        ANTIMICROBIALS:  vancomycin  IVPB    vancomycin  IVPB 1000 every 12 hours      OTHER MEDS:  ALBUTerol    90 MICROgram(s) HFA Inhaler 2 Puff(s) Inhalation every 8 hours  budesonide 160 MICROgram(s)/formoterol 4.5 MICROgram(s) Inhaler 2 Puff(s) Inhalation two times a day  chlorhexidine 0.12% Liquid 15 milliLiter(s) Oral Mucosa every 12 hours  chlorhexidine 4% Liquid 1 Application(s) Topical <User Schedule>  cholecalciferol 2000 Unit(s) Oral daily  cisatracurium Infusion 3 MICROgram(s)/kG/Min IV Continuous <Continuous>  diazepam    Tablet 10 milliGRAM(s) Oral every 8 hours  enoxaparin Injectable 80 milliGRAM(s) SubCutaneous every 12 hours  fentaNYL    Injectable 50 MICROGram(s) IV Push every 4 hours PRN  fentaNYL   Infusion... 2 MICROgram(s)/kG/Hr IV Continuous <Continuous>  insulin lispro (ADMELOG) corrective regimen sliding scale   SubCutaneous every 6 hours  insulin NPH human recombinant 16 Unit(s) SubCutaneous every 6 hours  ketamine Infusion. 1.998 mG/kG/Hr IV Continuous <Continuous>  methadone    Tablet 20 milliGRAM(s) Oral every 8 hours  multivitamin/minerals Oral Solution (WELLESSE) 30 milliLiter(s) Enteral Tube daily  naloxegol 25 milliGRAM(s) Oral daily  norepinephrine Infusion 0.05 MICROgram(s)/kG/Min IV Continuous <Continuous>  pantoprazole  Injectable 40 milliGRAM(s) IV Push daily  petrolatum Ophthalmic Ointment 1 Application(s) Both EYES every 8 hours  polyethylene glycol 3350 17 Gram(s) Oral daily  senna Syrup 20 milliLiter(s) Oral at bedtime  vasopressin Infusion 0.04 Unit(s)/Min IV Continuous <Continuous>      Vital Signs Last 24 Hrs  T(C): 36.5 (02 Sep 2021 13:00), Max: 38 (01 Sep 2021 16:15)  T(F): 97.7 (02 Sep 2021 13:00), Max: 100.4 (01 Sep 2021 16:15)  HR: 83 (02 Sep 2021 15:01) (74 - 101)  BP: --  BP(mean): --  RR: 34 (02 Sep 2021 15:00) (19 - 35)  SpO2: 99% (02 Sep 2021 15:01) (85% - 100%)    Physical Exam:  General: intubated sedated   Head: atraumatic, normocephalic  ENT: ET and OG tubes   Cardio: regular rate   Respiratory: mechanically ventilated   abd: soft, nondistended   : mace  Musculoskeletal: anasarca. no focal joint swelling  vascular: no phlebitis                           7.3    8.97  )-----------( 158      ( 02 Sep 2021 01:03 )             24.4       09-02    138  |  102  |  26<H>  ----------------------------<  198<H>  3.9   |  28  |  0.72    Ca    8.5      02 Sep 2021 01:03  Phos  3.5     09-02  Mg     2.2     09-02    TPro  5.2<L>  /  Alb  2.7<L>  /  TBili  1.1  /  DBili  x   /  AST  73<H>  /  ALT  217<H>  /  AlkPhos  584<H>  09-02      MICROBIOLOGY:  Culture - Blood (collected 09-01-21 @ 05:05)  Source: .Blood Blood-Peripheral  Preliminary Report (09-02-21 @ 06:01):    No growth to date.    Culture - Blood (collected 09-01-21 @ 05:05)  Source: .Blood Blood-Peripheral  Preliminary Report (09-02-21 @ 06:01):    No growth to date.    Culture - Sputum (collected 08-31-21 @ 00:37)  Source: .Sputum Sputum  Gram Stain (08-31-21 @ 07:31):    Rare polymorphonuclear leukocytes per low power field    No Squamous epithelial cells per low power field    Rare Yeast like cells seen per oil power field  Final Report (09-01-21 @ 17:20):    Normal Respiratory Liane present    Culture - Blood (collected 08-30-21 @ 18:39)  Source: .Blood Blood  Preliminary Report (08-31-21 @ 19:02):    No growth to date.    Culture - Blood (collected 08-30-21 @ 11:17)  Source: .Blood Blood-Peripheral  Gram Stain (08-31-21 @ 08:24):    Growth in aerobic bottle: Gram Positive Cocci in Clusters  Preliminary Report (09-01-21 @ 12:18):    Growth in aerobic bottle: Staphylococcus epidermidis    Susceptibility to follow.    Growth in aerobic bottle: Staphylococcus haemolyticus    Coag Negative Staphylococcus    Single set isolate, possible contaminant. Contact    Microbiology if susceptibility testing clinically    indicated.  Organism: Blood Culture PCR (08-31-21 @ 15:43)  Organism: Blood Culture PCR (08-31-21 @ 15:43)      -  Staphylococcus epidermidis, Methicillin resistant: Detec      Method Type: PCR    Culture - Blood (collected 08-27-21 @ 03:23)  Source: .Blood Blood  Final Report (09-01-21 @ 04:01):    No Growth Final    RADIOLOGY:  Images below reviewed personally  US Abdomen Upper Quadrant Right (08.31.21 @ 11:33)   No evidence of cholecystitis.  Right pleural effusion.  Trace perihepatic ascites.    Xray Chest 1 View- PORTABLE-Urgent (Xray Chest 1 View- PORTABLE-Urgent .) (08.30.21 @ 20:02)   The heart is normal in size. Smallright pleural effusion. Diffuse airspace opacities are seen throughout post lungs which remain unchanged when compared to previous study done the same date at 3:19 PM. Endotracheal tube is in good position. NG tube is in the stomach. A central line is seen on the left and the tip is in superior vena cava. No pneumothorax.

## 2021-09-02 NOTE — PROGRESS NOTE ADULT - ASSESSMENT
A/P: 71M Greek speaking Hx HTN, DM2, BPH, Asthma admit 8/12 Covid-19 PNA with progressive worsening hypoxic respiratory failure s/p intubation (8/20) c/b VT arrest s/p shock/epi with ROSC ~6mins, remains sedated and paralyzed.  Course c/b persistent MRSE bacteremia despite abx pending further work up.     Neuro:   #Sedation  - Paralyzed: Nimbex 4 / Sedated: Fentanyl 2, Ketamine 3  - Remains off Propofol in setting of hypertriglyceridemia   - c/w Versed 2 mg IVP q4 and Fent 50 mcg IVP q4 PRNs  - c/w Valium 10 TID, increase Methadone to 20 TID  - DC Seroquel given no benefits     Pulm:   #Acute Hypoxic Respiratory Failure 2/2 COVID PNA c/b ARDS  - Intubated (8/20): PRVC 34/320/10/70 p/f: 114 peak: 34 Plat: 32  - Currently prone, due to be supinated at 9am, f/u repeat ABG  - s/p Toci (8/15), RDV (8/13-17), Dex (8/13-23)  - Vent management per ABGs    #Pnumomediastinum  - Occurred 8/21, now resolved    #Rule Out: PE  - Positive DVT suspected PE. Too unstable for CTA.   - Continue Lovenox 80 BID for full AC.   - Anti factor Xa theraputic at 1.7    #Hx Asthma  - c/w home Symbicort and albuterol     CV  #Hypotension in setting of vasodilatory shock vs. distributive vs. medication induced   - c/w Levophed/Vaso, wide pulse pressures, titrate to SBP > 100  - s/p PEA vs. VT arrest upon intubation requiring shock/epi w/ ROSC  - Pending TTE, will POCUS today    #PAF, episode Afib RVR requiring Amio bolus   - in NSR, on full AC, will monitor on telemetry   - Hold further Amio for now      /Renal:   #TAYLOR, resolved  - SCr 0.68 c/w strict I/Os, target net even     GI:  #Oropharyngeal dysphagia   - Tolerating TF at goal: Glucerna 20ml/hr   - c/w bowel regimen, GI ppx: PPI   - s/p RUQ Sono: trace ascites      #Transaminase  - Downtrending, trend LFTs daily, avoid hepatotoxins     ENDO:  #Hx T2DM, A1c 7.8  - Hyperglycemic with basal/bolus coverage on TF a goal   - Increase NPH to 16u q6hr, ISS mod, trend FS closely    - Hypoglycemia protocol     Heme:  #Hypercoagulable state in setting of Covid   - [facility] Duplex (8/21): +DVT L popiteal, RLE below knee  - c/w full AC Lovenox, Anti 10A factor 1.7    ID   #MRSE Bacteremia  - Persistent Staph BCx ->start Vanco 1gm IV q12h   - AM trough noted, 9.2 (drawn too early)  - Check trough prior to 4th dose per ID  - Sputum Cx neg -> Zosyn discontinued   - RUQ sono results noted (8/31)   - Lines changed (8/30)  - BCx x 2 in AM  - TTE pending       #COVID-19 PNA (not vaccinated)  - s/p Toci (8/15), RDV (8/13-17), Dex (8/13-23)  - Inflammatory markers q72h    #GOC  - FULL code at this time.   - Goals of care d/w son and 2 daughters   ======================= DISPOSITION  =====================  [X] Critical   [ ] Guarded    [ ] Stable    [X] Maintain in ICU  [ ] Downgrade to Medicine   [ ] Discharge Home      Naomi Michelle [facility]  ICU [phone]   A/P: 71M Thai speaking Hx HTN, DM2, BPH, Asthma admit 8/12 Covid-19 PNA with progressive worsening hypoxic respiratory failure s/p intubation (8/20) c/b VT arrest s/p shock/epi with ROSC ~6mins, remains sedated and paralyzed.  Course c/b persistent MRSE bacteremia despite abx pending further work up.     Neuro:   #Sedation  - Paralyzed: Nimbex 4 / Sedated: Fentanyl 2, Ketamine 3  - Remains off Propofol in setting of hypertriglyceridemia   - c/w Versed 2 mg IVP q4 and Fent 50 mcg IVP q4 PRNs  - c/w Valium 10 TID and Methadone to 20 TID  - Seroquel DC yesterday given no benefits     Pulm:   #Acute Hypoxic Respiratory Failure 2/2 COVID PNA c/b ARDS  - Intubated (8/20): PC 26/10, RR 34, 80% p/f: 151 peak: 34  Plat: 35  - Currently prone, due to be supinated at 10am, f/u repeat ABG  - s/p Toci (8/15), RDV (8/13-17), Dex (8/13-23)  - Vent management per ABGs    #Pnumomediastinum  - Occurred 8/21, now resolved    #Rule Out: PE  - Positive DVT suspected PE. Too unstable for CTA.   - Continue Lovenox 80 BID for full AC.   - Anti factor Xa therapeutic at 1.7    #Hx Asthma  - c/w home Symbicort and albuterol     CV  #Hypotension in setting of vasodilatory shock vs. distributive vs. medication induced   - c/w Levophed/Vaso, wide pulse pressures, titrate to SBP > 100  - s/p PEA vs. VT arrest upon intubation requiring shock/epi w/ ROSC  - Pending TTE, will POCUS today    #PAF, episode Afib RVR requiring Amio bolus   - in NSR, on full AC, will monitor on telemetry   - Hold further Amio for now      /Renal:   #TAYLOR, resolved  - SCr 0.72 c/w strict I/Os, target negative (Net +6.5L total)  - Diuresis with Lasix IV PRN - will give Lasix 40mg IV now     GI:  #Oropharyngeal dysphagia   - Tolerating TF at trickle while paralyzed   - c/w bowel regimen, GI ppx: PPI   - s/p RUQ Sono: trace ascites      #Transaminase  - Downtrending, trend LFTs daily, avoid hepatotoxins     ENDO:  #Hx T2DM, A1c 7.8  - Hyperglycemic with basal/bolus coverage on TF a goal   - Increase NPH to 18u q6hr, ISS mod, trend FS closely    - Hypoglycemia protocol     Heme:  #Hypercoagulable state in setting of Covid   - [facility] Duplex (8/21): +DVT L popiteal, RLE below knee  - c/w full AC Lovenox, Anti 10A factor 1.7    ID   #MRSE Bacteremia  - Persistent Staph BCx ->start Vanco 1gm IV q12h   - AM trough noted, 9.2 yesterday (drawn too early)  - Check trough prior to 4th dose per ID (9/3 AM)  - Sputum Cx neg -> Zosyn discontinued on 9/1  - TTE pending today to r/o vegetation   - RUQ sono results noted (8/31)   - Lines changed (8/30)  - f/u BCx sent (9/1)      #COVID-19 PNA (not vaccinated)  - s/p Toci (8/15), RDV (8/13-17), Dex (8/13-23)  - Inflammatory markers q72h    #GOC  - FULL code at this time  - Goals of care d/w son and 2 daughters   ======================= DISPOSITION  =====================  [X] Critical   [ ] Guarded    [ ] Stable    [X] Maintain in ICU  [ ] Downgrade to Medicine   [ ] Discharge Home      Naomi Michelle [facility]  ICU [phone]   A/P: 71M Bangladeshi speaking Hx HTN, DM2, BPH, Asthma admit 8/12 Covid-19 PNA with progressive worsening hypoxic respiratory failure s/p intubation (8/20) c/b VT arrest s/p shock/epi with ROSC ~6mins, remains sedated and paralyzed.  Course c/b persistent MRSE bacteremia despite abx pending further work up.     Neuro:   #Sedation  - Paralyzed: Nimbex 4 / Sedated: Fentanyl 2, Ketamine 3  - Remains off Propofol in setting of hypertriglyceridemia   - c/w Versed 2 mg IVP q4 and Fent 50 mcg IVP q4 PRNs  - c/w Valium 10 TID and Methadone to 20 TID  - Seroquel DC yesterday given no benefits     Pulm:   #Acute Hypoxic Respiratory Failure 2/2 COVID PNA c/b ARDS  - Intubated (8/20): PC 26/10, RR 34, 80% p/f: 151 peak: 34  Plat: 35  - Currently prone, due to be supinated at 10am, f/u repeat ABG  - s/p Toci (8/15), RDV (8/13-17), Dex (8/13-23)  - Vent management per ABGs    #Pnumomediastinum  - Occurred 8/21, now resolved    #Rule Out: PE  - Positive DVT suspected PE. Too unstable for CTA.   - Continue Lovenox 80 BID for full AC.   - Anti factor Xa therapeutic at 1.7    #Hx Asthma  - c/w home Symbicort and albuterol     CV  #Hypotension in setting of vasodilatory shock vs. distributive vs. medication induced   - c/w Levophed/Vaso, wide pulse pressures, titrate to SBP > 100  - s/p PEA vs. VT arrest upon intubation requiring shock/epi w/ ROSC  - Pending TTE, will POCUS today    #PAF, episode Afib RVR requiring Amio bolus   - in NSR, on full AC, will monitor on telemetry   - Hold further Amio for now      /Renal:   #TAYLOR, resolved  - SCr 0.72 c/w strict I/Os, target negative (Net +6.5L total)  - Diuresis with Lasix IV PRN - will give Lasix 40mg IV now     GI:  #Oropharyngeal dysphagia   - Tolerating TF at trickle while paralyzed   - c/w bowel regimen, GI ppx: PPI   - s/p RUQ Sono: trace ascites      #Transaminase  - Downtrending, trend LFTs daily, avoid hepatotoxins     ENDO:  #Hx T2DM, A1c 7.8  - FS stable on basal/bolus coverage on TF at [facility]   - Hypoglycemia protocol     Heme:  #Hypercoagulable state in setting of Covid   - [facility] Duplex (8/21): +DVT L popiteal, RLE below knee  - c/w full AC Lovenox, Anti 10A factor 1.7    ID   #MRSE Bacteremia  - Persistent Staph BCx ->start Vanco 1gm IV q12h   - AM trough noted, 9.2 yesterday (drawn too early)  - Check trough prior to 4th dose per ID (9/3 AM)  - Sputum Cx neg -> Zosyn discontinued on 9/1  - TTE pending today to r/o vegetation   - RUQ sono results noted (8/31)   - Lines changed (8/30)  - f/u BCx sent (9/1)      #COVID-19 PNA (not vaccinated)  - s/p Toci (8/15), RDV (8/13-17), Dex (8/13-23)  - Inflammatory markers q72h    #GOC  - FULL code at this time  - Goals of care d/w son and 2 daughters   ======================= DISPOSITION  =====================  [X] Critical   [ ] Guarded    [ ] Stable    [X] Maintain in ICU  [ ] Downgrade to Medicine   [ ] Discharge Home      Naomi Michelle [facility]  ICU [phone]

## 2021-09-02 NOTE — PROGRESS NOTE ADULT - ATTENDING COMMENTS
Agree with above. Patient critically ill requiring frequent bedside visits with therapy change. Patient DM2, HTN, and BPH who presented with COVID-19 pneumonia. He is unfortunately unvaccinated and suffering more severe disease. He has had progressive hypoxemia despite BIPAP. His family reportedly did not want him intubated until absolutely necessary and he was intubated emergently by anesthesia 8/20/21. He had a subsequent cardiac arrest requiring CPR and then was transferred to [facility]ICU for further monitoring and care.    1. Acute Hypoxemic Respiratory Failure - ARDS due to COVID-19. Continue mechanical ventilation and wean as tolerated.  - May need to attempt reproning - as he did have some improvement overnight  - Patient is not a candidate for ECMO - he has been hypoxemic on high BIPAP and vent settings since 8/14 - as well as cardiac arrest on day of intubation  2. Cardiovascular - continue vasopressors as needed to maintain MAP >65. No significant ectopy at this time.   - Afib with RVR now improved  3. 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 Renal function - improved. Continue to monitor urine output  5. Neurology - continue sedatives, has been off paralytics  6. Infectious Disease - continue antibiotics. repeat cultures sent.  - Persistent fevers - TLC and Amada changed 8/30  - Prior MRSE bacteremia on Vanco - recurrent MRSE infection  - Followup repeat culture thus far negative  - For COVID-19 previously received Remdesivir, Dexamethasone, and Toci  7. Hem/Onc - patient with LE DVT noted on Duplex initially. Continue therapeutic AC with Lovenox.   8. Endo - continue NPH for hyperglycemia with goal FS < 180  9. GO - patient is FULL Code     Family is inquiring about Ivermectin. I discussed with family and ID. Family slightly upset that this medication was not being given at [facility].

## 2021-09-02 NOTE — PROGRESS NOTE ADULT - ASSESSMENT
1. Unvaccinated COVID pneumonia.   s/p Tocilizumab, Remdesivir and Decadron and still decompensated, intubated 8/20, suspect PE given bilateral leg DVTs.   Ivermectin is not recommended for the treatment of COVID.     2. MRSE bacteremia (one Staph hominis) 8/23  Maybe CLABSI although no clear phlebitis (left IJ 8/20, changed to right IJ 8/25, back to left 8/30).   Unclear significance of positive cultures again 8/30, could be contaminant, had Staph haemolyticus too.   Uncommon cause of native valve endocarditis.   No open wounds.     3. Elevated liver enzymes. Obstructive pattern but US reassuring.     Poor prognosis. Remains critically ill.     Suggest  -recheck UA given recurring fevers   -Vancomycin 1GM IV q12h for CoNS bacteremia   -monitor troughs and creatinine  -monitor blood cultures   -check TTE   -supportive care   -Ivermectin is not recommended for the treatment of COVID     Discussed with ICU     Patrick Paul MD   Infectious Disease   Pager [phone]   After 5PM and on weekends please page fellow on call or call [phone]

## 2021-09-03 NOTE — PROGRESS NOTE ADULT - ASSESSMENT
71M Burkinan speaking Hx HTN, DM2, BPH, Asthma admit 8/12 Covid-19 PNA with progressive worsening hypoxic respiratory failure s/p intubation (8/20) c/b VT arrest s/p shock/epi with ROSC ~6mins, remains sedated and paralyzed     Neuro:   #Sedation  - Paralyzed: Nimbex 4 / Sedated: Fentanyl 2  - Remains off Propofol in setting of hypertriglyceridemia   - c/w Versed 2 mg IVP q4 and Fent 50 mcg IVP q4 PRNs  - c/w Valium 10 TID, increase Methadone to 20 TID  - DC Seroquel given no benefits     Pulm:   #Acute Hypoxic Respiratory Failure 2/2 COVID PNA c/b ARDS  - Intubated (8/20)  - Supinated at 11 am: 7.35/63/166/35 on 100%  p/f 166 - which decreased overnight will attempt to reprone this am  - Vent mode switched to PC: 26/10, PEEP 10, Fi02 80%  - s/p Toci (8/15), RDV (8/13-17), Dex (8/13-23)  - Vent management per ABGs    #Pnumomediastinum  - Occurred 8/21, now resolved    #Rule Out: PE  - Positive DVT suspected PE. Too unstable for CTA.   - Continue Lovenox 80 BID for full AC.   - Anti factor Xa theraputic at 1.7    #Hx Asthma  - c/w home Symbicort and albuterol     CV  #Hypotension in setting of vasodilatory shock vs. distributive vs. medication induced   - c/w Levophed/Vaso, wide pulse pressures, titrate to SBP > 100  - s/p PEA vs. VT arrest upon intubation requiring shock/epi w/ ROSC  - Pending TTE, will POCUS today    #PAF, episode Afib RVR requiring Amio bolus   - in NSR, on full AC, will monitor on telemetry   - Hold further Amio for now      /Renal:   #TAYLOR, resolved  - SCr 0.68 c/w strict I/Os, target -1L     GI:  #Oropharyngeal dysphagia   - Tolerating TF at goal: Glucerna 20ml/hr   - c/w bowel regimen, GI ppx: PPI   - s/p RUQ Sono: trace ascites      #Transaminase  - Downtrending, trend LFTs daily, avoid hepatotoxins     ENDO:  #Hx T2DM, A1c 7.8  - Hyperglycemic with basal/bolus coverage on TF a goal   - Increase NPH to 16u q6hr, ISS mod, trend FS closely    - Hypoglycemia protocol     Heme:  #Hypercoagulable state in setting of Covid   - [facility] Duplex (8/21): +DVT L popiteal, RLE below knee  - c/w full AC Lovenox, Anti 10A factor 1.7    ID   #MRSE Bacteremia  - Persistent Staph BCx ->start Vanco 1gm IV q12h   - AM trough noted, 9.2 (drawn too early)  - Check trough prior to 4th dose on 9/3 AM per ID  - Sputum Cx neg -> Zosyn discontinued   - RUQ sono results noted (8/31)   - Lines changed (8/30)  - BCx x 2 in AM  - TTE pending in AM (9/2)      #COVID-19 PNA (not vaccinated)  - s/p Toci (8/15), RDV (8/13-17), Dex (8/13-23)  - Inflammatory markers q72h    #GOC  - FULL code at this time.   - Goals of care d/w son and 2 daughters    71M [place] speaking Hx HTN, DM2, BPH, Asthma admit 8/12 Covid-19 PNA with progressive worsening hypoxic respiratory failure s/p intubation (8/20) c/b VT arrest s/p shock/epi with ROSC ~6mins, remains sedated and paralyzed     Neuro:   #Sedation  - Paralyzed: Nimbex 4 / Sedated: Fentanyl 2  - Remains off Propofol in setting of hypertriglyceridemia   - c/w Versed 2 mg IVP q4 and Fent 50 mcg IVP q4 PRNs  - c/w Valium 10 TID,  Methadone to 20 TID  -     Pulm:   #Acute Hypoxic Respiratory Failure 2/2 COVID PNA c/b ARDS  - Intubated (8/20)  - Proned at 1200. post prone gas 7.38/51/96/30  - Vent mode  PC: 34/10, PEEP 10, Fi02 80%  - s/p Toci (8/15), RDV (8/13-17), Dex (8/13-23)  - Vent management per ABGs    #Pnumomediastinum  - Occurred 8/21, now resolved    #Rule Out: PE  - Positive DVT suspected PE. Too unstable for CTA.   - Continue Lovenox 80 BID for full AC.   - Anti factor Xa theraputic at 1.7    #Hx Asthma  - c/w home Symbicort and albuterol     CV  #Hypotension in setting of vasodilatory shock vs. distributive vs. medication induced   - OFF Vaso, wide pulse pressures, titrate to SBP > 100  - s/p PEA vs. VT arrest upon intubation requiring shock/epi w/ ROSC  - Pending TTE, will POCUS today    #PAF, episode Afib RVR requiring Amio bolus in recent hx - Hold further Amio for now   - in NSR, on full AC, will monitor on telemetry      /Renal:   #TAYLOR, resolved  - Monitor kidney fx closely while diuresing  -Bumex 1mg Q 12h added to regimen  -electrolytes suplemented  strict I/Os, target -1L     GI:  #Oropharyngeal dysphagia   - Tolerating TF at goal: Glucerna 20ml/hr while proned  - c/w bowel regimen, GI ppx: PPI   - s/p RUQ Sono: trace ascites    #Transaminase  - Downtrending, trend LFTs daily, avoid hepatotoxins     ENDO:  #Hx T2DM, A1c 7.8  - now Hypoglycemic with basal/bolus coverage on TF at 10cc/h  - NPH decreased to 6u q6hr, ISS mod, trend FS closely    - Hypoglycemia protocol   -Nutrition f/u re formula or rate change    Heme:  Anemia-Unclear etiology, mod amounts of blood seen from right palate-s/p packing by ENT  -s/p 1 unit PRBC-9/3 with good response  -am dose lovenox held  #Hypercoagulable state in setting of Covid   - VA Duplex (8/21): +DVT L popiteal, RLE below knee  - c/w full AC Lovenox, Anti 10A factor 1.7    ID   #MRSE Bacteremia  On reduced dose of vanco-750 for trough 21 day 4/14  - Next trough pre 4th dose of new dosage  - Lines changed (8/30)  - TTE pending     #COVID-19 PNA (not vaccinated)  - s/p Toci (8/15), RDV (8/13-17), Dex (8/13-23)  - Inflammatory markers q72h    #GOC  - FULL code at this time.   - Goals of care d/w son and 2 daughters

## 2021-09-03 NOTE — PROGRESS NOTE ADULT - SUBJECTIVE AND OBJECTIVE BOX
CHIEF COMPLAINT:  Patient is a 71y old  Male who presents with a chief complaint of SOB (02 Sep 2021 15:49)    HPI:  72yo M w/ PMHx of HTN, DM2, BPH, asthma presents with shortness of breath. Patient endorses that he has been feeling SOB with associated cough for the last 2 weeks and his symptoms slowly worsened over time. He did not receive the COVID vaccine. He reports that his family members whom he lives with likely are also COVID positive but he does not know for sure. He did not try taking anything for his symptoms other than his usual medications/inhalers. There were no obvious aggravating or alleviating factors. Currently with supplemental oxygen on, he feels well and has no complaints, however he presented to [facility] since his symptoms were worsening. In the ED, he was hemodynamically stable, afebrile, but he was encephalopathic and hypoxic on room air requiring high flow nasal canula. Labs were significant for mild hyperkalemia and COVID positive. CXR prelim read showed bilateral patchy opacities. CT head showed no acute findings. MICU was consulted in the ED, patient was deemed not a MICU candidate. Patient was given doxycycline and dexamethasone x1 and admitted to general medicine for further management. At time of interview, patient was A/Ox3 with .  (13 Aug 2021 04:21)      Interval Events: H+H down to 6.8/21-->1 unit prbc NPH held 2/2 FS 73, small to mod amt of blood with clots from right palate am lovenox held. Will require proning this am, vanco dosing lowered to 750 for T-->21      REVIEW OF SYSTEMS:  unable as pt is sedated        OBJECTIVE:  ICU Vital Signs Last 24 Hrs  T(C): 37 (03 Sep 2021 03:00), Max: 37.1 (02 Sep 2021 23:00)  T(F): 98.6 (03 Sep 2021 03:00), Max: 98.8 (02 Sep 2021 23:00)  HR: 85 (03 Sep 2021 06:30) (75 - 89)  BP: --  BP(mean): --  ABP: 132/59 (03 Sep 2021 06:30) (94/48 - 169/65)  ABP(mean): 85 (03 Sep 2021 06:30) (64 - 111)  RR: 34 (03 Sep 2021 06:30) (33 - 34)  SpO2: 94% (03 Sep 2021 06:30) (90% - 100%)    Mode: AC/ CMV (Assist Control/ Continuous Mandatory Ventilation), RR (machine): 34, FiO2: 80, PEEP: 10, ITime: 1, MAP: 19, PC: 26, PIP: 38    09-01 @ 07:01 - 09-02 @ 07:00  --------------------------------------------------------  IN: 2845.6 mL / OUT: 2845 mL / NET: 0.6 mL    09-02 @ 07:01 - 09-03 @ 06:44  --------------------------------------------------------  IN: 3186.4 mL / OUT: 1860 mL / NET: 1326.4 mL      CAPILLARY BLOOD GLUCOSE      POCT Blood Glucose.: 112 mg/dL (03 Sep 2021 05:50)          PHYSICAL EXAM:  GENERAL: NAD,  HEENT:  Atraumatic, Normocephalic  EYES: PERRLA, conjunctiva and sclera clear  NECK: Supple, No JVD  CHEST/LUNG: diminished bilaterally; No wheezes, rales, or rhonchi  HEART: Regular rate and rhythm; No murmurs, rubs, or gallops  ABDOMEN: Soft, Nontender, Nondistended; Bowel sounds present  EXTREMITIES:  2+ Peripheral Pulses, No clubbing, cyanosis, or edema  PSYCH: unable as pt is sedated  NEUROLOGY: sedated  SKIN: No rashes or lesions        HOSPITAL MEDICATIONS:  MEDICATIONS  (STANDING):  ALBUTerol    90 MICROgram(s) HFA Inhaler 2 Puff(s) Inhalation every 8 hours  budesonide 160 MICROgram(s)/formoterol 4.5 MICROgram(s) Inhaler 2 Puff(s) Inhalation two times a day  chlorhexidine 0.12% Liquid 15 milliLiter(s) Oral Mucosa every 12 hours  chlorhexidine 4% Liquid 1 Application(s) Topical <User Schedule>  cholecalciferol 2000 Unit(s) Oral daily  cisatracurium Infusion 3 MICROgram(s)/kG/Min (14.7 mL/Hr) IV Continuous <Continuous>  diazepam    Tablet 10 milliGRAM(s) Oral every 8 hours  enoxaparin Injectable 80 milliGRAM(s) SubCutaneous every 12 hours  fentaNYL   Infusion... 2 MICROgram(s)/kG/Hr (8.16 mL/Hr) IV Continuous <Continuous>  insulin lispro (ADMELOG) corrective regimen sliding scale   SubCutaneous every 6 hours  insulin NPH human recombinant 8 Unit(s) SubCutaneous every 6 hours  ketamine Infusion. 1.998 mG/kG/Hr (16.3 mL/Hr) IV Continuous <Continuous>  methadone    Tablet 20 milliGRAM(s) Oral every 8 hours  multivitamin/minerals Oral Solution (WELLESSE) 30 milliLiter(s) Enteral Tube daily  naloxegol 25 milliGRAM(s) Oral daily  pantoprazole  Injectable 40 milliGRAM(s) IV Push daily  petrolatum Ophthalmic Ointment 1 Application(s) Both EYES every 8 hours  polyethylene glycol 3350 17 Gram(s) Oral daily  senna Syrup 20 milliLiter(s) Oral at bedtime  vancomycin  IVPB      vancomycin  IVPB 750 milliGRAM(s) IV Intermittent every 12 hours  vasopressin Infusion 0.04 Unit(s)/Min (2.4 mL/Hr) IV Continuous <Continuous>    MEDICATIONS  (PRN):  fentaNYL    Injectable 50 MICROGram(s) IV Push every 4 hours PRN pain      LABS:                        6.8    7.56  )-----------( 153      ( 03 Sep 2021 01:44 )             21.6     Hgb Trend: 6.8<--, 7.3<--, 7.9<--, 7.7<--, 8.3<--  09-03    140  |  102  |  25<H>  ----------------------------<  73  3.4<L>   |  28  |  0.60    Ca    8.2<L>      03 Sep 2021 01:44  Phos  1.8     09-03  Mg     1.9     09-03    TPro  4.9<L>  /  Alb  2.4<L>  /  TBili  1.5<H>  /  DBili  x   /  AST  65<H>  /  ALT  166<H>  /  AlkPhos  567<H>  09-03    LIVER FUNCTIONS - ( 03 Sep 2021 01:44 )  Alb: 2.4 g/dL / Pro: 4.9 g/dL / ALK PHOS: 567 U/L / ALT: 166 U/L / AST: 65 U/L / GGT: x           Creatinine Trend: 0.60<--, 0.72<--, 0.68<--, 0.63<--, 0.69<--, 0.72<--  PT/INR - ( 03 Sep 2021 01:44 )   PT: 12.9 sec;   INR: 1.08 ratio         PTT - ( 03 Sep 2021 01:44 )  PTT:38.0 sec    Arterial Blood Gas:  09-03 @ 04:06  7.40/55/76/34/97.8/8.3  ABG lactate: --  Arterial Blood Gas:  09-03 @ 01:41  7.49/42/75/32/97.8/7.9  ABG lactate: --  Arterial Blood Gas:  09-02 @ 18:41  7.47/47/106/34/100.0/9.6  ABG lactate: --  Arterial Blood Gas:  09-02 @ 11:33  7.35/63/166/35/100.0/8.2  ABG lactate: --  Arterial Blood Gas:  09-02 @ 05:40  7.32/62/121/32/99.6/5.1  ABG lactate: --  Arterial Blood Gas:  09-02 @ 01:53  7.35/59/102/33/99.5/6.2  ABG lactate: --  Arterial Blood Gas:  09-02 @ 00:57  7.35/59/133/33/99.8/6.1  ABG lactate: --  Arterial Blood Gas:  09-01 @ 19:32  7.30/64/123/32/99.4/4.2  ABG lactate: --  Arterial Blood Gas:  09-01 @ 17:56  7.20/83/119/32/99.7/3.3  ABG lactate: --  Arterial Blood Gas:  09-01 @ 13:08  7.33/59/84/31/98.4/4.3  ABG lactate: --  Arterial Blood Gas:  09-01 @ 10:09  7.30/64/126/32/99.9/4.2  ABG lactate: --        MICROBIOLOGY:     RADIOLOGY:  [ ] Reviewed and interpreted by me    EKG:

## 2021-09-03 NOTE — PROGRESS NOTE ADULT - SUBJECTIVE AND OBJECTIVE BOX
Follow Up: COVID, bacteremia    Interval History/ROS: Remains critically ill. Proned.     Allergies  No Known Allergies        ANTIMICROBIALS:  vancomycin  IVPB    vancomycin  IVPB 750 every 12 hours      OTHER MEDS:  ALBUTerol    90 MICROgram(s) HFA Inhaler 2 Puff(s) Inhalation every 8 hours  budesonide 160 MICROgram(s)/formoterol 4.5 MICROgram(s) Inhaler 2 Puff(s) Inhalation two times a day  buMETAnide Injectable 1 milliGRAM(s) IV Push two times a day  chlorhexidine 0.12% Liquid 15 milliLiter(s) Oral Mucosa every 12 hours  chlorhexidine 4% Liquid 1 Application(s) Topical <User Schedule>  cholecalciferol 2000 Unit(s) Oral daily  cisatracurium Infusion 3 MICROgram(s)/kG/Min IV Continuous <Continuous>  diazepam    Tablet 10 milliGRAM(s) Oral every 8 hours  enoxaparin Injectable 80 milliGRAM(s) SubCutaneous every 12 hours  fentaNYL    Injectable 50 MICROGram(s) IV Push every 4 hours PRN  fentaNYL   Infusion... 2 MICROgram(s)/kG/Hr IV Continuous <Continuous>  insulin lispro (ADMELOG) corrective regimen sliding scale   SubCutaneous every 6 hours  insulin NPH human recombinant 6 Unit(s) SubCutaneous every 6 hours  ketamine Infusion. 1.998 mG/kG/Hr IV Continuous <Continuous>  lactulose Syrup 10 Gram(s) Enteral Tube every 6 hours  methadone    Tablet 20 milliGRAM(s) Oral every 8 hours  midodrine 10 milliGRAM(s) Oral every 8 hours  multivitamin/minerals Oral Solution (WELLESSE) 30 milliLiter(s) Enteral Tube daily  naloxegol 25 milliGRAM(s) Oral daily  pantoprazole  Injectable 40 milliGRAM(s) IV Push daily  petrolatum Ophthalmic Ointment 1 Application(s) Both EYES every 8 hours  polyethylene glycol 3350 17 Gram(s) Oral daily  senna Syrup 20 milliLiter(s) Oral at bedtime  vasopressin Infusion 0.04 Unit(s)/Min IV Continuous <Continuous>      Vital Signs Last 24 Hrs  T(C): 36.5 (03 Sep 2021 17:30), Max: 37.1 (02 Sep 2021 23:00)  T(F): 97.7 (03 Sep 2021 17:30), Max: 98.8 (02 Sep 2021 23:00)  HR: 84 (03 Sep 2021 17:30) (75 - 90)  BP: --  BP(mean): --  RR: 34 (03 Sep 2021 17:30) (28 - 34)  SpO2: 97% (03 Sep 2021 17:30) (90% - 100%)    Physical Exam:  General: proned   Head: atraumatic  Cardio: regular rate   Respiratory: mechanically ventilated   : mace  Musculoskeletal: anasarca                           8.6    8.70  )-----------( 165      ( 03 Sep 2021 13:24 )             27.4       09-03    136  |  98  |  24<H>  ----------------------------<  141<H>  3.8   |  28  |  0.59    Ca    8.8      03 Sep 2021 13:24  Phos  2.7     09-03  Mg     2.2     09-03    TPro  5.7<L>  /  Alb  3.0<L>  /  TBili  2.0<H>  /  DBili  x   /  AST  65<H>  /  ALT  171<H>  /  AlkPhos  666<H>  09-03          MICROBIOLOGY:  Vancomycin Level, Trough: 21.0 ug/mL (09-03-21 @ 04:16)    Culture - Blood (collected 09-01-21 @ 05:05)  Source: .Blood Blood-Peripheral  Preliminary Report (09-02-21 @ 06:01):    No growth to date.    Culture - Blood (collected 09-01-21 @ 05:05)  Source: .Blood Blood-Peripheral  Preliminary Report (09-02-21 @ 06:01):    No growth to date.    Culture - Sputum (collected 08-31-21 @ 00:37)  Source: .Sputum Sputum  Gram Stain (08-31-21 @ 07:31):    Rare polymorphonuclear leukocytes per low power field    No Squamous epithelial cells per low power field    Rare Yeast like cells seen per oil power field  Final Report (09-01-21 @ 17:20):    Normal Respiratory Liane present    Culture - Blood (collected 08-30-21 @ 18:39)  Source: .Blood Blood  Preliminary Report (08-31-21 @ 19:02):    No growth to date.    Culture - Blood (collected 08-30-21 @ 11:17)  Source: .Blood Blood-Peripheral  Gram Stain (08-31-21 @ 08:24):    Growth in aerobic bottle: Gram Positive Cocci in Clusters  Final Report (09-03-21 @ 10:41):    Growth in aerobic bottle: Staphylococcus epidermidis    Growth in aerobic bottle: Staphylococcus haemolyticus    Coag Negative Staphylococcus    Single set isolate, possible contaminant. Contact    Microbiology if susceptibility testing clinically    indicated.  Organism: Blood Culture PCR (09-03-21 @ 10:41)  Organism: Blood Culture PCR (09-03-21 @ 10:41)      -  Staphylococcus epidermidis, Methicillin resistant: Detec      Method Type: PCR    RADIOLOGY:  Images below reviewed personally  US Abdomen Upper Quadrant Right (08.31.21 @ 11:33)   No evidence of cholecystitis.  Right pleural effusion.  Trace perihepatic ascites.    Xray Chest 1 View- PORTABLE-Urgent (Xray Chest 1 View- PORTABLE-Urgent .) (08.30.21 @ 20:02)   The heart is normal in size. Smallright pleural effusion. Diffuse airspace opacities are seen throughout post lungs which remain unchanged when compared to previous study done the same date at 3:19 PM. Endotracheal tube is in good position. NG tube is in the stomach. A central line is seen on the left and the tip is in superior vena cava. No pneumothorax.

## 2021-09-03 NOTE — PROGRESS NOTE ADULT - ATTENDING COMMENTS
Agree with above. Patient critically ill requiring frequent bedside visits with therapy change. Patient DM2, HTN, and BPH who presented with COVID-19 pneumonia. He is unfortunately unvaccinated and suffering more severe disease. He has had progressive hypoxemia despite BIPAP. His family reportedly did not want him intubated until absolutely necessary and he was intubated emergently by anesthesia 8/20/21. He had a subsequent cardiac arrest requiring CPR and then was transferred to [facility]ICU for further monitoring and care.    1. Acute Hypoxemic Respiratory Failure - ARDS due to COVID-19. Continue mechanical ventilation and wean as tolerated.  - Will reprone again today as has had some benefit  - Patient is not a candidate for ECMO - he has been hypoxemic on high BIPAP settings and then vent since 8/14 - as well as cardiac arrest on day of intubation  2. Cardiovascular - continue vasopressors as needed to maintain MAP >65. No significant ectopy at this time.   - Afib with RVR now improved  - Diurese as able  3. 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 Renal function - stable. Continue to monitor urine output  5. Neurology - continue sedatives, has been off paralytics  6. Infectious Disease - continue antibiotics. repeat cultures sent.  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 Hem/Onc - patient with LE DVT noted on Duplex initially.   - ENT followup for oropharyngeal bleeding - will hold Lovenox in AM pending ENT evaluation  - Acute blood loss anemia - PRBC transfusion today - likely due to oropharyngeal bleed  8. Endo - continue NPH for hyperglycemia with goal FS < 180  9. GOC - patient is FULL Code     Family is inquiring about Ivermectin on 9/2. I discussed with family and ID.

## 2021-09-03 NOTE — CHART NOTE - NSCHARTNOTEFT_GEN_A_CORE
Nutrition Follow Up Note  Patient seen for: Malnutrition Follow up on 5ICU    Chart reviewed, events noted. "70 y/o Male with PMH diabetes and asthma, not COVID vaccinated admitted 21 with COVID pneumonia, intubated ." Pt remains intubated, sedated and paralyzed with nimbex. SEJAL pending. Pt with some episodes of hyperglycemia per RN.     Source:     [x] EMR          -If unable to interview patient: [x] Trach/Vent/BiPAP      Nutrition-Related Events:   - Pressors:  [x] Yes - pt ordered for vasopressin   - Propofol:  [x] No        Diet, NPO with Tube Feed:   Tube Feeding Modality: Nasogastric  Glucerna 1.2 Stephen (GLUCERNARTH)  Total Volume for 24 Hours (mL): 720  Continuous  Starting Tube Feed Rate {mL per Hour}: 30  Until Goal Tube Feed Rate (mL per Hour): 30  Tube Feed Duration (in Hours): 24  Tube Feed Start Time: 13:24  No Carb Prosource TF     Qty per Day:  2 (21 @ 14:42) [Active]    EN Order Provides: 720 ml total volume, 944 kcal, 65 gm protein and 580 ml free water daily with 2 No Carb Prosource daily. Meets 11.5 kcal/kg and .8 g/kg protein based on weight of 81.6 kg.    EN Provision:  : 1440 ml (100% of goal)  : 1440 ml (100% of goal)  : 1440 ml (100% of goal)  : 540 ml so far today (held for proning)  9: 240 ml (feeds being held/ titrated down when proned)  93: 80 ml so far today    Is current diet order appropriate/adequate? [x] No, pt not meeting nutritional needs at this time    Nutrition-related concerns:  - noted pt feeds are being held/ titrated to trickle feeds when proning  - Pt to be proned for 18 hours today; was proned at 12pm.  - Last bowel movement  per flow sheets; noted pt ordered for lactulose  - Per NP, pt with hypoglycemic episodes, not requiring as much insulin; NPH reduced to 6 units every 6 hours  - No additional GI Distress noted    Weights:   Daily Weight in k.5 (); daily weight noted; increase may be secondary to edema, will continue to monitor trends as able    Weights:   Daily Weight in k.5 (); likely elevated secondary to fluid; will continue to monitor trends    MEDICATIONS  (STANDING):  buMETAnide Injectable  cholecalciferol  insulin lispro (ADMELOG) corrective regimen sliding scale  insulin NPH human recombinant  lactulose Syrup  midodrine  multivitamin/minerals Oral Solution (WELLESSE)  naloxegol  pantoprazole  Injectable  polyethylene glycol 3350  senna Syrup  vancomycin  IVPB  vancomycin  IVPB  vasopressin Infusion    Pertinent Labs:  @ 13:24: Na 136, BUN 24<H>, Cr 0.59, <H>, K+ 3.8, Phos 2.7, Mg 2.2, Alk Phos 666<H>, ALT/SGPT 171<H>, AST/SGOT 65<H>, HbA1c --   @ 01:44: Na 140, BUN 25<H>, Cr 0.60, BG 73, K+ 3.4<L>, Phos 1.8<L>, Mg 1.9, Alk Phos 567<H>, ALT/SGPT 166<H>, AST/SGOT 65<H>, HbA1c --    A1C with Estimated Average Glucose Result: 7.9 % (21 @ 16:09)  A1C with Estimated Average Glucose Result: 7.3 % (21 @ 14:36)    Finger Sticks:  POCT Blood Glucose.: 130 mg/dL ( @ 11:58)  POCT Blood Glucose.: 112 mg/dL ( @ 05:50)  POCT Blood Glucose.: 78 mg/dL ( @ 01:27)  POCT Blood Glucose.: 103 mg/dL ( @ 17:20)    Triglycerides, Serum: 231 mg/dL (21 @ 01:10)  Triglycerides, Serum: 593 mg/dL (21 @ 01:10)  Triglycerides, Serum: 483 mg/dL (21 @ 05:51)  Triglycerides, Serum: 538 mg/dL (21 @ 00:45)  Triglycerides, Serum: 343 mg/dL (21 @ 00:28)  Triglycerides, Serum: 375 mg/dL (21 @ 00:07)  Triglycerides, Serum: 427 mg/dL (21 @ 00:50)  Triglycerides, Serum: 464 mg/dL (21 @ 00:36)  Triglycerides, Serum: 632 mg/dL (21 @ 16:11)    Skin per nursing documentation: right buttock suspected deep tissue injury per flow sheets, right check, lip, line stage 2 pressure injury per flow sheets  Edema: 2+ right arm, left arm, left leg, right leg per flow sheets    Estimated Nutritional needs based on 81.6 kg:  Estimated Energy Needs: (20-25 kcal/kg): 6112-8883 kcal based on dosing weight of 81.6 kg  Estimated Protein Needs: (1.2-1.4 g/kg):  gm based on dosing weight of 81.6 kg  Defer fluid needs to team  Harish State Equation: The Harish State Equation (PSU) 2003b was used to calculate resting energy expenditure: 1825 kcal    Previous Nutrition Diagnosis: Acute, moderate malnutrition, Increased nutrient needs  Nutrition Diagnosis is: [x] ongoing, being addressed with enteral nutrition    New Nutrition Diagnosis: Severe, acute malnutrition related to inability to meet protein-energy needs as evidenced by pt meeting <50% EER> 5 days, moderate edema.    Nutrition Care Plan:  [x] In Progress    Nutrition Interventions:     Education Provided:         [x] No: pt intubated, sedated    Recommendations:  1. Recommend as appropriate and medically feasible, to change enteral nutrition regimen to Vital 1.5 @ 20 ml/hr while proned x 18 hours; when pt is not proned, consider increasing Vital 1.5 to goal rate of 70 ml/hr x 6 hours with 2 No Carb Prosource daily. At goal, feeding with 2 No Carb Prosource will provide   2. Free water flushes per team  3. Continue with micronutrient supplementation daily to aid in wound healing  4. RD remains available for further interventions as needed       Monitoring and Evaluation:   Continue to monitor nutritional intake, tolerance to diet prescription, weights, labs, skin integrity    RD remains available upon request and will follow up per protocol  Alyson Hernandez MS, RD, CDN, [facility] pgr #[phone] Nutrition Follow Up Note  Patient seen for: Malnutrition Follow up on 5ICU    Chart reviewed, events noted. "72 y/o Male with PMH diabetes and asthma, not COVID vaccinated admitted 21 with COVID pneumonia, intubated ." Pt remains intubated, sedated and paralyzed with nimbex. SEJAL pending. Pt with some episodes of hyperglycemia per RN.     Source:     [x] EMR          -If unable to interview patient: [x] Trach/Vent/BiPAP      Nutrition-Related Events:   - Pressors:  [x] Yes - pt ordered for vasopressin   - Propofol:  [x] No        Diet, NPO with Tube Feed:   Tube Feeding Modality: Nasogastric  Glucerna 1.2 Stephen (GLUCERNARTH)  Total Volume for 24 Hours (mL): 720  Continuous  Starting Tube Feed Rate {mL per Hour}: 30  Until Goal Tube Feed Rate (mL per Hour): 30  Tube Feed Duration (in Hours): 24  Tube Feed Start Time: 13:24  No Carb Prosource TF     Qty per Day:  2 (21 @ 14:42) [Active]    EN Order Provides: 720 ml total volume, 944 kcal, 65 gm protein and 580 ml free water daily with 2 No Carb Prosource daily. Meets 11.5 kcal/kg and .8 g/kg protein based on weight of 81.6 kg.    EN Provision:  : 1440 ml (100% of goal)  : 1440 ml (100% of goal)  : 1440 ml (100% of goal)  : 540 ml so far today (held for proning)  9: 240 ml (feeds being held/ titrated down when proned)  93: 80 ml so far today    Is current diet order appropriate/adequate? [x] No, pt not meeting nutritional needs at this time    Nutrition-related concerns:  - noted pt feeds are being held/ titrated to trickle feeds when proning  - Pt to be proned for 18 hours today; was proned at 12pm.  - Last bowel movement  per flow sheets; noted pt ordered for lactulose  - Per NP, pt with hypoglycemic episodes, not requiring as much insulin; NPH reduced to 6 units every 6 hours  - No additional GI Distress noted    Weights:   Daily Weight in k.5 (); daily weight noted; increase may be secondary to edema, will continue to monitor trends as able    Weights:   Daily Weight in k.5 (); likely elevated secondary to fluid; will continue to monitor trends    MEDICATIONS  (STANDING):  buMETAnide Injectable  cholecalciferol  insulin lispro (ADMELOG) corrective regimen sliding scale  insulin NPH human recombinant  lactulose Syrup  midodrine  multivitamin/minerals Oral Solution (WELLESSE)  naloxegol  pantoprazole  Injectable  polyethylene glycol 3350  senna Syrup  vancomycin  IVPB  vancomycin  IVPB  vasopressin Infusion    Pertinent Labs:  @ 13:24: Na 136, BUN 24<H>, Cr 0.59, <H>, K+ 3.8, Phos 2.7, Mg 2.2, Alk Phos 666<H>, ALT/SGPT 171<H>, AST/SGOT 65<H>, HbA1c --   @ 01:44: Na 140, BUN 25<H>, Cr 0.60, BG 73, K+ 3.4<L>, Phos 1.8<L>, Mg 1.9, Alk Phos 567<H>, ALT/SGPT 166<H>, AST/SGOT 65<H>, HbA1c --    A1C with Estimated Average Glucose Result: 7.9 % (21 @ 16:09)  A1C with Estimated Average Glucose Result: 7.3 % (21 @ 14:36)    Finger Sticks:  POCT Blood Glucose.: 130 mg/dL ( @ 11:58)  POCT Blood Glucose.: 112 mg/dL ( @ 05:50)  POCT Blood Glucose.: 78 mg/dL ( @ 01:27)  POCT Blood Glucose.: 103 mg/dL ( @ 17:20)    Triglycerides, Serum: 231 mg/dL (21 @ 01:10)  Triglycerides, Serum: 593 mg/dL (21 @ 01:10)  Triglycerides, Serum: 483 mg/dL (21 @ 05:51)  Triglycerides, Serum: 538 mg/dL (21 @ 00:45)  Triglycerides, Serum: 343 mg/dL (21 @ 00:28)  Triglycerides, Serum: 375 mg/dL (21 @ 00:07)  Triglycerides, Serum: 427 mg/dL (21 @ 00:50)  Triglycerides, Serum: 464 mg/dL (21 @ 00:36)  Triglycerides, Serum: 632 mg/dL (21 @ 16:11)    Skin per nursing documentation: right buttock suspected deep tissue injury per flow sheets, right check, lip, line stage 2 pressure injury per flow sheets  Edema: 2+ right arm, left arm, left leg, right leg per flow sheets    Estimated Nutritional needs based on 81.6 kg:  Estimated Energy Needs: (20-25 kcal/kg): 4622-7339 kcal based on dosing weight of 81.6 kg  Estimated Protein Needs: (1.2-1.4 g/kg):  gm based on dosing weight of 81.6 kg  Defer fluid needs to team  Harish State Equation: The Harish State Equation (PSU) 2003b was used to calculate resting energy expenditure: 1825 kcal    Previous Nutrition Diagnosis: Acute, moderate malnutrition, Increased nutrient needs  Nutrition Diagnosis is: [x] ongoing, being addressed with enteral nutrition, malnutrition being advanced as below    New Nutrition Diagnosis: Severe, acute malnutrition related to inability to meet protein-energy needs as evidenced by pt meeting <50% EER> 5 days, moderate edema.    Nutrition Care Plan:  [x] In Progress    Nutrition Interventions:     Education Provided:         [x] No: pt intubated, sedated    Recommendations:  1. Recommend as appropriate and medically feasible, to change enteral nutrition regimen to Vital 1.5 @ 20 ml/hr while proned x 18 hours; when pt is not proned, consider increasing Vital 1.5 to goal rate of 75 ml/hr x 6 hours with 2 No Carb Prosource TF (120 kcal, 33 gm protein) daily. At goal, feeding with 3 No Carb Prosource will provide (based on dosing weight of 81.6 kg): 810 ml total volume, 1335 kcal (16 kcal/kg), 88 gm protein (1.07 g/kg) and 618 ml free water.  --> RD to make adjustments to enteral nutrition regimen as medically feasible  2. Free water flushes per team  3. Continue with micronutrient supplementation daily to aid in wound healing  4. RD remains available for further interventions as needed  5. Malnutrition notification placed in chart        Monitoring and Evaluation:   Continue to monitor nutritional intake, tolerance to diet prescription, weights, labs, skin integrity    RD remains available upon request and will follow up per protocol  Alyson Hernandez MS, RD, CDN, [facility] pgr #[phone] Nutrition Follow Up Note  Patient seen for: Malnutrition Follow up on 5ICU    Chart reviewed, events noted. "70 y/o Male with PMH diabetes and asthma, not COVID vaccinated admitted 21 with COVID pneumonia, intubated ." Pt remains intubated, sedated and paralyzed with nimbex. SEJAL pending. Pt with some episodes of hyperglycemia per RN.     Source:     [x] EMR          -If unable to interview patient: [x] Trach/Vent/BiPAP      Nutrition-Related Events:   - Pressors:  [x] Yes - pt ordered for vasopressin   - Propofol:  [x] No        Diet, NPO with Tube Feed:   Tube Feeding Modality: Nasogastric  Glucerna 1.2 Stephen (GLUCERNARTH)  Total Volume for 24 Hours (mL): 720  Continuous  Starting Tube Feed Rate {mL per Hour}: 30  Until Goal Tube Feed Rate (mL per Hour): 30  Tube Feed Duration (in Hours): 24  Tube Feed Start Time: 13:24  No Carb Prosource TF     Qty per Day:  2 (21 @ 14:42) [Active]    EN Order Provides: 720 ml total volume, 944 kcal, 65 gm protein and 580 ml free water daily with 2 No Carb Prosource daily. Meets 11.5 kcal/kg and .8 g/kg protein based on weight of 81.6 kg.    EN Provision:  : 1440 ml (100% of goal)  : 1440 ml (100% of goal)  : 1440 ml (100% of goal)  : 540 ml so far today (held for proning)  9: 240 ml (feeds being held/ titrated down when proned)  93: 80 ml so far today    Is current diet order appropriate/adequate? [x] No, pt not meeting nutritional needs at this time    Nutrition-related concerns:  - noted pt feeds are being held/ titrated to trickle feeds when proning  - Pt to be proned for 18 hours today; was proned at 12pm.  - Last bowel movement  per flow sheets; noted pt ordered for lactulose  - Per NP, pt with hypoglycemic episodes, not requiring as much insulin; NPH reduced to 6 units every 6 hours  - No additional GI Distress noted    Weights:   Daily Weight in k.5 (); daily weight noted; increase may be secondary to edema, will continue to monitor trends as able    Weights:   Daily Weight in k.5 (); likely elevated secondary to fluid; will continue to monitor trends    MEDICATIONS  (STANDING):  buMETAnide Injectable  cholecalciferol  insulin lispro (ADMELOG) corrective regimen sliding scale  insulin NPH human recombinant  lactulose Syrup  midodrine  multivitamin/minerals Oral Solution (WELLESSE)  naloxegol  pantoprazole  Injectable  polyethylene glycol 3350  senna Syrup  vancomycin  IVPB  vancomycin  IVPB  vasopressin Infusion    Pertinent Labs:  @ 13:24: Na 136, BUN 24<H>, Cr 0.59, <H>, K+ 3.8, Phos 2.7, Mg 2.2, Alk Phos 666<H>, ALT/SGPT 171<H>, AST/SGOT 65<H>, HbA1c --   @ 01:44: Na 140, BUN 25<H>, Cr 0.60, BG 73, K+ 3.4<L>, Phos 1.8<L>, Mg 1.9, Alk Phos 567<H>, ALT/SGPT 166<H>, AST/SGOT 65<H>, HbA1c --    A1C with Estimated Average Glucose Result: 7.9 % (21 @ 16:09)  A1C with Estimated Average Glucose Result: 7.3 % (21 @ 14:36)    Finger Sticks:  POCT Blood Glucose.: 130 mg/dL ( @ 11:58)  POCT Blood Glucose.: 112 mg/dL ( @ 05:50)  POCT Blood Glucose.: 78 mg/dL ( @ 01:27)  POCT Blood Glucose.: 103 mg/dL ( @ 17:20)    Triglycerides, Serum: 231 mg/dL (21 @ 01:10)  Triglycerides, Serum: 593 mg/dL (21 @ 01:10)  Triglycerides, Serum: 483 mg/dL (21 @ 05:51)  Triglycerides, Serum: 538 mg/dL (21 @ 00:45)  Triglycerides, Serum: 343 mg/dL (21 @ 00:28)  Triglycerides, Serum: 375 mg/dL (21 @ 00:07)  Triglycerides, Serum: 427 mg/dL (21 @ 00:50)  Triglycerides, Serum: 464 mg/dL (21 @ 00:36)  Triglycerides, Serum: 632 mg/dL (21 @ 16:11)    Skin per nursing documentation: right buttock suspected deep tissue injury per flow sheets, right check, lip, line stage 2 pressure injury per flow sheets  Edema: 2+ right arm, left arm, left leg, right leg per flow sheets    Estimated Nutritional needs based on 81.6 kg:  Estimated Energy Needs: (20-25 kcal/kg): 5568-5063 kcal based on dosing weight of 81.6 kg  Estimated Protein Needs: (1.2-1.4 g/kg):  gm based on dosing weight of 81.6 kg  Defer fluid needs to team  Harish State Equation: The Harish State Equation (PSU) 2003b was used to calculate resting energy expenditure: 1825 kcal    Previous Nutrition Diagnosis: Acute, moderate malnutrition, Increased nutrient needs  Nutrition Diagnosis is: [x] ongoing, being addressed with enteral nutrition, malnutrition being advanced as below    New Nutrition Diagnosis: Severe, acute malnutrition related to inability to meet protein-energy needs as evidenced by pt meeting <50% EER> 5 days, moderate edema.    Nutrition Care Plan:  [x] In Progress    Nutrition Interventions:     Education Provided:         [x] No: pt intubated, sedated    Recommendations:  1. Recommend as appropriate and medically feasible, to change enteral nutrition regimen to Vital 1.5 @ 20 ml/hr while proned x 18 hours; when pt is not proned, consider increasing Vital 1.5 to goal rate of 75 ml/hr x 6 hours with 2 No Carb Prosource TF (120 kcal, 33 gm protein) daily. At goal, feeding with 3 No Carb Prosource will provide (based on dosing weight of 81.6 kg): 810 ml total volume, 1335 kcal (16 kcal/kg), 88 gm protein (1.07 g/kg) and 618 ml free water.  --> RD to make adjustments to enteral nutrition regimen as medically feasible; current respiratory status precluding pt meeting nutritional needs at this time  2. Free water flushes per team  3. Continue with micronutrient supplementation daily to aid in wound healing  4. RD remains available for further interventions as needed  5. Malnutrition notification placed in chart        Monitoring and Evaluation:   Continue to monitor nutritional intake, tolerance to diet prescription, weights, labs, skin integrity    RD remains available upon request and will follow up per protocol  Alyson Hernandez MS, RD, CDN, [facility] pgr #[phone] Nutrition Follow Up Note  Patient seen for: Malnutrition Follow up on 5ICU    Chart reviewed, events noted. "72 y/o Male with PMH diabetes and asthma, not COVID vaccinated admitted 21 with COVID pneumonia, intubated ." Pt remains intubated, sedated and paralyzed with nimbex. SEJAL pending. Pt with some episodes of hyperglycemia per RN.     Source:     [x] EMR          -If unable to interview patient: [x] Trach/Vent/BiPAP      Nutrition-Related Events:   - Pressors:  [x] Yes - pt ordered for vasopressin   - Propofol:  [x] No        Diet, NPO with Tube Feed:   Tube Feeding Modality: Nasogastric  Glucerna 1.2 Stephen (GLUCERNARTH)  Total Volume for 24 Hours (mL): 720  Continuous  Starting Tube Feed Rate {mL per Hour}: 30  Until Goal Tube Feed Rate (mL per Hour): 30  Tube Feed Duration (in Hours): 24  Tube Feed Start Time: 13:24  No Carb Prosource TF     Qty per Day:  2 (21 @ 14:42) [Active]    EN Order Provides: 720 ml total volume, 944 kcal, 65 gm protein and 580 ml free water daily with 2 No Carb Prosource daily. Meets 11.5 kcal/kg and .8 g/kg protein based on weight of 81.6 kg.    EN Provision:  : 1440 ml (100% of goal)  : 1440 ml (100% of goal)  : 1440 ml (100% of goal)  : 540 ml so far today (held for proning)  9: 240 ml (feeds being held/ titrated down when proned)  93: 80 ml so far today    Is current diet order appropriate/adequate? [x] No, pt not meeting nutritional needs at this time    Nutrition-related concerns:  - noted pt feeds are being held/ titrated to trickle feeds when proning  - Pt to be proned for 18 hours today; was proned at 12pm.  - Last bowel movement  per flow sheets; noted pt ordered for lactulose  - Per NP, pt with hypoglycemic episodes, not requiring as much insulin; NPH reduced to 6 units every 6 hours  - No additional GI Distress noted    Weights:   Daily Weight in k.5 (); daily weight noted; increase may be secondary to edema, will continue to monitor trends as able    Weights:   Daily Weight in k.5 (); likely elevated secondary to fluid; will continue to monitor trends    MEDICATIONS  (STANDING):  buMETAnide Injectable  cholecalciferol  insulin lispro (ADMELOG) corrective regimen sliding scale  insulin NPH human recombinant  lactulose Syrup  midodrine  multivitamin/minerals Oral Solution (WELLESSE)  naloxegol  pantoprazole  Injectable  polyethylene glycol 3350  senna Syrup  vancomycin  IVPB  vancomycin  IVPB  vasopressin Infusion    Pertinent Labs:  @ 13:24: Na 136, BUN 24<H>, Cr 0.59, <H>, K+ 3.8, Phos 2.7, Mg 2.2, Alk Phos 666<H>, ALT/SGPT 171<H>, AST/SGOT 65<H>, HbA1c --   @ 01:44: Na 140, BUN 25<H>, Cr 0.60, BG 73, K+ 3.4<L>, Phos 1.8<L>, Mg 1.9, Alk Phos 567<H>, ALT/SGPT 166<H>, AST/SGOT 65<H>, HbA1c --    A1C with Estimated Average Glucose Result: 7.9 % (21 @ 16:09)  A1C with Estimated Average Glucose Result: 7.3 % (21 @ 14:36)    Finger Sticks:  POCT Blood Glucose.: 130 mg/dL ( @ 11:58)  POCT Blood Glucose.: 112 mg/dL ( @ 05:50)  POCT Blood Glucose.: 78 mg/dL ( @ 01:27)  POCT Blood Glucose.: 103 mg/dL ( @ 17:20)    Triglycerides, Serum: 231 mg/dL (21 @ 01:10)  Triglycerides, Serum: 593 mg/dL (21 @ 01:10)  Triglycerides, Serum: 483 mg/dL (21 @ 05:51)  Triglycerides, Serum: 538 mg/dL (21 @ 00:45)  Triglycerides, Serum: 343 mg/dL (21 @ 00:28)  Triglycerides, Serum: 375 mg/dL (21 @ 00:07)  Triglycerides, Serum: 427 mg/dL (21 @ 00:50)  Triglycerides, Serum: 464 mg/dL (21 @ 00:36)  Triglycerides, Serum: 632 mg/dL (21 @ 16:11)    Skin per nursing documentation: right buttock suspected deep tissue injury per flow sheets, right check, lip, line stage 2 pressure injury per flow sheets  Edema: 2+ right arm, left arm, left leg, right leg per flow sheets    Estimated Nutritional needs based on 81.6 kg:  Estimated Energy Needs: (20-25 kcal/kg): 7294-0710 kcal based on dosing weight of 81.6 kg  Estimated Protein Needs: (1.2-1.4 g/kg):  gm based on dosing weight of 81.6 kg  Defer fluid needs to team  Harish State Equation: The Harish State Equation (PSU) 2003b was used to calculate resting energy expenditure: 1825 kcal    Previous Nutrition Diagnosis: Acute, moderate malnutrition, Increased nutrient needs  Nutrition Diagnosis is: [x] ongoing, being addressed with enteral nutrition, malnutrition being advanced as below    New Nutrition Diagnosis: Severe, acute malnutrition related to inability to meet protein-energy needs as evidenced by pt meeting <50% EER> 5 days, moderate edema.    Nutrition Care Plan:  [x] In Progress    Nutrition Interventions:     Education Provided:         [x] No: pt intubated, sedated    Recommendations:  1. Recommend as appropriate and medically feasible, to change enteral nutrition regimen to Vital 1.5 @ 20 ml/hr while proned x 18 hours; when pt is not proned, consider increasing Vital 1.5 to goal rate of 70 ml/hr x 6 hours with 3 No Carb Prosource TF (120 kcal, 33 gm protein) daily. At goal, feeding with 3 No Carb Prosource will provide (based on dosing weight of 81.6 kg): 780 ml total volume, 1290 kcal (15.8 kcal/kg), 86 gm protein (1.05 g/kg) and 596 ml free water.  --> RD to make adjustments to enteral nutrition regimen as medically feasible; current respiratory status precluding pt meeting nutritional needs at this time  2. Free water flushes per team  3. Continue with micronutrient supplementation daily to aid in wound healing  4. RD remains available for further interventions as needed  5. Malnutrition notification placed in chart        Monitoring and Evaluation:   Continue to monitor nutritional intake, tolerance to diet prescription, weights, labs, skin integrity    RD remains available upon request and will follow up per protocol  Alyson Hernandez MS, RD, CDN, [facility] pgr #[phone] Nutrition Follow Up Note  Patient seen for: Malnutrition Follow up on 5ICU    Chart reviewed, events noted. "70 y/o Male with PMH diabetes and asthma, not COVID vaccinated admitted 21 with COVID pneumonia, intubated ." Pt remains intubated, sedated and paralyzed with nimbex. SEJAL pending. Pt with some episodes of hyperglycemia per RN.     Source:     [x] EMR          -If unable to interview patient: [x] Trach/Vent/BiPAP      Nutrition-Related Events:   - Pressors:  [x] Yes - pt ordered for vasopressin   - Propofol:  [x] No        Diet, NPO with Tube Feed:   Tube Feeding Modality: Nasogastric  Glucerna 1.2 Stephen (GLUCERNARTH)  Total Volume for 24 Hours (mL): 720  Continuous  Starting Tube Feed Rate {mL per Hour}: 30  Until Goal Tube Feed Rate (mL per Hour): 30  Tube Feed Duration (in Hours): 24  Tube Feed Start Time: 13:24  No Carb Prosource TF     Qty per Day:  2 (21 @ 14:42) [Active]    EN Order Provides: 720 ml total volume, 944 kcal, 65 gm protein and 580 ml free water daily with 2 No Carb Prosource daily. Meets 11.5 kcal/kg and .8 g/kg protein based on weight of 81.6 kg.    EN Provision:  : 1440 ml (100% of goal)  : 1440 ml (100% of goal)  : 1440 ml (100% of goal)  : 540 ml so far today (held for proning)  9: 240 ml (feeds being held/ titrated down when proned)  9/3: 80 ml so far today    Is current diet order appropriate/adequate? [x] No, pt not meeting nutritional needs at this time    Nutrition-related concerns:  - noted pt feeds are being held/ titrated to trickle feeds when proning  - Pt to be proned for 18 hours today; was proned at 12pm.  - Last bowel movement  per flow sheets; noted pt ordered for lactulose, on bowel regimen (senna, miralax)  - Per NP, pt with hypoglycemic episodes, not requiring as much insulin; NPH reduced to 6 units every 6 hours  - No additional GI Distress noted    Weights:   Daily Weight in k.5 (); daily weight noted; increase may be secondary to edema, will continue to monitor trends as able    MEDICATIONS  (STANDING):  buMETAnide Injectable  cholecalciferol  insulin lispro (ADMELOG) corrective regimen sliding scale  insulin NPH human recombinant  lactulose Syrup  midodrine  multivitamin/minerals Oral Solution (WELLESSE)  naloxegol  pantoprazole  Injectable  polyethylene glycol 3350  senna Syrup  vancomycin  IVPB  vancomycin  IVPB  vasopressin Infusion    Pertinent Labs:  @ 13:24: Na 136, BUN 24<H>, Cr 0.59, <H>, K+ 3.8, Phos 2.7, Mg 2.2, Alk Phos 666<H>, ALT/SGPT 171<H>, AST/SGOT 65<H>, HbA1c --   @ 01:44: Na 140, BUN 25<H>, Cr 0.60, BG 73, K+ 3.4<L>, Phos 1.8<L>, Mg 1.9, Alk Phos 567<H>, ALT/SGPT 166<H>, AST/SGOT 65<H>, HbA1c --    A1C with Estimated Average Glucose Result: 7.9 % (21 @ 16:09)  A1C with Estimated Average Glucose Result: 7.3 % (21 @ 14:36)    Finger Sticks:  POCT Blood Glucose.: 130 mg/dL ( @ 11:58)  POCT Blood Glucose.: 112 mg/dL ( @ 05:50)  POCT Blood Glucose.: 78 mg/dL ( @ 01:27)  POCT Blood Glucose.: 103 mg/dL ( @ 17:20)    Triglycerides, Serum: 231 mg/dL (21 @ 01:10)  Triglycerides, Serum: 593 mg/dL (21 @ 01:10)  Triglycerides, Serum: 483 mg/dL (21 @ 05:51)  Triglycerides, Serum: 538 mg/dL (21 @ 00:45)  Triglycerides, Serum: 343 mg/dL (21 @ 00:28)  Triglycerides, Serum: 375 mg/dL (21 @ 00:07)  Triglycerides, Serum: 427 mg/dL (21 @ 00:50)  Triglycerides, Serum: 464 mg/dL (21 @ 00:36)  Triglycerides, Serum: 632 mg/dL (21 @ 16:11)    Skin per nursing documentation: right buttock suspected deep tissue injury per flow sheets, right check, lip, line stage 2 pressure injury per flow sheets  Edema: 2+ right arm, left arm, left leg, right leg per flow sheets    Estimated Nutritional needs based on 81.6 kg:  Estimated Energy Needs: (20-25 kcal/kg): 5172-0886 kcal based on dosing weight of 81.6 kg  Estimated Protein Needs: (1.2-1.4 g/kg):  gm based on dosing weight of 81.6 kg  Defer fluid needs to team  [place] State Equation: The [place] State Equation (PSU) 2003b was used to calculate resting energy expenditure: 1825 kcal    Previous Nutrition Diagnosis: Acute, moderate malnutrition, Increased nutrient needs  Nutrition Diagnosis is: [x] ongoing, being addressed with enteral nutrition, malnutrition being advanced as below    New Nutrition Diagnosis: Severe, acute malnutrition related to inability to meet protein-energy needs as evidenced by pt meeting <50% EER> 5 days, moderate edema.    Nutrition Care Plan:  [x] In Progress    Nutrition Interventions:     Education Provided:         [x] No: pt intubated, sedated    Recommendations:  1. Recommend as appropriate and medically feasible, to change enteral nutrition regimen to Vital 1.5 @ 20 ml/hr while proned x 18 hours; when pt is not proned, consider increasing Vital 1.5 to goal rate of 70 ml/hr x 6 hours with 3 No Carb Prosource TF (120 kcal, 33 gm protein) daily. At goal, feeding with 3 No Carb Prosource will provide (based on dosing weight of 81.6 kg): 780 ml total volume, 1290 kcal (15.8 kcal/kg), 86 gm protein (1.05 g/kg) and 596 ml free water.  --> RD to make adjustments to enteral nutrition regimen as medically feasible; current respiratory status precluding pt meeting nutritional needs at this time  2. Free water flushes per team  3. Continue with micronutrient supplementation daily to aid in wound healing  4. RD remains available for further interventions as needed  5. Malnutrition notification placed in chart        Monitoring and Evaluation:   Continue to monitor nutritional intake, tolerance to diet prescription, weights, labs, skin integrity    RD remains available upon request and will follow up per protocol  Alyson Hernandez MS, RD, CDN, [facility] pgr #[phone]

## 2021-09-03 NOTE — CONSULT NOTE ADULT - ASSESSMENT
70yo male covid+ intubated for respiratory distress with oral bleed S/P cauterization with silver nitrate and oral packing ( 1 kerlix ). No further bleeding. H/H 6.8/21.6

## 2021-09-03 NOTE — PROGRESS NOTE ADULT - ASSESSMENT
1. Unvaccinated COVID pneumonia.   s/p Tocilizumab, Remdesivir and Decadron   Decompensated, intubated 8/20, suspect PE given bilateral leg DVTs.   Ivermectin is not recommended for the treatment of COVID.     2. MRSE bacteremia (one Staph hominis) 8/23  Maybe CLABSI although no clear phlebitis (left IJ 8/20, changed to right IJ 8/25, back to left 8/30).   Unclear significance of positive cultures again 8/30, could be contaminant, had Staph haemolyticus too.   Uncommon cause of native valve endocarditis.   No open wounds.     3. Elevated liver enzymes. Obstructive pattern but US reassuring.     Poor prognosis. Remains critically ill.     Suggest  -recheck UA given recurring fevers   -agree with lowering Vancomycin to 750mg IV q12h for CoNS bacteremia - hope to stop next week   -monitor troughs and creatinine  -monitor blood cultures   -check TTE   -supportive care   -Ivermectin is not recommended for the treatment of COVID     Patrick Paul MD   Infectious Disease   Pager [phone]   After 5PM and on weekends please page fellow on call or call [phone]

## 2021-09-03 NOTE — DIETITIAN NUTRITION RISK NOTIFICATION - TREATMENT: THE FOLLOWING DIET HAS BEEN RECOMMENDED
Diet, NPO with Tube Feed:   Tube Feeding Modality: Nasogastric  Glucerna 1.2 Stephen (GLUCERNARTH)  Total Volume for 24 Hours (mL): 720  Continuous  Starting Tube Feed Rate {mL per Hour}: 30  Until Goal Tube Feed Rate (mL per Hour): 30  Tube Feed Duration (in Hours): 24  Tube Feed Start Time: 13:24  No Carb Prosource TF     Qty per Day:  2 (09-03-21 @ 14:42) [Active]

## 2021-09-04 NOTE — PROGRESS NOTE ADULT - ASSESSMENT
70yo male covid+ intubated for respiratory distress with oral bleed S/P cauterization with silver nitrate and oral packing ( 1 kerlix ). No further bleeding

## 2021-09-04 NOTE — PROGRESS NOTE ADULT - ATTENDING COMMENTS
Agree with above except as noted. 71M Liberian speaking Hx HTN, DM2, BPH, Asthma admit 8/12 Covid-19 PNA with AHRF s/p intubation (8/20), VT arrest -ROSC ~6mins, MRSE bacteremia, DVT on a/c. Pt remains on high fio2 and peep, titrate to maintain sat>90%. Cont LTV as tolerated. S/p multiple session of proning, no change in p/f ratio now with change from supine to prone. Recent bcx ngtd but cont to follow to ensure bacteremia cleared. cont abx, f/u ID reccs. Oral bleeding s/p cautery by ent. No bleeding today, packing removal tomorrow. on a/c for dvt and AF but if bleeding recurs would stop.   Prognosis very poor.

## 2021-09-04 NOTE — PROGRESS NOTE ADULT - ASSESSMENT
71M Somali speaking Hx HTN, DM2, BPH, Asthma admit 8/12 Covid-19 PNA with progressive worsening hypoxic respiratory failure s/p intubation (8/20) c/b VT arrest s/p shock/epi with ROSC ~6mins, remains sedated and paralyzed     Neuro:   #Sedation  - Paralyzed: Nimbex 4 / Sedated: Fentanyl 2  - Remains off Propofol in setting of hypertriglyceridemia   - c/w Versed 2 mg IVP q4 and Fent 50 mcg IVP q4 PRNs  - c/w Valium 10 TID,  Methadone to 20 TID  -     Pulm:   #Acute Hypoxic Respiratory Failure 2/2 COVID PNA c/b ARDS  - Intubated (8/20)  - Proned at 1200. post prone gas 7.38/51/96/30  - Vent mode  PC: 34/10, PEEP 10, Fi02 80%  - s/p Toci (8/15), RDV (8/13-17), Dex (8/13-23)  - Vent management per ABGs    #Pnumomediastinum  - Occurred 8/21, now resolved    #Rule Out: PE  - Positive DVT suspected PE. Too unstable for CTA.   - Continue Lovenox 80 BID for full AC.   - Anti factor Xa theraputic at 1.7    #Hx Asthma  - c/w home Symbicort and albuterol     CV  #Hypotension in setting of vasodilatory shock vs. distributive vs. medication induced   - OFF Vaso, wide pulse pressures, titrate to SBP > 100  - s/p PEA vs. VT arrest upon intubation requiring shock/epi w/ ROSC  - Pending TTE, will POCUS today    #PAF, episode Afib RVR requiring Amio bolus in recent hx - Hold further Amio for now   - in NSR, on full AC, will monitor on telemetry      /Renal:   #TAYLOR, resolved  - Monitor kidney fx closely while diuresing  -Bumex 1mg Q 12h added to regimen  -electrolytes suplemented  strict I/Os, target -1L     GI:  #Oropharyngeal dysphagia   - Tolerating TF at goal: Glucerna 20ml/hr while proned  - c/w bowel regimen, GI ppx: PPI   - s/p RUQ Sono: trace ascites    #Transaminase  - Downtrending, trend LFTs daily, avoid hepatotoxins     ENDO:  #Hx T2DM, A1c 7.8  - now Hypoglycemic with basal/bolus coverage on TF at 10cc/h  - NPH decreased to 6u q6hr, ISS mod, trend FS closely    - Hypoglycemia protocol   -Nutrition f/u re formula or rate change    Heme:  Anemia-Unclear etiology, mod amounts of blood seen from right palate-s/p packing by ENT  -s/p 1 unit PRBC-9/3 with good response  -am dose lovenox held  #Hypercoagulable state in setting of Covid   - [facility] Duplex (8/21): +DVT L popiteal, RLE below knee  - c/w full AC Lovenox, Anti 10A factor 1.7    ID   #MRSE Bacteremia  On reduced dose of vanco-750 for trough 21 day 4/14  - Next trough pre 4th dose of new dosage  - Lines changed (8/30)  - TTE pending     #COVID-19 PNA (not vaccinated)  - s/p Toci (8/15), RDV (8/13-17), Dex (8/13-23)  - Inflammatory markers q72h    #GOC  - FULL code at this time.   - Goals of care d/w son and 2 daughters

## 2021-09-04 NOTE — PROGRESS NOTE ADULT - SUBJECTIVE AND OBJECTIVE BOX
CHIEF COMPLAINT:  Patient is a 71y old  Male who presents with a chief complaint of SOB (03 Sep 2021 17:52)    HPI:  72yo M w/ PMHx of HTN, DM2, BPH, asthma presents with shortness of breath. Patient endorses that he has been feeling SOB with associated cough for the last 2 weeks and his symptoms slowly worsened over time. He did not receive the COVID vaccine. He reports that his family members whom he lives with likely are also COVID positive but he does not know for sure. He did not try taking anything for his symptoms other than his usual medications/inhalers. There were no obvious aggravating or alleviating factors. Currently with supplemental oxygen on, he feels well and has no complaints, however he presented to [facility] since his symptoms were worsening. In the ED, he was hemodynamically stable, afebrile, but he was encephalopathic and hypoxic on room air requiring high flow nasal canula. Labs were significant for mild hyperkalemia and COVID positive. CXR prelim read showed bilateral patchy opacities. CT head showed no acute findings. MICU was consulted in the ED, patient was deemed not a MICU candidate. Patient was given doxycycline and dexamethasone x1 and admitted to general medicine for further management. At time of interview, patient was A/Ox3 with .  (13 Aug 2021 04:21)      Interval Events:      REVIEW OF SYSTEMS:          OBJECTIVE:  ICU Vital Signs Last 24 Hrs  T(C): 36.7 (04 Sep 2021 08:00), Max: 36.9 (03 Sep 2021 10:00)  T(F): 98.1 (04 Sep 2021 08:00), Max: 98.4 (03 Sep 2021 10:00)  HR: 80 (04 Sep 2021 08:30) (73 - 98)  BP: --  BP(mean): --  ABP: 127/52 (04 Sep 2021 08:30) (71/34 - 176/79)  ABP(mean): 74 (04 Sep 2021 08:30) (47 - 347)  RR: 34 (04 Sep 2021 08:30) (28 - 34)  SpO2: 97% (04 Sep 2021 08:30) (90% - 100%)    Mode: AC/ CMV (Assist Control/ Continuous Mandatory Ventilation), RR (machine): 34, FiO2: 90, PEEP: 10, ITime: 1, MAP: 15, PC: 26, PIP: 38    09-03-21 @ 07:01  -  09-04-21 @ 07:00  --------------------------------------------------------  IN: 2838.8 mL / OUT: 2855 mL / NET: -16.1 mL    09-04-21 @ 07:01  - 09-04-21 @ 08:39  --------------------------------------------------------  IN: 87.3 mL / OUT: 200 mL / NET: -112.7 mL      CAPILLARY BLOOD GLUCOSE      POCT Blood Glucose.: 175 mg/dL (04 Sep 2021 05:34)          PHYSICAL EXAM:          HOSPITAL MEDICATIONS:  MEDICATIONS  (STANDING):  ALBUTerol    90 MICROgram(s) HFA Inhaler 2 Puff(s) Inhalation every 8 hours  budesonide 160 MICROgram(s)/formoterol 4.5 MICROgram(s) Inhaler 2 Puff(s) Inhalation two times a day  buMETAnide Injectable 1 milliGRAM(s) IV Push two times a day  chlorhexidine 0.12% Liquid 15 milliLiter(s) Oral Mucosa every 12 hours  chlorhexidine 4% Liquid 1 Application(s) Topical <User Schedule>  cholecalciferol 2000 Unit(s) Oral daily  cisatracurium Infusion 3 MICROgram(s)/kG/Min (14.7 mL/Hr) IV Continuous <Continuous>  diazepam    Tablet 10 milliGRAM(s) Oral every 8 hours  enoxaparin Injectable 80 milliGRAM(s) SubCutaneous every 12 hours  fentaNYL   Infusion... 2 MICROgram(s)/kG/Hr (8.16 mL/Hr) IV Continuous <Continuous>  insulin lispro (ADMELOG) corrective regimen sliding scale   SubCutaneous every 6 hours  ketamine Infusion. 1.998 mG/kG/Hr (16.3 mL/Hr) IV Continuous <Continuous>  methadone    Tablet 20 milliGRAM(s) Oral every 8 hours  midodrine 10 milliGRAM(s) Oral every 8 hours  multivitamin/minerals Oral Solution (WELLESSE) 30 milliLiter(s) Enteral Tube daily  naloxegol 25 milliGRAM(s) Oral daily  norepinephrine Infusion 0.05 MICROgram(s)/kG/Min (3.83 mL/Hr) IV Continuous <Continuous>  pantoprazole  Injectable 40 milliGRAM(s) IV Push daily  petrolatum Ophthalmic Ointment 1 Application(s) Both EYES every 8 hours  polyethylene glycol 3350 17 Gram(s) Oral daily  senna Syrup 10 milliLiter(s) Oral at bedtime  vancomycin  IVPB      vancomycin  IVPB 750 milliGRAM(s) IV Intermittent every 12 hours  vasopressin Infusion 0.04 Unit(s)/Min (2.4 mL/Hr) IV Continuous <Continuous>    MEDICATIONS  (PRN):  fentaNYL    Injectable 50 MICROGram(s) IV Push every 4 hours PRN pain      LABS:                        8.2    9.10  )-----------( 219      ( 04 Sep 2021 01:35 )             25.7     Hgb Trend: 8.2<--, 8.6<--, 6.8<--, 7.3<--, 7.9<--  09-04    136  |  99  |  23  ----------------------------<  117<H>  4.3   |  26  |  0.59    Ca    8.5      04 Sep 2021 01:35  Phos  2.6     09-04  Mg     2.2     09-04    TPro  5.5<L>  /  Alb  2.3<L>  /  TBili  1.4<H>  /  DBili  x   /  AST  70<H>  /  ALT  147<H>  /  AlkPhos  664<H>  09-04    LIVER FUNCTIONS - ( 04 Sep 2021 01:35 )  Alb: 2.3 g/dL / Pro: 5.5 g/dL / ALK PHOS: 664 U/L / ALT: 147 U/L / AST: 70 U/L / GGT: x           Creatinine Trend: 0.59<--, 0.59<--, 0.60<--, 0.72<--, 0.68<--, 0.63<--  PT/INR - ( 03 Sep 2021 01:44 )   PT: 12.9 sec;   INR: 1.08 ratio         PTT - ( 04 Sep 2021 01:35 )  PTT:34.8 sec    Arterial Blood Gas:  09-04 @ 01:31  7.33/57/148/30/98.6/3.5  ABG lactate: --  Arterial Blood Gas:  09-03 @ 13:09  7.38/51/96/30/99.1/4.3  ABG lactate: --  Arterial Blood Gas:  09-03 @ 04:06  7.40/55/76/34/97.8/8.3  ABG lactate: --  Arterial Blood Gas:  09-03 @ 01:41  7.49/42/75/32/97.8/7.9  ABG lactate: --  Arterial Blood Gas:  09-02 @ 18:41  7.47/47/106/34/100.0/9.6  ABG lactate: --  Arterial Blood Gas:  09-02 @ 11:33  7.35/63/166/35/100.0/8.2  ABG lactate: --        MICROBIOLOGY: Reviewed      RADIOLOGY: Reviewed and interpreted by me    EKG:   CHIEF COMPLAINT:  Patient is a 71y old  Male who presents with a chief complaint of SOB (03 Sep 2021 17:52)    HPI:  70yo M w/ PMHx of HTN, DM2, BPH, asthma presents with shortness of breath. Patient endorses that he has been feeling SOB with associated cough for the last 2 weeks and his symptoms slowly worsened over time. He did not receive the COVID vaccine. He reports that his family members whom he lives with likely are also COVID positive but he does not know for sure. He did not try taking anything for his symptoms other than his usual medications/inhalers. There were no obvious aggravating or alleviating factors. Currently with supplemental oxygen on, he feels well and has no complaints, however he presented to [facility] since his symptoms were worsening. In the ED, he was hemodynamically stable, afebrile, but he was encephalopathic and hypoxic on room air requiring high flow nasal canula. Labs were significant for mild hyperkalemia and COVID positive. CXR prelim read showed bilateral patchy opacities. CT head showed no acute findings. MICU was consulted in the ED, patient was deemed not a MICU candidate. Patient was given doxycycline and dexamethasone x1 and admitted to general medicine for further management. At time of interview, patient was A/Ox3 with .  (13 Aug 2021 04:21)      Interval Events:  -proned and due to supine @ 8am  -Vanco dose decreased to 750 bid 2/2 trough    OBJECTIVE:  ICU Vital Signs Last 24 Hrs  T(C): 36.7 (04 Sep 2021 08:00), Max: 36.9 (03 Sep 2021 10:00)  T(F): 98.1 (04 Sep 2021 08:00), Max: 98.4 (03 Sep 2021 10:00)  HR: 80 (04 Sep 2021 08:30) (73 - 98)  BP: --  BP(mean): --  ABP: 127/52 (04 Sep 2021 08:30) (71/34 - 176/79)  ABP(mean): 74 (04 Sep 2021 08:30) (47 - 347)  RR: 34 (04 Sep 2021 08:30) (28 - 34)  SpO2: 97% (04 Sep 2021 08:30) (90% - 100%)    Mode: AC/ CMV (Assist Control/ Continuous Mandatory Ventilation), RR (machine): 34, FiO2: 90, PEEP: 10, ITime: 1, MAP: 15, PC: 26, PIP: 38    09-03-21 @ 07:01  -  09-04-21 @ 07:00  --------------------------------------------------------  IN: 2838.8 mL / OUT: 2855 mL / NET: -16.1 mL    09-04-21 @ 07:01  -  09-04-21 @ 08:39  --------------------------------------------------------  IN: 87.3 mL / OUT: 200 mL / NET: -112.7 mL      CAPILLARY BLOOD GLUCOSE      POCT Blood Glucose.: 175 mg/dL (04 Sep 2021 05:34)          PHYSICAL EXAM:          HOSPITAL MEDICATIONS:  MEDICATIONS  (STANDING):  ALBUTerol    90 MICROgram(s) HFA Inhaler 2 Puff(s) Inhalation every 8 hours  budesonide 160 MICROgram(s)/formoterol 4.5 MICROgram(s) Inhaler 2 Puff(s) Inhalation two times a day  buMETAnide Injectable 1 milliGRAM(s) IV Push two times a day  chlorhexidine 0.12% Liquid 15 milliLiter(s) Oral Mucosa every 12 hours  chlorhexidine 4% Liquid 1 Application(s) Topical <User Schedule>  cholecalciferol 2000 Unit(s) Oral daily  cisatracurium Infusion 3 MICROgram(s)/kG/Min (14.7 mL/Hr) IV Continuous <Continuous>  diazepam    Tablet 10 milliGRAM(s) Oral every 8 hours  enoxaparin Injectable 80 milliGRAM(s) SubCutaneous every 12 hours  fentaNYL   Infusion... 2 MICROgram(s)/kG/Hr (8.16 mL/Hr) IV Continuous <Continuous>  insulin lispro (ADMELOG) corrective regimen sliding scale   SubCutaneous every 6 hours  ketamine Infusion. 1.998 mG/kG/Hr (16.3 mL/Hr) IV Continuous <Continuous>  methadone    Tablet 20 milliGRAM(s) Oral every 8 hours  midodrine 10 milliGRAM(s) Oral every 8 hours  multivitamin/minerals Oral Solution (WELLESSE) 30 milliLiter(s) Enteral Tube daily  naloxegol 25 milliGRAM(s) Oral daily  norepinephrine Infusion 0.05 MICROgram(s)/kG/Min (3.83 mL/Hr) IV Continuous <Continuous>  pantoprazole  Injectable 40 milliGRAM(s) IV Push daily  petrolatum Ophthalmic Ointment 1 Application(s) Both EYES every 8 hours  polyethylene glycol 3350 17 Gram(s) Oral daily  senna Syrup 10 milliLiter(s) Oral at bedtime  vancomycin  IVPB      vancomycin  IVPB 750 milliGRAM(s) IV Intermittent every 12 hours  vasopressin Infusion 0.04 Unit(s)/Min (2.4 mL/Hr) IV Continuous <Continuous>    MEDICATIONS  (PRN):  fentaNYL    Injectable 50 MICROGram(s) IV Push every 4 hours PRN pain      LABS:                        8.2    9.10  )-----------( 219      ( 04 Sep 2021 01:35 )             25.7     Hgb Trend: 8.2<--, 8.6<--, 6.8<--, 7.3<--, 7.9<--  09-04    136  |  99  |  23  ----------------------------<  117<H>  4.3   |  26  |  0.59    Ca    8.5      04 Sep 2021 01:35  Phos  2.6     09-04  Mg     2.2     09-04    TPro  5.5<L>  /  Alb  2.3<L>  /  TBili  1.4<H>  /  DBili  x   /  AST  70<H>  /  ALT  147<H>  /  AlkPhos  664<H>  09-04    LIVER FUNCTIONS - ( 04 Sep 2021 01:35 )  Alb: 2.3 g/dL / Pro: 5.5 g/dL / ALK PHOS: 664 U/L / ALT: 147 U/L / AST: 70 U/L / GGT: x           Creatinine Trend: 0.59<--, 0.59<--, 0.60<--, 0.72<--, 0.68<--, 0.63<--  PT/INR - ( 03 Sep 2021 01:44 )   PT: 12.9 sec;   INR: 1.08 ratio         PTT - ( 04 Sep 2021 01:35 )  PTT:34.8 sec    Arterial Blood Gas:  09-04 @ 01:31  7.33/57/148/30/98.6/3.5  ABG lactate: --  Arterial Blood Gas:  09-03 @ 13:09  7.38/51/96/30/99.1/4.3  ABG lactate: --  Arterial Blood Gas:  09-03 @ 04:06  7.40/55/76/34/97.8/8.3  ABG lactate: --  Arterial Blood Gas:  09-03 @ 01:41  7.49/42/75/32/97.8/7.9  ABG lactate: --  Arterial Blood Gas:  09-02 @ 18:41  7.47/47/106/34/100.0/9.6  ABG lactate: --  Arterial Blood Gas:  09-02 @ 11:33  7.35/63/166/35/100.0/8.2  ABG lactate: --        MICROBIOLOGY: Reviewed      RADIOLOGY: Reviewed and interpreted by me    EKG:   CHIEF COMPLAINT:  Patient is a 71y old  Male who presents with a chief complaint of SOB (03 Sep 2021 17:52)    HPI:  72yo M w/ PMHx of HTN, DM2, BPH, asthma presents with shortness of breath. Patient endorses that he has been feeling SOB with associated cough for the last 2 weeks and his symptoms slowly worsened over time. He did not receive the COVID vaccine. He reports that his family members whom he lives with likely are also COVID positive but he does not know for sure. He did not try taking anything for his symptoms other than his usual medications/inhalers. There were no obvious aggravating or alleviating factors. Currently with supplemental oxygen on, he feels well and has no complaints, however he presented to [facility] since his symptoms were worsening. In the ED, he was hemodynamically stable, afebrile, but he was encephalopathic and hypoxic on room air requiring high flow nasal canula. Labs were significant for mild hyperkalemia and COVID positive. CXR prelim read showed bilateral patchy opacities. CT head showed no acute findings. MICU was consulted in the ED, patient was deemed not a MICU candidate. Patient was given doxycycline and dexamethasone x1 and admitted to general medicine for further management. At time of interview, patient was A/Ox3 with .  (13 Aug 2021 04:21)      Interval Events:  -proned and due to supine @ 8am  -Vanco dose decreased to 750 bid 2/2 trough    OBJECTIVE:  ICU Vital Signs Last 24 Hrs  T(C): 36.7 (04 Sep 2021 08:00), Max: 36.9 (03 Sep 2021 10:00)  T(F): 98.1 (04 Sep 2021 08:00), Max: 98.4 (03 Sep 2021 10:00)  HR: 80 (04 Sep 2021 08:30) (73 - 98)  BP: --  BP(mean): --  ABP: 127/52 (04 Sep 2021 08:30) (71/34 - 176/79)  ABP(mean): 74 (04 Sep 2021 08:30) (47 - 347)  RR: 34 (04 Sep 2021 08:30) (28 - 34)  SpO2: 97% (04 Sep 2021 08:30) (90% - 100%)    Mode: AC/ CMV (Assist Control/ Continuous Mandatory Ventilation), RR (machine): 34, FiO2: 90, PEEP: 10, ITime: 1, MAP: 15, PC: 26, PIP: 38    09-03-21 @ 07:01  -  09-04-21 @ 07:00  --------------------------------------------------------  IN: 2838.8 mL / OUT: 2855 mL / NET: -16.1 mL    09-04-21 @ 07:01  - 09-04-21 @ 08:39  --------------------------------------------------------  IN: 87.3 mL / OUT: 200 mL / NET: -112.7 mL      CAPILLARY BLOOD GLUCOSE  POCT Blood Glucose.: 175 mg/dL (04 Sep 2021 05:34)    PHYSICAL EXAM:  GENERAL: NAD, sedated and intubated  HEENT:  Atraumatic, Normocephalic  EYES: PERRLA, conjunctiva and sclera clear  NECK: Supple, No JVD  CHEST/LUNG: diminished bilaterally; No wheezes, rales, or rhonchi  HEART: Regular rate and rhythm; No murmurs, rubs, or gallops  ABDOMEN: Soft, Nondistended; Bowel sounds present  EXTREMITIES:  2+ Peripheral Pulses, No clubbing, cyanosis, or edema  PSYCH: unable as pt is sedated  NEUROLOGY: sedated  SKIN: No rashes or lesions    HOSPITAL MEDICATIONS:  MEDICATIONS  (STANDING):  ALBUTerol    90 MICROgram(s) HFA Inhaler 2 Puff(s) Inhalation every 8 hours  budesonide 160 MICROgram(s)/formoterol 4.5 MICROgram(s) Inhaler 2 Puff(s) Inhalation two times a day  buMETAnide Injectable 1 milliGRAM(s) IV Push two times a day  chlorhexidine 0.12% Liquid 15 milliLiter(s) Oral Mucosa every 12 hours  chlorhexidine 4% Liquid 1 Application(s) Topical <User Schedule>  cholecalciferol 2000 Unit(s) Oral daily  cisatracurium Infusion 3 MICROgram(s)/kG/Min (14.7 mL/Hr) IV Continuous <Continuous>  diazepam    Tablet 10 milliGRAM(s) Oral every 8 hours  enoxaparin Injectable 80 milliGRAM(s) SubCutaneous every 12 hours  fentaNYL   Infusion... 2 MICROgram(s)/kG/Hr (8.16 mL/Hr) IV Continuous <Continuous>  insulin lispro (ADMELOG) corrective regimen sliding scale   SubCutaneous every 6 hours  ketamine Infusion. 1.998 mG/kG/Hr (16.3 mL/Hr) IV Continuous <Continuous>  methadone    Tablet 20 milliGRAM(s) Oral every 8 hours  midodrine 10 milliGRAM(s) Oral every 8 hours  multivitamin/minerals Oral Solution (WELLESSE) 30 milliLiter(s) Enteral Tube daily  naloxegol 25 milliGRAM(s) Oral daily  norepinephrine Infusion 0.05 MICROgram(s)/kG/Min (3.83 mL/Hr) IV Continuous <Continuous>  pantoprazole  Injectable 40 milliGRAM(s) IV Push daily  petrolatum Ophthalmic Ointment 1 Application(s) Both EYES every 8 hours  polyethylene glycol 3350 17 Gram(s) Oral daily  senna Syrup 10 milliLiter(s) Oral at bedtime  vancomycin  IVPB      vancomycin  IVPB 750 milliGRAM(s) IV Intermittent every 12 hours  vasopressin Infusion 0.04 Unit(s)/Min (2.4 mL/Hr) IV Continuous <Continuous>    MEDICATIONS  (PRN):  fentaNYL    Injectable 50 MICROGram(s) IV Push every 4 hours PRN pain      LABS:                        8.2    9.10  )-----------( 219      ( 04 Sep 2021 01:35 )             25.7     Hgb Trend: 8.2<--, 8.6<--, 6.8<--, 7.3<--, 7.9<--  09-04    136  |  99  |  23  ----------------------------<  117<H>  4.3   |  26  |  0.59    Ca    8.5      04 Sep 2021 01:35  Phos  2.6     09-04  Mg     2.2     09-04    TPro  5.5<L>  /  Alb  2.3<L>  /  TBili  1.4<H>  /  DBili  x   /  AST  70<H>  /  ALT  147<H>  /  AlkPhos  664<H>  09-04    LIVER FUNCTIONS - ( 04 Sep 2021 01:35 )  Alb: 2.3 g/dL / Pro: 5.5 g/dL / ALK PHOS: 664 U/L / ALT: 147 U/L / AST: 70 U/L / GGT: x           Creatinine Trend: 0.59<--, 0.59<--, 0.60<--, 0.72<--, 0.68<--, 0.63<--  PT/INR - ( 03 Sep 2021 01:44 )   PT: 12.9 sec;   INR: 1.08 ratio         PTT - ( 04 Sep 2021 01:35 )  PTT:34.8 sec    Arterial Blood Gas:  09-04 @ 01:31  7.33/57/148/30/98.6/3.5  ABG lactate: --  Arterial Blood Gas:  09-03 @ 13:09  7.38/51/96/30/99.1/4.3  ABG lactate: --  Arterial Blood Gas:  09-03 @ 04:06  7.40/55/76/34/97.8/8.3  ABG lactate: --  Arterial Blood Gas:  09-03 @ 01:41  7.49/42/75/32/97.8/7.9  ABG lactate: --  Arterial Blood Gas:  09-02 @ 18:41  7.47/47/106/34/100.0/9.6  ABG lactate: --  Arterial Blood Gas:  09-02 @ 11:33  7.35/63/166/35/100.0/8.2  ABG lactate: --        MICROBIOLOGY: Reviewed    RADIOLOGY: Reviewed and interpreted by me    EKG: nsr

## 2021-09-04 NOTE — PROGRESS NOTE ADULT - SUBJECTIVE AND OBJECTIVE BOX
ENT ISSUE/POD: oral bleed       HPI: 72yo M w/ PMHx of HTN, DM2, BPH, asthma admitted with shortness of breath. Pt was found to be Covid+ on 8/12 and intubated on 8/20 for respiratory distress. ENT was consulted for oral bleed. Pt is on therapeutic lovenox, currently on hold. S/P cauterization with silver nitrate and oral packing ( 1 kerlix ). No further bleeding overnight      PAST MEDICAL & SURGICAL HISTORY:  BPH (benign prostatic hyperplasia)    Hyperlipemia    HTN (hypertension)    Hypercholesteremia    Asthma    Diabetes    Kidney stone    Bladder stone    H/O lithotripsy  x 2    History of kidney stones    H/O umbilical hernia repair      Allergies    No Known Allergies    Intolerances      MEDICATIONS  (STANDING):  ALBUTerol    90 MICROgram(s) HFA Inhaler 2 Puff(s) Inhalation every 8 hours  budesonide 160 MICROgram(s)/formoterol 4.5 MICROgram(s) Inhaler 2 Puff(s) Inhalation two times a day  buMETAnide Injectable 1 milliGRAM(s) IV Push two times a day  chlorhexidine 0.12% Liquid 15 milliLiter(s) Oral Mucosa every 12 hours  chlorhexidine 4% Liquid 1 Application(s) Topical <User Schedule>  cholecalciferol 2000 Unit(s) Oral daily  cisatracurium Infusion 3 MICROgram(s)/kG/Min (14.7 mL/Hr) IV Continuous <Continuous>  diazepam    Tablet 10 milliGRAM(s) Oral every 8 hours  enoxaparin Injectable 80 milliGRAM(s) SubCutaneous every 12 hours  fentaNYL   Infusion... 2 MICROgram(s)/kG/Hr (8.16 mL/Hr) IV Continuous <Continuous>  insulin lispro (ADMELOG) corrective regimen sliding scale   SubCutaneous every 6 hours  ketamine Infusion. 1.998 mG/kG/Hr (16.3 mL/Hr) IV Continuous <Continuous>  methadone    Tablet 20 milliGRAM(s) Oral every 8 hours  midodrine 10 milliGRAM(s) Oral every 8 hours  multivitamin/minerals Oral Solution (WELLESSE) 30 milliLiter(s) Enteral Tube daily  naloxegol 25 milliGRAM(s) Oral daily  norepinephrine Infusion 0.05 MICROgram(s)/kG/Min (3.83 mL/Hr) IV Continuous <Continuous>  pantoprazole  Injectable 40 milliGRAM(s) IV Push daily  petrolatum Ophthalmic Ointment 1 Application(s) Both EYES every 8 hours  polyethylene glycol 3350 17 Gram(s) Oral daily  senna Syrup 10 milliLiter(s) Oral at bedtime  vancomycin  IVPB      vancomycin  IVPB 750 milliGRAM(s) IV Intermittent every 12 hours    MEDICATIONS  (PRN):  fentaNYL    Injectable 50 MICROGram(s) IV Push every 4 hours PRN pain        ROS:   unable to obtain due to pts clinical condition     Vital Signs Last 24 Hrs  T(C): 36.7 (04 Sep 2021 08:00), Max: 36.7 (04 Sep 2021 08:00)  T(F): 98.1 (04 Sep 2021 08:00), Max: 98.1 (04 Sep 2021 08:00)  HR: 75 (04 Sep 2021 11:30) (73 - 98)  BP: --  BP(mean): --  RR: 34 (04 Sep 2021 11:30) (28 - 34)  SpO2: 97% (04 Sep 2021 11:30) (90% - 100%)                          8.2    9.10  )-----------( 219      ( 04 Sep 2021 01:35 )             25.7    09-04    136  |  99  |  23  ----------------------------<  117<H>  4.3   |  26  |  0.59    Ca    8.5      04 Sep 2021 01:35  Phos  2.6     09-04  Mg     2.2     09-04    TPro  5.5<L>  /  Alb  2.3<L>  /  TBili  1.4<H>  /  DBili  x   /  AST  70<H>  /  ALT  147<H>  /  AlkPhos  664<H>  09-04   PT/INR - ( 03 Sep 2021 01:44 )   PT: 12.9 sec;   INR: 1.08 ratio         PTT - ( 04 Sep 2021 01:35 )  PTT:34.8 sec    PHYSICAL EXAM:  Gen: NAD  Skin: No rashes, bruises, or lesions  Head: Normocephalic, Atraumatic  Face: no edema, erythema, or fluctuance. Parotid glands soft without mass  Eyes: no scleral injection  Nose: Nares bilaterally patent, no discharge  Mouth: ET tube and NGT in place,  Kerlix dry and clean distally, no BRB   Neck: Flat, supple, no lymphadenopathy, trachea midline, no masses  Lymphatic: No lymphadenopathy  Resp: on ventilator  Neuro: facial nerve intact, no facial droop

## 2021-09-05 NOTE — CHART NOTE - NSCHARTNOTEFT_GEN_A_CORE
Had a lengthy discussion with family of Terence echavarria/ wife (HC surrogate) and daughters and son on speaker as well.  was used (Wallisian- Janna #[id number]) as wife prefers Wallisian. Daughter Maribel later spoke primarily in English.     Initial conversation was regarding ivermectin and its use for Terence. They wanted to know possible utility of it and any risks. I explained that it is currently not recommended for  COVID ARDS by the CDC, FDA and is not approved for use by the [facility] covid treatment policy. I also explained that the drug has not been approved for this for lack of any clear evidence of benefit. I informed that any medication has possible toxicity and that this may not have any impact but also could possibly cause him deteriorate if he had any side effects. After this discussion they did not want to use ivermectin at this time.    Had further conversation with family with wide ranging discussion regarding hospital course. ALl issues were explained. Their primary concern is that he is not getting better and they know there is a concern for him dying from his advanced disease and they would like to try anything to change his outcome. They requested another course of dexamethasone to decrease lung inflammation for  his ards. I explained this is not typical practice and carries risks of worsening immune system and he already is battling infections. They understand the risks but would like to try anyway as they would like to take the chance to improve his condition as at least dexamethasone has evidence of benefit.  Plan to cont aggressive therapy. Will start another course of dexamethasone at strong family request. They are aware of his critical condition and poor prognosis. Had a lengthy discussion with family of Terence echavarria/ wife (HC surrogate) and daughters and son on speaker as well.  was used (Tunisian- Janna #[id number]) as wife prefers Tunisian. Daughter Maribel later spoke primarily in English.     Initial conversation was regarding ivermectin and its use for Terence. They wanted to know possible utility of it and any risks. I explained that it is currently not recommended for  COVID ARDS by the CDC, FDA and is not approved for use by the [facility] covid treatment policy. I also explained that the drug has not been approved for this for lack of any clear evidence of benefit. I informed that any medication has possible toxicity and that this may not have any impact but also could possibly cause him deteriorate if he had any side effects. After this discussion they did not want to use ivermectin at this time. Repeated offer to prescribe ivermectin if they so chose but they all refused it.     Had further conversation with family with wide ranging discussion regarding hospital course. ALl issues were explained. Their primary concern is that he is not getting better and they know there is a concern for him dying from his advanced disease and they would like to try anything to change his outcome. They requested another course of dexamethasone to decrease lung inflammation for  his ards. I explained this is not typical practice and carries risks of worsening immune system and he already is battling infections. They understand the risks but would like to try anyway as they would like to take the chance to improve his condition as at least dexamethasone has evidence of benefit.  Plan to cont aggressive therapy. Will start another course of dexamethasone at strong family request. They are aware of his critical condition and poor prognosis.

## 2021-09-05 NOTE — PROGRESS NOTE ADULT - ASSESSMENT
71M [place] speaking Hx HTN, DM2, BPH, Asthma admit 8/12 Covid-19 PNA with progressive worsening hypoxic respiratory failure s/p intubation (8/20) c/b VT arrest s/p shock/epi with ROSC ~6mins, remains sedated and paralyzed     Neuro:   #Sedation  - Paralyzed: Nimbex 4 / Sedated: Fentanyl 2  - Remains off Propofol in setting of hypertriglyceridemia   - c/w Versed 2 mg IVP q4 and Fent 50 mcg IVP q4 PRNs  - c/w Valium 10 TID,  Methadone to 20 TID  -     Pulm:   #Acute Hypoxic Respiratory Failure 2/2 COVID PNA c/b ARDS  - Intubated (8/20)  - Proned at 1200. post prone gas 7.38/51/96/30  - Vent mode  PC: 34/10, PEEP 10, Fi02 80%  - s/p Toci (8/15), RDV (8/13-17), Dex (8/13-23)  - Vent management per ABGs    #Pnumomediastinum  - Occurred 8/21, now resolved    #Rule Out: PE  - Positive DVT suspected PE. Too unstable for CTA.   - Continue Lovenox 80 BID for full AC.   - Anti factor Xa theraputic at 1.7    #Hx Asthma  - c/w home Symbicort and albuterol     CV  #Hypotension in setting of vasodilatory shock vs. distributive vs. medication induced   - OFF Vaso, wide pulse pressures, titrate to SBP > 100  - s/p PEA vs. VT arrest upon intubation requiring shock/epi w/ ROSC  - Pending TTE, will POCUS today    #PAF, episode Afib RVR requiring Amio bolus in recent hx - Hold further Amio for now   - in NSR, on full AC, will monitor on telemetry      /Renal:   #TAYLOR, resolved  - Monitor kidney fx closely while diuresing  -Bumex 1mg Q 12h added to regimen  -electrolytes suplemented  strict I/Os, target -1L     GI:  #Oropharyngeal dysphagia   - Tolerating TF at goal: Glucerna 20ml/hr while proned  - c/w bowel regimen, GI ppx: PPI   - s/p RUQ Sono: trace ascites    #Transaminase  - Downtrending, trend LFTs daily, avoid hepatotoxins     ENDO:  #Hx T2DM, A1c 7.8  - now Hypoglycemic with basal/bolus coverage on TF at 10cc/h  - NPH decreased to 6u q6hr, ISS mod, trend FS closely    - Hypoglycemia protocol   -Nutrition f/u re formula or rate change    Heme:  Anemia-Unclear etiology, mod amounts of blood seen from right palate-s/p packing by ENT  -s/p 1 unit PRBC-9/3 with good response  -am dose lovenox held  #Hypercoagulable state in setting of Covid   - VA Duplex (8/21): +DVT L popiteal, RLE below knee  - c/w full AC Lovenox, Anti 10A factor 1.7    ID   #MRSE Bacteremia  On reduced dose of vanco-750 for trough 21 day 4/14  - Next trough pre 4th dose of new dosage  - Lines changed (8/30)  - TTE pending     #COVID-19 PNA (not vaccinated)  - s/p Toci (8/15), RDV (8/13-17), Dex (8/13-23)  - Inflammatory markers q72h    #GOC  - FULL code at this time.   - Goals of care d/w son and 2 daughters    71M Ethiopian speaking Hx HTN, DM2, BPH, Asthma admit 8/12 Covid-19 PNA with progressive worsening hypoxic respiratory failure s/p intubation (8/20) c/b VT arrest s/p shock/epi with ROSC ~6mins, remains sedated and paralyzed.     Neuro:   Sedation  - Fentanyl and Ketamine.   - Paralysis with Nimbex.   - Remains off Propofol in setting of hypertriglyceridemia   - Versed 2 mg IVP q4 and Fent 50 mcg IVP q4 PRNs  - Valium 15 TID,  Methadone to 30 TID    Pulm:   Acute Hypoxic Respiratory Failure 2/2 COVID PNA c/b ARDS  - Intubated (8/20)  - Vent mode  PC: 34/10, PEEP 10, Fi02 70%  Pnumomediastinum  - Occurred 8/21, now resolved  Rule Out: PE  - Positive DVT suspected PE. Too unstable for CTA.   - Continue Lovenox 80 BID for full AC.   - Anti factor Xa theraputic at 1.7  Hx Asthma  - c/w home Symbicort and albuterol     CV  Hypotension in setting of vasodilatory shock vs. distributive vs. medication induced   - Levo infusion.   - Midodrine 10mg q8.   - Pending TTE   PAF, episode Afib RVR requiring Amio bolus in recent hx   - Hold further Amio for now   - in NSR, on full AC, will monitor on telemetry   PEA/VT Arrest  - upon intubation requiring shock/epi w/ ROSC     /Renal:   TAYLOR, resolved  - Monitor kidney fx closely while diuresing  - Bumex 1mg Q 12h for goal net negative -1L.     GI:  Oropharyngeal Dysphagia   - Tolerating TF at goal: Glucerna 20ml/hr while proned  - c/w bowel regimen, GI ppx: PPI   - s/p RUQ Sono: trace ascites    Transaminase  - Downtrending, trend LFTs daily, avoid hepatotoxins     ENDO:  Hx T2DM, A1c 7.8  - Hypoglycemic, NPH d/c'ed  - ISS    Heme:  Anemia-Unclear etiology, mod amounts of blood seen from right palate-s/p packing by ENT  -s/p 1 unit PRBC-9/3 with good response  -am dose lovenox held  - CBC q12.   Hypercoagulable state in setting of Covid   - VA Duplex (8/21): +DVT L popiteal, RLE below knee  - Full AC Lovenox, Anti 10A factor 1.7    ID   MRSE Bacteremia  - Vanco decreased to 750 new trough 17.   - Next trough pre 4th dose of new dosage (9/4 1800)  - Lines changed (8/30)  - TTE pending   COVID-19 PNA (not vaccinated)  - Status post Toci (8/15), RDV (8/13-17), Dex (8/13-23)    GOC  - FULL code at this time.   - Goals of care d/w son and 2 daughters  71M Cymro speaking Hx HTN, DM2, BPH, Asthma admit 8/12 Covid-19 PNA with progressive worsening hypoxic respiratory failure s/p intubation (8/20) c/b VT arrest s/p shock/epi with ROSC ~6mins, remains sedated on ventilator.     Neuro:   Sedation  - Fentanyl and Ketamine  - Remains off Propofol in setting of hypertriglyceridemia   - Versed 2 mg IVP q4 and Fent 50 mcg IVP q4 PRNs  - Valium 15 TID,  Methadone to 30 TID    Pulm:   Acute Hypoxic Respiratory Failure 2/2 COVID PNA c/b ARDS  - Intubated (8/20)  - Vent mode  PC: 34/26/10/80%  Pnumomediastinum  - Occurred 8/21, now resolved  Rule Out: PE  - Positive DVT suspected PE. Too unstable for CTA.   - Continue Lovenox 80 BID for full AC.   - Anti factor Xa theraputic at 1.7  Hx Asthma  - c/w home Symbicort and albuterol     CV  Hypotension in setting of vasodilatory shock vs. distributive vs. medication induced   - Levo infusion.   - Midodrine 10mg q8.   - Pending TTE   PAF, episode Afib RVR requiring Amio bolus in recent hx   - Hold further Amio for now   - in NSR, on full AC, will monitor on telemetry   PEA/VT Arrest  - upon intubation requiring shock/epi w/ ROSC     /Renal:   TAYLOR, resolved  - Monitor kidney fx closely while diuresing  - Bumex 1mg Q 12h for goal net negative -1L.     GI:  Oropharyngeal Dysphagia   - Tolerating TF at goal: Glucerna 20ml/hr while proned  - c/w bowel regimen, GI ppx: PPI   - s/p RUQ Sono: trace ascites    Transaminase  - Downtrending, trend LFTs daily, avoid hepatotoxins     ENDO:  Hx T2DM, A1c 7.8  - Hypoglycemic, NPH d/c'ed  - ISS    Heme:  Anemia-Unclear etiology, mod amounts of blood seen from right palate-s/p packing by ENT  -s/p 1 unit PRBC-9/3 with good response  -am dose lovenox held  - CBC q12.   Hypercoagulable state in setting of Covid   - VA Duplex (8/21): +DVT L popiteal, RLE below knee  - Full AC Lovenox, Anti 10A factor 1.7    ID   MRSE Bacteremia  - Vanco decreased to 750 new trough 17.   - Next trough pre 4th dose of new dosage (9/4 1800)  - Lines changed (8/30)  - TTE pending   COVID-19 PNA (not vaccinated)  - Status post Toci (8/15), RDV (8/13-17), Dex (8/13-23)    GOC  - FULL code at this time.   - Goals of care d/w wife (via ), son and 2 daughters

## 2021-09-05 NOTE — PROGRESS NOTE ADULT - SUBJECTIVE AND OBJECTIVE BOX
ENT ISSUE/POD: oral bleed    HPI: 70yo M w/ PMHx of HTN, DM2, BPH, asthma admitted with shortness of breath. Pt was found to be Covid+ on 8/12 and intubated on 8/20 for respiratory distress. ENT was consulted for oral bleed. Pt is on therapeutic lovenox, currently on hold. S/P cauterization with silver nitrate and oral packing ( 1 kerlix ). No further bleeding since      PAST MEDICAL & SURGICAL HISTORY:  BPH (benign prostatic hyperplasia)    Hyperlipemia    HTN (hypertension)    Hypercholesteremia    Asthma    Diabetes    Kidney stone    Bladder stone    H/O lithotripsy  x 2    History of kidney stones    H/O umbilical hernia repair      Allergies    No Known Allergies    Intolerances      MEDICATIONS  (STANDING):  ALBUTerol    90 MICROgram(s) HFA Inhaler 2 Puff(s) Inhalation every 8 hours  budesonide 160 MICROgram(s)/formoterol 4.5 MICROgram(s) Inhaler 2 Puff(s) Inhalation two times a day  buMETAnide Injectable 1 milliGRAM(s) IV Push two times a day  chlorhexidine 0.12% Liquid 15 milliLiter(s) Oral Mucosa every 12 hours  chlorhexidine 4% Liquid 1 Application(s) Topical <User Schedule>  cholecalciferol 2000 Unit(s) Oral daily  cisatracurium Infusion 3 MICROgram(s)/kG/Min (14.7 mL/Hr) IV Continuous <Continuous>  diazepam    Tablet 15 milliGRAM(s) Oral every 8 hours  enoxaparin Injectable 80 milliGRAM(s) SubCutaneous every 12 hours  fentaNYL   Infusion... 2 MICROgram(s)/kG/Hr (8.16 mL/Hr) IV Continuous <Continuous>  insulin lispro (ADMELOG) corrective regimen sliding scale   SubCutaneous every 6 hours  ketamine Infusion. 1.998 mG/kG/Hr (16.3 mL/Hr) IV Continuous <Continuous>  midodrine 10 milliGRAM(s) Oral every 8 hours  multivitamin/minerals Oral Solution (WELLESSE) 30 milliLiter(s) Enteral Tube daily  naloxegol 25 milliGRAM(s) Oral daily  norepinephrine Infusion 0.05 MICROgram(s)/kG/Min (3.83 mL/Hr) IV Continuous <Continuous>  pantoprazole  Injectable 40 milliGRAM(s) IV Push daily  petrolatum Ophthalmic Ointment 1 Application(s) Both EYES every 8 hours  polyethylene glycol 3350 17 Gram(s) Oral daily  senna Syrup 10 milliLiter(s) Oral at bedtime  vancomycin  IVPB      vancomycin  IVPB 750 milliGRAM(s) IV Intermittent every 12 hours    MEDICATIONS  (PRN):  fentaNYL    Injectable 50 MICROGram(s) IV Push every 4 hours PRN pain        ROS:   ENT: all negative except as noted in HPI   Pulm: denies SOB, cough, hemoptysis  Neuro: denies numbness/tingling, loss of sensation  Endo: denies heat/cold intolerance, excessive sweating      Vital Signs Last 24 Hrs  T(C): 37 (05 Sep 2021 07:00), Max: 37 (05 Sep 2021 07:00)  T(F): 98.6 (05 Sep 2021 07:00), Max: 98.6 (05 Sep 2021 07:00)  HR: 85 (05 Sep 2021 09:45) (73 - 102)  BP: --  BP(mean): --  RR: 34 (05 Sep 2021 09:45) (0 - 34)  SpO2: 91% (05 Sep 2021 09:45) (81% - 98%)                          7.4    7.51  )-----------( 181      ( 05 Sep 2021 01:08 )             23.9    09-05    134<L>  |  98  |  19  ----------------------------<  175<H>  4.0   |  29  |  0.60    Ca    8.5      05 Sep 2021 01:08  Phos  2.1     09-05  Mg     2.1     09-05    TPro  5.4<L>  /  Alb  2.4<L>  /  TBili  1.0  /  DBili  x   /  AST  47<H>  /  ALT  116<H>  /  AlkPhos  694<H>  09-05   PTT - ( 04 Sep 2021 01:35 )  PTT:34.8 sec      PHYSICAL EXAM:  Gen: NAD  Skin: No rashes, bruises, or lesions  Head: Normocephalic, Atraumatic  Face: no edema, erythema, or fluctuance. Parotid glands soft without mass  Eyes: no scleral injection  Nose: Nares bilaterally patent, no discharge  Mouth: ET tube and NGT in place,  Kerlix x1 moistened and removed, minimally saturated, NO BRB throughout oropharynx  Neck: Flat, supple, no lymphadenopathy, trachea midline, no masses  Lymphatic: No lymphadenopathy  Resp: on ventilator  Neuro: facial nerve intact, no facial droop

## 2021-09-05 NOTE — CHART NOTE - NSCHARTNOTEFT_GEN_A_CORE
I am not currently on service on the 5ICU service however I was informed by [facility] Legal team on the evening of 9/4/21 that patient's family has threatened to seek legal action if Ivermectin is not given for COVID pneumonia. I was advised to have the clinical team discuss lack of data and seek a disclaimer from the family for Ivermectin administration and then proceed with this once all this has been done.    I had previously had discussions with patients 2 daughters on 9/2 regarding the lack of data or EUA for Ivermectin. However, given the high mortality of COVID-ARDS it seems that they wish to proceed with this medication administration. There are no known standardized dosing regimens for Ivermectin in COVID-19 but based on the [facility] protocol (0.4-0.5 mg/kg) and a current [facility] clinical trail (0.2mg/kg) for 5 days the dose will likely be in this range - probably around 40mg/daily x 5 days. This is higher than the standard 0.15mg/kg dosing for parasitic infections.     I have discussed this with Dr. Palomo - who is taking care of Mr. Nick today and he will contact the family to ensure that they agree to this and will have a witnessed disclaimer placed in the patients chart.    I have also discussed with the leadership of pulmonary, critical care, and [facility] to inform them of these conversations and the plan for Ivermectin administration for COVID-19 if the family continues to seek this therapy. I am not currently on service on the 5ICU service however I was informed by [facility] Legal team on the evening of 9/4/21 that patient's family has threatened to seek legal action if Ivermectin is not given for COVID pneumonia. I was advised to have the clinical team discuss lack of data and seek a disclaimer from the family for Ivermectin administration and then proceed with this once all this has been done.    I had previously had discussions with patients 2 daughters on 9/2 regarding the lack of data or EUA for Ivermectin. However, given the high mortality of COVID-ARDS it seems that they wish to proceed with this medication administration. There are no known standardized dosing regimens for Ivermectin in COVID-19 but based on the [facility] protocol (0.4-0.5 mg/kg) and a current [facility] clinical trail (0.2mg/kg) for 5 days the dose will likely be in this range - probably around 40mg/daily x 5 days. This is higher than the standard 0.15mg/kg dosing for parasitic infections.     I have discussed this with the ICU team who is taking care of Mr. Nick today and they will contact the patients wife, or designated HCP, to ensure that they agree to this and will have a witnessed disclaimer/consent placed in the patients chart.    I have also discussed with the leadership of pulmonary, critical care, and [facility] to inform them of these conversations and the plan for Ivermectin administration for COVID-19 if the family continues to seek this therapy.

## 2021-09-05 NOTE — PROGRESS NOTE ADULT - ATTENDING COMMENTS
Agree with above except as noted. 71M Czech speaking Hx HTN, DM2, BPH, Asthma admit 8/12 Covid-19 PNA with AHRF s/p intubation (8/20), VT arrest -ROSC ~6mins, MRSE bacteremia, DVT on a/c. Pt remains on high fio2 and peep, titrate to maintain sat>90%. Cont LTV as tolerated. S/p multiple session of proning, no change in p/f ratio now with change from supine to prone. Recent bcx ngtd but cont to follow to ensure mrse bacteremia cleared. cont abx, f/u ID reccs. Oral bleeding s/p cautery by ent. No bleeding today, packing removal by ENT today. on a/c for dvt and AF but if bleeding recurs would stop.   Please see separate note for family discussion. They do not want ivermectin now. They are requesting another round of dexamethasone - they understand risks and benefits of further steroids.   Prognosis very poor.

## 2021-09-05 NOTE — PROGRESS NOTE ADULT - SUBJECTIVE AND OBJECTIVE BOX
CHIEF COMPLAINT:  Patient is a 71y old  Male who presents with a chief complaint of SOB (05 Sep 2021 09:49)    HPI:  72yo M w/ PMHx of HTN, DM2, BPH, asthma presents with shortness of breath. Patient endorses that he has been feeling SOB with associated cough for the last 2 weeks and his symptoms slowly worsened over time. He did not receive the COVID vaccine. He reports that his family members whom he lives with likely are also COVID positive but he does not know for sure. He did not try taking anything for his symptoms other than his usual medications/inhalers. There were no obvious aggravating or alleviating factors. Currently with supplemental oxygen on, he feels well and has no complaints, however he presented to [facility] since his symptoms were worsening. In the ED, he was hemodynamically stable, afebrile, but he was encephalopathic and hypoxic on room air requiring high flow nasal canula. Labs were significant for mild hyperkalemia and COVID positive. CXR prelim read showed bilateral patchy opacities. CT head showed no acute findings. MICU was consulted in the ED, patient was deemed not a MICU candidate. Patient was given doxycycline and dexamethasone x1 and admitted to general medicine for further management. At time of interview, patient was A/Ox3 with .  (13 Aug 2021 04:21)      Interval Events:        OBJECTIVE:  ICU Vital Signs Last 24 Hrs  T(C): 37 (05 Sep 2021 07:00), Max: 37 (05 Sep 2021 07:00)  T(F): 98.6 (05 Sep 2021 07:00), Max: 98.6 (05 Sep 2021 07:00)  HR: 85 (05 Sep 2021 09:45) (73 - 102)  BP: --  BP(mean): --  ABP: 115/48 (05 Sep 2021 09:45) (78/37 - 173/71)  ABP(mean): 70 (05 Sep 2021 09:45) (51 - 108)  RR: 34 (05 Sep 2021 09:45) (0 - 34)  SpO2: 91% (05 Sep 2021 09:45) (81% - 98%)    Mode: AC/ CMV (Assist Control/ Continuous Mandatory Ventilation), RR (machine): 34, FiO2: 100, PEEP: 10, ITime: 1, MAP: 19, PC: 26, PIP: 38    09-04-21 @ 07:01  -  09-05-21 @ 07:00  --------------------------------------------------------  IN: 2786.5 mL / OUT: 3320 mL / NET: -533.5 mL    09-05-21 @ 07:01  -  09-05-21 @ 10:25  --------------------------------------------------------  IN: 313.4 mL / OUT: 450 mL / NET: -136.6 mL      CAPILLARY BLOOD GLUCOSE      POCT Blood Glucose.: 192 mg/dL (05 Sep 2021 05:34)          PHYSICAL EXAM:          HOSPITAL MEDICATIONS:  MEDICATIONS  (STANDING):  ALBUTerol    90 MICROgram(s) HFA Inhaler 2 Puff(s) Inhalation every 8 hours  budesonide 160 MICROgram(s)/formoterol 4.5 MICROgram(s) Inhaler 2 Puff(s) Inhalation two times a day  buMETAnide Injectable 1 milliGRAM(s) IV Push two times a day  chlorhexidine 0.12% Liquid 15 milliLiter(s) Oral Mucosa every 12 hours  chlorhexidine 4% Liquid 1 Application(s) Topical <User Schedule>  cholecalciferol 2000 Unit(s) Oral daily  cisatracurium Infusion 3 MICROgram(s)/kG/Min (14.7 mL/Hr) IV Continuous <Continuous>  diazepam    Tablet 15 milliGRAM(s) Oral every 8 hours  enoxaparin Injectable 80 milliGRAM(s) SubCutaneous every 12 hours  fentaNYL   Infusion... 2 MICROgram(s)/kG/Hr (8.16 mL/Hr) IV Continuous <Continuous>  insulin lispro (ADMELOG) corrective regimen sliding scale   SubCutaneous every 6 hours  ketamine Infusion. 1.998 mG/kG/Hr (16.3 mL/Hr) IV Continuous <Continuous>  midodrine 10 milliGRAM(s) Oral every 8 hours  multivitamin/minerals Oral Solution (WELLESSE) 30 milliLiter(s) Enteral Tube daily  naloxegol 25 milliGRAM(s) Oral daily  norepinephrine Infusion 0.05 MICROgram(s)/kG/Min (3.83 mL/Hr) IV Continuous <Continuous>  pantoprazole  Injectable 40 milliGRAM(s) IV Push daily  petrolatum Ophthalmic Ointment 1 Application(s) Both EYES every 8 hours  polyethylene glycol 3350 17 Gram(s) Oral daily  senna Syrup 10 milliLiter(s) Oral at bedtime  vancomycin  IVPB      vancomycin  IVPB 750 milliGRAM(s) IV Intermittent every 12 hours    MEDICATIONS  (PRN):  fentaNYL    Injectable 50 MICROGram(s) IV Push every 4 hours PRN pain      LABS:                        7.4    7.51  )-----------( 181      ( 05 Sep 2021 01:08 )             23.9     Hgb Trend: 7.4<--, 8.2<--, 8.6<--, 6.8<--, 7.3<--  09-05    134<L>  |  98  |  19  ----------------------------<  175<H>  4.0   |  29  |  0.60    Ca    8.5      05 Sep 2021 01:08  Phos  2.1     09-05  Mg     2.1     09-05    TPro  5.4<L>  /  Alb  2.4<L>  /  TBili  1.0  /  DBili  x   /  AST  47<H>  /  ALT  116<H>  /  AlkPhos  694<H>  09-05    LIVER FUNCTIONS - ( 05 Sep 2021 01:08 )  Alb: 2.4 g/dL / Pro: 5.4 g/dL / ALK PHOS: 694 U/L / ALT: 116 U/L / AST: 47 U/L / GGT: x           Creatinine Trend: 0.60<--, 0.59<--, 0.59<--, 0.60<--, 0.72<--, 0.68<--  PTT - ( 04 Sep 2021 01:35 )  PTT:34.8 sec    Arterial Blood Gas:  09-05 @ 00:59  7.49/43/169/33/99.8/8.6  ABG lactate: --  Arterial Blood Gas:  09-04 @ 14:07  7.38/58/95/34/99.6/8.0  ABG lactate: --  Arterial Blood Gas:  09-04 @ 10:57  7.44/48/100/33/99.4/7.6  ABG lactate: --  Arterial Blood Gas:  09-04 @ 01:31  7.33/57/148/30/98.6/3.5  ABG lactate: --  Arterial Blood Gas:  09-03 @ 13:09  7.38/51/96/30/99.1/4.3  ABG lactate: --        MICROBIOLOGY: Reviewed      RADIOLOGY: Reviewed and interpreted by me    EKG:   CHIEF COMPLAINT:  Patient is a 71y old  Male who presents with a chief complaint of SOB (05 Sep 2021 09:49)    HPI:  70yo M w/ PMHx of HTN, DM2, BPH, asthma presents with shortness of breath. Patient endorses that he has been feeling SOB with associated cough for the last 2 weeks and his symptoms slowly worsened over time. He did not receive the COVID vaccine. He reports that his family members whom he lives with likely are also COVID positive but he does not know for sure. He did not try taking anything for his symptoms other than his usual medications/inhalers. There were no obvious aggravating or alleviating factors. Currently with supplemental oxygen on, he feels well and has no complaints, however he presented to [facility] since his symptoms were worsening. In the ED, he was hemodynamically stable, afebrile, but he was encephalopathic and hypoxic on room air requiring high flow nasal canula. Labs were significant for mild hyperkalemia and COVID positive. CXR prelim read showed bilateral patchy opacities. CT head showed no acute findings. MICU was consulted in the ED, patient was deemed not a MICU candidate. Patient was given doxycycline and dexamethasone x1 and admitted to general medicine for further management. At time of interview, patient was A/Ox3 with .  (13 Aug 2021 04:21)      Interval Events:  -no overnight events    OBJECTIVE:  ICU Vital Signs Last 24 Hrs  T(C): 37 (05 Sep 2021 07:00), Max: 37 (05 Sep 2021 07:00)  T(F): 98.6 (05 Sep 2021 07:00), Max: 98.6 (05 Sep 2021 07:00)  HR: 85 (05 Sep 2021 09:45) (73 - 102)  BP: --  BP(mean): --  ABP: 115/48 (05 Sep 2021 09:45) (78/37 - 173/71)  ABP(mean): 70 (05 Sep 2021 09:45) (51 - 108)  RR: 34 (05 Sep 2021 09:45) (0 - 34)  SpO2: 91% (05 Sep 2021 09:45) (81% - 98%)    Mode: AC/ CMV (Assist Control/ Continuous Mandatory Ventilation), RR (machine): 34, FiO2: 100, PEEP: 10, ITime: 1, MAP: 19, PC: 26, PIP: 38    09-04-21 @ 07:01  -  09-05-21 @ 07:00  --------------------------------------------------------  IN: 2786.5 mL / OUT: 3320 mL / NET: -533.5 mL    09-05-21 @ 07:01  -  09-05-21 @ 10:25  --------------------------------------------------------  IN: 313.4 mL / OUT: 450 mL / NET: -136.6 mL      CAPILLARY BLOOD GLUCOSE  POCT Blood Glucose.: 192 mg/dL (05 Sep 2021 05:34)    PHYSICAL EXAM:          HOSPITAL MEDICATIONS:  MEDICATIONS  (STANDING):  ALBUTerol    90 MICROgram(s) HFA Inhaler 2 Puff(s) Inhalation every 8 hours  budesonide 160 MICROgram(s)/formoterol 4.5 MICROgram(s) Inhaler 2 Puff(s) Inhalation two times a day  buMETAnide Injectable 1 milliGRAM(s) IV Push two times a day  chlorhexidine 0.12% Liquid 15 milliLiter(s) Oral Mucosa every 12 hours  chlorhexidine 4% Liquid 1 Application(s) Topical <User Schedule>  cholecalciferol 2000 Unit(s) Oral daily  cisatracurium Infusion 3 MICROgram(s)/kG/Min (14.7 mL/Hr) IV Continuous <Continuous>  diazepam    Tablet 15 milliGRAM(s) Oral every 8 hours  enoxaparin Injectable 80 milliGRAM(s) SubCutaneous every 12 hours  fentaNYL   Infusion... 2 MICROgram(s)/kG/Hr (8.16 mL/Hr) IV Continuous <Continuous>  insulin lispro (ADMELOG) corrective regimen sliding scale   SubCutaneous every 6 hours  ketamine Infusion. 1.998 mG/kG/Hr (16.3 mL/Hr) IV Continuous <Continuous>  midodrine 10 milliGRAM(s) Oral every 8 hours  multivitamin/minerals Oral Solution (WELLESSE) 30 milliLiter(s) Enteral Tube daily  naloxegol 25 milliGRAM(s) Oral daily  norepinephrine Infusion 0.05 MICROgram(s)/kG/Min (3.83 mL/Hr) IV Continuous <Continuous>  pantoprazole  Injectable 40 milliGRAM(s) IV Push daily  petrolatum Ophthalmic Ointment 1 Application(s) Both EYES every 8 hours  polyethylene glycol 3350 17 Gram(s) Oral daily  senna Syrup 10 milliLiter(s) Oral at bedtime  vancomycin  IVPB      vancomycin  IVPB 750 milliGRAM(s) IV Intermittent every 12 hours    MEDICATIONS  (PRN):  fentaNYL    Injectable 50 MICROGram(s) IV Push every 4 hours PRN pain      LABS:                        7.4    7.51  )-----------( 181      ( 05 Sep 2021 01:08 )             23.9     Hgb Trend: 7.4<--, 8.2<--, 8.6<--, 6.8<--, 7.3<--  09-05    134<L>  |  98  |  19  ----------------------------<  175<H>  4.0   |  29  |  0.60    Ca    8.5      05 Sep 2021 01:08  Phos  2.1     09-05  Mg     2.1     09-05    TPro  5.4<L>  /  Alb  2.4<L>  /  TBili  1.0  /  DBili  x   /  AST  47<H>  /  ALT  116<H>  /  AlkPhos  694<H>  09-05    LIVER FUNCTIONS - ( 05 Sep 2021 01:08 )  Alb: 2.4 g/dL / Pro: 5.4 g/dL / ALK PHOS: 694 U/L / ALT: 116 U/L / AST: 47 U/L / GGT: x           Creatinine Trend: 0.60<--, 0.59<--, 0.59<--, 0.60<--, 0.72<--, 0.68<--  PTT - ( 04 Sep 2021 01:35 )  PTT:34.8 sec    Arterial Blood Gas:  09-05 @ 00:59  7.49/43/169/33/99.8/8.6  ABG lactate: --  Arterial Blood Gas:  09-04 @ 14:07  7.38/58/95/34/99.6/8.0  ABG lactate: --  Arterial Blood Gas:  09-04 @ 10:57  7.44/48/100/33/99.4/7.6  ABG lactate: --  Arterial Blood Gas:  09-04 @ 01:31  7.33/57/148/30/98.6/3.5  ABG lactate: --  Arterial Blood Gas:  09-03 @ 13:09  7.38/51/96/30/99.1/4.3  ABG lactate: --        MICROBIOLOGY: Reviewed      RADIOLOGY: Reviewed and interpreted by me    EKG: patti   CHIEF COMPLAINT:  Patient is a 71y old  Male who presents with a chief complaint of SOB (05 Sep 2021 09:49)    HPI:  70yo M w/ PMHx of HTN, DM2, BPH, asthma presents with shortness of breath. Patient endorses that he has been feeling SOB with associated cough for the last 2 weeks and his symptoms slowly worsened over time. He did not receive the COVID vaccine. He reports that his family members whom he lives with likely are also COVID positive but he does not know for sure. He did not try taking anything for his symptoms other than his usual medications/inhalers. There were no obvious aggravating or alleviating factors. Currently with supplemental oxygen on, he feels well and has no complaints, however he presented to [facility] since his symptoms were worsening. In the ED, he was hemodynamically stable, afebrile, but he was encephalopathic and hypoxic on room air requiring high flow nasal canula. Labs were significant for mild hyperkalemia and COVID positive. CXR prelim read showed bilateral patchy opacities. CT head showed no acute findings. MICU was consulted in the ED, patient was deemed not a MICU candidate. Patient was given doxycycline and dexamethasone x1 and admitted to general medicine for further management. At time of interview, patient was A/Ox3 with .  (13 Aug 2021 04:21)      Interval Events:  -no overnight events    OBJECTIVE:  ICU Vital Signs Last 24 Hrs  T(C): 37 (05 Sep 2021 07:00), Max: 37 (05 Sep 2021 07:00)  T(F): 98.6 (05 Sep 2021 07:00), Max: 98.6 (05 Sep 2021 07:00)  HR: 85 (05 Sep 2021 09:45) (73 - 102)  BP: --  BP(mean): --  ABP: 115/48 (05 Sep 2021 09:45) (78/37 - 173/71)  ABP(mean): 70 (05 Sep 2021 09:45) (51 - 108)  RR: 34 (05 Sep 2021 09:45) (0 - 34)  SpO2: 91% (05 Sep 2021 09:45) (81% - 98%)    Mode: AC/ CMV (Assist Control/ Continuous Mandatory Ventilation), RR (machine): 34, FiO2: 100, PEEP: 10, ITime: 1, MAP: 19, PC: 26, PIP: 38    09-04-21 @ 07:01  -  09-05-21 @ 07:00  --------------------------------------------------------  IN: 2786.5 mL / OUT: 3320 mL / NET: -533.5 mL    09-05-21 @ 07:01  - 09-05-21 @ 10:25  --------------------------------------------------------  IN: 313.4 mL / OUT: 450 mL / NET: -136.6 mL      CAPILLARY BLOOD GLUCOSE  POCT Blood Glucose.: 192 mg/dL (05 Sep 2021 05:34)    PHYSICAL EXAM:  GENERAL: NAD, sedated and intubated  HEENT:  Atraumatic, Normocephalic  EYES: PERRLA, conjunctiva and sclera clear  NECK: Supple, No JVD  CHEST/LUNG: diminished bilaterally; No wheezes, rales, or rhonchi  HEART: Regular rate and rhythm; No murmurs, rubs, or gallops  ABDOMEN: Soft, Nondistended; Bowel sounds present  EXTREMITIES:  2+ Peripheral Pulses, No clubbing, cyanosis, or edema  PSYCH: unable as pt is sedated  NEUROLOGY: sedated  SKIN: No rashes or lesions      HOSPITAL MEDICATIONS:  MEDICATIONS  (STANDING):  ALBUTerol    90 MICROgram(s) HFA Inhaler 2 Puff(s) Inhalation every 8 hours  budesonide 160 MICROgram(s)/formoterol 4.5 MICROgram(s) Inhaler 2 Puff(s) Inhalation two times a day  buMETAnide Injectable 1 milliGRAM(s) IV Push two times a day  chlorhexidine 0.12% Liquid 15 milliLiter(s) Oral Mucosa every 12 hours  chlorhexidine 4% Liquid 1 Application(s) Topical <User Schedule>  cholecalciferol 2000 Unit(s) Oral daily  cisatracurium Infusion 3 MICROgram(s)/kG/Min (14.7 mL/Hr) IV Continuous <Continuous>  diazepam    Tablet 15 milliGRAM(s) Oral every 8 hours  enoxaparin Injectable 80 milliGRAM(s) SubCutaneous every 12 hours  fentaNYL   Infusion... 2 MICROgram(s)/kG/Hr (8.16 mL/Hr) IV Continuous <Continuous>  insulin lispro (ADMELOG) corrective regimen sliding scale   SubCutaneous every 6 hours  ketamine Infusion. 1.998 mG/kG/Hr (16.3 mL/Hr) IV Continuous <Continuous>  midodrine 10 milliGRAM(s) Oral every 8 hours  multivitamin/minerals Oral Solution (WELLESSE) 30 milliLiter(s) Enteral Tube daily  naloxegol 25 milliGRAM(s) Oral daily  norepinephrine Infusion 0.05 MICROgram(s)/kG/Min (3.83 mL/Hr) IV Continuous <Continuous>  pantoprazole  Injectable 40 milliGRAM(s) IV Push daily  petrolatum Ophthalmic Ointment 1 Application(s) Both EYES every 8 hours  polyethylene glycol 3350 17 Gram(s) Oral daily  senna Syrup 10 milliLiter(s) Oral at bedtime  vancomycin  IVPB      vancomycin  IVPB 750 milliGRAM(s) IV Intermittent every 12 hours    MEDICATIONS  (PRN):  fentaNYL    Injectable 50 MICROGram(s) IV Push every 4 hours PRN pain      LABS:                        7.4    7.51  )-----------( 181      ( 05 Sep 2021 01:08 )             23.9     Hgb Trend: 7.4<--, 8.2<--, 8.6<--, 6.8<--, 7.3<--  09-05    134<L>  |  98  |  19  ----------------------------<  175<H>  4.0   |  29  |  0.60    Ca    8.5      05 Sep 2021 01:08  Phos  2.1     09-05  Mg     2.1     09-05    TPro  5.4<L>  /  Alb  2.4<L>  /  TBili  1.0  /  DBili  x   /  AST  47<H>  /  ALT  116<H>  /  AlkPhos  694<H>  09-05    LIVER FUNCTIONS - ( 05 Sep 2021 01:08 )  Alb: 2.4 g/dL / Pro: 5.4 g/dL / ALK PHOS: 694 U/L / ALT: 116 U/L / AST: 47 U/L / GGT: x           Creatinine Trend: 0.60<--, 0.59<--, 0.59<--, 0.60<--, 0.72<--, 0.68<--  PTT - ( 04 Sep 2021 01:35 )  PTT:34.8 sec    Arterial Blood Gas:  09-05 @ 00:59  7.49/43/169/33/99.8/8.6  ABG lactate: --  Arterial Blood Gas:  09-04 @ 14:07  7.38/58/95/34/99.6/8.0  ABG lactate: --  Arterial Blood Gas:  09-04 @ 10:57  7.44/48/100/33/99.4/7.6  ABG lactate: --  Arterial Blood Gas:  09-04 @ 01:31  7.33/57/148/30/98.6/3.5  ABG lactate: --  Arterial Blood Gas:  09-03 @ 13:09  7.38/51/96/30/99.1/4.3  ABG lactate: --        MICROBIOLOGY: Reviewed      RADIOLOGY: Reviewed and interpreted by me    EKG: patti

## 2021-09-05 NOTE — PROGRESS NOTE ADULT - ASSESSMENT
70yo male covid+ intubated for respiratory distress with oral bleed S/P cauterization with silver nitrate and oral packing ( 1 kerlix ). No further bleeding. Packing x1 removed, relatively clean. No active bleeding appreciated

## 2021-09-06 NOTE — PROGRESS NOTE ADULT - ASSESSMENT
71M Belgian speaking Hx HTN, DM2, BPH, Asthma admit 8/12 Covid-19 PNA with progressive worsening hypoxic respiratory failure s/p intubation (8/20) c/b VT arrest s/p shock/epi with ROSC ~6mins, remains sedated on ventilator.     Neuro:   Sedation  - Fentanyl and Ketamine  - Remains off Propofol in setting of hypertriglyceridemia   - Will add versed, now off nimbex Was not getting PRNs as patient is paralyzed.   - In increase oral sedation as tolerated.   - Valium 20 TID,  Methadone to 30 TID    Pulm:   Acute Hypoxic Respiratory Failure 2/2 COVID PNA c/b ARDS  - Intubated (8/20)  - Vent mode  PC: 34/26/10/80%  Pnumomediastinum  - Occurred 8/21, now resolved  Rule Out: PE  - Positive DVT suspected PE. Too unstable for CTA.   - Continue Lovenox 80 BID for full AC.   - Anti factor Xa theraputic at 1.7  Hx Asthma  - c/w home Symbicort and albuterol     CV  Hypotension in setting of vasodilatory shock vs. distributive vs. medication induced   - Levo infusion.   - Midodrine 10mg q8.   - Pending TTE   PAF, episode Afib RVR requiring Amio bolus in recent hx   - Hold further Amio for now   - in NSR, on full AC, will monitor on telemetry   PEA/VT Arrest  - upon intubation requiring shock/epi w/ ROSC     /Renal:   TAYLOR, resolved  - Monitor kidney fx closely while diuresing  - Bumex 1mg Q 12h for goal net negative -1L.     GI:  Oropharyngeal Dysphagia   - Tolerating TF at goal: Glucerna 20ml/hr while proned  - c/w bowel regimen, GI ppx: PPI   - s/p RUQ Sono: trace ascites    Transaminase  - Downtrending, trend LFTs daily, avoid hepatotoxins     ENDO:  Hx T2DM, A1c 7.8  - Hypoglycemic, NPH d/c'ed  - ISS    Heme:  Anemia-Unclear etiology, mod amounts of blood seen from right palate-s/p packing by ENT  -s/p 1 unit PRBC-9/3 with good response  -am dose lovenox held  - CBC q12.   Hypercoagulable state in setting of Covid   - VA Duplex (8/21): +DVT L popiteal, RLE below knee  - Full AC Lovenox, Anti 10A factor 1.7    ID   MRSE Bacteremia  - Vanco decreased to 750 new trough 17.   - Next trough pre 4th dose of new dosage (9/4 1800)  - Lines changed (8/30)  - TTE pending   COVID-19 PNA (not vaccinated)  - Status post Toci (8/15), RDV (8/13-17), Dex (8/13-23)    GOC  - FULL code at this time.   - Goals of care d/w wife (via ), son and 2 daughters  71M Kuwaiti speaking Hx HTN, DM2, BPH, Asthma admit 8/12 Covid-19 PNA with progressive worsening hypoxic respiratory failure s/p intubation (8/20) c/b VT arrest s/p shock/epi with ROSC ~6mins, remains sedated on ventilator.     Neuro:   Sedation  - Fentanyl and Ketamine  - Remains off Propofol in setting of hypertriglyceridemia   - Will add versed, now off nimbex.  - In increase oral sedation as tolerated.   - Valium 20 TID,  Methadone to 30 TID  - check EKG periodically.     Pulm:   Acute Hypoxic Respiratory Failure 2/2 COVID PNA c/b ARDS  - Intubated (8/20)  - Vent mode  PC: 34/10/34/80%  Pnumomediastinum  - Occurred 8/21, now resolved  Rule Out: PE  - Positive DVT suspected PE. Too unstable for CTA.   - Continue Lovenox 80 BID for full AC.   - Anti factor Xa theraputic at 1.7  Hx Asthma  - c/w home Symbicort and albuterol   - S/P proning multiple times now without improvement in FIO2. Will hold off on any additional proning.    CV  Hypotension in setting of vasodilatory shock vs. distributive vs. medication induced   - Levo infusion.   - Midodrine 10mg q8.   - Pending TTE   PAF, episode Afib RVR requiring Amio bolus in recent hx   - Hold further Amio for now   - in NSR, on full AC, will monitor on telemetry   PEA/VT Arrest  - upon intubation requiring shock/epi w/ ROSC  - Also with enlarged RV, pHTN RVSP of 65, c/w diuresis as tolerated.   - TTE pending     /Renal:   TAYLOR, resolved  - Monitor kidney fx closely while diuresing  - Bumex 1mg Q 12h for goal net negative 1-2 L.   - Monitor electrolytes on diuresis.     GI:  Oropharyngeal Dysphagia   - Tolerating TF at goal: Glucerna 30ml/hr  - c/w bowel regimen, GI ppx: PPI   - s/p RUQ Sono: trace ascites    Transaminase  - Downtrending, trend LFTs daily, avoid hepatotoxins     ENDO:  Hx T2DM, A1c 7.8  - Hypoglycemic,  - ISS, NPH, adjust as needed.,     Heme:  Anemia-Unclear etiology, mod amounts of blood seen from right palate-s/p packing by ENT  -s/p 1 unit PRBC-9/3 with good response, monitor for additional bleeding.   - Trend CBC  Hypercoagulable state in setting of Covid   - VA Duplex (8/21): +DVT L popiteal, RLE below knee  - Full AC Lovenox, Anti 10A factor 1.7    ID   MRSE Bacteremia  - C/W Vanco 750mg q12 trough 17.   - Next trough pre 4th dose of new dosage  - Lines changed (8/30)  COVID-19 PNA (not vaccinated)  - Status post Toci (8/15), RDV (8/13-17), Dex (8/13-23) Dex again (9/5-  - See Attending note. No longer wants ivermectin, is on 2nd course of dex     GOC  - FULL code at this time.   - Goals of care d/w wife (via ), son and 2 daughters

## 2021-09-06 NOTE — PROGRESS NOTE ADULT - ATTENDING COMMENTS
Agree with above except as noted. 71M German speaking Hx HTN, DM2, BPH, Asthma admit 8/12 Covid-19 PNA with AHRF s/p intubation (8/20), VT arrest -ROSC ~6mins, MRSE bacteremia, DVT on a/c. Pt remains on high fio2 and peep, titrate to maintain sat>90%. Cont LTV as tolerated. S/p multiple session of proning, no change in p/f ratio now with change from supine to prone. Recent bcx ngtd but cont to follow to ensure mrse bacteremia cleared. cont abx, f/u ID reccs. Oral bleeding s/p cautery by ent. No bleeding any more and, packing already removed. on a/c for dvt and AF but if bleeding recurs would stop.   On repeat round of dexamethasone as per family request - can see prior notes.     Prognosis very poor.

## 2021-09-07 NOTE — PROGRESS NOTE ADULT - ATTENDING COMMENTS
pt repacked with kerlex to control oropharyngeal bleeding.  H&H is stable.  Pt requires anticoagulation due to b/l DVTs and highly suspected to have PE due to right heart strain.  Will leave in place for 48 hours, currently appears to be holding.

## 2021-09-07 NOTE — PROGRESS NOTE ADULT - ASSESSMENT
1. Unvaccinated COVID pneumonia.   s/p Tocilizumab, Remdesivir and Decadron   Decompensated, intubated 8/20, suspect PE given bilateral leg DVTs.   Ivermectin is not recommended for the treatment of COVID.     2. MRSE bacteremia (one Staph hominis too) 8/23  Maybe CLABSI although no clear phlebitis (left IJ 8/20, changed to right IJ 8/25, back to left 8/30).   Unclear significance of positive cultures again 8/30, could be contaminant (one Staph haemolyticus too).   Repeat echo wasn't done but it's an uncommon cause of native valve endocarditis.   No open wounds.     3. Elevated liver enzymes. Obstructive pattern but US reassuring.     Not much else to offer. Remains critically ill. Poor prognosis.     Suggest  -stop Vancomycin, it's been about two weeks at this point   -supportive care     Will sign off, pleases call back if needed   Discussed with ICU     Patrick Paul MD   Infectious Disease   Pager [phone]   After 5PM and on weekends please page fellow on call or call [phone] 1. Unvaccinated COVID pneumonia.   s/p Tocilizumab, Remdesivir and Decadron   Decompensated, intubated 8/20, suspect PE given bilateral leg DVTs.   Ivermectin is not recommended for the treatment of COVID.     2. MRSE bacteremia (one Staph hominis too) 8/23  Maybe CLABSI although no clear phlebitis (left IJ 8/20, changed to right IJ 8/25, back to left 8/30).   Unclear significance of positive cultures again 8/30, could be contaminant (one Staph haemolyticus too).   Repeat echo wasn't done but it's an uncommon cause of native valve endocarditis.   No open wounds.     3. Elevated liver enzymes. Obstructive pattern but US reassuring.     Not much else to offer. Decadron was restarted 9/5 at family's insistence. Remains critically ill. Poor prognosis.     Suggest  -stop Vancomycin, it's been about two weeks at this point   -supportive care     Will sign off, pleases call back if needed   Discussed with ICU     Patrick Paul MD   Infectious Disease   Pager [phone]   After 5PM and on weekends please page fellow on call or call [phone]

## 2021-09-07 NOTE — CHART NOTE - NSCHARTNOTEFT_GEN_A_CORE
Nutrition Follow Up Note  Patient seen for: Malnutrition Follow up on 5ICU    Chart reviewed, events noted. "72 y/o Male with PMH diabetes and asthma, not COVID vaccinated admitted 8/12/21 with COVID pneumonia, intubated 8/20." Pt remains intubated and sedated. Nimbex discontinued 9/5. Versed initiated.    Source:     [x] EMR      [x] RN    -If unable to interview patient: [x] Trach/Vent/BiPAP      Nutrition-Related Events:   - Pressors:  [x] Yes - pt ordered for minimal levophed   - Propofol:  [x] No        Diet, NPO with Tube Feed:   Tube Feeding Modality: Nasogastric  Glucerna 1.2 Stephen (GLUCERNARTH)  Total Volume for 24 Hours (mL): 720  Continuous  Starting Tube Feed Rate {mL per Hour}: 30  Until Goal Tube Feed Rate (mL per Hour): 30  Tube Feed Duration (in Hours): 24  Tube Feed Start Time: 13:24  No Carb Prosource TF     Qty per Day:  2 (09-03-21 @ 17:54) [Active]      EN Order Provides: 720 ml total volume, 944 kcal, 65 gm protein and 580 ml free water daily with 2 No Carb Prosource daily. Meets 11.5 kcal/kg and .8 g/kg protein based on weight of 81.6 kg.    EN Provision:  9/1: 540 ml  (held for proning)  9/2: 240 ml (feeds being held/ titrated down when proned)  9/3: 240 ml (33% of goal)  9/4: 720 ml (100% of goal)  9/5: 720 ml (100% of goal)  9/6: 720 ml (100% of goal)  9/7: 420 ml so far today (58% of goal)    Is current diet order appropriate/adequate? [x] No, pt not meeting nutritional needs at this time    Nutrition-related concerns:  - noted pt feeds are being held/ titrated to trickle feeds when proning  - Pt not proned at this time since 9/4  - Last bowel movement 9/6 per nurse; noted pt ordered for lactulose, on bowel regimen (senna, miralax)  - Pt now ordered for 2 units NPH q 6 hours for glycemic control and ISS admelog  - Pt with oral bleeding; ENT consulted    No daily weights noted at this time; will monitor trends as available.     MEDICATIONS  (STANDING):  buMETAnide Injectable  cholecalciferol  dexAMETHasone  Injectable  insulin lispro (ADMELOG) corrective regimen sliding scale  insulin NPH human recombinant  midodrine  multivitamin/minerals Oral Solution (WELLESSE)  naloxegol  norepinephrine Infusion  pantoprazole  Injectable  polyethylene glycol 3350  senna Syrup  vancomycin  IVPB  vancomycin  IVPB    Pertinent Labs: 09-07 @ 00:41: Na 143, BUN 16, Cr 0.60, <H>, K+ 4.1, Phos 2.2<L>, Mg 2.0, Alk Phos 845<H>, ALT/SGPT 97<H>, AST/SGOT 66<H>, HbA1c --  --> pt with depressed phosphorus; ordered for potassium phosphate IV this morning  09-06 @ 16:52: Na 141, BUN 16, Cr 0.56, <H>, K+ 3.8, Phos --, Mg --, Alk Phos 865<H>, ALT/SGPT 96<H>, AST/SGOT 60<H>, HbA1c --    A1C with Estimated Average Glucose Result: 7.9 % (08-21-21 @ 16:09)  A1C with Estimated Average Glucose Result: 7.3 % (08-14-21 @ 14:36)    Finger Sticks:  POCT Blood Glucose.: 264 mg/dL (09-07 @ 11:26)  POCT Blood Glucose.: 171 mg/dL (09-07 @ 05:32)  POCT Blood Glucose.: 176 mg/dL (09-06 @ 17:14)    Triglycerides, Serum: 194 mg/dL (09-06-21 @ 00:42)  Triglycerides, Serum: 216 mg/dL (09-05-21 @ 01:08)  Triglycerides, Serum: 281 mg/dL (09-04-21 @ 01:35)  Triglycerides, Serum: 231 mg/dL (08-30-21 @ 01:10)  Triglycerides, Serum: 593 mg/dL (08-28-21 @ 01:10)  Triglycerides, Serum: 483 mg/dL (08-27-21 @ 05:51)  Triglycerides, Serum: 538 mg/dL (08-27-21 @ 00:45)  Triglycerides, Serum: 343 mg/dL (08-26-21 @ 00:28)  Triglycerides, Serum: 375 mg/dL (08-25-21 @ 00:07)  Triglycerides, Serum: 427 mg/dL (08-24-21 @ 00:50)  Triglycerides, Serum: 464 mg/dL (08-23-21 @ 00:36)  Triglycerides, Serum: 632 mg/dL (08-21-21 @ 16:11)    Skin per nursing documentation: right buttock suspected deep tissue injury per flow sheets, right check, lip, line stage 2 pressure injury per flow sheets  Edema: 1+ generalized per flow sheets    Estimated Nutritional needs based on 81.6 kg:  Estimated Energy Needs: (20-25 kcal/kg): 0697-3319 kcal based on dosing weight of 81.6 kg  Estimated Protein Needs: (1.2-1.6 g/kg):  gm based on dosing weight of 81.6 kg  Defer fluid needs to team  Harish State Equation: The Harish State Equation (PSU) 2003b was used to calculate resting energy expenditure: 1856 kcal    Previous Nutrition Diagnosis: Severe, acute malnutrition related to inability to meet protein-energy needs as evidenced by pt meeting <50% EER> 5 days, moderate edema.  Nutrition Diagnosis is: [x] ongoing, being addressed with enteral nutrition    No New Nutrition Diagnosis at this time    Nutrition Care Plan:  [x] In Progress    Nutrition Interventions:     Education Provided:         [x] No: pt intubated, sedated    Recommendations:  1. Recommend as medically appropriate, to change enteral nutrition to Glucerna 1.5 (more calorically dense) with goal rate of 60 ml/hr x 24 hours. At goal, feeds to provide 1440 ml total volume, 2160 kcal, 118 gm protein and 1093 ml free water. Meets 26 kcal/kg and 1.44 g/kg protein based on 81.6 kg.  2. Free water flushes per team  3. Continue with micronutrient supplementation daily to aid in wound healing  4. RD remains available for further interventions as needed       Monitoring and Evaluation:   Continue to monitor nutritional intake, tolerance to diet prescription, weights, labs, skin integrity    RD remains available upon request and will follow up per protocol  Alyson Hernandez MS, RD, CDN, [facility] pgr #[phone]

## 2021-09-07 NOTE — PROGRESS NOTE ADULT - ASSESSMENT
71M Irish speaking Hx HTN, DM2, BPH, Asthma admit 8/12 Covid-19 PNA with progressive worsening hypoxic respiratory failure s/p intubation (8/20) c/b VT arrest s/p shock/epi with ROSC ~6mins, remains sedated on ventilator.     Neuro:   Sedation  - Fentanyl, Ketamine, Versed  - Nimbex discontinued 9/5  - Remains off Propofol in setting of hypertriglyceridemia, last 142  - Versed 2 mg IVP q4 and Fent 50 mcg IVP q4 PRNs  - Valium 20 q8, Methadone to 30 TID    Pulm:   Acute Hypoxic Respiratory Failure 2/2 COVID PNA c/b ARDS  - Intubated (8/20) day #18  - Vent mode  PC: 30/26/10/80%  ABG - ( 07 Sep 2021 03:50 )  pH, Arterial: 7.43  pH, Blood: x     /  pCO2: 53    /  pO2: 66    / HCO3: 35    / Base Excess: 9.8   /  SaO2: 95.0 , lact 0.4  Pneumomediastinum  - Occurred 8/21, now resolved  Rule Out: PE  - POCUS 9/6 RV overload. Cont diuresis, goal net - 1L  - Positive DVT suspected PE. Too unstable for CTA.   - Continue Lovenox 80 BID for full AC.   - Anti factor Xa theraputic at 1.7  Hx Asthma  - c/w home Symbicort and albuterol     CV  Hypotension in setting of vasodilatory shock vs. distributive vs. medication induced   - minimal Levo @ 0.02  - Midodrine 10mg q8  [ ] Pending TTE (POCUS w/ RV overload)  PAF, episode Afib RVR requiring Amio bolus in recent hx   - Hold further Amio for now  - in NSR, on full AC, will monitor on telemetry   PEA/VT Arrest  - upon intubation requiring shock/epi w/ ROSC     /Renal:   TAYLOR, resolved  - Monitor kidney fx closely while diuresing  - Bumex 1mg Q 12h + Metolazone goal net negative -1L.   - pH increased to 7.5 Bicarb 38 monitor for contraction alkalosis. Diamox 500 x1.   - mace in place  Volume Status:  09-06-21 @ 07:01  -  09-07-21 @ 07:00  --------------------------------------------------------  IN: 2733.2 mL / OUT: 3390 mL / NET: -656.8 mL  Labs:          BUN/Cr- 16/0.55  -->,  16/0.56  -->,  16/0.60  -->          Electrolytes-Na 143 // K 4.1 // Mg 2.0 //  Phos 2.2 (09-07 @ 00:41)    GI:  Oropharyngeal Dysphagia   - Tolerating TF at goal: Glucerna 30ml/hr  - c/w bowel regimen needs to have BM   - s/p RUQ Sono: trace ascites   Transaminase  - Downtrending, trend LFTs daily, avoid hepatotoxins   LIVER FUNCTIONS - ( 07 Sep 2021 00:41 )  Alb: 2.2 g/dL / Pro: 5.6 g/dL / ALK PHOS: 845 U/L / ALT: 97 U/L / AST: 66 U/L / GGT: x           ENDO:  Hx T2DM, A1c 7.8  - NPH 2 units q6po  - ISS, last 171    Heme:  Anemia-Unclear etiology, mod amounts of blood seen from right palate - s/p packing by ENT  - Still bleeding, ENT re-paged to discuss cautery  - Required PRBC x1, 9.3. H&H currently stable   - CBC q12   Hypercoagulable state in setting of COVID  - [facility] Duplex (8/21): +DVT L popiteal, RLE below knee  - Full AC Lovenox, Anti 10A factor 1.7    ID   MRSE Bacteremia  - Vanco decreased to 750, new trough theraputic at 17. Check trough every 4th dose - next due 9/7 PM.  - Lines changed (8/30)  - TTE pending   COVID-19 PNA (not vaccinated)  - Status post Toci (8/15), RDV (8/13-17), Dex (8/13-23)    GOC  - FULL code at this time.   - Goals of care d/w wife (via ), son and 2 daughters    71M Romanian speaking Hx HTN, DM2, BPH, Asthma admit 8/12 Covid-19 PNA with progressive worsening hypoxic respiratory failure s/p intubation (8/20) c/b VT arrest s/p shock/epi with ROSC ~6mins, remains sedated on ventilator.     Neuro:   Sedation  - Fentanyl, Ketamine, Versed  - Nimbex discontinued 9/5  - Remains off Propofol in setting of hypertriglyceridemia, last 142  - Versed 2 mg IVP q4 and Fent 50 mcg IVP q4 PRNs  - Valium 20 q8, Methadone to 30 TID    Pulm:   Acute Hypoxic Respiratory Failure 2/2 COVID PNA c/b ARDS  - Intubated (8/20) day #18  - Vent mode  PC: 30/26/10/80%  ABG - ( 07 Sep 2021 03:50 )  pH, Arterial: 7.43  pH, Blood: x     /  pCO2: 53    /  pO2: 66    / HCO3: 35    / Base Excess: 9.8   /  SaO2: 95.0 , lact 0.4  Pneumomediastinum  - Occurred 8/21, now resolved  Rule Out: PE  - POCUS 9/6 RV overload. Cont diuresis, goal net - 1L  - Positive DVT suspected PE. Too unstable for CTA.   - Continue Lovenox 80 BID for full AC.   - Anti factor Xa theraputic at 1.7  Hx Asthma  - c/w home Symbicort and albuterol     CV  Hypotension in setting of vasodilatory shock vs. distributive vs. medication induced   - minimal Levo @ 0.02  - Midodrine 10mg q8  [ ] Pending TTE (POCUS w/ RV overload)  PAF, episode Afib RVR requiring Amio bolus in recent hx   - Hold further Amio for now  - in NSR, on full AC, will monitor on telemetry   PEA/VT Arrest  - upon intubation requiring shock/epi w/ ROSC     /Renal:   TAYLOR, resolved  - Monitor kidney fx closely while diuresing  - Bumex 1mg Q 12h + Metolazone goal net negative -1L.   - pH increased to 7.5 Bicarb 38 monitor for contraction alkalosis. Diamox 500 x1.   - mace in place  Volume Status:  09-06-21 @ 07:01  -  09-07-21 @ 07:00  --------------------------------------------------------  IN: 2733.2 mL / OUT: 3390 mL / NET: -656.8 mL  Labs:          BUN/Cr- 16/0.55  -->,  16/0.56  -->,  16/0.60  -->          Electrolytes-Na 143 // K 4.1 // Mg 2.0 //  Phos 2.2 (09-07 @ 00:41)    GI:  Oropharyngeal Dysphagia   - Tolerating TF at goal: Glucerna 30ml/hr  - c/w bowel regimen needs to have BM   - s/p RUQ Sono: trace ascites   Transaminase  - Downtrending, trend LFTs daily, avoid hepatotoxins   LIVER FUNCTIONS - ( 07 Sep 2021 00:41 )  Alb: 2.2 g/dL / Pro: 5.6 g/dL / ALK PHOS: 845 U/L / ALT: 97 U/L / AST: 66 U/L / GGT: x           ENDO:  Hx T2DM, A1c 7.8  - NPH 2 units q6po  - ISS, last 171    Heme:  Anemia-Unclear etiology, mod amounts of blood seen from right palate - s/p packing by ENT  - Still bleeding, ENT re-paged to discuss cautery  - Required PRBC x1, 9.3. H&H currently stable   - CBC q12   Hypercoagulable state in setting of COVID  - [facility] Duplex (8/21): +DVT L popiteal, RLE below knee  - Full AC Lovenox, Anti 10A factor 1.7    DVT prophylaxis-enoxaparin Injectable, SCDs    Labs: Hb/Hct:  8.2/26.4  -->,  7.7/25.0               Plts:  232  -->,  214              PTT/INR:                  T&S: (09-05)  ID   MRSE Bacteremia  - Vanco decreased to 750, new trough theraputic at 17. Check trough every 4th dose - next due 9/7 PM  - Lines changed (8/30)  [ ] TTE pending   COVID-19 PNA (not vaccinated)  - Status post Toci (8/15), RDV (8/13-17), Dex (8/13-23)    GOC  - FULL code at this time  - Goals of care d/w wife (via ), son and 2 daughters    71M Jordanian speaking Hx HTN, DM2, BPH, Asthma admit 8/12 Covid-19 PNA with progressive worsening hypoxic respiratory failure s/p intubation (8/20) c/b VT arrest s/p shock/epi with ROSC ~6mins, remains sedated on ventilator.     Neuro:   Sedation  - Fentanyl, Ketamine, Versed  - Nimbex discontinued 9/5.  - Remains off Propofol in setting of hypertriglyceridemia   - Versed 2 mg IVP q4 and Fent 50 mcg IVP q4 PRNs  - Valium 20 q8, Methadone to 30 TID    Pulm:   Acute Hypoxic Respiratory Failure 2/2 COVID PNA c/b ARDS  - Intubated (8/20)  - Vent mode  PC: 30/26/10/70%  Pnumomediastinum  - Occurred 8/21, now resolved  Rule Out: PE  - POCUS 9/6 RV overload. Cont diuresis  - Positive DVT suspected PE. Too unstable for CTA  - Continue Lovenox 80 BID for full AC.   - Anti factor Xa theraputic at 1.7  Hx Asthma  - c/w home Symbicort and albuterol     CV  Hypotension in setting of vasodilatory shock vs. distributive vs. medication induced   - Levo   - Midodrine 10mg q8  - Pending TTE (POCUS w/ RV overload)  PAF, episode Afib RVR requiring Amio bolus in recent hx   - Hold further Amio for now   - in NSR, on full AC, will monitor on telemetry   PEA/VT Arrest  - upon intubation requiring shock/epi w/ ROSC     /Renal:   TAYLOR, resolved  - Monitor kidney fx closely while diuresing  - Bumex 1mg Q 12h + Metolazone   [ ] goal net negative -1L  - pH increased to 7.5 Bicarb 38 monitor for contraction alkalosis. Diamox 500 x1.     GI:  Oropharyngeal Dysphagia   - Nutrition recs: TF Glucerna 1.5 @ 60cc/hr, no prosource needed.   - c/w bowel regimen needs to have BM.   - s/p RUQ Sono: trace ascites   Transaminase  - Downtrending, trend LFTs daily, avoid hepatotoxins     ENDO:  Hx T2DM, A1c 7.8  - NPH 2 units q6  - ISS    Heme:  Anemia-Unclear etiology, mod amounts of blood seen from right palate-s/p packing by ENT  - Still bleeding, ENT re-paged to discuss cautery.   - Required PRBC x1 9/3. H&h currently stable.   - CBC q12.   Hypercoagulable state in setting of Covid   - [facility] Duplex (8/21): +DVT L popiteal, RLE below knee  - Full AC Lovenox, Anti 10A factor 1.7    ID   MRSE Bacteremia  - DC'd Vanc 9/7  - Lines changed (8/30)  [ ] TTE pending   COVID-19 PNA (not vaccinated)  - Status post Toci (8/15), RDV (8/13-17), Dex (8/13-23)    GOC  - FULL code at this time.   - Goals of care d/w wife (via ), son and 2 daughters

## 2021-09-07 NOTE — PROGRESS NOTE ADULT - ASSESSMENT
72 yo male covid+ intubated for respiratory distress with oral bleed, noted with diffused oozing, oral packing placed ( 1 kerlix ).

## 2021-09-07 NOTE — PROGRESS NOTE ADULT - ATTENDING COMMENTS
71 year old man here with acute hypoxic respiratory failure and ARDS secondary to viral pneumonia from COVID -19 course compliacated by VT arrest, DVT and MRSE bacteremia. The patient has a history of HTN, DM2, BPH, and Asthma.  Pt remains on high fio2 and peep, titrate to maintain sat>90%. Lung protective ventilation. S/p multiple session of proning, not responding anymore.   Recent Bcx NGTD Cont abx, f/u ID reccs.  Oral bleeding s/p cautery follow up ENT recommendations.   On repeat round of dexamethasone as per family request.      Prognosis very poor.

## 2021-09-07 NOTE — PROGRESS NOTE ADULT - SUBJECTIVE AND OBJECTIVE BOX
Follow Up:  COVID    Interval History/ROS: Critically ill     Allergies  No Known Allergies        ANTIMICROBIALS:  vancomycin  IVPB    vancomycin  IVPB 750 every 12 hours      OTHER MEDS:  ALBUTerol    90 MICROgram(s) HFA Inhaler 2 Puff(s) Inhalation every 8 hours  artificial tears (preservative free) Ophthalmic Solution 1 Drop(s) Both EYES every 8 hours  budesonide 160 MICROgram(s)/formoterol 4.5 MICROgram(s) Inhaler 2 Puff(s) Inhalation two times a day  buMETAnide Injectable 1 milliGRAM(s) IV Push two times a day  chlorhexidine 0.12% Liquid 15 milliLiter(s) Oral Mucosa every 12 hours  chlorhexidine 4% Liquid 1 Application(s) Topical <User Schedule>  cholecalciferol 2000 Unit(s) Oral daily  dexAMETHasone  Injectable 6 milliGRAM(s) IV Push daily  diazepam    Tablet 20 milliGRAM(s) Oral every 8 hours  enoxaparin Injectable 80 milliGRAM(s) SubCutaneous every 12 hours  fentaNYL   Infusion... 2 MICROgram(s)/kG/Hr IV Continuous <Continuous>  insulin lispro (ADMELOG) corrective regimen sliding scale   SubCutaneous every 6 hours  insulin NPH human recombinant 2 Unit(s) SubCutaneous every 6 hours  ketamine Infusion. 1.998 mG/kG/Hr IV Continuous <Continuous>  methadone    Tablet 10 milliGRAM(s) Oral every 8 hours  midazolam Infusion 0.02 mG/kG/Hr IV Continuous <Continuous>  midodrine 10 milliGRAM(s) Oral every 8 hours  multivitamin/minerals Oral Solution (WELLESSE) 30 milliLiter(s) Enteral Tube daily  naloxegol 25 milliGRAM(s) Oral daily  norepinephrine Infusion 0.05 MICROgram(s)/kG/Min IV Continuous <Continuous>  pantoprazole  Injectable 40 milliGRAM(s) IV Push daily  polyethylene glycol 3350 17 Gram(s) Oral daily  senna Syrup 10 milliLiter(s) Oral at bedtime      Vital Signs Last 24 Hrs  T(C): 37.5 (07 Sep 2021 18:00), Max: 37.7 (07 Sep 2021 08:00)  T(F): 99.5 (07 Sep 2021 18:00), Max: 99.9 (07 Sep 2021 08:00)  HR: 79 (07 Sep 2021 18:00) (70 - 93)  BP: --  BP(mean): --  RR: 20 (07 Sep 2021 18:00) (10 - 51)  SpO2: 91% (07 Sep 2021 18:00) (82% - 95%)    Physical Exam:  General: sedated   Head: atraumatic, normocephalic  ENT: ET and OG tubes   Cardio: regular rate   Respiratory: mechanically ventilated   : rodri                          7.8    8.39  )-----------( 301      ( 07 Sep 2021 17:23 )             25.3       09-07    138  |  96  |  18  ----------------------------<  135<H>  3.9   |  31  |  0.70    Ca    9.2      07 Sep 2021 17:23  Phos  4.4     09-07  Mg     2.1     09-07    TPro  5.9<L>  /  Alb  2.3<L>  /  TBili  1.0  /  DBili  x   /  AST  101<H>  /  ALT  126<H>  /  AlkPhos  1035<H>  09-07          MICROBIOLOGY:  Culture - Blood (collected 09-01-21 @ 05:05)  Source: .Blood Blood-Peripheral  Final Report (09-06-21 @ 06:01):    No Growth Final    Culture - Blood (collected 09-01-21 @ 05:05)  Source: .Blood Blood-Peripheral  Final Report (09-06-21 @ 06:01):    No Growth Final      RADIOLOGY:  Images below reviewed personally  Xray Chest 1 View- PORTABLE-Urgent (Xray Chest 1 View- PORTABLE-Urgent .) (09.05.21 @ 05:59)   Bilateral diffuse patchy opacities, consistent with COVID pneumonia. Worsening when compared to previous study done on August 30, 2021 at 7:52 PM

## 2021-09-07 NOTE — PROGRESS NOTE ADULT - SUBJECTIVE AND OBJECTIVE BOX
ENT ISSUE/POD: oral bleeding     HPI: 72yo M w/ PMHx of HTN, DM2, BPH, asthma admitted with shortness of breath. Pt was found to be Covid+ on 8/12 and intubated on 8/20 for respiratory distress. ENT was consulted for oral bleed. Pt is on therapeutic lovenox. S/P cauterization with silver nitrate and oral packing ( 1 kerlix ) which was removed two days ago. ENT reconsulted for oral bleeding           PAST MEDICAL & SURGICAL HISTORY:  BPH (benign prostatic hyperplasia)    Hyperlipemia    HTN (hypertension)    Hypercholesteremia    Asthma    Diabetes    Kidney stone    Bladder stone    H/O lithotripsy  x 2    History of kidney stones    H/O umbilical hernia repair      Allergies    No Known Allergies    Intolerances      MEDICATIONS  (STANDING):  ALBUTerol    90 MICROgram(s) HFA Inhaler 2 Puff(s) Inhalation every 8 hours  artificial tears (preservative free) Ophthalmic Solution 1 Drop(s) Both EYES every 8 hours  budesonide 160 MICROgram(s)/formoterol 4.5 MICROgram(s) Inhaler 2 Puff(s) Inhalation two times a day  buMETAnide Injectable 1 milliGRAM(s) IV Push two times a day  chlorhexidine 0.12% Liquid 15 milliLiter(s) Oral Mucosa every 12 hours  chlorhexidine 4% Liquid 1 Application(s) Topical <User Schedule>  cholecalciferol 2000 Unit(s) Oral daily  dexAMETHasone  Injectable 6 milliGRAM(s) IV Push daily  diazepam    Tablet 20 milliGRAM(s) Oral every 8 hours  enoxaparin Injectable 80 milliGRAM(s) SubCutaneous every 12 hours  fentaNYL   Infusion... 2 MICROgram(s)/kG/Hr (8.16 mL/Hr) IV Continuous <Continuous>  insulin lispro (ADMELOG) corrective regimen sliding scale   SubCutaneous every 6 hours  insulin NPH human recombinant 2 Unit(s) SubCutaneous every 6 hours  ketamine Infusion. 1.998 mG/kG/Hr (16.3 mL/Hr) IV Continuous <Continuous>  methadone   Solution 10 milliGRAM(s) Enteral Tube every 8 hours  midazolam Infusion 0.02 mG/kG/Hr (1.63 mL/Hr) IV Continuous <Continuous>  midodrine 10 milliGRAM(s) Oral every 8 hours  multivitamin/minerals Oral Solution (WELLESSE) 30 milliLiter(s) Enteral Tube daily  naloxegol 25 milliGRAM(s) Oral daily  norepinephrine Infusion 0.05 MICROgram(s)/kG/Min (3.83 mL/Hr) IV Continuous <Continuous>  pantoprazole  Injectable 40 milliGRAM(s) IV Push daily  polyethylene glycol 3350 17 Gram(s) Oral daily  senna Syrup 10 milliLiter(s) Oral at bedtime  vancomycin  IVPB 750 milliGRAM(s) IV Intermittent every 12 hours  vancomycin  IVPB        MEDICATIONS  (PRN):      Vital Signs Last 24 Hrs  T(C): 37.4 (07 Sep 2021 04:00), Max: 37.5 (06 Sep 2021 16:00)  T(F): 99.3 (07 Sep 2021 04:00), Max: 99.5 (06 Sep 2021 16:00)  HR: 93 (07 Sep 2021 05:00) (70 - 93)  BP: --  BP(mean): --  RR: 34 (07 Sep 2021 05:00) (11 - 51)  SpO2: 87% (07 Sep 2021 05:00) (82% - 95%)                          7.7    8.47  )-----------( 232      ( 07 Sep 2021 00:41 )             25.0    09-07    143  |  100  |  16  ----------------------------<  149<H>  4.1   |  29  |  0.60    Ca    8.8      07 Sep 2021 00:41  Phos  2.2     09-07  Mg     2.0     09-07    TPro  5.6<L>  /  Alb  2.2<L>  /  TBili  0.8  /  DBili  x   /  AST  66<H>  /  ALT  97<H>  /  AlkPhos  845<H>  09-07   PT/INR - ( 06 Sep 2021 00:42 )   PT: 12.7 sec;   INR: 1.06 ratio         PTT - ( 06 Sep 2021 00:42 )  PTT:39.7 sec    PHYSICAL EXAM:  Gen: NAD  Skin: No rashes, bruises, or lesions  Head: Normocephalic, Atraumatic  Face: no edema, erythema, or fluctuance. Parotid glands soft without mass  Eyes: no scleral injection  Nose: Nares bilaterally patent, no discharge  Mouth: diffuse oozing in the mouth, giant clot removed from oropharynx. ET tube in place, 1 large kerlex packed in the mouth   Neck: Flat, supple, no lymphadenopathy, trachea midline, no masses  Lymphatic: No lymphadenopathy  Resp: breathing easily, no stridor  Neuro: facial nerve intact, no facial droop

## 2021-09-07 NOTE — PROGRESS NOTE ADULT - SUBJECTIVE AND OBJECTIVE BOX
CHIEF COMPLAINT:  Patient is a 71y old  Male who presents with a chief complaint of SOB (05 Sep 2021 09:49)    HPI:  72yo M w/ PMHx of HTN, DM2, BPH, asthma presents with shortness of breath. Patient endorses that he has been feeling SOB with associated cough for the last 2 weeks and his symptoms slowly worsened over time. He did not receive the COVID vaccine. He reports that his family members whom he lives with likely are also COVID positive but he does not know for sure. He did not try taking anything for his symptoms other than his usual medications/inhalers. There were no obvious aggravating or alleviating factors. Currently with supplemental oxygen on, he feels well and has no complaints, however he presented to [facility] since his symptoms were worsening. In the ED, he was hemodynamically stable, afebrile, but he was encephalopathic and hypoxic on room air requiring high flow nasal canula. Labs were significant for mild hyperkalemia and COVID positive. CXR prelim read showed bilateral patchy opacities. CT head showed no acute findings. MICU was consulted in the ED, patient was deemed not a MICU candidate. Patient was given doxycycline and dexamethasone x1 and admitted to general medicine for further management. At time of interview, patient was A/Ox3 with .  (13 Aug 2021 04:21)      Interval Events:  ENT recalled for oral mucosal bleeding, patient seen bedside cauterized with silver nitrate and packed with 1 kerlix. Bicarb elevated to 40 on ABG, received 500mg od diamox x1, hypophosphatemia repleted. Afebrile overnight. No other acute events overnight.       Daily     Diet, NPO with Tube Feed:   Tube Feeding Modality: Nasogastric  Glucerna 1.2 Stephen (GLUCERNARTH)  Total Volume for 24 Hours (mL): 720  Continuous  Starting Tube Feed Rate mL per Hour: 30  Until Goal Tube Feed Rate (mL per Hour): 30  Tube Feed Duration (in Hours): 24  Tube Feed Start Time: 13:24  No Carb Prosource TF     Qty per Day:  2 (09-03-21 @ 17:54)      CURRENT MEDS:  Neurologic Medications  diazepam    Tablet 20 milliGRAM(s) Oral every 8 hours  fentaNYL   Infusion... 2 MICROgram(s)/kG/Hr IV Continuous <Continuous>  ketamine Infusion. 1.998 mG/kG/Hr IV Continuous <Continuous>  methadone   Solution 10 milliGRAM(s) Enteral Tube every 8 hours  midazolam Infusion 0.02 mG/kG/Hr IV Continuous <Continuous>    Respiratory Medications  ALBUTerol    90 MICROgram(s) HFA Inhaler 2 Puff(s) Inhalation every 8 hours  budesonide 160 MICROgram(s)/formoterol 4.5 MICROgram(s) Inhaler 2 Puff(s) Inhalation two times a day    Cardiovascular Medications  buMETAnide Injectable 1 milliGRAM(s) IV Push two times a day  midodrine 10 milliGRAM(s) Oral every 8 hours  norepinephrine Infusion 0.05 MICROgram(s)/kG/Min IV Continuous <Continuous>    Gastrointestinal Medications  cholecalciferol 2000 Unit(s) Oral daily  multivitamin/minerals Oral Solution (WELLESSE) 30 milliLiter(s) Enteral Tube daily  naloxegol 25 milliGRAM(s) Oral daily  pantoprazole  Injectable 40 milliGRAM(s) IV Push daily  polyethylene glycol 3350 17 Gram(s) Oral daily  senna Syrup 10 milliLiter(s) Oral at bedtime    Genitourinary Medications    Hematologic/Oncologic Medications  enoxaparin Injectable 80 milliGRAM(s) SubCutaneous every 12 hours    Antimicrobial/Immunologic Medications  vancomycin  IVPB      vancomycin  IVPB 750 milliGRAM(s) IV Intermittent every 12 hours    Endocrine/Metabolic Medications  dexAMETHasone  Injectable 6 milliGRAM(s) IV Push daily  insulin lispro (ADMELOG) corrective regimen sliding scale   SubCutaneous every 6 hours  insulin NPH human recombinant 2 Unit(s) SubCutaneous every 6 hours    Topical/Other Medications  artificial tears (preservative free) Ophthalmic Solution 1 Drop(s) Both EYES every 8 hours  chlorhexidine 0.12% Liquid 15 milliLiter(s) Oral Mucosa every 12 hours  chlorhexidine 4% Liquid 1 Application(s) Topical <User Schedule>      ICU Vital Signs Last 24 Hrs  T(C): 37.4 (07 Sep 2021 04:00), Max: 37.5 (06 Sep 2021 16:00)  T(F): 99.3 (07 Sep 2021 04:00), Max: 99.5 (06 Sep 2021 16:00)  HR: 87 (07 Sep 2021 07:00) (70 - 93)  BP: --  BP(mean): --  ABP: 103/46 (07 Sep 2021 07:00) (93/39 - 170/75)  ABP(mean): 64 (07 Sep 2021 07:00) (56 - 112)  RR: 26 (07 Sep 2021 07:00) (11 - 51)  SpO2: 89% (07 Sep 2021 07:00) (82% - 95%)      Adult Advanced Hemodynamics Last 24 Hrs  CVP(mm Hg): --  CVP(cm H2O): --  CO: --  CI: --  PA: --  PA(mean): --  PCWP: --  SVR: --  SVRI: --  PVR: --  PVRI: --    Mode: AC/ CMV (Assist Control/ Continuous Mandatory Ventilation)  RR (machine): 34  FiO2: 90  PEEP: 10  ITime: 0.6  MAP: 19  PC: 26  PIP: 39    ABG - ( 07 Sep 2021 03:50 )  pH, Arterial: 7.43  pH, Blood: x     /  pCO2: 53    /  pO2: 66    / HCO3: 35    / Base Excess: 9.8   /  SaO2: 95.0                I&O's Summary    06 Sep 2021 07:01  -  07 Sep 2021 07:00  --------------------------------------------------------  IN: 2733.2 mL / OUT: 3390 mL / NET: -656.8 mL      I&O's Detail    06 Sep 2021 07:01  -  07 Sep 2021 07:00  --------------------------------------------------------  IN:    Enteral Tube Flush: 150 mL    FentaNYL: 188.6 mL    Glucerna: 660 mL    IV PiggyBack: 1000 mL    Ketamine: 514.2 mL    Midazolam: 174 mL    Norepinephrine: 46.4 mL  Total IN: 2733.2 mL    OUT:    Indwelling Catheter - Urethral (mL): 3390 mL  Total OUT: 3390 mL    Total NET: -656.8 mL          PHYSICAL EXAM:    General/Neuro  RASS:             GCS:     = E   / V   / M      Deficits:                             alert & oriented x 3, no focal deficits  Pupils:    Lungs:      clear to auscultation, Normal expansion/effort.     Cardiovascular : S1, S2.  Regular rate and rhythm.  Peripheral edema   Cardiac Rhythm: Normal Sinus Rhythm    GI: Abdomen soft, Non-tender, Non-distended.    Gastrostomy / Jejunostomy tube in place.  Nasogastric tube in place.  Colostomy / Ileostomy.    Wound:    Extremities: Extremities warm, pink, well-perfused. Pulses:Rt     Lt    Derm: Good skin turgor, no skin breakdown.      :       Ramos catheter in place.      CXR:     LABS:  CAPILLARY BLOOD GLUCOSE      POCT Blood Glucose.: 171 mg/dL (07 Sep 2021 05:32)  POCT Blood Glucose.: 176 mg/dL (06 Sep 2021 17:14)  POCT Blood Glucose.: 192 mg/dL (06 Sep 2021 11:38)                          7.7    8.47  )-----------( 232      ( 07 Sep 2021 00:41 )             25.0       09-07    143  |  100  |  16  ----------------------------<  149<H>  4.1   |  29  |  0.60    Ca    8.8      07 Sep 2021 00:41  Phos  2.2     09-07  Mg     2.0     09-07    TPro  5.6<L>  /  Alb  2.2<L>  /  TBili  0.8  /  DBili  x   /  AST  66<H>  /  ALT  97<H>  /  AlkPhos  845<H>  09-07      PT/INR - ( 06 Sep 2021 00:42 )   PT: 12.7 sec;   INR: 1.06 ratio         PTT - ( 06 Sep 2021 00:42 )  PTT:39.7 sec           CHIEF COMPLAINT:  Patient is a 71y old  Male who presents with a chief complaint of SOB (05 Sep 2021 09:49)    HPI:  72yo M w/ PMHx of HTN, DM2, BPH, asthma presents with shortness of breath. Patient endorses that he has been feeling SOB with associated cough for the last 2 weeks and his symptoms slowly worsened over time. He did not receive the COVID vaccine. He reports that his family members whom he lives with likely are also COVID positive but he does not know for sure. He did not try taking anything for his symptoms other than his usual medications/inhalers. There were no obvious aggravating or alleviating factors. Currently with supplemental oxygen on, he feels well and has no complaints, however he presented to [facility] since his symptoms were worsening. In the ED, he was hemodynamically stable, afebrile, but he was encephalopathic and hypoxic on room air requiring high flow nasal canula. Labs were significant for mild hyperkalemia and COVID positive. CXR prelim read showed bilateral patchy opacities. CT head showed no acute findings. MICU was consulted in the ED, patient was deemed not a MICU candidate. Patient was given doxycycline and dexamethasone x1 and admitted to general medicine for further management. At time of interview, patient was A/Ox3 with .  (13 Aug 2021 04:21)      Interval Events:  ENT recalled for oral mucosal bleeding, patient seen bedside cauterized with silver nitrate and packed with 1 kerlix. Bicarb elevated to 40 on ABG, received 500mg od diamox x1, hypophosphatemia repleted. Afebrile overnight. No other acute events overnight.       Daily     Diet, NPO with Tube Feed:   Tube Feeding Modality: Nasogastric  Glucerna 1.2 Stephen (GLUCERNARTH)  Total Volume for 24 Hours (mL): 720  Continuous  Starting Tube Feed Rate mL per Hour: 30  Until Goal Tube Feed Rate (mL per Hour): 30  Tube Feed Duration (in Hours): 24  Tube Feed Start Time: 13:24  No Carb Prosource TF     Qty per Day:  2 (09-03-21 @ 17:54)      CURRENT MEDS:  Neurologic Medications  diazepam    Tablet 20 milliGRAM(s) Oral every 8 hours  fentaNYL   Infusion... 2 MICROgram(s)/kG/Hr IV Continuous <Continuous>  ketamine Infusion. 1.998 mG/kG/Hr IV Continuous <Continuous>  methadone   Solution 10 milliGRAM(s) Enteral Tube every 8 hours  midazolam Infusion 0.02 mG/kG/Hr IV Continuous <Continuous>    Respiratory Medications  ALBUTerol    90 MICROgram(s) HFA Inhaler 2 Puff(s) Inhalation every 8 hours  budesonide 160 MICROgram(s)/formoterol 4.5 MICROgram(s) Inhaler 2 Puff(s) Inhalation two times a day    Cardiovascular Medications  buMETAnide Injectable 1 milliGRAM(s) IV Push two times a day  midodrine 10 milliGRAM(s) Oral every 8 hours  norepinephrine Infusion 0.05 MICROgram(s)/kG/Min IV Continuous <Continuous>    Gastrointestinal Medications  cholecalciferol 2000 Unit(s) Oral daily  multivitamin/minerals Oral Solution (WELLESSE) 30 milliLiter(s) Enteral Tube daily  naloxegol 25 milliGRAM(s) Oral daily  pantoprazole  Injectable 40 milliGRAM(s) IV Push daily  polyethylene glycol 3350 17 Gram(s) Oral daily  senna Syrup 10 milliLiter(s) Oral at bedtime    Genitourinary Medications    Hematologic/Oncologic Medications  enoxaparin Injectable 80 milliGRAM(s) SubCutaneous every 12 hours    Antimicrobial/Immunologic Medications  vancomycin  IVPB      vancomycin  IVPB 750 milliGRAM(s) IV Intermittent every 12 hours    Endocrine/Metabolic Medications  dexAMETHasone  Injectable 6 milliGRAM(s) IV Push daily  insulin lispro (ADMELOG) corrective regimen sliding scale   SubCutaneous every 6 hours  insulin NPH human recombinant 2 Unit(s) SubCutaneous every 6 hours    Topical/Other Medications  artificial tears (preservative free) Ophthalmic Solution 1 Drop(s) Both EYES every 8 hours  chlorhexidine 0.12% Liquid 15 milliLiter(s) Oral Mucosa every 12 hours  chlorhexidine 4% Liquid 1 Application(s) Topical <User Schedule>      ICU Vital Signs Last 24 Hrs  T(C): 37.4 (07 Sep 2021 04:00), Max: 37.5 (06 Sep 2021 16:00)  T(F): 99.3 (07 Sep 2021 04:00), Max: 99.5 (06 Sep 2021 16:00)  HR: 87 (07 Sep 2021 07:00) (70 - 93)  BP: --  BP(mean): --  ABP: 103/46 (07 Sep 2021 07:00) (93/39 - 170/75)  ABP(mean): 64 (07 Sep 2021 07:00) (56 - 112)  RR: 26 (07 Sep 2021 07:00) (11 - 51)  SpO2: 89% (07 Sep 2021 07:00) (82% - 95%)      Adult Advanced Hemodynamics Last 24 Hrs  CVP(mm Hg): --  CVP(cm H2O): --  CO: --  CI: --  PA: --  PA(mean): --  PCWP: --  SVR: --  SVRI: --  PVR: --  PVRI: --    Mode: AC/ CMV (Assist Control/ Continuous Mandatory Ventilation)  RR (machine): 34  FiO2: 90  PEEP: 10  ITime: 0.6  MAP: 19  PC: 26  PIP: 39    ABG - ( 07 Sep 2021 03:50 )  pH, Arterial: 7.43  pH, Blood: x     /  pCO2: 53    /  pO2: 66    / HCO3: 35    / Base Excess: 9.8   /  SaO2: 95.0                I&O's Summary    06 Sep 2021 07:01  -  07 Sep 2021 07:00  --------------------------------------------------------  IN: 2733.2 mL / OUT: 3390 mL / NET: -656.8 mL      I&O's Detail    06 Sep 2021 07:01  -  07 Sep 2021 07:00  --------------------------------------------------------  IN:    Enteral Tube Flush: 150 mL    FentaNYL: 188.6 mL    Glucerna: 660 mL    IV PiggyBack: 1000 mL    Ketamine: 514.2 mL    Midazolam: 174 mL    Norepinephrine: 46.4 mL  Total IN: 2733.2 mL    OUT:    Indwelling Catheter - Urethral (mL): 3390 mL  Total OUT: 3390 mL    Total NET: -656.8 mL          PHYSICAL EXAM:    General/Neuro  RASS: -4  Deficits:                             alert & oriented x 3, no focal deficits    Lungs:      clear to auscultation, Normal expansion/effort. coarse breath sounds    Cardiovascular : S1, S2.  Regular rate and rhythm.  + Peripheral edema   Cardiac Rhythm: Normal Sinus Rhythm    GI: Abdomen soft, Non-tender, Non-distended.      Extremities: Extremities warm, pink, well-perfused. Pulses:  Rt + DP     Lt  +DP    Derm: Good skin turgor, no skin breakdown.      :       Ramos catheter in place.      CXR:     LABS:  CAPILLARY BLOOD GLUCOSE      POCT Blood Glucose.: 171 mg/dL (07 Sep 2021 05:32)  POCT Blood Glucose.: 176 mg/dL (06 Sep 2021 17:14)  POCT Blood Glucose.: 192 mg/dL (06 Sep 2021 11:38)                          7.7    8.47  )-----------( 232      ( 07 Sep 2021 00:41 )             25.0       09-07    143  |  100  |  16  ----------------------------<  149<H>  4.1   |  29  |  0.60    Ca    8.8      07 Sep 2021 00:41  Phos  2.2     09-07  Mg     2.0     09-07    TPro  5.6<L>  /  Alb  2.2<L>  /  TBili  0.8  /  DBili  x   /  AST  66<H>  /  ALT  97<H>  /  AlkPhos  845<H>  09-07      PT/INR - ( 06 Sep 2021 00:42 )   PT: 12.7 sec;   INR: 1.06 ratio         PTT - ( 06 Sep 2021 00:42 )  PTT:39.7 sec

## 2021-09-08 NOTE — PROGRESS NOTE ADULT - SUBJECTIVE AND OBJECTIVE BOX
CHIEF COMPLAINT:  Patient is a 71y old  Male who presents with a chief complaint of SOB (07 Sep 2021 18:37)    HPI:  72yo M w/ PMHx of HTN, DM2, BPH, asthma presents with shortness of breath. Patient endorses that he has been feeling SOB with associated cough for the last 2 weeks and his symptoms slowly worsened over time. He did not receive the COVID vaccine. He reports that his family members whom he lives with likely are also COVID positive but he does not know for sure. He did not try taking anything for his symptoms other than his usual medications/inhalers. There were no obvious aggravating or alleviating factors. Currently with supplemental oxygen on, he feels well and has no complaints, however he presented to [facility] since his symptoms were worsening. In the ED, he was hemodynamically stable, afebrile, but he was encephalopathic and hypoxic on room air requiring high flow nasal canula. Labs were significant for mild hyperkalemia and COVID positive. CXR prelim read showed bilateral patchy opacities. CT head showed no acute findings. MICU was consulted in the ED, patient was deemed not a MICU candidate. Patient was given doxycycline and dexamethasone x1 and admitted to general medicine for further management. At time of interview, patient was A/Ox3 with .  (13 Aug 2021 04:21)      Interval Events:  -d/c vanco 2/2 ID rec in setting of neg cx  -replete lytes    OBJECTIVE:  ICU Vital Signs Last 24 Hrs  T(C): 37.5 (08 Sep 2021 08:00), Max: 37.8 (08 Sep 2021 00:00)  T(F): 99.5 (08 Sep 2021 08:00), Max: 100 (08 Sep 2021 00:00)  HR: 81 (08 Sep 2021 07:45) (72 - 86)  BP: --  BP(mean): --  ABP: 133/55 (08 Sep 2021 07:45) (100/42 - 318/314)  ABP(mean): 80 (08 Sep 2021 07:45) (60 - 316)  RR: 24 (08 Sep 2021 07:45) (10 - 36)  SpO2: 88% (08 Sep 2021 07:45) (88% - 100%)    Mode: AC/ CMV (Assist Control/ Continuous Mandatory Ventilation), RR (machine): 34, FiO2: 70, PEEP: 10, ITime: 0.6, MAP: 19, PC: 26, PIP: 38    09-07-21 @ 07:01  -  09-08-21 @ 07:00  --------------------------------------------------------  IN: 1856.1 mL / OUT: 4600 mL / NET: -2743.9 mL    09-08-21 @ 07:01  -  09-08-21 @ 07:55  --------------------------------------------------------  IN: 81.3 mL / OUT: 400 mL / NET: -318.7 mL      CAPILLARY BLOOD GLUCOSE  POCT Blood Glucose.: 131 mg/dL (07 Sep 2021 17:17)    PHYSICAL EXAM:  GENERAL: NAD, sedated and intubated  HEENT:  Atraumatic, Normocephalic  EYES: PERRLA, conjunctiva and sclera clear  NECK: Supple, No JVD, Left IJ central line  CHEST/LUNG: diminished bilaterally; No wheezes, rales, or rhonchi  HEART: Regular rate and rhythm; No murmurs, rubs, or gallops  ABDOMEN: Soft, Nondistended; Bowel sounds present  EXTREMITIES:  2+ Peripheral Pulses, No clubbing, cyanosis. + generalized edema  PSYCH: unable as pt is sedated  NEUROLOGY: sedated  SKIN: No rashes or lesions    HOSPITAL MEDICATIONS:  MEDICATIONS  (STANDING):  ALBUTerol    90 MICROgram(s) HFA Inhaler 2 Puff(s) Inhalation every 8 hours  artificial tears (preservative free) Ophthalmic Solution 1 Drop(s) Both EYES every 8 hours  budesonide 160 MICROgram(s)/formoterol 4.5 MICROgram(s) Inhaler 2 Puff(s) Inhalation two times a day  buMETAnide Injectable 1 milliGRAM(s) IV Push two times a day  chlorhexidine 0.12% Liquid 15 milliLiter(s) Oral Mucosa every 12 hours  chlorhexidine 4% Liquid 1 Application(s) Topical <User Schedule>  cholecalciferol 2000 Unit(s) Oral daily  dexAMETHasone  Injectable 6 milliGRAM(s) IV Push daily  diazepam    Tablet 20 milliGRAM(s) Oral every 8 hours  enoxaparin Injectable 80 milliGRAM(s) SubCutaneous every 12 hours  fentaNYL   Infusion... 2 MICROgram(s)/kG/Hr (8.16 mL/Hr) IV Continuous <Continuous>  insulin lispro (ADMELOG) corrective regimen sliding scale   SubCutaneous every 6 hours  insulin NPH human recombinant 2 Unit(s) SubCutaneous every 6 hours  ketamine Infusion. 1.998 mG/kG/Hr (16.3 mL/Hr) IV Continuous <Continuous>  methadone    Tablet 10 milliGRAM(s) Oral every 8 hours  midazolam Infusion 0.02 mG/kG/Hr (1.63 mL/Hr) IV Continuous <Continuous>  midodrine 10 milliGRAM(s) Oral every 8 hours  multivitamin/minerals Oral Solution (WELLESSE) 30 milliLiter(s) Enteral Tube daily  naloxegol 25 milliGRAM(s) Oral daily  norepinephrine Infusion 0.05 MICROgram(s)/kG/Min (3.83 mL/Hr) IV Continuous <Continuous>  pantoprazole  Injectable 40 milliGRAM(s) IV Push daily  polyethylene glycol 3350 17 Gram(s) Oral daily  senna Syrup 10 milliLiter(s) Oral at bedtime    MEDICATIONS  (PRN):      LABS:                        7.6    8.91  )-----------( 298      ( 08 Sep 2021 00:12 )             24.6     Hgb Trend: 7.6<--, 7.8<--, 7.7<--, 8.2<--, 7.4<--  09-08    138  |  95<L>  |  19  ----------------------------<  155<H>  3.8   |  31  |  0.69    Ca    9.3      08 Sep 2021 00:12  Phos  4.1     09-08  Mg     2.0     09-08    TPro  5.9<L>  /  Alb  2.3<L>  /  TBili  1.3<H>  /  DBili  x   /  AST  125<H>  /  ALT  137<H>  /  AlkPhos  1055<H>  09-08    LIVER FUNCTIONS - ( 08 Sep 2021 00:12 )  Alb: 2.3 g/dL / Pro: 5.9 g/dL / ALK PHOS: 1055 U/L / ALT: 137 U/L / AST: 125 U/L / GGT: x           Creatinine Trend: 0.69<--, 0.70<--, 0.60<--, 0.56<--, 0.55<--, 0.60<--  PT/INR - ( 08 Sep 2021 00:12 )   PT: 13.4 sec;   INR: 1.12 ratio         PTT - ( 08 Sep 2021 00:12 )  PTT:38.7 sec    Arterial Blood Gas:  09-08 @ 05:19  7.45/54/80/38/97.9/12.1  ABG lactate: --  Arterial Blood Gas:  09-08 @ 00:08  7.44/58/69/39/96.1/13.5  ABG lactate: --  Arterial Blood Gas:  09-07 @ 16:04  7.41/60/65/38/95.6/11.8  ABG lactate: --  Arterial Blood Gas:  09-07 @ 03:50  7.43/53/66/35/95.0/9.8  ABG lactate: --  Arterial Blood Gas:  09-07 @ 00:49  7.51/48/146/38/100.0/13.9  ABG lactate: --        MICROBIOLOGY: Reviewed      RADIOLOGY: Reviewed and interpreted by me    EKG: patti

## 2021-09-08 NOTE — CHART NOTE - NSCHARTNOTEFT_GEN_A_CORE
Per Willy from Infection Prevention, pt has been covid+ >21days and will therefore be removed from covid isolation precautions.  Charge nurse notified.

## 2021-09-08 NOTE — PROGRESS NOTE ADULT - ASSESSMENT
71M Estonian speaking Hx HTN, DM2, BPH, Asthma admit 8/12 Covid-19 PNA with progressive worsening hypoxic respiratory failure s/p intubation (8/20) c/b VT arrest s/p shock/epi with ROSC ~6mins, remains sedated on ventilator.     Neuro:   Sedation  - Fentanyl, Ketamine, Versed  - Nimbex discontinued 9/5.  - Remains off Propofol in setting of hypertriglyceridemia   - Versed 2 mg IVP q4 and Fent 50 mcg IVP q4 PRNs  - Valium 20 q8, Methadone to 30 TID    Pulm:   Acute Hypoxic Respiratory Failure 2/2 COVID PNA c/b ARDS  - Intubated (8/20)  - Vent mode  PC: 30/26/10/70%  Pnumomediastinum  - Occurred 8/21, now resolved  Rule Out: PE  - POCUS 9/6 RV overload. Cont diuresis  - Positive DVT suspected PE. Too unstable for CTA  - Continue Lovenox 80 BID for full AC.   - Anti factor Xa theraputic at 1.7  Hx Asthma  - c/w home Symbicort and albuterol     CV  Hypotension in setting of vasodilatory shock vs. distributive vs. medication induced   - Levo   - Midodrine 10mg q8  - Pending TTE (POCUS w/ RV overload)  PAF, episode Afib RVR requiring Amio bolus in recent hx   - Hold further Amio for now   - in NSR, on full AC, will monitor on telemetry   PEA/VT Arrest  - upon intubation requiring shock/epi w/ ROSC     /Renal:   TAYLOR, resolved  - Monitor kidney fx closely while diuresing  - Bumex 1mg Q 12h + Metolazone   [ ] goal net negative -1L  - pH increased to 7.5 Bicarb 38 monitor for contraction alkalosis. Diamox 500 x1.     GI:  Oropharyngeal Dysphagia   - Nutrition recs: TF Glucerna 1.5 @ 60cc/hr, no prosource needed.   - c/w bowel regimen needs to have BM.   - s/p RUQ Sono: trace ascites   Transaminase  - Downtrending, trend LFTs daily, avoid hepatotoxins     ENDO:  Hx T2DM, A1c 7.8  - NPH 2 units q6  - ISS    Heme:  Anemia-Unclear etiology, mod amounts of blood seen from right palate-s/p packing by ENT  - Still bleeding, ENT re-paged to discuss cautery.   - Required PRBC x1 9/3. H&h currently stable.   - CBC q12.   Hypercoagulable state in setting of Covid   - [facility] Duplex (8/21): +DVT L popiteal, RLE below knee  - Full AC Lovenox, Anti 10A factor 1.7    ID   MRSE Bacteremia  - DC'd Vanc 9/7  - Lines changed (8/30)  [ ] TTE pending   COVID-19 PNA (not vaccinated)  - Status post Toci (8/15), RDV (8/13-17), Dex (8/13-23)    GOC  - FULL code at this time.   - Goals of care d/w wife (via ), son and 2 daughters    71M Greek speaking Hx HTN, DM2, BPH, Asthma admit 8/12 Covid-19 PNA with progressive worsening hypoxic respiratory failure s/p intubation (8/20) c/b VT arrest s/p shock/epi with ROSC ~6mins, remains sedated on ventilator.     Neuro:   Sedation  - Fentanyl, Ketamine, Versed drips  - Nimbex discontinued 9/5  - Remains off Propofol in setting of hypertriglyceridemia   - Ativan 2mg q6h, Methadone to 30 q8h    Pulm:   Acute Hypoxic Respiratory Failure 2/2 COVID PNA c/b ARDS  - Intubated (8/20)  - Mechanical Ventilation:  PC 34/27/8/70%; Vte 300  Pnumomediastinum  - Occurred 8/21, now resolved  Rule Out: PE  - POCUS 9/6 RV strain. Cont diuresis and downtitrate PEEP as tolerated   - Positive DVT suspected PE. Too unstable for CTA  - Continue Lovenox 80 BID for full AC.   - Anti factor Xa theraputic at 1.7  Hx Asthma  - c/w home Symbicort and albuterol     CV  Hypotension in setting of vasodilatory shock vs. distributive vs. medication induced   - Levo to maintain MAP>65  - Midodrine 20mg q8  - Pending TTE (POCUS w/ RV overload)  PAF, episode Afib RVR requiring Amio bolus in recent hx   - Hold further Amio for now   - in NSR, on full AC, will monitor  PEA/VT Arrest  - upon intubation requiring shock/epi w/ ROSC     /Renal:   TAYLOR, resolved  - Monitor kidney fx closely while diuresing and abg for contraction alkalosis  - Bumex 1mg q12h to maintain daily goal net negative -1L    GI:  Oropharyngeal Dysphagia   - Tube feeds per Nutrition recs  - c/w bowel regimen needs to have BM  Transaminase  - RUQ sono ordered 9/8 for elevated LFTs again  - Changed Valium to Ativan for possible hepatotoxicity    ENDO:  Hx T2DM, A1c 7.8  - NPH 2 units q6h  - Mod ISS    Heme:  Anemia-Unclear etiology, mod amounts of blood seen from right palate-s/p packing by ENT  - ENT packed mouth again 9/7   - Required PRBC x1 9/3. H&h currently stable.   - CBC q12.   Hypercoagulable state in setting of Covid   - VA Duplex (8/21): +DVT L popiteal, RLE below knee  - Full AC Lovenox, Anti 10A factor 1.7    ID   MRSE Bacteremia  - DC'd Vanc 9/7  - Lines changed (8/30)  [ ] TTE pending   COVID-19 PNA (not vaccinated)  - Status post Toci (8/15), RDV (8/13-17), Dex (8/13-23, 9/5-?)    GOC  - FULL code at this time.   - Goals of care d/w wife (via ), son and 2 daughters

## 2021-09-09 NOTE — PROGRESS NOTE ADULT - ATTENDING COMMENTS
71 year old man with acute hypoxic respiratory failure and ARDS secondary to viral pneumonia from COVID-19. Hospital course complicated by VT arrest, DVT and MRSE bacteremia. Medical history of HTN, DM2, BPH, and Asthma.     Remains on lung protective ventilation wean Fio2 and PEEP as tolerated    low grade fevers, most recent Bcx NGTD has completed a course of ABx, will repeat cultures  follow up ID recommendations  Oral packing removed by ENT  On repeat round of dexamethasone as per family request.      Prognosis  poor.

## 2021-09-09 NOTE — CHART NOTE - NSCHARTNOTEFT_GEN_A_CORE
: Sybil KEARNS NP     INDICATION: Respiratory failure     PROCEDURE:  [x ] LIMITED ECHO  [x ] LIMITED CHEST  [ ] LIMITED RETROPERITONEAL  [x ] LIMITED ABDOMINAL  [ ] LIMITED DVT  [ ] NEEDLE GUIDANCE VASCULAR  [ ] NEEDLE GUIDANCE THORACENTESIS  [ ] NEEDLE GUIDANCE PARACENTESIS  [ ] NEEDLE GUIDANCE PERICARDIOCENTESIS  [ ] OTHER    FINDINGS:  Echo  RV dilated with severely reduced function (+) enlargement  LV with good Systolic function and size   no pericardial effusion is noted   IVC 1.4     Chest   (+) focal B lines to anterior chest wall   Left pleural base with (+) consolidation trance effusion   Right Pleural Base without consolidation or effusion noted     Abd   Gallbladder is distended with sludge            INTERPRETATION:  RV dilated with severely reduced function (+) enlargement LV with good Systolic function and size IVC WNL   (+) left pleural base consolidation and associated trace effusion   Gallbladder is distended with Sludge : Sybil KEARNS NP     INDICATION: Respiratory failure     PROCEDURE:  [x] LIMITED ECHO  [x] LIMITED CHEST  [ ] LIMITED RETROPERITONEAL  [x] LIMITED ABDOMINAL  [ ] LIMITED DVT  [ ] NEEDLE GUIDANCE VASCULAR  [ ] NEEDLE GUIDANCE THORACENTESIS  [ ] NEEDLE GUIDANCE PARACENTESIS  [ ] NEEDLE GUIDANCE PERICARDIOCENTESIS  [ ] OTHER    FINDINGS:  Echo  RV dilated with severely reduced function (+) enlargement  LV with good Systolic function and size   no pericardial effusion is noted   IVC 1.4     Chest   (+) focal B lines to anterior chest wall   Left pleural base with (+) consolidation trance effusion   Right Pleural Base without consolidation or effusion noted     Abd   Gallbladder is distended with sludge        INTERPRETATION:  RV dilated with severely reduced function (+) enlargement LV with good Systolic function and size IVC WNL   (+) left pleural base consolidation and associated trace effusion   Gallbladder is distended with Sludge    Attending Note: Agree with above. I was present through out the procedure.

## 2021-09-09 NOTE — PROGRESS NOTE ADULT - ASSESSMENT
72 yo male covid+ intubated for respiratory distress with oral bleed, noted with diffused oozing, oral packing placed (1 kerlix) on 9/7. Packing removed today with minimal old blood. No active bleeding.  70 yo male covid+ intubated for respiratory distress with oral bleed, noted with diffused oozing, oral packing placed (1 kerlix) on 9/7. Packing removed today with minimal old blood. No active bleeding. H/H stable.

## 2021-09-09 NOTE — PROGRESS NOTE ADULT - SUBJECTIVE AND OBJECTIVE BOX
ENT ISSUE/POD: oral bleeding     HPI: 72yo M w/ PMHx of HTN, DM2, BPH, asthma admitted with shortness of breath. Pt was found to be Covid+ on 8/12 and intubated on 8/20 for respiratory distress. ENT was consulted for oral bleed. Pt is on therapeutic lovenox. S/P cauterization with silver nitrate and oral packing ( 1 kerlix ) which was removed. ENT reconsulted for oral bleeding and pt was packed again with 1 kerlix on 9/7.         PAST MEDICAL & SURGICAL HISTORY:  BPH (benign prostatic hyperplasia)    Hyperlipemia    HTN (hypertension)    Hypercholesteremia    Asthma    Diabetes    Kidney stone    Bladder stone    H/O lithotripsy  x 2    History of kidney stones    H/O umbilical hernia repair      Allergies    No Known Allergies    Intolerances      MEDICATIONS  (STANDING):  ALBUTerol    90 MICROgram(s) HFA Inhaler 2 Puff(s) Inhalation every 8 hours  artificial tears (preservative free) Ophthalmic Solution 1 Drop(s) Both EYES every 8 hours  budesonide 160 MICROgram(s)/formoterol 4.5 MICROgram(s) Inhaler 2 Puff(s) Inhalation two times a day  buMETAnide Injectable 1 milliGRAM(s) IV Push two times a day  chlorhexidine 0.12% Liquid 15 milliLiter(s) Oral Mucosa every 12 hours  chlorhexidine 4% Liquid 1 Application(s) Topical <User Schedule>  cholecalciferol 2000 Unit(s) Oral daily  dexAMETHasone  Injectable 6 milliGRAM(s) IV Push daily  enoxaparin Injectable 80 milliGRAM(s) SubCutaneous every 12 hours  fentaNYL   Infusion... 2 MICROgram(s)/kG/Hr (8.16 mL/Hr) IV Continuous <Continuous>  insulin lispro (ADMELOG) corrective regimen sliding scale   SubCutaneous every 6 hours  insulin NPH human recombinant 2 Unit(s) SubCutaneous every 6 hours  ketamine Infusion. 1.998 mG/kG/Hr (16.3 mL/Hr) IV Continuous <Continuous>  LORazepam     Tablet 2 milliGRAM(s) Oral every 6 hours  methadone   Solution 30 milliGRAM(s) Oral every 8 hours  midazolam Infusion 0.02 mG/kG/Hr (1.63 mL/Hr) IV Continuous <Continuous>  midodrine 20 milliGRAM(s) Oral every 8 hours  multivitamin/minerals Oral Solution (WELLESSE) 30 milliLiter(s) Enteral Tube daily  naloxegol 25 milliGRAM(s) Oral daily  norepinephrine Infusion 0.05 MICROgram(s)/kG/Min (3.83 mL/Hr) IV Continuous <Continuous>  pantoprazole  Injectable 40 milliGRAM(s) IV Push daily  polyethylene glycol 3350 17 Gram(s) Oral daily  senna Syrup 10 milliLiter(s) Oral at bedtime    MEDICATIONS  (PRN):      Social History: see consult    Family history: see consult    ROS:   uto      Vital Signs Last 24 Hrs  T(C): 37.3 (09 Sep 2021 08:00), Max: 38.1 (08 Sep 2021 22:00)  T(F): 99.1 (09 Sep 2021 08:00), Max: 100.6 (08 Sep 2021 22:00)  HR: 77 (09 Sep 2021 08:50) (72 - 88)  BP: --  BP(mean): --  RR: 34 (09 Sep 2021 08:00) (0 - 37)  SpO2: 94% (09 Sep 2021 08:50) (84% - 94%)                          7.8    8.83  )-----------( 303      ( 09 Sep 2021 00:24 )             25.8    09-09    136  |  93<L>  |  24<H>  ----------------------------<  258<H>  4.0   |  31  |  0.64    Ca    8.8      09 Sep 2021 00:24  Phos  3.3     09-09  Mg     2.0     09-09    TPro  6.0  /  Alb  2.2<L>  /  TBili  1.1  /  DBili  x   /  AST  118<H>  /  ALT  139<H>  /  AlkPhos  1145<H>  09-09   PT/INR - ( 08 Sep 2021 00:12 )   PT: 13.4 sec;   INR: 1.12 ratio         PTT - ( 08 Sep 2021 00:12 )  PTT:38.7 sec    PHYSICAL EXAM:  Gen: sedated   Skin: No rashes, bruises, or lesions  Head: Normocephalic, Atraumatic  Face: no edema, erythema, or fluctuance. Parotid glands soft without mass  Eyes: no scleral injection  Nose: Nares bilaterally patent, no discharge  Mouth: ETT in place. Kerlix x1 removed, packing removed with minimal old blood. No active bleeding, minimal oozing on soft palate. Mucosa moist, tongue/uvula midline, oropharynx clear  Neck: Flat, supple, no lymphadenopathy, trachea midline, no masses  Lymphatic: No lymphadenopathy  Resp: on vent   Neuro: unable to evaluate

## 2021-09-09 NOTE — PROGRESS NOTE ADULT - ASSESSMENT
71M Hong Konger speaking Hx HTN, DM2, BPH, Asthma admit 8/12 Covid-19 PNA and cardiac arrest.    Neuro   Sedation   - will continue to miguelangel IV sedation as tolerated   - Maintain methadone and PRN Ativan   - Sedation vacation once medically appropriate     Critical care Myopathy   - will need extensive PT/OT when clinically able     CV   Cardiac Arrest VT  - S/p Amiodarone and electrolyte supplementation   - No further VT noted   - Echo with new Right hrt strain   - will give additional Bumex today  to meet goal of net (-) balance     Shock probably secondary to vasoplegia from sedation   - will continue with low dose Levo   - will discuss with attending for possible increase of midodrine   - maintain goal MAP of 65 as tolerated will attempt to further wean PEEP once goal diuresis has been achieved   - daily POCUS     Pulm   Respiratory failure Secondary to COVID PNA   - will continue with Mechanical Vent support   - attempt to reduce PEEP to 5 as above   - Blood gas daily / MDI   - PRN chest x ray     GI   Nutrition   - maintain Glucerna tube feeds as tolerated   - Bowel regimen      Transaminitis  ? etiology may be congestive hepatopathy vs portal vein thrombus vs Pancreatitis  VS DILI   - will continue to monitor LFT   -        Hypervolemia   - Strict I/O   - Diuresis as above   - Monitor electrolytes     ID   COVID - 19   - S/p RDV Dexa and TOCI   - Now on second round of decadron   - will continue with supportive care     GRIFFIN Bacteremia   - S/P 14 day course of ABT therapy   - ID follow up appreciated     Fever overnight   - will reculture today if patient spikes   - will hold abt therapy for now     Endo   DM   - Will maintain NPH and ISS   - goal Blood Sugar 100-180     VTE   (+) VTE and Probable PE   - continue with LOVENOX tx BID

## 2021-09-09 NOTE — PROGRESS NOTE ADULT - SUBJECTIVE AND OBJECTIVE BOX
CHIEF COMPLAINT: COVID- 19  VT Cardiac arrest     Interval Events:  72yo M w/ PMHx of HTN, DM2, BPH, asthma presents with shortness of breath. Found to have COVID -19 PNA No Prior Vaccine. The patient was admitted to the Medical floors. The hospital course was complicated by VT arrest trespiratory failure MRSE bacteremia, Bilateral DVT upper and lower extremities currently on Lovenox. Found to have right hrt strain on Echo probable PE.  (+) Afib, hypertriglyceridemia, now resolved.      REVIEW OF SYSTEMS:  Constitutional: [ ] fevers [ ] chills [ ] weight loss [ ] weight gain  HEENT: [ ] dry eyes [ ] eye irritation [ ] postnasal drip [ ] nasal congestion  CV: [ ] chest pain [ ] orthopnea [ ] palpitations [ ] murmur  Resp: [ ] cough [ ] shortness of breath [ ] dyspnea [ ] wheezing [ ] sputum [ ] hemoptysis  GI: [ ] nausea [ ] vomiting [ ] diarrhea [ ] constipation [ ] abd pain [ ] dysphagia   : [ ] dysuria [ ] nocturia [ ] hematuria [ ] increased urinary frequency  Musculoskeletal: [ ] back pain [ ] myalgias [ ] arthralgias [ ] fracture  Skin: [ ] rash [ ] itch  Neurological: [ ] headache [ ] dizziness [ ] syncope [ ] weakness [ ] numbness  Psychiatric: [ ] anxiety [ ] depression  Endocrine: [ ] diabetes [ ] thyroid problem  Hematologic/Lymphatic: [ ] anemia [ ] bleeding problem  Allergic/Immunologic: [ ] itchy eyes [ ] nasal discharge [ ] hives [ ] angioedema  [ ] All other systems negative  [x ] Unable to assess ROS because ________    OBJECTIVE:  ICU Vital Signs Last 24 Hrs  T(C): 37.7 (09 Sep 2021 06:00), Max: 38.1 (08 Sep 2021 22:00)  T(F): 99.9 (09 Sep 2021 06:00), Max: 100.6 (08 Sep 2021 22:00)  HR: 84 (09 Sep 2021 06:15) (72 - 88)  BP: --  BP(mean): --  ABP: 134/62 (09 Sep 2021 06:15) (85/64 - 171/71)  ABP(mean): 85 (09 Sep 2021 06:15) (61 - 101)  RR: 0 (09 Sep 2021 06:15) (0 - 37)  SpO2: 94% (09 Sep 2021 06:15) (84% - 98%)    Mode: AC/ CMV (Assist Control/ Continuous Mandatory Ventilation), RR (machine): 34, FiO2: 70, PEEP: 8, ITime: 0.6, MAP: 18, PC: 27, PIP: 37    09-07 @ 07:01  -  09-08 @ 07:00  --------------------------------------------------------  IN: 1856.1 mL / OUT: 4600 mL / NET: -2743.9 mL    09-08 @ 07:01  -  09-09 @ 06:27  --------------------------------------------------------  IN: 2485.8 mL / OUT: 3070 mL / NET: -584.2 mL      CAPILLARY BLOOD GLUCOSE      POCT Blood Glucose.: 235 mg/dL (09 Sep 2021 05:30)      PHYSICAL EXAM:  General:   HEENT:   Lymph Nodes:  Neck:   Respiratory:   Cardiovascular:   Abdomen:   Extremities:   Skin:   Neurological:  Psychiatry:    LINES:    HOSPITAL MEDICATIONS:  MEDICATIONS  (STANDING):  ALBUTerol    90 MICROgram(s) HFA Inhaler 2 Puff(s) Inhalation every 8 hours  artificial tears (preservative free) Ophthalmic Solution 1 Drop(s) Both EYES every 8 hours  budesonide 160 MICROgram(s)/formoterol 4.5 MICROgram(s) Inhaler 2 Puff(s) Inhalation two times a day  buMETAnide Injectable 1 milliGRAM(s) IV Push two times a day  chlorhexidine 0.12% Liquid 15 milliLiter(s) Oral Mucosa every 12 hours  chlorhexidine 4% Liquid 1 Application(s) Topical <User Schedule>  cholecalciferol 2000 Unit(s) Oral daily  dexAMETHasone  Injectable 6 milliGRAM(s) IV Push daily  enoxaparin Injectable 80 milliGRAM(s) SubCutaneous every 12 hours  fentaNYL   Infusion... 2 MICROgram(s)/kG/Hr (8.16 mL/Hr) IV Continuous <Continuous>  insulin lispro (ADMELOG) corrective regimen sliding scale   SubCutaneous every 6 hours  insulin NPH human recombinant 2 Unit(s) SubCutaneous every 6 hours  ketamine Infusion. 1.998 mG/kG/Hr (16.3 mL/Hr) IV Continuous <Continuous>  LORazepam     Tablet 2 milliGRAM(s) Oral every 6 hours  methadone   Solution 30 milliGRAM(s) Oral every 8 hours  midazolam Infusion 0.02 mG/kG/Hr (1.63 mL/Hr) IV Continuous <Continuous>  midodrine 20 milliGRAM(s) Oral every 8 hours  multivitamin/minerals Oral Solution (WELLESSE) 30 milliLiter(s) Enteral Tube daily  naloxegol 25 milliGRAM(s) Oral daily  norepinephrine Infusion 0.05 MICROgram(s)/kG/Min (3.83 mL/Hr) IV Continuous <Continuous>  pantoprazole  Injectable 40 milliGRAM(s) IV Push daily  polyethylene glycol 3350 17 Gram(s) Oral daily  senna Syrup 10 milliLiter(s) Oral at bedtime    MEDICATIONS  (PRN):      LABS:                        7.8    8.83  )-----------( 303      ( 09 Sep 2021 00:24 )             25.8     Hgb Trend: 7.8<--, 7.6<--, 7.8<--, 7.7<--, 8.2<--  09-09    136  |  93<L>  |  24<H>  ----------------------------<  258<H>  4.0   |  31  |  0.64    Ca    8.8      09 Sep 2021 00:24  Phos  3.3     09-09  Mg     2.0     09-09    TPro  6.0  /  Alb  2.2<L>  /  TBili  1.1  /  DBili  x   /  AST  118<H>  /  ALT  139<H>  /  AlkPhos  1145<H>  09-09    Creatinine Trend: 0.64<--, 0.69<--, 0.70<--, 0.60<--, 0.56<--, 0.55<--  PT/INR - ( 08 Sep 2021 00:12 )   PT: 13.4 sec;   INR: 1.12 ratio         PTT - ( 08 Sep 2021 00:12 )  PTT:38.7 sec    Arterial Blood Gas:  09-09 @ 00:15  7.44/57/69/39/96.0/13.0  ABG lactate: --  Arterial Blood Gas:  09-08 @ 11:27  7.41/59/75/37/96.0/11.3  ABG lactate: --  Arterial Blood Gas:  09-08 @ 05:19  7.45/54/80/38/97.9/12.1  ABG lactate: --  Arterial Blood Gas:  09-08 @ 00:08  7.44/58/69/39/96.1/13.5  ABG lactate: --  Arterial Blood Gas:  09-07 @ 16:04  7.41/60/65/38/95.6/11.8  ABG lactate: --        MICROBIOLOGY:   Culture - Blood (09.01.21 @ 05:05)   Specimen Source: .Blood Blood-Peripheral   Culture Results:   No Growth Final       Historical Values  Culture - Blood (09.01.21 @ 05:05)   Specimen Source: .Blood Blood-Peripheral   Culture Results:   No Growth Final Culture - Blood (09.01.21 @ 05:05)   Specimen Source: .Blood Blood-Peripheral   Culture Results:   No Growth Final       Historical Values  Culture - Blood (09.01.21 @ 05:05)   Specimen Source: .Blood Blood-Peripheral   Culture Results:   No Growth Final   Culture - Blood (09.01.21 @ 05:05)   Specimen Source: .Blood Blood-Peripheral Culture - Sputum . (08.31.21 @ 00:37)   Gram Stain:   Rare polymorphonuclear leukocytes per low power field   No Squamous epithelial cells per low power field   Rare Yeast like cells seen per oil power field   Specimen Source: .Sputum Sputum   Culture Results:   Normal Respiratory Liane present       Historical Values  Culture - Sputum . (08.31.21 @ 00:37)   Gram Stain:   Rare polymorphonuclear leukocytes per low power field   No Squamous epithelial cells per low power field   Rare Yeast like cells seen per oil power field   Specimen Source: .Sputum Sputum Culture - Blood (08.30.21 @ 18:39)   Specimen Source: .Blood Blood   Culture Results:   No Growth Final       Historical Values  Culture - Blood (09.01.21 @ 05:05)   Specimen Source: .Blood Blood-Peripheral   Culture Results:   No Growth Final   Culture - Blood (09.01.21 @ 05:05)   Specimen Source: .Blood Blood-Peripheral   Culture Results:   RADIOLOGY:  < from: Xray Chest 1 View- PORTABLE-Urgent (Xray Chest 1 View- PORTABLE-Urgent .) (09.05.21 @ 05:59) >    IMPRESSION:  Bilateral diffuse patchy opacities, consistent with COVID pneumonia. Worsening when compared to previous study done on August 30, 2021 at 7:52 PM    --- End of Report ---    < end of copied text >  < from: US Abdomen Upper Quadrant Right (08.31.21 @ 11:33) >  IMPRESSION:    No evidence of cholecystitis.  Right pleural effusion.  Trace perihepatic ascites.    < end of copied text >  < from: [facility] Duplex Lower Extrem Arterial, Bilat (08.30.21 @ 16:21) >  Findings: Covid protocol.    The peak systolic velocities of the Right lower extremity are as follows:    Common femoral artery: 95 cm/s  Deep femoral artery: 67 cm/s  Superficial femoral artery: 96 cm/s proximal,  92 cm/s mid, 71 cm/s distal  Popliteal artery: 66 cm/s  Tibioperoneal trunk: 67 cm/s  Posterior tibial artery:   59 cm/s mid  Peroneal artery:  51 cm/s mid  Triphasic waveforms throughout.    The peak systolic velocities of the Left lower extremity are as follows:    Common femoral artery: 82 cm/s  Deep femoral artery: 71 cm/s  Superficial femoral artery: 120 cm/s proximal,  109 cm/s mid, 72 cm/s distal  Popliteal artery: 66 cm/s  Tibioperoneal trunk: 62 cm/s  Posterior tibial artery:  87 cm/s mid  Peroneal artery:   71 cm/s mid  Triphasic waveforms throughout.    Impression:    No stenosis or occlusion. All imaged vessels are patent.    --- End of Report ---    < end of copied text >    EKG:    < from: 12 Lead ECG (09.01.21 @ 06:14) >    Systolic  mmHg    Diastolic BP 51 mmHg    Ventricular Rate 97 BPM    Atrial Rate 97 BPM    P-R Interval 148 ms    QRS Duration 66 ms    Q-T Interval 344 ms    QTC Calculation(Bazett) 436 ms    P Axis 12 degrees    R Axis 12 degrees    T Axis 28 degrees    Diagnosis Line NORMAL SINUS RHYTHM  LOW VOLTAGE QRS  SEPTAL INFARCT , AGE UNDETERMINED  ABNORMAL ECG  WHEN COMPARED WITH ECG OF `8/28/21   SINUS RHYTHM HAS REPLACED ATRIAL FIBRILLATION  Confirmed by TOD ROCHE, CUBA LONDON ([id number]) on 9/1/2021 2:42:21 PM    < end of copied text >    [facility] - [place]  ex [id number]  CHIEF COMPLAINT: COVID- 19  VT Cardiac arrest     Interval Events:  72yo M w/ PMHx of HTN, DM2, BPH, asthma presents with shortness of breath. Found to have COVID -19 PNA No Prior Vaccine. The patient was admitted to the Medical floors. The hospital course was complicated by VT arrest respiratory failure MRSE bacteremia, Bilateral DVT upper and lower extremities currently on Lovenox. Found to have right hrt strain on Echo probable PE.  (+) Afib, hypertriglyceridemia, now resolved.      Overnight:   (+) fever remains net (+) for LOS     REVIEW OF SYSTEMS:  Constitutional: [ ] fevers [ ] chills [ ] weight loss [ ] weight gain  HEENT: [ ] dry eyes [ ] eye irritation [ ] postnasal drip [ ] nasal congestion  CV: [ ] chest pain [ ] orthopnea [ ] palpitations [ ] murmur  Resp: [ ] cough [ ] shortness of breath [ ] dyspnea [ ] wheezing [ ] sputum [ ] hemoptysis  GI: [ ] nausea [ ] vomiting [ ] diarrhea [ ] constipation [ ] abd pain [ ] dysphagia   : [ ] dysuria [ ] nocturia [ ] hematuria [ ] increased urinary frequency  Musculoskeletal: [ ] back pain [ ] myalgias [ ] arthralgias [ ] fracture  Skin: [ ] rash [ ] itch  Neurological: [ ] headache [ ] dizziness [ ] syncope [ ] weakness [ ] numbness  Psychiatric: [ ] anxiety [ ] depression  Endocrine: [ ] diabetes [ ] thyroid problem  Hematologic/Lymphatic: [ ] anemia [ ] bleeding problem  Allergic/Immunologic: [ ] itchy eyes [ ] nasal discharge [ ] hives [ ] angioedema  [ ] All other systems negative  [x ] Unable to assess ROS because ________    OBJECTIVE:  ICU Vital Signs Last 24 Hrs  T(C): 37.7 (09 Sep 2021 06:00), Max: 38.1 (08 Sep 2021 22:00)  T(F): 99.9 (09 Sep 2021 06:00), Max: 100.6 (08 Sep 2021 22:00)  HR: 84 (09 Sep 2021 06:15) (72 - 88)  BP: --  BP(mean): --  ABP: 134/62 (09 Sep 2021 06:15) (85/64 - 171/71)  ABP(mean): 85 (09 Sep 2021 06:15) (61 - 101)  RR: 0 (09 Sep 2021 06:15) (0 - 37)  SpO2: 94% (09 Sep 2021 06:15) (84% - 98%)    Mode: AC/ CMV (Assist Control/ Continuous Mandatory Ventilation), RR (machine): 34, FiO2: 70, PEEP: 8, ITime: 0.6, MAP: 18, PC: 27, PIP: 37    09-07 @ 07:01  -  09-08 @ 07:00  --------------------------------------------------------  IN: 1856.1 mL / OUT: 4600 mL / NET: -2743.9 mL    09-08 @ 07:01  -  09-09 @ 06:27  --------------------------------------------------------  IN: 2485.8 mL / OUT: 3070 mL / NET: -584.2 mL      CAPILLARY BLOOD GLUCOSE      POCT Blood Glucose.: 235 mg/dL (09 Sep 2021 05:30)      PHYSICAL EXAM:  General:   HEENT:   Lymph Nodes:  Neck:   Respiratory:   Cardiovascular:   Abdomen:   Extremities:   Skin:   Neurological:  Psychiatry:    LINES:    HOSPITAL MEDICATIONS:  MEDICATIONS  (STANDING):  ALBUTerol    90 MICROgram(s) HFA Inhaler 2 Puff(s) Inhalation every 8 hours  artificial tears (preservative free) Ophthalmic Solution 1 Drop(s) Both EYES every 8 hours  budesonide 160 MICROgram(s)/formoterol 4.5 MICROgram(s) Inhaler 2 Puff(s) Inhalation two times a day  buMETAnide Injectable 1 milliGRAM(s) IV Push two times a day  chlorhexidine 0.12% Liquid 15 milliLiter(s) Oral Mucosa every 12 hours  chlorhexidine 4% Liquid 1 Application(s) Topical <User Schedule>  cholecalciferol 2000 Unit(s) Oral daily  dexAMETHasone  Injectable 6 milliGRAM(s) IV Push daily  enoxaparin Injectable 80 milliGRAM(s) SubCutaneous every 12 hours  fentaNYL   Infusion... 2 MICROgram(s)/kG/Hr (8.16 mL/Hr) IV Continuous <Continuous>  insulin lispro (ADMELOG) corrective regimen sliding scale   SubCutaneous every 6 hours  insulin NPH human recombinant 2 Unit(s) SubCutaneous every 6 hours  ketamine Infusion. 1.998 mG/kG/Hr (16.3 mL/Hr) IV Continuous <Continuous>  LORazepam     Tablet 2 milliGRAM(s) Oral every 6 hours  methadone   Solution 30 milliGRAM(s) Oral every 8 hours  midazolam Infusion 0.02 mG/kG/Hr (1.63 mL/Hr) IV Continuous <Continuous>  midodrine 20 milliGRAM(s) Oral every 8 hours  multivitamin/minerals Oral Solution (WELLESSE) 30 milliLiter(s) Enteral Tube daily  naloxegol 25 milliGRAM(s) Oral daily  norepinephrine Infusion 0.05 MICROgram(s)/kG/Min (3.83 mL/Hr) IV Continuous <Continuous>  pantoprazole  Injectable 40 milliGRAM(s) IV Push daily  polyethylene glycol 3350 17 Gram(s) Oral daily  senna Syrup 10 milliLiter(s) Oral at bedtime    MEDICATIONS  (PRN):      LABS:                        7.8    8.83  )-----------( 303      ( 09 Sep 2021 00:24 )             25.8     Hgb Trend: 7.8<--, 7.6<--, 7.8<--, 7.7<--, 8.2<--  09-09    136  |  93<L>  |  24<H>  ----------------------------<  258<H>  4.0   |  31  |  0.64    Ca    8.8      09 Sep 2021 00:24  Phos  3.3     09-09  Mg     2.0     09-09    TPro  6.0  /  Alb  2.2<L>  /  TBili  1.1  /  DBili  x   /  AST  118<H>  /  ALT  139<H>  /  AlkPhos  1145<H>  09-09    Creatinine Trend: 0.64<--, 0.69<--, 0.70<--, 0.60<--, 0.56<--, 0.55<--  PT/INR - ( 08 Sep 2021 00:12 )   PT: 13.4 sec;   INR: 1.12 ratio         PTT - ( 08 Sep 2021 00:12 )  PTT:38.7 sec    Arterial Blood Gas:  09-09 @ 00:15  7.44/57/69/39/96.0/13.0  ABG lactate: --  Arterial Blood Gas:  09-08 @ 11:27  7.41/59/75/37/96.0/11.3  ABG lactate: --  Arterial Blood Gas:  09-08 @ 05:19  7.45/54/80/38/97.9/12.1  ABG lactate: --  Arterial Blood Gas:  09-08 @ 00:08  7.44/58/69/39/96.1/13.5  ABG lactate: --  Arterial Blood Gas:  09-07 @ 16:04  7.41/60/65/38/95.6/11.8  ABG lactate: --        MICROBIOLOGY:   Culture - Blood (09.01.21 @ 05:05)   Specimen Source: .Blood Blood-Peripheral   Culture Results:   No Growth Final       Historical Values  Culture - Blood (09.01.21 @ 05:05)   Specimen Source: .Blood Blood-Peripheral   Culture Results:   No Growth Final Culture - Blood (09.01.21 @ 05:05)   Specimen Source: .Blood Blood-Peripheral   Culture Results:   No Growth Final       Historical Values  Culture - Blood (09.01.21 @ 05:05)   Specimen Source: .Blood Blood-Peripheral   Culture Results:   No Growth Final   Culture - Blood (09.01.21 @ 05:05)   Specimen Source: .Blood Blood-Peripheral Culture - Sputum . (08.31.21 @ 00:37)   Gram Stain:   Rare polymorphonuclear leukocytes per low power field   No Squamous epithelial cells per low power field   Rare Yeast like cells seen per oil power field   Specimen Source: .Sputum Sputum   Culture Results:   Normal Respiratory Liane present       Historical Values  Culture - Sputum . (08.31.21 @ 00:37)   Gram Stain:   Rare polymorphonuclear leukocytes per low power field   No Squamous epithelial cells per low power field   Rare Yeast like cells seen per oil power field   Specimen Source: .Sputum Sputum Culture - Blood (08.30.21 @ 18:39)   Specimen Source: .Blood Blood   Culture Results:   No Growth Final       Historical Values  Culture - Blood (09.01.21 @ 05:05)   Specimen Source: .Blood Blood-Peripheral   Culture Results:   No Growth Final   Culture - Blood (09.01.21 @ 05:05)   Specimen Source: .Blood Blood-Peripheral   Culture Results:   RADIOLOGY:  < from: Xray Chest 1 View- PORTABLE-Urgent (Xray Chest 1 View- PORTABLE-Urgent .) (09.05.21 @ 05:59) >    IMPRESSION:  Bilateral diffuse patchy opacities, consistent with COVID pneumonia. Worsening when compared to previous study done on August 30, 2021 at 7:52 PM    --- End of Report ---    < end of copied text >  < from: US Abdomen Upper Quadrant Right (08.31.21 @ 11:33) >  IMPRESSION:    No evidence of cholecystitis.  Right pleural effusion.  Trace perihepatic ascites.    < end of copied text >  < from: VA Duplex Lower Extrem Arterial, Bilat (08.30.21 @ 16:21) >  Findings: Covid protocol.    The peak systolic velocities of the Right lower extremity are as follows:    Common femoral artery: 95 cm/s  Deep femoral artery: 67 cm/s  Superficial femoral artery: 96 cm/s proximal,  92 cm/s mid, 71 cm/s distal  Popliteal artery: 66 cm/s  Tibioperoneal trunk: 67 cm/s  Posterior tibial artery:   59 cm/s mid  Peroneal artery:  51 cm/s mid  Triphasic waveforms throughout.    The peak systolic velocities of the Left lower extremity are as follows:    Common femoral artery: 82 cm/s  Deep femoral artery: 71 cm/s  Superficial femoral artery: 120 cm/s proximal,  109 cm/s mid, 72 cm/s distal  Popliteal artery: 66 cm/s  Tibioperoneal trunk: 62 cm/s  Posterior tibial artery:  87 cm/s mid  Peroneal artery:   71 cm/s mid  Triphasic waveforms throughout.    Impression:    No stenosis or occlusion. All imaged vessels are patent.    --- End of Report ---    < end of copied text >    EKG:    < from: 12 Lead ECG (09.01.21 @ 06:14) >    Systolic  mmHg    Diastolic BP 51 mmHg    Ventricular Rate 97 BPM    Atrial Rate 97 BPM    P-R Interval 148 ms    QRS Duration 66 ms    Q-T Interval 344 ms    QTC Calculation(Bazett) 436 ms    P Axis 12 degrees    R Axis 12 degrees    T Axis 28 degrees    Diagnosis Line NORMAL SINUS RHYTHM  LOW VOLTAGE QRS  SEPTAL INFARCT , AGE UNDETERMINED  ABNORMAL ECG  WHEN COMPARED WITH ECG OF `8/28/21   SINUS RHYTHM HAS REPLACED ATRIAL FIBRILLATION  Confirmed by TOD ROCHE, CUBA LONDON ([id number]) on 9/1/2021 2:42:21 PM    < end of copied text >    [facility] - [place]  ex [phone]  CHIEF COMPLAINT: COVID- 19  VT Cardiac arrest     Interval Events:  72yo M w/ PMHx of HTN, DM2, BPH, asthma presents with shortness of breath. Found to have COVID -19 PNA No Prior Vaccine. The patient was admitted to the Medical floors. The hospital course was complicated by VT arrest respiratory failure MRSE bacteremia, Bilateral DVT upper and lower extremities currently on Lovenox. Found to have right hrt strain on Echo probable PE.  (+) Afib, hypertriglyceridemia, now resolved.      Overnight:   (+) fever remains net (+) for LOS     REVIEW OF SYSTEMS:  Constitutional: [ ] fevers [ ] chills [ ] weight loss [ ] weight gain  HEENT: [ ] dry eyes [ ] eye irritation [ ] postnasal drip [ ] nasal congestion  CV: [ ] chest pain [ ] orthopnea [ ] palpitations [ ] murmur  Resp: [ ] cough [ ] shortness of breath [ ] dyspnea [ ] wheezing [ ] sputum [ ] hemoptysis  GI: [ ] nausea [ ] vomiting [ ] diarrhea [ ] constipation [ ] abd pain [ ] dysphagia   : [ ] dysuria [ ] nocturia [ ] hematuria [ ] increased urinary frequency  Musculoskeletal: [ ] back pain [ ] myalgias [ ] arthralgias [ ] fracture  Skin: [ ] rash [ ] itch  Neurological: [ ] headache [ ] dizziness [ ] syncope [ ] weakness [ ] numbness  Psychiatric: [ ] anxiety [ ] depression  Endocrine: [ ] diabetes [ ] thyroid problem  Hematologic/Lymphatic: [ ] anemia [ ] bleeding problem  Allergic/Immunologic: [ ] itchy eyes [ ] nasal discharge [ ] hives [ ] angioedema  [ ] All other systems negative  [x ] Unable to assess ROS because ________    OBJECTIVE:  ICU Vital Signs Last 24 Hrs  T(C): 37.7 (09 Sep 2021 06:00), Max: 38.1 (08 Sep 2021 22:00)  T(F): 99.9 (09 Sep 2021 06:00), Max: 100.6 (08 Sep 2021 22:00)  HR: 84 (09 Sep 2021 06:15) (72 - 88)  BP: --  BP(mean): --  ABP: 134/62 (09 Sep 2021 06:15) (85/64 - 171/71)  ABP(mean): 85 (09 Sep 2021 06:15) (61 - 101)  RR: 0 (09 Sep 2021 06:15) (0 - 37)  SpO2: 94% (09 Sep 2021 06:15) (84% - 98%)    Mode: AC/ CMV (Assist Control/ Continuous Mandatory Ventilation), RR (machine): 34, FiO2: 70, PEEP: 8, ITime: 0.6, MAP: 18, PC: 27, PIP: 37    09-07 @ 07:01  -  09-08 @ 07:00  --------------------------------------------------------  IN: 1856.1 mL / OUT: 4600 mL / NET: -2743.9 mL    09-08 @ 07:01  -  09-09 @ 06:27  --------------------------------------------------------  IN: 2485.8 mL / OUT: 3070 mL / NET: -584.2 mL      CAPILLARY BLOOD GLUCOSE      POCT Blood Glucose.: 235 mg/dL (09 Sep 2021 05:30)      PHYSICAL EXAM:  General:  Seated moving to right upper extremity to tactile stimuli   HEENT: PERRLA Oral DSG removed by ENT this AM   Lymph Nodes: No Palpable Lymph node adenopathy   Neck: supple   Respiratory: intubated course breath sounds   Cardiovascular: S1 S2 No m/C/G/R   Abdomen: Distended but soft (+) OGT   Extremities: (+) 1 edema to all extremities   Skin: Intact   Neurological: Sedated   Psychiatry: Pleasant     LINES:  CVL A line   HOSPITAL MEDICATIONS:  MEDICATIONS  (STANDING):  ALBUTerol    90 MICROgram(s) HFA Inhaler 2 Puff(s) Inhalation every 8 hours  artificial tears (preservative free) Ophthalmic Solution 1 Drop(s) Both EYES every 8 hours  budesonide 160 MICROgram(s)/formoterol 4.5 MICROgram(s) Inhaler 2 Puff(s) Inhalation two times a day  buMETAnide Injectable 1 milliGRAM(s) IV Push two times a day  chlorhexidine 0.12% Liquid 15 milliLiter(s) Oral Mucosa every 12 hours  chlorhexidine 4% Liquid 1 Application(s) Topical <User Schedule>  cholecalciferol 2000 Unit(s) Oral daily  dexAMETHasone  Injectable 6 milliGRAM(s) IV Push daily  enoxaparin Injectable 80 milliGRAM(s) SubCutaneous every 12 hours  fentaNYL   Infusion... 2 MICROgram(s)/kG/Hr (8.16 mL/Hr) IV Continuous <Continuous>  insulin lispro (ADMELOG) corrective regimen sliding scale   SubCutaneous every 6 hours  insulin NPH human recombinant 2 Unit(s) SubCutaneous every 6 hours  ketamine Infusion. 1.998 mG/kG/Hr (16.3 mL/Hr) IV Continuous <Continuous>  LORazepam     Tablet 2 milliGRAM(s) Oral every 6 hours  methadone   Solution 30 milliGRAM(s) Oral every 8 hours  midazolam Infusion 0.02 mG/kG/Hr (1.63 mL/Hr) IV Continuous <Continuous>  midodrine 20 milliGRAM(s) Oral every 8 hours  multivitamin/minerals Oral Solution (WELLESSE) 30 milliLiter(s) Enteral Tube daily  naloxegol 25 milliGRAM(s) Oral daily  norepinephrine Infusion 0.05 MICROgram(s)/kG/Min (3.83 mL/Hr) IV Continuous <Continuous>  pantoprazole  Injectable 40 milliGRAM(s) IV Push daily  polyethylene glycol 3350 17 Gram(s) Oral daily  senna Syrup 10 milliLiter(s) Oral at bedtime    MEDICATIONS  (PRN):      LABS:                        7.8    8.83  )-----------( 303      ( 09 Sep 2021 00:24 )             25.8     Hgb Trend: 7.8<--, 7.6<--, 7.8<--, 7.7<--, 8.2<--  09-09    136  |  93<L>  |  24<H>  ----------------------------<  258<H>  4.0   |  31  |  0.64    Ca    8.8      09 Sep 2021 00:24  Phos  3.3     09-09  Mg     2.0     09-09    TPro  6.0  /  Alb  2.2<L>  /  TBili  1.1  /  DBili  x   /  AST  118<H>  /  ALT  139<H>  /  AlkPhos  1145<H>  09-09    Creatinine Trend: 0.64<--, 0.69<--, 0.70<--, 0.60<--, 0.56<--, 0.55<--  PT/INR - ( 08 Sep 2021 00:12 )   PT: 13.4 sec;   INR: 1.12 ratio         PTT - ( 08 Sep 2021 00:12 )  PTT:38.7 sec    Arterial Blood Gas:  09-09 @ 00:15  7.44/57/69/39/96.0/13.0  ABG lactate: --  Arterial Blood Gas:  09-08 @ 11:27  7.41/59/75/37/96.0/11.3  ABG lactate: --  Arterial Blood Gas:  09-08 @ 05:19  7.45/54/80/38/97.9/12.1  ABG lactate: --  Arterial Blood Gas:  09-08 @ 00:08  7.44/58/69/39/96.1/13.5  ABG lactate: --  Arterial Blood Gas:  09-07 @ 16:04  7.41/60/65/38/95.6/11.8  ABG lactate: --        MICROBIOLOGY:   Culture - Blood (09.01.21 @ 05:05)   Specimen Source: .Blood Blood-Peripheral   Culture Results:   No Growth Final       Historical Values  Culture - Blood (09.01.21 @ 05:05)   Specimen Source: .Blood Blood-Peripheral   Culture Results:   No Growth Final Culture - Blood (09.01.21 @ 05:05)   Specimen Source: .Blood Blood-Peripheral   Culture Results:   No Growth Final       Historical Values  Culture - Blood (09.01.21 @ 05:05)   Specimen Source: .Blood Blood-Peripheral   Culture Results:   No Growth Final   Culture - Blood (09.01.21 @ 05:05)   Specimen Source: .Blood Blood-Peripheral Culture - Sputum . (08.31.21 @ 00:37)   Gram Stain:   Rare polymorphonuclear leukocytes per low power field   No Squamous epithelial cells per low power field   Rare Yeast like cells seen per oil power field   Specimen Source: .Sputum Sputum   Culture Results:   Normal Respiratory Liane present       Historical Values  Culture - Sputum . (08.31.21 @ 00:37)   Gram Stain:   Rare polymorphonuclear leukocytes per low power field   No Squamous epithelial cells per low power field   Rare Yeast like cells seen per oil power field   Specimen Source: .Sputum Sputum Culture - Blood (08.30.21 @ 18:39)   Specimen Source: .Blood Blood   Culture Results:   No Growth Final       Historical Values  Culture - Blood (09.01.21 @ 05:05)   Specimen Source: .Blood Blood-Peripheral   Culture Results:   No Growth Final   Culture - Blood (09.01.21 @ 05:05)   Specimen Source: .Blood Blood-Peripheral   Culture Results:   RADIOLOGY:  < from: Xray Chest 1 View- PORTABLE-Urgent (Xray Chest 1 View- PORTABLE-Urgent .) (09.05.21 @ 05:59) >    IMPRESSION:  Bilateral diffuse patchy opacities, consistent with COVID pneumonia. Worsening when compared to previous study done on August 30, 2021 at 7:52 PM    --- End of Report ---    < end of copied text >  < from: US Abdomen Upper Quadrant Right (08.31.21 @ 11:33) >  IMPRESSION:    No evidence of cholecystitis.  Right pleural effusion.  Trace perihepatic ascites.    < end of copied text >  < from: [facility] Duplex Lower Extrem Arterial, Bilat (08.30.21 @ 16:21) >  Findings: Covid protocol.    The peak systolic velocities of the Right lower extremity are as follows:    Common femoral artery: 95 cm/s  Deep femoral artery: 67 cm/s  Superficial femoral artery: 96 cm/s proximal,  92 cm/s mid, 71 cm/s distal  Popliteal artery: 66 cm/s  Tibioperoneal trunk: 67 cm/s  Posterior tibial artery:   59 cm/s mid  Peroneal artery:  51 cm/s mid  Triphasic waveforms throughout.    The peak systolic velocities of the Left lower extremity are as follows:    Common femoral artery: 82 cm/s  Deep femoral artery: 71 cm/s  Superficial femoral artery: 120 cm/s proximal,  109 cm/s mid, 72 cm/s distal  Popliteal artery: 66 cm/s  Tibioperoneal trunk: 62 cm/s  Posterior tibial artery:  87 cm/s mid  Peroneal artery:   71 cm/s mid  Triphasic waveforms throughout.    Impression:    No stenosis or occlusion. All imaged vessels are patent.    --- End of Report ---    < end of copied text >    EKG:    < from: 12 Lead ECG (09.01.21 @ 06:14) >    Systolic  mmHg    Diastolic BP 51 mmHg    Ventricular Rate 97 BPM    Atrial Rate 97 BPM    P-R Interval 148 ms    QRS Duration 66 ms    Q-T Interval 344 ms    QTC Calculation(Bazett) 436 ms    P Axis 12 degrees    R Axis 12 degrees    T Axis 28 degrees    Diagnosis Line NORMAL SINUS RHYTHM  LOW VOLTAGE QRS  SEPTAL INFARCT , AGE UNDETERMINED  ABNORMAL ECG  WHEN COMPARED WITH ECG OF `8/28/21   SINUS RHYTHM HAS REPLACED ATRIAL FIBRILLATION  Confirmed by TOD ROCHE, CUBA LONDON ([id number]) on 9/1/2021 2:42:21 PM    < end of copied text >    Sybil Harrington ACN - BC  ex [id number]

## 2021-09-09 NOTE — PROGRESS NOTE ADULT - PROBLEM SELECTOR PLAN 1
- Very gentle oral care and suctioning if necessary  - No further ENT intervention at this time   - Call with questions or concerns

## 2021-09-10 NOTE — CONSULT NOTE ADULT - SUBJECTIVE AND OBJECTIVE BOX
Chief Complaint:  Patient is a 71y old  Male who presents with a chief complaint of SOB (10 Sep 2021 06:15)      HPI: 70yo M w/ PMHx of HTN, DM2, BPH, asthma presents with shortness of breath. Patient endorses that he has been feeling SOB with associated cough for the last 2 weeks and his symptoms slowly worsened over time. He did not receive the COVID vaccine. Pt was found to be Covid+ on 8/12 and intubated on 8/20 for respiratory distress. Immediately post intubation on 8/20 pt had cardiac arrest; with ROSC after 6 min, VT x 2 s/p electrical shock x 2 and 2 epi's. Patient was in shock and on multiple vasopressors and upon transfer to ICU on 8/20/21 patient was hypotensive 50/30 requiring multiple pushes of Neosynephrine, Bicarb, Epi, and D50 while on 3 vasopressors. Also required amio 8/27 for significant ectopy. Hepatology consulted for elevated liver enzymes.    Allergies:  No Known Allergies      Home Medications:    Hospital Medications:  ALBUTerol    90 MICROgram(s) HFA Inhaler 2 Puff(s) Inhalation every 8 hours  artificial tears (preservative free) Ophthalmic Solution 1 Drop(s) Both EYES every 8 hours  budesonide 160 MICROgram(s)/formoterol 4.5 MICROgram(s) Inhaler 2 Puff(s) Inhalation two times a day  buMETAnide Injectable 1 milliGRAM(s) IV Push two times a day  chlorhexidine 0.12% Liquid 15 milliLiter(s) Oral Mucosa every 12 hours  chlorhexidine 4% Liquid 1 Application(s) Topical <User Schedule>  cholecalciferol 2000 Unit(s) Oral daily  dexAMETHasone  Injectable 6 milliGRAM(s) IV Push daily  enoxaparin Injectable 80 milliGRAM(s) SubCutaneous every 12 hours  fentaNYL   Infusion... 2 MICROgram(s)/kG/Hr IV Continuous <Continuous>  insulin lispro (ADMELOG) corrective regimen sliding scale   SubCutaneous every 6 hours  insulin NPH human recombinant 2 Unit(s) SubCutaneous every 6 hours  LORazepam     Tablet 2 milliGRAM(s) Oral every 6 hours  methadone   Solution 30 milliGRAM(s) Oral every 8 hours  midazolam Infusion 0.02 mG/kG/Hr IV Continuous <Continuous>  midodrine 30 milliGRAM(s) Oral every 8 hours  multivitamin/minerals Oral Solution (WELLESSE) 30 milliLiter(s) Enteral Tube daily  naloxegol 25 milliGRAM(s) Oral daily  norepinephrine Infusion 0.05 MICROgram(s)/kG/Min IV Continuous <Continuous>  pantoprazole  Injectable 40 milliGRAM(s) IV Push daily  polyethylene glycol 3350 17 Gram(s) Oral daily  senna Syrup 10 milliLiter(s) Oral at bedtime      PMHX/PSHX:  BPH (benign prostatic hyperplasia)    Hyperlipemia    HTN (hypertension)    HTN (hypertension)    Hypercholesteremia    Asthma    Diabetes    Kidney stone    Bladder stone    H/O lithotripsy    History of kidney stones    H/O umbilical hernia repair        Family history:  Family history of diabetes mellitus (Father, Sibling)    Family history of cancer (Mother)        Social History:     ROS: 14 point ROS negative unless otherwise stated in subjective    PHYSICAL EXAM:     GENERAL:  Appears stated age, well-groomed, well-nourished, no distress  HEENT:  NC/AT,  conjunctivae clear and pink  CHEST:  Full & symmetric excursion, no increased effort, breath sounds clear  HEART:  Regular rhythm, S1, S2, no murmur/rub/S3/S4  ABDOMEN:  Soft, non-tender, non-distended, normoactive bowel sounds  EXTREMITIES:  no cyanosis, clubbing or edema  SKIN:  No rash/erythema/ecchymoses/petechiae/wounds/abscess/warm/dry  NEURO:  Alert, oriented    Vital Signs:  Vital Signs Last 24 Hrs  T(C): 37.7 (10 Sep 2021 12:00), Max: 38.2 (10 Sep 2021 00:00)  T(F): 99.9 (10 Sep 2021 12:00), Max: 100.8 (10 Sep 2021 00:00)  HR: 85 (10 Sep 2021 14:30) (71 - 90)  BP: --  BP(mean): --  RR: 34 (10 Sep 2021 14:30) (25 - 36)  SpO2: 91% (10 Sep 2021 14:30) (88% - 95%)  Daily     Daily     LABS:                        8.0    10.42 )-----------( 415      ( 10 Sep 2021 00:40 )             26.0     09-10    138  |  95<L>  |  25<H>  ----------------------------<  178<H>  4.2   |  33<H>  |  0.54    Ca    8.8      10 Sep 2021 00:40  Phos  2.6     09-10  Mg     2.2     09-10    TPro  6.5  /  Alb  2.6<L>  /  TBili  0.9  /  DBili  x   /  AST  156<H>  /  ALT  167<H>  /  AlkPhos  1253<H>  09-10    LIVER FUNCTIONS - ( 10 Sep 2021 00:40 )  Alb: 2.6 g/dL / Pro: 6.5 g/dL / ALK PHOS: 1253 U/L / ALT: 167 U/L / AST: 156 U/L / GGT: x           PT/INR - ( 10 Sep 2021 00:40 )   PT: 14.0 sec;   INR: 1.18 ratio                 Imaging:        EXAM:  US ABDOMEN RT UPR QUADRANT                          EXAM:  US DPLX PORTAL HEPATIC VEIN                            PROCEDURE DATE:  09/09/2021            INTERPRETATION:  US PORTAL HEPATIC VEIN DUPLEX, US ABDOMEN UPPER QUADRANT RIGHT    HISTORY: Abnormal LFTs. Covid pneumonia. Assess for portal vein thrombus.    COMPARISON: CT abdomen and pelvis 11/9/2019. Abdominal ultrasound 8/31/2021.    Technique: Multiple transverse and longitudinal sonographic images of the liver and abdomen are obtained. Color and spectral Doppler evaluation of the hepatic vasculature is performed.    Findings:    LIVER  Liver morphology: Within normal limits.    Main portal vein: Patent with ANTEGRADE flow.    Right portal vein: Patent with ANTEGRADE flow.    Left portal vein: Patent with ANTEGRADE flow.    Hepatic artery: Patent with normal flow characteristics. Peak systolic velocity 113 cm/s.    Hepatic veins: Patent with normal flow hemodynamics and spectral waveforms.    Splenic vein: Patent where visualized with antegrade flow.    GALLBLADDER: Tiny stones and/or sludge in a nondistended gallbladder. No wall thickening.    BILE DUCTS: The common bile duct measures 8 mm.  No intrahepatic ductal dilatation.    PANCREAS:  The head and proximal body are normal. The distal body and tail are obscured.    RIGHT KIDNEY: 10.3 cm in length.  No hydronephrosis or shadowing stone.    INFERIOR VENA CAVA: Normal in caliber.    FLUID: No ascites.    IMPRESSION:    Cholelithiasis. Normal caliber biliary tree.    Patent portal and hepatic veins.         Chief Complaint:  Patient is a 71y old  Male who presents with a chief complaint of SOB (10 Sep 2021 06:15)      HPI: 72yo M w/ PMHx of HTN, DM2, BPH, asthma presents with shortness of breath. Patient endorses that he has been feeling SOB with associated cough for the last 2 weeks and his symptoms slowly worsened over time. He did not receive the COVID vaccine. Pt was found to be Covid+ on 8/12 and intubated on 8/20 for respiratory distress. Immediately post intubation on 8/20 pt had cardiac arrest; with ROSC after 6 min, VT x 2 s/p electrical shock x 2 and 2 epi's. Patient was in shock and on multiple vasopressors and upon transfer to ICU on 8/20/21 patient was hypotensive 50/30 requiring multiple pushes of Neosynephrine, Bicarb, Epi, and D50 while on 3 vasopressors. Had been sedated with ketamine since 8/21 until 9/10. s/p remdesivir 8/13-8/17, tocilizumab 8/15, atorvastatin until 8/17, haldol 8/19-20. Required amio 8/27 for significant ectopy. Hepatology consulted for elevated liver enzymes.    Allergies:  No Known Allergies      Home Medications:    Hospital Medications:  ALBUTerol    90 MICROgram(s) HFA Inhaler 2 Puff(s) Inhalation every 8 hours  artificial tears (preservative free) Ophthalmic Solution 1 Drop(s) Both EYES every 8 hours  budesonide 160 MICROgram(s)/formoterol 4.5 MICROgram(s) Inhaler 2 Puff(s) Inhalation two times a day  buMETAnide Injectable 1 milliGRAM(s) IV Push two times a day  chlorhexidine 0.12% Liquid 15 milliLiter(s) Oral Mucosa every 12 hours  chlorhexidine 4% Liquid 1 Application(s) Topical <User Schedule>  cholecalciferol 2000 Unit(s) Oral daily  dexAMETHasone  Injectable 6 milliGRAM(s) IV Push daily  enoxaparin Injectable 80 milliGRAM(s) SubCutaneous every 12 hours  fentaNYL   Infusion... 2 MICROgram(s)/kG/Hr IV Continuous <Continuous>  insulin lispro (ADMELOG) corrective regimen sliding scale   SubCutaneous every 6 hours  insulin NPH human recombinant 2 Unit(s) SubCutaneous every 6 hours  LORazepam     Tablet 2 milliGRAM(s) Oral every 6 hours  methadone   Solution 30 milliGRAM(s) Oral every 8 hours  midazolam Infusion 0.02 mG/kG/Hr IV Continuous <Continuous>  midodrine 30 milliGRAM(s) Oral every 8 hours  multivitamin/minerals Oral Solution (WELLESSE) 30 milliLiter(s) Enteral Tube daily  naloxegol 25 milliGRAM(s) Oral daily  norepinephrine Infusion 0.05 MICROgram(s)/kG/Min IV Continuous <Continuous>  pantoprazole  Injectable 40 milliGRAM(s) IV Push daily  polyethylene glycol 3350 17 Gram(s) Oral daily  senna Syrup 10 milliLiter(s) Oral at bedtime      PMHX/PSHX:  BPH (benign prostatic hyperplasia)    Hyperlipemia    HTN (hypertension)    HTN (hypertension)    Hypercholesteremia    Asthma    Diabetes    Kidney stone    Bladder stone    H/O lithotripsy    History of kidney stones    H/O umbilical hernia repair        Family history:  Family history of diabetes mellitus (Father, Sibling)    Family history of cancer (Mother)        Social History:     ROS: 14 point ROS negative unless otherwise stated in subjective    PHYSICAL EXAM:     GENERAL:  Appears stated age, toxic and ill appearing  HEENT:  NC/AT,  conjunctivae clear and pink  CHEST:  Full & symmetric excursion, no increased effort, breath sounds clear  HEART:  Regular rhythm, S1, S2, no murmur/rub/S3/S4  ABDOMEN:  Soft, non-tender, non-distended, normoactive bowel sounds  EXTREMITIES:  no cyanosis, clubbing or edema  SKIN:  No rash/erythema/ecchymoses/petechiae/wounds/abscess/warm/dry  NEURO:  Alert, oriented    Vital Signs:  Vital Signs Last 24 Hrs  T(C): 37.7 (10 Sep 2021 12:00), Max: 38.2 (10 Sep 2021 00:00)  T(F): 99.9 (10 Sep 2021 12:00), Max: 100.8 (10 Sep 2021 00:00)  HR: 85 (10 Sep 2021 14:30) (71 - 90)  BP: --  BP(mean): --  RR: 34 (10 Sep 2021 14:30) (25 - 36)  SpO2: 91% (10 Sep 2021 14:30) (88% - 95%)  Daily     Daily     LABS:                        8.0    10.42 )-----------( 415      ( 10 Sep 2021 00:40 )             26.0     09-10    138  |  95<L>  |  25<H>  ----------------------------<  178<H>  4.2   |  33<H>  |  0.54    Ca    8.8      10 Sep 2021 00:40  Phos  2.6     09-10  Mg     2.2     09-10    TPro  6.5  /  Alb  2.6<L>  /  TBili  0.9  /  DBili  x   /  AST  156<H>  /  ALT  167<H>  /  AlkPhos  1253<H>  09-10    LIVER FUNCTIONS - ( 10 Sep 2021 00:40 )  Alb: 2.6 g/dL / Pro: 6.5 g/dL / ALK PHOS: 1253 U/L / ALT: 167 U/L / AST: 156 U/L / GGT: x           PT/INR - ( 10 Sep 2021 00:40 )   PT: 14.0 sec;   INR: 1.18 ratio                 Imaging:        EXAM:  US ABDOMEN RT UPR QUADRANT                          EXAM:  US DPLX PORTAL HEPATIC VEIN                            PROCEDURE DATE:  09/09/2021            INTERPRETATION:  US PORTAL HEPATIC VEIN DUPLEX, US ABDOMEN UPPER QUADRANT RIGHT    HISTORY: Abnormal LFTs. Covid pneumonia. Assess for portal vein thrombus.    COMPARISON: CT abdomen and pelvis 11/9/2019. Abdominal ultrasound 8/31/2021.    Technique: Multiple transverse and longitudinal sonographic images of the liver and abdomen are obtained. Color and spectral Doppler evaluation of the hepatic vasculature is performed.    Findings:    LIVER  Liver morphology: Within normal limits.    Main portal vein: Patent with ANTEGRADE flow.    Right portal vein: Patent with ANTEGRADE flow.    Left portal vein: Patent with ANTEGRADE flow.    Hepatic artery: Patent with normal flow characteristics. Peak systolic velocity 113 cm/s.    Hepatic veins: Patent with normal flow hemodynamics and spectral waveforms.    Splenic vein: Patent where visualized with antegrade flow.    GALLBLADDER: Tiny stones and/or sludge in a nondistended gallbladder. No wall thickening.    BILE DUCTS: The common bile duct measures 8 mm.  No intrahepatic ductal dilatation.    PANCREAS:  The head and proximal body are normal. The distal body and tail are obscured.    RIGHT KIDNEY: 10.3 cm in length.  No hydronephrosis or shadowing stone.    INFERIOR VENA CAVA: Normal in caliber.    FLUID: No ascites.    IMPRESSION:    Cholelithiasis. Normal caliber biliary tree.    Patent portal and hepatic veins.         Chief Complaint:  Patient is a 71y old  Male who presents with a chief complaint of SOB (10 Sep 2021 06:15)      HPI: 72yo M w/ PMHx of HTN, DM2, BPH, asthma presents with shortness of breath. Patient endorses that he has been feeling SOB with associated cough for the last 2 weeks and his symptoms slowly worsened over time. He did not receive the COVID vaccine. Pt was found to be Covid+ on 8/12 and intubated on 8/20 for respiratory distress. Immediately post intubation on 8/20 pt had cardiac arrest; with ROSC after 6 min, VT x 2 s/p electrical shock x 2 and 2 epi's. Patient was in shock and on multiple vasopressors and upon transfer to ICU on 8/20/21 patient was hypotensive 50/30 requiring multiple pushes of Neosynephrine, Bicarb, Epi, and D50 while on 3 vasopressors. Had been sedated with ketamine since 8/21 until 9/10. s/p remdesivir 8/13-8/17, tocilizumab 8/15, atorvastatin until 8/17, haldol 8/19-20. Required amio 8/27 for significant ectopy. Hepatology consulted for elevated liver enzymes.    Allergies:  No Known Allergies      Home Medications:    Hospital Medications:  ALBUTerol    90 MICROgram(s) HFA Inhaler 2 Puff(s) Inhalation every 8 hours  artificial tears (preservative free) Ophthalmic Solution 1 Drop(s) Both EYES every 8 hours  budesonide 160 MICROgram(s)/formoterol 4.5 MICROgram(s) Inhaler 2 Puff(s) Inhalation two times a day  buMETAnide Injectable 1 milliGRAM(s) IV Push two times a day  chlorhexidine 0.12% Liquid 15 milliLiter(s) Oral Mucosa every 12 hours  chlorhexidine 4% Liquid 1 Application(s) Topical <User Schedule>  cholecalciferol 2000 Unit(s) Oral daily  dexAMETHasone  Injectable 6 milliGRAM(s) IV Push daily  enoxaparin Injectable 80 milliGRAM(s) SubCutaneous every 12 hours  fentaNYL   Infusion... 2 MICROgram(s)/kG/Hr IV Continuous <Continuous>  insulin lispro (ADMELOG) corrective regimen sliding scale   SubCutaneous every 6 hours  insulin NPH human recombinant 2 Unit(s) SubCutaneous every 6 hours  LORazepam     Tablet 2 milliGRAM(s) Oral every 6 hours  methadone   Solution 30 milliGRAM(s) Oral every 8 hours  midazolam Infusion 0.02 mG/kG/Hr IV Continuous <Continuous>  midodrine 30 milliGRAM(s) Oral every 8 hours  multivitamin/minerals Oral Solution (WELLESSE) 30 milliLiter(s) Enteral Tube daily  naloxegol 25 milliGRAM(s) Oral daily  norepinephrine Infusion 0.05 MICROgram(s)/kG/Min IV Continuous <Continuous>  pantoprazole  Injectable 40 milliGRAM(s) IV Push daily  polyethylene glycol 3350 17 Gram(s) Oral daily  senna Syrup 10 milliLiter(s) Oral at bedtime      PMHX/PSHX:  BPH (benign prostatic hyperplasia)    Hyperlipemia    HTN (hypertension)    HTN (hypertension)    Hypercholesteremia    Asthma    Diabetes    Kidney stone    Bladder stone    H/O lithotripsy    History of kidney stones    H/O umbilical hernia repair        Family history:  Family history of diabetes mellitus (Father, Sibling)    Family history of cancer (Mother)        Social History:     ROS: unable to obtain as pt is intubated and sedated    PHYSICAL EXAM:     GENERAL:  Appears stated age, toxic and ill appearing  HEENT:  NC/AT, eyes are closed  CHEST: +vented breath sounds  HEART:  Regular rhythm, S1, S2, no murmur/rub/S3/S4  ABDOMEN:  Soft, non-tender, non-distended, normoactive bowel sounds  EXTREMITIES:  no cyanosis, clubbing or edema  SKIN:  No rash/erythema/ecchymoses/petechiae/wounds/abscess/warm/dry  NEURO:  sedated    Vital Signs:  Vital Signs Last 24 Hrs  T(C): 37.7 (10 Sep 2021 12:00), Max: 38.2 (10 Sep 2021 00:00)  T(F): 99.9 (10 Sep 2021 12:00), Max: 100.8 (10 Sep 2021 00:00)  HR: 85 (10 Sep 2021 14:30) (71 - 90)  BP: --  BP(mean): --  RR: 34 (10 Sep 2021 14:30) (25 - 36)  SpO2: 91% (10 Sep 2021 14:30) (88% - 95%)  Daily     Daily     LABS:                        8.0    10.42 )-----------( 415      ( 10 Sep 2021 00:40 )             26.0     09-10    138  |  95<L>  |  25<H>  ----------------------------<  178<H>  4.2   |  33<H>  |  0.54    Ca    8.8      10 Sep 2021 00:40  Phos  2.6     09-10  Mg     2.2     09-10    TPro  6.5  /  Alb  2.6<L>  /  TBili  0.9  /  DBili  x   /  AST  156<H>  /  ALT  167<H>  /  AlkPhos  1253<H>  09-10    LIVER FUNCTIONS - ( 10 Sep 2021 00:40 )  Alb: 2.6 g/dL / Pro: 6.5 g/dL / ALK PHOS: 1253 U/L / ALT: 167 U/L / AST: 156 U/L / GGT: x           PT/INR - ( 10 Sep 2021 00:40 )   PT: 14.0 sec;   INR: 1.18 ratio                 Imaging:        EXAM:  US ABDOMEN RT UPR QUADRANT                          EXAM:  US DPLX PORTAL HEPATIC VEIN                            PROCEDURE DATE:  09/09/2021            INTERPRETATION:  US PORTAL HEPATIC VEIN DUPLEX, US ABDOMEN UPPER QUADRANT RIGHT    HISTORY: Abnormal LFTs. Covid pneumonia. Assess for portal vein thrombus.    COMPARISON: CT abdomen and pelvis 11/9/2019. Abdominal ultrasound 8/31/2021.    Technique: Multiple transverse and longitudinal sonographic images of the liver and abdomen are obtained. Color and spectral Doppler evaluation of the hepatic vasculature is performed.    Findings:    LIVER  Liver morphology: Within normal limits.    Main portal vein: Patent with ANTEGRADE flow.    Right portal vein: Patent with ANTEGRADE flow.    Left portal vein: Patent with ANTEGRADE flow.    Hepatic artery: Patent with normal flow characteristics. Peak systolic velocity 113 cm/s.    Hepatic veins: Patent with normal flow hemodynamics and spectral waveforms.    Splenic vein: Patent where visualized with antegrade flow.    GALLBLADDER: Tiny stones and/or sludge in a nondistended gallbladder. No wall thickening.    BILE DUCTS: The common bile duct measures 8 mm.  No intrahepatic ductal dilatation.    PANCREAS:  The head and proximal body are normal. The distal body and tail are obscured.    RIGHT KIDNEY: 10.3 cm in length.  No hydronephrosis or shadowing stone.    INFERIOR VENA CAVA: Normal in caliber.    FLUID: No ascites.    IMPRESSION:    Cholelithiasis. Normal caliber biliary tree.    Patent portal and hepatic veins.

## 2021-09-10 NOTE — PROGRESS NOTE ADULT - SUBJECTIVE AND OBJECTIVE BOX
[facility]-- WOUND TEAM -- FOLLOW UP NOTE  --------------------------------------------------------------------------------    24 hour events/subjective:    pt remains intubated and sedated in ICU -critically ill with multiorgan system failure - he has a hx of cardiac arrest  and cardiogenic shock, acute kidney injury which is resolving, and respiratory failure requiring intubation.  no f/c/s  tolerating TF  incontinent  Pt resolving covid awaiting MICU bed    Diet:  Diet, NPO with Tube Feed:   Tube Feeding Modality: Nasogastric  Glucerna 1.5 Stephen (GLUCERNA1.5RTH)  Total Volume for 24 Hours (mL): 1440  Continuous  Starting Tube Feed Rate mL per Hour: 30  Increase Tube Feed Rate by (mL): 10     Every 2 hours  Until Goal Tube Feed Rate (mL per Hour): 60  Tube Feed Duration (in Hours): 24  Tube Feed Start Time: 13:24 (09-08-21 @ 08:04)      ROS: pt unable to offer    ALLERGIES & MEDICATIONS  --------------------------------------------------------------------------------  No Known Allergies      STANDING INPATIENT MEDICATIONS  ALBUTerol    90 MICROgram(s) HFA Inhaler 2 Puff(s) Inhalation every 8 hours  artificial tears (preservative free) Ophthalmic Solution 1 Drop(s) Both EYES every 8 hours  budesonide 160 MICROgram(s)/formoterol 4.5 MICROgram(s) Inhaler 2 Puff(s) Inhalation two times a day  buMETAnide Injectable 1 milliGRAM(s) IV Push two times a day  chlorhexidine 0.12% Liquid 15 milliLiter(s) Oral Mucosa every 12 hours  chlorhexidine 4% Liquid 1 Application(s) Topical <User Schedule>  cholecalciferol 2000 Unit(s) Oral daily  dexAMETHasone  Injectable 6 milliGRAM(s) IV Push daily  enoxaparin Injectable 80 milliGRAM(s) SubCutaneous every 12 hours  fentaNYL   Infusion... 2 MICROgram(s)/kG/Hr IV Continuous <Continuous>  insulin lispro (ADMELOG) corrective regimen sliding scale   SubCutaneous every 6 hours  insulin NPH human recombinant 2 Unit(s) SubCutaneous every 6 hours  LORazepam     Tablet 2 milliGRAM(s) Oral every 6 hours  methadone   Solution 30 milliGRAM(s) Oral every 8 hours  midazolam Infusion 0.02 mG/kG/Hr IV Continuous <Continuous>  midodrine 30 milliGRAM(s) Oral every 8 hours  multivitamin/minerals Oral Solution (WELLESSE) 30 milliLiter(s) Enteral Tube daily  naloxegol 25 milliGRAM(s) Oral daily  norepinephrine Infusion 0.05 MICROgram(s)/kG/Min IV Continuous <Continuous>  pantoprazole  Injectable 40 milliGRAM(s) IV Push daily  polyethylene glycol 3350 17 Gram(s) Oral daily  senna Syrup 10 milliLiter(s) Oral at bedtime      VITALS/PHYSICAL EXAM  --------------------------------------------------------------------------------  T(C): 36.2 (10 Sep 2021 04:00), Max: 38.2 (10 Sep 2021 00:00)  T(F): 97.2 (10 Sep 2021 04:00), Max: 100.8 (10 Sep 2021 00:00)  HR: 79 (10 Sep 2021 04:13) (63 - 90)  BP: --  BP(mean): --  ABP: 170/76 (10 Sep 2021 04:00) (99/45 - 170/76)  ABP(mean): 112 (10 Sep 2021 04:00) (60 - 114)  RR: 36 (10 Sep 2021 04:00) (3 - 36)  SpO2: 93% (10 Sep 2021 04:13) (81% - 96%)    Physical Exam:  General: guarded but stable, intubated, obese   HEENT: nc/at, (+)ogt, ET tube. mucosa moist, trachea midline  Psych: sedated  Neurology: nonverbal, not following commands  Musculoskeletal: no contractures  Vascular: BLE edema equal & warm no acute ischemia  Skin:  skin intact, moist w/ good turgor  Bilateral buttocks into gluteal cleft evolving deep tissue injury   in  8cm x 9cm x0cm area there is epidermal slippage continuing w/ denuded and partial thickness skin loss & granular       tissue w/ slough in gluteal cleft, 40% continues to be intact dark skin, the skin being darker than the pts overall skin tone.   No drainage, odor, erythema, increased warmth, tenderness, induration, fluctuance      LABS/ CULTURES/ RADIOLOGY:              8.0    10.42 >-----------<  415      [09-10-21 @ 00:40]              26.0     138  |  95  |  25  ----------------------------<  178      [09-10-21 @ 00:40]  4.2   |  33  |  0.54        Ca     8.8     [09-10-21 @ 00:40]      Mg     2.2     [09-10-21 @ 00:40]      Phos  2.6     [09-10-21 @ 00:40]    TPro  6.5  /  Alb  2.6  /  TBili  0.9  /  DBili  x   /  AST  156  /  ALT  167  /  AlkPhos  1253  [09-10-21 @ 00:40]    PT/INR: PT 14.0 , INR 1.18       [09-10-21 @ 00:40]      CAPILLARY BLOOD GLUCOSE  POCT Blood Glucose.: 178 mg/dL (10 Sep 2021 17:14)  POCT Blood Glucose.: 277 mg/dL (10 Sep 2021 10:58)  POCT Blood Glucose.: 279 mg/dL (10 Sep 2021 05:25)  POCT Blood Glucose.: 155 mg/dL (09 Sep 2021 23:23)        Triglycerides, Serum: 234 mg/dL (09-10-21 @ 00:40)  Triglycerides, Serum: 194 mg/dL (09-06-21 @ 00:42)  Triglycerides, Serum: 216 mg/dL (09-05-21 @ 01:08)  Triglycerides, Serum: 281 mg/dL (09-04-21 @ 01:35)  Triglycerides, Serum: 231 mg/dL (08-30-21 @ 01:10)                A1C with Estimated Average Glucose Result: 7.9 % (08-21-21 @ 16:09)  A1C with Estimated Average Glucose Result: 7.3 % (08-14-21 @ 14:36)

## 2021-09-10 NOTE — PROGRESS NOTE ADULT - ASSESSMENT
71M Irish speaking Hx HTN, DM2, BPH, Asthma admit 8/12 Covid-19 PNA and cardiac arrest.    Neuro   Sedation   - will continue to miguelangel IV sedation as tolerated   - Maintain methadone and PRN Ativan   - Sedation vacation once medically appropriate     Critical care Myopathy   - will need extensive PT/OT when clinically able     ENT    oropharyngeal packing removed   -(+) oozing noted will follow up with ENT this am possible Surgicel packing vs TXA      CV   Cardiac Arrest VT  - S/p Amiodarone and electrolyte supplementation   - No further VT noted   - Echo with new Right hrt strain   - cont Bumex today  to meet goal of net (-) balance     Shock probably secondary to vasoplegia from sedation   - will continue with low dose Levo   - will discuss with attending for possible increase of midodrine   - maintain goal MAP of 65 as tolerated will attempt to further wean PEEP once goal diuresis has been achieved   - daily POCUS     Pulm   Respiratory failure Secondary to COVID PNA   - will continue with Mechanical Vent support   - attempt to reduce PEEP to 5 if tolerated   - Blood gas daily / MDI   - PRN chest x ray     GI   Nutrition   - maintain Glucerna tube feeds as tolerated   - Bowel regimen      Transaminitis  ? etiology may be congestive hepatopathy VS DILI Lipase (-) Hepatic US and hepatic doppler US (-) for acute pathology   - will continue to monitor LFT        Hypervolemia   - Strict I/O   - Diuresis as above   - Monitor electrolytes     ID   COVID - 19   - S/p RDV Dexa and TOCI   - Now on second round of decadron   - will continue with supportive care     GRIFFIN Bacteremia   - S/P 14 day course of ABT therapy   - ID follow up appreciated   - (+) fever last evening blood cx now (P)   - will send UA and Sputum     Fever overnight   - will reculture today if patient spikes   - will hold abt therapy for now     Endo   DM   - Will maintain NPH and ISS   - goal Blood Sugar 100-180     VTE   (+) VTE and Probable PE   - continue with LOVENOX tx BID                71M [place] speaking Hx HTN, DM2, BPH, Asthma admit 8/12 Covid-19 PNA and cardiac arrest.    Neuro   Sedation   - will continue to miguelangel IV sedation as tolerated   - Maintain methadone and PRN Ativan   - Sedation vacation once medically appropriate     Critical care Myopathy   - will need extensive PT/OT when clinically able     ENT    oropharyngeal packing removed   -(+) oozing noted will follow up with ENT this am possible Surgicel packing vs TXA      CV   Cardiac Arrest VT  - S/p Amiodarone and electrolyte supplementation   - No further VT noted   - Echo with new Right hrt strain   - cont Bumex today  to meet goal of net (-) balance     Shock probably secondary to vasoplegia from sedation   - will continue with low dose Levo   - will discuss with attending for possible increase of midodrine   - maintain goal MAP of 65 as tolerated will attempt to further wean PEEP once goal diuresis has been achieved   - daily POCUS     Pulm   Respiratory failure Secondary to COVID PNA   - will continue with Mechanical Vent support   - attempt to reduce PEEP to 5 if tolerated   - Blood gas daily / MDI   - PRN chest x ray     GI   Nutrition   - maintain Glucerna tube feeds as tolerated   - Bowel regimen      Transaminitis  ? etiology may be congestive hepatopathy VS DILI Lipase (-) Hepatic US and hepatic doppler US (-) for acute pathology   - will continue to monitor LFT   - miguelangel off Ketamine        Hypervolemia   - Strict I/O   - Diuresis as above   - Monitor electrolytes     ID   COVID - 19   - S/p RDV Dexa and TOCI   - Now on second round of decadron   - will continue with supportive care     GRIFFIN Bacteremia   - S/P 14 day course of ABT therapy   - ID follow up appreciated   - (+) fever last evening blood cx now (P)   - will send UA and Sputum     Fever overnight   - will reculture today if patient spikes   - will hold abt therapy for now     Endo   DM   - Will maintain NPH and ISS   - goal Blood Sugar 100-180     VTE   (+) VTE and Probable PE   - continue with LOVENOX tx BID

## 2021-09-10 NOTE — PROGRESS NOTE ADULT - ATTENDING COMMENTS
71 year old man with acute hypoxic respiratory failure and ARDS secondary to viral pneumonia from COVID-19. Hospital course complicated by VT arrest, DVT and MRSE bacteremia. Medical history of HTN, DM2, BPH, and Asthma.       low grade fevers, most recent Bcx remain negative, will continue to monitor off ABx  remains on lung protective ventilation wean FiO2 and PEEP as tolerated    Still with some Oral oozing follow up with ENT   On repeat round of dexamethasone as per family request  continue anticoagulation    Prognosis  poor.

## 2021-09-10 NOTE — CONSULT NOTE ADULT - ASSESSMENT
Impression:  #elevated liver enyzmes- has mixed hepatocellular and cholestatic pattern of injury; has had elevated AST/ALT/Alk phos starting 8/22 to 8/23; differential includes ischemic injury s/p cardiac arrest 8/20/21 vs DILI 2/2 atorvastatin (had received until 8/17) vs haldol (had received 8/19-20) vs remdesivir/tocilizumab (had received remdesivir until 8/17 and tocilizumab 8/15) vs acute viral illness; unlikely AIH given acute onset; US abd + doppler 9/9 showing patent portal and hepatic veins      Recommendations:  - send alk phos isoenzymes  - Cross sectional liver imaging  - send viral hepatitis serologies: Hep A IgM, Hep B SAg IgM, Hep C Ab, Hep E Ab,  CMV IgM & DNA PCR, EBV IgM & DNA PCR,  HSV IgM & DNA PCR   - trend daily CMP, INR    Trice Goyal MD  GI Fellow, PGY-4  Available via [employer] Teams    NON-URGENT CONSULTS:  Please email [email or web address]  AT NIGHT AND ON WEEKENDS:  Contact on-call GI fellow via answering service ([phone]) from 5pm-8am and on weekends/holidays  MONDAY-FRIDAY 8AM-5PM:  Pager# [phone] ([facility]) or [phone] ([facility])  GI Phone# [phone] ([facility])   72yo M w/ PMHx of HTN, DM2, BPH, asthma presents with shortness of breath. Patient endorses that he has been feeling SOB with associated cough for the last 2 weeks and his symptoms slowly worsened over time. He did not receive the COVID vaccine. Pt was found to be Covid+ on 8/12 and intubated on 8/20 for respiratory distress. Immediately post intubation on 8/20 pt had cardiac arrest; with ROSC after 6 min, VT x 2 s/p electrical shock x 2 and 2 epi's. Patient was in shock and on multiple vasopressors and upon transfer to ICU on 8/20/21 patient was hypotensive 50/30 requiring multiple pushes of Neosynephrine, Bicarb, Epi, and D50 while on 3 vasopressors. Had been sedated with ketamine since 8/21 until 9/10. s/p remdesivir 8/13-8/17, tocilizumab 8/15, atorvastatin until 8/17, haldol 8/19-20. Required amio 8/27 for significant ectopy. Hepatology consulted for elevated liver enzymes.    Impression:  #elevated liver enyzmes- has mixed hepatocellular and cholestatic pattern of injury; has had elevated AST/ALT/Alk phos starting 8/23 to 8/24; differential includes COVID vs DILI (2/2 ketamine vs atorvastatin vs haldol vs remdesivir/tocilizumab) vs other viral illness (CMV/EBV/HSV/HepE; HAV/HBV/HCV neg); less likely shock liver or ischemic injury s/p cardiac arrest 8/20/21 given AST/ALT are not markedly elevated (<1000) and have continued to uptrend weeks after cardiac arrest; unlikely AIH given acute onset; US abd + doppler 9/9 showing patent portal and hepatic veins      Recommendations:  - send alk phos isoenzymes  - send viral hepatitis serologies: Hep E Ab, CMV IgM & DNA PCR, EBV IgM & DNA PCR, HSV IgM & DNA PCR   - trend daily CMP, INR  - avoid hepatotoxic agents including ketamine, haldol, statins  - rest of care per primary team      Trice Goyal MD  GI Fellow, PGY-4  Available via [employer] Teams    NON-URGENT CONSULTS:  Please email [email or web address]  AT NIGHT AND ON WEEKENDS:  Contact on-call GI fellow via answering service ([phone]) from 5pm-8am and on weekends/holidays  MONDAY-FRIDAY 8AM-5PM:  Pager# [phone] ([facility]) or [phone] ([facility])  GI Phone# [phone] ([facility])   70yo M w/ PMHx of HTN, DM2, BPH, asthma presents with shortness of breath. Patient endorses that he has been feeling SOB with associated cough for the last 2 weeks and his symptoms slowly worsened over time. He did not receive the COVID vaccine. Pt was found to be Covid+ on 8/12 and intubated on 8/20 for respiratory distress. Immediately post intubation on 8/20 pt had cardiac arrest; with ROSC after 6 min, VT x 2 s/p electrical shock x 2 and 2 epi's. Patient was in shock and on multiple vasopressors and upon transfer to ICU on 8/20/21 patient was hypotensive 50/30 requiring multiple pushes of Neosynephrine, Bicarb, Epi, and D50 while on 3 vasopressors. Had been sedated with ketamine since 8/21 until 9/10. s/p remdesivir 8/13-8/17, tocilizumab 8/15, atorvastatin until 8/17, haldol 8/19-20. Required amio 8/27 for significant ectopy. Hepatology consulted for elevated liver enzymes.    Impression:  #elevated liver enyzmes- has mixed hepatocellular and cholestatic pattern of injury; has had elevated AST/ALT/Alk phos starting 8/23 to 8/24; differential includes COVID vs DILI (2/2 ketamine vs atorvastatin vs haldol vs remdesivir/tocilizumab) vs other viral illness (CMV/EBV/HSV/HepE; HAV/HBV/HCV neg); less likely shock liver or ischemic injury s/p cardiac arrest 8/20/21 given AST/ALT are not markedly elevated (<1000) and have continued to uptrend weeks after cardiac arrest; unlikely AIH given acute onset; US abd + doppler 9/9 showing patent portal and hepatic veins      Recommendations:    - send viral hepatitis serologies:CMV IgM & DNA PCR, EBV IgM & DNA PCR, HSV IgM & DNA PCR   - trend daily CMP, INR  - avoid hepatotoxic agents including ketamine, haldol, statins  - rest of care per primary team      Trice Goyal MD  GI Fellow, PGY-4  Available via [employer] Teams    NON-URGENT CONSULTS:  Please email [email or web address]  AT NIGHT AND ON WEEKENDS:  Contact on-call GI fellow via answering service ([phone]) from 5pm-8am and on weekends/holidays  MONDAY-FRIDAY 8AM-5PM:  Pager# [phone] ([facility]) or [phone] ([facility])  GI Phone# [phone] ([facility])

## 2021-09-10 NOTE — PROGRESS NOTE ADULT - SUBJECTIVE AND OBJECTIVE BOX
CHIEF COMPLAINT: COVID - 19    Interval Events:  70yo M w/ PMHx of HTN, DM2, BPH, asthma presents with shortness of breath. Found to have COVID -19 PNA No Prior Vaccine. The patient was admitted to the Medical floors. The hospital course was complicated by VT arrest respiratory failure MRSE bacteremia, Bilateral DVT upper and lower extremities currently on Lovenox. Found to have right hrt strain on Echo probable PE.  (+) Afib, hypertriglyceridemia, now resolved.      Overnight: (+) fever blood cx urine and sputum cx (+) bleeding in oral -pharnyx     REVIEW OF SYSTEMS:  Constitutional: [ ] fevers [ ] chills [ ] weight loss [ ] weight gain  HEENT: [ ] dry eyes [ ] eye irritation [ ] postnasal drip [ ] nasal congestion  CV: [ ] chest pain [ ] orthopnea [ ] palpitations [ ] murmur  Resp: [ ] cough [ ] shortness of breath [ ] dyspnea [ ] wheezing [ ] sputum [ ] hemoptysis  GI: [ ] nausea [ ] vomiting [ ] diarrhea [ ] constipation [ ] abd pain [ ] dysphagia   : [ ] dysuria [ ] nocturia [ ] hematuria [ ] increased urinary frequency  Musculoskeletal: [ ] back pain [ ] myalgias [ ] arthralgias [ ] fracture  Skin: [ ] rash [ ] itch  Neurological: [ ] headache [ ] dizziness [ ] syncope [ ] weakness [ ] numbness  Psychiatric: [ ] anxiety [ ] depression  Endocrine: [ ] diabetes [ ] thyroid problem  Hematologic/Lymphatic: [ ] anemia [ ] bleeding problem  Allergic/Immunologic: [ ] itchy eyes [ ] nasal discharge [ ] hives [ ] angioedema  [ ] All other systems negative  [x ] Unable to assess ROS because ________    OBJECTIVE:  ICU Vital Signs Last 24 Hrs  T(C): 36.2 (10 Sep 2021 04:00), Max: 38.2 (10 Sep 2021 00:00)  T(F): 97.2 (10 Sep 2021 04:00), Max: 100.8 (10 Sep 2021 00:00)  HR: 79 (10 Sep 2021 04:13) (63 - 90)  BP: --  BP(mean): --  ABP: 170/76 (10 Sep 2021 04:00) (99/45 - 170/76)  ABP(mean): 112 (10 Sep 2021 04:00) (60 - 114)  RR: 36 (10 Sep 2021 04:00) (3 - 36)  SpO2: 93% (10 Sep 2021 04:13) (81% - 96%)    Mode: AC/ CMV (Assist Control/ Continuous Mandatory Ventilation), RR (machine): 34, FiO2: 70, PEEP: 8, ITime: 0.6, MAP: 18, PC: 27, PIP: 36    09-08 @ 07:01  -  09-09 @ 07:00  --------------------------------------------------------  IN: 2485.8 mL / OUT: 3070 mL / NET: -584.2 mL    09-09 @ 07:01  -  09-10 @ 06:19  --------------------------------------------------------  IN: 1354.7 mL / OUT: 1295 mL / NET: 59.7 mL      CAPILLARY BLOOD GLUCOSE      POCT Blood Glucose.: 279 mg/dL (10 Sep 2021 05:25)      PHYSICAL EXAM:  General:   HEENT:   Lymph Nodes:  Neck:   Respiratory:   Cardiovascular:   Abdomen:   Extremities:   Skin:   Neurological:  Psychiatry:    LINES:    HOSPITAL MEDICATIONS:  MEDICATIONS  (STANDING):  ALBUTerol    90 MICROgram(s) HFA Inhaler 2 Puff(s) Inhalation every 8 hours  artificial tears (preservative free) Ophthalmic Solution 1 Drop(s) Both EYES every 8 hours  budesonide 160 MICROgram(s)/formoterol 4.5 MICROgram(s) Inhaler 2 Puff(s) Inhalation two times a day  buMETAnide Injectable 1 milliGRAM(s) IV Push two times a day  chlorhexidine 0.12% Liquid 15 milliLiter(s) Oral Mucosa every 12 hours  chlorhexidine 4% Liquid 1 Application(s) Topical <User Schedule>  cholecalciferol 2000 Unit(s) Oral daily  dexAMETHasone  Injectable 6 milliGRAM(s) IV Push daily  enoxaparin Injectable 80 milliGRAM(s) SubCutaneous every 12 hours  fentaNYL   Infusion... 2 MICROgram(s)/kG/Hr (8.16 mL/Hr) IV Continuous <Continuous>  insulin lispro (ADMELOG) corrective regimen sliding scale   SubCutaneous every 6 hours  insulin NPH human recombinant 2 Unit(s) SubCutaneous every 6 hours  ketamine Infusion. 1.998 mG/kG/Hr (16.3 mL/Hr) IV Continuous <Continuous>  LORazepam     Tablet 2 milliGRAM(s) Oral every 6 hours  methadone   Solution 30 milliGRAM(s) Oral every 8 hours  midazolam Infusion 0.02 mG/kG/Hr (1.63 mL/Hr) IV Continuous <Continuous>  midodrine 30 milliGRAM(s) Oral every 8 hours  multivitamin/minerals Oral Solution (WELLESSE) 30 milliLiter(s) Enteral Tube daily  naloxegol 25 milliGRAM(s) Oral daily  norepinephrine Infusion 0.05 MICROgram(s)/kG/Min (3.83 mL/Hr) IV Continuous <Continuous>  pantoprazole  Injectable 40 milliGRAM(s) IV Push daily  polyethylene glycol 3350 17 Gram(s) Oral daily  senna Syrup 10 milliLiter(s) Oral at bedtime    MEDICATIONS  (PRN):      LABS:                        8.0    10.42 )-----------( 415      ( 10 Sep 2021 00:40 )             26.0     Hgb Trend: 8.0<--, 7.8<--, 7.6<--, 7.8<--, 7.7<--  09-10    138  |  95<L>  |  25<H>  ----------------------------<  178<H>  4.2   |  33<H>  |  0.54    Ca    8.8      10 Sep 2021 00:40  Phos  2.6     09-10  Mg     2.2     09-10    TPro  6.5  /  Alb  2.6<L>  /  TBili  0.9  /  DBili  x   /  AST  156<H>  /  ALT  167<H>  /  AlkPhos  1253<H>  09-10    Creatinine Trend: 0.54<--, 0.64<--, 0.69<--, 0.70<--, 0.60<--, 0.56<--  PT/INR - ( 10 Sep 2021 00:40 )   PT: 14.0 sec;   INR: 1.18 ratio             Arterial Blood Gas:  09-10 @ 00:21  7.43/58/65/38/93.8/12.6  ABG lactate: --  Arterial Blood Gas:  09-09 @ 00:15  7.44/57/69/39/96.0/13.0  ABG lactate: --  Arterial Blood Gas:  09-08 @ 11:27  7.41/59/75/37/96.0/11.3  ABG lactate: --        MICROBIOLOGY:     RADIOLOGY:  [ ] Reviewed and interpreted by me    EKG:      Sybil Harrington ACNP - [place]  ex [id number]  CHIEF COMPLAINT: COVID - 19    Interval Events:  70yo M w/ PMHx of HTN, DM2, BPH, asthma presents with shortness of breath. Found to have COVID -19 PNA No Prior Vaccine. The patient was admitted to the Medical floors. The hospital course was complicated by VT arrest respiratory failure MRSE bacteremia, Bilateral DVT upper and lower extremities currently on Lovenox. Found to have right hrt strain on Echo probable PE.  (+) Afib, hypertriglyceridemia, now resolved.      Overnight: (+) fever blood cx urine and sputum cx (+) bleeding in oral -pharnyx     REVIEW OF SYSTEMS:  Constitutional: [ ] fevers [ ] chills [ ] weight loss [ ] weight gain  HEENT: [ ] dry eyes [ ] eye irritation [ ] postnasal drip [ ] nasal congestion  CV: [ ] chest pain [ ] orthopnea [ ] palpitations [ ] murmur  Resp: [ ] cough [ ] shortness of breath [ ] dyspnea [ ] wheezing [ ] sputum [ ] hemoptysis  GI: [ ] nausea [ ] vomiting [ ] diarrhea [ ] constipation [ ] abd pain [ ] dysphagia   : [ ] dysuria [ ] nocturia [ ] hematuria [ ] increased urinary frequency  Musculoskeletal: [ ] back pain [ ] myalgias [ ] arthralgias [ ] fracture  Skin: [ ] rash [ ] itch  Neurological: [ ] headache [ ] dizziness [ ] syncope [ ] weakness [ ] numbness  Psychiatric: [ ] anxiety [ ] depression  Endocrine: [ ] diabetes [ ] thyroid problem  Hematologic/Lymphatic: [ ] anemia [ ] bleeding problem  Allergic/Immunologic: [ ] itchy eyes [ ] nasal discharge [ ] hives [ ] angioedema  [ ] All other systems negative  [x ] Unable to assess ROS because ________    OBJECTIVE:  ICU Vital Signs Last 24 Hrs  T(C): 36.2 (10 Sep 2021 04:00), Max: 38.2 (10 Sep 2021 00:00)  T(F): 97.2 (10 Sep 2021 04:00), Max: 100.8 (10 Sep 2021 00:00)  HR: 79 (10 Sep 2021 04:13) (63 - 90)  BP: --  BP(mean): --  ABP: 170/76 (10 Sep 2021 04:00) (99/45 - 170/76)  ABP(mean): 112 (10 Sep 2021 04:00) (60 - 114)  RR: 36 (10 Sep 2021 04:00) (3 - 36)  SpO2: 93% (10 Sep 2021 04:13) (81% - 96%)    Mode: AC/ CMV (Assist Control/ Continuous Mandatory Ventilation), RR (machine): 34, FiO2: 70, PEEP: 8, ITime: 0.6, MAP: 18, PC: 27, PIP: 36    09-08 @ 07:01  -  09-09 @ 07:00  --------------------------------------------------------  IN: 2485.8 mL / OUT: 3070 mL / NET: -584.2 mL    09-09 @ 07:01  -  09-10 @ 06:19  --------------------------------------------------------  IN: 1354.7 mL / OUT: 1295 mL / NET: 59.7 mL      CAPILLARY BLOOD GLUCOSE      POCT Blood Glucose.: 279 mg/dL (10 Sep 2021 05:25)    General:  Seated moving to right upper extremity to tactile stimuli   HEENT: PERRLA Oral DSG removed by ENT this AM   Lymph Nodes: No Palpable Lymph node adenopathy   Neck: supple   Respiratory: intubated course breath sounds   Cardiovascular: S1 S2 No m/C/G/R   Abdomen: Distended but soft (+) OGT   Extremities: (+) 1 edema to all extremities   Skin: Intact   Neurological: Sedated   Psychiatry: unable to assess       CVL  A line PIV       HOSPITAL MEDICATIONS:  MEDICATIONS  (STANDING):  ALBUTerol    90 MICROgram(s) HFA Inhaler 2 Puff(s) Inhalation every 8 hours  artificial tears (preservative free) Ophthalmic Solution 1 Drop(s) Both EYES every 8 hours  budesonide 160 MICROgram(s)/formoterol 4.5 MICROgram(s) Inhaler 2 Puff(s) Inhalation two times a day  buMETAnide Injectable 1 milliGRAM(s) IV Push two times a day  chlorhexidine 0.12% Liquid 15 milliLiter(s) Oral Mucosa every 12 hours  chlorhexidine 4% Liquid 1 Application(s) Topical <User Schedule>  cholecalciferol 2000 Unit(s) Oral daily  dexAMETHasone  Injectable 6 milliGRAM(s) IV Push daily  enoxaparin Injectable 80 milliGRAM(s) SubCutaneous every 12 hours  fentaNYL   Infusion... 2 MICROgram(s)/kG/Hr (8.16 mL/Hr) IV Continuous <Continuous>  insulin lispro (ADMELOG) corrective regimen sliding scale   SubCutaneous every 6 hours  insulin NPH human recombinant 2 Unit(s) SubCutaneous every 6 hours  ketamine Infusion. 1.998 mG/kG/Hr (16.3 mL/Hr) IV Continuous <Continuous>  LORazepam     Tablet 2 milliGRAM(s) Oral every 6 hours  methadone   Solution 30 milliGRAM(s) Oral every 8 hours  midazolam Infusion 0.02 mG/kG/Hr (1.63 mL/Hr) IV Continuous <Continuous>  midodrine 30 milliGRAM(s) Oral every 8 hours  multivitamin/minerals Oral Solution (WELLESSE) 30 milliLiter(s) Enteral Tube daily  naloxegol 25 milliGRAM(s) Oral daily  norepinephrine Infusion 0.05 MICROgram(s)/kG/Min (3.83 mL/Hr) IV Continuous <Continuous>  pantoprazole  Injectable 40 milliGRAM(s) IV Push daily  polyethylene glycol 3350 17 Gram(s) Oral daily  senna Syrup 10 milliLiter(s) Oral at bedtime    MEDICATIONS  (PRN):      LABS:                        8.0    10.42 )-----------( 415      ( 10 Sep 2021 00:40 )             26.0     Hgb Trend: 8.0<--, 7.8<--, 7.6<--, 7.8<--, 7.7<--  09-10    138  |  95<L>  |  25<H>  ----------------------------<  178<H>  4.2   |  33<H>  |  0.54    Ca    8.8      10 Sep 2021 00:40  Phos  2.6     09-10  Mg     2.2     09-10    TPro  6.5  /  Alb  2.6<L>  /  TBili  0.9  /  DBili  x   /  AST  156<H>  /  ALT  167<H>  /  AlkPhos  1253<H>  09-10    Creatinine Trend: 0.54<--, 0.64<--, 0.69<--, 0.70<--, 0.60<--, 0.56<--  PT/INR - ( 10 Sep 2021 00:40 )   PT: 14.0 sec;   INR: 1.18 ratio             Arterial Blood Gas:  09-10 @ 00:21  7.43/58/65/38/93.8/12.6  ABG lactate: --  Arterial Blood Gas:  09-09 @ 00:15  7.44/57/69/39/96.0/13.0  ABG lactate: --  Arterial Blood Gas:  09-08 @ 11:27  7.41/59/75/37/96.0/11.3  ABG lactate: --        MICROBIOLOGY:     RADIOLOGY:  [ ] Reviewed and interpreted by me    EKG:      Sybil Harrington [facility] - [place]  ex [id number]

## 2021-09-10 NOTE — CHART NOTE - NSCHARTNOTEFT_GEN_A_CORE
CHIEF COMPLAINT:     HPI / INTERVAL HISTORY: sob  70yo M w/ PMHx of HTN, DM2, BPH, asthma presents with shortness of breath. Patient endorses that he has been feeling SOB with associated cough for the last 2 weeks and his symptoms slowly worsened over time. He did not receive the COVID vaccine. He reports that his family members whom he lives with likely are also COVID positive but he does not know for sure. He did not try taking anything for his symptoms other than his usual medications/inhalers. There were no obvious aggravating or alleviating factors. Currently with supplemental oxygen on, he feels well and has no complaints, however he presented to [facility] since his symptoms were worsening. In the ED, he was hemodynamically stable, afebrile, but he was encephalopathic and hypoxic on room air requiring high flow nasal canula. Labs were significant for mild hyperkalemia and COVID positive. CXR prelim read showed bilateral patchy opacities. CT head showed no acute findings. MICU was consulted in the ED, patient was deemed not a MICU candidate. Patient was given doxycycline and dexamethasone x1 and admitted to general medicine for further management. At time of interview, patient was A/Ox3 with .  (13 Aug 2021 04:21)    Hospital Course:       PAST MEDICAL & SURGICAL HISTORY:  BPH (benign prostatic hyperplasia)    Hyperlipemia    HTN (hypertension)    Hypercholesteremia    Asthma    Diabetes    Kidney stone    Bladder stone    H/O lithotripsy  x 2    History of kidney stones    H/O umbilical hernia repair        FAMILY HISTORY:  Family history of diabetes mellitus (Father, Sibling)    Family history of cancer (Mother)        SOCIAL HISTORY:  Smoking: [  ] Never Smoked  [  ] Former Smoker (# packs x # years)  [  ] Current Smoker (# packs x # years)  Substance Use:   EtOH Use:   Marital Status: [  ] Single  [  ]   [  ]   [  ]   Sexual History:   Occupation:  Recent Travel:  Country of Birth:   Advance Directives:     HOME MEDICATIONS:      Allergies    No Known Allergies    Intolerances      REVIEW OF SYSTEMS:  Constitutional: No fevers, chills, weight loss, weight gain  HEENT: No vision problems, eye pain, nasal congestion, rhinorrhea, sore throat, dysphagia  CV: No chest pain, orthopnea, palpitations  Resp: No cough, dyspnea, wheezing, hemoptysis  GI: No nausea, vomiting, diarrhea, constipation, abdominal pain  : [ ] dysuria [ ] nocturia [ ] hematuria [ ] increased urinary frequency  Musculoskeletal: [ ] back pain [ ] myalgias [ ] arthralgias [ ] fracture  Skin: [ ] rash [ ] itch  Neurological: [ ] headache [ ] dizziness [ ] syncope [ ] weakness [ ] numbness  Psychiatric: [ ] anxiety [ ] depression  Endocrine: [ ] diabetes [ ] thyroid problem  Hematologic/Lymphatic: [ ] anemia [ ] bleeding problem  Allergic/Immunologic: [ ] itchy eyes [ ] nasal discharge [ ] hives [ ] angioedema  [ ] All other systems negative  [ ] Unable to assess ROS because ________    OBJECTIVE:  ICU Vital Signs Last 24 Hrs  T(C): 38 (10 Sep 2021 17:00), Max: 38.3 (10 Sep 2021 16:00)  T(F): 100.4 (10 Sep 2021 17:00), Max: 100.9 (10 Sep 2021 16:00)  HR: 83 (10 Sep 2021 17:00) (71 - 90)  BP: --  BP(mean): --  ABP: 144/61 (10 Sep 2021 17:00) (95/47 - 170/76)  ABP(mean): 89 (10 Sep 2021 17:00) (60 - 112)  RR: 34 (10 Sep 2021 17:00) (25 - 36)  SpO2: 92% (10 Sep 2021 17:00) (88% - 96%)    Mode: AC/ CMV (Assist Control/ Continuous Mandatory Ventilation), RR (machine): 34, FiO2: 70, PEEP: 8, ITime: 0.6, MAP: 18, PC: 27, PIP: 37    09-09 @ 07:01  -  09-10 @ 07:00  --------------------------------------------------------  IN: 2532.7 mL / OUT: 2165 mL / NET: 367.7 mL    09-10 @ 07:01  -  09-10 @ 17:08  --------------------------------------------------------  IN: 927.5 mL / OUT: 930 mL / NET: -2.5 mL      CAPILLARY BLOOD GLUCOSE      POCT Blood Glucose.: 277 mg/dL (10 Sep 2021 10:58)      PHYSICAL EXAM:      HOSPITAL MEDICATIONS:  MEDICATIONS  (STANDING):  ALBUTerol    90 MICROgram(s) HFA Inhaler 2 Puff(s) Inhalation every 8 hours  artificial tears (preservative free) Ophthalmic Solution 1 Drop(s) Both EYES every 8 hours  budesonide 160 MICROgram(s)/formoterol 4.5 MICROgram(s) Inhaler 2 Puff(s) Inhalation two times a day  buMETAnide Injectable 1 milliGRAM(s) IV Push two times a day  chlorhexidine 0.12% Liquid 15 milliLiter(s) Oral Mucosa every 12 hours  chlorhexidine 4% Liquid 1 Application(s) Topical <User Schedule>  cholecalciferol 2000 Unit(s) Oral daily  dexAMETHasone  Injectable 6 milliGRAM(s) IV Push daily  enoxaparin Injectable 80 milliGRAM(s) SubCutaneous every 12 hours  fentaNYL   Infusion... 2 MICROgram(s)/kG/Hr (8.16 mL/Hr) IV Continuous <Continuous>  insulin lispro (ADMELOG) corrective regimen sliding scale   SubCutaneous every 6 hours  insulin NPH human recombinant 2 Unit(s) SubCutaneous every 6 hours  LORazepam     Tablet 2 milliGRAM(s) Oral every 6 hours  methadone   Solution 30 milliGRAM(s) Oral every 8 hours  midazolam Infusion 0.02 mG/kG/Hr (1.63 mL/Hr) IV Continuous <Continuous>  midodrine 30 milliGRAM(s) Oral every 8 hours  multivitamin/minerals Oral Solution (WELLESSE) 30 milliLiter(s) Enteral Tube daily  naloxegol 25 milliGRAM(s) Oral daily  norepinephrine Infusion 0.05 MICROgram(s)/kG/Min (3.83 mL/Hr) IV Continuous <Continuous>  pantoprazole  Injectable 40 milliGRAM(s) IV Push daily  polyethylene glycol 3350 17 Gram(s) Oral daily  senna Syrup 10 milliLiter(s) Oral at bedtime    MEDICATIONS  (PRN):      LABS:                        8.0    10.42 )-----------( 415      ( 10 Sep 2021 00:40 )             26.0     Hgb Trend: 8.0<--, 7.8<--, 7.6<--, 7.8<--, 7.7<--  09-10    138  |  95<L>  |  25<H>  ----------------------------<  178<H>  4.2   |  33<H>  |  0.54    Ca    8.8      10 Sep 2021 00:40  Phos  2.6     09-10  Mg     2.2     09-10    TPro  6.5  /  Alb  2.6<L>  /  TBili  0.9  /  DBili  x   /  AST  156<H>  /  ALT  167<H>  /  AlkPhos  1253<H>  09-10    Creatinine Trend: 0.54<--, 0.64<--, 0.69<--, 0.70<--, 0.60<--, 0.56<--  PT/INR - ( 10 Sep 2021 00:40 )   PT: 14.0 sec;   INR: 1.18 ratio             Arterial Blood Gas:  09-10 @ 00:21  7.43/58/65/38/93.8/12.6  ABG lactate: --  Arterial Blood Gas:  09-09 @ 00:15  7.44/57/69/39/96.0/13.0  ABG lactate: --    MICROBIOLOGY:       Assessmen and Plan:     71M Serbian speaking Hx HTN, DM2, BPH, Asthma admitted 8/12 Covid-19 PNA and cardiac arrest.    #Neuro   1) Sedation   - will continue to miguelangel IV sedation as tolerated   - Maintain methadone and PRN Ativan   - Sedation vacation once medically appropriate     2) Critical care Myopathy   - will need extensive PT/OT when clinically able     3) ENT    oropharyngeal packing removed   -(+) oozing noted will follow up with ENT this am possible Surgicel packing vs TXA      #CV   1)Cardiac Arrest VT  - S/p Amiodarone and electrolyte supplementation   - No further VT noted   - Echo with new Right hrt strain   - cont Bumex today  to meet goal of net (-) balance     2)Shock probably secondary to vasoplegia from sedation   - will continue with low dose Levo   - will discuss with attending for possible increase of midodrine   - maintain goal MAP of 65 as tolerated will attempt to further wean PEEP once goal diuresis has been achieved   - daily POCUS     #Pulm   1)Respiratory failure Secondary to COVID PNA   - will continue with Mechanical Vent support   - attempt to reduce PEEP to 5 if tolerated   - Blood gas daily / MDI   - PRN chest x ray     #GI   1)Nutrition   - maintain Glucerna tube feeds as tolerated   - Bowel regimen      2)Transaminitis  ? etiology may be congestive hepatopathy VS DILI Lipase (-) Hepatic US and hepatic doppler US (-) for acute pathology   - will continue to monitor LFT   - miguelangel off Ketamine     #   1)Hypervolemia   - Strict I/O   - Diuresis as above   - Monitor electrolytes     #ID   1)COVID - 19   - S/p RDV Dexa and TOCI   - Now on second round of decadron   - will continue with supportive care     2)MRSE Bacteremia   - S/P 14 day course of ABT therapy   - ID follow up appreciated   - (+) fever last evening blood cx now (P)   - will send UA and Sputum     Fever overnight   - will reculture today if patient spikes   - will hold abt therapy for now     #Endo   1)DM   - Will maintain NPH and ISS   - goal Blood Sugar 100-180     #Prophylaxis  - continue with LOVENOX  BID     #Ethics  -Goc with family as prognosis is poor CHIEF COMPLAINT:     HPI / INTERVAL HISTORY: sob  72yo M w/ PMHx of HTN, DM2, BPH, asthma presents with shortness of breath. Patient endorses that he has been feeling SOB with associated cough for the last 2 weeks and his symptoms slowly worsened over time. He did not receive the COVID vaccine. He reports that his family members whom he lives with likely are also COVID positive but he does not know for sure. He did not try taking anything for his symptoms other than his usual medications/inhalers. There were no obvious aggravating or alleviating factors. Currently with supplemental oxygen on, he feels well and has no complaints, however he presented to [facility] since his symptoms were worsening. In the ED, he was hemodynamically stable, afebrile, but he was encephalopathic and hypoxic on room air requiring high flow nasal canula. Labs were significant for mild hyperkalemia and COVID positive. CXR prelim read showed bilateral patchy opacities. CT head showed no acute findings. MICU was consulted in the ED, patient was deemed not a MICU candidate. Patient was given doxycycline and dexamethasone x1 and admitted to general medicine for further management. At time of interview, patient was A/Ox3 with .  (13 Aug 2021 04:21)    Hospital Course: On 8/20 the patient had an RRT and was admitted to the ICU. Had MRSA+ Bcx on 8/24 and started on vanco. Patient was initially being weaned on Levo and lyndsey, however their pressures did not maintain and levo was restarted. RIJ was inserted on 8/25.        PAST MEDICAL & SURGICAL HISTORY:  BPH (benign prostatic hyperplasia)    Hyperlipemia    HTN (hypertension)    Hypercholesteremia    Asthma    Diabetes    Kidney stone    Bladder stone    H/O lithotripsy  x 2    History of kidney stones    H/O umbilical hernia repair        FAMILY HISTORY:  Family history of diabetes mellitus (Father, Sibling)    Family history of cancer (Mother)        SOCIAL HISTORY:  Smoking: [  ] Never Smoked  [  ] Former Smoker (# packs x # years)  [  ] Current Smoker (# packs x # years)  Substance Use:   EtOH Use:   Marital Status: [  ] Single  [  ]   [  ]   [  ]   Sexual History:   Occupation:  Recent Travel:  Country of Birth:   Advance Directives:     HOME MEDICATIONS:      Allergies    No Known Allergies    Intolerances      REVIEW OF SYSTEMS:  Constitutional: No fevers, chills, weight loss, weight gain  HEENT: No vision problems, eye pain, nasal congestion, rhinorrhea, sore throat, dysphagia  CV: No chest pain, orthopnea, palpitations  Resp: No cough, dyspnea, wheezing, hemoptysis  GI: No nausea, vomiting, diarrhea, constipation, abdominal pain  : [ ] dysuria [ ] nocturia [ ] hematuria [ ] increased urinary frequency  Musculoskeletal: [ ] back pain [ ] myalgias [ ] arthralgias [ ] fracture  Skin: [ ] rash [ ] itch  Neurological: [ ] headache [ ] dizziness [ ] syncope [ ] weakness [ ] numbness  Psychiatric: [ ] anxiety [ ] depression  Endocrine: [ ] diabetes [ ] thyroid problem  Hematologic/Lymphatic: [ ] anemia [ ] bleeding problem  Allergic/Immunologic: [ ] itchy eyes [ ] nasal discharge [ ] hives [ ] angioedema  [ ] All other systems negative  [ ] Unable to assess ROS because ________    OBJECTIVE:  ICU Vital Signs Last 24 Hrs  T(C): 38 (10 Sep 2021 17:00), Max: 38.3 (10 Sep 2021 16:00)  T(F): 100.4 (10 Sep 2021 17:00), Max: 100.9 (10 Sep 2021 16:00)  HR: 83 (10 Sep 2021 17:00) (71 - 90)  BP: --  BP(mean): --  ABP: 144/61 (10 Sep 2021 17:00) (95/47 - 170/76)  ABP(mean): 89 (10 Sep 2021 17:00) (60 - 112)  RR: 34 (10 Sep 2021 17:00) (25 - 36)  SpO2: 92% (10 Sep 2021 17:00) (88% - 96%)    Mode: AC/ CMV (Assist Control/ Continuous Mandatory Ventilation), RR (machine): 34, FiO2: 70, PEEP: 8, ITime: 0.6, MAP: 18, PC: 27, PIP: 37    09-09 @ 07:01  -  09-10 @ 07:00  --------------------------------------------------------  IN: 2532.7 mL / OUT: 2165 mL / NET: 367.7 mL    09-10 @ 07:01  -  09-10 @ 17:08  --------------------------------------------------------  IN: 927.5 mL / OUT: 930 mL / NET: -2.5 mL      CAPILLARY BLOOD GLUCOSE      POCT Blood Glucose.: 277 mg/dL (10 Sep 2021 10:58)      PHYSICAL EXAM:      HOSPITAL MEDICATIONS:  MEDICATIONS  (STANDING):  ALBUTerol    90 MICROgram(s) HFA Inhaler 2 Puff(s) Inhalation every 8 hours  artificial tears (preservative free) Ophthalmic Solution 1 Drop(s) Both EYES every 8 hours  budesonide 160 MICROgram(s)/formoterol 4.5 MICROgram(s) Inhaler 2 Puff(s) Inhalation two times a day  buMETAnide Injectable 1 milliGRAM(s) IV Push two times a day  chlorhexidine 0.12% Liquid 15 milliLiter(s) Oral Mucosa every 12 hours  chlorhexidine 4% Liquid 1 Application(s) Topical <User Schedule>  cholecalciferol 2000 Unit(s) Oral daily  dexAMETHasone  Injectable 6 milliGRAM(s) IV Push daily  enoxaparin Injectable 80 milliGRAM(s) SubCutaneous every 12 hours  fentaNYL   Infusion... 2 MICROgram(s)/kG/Hr (8.16 mL/Hr) IV Continuous <Continuous>  insulin lispro (ADMELOG) corrective regimen sliding scale   SubCutaneous every 6 hours  insulin NPH human recombinant 2 Unit(s) SubCutaneous every 6 hours  LORazepam     Tablet 2 milliGRAM(s) Oral every 6 hours  methadone   Solution 30 milliGRAM(s) Oral every 8 hours  midazolam Infusion 0.02 mG/kG/Hr (1.63 mL/Hr) IV Continuous <Continuous>  midodrine 30 milliGRAM(s) Oral every 8 hours  multivitamin/minerals Oral Solution (WELLESSE) 30 milliLiter(s) Enteral Tube daily  naloxegol 25 milliGRAM(s) Oral daily  norepinephrine Infusion 0.05 MICROgram(s)/kG/Min (3.83 mL/Hr) IV Continuous <Continuous>  pantoprazole  Injectable 40 milliGRAM(s) IV Push daily  polyethylene glycol 3350 17 Gram(s) Oral daily  senna Syrup 10 milliLiter(s) Oral at bedtime    MEDICATIONS  (PRN):      LABS:                        8.0    10.42 )-----------( 415      ( 10 Sep 2021 00:40 )             26.0     Hgb Trend: 8.0<--, 7.8<--, 7.6<--, 7.8<--, 7.7<--  09-10    138  |  95<L>  |  25<H>  ----------------------------<  178<H>  4.2   |  33<H>  |  0.54    Ca    8.8      10 Sep 2021 00:40  Phos  2.6     09-10  Mg     2.2     09-10    TPro  6.5  /  Alb  2.6<L>  /  TBili  0.9  /  DBili  x   /  AST  156<H>  /  ALT  167<H>  /  AlkPhos  1253<H>  09-10    Creatinine Trend: 0.54<--, 0.64<--, 0.69<--, 0.70<--, 0.60<--, 0.56<--  PT/INR - ( 10 Sep 2021 00:40 )   PT: 14.0 sec;   INR: 1.18 ratio             Arterial Blood Gas:  09-10 @ 00:21  7.43/58/65/38/93.8/12.6  ABG lactate: --  Arterial Blood Gas:  09-09 @ 00:15  7.44/57/69/39/96.0/13.0  ABG lactate: --    MICROBIOLOGY:       Assessmen and Plan:     71M Martiniquais speaking Hx HTN, DM2, BPH, Asthma admitted 8/12 Covid-19 PNA and cardiac arrest.    #Neuro   1) Sedation   - will continue to miguelangel IV sedation as tolerated   - Maintain methadone and PRN Ativan   - Sedation vacation once medically appropriate     2) Critical care Myopathy   - will need extensive PT/OT when clinically able     3) ENT    oropharyngeal packing removed   -(+) oozing noted will follow up with ENT this am possible Surgicel packing vs TXA      #CV   1)Cardiac Arrest VT  - S/p Amiodarone and electrolyte supplementation   - No further VT noted   - Echo with new Right hrt strain   - cont Bumex today  to meet goal of net (-) balance     2)Shock probably secondary to vasoplegia from sedation   - will continue with low dose Levo   - will discuss with attending for possible increase of midodrine   - maintain goal MAP of 65 as tolerated will attempt to further wean PEEP once goal diuresis has been achieved   - daily POCUS     #Pulm   1)Respiratory failure Secondary to COVID PNA   - will continue with Mechanical Vent support   - attempt to reduce PEEP to 5 if tolerated   - Blood gas daily / MDI   - PRN chest x ray     #GI   1)Nutrition   - maintain Glucerna tube feeds as tolerated   - Bowel regimen      2)Transaminitis  ? etiology may be congestive hepatopathy VS DILI Lipase (-) Hepatic US and hepatic doppler US (-) for acute pathology   - will continue to monitor LFT   - miguelangel off Ketamine     #   1)Hypervolemia   - Strict I/O   - Diuresis as above   - Monitor electrolytes     #ID   1)COVID - 19   - S/p RDV Dexa and TOCI   - Now on second round of decadron   - will continue with supportive care     2)MRSE Bacteremia   - S/P 14 day course of ABT therapy   - ID follow up appreciated   - (+) fever last evening blood cx now (P)   - will send UA and Sputum     Fever overnight   - will reculture today if patient spikes   - will hold abt therapy for now     #Endo   1)DM   - Will maintain NPH and ISS   - goal Blood Sugar 100-180     #Prophylaxis  - continue with LOVENOX  BID     #Ethics  -Goc with family as prognosis is poor CHIEF COMPLAINT:     HPI / INTERVAL HISTORY: sob  70yo M w/ PMHx of HTN, DM2, BPH, asthma presents with shortness of breath. Patient endorses that he has been feeling SOB with associated cough for the last 2 weeks and his symptoms slowly worsened over time. He did not receive the COVID vaccine. He reports that his family members whom he lives with likely are also COVID positive but he does not know for sure. He did not try taking anything for his symptoms other than his usual medications/inhalers. There were no obvious aggravating or alleviating factors. Currently with supplemental oxygen on, he feels well and has no complaints, however he presented to [facility] since his symptoms were worsening. In the ED, he was hemodynamically stable, afebrile, but he was encephalopathic and hypoxic on room air requiring high flow nasal canula. Labs were significant for mild hyperkalemia and COVID positive. CXR prelim read showed bilateral patchy opacities. CT head showed no acute findings. MICU was consulted in the ED, patient was deemed not a MICU candidate. Patient was given doxycycline and dexamethasone x1 and admitted to general medicine for further management. At time of interview, patient was A/Ox3 with .  (13 Aug 2021 04:21)    Hospital Course: On 8/20 the patient had an RRT and was admitted to the ICU. Had MRSE+ Bcx on 8/24 and started on vanco 2x week course completed since admission. Patient was initially being weaned on Levo and lyndsey, however their pressures did not maintain and levo was restarted. RIJ was inserted on 8/25. Patient had Afib RVR but has now reconverted back to NSR on 8/26. TTE done demonstrated RV strain that the primary team began diuresing. Arjun red blood was noted on suction and ENT was consulted for blood oozing in the pharynx. ENT packed and unpacked on 9/9 but wants minimal procedure. Consult ENT if h/h were to drop. Additionally the patient had hypertriglyceridemia that resolved. Patient was also found to have b/l DVTs and RVT and was started on therapeutic lovenox. Patient has been on the vent ever since 8/20 and primary team has attempted proning him but was ineffective. Overnight on 9/9 the patient was found to be febrile and pancultures were sent. Additionally the patient has a transaminitis. RUQ neg, hepatic veins doppler  neg, and lipase neg. Hepatology was consulted for transaminitis. The patient remains full code at this time.      PAST MEDICAL & SURGICAL HISTORY:  BPH (benign prostatic hyperplasia)    Hyperlipemia    HTN (hypertension)    Hypercholesteremia    Asthma    Diabetes    Kidney stone    Bladder stone    H/O lithotripsy  x 2    History of kidney stones    H/O umbilical hernia repair        FAMILY HISTORY:  Family history of diabetes mellitus (Father, Sibling)    Family history of cancer (Mother)        SOCIAL HISTORY:  Smoking: [  ] Never Smoked  [  ] Former Smoker (# packs x # years)  [  ] Current Smoker (# packs x # years)  Substance Use:   EtOH Use:   Marital Status: [  ] Single  [  ]   [  ]   [  ]   Sexual History:   Occupation:  Recent Travel:  Country of Birth:   Advance Directives:     HOME MEDICATIONS:      Allergies    No Known Allergies    Intolerances      REVIEW OF SYSTEMS:  Constitutional: No fevers, chills, weight loss, weight gain  HEENT: No vision problems, eye pain, nasal congestion, rhinorrhea, sore throat, dysphagia  CV: No chest pain, orthopnea, palpitations  Resp: No cough, dyspnea, wheezing, hemoptysis  GI: No nausea, vomiting, diarrhea, constipation, abdominal pain  : [ ] dysuria [ ] nocturia [ ] hematuria [ ] increased urinary frequency  Musculoskeletal: [ ] back pain [ ] myalgias [ ] arthralgias [ ] fracture  Skin: [ ] rash [ ] itch  Neurological: [ ] headache [ ] dizziness [ ] syncope [ ] weakness [ ] numbness  Psychiatric: [ ] anxiety [ ] depression  Endocrine: [ ] diabetes [ ] thyroid problem  Hematologic/Lymphatic: [ ] anemia [ ] bleeding problem  Allergic/Immunologic: [ ] itchy eyes [ ] nasal discharge [ ] hives [ ] angioedema  [ ] All other systems negative  [ ] Unable to assess ROS because ________    OBJECTIVE:  ICU Vital Signs Last 24 Hrs  T(C): 38 (10 Sep 2021 17:00), Max: 38.3 (10 Sep 2021 16:00)  T(F): 100.4 (10 Sep 2021 17:00), Max: 100.9 (10 Sep 2021 16:00)  HR: 83 (10 Sep 2021 17:00) (71 - 90)  BP: --  BP(mean): --  ABP: 144/61 (10 Sep 2021 17:00) (95/47 - 170/76)  ABP(mean): 89 (10 Sep 2021 17:00) (60 - 112)  RR: 34 (10 Sep 2021 17:00) (25 - 36)  SpO2: 92% (10 Sep 2021 17:00) (88% - 96%)    Mode: AC/ CMV (Assist Control/ Continuous Mandatory Ventilation), RR (machine): 34, FiO2: 70, PEEP: 8, ITime: 0.6, MAP: 18, PC: 27, PIP: 37    09-09 @ 07:01  -  09-10 @ 07:00  --------------------------------------------------------  IN: 2532.7 mL / OUT: 2165 mL / NET: 367.7 mL    09-10 @ 07:01  -  09-10 @ 17:08  --------------------------------------------------------  IN: 927.5 mL / OUT: 930 mL / NET: -2.5 mL      CAPILLARY BLOOD GLUCOSE      POCT Blood Glucose.: 277 mg/dL (10 Sep 2021 10:58)      PHYSICAL EXAM:      HOSPITAL MEDICATIONS:  MEDICATIONS  (STANDING):  ALBUTerol    90 MICROgram(s) HFA Inhaler 2 Puff(s) Inhalation every 8 hours  artificial tears (preservative free) Ophthalmic Solution 1 Drop(s) Both EYES every 8 hours  budesonide 160 MICROgram(s)/formoterol 4.5 MICROgram(s) Inhaler 2 Puff(s) Inhalation two times a day  buMETAnide Injectable 1 milliGRAM(s) IV Push two times a day  chlorhexidine 0.12% Liquid 15 milliLiter(s) Oral Mucosa every 12 hours  chlorhexidine 4% Liquid 1 Application(s) Topical <User Schedule>  cholecalciferol 2000 Unit(s) Oral daily  dexAMETHasone  Injectable 6 milliGRAM(s) IV Push daily  enoxaparin Injectable 80 milliGRAM(s) SubCutaneous every 12 hours  fentaNYL   Infusion... 2 MICROgram(s)/kG/Hr (8.16 mL/Hr) IV Continuous <Continuous>  insulin lispro (ADMELOG) corrective regimen sliding scale   SubCutaneous every 6 hours  insulin NPH human recombinant 2 Unit(s) SubCutaneous every 6 hours  LORazepam     Tablet 2 milliGRAM(s) Oral every 6 hours  methadone   Solution 30 milliGRAM(s) Oral every 8 hours  midazolam Infusion 0.02 mG/kG/Hr (1.63 mL/Hr) IV Continuous <Continuous>  midodrine 30 milliGRAM(s) Oral every 8 hours  multivitamin/minerals Oral Solution (WELLESSE) 30 milliLiter(s) Enteral Tube daily  naloxegol 25 milliGRAM(s) Oral daily  norepinephrine Infusion 0.05 MICROgram(s)/kG/Min (3.83 mL/Hr) IV Continuous <Continuous>  pantoprazole  Injectable 40 milliGRAM(s) IV Push daily  polyethylene glycol 3350 17 Gram(s) Oral daily  senna Syrup 10 milliLiter(s) Oral at bedtime    MEDICATIONS  (PRN):      LABS:                        8.0    10.42 )-----------( 415      ( 10 Sep 2021 00:40 )             26.0     Hgb Trend: 8.0<--, 7.8<--, 7.6<--, 7.8<--, 7.7<--  09-10    138  |  95<L>  |  25<H>  ----------------------------<  178<H>  4.2   |  33<H>  |  0.54    Ca    8.8      10 Sep 2021 00:40  Phos  2.6     09-10  Mg     2.2     09-10    TPro  6.5  /  Alb  2.6<L>  /  TBili  0.9  /  DBili  x   /  AST  156<H>  /  ALT  167<H>  /  AlkPhos  1253<H>  09-10    Creatinine Trend: 0.54<--, 0.64<--, 0.69<--, 0.70<--, 0.60<--, 0.56<--  PT/INR - ( 10 Sep 2021 00:40 )   PT: 14.0 sec;   INR: 1.18 ratio             Arterial Blood Gas:  09-10 @ 00:21  7.43/58/65/38/93.8/12.6  ABG lactate: --  Arterial Blood Gas:  09-09 @ 00:15  7.44/57/69/39/96.0/13.0  ABG lactate: --    MICROBIOLOGY:       Assessmen and Plan:     71M Botswanan speaking Hx HTN, DM2, BPH, Asthma admitted 8/12 Covid-19 PNA and cardiac arrest.    #Neuro   1) Sedation   - will continue to miguelangel IV sedation as tolerated   - Maintain methadone and PRN Ativan   - Sedation vacation once medically appropriate     2) Critical care Myopathy   - will need extensive PT/OT when clinically able     3) ENT    oropharyngeal packing removed   -(+) oozing noted will follow up with ENT this am possible Surgicel packing vs TXA      #CV   1)Cardiac Arrest VT  - S/p Amiodarone and electrolyte supplementation   - No further VT noted   - Echo with new Right hrt strain   - cont Bumex today  to meet goal of net (-) balance     2)Shock probably secondary to vasoplegia from sedation   - will continue with low dose Levo   - will discuss with attending for possible increase of midodrine   - maintain goal MAP of 65 as tolerated will attempt to further wean PEEP once goal diuresis has been achieved   - daily POCUS     #Pulm   1)Respiratory failure Secondary to COVID PNA   - will continue with Mechanical Vent support   - attempt to reduce PEEP to 5 if tolerated   - Blood gas daily / MDI   - PRN chest x ray     #GI   1)Nutrition   - maintain Glucerna tube feeds as tolerated   - Bowel regimen      2)Transaminitis  ? etiology may be congestive hepatopathy VS DILI Lipase (-) Hepatic US and hepatic doppler US (-) for acute pathology   - will continue to monitor LFT   - miguelangel off Ketamine     #   1)Hypervolemia   - Strict I/O   - Diuresis as above   - Monitor electrolytes     #ID   1)COVID - 19   - S/p RDV Dexa and TOCI   - Now on second round of decadron   - will continue with supportive care     2)MRSE Bacteremia   - S/P 14 day course of ABT therapy   - ID follow up appreciated   - (+) fever last evening blood cx now (P)   - will send UA and Sputum     Fever overnight   - will reculture today if patient spikes   - will hold abt therapy for now     #Endo   1)DM   - Will maintain NPH and ISS   - goal Blood Sugar 100-180     #Prophylaxis  - continue with LOVENOX  BID     #Ethics  -Goc with family as prognosis is poor  -Full Code CHIEF COMPLAINT:     HPI / INTERVAL HISTORY: sob  72yo M w/ PMHx of HTN, DM2, BPH, asthma presents with shortness of breath. Patient endorses that he has been feeling SOB with associated cough for the last 2 weeks and his symptoms slowly worsened over time. He did not receive the COVID vaccine. He reports that his family members whom he lives with likely are also COVID positive but he does not know for sure. He did not try taking anything for his symptoms other than his usual medications/inhalers. There were no obvious aggravating or alleviating factors. Currently with supplemental oxygen on, he feels well and has no complaints, however he presented to [facility] since his symptoms were worsening. In the ED, he was hemodynamically stable, afebrile, but he was encephalopathic and hypoxic on room air requiring high flow nasal canula. Labs were significant for mild hyperkalemia and COVID positive. CXR prelim read showed bilateral patchy opacities. CT head showed no acute findings. MICU was consulted in the ED, patient was deemed not a MICU candidate. Patient was given doxycycline and dexamethasone x1 and admitted to general medicine for further management. At time of interview, patient was A/Ox3 with .  (13 Aug 2021 04:21)    Hospital Course: On 8/20 the patient had an RRT and was admitted to the ICU. Had MRSE+ Bcx on 8/24 and started on vanco 2x week course completed since admission. Patient was initially being weaned on Levo and lyndsey, however their pressures did not maintain and levo was restarted. RIJ was inserted on 8/25. Patient had Afib RVR but has now reconverted back to NSR on 8/26. TTE done demonstrated RV strain that the primary team began diuresing. Arjun red blood was noted on suction and ENT was consulted for blood oozing in the pharynx. ENT packed and unpacked on 9/9 but wants minimal procedure. Consult ENT if h/h were to drop. Additionally the patient had hypertriglyceridemia that resolved. Patient was also found to have b/l DVTs and RVT and was started on therapeutic lovenox. Patient has been on the vent ever since 8/20 and primary team has attempted proning him but was ineffective. Overnight on 9/9 the patient was found to be febrile and pancultures were sent. Additionally the patient has a transaminitis. RUQ neg, hepatic veins doppler  neg, and lipase neg. Hepatology was consulted for transaminitis. The patient remains full code at this time.      PAST MEDICAL & SURGICAL HISTORY:  BPH (benign prostatic hyperplasia)    Hyperlipemia    HTN (hypertension)    Hypercholesteremia    Asthma    Diabetes    Kidney stone    Bladder stone    H/O lithotripsy  x 2    History of kidney stones    H/O umbilical hernia repair        FAMILY HISTORY:  Family history of diabetes mellitus (Father, Sibling)    Family history of cancer (Mother)        SOCIAL HISTORY:  Smoking: [  ] Never Smoked  [  ] Former Smoker (# packs x # years)  [  ] Current Smoker (# packs x # years)  Substance Use:   EtOH Use:   Marital Status: [  ] Single  [  ]   [  ]   [  ]   Sexual History:   Occupation:  Recent Travel:  Country of Birth:   Advance Directives:     HOME MEDICATIONS:      Allergies    No Known Allergies    Intolerances      REVIEW OF SYSTEMS:  Constitutional: No fevers, chills, weight loss, weight gain  HEENT: No vision problems, eye pain, nasal congestion, rhinorrhea, sore throat, dysphagia  CV: No chest pain, orthopnea, palpitations  Resp: No cough, dyspnea, wheezing, hemoptysis  GI: No nausea, vomiting, diarrhea, constipation, abdominal pain  : [ ] dysuria [ ] nocturia [ ] hematuria [ ] increased urinary frequency  Musculoskeletal: [ ] back pain [ ] myalgias [ ] arthralgias [ ] fracture  Skin: [ ] rash [ ] itch  Neurological: [ ] headache [ ] dizziness [ ] syncope [ ] weakness [ ] numbness  Psychiatric: [ ] anxiety [ ] depression  Endocrine: [ ] diabetes [ ] thyroid problem  Hematologic/Lymphatic: [ ] anemia [ ] bleeding problem  Allergic/Immunologic: [ ] itchy eyes [ ] nasal discharge [ ] hives [ ] angioedema  [ ] All other systems negative  [x ] Unable to assess ROS because sedated    OBJECTIVE:  ICU Vital Signs Last 24 Hrs  T(C): 38 (10 Sep 2021 17:00), Max: 38.3 (10 Sep 2021 16:00)  T(F): 100.4 (10 Sep 2021 17:00), Max: 100.9 (10 Sep 2021 16:00)  HR: 83 (10 Sep 2021 17:00) (71 - 90)  BP: --  BP(mean): --  ABP: 144/61 (10 Sep 2021 17:00) (95/47 - 170/76)  ABP(mean): 89 (10 Sep 2021 17:00) (60 - 112)  RR: 34 (10 Sep 2021 17:00) (25 - 36)  SpO2: 92% (10 Sep 2021 17:00) (88% - 96%)    Mode: AC/ CMV (Assist Control/ Continuous Mandatory Ventilation), RR (machine): 34, FiO2: 70, PEEP: 8, ITime: 0.6, MAP: 18, PC: 27, PIP: 37    09-09 @ 07:01  -  09-10 @ 07:00  --------------------------------------------------------  IN: 2532.7 mL / OUT: 2165 mL / NET: 367.7 mL    09-10 @ 07:01  -  09-10 @ 17:08  --------------------------------------------------------  IN: 927.5 mL / OUT: 930 mL / NET: -2.5 mL      CAPILLARY BLOOD GLUCOSE      POCT Blood Glucose.: 277 mg/dL (10 Sep 2021 10:58)      PHYSICAL EXAM:  General:  Seated moving to right upper extremity to tactile stimuli   HEENT: PERRLA Oral DSG removed by ENT this AM   Lymph Nodes: No Palpable Lymph node adenopathy   Neck: supple   Respiratory: intubated course breath sounds   Cardiovascular: S1 S2 No m/C/G/R   Abdomen: Distended but soft (+) OGT   Extremities: (+) 1 edema to all extremities   Skin: Intact   Neurological: Sedated   Psychiatry: unable to assess       HOSPITAL MEDICATIONS:  MEDICATIONS  (STANDING):  ALBUTerol    90 MICROgram(s) HFA Inhaler 2 Puff(s) Inhalation every 8 hours  artificial tears (preservative free) Ophthalmic Solution 1 Drop(s) Both EYES every 8 hours  budesonide 160 MICROgram(s)/formoterol 4.5 MICROgram(s) Inhaler 2 Puff(s) Inhalation two times a day  buMETAnide Injectable 1 milliGRAM(s) IV Push two times a day  chlorhexidine 0.12% Liquid 15 milliLiter(s) Oral Mucosa every 12 hours  chlorhexidine 4% Liquid 1 Application(s) Topical <User Schedule>  cholecalciferol 2000 Unit(s) Oral daily  dexAMETHasone  Injectable 6 milliGRAM(s) IV Push daily  enoxaparin Injectable 80 milliGRAM(s) SubCutaneous every 12 hours  fentaNYL   Infusion... 2 MICROgram(s)/kG/Hr (8.16 mL/Hr) IV Continuous <Continuous>  insulin lispro (ADMELOG) corrective regimen sliding scale   SubCutaneous every 6 hours  insulin NPH human recombinant 2 Unit(s) SubCutaneous every 6 hours  LORazepam     Tablet 2 milliGRAM(s) Oral every 6 hours  methadone   Solution 30 milliGRAM(s) Oral every 8 hours  midazolam Infusion 0.02 mG/kG/Hr (1.63 mL/Hr) IV Continuous <Continuous>  midodrine 30 milliGRAM(s) Oral every 8 hours  multivitamin/minerals Oral Solution (WELLESSE) 30 milliLiter(s) Enteral Tube daily  naloxegol 25 milliGRAM(s) Oral daily  norepinephrine Infusion 0.05 MICROgram(s)/kG/Min (3.83 mL/Hr) IV Continuous <Continuous>  pantoprazole  Injectable 40 milliGRAM(s) IV Push daily  polyethylene glycol 3350 17 Gram(s) Oral daily  senna Syrup 10 milliLiter(s) Oral at bedtime    MEDICATIONS  (PRN):      LABS:                        8.0    10.42 )-----------( 415      ( 10 Sep 2021 00:40 )             26.0     Hgb Trend: 8.0<--, 7.8<--, 7.6<--, 7.8<--, 7.7<--  09-10    138  |  95<L>  |  25<H>  ----------------------------<  178<H>  4.2   |  33<H>  |  0.54    Ca    8.8      10 Sep 2021 00:40  Phos  2.6     09-10  Mg     2.2     09-10    TPro  6.5  /  Alb  2.6<L>  /  TBili  0.9  /  DBili  x   /  AST  156<H>  /  ALT  167<H>  /  AlkPhos  1253<H>  09-10    Creatinine Trend: 0.54<--, 0.64<--, 0.69<--, 0.70<--, 0.60<--, 0.56<--  PT/INR - ( 10 Sep 2021 00:40 )   PT: 14.0 sec;   INR: 1.18 ratio             Arterial Blood Gas:  09-10 @ 00:21  7.43/58/65/38/93.8/12.6  ABG lactate: --  Arterial Blood Gas:  09-09 @ 00:15  7.44/57/69/39/96.0/13.0  ABG lactate: --    MICROBIOLOGY:       Assessmen and Plan:     71M Turkmen speaking Hx HTN, DM2, BPH, Asthma admitted 8/12 Covid-19 PNA and cardiac arrest.    #Neuro   1) Sedation   - will continue to miguelangel IV sedation as tolerated   - Maintain methadone and PRN Ativan   - Sedation vacation once medically appropriate     2) Critical care Myopathy   - will need extensive PT/OT when clinically able     3) ENT    oropharyngeal packing removed   -(+) oozing noted will follow up with ENT this am possible Surgicel packing vs TXA      #CV   1)Cardiac Arrest VT  - S/p Amiodarone and electrolyte supplementation   - No further VT noted   - Echo with new Right hrt strain   - cont Bumex today  to meet goal of net (-) balance     2)Shock probably secondary to vasoplegia from sedation   - will continue with low dose Levo   - will discuss with attending for possible increase of midodrine   - maintain goal MAP of 65 as tolerated will attempt to further wean PEEP once goal diuresis has been achieved   - daily POCUS     #Pulm   1)Respiratory failure Secondary to COVID PNA   - will continue with Mechanical Vent support   - attempt to reduce PEEP to 5 if tolerated   - Blood gas daily / MDI   - PRN chest x ray     #GI   1)Nutrition   - maintain Glucerna tube feeds as tolerated   - Bowel regimen      2)Transaminitis  ? etiology may be congestive hepatopathy VS DILI Lipase (-) Hepatic US and hepatic doppler US (-) for acute pathology   - will continue to monitor LFT   - hepatology onboard reccs appreciated  - f/u alk phos isoenzymes, viral hepatitis serologies, and trend daily cmp, inr    #   1)Hypervolemia   - Strict I/O   - Diuresis as above   - Monitor electrolytes     #ID   1)COVID - 19   - S/p RDV Dexa and TOCI   - Now on second round of decadron   - will continue with supportive care     2)MRSE Bacteremia   - S/P 14 day course of ABT therapy   - ID follow up appreciated   - (+) fever last evening blood cx now (P)   - will send UA and Sputum     Fever overnight   - will reculture today if patient spikes   - will hold abt therapy for now     #Endo   1)DM   - Will maintain NPH and ISS   - goal Blood Sugar 100-180     #Prophylaxis  - continue with LOVENOX  BID     #Ethics  -Goc with family as prognosis is poor  -Full Code CHIEF COMPLAINT:     HPI / INTERVAL HISTORY: sob  72yo M w/ PMHx of HTN, DM2, BPH, asthma presents with shortness of breath. Patient endorses that he has been feeling SOB with associated cough for the last 2 weeks and his symptoms slowly worsened over time. He did not receive the COVID vaccine. He reports that his family members whom he lives with likely are also COVID positive but he does not know for sure. He did not try taking anything for his symptoms other than his usual medications/inhalers. There were no obvious aggravating or alleviating factors. Currently with supplemental oxygen on, he feels well and has no complaints, however he presented to [facility] since his symptoms were worsening. In the ED, he was hemodynamically stable, afebrile, but he was encephalopathic and hypoxic on room air requiring high flow nasal canula. Labs were significant for mild hyperkalemia and COVID positive. CXR prelim read showed bilateral patchy opacities. CT head showed no acute findings. MICU was consulted in the ED, patient was deemed not a MICU candidate. Patient was given doxycycline and dexamethasone x1 and admitted to general medicine for further management. At time of interview, patient was A/Ox3 with .  (13 Aug 2021 04:21)    Hospital Course: On 8/20 the patient had an RRT and was admitted to the ICU. Had MRSE+ Bcx on 8/24 and started on vanco 2x week course completed since admission. Patient was initially being weaned on Levo and lyndsey, however their pressures did not maintain and levo was restarted. RIJ was inserted on 8/25. Patient had Afib RVR but has now reconverted back to NSR on 8/26. TTE done demonstrated RV strain that the primary team began diuresing. Arjun red blood was noted on suction and ENT was consulted for blood oozing in the pharynx. ENT packed and unpacked on 9/9 but wants minimal procedure. Consult ENT if h/h were to drop. Additionally the patient had hypertriglyceridemia that resolved. Patient was also found to have b/l DVTs and RVT and was started on therapeutic lovenox. Patient has been on the vent ever since 8/20 and primary team has attempted proning him but was ineffective. Overnight on 9/9 the patient was found to be febrile and pancultures were sent. Additionally the patient has a transaminitis. RUQ neg, hepatic veins doppler  neg, and lipase neg. Hepatology was consulted for transaminitis. The patient remains full code at this time.      PAST MEDICAL & SURGICAL HISTORY:  BPH (benign prostatic hyperplasia)    Hyperlipemia    HTN (hypertension)    Hypercholesteremia    Asthma    Diabetes    Kidney stone    Bladder stone    H/O lithotripsy  x 2    History of kidney stones    H/O umbilical hernia repair        FAMILY HISTORY:  Family history of diabetes mellitus (Father, Sibling)    Family history of cancer (Mother)        SOCIAL HISTORY:  Smoking: [  ] Never Smoked  [  ] Former Smoker (# packs x # years)  [  ] Current Smoker (# packs x # years)  Substance Use:   EtOH Use:   Marital Status: [  ] Single  [  ]   [  ]   [  ]   Sexual History:   Occupation:  Recent Travel:  Country of Birth:   Advance Directives:     HOME MEDICATIONS:      Allergies    No Known Allergies    Intolerances      REVIEW OF SYSTEMS:  Constitutional: No fevers, chills, weight loss, weight gain  HEENT: No vision problems, eye pain, nasal congestion, rhinorrhea, sore throat, dysphagia  CV: No chest pain, orthopnea, palpitations  Resp: No cough, dyspnea, wheezing, hemoptysis  GI: No nausea, vomiting, diarrhea, constipation, abdominal pain  : [ ] dysuria [ ] nocturia [ ] hematuria [ ] increased urinary frequency  Musculoskeletal: [ ] back pain [ ] myalgias [ ] arthralgias [ ] fracture  Skin: [ ] rash [ ] itch  Neurological: [ ] headache [ ] dizziness [ ] syncope [ ] weakness [ ] numbness  Psychiatric: [ ] anxiety [ ] depression  Endocrine: [ ] diabetes [ ] thyroid problem  Hematologic/Lymphatic: [ ] anemia [ ] bleeding problem  Allergic/Immunologic: [ ] itchy eyes [ ] nasal discharge [ ] hives [ ] angioedema  [ ] All other systems negative  [x ] Unable to assess ROS because sedated    OBJECTIVE:  ICU Vital Signs Last 24 Hrs  T(C): 38 (10 Sep 2021 17:00), Max: 38.3 (10 Sep 2021 16:00)  T(F): 100.4 (10 Sep 2021 17:00), Max: 100.9 (10 Sep 2021 16:00)  HR: 83 (10 Sep 2021 17:00) (71 - 90)  BP: --  BP(mean): --  ABP: 144/61 (10 Sep 2021 17:00) (95/47 - 170/76)  ABP(mean): 89 (10 Sep 2021 17:00) (60 - 112)  RR: 34 (10 Sep 2021 17:00) (25 - 36)  SpO2: 92% (10 Sep 2021 17:00) (88% - 96%)    Mode: AC/ CMV (Assist Control/ Continuous Mandatory Ventilation), RR (machine): 34, FiO2: 70, PEEP: 8, ITime: 0.6, MAP: 18, PC: 27, PIP: 37    09-09 @ 07:01  -  09-10 @ 07:00  --------------------------------------------------------  IN: 2532.7 mL / OUT: 2165 mL / NET: 367.7 mL    09-10 @ 07:01  -  09-10 @ 17:08  --------------------------------------------------------  IN: 927.5 mL / OUT: 930 mL / NET: -2.5 mL      CAPILLARY BLOOD GLUCOSE      POCT Blood Glucose.: 277 mg/dL (10 Sep 2021 10:58)      PHYSICAL EXAM:  General:  Seated moving to right upper extremity to tactile stimuli   HEENT: PERRLA Oral DSG removed by ENT this AM   Lymph Nodes: No Palpable Lymph node adenopathy   Neck: supple   Respiratory: intubated course breath sounds   Cardiovascular: S1 S2 No m/C/G/R   Abdomen: Distended but soft (+) OGT   Extremities: (+) 1 edema to all extremities   Skin: Intact   Neurological: Sedated   Psychiatry: unable to assess       HOSPITAL MEDICATIONS:  MEDICATIONS  (STANDING):  ALBUTerol    90 MICROgram(s) HFA Inhaler 2 Puff(s) Inhalation every 8 hours  artificial tears (preservative free) Ophthalmic Solution 1 Drop(s) Both EYES every 8 hours  budesonide 160 MICROgram(s)/formoterol 4.5 MICROgram(s) Inhaler 2 Puff(s) Inhalation two times a day  buMETAnide Injectable 1 milliGRAM(s) IV Push two times a day  chlorhexidine 0.12% Liquid 15 milliLiter(s) Oral Mucosa every 12 hours  chlorhexidine 4% Liquid 1 Application(s) Topical <User Schedule>  cholecalciferol 2000 Unit(s) Oral daily  dexAMETHasone  Injectable 6 milliGRAM(s) IV Push daily  enoxaparin Injectable 80 milliGRAM(s) SubCutaneous every 12 hours  fentaNYL   Infusion... 2 MICROgram(s)/kG/Hr (8.16 mL/Hr) IV Continuous <Continuous>  insulin lispro (ADMELOG) corrective regimen sliding scale   SubCutaneous every 6 hours  insulin NPH human recombinant 2 Unit(s) SubCutaneous every 6 hours  LORazepam     Tablet 2 milliGRAM(s) Oral every 6 hours  methadone   Solution 30 milliGRAM(s) Oral every 8 hours  midazolam Infusion 0.02 mG/kG/Hr (1.63 mL/Hr) IV Continuous <Continuous>  midodrine 30 milliGRAM(s) Oral every 8 hours  multivitamin/minerals Oral Solution (WELLESSE) 30 milliLiter(s) Enteral Tube daily  naloxegol 25 milliGRAM(s) Oral daily  norepinephrine Infusion 0.05 MICROgram(s)/kG/Min (3.83 mL/Hr) IV Continuous <Continuous>  pantoprazole  Injectable 40 milliGRAM(s) IV Push daily  polyethylene glycol 3350 17 Gram(s) Oral daily  senna Syrup 10 milliLiter(s) Oral at bedtime    MEDICATIONS  (PRN):      LABS:                        8.0    10.42 )-----------( 415      ( 10 Sep 2021 00:40 )             26.0     Hgb Trend: 8.0<--, 7.8<--, 7.6<--, 7.8<--, 7.7<--  09-10    138  |  95<L>  |  25<H>  ----------------------------<  178<H>  4.2   |  33<H>  |  0.54    Ca    8.8      10 Sep 2021 00:40  Phos  2.6     09-10  Mg     2.2     09-10    TPro  6.5  /  Alb  2.6<L>  /  TBili  0.9  /  DBili  x   /  AST  156<H>  /  ALT  167<H>  /  AlkPhos  1253<H>  09-10    Creatinine Trend: 0.54<--, 0.64<--, 0.69<--, 0.70<--, 0.60<--, 0.56<--  PT/INR - ( 10 Sep 2021 00:40 )   PT: 14.0 sec;   INR: 1.18 ratio             Arterial Blood Gas:  09-10 @ 00:21  7.43/58/65/38/93.8/12.6  ABG lactate: --  Arterial Blood Gas:  09-09 @ 00:15  7.44/57/69/39/96.0/13.0  ABG lactate: --    MICROBIOLOGY:       Assessmen and Plan:     71M Estonian speaking Hx HTN, DM2, BPH, Asthma admitted 8/12 Covid-19 PNA and cardiac arrest.    #Neuro   1) Sedation   - will continue to miguelangel IV sedation as tolerated   - Maintain methadone and PRN Ativan   - Sedation vacation once medically appropriate     2) Critical care Myopathy   - will need extensive PT/OT when clinically able     #ENT    oropharyngeal packing removed   -(+) oozing noted will follow up with ENT this am possible Surgicel packing vs TXA      #CV   1)Cardiac Arrest VT  - S/p Amiodarone and electrolyte supplementation   - No further VT noted   - Echo with new Right hrt strain   - cont Bumex to meet goal of net (-) balance     2)Shock probably secondary to vasoplegia from sedation   - will continue with low dose Levo   - will discuss with attending for possible increase of midodrine   - maintain goal MAP of 65 as tolerated will attempt to further wean PEEP once goal diuresis has been achieved   - daily POCUS     #Pulm   1)Respiratory failure Secondary to COVID PNA   - will continue with Mechanical Vent support   - attempt to reduce PEEP to 5 if tolerated   - Blood gas daily / MDI   - PRN chest x ray     #GI   1)Nutrition   - maintain Glucerna tube feeds as tolerated   - Bowel regimen      2)Transaminitis  ? etiology may be congestive hepatopathy VS DILI Lipase (-) Hepatic US and hepatic doppler US (-) for acute pathology   - will continue to monitor LFT   - hepatology onboard reccs appreciated  - f/u alk phos isoenzymes, viral hepatitis serologies, and trend daily cmp, inr    #   1)Hypervolemia   - Strict I/O   - Diuresis as above   - Monitor electrolytes     #ID   1)COVID - 19   - S/p RDV Dexa and TOCI   - Now on second round of decadron   - will continue with supportive care     2)MRSE Bacteremia   - S/P 14 day course of ABT therapy   - ID follow up appreciated   - (+) fever last evening blood cx now (P)   - will send UA and Sputum     3) Fever overnight   - will reculture today if patient spikes   - will hold abt therapy for now     #Endo   1)DM   - Will maintain NPH and ISS   - goal Blood Sugar 100-180     #Prophylaxis  - continue with LOVENOX 80mg BID     #Ethics  -Goc with family as prognosis is poor  -Full Code CHIEF COMPLAINT:     HPI / INTERVAL HISTORY: sob  70yo M w/ PMHx of HTN, DM2, BPH, asthma presents with shortness of breath. Patient endorses that he has been feeling SOB with associated cough for the last 2 weeks and his symptoms slowly worsened over time. He did not receive the COVID vaccine. He reports that his family members whom he lives with likely are also COVID positive but he does not know for sure. He did not try taking anything for his symptoms other than his usual medications/inhalers. There were no obvious aggravating or alleviating factors. Currently with supplemental oxygen on, he feels well and has no complaints, however he presented to [facility] since his symptoms were worsening. In the ED, he was hemodynamically stable, afebrile, but he was encephalopathic and hypoxic on room air requiring high flow nasal canula. Labs were significant for mild hyperkalemia and COVID positive. CXR prelim read showed bilateral patchy opacities. CT head showed no acute findings. MICU was consulted in the ED, patient was deemed not a MICU candidate. Patient was given doxycycline and dexamethasone x1 and admitted to general medicine for further management. At time of interview, patient was A/Ox3 with .  (13 Aug 2021 04:21)    Hospital Course: On 8/20 the patient had an RRT and was admitted to the ICU. Had MRSE+ Bcx on 8/24 and started on vanco 2x week course completed since admission. Patient was initially being weaned on Levo and lyndsey, however their pressures did not maintain and levo was restarted. RIJ was inserted on 8/25. Patient had Afib RVR but has now reconverted back to NSR on 8/26. TTE done demonstrated RV strain that the primary team began diuresing. Arjun red blood was noted on suction and ENT was consulted for blood oozing in the pharynx. ENT packed and unpacked on 9/9 but wants minimal procedure. Consult ENT if h/h were to drop. Additionally the patient had hypertriglyceridemia that resolved. Patient was also found to have b/l DVTs and RVT and was started on therapeutic lovenox. Patient has been on the vent ever since 8/20 and primary team has attempted proning him but was ineffective. Overnight on 9/9 the patient was found to be febrile and pancultures were sent. Additionally the patient has a transaminitis. RUQ neg, hepatic veins doppler  neg, and lipase neg. Hepatology was consulted for transaminitis. The patient remains full code at this time.      PAST MEDICAL & SURGICAL HISTORY:  BPH (benign prostatic hyperplasia)    Hyperlipemia    HTN (hypertension)    Hypercholesteremia    Asthma    Diabetes    Kidney stone    Bladder stone    H/O lithotripsy  x 2    History of kidney stones    H/O umbilical hernia repair        FAMILY HISTORY:  Family history of diabetes mellitus (Father, Sibling)    Family history of cancer (Mother)        SOCIAL HISTORY:  Smoking: [  ] Never Smoked  [  ] Former Smoker (# packs x # years)  [  ] Current Smoker (# packs x # years)  Substance Use:   EtOH Use:   Marital Status: [  ] Single  [  ]   [  ]   [  ]   Sexual History:   Occupation:  Recent Travel:  Country of Birth:   Advance Directives:     HOME MEDICATIONS:      Allergies    No Known Allergies    Intolerances      REVIEW OF SYSTEMS:  Constitutional: No fevers, chills, weight loss, weight gain  HEENT: No vision problems, eye pain, nasal congestion, rhinorrhea, sore throat, dysphagia  CV: No chest pain, orthopnea, palpitations  Resp: No cough, dyspnea, wheezing, hemoptysis  GI: No nausea, vomiting, diarrhea, constipation, abdominal pain  : [ ] dysuria [ ] nocturia [ ] hematuria [ ] increased urinary frequency  Musculoskeletal: [ ] back pain [ ] myalgias [ ] arthralgias [ ] fracture  Skin: [ ] rash [ ] itch  Neurological: [ ] headache [ ] dizziness [ ] syncope [ ] weakness [ ] numbness  Psychiatric: [ ] anxiety [ ] depression  Endocrine: [ ] diabetes [ ] thyroid problem  Hematologic/Lymphatic: [ ] anemia [ ] bleeding problem  Allergic/Immunologic: [ ] itchy eyes [ ] nasal discharge [ ] hives [ ] angioedema  [ ] All other systems negative  [x ] Unable to assess ROS because sedated    OBJECTIVE:  ICU Vital Signs Last 24 Hrs  T(C): 38 (10 Sep 2021 17:00), Max: 38.3 (10 Sep 2021 16:00)  T(F): 100.4 (10 Sep 2021 17:00), Max: 100.9 (10 Sep 2021 16:00)  HR: 83 (10 Sep 2021 17:00) (71 - 90)  BP: --  BP(mean): --  ABP: 144/61 (10 Sep 2021 17:00) (95/47 - 170/76)  ABP(mean): 89 (10 Sep 2021 17:00) (60 - 112)  RR: 34 (10 Sep 2021 17:00) (25 - 36)  SpO2: 92% (10 Sep 2021 17:00) (88% - 96%)    Mode: AC/ CMV (Assist Control/ Continuous Mandatory Ventilation), RR (machine): 34, FiO2: 70, PEEP: 8, ITime: 0.6, MAP: 18, PC: 27, PIP: 37    09-09 @ 07:01  -  09-10 @ 07:00  --------------------------------------------------------  IN: 2532.7 mL / OUT: 2165 mL / NET: 367.7 mL    09-10 @ 07:01  -  09-10 @ 17:08  --------------------------------------------------------  IN: 927.5 mL / OUT: 930 mL / NET: -2.5 mL      CAPILLARY BLOOD GLUCOSE      POCT Blood Glucose.: 277 mg/dL (10 Sep 2021 10:58)      PHYSICAL EXAM:  General:  Seated moving to right upper extremity to tactile stimuli   HEENT: PERRLA Oral DSG removed by ENT this AM   Lymph Nodes: No Palpable Lymph node adenopathy   Neck: supple   Respiratory: intubated course breath sounds   Cardiovascular: S1 S2 No m/C/G/R   Abdomen: Distended but soft (+) OGT   Extremities: (+) 1 edema to all extremities   Skin: Intact   Neurological: Sedated   Psychiatry: unable to assess       HOSPITAL MEDICATIONS:  MEDICATIONS  (STANDING):  ALBUTerol    90 MICROgram(s) HFA Inhaler 2 Puff(s) Inhalation every 8 hours  artificial tears (preservative free) Ophthalmic Solution 1 Drop(s) Both EYES every 8 hours  budesonide 160 MICROgram(s)/formoterol 4.5 MICROgram(s) Inhaler 2 Puff(s) Inhalation two times a day  buMETAnide Injectable 1 milliGRAM(s) IV Push two times a day  chlorhexidine 0.12% Liquid 15 milliLiter(s) Oral Mucosa every 12 hours  chlorhexidine 4% Liquid 1 Application(s) Topical <User Schedule>  cholecalciferol 2000 Unit(s) Oral daily  dexAMETHasone  Injectable 6 milliGRAM(s) IV Push daily  enoxaparin Injectable 80 milliGRAM(s) SubCutaneous every 12 hours  fentaNYL   Infusion... 2 MICROgram(s)/kG/Hr (8.16 mL/Hr) IV Continuous <Continuous>  insulin lispro (ADMELOG) corrective regimen sliding scale   SubCutaneous every 6 hours  insulin NPH human recombinant 2 Unit(s) SubCutaneous every 6 hours  LORazepam     Tablet 2 milliGRAM(s) Oral every 6 hours  methadone   Solution 30 milliGRAM(s) Oral every 8 hours  midazolam Infusion 0.02 mG/kG/Hr (1.63 mL/Hr) IV Continuous <Continuous>  midodrine 30 milliGRAM(s) Oral every 8 hours  multivitamin/minerals Oral Solution (WELLESSE) 30 milliLiter(s) Enteral Tube daily  naloxegol 25 milliGRAM(s) Oral daily  norepinephrine Infusion 0.05 MICROgram(s)/kG/Min (3.83 mL/Hr) IV Continuous <Continuous>  pantoprazole  Injectable 40 milliGRAM(s) IV Push daily  polyethylene glycol 3350 17 Gram(s) Oral daily  senna Syrup 10 milliLiter(s) Oral at bedtime    MEDICATIONS  (PRN):      LABS:                        8.0    10.42 )-----------( 415      ( 10 Sep 2021 00:40 )             26.0     Hgb Trend: 8.0<--, 7.8<--, 7.6<--, 7.8<--, 7.7<--  09-10    138  |  95<L>  |  25<H>  ----------------------------<  178<H>  4.2   |  33<H>  |  0.54    Ca    8.8      10 Sep 2021 00:40  Phos  2.6     09-10  Mg     2.2     09-10    TPro  6.5  /  Alb  2.6<L>  /  TBili  0.9  /  DBili  x   /  AST  156<H>  /  ALT  167<H>  /  AlkPhos  1253<H>  09-10    Creatinine Trend: 0.54<--, 0.64<--, 0.69<--, 0.70<--, 0.60<--, 0.56<--  PT/INR - ( 10 Sep 2021 00:40 )   PT: 14.0 sec;   INR: 1.18 ratio             Arterial Blood Gas:  09-10 @ 00:21  7.43/58/65/38/93.8/12.6  ABG lactate: --  Arterial Blood Gas:  09-09 @ 00:15  7.44/57/69/39/96.0/13.0  ABG lactate: --    MICROBIOLOGY:       Assessmen and Plan:     71 year old man w/pmhx of HTN, DM2, BPH, and Asthma here for acute hypoxic respiratory failure and ARDS secondary to viral pneumonia from COVID-19. Hospital course complicated by VT arrest, DVT and MRSE bacteremia.    #Neuro   1) Sedation   - will continue to miguelangel IV sedation as tolerated   - Maintain methadone and PRN Ativan   - Sedation vacation once medically appropriate     2) Critical care Myopathy   - will need extensive PT/OT when clinically able     #ENT    oropharyngeal packing removed   -(+) oozing noted will follow up with ENT this am possible Surgicel packing vs TXA      #CV   1)Cardiac Arrest VT  - S/p Amiodarone and electrolyte supplementation   - No further VT noted   - Echo with new Right hrt strain   - cont Bumex to meet goal of net (-) balance     2)Shock probably secondary to vasoplegia from sedation   - will continue with low dose Levo   - will discuss with attending for possible increase of midodrine   - maintain goal MAP of 65 as tolerated will attempt to further wean PEEP once goal diuresis has been achieved   - daily POCUS     #Pulm   1)Respiratory failure Secondary to COVID PNA   - will continue with Mechanical Vent support   - attempt to reduce PEEP to 5 if tolerated   - Blood gas daily / MDI   - PRN chest x ray     2) Asthma  - c/w Proventil q8h and Symbicort BID    #GI   1)Nutrition   - maintain Glucerna tube feeds as tolerated   - Bowel regimen      2)Transaminitis  ? etiology may be congestive hepatopathy VS DILI Lipase (-) Hepatic US and hepatic doppler US (-) for acute pathology   - will continue to monitor LFT   - hepatology onboard reccs appreciated  - f/u alk phos isoenzymes, viral hepatitis serologies, and trend daily cmp, inr    #   1)Hypervolemia   - Strict I/O   - Diuresis as above   - Monitor electrolytes     #ID   1)COVID - 19   - S/p RDV Dexa and TOCI   - Now on second round of decadron   - will continue with supportive care     2)MRSE Bacteremia   - S/P 14 day course of ABT therapy   - ID follow up appreciated   - (+) fever last evening blood cx now (P)   - will send UA and Sputum     3) Fever overnight   - will reculture today if patient spikes   - will hold abt therapy for now     #Endo   1)DM   - Will maintain NPH and ISS   - goal Blood Sugar 100-180     #Prophylaxis  - continue with LOVENOX 80mg BID     #Ethics  -Goc with family as prognosis is poor  -Full Code

## 2021-09-10 NOTE — PROGRESS NOTE ADULT - ASSESSMENT
A/P     Wound consult requested to assist w/ management   Evolving bilateral buttock DTI, possible stage 2       possible skin failing as in multiorgan failure or a manifestation of COVID skin changes  Incontinence Dermatitis  Incontinent of stool & urine  prophylaxis measures    Recommend:  1.) Bilateral buttocks- TRIAD Paste BID and prn soiling w/ pericare as perprotocol  2.) Nutritional optimization        Glucerna TF and MVI  3.) Maintain on an alternating air with low air loss surface  4.) Turn and reposition q 2 hours  5.) Incontinence management - incontinence pads, cleanser, pericare BID         Loose BM -continue senna, consider Banatrol  6.) Offload heels/feet with complete cAir air fluidized boots  7.) Hyperglycemia improving w/ NPH, ISS w/ q6h FS, and glucerna TF   Care as per MICU/medicine. will follow w/ you  Upon discharge f/u as outpatient at [facility] [address] [facility] [phone]  D/w RN and team  Holly Rubio [place]-C, [place] [id number]   A/P 71M w/ PMH/o HTN, DM2, BPH, Asthma admit 8/12 Covid-19 PNA and cardiac arrest.    Wound consult requested to assist w/ management   Evolving bilateral buttock DTI, possible stage 2       possible skin failing as in multiorgan failure or a manifestation of COVID skin changes  Incontinence Dermatitis  Incontinent of stool & urine  prophylaxis measures    Recommend:  1.) Bilateral buttocks- TRIAD Paste BID and prn soiling w/ pericare as perprotocol  2.) Nutritional optimization        Glucerna TF and MVI  3.) Maintain on an alternating air with low air loss surface  4.) Turn and reposition q 2 hours  5.) Incontinence management - incontinence pads, cleanser, pericare BID         Loose BM -continue senna, consider Banatrol  6.) Offload heels/feet with complete cAir air fluidized boots  7.) Hyperglycemia improving w/ NPH, ISS w/ q6h FS, and glucerna TF   Care as per MICU/medicine. will follow w/ you  Upon discharge f/u as outpatient at Wound Center [address] [facility] [phone]  D/w RN and team  Holly Rubio PA-C, CWS [id number]   A/P 71M w/ PMH/o HTN, DM2, BPH, Asthma admit 8/12 Covid-19 PNA and cardiac arrest.    Wound consult requested to assist w/ management   Evolving bilateral buttock DTI, possible stage 2       possible skin failing as in multiorgan failure or a manifestation of COVID skin changes  Incontinence Dermatitis  Incontinent of stool & urine  prophylaxis measures    Recommend:  1.) Bilateral buttocks- TRIAD Paste BID and prn soiling w/ pericare as perprotocol  2.) Nutritional optimization in pt w/ Severe, acute malnutrition         Glucerna TF, high quality protein, and MVI/ vit c to assist w/ wound healing  3.) Maintain on an alternating air with low air loss surface  4.) Turn and reposition q 2 hours  5.) Incontinence management - incontinence pads, cleanser, pericare BID         Loose BM -continue senna, consider Banatrol  6.) Offload heels/feet with complete cAir air fluidized boots  7.) Hyperglycemia improving w/ NPH, ISS w/ q6h FS, and glucerna TF   Care as per MICU/medicine. will follow w/ you  Upon discharge f/u as outpatient at Wound Center [address] [facility] [phone]  D/w RN and team  Holly Rubio PA-C, CWS [id number]

## 2021-09-10 NOTE — CHART NOTE - NSCHARTNOTEFT_GEN_A_CORE
: Sybil KEARNS NP     INDICATION: Respiratory failure     PROCEDURE:  [x] LIMITED ECHO  [x] LIMITED CHEST  [ ] LIMITED RETROPERITONEAL  [x] LIMITED ABDOMINAL  [ ] LIMITED DVT  [ ] NEEDLE GUIDANCE VASCULAR  [ ] NEEDLE GUIDANCE THORACENTESIS  [ ] NEEDLE GUIDANCE PARACENTESIS  [ ] NEEDLE GUIDANCE PERICARDIOCENTESIS  [ ] OTHER    FINDINGS:  Echo  RV dilated with reduced function with slight improvement today (+) enlargement  LV with good Systolic function and size   no pericardial effusion is noted   IVC 1.4     Chest   (+) focal B lines to anterior chest wall   Left pleural base with (+) consolidation trance effusion   Right Pleural Base without consolidation or effusion noted        INTERPRETATION:  RV dilated with severely reduced function (+) enlargement LV with good Systolic function and size IVC WNL   (+) left pleural base consolidation and associated trace effusion : Sybil KEARNS NP     INDICATION: Respiratory failure     PROCEDURE:  [x] LIMITED ECHO  [x] LIMITED CHEST  [ ] LIMITED RETROPERITONEAL  [x] LIMITED ABDOMINAL  [ ] LIMITED DVT  [ ] NEEDLE GUIDANCE VASCULAR  [ ] NEEDLE GUIDANCE THORACENTESIS  [ ] NEEDLE GUIDANCE PARACENTESIS  [ ] NEEDLE GUIDANCE PERICARDIOCENTESIS  [ ] OTHER    FINDINGS:  Echo  RV dilated with reduced function with slight improvement today (+) enlargement  LV with good Systolic function and size   no pericardial effusion is noted   IVC 1.4     Chest   (+) focal B lines to anterior chest wall   Left pleural base with (+) consolidation trance effusion   Right Pleural Base without consolidation or effusion noted        INTERPRETATION:  RV dilated with severely reduced function eems to have some Improvement today (+) enlargement LV with good Systolic function and size IVC WNL   (+) left pleural base consolidation and associated trace effusion : Sybil KEARNS NP     INDICATION: Respiratory failure     PROCEDURE:  [x] LIMITED ECHO  [x] LIMITED CHEST  [ ] LIMITED RETROPERITONEAL  [x] LIMITED ABDOMINAL  [ ] LIMITED DVT  [ ] NEEDLE GUIDANCE VASCULAR  [ ] NEEDLE GUIDANCE THORACENTESIS  [ ] NEEDLE GUIDANCE PARACENTESIS  [ ] NEEDLE GUIDANCE PERICARDIOCENTESIS  [ ] OTHER    FINDINGS:  Echo  RV dilated with reduced function with slight improvement today (+) enlargement  LV with good Systolic function and size   no pericardial effusion is noted   IVC 1.4     Chest   (+) focal B lines to anterior chest wall   Left pleural base with (+) consolidation trance effusion   Right Pleural Base without consolidation or effusion noted        INTERPRETATION:  RV dilated with severely reduced function seems to have some Improvement today (+) enlargement LV with good Systolic function and size IVC WNL   (+) left pleural base consolidation and associated trace effusion : Sybil KEARNS NP     INDICATION: Respiratory failure     PROCEDURE:  [x] LIMITED ECHO  [x] LIMITED CHEST  [ ] LIMITED RETROPERITONEAL  [] LIMITED ABDOMINAL  [ ] LIMITED DVT  [ ] NEEDLE GUIDANCE VASCULAR  [ ] NEEDLE GUIDANCE THORACENTESIS  [ ] NEEDLE GUIDANCE PARACENTESIS  [ ] NEEDLE GUIDANCE PERICARDIOCENTESIS  [ ] OTHER    FINDINGS:  Echo  RV dilated with reduced function with slight improvement today (+) enlargement  LV with good Systolic function and size   no pericardial effusion is noted   IVC 1.4     Chest   (+) focal B lines to anterior chest wall   Left pleural base with (+) consolidation trance effusion   Right Pleural Base without consolidation or effusion noted        INTERPRETATION:  RV dilated with severely reduced function seems to have some Improvement today (+) enlargement LV with good Systolic function and size IVC WNL   (+) left pleural base consolidation and associated trace effusion : Sybil KEARNS NP     INDICATION: Respiratory failure     PROCEDURE:  [x] LIMITED ECHO  [x] LIMITED CHEST  [ ] LIMITED RETROPERITONEAL  [ ] LIMITED ABDOMINAL  [ ] LIMITED DVT  [ ] NEEDLE GUIDANCE VASCULAR  [ ] NEEDLE GUIDANCE THORACENTESIS  [ ] NEEDLE GUIDANCE PARACENTESIS  [ ] NEEDLE GUIDANCE PERICARDIOCENTESIS  [ ] OTHER    FINDINGS:  Echo  RV dilated with reduced function with slight improvement today (+) enlargement  LV with good Systolic function and size   no pericardial effusion is noted   IVC 1.4     Chest   (+) focal B lines to anterior chest wall   Left pleural base with (+) consolidation trance effusion   Right Pleural Base without consolidation or effusion noted        INTERPRETATION:  RV dilated with severely reduced function seems to have some Improvement today (+) enlargement LV with good Systolic function and size IVC WNL   (+) left pleural base consolidation and associated trace effusion    Attending Note: Agree with above. I was present through out the procedure.

## 2021-09-11 NOTE — PROGRESS NOTE ADULT - SUBJECTIVE AND OBJECTIVE BOX
Holger Knapp  PGY1    INTERVAL HPI/OVERNIGHT EVENTS: Febrile overnight.     SUBJECTIVE: Patient seen and examined at bedside.     OBJECTIVE:    VITAL SIGNS:  ICU Vital Signs Last 24 Hrs  T(C): 37.9 (11 Sep 2021 04:00), Max: 38.5 (10 Sep 2021 17:45)  T(F): 100.2 (11 Sep 2021 04:00), Max: 101.3 (10 Sep 2021 17:45)  HR: 92 (11 Sep 2021 07:00) (79 - 97)  BP: --  BP(mean): --  ABP: 122/57 (11 Sep 2021 07:00) (88/82 - 195/94)  ABP(mean): 77 (11 Sep 2021 07:00) (63 - 135)  RR: 36 (11 Sep 2021 07:) (25 - 40)  SpO2: 100% (11 Sep 2021 07:) (87% - 100%)    Mode: AC/ CMV (Assist Control/ Continuous Mandatory Ventilation), RR (machine): 34, FiO2: 100, PEEP: 8, ITime: 0.6, MAP: 18, PC: 27, PIP: 36    -10 @ 07:01  -  -11 @ 07:00  --------------------------------------------------------  IN: 2060.3 mL / OUT: 2755 mL / NET: -694.7 mL      CAPILLARY BLOOD GLUCOSE      POCT Blood Glucose.: 307 mg/dL (11 Sep 2021 05:33)      PHYSICAL EXAM:  General: Seated moving to right upper extremity to tactile stimuli   HEENT: PERRLA   Lymph Nodes: No Palpable Lymph node adenopathy   Neck: supple   Respiratory: intubated course breath sounds   Cardiovascular: S1 S2 No m/C/G/R   Abdomen: Distended but soft (+) OGT   Extremities: (+) 1 edema to all extremities   Skin: Intact   Neurological: Sedated   Psychiatry: unable to assess     MEDICATIONS:  MEDICATIONS  (STANDING):  ALBUTerol    90 MICROgram(s) HFA Inhaler 2 Puff(s) Inhalation every 8 hours  artificial tears (preservative free) Ophthalmic Solution 1 Drop(s) Both EYES every 8 hours  budesonide 160 MICROgram(s)/formoterol 4.5 MICROgram(s) Inhaler 2 Puff(s) Inhalation two times a day  buMETAnide Injectable 1 milliGRAM(s) IV Push two times a day  chlorhexidine 0.12% Liquid 15 milliLiter(s) Oral Mucosa every 12 hours  chlorhexidine 4% Liquid 1 Application(s) Topical <User Schedule>  cholecalciferol 2000 Unit(s) Oral daily  dexAMETHasone  Injectable 6 milliGRAM(s) IV Push daily  enoxaparin Injectable 80 milliGRAM(s) SubCutaneous every 12 hours  fentaNYL   Infusion... 2 MICROgram(s)/kG/Hr (8.16 mL/Hr) IV Continuous <Continuous>  insulin lispro (ADMELOG) corrective regimen sliding scale   SubCutaneous every 6 hours  insulin NPH human recombinant 2 Unit(s) SubCutaneous every 6 hours  LORazepam     Tablet 2 milliGRAM(s) Oral every 6 hours  methadone   Solution 30 milliGRAM(s) Oral every 8 hours  midazolam Infusion 0.02 mG/kG/Hr (1.63 mL/Hr) IV Continuous <Continuous>  midodrine 30 milliGRAM(s) Oral every 8 hours  multivitamin/minerals Oral Solution (WELLESSE) 30 milliLiter(s) Enteral Tube daily  naloxegol 25 milliGRAM(s) Oral daily  norepinephrine Infusion 0.05 MICROgram(s)/kG/Min (3.83 mL/Hr) IV Continuous <Continuous>  pantoprazole  Injectable 40 milliGRAM(s) IV Push daily  polyethylene glycol 3350 17 Gram(s) Oral daily  senna Syrup 10 milliLiter(s) Oral at bedtime    MEDICATIONS  (PRN):      ALLERGIES:  Allergies    No Known Allergies    Intolerances        LABS:                        8.0    10.96 )-----------( 418      ( 11 Sep 2021 00:50 )             26.2     09-11    138  |  94<L>  |  25<H>  ----------------------------<  199<H>  4.0   |  33<H>  |  0.47<L>    Ca    9.1      11 Sep 2021 00:50  Phos  2.5     09-11  Mg     2.2     09-11    TPro  6.8  /  Alb  2.7<L>  /  TBili  0.8  /  DBili  x   /  AST  122<H>  /  ALT  155<H>  /  AlkPhos  1357<H>      PT/INR - ( 11 Sep 2021 00:50 )   PT: 13.5 sec;   INR: 1.13 ratio         PTT - ( 11 Sep 2021 00:50 )  PTT:38.6 sec  Urinalysis Basic - ( 11 Sep 2021 00:50 )    Color: Yellow / Appearance: Clear / S.012 / pH: x  Gluc: x / Ketone: Negative  / Bili: Negative / Urobili: 2 mg/dL   Blood: x / Protein: Trace / Nitrite: Negative   Leuk Esterase: Negative / RBC: 2 /hpf / WBC 1 /HPF   Sq Epi: x / Non Sq Epi: 1 /hpf / Bacteria: Negative        RADIOLOGY & ADDITIONAL TESTS: Reviewed.

## 2021-09-11 NOTE — PROGRESS NOTE ADULT - ASSESSMENT
71 year old man w/pmhx of HTN, DM2, BPH, and Asthma here for acute hypoxic respiratory failure and ARDS secondary to viral pneumonia from COVID-19. Hospital course complicated by VT arrest, DVT and MRSE bacteremia.    #Neuro   1) sedated  - c/w fetanyl and versed    - Maintain methadone and PRN Ativan   - wean as tolerated    #ENT   -oropharyngeal packing removed on 9/10  -ENT said they will pack if patient's h/h drops    #CV   1)Cardiac Arrest VT  - S/p Amiodarone and electrolyte supplementation   - No further VT noted   - Echo reveals Right hrt strain   - cont Bumex to meet goal of net (-) balance     2)Shock probably secondary to vasoplegia from sedation   - maintain goal MAP of 65 as tolerated will attempt to further wean PEEP once goal diuresis has been achieved  -dc'd levo as patient's MAP has been >65 w/o it     #Pulm   1)Acute hypoxic resp failure 2/2 cardiac arrest  - vent Pressure AC 34/27/100/18    2) Asthma  - c/w Proventil q8h and Symbicort BID    #GI   1)Nutrition   - maintain Glucerna tube feeds as tolerated   - Bowel regimen      2)Transaminitis  ? etiology may be congestive hepatopathy VS DILI Lipase (-) Hepatic US and hepatic doppler US (-) for acute pathology   - will continue to monitor LFT   - hepatology onboard reccs appreciated  - f/u alk phos isoenzymes, viral hepatitis serologies, and trend daily cmp, inr    #   1)Hypervolemia   - Strict I/O   - Diuresis as above   - Monitor electrolytes     #ID   1)COVID - 19   - S/p RDV Dexa and TOCI   - Now on second round of decadron   - will continue with supportive care     2)MRSE Bacteremia   - S/P 14 day course of Vanc from 8/20-9/3 therapy, and intermittent gram-&anaerobe coverage   - ID follow up appreciated   - (+) fever last evening blood cx now (P)   - Ua neg  - Sputum cx revealed gram neg rods and cocci on 9/11  - Bcx on 9/10 NGTD  - febrile to 101.3F ON    #Endo   1)DM   - c/w NPH 2U q6h and ISS   - goal Blood Sugar 100-180     #Heme  1)b/l DVTs above knee on left and below knee on R  -c/w Lovenox 80 BID    #Prophylaxis  - continue with LOVENOX 80mg BID     #Ethics  -Goc with family as prognosis is poor  -Full Code. 71 year old man w/pmhx of HTN, DM2, BPH, and Asthma here for acute hypoxic respiratory failure and ARDS secondary to viral pneumonia from COVID-19. Hospital course complicated by VT arrest, DVT and MRSE bacteremia.    #Neuro   1) sedated  - c/w fentanyl and versed    - wean fentanyl added precedex  - Maintain methadone and PRN Ativan   - wean as tolerated    #ENT   -oropharyngeal packing removed on 9/10  -ENT said they will pack if patient's h/h drops    #CV   1)Cardiac Arrest VT  - S/p Amiodarone and electrolyte supplementation   - No further VT noted   - Echo reveals Right hrt strain   - cont Bumex to meet goal of net (-) balance     2)Shock probably secondary to vasoplegia from sedation   - maintain goal MAP of 65 as tolerated will attempt to further wean PEEP once goal diuresis has been achieved  -dc'd levo as patient's MAP has been >65 w/o it     #Pulm   1)Acute hypoxic resp failure 2/2 cardiac arrest  - vent Pressure AC 34/27/100/18    2) Asthma  - c/w Proventil q8h and Symbicort BID    #GI   1)Nutrition   - maintain Glucerna tube feeds as tolerated   - Bowel regimen      2)Transaminitis  ? etiology may be congestive hepatopathy VS DILI Lipase (-) Hepatic US and hepatic doppler US (-) for acute pathology   - will continue to monitor LFT   - hepatology onboard reccs appreciated  - f/u alk phos isoenzymes, viral hepatitis serologies, and trend daily cmp, inr    #   1)Hypervolemia   - Strict I/O   - Diuresis as above   - Monitor electrolytes     #ID   1)COVID - 19   - S/p RDV Dexa and TOCI   - Now on second round of decadron   - will continue with supportive care     2)MRSE Bacteremia   - S/P 14 day course of Vanc from 8/20-9/3 therapy, and intermittent gram-&anaerobe coverage   - ID follow up appreciated   - (+) fever last evening blood cx now (P)   - Ua neg  - Sputum cx revealed gram neg rods and cocci on 9/11  - Bcx on 9/10 gram+cocci in both bottles  - febrile to 101.3F ON  -begin Vanc    #Endo   1)DM   - increase NPH 4U q6h and ISS   - goal Blood Sugar 100-180     #Heme  1)b/l DVTs above knee on left and below knee on R  -c/w Lovenox 80 BID    #Prophylaxis  - continue with LOVENOX 80mg BID     #Ethics  -Goc with family as prognosis is poor  -Full Code.

## 2021-09-11 NOTE — PROGRESS NOTE ADULT - ATTENDING COMMENTS
Critically ill patient with frequent bedside visits. Patient seen and examined on rounds and plan of care discussed with team.     In summary, this is a 71 year old man with acute hypoxic respiratory failure and ARDS secondary to viral pneumonia from COVID-19. Hospital course complicated by VT arrest, DVT and MRSE bacteremia. Medical history of HTN, DM2, BPH, and Asthma.     low grade fevers, most recent Bcx remain negative, will continue to monitor off ABx  remains on lung protective ventilation wean FiO2 and PEEP as tolerated    Still with some Oral oozing follow up with ENT   On repeat round of dexamethasone as per family request  continue anticoagulation    Prognosis  poor.

## 2021-09-12 NOTE — PROGRESS NOTE ADULT - ASSESSMENT
71 year old man w/pmhx of HTN, DM2, BPH, and Asthma here for acute hypoxic respiratory failure and ARDS secondary to viral pneumonia from COVID-19. Hospital course complicated by VT arrest, DVT and MRSE bacteremia.    #Neuro   1) sedated  - c/w fentanyl and versed    - wean fentanyl added precedex  - Maintain methadone and PRN Ativan   - wean as tolerated    #ENT   -oropharyngeal packing removed on 9/10  -ENT said they will pack if patient's h/h drops    #CV   1)Cardiac Arrest VT  - S/p Amiodarone and electrolyte supplementation   - No further VT noted   - Echo reveals Right hrt strain   - cont Bumex to meet goal of net (-) balance     2)Shock probably secondary to vasoplegia from sedation   - maintain goal MAP of 65 as tolerated will attempt to further wean PEEP once goal diuresis has been achieved  -dc'd levo as patient's MAP has been >65 w/o it     #Pulm   1)Acute hypoxic resp failure 2/2 cardiac arrest  - vent Pressure AC 34/27/100/18    2) Asthma  - c/w Proventil q8h and Symbicort BID    #GI   1)Nutrition   - maintain Glucerna tube feeds as tolerated   - Bowel regimen      2)Transaminitis  ? etiology may be congestive hepatopathy VS DILI Lipase (-) Hepatic US and hepatic doppler US (-) for acute pathology   - will continue to monitor LFT   - hepatology onboard reccs appreciated  - f/u alk phos isoenzymes, viral hepatitis serologies, and trend daily cmp, inr    #   1)Hypervolemia   - Strict I/O   - Diuresis as above   - Monitor electrolytes     #ID   1)COVID - 19   - S/p RDV Dexa and TOCI   - Now on second round of decadron   - will continue with supportive care     2)MRSE Bacteremia   - S/P 14 day course of Vanc from 8/20-9/3 therapy, and intermittent gram-&anaerobe coverage   - ID follow up appreciated   - (+) fever last evening blood cx now (P)   - Ua neg  - Sputum cx revealed gram neg rods and cocci on 9/11  - Bcx on 9/10 gram+cocci in both bottles  - febrile to 101.3F ON  -begin Vanc    #Endo   1)DM   - increase NPH 4U q6h and ISS   - goal Blood Sugar 100-180     #Heme  1)b/l DVTs above knee on left and below knee on R  -c/w Lovenox 80 BID    #Prophylaxis  - continue with LOVENOX 80mg BID     #Ethics  -Goc with family as prognosis is poor  -Full Code. 71 year old man w/pmhx of HTN, DM2, BPH, and Asthma here for acute hypoxic respiratory failure and ARDS secondary to viral pneumonia from COVID-19. Hospital course complicated by VT arrest, DVT and MRSE bacteremia.    #Neuro   1) sedated  - c/w midazolam, precedex  - Maintain methadone and PRN Ativan   - wean as tolerated    #ENT   -oropharyngeal packing removed on 9/10  -ENT said they will pack if patient's h/h drops    #CV   1)Cardiac Arrest VT  - S/p Amiodarone and electrolyte supplementation   - No further VT noted   - Echo reveals Right hrt strain   -9/12 was net +1L  - increased to Bumex 1mg TID to meet goal of net (-) balance     2)Shock probably secondary to vasoplegia from sedation   - maintain goal MAP of 65 as tolerated will attempt to further wean PEEP once goal diuresis has been achieved  -dc'd levo as patient's MAP has been >65 w/o it     #Pulm   1)Acute hypoxic resp failure 2/2 cardiac arrest  - vent Pressure AC 34/27/80%/18  -wean as tolerated    2) Asthma  - c/w Proventil q8h and Symbicort BID    #GI   1)Nutrition   - maintain Glucerna tube feeds as tolerated   - Bowel regimen w senna/ miralax    2)Transaminitis  ? etiology may be congestive hepatopathy VS DILI Lipase (-) Hepatic US and hepatic doppler US (-) for acute pathology   -high ALP in 1300s with fever and increasing pressor requirements concerning for acalculous cholecystitis vs ascending cholangitis  [ ] f/u RUQUS    #Renal  1)Hypervolemia   - Strict I/O   - Diuresis as above (bumex tid)  - Monitor electrolytes     #ID   1)COVID - 19   - S/p RDV Dexa and TOCI   - Continues on decadron per family preferences  - will continue with supportive care     2)MRSE Bacteremia   - S/P 14 day course of Vanc from 8/20-9/3 therapy, and intermittent gram-&anaerobe coverage   - ID follow up appreciated   - (+) fever last evening blood cx now (P)   - Ua neg  - Sputum cx revealed gram neg rods and cocci on 9/11  - Bcx on 9/10 gram+cocci in both bottles  - febrile to 101.3F ON  - c/w vanc    3) Klebsiella and Enterobacter PNA  - start IV Zosyn x5-7d (9/12 - )    4) Long term steroids  -concern for need for PCP ppx given long term decadron use  -will check fungitell, if positive then start bactrim    #Endo   1)DM   - increase to NPH 8U q6h and ISS   - goal Blood Sugar 100-180     #Heme  1)b/l DVTs above knee on left and below knee on R  -c/w Lovenox 80 BID    #Prophylaxis  - therapeutic on lovenox    #Ethics  -Goc with family as prognosis is poor  -Full Code.

## 2021-09-12 NOTE — PROGRESS NOTE ADULT - ATTENDING COMMENTS
Critically ill patient with frequent bedside visits. Patient seen and examined on rounds and plan of care discussed with team.     In summary, this is a 71 year old man with acute hypoxic respiratory failure and ARDS secondary to viral pneumonia from COVID-19. Hospital course complicated by VT arrest, DVT and MRSE bacteremia. Medical history of HTN, DM2, BPH, and Asthma.     Continues to have fevers. Alk phos rising. Will check RUQ sono and start zosyn for empiric coverage and send surveillance cultures. Remains on lung protective ventilation wean FiO2 and PEEP as tolerated. Still on very high requirements. Requiring vasopressors.   On repeat round of dexamethasone as per family request  continue anticoagulation    Prognosis  poor.

## 2021-09-12 NOTE — PROGRESS NOTE ADULT - SUBJECTIVE AND OBJECTIVE BOX
CHIEF COMPLAINT: Patient is a 71y old  Male who presents with a chief complaint of SOB (11 Sep 2021 08:06)    Interval Events:    REVIEW OF SYSTEMS:  Constitutional:     [ ] negative [ ] fevers [ ] chills [ ] weight loss [ ] weight gain  HEENT:                  [ ] negative [ ] dry eyes [ ] eye irritation [ ] postnasal drip [ ] nasal congestion  CV:                         [ ] negative  [ ] chest pain [ ] orthopnea [ ] palpitations [ ] murmur  Resp:                     [ ] negative [ ] cough [ ] shortness of breath [ ] dyspnea [ ] wheezing [ ] sputum [ ] hemoptysis  GI:                          [ ] negative [ ] nausea [ ] vomiting [ ] diarrhea [ ] constipation [ ] abd pain [ ] dysphagia   :                        [ ] negative [ ] dysuria [ ] nocturia [ ] hematuria [ ] increased urinary frequency  Musculoskeletal: [ ] negative [ ] back pain [ ] myalgias [ ] arthralgias [ ] fracture  Skin:                       [ ] negative [ ] rash [ ] itch  Neurological:        [ ] negative [ ] headache [ ] dizziness [ ] syncope [ ] weakness [ ] numbness  Psychiatric:           [ ] negative [ ] anxiety [ ] depression  Endocrine:            [ ] negative [ ] diabetes [ ] thyroid problem  Heme/Lymph:      [ ] negative [ ] anemia [ ] bleeding problem  Allergic/Immune: [ ] negative [ ] itchy eyes [ ] nasal discharge [ ] hives [ ] angioedema    [ ] All other systems negative  [ ] Unable to assess ROS because pt intubated and sedated.    OBJECTIVE:  ICU Vital Signs Last 24 Hrs  T(C): 36.7 (12 Sep 2021 04:00), Max: 38.2 (11 Sep 2021 07:15)  T(F): 98 (12 Sep 2021 04:00), Max: 100.8 (11 Sep 2021 07:15)  HR: 70 (12 Sep 2021 06:45) (62 - 92)  BP: 109/65 (12 Sep 2021 06:45) (81/53 - 142/72)  BP(mean): 82 (12 Sep 2021 06:45) (63 - 103)  ABP: 121/58 (12 Sep 2021 06:45) (76/35 - 170/78)  ABP(mean): 79 (12 Sep 2021 06:45) (49 - 115)  RR: 34 (12 Sep 2021 06:45) (11 - 38)  SpO2: 96% (12 Sep 2021 06:45) (92% - 100%)    Mode: AC/ CMV (Assist Control/ Continuous Mandatory Ventilation), RR (machine): 34, FiO2: 90, PEEP: 8, ITime: 1, MAP: 18, PC: 27, PIP: 37    09- @ 07:01  -   @ 07:00  --------------------------------------------------------  IN: 2706.8 mL / OUT: 1530 mL / NET: 1176.8 mL      CAPILLARY BLOOD GLUCOSE      POCT Blood Glucose.: 323 mg/dL (12 Sep 2021 05:22)      PHYSICAL EXAM:  General: Seated moving to right upper extremity to tactile stimuli   HEENT: PERRLA   Lymph Nodes: No Palpable Lymph node adenopathy   Neck: supple   Respiratory: intubated course breath sounds   Cardiovascular: S1 S2 No m/C/G/R   Abdomen: Distended but soft (+) OGT   Extremities: (+) 1 edema to all extremities   Skin: Intact   Neurological: Sedated   Psychiatry: unable to assess     HOSPITAL MEDICATIONS:  MEDICATIONS  (STANDING):  ALBUTerol    90 MICROgram(s) HFA Inhaler 2 Puff(s) Inhalation every 8 hours  artificial tears (preservative free) Ophthalmic Solution 1 Drop(s) Both EYES every 8 hours  budesonide 160 MICROgram(s)/formoterol 4.5 MICROgram(s) Inhaler 2 Puff(s) Inhalation two times a day  buMETAnide Injectable 1 milliGRAM(s) IV Push two times a day  chlorhexidine 0.12% Liquid 15 milliLiter(s) Oral Mucosa every 12 hours  chlorhexidine 4% Liquid 1 Application(s) Topical <User Schedule>  cholecalciferol 2000 Unit(s) Oral daily  dexAMETHasone  Injectable 6 milliGRAM(s) IV Push daily  dexMEDEtomidine Infusion 0.2 MICROgram(s)/kG/Hr (4.08 mL/Hr) IV Continuous <Continuous>  dextrose 40% Gel 15 Gram(s) Oral once  dextrose 5%. 1000 milliLiter(s) (50 mL/Hr) IV Continuous <Continuous>  dextrose 5%. 1000 milliLiter(s) (100 mL/Hr) IV Continuous <Continuous>  dextrose 50% Injectable 25 Gram(s) IV Push once  dextrose 50% Injectable 12.5 Gram(s) IV Push once  dextrose 50% Injectable 25 Gram(s) IV Push once  enoxaparin Injectable 80 milliGRAM(s) SubCutaneous every 12 hours  glucagon  Injectable 1 milliGRAM(s) IntraMuscular once  insulin lispro (ADMELOG) corrective regimen sliding scale   SubCutaneous every 6 hours  insulin NPH human recombinant 5 Unit(s) SubCutaneous every 6 hours  LORazepam     Tablet 2 milliGRAM(s) Oral every 6 hours  methadone   Solution 30 milliGRAM(s) Oral every 8 hours  metoclopramide Injectable 5 milliGRAM(s) IV Push daily  midazolam Infusion 0.02 mG/kG/Hr (1.63 mL/Hr) IV Continuous <Continuous>  midodrine 30 milliGRAM(s) Oral every 8 hours  multivitamin/minerals Oral Solution (WELLESSE) 30 milliLiter(s) Enteral Tube daily  naloxegol 25 milliGRAM(s) Oral daily  norepinephrine Infusion 0.05 MICROgram(s)/kG/Min (3.83 mL/Hr) IV Continuous <Continuous>  pantoprazole  Injectable 40 milliGRAM(s) IV Push daily  polyethylene glycol 3350 17 Gram(s) Oral daily  senna Syrup 10 milliLiter(s) Oral at bedtime  vancomycin  IVPB      vancomycin  IVPB 750 milliGRAM(s) IV Intermittent every 12 hours    MEDICATIONS  (PRN):      LABS:  ( @ 00:35)                        7.4  9.96 )-----------( 380                 25.2    Neutrophils = 7.79 (78.3%)  Lymphocytes = 0.66 (6.6%)  Eosinophils = 0.71 (7.1%)  Basophils = 0.04 (0.4%)  Monocytes = 0.64 (6.4%)  Bands = --%    WBC Trend: 9.96<--, 10.96<--, 10.42<--  Hb Trend: 7.4<--, 8.0<--, 8.0<--, 7.8<--, 7.6<--  Plt Trend: 380<--, 418<--, 415<--, 303<--, 298<--      140  |  95<L>  |  28<H>  ----------------------------<  215<H>  3.6   |  34<H>  |  0.53    Ca    8.8      12 Sep 2021 00:35  Phos  2.3       Mg     2.2         TPro  6.3  /  Alb  2.4<L>  /  TBili  1.1  /  DBili  x   /  AST  132<H>  /  ALT  152<H>  /  AlkPhos  1331<H>      Creatinine Trend: 0.53<--, 0.47<--, 0.54<--, 0.64<--, 0.69<--, 0.70<--  PT/INR - ( 12 Sep 2021 00:35 )   PT: 13.8 sec;   INR: 1.16 ratio         PTT - ( 12 Sep 2021 00:35 )  PTT:38.7 sec  Urinalysis Basic - ( 11 Sep 2021 00:50 )    Color: Yellow / Appearance: Clear / S.012 / pH: x  Gluc: x / Ketone: Negative  / Bili: Negative / Urobili: 2 mg/dL   Blood: x / Protein: Trace / Nitrite: Negative   Leuk Esterase: Negative / RBC: 2 /hpf / WBC 1 /HPF   Sq Epi: x / Non Sq Epi: 1 /hpf / Bacteria: Negative      Arterial Blood Gas:   @ 00:33  7.37/69/44/40/77.4/12.9  ABG lactate: --  Arterial Blood Gas:   @ 11:45  7.41/65/122/41/99.5/14.7  ABG lactate: --  Arterial Blood Gas:   @ 00:41  7.47/57/76/42/97.6/15.9  ABG lactate: --            MICROBIOLOGY:   Blood Cx:  Urine Cx:  Sputum Cx:  Legionella:  RVP:    RADIOLOGY:  X Ray:  CT:  MRI:  Ultrasound:  [ ] Reviewed and interpreted by me    EKG:   CHIEF COMPLAINT: Patient is a 71y old  Male who presents with a chief complaint of SOB (11 Sep 2021 08:06)    Interval Events: NAEO. Mild increases in pressor requirements levo 0.02-->0.045    REVIEW OF SYSTEMS:  Constitutional:     [ ] negative [ ] fevers [ ] chills [ ] weight loss [ ] weight gain  HEENT:                  [ ] negative [ ] dry eyes [ ] eye irritation [ ] postnasal drip [ ] nasal congestion  CV:                         [ ] negative  [ ] chest pain [ ] orthopnea [ ] palpitations [ ] murmur  Resp:                     [ ] negative [ ] cough [ ] shortness of breath [ ] dyspnea [ ] wheezing [ ] sputum [ ] hemoptysis  GI:                          [ ] negative [ ] nausea [ ] vomiting [ ] diarrhea [ ] constipation [ ] abd pain [ ] dysphagia   :                        [ ] negative [ ] dysuria [ ] nocturia [ ] hematuria [ ] increased urinary frequency  Musculoskeletal: [ ] negative [ ] back pain [ ] myalgias [ ] arthralgias [ ] fracture  Skin:                       [ ] negative [ ] rash [ ] itch  Neurological:        [ ] negative [ ] headache [ ] dizziness [ ] syncope [ ] weakness [ ] numbness  Psychiatric:           [ ] negative [ ] anxiety [ ] depression  Endocrine:            [ ] negative [ ] diabetes [ ] thyroid problem  Heme/Lymph:      [ ] negative [ ] anemia [ ] bleeding problem  Allergic/Immune: [ ] negative [ ] itchy eyes [ ] nasal discharge [ ] hives [ ] angioedema    [ ] All other systems negative  [ x] Unable to assess ROS because pt intubated and sedated.    OBJECTIVE:  ICU Vital Signs Last 24 Hrs  T(C): 36.7 (12 Sep 2021 04:00), Max: 38.2 (11 Sep 2021 07:15)  T(F): 98 (12 Sep 2021 04:00), Max: 100.8 (11 Sep 2021 07:15)  HR: 70 (12 Sep 2021 06:45) (62 - 92)  BP: 109/65 (12 Sep 2021 06:45) (81/53 - 142/72)  BP(mean): 82 (12 Sep 2021 06:45) (63 - 103)  ABP: 121/58 (12 Sep 2021 06:45) (76/35 - 170/78)  ABP(mean): 79 (12 Sep 2021 06:45) (49 - 115)  RR: 34 (12 Sep 2021 06:45) (11 - 38)  SpO2: 96% (12 Sep 2021 06:45) (92% - 100%)    Mode: AC/ CMV (Assist Control/ Continuous Mandatory Ventilation), RR (machine): 34, FiO2: 90, PEEP: 8, ITime: 1, MAP: 18, PC: 27, PIP: 37    09-11 @ 07:01  -   @ 07:00  --------------------------------------------------------  IN: 2706.8 mL / OUT: 1530 mL / NET: 1176.8 mL      CAPILLARY BLOOD GLUCOSE      POCT Blood Glucose.: 323 mg/dL (12 Sep 2021 05:22)      PHYSICAL EXAM:  General: Seated moving to right upper extremity to tactile stimuli   HEENT: PERRLA   Lymph Nodes: No Palpable Lymph node adenopathy   Neck: supple   Respiratory: intubated course breath sounds   Cardiovascular: S1 S2 No m/C/G/R   Abdomen: Distended but soft (+) OGT   Extremities: (+) 1 edema to all extremities   Skin: Intact   Neurological: Sedated   Psychiatry: unable to assess     HOSPITAL MEDICATIONS:  MEDICATIONS  (STANDING):  ALBUTerol    90 MICROgram(s) HFA Inhaler 2 Puff(s) Inhalation every 8 hours  artificial tears (preservative free) Ophthalmic Solution 1 Drop(s) Both EYES every 8 hours  budesonide 160 MICROgram(s)/formoterol 4.5 MICROgram(s) Inhaler 2 Puff(s) Inhalation two times a day  buMETAnide Injectable 1 milliGRAM(s) IV Push two times a day  chlorhexidine 0.12% Liquid 15 milliLiter(s) Oral Mucosa every 12 hours  chlorhexidine 4% Liquid 1 Application(s) Topical <User Schedule>  cholecalciferol 2000 Unit(s) Oral daily  dexAMETHasone  Injectable 6 milliGRAM(s) IV Push daily  dexMEDEtomidine Infusion 0.2 MICROgram(s)/kG/Hr (4.08 mL/Hr) IV Continuous <Continuous>  dextrose 40% Gel 15 Gram(s) Oral once  dextrose 5%. 1000 milliLiter(s) (50 mL/Hr) IV Continuous <Continuous>  dextrose 5%. 1000 milliLiter(s) (100 mL/Hr) IV Continuous <Continuous>  dextrose 50% Injectable 25 Gram(s) IV Push once  dextrose 50% Injectable 12.5 Gram(s) IV Push once  dextrose 50% Injectable 25 Gram(s) IV Push once  enoxaparin Injectable 80 milliGRAM(s) SubCutaneous every 12 hours  glucagon  Injectable 1 milliGRAM(s) IntraMuscular once  insulin lispro (ADMELOG) corrective regimen sliding scale   SubCutaneous every 6 hours  insulin NPH human recombinant 5 Unit(s) SubCutaneous every 6 hours  LORazepam     Tablet 2 milliGRAM(s) Oral every 6 hours  methadone   Solution 30 milliGRAM(s) Oral every 8 hours  metoclopramide Injectable 5 milliGRAM(s) IV Push daily  midazolam Infusion 0.02 mG/kG/Hr (1.63 mL/Hr) IV Continuous <Continuous>  midodrine 30 milliGRAM(s) Oral every 8 hours  multivitamin/minerals Oral Solution (WELLESSE) 30 milliLiter(s) Enteral Tube daily  naloxegol 25 milliGRAM(s) Oral daily  norepinephrine Infusion 0.05 MICROgram(s)/kG/Min (3.83 mL/Hr) IV Continuous <Continuous>  pantoprazole  Injectable 40 milliGRAM(s) IV Push daily  polyethylene glycol 3350 17 Gram(s) Oral daily  senna Syrup 10 milliLiter(s) Oral at bedtime  vancomycin  IVPB      vancomycin  IVPB 750 milliGRAM(s) IV Intermittent every 12 hours    MEDICATIONS  (PRN):      LABS:  ( @ 00:35)                        7.4  9.96 )-----------( 380                 25.2    Neutrophils = 7.79 (78.3%)  Lymphocytes = 0.66 (6.6%)  Eosinophils = 0.71 (7.1%)  Basophils = 0.04 (0.4%)  Monocytes = 0.64 (6.4%)  Bands = --%    WBC Trend: 9.96<--, 10.96<--, 10.42<--  Hb Trend: 7.4<--, 8.0<--, 8.0<--, 7.8<--, 7.6<--  Plt Trend: 380<--, 418<--, 415<--, 303<--, 298<--      140  |  95<L>  |  28<H>  ----------------------------<  215<H>  3.6   |  34<H>  |  0.53    Ca    8.8      12 Sep 2021 00:35  Phos  2.3       Mg     2.2         TPro  6.3  /  Alb  2.4<L>  /  TBili  1.1  /  DBili  x   /  AST  132<H>  /  ALT  152<H>  /  AlkPhos  1331<H>      Creatinine Trend: 0.53<--, 0.47<--, 0.54<--, 0.64<--, 0.69<--, 0.70<--  PT/INR - ( 12 Sep 2021 00:35 )   PT: 13.8 sec;   INR: 1.16 ratio         PTT - ( 12 Sep 2021 00:35 )  PTT:38.7 sec  Urinalysis Basic - ( 11 Sep 2021 00:50 )    Color: Yellow / Appearance: Clear / S.012 / pH: x  Gluc: x / Ketone: Negative  / Bili: Negative / Urobili: 2 mg/dL   Blood: x / Protein: Trace / Nitrite: Negative   Leuk Esterase: Negative / RBC: 2 /hpf / WBC 1 /HPF   Sq Epi: x / Non Sq Epi: 1 /hpf / Bacteria: Negative      Arterial Blood Gas:   @ 00:33  7.37/69/44/40/77.4/12.9  ABG lactate: --  Arterial Blood Gas:   @ 11:45  7.41/65/122/41/99.5/14.7  ABG lactate: --  Arterial Blood Gas:   @ 00:41  7.47/57/76/42/97.6/15.9  ABG lactate: --    Sputum culture +Klebsiella pneumoniae and Enterobacter cloacae

## 2021-09-13 NOTE — CHART NOTE - NSCHARTNOTEFT_GEN_A_CORE
Nutrition Follow Up Note  Patient seen for: Nutrition follow up     Chart reviewed, events noted. 70 yo M with PMH: HTN, DM2, BPH, asthma. Admitted for acute hypoxic respiratory failure and ARDS secondary to viral pneumonia from COVID19. Hospital course c/b VT arrest, DVT and MRSE bacteremia. Pt remains sedated, on Midazolam and Precedex. Continues on antibiotics as ordered in context of Klebsiella and Enterobacter pneumonia.     Source: [] Patient       [x] EMR        [x] RN        [] Family at bedside       [x] Other: interdisciplinary medical team    -If unable to interview patient: [] Trach/Vent/BiPAP  [] Disoriented/confused/inappropriate to interview    Diet Order:   Diet, NPO with Tube Feed:   Tube Feeding Modality: Nasogastric  Glucerna 1.5 Stephen (GLUCERNA1.5RTH)  Total Volume for 24 Hours (mL): 1080  Continuous  Starting Tube Feed Rate {mL per Hour}: 30  Increase Tube Feed Rate by (mL): 10     Every 2 hours  Until Goal Tube Feed Rate (mL per Hour): 60  Tube Feed Duration (in Hours): 18  Tube Feed Start Time: 11:00 (21)    Provides 1080ml, 1620kcal and 89g protein.     EN provision (per nursing flow sheet):   (): 100ml (thus far; trickle feeds between 20-30ml/hr. Feeds held in context of residuals)  (): 700ml (65% of goal; feeds held in context of residuals)  (): 1200ml (EN feeds changed midday from Glucerna 1.5 at 60ml/hr x 24 hrs to 18 hrs feeds)  (9/10): 1440ml (100%; EN feeds: Glucerna 1.5 at 60ml/hr x 24 hrs)  (): 1320ml (92%; EN feeds: Glucerna 1.5 at 60ml/hr x 24 hrs)    Nutrition-related concerns:   -Last BM (): x 1; (): x 4. On bowel regimen (senna, Miralax) as ordered.   -Continues on vitamin D3, multivitamin.  -Continues on antibiotics as ordered.  -Continues on antihyperglycemic regimen: NPH, insulin lispro sliding scale. On decadron; at risk for elevated BG.     Weights:   Daily Weight in k.1 ()    MEDICATIONS  (STANDING):  buMETAnide Injectable  cefepime   IVPB  cholecalciferol  dexAMETHasone  Injectable  dextrose 40% Gel  dextrose 5%.  dextrose 5%.  dextrose 50% Injectable  dextrose 50% Injectable  dextrose 50% Injectable  glucagon  Injectable  insulin lispro (ADMELOG) corrective regimen sliding scale  insulin NPH human recombinant  midodrine  multivitamin/minerals Oral Solution (WELLESSE)  naloxegol  norepinephrine Infusion  polyethylene glycol 3350  senna Syrup  vancomycin  IVPB    Pertinent Labs:  @ 23:03: Na 140, BUN 30<H>, Cr 0.62, <H>, K+ 4.0, Phos 2.7, Mg 2.1, Alk Phos 1613<H>, ALT/SGPT 168<H>, AST/SGOT 162<H>, HbA1c --    A1C with Estimated Average Glucose Result: 7.9 % (21 @ 16:09)  A1C with Estimated Average Glucose Result: 7.3 % (21 @ 14:36)    Finger Sticks:  POCT Blood Glucose.: 164 mg/dL ( @ 05:23)  POCT Blood Glucose.: 233 mg/dL ( @ 16:58)    Triglycerides, Serum: 168 mg/dL (21 @ 23:03)  Triglycerides, Serum: 292 mg/dL (21 @ 00:50)  Triglycerides, Serum: 234 mg/dL (09-10-21 @ 00:40)  Triglycerides, Serum: 194 mg/dL (21 @ 00:42)  Triglycerides, Serum: 216 mg/dL (21 @ 01:08)  Triglycerides, Serum: 281 mg/dL (21 @ 01:35)  Triglycerides, Serum: 231 mg/dL (21 @ 01:10)    Skin per nursing documentation: sacral DTI; stage II right check lip   Edema: 1+ generalized     Estimated Nutritional needs based on 81.6 kg:  Estimated Energy Needs: (20-25 kcal/kg): 3727-3833 kcal based on dosing weight of 81.6 kg  Estimated Protein Needs: (1.2-1.6 g/kg):  gm based on dosing weight of 81.6 kg  Defer fluid needs to team    Previous Nutrition Diagnosis: Severe, acute malnutrition related to inability to meet protein-energy needs as evidenced by pt meeting <50% EER> 5 days, moderate edema.  Nutrition Diagnosis is: [x] ongoing, being addressed with enteral nutrition    No New Nutrition Diagnosis at this time    Nutrition Care Plan:  [x] In Progress    Nutrition Interventions:     Education Provided:         [x] No: pt intubated, sedated       Recommendations:      1. Recommend (as tolerated) increase Glucerna 1.5 to 70ml/hr x 18 hrs. To provide: 1260ml, 1890kcal and 104g protein. Based on dosing wt 81.6kg, provides: 23.2kcal/kg and 1.27g protein/kg.   2. Consider Banatrol TF if multiple BMs recorded and/or adjust bowel regimen PRN. Consider promotility agent.   3. Monitor GI tolerance. RD to remain available to adjust EN formulary, volume/rate PRN.   4. Continue micronutrient supplementation as ordered.   5. Monitor wt trends/labs/skin integrity/hydration status/bowel regularity.     Monitoring and Evaluation:   Continue to monitor nutritional intake, tolerance to diet prescription, weights, labs, skin integrity    RD remains available upon request and will follow up per protocol Nutrition Follow Up Note  Patient seen for: Nutrition follow up     Chart reviewed, events noted. 72 yo M with PMH: HTN, DM2, BPH, asthma. Admitted for acute hypoxic respiratory failure and ARDS secondary to viral pneumonia from COVID19. Hospital course c/b VT arrest, DVT and MRSE bacteremia. Pt remains sedated, on Midazolam and Precedex. Continues on antibiotics as ordered in context of Klebsiella and Enterobacter pneumonia.     Source: [] Patient       [x] EMR        [x] RN        [] Family at bedside       [x] Other: interdisciplinary medical team    -If unable to interview patient: [] Trach/Vent/BiPAP  [] Disoriented/confused/inappropriate to interview    Diet Order:   Diet, NPO with Tube Feed:   Tube Feeding Modality: Nasogastric  Glucerna 1.5 Stephen (GLUCERNA1.5RTH)  Total Volume for 24 Hours (mL): 1080  Continuous  Starting Tube Feed Rate {mL per Hour}: 30  Increase Tube Feed Rate by (mL): 10     Every 2 hours  Until Goal Tube Feed Rate (mL per Hour): 60  Tube Feed Duration (in Hours): 18  Tube Feed Start Time: 11:00 (21)    Provides 1080ml, 1620kcal and 89g protein.     EN provision (per nursing flow sheet):   (): 100ml (thus far; trickle feeds between 20-30ml/hr. Feeds held in context of residuals)  (): 700ml (65% of goal; feeds held in context of residuals)  (): 1200ml (EN feeds changed midday from Glucerna 1.5 at 60ml/hr x 24 hrs to 18 hrs feeds)  (9/10): 1440ml (100%; EN feeds: Glucerna 1.5 at 60ml/hr x 24 hrs)  (): 1320ml (92%; EN feeds: Glucerna 1.5 at 60ml/hr x 24 hrs)    Nutrition-related concerns:   -Last BM (): x 1; (): x 4. On bowel regimen (senna, Miralax) as ordered.   -Continues on vitamin D3, multivitamin.  -Continues on antibiotics as ordered.  -Continues on antihyperglycemic regimen: NPH, insulin lispro sliding scale. On decadron; at risk for elevated BG.     Weights:   Daily Weight in k.1 ()    MEDICATIONS  (STANDING):  buMETAnide Injectable  cefepime   IVPB  cholecalciferol  dexAMETHasone  Injectable  dextrose 40% Gel  dextrose 5%.  dextrose 5%.  dextrose 50% Injectable  dextrose 50% Injectable  dextrose 50% Injectable  glucagon  Injectable  insulin lispro (ADMELOG) corrective regimen sliding scale  insulin NPH human recombinant  midodrine  multivitamin/minerals Oral Solution (WELLESSE)  naloxegol  norepinephrine Infusion  polyethylene glycol 3350  senna Syrup  vancomycin  IVPB    Pertinent Labs:  @ 23:03: Na 140, BUN 30<H>, Cr 0.62, <H>, K+ 4.0, Phos 2.7, Mg 2.1, Alk Phos 1613<H>, ALT/SGPT 168<H>, AST/SGOT 162<H>, HbA1c --    A1C with Estimated Average Glucose Result: 7.9 % (21 @ 16:09)  A1C with Estimated Average Glucose Result: 7.3 % (21 @ 14:36)    Finger Sticks:  POCT Blood Glucose.: 164 mg/dL ( @ 05:23)  POCT Blood Glucose.: 233 mg/dL ( @ 16:58)    Triglycerides, Serum: 168 mg/dL (21 @ 23:03)  Triglycerides, Serum: 292 mg/dL (21 @ 00:50)  Triglycerides, Serum: 234 mg/dL (09-10-21 @ 00:40)  Triglycerides, Serum: 194 mg/dL (21 @ 00:42)  Triglycerides, Serum: 216 mg/dL (21 @ 01:08)  Triglycerides, Serum: 281 mg/dL (21 @ 01:35)  Triglycerides, Serum: 231 mg/dL (21 @ 01:10)    Skin per nursing documentation: sacral DTI; stage II right check lip   Edema: 1+ generalized     Estimated Nutritional needs based on 81.6 kg:  Estimated Energy Needs: (20-25 kcal/kg): 1480-0979 kcal based on dosing weight of 81.6 kg  Estimated Protein Needs: (1.2-1.6 g/kg):  gm based on dosing weight of 81.6 kg  Defer fluid needs to team    Previous Nutrition Diagnosis: Severe, acute malnutrition related to inability to meet protein-energy needs as evidenced by pt meeting <50% EER> 5 days, moderate edema.  Nutrition Diagnosis is: [x] ongoing, being addressed with enteral nutrition    No New Nutrition Diagnosis at this time    Nutrition Care Plan:  [x] In Progress    Nutrition Interventions:     Education Provided:         [x] No: pt intubated, sedated       Recommendations:      1. Recommend (as tolerated) increase Glucerna 1.5 to 70ml/hr x 18 hrs. To provide: 1260ml, 1890kcal and 104g protein. Based on dosing wt 81.6kg, provides: 23.2kcal/kg and 1.27g protein/kg.   2. Consider Banatrol TF if multiple BMs recorded and/or adjust bowel regimen PRN.   3. Monitor GI tolerance. RD to remain available to adjust EN formulary, volume/rate PRN.   4. Continue micronutrient supplementation as ordered.   5. Monitor wt trends/labs/skin integrity/hydration status/bowel regularity.     Monitoring and Evaluation:   Continue to monitor nutritional intake, tolerance to diet prescription, weights, labs, skin integrity    RD remains available upon request and will follow up per protocol

## 2021-09-13 NOTE — PROGRESS NOTE ADULT - SUBJECTIVE AND OBJECTIVE BOX
Chief Complaint:  Patient is a 71y old  Male who presents with a chief complaint of SOB (13 Sep 2021 08:09)      Interval Events: Still intubated and sedated. Has uptrending alk phos/AST/ALT/Tbili. Febrile overnight with Tmax 38.8.      Hospital Medications:  ALBUTerol    90 MICROgram(s) HFA Inhaler 2 Puff(s) Inhalation every 8 hours  artificial tears (preservative free) Ophthalmic Solution 1 Drop(s) Both EYES every 8 hours  budesonide 160 MICROgram(s)/formoterol 4.5 MICROgram(s) Inhaler 2 Puff(s) Inhalation two times a day  buMETAnide Injectable 2 milliGRAM(s) IV Push every 8 hours  chlorhexidine 0.12% Liquid 15 milliLiter(s) Oral Mucosa every 12 hours  chlorhexidine 4% Liquid 1 Application(s) Topical <User Schedule>  cholecalciferol 2000 Unit(s) Oral daily  dexAMETHasone  Injectable 6 milliGRAM(s) IV Push daily  dexMEDEtomidine Infusion 0.2 MICROgram(s)/kG/Hr IV Continuous <Continuous>  dextrose 40% Gel 15 Gram(s) Oral once  dextrose 5%. 1000 milliLiter(s) IV Continuous <Continuous>  dextrose 5%. 1000 milliLiter(s) IV Continuous <Continuous>  dextrose 50% Injectable 25 Gram(s) IV Push once  dextrose 50% Injectable 12.5 Gram(s) IV Push once  dextrose 50% Injectable 25 Gram(s) IV Push once  enoxaparin Injectable 80 milliGRAM(s) SubCutaneous every 12 hours  glucagon  Injectable 1 milliGRAM(s) IntraMuscular once  insulin lispro (ADMELOG) corrective regimen sliding scale   SubCutaneous every 6 hours  insulin NPH human recombinant 8 Unit(s) SubCutaneous every 6 hours  LORazepam     Tablet 2 milliGRAM(s) Oral every 6 hours  meropenem  IVPB 1000 milliGRAM(s) IV Intermittent every 8 hours  methadone   Solution 30 milliGRAM(s) Oral every 8 hours  midazolam Infusion 0.02 mG/kG/Hr IV Continuous <Continuous>  midodrine 40 milliGRAM(s) Oral every 8 hours  multivitamin/minerals Oral Solution (WELLESSE) 30 milliLiter(s) Enteral Tube daily  naloxegol 25 milliGRAM(s) Oral daily  norepinephrine Infusion 0.05 MICROgram(s)/kG/Min IV Continuous <Continuous>  polyethylene glycol 3350 17 Gram(s) Oral daily  senna Syrup 10 milliLiter(s) Oral at bedtime  vancomycin  IVPB 1000 milliGRAM(s) IV Intermittent every 12 hours      PMHX/PSHX:  BPH (benign prostatic hyperplasia)    Hyperlipemia    HTN (hypertension)    HTN (hypertension)    Hypercholesteremia    Asthma    Diabetes    Kidney stone    Bladder stone    H/O lithotripsy    History of kidney stones    H/O umbilical hernia repair          ROS: unable to obtain as pt is intubated and sedated    PHYSICAL EXAM:     GENERAL:  Appears stated age, toxic and ill appearing  HEENT:  NC/AT, eyes are closed  CHEST: +vented breath sounds  HEART:  Regular rhythm, S1, S2, no murmur/rub/S3/S4  ABDOMEN:  Soft, non-distended, normoactive bowel sounds  EXTREMITIES:  no cyanosis, clubbing or edema  SKIN:  No rash/erythema/ecchymoses/petechiae/wounds/abscess/warm/dry  NEURO:  sedated    Vital Signs:  Vital Signs Last 24 Hrs  T(C): 37.2 (13 Sep 2021 16:00), Max: 38.3 (13 Sep 2021 09:00)  T(F): 99 (13 Sep 2021 16:00), Max: 100.9 (13 Sep 2021 09:00)  HR: 68 (13 Sep 2021 16:00) (62 - 82)  BP: 130/74 (13 Sep 2021 14:45) (68/47 - 169/87)  BP(mean): 94 (13 Sep 2021 14:45) (53 - 123)  RR: 30 (13 Sep 2021 16:00) (27 - 37)  SpO2: 89% (13 Sep 2021 16:00) (88% - 99%)  Daily     Daily Weight in k.1 (13 Sep 2021 03:00)    LABS:                        7.4    11.50 )-----------( 394      ( 12 Sep 2021 23:03 )             25.0     09-12    140  |  95<L>  |  30<H>  ----------------------------<  162<H>  4.0   |  32<H>  |  0.62    Ca    9.3      12 Sep 2021 23:03  Phos  2.7     09-12  Mg     2.1     09-12    TPro  6.9  /  Alb  2.6<L>  /  TBili  1.2  /  DBili  x   /  AST  162<H>  /  ALT  168<H>  /  AlkPhos  1613<H>  09-12    LIVER FUNCTIONS - ( 12 Sep 2021 23:03 )  Alb: 2.6 g/dL / Pro: 6.9 g/dL / ALK PHOS: 1613 U/L / ALT: 168 U/L / AST: 162 U/L / GGT: x           PT/INR - ( 12 Sep 2021 23:03 )   PT: 13.8 sec;   INR: 1.16 ratio         PTT - ( 12 Sep 2021 23:03 )  PTT:39.2 sec        Imaging:        EXAM:  US ABDOMEN RT UPR QUADRANT                            PROCEDURE DATE:  2021            INTERPRETATION:  CLINICAL INFORMATION: Transaminitis. COVID-19 pneumonia.    COMPARISON: Ultrasound abdomen 2021. CT examination 2019.    TECHNIQUE: Sonography of the right upper quadrant.    FINDINGS:    Liver: Mild hepatomegaly measuring 17.8 cm. Left lobe also mildly enlarged. No focal lesion. Liver contour is smooth.  Bile ducts: Normal caliber. Common bile duct measures 0.7 cm.  Gallbladder: Questionable tiny stones and sludge within the gallbladder. Wall is upper normal in thickness.  Pancreas: Visualized portions are within normal limits.  Right kidney: 10.8 cm. No hydronephrosis.  Ascites: None.  IVC: Visualized portions are within normal limits.    IMPRESSION:    Mild hepatomegaly.    Questionable tiny gallstones and  sludge. Wall upper normal in thickness.

## 2021-09-13 NOTE — PROGRESS NOTE ADULT - ASSESSMENT
71 year old man w/pmhx of HTN, DM2, BPH, and Asthma here for acute hypoxic respiratory failure and ARDS secondary to viral pneumonia from COVID-19. Hospital course complicated by VT arrest, DVT and MRSE bacteremia.    #Neuro   1) sedated  - c/w midazolam, precedex  - Maintain methadone and PRN Ativan   - wean as tolerated    #ENT   -oropharyngeal packing removed on 9/10  -ENT said they will pack if patient's h/h drops      #CV   1)Cardiac Arrest VT  - S/p Amiodarone and electrolyte supplementation   - No further VT noted   - Echo reveals Right hrt strain   -9/12 was net +855cc  - increased to Bumex 1mg TID to meet goal of net (-) balance     2)Shock probably secondary to vasoplegia from sedation   -dc'd levo as patient's MAP has been >65 w/o it   -resolved    #Pulm   1)Acute hypoxic resp failure 2/2 cardiac arrest  - vent Pressure AC 34/27/80%/8  -wean as tolerated    2) Asthma  - c/w Proventil q8h and Symbicort BID    #GI   1)Nutrition   - maintain Glucerna tube feeds as tolerated   - Bowel regimen w senna/ miralax    2)Transaminitis  ? etiology may be congestive hepatopathy VS DILI Lipase (-) Hepatic US and hepatic doppler US (-) for acute pathology   -high ALP in 1300s with fever and increasing pressor requirements concerning for acalculous cholecystitis vs ascending cholangitis  -hep b, c, cmv neg  -ebv IgG positive, but ebv IgM and Ab neg.   [ ] f/u RUQUS    #Renal  1)Hypervolemia   - Strict I/O   - Diuresis as above (bumex tid)  - Monitor electrolytes     #ID   1)COVID - 19   - S/p RDV Dexa and TOCI   - Continues on decadron per family preferences  - will continue with supportive care     2)MRSE Bacteremia   - S/P 14 day course of Vanc from 8/20-9/3 therapy, and intermittent gram-&anaerobe coverage   - ID follow up appreciated   - Ua neg  - Bcx on 9/11 revealed staph epidermis on both bottles  - Bcx on 9/12 NGTD  - afebrile OVN  - c/w vanc      3) Klebsiella and Enterobacter PNA  - Sputum cx revealed kleb siella and enterobact  - start IV Zosyn x5-7d (9/12 - )    4) Long term steroids  -concern for need for PCP ppx given long term decadron use  -f/u fungitell, if positive then start bactrim    #Endo   1)DM   - increase to NPH 8U q6h and ISS   - goal Blood Sugar 100-180     #Heme  1)b/l DVTs above knee on left and below knee on R  -c/w Lovenox 80 BID    #Prophylaxis  - therapeutic on lovenox    #Ethics  -Goc with family as prognosis is poor  -Full Code. 71 year old man w/pmhx of HTN, DM2, BPH, and Asthma here for acute hypoxic respiratory failure and ARDS secondary to viral pneumonia from COVID-19. Hospital course complicated by VT arrest, DVT and MRSE bacteremia.    #Neuro   1) sedated  - c/w midazolam, precedex  - Maintain methadone and PRN Ativan   - wean as tolerated  -f/u noncon CT    #ENT   -oropharyngeal packing removed on 9/10  -ENT said they will pack if patient's h/h drops  -will need FiO2 at 50% or less and PaO2 at 60 for qualify for tach    #CV   1)Cardiac Arrest VT  - S/p Amiodarone and electrolyte supplementation   - No further VT noted   - Echo reveals Right hrt strain   -9/12 was net +855cc  - increased to Bumex 1mg TID to meet goal of net (-) balance     2)Shock probably secondary to vasoplegia from sedation   -dc'd levo as patient's MAP has been >65 w/o it   -resolved    #Pulm   1)Acute hypoxic resp failure 2/2 cardiac arrest  - vent Pressure AC 20/27/80/8  -wean as tolerated    2) Asthma  - c/w Proventil q8h and Symbicort BID    #GI   1)Nutrition   - maintain Glucerna tube feeds as tolerated   - Bowel regimen w senna/ miralax    2)Transaminitis  ? etiology may be congestive hepatopathy VS DILI Lipase (-) Hepatic US and hepatic doppler US (-) for acute pathology   -high ALP in 1300s with fever and increasing pressor requirements concerning for acalculous cholecystitis vs ascending cholangitis  -hep b, c, cmv neg  -ebv IgG positive, but ebv IgM and Ab neg.   [ ] f/u RUQUS    #Renal  1)Hypervolemia   - Strict I/O   - Diuresis as above (bumex tid)  - Monitor electrolytes     #ID   1)COVID - 19   - S/p RDV Dexa and TOCI   - Continues on decadron per family preferences  - will continue with supportive care     2)MRSE Bacteremia   - S/P 14 day course of Vanc from 8/20-9/3 therapy, and intermittent gram-&anaerobe coverage   - ID follow up appreciated   - Ua neg  - Bcx on 9/11 revealed staph epidermis on both bottles  - Bcx on 9/12 NGTD  - afebrile OVN  - c/w vanc      3) Vent associated PNA  - Sputum cx revealed kleb siella and enterobact  - IV Zosyn x5-7d (9/12 - )  - will require meropenem as enterobact is resistant to zosyn and indeterminant for cefepime    4) Long term steroids  -concern for need for PCP ppx given long term decadron use  -f/u fungitell, if positive then start bactrim    #Endo   1)DM   - increase to NPH 8U q6h and ISS   - goal Blood Sugar 100-180     #Heme  1)b/l DVTs above knee on left and below knee on R  -c/w Lovenox 80 BID    #Prophylaxis  - therapeutic on lovenox    #Ethics  -Goc with family as prognosis is poor  - talk to family regarding stopping steroid as he has infections in his blood and vent associated PNA  -Full Code. 71 year old man w/pmhx of HTN, DM2, BPH, and Asthma here for acute hypoxic respiratory failure and ARDS secondary to viral pneumonia from COVID-19. Hospital course complicated by VT arrest, DVT and MRSE bacteremia.    #Neuro   1) sedated  - c/w midazolam, precedex  - Maintain methadone and PRN Ativan   - wean as tolerated  -f/u noncon CT    #ENT   -oropharyngeal packing removed on 9/10  -ENT said they will pack if patient's h/h drops  -will need FiO2 at 50% or less and PaO2 at 60 for qualify for tach    #CV   1)Cardiac Arrest VT  - S/p Amiodarone and electrolyte supplementation   - No further VT noted   - Echo reveals Right hrt strain   -9/12 was net +855cc  - increased to Bumex 1mg TID to meet goal of net (-) balance     2)Shock probably secondary to vasoplegia from sedation   -dc'd levo as patient's MAP has been >65 w/o it   -resolved    #Pulm   1)Acute hypoxic resp failure 2/2 cardiac arrest  - vent Pressure AC 20/27/80/8  -wean as tolerated  -f/u cxr regarding et tube as well as chest    2) Asthma  - c/w Proventil q8h and Symbicort BID    #GI   1)Nutrition   - maintain Glucerna tube feeds as tolerated   - Bowel regimen w senna/ miralax  -dc'd protonix and reglan    2)Transaminitis  ? etiology may be congestive hepatopathy VS DILI Lipase (-) Hepatic US and hepatic doppler US (-) for acute pathology   -high ALP in 1300s with fever and increasing pressor requirements concerning for acalculous cholecystitis vs ascending cholangitis  -hep b, c, cmv neg  -ebv IgG positive, but ebv IgM and Ab neg.   [ ] f/u RUQUS    #Renal  1)Hypervolemia   - Strict I/O   - Diuresis as above (bumex tid)  - Monitor electrolytes     #ID   1)COVID - 19   - S/p RDV Dexa and TOCI   - Continues on decadron per family preferences  - will continue with supportive care     2)MRSE Bacteremia   - S/P 14 day course of Vanc from 8/20-9/3 therapy, and intermittent gram-&anaerobe coverage   - ID follow up appreciated   - Ua neg  - Bcx on 9/11 revealed staph epidermis on both bottles  - Bcx on 9/12 NGTD  - afebrile OVN  - c/w vanc    3) Vent associated PNA  - Sputum cx revealed kleb siella and enterobact  - IV Zosyn x5-7d (9/12 - )  - will require meropenem as enterobact is resistant to zosyn and indeterminant for cefepime    4) Long term steroids  -concern for need for PCP ppx given long term decadron use  -f/u fungitell, if positive then start bactrim    #Endo   1)DM   - increase to NPH 8U q6h and ISS   - goal Blood Sugar 100-180     #Heme  1)b/l DVTs above knee on left and below knee on R  -c/w Lovenox 80 BID    #Prophylaxis  - therapeutic on lovenox    #Ethics  -Goc with family as prognosis is poor  - talk to family regarding stopping steroid as he has infections in his blood and vent associated PNA  -Full Code. 71 year old man w/pmhx of HTN, DM2, BPH, and Asthma here for acute hypoxic respiratory failure and ARDS secondary to viral pneumonia from COVID-19. Hospital course complicated by VT arrest, DVT and MRSE bacteremia.    #Neuro   1) sedated  - c/w midazolam, precedex  - Maintain methadone and PRN Ativan   - wean as tolerated  -f/u noncon CT    #ENT   -oropharyngeal packing removed on 9/10  -ENT said they will pack if patient's h/h drops  -will need FiO2 at 50% or less and PaO2 at 60 for qualify for tach    #CV   1)Cardiac Arrest VT  - S/p Amiodarone and electrolyte supplementation   - No further VT noted   - Echo reveals Right hrt strain   -9/12 was net +855cc  - increased to Bumex 2mg TID to meet goal of net (-) balance     2)Shock probably secondary to vasoplegia from sedation   -dc'd levo as patient's MAP has been >65 w/o it   -resolved    #Pulm   1)Acute hypoxic resp failure 2/2 cardiac arrest  - vent Pressure AC 20/27/80/8  -wean as tolerated  -f/u cxr regarding et tube as well as chest    2) Asthma  - c/w Proventil q8h and Symbicort BID    #GI   1)Nutrition   - maintain Glucerna tube feeds as tolerated   - Bowel regimen w senna/ miralax  -dc'd protonix and reglan    2)Transaminitis  ? etiology may be congestive hepatopathy VS DILI Lipase (-) Hepatic US and hepatic doppler US (-) for acute pathology   -high ALP in 1300s with fever and increasing pressor requirements concerning for acalculous cholecystitis vs ascending cholangitis  -hep b, c, cmv neg  -ebv IgG positive, but ebv IgM and Ab neg.   [ ] f/u RUQUS    #Renal  1)Hypervolemia   - Strict I/O   - Diuresis as above (bumex tid)  - Monitor electrolytes     #ID   1)COVID - 19   - S/p RDV Dexa and TOCI   - Continues on decadron per family preferences  - will continue with supportive care     2)MRSE Bacteremia   - S/P 14 day course of Vanc from 8/20-9/3 therapy, and intermittent gram-&anaerobe coverage   - ID follow up appreciated   - Ua neg  - Bcx on 9/11 revealed staph epidermis on both bottles  - Bcx on 9/12 NGTD  - afebrile OVN  - c/w vanc    3) Vent associated PNA  - Sputum cx revealed kleb siella and enterobact  - will require meropenem as enterobact is resistant to zosyn and indeterminant for cefepime    4) Long term steroids  -concern for need for PCP ppx given long term decadron use  -f/u fungitell, if positive then start bactrim  -stop steroids on 9/14    #Endo   1)DM   - increase to NPH 8U q6h and ISS   - goal Blood Sugar 100-180     #Heme  1)b/l DVTs above knee on left and below knee on R  -c/w Lovenox 80 BID    #Prophylaxis  - therapeutic on lovenox    #Ethics  -Goc with family as prognosis is poor  - talk to family regarding stopping steroid as he has infections in his blood and vent associated PNA  -Full Code. 71 year old man w/pmhx of HTN, DM2, BPH, and Asthma here for acute hypoxic respiratory failure and ARDS secondary to viral pneumonia from COVID-19. Hospital course complicated by VT arrest, DVT and MRSE bacteremia.    #Neuro   1) sedated  - c/w midazolam, precedex  - Maintain methadone and PRN Ativan   - wean as tolerated  -f/u noncon CT    #ENT   -oropharyngeal packing removed on 9/10  -ENT said they will pack if patient's h/h drops  -will need FiO2 at 50% or less and PaO2 at 60 for qualify for tach    #CV   1)Cardiac Arrest VT  - S/p Amiodarone and electrolyte supplementation   - No further VT noted   - Echo reveals Right hrt strain   -9/12 was net +855cc  - increased to Bumex 2mg TID to meet goal of net (-) balance     2)Shock probably secondary to vasoplegia from sedation   -dc'd levo as patient's MAP has been >65 w/o it   -resolved    #Pulm   1)Acute hypoxic resp failure 2/2 cardiac arrest  - vent Pressure PC 20/27/80/8  -wean as tolerated  -f/u cxr regarding et tube as well as chest    2) Asthma  - c/w Proventil q8h and Symbicort BID    #GI   1)Nutrition   - maintain Glucerna tube feeds as tolerated   - Bowel regimen w senna/ miralax  -dc'd protonix and reglan    2)Transaminitis  ? etiology may be congestive hepatopathy VS DILI Lipase (-) Hepatic US and hepatic doppler US (-) for acute pathology   -high ALP in 1300s with fever and increasing pressor requirements concerning for acalculous cholecystitis vs ascending cholangitis  -hep b, c, cmv neg  -ebv IgG positive, but ebv IgM and Ab neg.   [ ] f/u RUQUS    #Renal  1)Hypervolemia   - Strict I/O   - Diuresis as above (bumex tid)  - Monitor electrolytes     #ID   1)COVID - 19   - S/p RDV Dexa and TOCI   - Continues on decadron per family preferences  - will continue with supportive care     2)MRSE Bacteremia   - S/P 14 day course of Vanc from 8/20-9/3 therapy, and intermittent gram-&anaerobe coverage   - ID follow up appreciated   - Ua neg  - Bcx on 9/11 revealed staph epidermis on both bottles  - Bcx on 9/12 NGTD  - afebrile OVN  - c/w vanc    3) Vent associated PNA  - Sputum cx revealed kleb siella and enterobact  - will require meropenem as enterobact is resistant to zosyn and indeterminant for cefepime    4) Long term steroids  -concern for need for PCP ppx given long term decadron use  -f/u fungitell, if positive then start bactrim  -stop steroids on 9/14    #Endo   1)DM   - increase to NPH 8U q6h and ISS   - goal Blood Sugar 100-180     #Heme  1)b/l DVTs above knee on left and below knee on R  -c/w Lovenox 80 BID    #Prophylaxis  - therapeutic on lovenox    #Ethics  -Goc with family as prognosis is poor  - talk to family regarding stopping steroid as he has infections in his blood and vent associated PNA  -Full Code.

## 2021-09-13 NOTE — PROGRESS NOTE ADULT - ATTENDING COMMENTS
1.Aute hypoxemic respiratory failure due to Covid pneumonia and ARDS. Continue lung protective ventilation with PC , insp27 PEEP 8 FIO2 70-80%.Pt finishing second course of dexamethasone. Pt received. TOCI.  2.Sedation: Decrease versed drip in half today. Continue Valium Precedex and methadone.   3.ID: MRSE bacteremia. Continue Vancomycin. Klebsiella and enterobacter  from sputum. ? colonization vs infection. Continue 3-5 days of meropenem.  4.Increasing alk phos more than LFTS. Check RUQ sonogram.  5. Hypotension mesopelagic requiring Levophed.  6.S/P 6 minute cardiac arrest after intubation. Recent CT head without acute changes.  7. Continue tube feeds.  8. Bumex to keep fluid balance net even.

## 2021-09-13 NOTE — PROGRESS NOTE ADULT - SUBJECTIVE AND OBJECTIVE BOX
Hloger Knapp  PGY1    INTERVAL HPI/OVERNIGHT EVENTS: LFTs uptrending pending RUQ, titrated off fentanyl now on precedex and versed.     SUBJECTIVE: Patient seen and examined at bedside.       VITAL SIGNS:  ICU Vital Signs Last 24 Hrs  T(C): 38.1 (13 Sep 2021 08:00), Max: 38.8 (12 Sep 2021 11:00)  T(F): 100.6 (13 Sep 2021 08:00), Max: 101.8 (12 Sep 2021 11:00)  HR: 72 (13 Sep 2021 08:00) (63 - 84)  BP: 106/65 (13 Sep 2021 08:00) (79/51 - 169/87)  BP(mean): 80 (13 Sep 2021 08:00) (59 - 123)  ABP: 102/51 (13 Sep 2021 08:00) (88/43 - 179/86)  ABP(mean): 67 (13 Sep 2021 08:00) (57 - 125)  RR: 32 (13 Sep 2021 08:00) (9 - 38)  SpO2: 94% (13 Sep 2021 08:00) (90% - 99%)    Mode: AC/ CMV (Assist Control/ Continuous Mandatory Ventilation), RR (machine): 30, FiO2: 80, PEEP: 8, ITime: 1, MAP: 17, PC: 27, PIP: 38    09-12 @ 07:01  -  09-13 @ 07:00  --------------------------------------------------------  IN: 2650.7 mL / OUT: 1795 mL / NET: 855.7 mL      CAPILLARY BLOOD GLUCOSE  164 (13 Sep 2021 05:30)      POCT Blood Glucose.: 164 mg/dL (13 Sep 2021 05:23)      PHYSICAL EXAM:  General: sedated  HEENT: NCAT, PERRL, clear conjunctiva, sclera anicteric, no blood in mout  Neck: supple, no JVD  Respiratory: course breath sounds  Cardiovascular: RRR, S1S2, no m/r/g  Abdomen: soft, nontender, distended, normal bowel sounds  Extremities: no edema, no cyanosis  Skin: cold LE b/l but pulses present  Neurological: nonfocal    MEDICATIONS:  MEDICATIONS  (STANDING):  ALBUTerol    90 MICROgram(s) HFA Inhaler 2 Puff(s) Inhalation every 8 hours  artificial tears (preservative free) Ophthalmic Solution 1 Drop(s) Both EYES every 8 hours  budesonide 160 MICROgram(s)/formoterol 4.5 MICROgram(s) Inhaler 2 Puff(s) Inhalation two times a day  buMETAnide Injectable 1 milliGRAM(s) IV Push every 8 hours  chlorhexidine 0.12% Liquid 15 milliLiter(s) Oral Mucosa every 12 hours  chlorhexidine 4% Liquid 1 Application(s) Topical <User Schedule>  cholecalciferol 2000 Unit(s) Oral daily  dexAMETHasone  Injectable 6 milliGRAM(s) IV Push daily  dexMEDEtomidine Infusion 0.2 MICROgram(s)/kG/Hr (4.08 mL/Hr) IV Continuous <Continuous>  dextrose 40% Gel 15 Gram(s) Oral once  dextrose 5%. 1000 milliLiter(s) (50 mL/Hr) IV Continuous <Continuous>  dextrose 5%. 1000 milliLiter(s) (100 mL/Hr) IV Continuous <Continuous>  dextrose 50% Injectable 25 Gram(s) IV Push once  dextrose 50% Injectable 12.5 Gram(s) IV Push once  dextrose 50% Injectable 25 Gram(s) IV Push once  enoxaparin Injectable 80 milliGRAM(s) SubCutaneous every 12 hours  glucagon  Injectable 1 milliGRAM(s) IntraMuscular once  insulin lispro (ADMELOG) corrective regimen sliding scale   SubCutaneous every 6 hours  insulin NPH human recombinant 8 Unit(s) SubCutaneous every 6 hours  LORazepam     Tablet 2 milliGRAM(s) Oral every 6 hours  methadone   Solution 30 milliGRAM(s) Oral every 8 hours  metoclopramide Injectable 5 milliGRAM(s) IV Push daily  midazolam Infusion 0.02 mG/kG/Hr (1.63 mL/Hr) IV Continuous <Continuous>  midodrine 30 milliGRAM(s) Oral every 8 hours  multivitamin/minerals Oral Solution (WELLESSE) 30 milliLiter(s) Enteral Tube daily  naloxegol 25 milliGRAM(s) Oral daily  norepinephrine Infusion 0.05 MICROgram(s)/kG/Min (3.83 mL/Hr) IV Continuous <Continuous>  pantoprazole  Injectable 40 milliGRAM(s) IV Push daily  piperacillin/tazobactam IVPB.. 3.375 Gram(s) IV Intermittent every 8 hours  polyethylene glycol 3350 17 Gram(s) Oral daily  senna Syrup 10 milliLiter(s) Oral at bedtime  vancomycin  IVPB 1000 milliGRAM(s) IV Intermittent every 12 hours    MEDICATIONS  (PRN):      ALLERGIES:  Allergies    No Known Allergies    Intolerances        LABS:                        7.4    11.50 )-----------( 394      ( 12 Sep 2021 23:03 )             25.0     09-12    140  |  95<L>  |  30<H>  ----------------------------<  162<H>  4.0   |  32<H>  |  0.62    Ca    9.3      12 Sep 2021 23:03  Phos  2.7     09-12  Mg     2.1     09-12    TPro  6.9  /  Alb  2.6<L>  /  TBili  1.2  /  DBili  x   /  AST  162<H>  /  ALT  168<H>  /  AlkPhos  1613<H>  09-12    PT/INR - ( 12 Sep 2021 23:03 )   PT: 13.8 sec;   INR: 1.16 ratio         PTT - ( 12 Sep 2021 23:03 )  PTT:39.2 sec      RADIOLOGY & ADDITIONAL TESTS: Reviewed.

## 2021-09-13 NOTE — PROGRESS NOTE ADULT - ASSESSMENT
72yo M w/ PMHx of HTN, DM2, BPH, asthma presents with shortness of breath. Patient endorses that he has been feeling SOB with associated cough for the last 2 weeks and his symptoms slowly worsened over time. He did not receive the COVID vaccine. Pt was found to be Covid+ on 8/12 and intubated on 8/20 for respiratory distress. Immediately post intubation on 8/20 pt had cardiac arrest; with ROSC after 6 min, VT x 2 s/p electrical shock x 2 and 2 epi's. Patient was in shock and on multiple vasopressors and upon transfer to ICU on 8/20/21 patient was hypotensive 50/30 requiring multiple pushes of Neosynephrine, Bicarb, Epi, and D50 while on 3 vasopressors. Had been sedated with ketamine since 8/21 until 9/10. s/p remdesivir 8/13-8/17, tocilizumab 8/15, atorvastatin until 8/17, haldol 8/19-20. Required amio 8/27 for significant ectopy. Hepatology consulted for elevated liver enzymes.    Impression:  #elevated liver enyzmes- has mixed hepatocellular and cholestatic pattern of injury; has had elevated AST/ALT/Alk phos starting 8/23 to 8/24; differential includes COVID cholangiopathy vs DILI (2/2 ketamine vs atorvastatin vs haldol vs remdesivir/tocilizumab) vs superimposed infection (CMV/EBV/HSV/HepE; HAV/HBV/HCV neg); less likely shock liver or ischemic injury s/p cardiac arrest 8/20/21 given AST/ALT are not markedly elevated (<1000) and have continued to uptrend weeks after cardiac arrest; unlikely AIH given acute onset; US abd + doppler 9/9 showing patent portal and hepatic veins      Recommendations:    - f/u viral hepatitis serologiesCMV IgM & DNA PCR, EBV IgM & DNA PCR, HSV IgM & DNA PCR   - trend daily CMP, INR  - repeat infectious workup- bl culture, UA, urine cx, CXR  - add ursodiol 500 mg BID  - avoid hepatotoxic agents including ketamine, haldol, statins  - rest of care per primary team      Trice Goyal MD  GI Fellow, PGY-4  Available via [employer] Teams    NON-URGENT CONSULTS:  Please email [email or web address]  [email or web address]  AT NIGHT AND ON WEEKENDS:  Contact on-call GI fellow via answering service ([phone]) from 5pm-8am and on weekends/holidays  MONDAY-FRIDAY 8AM-5PM:  Pager# [phone] ([facility]) or [phone] ([facility])  GI Phone# [phone] ([facility])

## 2021-09-13 NOTE — PROCEDURE NOTE - NSPROCDETAILS_GEN_ALL_CORE
guidewire recovered/lumen(s) aspirated and flushed/sterile dressing applied/sterile technique, catheter placed/ultrasound guidance with use of sterile gel and probe cove
guidewire recovered/lumen(s) aspirated and flushed/sterile dressing applied/sterile technique, catheter placed/ultrasound guidance with use of sterile gel and probe cove
location identified, draped/prepped, sterile technique used, needle inserted/introduced/positive blood return obtained via catheter/connected to a pressurized flush line/sutured in place/hemostasis with direct pressure, dressing applied/Seldinger technique/all materials/supplies accounted for at end of procedure
location identified, draped/prepped, sterile technique used, needle inserted/introduced/positive blood return obtained via catheter/connected to a pressurized flush line/sutured in place/hemostasis with direct pressure, dressing applied/Seldinger technique/all materials/supplies accounted for at end of procedure
orogastric
Seldinger technique
location identified, draped/prepped, sterile technique used, needle inserted/introduced/positive blood return obtained via catheter/connected to a pressurized flush line/sutured in place/hemostasis with direct pressure, dressing applied/Seldinger technique/all materials/supplies accounted for at end of procedure
guidewire recovered/lumen(s) aspirated and flushed/sterile dressing applied/sterile technique, catheter placed/ultrasound guidance with use of sterile gel and probe cove

## 2021-09-13 NOTE — PROGRESS NOTE ADULT - ATTENDING COMMENTS
Hepatology Staff: Octavio Pope MD  I saw and examined the patient along with  Dr. Goyal on 09-13-21 @ 20:33  Patient Medical Record, hosptial course was reviewed and summarized as below:    Vitals: Vital Signs Last 24 Hrs  T(C): 37.8 (13 Sep 2021 20:00), Max: 38.3 (13 Sep 2021 09:00)  T(F): 100 (13 Sep 2021 20:00), Max: 100.9 (13 Sep 2021 09:00)  HR: 81 (13 Sep 2021 20:00) (62 - 88)  BP: 73/48 (13 Sep 2021 19:30) (68/47 - 169/87)  BP(mean): 55 (13 Sep 2021 19:30) (53 - 123)  RR: 32 (13 Sep 2021 20:00) (4 - 40)  SpO2: 91% (13 Sep 2021 20:00) (87% - 100%)  Medications:  IV Fluids:   Antibiotics:meropenem  IVPB 1000 milliGRAM(s) IV Intermittent every 8 hours  vancomycin  IVPB 1000 milliGRAM(s) IV Intermittent every 12 hours    Diuretics:  Labs:Creatinine, Serum: 0.62 mg/dL (09-12-21 @ 23:03)  Bilirubin Total, Serum: 1.2 mg/dL (09-12-21 @ 23:03)  INR: 1.16 ratio (09-12-21 @ 23:03)      I/O: I&O's Summary    12 Sep 2021 07:01  -  13 Sep 2021 07:00  --------------------------------------------------------  IN: 2650.7 mL / OUT: 1795 mL / NET: 855.7 mL    13 Sep 2021 07:01  -  13 Sep 2021 20:33  --------------------------------------------------------  IN: 1694.8 mL / OUT: 825 mL / NET: 869.8 mL      Nutritional Status:   Albumin, Serum: 2.6 g/dL (09-12-21 @ 23:03)    Recommendations: This is a 71-year-old male with multiple comorbidities as outlined in fellow's note currently being admitted with severe COVID-19 infection with respiratory failure, status post intubation, complicated course with cardiac arrest with ROSC after 6 minutes.  Patient remains on multiple IV pressors.  Already been treated for COVID-19 infection with remdesivir and Tocilizumab.  Hepatology was consulted for elevated liver enzymes.  I agree with above outlined history, physical examination, assessment and plan.  Elevated liver enzymes which is in a cholestatic pattern likely multifactorial.  Differential diagnosis includes cholestasis of sepsis, SSC CIP (secondary sclerosing cholangitis in critically ill patients), Covid cholangiopathy, drug-induced liver injury.  Management will be supportive.  Would agree with Stephanie Broderick infectious work-up including blood culture, urine analysis, urine culture.  Considering safety of ursodiol and concern for ?DILI, would recommend ursodiol 500 mg twice a day.      Plan discussed with Primary team.

## 2021-09-14 NOTE — CONSULT NOTE ADULT - PROBLEM SELECTOR RECOMMENDATION 2
remains intubated  options for care including trach vs. compassionate extubation discussed  family aware that at this moment, given high oxygen requirements and ^PEEP that patient is not a candidate just yet to proceed with any tracheostomy

## 2021-09-14 NOTE — CONSULT NOTE ADULT - PROBLEM SELECTOR RECOMMENDATION 3
met with wife and daughter along with SW  narrative above  options discussed  wife is surrogate, all providers should be using  services if no shared language with patients wife who is primarily Telugu speaking.  There is no HCP or advance directives.

## 2021-09-14 NOTE — PROGRESS NOTE ADULT - SUBJECTIVE AND OBJECTIVE BOX
Chief Complaint:  Patient is a 71y old  Male who presents with a chief complaint of SOB (14 Sep 2021 07:57)      Interval Events:       Hospital Medications:  ALBUTerol    90 MICROgram(s) HFA Inhaler 2 Puff(s) Inhalation every 8 hours  artificial tears (preservative free) Ophthalmic Solution 1 Drop(s) Both EYES every 8 hours  budesonide 160 MICROgram(s)/formoterol 4.5 MICROgram(s) Inhaler 2 Puff(s) Inhalation two times a day  buMETAnide Infusion 1 mG/Hr IV Continuous <Continuous>  chlorhexidine 0.12% Liquid 15 milliLiter(s) Oral Mucosa every 12 hours  chlorhexidine 4% Liquid 1 Application(s) Topical <User Schedule>  cholecalciferol 2000 Unit(s) Oral daily  dexMEDEtomidine Infusion 0.2 MICROgram(s)/kG/Hr IV Continuous <Continuous>  dextrose 40% Gel 15 Gram(s) Oral once  dextrose 5%. 1000 milliLiter(s) IV Continuous <Continuous>  dextrose 5%. 1000 milliLiter(s) IV Continuous <Continuous>  dextrose 50% Injectable 25 Gram(s) IV Push once  dextrose 50% Injectable 12.5 Gram(s) IV Push once  dextrose 50% Injectable 25 Gram(s) IV Push once  enoxaparin Injectable 80 milliGRAM(s) SubCutaneous every 12 hours  glucagon  Injectable 1 milliGRAM(s) IntraMuscular once  insulin lispro (ADMELOG) corrective regimen sliding scale   SubCutaneous every 6 hours  insulin NPH human recombinant 8 Unit(s) SubCutaneous every 6 hours  LORazepam     Tablet 2 milliGRAM(s) Oral every 6 hours  meropenem  IVPB 1000 milliGRAM(s) IV Intermittent every 8 hours  methadone   Solution 30 milliGRAM(s) Oral every 8 hours  midazolam Infusion 0.02 mG/kG/Hr IV Continuous <Continuous>  midodrine 40 milliGRAM(s) Oral every 8 hours  multivitamin/minerals Oral Solution (WELLESSE) 30 milliLiter(s) Enteral Tube daily  naloxegol 25 milliGRAM(s) Oral daily  norepinephrine Infusion 0.05 MICROgram(s)/kG/Min IV Continuous <Continuous>  polyethylene glycol 3350 17 Gram(s) Oral daily  senna Syrup 10 milliLiter(s) Oral at bedtime  vancomycin  IVPB 1000 milliGRAM(s) IV Intermittent every 12 hours      PMHX/PSHX:  BPH (benign prostatic hyperplasia)    Hyperlipemia    HTN (hypertension)    HTN (hypertension)    Hypercholesteremia    Asthma    Diabetes    Kidney stone    Bladder stone    H/O lithotripsy    History of kidney stones    H/O umbilical hernia repair        ROS: unable to obtain as pt is intubated and sedated    PHYSICAL EXAM:     GENERAL:  Appears stated age, toxic and ill appearing  HEENT:  NC/AT, eyes are closed  CHEST: +vented breath sounds  HEART:  Regular rhythm, S1, S2, no murmur/rub/S3/S4  ABDOMEN:  Soft, non-distended, normoactive bowel sounds  EXTREMITIES:  no cyanosis, clubbing or edema  SKIN:  No rash/erythema/ecchymoses/petechiae/wounds/abscess/warm/dry  NEURO:  sedated    Vital Signs:  Vital Signs Last 24 Hrs  T(C): 37.8 (14 Sep 2021 04:00), Max: 38.3 (13 Sep 2021 09:00)  T(F): 100 (14 Sep 2021 04:00), Max: 100.9 (13 Sep 2021 09:00)  HR: 78 (14 Sep 2021 06:45) (62 - 88)  BP: 73/48 (13 Sep 2021 19:30) (68/47 - 160/79)  BP(mean): 55 (13 Sep 2021 19:30) (53 - 113)  RR: 31 (14 Sep 2021 06:45) (4 - 40)  SpO2: 92% (14 Sep 2021 06:45) (87% - 100%)  Daily     Daily     LABS:                        7.8    15.32 )-----------( 556      ( 13 Sep 2021 19:52 )             26.4     09-14    137  |  94<L>  |  40<H>  ----------------------------<  247<H>  4.4   |  27  |  0.75    Ca    9.0      14 Sep 2021 00:09  Phos  4.5     09-14  Mg     2.2     09-14    TPro  6.6  /  Alb  2.0<L>  /  TBili  1.5<H>  /  DBili  x   /  AST  213<H>  /  ALT  202<H>  /  AlkPhos  1838<H>  09-14    LIVER FUNCTIONS - ( 14 Sep 2021 00:09 )  Alb: 2.0 g/dL / Pro: 6.6 g/dL / ALK PHOS: 1838 U/L / ALT: 202 U/L / AST: 213 U/L / GGT: x           PT/INR - ( 12 Sep 2021 23:03 )   PT: 13.8 sec;   INR: 1.16 ratio         PTT - ( 12 Sep 2021 23:03 )  PTT:39.2 sec        Imaging:    EXAM:  US ABDOMEN RT UPR QUADRANT                            PROCEDURE DATE:  09/13/2021            INTERPRETATION:  CLINICAL INFORMATION: Transaminitis. COVID-19 pneumonia.    COMPARISON: Ultrasound abdomen 9/9/2021. CT examination 11/9/2019.    TECHNIQUE: Sonography of the right upper quadrant.    FINDINGS:    Liver: Mild hepatomegaly measuring 17.8 cm. Left lobe also mildly enlarged. No focal lesion. Liver contour is smooth.  Bile ducts: Normal caliber. Common bile duct measures 0.7 cm.  Gallbladder: Questionable tiny stones and sludge within the gallbladder. Wall is upper normal in thickness.  Pancreas: Visualized portions are within normal limits.  Right kidney: 10.8 cm. No hydronephrosis.  Ascites: None.  IVC: Visualized portions are within normal limits.    IMPRESSION:    Mild hepatomegaly.    Questionable tiny gallstones and  sludge. Wall upper normal in thickness.           Chief Complaint:  Patient is a 71y old  Male who presents with a chief complaint of SOB (14 Sep 2021 07:57)      Interval Events: Still on levophed, intubated and sedated on precedex gtt. Started on       Hospital Medications:  ALBUTerol    90 MICROgram(s) HFA Inhaler 2 Puff(s) Inhalation every 8 hours  artificial tears (preservative free) Ophthalmic Solution 1 Drop(s) Both EYES every 8 hours  budesonide 160 MICROgram(s)/formoterol 4.5 MICROgram(s) Inhaler 2 Puff(s) Inhalation two times a day  buMETAnide Infusion 1 mG/Hr IV Continuous <Continuous>  chlorhexidine 0.12% Liquid 15 milliLiter(s) Oral Mucosa every 12 hours  chlorhexidine 4% Liquid 1 Application(s) Topical <User Schedule>  cholecalciferol 2000 Unit(s) Oral daily  dexMEDEtomidine Infusion 0.2 MICROgram(s)/kG/Hr IV Continuous <Continuous>  dextrose 40% Gel 15 Gram(s) Oral once  dextrose 5%. 1000 milliLiter(s) IV Continuous <Continuous>  dextrose 5%. 1000 milliLiter(s) IV Continuous <Continuous>  dextrose 50% Injectable 25 Gram(s) IV Push once  dextrose 50% Injectable 12.5 Gram(s) IV Push once  dextrose 50% Injectable 25 Gram(s) IV Push once  enoxaparin Injectable 80 milliGRAM(s) SubCutaneous every 12 hours  glucagon  Injectable 1 milliGRAM(s) IntraMuscular once  insulin lispro (ADMELOG) corrective regimen sliding scale   SubCutaneous every 6 hours  insulin NPH human recombinant 8 Unit(s) SubCutaneous every 6 hours  LORazepam     Tablet 2 milliGRAM(s) Oral every 6 hours  meropenem  IVPB 1000 milliGRAM(s) IV Intermittent every 8 hours  methadone   Solution 30 milliGRAM(s) Oral every 8 hours  midazolam Infusion 0.02 mG/kG/Hr IV Continuous <Continuous>  midodrine 40 milliGRAM(s) Oral every 8 hours  multivitamin/minerals Oral Solution (WELLESSE) 30 milliLiter(s) Enteral Tube daily  naloxegol 25 milliGRAM(s) Oral daily  norepinephrine Infusion 0.05 MICROgram(s)/kG/Min IV Continuous <Continuous>  polyethylene glycol 3350 17 Gram(s) Oral daily  senna Syrup 10 milliLiter(s) Oral at bedtime  vancomycin  IVPB 1000 milliGRAM(s) IV Intermittent every 12 hours      PMHX/PSHX:  BPH (benign prostatic hyperplasia)    Hyperlipemia    HTN (hypertension)    HTN (hypertension)    Hypercholesteremia    Asthma    Diabetes    Kidney stone    Bladder stone    H/O lithotripsy    History of kidney stones    H/O umbilical hernia repair        ROS: unable to obtain as pt is intubated and sedated    PHYSICAL EXAM:     GENERAL:  Appears stated age, toxic and ill appearing  HEENT:  NC/AT, eyes are closed  CHEST: +vented breath sounds  HEART:  Regular rhythm, S1, S2, no murmur/rub/S3/S4  ABDOMEN:  Soft, non-distended, normoactive bowel sounds  EXTREMITIES:  no cyanosis, clubbing or edema  SKIN:  No rash/erythema/ecchymoses/petechiae/wounds/abscess/warm/dry  NEURO:  sedated    Vital Signs:  Vital Signs Last 24 Hrs  T(C): 37.8 (14 Sep 2021 04:00), Max: 38.3 (13 Sep 2021 09:00)  T(F): 100 (14 Sep 2021 04:00), Max: 100.9 (13 Sep 2021 09:00)  HR: 78 (14 Sep 2021 06:45) (62 - 88)  BP: 73/48 (13 Sep 2021 19:30) (68/47 - 160/79)  BP(mean): 55 (13 Sep 2021 19:30) (53 - 113)  RR: 31 (14 Sep 2021 06:45) (4 - 40)  SpO2: 92% (14 Sep 2021 06:45) (87% - 100%)  Daily     Daily     LABS:                        7.8    15.32 )-----------( 556      ( 13 Sep 2021 19:52 )             26.4     09-14    137  |  94<L>  |  40<H>  ----------------------------<  247<H>  4.4   |  27  |  0.75    Ca    9.0      14 Sep 2021 00:09  Phos  4.5     09-14  Mg     2.2     09-14    TPro  6.6  /  Alb  2.0<L>  /  TBili  1.5<H>  /  DBili  x   /  AST  213<H>  /  ALT  202<H>  /  AlkPhos  1838<H>  09-14    LIVER FUNCTIONS - ( 14 Sep 2021 00:09 )  Alb: 2.0 g/dL / Pro: 6.6 g/dL / ALK PHOS: 1838 U/L / ALT: 202 U/L / AST: 213 U/L / GGT: x           PT/INR - ( 12 Sep 2021 23:03 )   PT: 13.8 sec;   INR: 1.16 ratio         PTT - ( 12 Sep 2021 23:03 )  PTT:39.2 sec        Imaging:    EXAM:  US ABDOMEN RT UPR QUADRANT                            PROCEDURE DATE:  09/13/2021            INTERPRETATION:  CLINICAL INFORMATION: Transaminitis. COVID-19 pneumonia.    COMPARISON: Ultrasound abdomen 9/9/2021. CT examination 11/9/2019.    TECHNIQUE: Sonography of the right upper quadrant.    FINDINGS:    Liver: Mild hepatomegaly measuring 17.8 cm. Left lobe also mildly enlarged. No focal lesion. Liver contour is smooth.  Bile ducts: Normal caliber. Common bile duct measures 0.7 cm.  Gallbladder: Questionable tiny stones and sludge within the gallbladder. Wall is upper normal in thickness.  Pancreas: Visualized portions are within normal limits.  Right kidney: 10.8 cm. No hydronephrosis.  Ascites: None.  IVC: Visualized portions are within normal limits.    IMPRESSION:    Mild hepatomegaly.    Questionable tiny gallstones and  sludge. Wall upper normal in thickness.           Chief Complaint:  Patient is a 71y old  Male who presents with a chief complaint of SOB (14 Sep 2021 07:57)      Interval Events: Still on levophed, intubated and sedated on precedex gtt. Started on bumex gtt overnight. Sputum cx from 9/11 growing E. cloacae + klebsiella pneumoniae. Persistently bacteremic with bl cx 9/12 growing GPCs. Liver enzymes and alk phos continue to uptrend.      Hospital Medications:  ALBUTerol    90 MICROgram(s) HFA Inhaler 2 Puff(s) Inhalation every 8 hours  artificial tears (preservative free) Ophthalmic Solution 1 Drop(s) Both EYES every 8 hours  budesonide 160 MICROgram(s)/formoterol 4.5 MICROgram(s) Inhaler 2 Puff(s) Inhalation two times a day  buMETAnide Infusion 1 mG/Hr IV Continuous <Continuous>  chlorhexidine 0.12% Liquid 15 milliLiter(s) Oral Mucosa every 12 hours  chlorhexidine 4% Liquid 1 Application(s) Topical <User Schedule>  cholecalciferol 2000 Unit(s) Oral daily  dexMEDEtomidine Infusion 0.2 MICROgram(s)/kG/Hr IV Continuous <Continuous>  dextrose 40% Gel 15 Gram(s) Oral once  dextrose 5%. 1000 milliLiter(s) IV Continuous <Continuous>  dextrose 5%. 1000 milliLiter(s) IV Continuous <Continuous>  dextrose 50% Injectable 25 Gram(s) IV Push once  dextrose 50% Injectable 12.5 Gram(s) IV Push once  dextrose 50% Injectable 25 Gram(s) IV Push once  enoxaparin Injectable 80 milliGRAM(s) SubCutaneous every 12 hours  glucagon  Injectable 1 milliGRAM(s) IntraMuscular once  insulin lispro (ADMELOG) corrective regimen sliding scale   SubCutaneous every 6 hours  insulin NPH human recombinant 8 Unit(s) SubCutaneous every 6 hours  LORazepam     Tablet 2 milliGRAM(s) Oral every 6 hours  meropenem  IVPB 1000 milliGRAM(s) IV Intermittent every 8 hours  methadone   Solution 30 milliGRAM(s) Oral every 8 hours  midazolam Infusion 0.02 mG/kG/Hr IV Continuous <Continuous>  midodrine 40 milliGRAM(s) Oral every 8 hours  multivitamin/minerals Oral Solution (WELLESSE) 30 milliLiter(s) Enteral Tube daily  naloxegol 25 milliGRAM(s) Oral daily  norepinephrine Infusion 0.05 MICROgram(s)/kG/Min IV Continuous <Continuous>  polyethylene glycol 3350 17 Gram(s) Oral daily  senna Syrup 10 milliLiter(s) Oral at bedtime  vancomycin  IVPB 1000 milliGRAM(s) IV Intermittent every 12 hours      PMHX/PSHX:  BPH (benign prostatic hyperplasia)    Hyperlipemia    HTN (hypertension)    HTN (hypertension)    Hypercholesteremia    Asthma    Diabetes    Kidney stone    Bladder stone    H/O lithotripsy    History of kidney stones    H/O umbilical hernia repair        ROS: unable to obtain as pt is intubated and sedated    PHYSICAL EXAM:     GENERAL:  Appears stated age, toxic and ill appearing  HEENT:  NC/AT, eyes are closed  CHEST: +vented breath sounds  HEART:  Regular rhythm, S1, S2, no murmur/rub/S3/S4  ABDOMEN:  Soft, non-distended, normoactive bowel sounds  EXTREMITIES:  no cyanosis, clubbing or edema  SKIN:  No rash/erythema/ecchymoses/petechiae/wounds/abscess/warm/dry  NEURO:  sedated    Vital Signs:  Vital Signs Last 24 Hrs  T(C): 37.8 (14 Sep 2021 04:00), Max: 38.3 (13 Sep 2021 09:00)  T(F): 100 (14 Sep 2021 04:00), Max: 100.9 (13 Sep 2021 09:00)  HR: 78 (14 Sep 2021 06:45) (62 - 88)  BP: 73/48 (13 Sep 2021 19:30) (68/47 - 160/79)  BP(mean): 55 (13 Sep 2021 19:30) (53 - 113)  RR: 31 (14 Sep 2021 06:45) (4 - 40)  SpO2: 92% (14 Sep 2021 06:45) (87% - 100%)  Daily     Daily     LABS:                        7.8    15.32 )-----------( 556      ( 13 Sep 2021 19:52 )             26.4     09-14    137  |  94<L>  |  40<H>  ----------------------------<  247<H>  4.4   |  27  |  0.75    Ca    9.0      14 Sep 2021 00:09  Phos  4.5     09-14  Mg     2.2     09-14    TPro  6.6  /  Alb  2.0<L>  /  TBili  1.5<H>  /  DBili  x   /  AST  213<H>  /  ALT  202<H>  /  AlkPhos  1838<H>  09-14    LIVER FUNCTIONS - ( 14 Sep 2021 00:09 )  Alb: 2.0 g/dL / Pro: 6.6 g/dL / ALK PHOS: 1838 U/L / ALT: 202 U/L / AST: 213 U/L / GGT: x           PT/INR - ( 12 Sep 2021 23:03 )   PT: 13.8 sec;   INR: 1.16 ratio         PTT - ( 12 Sep 2021 23:03 )  PTT:39.2 sec        Imaging:    EXAM:  US ABDOMEN RT UPR QUADRANT                            PROCEDURE DATE:  09/13/2021            INTERPRETATION:  CLINICAL INFORMATION: Transaminitis. COVID-19 pneumonia.    COMPARISON: Ultrasound abdomen 9/9/2021. CT examination 11/9/2019.    TECHNIQUE: Sonography of the right upper quadrant.    FINDINGS:    Liver: Mild hepatomegaly measuring 17.8 cm. Left lobe also mildly enlarged. No focal lesion. Liver contour is smooth.  Bile ducts: Normal caliber. Common bile duct measures 0.7 cm.  Gallbladder: Questionable tiny stones and sludge within the gallbladder. Wall is upper normal in thickness.  Pancreas: Visualized portions are within normal limits.  Right kidney: 10.8 cm. No hydronephrosis.  Ascites: None.  IVC: Visualized portions are within normal limits.    IMPRESSION:    Mild hepatomegaly.    Questionable tiny gallstones and  sludge. Wall upper normal in thickness.

## 2021-09-14 NOTE — PROGRESS NOTE ADULT - ASSESSMENT
70yo M w/ PMHx of HTN, DM2, BPH, asthma presents with shortness of breath. Patient endorses that he has been feeling SOB with associated cough for the last 2 weeks and his symptoms slowly worsened over time. He did not receive the COVID vaccine. Pt was found to be Covid+ on 8/12 and intubated on 8/20 for respiratory distress. Immediately post intubation on 8/20 pt had cardiac arrest; with ROSC after 6 min, VT x 2 s/p electrical shock x 2 and 2 epi's. Patient was in shock and on multiple vasopressors and upon transfer to ICU on 8/20/21 patient was hypotensive 50/30 requiring multiple pushes of Neosynephrine, Bicarb, Epi, and D50 while on 3 vasopressors. Had been sedated with ketamine since 8/21 until 9/10. s/p remdesivir 8/13-8/17, tocilizumab 8/15, atorvastatin until 8/17, haldol 8/19-20. Required amio 8/27 for significant ectopy. Hepatology consulted for elevated liver enzymes.    Impression:  #elevated liver enyzmes- has mixed hepatocellular and cholestatic pattern of injury; has had elevated AST/ALT/Alk phos starting 8/23 to 8/24; differential includes COVID cholangiopathy vs DILI (2/2 ketamine vs atorvastatin vs haldol vs remdesivir/tocilizumab) vs superimposed infection (CMV/EBV IgM neg, HAV/HBV/HCV neg); less likely shock liver or ischemic injury s/p cardiac arrest 8/20/21 given AST/ALT are not markedly elevated (<1000) and have continued to uptrend weeks after cardiac arrest; unlikely AIH given acute onset; US abd + doppler 9/9 showing patent portal and hepatic veins      Recommendations:  - trend daily CMP, INR  - repeat infectious workup- bl culture, UA, urine cx, CXR  - add ursodiol 500 mg BID  - avoid hepatotoxic agents including ketamine, haldol, statins  - rest of care per primary team      Trice Goyal MD  GI Fellow, PGY-4  Available via [employer] Teams    NON-URGENT CONSULTS:  Please email [email or web address]  AT NIGHT AND ON WEEKENDS:  Contact on-call GI fellow via answering service ([phone]) from 5pm-8am and on weekends/holidays  MONDAY-FRIDAY 8AM-5PM:  Pager# [phone] ([facility]) or [phone] ([facility])  GI Phone# [phone] ([facility])   70yo M w/ PMHx of HTN, DM2, BPH, asthma presents with shortness of breath. Patient endorses that he has been feeling SOB with associated cough for the last 2 weeks and his symptoms slowly worsened over time. He did not receive the COVID vaccine. Pt was found to be Covid+ on 8/12 and intubated on 8/20 for respiratory distress. Immediately post intubation on 8/20 pt had cardiac arrest; with ROSC after 6 min, VT x 2 s/p electrical shock x 2 and 2 epi's. Patient was in shock and on multiple vasopressors and upon transfer to ICU on 8/20/21 patient was hypotensive 50/30 requiring multiple pushes of Neosynephrine, Bicarb, Epi, and D50 while on 3 vasopressors. Had been sedated with ketamine since 8/21 until 9/10. s/p remdesivir 8/13-8/17, tocilizumab 8/15, atorvastatin until 8/17, haldol 8/19-20. Required amio 8/27 for significant ectopy. Hepatology consulted for elevated liver enzymes.    Impression:  #elevated liver enyzmes- has mixed hepatocellular and cholestatic pattern of injury; has had elevated AST/ALT/Alk phos starting 8/23 to 8/24; differential includes COVID cholangiopathy vs DILI (2/2 ketamine vs atorvastatin vs haldol vs remdesivir/tocilizumab) vs superimposed infection (CMV/EBV IgM neg, HAV/HBV/HCV neg); less likely shock liver or ischemic injury s/p cardiac arrest 8/20/21 given AST/ALT are not markedly elevated (<1000) and have continued to uptrend weeks after cardiac arrest; unlikely AIH given acute onset; US abd + doppler 9/9 showing patent portal and hepatic veins. Had repeat RUQ US 9/13 showing mild hepatomegaly with enlarged L lobe, but smooth contour; normal CBD 7 mm; ?tiny stones and sludge w/in GB; no ascites.      Recommendations:  - trend daily CMP, INR  - repeat infectious workup- bl culture, UA, urine cx, CXR  - add ursodiol 500 mg BID  - avoid hepatotoxic agents including ketamine, haldol, statins  - rest of care per primary team      Trice Goyal MD  GI Fellow, PGY-4  Available via [employer] Teams    NON-URGENT CONSULTS:  Please email [email or web address]  AT NIGHT AND ON WEEKENDS:  Contact on-call GI fellow via answering service ([phone]) from 5pm-8am and on weekends/holidays  MONDAY-FRIDAY 8AM-5PM:  Pager# [phone] ([facility]) or [phone] ([facility])  GI Phone# [phone] ([facility])   70yo M w/ PMHx of HTN, DM2, BPH, asthma presents with shortness of breath. Patient endorses that he has been feeling SOB with associated cough for the last 2 weeks and his symptoms slowly worsened over time. He did not receive the COVID vaccine. Pt was found to be Covid+ on 8/12 and intubated on 8/20 for respiratory distress. Immediately post intubation on 8/20 pt had cardiac arrest; with ROSC after 6 min, VT x 2 s/p electrical shock x 2 and 2 epi's. Patient was in shock and on multiple vasopressors and upon transfer to ICU on 8/20/21 patient was hypotensive 50/30 requiring multiple pushes of Neosynephrine, Bicarb, Epi, and D50 while on 3 vasopressors. Had been sedated with ketamine since 8/21 until 9/10. s/p remdesivir 8/13-8/17, tocilizumab 8/15, atorvastatin until 8/17, haldol 8/19-20. Required amio 8/27 for significant ectopy. Hepatology consulted for elevated liver enzymes.    Impression:  #elevated liver enyzmes- has mixed hepatocellular and cholestatic pattern of injury; has had elevated AST/ALT/Alk phos starting 8/23 to 8/24; differential includes COVID cholangiopathy vs DILI (2/2 ketamine vs atorvastatin vs haldol vs remdesivir/tocilizumab) vs superimposed infection +VAP (Sputum cx from 9/11 growing E. cloacae + klebsiella pneumoniae) + persistent GPC bacteremia; other viral infectious workup negative- CMV/EBV IgM neg, HAV/HBV/HCV neg; less likely shock liver or ischemic injury s/p cardiac arrest 8/20/21 given AST/ALT are not markedly elevated (<1000) and have continued to uptrend weeks after cardiac arrest; unlikely AIH given acute onset; US abd + doppler 9/9 showing patent portal and hepatic veins. Had repeat RUQ US 9/13 showing mild hepatomegaly with enlarged L lobe, but smooth contour; normal CBD 7 mm; ?tiny stones and sludge w/in GB; no ascites.  #VAP- Sputum cx from 9/11 growing E. cloacae + klebsiella pneumoniae; s/p Zosyn (9/12-9/13), s/p meropenem (9/13), levaquin (9/14->)  #persistent GPC bacteremia- bl cx x2 8/24 with MRSE (methicillin resistant staph epi); repeat bl cx 9/12 growing GPCs; vanco (8/25-8/29; 9/11->)  #COVID PNA; tested positive on 8/12, intubated 8/20; s/p remdesivir 8/13-8/17, tocilizumab 8/15      Recommendations:  - trend daily CMP, INR  - c/w vanc for MRSE; repeat bl cx  - c/w levaquin for VAP (E. cloacae + klebsiella pneumoniae)   - appreciate ID recs  - c/w ursodiol 500 mg BID  - avoid hepatotoxic agents including ketamine, haldol, statins  - rest of care per primary team      Trice Goyal MD  GI Fellow, PGY-4  Available via [employer] Teams    NON-URGENT CONSULTS:  Please email [email or web address]  AT NIGHT AND ON WEEKENDS:  Contact on-call GI fellow via answering service ([phone]) from 5pm-8am and on weekends/holidays  MONDAY-FRIDAY 8AM-5PM:  Pager# [phone] ([facility]) or [phone] ([facility])  GI Phone# [phone] ([facility])   70yo M w/ PMHx of HTN, DM2, BPH, asthma presents with shortness of breath. Patient endorses that he has been feeling SOB with associated cough for the last 2 weeks and his symptoms slowly worsened over time. He did not receive the COVID vaccine. Pt was found to be Covid+ on 8/12 and intubated on 8/20 for respiratory distress. Immediately post intubation on 8/20 pt had cardiac arrest; with ROSC after 6 min, VT x 2 s/p electrical shock x 2 and 2 epi's. Patient was in shock and on multiple vasopressors and upon transfer to ICU on 8/20/21 patient was hypotensive 50/30 requiring multiple pushes of Neosynephrine, Bicarb, Epi, and D50 while on 3 vasopressors. Had been sedated with ketamine since 8/21 until 9/10. s/p remdesivir 8/13-8/17, tocilizumab 8/15, atorvastatin until 8/17, haldol 8/19-20. Required amio 8/27 for significant ectopy. Hepatology consulted for elevated liver enzymes.    Impression:  #elevated liver enyzmes- has mixed hepatocellular and cholestatic pattern of injury; has had elevated AST/ALT/Alk phos starting 8/23 to 8/24; differential includes COVID cholangiopathy vs DILI (2/2 ketamine vs atorvastatin vs haldol vs remdesivir/tocilizumab) vs cholestasis of sepsis 2/2 superimposed infection from VAP (Sputum cx from 9/11 growing E. cloacae + klebsiella pneumoniae) + persistent GPC bacteremia; other viral infectious workup negative- CMV/EBV IgM neg, HAV/HBV/HCV neg; less likely shock liver or ischemic injury s/p cardiac arrest 8/20/21 given AST/ALT are not markedly elevated (<1000) and have continued to uptrend weeks after cardiac arrest; unlikely AIH given acute onset; US abd + doppler 9/9 showing patent portal and hepatic veins. Had repeat RUQ US 9/13 showing mild hepatomegaly with enlarged L lobe, but smooth contour; normal CBD 7 mm; ?tiny stones and sludge w/in GB; no ascites.  #VAP- Sputum cx from 9/11 growing E. cloacae + klebsiella pneumoniae; s/p Zosyn (9/12-9/13), s/p meropenem (9/13), levaquin (9/14->)  #persistent GPC bacteremia- bl cx x2 8/24 with MRSE (methicillin resistant staph epi); repeat bl cx 9/12 growing GPCs; vanco (8/25-8/29; 9/11->)  #COVID PNA; tested positive on 8/12, intubated 8/20; s/p remdesivir 8/13-8/17, tocilizumab 8/15      Recommendations:  - trend daily CMP, INR  - c/w vanc for MRSE; repeat bl cx  - c/w levaquin for VAP (E. cloacae + klebsiella pneumoniae)   - appreciate ID recs  - c/w ursodiol 500 mg BID  - avoid hepatotoxic agents including ketamine, haldol, statins  - rest of care per primary team      Trice Goyal MD  GI Fellow, PGY-4  Available via [employer] Teams    NON-URGENT CONSULTS:  Please email [email or web address]  AT NIGHT AND ON WEEKENDS:  Contact on-call GI fellow via answering service ([phone]) from 5pm-8am and on weekends/holidays  MONDAY-FRIDAY 8AM-5PM:  Pager# [phone] ([facility]) or [phone] ([facility])  GI Phone# [phone] ([facility])

## 2021-09-14 NOTE — PROGRESS NOTE ADULT - ATTENDING COMMENTS
Hepatology Staff: Octavio Pope MD    I saw and examined the patient along with  Dr. Goyal on 09-14-21 @ 16:53  Patient Medical Record, hosptial course was reviewed and summarized as below:    Vitals: Vital Signs Last 24 Hrs  T(C): 37.1 (14 Sep 2021 16:00), Max: 38 (14 Sep 2021 12:00)  T(F): 98.8 (14 Sep 2021 16:00), Max: 100.4 (14 Sep 2021 12:00)  HR: 73 (14 Sep 2021 16:45) (68 - 88)  BP: 73/48 (13 Sep 2021 19:30) (73/48 - 136/87)  BP(mean): 55 (13 Sep 2021 19:30) (55 - 102)  RR: 30 (14 Sep 2021 16:45) (4 - 40)  SpO2: 97% (14 Sep 2021 16:45) (87% - 100%)    Antibiotics:levoFLOXacin IVPB 750 milliGRAM(s) IV Intermittent every 24 hours    Diuretics:  Labs:Creatinine, Serum: 0.71 mg/dL (09-14-21 @ 08:29)  Bilirubin Total, Serum: 1.6 mg/dL (09-14-21 @ 08:29)  Creatinine, Serum: 0.75 mg/dL (09-14-21 @ 00:09)  Bilirubin Total, Serum: 1.5 mg/dL (09-14-21 @ 00:09)      I/O: I&O's Summary    13 Sep 2021 07:01  -  14 Sep 2021 07:00  --------------------------------------------------------  IN: 3056.8 mL / OUT: 1865 mL / NET: 1191.8 mL    14 Sep 2021 07:01  -  14 Sep 2021 16:53  --------------------------------------------------------  IN: 725.3 mL / OUT: 1100 mL / NET: -374.7 mL      Nutritional Status:   Albumin, Serum: 2.4 g/dL (09-14-21 @ 08:29)  Albumin, Serum: 2.0 g/dL (09-14-21 @ 00:09)    Recommendations: This is a 71-year-old male with multiple comorbidities as outlined in fellow's note currently being admitted with severe COVID-19 infection with respiratory failure, status post intubation, complicated course with cardiac arrest with ROSC after 6 minutes.  Patient remains on multiple IV pressors.  Already been treated for COVID-19 infection with remdesivir and Tocilizumab.  Hepatology was consulted for elevated liver enzymes.     I agree with above outlined history, physical examination, assessment and plan.      Elevated liver enzymes which is in a cholestatic pattern likely multifactorial.  Noted patient has MRSA bacteremia and also has positive sputum culture with Klebsiella and Enterobacter him currently being treated with IV meropenem  (since 9/13).  Most recent blood cultures from September 11 revealed staph epidermidis in both bottles and blood culture from September 12 revealed 1 bottle of staph epidermidis.   On 9/13 positive for staph aureus.    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  Management will be supportive.  Management of sepsis per primary team.    Considering safety of ursodiol and concern for ?DILI, would recommend continuing with ursodiol 500 mg twice a day.      Plan discussed with Primary team.

## 2021-09-14 NOTE — CONSULT NOTE ADULT - PROBLEM SELECTOR RECOMMENDATION 9
Plan for packing removal on sunday  Avoid aggressive oral suctioning  We'll continue to follow
PPS 10%, intubated, sedated  requires total care

## 2021-09-14 NOTE — PROGRESS NOTE ADULT - ASSESSMENT
71 year old man w/pmhx of HTN, DM2, BPH, and Asthma here for acute hypoxic respiratory failure and ARDS secondary to viral pneumonia from COVID-19. Hospital course complicated by VT arrest, DVT and MRSE bacteremia.    #Neuro   1) sedated  - c/w midazolam, precedex  - Maintain methadone and PRN Ativan   - wean as tolerated  -noncont CT appears unchanged 8/12    #ENT   -oropharyngeal packing removed on 9/10  -ENT said they will pack if patient's h/h drops  -will need FiO2 at 50% or less and PaO2 at 60 for qualify for tach    #CV   1)Cardiac Arrest VT  - S/p Amiodarone and electrolyte supplementation   - No further VT noted   - Echo reveals Right hrt strain   -9/13 net 1.1L positive  -changed to bumex drip    2)Shock probably secondary to vasoplegia from sedation   -dc'd levo as patient's MAP has been >65 w/o it   -resolved    #Pulm   1)Acute hypoxic resp failure 2/2 cardiac arrest  - vent Pressure AC 20/27/80/8  -wean as tolerated  -f/u cxr regarding et tube as well as chest    2) Asthma  - c/w Proventil q8h and Symbicort BID    #GI   1)Nutrition   - maintain Glucerna tube feeds as tolerated   - Bowel regimen w senna/ miralax  -dc'd protonix and reglan    2)Transaminitis  ? etiology may be congestive hepatopathy VS DILI Lipase (-) Hepatic US and hepatic doppler US (-) for acute pathology   -high ALP in 1300s with fever and increasing pressor requirements concerning for acalculous cholecystitis vs ascending cholangitis  -hep b, c, cmv neg  -ebv IgG positive, but ebv IgM and Ab neg.   -     #Renal  1)Hypervolemia   - Strict I/O   - Diuresis as above  - Monitor electrolytes     #ID   1)COVID - 19   - S/p RDV Dexa and TOCI   - Continues on decadron per family preferences  - will continue with supportive care     2)MRSE Bacteremia   - S/P 14 day course of Vanc from 8/20-9/3 therapy, and intermittent gram-&anaerobe coverage   - ID follow up appreciated   - Ua neg  - Bcx on 9/11 revealed staph epidermis on both bottles  - Bcx on 9/12 one bottle reveals staph epi  - repeat bcx sent  - afebrile OVN  - c/w vanc    3) Vent associated PNA  - Sputum cx revealed kleb siella and enterobact  - dc'd IV Zosyn x5-7d (9/12 -9/13 )  - c/w meropenem (9/13-)    #Endo   1)DM   - increase to NPH 10U q6h   - c/w ISS   - goal Blood Sugar 100-180     #Heme  1)b/l DVTs above knee on left and below knee on R  -c/w Lovenox 80 BID    #Prophylaxis  - therapeutic on lovenox    #Ethics  -spoke to family on 9/13 and they said they were discussing whether or not to go ahead with tach if he becomes eligible, the family stated that the patient would refuse the tach if he was offered the option   -Full Code.     71 year old man w/pmhx of HTN, DM2, BPH, and Asthma here for acute hypoxic respiratory failure and ARDS secondary to viral pneumonia from COVID-19. Hospital course complicated by VT arrest, DVT and MRSE bacteremia.    #Neuro   1) sedated  - c/w midazolam, precedex  - Maintain methadone and PRN Ativan   - wean as tolerated  -noncont CT appears unchanged 8/12    #ENT   -oropharyngeal packing removed on 9/10  -ENT said they will pack if patient's h/h drops  -will need FiO2 at 50% or less and PaO2 at 60 for qualify for tach    #CV   1)Cardiac Arrest VT  - S/p Amiodarone and electrolyte supplementation   - No further VT noted   - Echo reveals Right hrt strain   -9/13 net 1.1L positive  -changed to bumex drip    2)Shock probably secondary to vasoplegia from sedation   -dc'd levo as patient's MAP has been >65 w/o it   -resolved    #Pulm   1)Acute hypoxic resp failure 2/2 cardiac arrest  - vent Pressure AC 20/27/80/8  -wean as tolerated  -f/u cxr regarding et tube as well as chest    2) Asthma  - c/w Proventil q8h and Symbicort BID    #GI   1)Nutrition   - maintain Glucerna tube feeds as tolerated   - holding bowel regimen w senna/ miralax as patient had multiple bowel movements    2)Transaminitis - cholestasis of sepsis, 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   - RUQ revealed clear biliary duct and gallbladder with tiny tones and sludge  - begin ursodiol 500mg BID per hepatology  - supportive treatment per hepatology    #Renal  1)Hypervolemia   - Strict I/O   - Diuresis as above  - Monitor electrolytes     #ID   1)COVID - 19   - S/p RDV Dexa and TOCI   - Continues on decadron per family preferences  - will continue with supportive care     2)MRSE Bacteremia   - S/P 14 day course of Vanc from 8/20-9/3 therapy, and intermittent gram-&anaerobe coverage   - ID follow up appreciated   - Ua neg  - Bcx on 9/11 revealed staph epidermis on both bottles  - Bcx on 9/12 one bottle reveals staph epi  - repeat bcx sent  - afebrile OVN  - c/w vanc    3) Vent associated PNA  - Sputum cx revealed kleb siella and enterobact  - dc'd IV Zosyn x5-7d (9/12 -9/13 )  - c/w meropenem (9/13-)    #Endo   1)DM   - increase to NPH 10U q6h   - c/w ISS   - goal Blood Sugar 100-180     #Heme  1)b/l DVTs above knee on left and below knee on R  -c/w Lovenox 80 BID    #Prophylaxis  - therapeutic on lovenox    #Ethics  -spoke to family on 9/13 and they said they were discussing whether or not to go ahead with tach if he becomes eligible, the family stated that the patient would refuse the tach if he was offered the option   -Full Code.     71 year old man w/pmhx of HTN, DM2, BPH, and Asthma here for acute hypoxic respiratory failure and ARDS secondary to viral pneumonia from COVID-19. Hospital course complicated by VT arrest, DVT and MRSE bacteremia.    #Neuro   1) sedated  - c/w midazolam, precedex  - Maintain methadone and PRN Ativan   - wean as tolerated  -noncont CT appears unchanged 8/12    #ENT   -oropharyngeal packing removed on 9/10  -ENT said they will pack if patient's h/h drops  -will need FiO2 at 50% or less and PaO2 at 60 for qualify for tach    #CV   1)Cardiac Arrest VT  - S/p Amiodarone and electrolyte supplementation   - No further VT noted   - Echo reveals Right hrt strain   -9/13 net 1.1L positive  -changed to bumex drip    2)Shock probably secondary to vasoplegia from sedation   -dc'd levo as patient's MAP has been >65 w/o it   -resolved    #Pulm   1)Acute hypoxic resp failure 2/2 cardiac arrest  - vent Pressure AC 20/27/80/8  -wean as tolerated  -f/u cxr regarding et tube as well as chest    2) Asthma  - c/w Proventil q8h and Symbicort BID    #GI   1)Nutrition   - maintain Glucerna tube feeds as tolerated   - holding bowel regimen w senna/ miralax as patient had multiple bowel movements    2)Transaminitis - cholestasis of sepsis, 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   - RUQ revealed clear biliary duct and gallbladder with tiny tones and sludge  - begin ursodiol 500mg BID per hepatology  - supportive treatment per hepatology    #Renal  1)Hypervolemia   - Strict I/O   - Diuresis as above  - Monitor electrolytes     #ID   1)COVID - 19   - S/p RDV Dexa and TOCI   - Continues on decadron per family preferences  - will continue with supportive care     2)MRSE Bacteremia   - S/P 14 day course of Vanc from 8/20-9/3 therapy, and intermittent gram-&anaerobe coverage   - ID follow up appreciated   - Ua neg  - Bcx on 9/11 revealed staph epidermis on both bottles  - Bcx on 9/12 one bottle reveals staph epi  - repeat bcx sent  - afebrile OVN  - holding vanc as random level supratherapeutic will repeat later today and readjust dose    3) Vent associated PNA  - Sputum cx revealed kleb siella and enterobact  - dc'd IV Zosyn x5-7d (9/12 -9/13 )  - c/w meropenem (9/13-)    #Endo   1)DM   - increase to NPH 10U q6h   - c/w ISS   - goal Blood Sugar 100-180     #Heme  1)b/l DVTs above knee on left and below knee on R  -c/w Lovenox 80 BID    #Prophylaxis  - therapeutic on lovenox    #Ethics  -spoke to family on 9/13 and they said they were discussing whether or not to go ahead with tach if he becomes eligible, the family stated that the patient would refuse the tach if he was offered the option   -Full Code.     71 year old man w/pmhx of HTN, DM2, BPH, and Asthma here for acute hypoxic respiratory failure and ARDS secondary to viral pneumonia from COVID-19. Hospital course complicated by VT arrest, DVT and MRSE bacteremia.    #Neuro   1) sedated  - c/w versed, precedex  - wean versed  - c/w methadone and Ativan   -noncont CT appears unchanged 8/12    #ENT   -oropharyngeal packing removed on 9/10  -ENT said they will pack if patient's h/h drops  -will need FiO2 at 50% or less and PaO2 at 60 for qualify for tach    #CV   1)Cardiac Arrest VT  - S/p Amiodarone and electrolyte supplementation   - No further VT noted   - Echo reveals Right hrt strain   -9/13 net 1.1L positive  -changed to bumex drip    2)Shock probably secondary to vasoplegia from sedation   -dc'd levo as patient's MAP has been >65 w/o it   -resolved    #Pulm   1)Acute hypoxic resp failure 2/2 cardiac arrest  - vent Pressure AC 20/27/80/8  -wean as tolerated  -f/u cxr regarding et tube as well as chest    2) Asthma  - c/w Proventil q8h and Symbicort BID    #GI   1)Nutrition   - maintain Glucerna tube feeds as tolerated   - holding bowel regimen w senna/ miralax as patient had multiple bowel movements    2)Transaminitis - cholestasis of sepsis, 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   - RUQ revealed clear biliary duct and gallbladder with tiny tones and sludge  - begin ursodiol 500mg BID per hepatology  - supportive treatment per hepatology    #Renal  1)Hypervolemia   - Strict I/O   - Diuresis as above  - Monitor electrolytes     #ID   1)COVID - 19   - S/p RDV Dexa and TOCI   - Continues on decadron per family preferences  - will continue with supportive care     2)MRSE Bacteremia   - S/P 14 day course of Vanc from 8/20-9/3 therapy, and intermittent gram-&anaerobe coverage   - ID follow up appreciated   - Ua neg  - Bcx on 9/11 revealed staph epidermis on both bottles  - Bcx on 9/12 one bottle reveals staph epi  - repeat bcx sent  - afebrile OVN  - holding vanc as random level supratherapeutic will repeat later today and readjust dose    3) Vent associated PNA  - Sputum cx revealed kleb siella and enterobact  - dc'd IV Zosyn x5-7d (9/12 -9/13 )  - c/w meropenem (9/13-) for 5 days, if not making sputum can dc as patient may just be colonized    #Endo   1)DM   - increase to NPH 10U q6h   - c/w ISS   - goal Blood Sugar 100-180     #Heme  1)b/l DVTs above knee on left and below knee on R  -c/w Lovenox 80 BID    #Prophylaxis  - therapeutic on lovenox    #Ethics  -spoke to family on 9/13 and they said they were discussing whether or not to go ahead with tach if he becomes eligible, the family stated that the patient would refuse the tach if he was offered the option   -palliative consult regarding [facility]  -Full Code.

## 2021-09-14 NOTE — CONSULT NOTE ADULT - ASSESSMENT
70yo M w/ PMHx of HTN, DM2, BPH, asthma presents with shortness of breath. Patient endorses that he has been feeling SOB with associated cough for the last 2 weeks and his symptoms slowly worsened over time. Patient admitted with respiratory failure secondary to COVID, now with prolonged hospitalization and remains intubated on vent. Palliative consulted for [facility]

## 2021-09-14 NOTE — CONSULT NOTE ADULT - CONVERSATION DETAILS
Met with patients wife and family and introduced role of palliative care and SW. Meeting held in Haitian, as this is a common language shared by this provider.    Discussed current clinical status-> ongoing respiratory failure on high vent settings, currently not a candidate for trach, but in the setting of prolonged vent time, discussing options for care should patients stabilize or not stabilize.    We discussed tracheostomy vs. compassionate extubation. Family expresses concern over future tracheostomy given personal experiences in patients brother who had they feel  as a consequence of tracheostomy-> living in a nursing home with frequent infections and ultimately . They feel as though patient would not want a trach, and had previously stated based on his brothers complications, that if he were ever this sick, to not have a tracheostomy.    However, family also concerned with option for compassionate extubation and patient ultimately dying, as they are holding onto the hope that he will get better, and also have had friends and family on life support for months and improved. We discussed the side effects of prolonged intubation with ET tube and damage to surrounding structures, as well as the inability to live indefinitely in the hospital with an ET tube. Daughter also concerned reason for intubation was secondary to a "panic attack" upon arrival to ED given language barrier and patients anxiety to being encountered by multiple medical staff. Education provided on difference between panic attack vs. lung disease related to viral illness, and how patient likely would not have been intubated if respiratory distress was solely a reaction to anxiety.    Family verbalized understanding and receptive to information. they are processing and understand ongoing availability of SW and palliative team.

## 2021-09-14 NOTE — CONSULT NOTE ADULT - SUBJECTIVE AND OBJECTIVE BOX
HPI:  70yo M w/ PMHx of HTN, DM2, BPH, asthma presents with shortness of breath. Patient endorses that he has been feeling SOB with associated cough for the last 2 weeks and his symptoms slowly worsened over time. He did not receive the COVID vaccine. He reports that his family members whom he lives with likely are also COVID positive but he does not know for sure. He did not try taking anything for his symptoms other than his usual medications/inhalers. There were no obvious aggravating or alleviating factors. Currently with supplemental oxygen on, he feels well and has no complaints, however he presented to [facility] since his symptoms were worsening. In the ED, he was hemodynamically stable, afebrile, but he was encephalopathic and hypoxic on room air requiring high flow nasal canula. Labs were significant for mild hyperkalemia and COVID positive. CXR prelim read showed bilateral patchy opacities. CT head showed no acute findings. MICU was consulted in the ED, patient was deemed not a MICU candidate. Patient was given doxycycline and dexamethasone x1 and admitted to general medicine for further management. At time of interview, patient was A/Ox3 with .  (13 Aug 2021 04:21)    PERTINENT PM/SXH:   BPH (benign prostatic hyperplasia)    Hyperlipemia    HTN (hypertension)    HTN (hypertension)    Hypercholesteremia    Asthma    Diabetes    Kidney stone    Bladder stone      H/O lithotripsy    History of kidney stones    H/O umbilical hernia repair      FAMILY HISTORY:  Family history of diabetes mellitus (Father, Sibling)    Family history of cancer (Mother)      ITEMS NOT CHECKED ARE NOT PRESENT    SOCIAL HISTORY:   Significant other/partner[ ]  Children[ ]  Scientology/Spirituality:  Substance hx:  [ ]   Tobacco hx:  [ ]   Alcohol hx: [ ]   Home Opioid hx:  [ ] I-Stop Reference No:  Living Situation: [ ]Home  [ ]Long term care  [ ]Rehab [ ]Other    ADVANCE DIRECTIVES:    DNR  MOLST  [ ]  Living Will  [ ]   DECISION MAKER(s):  [ ] Health Care Proxy(s)  [ ] Surrogate(s)  [ ] Guardian           Name(s): Phone Number(s):    BASELINE (I)ADL(s) (prior to admission):  [place]: [ ]Total  [ ] Moderate [ ]Dependent    Allergies    No Known Allergies    Intolerances    MEDICATIONS  (STANDING):  ALBUTerol    90 MICROgram(s) HFA Inhaler 2 Puff(s) Inhalation every 8 hours  artificial tears (preservative free) Ophthalmic Solution 1 Drop(s) Both EYES every 8 hours  budesonide 160 MICROgram(s)/formoterol 4.5 MICROgram(s) Inhaler 2 Puff(s) Inhalation two times a day  buMETAnide Infusion 1 mG/Hr (5 mL/Hr) IV Continuous <Continuous>  chlorhexidine 0.12% Liquid 15 milliLiter(s) Oral Mucosa every 12 hours  chlorhexidine 4% Liquid 1 Application(s) Topical <User Schedule>  cholecalciferol 2000 Unit(s) Oral daily  dexMEDEtomidine Infusion 0.2 MICROgram(s)/kG/Hr (4.08 mL/Hr) IV Continuous <Continuous>  dextrose 40% Gel 15 Gram(s) Oral once  dextrose 5%. 1000 milliLiter(s) (50 mL/Hr) IV Continuous <Continuous>  dextrose 5%. 1000 milliLiter(s) (100 mL/Hr) IV Continuous <Continuous>  dextrose 50% Injectable 25 Gram(s) IV Push once  dextrose 50% Injectable 12.5 Gram(s) IV Push once  dextrose 50% Injectable 25 Gram(s) IV Push once  enoxaparin Injectable 80 milliGRAM(s) SubCutaneous every 12 hours  glucagon  Injectable 1 milliGRAM(s) IntraMuscular once  insulin lispro (ADMELOG) corrective regimen sliding scale   SubCutaneous every 6 hours  insulin NPH human recombinant 10 Unit(s) SubCutaneous every 6 hours  LORazepam     Tablet 2 milliGRAM(s) Oral every 6 hours  meropenem  IVPB 1000 milliGRAM(s) IV Intermittent every 8 hours  methadone   Solution 30 milliGRAM(s) Oral every 8 hours  midazolam Infusion 0.02 mG/kG/Hr (1.63 mL/Hr) IV Continuous <Continuous>  midodrine 40 milliGRAM(s) Oral every 8 hours  multivitamin/minerals Oral Solution (WELLESSE) 30 milliLiter(s) Enteral Tube daily  naloxegol 25 milliGRAM(s) Oral daily  norepinephrine Infusion 0.05 MICROgram(s)/kG/Min (7.65 mL/Hr) IV Continuous <Continuous>  polyethylene glycol 3350 17 Gram(s) Oral daily  senna Syrup 10 milliLiter(s) Oral at bedtime  ursodiol Tablet 500 milliGRAM(s) Oral two times a day  vancomycin  IVPB 1000 milliGRAM(s) IV Intermittent every 12 hours    MEDICATIONS  (PRN):    PRESENT SYMPTOMS: [ ]  Due to Covid 19 infection data obtained from nursing assessment.  Source if other than patient:  [ ]Family   [ ]Team     Pain: [ ]yes [ ]no  QOL impact -   Location -                    Aggravating factors -  Quality -  Radiation -  Timing-  Severity (0-10 scale):  Minimal acceptable level (0-10 scale):     CPOT:    https://www.[facility].org/getattachment/[email or web address]/Critical-Care-Pain-Observation-Tool-(CPOT)      PAIN AD Score:     http://geriatrictoolkit.[email or web address]/painad.pdf (press ctrl +  left click to view)    Dyspnea:                           [ ]Mild [ ]Moderate [ ]Severe  Anxiety:                             [ ]Mild [ ]Moderate [ ]Severe  Fatigue:                             [ ]Mild [ ]Moderate [ ]Severe  Nausea:                             [ ]Mild [ ]Moderate [ ]Severe  Loss of appetite:              [ ]Mild [ ]Moderate [ ]Severe  Constipation:                    [ ]Mild [ ]Moderate [ ]Severe    Other Symptoms:  [ ]All other review of systems negative     Palliative Performance Status Version 2:         %    http://npcrc.org/files/news/palliative_performance_scale_ppsv2.pdf  PHYSICAL EXAM: DUE TO COVID-19 INFECTION  physical exam findings from the clinician caring for this patient directly are used.   Vital Signs Last 24 Hrs  T(C): 38 (14 Sep 2021 14:00), Max: 38 (14 Sep 2021 12:00)  T(F): 100.4 (14 Sep 2021 14:00), Max: 100.4 (14 Sep 2021 12:00)  HR: 84 (14 Sep 2021 14:15) (65 - 88)  BP: 73/48 (13 Sep 2021 19:30) (73/48 - 136/87)  BP(mean): 55 (13 Sep 2021 19:30) (55 - 102)  RR: 30 (14 Sep 2021 14:15) (4 - 40)  SpO2: 98% (14 Sep 2021 14:15) (87% - 100%) I&O's Summary    13 Sep 2021 07:01  -  14 Sep 2021 07:00  --------------------------------------------------------  IN: 3056.8 mL / OUT: 1865 mL / NET: 1191.8 mL    14 Sep 2021 07:01  -  14 Sep 2021 14:43  --------------------------------------------------------  IN: 662.7 mL / OUT: 870 mL / NET: -207.3 mL      GENERAL:  [ ]Alert  [ ]Oriented x   [ ]Lethargic  [ ]Cachexia  [ ]Unarousable  [ ]Verbal  [ ]Non-Verbal  Behavioral:   [ ] Anxiety  [ ] Delirium [ ] Agitation [ ] Other  HEENT:  [ ]Normal   [ ]Dry mouth   [ ]ET Tube/Trach  [ ]Oral lesions  PULMONARY:   [ ]Clear [ ]Tachypnea  [ ]Audible excessive secretions   [ ]Rhonchi        [ ]Right [ ]Left [ ]Bilateral  [ ]Crackles        [ ]Right [ ]Left [ ]Bilateral  [ ]Wheezing     [ ]Right [ ]Left [ ]Bilateral  [ ]Diminished breath sounds [ ]right [ ]left [ ]bilateral  CARDIOVASCULAR:    [ ]Regular [ ]Irregular [ ]Tachy  [ ]Lei [ ]Murmur [ ]Other  GASTROINTESTINAL:  [ ]Soft  [ ]Distended   [ ]+BS  [ ]Non tender [ ]Tender  [ ]PEG [ ]OGT/ NGT  Last BM:   GENITOURINARY:  [ ]Normal [ ] Incontinent   [ ]Oliguria/Anuria   [ ]Ramos  MUSCULOSKELETAL:   [ ]Normal   [ ]Weakness  [ ]Bed/Wheelchair bound [ ]Edema  NEUROLOGIC:   [ ]No focal deficits  [ ]Cognitive impairment  [ ]Dysphagia [ ]Dysarthria [ ]Paresis [ ]Other   SKIN:   [ ]Normal    [ ]Rash  [ ]Pressure ulcer(s)       Present on admission [ ]y [ ]n    CRITICAL CARE:  [ ] Shock Present  [ ]Septic [ ]Cardiogenic [ ]Neurologic [ ]Hypovolemic  [ ]  Vasopressors [ ]  Inotropes   [ ]Respiratory failure present [ ]Mechanical ventilation [ ]Non-invasive ventilatory support [ ]High flow  Mode: AC/ CMV (Assist Control/ Continuous Mandatory Ventilation), RR (machine): 30, FiO2: 80, PEEP: 8, ITime: 0.6, MAP: 17, PC: 27, PIP: 36   [ ]Acute  [ ]Chronic [ ]Hypoxic  [ ]Hypercarbic [ ]Other  [ ]Other organ failure     LABS:                        7.8    15.32 )-----------( 556      ( 13 Sep 2021 19:52 )             26.4   09-14    138  |  95<L>  |  43<H>  ----------------------------<  255<H>  4.5   |  30  |  0.71    Ca    8.7      14 Sep 2021 08:29  Phos  4.2     09-14  Mg     2.3     09-14    TPro  7.0  /  Alb  2.4<L>  /  TBili  1.6<H>  /  DBili  x   /  AST  237<H>  /  ALT  225<H>  /  AlkPhos  2141<H>  09-14  PT/INR - ( 12 Sep 2021 23:03 )   PT: 13.8 sec;   INR: 1.16 ratio         PTT - ( 12 Sep 2021 23:03 )  PTT:39.2 sec      D-Dimer Assay, Quantitative: 551 ng/mL DDU (09-12-21 @ 23:03)  D-Dimer Assay, Quantitative: 423 ng/mL DDU (09-08-21 @ 00:12)  D-Dimer Assay, Quantitative: 368 ng/mL DDU (09-06-21 @ 00:42)    Ferritin, Serum: 386 ng/mL (09-13-21 @ 03:07)  Ferritin, Serum: 331 ng/mL (09-08-21 @ 07:51)  Ferritin, Serum: 267 ng/mL (09-06-21 @ 03:49)    Procalcitonin, Serum: 0.31 ng/mL (09-08-21 @ 00:12)  Procalcitonin, Serum: 0.26 ng/mL (09-06-21 @ 00:42)  Procalcitonin, Serum: 0.28 ng/mL (09-03-21 @ 01:44)      RADIOLOGY & ADDITIONAL STUDIES:    PROTEIN CALORIE MALNUTRITION PRESENT: [ ]mild [ ]moderate [ ]severe [ ]underweight [ ]morbid obesity  https://www.andeal.org/vault/2440/web/files/ONC/Table_Clinical%20Characteristics%20to%20Document%20Malnutrition-White%20JV%20et%20al%202012.pdf    Height (cm): 172.7 (08-12-21 @ 13:34)  Weight (kg): 81.6 (08-12-21 @ 13:34)  BMI (kg/m2): 27.4 (08-12-21 @ 13:34)    [ ]PPSV2 < or = to 30% [ ]significant weight loss  [ ]poor nutritional intake  [ ]anasarca      [ ]Artificial Nutrition      REFERRALS:   [ ]Chaplaincy  [ ]Hospice  [ ]Child Life  [ ]Social Work  [ ]Case management [ ]Holistic Therapy     Goals of Care Document:  HPI:  70yo M w/ PMHx of HTN, DM2, BPH, asthma presents with shortness of breath. Patient endorses that he has been feeling SOB with associated cough for the last 2 weeks and his symptoms slowly worsened over time. He did not receive the COVID vaccine. He reports that his family members whom he lives with likely are also COVID positive but he does not know for sure. He did not try taking anything for his symptoms other than his usual medications/inhalers. There were no obvious aggravating or alleviating factors. Currently with supplemental oxygen on, he feels well and has no complaints, however he presented to [facility] since his symptoms were worsening. In the ED, he was hemodynamically stable, afebrile, but he was encephalopathic and hypoxic on room air requiring high flow nasal canula. Labs were significant for mild hyperkalemia and COVID positive. CXR prelim read showed bilateral patchy opacities. CT head showed no acute findings. MICU was consulted in the ED, patient was deemed not a MICU candidate. Patient was given doxycycline and dexamethasone x1 and admitted to general medicine for further management. At time of interview, patient was A/Ox3 with .  (13 Aug 2021 04:21)    Palliative consulted to assist with goals of care    PERTINENT PM/SXH:   BPH (benign prostatic hyperplasia)    Hyperlipemia    HTN (hypertension)    HTN (hypertension)    Hypercholesteremia    Asthma    Diabetes    Kidney stone    Bladder stone      H/O lithotripsy    History of kidney stones    H/O umbilical hernia repair      FAMILY HISTORY:  Family history of diabetes mellitus (Father, Sibling)    Family history of cancer (Mother)      ITEMS NOT CHECKED ARE NOT PRESENT    SOCIAL HISTORY:   Significant other/partner[x ]  Children[x ]  Nondenominational/Spirituality:  Substance hx:  [ ]   Tobacco hx:  [ ]   Alcohol hx: [ ]   Home Opioid hx:  [ ] I-Stop Reference No:  Living Situation: [x ]Home  [ ]Long term care  [ ]Rehab [ ]Other    ADVANCE DIRECTIVES:    DNR  MOLST  [ ]  Living Will  [ ]   DECISION MAKER(s):  [ ] Health Care Proxy(s)  [ x] Surrogate(s)  [ ] Guardian           Name(s): Phone Number(s): wife, Christy Nick, number per EMR    BASELINE (I)ADL(s) (prior to admission):  [place]: [x ]Total  [ ] Moderate [ ]Dependent    Allergies    No Known Allergies    Intolerances    MEDICATIONS  (STANDING):  ALBUTerol    90 MICROgram(s) HFA Inhaler 2 Puff(s) Inhalation every 8 hours  artificial tears (preservative free) Ophthalmic Solution 1 Drop(s) Both EYES every 8 hours  budesonide 160 MICROgram(s)/formoterol 4.5 MICROgram(s) Inhaler 2 Puff(s) Inhalation two times a day  buMETAnide Infusion 1 mG/Hr (5 mL/Hr) IV Continuous <Continuous>  chlorhexidine 0.12% Liquid 15 milliLiter(s) Oral Mucosa every 12 hours  chlorhexidine 4% Liquid 1 Application(s) Topical <User Schedule>  cholecalciferol 2000 Unit(s) Oral daily  dexMEDEtomidine Infusion 0.2 MICROgram(s)/kG/Hr (4.08 mL/Hr) IV Continuous <Continuous>  dextrose 40% Gel 15 Gram(s) Oral once  dextrose 5%. 1000 milliLiter(s) (50 mL/Hr) IV Continuous <Continuous>  dextrose 5%. 1000 milliLiter(s) (100 mL/Hr) IV Continuous <Continuous>  dextrose 50% Injectable 25 Gram(s) IV Push once  dextrose 50% Injectable 12.5 Gram(s) IV Push once  dextrose 50% Injectable 25 Gram(s) IV Push once  enoxaparin Injectable 80 milliGRAM(s) SubCutaneous every 12 hours  glucagon  Injectable 1 milliGRAM(s) IntraMuscular once  insulin lispro (ADMELOG) corrective regimen sliding scale   SubCutaneous every 6 hours  insulin NPH human recombinant 10 Unit(s) SubCutaneous every 6 hours  LORazepam     Tablet 2 milliGRAM(s) Oral every 6 hours  meropenem  IVPB 1000 milliGRAM(s) IV Intermittent every 8 hours  methadone   Solution 30 milliGRAM(s) Oral every 8 hours  midazolam Infusion 0.02 mG/kG/Hr (1.63 mL/Hr) IV Continuous <Continuous>  midodrine 40 milliGRAM(s) Oral every 8 hours  multivitamin/minerals Oral Solution (WELLESSE) 30 milliLiter(s) Enteral Tube daily  naloxegol 25 milliGRAM(s) Oral daily  norepinephrine Infusion 0.05 MICROgram(s)/kG/Min (7.65 mL/Hr) IV Continuous <Continuous>  polyethylene glycol 3350 17 Gram(s) Oral daily  senna Syrup 10 milliLiter(s) Oral at bedtime  ursodiol Tablet 500 milliGRAM(s) Oral two times a day  vancomycin  IVPB 1000 milliGRAM(s) IV Intermittent every 12 hours    MEDICATIONS  (PRN):    PRESENT SYMPTOMS: [ ]  Due to Covid 19 infection data obtained from nursing assessment.  [x] unable to obtain 2/2 mental status, intubated/sedated  Source if other than patient:  [ ]Family   [ ]Team     Pain: [ ]yes [ ]no  QOL impact -   Location -                    Aggravating factors -  Quality -  Radiation -  Timing-  Severity (0-10 scale):  Minimal acceptable level (0-10 scale):     CPOT:    https://www.[facility].org/getattachment/[email or web address]/Critical-Care-Pain-Observation-Tool-(CPOT)      PAIN AD Score:     http://[email or web address]/[email or web address].pdf (press ctrl +  left click to view)    Dyspnea:                           [ ]Mild [ ]Moderate [ ]Severe  Anxiety:                             [ ]Mild [ ]Moderate [ ]Severe  Fatigue:                             [ ]Mild [ ]Moderate [ ]Severe  Nausea:                             [ ]Mild [ ]Moderate [ ]Severe  Loss of appetite:              [ ]Mild [ ]Moderate [ ]Severe  Constipation:                    [ ]Mild [ ]Moderate [ ]Severe    Other Symptoms:  [ ]All other review of systems negative     Palliative Performance Status Version 2:       10  %    http://npcrc.org/files/news/palliative_performance_scale_ppsv2.pdf  PHYSICAL EXAM: DUE TO COVID-19 INFECTION  physical exam findings from the clinician caring for this patient directly are used.   Vital Signs Last 24 Hrs  T(C): 38 (14 Sep 2021 14:00), Max: 38 (14 Sep 2021 12:00)  T(F): 100.4 (14 Sep 2021 14:00), Max: 100.4 (14 Sep 2021 12:00)  HR: 84 (14 Sep 2021 14:15) (65 - 88)  BP: 73/48 (13 Sep 2021 19:30) (73/48 - 136/87)  BP(mean): 55 (13 Sep 2021 19:30) (55 - 102)  RR: 30 (14 Sep 2021 14:15) (4 - 40)  SpO2: 98% (14 Sep 2021 14:15) (87% - 100%) I&O's Summary    13 Sep 2021 07:01  -  14 Sep 2021 07:00  --------------------------------------------------------  IN: 3056.8 mL / OUT: 1865 mL / NET: 1191.8 mL    14 Sep 2021 07:01  -  14 Sep 2021 14:43  --------------------------------------------------------  IN: 662.7 mL / OUT: 870 mL / NET: -207.3 mL      GENERAL:  [ ]Alert  [ ]Oriented x   [ ]Lethargic  [ ]Cachexia  [x ]Unarousable  [ ]Verbal  [ ]Non-Verbal  Behavioral:   [ ] Anxiety  [ ] Delirium [ ] Agitation [ ] Other  HEENT:  [ ]Normal   [ ]Dry mouth   [ x]ET Tube/Trach  [ ]Oral lesions  PULMONARY:   [x ]Clear [ ]Tachypnea  [ ]Audible excessive secretions   [ ]Rhonchi        [ ]Right [ ]Left [ ]Bilateral  [ ]Crackles        [ ]Right [ ]Left [ ]Bilateral  [ ]Wheezing     [ ]Right [ ]Left [ ]Bilateral  [ ]Diminished breath sounds [ ]right [ ]left [ ]bilateral  CARDIOVASCULAR:    [ x]Regular [ ]Irregular [ ]Tachy  [ ]Lei [ ]Murmur [ ]Other  GASTROINTESTINAL:  [x ]Soft  [ ]Distended   [ ]+BS  [x ]Non tender [ ]Tender  [ ]PEG [ ]OGT/ NGT  Last BM:   GENITOURINARY:  [ ]Normal [ ] Incontinent   [ ]Oliguria/Anuria   [ x]Ramos  MUSCULOSKELETAL:   [ ]Normal   [ ]Weakness  [x ]Bed/Wheelchair bound [ ]Edema  NEUROLOGIC: sedated  [ ]No focal deficits  [ ]Cognitive impairment  [ ]Dysphagia [ ]Dysarthria [ ]Paresis [x ]Other   SKIN:   [ ]Normal    [ ]Rash  [ ]Pressure ulcer(s)       Present on admission [ ]y [ ]n    CRITICAL CARE:  [ ] Shock Present  [ ]Septic [ ]Cardiogenic [ ]Neurologic [ ]Hypovolemic  [ ]  Vasopressors [ ]  Inotropes   [x ]Respiratory failure present [x ]Mechanical ventilation [ ]Non-invasive ventilatory support [ ]High flow  Mode: AC/ CMV (Assist Control/ Continuous Mandatory Ventilation), RR (machine): 30, FiO2: 80, PEEP: 8, ITime: 0.6, MAP: 17, PC: 27, PIP: 36   [x ]Acute  [ ]Chronic [x ]Hypoxic  [ ]Hypercarbic [ ]Other  [ ]Other organ failure     LABS:                        7.8    15.32 )-----------( 556      ( 13 Sep 2021 19:52 )             26.4   09-14    138  |  95<L>  |  43<H>  ----------------------------<  255<H>  4.5   |  30  |  0.71    Ca    8.7      14 Sep 2021 08:29  Phos  4.2     09-14  Mg     2.3     09-14    TPro  7.0  /  Alb  2.4<L>  /  TBili  1.6<H>  /  DBili  x   /  AST  237<H>  /  ALT  225<H>  /  AlkPhos  2141<H>  09-14  PT/INR - ( 12 Sep 2021 23:03 )   PT: 13.8 sec;   INR: 1.16 ratio         PTT - ( 12 Sep 2021 23:03 )  PTT:39.2 sec      D-Dimer Assay, Quantitative: 551 ng/mL DDU (09-12-21 @ 23:03)  D-Dimer Assay, Quantitative: 423 ng/mL DDU (09-08-21 @ 00:12)  D-Dimer Assay, Quantitative: 368 ng/mL DDU (09-06-21 @ 00:42)    Ferritin, Serum: 386 ng/mL (09-13-21 @ 03:07)  Ferritin, Serum: 331 ng/mL (09-08-21 @ 07:51)  Ferritin, Serum: 267 ng/mL (09-06-21 @ 03:49)    Procalcitonin, Serum: 0.31 ng/mL (09-08-21 @ 00:12)  Procalcitonin, Serum: 0.26 ng/mL (09-06-21 @ 00:42)  Procalcitonin, Serum: 0.28 ng/mL (09-03-21 @ 01:44)      RADIOLOGY & ADDITIONAL STUDIES:  < from: Xray Chest 1 View- PORTABLE-Urgent (Xray Chest 1 View- PORTABLE-Urgent .) (09.14.21 @ 04:45) >    EXAM:  XR CHEST PORTABLE URGENT 1V                            PROCEDURE DATE:  09/14/2021            INTERPRETATION:  A single chest x-ray was obtained on September 14, 2021.    INDICATION: Intubated.    IMPRESSION:    The heart is normal in size. Diffuse airspace opacity seen throughout both lungs which appears to be slightly improved when compared to previous study done on September 5, 2021 at 5:57 AM. Endotracheal tube and NG tube are in good position. No pleural effusion. No pneumothorax.    --- End of Report ---        GINO CHRISTY MD; Attending Radiologist  This document has been electronically signed. Sep 14 2021  9:00AM    < end of copied text >      PROTEIN CALORIE MALNUTRITION PRESENT: [ ]mild [ ]moderate [ ]severe [ ]underweight [ ]morbid obesity  https://www.andeal.org/vault/2440/web/files/ONC/Table_Clinical%20Characteristics%20to%20Document%20Malnutrition-White%20JV%20et%20al%202012.pdf    Height (cm): 172.7 (08-12-21 @ 13:34)  Weight (kg): 81.6 (08-12-21 @ 13:34)  BMI (kg/m2): 27.4 (08-12-21 @ 13:34)    [x ]PPSV2 < or = to 30% [ ]significant weight loss  [ x]poor nutritional intake  [ ]anasarca      [ x]Artificial Nutrition      REFERRALS:   [x ]Chaplaincy  [ ]Hospice  [ ]Child Life  [x ]Social Work  [ ]Case management [ ]Holistic Therapy     Goals of Care Document:  Universal Safety Interventions

## 2021-09-14 NOTE — PROGRESS NOTE ADULT - SUBJECTIVE AND OBJECTIVE BOX
Holger Knapp  PGY1    INTERVAL HPI/OVERNIGHT EVENTS: Hypotensive and required to be placed back on levo. Additionally patient was net positive despite increase in bumex 2mg po, and was started on bumex drip ON. Multiple bowel movements throughout the day.    SUBJECTIVE: Patient seen and examined at bedside.     OBJECTIVE:    VITAL SIGNS:  ICU Vital Signs Last 24 Hrs  T(C): 37.8 (14 Sep 2021 04:00), Max: 38.3 (13 Sep 2021 09:00)  T(F): 100 (14 Sep 2021 04:00), Max: 100.9 (13 Sep 2021 09:00)  HR: 78 (14 Sep 2021 06:45) (62 - 88)  BP: 73/48 (13 Sep 2021 19:30) (68/47 - 160/79)  BP(mean): 55 (13 Sep 2021 19:30) (53 - 113)  ABP: 127/64 (14 Sep 2021 06:45) (51/30 - 186/88)  ABP(mean): 85 (14 Sep 2021 06:45) (37 - 120)  RR: 31 (14 Sep 2021 06:45) (4 - 40)  SpO2: 92% (14 Sep 2021 06:45) (87% - 100%)    Mode: AC/ CMV (Assist Control/ Continuous Mandatory Ventilation), RR (machine): 30, FiO2: 70, PEEP: 8, ITime: 0.6, MAP: 12, PC: 27, PIP: 38    09-13 @ 07:01  -  09-14 @ 07:00  --------------------------------------------------------  IN: 3056.8 mL / OUT: 1865 mL / NET: 1191.8 mL      CAPILLARY BLOOD GLUCOSE  164 (13 Sep 2021 05:30)      POCT Blood Glucose.: 226 mg/dL (14 Sep 2021 05:11)      PHYSICAL EXAM:  General: sedated  HEENT: NCAT, PERRL, clear conjunctiva, sclera anicteric  Neck: supple, no JVD  Respiratory: CTAB but shallow breaths  Cardiovascular: RRR, S1S2, no m/r/g  Abdomen: soft, nontender, distended, normal bowel sounds  Extremities: no edema, no cyanosis, pulses 2+ throughout  Skin: cold LUE b/l  Neurological: nonfocal, nonresponsive to verbal and tactile stimuli    MEDICATIONS:  MEDICATIONS  (STANDING):  ALBUTerol    90 MICROgram(s) HFA Inhaler 2 Puff(s) Inhalation every 8 hours  artificial tears (preservative free) Ophthalmic Solution 1 Drop(s) Both EYES every 8 hours  budesonide 160 MICROgram(s)/formoterol 4.5 MICROgram(s) Inhaler 2 Puff(s) Inhalation two times a day  buMETAnide Infusion 1 mG/Hr (5 mL/Hr) IV Continuous <Continuous>  chlorhexidine 0.12% Liquid 15 milliLiter(s) Oral Mucosa every 12 hours  chlorhexidine 4% Liquid 1 Application(s) Topical <User Schedule>  cholecalciferol 2000 Unit(s) Oral daily  dexMEDEtomidine Infusion 0.2 MICROgram(s)/kG/Hr (4.08 mL/Hr) IV Continuous <Continuous>  dextrose 40% Gel 15 Gram(s) Oral once  dextrose 5%. 1000 milliLiter(s) (50 mL/Hr) IV Continuous <Continuous>  dextrose 5%. 1000 milliLiter(s) (100 mL/Hr) IV Continuous <Continuous>  dextrose 50% Injectable 25 Gram(s) IV Push once  dextrose 50% Injectable 12.5 Gram(s) IV Push once  dextrose 50% Injectable 25 Gram(s) IV Push once  enoxaparin Injectable 80 milliGRAM(s) SubCutaneous every 12 hours  glucagon  Injectable 1 milliGRAM(s) IntraMuscular once  insulin lispro (ADMELOG) corrective regimen sliding scale   SubCutaneous every 6 hours  insulin NPH human recombinant 8 Unit(s) SubCutaneous every 6 hours  LORazepam     Tablet 2 milliGRAM(s) Oral every 6 hours  meropenem  IVPB 1000 milliGRAM(s) IV Intermittent every 8 hours  methadone   Solution 30 milliGRAM(s) Oral every 8 hours  midazolam Infusion 0.02 mG/kG/Hr (1.63 mL/Hr) IV Continuous <Continuous>  midodrine 40 milliGRAM(s) Oral every 8 hours  multivitamin/minerals Oral Solution (WELLESSE) 30 milliLiter(s) Enteral Tube daily  naloxegol 25 milliGRAM(s) Oral daily  norepinephrine Infusion 0.05 MICROgram(s)/kG/Min (7.65 mL/Hr) IV Continuous <Continuous>  polyethylene glycol 3350 17 Gram(s) Oral daily  senna Syrup 10 milliLiter(s) Oral at bedtime  vancomycin  IVPB 1000 milliGRAM(s) IV Intermittent every 12 hours    MEDICATIONS  (PRN):      ALLERGIES:  Allergies    No Known Allergies    Intolerances        LABS:                        7.8    15.32 )-----------( 556      ( 13 Sep 2021 19:52 )             26.4     09-14    137  |  94<L>  |  40<H>  ----------------------------<  247<H>  4.4   |  27  |  0.75    Ca    9.0      14 Sep 2021 00:09  Phos  4.5     09-14  Mg     2.2     09-14    TPro  6.6  /  Alb  2.0<L>  /  TBili  1.5<H>  /  DBili  x   /  AST  213<H>  /  ALT  202<H>  /  AlkPhos  1838<H>  09-14    PT/INR - ( 12 Sep 2021 23:03 )   PT: 13.8 sec;   INR: 1.16 ratio         PTT - ( 12 Sep 2021 23:03 )  PTT:39.2 sec      RADIOLOGY & ADDITIONAL TESTS: Reviewed.  < from: CT Head No Cont (09.13.21 @ 17:40) >    IMPRESSION:  Exam limited by scan technique.    No significant change seen when compared with the prior exam.    < end of copied text >

## 2021-09-14 NOTE — PROGRESS NOTE ADULT - ATTENDING COMMENTS
1.Aute hypoxemic respiratory failure due to Covid pneumonia and ARDS. Continue lung protective ventilation with PC , insp27 PEEP 8 FIO2 70-80%.Pt finishing second course of dexamethasone. Pt received. TOCI.  2.Sedation: Decrease versed drip as tolerated today. Continue Valium Precedex and methadone.   3.ID: MRSE bacteremia. Continue Vancomycin. Klebsiella and enterobacter  from sputum. ? colonization vs infection. Continue 3-5 days of meropenem.  4.Increasing alk phos more than LFTS.  RUQ sonogram no obstruction. Sludge with some small stones Started on ursodiol.  5. Hypotension mesopelagic requiring Levophed.  6.S/P 6 minute cardiac arrest after intubation.  CT head  yesterday without acute changes.  7. Continue tube feeds.  8. Bumex drip started keep fluid balance net even.

## 2021-09-14 NOTE — CONSULT NOTE ADULT - PROBLEM SELECTOR RECOMMENDATION 4
will continue to follow. will likely require subsequent family meetings and pending clinical course given patient still acute with high FiO2 requirements and high PEEP.  discussed with MICU team and [facility]  [phone]

## 2021-09-15 NOTE — CDI QUERY NOTE - NSCDIOTHERTXTBX_GEN_ALL_CORE_HH
- pt admitted w/Covid19  - was diagnosed about 2 weeks prior to admission but presented due to worsening SOB  - pt is not vaccinated  - on admission: T-102.4, HR 99, RR 41, O2 87% RA-->92% HFNC, Covid PCR+  - pt admitted w/covid PNA, acute hypoxic respiratory failure  - 8/20 pt deteriorated and required intubation, s/p cardiac arrest after intubated w/ROSC after 6 min  - MICU notes documenting pt in cardiogenic shock s/p PEA followed by VT, septic shock/hypovolemic shock, septic shock in the setting of Covid PNA  - 8/13 received IV abx x1, received Remdesevir 8/13-17    Based on your judgement and the clinical findings above, please clarify the status of sepsis:    > Sepsis present on admission due to Covid  > Sepsis developed after admission due to Covid  > Other (please specify)  > Clinically undetermined

## 2021-09-15 NOTE — PROGRESS NOTE ADULT - SUBJECTIVE AND OBJECTIVE BOX
Chief Complaint:  Patient is a 71y old  Male who presents with a chief complaint of SOB (15 Sep 2021 08:17)      Interval Events: Febrile overnight with Tmax 38. Still on levophed, intubated and sedated on precedex gtt. S/p palliative care meeting yesterday to discuss GOC. Not a candidate for trach given high O2 req/PEEP on the ventilator.      Hospital Medications:  ALBUTerol    90 MICROgram(s) HFA Inhaler 2 Puff(s) Inhalation every 8 hours  artificial tears (preservative free) Ophthalmic Solution 1 Drop(s) Both EYES every 8 hours  budesonide 160 MICROgram(s)/formoterol 4.5 MICROgram(s) Inhaler 2 Puff(s) Inhalation two times a day  buMETAnide Infusion 0.5 mG/Hr IV Continuous <Continuous>  chlorhexidine 0.12% Liquid 15 milliLiter(s) Oral Mucosa every 12 hours  chlorhexidine 4% Liquid 1 Application(s) Topical <User Schedule>  cholecalciferol 2000 Unit(s) Oral daily  dexMEDEtomidine Infusion 0.2 MICROgram(s)/kG/Hr IV Continuous <Continuous>  dextrose 40% Gel 15 Gram(s) Oral once  dextrose 5%. 1000 milliLiter(s) IV Continuous <Continuous>  dextrose 5%. 1000 milliLiter(s) IV Continuous <Continuous>  dextrose 50% Injectable 25 Gram(s) IV Push once  dextrose 50% Injectable 12.5 Gram(s) IV Push once  dextrose 50% Injectable 25 Gram(s) IV Push once  enoxaparin Injectable 80 milliGRAM(s) SubCutaneous every 12 hours  glucagon  Injectable 1 milliGRAM(s) IntraMuscular once  insulin lispro (ADMELOG) corrective regimen sliding scale   SubCutaneous every 6 hours  insulin NPH human recombinant 10 Unit(s) SubCutaneous every 6 hours  levoFLOXacin IVPB 750 milliGRAM(s) IV Intermittent every 24 hours  LORazepam     Tablet 2 milliGRAM(s) Oral every 6 hours  methadone   Solution 20 milliGRAM(s) Oral every 8 hours  midodrine 40 milliGRAM(s) Oral every 8 hours  multivitamin/minerals Oral Solution (WELLESSE) 30 milliLiter(s) Enteral Tube daily  naloxegol 25 milliGRAM(s) Oral daily  norepinephrine Infusion 0.05 MICROgram(s)/kG/Min IV Continuous <Continuous>  polyethylene glycol 3350 17 Gram(s) Oral daily  senna Syrup 10 milliLiter(s) Oral at bedtime  ursodiol Tablet 500 milliGRAM(s) Oral two times a day  vancomycin  IVPB 750 milliGRAM(s) IV Intermittent every 12 hours      PMHX/PSHX:  BPH (benign prostatic hyperplasia)    Hyperlipemia    HTN (hypertension)    HTN (hypertension)    Hypercholesteremia    Asthma    Diabetes    Kidney stone    Bladder stone    H/O lithotripsy    History of kidney stones    H/O umbilical hernia repair            ROS: 14 point ROS negative unless otherwise stated in subjective      PHYSICAL EXAM:     GENERAL:  Appears stated age, toxic and ill appearing  HEENT:  NC/AT, eyes are closed  CHEST: +vented breath sounds  HEART:  Regular rhythm, S1, S2, no murmur/rub/S3/S4  ABDOMEN:  Soft, non-distended, normoactive bowel sounds  EXTREMITIES:  no cyanosis, clubbing or edema  SKIN:  No rash/erythema/ecchymoses/petechiae/wounds/abscess/warm/dry  NEURO:  sedated    Vital Signs:  Vital Signs Last 24 Hrs  T(C): 38.3 (15 Sep 2021 12:00), Max: 38.3 (15 Sep 2021 10:30)  T(F): 100.9 (15 Sep 2021 12:00), Max: 100.9 (15 Sep 2021 10:30)  HR: 69 (15 Sep 2021 14:30) (66 - 82)  BP: --  BP(mean): --  RR: 30 (15 Sep 2021 14:30) (26 - 35)  SpO2: 93% (15 Sep 2021 14:30) (93% - 100%)  Daily     Daily     LABS:                        7.9    13.27 )-----------( 548      ( 15 Sep 2021 01:35 )             26.2     09-15    142  |  94<L>  |  48<H>  ----------------------------<  191<H>  3.6   |  34<H>  |  0.78    Ca    9.1      15 Sep 2021 11:46  Phos  3.3     09-15  Mg     2.3     09-15    TPro  7.0  /  Alb  2.5<L>  /  TBili  1.8<H>  /  DBili  x   /  AST  259<H>  /  ALT  229<H>  /  AlkPhos  2487<H>  09-15    LIVER FUNCTIONS - ( 15 Sep 2021 11:46 )  Alb: 2.5 g/dL / Pro: 7.0 g/dL / ALK PHOS: 2487 U/L / ALT: 229 U/L / AST: 259 U/L / GGT: x           PT/INR - ( 15 Sep 2021 01:35 )   PT: 14.4 sec;   INR: 1.21 ratio         PTT - ( 15 Sep 2021 01:35 )  PTT:36.9 sec        Imaging: No new abdominal imaging

## 2021-09-15 NOTE — PROGRESS NOTE ADULT - ASSESSMENT
71 year old man w/pmhx of HTN, DM2, BPH, and Asthma here for acute hypoxic respiratory failure and ARDS secondary to viral pneumonia from COVID-19. Hospital course complicated by VT arrest, DVT and MRSE bacteremia.    #Neuro   1) sedated  - c/w precedex  - c/w methadone and Ativan   -noncont CT appears unchanged 8/12    #ENT   -oropharyngeal packing removed on 9/10  -ENT said they will pack if patient's h/h drops  -will need FiO2 at 50% or less and PaO2 at 60 for qualify for tach    #CV   1)Cardiac Arrest VT  - S/p Amiodarone and electrolyte supplementation   - No further VT noted   - Echo reveals Right hrt strain   -changed to bumex drip    2)Shock probably secondary to vasoplegia from sedation   -c/w levo as patient's MAP has been >65 w/o it       #Pulm   1)Acute hypoxic resp failure 2/2 cardiac arrest  - vent Pressure AC 30/27/80/8 wean as tolerated    2) Asthma  - c/w Proventil q8h and Symbicort BID    #GI   1)Nutrition   - maintain Glucerna tube feeds as tolerated   - holding bowel regimen w senna/ miralax as patient had multiple bowel movements    2)Transaminitis - cholestasis of sepsis, 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   - RUQ revealed clear biliary duct and gallbladder with tiny tones and sludge  - c/w ursodiol 500 mg BID            - supportive treatment per hepatology    #Renal  1)Hypervolemia   - Strict I/O   - Diuresis as above  - Monitor electrolytes     #ID   1)COVID - 19   - S/p RDV Dexa and TOCI   - Continues on decadron per family preferences  - will continue with supportive care     2)MRSE Bacteremia   - S/P 14 day course of Vanc from 8/20-9/3 therapy, and intermittent gram-&anaerobe coverage   - ID follow up appreciated   - Ua neg  - Bcx on 9/11 revealed staph epidermis on both bottles  - Bcx on 9/12 one bottle reveals staph epi  - repeat bcx sent  - afebrile OVN  - holding vanc as random level supratherapeutic will repeat later today and readjust dose    3) Vent associated PNA vs colonization  - Sputum cx revealed kleb siella and enterobact  - dc'd IV Zosyn x5-7d (9/12 -9/13 )  - d/c meropenem (9/13-9/14)  -began levaquin on 9/14 for total 5 days including meropenem dates as bacteria are sensitive per Dr. Paul  - still very little sputum    #Endo   1)DM   - c/w NPH 10u q6h  - c/w ISS   - goal Blood Sugar 100-180     #Heme  1)b/l DVTs above knee on left and below knee on R  -c/w Lovenox 80 BID    #Prophylaxis  - therapeutic on lovenox    #Ethics  -palliative consult following  -full code at this time  - family will discuss regarding palliative extubation vs trach, family still believes that the patient was intubated because of a panic attack instead of the respiratory failure 2/2 to VT arrest     71 year old man w/pmhx of HTN, DM2, BPH, and Asthma here for acute hypoxic respiratory failure and ARDS secondary to viral pneumonia from COVID-19. Hospital course complicated by VT arrest, DVT and MRSE bacteremia.    #Neuro   1) sedated  - c/w precedex  - c/w methadone and Ativan   -decreased methadone to 20 TID  -noncont CT appears unchanged 8/12    #ENT   -oropharyngeal packing removed on 9/10  -ENT said they will pack if patient's h/h drops  -will need FiO2 at 50% or less and PaO2 at 60 for qualify for tach    #CV   1)Cardiac Arrest VT  - S/p Amiodarone and electrolyte supplementation   - No further VT noted   - Echo reveals Right hrt strain   -changed to bumex drip 0.5    2)Shock probably secondary to vasoplegia from sedation   -c/w levo as patient's MAP has been >65 w/o it     #Pulm   1)Acute hypoxic resp failure 2/2 cardiac arrest  - vent Pressure AC 30/27/70/8 wean as tolerated    2) Asthma  - c/w Proventil q8h and Symbicort BID    #GI   1)Nutrition   - maintain Glucerna tube feeds as tolerated   - holding bowel regimen w senna/ miralax as patient had multiple bowel movements    2)Transaminitis - likely 2/2 cholestasis of sepsis vs 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   - RUQ revealed clear biliary duct and gallbladder with tiny tones and sludge  - c/w ursodiol 500 mg BID            - supportive treatment per hepatology    #Renal  1)Hypervolemia   - Strict I/O   - Diuresis as above  - Monitor electrolytes     #ID   1)COVID - 19   - S/p RDV Dexa and TOCI   - Continues on decadron per family preferences  - will continue with supportive care     2)MRSE Bacteremia   - S/P 14 day course of Vanc from 8/20-9/3 therapy, and intermittent gram-&anaerobe coverage   - ID follow up appreciated   - Ua neg  - Bcx on 9/11 revealed staph epidermis on both bottles  - Bcx on 9/12 one bottle reveals staph epi  - Bcx on 9/14 NGTD  - afebrile OVN  - holding vanc as random level supratherapeutic will repeat later today and readjust dose    3) Vent associated PNA vs colonization  - Sputum cx revealed kleb siella and enterobact  - dc'd IV Zosyn x5-7d (9/12 -9/13 )  - d/c meropenem (9/13-9/14)  -began levaquin on 9/14 for total 4 days including meropenem dates as bacteria are sensitive per Dr. Paul  - still very little sputum will dc abx if still no sputum and temp <101    #Endo   1)DM   - c/w NPH 10u q6h  - c/w ISS   - goal Blood Sugar 100-180     #Heme  1)b/l DVTs above knee on left and below knee on R  -c/w Lovenox 80 BID    #Prophylaxis  - therapeutic on lovenox    #GOC  -palliative consult following  -full code at this time  - family will discuss regarding palliative extubation vs trach, family still believes that the patient was intubated because of a panic attack instead of the respiratory failure 2/2 to VT arrest

## 2021-09-15 NOTE — PROGRESS NOTE ADULT - ATTENDING COMMENTS
Hepatology Staff: Octavio Pope MD    I saw and examined the patient along with   on  09-15-21 @ 15:05  Patient Medical Record, hosptial course was reviewed and summarized as below:    Vitals: Vital Signs Last 24 Hrs  T(C): 38.3 (15 Sep 2021 12:00), Max: 38.3 (15 Sep 2021 10:30)  T(F): 100.9 (15 Sep 2021 12:00), Max: 100.9 (15 Sep 2021 10:30)  HR: 65 (15 Sep 2021 15:00) (65 - 82)    RR: 30 (15 Sep 2021 15:00) (26 - 35)  SpO2: 94% (15 Sep 2021 15:00) (92% - 100%)    Antibiotics:levoFLOXacin IVPB 750 milliGRAM(s) IV Intermittent every 24 hours  vancomycin  IVPB 750 milliGRAM(s) IV Intermittent every 12 hours    Diuretics:  Labs:Creatinine, Serum: 0.78 mg/dL (09-15-21 @ 11:46)  Bilirubin Total, Serum: 1.8 mg/dL (09-15-21 @ 11:46)  Creatinine, Serum: 0.71 mg/dL (09-15-21 @ 01:35)  Bilirubin Total, Serum: 1.8 mg/dL (09-15-21 @ 01:35)  INR: 1.21 ratio (09-15-21 @ 01:35)  Creatinine, Serum: 0.77 mg/dL (09-14-21 @ 17:35)      I/O: I&O's Summary    14 Sep 2021 07:01  -  15 Sep 2021 07:00  --------------------------------------------------------  IN: 2602.4 mL / OUT: 3245 mL / NET: -642.6 mL    15 Sep 2021 07:01  -  15 Sep 2021 15:05  --------------------------------------------------------  IN: 370.4 mL / OUT: 1775 mL / NET: -1404.6 mL      Nutritional Status:   Albumin, Serum: 2.5 g/dL (09-15-21 @ 11:46)  Albumin, Serum: 2.1 g/dL (09-15-21 @ 01:35)      Recommendations: This is a 71-year-old male with multiple comorbidities as outlined in fellow's note currently being admitted with severe COVID-19 infection with respiratory failure, status post intubation, complicated course with cardiac arrest with ROSC after 6 minutes.  Patient remains on multiple IV pressors.  Already been treated for COVID-19 infection with remdesivir and Tocilizumab.  Hepatology was consulted for elevated liver enzymes.     I agree with above outlined history, physical examination, assessment and plan.      Elevated liver enzymes which is in a cholestatic pattern likely multifactorial.  Noted patient has MRSE bacteremia and also has positive sputum culture with Klebsiella and Enterobacter him currently being treated with IV Levofloxacin ( Switched from IV Meropenem).  Most recent blood cultures from September 11 revealed staph epidermidis in both bottles and blood culture from September 12 revealed 1 bottle of staph epidermidis.   On 9/13 positive for staph aureus.  Patient remains on IV vancomycin.    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  Management will be supportive.  Management of sepsis per primary team.    Considering safety of ursodiol and concern for ?DILI, would recommend continuing with ursodiol 500 mg twice a day.    Continue with ICU supportive care.      Plan discussed with Primary team.

## 2021-09-15 NOTE — PROGRESS NOTE ADULT - ASSESSMENT
70yo M w/ PMHx of HTN, DM2, BPH, asthma presents with shortness of breath. Patient endorses that he has been feeling SOB with associated cough for the last 2 weeks and his symptoms slowly worsened over time. He did not receive the COVID vaccine. Pt was found to be Covid+ on 8/12 and intubated on 8/20 for respiratory distress. Immediately post intubation on 8/20 pt had cardiac arrest; with ROSC after 6 min, VT x 2 s/p electrical shock x 2 and 2 epi's. Patient was in shock and on multiple vasopressors and upon transfer to ICU on 8/20/21 patient was hypotensive 50/30 requiring multiple pushes of Neosynephrine, Bicarb, Epi, and D50 while on 3 vasopressors. Had been sedated with ketamine since 8/21 until 9/10. s/p remdesivir 8/13-8/17, tocilizumab 8/15, atorvastatin until 8/17, haldol 8/19-20. Required amio 8/27 for significant ectopy. Hepatology consulted for elevated liver enzymes.    Impression:  #elevated liver enyzmes- has mixed hepatocellular and cholestatic pattern of injury; has had elevated AST/ALT/Alk phos starting 8/23 to 8/24; differential includes COVID cholangiopathy vs DILI (2/2 ketamine vs atorvastatin vs haldol vs remdesivir/tocilizumab) vs cholestasis of sepsis 2/2 superimposed infection from VAP (Sputum cx from 9/11 growing E. cloacae + klebsiella pneumoniae) + persistent GPC bacteremia; other viral infectious workup negative- CMV/EBV IgM neg, HAV/HBV/HCV neg; less likely shock liver or ischemic injury s/p cardiac arrest 8/20/21 given AST/ALT are not markedly elevated (<1000) and have continued to uptrend weeks after cardiac arrest; unlikely AIH given acute onset; US abd + doppler 9/9 showing patent portal and hepatic veins. Had repeat RUQ US 9/13 showing mild hepatomegaly with enlarged L lobe, but smooth contour; normal CBD 7 mm; ?tiny stones and sludge w/in GB; no ascites.  #VAP- Sputum cx from 9/11 growing E. cloacae + klebsiella pneumoniae; s/p Zosyn (9/12-9/13), s/p meropenem (9/13), levaquin (9/14->)  #persistent GPC bacteremia- bl cx x2 8/24 with MRSE (methicillin resistant staph epi); repeat bl cx 9/12 growing GPCs; vanco (8/25-8/29; 9/11->)  #COVID PNA; tested positive on 8/12, intubated 8/20; s/p remdesivir 8/13-8/17, tocilizumab 8/15      Recommendations:  - trend daily CMP, INR  - c/w vanc for MRSE; repeat bl cx  - c/w levaquin for VAP (E. cloacae + klebsiella pneumoniae)   - appreciate ID recs  - c/w ursodiol 500 mg BID  - avoid hepatotoxic agents including ketamine, haldol, statins  - rest of care per primary team      Trice Goyal MD  GI Fellow, PGY-4  Available via [employer] Teams    NON-URGENT CONSULTS:  Please email [email or web address]  AT NIGHT AND ON WEEKENDS:  Contact on-call GI fellow via answering service ([phone]) from 5pm-8am and on weekends/holidays  MONDAY-FRIDAY 8AM-5PM:  Pager# [phone] ([facility]) or [phone] ([facility])  GI Phone# [phone] ([facility])

## 2021-09-15 NOTE — PROGRESS NOTE ADULT - SUBJECTIVE AND OBJECTIVE BOX
Holger Knapp  PGY1    INTERVAL HPI/OVERNIGHT EVENTS: Net neg 200cc on Bumex drip.     SUBJECTIVE: Patient seen and examined at bedside.       OBJECTIVE:    VITAL SIGNS:  ICU Vital Signs Last 24 Hrs  T(C): 36.9 (15 Sep 2021 04:00), Max: 38 (14 Sep 2021 12:00)  T(F): 98.4 (15 Sep 2021 04:00), Max: 100.4 (14 Sep 2021 12:00)  HR: 67 (15 Sep 2021 07:00) (66 - 88)  BP: --  BP(mean): --  ABP: 132/63 (15 Sep 2021 07:00) (95/48 - 176/90)  ABP(mean): 86 (15 Sep 2021 07:00) (64 - 123)  RR: 30 (15 Sep 2021 07:00) (26 - 35)  SpO2: 97% (15 Sep 2021 07:00) (93% - 100%)    Mode: AC/ CMV (Assist Control/ Continuous Mandatory Ventilation), RR (machine): 30, FiO2: 80, PEEP: 8, ITime: 0.6, MAP: 17, PC: 27, PIP: 38    09-14 @ 07:01  -  09-15 @ 07:00  --------------------------------------------------------  IN: 2602.4 mL / OUT: 3245 mL / NET: -642.6 mL      CAPILLARY BLOOD GLUCOSE  POCT Blood Glucose.: 168 mg/dL (15 Sep 2021 05:24)    PHYSICAL EXAM:  General: sedated  HEENT: NCAT, pupils constrict, clear conjunctiva, sclera anicteric  Neck: supple, no JVD  Respiratory: CTAB  Cardiovascular: RRR, S1S2, no m/r/g  Abdomen: soft, nontender, distended, normal bowel sounds  Extremities: pulses 2+ throughout, no edema, no cyanosis  Skin: cold  Neurological: nonfocal    MEDICATIONS:  MEDICATIONS  (STANDING):  ALBUTerol    90 MICROgram(s) HFA Inhaler 2 Puff(s) Inhalation every 8 hours  artificial tears (preservative free) Ophthalmic Solution 1 Drop(s) Both EYES every 8 hours  budesonide 160 MICROgram(s)/formoterol 4.5 MICROgram(s) Inhaler 2 Puff(s) Inhalation two times a day  buMETAnide Infusion 1 mG/Hr (5 mL/Hr) IV Continuous <Continuous>  chlorhexidine 0.12% Liquid 15 milliLiter(s) Oral Mucosa every 12 hours  chlorhexidine 4% Liquid 1 Application(s) Topical <User Schedule>  cholecalciferol 2000 Unit(s) Oral daily  dexMEDEtomidine Infusion 0.2 MICROgram(s)/kG/Hr (4.08 mL/Hr) IV Continuous <Continuous>  dextrose 40% Gel 15 Gram(s) Oral once  dextrose 5%. 1000 milliLiter(s) (50 mL/Hr) IV Continuous <Continuous>  dextrose 5%. 1000 milliLiter(s) (100 mL/Hr) IV Continuous <Continuous>  dextrose 50% Injectable 25 Gram(s) IV Push once  dextrose 50% Injectable 12.5 Gram(s) IV Push once  dextrose 50% Injectable 25 Gram(s) IV Push once  enoxaparin Injectable 80 milliGRAM(s) SubCutaneous every 12 hours  glucagon  Injectable 1 milliGRAM(s) IntraMuscular once  insulin lispro (ADMELOG) corrective regimen sliding scale   SubCutaneous every 6 hours  insulin NPH human recombinant 10 Unit(s) SubCutaneous every 6 hours  levoFLOXacin IVPB 750 milliGRAM(s) IV Intermittent every 24 hours  LORazepam     Tablet 2 milliGRAM(s) Oral every 6 hours  methadone   Solution 30 milliGRAM(s) Oral every 8 hours  midodrine 40 milliGRAM(s) Oral every 8 hours  multivitamin/minerals Oral Solution (WELLESSE) 30 milliLiter(s) Enteral Tube daily  naloxegol 25 milliGRAM(s) Oral daily  polyethylene glycol 3350 17 Gram(s) Oral daily  senna Syrup 10 milliLiter(s) Oral at bedtime  ursodiol Tablet 500 milliGRAM(s) Oral two times a day  vancomycin  IVPB 750 milliGRAM(s) IV Intermittent every 12 hours    MEDICATIONS  (PRN):      ALLERGIES:  Allergies    No Known Allergies    Intolerances        LABS:                        7.9    13.27 )-----------( 548      ( 15 Sep 2021 01:35 )             26.2     09-15    140  |  95<L>  |  47<H>  ----------------------------<  175<H>  3.8   |  31  |  0.71    Ca    9.2      15 Sep 2021 01:35  Phos  4.0     09-15  Mg     2.3     09-15    TPro  6.8  /  Alb  2.1<L>  /  TBili  1.8<H>  /  DBili  x   /  AST  222<H>  /  ALT  220<H>  /  AlkPhos  2409<H>  09-15    PT/INR - ( 15 Sep 2021 01:35 )   PT: 14.4 sec;   INR: 1.21 ratio         PTT - ( 15 Sep 2021 01:35 )  PTT:36.9 sec      RADIOLOGY & ADDITIONAL TESTS: Reviewed.

## 2021-09-15 NOTE — PROGRESS NOTE ADULT - ATTENDING COMMENTS
1.Aute hypoxemic respiratory failure due to Covid pneumonia and ARDS. Continue lung protective ventilation with PC , insp27 PEEP 8 FIO2 70-80%.Pt finished second course of dexamethasone. Pt received. TOCI.  2.Sedation: Decrease versed drip as tolerated today. Continue Valium Precedex and methadone.   3.ID: MRSE bacteremia. Continue Vancomycin. Klebsiella and enterobacter  from sputum. ? colonization vs infection. Continue 3-5 days of  total of abx. Now on Levquin.  4.Increasing alk phos more than LFTS.  RUQ sonogram no obstruction. Sludge with some small stones Started on ursodiol.  5. Hypotension vasopelagic requiring Levophed.  6.S/P 6 minute cardiac arrest after intubation.  CT head  yesterday without acute changes.  7. Continue tube feeds.  8. Bumex drip started keep fluid balance net even.

## 2021-09-16 NOTE — PROGRESS NOTE ADULT - ASSESSMENT
71 year old man w/pmhx of HTN, DM2, BPH, and Asthma here for acute hypoxic respiratory failure and ARDS secondary to viral pneumonia from COVID-19. Hospital course complicated by VT arrest, DVT and MRSE bacteremia.    #Neuro   1) sedated  - c/w precedex  - c/w methadone 20TID and Ativan 2mg q6h  -noncont CT appears unchanged 8/12    #ENT   -oropharyngeal packing removed on 9/10  -ENT said they will pack if patient's h/h drops  -will need FiO2 at 50% or less and PaO2 at 60 for qualify for tach    #CV   1)Cardiac Arrest VT  - S/p Amiodarone and electrolyte supplementation   - No further VT noted   - Echo reveals Right hrt strain   -holding bumex     2)Shock probably secondary to vasoplegia from sedation   -weaned off levo    #Pulm   1)Acute hypoxic resp failure 2/2 cardiac arrest  - vent Pressure AC 30/27/70/8 wean as tolerated    2) Asthma  - c/w Proventil q8h and Symbicort BID    #GI   1)Nutrition   - maintain Glucerna tube feeds as tolerated   - c/w senna/ miralax as patient had multiple bowel movements    2)Transaminitis - likely 2/2 cholestasis of sepsis vs SSC CIP (secondary sclerosing cholangitis in critically ill patients), Covid cholangiopathy, drug-induced liver injury  - c/w ursodiol 500 mg BID            - supportive treatment per hepatology    #Renal  1)Hypervolemia   - Strict I/O   - Diuresis as above  - Monitor electrolytes     #ID   1)COVID - 19   - S/p RDV Dexa and TOCI   - Continues on decadron per family preferences  - will continue with supportive care     2)MRSE Bacteremia   - S/P 14 day course of Vanc from 8/20-9/3 therapy, and intermittent gram-&anaerobe coverage   - ID follow up appreciated   - Ua neg  - Bcx on 9/11 revealed staph epidermis on both bottles  - Bcx on 9/12 one bottle reveals staph epi similar to 9/11 bottles  - Bcx on 9/14 NGTD  - afebrile OVN  - holding vanc as random level supratherapeutic will repeat later today and readjust dose    3) Vent associated PNA vs colonization  - Sputum cx revealed kleb siella and enterobact  - dc'd IV Zosyn x5-7d (9/12 -9/13 )  - d/c meropenem (9/13-9/14)  -began levaquin on 9/14 for total 4 days including meropenem dates as bacteria are sensitive per Dr. Paul  - MD levaquin today as patient did not have fever>101 and very little secretions    #Endo   1)DM   - c/w NPH 10u q6h  - c/w ISS   - goal Blood Sugar 100-180     #Heme  1)b/l DVTs above knee on left and below knee on R  -c/w Lovenox 80 BID    #Prophylaxis  - therapeutic on lovenox    #GOC  -palliative consult following  -full code at this time  -clarified with family regarding why patient was intubated      71 year old man w/pmhx of HTN, DM2, BPH, and Asthma here for acute hypoxic respiratory failure and ARDS secondary to viral pneumonia from COVID-19. Hospital course complicated by VT arrest, DVT and MRSE bacteremia.    #Neuro   1) sedated  - c/w precedex  - c/w methadone 20TID and Ativan 2mg q8h  -noncont CT appears unchanged 8/12    #ENT   -oropharyngeal packing removed on 9/10  -ENT said they will pack if patient's h/h drops  -will need FiO2 at 50% or less and PaO2 at 60 for qualify for tach    #CV   1)Cardiac Arrest VT  - S/p Amiodarone and electrolyte supplementation   - No further VT noted   - Echo reveals Right hrt strain   - begin bumex 2mg BID with goal of net even    2)Shock probably secondary to vasoplegia from sedation   -weaned off levo    #Pulm   1)Acute hypoxic resp failure 2/2 cardiac arrest  - vent Pressure AC 30/27/70/8 wean as tolerated    2) Asthma  - c/w Proventil q8h and Symbicort BID    #GI   1)Nutrition   - maintain Glucerna tube feeds as tolerated   - c/w senna/ miralax as patient had multiple bowel movements    2)Transaminitis - likely 2/2 cholestasis of sepsis vs SSC CIP (secondary sclerosing cholangitis in critically ill patients), Covid cholangiopathy, drug-induced liver injury  - c/w ursodiol 500 mg BID            - supportive treatment per hepatology    #Renal  1)Hypervolemia   - Strict I/O   - Diuresis as above  - Monitor electrolytes     #ID   1)COVID - 19   - S/p RDV Dexa and TOCI   - Continues on decadron per family preferences  - will continue with supportive care     2)MRSE Bacteremia   - S/P 14 day course of Vanc from 8/20-9/3 therapy, and intermittent gram-&anaerobe coverage   - ID follow up appreciated   - Ua neg  - Bcx on 9/11 revealed staph epidermis on both bottles  - Bcx on 9/12 one bottle reveals staph epi similar to 9/11 bottles  - Bcx on 9/14 NGTD  - afebrile OVN  - holding vanc as random level supratherapeutic will repeat later today and readjust dose    3) Vent associated PNA vs colonization  - Sputum cx revealed kleb siella and enterobact  - dc'd IV Zosyn x5-7d (9/12 -9/13 )  - d/c meropenem (9/13-9/14)  -began levaquin on 9/14 for total 4 days including meropenem dates as bacteria are sensitive per Dr. Paul  - dc levaquin today as patient did not have fever>101 and very little secretions    #Endo   1)DM   - c/w NPH 10u q6h  - c/w ISS   - goal Blood Sugar 100-180     #Heme  1)b/l DVTs above knee on left and below knee on R  -c/w Lovenox 80 BID    #Prophylaxis  - therapeutic on lovenox    #GOC  -palliative consult following  -full code at this time  -clarified with family regarding why patient was intubated

## 2021-09-16 NOTE — PROCEDURE NOTE - NSINFORMCONSENT_GEN_A_CORE
This was an emergent procedure.
Benefits, risks, and possible complications of procedure explained to patient/caregiver who verbalized understanding and gave verbal consent.
Benefits, risks, and possible complications of procedure explained to patient/caregiver who verbalized understanding and gave verbal consent.

## 2021-09-16 NOTE — PROCEDURE NOTE - NSPROCNAME_GEN_A_CORE
Arterial Puncture/Cannulation
Central Line Insertion
Gastric Intubation/Gastric Lavage
Central Line Insertion
Arterial Puncture/Cannulation
Arterial Puncture/Cannulation
Central Line Insertion

## 2021-09-16 NOTE — PROCEDURE NOTE - PROCEDURE DATE TIME, MLM
20-Aug-2021 16:25
20-Aug-2021 14:30
13-Sep-2021 03:00
13-Sep-2021 14:12
25-Aug-2021 18:30
25-Aug-2021 18:45
30-Aug-2021
30-Aug-2021 14:30

## 2021-09-16 NOTE — PROGRESS NOTE ADULT - ATTENDING COMMENTS
1.Aute hypoxemic respiratory failure due to Covid pneumonia and ARDS. Continue lung protective ventilation with PC , insp27 PEEP 8 FIO2 70-80%.Pt finished second course of dexamethasone. Pt received. TOCI.  2.Sedation: Decrease versed drip as tolerated today. Continue Valium Precedex and methadone.   3.ID: MRSE bacteremia. Continue Vancomycin. Klebsiella and enterobacter  from sputum.Pt with increasing sputum production. Still with low grade fevers. Continue  Levaquin.  4.Increasing alk phos more than LFTS.  RUQ sonogram no obstruction. Sludge with some small stones Started on ursodiol.  5. Hypotension vasopelagic requiring Levophed.  6.S/P 6 minute cardiac arrest after intubation.  CT head  yesterday without acute changes.  7. Continue tube feeds.  8. Bumex IVP prn to keep  net fluid even. Serum bicarb rising . May need to stop all diuretics.  9. GOC discussion with family. Clarified that pt was not intubated because of Anxiety. Pt was intubated for worsening Covid pneumonia.

## 2021-09-16 NOTE — PROCEDURE NOTE - NSSITEPREP_SKIN_A_CORE
chlorhexidine/Adherence to aseptic technique: hand hygiene prior to donning barriers (gown, gloves), don cap and mask, sterile drape over patient
chlorhexidine
alcohol/chlorhexidine
chlorhexidine
chlorhexidine
chlorhexidine/Adherence to aseptic technique: hand hygiene prior to donning barriers (gown, gloves), don cap and mask, sterile drape over patient
chlorhexidine/Adherence to aseptic technique: hand hygiene prior to donning barriers (gown, gloves), don cap and mask, sterile drape over patient
chlorhexidine

## 2021-09-16 NOTE — PROCEDURE NOTE - NSINDICATIONS_GEN_A_CORE
critical illness
arterial puncture to obtain ABG's/critical patient/monitoring purposes
emergency venous access
venous access
arterial puncture to obtain ABG's
monitoring purposes
arterial puncture to obtain ABG's/critical patient/monitoring purposes
arterial puncture to obtain ABG's/critical patient/monitoring purposes
drainage

## 2021-09-16 NOTE — PROGRESS NOTE ADULT - ATTENDING COMMENTS
Hepatology Staff: Octavio Pope MD    I saw and examined the patient along with  Dr. Goyal on 09-16-21 @ 17:19  Patient Medical Record, hosptial course was reviewed and summarized as below:    Vitals: Vital Signs Last 24 Hrs  T(C): 38 (16 Sep 2021 16:00), Max: 38 (16 Sep 2021 16:00)  T(F): 100.4 (16 Sep 2021 16:00), Max: 100.4 (16 Sep 2021 16:00)  HR: 87 (16 Sep 2021 17:00) (61 - 111)    RR: 30 (16 Sep 2021 17:00) (25 - 40)  SpO2: 93% (16 Sep 2021 17:00) (90% - 100%)    Diuretics:buMETAnide 2 milliGRAM(s) Oral two times a day    Labs:Creatinine, Serum: 0.83 mg/dL (09-16-21 @ 11:50)  Bilirubin Total, Serum: 2.7 mg/dL (09-16-21 @ 11:50)  Creatinine, Serum: 0.77 mg/dL (09-16-21 @ 00:06)  Bilirubin Total, Serum: 2.5 mg/dL (09-16-21 @ 00:06)  INR: 1.22 ratio (09-16-21 @ 00:06)      I/O: I&O's Summary    15 Sep 2021 07:01  -  16 Sep 2021 07:00  --------------------------------------------------------  IN: 1993.7 mL / OUT: 4470 mL / NET: -2476.3 mL    16 Sep 2021 07:01  -  16 Sep 2021 17:19  --------------------------------------------------------  IN: 792.6 mL / OUT: 0 mL / NET: 792.6 mL      Nutritional Status:   Albumin, Serum: 2.3 g/dL (09-16-21 @ 11:50)  Albumin, Serum: 2.5 g/dL (09-16-21 @ 00:06)      Recommendations: This is a 71-year-old male with multiple comorbidities as outlined in fellow's note currently being admitted with severe COVID-19 infection with respiratory failure, status post intubation, complicated course with cardiac arrest with ROSC after 6 minutes.  Patient remains on multiple IV pressors.  Already been treated for COVID-19 infection with remdesivir and Tocilizumab.  Hepatology was consulted for elevated liver enzymes.     I agree with above outlined history, physical examination, assessment and plan.      Elevated liver enzymes which is in a cholestatic pattern likely multifactorial.  Noted patient has MRSE bacteremia and also has positive sputum culture with Klebsiella and Enterobacter currently being treated with IV Levofloxacin ( Switched from IV Meropenem).  Most recent blood cultures from September 11 revealed staph epidermidis in both bottles and blood culture from September 12 revealed 1 bottle of staph epidermidis.   On 9/13 positive for staph aureus.  Patient remains on IV vancomycin.    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  Management will be supportive.  Management of sepsis per primary team.  Noted repeat cultures from 9- with negative growth.    Considering safety of ursodiol and concern for ?DILI, would recommend continuing with ursodiol 500 mg twice a day.    Continue with ICU supportive care.      Plan discussed with Primary team.

## 2021-09-16 NOTE — PROGRESS NOTE ADULT - ASSESSMENT
70yo M w/ PMHx of HTN, DM2, BPH, asthma presents with shortness of breath. Patient endorses that he has been feeling SOB with associated cough for the last 2 weeks and his symptoms slowly worsened over time. He did not receive the COVID vaccine. Pt was found to be Covid+ on 8/12 and intubated on 8/20 for respiratory distress. Immediately post intubation on 8/20 pt had cardiac arrest; with ROSC after 6 min, VT x 2 s/p electrical shock x 2 and 2 epi's. Patient was in shock and on multiple vasopressors and upon transfer to ICU on 8/20/21 patient was hypotensive 50/30 requiring multiple pushes of Neosynephrine, Bicarb, Epi, and D50 while on 3 vasopressors. Had been sedated with ketamine since 8/21 until 9/10. s/p remdesivir 8/13-8/17, tocilizumab 8/15, atorvastatin until 8/17, haldol 8/19-20. Required amio 8/27 for significant ectopy. Hepatology consulted for elevated liver enzymes.    Impression:  #elevated liver enyzmes- has mixed hepatocellular and cholestatic pattern of injury; has had elevated AST/ALT/Alk phos starting 8/23 to 8/24; differential includes COVID cholangiopathy vs DILI (2/2 ketamine vs atorvastatin vs haldol vs remdesivir/tocilizumab) vs cholestasis of sepsis 2/2 superimposed infection from VAP (Sputum cx from 9/11 growing E. cloacae + klebsiella pneumoniae) + persistent GPC bacteremia; other viral infectious workup negative- CMV/EBV IgM neg, HAV/HBV/HCV neg; less likely shock liver or ischemic injury s/p cardiac arrest 8/20/21 given AST/ALT are not markedly elevated (<1000) and have continued to uptrend weeks after cardiac arrest; unlikely AIH given acute onset; US abd + doppler 9/9 showing patent portal and hepatic veins. Had repeat RUQ US 9/13 showing mild hepatomegaly with enlarged L lobe, but smooth contour; normal CBD 7 mm; ?tiny stones and sludge w/in GB; no ascites.  #VAP- Sputum cx from 9/11 growing E. cloacae + klebsiella pneumoniae; s/p Zosyn (9/12-9/13), s/p meropenem (9/13), levaquin (9/14->)  #persistent GPC bacteremia- bl cx x2 8/24 with MRSE (methicillin resistant staph epi); bl cx 9/12 growing staph epi; repeat culture 9/14 with no growth; vanco (8/25-8/29; 9/11->)  #COVID PNA; tested positive on 8/12, intubated 8/20; s/p remdesivir 8/13-8/17, tocilizumab 8/15      Recommendations:  - trend daily CMP, INR  - c/w vanc for MRSE; repeat bl cx 9/14 with no growth  - c/w levaquin for VAP (E. cloacae + klebsiella pneumoniae)   - appreciate ID recs  - c/w ursodiol 500 mg BID  - avoid hepatotoxic agents including ketamine, haldol, statins  - rest of care per primary team      Trice Goyal MD  GI Fellow, PGY-4  Available via [employer] Teams    NON-URGENT CONSULTS:  Please email [email or web address]  AT NIGHT AND ON WEEKENDS:  Contact on-call GI fellow via answering service ([phone]) from 5pm-8am and on weekends/holidays  MONDAY-FRIDAY 8AM-5PM:  Pager# [phone] ([facility]) or [phone] ([facility])  GI Phone# [phone] ([facility])

## 2021-09-16 NOTE — PROGRESS NOTE ADULT - SUBJECTIVE AND OBJECTIVE BOX
Chief Complaint:  Patient is a 71y old  Male who presents with a chief complaint of SOB (15 Sep 2021 14:58)      Interval Events: Yesterday afternoon, febrile to 100.9. Still intubated and sedated on Levophed.      Hospital Medications:  ALBUTerol    90 MICROgram(s) HFA Inhaler 2 Puff(s) Inhalation every 8 hours  artificial tears (preservative free) Ophthalmic Solution 1 Drop(s) Both EYES every 8 hours  budesonide 160 MICROgram(s)/formoterol 4.5 MICROgram(s) Inhaler 2 Puff(s) Inhalation two times a day  buMETAnide 2 milliGRAM(s) Oral <User Schedule>  chlorhexidine 0.12% Liquid 15 milliLiter(s) Oral Mucosa every 12 hours  chlorhexidine 4% Liquid 1 Application(s) Topical <User Schedule>  cholecalciferol 2000 Unit(s) Oral daily  dexMEDEtomidine Infusion 0.2 MICROgram(s)/kG/Hr IV Continuous <Continuous>  dextrose 40% Gel 15 Gram(s) Oral once  dextrose 5%. 1000 milliLiter(s) IV Continuous <Continuous>  dextrose 5%. 1000 milliLiter(s) IV Continuous <Continuous>  dextrose 50% Injectable 25 Gram(s) IV Push once  dextrose 50% Injectable 12.5 Gram(s) IV Push once  dextrose 50% Injectable 25 Gram(s) IV Push once  enoxaparin Injectable 80 milliGRAM(s) SubCutaneous every 12 hours  glucagon  Injectable 1 milliGRAM(s) IntraMuscular once  insulin lispro (ADMELOG) corrective regimen sliding scale   SubCutaneous every 6 hours  insulin NPH human recombinant 10 Unit(s) SubCutaneous every 6 hours  levoFLOXacin IVPB 750 milliGRAM(s) IV Intermittent every 24 hours  LORazepam     Tablet 2 milliGRAM(s) Oral every 6 hours  methadone   Solution 20 milliGRAM(s) Oral every 8 hours  midodrine 40 milliGRAM(s) Oral every 8 hours  multivitamin/minerals Oral Solution (WELLESSE) 30 milliLiter(s) Enteral Tube daily  naloxegol 25 milliGRAM(s) Oral daily  norepinephrine Infusion 0.05 MICROgram(s)/kG/Min IV Continuous <Continuous>  polyethylene glycol 3350 17 Gram(s) Oral daily  potassium chloride   Solution 40 milliEquivalent(s) Oral once  senna Syrup 10 milliLiter(s) Oral at bedtime  ursodiol Tablet 500 milliGRAM(s) Oral two times a day  vancomycin  IVPB 750 milliGRAM(s) IV Intermittent every 12 hours      PMHX/PSHX:  BPH (benign prostatic hyperplasia)    Hyperlipemia    HTN (hypertension)    HTN (hypertension)    Hypercholesteremia    Asthma    Diabetes    Kidney stone    Bladder stone    H/O lithotripsy    History of kidney stones    H/O umbilical hernia repair            ROS: unable to perform as pt is intubated and sedated      PHYSICAL EXAM:     GENERAL:  Appears stated age, toxic and ill appearing  HEENT:  NC/AT, eyes are closed  CHEST: +vented breath sounds  HEART:  Regular rhythm, S1, S2, no murmur/rub/S3/S4  ABDOMEN:  Soft, non-distended, normoactive bowel sounds  EXTREMITIES:  no cyanosis, clubbing or edema  SKIN:  No rash/erythema/ecchymoses/petechiae/wounds/abscess/warm/dry  NEURO:  sedated    Vital Signs:  Vital Signs Last 24 Hrs  T(C): 37.3 (16 Sep 2021 08:00), Max: 38.3 (15 Sep 2021 10:30)  T(F): 99.2 (16 Sep 2021 08:00), Max: 100.9 (15 Sep 2021 10:30)  HR: 61 (16 Sep 2021 07:30) (61 - 77)  BP: --  BP(mean): --  RR: 30 (16 Sep 2021 07:30) (29 - 31)  SpO2: 98% (16 Sep 2021 07:30) (88% - 100%)  Daily     Daily Weight in k.4 (16 Sep 2021 04:00)    LABS:                        8.2    15.10 )-----------( 620      ( 16 Sep 2021 00:06 )             27.1     09-16    143  |  93<L>  |  45<H>  ----------------------------<  163<H>  3.5   |  36<H>  |  0.77    Ca    9.5      16 Sep 2021 00:06  Phos  3.1     09-16  Mg     2.1     09-16    TPro  6.7  /  Alb  2.5<L>  /  TBili  2.5<H>  /  DBili  x   /  AST  351<H>  /  ALT  259<H>  /  AlkPhos  2701<H>  09-16    LIVER FUNCTIONS - ( 16 Sep 2021 00:06 )  Alb: 2.5 g/dL / Pro: 6.7 g/dL / ALK PHOS: 2701 U/L / ALT: 259 U/L / AST: 351 U/L / GGT: x           PT/INR - ( 16 Sep 2021 00:06 )   PT: 14.5 sec;   INR: 1.22 ratio         PTT - ( 16 Sep 2021 00:06 )  PTT:36.6 sec        Imaging: No new abdominal imaging         Chief Complaint:  Patient is a 71y old  Male who presents with a chief complaint of SOB (15 Sep 2021 14:58)      Interval Events: Yesterday afternoon, febrile to 100.9. Still intubated and sedated on Levophed.      Hospital Medications:  ALBUTerol    90 MICROgram(s) HFA Inhaler 2 Puff(s) Inhalation every 8 hours  artificial tears (preservative free) Ophthalmic Solution 1 Drop(s) Both EYES every 8 hours  budesonide 160 MICROgram(s)/formoterol 4.5 MICROgram(s) Inhaler 2 Puff(s) Inhalation two times a day  buMETAnide 2 milliGRAM(s) Oral <User Schedule>  chlorhexidine 0.12% Liquid 15 milliLiter(s) Oral Mucosa every 12 hours  chlorhexidine 4% Liquid 1 Application(s) Topical <User Schedule>  cholecalciferol 2000 Unit(s) Oral daily  dexMEDEtomidine Infusion 0.2 MICROgram(s)/kG/Hr IV Continuous <Continuous>  dextrose 40% Gel 15 Gram(s) Oral once  dextrose 5%. 1000 milliLiter(s) IV Continuous <Continuous>  dextrose 5%. 1000 milliLiter(s) IV Continuous <Continuous>  dextrose 50% Injectable 25 Gram(s) IV Push once  dextrose 50% Injectable 12.5 Gram(s) IV Push once  dextrose 50% Injectable 25 Gram(s) IV Push once  enoxaparin Injectable 80 milliGRAM(s) SubCutaneous every 12 hours  glucagon  Injectable 1 milliGRAM(s) IntraMuscular once  insulin lispro (ADMELOG) corrective regimen sliding scale   SubCutaneous every 6 hours  insulin NPH human recombinant 10 Unit(s) SubCutaneous every 6 hours  levoFLOXacin IVPB 750 milliGRAM(s) IV Intermittent every 24 hours  LORazepam     Tablet 2 milliGRAM(s) Oral every 6 hours  methadone   Solution 20 milliGRAM(s) Oral every 8 hours  midodrine 40 milliGRAM(s) Oral every 8 hours  multivitamin/minerals Oral Solution (WELLESSE) 30 milliLiter(s) Enteral Tube daily  naloxegol 25 milliGRAM(s) Oral daily  norepinephrine Infusion 0.05 MICROgram(s)/kG/Min IV Continuous <Continuous>  polyethylene glycol 3350 17 Gram(s) Oral daily  potassium chloride   Solution 40 milliEquivalent(s) Oral once  senna Syrup 10 milliLiter(s) Oral at bedtime  ursodiol Tablet 500 milliGRAM(s) Oral two times a day  vancomycin  IVPB 750 milliGRAM(s) IV Intermittent every 12 hours      PMHX/PSHX:  BPH (benign prostatic hyperplasia)    Hyperlipemia    HTN (hypertension)    HTN (hypertension)    Hypercholesteremia    Asthma    Diabetes    Kidney stone    Bladder stone    H/O lithotripsy    History of kidney stones    H/O umbilical hernia repair            ROS: unable to perform as pt is intubated and sedated      PHYSICAL EXAM:     GENERAL:  Appears stated age, toxic and ill appearing  HEENT:  NC/AT, eyes are closed  CHEST: +vented breath sounds  HEART:  Regular rhythm, S1, S2, no murmur/rub/S3/S4  ABDOMEN:  Soft, non-distended, normoactive bowel sounds  EXTREMITIES: 1-2+ BL LE edema  SKIN:  No rash/erythema/ecchymoses/petechiae/wounds/abscess/warm/dry  NEURO:  sedated    Vital Signs:  Vital Signs Last 24 Hrs  T(C): 37.3 (16 Sep 2021 08:00), Max: 38.3 (15 Sep 2021 10:30)  T(F): 99.2 (16 Sep 2021 08:00), Max: 100.9 (15 Sep 2021 10:30)  HR: 61 (16 Sep 2021 07:30) (61 - 77)  BP: --  BP(mean): --  RR: 30 (16 Sep 2021 07:30) (29 - 31)  SpO2: 98% (16 Sep 2021 07:30) (88% - 100%)  Daily     Daily Weight in k.4 (16 Sep 2021 04:00)    LABS:                        8.2    15.10 )-----------( 620      ( 16 Sep 2021 00:06 )             27.1     09-16    143  |  93<L>  |  45<H>  ----------------------------<  163<H>  3.5   |  36<H>  |  0.77    Ca    9.5      16 Sep 2021 00:06  Phos  3.1     09-16  Mg     2.1     09-16    TPro  6.7  /  Alb  2.5<L>  /  TBili  2.5<H>  /  DBili  x   /  AST  351<H>  /  ALT  259<H>  /  AlkPhos  2701<H>      LIVER FUNCTIONS - ( 16 Sep 2021 00:06 )  Alb: 2.5 g/dL / Pro: 6.7 g/dL / ALK PHOS: 2701 U/L / ALT: 259 U/L / AST: 351 U/L / GGT: x           PT/INR - ( 16 Sep 2021 00:06 )   PT: 14.5 sec;   INR: 1.22 ratio         PTT - ( 16 Sep 2021 00:06 )  PTT:36.6 sec        Imaging: No new abdominal imaging

## 2021-09-16 NOTE — PROGRESS NOTE ADULT - SUBJECTIVE AND OBJECTIVE BOX
Holger Knapp  PGY1    INTERVAL HPI/OVERNIGHT EVENTS: Held morning Bumex as patient's output was high and bicarb was elevated.    SUBJECTIVE: Patient seen and examined at bedside.     OBJECTIVE:    VITAL SIGNS:  ICU Vital Signs Last 24 Hrs  T(C): 37.3 (16 Sep 2021 08:00), Max: 38.3 (15 Sep 2021 10:30)  T(F): 99.2 (16 Sep 2021 08:00), Max: 100.9 (15 Sep 2021 10:30)  HR: 63 (16 Sep 2021 07:45) (61 - 77)  BP: --  BP(mean): --  ABP: 130/58 (16 Sep 2021 07:45) (97/47 - 177/77)  ABP(mean): 82 (16 Sep 2021 07:45) (62 - 114)  RR: 30 (16 Sep 2021 07:45) (29 - 31)  SpO2: 98% (16 Sep 2021 07:45) (88% - 100%)    Mode: AC/ CMV (Assist Control/ Continuous Mandatory Ventilation), RR (machine): 30, FiO2: 70, PEEP: 8, ITime: 0.6, MAP: 17, PC: 27, PIP: 37    09-15 @ 07:01 - 09-16 @ 07:00  --------------------------------------------------------  IN: 1993.7 mL / OUT: 4470 mL / NET: -2476.3 mL    09-16 @ 07:01 - 09-16 @ 08:04  --------------------------------------------------------  IN: 12.2 mL / OUT: 0 mL / NET: 12.2 mL      CAPILLARY BLOOD GLUCOSE      POCT Blood Glucose.: 152 mg/dL (16 Sep 2021 05:07)      PHYSICAL EXAM:  General: sedated  HEENT: NCAT, PERRL, clear conjunctiva, sclera anicteric  Neck: supple, no JVD  Respiratory: CTAB  Cardiovascular: RRR, S1S2, no m/r/g  Abdomen: soft, nontender, distended, normal bowel sounds  Extremities: no edema, no cyanosis  Skin: warm, perfused  Neurological: nonfocal, eyes half opened with verbal stimuli    MEDICATIONS:  MEDICATIONS  (STANDING):  ALBUTerol    90 MICROgram(s) HFA Inhaler 2 Puff(s) Inhalation every 8 hours  artificial tears (preservative free) Ophthalmic Solution 1 Drop(s) Both EYES every 8 hours  budesonide 160 MICROgram(s)/formoterol 4.5 MICROgram(s) Inhaler 2 Puff(s) Inhalation two times a day  buMETAnide 2 milliGRAM(s) Oral <User Schedule>  chlorhexidine 0.12% Liquid 15 milliLiter(s) Oral Mucosa every 12 hours  chlorhexidine 4% Liquid 1 Application(s) Topical <User Schedule>  cholecalciferol 2000 Unit(s) Oral daily  dexMEDEtomidine Infusion 0.2 MICROgram(s)/kG/Hr (4.08 mL/Hr) IV Continuous <Continuous>  dextrose 40% Gel 15 Gram(s) Oral once  dextrose 5%. 1000 milliLiter(s) (50 mL/Hr) IV Continuous <Continuous>  dextrose 5%. 1000 milliLiter(s) (100 mL/Hr) IV Continuous <Continuous>  dextrose 50% Injectable 25 Gram(s) IV Push once  dextrose 50% Injectable 12.5 Gram(s) IV Push once  dextrose 50% Injectable 25 Gram(s) IV Push once  enoxaparin Injectable 80 milliGRAM(s) SubCutaneous every 12 hours  glucagon  Injectable 1 milliGRAM(s) IntraMuscular once  insulin lispro (ADMELOG) corrective regimen sliding scale   SubCutaneous every 6 hours  insulin NPH human recombinant 10 Unit(s) SubCutaneous every 6 hours  levoFLOXacin IVPB 750 milliGRAM(s) IV Intermittent every 24 hours  LORazepam     Tablet 2 milliGRAM(s) Oral every 6 hours  methadone   Solution 20 milliGRAM(s) Oral every 8 hours  midodrine 40 milliGRAM(s) Oral every 8 hours  multivitamin/minerals Oral Solution (WELLESSE) 30 milliLiter(s) Enteral Tube daily  naloxegol 25 milliGRAM(s) Oral daily  norepinephrine Infusion 0.05 MICROgram(s)/kG/Min (7.65 mL/Hr) IV Continuous <Continuous>  polyethylene glycol 3350 17 Gram(s) Oral daily  potassium chloride   Solution 40 milliEquivalent(s) Oral once  senna Syrup 10 milliLiter(s) Oral at bedtime  ursodiol Tablet 500 milliGRAM(s) Oral two times a day  vancomycin  IVPB 750 milliGRAM(s) IV Intermittent every 12 hours    MEDICATIONS  (PRN):      ALLERGIES:  Allergies    No Known Allergies    Intolerances        LABS:                        8.2    15.10 )-----------( 620      ( 16 Sep 2021 00:06 )             27.1     09-16    143  |  93<L>  |  45<H>  ----------------------------<  163<H>  3.5   |  36<H>  |  0.77    Ca    9.5      16 Sep 2021 00:06  Phos  3.1     09-16  Mg     2.1     09-16    TPro  6.7  /  Alb  2.5<L>  /  TBili  2.5<H>  /  DBili  x   /  AST  351<H>  /  ALT  259<H>  /  AlkPhos  2701<H>  09-16    PT/INR - ( 16 Sep 2021 00:06 )   PT: 14.5 sec;   INR: 1.22 ratio         PTT - ( 16 Sep 2021 00:06 )  PTT:36.6 sec      RADIOLOGY & ADDITIONAL TESTS: Reviewed.

## 2021-09-17 NOTE — PROGRESS NOTE ADULT - SUBJECTIVE AND OBJECTIVE BOX
Chief Complaint:  Patient is a 71y old  Male who presents with a chief complaint of SOB (17 Sep 2021 07:53)      Interval Events:       Hospital Medications:  ALBUTerol    90 MICROgram(s) HFA Inhaler 2 Puff(s) Inhalation every 8 hours  artificial tears (preservative free) Ophthalmic Solution 1 Drop(s) Both EYES every 8 hours  budesonide 160 MICROgram(s)/formoterol 4.5 MICROgram(s) Inhaler 2 Puff(s) Inhalation two times a day  buMETAnide 2 milliGRAM(s) Oral two times a day  chlorhexidine 0.12% Liquid 15 milliLiter(s) Oral Mucosa every 12 hours  chlorhexidine 4% Liquid 1 Application(s) Topical <User Schedule>  cholecalciferol 2000 Unit(s) Oral daily  dexMEDEtomidine Infusion 0.2 MICROgram(s)/kG/Hr IV Continuous <Continuous>  dextrose 40% Gel 15 Gram(s) Oral once  dextrose 5%. 1000 milliLiter(s) IV Continuous <Continuous>  dextrose 5%. 1000 milliLiter(s) IV Continuous <Continuous>  dextrose 50% Injectable 25 Gram(s) IV Push once  dextrose 50% Injectable 12.5 Gram(s) IV Push once  dextrose 50% Injectable 25 Gram(s) IV Push once  enoxaparin Injectable 80 milliGRAM(s) SubCutaneous every 12 hours  glucagon  Injectable 1 milliGRAM(s) IntraMuscular once  insulin lispro (ADMELOG) corrective regimen sliding scale   SubCutaneous every 6 hours  insulin NPH human recombinant 5 Unit(s) SubCutaneous every 6 hours  levoFLOXacin IVPB 750 milliGRAM(s) IV Intermittent every 24 hours  LORazepam     Tablet 2 milliGRAM(s) Oral every 8 hours  methadone   Solution 20 milliGRAM(s) Oral every 8 hours  midodrine 40 milliGRAM(s) Oral every 8 hours  multivitamin/minerals Oral Solution (WELLESSE) 30 milliLiter(s) Enteral Tube daily  naloxegol 25 milliGRAM(s) Oral daily  norepinephrine Infusion 0.05 MICROgram(s)/kG/Min IV Continuous <Continuous>  polyethylene glycol 3350 17 Gram(s) Oral daily  senna Syrup 10 milliLiter(s) Oral at bedtime  ursodiol Tablet 500 milliGRAM(s) Oral two times a day      PMHX/PSHX:  BPH (benign prostatic hyperplasia)    Hyperlipemia    HTN (hypertension)    HTN (hypertension)    Hypercholesteremia    Asthma    Diabetes    Kidney stone    Bladder stone    H/O lithotripsy    History of kidney stones    H/O umbilical hernia repair        ROS: unable to perform as pt is intubated and sedated      PHYSICAL EXAM:     GENERAL:  Appears stated age, toxic and ill appearing  HEENT:  NC/AT, eyes are closed  CHEST: +vented breath sounds  HEART:  Regular rhythm, S1, S2, no murmur/rub/S3/S4  ABDOMEN:  Soft, non-distended, normoactive bowel sounds  EXTREMITIES: 1-2+ BL LE edema  SKIN:  No rash/erythema/ecchymoses/petechiae/wounds/abscess/warm/dry  NEURO:  sedated    Vital Signs:  Vital Signs Last 24 Hrs  T(C): 36.2 (17 Sep 2021 08:00), Max: 38.4 (16 Sep 2021 17:00)  T(F): 97.2 (17 Sep 2021 08:00), Max: 101.1 (16 Sep 2021 17:00)  HR: 57 (17 Sep 2021 08:00) (53 - 111)  BP: 133/76 (17 Sep 2021 08:00) (133/76 - 133/76)  BP(mean): 98 (17 Sep 2021 08:00) (98 - 98)  RR: 30 (17 Sep 2021 08:00) (25 - 40)  SpO2: 98% (17 Sep 2021 08:00) (90% - 100%)  Daily     Daily     LABS:                        7.6    11.00 )-----------( 497      ( 17 Sep 2021 00:27 )             26.0     09-17    142  |  95<L>  |  48<H>  ----------------------------<  271<H>  4.2   |  34<H>  |  0.92    Ca    9.2      17 Sep 2021 00:26  Phos  3.8     09-17  Mg     2.6     09-17    TPro  6.6  /  Alb  2.4<L>  /  TBili  2.5<H>  /  DBili  x   /  AST  269<H>  /  ALT  242<H>  /  AlkPhos  2654<H>  09-17    LIVER FUNCTIONS - ( 17 Sep 2021 00:26 )  Alb: 2.4 g/dL / Pro: 6.6 g/dL / ALK PHOS: 2654 U/L / ALT: 242 U/L / AST: 269 U/L / GGT: x           PT/INR - ( 16 Sep 2021 00:06 )   PT: 14.5 sec;   INR: 1.22 ratio         PTT - ( 16 Sep 2021 00:06 )  PTT:36.6 sec        Imaging:             Chief Complaint:  Patient is a 71y old  Male who presents with a chief complaint of SOB (17 Sep 2021 07:53)      Interval Events: Off pressors this AM. Liver enzymes and T bili are improving.      Hospital Medications:  ALBUTerol    90 MICROgram(s) HFA Inhaler 2 Puff(s) Inhalation every 8 hours  artificial tears (preservative free) Ophthalmic Solution 1 Drop(s) Both EYES every 8 hours  budesonide 160 MICROgram(s)/formoterol 4.5 MICROgram(s) Inhaler 2 Puff(s) Inhalation two times a day  buMETAnide 2 milliGRAM(s) Oral two times a day  chlorhexidine 0.12% Liquid 15 milliLiter(s) Oral Mucosa every 12 hours  chlorhexidine 4% Liquid 1 Application(s) Topical <User Schedule>  cholecalciferol 2000 Unit(s) Oral daily  dexMEDEtomidine Infusion 0.2 MICROgram(s)/kG/Hr IV Continuous <Continuous>  dextrose 40% Gel 15 Gram(s) Oral once  dextrose 5%. 1000 milliLiter(s) IV Continuous <Continuous>  dextrose 5%. 1000 milliLiter(s) IV Continuous <Continuous>  dextrose 50% Injectable 25 Gram(s) IV Push once  dextrose 50% Injectable 12.5 Gram(s) IV Push once  dextrose 50% Injectable 25 Gram(s) IV Push once  enoxaparin Injectable 80 milliGRAM(s) SubCutaneous every 12 hours  glucagon  Injectable 1 milliGRAM(s) IntraMuscular once  insulin lispro (ADMELOG) corrective regimen sliding scale   SubCutaneous every 6 hours  insulin NPH human recombinant 5 Unit(s) SubCutaneous every 6 hours  levoFLOXacin IVPB 750 milliGRAM(s) IV Intermittent every 24 hours  LORazepam     Tablet 2 milliGRAM(s) Oral every 8 hours  methadone   Solution 20 milliGRAM(s) Oral every 8 hours  midodrine 40 milliGRAM(s) Oral every 8 hours  multivitamin/minerals Oral Solution (WELLESSE) 30 milliLiter(s) Enteral Tube daily  naloxegol 25 milliGRAM(s) Oral daily  norepinephrine Infusion 0.05 MICROgram(s)/kG/Min IV Continuous <Continuous>  polyethylene glycol 3350 17 Gram(s) Oral daily  senna Syrup 10 milliLiter(s) Oral at bedtime  ursodiol Tablet 500 milliGRAM(s) Oral two times a day      PMHX/PSHX:  BPH (benign prostatic hyperplasia)    Hyperlipemia    HTN (hypertension)    HTN (hypertension)    Hypercholesteremia    Asthma    Diabetes    Kidney stone    Bladder stone    H/O lithotripsy    History of kidney stones    H/O umbilical hernia repair        ROS: unable to perform as pt is intubated and sedated      PHYSICAL EXAM:     GENERAL:  Appears stated age, toxic and ill appearing  HEENT:  NC/AT, eyes are closed  CHEST: +vented breath sounds  HEART:  Regular rhythm, S1, S2, no murmur/rub/S3/S4  ABDOMEN:  Soft, non-distended, normoactive bowel sounds  EXTREMITIES: 1+ BL LE edema  SKIN:  No rash/erythema/ecchymoses/petechiae/wounds/abscess/warm/dry  NEURO:  sedated    Vital Signs:  Vital Signs Last 24 Hrs  T(C): 36.2 (17 Sep 2021 08:00), Max: 38.4 (16 Sep 2021 17:00)  T(F): 97.2 (17 Sep 2021 08:00), Max: 101.1 (16 Sep 2021 17:00)  HR: 57 (17 Sep 2021 08:00) (53 - 111)  BP: 133/76 (17 Sep 2021 08:00) (133/76 - 133/76)  BP(mean): 98 (17 Sep 2021 08:00) (98 - 98)  RR: 30 (17 Sep 2021 08:00) (25 - 40)  SpO2: 98% (17 Sep 2021 08:00) (90% - 100%)  Daily     Daily     LABS:                        7.6    11.00 )-----------( 497      ( 17 Sep 2021 00:27 )             26.0     09-17    142  |  95<L>  |  48<H>  ----------------------------<  271<H>  4.2   |  34<H>  |  0.92    Ca    9.2      17 Sep 2021 00:26  Phos  3.8     09-17  Mg     2.6     09-17    TPro  6.6  /  Alb  2.4<L>  /  TBili  2.5<H>  /  DBili  x   /  AST  269<H>  /  ALT  242<H>  /  AlkPhos  2654<H>  09-17    LIVER FUNCTIONS - ( 17 Sep 2021 00:26 )  Alb: 2.4 g/dL / Pro: 6.6 g/dL / ALK PHOS: 2654 U/L / ALT: 242 U/L / AST: 269 U/L / GGT: x           PT/INR - ( 16 Sep 2021 00:06 )   PT: 14.5 sec;   INR: 1.22 ratio         PTT - ( 16 Sep 2021 00:06 )  PTT:36.6 sec        Imaging: No new abdominal imaging

## 2021-09-17 NOTE — PROGRESS NOTE ADULT - ASSESSMENT
71 year old man w/pmhx of HTN, DM2, BPH, and Asthma here for acute hypoxic respiratory failure and ARDS secondary to viral pneumonia from COVID-19. Hospital course complicated by VT arrest, DVT and MRSE bacteremia.    #Neuro   1) sedated  - c/w precedex  - c/w methadone 20TID and Ativan 2mg q8h  -noncont CT appears unchanged 8/12    #ENT   -oropharyngeal packing removed on 9/10  -ENT said they will pack if patient's h/h drops  -will need FiO2 at 50% or less and PaO2 at 60 for qualify for tach    #CV   1)Cardiac Arrest VT  - S/p Amiodarone and electrolyte supplementation   - No further VT noted   - Echo reveals Right hrt strain   - c/w bumex 2mg BID with goal of net even  -strict I's and O's with mace    #Pulm   1)Acute hypoxic resp failure 2/2 cardiac arrest  - vent Pressure AC 30/27/70/8 wean as tolerated    2) Asthma  - c/w Proventil q8h and Symbicort BID    #GI   1)Nutrition   - maintain Glucerna tube feeds as tolerated   - c/w senna/ miralax     2)Transaminitis - likely 2/2 cholestasis of sepsis vs SSC CIP (secondary sclerosing cholangitis in critically ill patients), Covid cholangiopathy, drug-induced liver injury  - c/w ursodiol 500 mg BID            - supportive treatment per hepatology    #Renal  1)Hypervolemia   - Strict I/O   - Diuresis as above  - Monitor electrolytes     #ID   1)COVID - 19   - S/p RDV Dexa and TOCI   - Continues on decadron per family preferences  - will continue with supportive care     2)MRSE Bacteremia   - S/P 14 day course of Vanc from 8/20-9/3 therapy, and intermittent gram-&anaerobe coverage   - ID follow up appreciated   - Ua neg  - Bcx on 9/11 revealed staph epidermis on both bottles  - Bcx on 9/12 one bottle reveals staph epi similar to 9/11 bottles  - Bcx on 9/14 NGTD  - afebrile OVN  - holding vanc as random level supratherapeutic will repeat later today and readjust dose    3) Vent associated PNA vs colonization  - Sputum cx revealed kleb siella and enterobact  - dc'd IV Zosyn x5-7d (9/12 -9/13 )  - d/c meropenem (9/13-9/14)  -began levaquin on 9/14 for total 4 days including meropenem dates as bacteria are sensitive per Dr. Paul  -c/w levaquin as patient made a lot of secretions on 9/16    #Endo   1)DM   - c/w NPH 5u q6h  - c/w ISS   - goal Blood Sugar 100-180     #Heme  1)b/l DVTs above knee on left and below knee on R  -c/w Lovenox 80 BID    #Prophylaxis  - therapeutic on lovenox    #GOC  -palliative consult following  -full code at this time  -clarified with family regarding why patient was intubated      71 year old man w/pmhx of HTN, DM2, BPH, and Asthma here for acute hypoxic respiratory failure and ARDS secondary to viral pneumonia from COVID-19. Hospital course complicated by VT arrest, DVT and MRSE bacteremia.    #Neuro   1) sedated  - c/w precedex  - c/w methadone 15TID and Ativan 2mg q8h  -noncont CT appears unchanged 8/12    #ENT   -oropharyngeal packing removed on 9/10  -ENT said they will pack if patient's h/h drops  -will need FiO2 at 50% or less and PaO2 at 60 for qualify for tach    #CV   1)Cardiac Arrest VT  - S/p Amiodarone and electrolyte supplementation   - No further VT noted   - Echo reveals Right hrt strain   -holding diuretics, will revisit when bicarb at 30    #Pulm   1)Acute hypoxic resp failure 2/2 cardiac arrest  - vent Pressure AC 30/27/70/8 wean as tolerated    2) Asthma  - c/w Proventil q8h and Symbicort BID    #GI   1)Nutrition   - maintain Glucerna tube feeds as tolerated   - c/w senna/ miralax     2)Transaminitis - likely 2/2 cholestasis of sepsis vs SSC CIP (secondary sclerosing cholangitis in critically ill patients), Covid cholangiopathy, drug-induced liver injury  - c/w ursodiol 500 mg BID            - supportive treatment per hepatology    #Renal  1)Hypervolemia   - Strict I/O   - Diuresis as above  - Monitor electrolytes     #ID   1)COVID - 19   - S/p RDV Dexa and TOCI   - Continues on decadron per family preferences  - will continue with supportive care     2)MRSE Bacteremia   - S/P 14 day course of Vanc from 8/20-9/3 therapy, and intermittent gram-&anaerobe coverage   - ID follow up appreciated   - Ua neg  - Bcx on 9/11 revealed staph epidermis on both bottles  - Bcx on 9/12 one bottle reveals staph epi similar to 9/11 bottles  - Bcx on 9/14 NGTD  - afebrile OVN  - holding vanc as random level supratherapeutic will repeat later today and readjust dose    3) Vent associated PNA vs colonization  - Sputum cx revealed kleb siella and enterobact  - dc'd IV Zosyn x5-7d (9/12 -9/13 )  - d/c meropenem (9/13-9/14)  -began levaquin on 9/14 for total 4 days including meropenem dates as bacteria are sensitive per Dr. Paul  -c/w levaquin as patient made a lot of secretions on 9/16 for total duration of 7d txt(9/19)    #Endo   1)DM   - c/w NPH 5u q6h  - c/w ISS   - goal Blood Sugar 100-180     #Heme  1)b/l DVTs above knee on left and below knee on R  -c/w Lovenox 80 BID    #Prophylaxis  - therapeutic on lovenox    #GOC  -palliative consult following  -full code at this time  -clarified with family regarding why patient was intubated

## 2021-09-17 NOTE — PROGRESS NOTE ADULT - ATTENDING COMMENTS
Hepatology Staff: Octavio Pope MD  I saw and examined the patient along with  Dr. Goyal on  09-17-21 @ 16:09  Patient Medical Record, hosptial course was reviewed and summarized as below:    Vitals: Vital Signs Last 24 Hrs  T(C): 38.2 (17 Sep 2021 16:00), Max: 38.4 (16 Sep 2021 17:00)  T(F): 100.8 (17 Sep 2021 16:00), Max: 101.1 (16 Sep 2021 17:00)  HR: 66 (17 Sep 2021 16:00) (53 - 111)  BP: 91/61 (17 Sep 2021 16:00) (91/61 - 134/74)  BP(mean): 70 (17 Sep 2021 16:00) (70 - 99)  RR: 30 (17 Sep 2021 16:00) (29 - 40)  SpO2: 97% (17 Sep 2021 16:00) (93% - 100%)    Labs:Creatinine, Serum: 0.85 mg/dL (09-17-21 @ 10:59)  Bilirubin Total, Serum: 2.7 mg/dL (09-17-21 @ 10:59)  Creatinine, Serum: 0.92 mg/dL (09-17-21 @ 00:26)  Bilirubin Total, Serum: 2.5 mg/dL (09-17-21 @ 00:26)      I/O: I&O's Summary    16 Sep 2021 07:01  -  17 Sep 2021 07:00  --------------------------------------------------------  IN: 2419.4 mL / OUT: 0 mL / NET: 2419.4 mL    17 Sep 2021 07:01  -  17 Sep 2021 16:09  --------------------------------------------------------  IN: 665.4 mL / OUT: 0 mL / NET: 665.4 mL      Nutritional Status:   Albumin, Serum: 2.2 g/dL (09-17-21 @ 10:59)  Albumin, Serum: 2.4 g/dL (09-17-21 @ 00:26)    Recommendations: This is a 71-year-old male with multiple comorbidities as outlined in fellow's note currently being admitted with severe COVID-19 infection with respiratory failure, status post intubation, complicated course with cardiac arrest with ROSC after 6 minutes.  Patient remains on multiple IV pressors.  Already been treated for COVID-19 infection with remdesivir and Tocilizumab.  Hepatology was consulted for elevated liver enzymes.     I agree with above outlined history, physical examination, assessment and plan.      Elevated liver enzymes which is in a cholestatic pattern likely multifactorial.  Noted patient has MRSE bacteremia and also has positive sputum culture with Klebsiella and Enterobacter currently being treated with IV Levofloxacin ( Switched from IV Meropenem).  Most recent blood cultures from September 11 revealed staph epidermidis in both bottles and blood culture from September 12 revealed 1 bottle of staph epidermidis.   On 9/13 positive for staph aureus.  Patient remains on IV vancomycin.    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  Management will be supportive.  Management of sepsis per primary team.  Noted repeat cultures from 9- with negative growth.    Considering safety of ursodiol and concern for ?DILI, would recommend continuing with ursodiol 500 mg twice a day. Overall improving LFTs.    No additional recs from heopatology. We will sign off.   Continue with ICU supportive care.      Plan discussed with Primary team.

## 2021-09-17 NOTE — PROGRESS NOTE ADULT - SUBJECTIVE AND OBJECTIVE BOX
[facility]-- WOUND TEAM -- FOLLOW UP NOTE  --------------------------------------------------------------------------------    24 hour events/subjective:  ongoing [facility]  pt remains intubated and sedated in ICU -critically ill with multiorgan system failure - he has a hx of cardiac arrest  and cardiogenic shock, acute kidney injury which is resolving, and respiratory failure requiring intubation.  no f/c/s  tolerating TF  incontinent  in micu now      Diet:  Diet, NPO with Tube Feed:   Tube Feeding Modality: Nasogastric  Glucerna 1.5 Stephen (GLUCERNA1.5RTH)  Total Volume for 24 Hours (mL): 1080  Continuous  Starting Tube Feed Rate mL per Hour: 30  Increase Tube Feed Rate by (mL): 10     Every 2 hours  Until Goal Tube Feed Rate (mL per Hour): 60  Tube Feed Duration (in Hours): 18  Tube Feed Start Time: 11:00 (09-11-21 @ 08:14)      ROS: pt unable to offer    ALLERGIES & MEDICATIONS  --------------------------------------------------------------------------------    No Known Allergies    STANDING INPATIENT MEDICATIONS  ALBUTerol    90 MICROgram(s) HFA Inhaler 2 Puff(s) Inhalation every 8 hours  artificial tears (preservative free) Ophthalmic Solution 1 Drop(s) Both EYES every 8 hours  budesonide 160 MICROgram(s)/formoterol 4.5 MICROgram(s) Inhaler 2 Puff(s) Inhalation two times a day  chlorhexidine 0.12% Liquid 15 milliLiter(s) Oral Mucosa every 12 hours  chlorhexidine 4% Liquid 1 Application(s) Topical <User Schedule>  cholecalciferol 2000 Unit(s) Oral daily  dexMEDEtomidine Infusion 0.2 MICROgram(s)/kG/Hr IV Continuous <Continuous>  dextrose 40% Gel 15 Gram(s) Oral once  dextrose 5%. 1000 milliLiter(s) IV Continuous <Continuous>  dextrose 5%. 1000 milliLiter(s) IV Continuous <Continuous>  dextrose 50% Injectable 25 Gram(s) IV Push once  dextrose 50% Injectable 12.5 Gram(s) IV Push once  dextrose 50% Injectable 25 Gram(s) IV Push once  enoxaparin Injectable 80 milliGRAM(s) SubCutaneous every 12 hours  glucagon  Injectable 1 milliGRAM(s) IntraMuscular once  insulin lispro (ADMELOG) corrective regimen sliding scale   SubCutaneous every 6 hours  insulin NPH human recombinant 8 Unit(s) SubCutaneous every 6 hours  levoFLOXacin IVPB 750 milliGRAM(s) IV Intermittent every 24 hours  LORazepam     Tablet 2 milliGRAM(s) Oral every 8 hours  methadone   Solution 15 milliGRAM(s) Oral every 8 hours  midodrine 40 milliGRAM(s) Oral every 8 hours  multivitamin/minerals Oral Solution (WELLESSE) 30 milliLiter(s) Enteral Tube daily  naloxegol 25 milliGRAM(s) Oral daily  norepinephrine Infusion 0.05 MICROgram(s)/kG/Min IV Continuous <Continuous>  polyethylene glycol 3350 17 Gram(s) Oral daily  senna Syrup 10 milliLiter(s) Oral at bedtime  ursodiol Tablet 500 milliGRAM(s) Oral two times a day      VITALS/PHYSICAL EXAM  --------------------------------------------------------------------------------  T(C): 38.8 (09-17-21 @ 17:00), Max: 38.8 (09-17-21 @ 17:00)  HR: 75 (09-17-21 @ 18:45) (53 - 81)  BP: 147/71 (09-17-21 @ 18:45) (80/50 - 147/71)  RR: 30 (09-17-21 @ 18:45) (27 - 39)  SpO2: 98% (09-17-21 @ 18:45) (91% - 100%)  Wt(kg): --        09-16-21 @ 07:01  -  09-17-21 @ 07:00  --------------------------------------------------------  IN: 2419.4 mL / OUT: 0 mL / NET: 2419.4 mL    09-17-21 @ 07:01  -  09-17-21 @ 18:54  --------------------------------------------------------  IN: 948.9 mL / OUT: 575 mL / NET: 373.9 mL    Physical Exam:  General: guarded but stable, intubated, obese   HEENT: nc/at, (+)ngt, ET tube. mucosa moist, trachea midline  Psych: sedated  Neurology: nonverbal, not following commands  Musculoskeletal: no contractures  Vascular: BLE edema equal & warm no acute ischemia  Skin:  skin intact, moist w/ good turgor  Bilateral buttocks into gluteal cleft evolving deep tissue injury   in  8cm x 9cm x0cm area w/ denuded and partial thickness skin loss & granular       into gluteal cleft, 40% dti in evolution - darker skin, than the pts overall skin tone.   No drainage, odor, erythema, increased warmth, tenderness, induration, fluctuance        LABS/ CULTURES/ RADIOLOGY:              7.6    11.00 >-----------<  497      [09-17-21 @ 00:27]              26.0     142  |  97  |  48  ----------------------------<  254      [09-17-21 @ 10:59]  3.8   |  33  |  0.85        Ca     9.4     [09-17-21 @ 10:59]      Mg     2.7     [09-17-21 @ 10:59]      Phos  3.4     [09-17-21 @ 10:59]    TPro  6.7  /  Alb  2.2  /  TBili  2.7  /  DBili  x   /  AST  228  /  ALT  218  /  AlkPhos  2587  [09-17-21 @ 10:59]    PT/INR: PT 14.5 , INR 1.22       [09-16-21 @ 00:06]  PTT: 36.6       [09-16-21 @ 00:06]      Blood Gas Arterial - Calcium, Ionized: 1.20 mmol/L (09-17-21 @ 00:24)  Blood Gas Arterial - Calcium, Ionized: 1.12 mmol/L (09-16-21 @ 00:03)      CAPILLARY BLOOD GLUCOSE      POCT Blood Glucose.: 243 mg/dL (17 Sep 2021 17:07)  POCT Blood Glucose.: 269 mg/dL (17 Sep 2021 10:53)  POCT Blood Glucose.: 181 mg/dL (17 Sep 2021 05:10)  POCT Blood Glucose.: 269 mg/dL (17 Sep 2021 00:10)      Culture - Blood (collected 09-14-21 @ 02:21)  Source: .Blood Blood-Peripheral  Preliminary Report (09-15-21 @ 03:01):    No growth to date.    Culture - Blood (collected 09-14-21 @ 02:21)  Source: .Blood Blood-Peripheral  Preliminary Report (09-15-21 @ 03:01):    No growth to date.    Culture - Blood (collected 09-12-21 @ 04:15)  Source: .Blood Blood-Venous  Final Report (09-17-21 @ 05:00):    No Growth Final      A1C with Estimated Average Glucose Result: 7.9 % (08-21-21 @ 16:09)  A1C with Estimated Average Glucose Result: 7.3 % (08-14-21 @ 14:36)

## 2021-09-17 NOTE — PROGRESS NOTE ADULT - ATTENDING COMMENTS
1.Aute hypoxemic respiratory failure due to Covid pneumonia and ARDS. Continue lung protective ventilation with PC , insp27 PEEP 8 FIO2 70-80%.Pt finished second course of dexamethasone. Pt received. TOCI.  2.Sedation: Decrease versed drip as tolerated today. Continue Valium Precedex and methadone.   3.ID: MRSE bacteremia. Continue Vancomycin. Klebsiella and enterobacter  from sputum.Pt with increasing sputum production. Still with low grade fevers. Continue  Levaquin.  4.Increasing alk phos more than LFTS.  RUQ sonogram no obstruction. Sludge with some small stones Started on ursodiol.  5. Hypotension vasopelagic requiring Levophed.  6.S/P 6 minute cardiac arrest after intubation.  CT head  yesterday without acute changes.  7. Continue tube feeds.  8. Bumex IVP prn to keep  net fluid even. Serum bicarb rising . May need to stop all diuretics.  9. GOC discussion with family. Clarified that pt was not intubated because of Anxiety. Pt was intubated for worsening Covid pneumonia. Family still discussing GOC.

## 2021-09-17 NOTE — PROGRESS NOTE ADULT - ASSESSMENT
A/P 71M w/ PMH/o HTN, DM2, BPH, Asthma admit 8/12 Covid-19 PNA and cardiac arrest.    Wound consult requested to assist w/ management   Evolving bilateral buttock DTI, possible stage 2       possible skin failing as in multiorgan failure or a manifestation of COVID skin changes  Incontinence Dermatitis  Incontinent of stool & urine  prophylaxis measures    Recommend:  1.) Bilateral buttocks- TRIAD Paste BID and prn soiling w/ pericare as perprotocol  2.) Nutritional optimization in pt w/ Severe, acute malnutrition         Glucerna TF, high quality protein, and MVI/ vit c to assist w/ wound healing  3.) Maintain on an alternating air with low air loss surface  4.) Turn and reposition q 2 hours  5.) Incontinence management - incontinence pads, cleanser, pericare BID         Loose BM -improving w/senna, consider Banatrol  6.) Offload heels/feet with complete cAir air fluidized boots  7.) Hyperglycemia improving w/ NPH, ISS w/ q6h FS, and glucerna TF   Care as per MICU. will follow w/ you  Upon discharge f/[facility] as [facility] at [facility] [address] [place] [address] [phone]  D/w RN and team  Holly Rubio PA-C, [id number]

## 2021-09-17 NOTE — PROGRESS NOTE ADULT - ASSESSMENT
72yo M w/ PMHx of HTN, DM2, BPH, asthma presents with shortness of breath. Patient endorses that he has been feeling SOB with associated cough for the last 2 weeks and his symptoms slowly worsened over time. He did not receive the COVID vaccine. Pt was found to be Covid+ on 8/12 and intubated on 8/20 for respiratory distress. Immediately post intubation on 8/20 pt had cardiac arrest; with ROSC after 6 min, VT x 2 s/p electrical shock x 2 and 2 epi's. Patient was in shock and on multiple vasopressors and upon transfer to ICU on 8/20/21 patient was hypotensive 50/30 requiring multiple pushes of Neosynephrine, Bicarb, Epi, and D50 while on 3 vasopressors. Had been sedated with ketamine since 8/21 until 9/10. s/p remdesivir 8/13-8/17, tocilizumab 8/15, atorvastatin until 8/17, haldol 8/19-20. Required amio 8/27 for significant ectopy. Hepatology consulted for elevated liver enzymes.    Impression:  #elevated liver enyzmes- has mixed hepatocellular and cholestatic pattern of injury; has had elevated AST/ALT/Alk phos starting 8/23 to 8/24; differential includes COVID cholangiopathy vs DILI (2/2 ketamine vs atorvastatin vs haldol vs remdesivir/tocilizumab) vs cholestasis of sepsis 2/2 superimposed infection from VAP (Sputum cx from 9/11 growing E. cloacae + klebsiella pneumoniae) + persistent GPC bacteremia; other viral infectious workup negative- CMV/EBV IgM neg, HAV/HBV/HCV neg; less likely shock liver or ischemic injury s/p cardiac arrest 8/20/21 given AST/ALT are not markedly elevated (<1000) and have continued to uptrend weeks after cardiac arrest; unlikely AIH given acute onset; US abd + doppler 9/9 showing patent portal and hepatic veins. Had repeat RUQ US 9/13 showing mild hepatomegaly with enlarged L lobe, but smooth contour; normal CBD 7 mm; ?tiny stones and sludge w/in GB; no ascites.  #VAP- Sputum cx from 9/11 growing E. cloacae + klebsiella pneumoniae; s/p Zosyn (9/12-9/13), s/p meropenem (9/13), levaquin (9/14->)  #persistent GPC bacteremia- bl cx x2 8/24 with MRSE (methicillin resistant staph epi); bl cx 9/12 growing staph epi; repeat culture 9/14 with no growth; vanco (8/25-8/29; 9/11->)  #COVID PNA; tested positive on 8/12, intubated 8/20; s/p remdesivir 8/13-8/17, tocilizumab 8/15      Recommendations:  - trend daily CMP, INR  - c/w vanc for MRSE; repeat bl cx 9/14 with no growth  - c/w levaquin for VAP (E. cloacae + klebsiella pneumoniae)   - appreciate ID recs  - c/w ursodiol 500 mg BID  - avoid hepatotoxic agents including ketamine, haldol, statins  - rest of care per primary team      Trice Goyal MD  GI Fellow, PGY-4  Available via [employer] Teams    NON-URGENT CONSULTS:  Please email [email or web address]  AT NIGHT AND ON WEEKENDS:  Contact on-call GI fellow via answering service ([phone]) from 5pm-8am and on weekends/holidays  MONDAY-FRIDAY 8AM-5PM:  Pager# [phone] ([facility]) or [phone] ([facility])  GI Phone# [phone] ([facility])   70yo M w/ PMHx of HTN, DM2, BPH, asthma presents with shortness of breath. Patient endorses that he has been feeling SOB with associated cough for the last 2 weeks and his symptoms slowly worsened over time. He did not receive the COVID vaccine. Pt was found to be Covid+ on 8/12 and intubated on 8/20 for respiratory distress. Immediately post intubation on 8/20 pt had cardiac arrest; with ROSC after 6 min, VT x 2 s/p electrical shock x 2 and 2 epi's. Patient was in shock and on multiple vasopressors and upon transfer to ICU on 8/20/21 patient was hypotensive 50/30 requiring multiple pushes of Neosynephrine, Bicarb, Epi, and D50 while on 3 vasopressors. Had been sedated with ketamine since 8/21 until 9/10. s/p remdesivir 8/13-8/17, tocilizumab 8/15, atorvastatin until 8/17, haldol 8/19-20. Required amio 8/27 for significant ectopy. Hepatology consulted for elevated liver enzymes.    Impression:  #elevated liver enyzmes- has mixed hepatocellular and cholestatic pattern of injury; has had elevated AST/ALT/Alk phos starting 8/23 to 8/24; differential includes COVID cholangiopathy vs DILI (2/2 ketamine vs atorvastatin vs haldol vs remdesivir/tocilizumab) vs cholestasis of sepsis 2/2 superimposed infection from VAP (Sputum cx from 9/11 growing E. cloacae + klebsiella pneumoniae) + persistent GPC bacteremia; other viral infectious workup negative- CMV/EBV IgM neg, HAV/HBV/HCV neg; less likely shock liver or ischemic injury s/p cardiac arrest 8/20/21 given AST/ALT are not markedly elevated (<1000) and have continued to uptrend weeks after cardiac arrest; unlikely AIH given acute onset; US abd + doppler 9/9 showing patent portal and hepatic veins. Had repeat RUQ US 9/13 showing mild hepatomegaly with enlarged L lobe, but smooth contour; normal CBD 7 mm; ?tiny stones and sludge w/in GB; no ascites.  #VAP- Sputum cx from 9/11 growing E. cloacae + klebsiella pneumoniae; s/p Zosyn (9/12-9/13), s/p meropenem (9/13), levaquin (9/14->)  #persistent GPC bacteremia- bl cx x2 8/24 with MRSE (methicillin resistant staph epi); bl cx 9/12 growing staph epi; repeat culture 9/14 with no growth; vanco (8/25-8/29; 9/11->)  #COVID PNA; tested positive on 8/12, intubated 8/20; s/p remdesivir 8/13-8/17, tocilizumab 8/15      Recommendations:  - trend daily CMP, INR  - c/w vanc for MRSE; repeat bl cx 9/14 with no growth  - c/w levaquin for VAP (E. cloacae + klebsiella pneumoniae)   - appreciate ID recs  - c/w ursodiol 500 mg BID  - avoid hepatotoxic agents including ketamine, haldol, statins  - rest of care per primary team    We will sign off at this time. Please reconsult/page if questions.      Trice Goyal MD  GI Fellow, PGY-4  Available via [employer] Teams    NON-URGENT CONSULTS:  Please email [email or web address]  AT NIGHT AND ON WEEKENDS:  Contact on-call GI fellow via answering service ([phone]) from 5pm-8am and on weekends/holidays  MONDAY-FRIDAY 8AM-5PM:  Pager# [phone] ([facility]) or [phone] ([facility])  GI Phone# [phone] ([facility])   72yo M w/ PMHx of HTN, DM2, BPH, asthma presents with shortness of breath. Patient endorses that he has been feeling SOB with associated cough for the last 2 weeks and his symptoms slowly worsened over time. He did not receive the COVID vaccine. Pt was found to be Covid+ on 8/12 and intubated on 8/20 for respiratory distress. Immediately post intubation on 8/20 pt had cardiac arrest; with ROSC after 6 min, VT x 2 s/p electrical shock x 2 and 2 epi's. Patient was in shock and on multiple vasopressors and upon transfer to ICU on 8/20/21 patient was hypotensive 50/30 requiring multiple pushes of Neosynephrine, Bicarb, Epi, and D50 while on 3 vasopressors. Had been sedated with ketamine since 8/21 until 9/10. s/p remdesivir 8/13-8/17, tocilizumab 8/15, atorvastatin until 8/17, haldol 8/19-20. Required amio 8/27 for significant ectopy. Hepatology consulted for elevated liver enzymes.    Impression:  #elevated liver enyzmes- has mixed hepatocellular and cholestatic pattern of injury; has had elevated AST/ALT/Alk phos starting 8/23 to 8/24; differential includes COVID cholangiopathy vs DILI (2/2 ketamine vs atorvastatin vs haldol vs remdesivir/tocilizumab) vs cholestasis of sepsis 2/2 superimposed infection from VAP (Sputum cx from 9/11 growing E. cloacae + klebsiella pneumoniae) + persistent GPC bacteremia; other viral infectious workup negative- CMV/EBV IgM neg, HAV/HBV/HCV neg; less likely shock liver or ischemic injury s/p cardiac arrest 8/20/21 given AST/ALT are not markedly elevated (<1000) and have continued to uptrend weeks after cardiac arrest; unlikely AIH given acute onset; US abd + doppler 9/9 showing patent portal and hepatic veins. Had repeat RUQ US 9/13 showing mild hepatomegaly with enlarged L lobe, but smooth contour; normal CBD 7 mm; ?tiny stones and sludge w/in GB; no ascites.  #VAP- Sputum cx from 9/11 growing E. cloacae + klebsiella pneumoniae; s/p Zosyn (9/12-9/13), s/p meropenem (9/13), levaquin (9/14->)  #persistent GPC bacteremia- bl cx x2 8/24 with MRSE (methicillin resistant staph epi); bl cx 9/12 growing staph epi; repeat culture 9/14 with no growth; vanco (8/25-8/29; 9/11->)  #COVID PNA; tested positive on 8/12, intubated 8/20; s/p remdesivir 8/13-8/17, tocilizumab 8/15      Recommendations:  - trend daily CMP, INR  - c/w vanc for MRSE; repeat bl cx 9/14 with no growth  - c/w levaquin for VAP (E. cloacae + klebsiella pneumoniae)   - appreciate ID recs  - c/w ursodiol 500 mg BID for 1 month  - avoid hepatotoxic agents including ketamine, haldol, statins  - rest of care per primary team    We will sign off at this time. Please reconsult/page if questions.      Trice Goyal MD  GI Fellow, PGY-4  Available via [employer] Teams    NON-URGENT CONSULTS:  Please email [email or web address]  [email or web address]  AT NIGHT AND ON WEEKENDS:  Contact on-call GI fellow via answering service ([phone]) from 5pm-8am and on weekends/holidays  MONDAY-FRIDAY 8AM-5PM:  Pager# [phone] ([facility] or [phone] ([facility])  GI Phone# [phone] ([facility])

## 2021-09-17 NOTE — PROGRESS NOTE ADULT - SUBJECTIVE AND OBJECTIVE BOX
Holger Knapp  PGY1    INTERVAL HPI/OVERNIGHT EVENTS: Unable to track I's and O's as condom cath keeps slipping off.     SUBJECTIVE: Patient seen and examined at bedside.       VITAL SIGNS:  ICU Vital Signs Last 24 Hrs  T(C): 37.2 (17 Sep 2021 05:00), Max: 38.4 (16 Sep 2021 17:00)  T(F): 99 (17 Sep 2021 05:00), Max: 101.1 (16 Sep 2021 17:00)  HR: 54 (17 Sep 2021 06:45) (54 - 111)  BP: --  BP(mean): --  ABP: 179/79 (17 Sep 2021 06:45) (85/40 - 219/99)  ABP(mean): 118 (17 Sep 2021 06:45) (56 - 137)  RR: 30 (17 Sep 2021 06:45) (25 - 40)  SpO2: 98% (17 Sep 2021 06:45) (90% - 100%)    Mode: AC/ CMV (Assist Control/ Continuous Mandatory Ventilation), RR (machine): 30, FiO2: 70, PEEP: 8, ITime: 1, MAP: 17, PC: 27, PIP: 37    09-16 @ 07:01  -  09-17 @ 07:00  --------------------------------------------------------  IN: 2419.4 mL / OUT: 0 mL / NET: 2419.4 mL      CAPILLARY BLOOD GLUCOSE      POCT Blood Glucose.: 181 mg/dL (17 Sep 2021 05:10)      PHYSICAL EXAM:  General: sedated  HEENT: NCAT, PERRL, clear conjunctiva, sclera icteric  Neck: supple, no JVD  Respiratory: CTAB  Cardiovascular: RRR, S1S2, no m/r/g  Abdomen: soft, nontender, distended, normal bowel sounds  Extremities: no edema, no cyanosis  Skin: warm, perfused  Neurological: nonfocal    MEDICATIONS:  MEDICATIONS  (STANDING):  ALBUTerol    90 MICROgram(s) HFA Inhaler 2 Puff(s) Inhalation every 8 hours  artificial tears (preservative free) Ophthalmic Solution 1 Drop(s) Both EYES every 8 hours  budesonide 160 MICROgram(s)/formoterol 4.5 MICROgram(s) Inhaler 2 Puff(s) Inhalation two times a day  buMETAnide 2 milliGRAM(s) Oral two times a day  chlorhexidine 0.12% Liquid 15 milliLiter(s) Oral Mucosa every 12 hours  chlorhexidine 4% Liquid 1 Application(s) Topical <User Schedule>  cholecalciferol 2000 Unit(s) Oral daily  dexMEDEtomidine Infusion 0.2 MICROgram(s)/kG/Hr (4.08 mL/Hr) IV Continuous <Continuous>  dextrose 40% Gel 15 Gram(s) Oral once  dextrose 5%. 1000 milliLiter(s) (50 mL/Hr) IV Continuous <Continuous>  dextrose 5%. 1000 milliLiter(s) (100 mL/Hr) IV Continuous <Continuous>  dextrose 50% Injectable 25 Gram(s) IV Push once  dextrose 50% Injectable 12.5 Gram(s) IV Push once  dextrose 50% Injectable 25 Gram(s) IV Push once  enoxaparin Injectable 80 milliGRAM(s) SubCutaneous every 12 hours  glucagon  Injectable 1 milliGRAM(s) IntraMuscular once  insulin lispro (ADMELOG) corrective regimen sliding scale   SubCutaneous every 6 hours  insulin NPH human recombinant 5 Unit(s) SubCutaneous every 6 hours  levoFLOXacin IVPB 750 milliGRAM(s) IV Intermittent every 24 hours  LORazepam     Tablet 2 milliGRAM(s) Oral every 8 hours  methadone   Solution 20 milliGRAM(s) Oral every 8 hours  midodrine 40 milliGRAM(s) Oral every 8 hours  multivitamin/minerals Oral Solution (WELLESSE) 30 milliLiter(s) Enteral Tube daily  naloxegol 25 milliGRAM(s) Oral daily  norepinephrine Infusion 0.05 MICROgram(s)/kG/Min (7.65 mL/Hr) IV Continuous <Continuous>  polyethylene glycol 3350 17 Gram(s) Oral daily  senna Syrup 10 milliLiter(s) Oral at bedtime  ursodiol Tablet 500 milliGRAM(s) Oral two times a day  vancomycin  IVPB 500 milliGRAM(s) IV Intermittent every 12 hours    MEDICATIONS  (PRN):      ALLERGIES:  Allergies    No Known Allergies    Intolerances        LABS:                        7.6    11.00 )-----------( 497      ( 17 Sep 2021 00:27 )             26.0     09-17    142  |  95<L>  |  48<H>  ----------------------------<  271<H>  4.2   |  34<H>  |  0.92    Ca    9.2      17 Sep 2021 00:26  Phos  3.8     09-17  Mg     2.6     09-17    TPro  6.6  /  Alb  2.4<L>  /  TBili  2.5<H>  /  DBili  x   /  AST  269<H>  /  ALT  242<H>  /  AlkPhos  2654<H>  09-17    PT/INR - ( 16 Sep 2021 00:06 )   PT: 14.5 sec;   INR: 1.22 ratio         PTT - ( 16 Sep 2021 00:06 )  PTT:36.6 sec      RADIOLOGY & ADDITIONAL TESTS: Reviewed.

## 2021-09-18 NOTE — PROGRESS NOTE ADULT - SUBJECTIVE AND OBJECTIVE BOX
Holger Knapp  PGY1    INTERVAL HPI/OVERNIGHT EVENTS: No events    SUBJECTIVE: Patient seen and examined at bedside.    VITAL SIGNS:  ICU Vital Signs Last 24 Hrs  T(C): 37.5 (18 Sep 2021 04:00), Max: 38.8 (17 Sep 2021 17:00)  T(F): 99.5 (18 Sep 2021 04:00), Max: 101.8 (17 Sep 2021 17:00)  HR: 61 (18 Sep 2021 07:00) (53 - 81)  BP: 127/63 (18 Sep 2021 07:00) (80/50 - 191/91)  BP(mean): 88 (18 Sep 2021 07:00) (60 - 130)  ABP: 149/70 (17 Sep 2021 11:00) (136/53 - 172/79)  ABP(mean): 99 (17 Sep 2021 11:00) (86 - 115)  RR: 30 (18 Sep 2021 07:00) (27 - 32)  SpO2: 100% (18 Sep 2021 07:00) (91% - 100%)    Mode: AC/ CMV (Assist Control/ Continuous Mandatory Ventilation), RR (machine): 30, FiO2: 70, PEEP: 8, ITime: 1, MAP: 17, PC: 27, PIP: 37    09-17 @ 07:01  -  09-18 @ 07:00  --------------------------------------------------------  IN: 2352.1 mL / OUT: 850 mL / NET: 1502.1 mL      CAPILLARY BLOOD GLUCOSE      POCT Blood Glucose.: 175 mg/dL (18 Sep 2021 05:22)      PHYSICAL EXAM:  General: sedated  HEENT: NCAT, PERRL, clear conjunctiva, sclera icteric  Neck: supple, no JVD  Respiratory: CTAB  Cardiovascular: RRR, S1S2, no m/r/g  Abdomen: soft, nontender, nondistended, normal bowel sounds  Extremities: no edema, no cyanosis  Skin: warm, perfused, b/l erythema on buttocks  Neurological: nonfocal    MEDICATIONS:  MEDICATIONS  (STANDING):  ALBUTerol    90 MICROgram(s) HFA Inhaler 2 Puff(s) Inhalation every 8 hours  artificial tears (preservative free) Ophthalmic Solution 1 Drop(s) Both EYES every 8 hours  budesonide 160 MICROgram(s)/formoterol 4.5 MICROgram(s) Inhaler 2 Puff(s) Inhalation two times a day  chlorhexidine 0.12% Liquid 15 milliLiter(s) Oral Mucosa every 12 hours  chlorhexidine 4% Liquid 1 Application(s) Topical <User Schedule>  cholecalciferol 2000 Unit(s) Oral daily  dexMEDEtomidine Infusion 0.2 MICROgram(s)/kG/Hr (4.08 mL/Hr) IV Continuous <Continuous>  dextrose 40% Gel 15 Gram(s) Oral once  dextrose 5%. 1000 milliLiter(s) (50 mL/Hr) IV Continuous <Continuous>  dextrose 5%. 1000 milliLiter(s) (100 mL/Hr) IV Continuous <Continuous>  dextrose 50% Injectable 25 Gram(s) IV Push once  dextrose 50% Injectable 12.5 Gram(s) IV Push once  dextrose 50% Injectable 25 Gram(s) IV Push once  enoxaparin Injectable 80 milliGRAM(s) SubCutaneous every 12 hours  glucagon  Injectable 1 milliGRAM(s) IntraMuscular once  insulin lispro (ADMELOG) corrective regimen sliding scale   SubCutaneous every 6 hours  insulin NPH human recombinant 10 Unit(s) SubCutaneous every 6 hours  levoFLOXacin IVPB 750 milliGRAM(s) IV Intermittent every 24 hours  LORazepam     Tablet 2 milliGRAM(s) Oral every 8 hours  methadone   Solution 15 milliGRAM(s) Oral every 8 hours  midodrine 40 milliGRAM(s) Oral every 8 hours  multivitamin/minerals Oral Solution (WELLESSE) 30 milliLiter(s) Enteral Tube daily  naloxegol 25 milliGRAM(s) Oral daily  norepinephrine Infusion 0.05 MICROgram(s)/kG/Min (7.65 mL/Hr) IV Continuous <Continuous>  polyethylene glycol 3350 17 Gram(s) Oral daily  senna Syrup 10 milliLiter(s) Oral at bedtime  ursodiol Tablet 500 milliGRAM(s) Oral two times a day    MEDICATIONS  (PRN):      ALLERGIES:  Allergies    No Known Allergies    Intolerances        LABS:                        7.1    12.90 )-----------( 458      ( 18 Sep 2021 01:47 )             23.9     09-18    144  |  99  |  49<H>  ----------------------------<  192<H>  3.8   |  33<H>  |  0.83    Ca    9.8      18 Sep 2021 01:47  Phos  3.8     09-18  Mg     2.5     09-18    TPro  6.2  /  Alb  1.9<L>  /  TBili  2.8<H>  /  DBili  x   /  AST  288<H>  /  ALT  208<H>  /  AlkPhos  2526<H>  09-18    PT/INR - ( 18 Sep 2021 01:47 )   PT: 13.8 sec;   INR: 1.16 ratio               RADIOLOGY & ADDITIONAL TESTS: Reviewed.

## 2021-09-18 NOTE — PROGRESS NOTE ADULT - ASSESSMENT
71 year old man w/pmhx of HTN, DM2, BPH, and Asthma here for acute hypoxic respiratory failure and ARDS secondary to viral pneumonia from COVID-19. Hospital course complicated by VT arrest, DVT and MRSE bacteremia.    #Neuro   1) sedated  - c/w precedex  - c/w methadone 15TID and Ativan 2mg q8h  -noncont CT appears unchanged 8/12    #ENT   -oropharyngeal packing removed on 9/10  -ENT said they will pack if patient's h/h drops  -will need FiO2 at 50% or less and PaO2 at 60 for qualify for tach    #CV   1)Cardiac Arrest VT  - S/p Amiodarone and electrolyte supplementation   - No further VT noted   - Echo reveals Right hrt strain   -holding diuretics, will revisit when bicarb at 30    #Pulm   1)Acute hypoxic resp failure 2/2 cardiac arrest  - vent Pressure AC 30/27/70/8 wean as tolerated    2) Asthma  - c/w Proventil q8h and Symbicort BID    #GI   1)Nutrition   - maintain Glucerna tube feeds as tolerated   - c/w senna/ miralax     2)Transaminitis - likely 2/2 cholestasis of sepsis vs SSC CIP (secondary sclerosing cholangitis in critically ill patients), Covid cholangiopathy, drug-induced liver injury  - c/w ursodiol 500 mg BID            - supportive treatment per hepatology  -alk phos, ast/alt downtrending    #Renal  1)Hypervolemia   - Strict I/O   - Diuresis as above  - Monitor electrolytes     #ID   1)COVID - 19   - S/p RDV Dexa and TOCI   - Continues on decadron per family preferences  - will continue with supportive care     2)MRSE Bacteremia   - S/P 14 day course of Vanc from 8/20-9/3 therapy, and intermittent gram-&anaerobe coverage   - ID follow up appreciated   - Ua neg  - afebrile OVN  - repeat vanc random level today  -will c/w 1g vanc q24    3) Vent associated PNA vs colonization  - Sputum cx revealed kleb siella and enterobact  - dc'd IV Zosyn x5-7d (9/12 -9/13 )  - d/c meropenem (9/13-9/14)  -began levaquin on 9/14 for total 4 days including meropenem dates as bacteria are sensitive per Dr. Paul  -c/w levaquin as patient made a lot of secretions on 9/16 for total duration of 7d txt(9/19)    #Endo   1)DM   - c/w NPH 8u q6h  - c/w ISS   - goal Blood Sugar 100-180     #Heme  1)b/l DVTs above knee on left and below knee on R  -c/w Lovenox 80 BID    #Prophylaxis  - therapeutic on lovenox    #GOC  -palliative consult following  -full code at this time  -family updated daily     71 year old man w/pmhx of HTN, DM2, BPH, and Asthma here for acute hypoxic respiratory failure and ARDS secondary to viral pneumonia from COVID-19. Hospital course complicated by VT arrest, DVT and MRSE bacteremia.    #Neuro   1) sedated  - c/w precedex  - c/w methadone 15TID and Ativan 2mg q8h  -noncont CT appears unchanged 8/12    #ENT   -oropharyngeal packing removed on 9/10  -ENT said they will pack if patient's h/h drops  -will need FiO2 at 50% or less and PaO2 at 60 for qualify for tach    #CV   1)Cardiac Arrest VT  - S/p Amiodarone and electrolyte supplementation   - No further VT noted   - Echo reveals Right hrt strain   -holding diuretics, will revisit when bicarb at 30    #Pulm   1)Acute hypoxic resp failure 2/2 cardiac arrest  - vent Pressure AC 30/27/70/8 wean as tolerated    2) Asthma  - c/w Proventil q8h and Symbicort BID    #GI   1)Nutrition   - maintain Glucerna tube feeds as tolerated   - c/w senna/ miralax     2)Transaminitis - likely 2/2 cholestasis of sepsis vs SSC CIP (secondary sclerosing cholangitis in critically ill patients), Covid cholangiopathy, drug-induced liver injury  - c/w ursodiol 500 mg BID            - supportive treatment per hepatology  -alk phos, ast/alt downtrending    #Renal  1)Hypervolemia   - Strict I/O   - Diuresis as above  - Monitor electrolytes     #ID   1)COVID - 19   - S/p RDV Dexa and TOCI   - Continues on decadron per family preferences  - will continue with supportive care     2)MRSE Bacteremia   - S/P 14 day course of Vanc from 8/20-9/3 therapy, and intermittent gram-&anaerobe coverage   - ID follow up appreciated   - Ua neg  - afebrile OVN  - repeat vanc random level today  -will c/w 1g vanc q24    3) Vent associated PNA vs colonization  - Sputum cx revealed kleb siella and enterobact  - dc'd IV Zosyn x5-7d (9/12 -9/13 )  - d/c meropenem (9/13-9/14)  -began levaquin on 9/14 for total 4 days including meropenem dates as bacteria are sensitive per Dr. Paul  -c/w levaquin as patient made a lot of secretions on 9/16 for total duration of 7d txt(9/19)    #Endo   1)DM   - c/w NPH 10u q6h  - c/w ISS   - goal Blood Sugar 100-180     #Heme  1)b/l DVTs above knee on left and below knee on R  -c/w Lovenox 80 BID    #Prophylaxis  - therapeutic on lovenox    #GOC  -palliative consult following  -full code at this time  -family updated daily     71 year old man w/pmhx of HTN, DM2, BPH, and Asthma here for acute hypoxic respiratory failure and ARDS secondary to viral pneumonia from COVID-19. Hospital course complicated by VT arrest, DVT and MRSE bacteremia.    #Neuro   1) sedated  - c/w methadone 15TID and Ativan 2mg q8h  -noncont CT 9/13 appears unchanged 8/12     2) r/o seizure  -had rhythmic R arm movement  -f/u with 24hr eeg    #ENT   -oropharyngeal packing removed on 9/10  -ENT said they will pack if patient's h/h drops  -will need FiO2 at 50% or less and PaO2 at 60 for qualify for tach    #CV   1)Cardiac Arrest VT  - S/p Amiodarone and electrolyte supplementation   - No further VT noted   - Echo reveals Right hrt strain   -holding diuretics, will revisit when bicarb at 30    #Pulm   1)Acute hypoxic resp failure 2/2 cardiac arrest  - vent Pressure AC 30/27/70/8 wean as tolerated    2) Asthma  - c/w Proventil q8h and Symbicort BID    #GI   1)Nutrition   - maintain Glucerna tube feeds as tolerated   - c/w senna/ miralax     2)Transaminitis - likely 2/2 cholestasis of sepsis vs SSC CIP (secondary sclerosing cholangitis in critically ill patients), Covid cholangiopathy, drug-induced liver injury  - c/w ursodiol 500 mg BID            - supportive treatment per hepatology  -alk phos, ast/alt downtrending    #Renal  1)Hypervolemia   - Strict I/O   - Diuresis as above  - Monitor electrolytes     #ID   1)COVID - 19   - S/p RDV Dexa and TOCI   - Continues on decadron per family preferences  - will continue with supportive care     2)MRSE Bacteremia   - S/P 14 day course of Vanc from 8/20-9/3 therapy, and intermittent gram-&anaerobe coverage   - ID follow up appreciated   - Ua neg  - afebrile OVN  - repeat vanc random level today  -will c/w 1g vanc q24    3) Vent associated PNA vs colonization  - Sputum cx revealed kleb siella and enterobact  - dc'd IV Zosyn x5-7d (9/12 -9/13 )  - d/c meropenem (9/13-9/14)  -began levaquin on 9/14 for total 4 days including meropenem dates as bacteria are sensitive per Dr. Paul  -c/w levaquin as patient made a lot of secretions on 9/16 for total duration of 7d txt(9/19)    #Endo   1)DM   - c/w NPH 10u q6h  - c/w ISS   - goal Blood Sugar 100-180     #Heme  1)b/l DVTs above knee on left and below knee on R  -c/w Lovenox 80 BID    #Prophylaxis  - therapeutic on lovenox    #GOC  -palliative consult following  -full code at this time  -family updated daily

## 2021-09-18 NOTE — PROGRESS NOTE ADULT - ATTENDING COMMENTS
Pt seen and examined. 71 year M with medical hx as noted above, now with acute hypoxic resp failure/ARDS 2/2 COVID19 PNA, with course complicated by cardiac arrest, superimposed bacterial PNA (n Klebsiella/ Enterobacter in SCx), MRSE bacteremia, distributive shock 2/2 sepsis + vasoplegia from sedation, b/l LE DVTs, transaminitis and lactic acidosis. Cont to wean sedation as tolerated. Remains on lung protective ventilation, titrate down FiO2 and PEEP as tolerated. Received Remsdesivir, Tocilizumab and has completed 2 courses of Decadron for COVID19 PNA. Cont to follow inflammatory markers. Completed a course of Vancomycin IV for MRSE bacteremia, surveillance BCxs remain NGTD. Remains on Levaquin for Klebsiella/Enterobacter PNA. Titrate pressor support to keep MAP >65, lactate cleared. Cr stable, cont to follow UO/ electrolytes. Cont AC with therapeutic Lovenox Q12H for b/l LE DVTs. Rising alk phos and transaminitis, likely cholestatic, no evidence of acute cholecystitis on RUQ US, cont to follow LFTs. Overall prognosis extremely guarded. Ongoing GOC discussions with family.

## 2021-09-19 NOTE — PROGRESS NOTE ADULT - ASSESSMENT
71 year old man w/pmhx of HTN, DM2, BPH, and Asthma here for acute hypoxic respiratory failure and ARDS secondary to viral pneumonia from COVID-19. Hospital course complicated by VT arrest, DVT and MRSE bacteremia.    #Neuro   1) sedated  - c/w methadone increased to 20TID and Ativan 2mg q6h  - wean precedex  -noncont CT 9/13 appears unchanged 8/12     2) r/o seizure  -had rhythmic R arm movement  -f/u with 24hr eeg  - f/u procalcitonin     #ENT   -oropharyngeal packing removed on 9/10  -ENT said they will pack if patient's h/h drops  -will need FiO2 at 50% or less and PaO2 at 60 for qualify for tach    #CV   1)Cardiac Arrest VT  - S/p Amiodarone and electrolyte supplementation   - No further VT noted   - Echo reveals Right hrt strain   -holding diuretics, will revisit when bicarb at 30    #Pulm   1)Acute hypoxic resp failure 2/2 cardiac arrest  - vent Pressure AC 30/60/60/8 wean as tolerated    2) Asthma  - c/w Proventil q8h and Symbicort BID    #GI   1)Nutrition   - maintain Glucerna tube feeds as tolerated   - c/w senna/ miralax     2)Transaminitis - likely 2/2 cholestasis of sepsis vs SSC CIP (secondary sclerosing cholangitis in critically ill patients), Covid cholangiopathy, drug-induced liver injury  - c/w ursodiol 500 mg BID            - supportive treatment per hepatology  -alk phos, ast/alt downtrending    #Renal  1)Hypervolemia   - Strict I/O   - Diuresis as above  - Monitor electrolytes     2) Hypernatremia   - 146 and started on free water 250 q8       #ID   1)COVID - 19   - S/p RDV Dexa and TOCI   - Continues on decadron per family preferences  - will continue with supportive care     2)MRSE Bacteremia   - S/P 14 day course of Vanc from 8/20-9/3 therapy, and intermittent gram-&anaerobe coverage   - ID follow up appreciated   - Ua neg  - afebrile OVN  - repeat vanc random level today  -will c/w 1g vanc q24 for 2 weeks s/p negative BCX 9/12. will stop vanc on 9/26     3) Vent associated PNA vs colonization  - Sputum cx revealed kleb siella and enterobact  - dc'd IV Zosyn x5-7d (9/12 -9/13 )  - d/c meropenem (9/13-9/14)  -began levaquin on 9/14 for total 4 days including meropenem dates as bacteria are sensitive per Dr. Paul  - levaquin was started on 9/16 for total duration of 7d txt(9/19) and will end today    #Endo   1)DM   - c/w NPH 10u q6h  - c/w ISS   - goal Blood Sugar 100-180     #Heme  1)b/l DVTs above knee on left and below knee on R  -c/w Lovenox 80 BID    #Prophylaxis  - therapeutic on lovenox    #GOC  -palliative consult following  -full code at this time  -family updated daily     71 year old man w/pmhx of HTN, DM2, BPH, and Asthma here for acute hypoxic respiratory failure and ARDS secondary to viral pneumonia from COVID-19. Hospital course complicated by VT arrest, DVT and MRSE bacteremia.    #Neuro   1) sedated  - c/w methadone increased to 20TID and Ativan 2mg q6h  - wean precedex  - noncont CT 9/13 appears unchanged 8/12     2) r/o seizure  -had rhythmic R arm movement  -f/u with 24hr eeg  - f/u procalcitonin     #ENT   -oropharyngeal packing removed on 9/10  -ENT said they will pack if patient's h/h drops  -will need FiO2 at 50% or less and PaO2 at 60 for qualify for tach    #CV   1)Cardiac Arrest VT  - S/p Amiodarone and electrolyte supplementation   - No further VT noted   - Echo reveals Right hrt strain   -holding diuretics, will revisit when bicarb at 30    #Pulm   1)Acute hypoxic resp failure 2/2 cardiac arrest  - vent Pressure AC 30/60/50/8 wean as tolerated    2) Asthma  - c/w Proventil q8h and Symbicort BID    #GI   1)Nutrition   - maintain Glucerna tube feeds as tolerated   - c/w senna/ miralax     2)Transaminitis - likely 2/2 cholestasis of sepsis vs SSC CIP (secondary sclerosing cholangitis in critically ill patients), Covid cholangiopathy, drug-induced liver injury  - c/w ursodiol 500 mg BID            - supportive treatment per hepatology  -alk phos, ast/alt downtrending    #Renal  1)Hypervolemia   - Strict I/O   - Diuresis as above  - Monitor electrolytes     2) Hypernatremia   -  Na 146 and started on free water 250 q8       #ID   1)COVID - 19   - S/p RDV Dexa and TOCI   - Continues on decadron per family preferences  - will continue with supportive care     2)MRSE Bacteremia   - S/P 14 day course of Vanc from 8/20-9/3 therapy, and intermittent gram-&anaerobe coverage   - ID follow up appreciated   - Ua neg  - afebrile OVN  -will c/w 1g vanc q24 for 2 weeks s/p negative BCX 9/12. will stop vanc on 9/26     3) Vent associated PNA vs colonization  - Sputum cx revealed kleb siella and enterobact  - dc'd IV Zosyn x5-7d (9/12 -9/13 )  - d/c meropenem (9/13-9/14)  -began levaquin on 9/14 for total 4 days including meropenem dates as bacteria are sensitive per Dr. Paul  - levaquin was started on 9/16 for total duration of 7d txt(9/19) and will end today    #Endo   1)DM   - c/w NPH 10u q6h  - c/w ISS   - goal Blood Sugar 100-180     #Heme  1)b/l DVTs above knee on left and below knee on R  -c/w Lovenox 80 BID    #Prophylaxis  - therapeutic on lovenox, held for low Hbg     #GOC  -palliative consult following  -full code at this time  -family updated daily     71 year old man w/pmhx of HTN, DM2, BPH, and Asthma here for acute hypoxic respiratory failure and ARDS secondary to viral pneumonia from COVID-19. Hospital course complicated by VT arrest, DVT and MRSE bacteremia.    #Neuro   1) sedated  - c/w methadone increased to 20TID and Ativan 2mg q6h  - wean precedex  - noncont CT 9/13 appears unchanged 8/12     2) r/o seizure  -had rhythmic R arm movement  -f/u with 24hr eeg  - f/u prolactin     #ENT   -oropharyngeal packing removed on 9/10  -ENT said they will pack if patient's h/h drops  -will need FiO2 at 50% or less and PaO2 at 60 for qualify for tach    #CV   1)Cardiac Arrest VT  - S/p Amiodarone and electrolyte supplementation   - No further VT noted   - Echo reveals Right hrt strain   -holding diuretics, will revisit when bicarb at 30    #Pulm   1)Acute hypoxic resp failure 2/2 cardiac arrest  - vent Pressure AC 30/60/50/8 wean as tolerated    2) Asthma  - c/w Proventil q8h and Symbicort BID    #GI   1)Nutrition   - maintain Glucerna tube feeds as tolerated   - c/w senna/ miralax     2)Transaminitis - likely 2/2 cholestasis of sepsis vs SSC CIP (secondary sclerosing cholangitis in critically ill patients), Covid cholangiopathy, drug-induced liver injury  - c/w ursodiol 500 mg BID            - supportive treatment per hepatology  -alk phos, ast/alt downtrending    #Renal  1)Hypervolemia   - Strict I/O   - Diuresis as above  - Monitor electrolytes     2) Hypernatremia   -  Na 146 and started on free water 250 q8       #ID   1)COVID - 19   - S/p RDV Dexa and TOCI   - Continues on decadron per family preferences  - will continue with supportive care     2)MRSE Bacteremia   - S/P 14 day course of Vanc from 8/20-9/3 therapy, and intermittent gram-&anaerobe coverage   - ID follow up appreciated   - Ua neg  - afebrile OVN  -will c/w 1g vanc q24 for 2 weeks s/p negative BCX 9/12. will stop vanc on 9/26     3) Vent associated PNA vs colonization  - Sputum cx revealed kleb siella and enterobact  - dc'd IV Zosyn x5-7d (9/12 -9/13 )  - d/c meropenem (9/13-9/14)  -began levaquin on 9/14 for total 4 days including meropenem dates as bacteria are sensitive per Dr. Paul  - levaquin was started on 9/16 for total duration of 7d txt(9/19) and will end today    #Endo   1)DM   - c/w NPH 10u q6h  - c/w ISS   - goal Blood Sugar 100-180     #Heme  1)b/l DVTs above knee on left and below knee on R  -c/w Lovenox 80 BID    #Prophylaxis  - therapeutic on lovenox, held for low Hbg     #GOC  -palliative consult following  -full code at this time  -family updated daily

## 2021-09-19 NOTE — PROGRESS NOTE ADULT - ATTENDING COMMENTS
Pt seen and examined on 9/19/21. 71 year M with medical hx as noted above, now with acute hypoxic resp failure/ARDS 2/2 COVID19 PNA, with course complicated by cardiac arrest, superimposed bacterial PNA (n Klebsiella/ Enterobacter in SCx), MRSE bacteremia, distributive shock 2/2 sepsis + vasoplegia from sedation, b/l LE DVTs, transaminitis and lactic acidosis. Cont to wean sedation as tolerated. Noted to have r UE jerking movements on exam, awaiting VEEG to r/o seizures. Remains on lung protective ventilation, titrate down FiO2 and PEEP as tolerated. Received Remsdesivir, Tocilizumab and has completed 2 courses of Decadron for COVID19 PNA. Cont to follow inflammatory markers. Completed a course of Vancomycin IV for MRSE bacteremia, surveillance BCxs remain NGTD. Remains on Levaquin for Klebsiella/Enterobacter PNA. Titrate pressor support to keep MAP >65, lactate cleared. Cr stable, gentle diuresis to keep negative fluid balance, cont to follow UO/ electrolytes. Cont AC with therapeutic Lovenox Q12H for b/l LE DVTs. Rising alk phos and transaminitis, likely cholestatic, no evidence of acute cholecystitis on RUQ US, cont to follow LFTs. Overall prognosis extremely guarded. Ongoing GOC discussions with family.

## 2021-09-19 NOTE — PROGRESS NOTE ADULT - SUBJECTIVE AND OBJECTIVE BOX
CHIEF COMPLAINT:    Interval Events:    REVIEW OF SYSTEMS:  Constitutional: [ ] negative [ ] fevers [ ] chills [ ] weight loss [ ] weight gain  HEENT: [ ] negative [ ] dry eyes [ ] eye irritation [ ] postnasal drip [ ] nasal congestion  CV: [ ] negative  [ ] chest pain [ ] orthopnea [ ] palpitations [ ] murmur  Resp: [ ] negative [ ] cough [ ] shortness of breath [ ] dyspnea [ ] wheezing [ ] sputum [ ] hemoptysis  GI: [ ] negative [ ] nausea [ ] vomiting [ ] diarrhea [ ] constipation [ ] abd pain [ ] dysphagia   : [ ] negative [ ] dysuria [ ] nocturia [ ] hematuria [ ] increased urinary frequency  Musculoskeletal: [ ] negative [ ] back pain [ ] myalgias [ ] arthralgias [ ] fracture  Skin: [ ] negative [ ] rash [ ] itch  Neurological: [ ] negative [ ] headache [ ] dizziness [ ] syncope [ ] weakness [ ] numbness  Psychiatric: [ ] negative [ ] anxiety [ ] depression  Endocrine: [ ] negative [ ] diabetes [ ] thyroid problem  Hematologic/Lymphatic: [ ] negative [ ] anemia [ ] bleeding problem  Allergic/Immunologic: [ ] negative [ ] itchy eyes [ ] nasal discharge [ ] hives [ ] angioedema  [ ] All other systems negative  [ ] Unable to assess ROS because ________    OBJECTIVE:  ICU Vital Signs Last 24 Hrs  T(C): 37.5 (19 Sep 2021 04:00), Max: 38.3 (18 Sep 2021 15:00)  T(F): 99.5 (19 Sep 2021 04:00), Max: 100.9 (18 Sep 2021 15:00)  HR: 87 (19 Sep 2021 07:00) (68 - 123)  BP: 125/73 (19 Sep 2021 07:00) (63/39 - 167/75)  BP(mean): 93 (19 Sep 2021 07:00) (46 - 115)  ABP: --  ABP(mean): --  RR: 30 (19 Sep 2021 07:00) (30 - 34)  SpO2: 96% (19 Sep 2021 07:00) (89% - 100%)    Mode: AC/ CMV (Assist Control/ Continuous Mandatory Ventilation), RR (machine): 30, FiO2: 60, PEEP: 8, ITime: 0.6, MAP: 18, PC: 27, PIP: 36    09-18 @ 07:01  -  09-19 @ 07:00  --------------------------------------------------------  IN: 3378.7 mL / OUT: 825 mL / NET: 2553.7 mL      CAPILLARY BLOOD GLUCOSE      POCT Blood Glucose.: 164 mg/dL (19 Sep 2021 06:08)      PHYSICAL EXAM:  General:   HEENT:   Lymph Nodes:  Neck:   Respiratory:   Cardiovascular:   Abdomen:   Extremities:   Skin:   Neurological:  Psychiatry:    LINES:    HOSPITAL MEDICATIONS:  Standing Meds:  ALBUTerol    90 MICROgram(s) HFA Inhaler 2 Puff(s) Inhalation every 8 hours  artificial tears (preservative free) Ophthalmic Solution 1 Drop(s) Both EYES every 8 hours  budesonide 160 MICROgram(s)/formoterol 4.5 MICROgram(s) Inhaler 2 Puff(s) Inhalation two times a day  chlorhexidine 0.12% Liquid 15 milliLiter(s) Oral Mucosa every 12 hours  chlorhexidine 4% Liquid 1 Application(s) Topical <User Schedule>  cholecalciferol 2000 Unit(s) Oral daily  dexMEDEtomidine Infusion 0.2 MICROgram(s)/kG/Hr IV Continuous <Continuous>  dextrose 40% Gel 15 Gram(s) Oral once  dextrose 5%. 1000 milliLiter(s) IV Continuous <Continuous>  dextrose 5%. 1000 milliLiter(s) IV Continuous <Continuous>  dextrose 50% Injectable 25 Gram(s) IV Push once  dextrose 50% Injectable 12.5 Gram(s) IV Push once  dextrose 50% Injectable 25 Gram(s) IV Push once  enoxaparin Injectable 80 milliGRAM(s) SubCutaneous every 12 hours  glucagon  Injectable 1 milliGRAM(s) IntraMuscular once  insulin lispro (ADMELOG) corrective regimen sliding scale   SubCutaneous every 6 hours  insulin NPH human recombinant 10 Unit(s) SubCutaneous every 6 hours  levoFLOXacin IVPB 750 milliGRAM(s) IV Intermittent every 24 hours  LORazepam     Tablet 2 milliGRAM(s) Oral every 8 hours  methadone   Solution 15 milliGRAM(s) Oral every 8 hours  midodrine 40 milliGRAM(s) Oral every 8 hours  multivitamin/minerals Oral Solution (WELLESSE) 30 milliLiter(s) Enteral Tube daily  naloxegol 25 milliGRAM(s) Oral daily  norepinephrine Infusion 0.05 MICROgram(s)/kG/Min IV Continuous <Continuous>  polyethylene glycol 3350 17 Gram(s) Oral daily  senna Syrup 10 milliLiter(s) Oral at bedtime  ursodiol Tablet 500 milliGRAM(s) Oral two times a day      PRN Meds:      LABS:                        6.8    14.02 )-----------( 394      ( 19 Sep 2021 00:45 )             23.5     Hgb Trend: 6.8<--, 7.1<--, 7.6<--, 8.2<--, 7.9<--  09-19    146<H>  |  100  |  45<H>  ----------------------------<  211<H>  3.6   |  32<H>  |  0.91    Ca    9.1      19 Sep 2021 00:45  Phos  4.4     09-19  Mg     2.5     09-19    TPro  5.9<L>  /  Alb  2.2<L>  /  TBili  2.3<H>  /  DBili  x   /  AST  238<H>  /  ALT  193<H>  /  AlkPhos  2236<H>  09-19    Creatinine Trend: 0.91<--, 0.83<--, 0.85<--, 0.92<--, 0.83<--, 0.77<--  PT/INR - ( 18 Sep 2021 01:47 )   PT: 13.8 sec;   INR: 1.16 ratio             Arterial Blood Gas:  09-19 @ 00:42  7.41/62/89/39/99.6/13.6  ABG lactate: --  Arterial Blood Gas:  09-18 @ 01:39  7.47/56/88/41/99.3/15.6  ABG lactate: --        MICROBIOLOGY:     Culture - Sputum (collected 17 Sep 2021 04:10)  Source: .Sputum Sputum  Gram Stain (17 Sep 2021 07:10):    Moderate polymorphonuclear leukocytes per low power field    Rare Squamous epithelial cells per low power field    No organisms seen per oil power field  Preliminary Report (18 Sep 2021 07:20):    Normal Respiratory Liane present    Culture - Blood (collected 16 Sep 2021 20:18)  Source: .Blood Blood-Peripheral  Preliminary Report (17 Sep 2021 21:01):    No growth to date.    Culture - Blood (collected 16 Sep 2021 20:18)  Source: .Blood Blood-Venous  Preliminary Report (17 Sep 2021 21:01):    No growth to date.      RADIOLOGY:  [ ] Reviewed and interpreted by me    EKG:   CHIEF COMPLAINT:    Interval Events: ON LFT's have been downtrending,     REVIEW OF SYSTEMS:  Constitutional: [ ] negative [ ] fevers [ ] chills [ ] weight loss [ ] weight gain  HEENT: [ ] negative [ ] dry eyes [ ] eye irritation [ ] postnasal drip [ ] nasal congestion  CV: [ ] negative  [ ] chest pain [ ] orthopnea [ ] palpitations [ ] murmur  Resp: [ ] negative [ ] cough [ ] shortness of breath [ ] dyspnea [ ] wheezing [ ] sputum [ ] hemoptysis  GI: [ ] negative [ ] nausea [ ] vomiting [ ] diarrhea [ ] constipation [ ] abd pain [ ] dysphagia   : [ ] negative [ ] dysuria [ ] nocturia [ ] hematuria [ ] increased urinary frequency  Musculoskeletal: [ ] negative [ ] back pain [ ] myalgias [ ] arthralgias [ ] fracture  Skin: [ ] negative [ ] rash [ ] itch  Neurological: [ ] negative [ ] headache [ ] dizziness [ ] syncope [ ] weakness [ ] numbness  Psychiatric: [ ] negative [ ] anxiety [ ] depression  Endocrine: [ ] negative [ ] diabetes [ ] thyroid problem  Hematologic/Lymphatic: [ ] negative [ ] anemia [ ] bleeding problem  Allergic/Immunologic: [ ] negative [ ] itchy eyes [ ] nasal discharge [ ] hives [ ] angioedema  [ ] All other systems negative  [ ] Unable to assess ROS because ________    OBJECTIVE:  ICU Vital Signs Last 24 Hrs  T(C): 37.5 (19 Sep 2021 04:00), Max: 38.3 (18 Sep 2021 15:00)  T(F): 99.5 (19 Sep 2021 04:00), Max: 100.9 (18 Sep 2021 15:00)  HR: 87 (19 Sep 2021 07:00) (68 - 123)  BP: 125/73 (19 Sep 2021 07:00) (63/39 - 167/75)  BP(mean): 93 (19 Sep 2021 07:00) (46 - 115)  ABP: --  ABP(mean): --  RR: 30 (19 Sep 2021 07:00) (30 - 34)  SpO2: 96% (19 Sep 2021 07:00) (89% - 100%)    Mode: AC/ CMV (Assist Control/ Continuous Mandatory Ventilation), RR (machine): 30, FiO2: 60, PEEP: 8, ITime: 0.6, MAP: 18, PC: 27, PIP: 36    09-18 @ 07:01  -  09-19 @ 07:00  --------------------------------------------------------  IN: 3378.7 mL / OUT: 825 mL / NET: 2553.7 mL      CAPILLARY BLOOD GLUCOSE      POCT Blood Glucose.: 164 mg/dL (19 Sep 2021 06:08)      PHYSICAL EXAM:  General:   HEENT:   Lymph Nodes:  Neck:   Respiratory:   Cardiovascular:   Abdomen:   Extremities:   Skin:   Neurological:  Psychiatry:    LINES:    HOSPITAL MEDICATIONS:  Standing Meds:  ALBUTerol    90 MICROgram(s) HFA Inhaler 2 Puff(s) Inhalation every 8 hours  artificial tears (preservative free) Ophthalmic Solution 1 Drop(s) Both EYES every 8 hours  budesonide 160 MICROgram(s)/formoterol 4.5 MICROgram(s) Inhaler 2 Puff(s) Inhalation two times a day  chlorhexidine 0.12% Liquid 15 milliLiter(s) Oral Mucosa every 12 hours  chlorhexidine 4% Liquid 1 Application(s) Topical <User Schedule>  cholecalciferol 2000 Unit(s) Oral daily  dexMEDEtomidine Infusion 0.2 MICROgram(s)/kG/Hr IV Continuous <Continuous>  dextrose 40% Gel 15 Gram(s) Oral once  dextrose 5%. 1000 milliLiter(s) IV Continuous <Continuous>  dextrose 5%. 1000 milliLiter(s) IV Continuous <Continuous>  dextrose 50% Injectable 25 Gram(s) IV Push once  dextrose 50% Injectable 12.5 Gram(s) IV Push once  dextrose 50% Injectable 25 Gram(s) IV Push once  enoxaparin Injectable 80 milliGRAM(s) SubCutaneous every 12 hours  glucagon  Injectable 1 milliGRAM(s) IntraMuscular once  insulin lispro (ADMELOG) corrective regimen sliding scale   SubCutaneous every 6 hours  insulin NPH human recombinant 10 Unit(s) SubCutaneous every 6 hours  levoFLOXacin IVPB 750 milliGRAM(s) IV Intermittent every 24 hours  LORazepam     Tablet 2 milliGRAM(s) Oral every 8 hours  methadone   Solution 15 milliGRAM(s) Oral every 8 hours  midodrine 40 milliGRAM(s) Oral every 8 hours  multivitamin/minerals Oral Solution (WELLESSE) 30 milliLiter(s) Enteral Tube daily  naloxegol 25 milliGRAM(s) Oral daily  norepinephrine Infusion 0.05 MICROgram(s)/kG/Min IV Continuous <Continuous>  polyethylene glycol 3350 17 Gram(s) Oral daily  senna Syrup 10 milliLiter(s) Oral at bedtime  ursodiol Tablet 500 milliGRAM(s) Oral two times a day      PRN Meds:      LABS:                        6.8    14.02 )-----------( 394      ( 19 Sep 2021 00:45 )             23.5     Hgb Trend: 6.8<--, 7.1<--, 7.6<--, 8.2<--, 7.9<--  09-19    146<H>  |  100  |  45<H>  ----------------------------<  211<H>  3.6   |  32<H>  |  0.91    Ca    9.1      19 Sep 2021 00:45  Phos  4.4     09-19  Mg     2.5     09-19    TPro  5.9<L>  /  Alb  2.2<L>  /  TBili  2.3<H>  /  DBili  x   /  AST  238<H>  /  ALT  193<H>  /  AlkPhos  2236<H>  09-19    Creatinine Trend: 0.91<--, 0.83<--, 0.85<--, 0.92<--, 0.83<--, 0.77<--  PT/INR - ( 18 Sep 2021 01:47 )   PT: 13.8 sec;   INR: 1.16 ratio             Arterial Blood Gas:  09-19 @ 00:42  7.41/62/89/39/99.6/13.6  ABG lactate: --  Arterial Blood Gas:  09-18 @ 01:39  7.47/56/88/41/99.3/15.6  ABG lactate: --        MICROBIOLOGY:     Culture - Sputum (collected 17 Sep 2021 04:10)  Source: .Sputum Sputum  Gram Stain (17 Sep 2021 07:10):    Moderate polymorphonuclear leukocytes per low power field    Rare Squamous epithelial cells per low power field    No organisms seen per oil power field  Preliminary Report (18 Sep 2021 07:20):    Normal Respiratory Liane present    Culture - Blood (collected 16 Sep 2021 20:18)  Source: .Blood Blood-Peripheral  Preliminary Report (17 Sep 2021 21:01):    No growth to date.    Culture - Blood (collected 16 Sep 2021 20:18)  Source: .Blood Blood-Venous  Preliminary Report (17 Sep 2021 21:01):    No growth to date.      RADIOLOGY:  [ ] Reviewed and interpreted by me    EKG:   CHIEF COMPLAINT:    Interval Events: ON LFT's have been downtrending and QTC less than 500. Fungitell, BCX, sputumcx sent overnight for fevers. Na 146 and was started on free water 250 q8. Hgb was 6.8 and 1 unit prbc started, but no bleeding source found and lovenox was held. Vanc trough was therapeutic so vanc was held. EEG has not been performed yet.     REVIEW OF SYSTEMS:  Unable to assess ROS because pt is sedated    OBJECTIVE:  ICU Vital Signs Last 24 Hrs  T(C): 37.5 (19 Sep 2021 04:00), Max: 38.3 (18 Sep 2021 15:00)  T(F): 99.5 (19 Sep 2021 04:00), Max: 100.9 (18 Sep 2021 15:00)  HR: 87 (19 Sep 2021 07:00) (68 - 123)  BP: 125/73 (19 Sep 2021 07:00) (63/39 - 167/75)  BP(mean): 93 (19 Sep 2021 07:00) (46 - 115)  ABP: --  ABP(mean): --  RR: 30 (19 Sep 2021 07:00) (30 - 34)  SpO2: 96% (19 Sep 2021 07:00) (89% - 100%)    Mode: AC/ CMV (Assist Control/ Continuous Mandatory Ventilation), RR (machine): 30, FiO2: 60, PEEP: 8, ITime: 0.6, MAP: 18, PC: 27, PIP: 36    09-18 @ 07:01  -  09-19 @ 07:00  --------------------------------------------------------  IN: 3378.7 mL / OUT: 825 mL / NET: 2553.7 mL      CAPILLARY BLOOD GLUCOSE      POCT Blood Glucose.: 164 mg/dL (19 Sep 2021 06:08)      PHYSICAL EXAM:  General: sedated  HEENT: NCAT, PERRL, clear conjunctiva, sclera icteric  Neck: supple, no JVD  Respiratory: CTAB  Cardiovascular: RRR, S1S2, no m/r/g  Abdomen: soft, nontender, nondistended, normal bowel sounds  Extremities: no edema, no cyanosis  Skin: warm, perfused, b/l erythema on buttocks  Neurological: nonfocal    LINES:    HOSPITAL MEDICATIONS:  Standing Meds:  ALBUTerol    90 MICROgram(s) HFA Inhaler 2 Puff(s) Inhalation every 8 hours  artificial tears (preservative free) Ophthalmic Solution 1 Drop(s) Both EYES every 8 hours  budesonide 160 MICROgram(s)/formoterol 4.5 MICROgram(s) Inhaler 2 Puff(s) Inhalation two times a day  chlorhexidine 0.12% Liquid 15 milliLiter(s) Oral Mucosa every 12 hours  chlorhexidine 4% Liquid 1 Application(s) Topical <User Schedule>  cholecalciferol 2000 Unit(s) Oral daily  dexMEDEtomidine Infusion 0.2 MICROgram(s)/kG/Hr IV Continuous <Continuous>  dextrose 40% Gel 15 Gram(s) Oral once  dextrose 5%. 1000 milliLiter(s) IV Continuous <Continuous>  dextrose 5%. 1000 milliLiter(s) IV Continuous <Continuous>  dextrose 50% Injectable 25 Gram(s) IV Push once  dextrose 50% Injectable 12.5 Gram(s) IV Push once  dextrose 50% Injectable 25 Gram(s) IV Push once  enoxaparin Injectable 80 milliGRAM(s) SubCutaneous every 12 hours  glucagon  Injectable 1 milliGRAM(s) IntraMuscular once  insulin lispro (ADMELOG) corrective regimen sliding scale   SubCutaneous every 6 hours  insulin NPH human recombinant 10 Unit(s) SubCutaneous every 6 hours  levoFLOXacin IVPB 750 milliGRAM(s) IV Intermittent every 24 hours  LORazepam     Tablet 2 milliGRAM(s) Oral every 8 hours  methadone   Solution 15 milliGRAM(s) Oral every 8 hours  midodrine 40 milliGRAM(s) Oral every 8 hours  multivitamin/minerals Oral Solution (WELLESSE) 30 milliLiter(s) Enteral Tube daily  naloxegol 25 milliGRAM(s) Oral daily  norepinephrine Infusion 0.05 MICROgram(s)/kG/Min IV Continuous <Continuous>  polyethylene glycol 3350 17 Gram(s) Oral daily  senna Syrup 10 milliLiter(s) Oral at bedtime  ursodiol Tablet 500 milliGRAM(s) Oral two times a day      PRN Meds:      LABS:                        6.8    14.02 )-----------( 394      ( 19 Sep 2021 00:45 )             23.5     Hgb Trend: 6.8<--, 7.1<--, 7.6<--, 8.2<--, 7.9<--  09-19    146<H>  |  100  |  45<H>  ----------------------------<  211<H>  3.6   |  32<H>  |  0.91    Ca    9.1      19 Sep 2021 00:45  Phos  4.4     09-19  Mg     2.5     09-19    TPro  5.9<L>  /  Alb  2.2<L>  /  TBili  2.3<H>  /  DBili  x   /  AST  238<H>  /  ALT  193<H>  /  AlkPhos  2236<H>  09-19    Creatinine Trend: 0.91<--, 0.83<--, 0.85<--, 0.92<--, 0.83<--, 0.77<--  PT/INR - ( 18 Sep 2021 01:47 )   PT: 13.8 sec;   INR: 1.16 ratio             Arterial Blood Gas:  09-19 @ 00:42  7.41/62/89/39/99.6/13.6  ABG lactate: --  Arterial Blood Gas:  09-18 @ 01:39  7.47/56/88/41/99.3/15.6  ABG lactate: --        MICROBIOLOGY:     Culture - Sputum (collected 17 Sep 2021 04:10)  Source: .Sputum Sputum  Gram Stain (17 Sep 2021 07:10):    Moderate polymorphonuclear leukocytes per low power field    Rare Squamous epithelial cells per low power field    No organisms seen per oil power field  Preliminary Report (18 Sep 2021 07:20):    Normal Respiratory Liane present    Culture - Blood (collected 16 Sep 2021 20:18)  Source: .Blood Blood-Peripheral  Preliminary Report (17 Sep 2021 21:01):    No growth to date.    Culture - Blood (collected 16 Sep 2021 20:18)  Source: .Blood Blood-Venous  Preliminary Report (17 Sep 2021 21:01):    No growth to date.      RADIOLOGY:  [ ] Reviewed and interpreted by me    EKG:

## 2021-09-19 NOTE — CHART NOTE - NSCHARTNOTEFT_GEN_A_CORE
EEG reviewed until ~1730    No seizures recorded.    Final report to be completed at the completion of the study tomorrow morning     Jonathan Nascimento MD  Epilepsy Fellow    Reading Room: [phone]  On Call Service After Hours: [phone]

## 2021-09-20 NOTE — PROGRESS NOTE ADULT - ASSESSMENT
71 year old man w/pmhx of HTN, DM2, BPH, and Asthma here for acute hypoxic respiratory failure and ARDS secondary to viral pneumonia from COVID-19. Hospital course complicated by VT arrest, DVT and MRSE bacteremia.    #Neuro   1) sedated  - c/w methadone increased to 20TID and Ativan 2mg q6h  - wean precedex  - noncont CT 9/13 appears unchanged 8/12     2) r/o seizure  -had rhythmic R arm movement  -f/u with 24hr eeg  - f/u procalcitonin     #ENT   -oropharyngeal packing removed on 9/10  -ENT said they will pack if patient's h/h drops  -will need FiO2 at 50% or less and PaO2 at 60 for qualify for tach    #CV   1)Cardiac Arrest VT  - S/p Amiodarone and electrolyte supplementation   - No further VT noted   - Echo reveals Right hrt strain   -holding diuretics, will revisit when bicarb at 30    #Pulm   1)Acute hypoxic resp failure 2/2 cardiac arrest  - vent Pressure AC 30/60/50/8 wean as tolerated    2) Asthma  - c/w Proventil q8h and Symbicort BID    #GI   1)Nutrition   - maintain Glucerna tube feeds as tolerated   - c/w senna/ miralax     2)Transaminitis - likely 2/2 cholestasis of sepsis vs SSC CIP (secondary sclerosing cholangitis in critically ill patients), Covid cholangiopathy, drug-induced liver injury  - c/w ursodiol 500 mg BID            - supportive treatment per hepatology  -alk phos, ast/alt downtrending    #Renal  1)Hypervolemia   - Strict I/O   - Diuresis as above  - Monitor electrolytes     2) Hypernatremia   -  Na 146 and started on free water 250 q8       #ID   1)COVID - 19   - S/p RDV Dexa and TOCI   - Continues on decadron per family preferences  - will continue with supportive care     2)MRSE Bacteremia   - S/P 14 day course of Vanc from 8/20-9/3 therapy, and intermittent gram-&anaerobe coverage   - ID follow up appreciated   - Ua neg  - afebrile OVN  -will c/w 1g vanc q24 for 2 weeks s/p negative BCX 9/12. will stop vanc on 9/26     3) Vent associated PNA vs colonization  - Sputum cx revealed kleb siella and enterobact  - dc'd IV Zosyn x5-7d (9/12 -9/13 )  - d/c meropenem (9/13-9/14)  -began levaquin on 9/14 for total 4 days including meropenem dates as bacteria are sensitive per Dr. Paul  - levaquin was started on 9/16 for total duration of 7d txt(9/19) and will end today    #Endo   1)DM   - c/w NPH 10u q6h  - c/w ISS   - goal Blood Sugar 100-180     #Heme  1)b/l DVTs above knee on left and below knee on R  -c/w Lovenox 80 BID    #Prophylaxis  - therapeutic on lovenox, held for low Hbg     #GOC  -palliative consult following  -full code at this time  -family updated daily 71 year old man w/pmhx of HTN, DM2, BPH, and Asthma here for acute hypoxic respiratory failure and ARDS secondary to viral pneumonia from COVID-19. Hospital course complicated by VT arrest, DVT and MRSE bacteremia.    #Neuro   1) sedated  - c/w methadone increased to 20TID and Ativan 2mg q6h  - wean precedex  - noncont CT 9/13 appears unchanged 8/12     2) r/o seizure  -had rhythmic R arm movement  -f/u with 24hr eeg  - f/u procalcitonin     #ENT   -oropharyngeal packing removed on 9/10  -ENT said they will pack if patient's h/h drops  -will need FiO2 at 50% or less and PaO2 at 60 for qualify for tach    #CV   1)Cardiac Arrest VT  - S/p Amiodarone and electrolyte supplementation   - No further VT noted   - Echo reveals Right hrt strain   -holding diuretics, will revisit when bicarb at 30    #Pulm   1)Acute hypoxic resp failure 2/2 cardiac arrest  - vent Pressure AC 30/60/50/8 wean as tolerated    2) Asthma  - c/w Proventil q8h and Symbicort BID    #GI   1)Nutrition   - maintain Glucerna tube feeds as tolerated   - c/w senna/ miralax     2)Transaminitis - likely 2/2 cholestasis of sepsis vs SSC CIP (secondary sclerosing cholangitis in critically ill patients), Covid cholangiopathy, drug-induced liver injury  - c/w ursodiol 500 mg BID            - supportive treatment per hepatology  -alk phos, ast/alt downtrending    #Renal  1)Hypervolemia   - Strict I/O   - Diuresis as above  - Monitor electrolytes     2) Hypernatremia   -  Na 146 and started on free water 250 q8       #ID   1)COVID - 19   - S/p RDV Dexa and TOCI   - Continues on decadron per family preferences  - will continue with supportive care     2)MRSE Bacteremia   - S/P 14 day course of Vanc from 8/20-9/3 therapy, and intermittent gram-&anaerobe coverage   - ID follow up appreciated   - Ua neg  - afebrile OVN  -will c/w 1g vanc q24 for 2 weeks s/p negative BCX 9/12. will stop vanc on 9/26     3) Vent associated PNA vs colonization  - Sputum cx revealed kleb siella and enterobact  - dc'd IV Zosyn x5-7d (9/12 -9/13 )  - d/c meropenem (9/13-9/14)  -began levaquin on 9/14 for total 4 days including meropenem dates as bacteria are sensitive per Dr. Paul  - levaquin was started on 9/16 for total duration of 7d txt(9/19) and will end today    #Endo   1)DM   - c/w NPH 10u q6h  - c/w ISS   - goal Blood Sugar 100-180     #Heme  1)b/l DVTs above knee on left and below knee on R  - off Lovenox since 9/19 given Hgb drop to 6.0 requiring 1u PRBC; without signs of bleeding  - will do trial of Lovenox 9/21 if Hgb remains stable and check Xa level in s/o recent covid infection    #Prophylaxis  - prev Lovenox 80 BID; held for Hgb drop    #GOC  -palliative consult following  -full code at this time  -family updated daily 71 year old man w/pmhx of HTN, DM2, BPH, and Asthma here for acute hypoxic respiratory failure and ARDS secondary to viral pneumonia from COVID-19. Hospital course complicated by VT arrest, DVT and MRSE bacteremia.    #Neuro   1) sedated  - c/w methadone increased to 20TID and Ativan 2mg q6h  - wean precedex  - noncont CT 9/13 appears unchanged 8/12     2) r/o seizure  -had rhythmic R arm movement  - 24h EEG with generalized slowing; no evidence of seizure (per report 9/20 AM)    #ENT   -oropharyngeal packing removed on 9/10  -ENT said they will pack if patient's h/h drops  -will need FiO2 at 50% or less and PaO2 at 60 for qualify for tach    #CV   1)Cardiac Arrest VT  - S/p Amiodarone and electrolyte supplementation   - No further VT noted   - Echo reveals Right hrt strain   -holding diuretics, will revisit when bicarb at 30    #Pulm   1)Acute hypoxic resp failure 2/2 cardiac arrest  - vent Pressure AC 30/60/50/8 wean as tolerated    2) Asthma  - c/w Proventil q8h and Symbicort BID    #GI   1)Nutrition   - maintain Glucerna tube feeds as tolerated   - c/w senna/ miralax     2)Transaminitis - likely 2/2 cholestasis of sepsis vs SSC CIP (secondary sclerosing cholangitis in critically ill patients), Covid cholangiopathy, drug-induced liver injury  - c/w ursodiol 500 mg BID            - supportive treatment per hepatology  -alk phos, ast/alt downtrending    #Renal  1)Hypervolemia   - Strict I/O   - Diuresis as above  - Monitor electrolytes     2) Hypernatremia   -  Na 146 and started on free water 250 q8       #ID   1)COVID - 19   - S/p RDV Dexa and TOCI   - will continue with supportive care     2)MRSE Bacteremia   - S/P 14 day course of Vanc from 8/20-9/3 therapy, and intermittent gram-&anaerobe coverage   - Ua neg  - c/w Vanc 1g q24h. No need to dose by levels  - last postive Bcx 9/19 - will need 14d treatment beyond that  - unable to discern source; will follow-up with ID    3) Vent associated PNA vs colonization  - Sputum cx revealed kleb siella and enterobact  - dc'd IV Zosyn x5-7d (9/12 -9/13 )  - d/c meropenem (9/13-9/14)  -began levaquin on 9/14 for total 4 days including meropenem dates as bacteria are sensitive per Dr. Paul  - levaquin was started on 9/16 for total duration of 7d txt(9/19) and will end today    #Endo   1)DM   - c/w NPH 10u q6h  - c/w ISS   - goal Blood Sugar 100-180     #Heme  1)b/l DVTs above knee on left and below knee on R  - off Lovenox since 9/19 given Hgb drop to 6.0 requiring 1u PRBC; without signs of bleeding  - will do trial of Lovenox 9/21 if Hgb remains stable and check Xa level in s/o recent covid infection    #Prophylaxis  - prev Lovenox 80 BID; held for Hgb drop    #GOC  -palliative consult following  -full code at this time  -family updated daily

## 2021-09-20 NOTE — PROGRESS NOTE ADULT - SUBJECTIVE AND OBJECTIVE BOX
INTERVAL HPI/OVERNIGHT EVENTS:    SUBJECTIVE: Patient seen and examined at bedside.     CONSTITUTIONAL: No weakness, fevers or chills  EYES/ENT: No visual changes;  No vertigo or throat pain   NECK: No pain or stiffness  RESPIRATORY: No cough, wheezing, hemoptysis; No shortness of breath  CARDIOVASCULAR: No chest pain or palpitations  GASTROINTESTINAL: No abdominal or epigastric pain. No nausea, vomiting, or hematemesis; No diarrhea or constipation. No melena or hematochezia.  GENITOURINARY: No dysuria, frequency or hematuria  NEUROLOGICAL: No numbness or weakness  SKIN: No itching, rashes    OBJECTIVE:    VITAL SIGNS:  ICU Vital Signs Last 24 Hrs  T(C): 36.9 (20 Sep 2021 04:00), Max: 38.5 (19 Sep 2021 18:00)  T(F): 98.4 (20 Sep 2021 04:00), Max: 101.3 (19 Sep 2021 18:00)  HR: 87 (20 Sep 2021 07:00) (81 - 119)  BP: 87/52 (20 Sep 2021 07:00) (87/52 - 182/96)  BP(mean): 65 (20 Sep 2021 07:00) (65 - 129)  ABP: --  ABP(mean): --  RR: 30 (20 Sep 2021 07:00) (24 - 34)  SpO2: 97% (20 Sep 2021 07:00) (86% - 100%)    Mode: AC/ CMV (Assist Control/ Continuous Mandatory Ventilation), RR (machine): 30, FiO2: 60, PEEP: 8, ITime: 0.6, MAP: 17, PC: 27, PIP: 36    09-19 @ 07:01  -  09-20 @ 07:00  --------------------------------------------------------  IN: 3116 mL / OUT: 875 mL / NET: 2241 mL      CAPILLARY BLOOD GLUCOSE      POCT Blood Glucose.: 257 mg/dL (20 Sep 2021 05:10)      PHYSICAL EXAM:    General: NAD  HEENT: NC/AT; PERRL, clear conjunctiva  Neck: supple  Respiratory: CTA b/l  Cardiovascular: +S1/S2; RRR  Abdomen: soft, NT/ND; +BS x4  Extremities: WWP, 2+ peripheral pulses b/l; no LE edema  Skin: normal color and turgor; no rash  Neurological:    MEDICATIONS:  MEDICATIONS  (STANDING):  ALBUTerol    90 MICROgram(s) HFA Inhaler 2 Puff(s) Inhalation every 8 hours  artificial tears (preservative free) Ophthalmic Solution 1 Drop(s) Both EYES every 8 hours  budesonide 160 MICROgram(s)/formoterol 4.5 MICROgram(s) Inhaler 2 Puff(s) Inhalation two times a day  chlorhexidine 0.12% Liquid 15 milliLiter(s) Oral Mucosa every 12 hours  chlorhexidine 4% Liquid 1 Application(s) Topical <User Schedule>  cholecalciferol 2000 Unit(s) Oral daily  dextrose 40% Gel 15 Gram(s) Oral once  dextrose 5%. 1000 milliLiter(s) (50 mL/Hr) IV Continuous <Continuous>  dextrose 5%. 1000 milliLiter(s) (100 mL/Hr) IV Continuous <Continuous>  dextrose 50% Injectable 25 Gram(s) IV Push once  dextrose 50% Injectable 12.5 Gram(s) IV Push once  dextrose 50% Injectable 25 Gram(s) IV Push once  glucagon  Injectable 1 milliGRAM(s) IntraMuscular once  insulin lispro (ADMELOG) corrective regimen sliding scale   SubCutaneous every 6 hours  insulin NPH human recombinant 10 Unit(s) SubCutaneous every 6 hours  levoFLOXacin IVPB 750 milliGRAM(s) IV Intermittent every 24 hours  LORazepam     Tablet 2 milliGRAM(s) Oral every 6 hours  methadone   Solution 20 milliGRAM(s) Oral every 8 hours  midodrine 40 milliGRAM(s) Oral every 8 hours  multivitamin/minerals Oral Solution (WELLESSE) 30 milliLiter(s) Enteral Tube daily  naloxegol 25 milliGRAM(s) Oral daily  norepinephrine Infusion 0.05 MICROgram(s)/kG/Min (7.65 mL/Hr) IV Continuous <Continuous>  pantoprazole   Suspension 40 milliGRAM(s) Enteral Tube every 12 hours  polyethylene glycol 3350 17 Gram(s) Oral daily  senna Syrup 10 milliLiter(s) Oral at bedtime  ursodiol Tablet 500 milliGRAM(s) Oral two times a day    MEDICATIONS  (PRN):      ALLERGIES:  Allergies    No Known Allergies    Intolerances        LABS:                        7.9    17.34 )-----------( 341      ( 20 Sep 2021 00:28 )             25.5     09-19    146<H>  |  100  |  45<H>  ----------------------------<  211<H>  3.6   |  32<H>  |  0.91    Ca    9.1      19 Sep 2021 00:45  Phos  4.7     09-20  Mg     2.6     09-20    TPro  5.9<L>  /  Alb  2.2<L>  /  TBili  2.3<H>  /  DBili  x   /  AST  238<H>  /  ALT  193<H>  /  AlkPhos  2236<H>  09-19    PT/INR - ( 20 Sep 2021 00:29 )   PT: 13.3 sec;   INR: 1.11 ratio         PTT - ( 20 Sep 2021 00:29 )  PTT:23.0 sec      RADIOLOGY & ADDITIONAL TESTS: Reviewed.     INTERVAL HPI/OVERNIGHT EVENTS:    SUBJECTIVE: Patient seen and examined at bedside. No events overnight.    CONSTITUTIONAL: No weakness, fevers or chills  EYES/ENT: No visual changes;  No vertigo or throat pain   NECK: No pain or stiffness  RESPIRATORY: No cough, wheezing, hemoptysis; No shortness of breath  CARDIOVASCULAR: No chest pain or palpitations  GASTROINTESTINAL: No abdominal or epigastric pain. No nausea, vomiting, or hematemesis; No diarrhea or constipation. No melena or hematochezia.  GENITOURINARY: No dysuria, frequency or hematuria  NEUROLOGICAL: No numbness or weakness  SKIN: No itching, rashes    OBJECTIVE:    VITAL SIGNS:  ICU Vital Signs Last 24 Hrs  T(C): 36.9 (20 Sep 2021 04:00), Max: 38.5 (19 Sep 2021 18:00)  T(F): 98.4 (20 Sep 2021 04:00), Max: 101.3 (19 Sep 2021 18:00)  HR: 87 (20 Sep 2021 07:00) (81 - 119)  BP: 87/52 (20 Sep 2021 07:00) (87/52 - 182/96)  BP(mean): 65 (20 Sep 2021 07:00) (65 - 129)  ABP: --  ABP(mean): --  RR: 30 (20 Sep 2021 07:00) (24 - 34)  SpO2: 97% (20 Sep 2021 07:00) (86% - 100%)    Mode: AC/ CMV (Assist Control/ Continuous Mandatory Ventilation), RR (machine): 30, FiO2: 60, PEEP: 8, ITime: 0.6, MAP: 17, PC: 27, PIP: 36    09-19 @ 07:01  -  09-20 @ 07:00  --------------------------------------------------------  IN: 3116 mL / OUT: 875 mL / NET: 2241 mL      CAPILLARY BLOOD GLUCOSE      POCT Blood Glucose.: 257 mg/dL (20 Sep 2021 05:10)      PHYSICAL EXAM:    General: NAD  HEENT: NC/AT; PERRL, clear conjunctiva  Neck: supple  Respiratory: CTA b/l  Cardiovascular: +S1/S2; RRR  Abdomen: soft, NT/ND; +BS x4  Extremities: WWP, 2+ peripheral pulses b/l; no LE edema  Skin: normal color and turgor; no rash  Neurological:    MEDICATIONS:  MEDICATIONS  (STANDING):  ALBUTerol    90 MICROgram(s) HFA Inhaler 2 Puff(s) Inhalation every 8 hours  artificial tears (preservative free) Ophthalmic Solution 1 Drop(s) Both EYES every 8 hours  budesonide 160 MICROgram(s)/formoterol 4.5 MICROgram(s) Inhaler 2 Puff(s) Inhalation two times a day  chlorhexidine 0.12% Liquid 15 milliLiter(s) Oral Mucosa every 12 hours  chlorhexidine 4% Liquid 1 Application(s) Topical <User Schedule>  cholecalciferol 2000 Unit(s) Oral daily  dextrose 40% Gel 15 Gram(s) Oral once  dextrose 5%. 1000 milliLiter(s) (50 mL/Hr) IV Continuous <Continuous>  dextrose 5%. 1000 milliLiter(s) (100 mL/Hr) IV Continuous <Continuous>  dextrose 50% Injectable 25 Gram(s) IV Push once  dextrose 50% Injectable 12.5 Gram(s) IV Push once  dextrose 50% Injectable 25 Gram(s) IV Push once  glucagon  Injectable 1 milliGRAM(s) IntraMuscular once  insulin lispro (ADMELOG) corrective regimen sliding scale   SubCutaneous every 6 hours  insulin NPH human recombinant 10 Unit(s) SubCutaneous every 6 hours  levoFLOXacin IVPB 750 milliGRAM(s) IV Intermittent every 24 hours  LORazepam     Tablet 2 milliGRAM(s) Oral every 6 hours  methadone   Solution 20 milliGRAM(s) Oral every 8 hours  midodrine 40 milliGRAM(s) Oral every 8 hours  multivitamin/minerals Oral Solution (WELLESSE) 30 milliLiter(s) Enteral Tube daily  naloxegol 25 milliGRAM(s) Oral daily  norepinephrine Infusion 0.05 MICROgram(s)/kG/Min (7.65 mL/Hr) IV Continuous <Continuous>  pantoprazole   Suspension 40 milliGRAM(s) Enteral Tube every 12 hours  polyethylene glycol 3350 17 Gram(s) Oral daily  senna Syrup 10 milliLiter(s) Oral at bedtime  ursodiol Tablet 500 milliGRAM(s) Oral two times a day    MEDICATIONS  (PRN):      ALLERGIES:  Allergies    No Known Allergies    Intolerances        LABS:                        7.9    17.34 )-----------( 341      ( 20 Sep 2021 00:28 )             25.5     09-19    146<H>  |  100  |  45<H>  ----------------------------<  211<H>  3.6   |  32<H>  |  0.91    Ca    9.1      19 Sep 2021 00:45  Phos  4.7     09-20  Mg     2.6     09-20    TPro  5.9<L>  /  Alb  2.2<L>  /  TBili  2.3<H>  /  DBili  x   /  AST  238<H>  /  ALT  193<H>  /  AlkPhos  2236<H>  09-19    PT/INR - ( 20 Sep 2021 00:29 )   PT: 13.3 sec;   INR: 1.11 ratio         PTT - ( 20 Sep 2021 00:29 )  PTT:23.0 sec      RADIOLOGY & ADDITIONAL TESTS: Reviewed. INTERVAL HPI/OVERNIGHT EVENTS:    SUBJECTIVE: Patient seen and examined at bedside. No events overnight.    CONSTITUTIONAL: No weakness, fevers or chills  EYES/ENT: No visual changes;  No vertigo or throat pain   NECK: No pain or stiffness  RESPIRATORY: No cough, wheezing, hemoptysis; No shortness of breath  CARDIOVASCULAR: No chest pain or palpitations  GASTROINTESTINAL: No abdominal or epigastric pain. No nausea, vomiting, or hematemesis; No diarrhea or constipation. No melena or hematochezia.  GENITOURINARY: No dysuria, frequency or hematuria  NEUROLOGICAL: No numbness or weakness  SKIN: No itching, rashes    OBJECTIVE:    VITAL SIGNS:  ICU Vital Signs Last 24 Hrs  T(C): 36.9 (20 Sep 2021 04:00), Max: 38.5 (19 Sep 2021 18:00)  T(F): 98.4 (20 Sep 2021 04:00), Max: 101.3 (19 Sep 2021 18:00)  HR: 87 (20 Sep 2021 07:00) (81 - 119)  BP: 87/52 (20 Sep 2021 07:00) (87/52 - 182/96)  BP(mean): 65 (20 Sep 2021 07:00) (65 - 129)  ABP: --  ABP(mean): --  RR: 30 (20 Sep 2021 07:00) (24 - 34)  SpO2: 97% (20 Sep 2021 07:00) (86% - 100%)    Mode: AC/ CMV (Assist Control/ Continuous Mandatory Ventilation), RR (machine): 30, FiO2: 60, PEEP: 8, ITime: 0.6, MAP: 17, PC: 27, PIP: 36    09-19 @ 07:01  -  09-20 @ 07:00  --------------------------------------------------------  IN: 3116 mL / OUT: 875 mL / NET: 2241 mL      CAPILLARY BLOOD GLUCOSE      POCT Blood Glucose.: 257 mg/dL (20 Sep 2021 05:10)      PHYSICAL EXAM:    General: NAD  HEENT: NC/AT; PERRL, clear conjunctiva  Neck: supple  Respiratory: CTA b/l  Cardiovascular: +S1/S2; RRR  Abdomen: soft, NT/ND; +BS x4  Extremities: WWP, 2+ peripheral pulses b/l; no LE edema  Skin: normal color and turgor; no rash  Neurological:    MEDICATIONS:  MEDICATIONS  (STANDING):  ALBUTerol    90 MICROgram(s) HFA Inhaler 2 Puff(s) Inhalation every 8 hours  artificial tears (preservative free) Ophthalmic Solution 1 Drop(s) Both EYES every 8 hours  budesonide 160 MICROgram(s)/formoterol 4.5 MICROgram(s) Inhaler 2 Puff(s) Inhalation two times a day  chlorhexidine 0.12% Liquid 15 milliLiter(s) Oral Mucosa every 12 hours  chlorhexidine 4% Liquid 1 Application(s) Topical <User Schedule>  cholecalciferol 2000 Unit(s) Oral daily  dextrose 40% Gel 15 Gram(s) Oral once  dextrose 5%. 1000 milliLiter(s) (50 mL/Hr) IV Continuous <Continuous>  dextrose 5%. 1000 milliLiter(s) (100 mL/Hr) IV Continuous <Continuous>  dextrose 50% Injectable 25 Gram(s) IV Push once  dextrose 50% Injectable 12.5 Gram(s) IV Push once  dextrose 50% Injectable 25 Gram(s) IV Push once  glucagon  Injectable 1 milliGRAM(s) IntraMuscular once  insulin lispro (ADMELOG) corrective regimen sliding scale   SubCutaneous every 6 hours  insulin NPH human recombinant 10 Unit(s) SubCutaneous every 6 hours  levoFLOXacin IVPB 750 milliGRAM(s) IV Intermittent every 24 hours  LORazepam     Tablet 2 milliGRAM(s) Oral every 6 hours  methadone   Solution 20 milliGRAM(s) Oral every 8 hours  midodrine 40 milliGRAM(s) Oral every 8 hours  multivitamin/minerals Oral Solution (WELLESSE) 30 milliLiter(s) Enteral Tube daily  naloxegol 25 milliGRAM(s) Oral daily  norepinephrine Infusion 0.05 MICROgram(s)/kG/Min (7.65 mL/Hr) IV Continuous <Continuous>  pantoprazole   Suspension 40 milliGRAM(s) Enteral Tube every 12 hours  polyethylene glycol 3350 17 Gram(s) Oral daily  senna Syrup 10 milliLiter(s) Oral at bedtime  ursodiol Tablet 500 milliGRAM(s) Oral two times a day    MEDICATIONS  (PRN):      ALLERGIES:  Allergies    No Known Allergies    Intolerances        LABS:                        7.9    17.34 )-----------( 341      ( 20 Sep 2021 00:28 )             25.5     09-19    146<H>  |  100  |  45<H>  ----------------------------<  211<H>  3.6   |  32<H>  |  0.91    Ca    9.1      19 Sep 2021 00:45  Phos  4.7     09-20  Mg     2.6     09-20    TPro  5.9<L>  /  Alb  2.2<L>  /  TBili  2.3<H>  /  DBili  x   /  AST  238<H>  /  ALT  193<H>  /  AlkPhos  2236<H>  09-19    PT/INR - ( 20 Sep 2021 00:29 )   PT: 13.3 sec;   INR: 1.11 ratio         PTT - ( 20 Sep 2021 00:29 )  PTT:23.0 sec      RADIOLOGY & ADDITIONAL TESTS: Reviewed.

## 2021-09-20 NOTE — EEG REPORT - NS EEG TEXT BOX
[facility]  Comprehensive Epilepsy Center  Report of Continuous Video EEG    [facility]: [address], [place] [address], Phone [phone]  [facility]: [address] [place], [place] [address], [place] [address], Phone [phone]  [place] Office: [address], [address], [place] [address] Phone [phone]    [facility]: [address] [place] [address], Phone [phone]  [place] Office: [address] [place] [address], Phone [phone]    Patient Name: Terence Nick    Age: -, : -  Patient ID: -, MRN #: Mr# [id number], [place]: - [facility] [address]  Referring Physician: -  EEG #: [id number]    Study Time/Date: 1:11:32 PM on 2021  	  End Time/Date: 8AM on 2021          			   Duration: 19H    Study Information:    EEG Recording Technique:  The patient underwent continuous Video-EEG monitoring, using Telemetry System hardware on the XLTek Digital System. EEG and video data were stored on a computer hard drive with important events saved in digital archive files. The material was reviewed by a physician (electroencephalographer / epileptologist) on a daily basis. Talib and seizure detection algorithms were utilized and reviewed. An EEG Technician attended to the patient, and was available throughout daytime work hours.  The epilepsy center neurologist was available in person or on call 24-hours per day.    EEG Placement and Labeling of Electrodes:  The EEG was performed utilizing 20 channel referential EEG connections (coronal over temporal over parasagittal montage) using all standard 10-20 electrode placements with EKG, with additional electrodes placed in the inferior temporal region using the modified 10-10 montage electrode placements for elective admissions, or if deemed necessary. Recording was at a sampling rate of 256 samples per second per channel. Time synchronized digital video recording was done simultaneously with EEG recording. A low light infrared camera was used for low light recording.     History:  Concern for seizures.    Pertinent Medication  No Data.    Interpretation:    Daily EEG Visual Analysis  Findings:   No posterior dominant rhythm seen.  Background predominantly consisted of theta, delta and faster activities.    Focal Slowing:   None were present.    Sleep Background:  Not able to discern sleep stages.    Other Non-Epileptiform Findings:  None were present.    Interictal Epileptiform Activity:   None were present.    Events:  Clinical events: Right upper limb twitching noted by tech witnessed on video without electrographic changes.  Seizures: None recorded.    Activation Procedures:   Hyperventilation was not performed.    Photic stimulation was not performed.     Artifacts:  Intermittent myogenic and movement artifacts were noted.    ECG:  The heart rate on single channel ECG was predominantly between  BPM.    EEG Summary / Classification:  Abnormal EEG   - Moderate to severe generalized slowing.  - Right upper limb twitching without EEG changes.    EEG Impression / Clinical Correlate:  Abnormal EEG study.  Moderate to severe nonspecific diffuse or multifocal cerebral dysfunction.   No epileptiform pattern or seizure seen.  Right upper limb twitching not likely epileptic in nature.    Karan Ellsworth MD  Attending Physician, [facility] [place]

## 2021-09-20 NOTE — PROGRESS NOTE ADULT - ATTENDING COMMENTS
Agree with above  COVID pneumonia intubated day 30 30/+27/60%/+8, peak 37 plateau 32  Off pressors  Off Precedex, c/w methadone lorazepam  MRSE bacteremia on vancomycin, continue until 9/28  end Levaquin for pneumonia (finished course)  Twitching, but EEG negative for seizures  Off OAC for bleeding    I have personally provided 45 minutes of critical care time.

## 2021-09-21 NOTE — EEG REPORT - NS EEG TEXT BOX
[facility]  Comprehensive Epilepsy Center  Report of Continuous Video EEG    [facility]: [address], [place] [address], Phone [phone]  [facility]: [address] [place], [place] [address] [address], Phone [phone]  [place] Office: [address], [address], [place] [address] Phone [phone]    [facility]: [address] [place] [address], Phone [phone]  [place] Office: [address] [place] [address], Phone [phone]    Patient Name: Terence Nick    Age: -, : -  Patient ID: -, MRN #: Mr# [id number], [place]: - [facility] 520  Referring Physician: -    Study Time/Date: 8AM on 2021  	  End Time/Date: 1159AM on 2021          			   Duration: 4H    Study Information:    EEG Recording Technique:  The patient underwent continuous Video-EEG monitoring, using Telemetry System hardware on the XLTek Digital System. EEG and video data were stored on a computer hard drive with important events saved in digital archive files. The material was reviewed by a physician (electroencephalographer / epileptologist) on a daily basis. Talib and seizure detection algorithms were utilized and reviewed. An EEG Technician attended to the patient, and was available throughout daytime work hours.  The epilepsy center neurologist was available in person or on call 24-hours per day.    EEG Placement and Labeling of Electrodes:  The EEG was performed utilizing 20 channel referential EEG connections (coronal over temporal over parasagittal montage) using all standard 10-20 electrode placements with EKG, with additional electrodes placed in the inferior temporal region using the modified 10-10 montage electrode placements for elective admissions, or if deemed necessary. Recording was at a sampling rate of 256 samples per second per channel. Time synchronized digital video recording was done simultaneously with EEG recording. A low light infrared camera was used for low light recording.     History:  Concern for seizures.    Pertinent Medication  No Data.    Interpretation:    Daily EEG Visual Analysis  Findings:   No posterior dominant rhythm seen.  Background predominantly consisted of theta, delta and faster activities.    Focal Slowing:   None were present.    Sleep Background:  Not able to discern sleep stages.    Other Non-Epileptiform Findings:  None were present.    Interictal Epileptiform Activity:   None were present.    Events:  Clinical events: None.  Seizures: None recorded.    Activation Procedures:   Hyperventilation was not performed.    Photic stimulation was not performed.     Artifacts:  Intermittent myogenic and movement artifacts were noted.    ECG:  The heart rate on single channel ECG was predominantly between  BPM.    EEG Summary / Classification:  Abnormal EEG   - Moderate to severe generalized slowing.    EEG Impression / Clinical Correlate:  Abnormal EEG study.  Moderate to severe nonspecific diffuse or multifocal cerebral dysfunction.   No epileptiform pattern or seizure seen.    Karan Ellsworth MD  Attending Physician, [facility] [place]

## 2021-09-21 NOTE — CHART NOTE - NSCHARTNOTEFT_GEN_A_CORE
Outreach made to family to set up family meeting.  Discussed with daughterJaneen. Wife Christy unavailable at time of call.  Tentative meeting planned for Thursday at 2:30pm with palliative and MICU team.  Will confirm tomorrow with family to ensure patients son able to participate in conversation.  Remain available and will follow up. [phone]  MICU attending and team updated on the above.

## 2021-09-21 NOTE — PROGRESS NOTE ADULT - SUBJECTIVE AND OBJECTIVE BOX
INTERVAL HPI/OVERNIGHT EVENTS:    SUBJECTIVE: Patient seen and examined at bedside.     CONSTITUTIONAL: No weakness, fevers or chills  EYES/ENT: No visual changes;  No vertigo or throat pain   NECK: No pain or stiffness  RESPIRATORY: No cough, wheezing, hemoptysis; No shortness of breath  CARDIOVASCULAR: No chest pain or palpitations  GASTROINTESTINAL: No abdominal or epigastric pain. No nausea, vomiting, or hematemesis; No diarrhea or constipation. No melena or hematochezia.  GENITOURINARY: No dysuria, frequency or hematuria  NEUROLOGICAL: No numbness or weakness  SKIN: No itching, rashes    OBJECTIVE:    VITAL SIGNS:  ICU Vital Signs Last 24 Hrs  T(C): 37.6 (21 Sep 2021 07:00), Max: 37.7 (21 Sep 2021 03:00)  T(F): 99.7 (21 Sep 2021 07:00), Max: 99.9 (21 Sep 2021 03:00)  HR: 89 (21 Sep 2021 07:00) (80 - 106)  BP: 131/70 (21 Sep 2021 07:00) (76/49 - 173/92)  BP(mean): 65 (21 Sep 2021 07:00) (57 - 123)  ABP: --  ABP(mean): --  RR: 20 (21 Sep 2021 07:00) (20 - 32)  SpO2: 98% (21 Sep 2021 07:00) (94% - 100%)    Mode: AC/ CMV (Assist Control/ Continuous Mandatory Ventilation), RR (machine): 20, FiO2: 60, PEEP: 8, ITime: 0.6, MAP: 17, PC: 24, PIP: 36    09-20 @ 07:01  -  09-21 @ 07:00  --------------------------------------------------------  IN: 2013.7 mL / OUT: 900 mL / NET: 1113.7 mL      CAPILLARY BLOOD GLUCOSE      POCT Blood Glucose.: 132 mg/dL (21 Sep 2021 05:23)      PHYSICAL EXAM:    General: NAD  HEENT: NC/AT; PERRL, clear conjunctiva  Neck: supple  Respiratory: CTA b/l  Cardiovascular: +S1/S2; RRR  Abdomen: soft, NT/ND; +BS x4  Extremities: WWP, 2+ peripheral pulses b/l; no LE edema  Skin: normal color and turgor; no rash  Neurological:    MEDICATIONS:  MEDICATIONS  (STANDING):  ALBUTerol    90 MICROgram(s) HFA Inhaler 2 Puff(s) Inhalation every 8 hours  artificial tears (preservative free) Ophthalmic Solution 1 Drop(s) Both EYES every 8 hours  budesonide 160 MICROgram(s)/formoterol 4.5 MICROgram(s) Inhaler 2 Puff(s) Inhalation two times a day  chlorhexidine 0.12% Liquid 15 milliLiter(s) Oral Mucosa every 12 hours  chlorhexidine 4% Liquid 1 Application(s) Topical <User Schedule>  cholecalciferol 2000 Unit(s) Oral daily  dextrose 40% Gel 15 Gram(s) Oral once  dextrose 5%. 1000 milliLiter(s) (50 mL/Hr) IV Continuous <Continuous>  dextrose 5%. 1000 milliLiter(s) (100 mL/Hr) IV Continuous <Continuous>  dextrose 50% Injectable 25 Gram(s) IV Push once  dextrose 50% Injectable 12.5 Gram(s) IV Push once  dextrose 50% Injectable 25 Gram(s) IV Push once  glucagon  Injectable 1 milliGRAM(s) IntraMuscular once  insulin lispro (ADMELOG) corrective regimen sliding scale   SubCutaneous every 6 hours  insulin NPH human recombinant 10 Unit(s) SubCutaneous every 6 hours  LORazepam     Tablet 2 milliGRAM(s) Oral every 6 hours  methadone   Solution 20 milliGRAM(s) Oral every 8 hours  midodrine 40 milliGRAM(s) Oral every 8 hours  multivitamin/minerals Oral Solution (WELLESSE) 30 milliLiter(s) Enteral Tube daily  naloxegol 25 milliGRAM(s) Oral daily  norepinephrine Infusion 0.02 MICROgram(s)/kG/Min (3.06 mL/Hr) IV Continuous <Continuous>  pantoprazole   Suspension 40 milliGRAM(s) Enteral Tube every 12 hours  polyethylene glycol 3350 17 Gram(s) Oral daily  senna Syrup 10 milliLiter(s) Oral at bedtime  ursodiol Tablet 500 milliGRAM(s) Oral two times a day    MEDICATIONS  (PRN):      ALLERGIES:  Allergies    No Known Allergies    Intolerances        LABS:                        7.1    16.86 )-----------( 338      ( 21 Sep 2021 00:18 )             23.7     09-21    141  |  100  |  43<H>  ----------------------------<  125<H>  4.3   |  31  |  0.94    Ca    9.7      21 Sep 2021 00:18  Phos  4.2     09-21  Mg     2.6     09-21    TPro  6.0  /  Alb  2.2<L>  /  TBili  2.7<H>  /  DBili  x   /  AST  206<H>  /  ALT  150<H>  /  AlkPhos  2375<H>  09-21    PT/INR - ( 20 Sep 2021 00:29 )   PT: 13.3 sec;   INR: 1.11 ratio         PTT - ( 20 Sep 2021 00:29 )  PTT:23.0 sec      RADIOLOGY & ADDITIONAL TESTS: Reviewed.   INTERVAL HPI/OVERNIGHT EVENTS:    SUBJECTIVE: Patient seen and examined at bedside. No events overnight.     CONSTITUTIONAL: No weakness, fevers or chills  EYES/ENT: No visual changes;  No vertigo or throat pain   NECK: No pain or stiffness  RESPIRATORY: No cough, wheezing, hemoptysis; No shortness of breath  CARDIOVASCULAR: No chest pain or palpitations  GASTROINTESTINAL: No abdominal or epigastric pain. No nausea, vomiting, or hematemesis; No diarrhea or constipation. No melena or hematochezia.  GENITOURINARY: No dysuria, frequency or hematuria  NEUROLOGICAL: No numbness or weakness  SKIN: No itching, rashes    OBJECTIVE:    VITAL SIGNS:  ICU Vital Signs Last 24 Hrs  T(C): 37.6 (21 Sep 2021 07:00), Max: 37.7 (21 Sep 2021 03:00)  T(F): 99.7 (21 Sep 2021 07:00), Max: 99.9 (21 Sep 2021 03:00)  HR: 89 (21 Sep 2021 07:00) (80 - 106)  BP: 131/70 (21 Sep 2021 07:00) (76/49 - 173/92)  BP(mean): 65 (21 Sep 2021 07:00) (57 - 123)  ABP: --  ABP(mean): --  RR: 20 (21 Sep 2021 07:00) (20 - 32)  SpO2: 98% (21 Sep 2021 07:00) (94% - 100%)    Mode: AC/ CMV (Assist Control/ Continuous Mandatory Ventilation), RR (machine): 20, FiO2: 60, PEEP: 8, ITime: 0.6, MAP: 17, PC: 24, PIP: 36    09-20 @ 07:01  -  09-21 @ 07:00  --------------------------------------------------------  IN: 2013.7 mL / OUT: 900 mL / NET: 1113.7 mL      CAPILLARY BLOOD GLUCOSE      POCT Blood Glucose.: 132 mg/dL (21 Sep 2021 05:23)      PHYSICAL EXAM:    General: intubated, off sedation  HEENT: NCAT, PERRL, clear conjunctiva, sclera icteric  Neck: supple, no JVD  Respiratory: CTAB  Cardiovascular: RRR, S1S2, no m/r/g  Abdomen: soft, nontender, nondistended, normal bowel sounds  Extremities: no edema, no cyanosis  Skin: warm, perfused, b/l erythema on buttocks  Neurological: nonfocal, no response to pain, sternal rub    MEDICATIONS:  MEDICATIONS  (STANDING):  ALBUTerol    90 MICROgram(s) HFA Inhaler 2 Puff(s) Inhalation every 8 hours  artificial tears (preservative free) Ophthalmic Solution 1 Drop(s) Both EYES every 8 hours  budesonide 160 MICROgram(s)/formoterol 4.5 MICROgram(s) Inhaler 2 Puff(s) Inhalation two times a day  chlorhexidine 0.12% Liquid 15 milliLiter(s) Oral Mucosa every 12 hours  chlorhexidine 4% Liquid 1 Application(s) Topical <User Schedule>  cholecalciferol 2000 Unit(s) Oral daily  dextrose 40% Gel 15 Gram(s) Oral once  dextrose 5%. 1000 milliLiter(s) (50 mL/Hr) IV Continuous <Continuous>  dextrose 5%. 1000 milliLiter(s) (100 mL/Hr) IV Continuous <Continuous>  dextrose 50% Injectable 25 Gram(s) IV Push once  dextrose 50% Injectable 12.5 Gram(s) IV Push once  dextrose 50% Injectable 25 Gram(s) IV Push once  glucagon  Injectable 1 milliGRAM(s) IntraMuscular once  insulin lispro (ADMELOG) corrective regimen sliding scale   SubCutaneous every 6 hours  insulin NPH human recombinant 10 Unit(s) SubCutaneous every 6 hours  LORazepam     Tablet 2 milliGRAM(s) Oral every 6 hours  methadone   Solution 20 milliGRAM(s) Oral every 8 hours  midodrine 40 milliGRAM(s) Oral every 8 hours  multivitamin/minerals Oral Solution (WELLESSE) 30 milliLiter(s) Enteral Tube daily  naloxegol 25 milliGRAM(s) Oral daily  norepinephrine Infusion 0.02 MICROgram(s)/kG/Min (3.06 mL/Hr) IV Continuous <Continuous>  pantoprazole   Suspension 40 milliGRAM(s) Enteral Tube every 12 hours  polyethylene glycol 3350 17 Gram(s) Oral daily  senna Syrup 10 milliLiter(s) Oral at bedtime  ursodiol Tablet 500 milliGRAM(s) Oral two times a day    MEDICATIONS  (PRN):      ALLERGIES:  Allergies    No Known Allergies    Intolerances        LABS:                        7.1    16.86 )-----------( 338      ( 21 Sep 2021 00:18 )             23.7     09-21    141  |  100  |  43<H>  ----------------------------<  125<H>  4.3   |  31  |  0.94    Ca    9.7      21 Sep 2021 00:18  Phos  4.2     09-21  Mg     2.6     09-21    TPro  6.0  /  Alb  2.2<L>  /  TBili  2.7<H>  /  DBili  x   /  AST  206<H>  /  ALT  150<H>  /  AlkPhos  2375<H>  09-21    PT/INR - ( 20 Sep 2021 00:29 )   PT: 13.3 sec;   INR: 1.11 ratio         PTT - ( 20 Sep 2021 00:29 )  PTT:23.0 sec      RADIOLOGY & ADDITIONAL TESTS: Reviewed.   INTERVAL HPI/OVERNIGHT EVENTS:    SUBJECTIVE: Patient seen and examined at bedside. No events overnight. Remains off sedation and unresponsive.     ROS: unable to assess - patient not able to participate.     OBJECTIVE:    VITAL SIGNS:  ICU Vital Signs Last 24 Hrs  T(C): 37.6 (21 Sep 2021 07:00), Max: 37.7 (21 Sep 2021 03:00)  T(F): 99.7 (21 Sep 2021 07:00), Max: 99.9 (21 Sep 2021 03:00)  HR: 89 (21 Sep 2021 07:00) (80 - 106)  BP: 131/70 (21 Sep 2021 07:00) (76/49 - 173/92)  BP(mean): 65 (21 Sep 2021 07:00) (57 - 123)  ABP: --  ABP(mean): --  RR: 20 (21 Sep 2021 07:00) (20 - 32)  SpO2: 98% (21 Sep 2021 07:00) (94% - 100%)    Mode: AC/ CMV (Assist Control/ Continuous Mandatory Ventilation), RR (machine): 20, FiO2: 60, PEEP: 8, ITime: 0.6, MAP: 17, PC: 24, PIP: 36    09-20 @ 07:01  -  09-21 @ 07:00  --------------------------------------------------------  IN: 2013.7 mL / OUT: 900 mL / NET: 1113.7 mL      CAPILLARY BLOOD GLUCOSE      POCT Blood Glucose.: 132 mg/dL (21 Sep 2021 05:23)      PHYSICAL EXAM:    General: intubated, off sedation  HEENT: NCAT, PERRL, clear conjunctiva, sclera icteric  Neck: supple, no JVD  Respiratory: CTAB  Cardiovascular: RRR, S1S2, no m/r/g  Abdomen: soft, nontender, nondistended, normal bowel sounds  Extremities: no edema, no cyanosis  Skin: warm, perfused, b/l erythema on buttocks  Neurological: nonfocal, no response to pain, sternal rub    MEDICATIONS:  MEDICATIONS  (STANDING):  ALBUTerol    90 MICROgram(s) HFA Inhaler 2 Puff(s) Inhalation every 8 hours  artificial tears (preservative free) Ophthalmic Solution 1 Drop(s) Both EYES every 8 hours  budesonide 160 MICROgram(s)/formoterol 4.5 MICROgram(s) Inhaler 2 Puff(s) Inhalation two times a day  chlorhexidine 0.12% Liquid 15 milliLiter(s) Oral Mucosa every 12 hours  chlorhexidine 4% Liquid 1 Application(s) Topical <User Schedule>  cholecalciferol 2000 Unit(s) Oral daily  dextrose 40% Gel 15 Gram(s) Oral once  dextrose 5%. 1000 milliLiter(s) (50 mL/Hr) IV Continuous <Continuous>  dextrose 5%. 1000 milliLiter(s) (100 mL/Hr) IV Continuous <Continuous>  dextrose 50% Injectable 25 Gram(s) IV Push once  dextrose 50% Injectable 12.5 Gram(s) IV Push once  dextrose 50% Injectable 25 Gram(s) IV Push once  glucagon  Injectable 1 milliGRAM(s) IntraMuscular once  insulin lispro (ADMELOG) corrective regimen sliding scale   SubCutaneous every 6 hours  insulin NPH human recombinant 10 Unit(s) SubCutaneous every 6 hours  LORazepam     Tablet 2 milliGRAM(s) Oral every 6 hours  methadone   Solution 20 milliGRAM(s) Oral every 8 hours  midodrine 40 milliGRAM(s) Oral every 8 hours  multivitamin/minerals Oral Solution (WELLESSE) 30 milliLiter(s) Enteral Tube daily  naloxegol 25 milliGRAM(s) Oral daily  norepinephrine Infusion 0.02 MICROgram(s)/kG/Min (3.06 mL/Hr) IV Continuous <Continuous>  pantoprazole   Suspension 40 milliGRAM(s) Enteral Tube every 12 hours  polyethylene glycol 3350 17 Gram(s) Oral daily  senna Syrup 10 milliLiter(s) Oral at bedtime  ursodiol Tablet 500 milliGRAM(s) Oral two times a day    MEDICATIONS  (PRN):      ALLERGIES:  Allergies    No Known Allergies    Intolerances        LABS:                        7.1    16.86 )-----------( 338      ( 21 Sep 2021 00:18 )             23.7     09-21    141  |  100  |  43<H>  ----------------------------<  125<H>  4.3   |  31  |  0.94    Ca    9.7      21 Sep 2021 00:18  Phos  4.2     09-21  Mg     2.6     09-21    TPro  6.0  /  Alb  2.2<L>  /  TBili  2.7<H>  /  DBili  x   /  AST  206<H>  /  ALT  150<H>  /  AlkPhos  2375<H>  09-21    PT/INR - ( 20 Sep 2021 00:29 )   PT: 13.3 sec;   INR: 1.11 ratio         PTT - ( 20 Sep 2021 00:29 )  PTT:23.0 sec      RADIOLOGY & ADDITIONAL TESTS: Reviewed. INTERVAL HPI/OVERNIGHT EVENTS:  SUBJECTIVE: Patient seen and examined at bedside. No events overnight. Remains off sedation and unresponsive.     ROS: unable to assess - patient not able to participate.     OBJECTIVE:    VITAL SIGNS:  ICU Vital Signs Last 24 Hrs  T(C): 37.6 (21 Sep 2021 07:00), Max: 37.7 (21 Sep 2021 03:00)  T(F): 99.7 (21 Sep 2021 07:00), Max: 99.9 (21 Sep 2021 03:00)  HR: 89 (21 Sep 2021 07:00) (80 - 106)  BP: 131/70 (21 Sep 2021 07:00) (76/49 - 173/92)  BP(mean): 65 (21 Sep 2021 07:00) (57 - 123)  ABP: --  ABP(mean): --  RR: 20 (21 Sep 2021 07:00) (20 - 32)  SpO2: 98% (21 Sep 2021 07:00) (94% - 100%)    Mode: AC/ CMV (Assist Control/ Continuous Mandatory Ventilation), RR (machine): 20, FiO2: 60, PEEP: 8, ITime: 0.6, MAP: 17, PC: 24, PIP: 36    09-20 @ 07:01  -  09-21 @ 07:00  --------------------------------------------------------  IN: 2013.7 mL / OUT: 900 mL / NET: 1113.7 mL      CAPILLARY BLOOD GLUCOSE      POCT Blood Glucose.: 132 mg/dL (21 Sep 2021 05:23)      PHYSICAL EXAM:    General: intubated, off sedation  HEENT: NCAT, PERRL, clear conjunctiva, sclera icteric  Neck: supple, no JVD  Respiratory: CTAB  Cardiovascular: RRR, S1S2, no m/r/g  Abdomen: soft, nontender, nondistended, normal bowel sounds  Extremities: no edema, no cyanosis  Skin: warm, perfused, b/l erythema on buttocks  Neurological: nonfocal, no response to pain, sternal rub    MEDICATIONS:  MEDICATIONS  (STANDING):  ALBUTerol    90 MICROgram(s) HFA Inhaler 2 Puff(s) Inhalation every 8 hours  artificial tears (preservative free) Ophthalmic Solution 1 Drop(s) Both EYES every 8 hours  budesonide 160 MICROgram(s)/formoterol 4.5 MICROgram(s) Inhaler 2 Puff(s) Inhalation two times a day  chlorhexidine 0.12% Liquid 15 milliLiter(s) Oral Mucosa every 12 hours  chlorhexidine 4% Liquid 1 Application(s) Topical <User Schedule>  cholecalciferol 2000 Unit(s) Oral daily  dextrose 40% Gel 15 Gram(s) Oral once  dextrose 5%. 1000 milliLiter(s) (50 mL/Hr) IV Continuous <Continuous>  dextrose 5%. 1000 milliLiter(s) (100 mL/Hr) IV Continuous <Continuous>  dextrose 50% Injectable 25 Gram(s) IV Push once  dextrose 50% Injectable 12.5 Gram(s) IV Push once  dextrose 50% Injectable 25 Gram(s) IV Push once  glucagon  Injectable 1 milliGRAM(s) IntraMuscular once  insulin lispro (ADMELOG) corrective regimen sliding scale   SubCutaneous every 6 hours  insulin NPH human recombinant 10 Unit(s) SubCutaneous every 6 hours  LORazepam     Tablet 2 milliGRAM(s) Oral every 6 hours  methadone   Solution 20 milliGRAM(s) Oral every 8 hours  midodrine 40 milliGRAM(s) Oral every 8 hours  multivitamin/minerals Oral Solution (WELLESSE) 30 milliLiter(s) Enteral Tube daily  naloxegol 25 milliGRAM(s) Oral daily  norepinephrine Infusion 0.02 MICROgram(s)/kG/Min (3.06 mL/Hr) IV Continuous <Continuous>  pantoprazole   Suspension 40 milliGRAM(s) Enteral Tube every 12 hours  polyethylene glycol 3350 17 Gram(s) Oral daily  senna Syrup 10 milliLiter(s) Oral at bedtime  ursodiol Tablet 500 milliGRAM(s) Oral two times a day    MEDICATIONS  (PRN):      ALLERGIES:  Allergies    No Known Allergies    Intolerances        LABS:                        7.1    16.86 )-----------( 338      ( 21 Sep 2021 00:18 )             23.7     09-21    141  |  100  |  43<H>  ----------------------------<  125<H>  4.3   |  31  |  0.94    Ca    9.7      21 Sep 2021 00:18  Phos  4.2     09-21  Mg     2.6     09-21    TPro  6.0  /  Alb  2.2<L>  /  TBili  2.7<H>  /  DBili  x   /  AST  206<H>  /  ALT  150<H>  /  AlkPhos  2375<H>  09-21    PT/INR - ( 20 Sep 2021 00:29 )   PT: 13.3 sec;   INR: 1.11 ratio         PTT - ( 20 Sep 2021 00:29 )  PTT:23.0 sec      RADIOLOGY & ADDITIONAL TESTS: Reviewed.

## 2021-09-21 NOTE — CHART NOTE - NSCHARTNOTEFT_GEN_A_CORE
Nutrition Follow Up Note  Patient seen for: Malnutrition Follow Up     Chart reviewed, events noted. As per team: "71 year old man w/pmhx of HTN, DM2, BPH, and Asthma here for acute hypoxic respiratory failure and ARDS secondary to viral pneumonia from COVID-19. Hospital course complicated by VT arrest, DVT and MRSE bacteremia."    Source: [] Patient       [x] EMR        [x] RN        [] Family at bedside       [] Other:    -If unable to interview patient: [x] Trach/Vent/BiPAP  [] Disoriented/confused/inappropriate to interview    Nutrition-Related Events:   - Pressors:  [] Yes    [x] No   - Propofol:  [] Yes    [x] No         - Rate: __mL/hr. If maintained x 24 hours, propofol will provide:     Diet Order:   Diet, NPO with Tube Feed:   Tube Feeding Modality: Nasogastric  Glucerna 1.5 Stephen (GLUCERNA1.5RTH)  Total Volume for 24 Hours (mL): 1080  Continuous  Starting Tube Feed Rate {mL per Hour}: 30  Increase Tube Feed Rate by (mL): 10     Every 2 hours  Until Goal Tube Feed Rate (mL per Hour): 60  Tube Feed Duration (in Hours): 18  Tube Feed Start Time: 11:00 (21)      EN Order Provides: 1620cal and 89 Gm protein; ~19.8cal/kg and ~1.1 Gm Prot/kg based on dosing wt of 81.6kg    Nutrition-related concerns:  -Since last assessment, pt has been at goal rate; noted pt started at 40ml/hr instead of 60ml/hr this morning due to 400ml of residuals this morning   -noted pt continues to receive micronutrient coverage  -continues to be on NPH for glycemic control, has been adjusted as needed     GI:  Last BM .   Bowel Regimen? [x] Yes   [] No  Noted BMs loose at times.    Weights:   Daily Weight in k.5 (), Weight in k.4 ()  Various weights noted, would continue to monitor     Drug Dosing Weight  Height (cm): 172.7 (12 Aug 2021 13:34)  Weight (kg): 81.6 (12 Aug 2021 13:34)  BMI (kg/m2): 27.4 (12 Aug 2021 13:34)  BSA (m2): 1.95 (12 Aug 2021 13:34)    MEDICATIONS  (STANDING):  cholecalciferol  dextrose 40% Gel  dextrose 5%.  dextrose 5%.  dextrose 50% Injectable  dextrose 50% Injectable  dextrose 50% Injectable  glucagon  Injectable  insulin lispro (ADMELOG) corrective regimen sliding scale  insulin NPH human recombinant  midodrine  multivitamin/minerals Oral Solution (WELLESSE)  naloxegol  pantoprazole   Suspension  polyethylene glycol 3350  senna Syrup  ursodiol Tablet    Pertinent Labs:  @ 00:18: Na 141, BUN 43<H>, Cr 0.94, <H>, K+ 4.3, Phos 4.2, Mg 2.6, Alk Phos 2375<H>, ALT/SGPT 150<H>, AST/SGOT 206<H>, HbA1c --    A1C with Estimated Average Glucose Result: 7.9 % (21 @ 16:09)  A1C with Estimated Average Glucose Result: 7.3 % (21 @ 14:36)    Finger Sticks:  POCT Blood Glucose.: 109 mg/dL ( @ 10:35)  POCT Blood Glucose.: 132 mg/dL ( @ 05:23)  POCT Blood Glucose.: 126 mg/dL ( @ 23:52)  POCT Blood Glucose.: 148 mg/dL ( @ 17:16)    Triglycerides, Serum: 168 mg/dL (21 @ 23:03)  Triglycerides, Serum: 292 mg/dL (21 @ 00:50)  Triglycerides, Serum: 234 mg/dL (09-10-21 @ 00:40)  Triglycerides, Serum: 194 mg/dL (21 @ 00:42)  Triglycerides, Serum: 216 mg/dL (21 @ 01:08)  Triglycerides, Serum: 281 mg/dL (21 @ 01:35)  Triglycerides, Serum: 231 mg/dL (21 @ 01:10)      Skin per nursing documentation: suspected DTI on sacrum and stage 2 on right cheek; per Wound Care: evolving emili. buttocks DTI, possibly stage 2  Edema: +2 generalized, +3 emili. ankle, leg    Estimated Nutritional needs based on 81.6 kg:  Estimated Energy Needs: (20-25 stephen/kg): 9444-9983 stephen based on dosing weight of 81.6 kg  Estimated Protein Needs: (1.2-1.6 Gm/kg):  Gm based on dosing weight of 81.6 kg  Defer fluid needs to team    Previous Nutrition Diagnosis: Severe, acute malnutrition  Nutrition Diagnosis is: [x] ongoing  [] resolved [] not applicable     New Nutrition Diagnosis: [x] Not applicable    Nutrition Care Plan:  [x] In Progress  [] Achieved  [] Not applicable       Recommendations:      1. Recommend (as tolerated) increase Glucerna 1.5 to 70ml/hr x 18 hrs. To provide: 1260ml, 1890cal and 104 Gm protein. Based on dosing wt 81.6kg, provides: 23.2cal/kg and 1.27 Gm protein/kg.   2. Consider Banatrol TF if multiple BMs recorded and/or adjust bowel regimen PRN.   3. RD remains available for further nutritional interventions as needed.     Monitoring and Evaluation:   Continue to monitor nutritional intake, tolerance to diet prescription, weights, labs, skin integrity      RD remains available upon request and will follow up per protocol  Ayala Javier MS RD CDN [facility], Pager #[phone] Nutrition Follow Up Note  Patient seen for: Malnutrition Follow Up     Chart reviewed, events noted. As per team: "71 year old man w/pmhx of HTN, DM2, BPH, and Asthma here for acute hypoxic respiratory failure and ARDS secondary to viral pneumonia from COVID-19. Hospital course complicated by VT arrest, DVT and MRSE bacteremia."    Source: [] Patient       [x] EMR        [x] RN        [] Family at bedside       [] Other:    -If unable to interview patient: [x] Trach/Vent/BiPAP  [] Disoriented/confused/inappropriate to interview    Nutrition-Related Events:   - Pressors:  [] Yes    [x] No   - Propofol:  [] Yes    [x] No         - Rate: __mL/hr. If maintained x 24 hours, propofol will provide:     Diet Order:   Diet, NPO with Tube Feed:   Tube Feeding Modality: Nasogastric  Glucerna 1.5 Stephen (GLUCERNA1.5RTH)  Total Volume for 24 Hours (mL): 1080  Continuous  Starting Tube Feed Rate {mL per Hour}: 30  Increase Tube Feed Rate by (mL): 10     Every 2 hours  Until Goal Tube Feed Rate (mL per Hour): 60  Tube Feed Duration (in Hours): 18  Tube Feed Start Time: 11:00 (21)      EN Order Provides: 1620cal and 89 Gm protein; ~19.8cal/kg and ~1.1 Gm Prot/kg based on dosing wt of 81.6kg    Nutrition-related concerns:  -Since last assessment, pt has been at goal rate; noted pt started at 40ml/hr instead of 60ml/hr this morning due to 400ml of residuals this morning; continues to be at 40ml/hr at this time   -noted pt continues to receive micronutrient coverage  -continues to be on NPH for glycemic control, has been adjusted as needed   -as per RN, pt with a lot of flatus and loose BMs; noted received Miralax on 9/15,  and , none yesterday or today; noted prior to the 9/15, pt documented to have liquid BMs as well     GI:  Last BM .   Bowel Regimen? [x] Yes   [] No    Weights:   Daily Weight in k.5 (), Weight in k.4 ()  Various weights noted, would continue to monitor     Drug Dosing Weight  Height (cm): 172.7 (12 Aug 2021 13:34)  Weight (kg): 81.6 (12 Aug 2021 13:34)  BMI (kg/m2): 27.4 (12 Aug 2021 13:34)  BSA (m2): 1.95 (12 Aug 2021 13:34)    MEDICATIONS  (STANDING):  cholecalciferol  dextrose 40% Gel  dextrose 5%.  dextrose 5%.  dextrose 50% Injectable  dextrose 50% Injectable  dextrose 50% Injectable  glucagon  Injectable  insulin lispro (ADMELOG) corrective regimen sliding scale  insulin NPH human recombinant  midodrine  multivitamin/minerals Oral Solution (WELLESSE)  naloxegol  pantoprazole   Suspension  polyethylene glycol 3350  senna Syrup  ursodiol Tablet    Pertinent Labs:  @ 00:18: Na 141, BUN 43<H>, Cr 0.94, <H>, K+ 4.3, Phos 4.2, Mg 2.6, Alk Phos 2375<H>, ALT/SGPT 150<H>, AST/SGOT 206<H>, HbA1c --    A1C with Estimated Average Glucose Result: 7.9 % (21 @ 16:09)  A1C with Estimated Average Glucose Result: 7.3 % (21 @ 14:36)    Finger Sticks:  POCT Blood Glucose.: 109 mg/dL ( @ 10:35)  POCT Blood Glucose.: 132 mg/dL ( @ 05:23)  POCT Blood Glucose.: 126 mg/dL ( @ 23:52)  POCT Blood Glucose.: 148 mg/dL ( @ 17:16)    Triglycerides, Serum: 168 mg/dL (21 @ 23:03)  Triglycerides, Serum: 292 mg/dL (21 @ 00:50)  Triglycerides, Serum: 234 mg/dL (09-10-21 @ 00:40)  Triglycerides, Serum: 194 mg/dL (21 @ 00:42)  Triglycerides, Serum: 216 mg/dL (21 @ 01:08)  Triglycerides, Serum: 281 mg/dL (21 @ 01:35)  Triglycerides, Serum: 231 mg/dL (21 @ 01:10)      Skin per nursing documentation: suspected DTI on sacrum and stage 2 on right cheek; per Wound Care: evolving emili. buttocks DTI, possibly stage 2  Edema: +2 generalized, +3 emili. ankle, leg    Estimated Nutritional needs based on 81.6 kg:  Estimated Energy Needs: (20-25 stephen/kg): 4997-0265 stephen based on dosing weight of 81.6 kg  Estimated Protein Needs: (1.2-1.6 Gm/kg):  Gm based on dosing weight of 81.6 kg  Defer fluid needs to team    Previous Nutrition Diagnosis: Severe, acute malnutrition  Nutrition Diagnosis is: [x] ongoing  [] resolved [] not applicable     New Nutrition Diagnosis: [x] Not applicable    Nutrition Care Plan:  [x] In Progress  [] Achieved  [] Not applicable       Recommendations:      1. Consider changing tube feeds to Vital AF at 75ml/hr x18 hours to promote better tolerance (1350ml total volume, 1620cal, 101 Gm Prot; ~20cal/kg and 1.2 Gm Prot/kg based on dosing wt of 81.6kg).  2. Monitor BMs.   2. RD remains available for further nutritional interventions as needed.     Monitoring and Evaluation:   Continue to monitor nutritional intake, tolerance to diet prescription, weights, labs, skin integrity      RD remains available upon request and will follow up per protocol  Ayala Javier MS RD CDN [facility], Pager #[phone]

## 2021-09-21 NOTE — PROGRESS NOTE ADULT - ASSESSMENT
71 year old man w/pmhx of HTN, DM2, BPH, and Asthma here for acute hypoxic respiratory failure and ARDS secondary to viral pneumonia from COVID-19. Hospital course complicated by VT arrest, DVT and MRSE bacteremia.    #Neuro   1) sedated  - c/w methadone increased to 20TID and Ativan 2mg q6h  - noncont CT 9/13 appears unchanged 8/12     2) r/o seizure  -had rhythmic R arm movement  - 24h EEG with generalized slowing; no evidence of seizure (per report 9/20 AM)    #ENT   -oropharyngeal packing removed on 9/10  -ENT said they will pack if patient's h/h drops  -will need FiO2 at 50% or less and PaO2 at 60 for qualify for tach    #CV   1)Cardiac Arrest VT  - S/p Amiodarone and electrolyte supplementation   - No further VT noted   - Echo reveals Right hrt strain   -holding diuretics, will revisit when bicarb at 30    #Pulm   1)Acute hypoxic resp failure 2/2 cardiac arrest  - vent Pressure AC 30/60/50/8 wean as tolerated    2) Asthma  - c/w Proventil q8h and Symbicort BID    #GI   1)Nutrition   - maintain Glucerna tube feeds as tolerated   - c/w senna/ miralax     2)Transaminitis - likely 2/2 cholestasis of sepsis vs SSC CIP (secondary sclerosing cholangitis in critically ill patients), Covid cholangiopathy, drug-induced liver injury  - c/w ursodiol 500 mg BID            - supportive treatment per hepatology  -alk phos, ast/alt downtrending    #Renal  1)Hypervolemia   - Strict I/O   - Diuresis as above  - Monitor electrolytes     2) Hypernatremia   -  Na 146 and started on free water 250 q8       #ID   1)COVID - 19   - S/p RDV Dexa and TOCI   - will continue with supportive care     2)MRSE Bacteremia   - S/P 14 day course of Vanc from 8/20-9/3 therapy, and intermittent gram-&anaerobe coverage   - Ua neg  - c/w Vanc 1g q24h. No need to dose by levels  - last postive Bcx 9/19 - will need 14d treatment beyond that  - unable to discern source; will follow-up with ID    3) Vent associated PNA vs colonization  - Sputum cx revealed kleb siella and enterobact  - dc'd IV Zosyn x5-7d (9/12 -9/13 )  - d/c meropenem (9/13-9/14)  -began levaquin on 9/14 for total 4 days including meropenem dates as bacteria are sensitive per Dr. Paul  - levaquin was started on 9/16 for total duration of 7d txt(9/19) and will end today    #Endo   1)DM   - c/w NPH 10u q6h  - c/w ISS   - goal Blood Sugar 100-180     #Heme  1)b/l DVTs above knee on left and below knee on R  - off Lovenox since 9/19 given Hgb drop to 6.0 requiring 1u PRBC; without signs of bleeding  - will do trial of Lovenox 9/21 if Hgb remains stable and check Xa level in s/o recent covid infection    #Prophylaxis  - prev Lovenox 80 BID; held for Hgb drop    #GOC  -palliative consult following  -full code at this time  -family updated daily 71 year old man w/pmhx of HTN, DM2, BPH, and Asthma here for acute hypoxic respiratory failure and ARDS secondary to viral pneumonia from COVID-19. Hospital course complicated by VT arrest, DVT and MRSE bacteremia.    #Neuro   1) sedated  - decreased methadone increased to 10TID and Ativan 2mg q12h  - noncont CT 9/13 appears unchanged 8/12     2) r/o seizure  -had rhythmic R arm movement  - 24h EEG with generalized slowing; no evidence of seizure (per report 9/20 AM)    #ENT   -oropharyngeal packing removed on 9/10  -ENT said they will pack if patient's h/h drops  -will need FiO2 at 50% or less and PaO2 at 60 for qualify for tach    #CV   1)Cardiac Arrest VT  - S/p Amiodarone and electrolyte supplementation   - No further VT noted   - Echo reveals Right hrt strain   -holding diuretics, will revisit when bicarb at 30    #Pulm   1)Acute hypoxic resp failure 2/2 cardiac arrest  - vent Pressure AC 30/60/50/8 wean as tolerated    2) Asthma  - c/w Proventil q8h and Symbicort BID    #GI   1)Nutrition   - maintain Glucerna tube feeds as tolerated   - c/w senna/ miralax     2)Transaminitis - likely 2/2 cholestasis of sepsis vs SSC CIP (secondary sclerosing cholangitis in critically ill patients), Covid cholangiopathy, drug-induced liver injury  - c/w ursodiol 500 mg BID            - supportive treatment per hepatology  -alk phos, ast/alt downtrending    #Renal  1)Hypervolemia   - Strict I/O   - Diuresis as above  - Monitor electrolytes     2) Hypernatremia   -  Na 141 and free water stopped       #ID   1)COVID - 19   - S/p RDV Dexa and TOCI   - will continue with supportive care     2)MRSE Bacteremia   - S/P 14 day course of Vanc from 8/20-9/3 therapy, and intermittent gram-&anaerobe coverage   - Ua neg  - c/w Vanc 1g q24h  - last postive Bcx 9/19 - will need 14d treatment beyond that  - continue surveillance cultures q48-72h   - f/u TTE to r/o endocarditis as source of bacteremia    3) Vent associated PNA vs colonization  - Sputum cx revealed kleb siella and enterobact  - dc'd IV Zosyn x5-7d (9/12 -9/13 )  - d/c meropenem (9/13-9/14)  -began levaquin on 9/14 for total 4 days including meropenem dates as bacteria are sensitive per Dr. Paul  - levaquin was started on 9/16 for total duration of 7d txt(9/19) and will end today    #Endo   1)DM   - c/w NPH 10u q6h  - c/w ISS   - goal Blood Sugar 100-180     #Heme  1)b/l DVTs above knee on left and below knee on R  - off Lovenox since 9/19 given Hgb drop to 6.0 requiring 1u PRBC; without signs of bleeding  - Hgb drop 7.9 --> 7.1 on 9/21; holding full AC    #Prophylaxis  - Hep 5000 SubQ q8h    #GOC  -palliative consult following  -full code at this time  -family updated daily 71 year old man w/pmhx of HTN, DM2, BPH, and Asthma here for acute hypoxic respiratory failure and ARDS secondary to viral pneumonia from COVID-19. Hospital course complicated by VT arrest, DVT and MRSE bacteremia.    #Neuro   1) sedated  - decreased methadone increased to 10TID and Ativan 2mg q12h  - noncont CT 9/13 appears unchanged 8/12     2) r/o seizure  -had rhythmic R arm movement  - 24h EEG with generalized slowing; no evidence of seizure (per report 9/20 AM)    #ENT   -oropharyngeal packing removed on 9/10  -ENT said they will pack if patient's h/h drops  -will need FiO2 at 50% or less and PaO2 at 60 for qualify for tach    #CV   1)Cardiac Arrest VT  - S/p Amiodarone and electrolyte supplementation   - No further VT noted   - Echo reveals Right hrt strain   -holding diuretics, will revisit when bicarb at 30    #Pulm   1)Acute hypoxic resp failure 2/2 cardiac arrest  - vent Pressure AC 30/60/50/8 wean as tolerated    2) Asthma  - c/w Proventil q8h and Symbicort BID    #GI   1)Nutrition   - maintain Glucerna tube feeds as tolerated   - c/w senna/ miralax     2)Transaminitis - likely 2/2 cholestasis of sepsis vs SSC CIP (secondary sclerosing cholangitis in critically ill patients), Covid cholangiopathy, drug-induced liver injury  - c/w ursodiol 500 mg BID            - supportive treatment per hepatology  -alk phos, ast/alt downtrending    #Renal  1)Hypervolemia   - Strict I/O   - Diuresis as above  - Monitor electrolytes     2) Hypernatremia   -  Na 141 and free water stopped       #ID   1)COVID - 19   - S/p RDV Dexa and TOCI   - will continue with supportive care     2)MRSE Bacteremia   - S/P 14 day course of Vanc from 8/20-9/3 therapy, and intermittent gram-&anaerobe coverage   - Ua neg  - c/w Vanc 1g q24h  - last postive Bcx 9/19 - will need 14d treatment beyond that  - continue surveillance cultures q48-72h   - f/u TTE to r/o endocarditis as source of bacteremia    3) Vent associated PNA vs colonization  - Sputum cx revealed klebsiella and enterobact  - dc'd IV Zosyn x5-7d (9/12 -9/13 )  - d/c meropenem (9/13-9/14)  -began levaquin on 9/14 for total 4 days including meropenem dates as bacteria are sensitive per Dr. Paul  - levaquin was started on 9/16 for total duration of 7d txt(9/19) and will end today    #Endo   1)DM   - c/w NPH 10u q6h  - c/w ISS   - goal Blood Sugar 100-180     #Heme  1)b/l DVTs above knee on left and below knee on R  - off Lovenox since 9/19 given Hgb drop to 6.0 requiring 1u PRBC; without signs of bleeding  - Hgb drop 7.9 --> 7.1 on 9/21; holding full AC    #Prophylaxis  - Hep 5000 SubQ q8h    #GOC  -palliative consult following  -full code at this time  -family updated daily

## 2021-09-21 NOTE — PROGRESS NOTE ADULT - ATTENDING COMMENTS
Agree with above  Remains intubated, day 31. 20/+24/60%/+8. Peaks 36 plateau 34  Continue attempts to titrate down FiO2 as tolerated to maintain spO2 =92%  Able to titrate down to 50% at present  Unresponsive off sedatives (has gag and breathes over vent). Attempt to titrate down Ativan and methadone  Remains on vancomycin for persistent MRSE. Check level before next dose.  Check TTE to R/O endocarditis. May need SEJAL if no other possible source.  Sodium corrected, D/C free water boluses for now (can always resume if sodium increases again)  MAP =65, on-and-off pressors  Family meeting at 2:30pm on Thursday    I have personally provided 50 minutes of critical care time.

## 2021-09-22 NOTE — PROGRESS NOTE ADULT - ATTENDING COMMENTS
Agree with above  Remains intubated, now >30 days  Oxygen requirements are minimal 50%/+5  Patient now may be evaluated for trache  May need CT chest / abd / pelvis with contrast to evaluate other sources  Persistent MRSE bacteremia, unclear source.   Last positive culture 9/19, all other cultures negative to-date  TTE negative for vegetations, will need SEJAL to truly R/O endocarditis. Cardiology consult.  ID consult for persistent bacteremia  c/w vancomycin, monitor trough (last 20.8)  Alk phos remains elevated  Very poor overall prognosis    I have personally provided 45 minutes of critical care time.

## 2021-09-22 NOTE — PROGRESS NOTE ADULT - ASSESSMENT
Impression:    Evolving bilateral buttock DTI   Incontinence Dermatitis  Incontinent of stool & urine    Recommend:  1.) Topical therapy: Sacrum/Bilateral buttocks- Cleanse with NS, pat dry, apply medihoney, apply cavilon skin protectant to periwound skin,cover with DSD, then secure with tegederm daily  2.) Nutritional optimization  3.) Maintain on an alternating air with low air loss surface  4.) Turn and reposition q 2 hours  5.) Incontinence management - incontinence pads, cleanser, pericare BID  6.) Offload heels/feet with complete cAir air fluidized boots  7.) Glycemic control    Care as per MICU. will follow w/ you  Upon discharge f/u as outpatient at [facility] [address] [facility] [phone]  Neisha Cohen NP-C, CWOCN [id number]

## 2021-09-22 NOTE — PROGRESS NOTE ADULT - SUBJECTIVE AND OBJECTIVE BOX
Wound Surgery Progress Note:    HPI:  72yo M w/ PMHx of HTN, DM2, BPH, asthma presents with shortness of breath. Patient endorses that he has been feeling SOB with associated cough for the last 2 weeks and his symptoms slowly worsened over time. He did not receive the COVID vaccine. He reports that his family members whom he lives with likely are also COVID positive but he does not know for sure. He did not try taking anything for his symptoms other than his usual medications/inhalers. There were no obvious aggravating or alleviating factors. Currently with supplemental oxygen on, he feels well and has no complaints, however he presented to [facility] since his symptoms were worsening. In the ED, he was hemodynamically stable, afebrile, but he was encephalopathic and hypoxic on room air requiring high flow nasal canula. Labs were significant for mild hyperkalemia and COVID positive. CXR prelim read showed bilateral patchy opacities. CT head showed no acute findings. MICU was consulted in the ED, patient was deemed not a MICU candidate. Patient was given doxycycline and dexamethasone x1 and admitted to general medicine for further management. At time of interview, patient was A/Ox3 with .  (13 Aug 2021 04:21)    Follow up visit for management of sacral/bilateral buttocks skin breakdown, Mr. Nick was encountered on an alternating air with low air loss surface. He is incontinent of stool and has an indwelling urinary catheter to manage urinary incontinence. His extreme immobility, inactivity, gross incontinence of urine and stool as well as poor nutritional status all contribute to his high risk for pressure injury development and hinder healing. In addition, skin may be failing in the setting of multiple organ failure.    PAST MEDICAL & SURGICAL HISTORY:  BPH (benign prostatic hyperplasia)  Hyperlipemia  HTN (hypertension)  Hypercholesteremia  Asthma  Diabetes  Kidney stone  Bladder stone  H/O lithotripsy, x 2  History of kidney stones  H/O umbilical hernia repair    MEDICATIONS  (STANDING):  ALBUTerol    90 MICROgram(s) HFA Inhaler 2 Puff(s) Inhalation every 8 hours  artificial tears (preservative free) Ophthalmic Solution 1 Drop(s) Both EYES every 8 hours  budesonide 160 MICROgram(s)/formoterol 4.5 MICROgram(s) Inhaler 2 Puff(s) Inhalation two times a day  chlorhexidine 0.12% Liquid 15 milliLiter(s) Oral Mucosa every 12 hours  chlorhexidine 4% Liquid 1 Application(s) Topical <User Schedule>  cholecalciferol 2000 Unit(s) Oral daily  dextrose 40% Gel 15 Gram(s) Oral once  dextrose 5%. 1000 milliLiter(s) (50 mL/Hr) IV Continuous <Continuous>  dextrose 5%. 1000 milliLiter(s) (100 mL/Hr) IV Continuous <Continuous>  dextrose 50% Injectable 25 Gram(s) IV Push once  dextrose 50% Injectable 12.5 Gram(s) IV Push once  dextrose 50% Injectable 25 Gram(s) IV Push once  glucagon  Injectable 1 milliGRAM(s) IntraMuscular once  insulin lispro (ADMELOG) corrective regimen sliding scale   SubCutaneous every 6 hours  insulin NPH human recombinant 10 Unit(s) SubCutaneous every 6 hours  LORazepam     Tablet 2 milliGRAM(s) Oral every 12 hours  methadone   Solution 10 milliGRAM(s) Oral every 8 hours  midodrine 40 milliGRAM(s) Oral every 8 hours  multivitamin/minerals Oral Solution (WELLESSE) 30 milliLiter(s) Enteral Tube daily  naloxegol 25 milliGRAM(s) Oral daily  pantoprazole   Suspension 40 milliGRAM(s) Enteral Tube every 12 hours  polyethylene glycol 3350 17 Gram(s) Oral daily  senna Syrup 10 milliLiter(s) Oral at bedtime  ursodiol Tablet 500 milliGRAM(s) Oral two times a day  vancomycin  IVPB 1000 milliGRAM(s) IV Intermittent every 24 hours    MEDICATIONS  (PRN):    Allergies    No Known Allergies    Intolerances    Vital Signs Last 24 Hrs  T(C): 37.2 (22 Sep 2021 12:00), Max: 38.2 (22 Sep 2021 00:00)  T(F): 99 (22 Sep 2021 12:00), Max: 100.8 (22 Sep 2021 00:00)  HR: 87 (22 Sep 2021 12:00) (81 - 96)  BP: 115/65 (22 Sep 2021 12:00) (77/48 - 115/65)  BP(mean): 84 (22 Sep 2021 12:00) (58 - 84)  RR: 16 (22 Sep 2021 12:00) (15 - 20)  SpO2: 92% (22 Sep 2021 12:00) (90% - 100%)    Physical Exam:  General: sedated, Obese   ENMT: intubated  Respiratory: vented  Gastrointestinal: soft NT/ND  Neurology: nonverbal, not following commands  Musculoskeletal: no contractures  Vascular: BLE edema equal  Skin:  Sacral/bilateral buttocks with evolving central eschar and peripheral superficially denuded tissue, L 10cm x W 8cm x D unknown - scant serosanguinous drainage, No odor, erythema, increased warmth, tenderness, induration, fluctuance    LABS:      141  |  100  |  51<H>  ----------------------------<  157<H>  4.9   |  30  |  1.13    Ca    10.0      22 Sep 2021 00:46  Phos  4.2       Mg     2.8         TPro  6.4  /  Alb  2.6<L>  /  TBili  3.0<H>  /  DBili  x   /  AST  189<H>  /  ALT  136<H>  /  AlkPhos  2421<H>                            7.9    14.17 )-----------( 273      ( 22 Sep 2021 11:45 )             26.6     PT/INR - ( 22 Sep 2021 00:46 )   PT: 12.4 sec;   INR: 1.04 ratio         PTT - ( 22 Sep 2021 00:46 )  PTT:30.7 sec  Urinalysis Basic - ( 21 Sep 2021 16:11 )    Color: Dark Yellow / Appearance: Turbid / S.019 / pH: x  Gluc: x / Ketone: Negative  / Bili: Small / Urobili: 4 mg/dL   Blood: x / Protein: 30 mg/dL / Nitrite: Negative   Leuk Esterase: Negative / RBC: 29 /hpf / WBC 19 /HPF   Sq Epi: x / Non Sq Epi: 5 /hpf / Bacteria: Negative

## 2021-09-22 NOTE — CHART NOTE - NSCHARTNOTEFT_GEN_A_CORE
Outreach made with family to confirm meeting for tomorrow.  Per family, son is not available AT ALL this week and requested to reschedule for next week.  Discussed this providers availability for Monday and Tuesday. plan to discuss with family on Friday to confirm.    If MICU requires meeting prior to this, would suggest their individual outreach to the family to discuss.  [phone]

## 2021-09-22 NOTE — PROGRESS NOTE ADULT - ASSESSMENT
Unvaccinated COVID pneumonia s/p tocilizumab, remdesivir and two courses of decadron, intubated since 8/20.     Fevers almost daily for the past two weeks (since 9/8).     Blood cultures intermittently growing Staph epidermidis (8/24, 8/30, 9/10, 9/12, 9/19). Unclear significance. Has been on Vancomycin for about a month already which suggests either contaminant or persistent endovascular focus. Central lines removed. Uncommon cause of NVE and TTE without vegetations (9/8, 9/21). Evolving sacral eschar without acute infection.     Treated possible VAP - Klebsiella and Enterobacter 9/11 with negative subsequent sputum cultures.     Remains in respiratory failure. Poor prognosis. Family meeting pending.     Suggest  -continue Vancomycin for now, agree with 1Gm IV q24h   -monitor troughs and renal function   -would pursue SEJAL in the setting of persistent fevers and gram positive organism on blood cultures   -check CT chest abdomen pelvis with IV contrast to rule out occult process   -at the moment I don't think gram negative coverage needs to be empirically added   -supportive care     Discussed with ICU     Patrick Paul MD   Infectious Disease   Pager [phone]   After 5PM and on weekends please page fellow on call or call [phone]

## 2021-09-22 NOTE — PROGRESS NOTE ADULT - SUBJECTIVE AND OBJECTIVE BOX
Follow Up: COVID, Fever, positive blood cultures    Interval History/ROS: Febrile for the past two weeks. Remains intubated.     Allergies  No Known Allergies        ANTIMICROBIALS:  vancomycin  IVPB 1000 every 24 hours      OTHER MEDS:  ALBUTerol    90 MICROgram(s) HFA Inhaler 2 Puff(s) Inhalation every 8 hours  artificial tears (preservative free) Ophthalmic Solution 1 Drop(s) Both EYES every 8 hours  budesonide 160 MICROgram(s)/formoterol 4.5 MICROgram(s) Inhaler 2 Puff(s) Inhalation two times a day  chlorhexidine 0.12% Liquid 15 milliLiter(s) Oral Mucosa every 12 hours  chlorhexidine 4% Liquid 1 Application(s) Topical <User Schedule>  cholecalciferol 2000 Unit(s) Oral daily  dextrose 40% Gel 15 Gram(s) Oral once  dextrose 5%. 1000 milliLiter(s) IV Continuous <Continuous>  dextrose 5%. 1000 milliLiter(s) IV Continuous <Continuous>  dextrose 50% Injectable 25 Gram(s) IV Push once  dextrose 50% Injectable 12.5 Gram(s) IV Push once  dextrose 50% Injectable 25 Gram(s) IV Push once  glucagon  Injectable 1 milliGRAM(s) IntraMuscular once  insulin lispro (ADMELOG) corrective regimen sliding scale   SubCutaneous every 6 hours  insulin NPH human recombinant 10 Unit(s) SubCutaneous every 6 hours  LORazepam     Tablet 2 milliGRAM(s) Oral every 12 hours  methadone   Solution 10 milliGRAM(s) Oral every 8 hours  midodrine 40 milliGRAM(s) Oral every 8 hours  multivitamin/minerals Oral Solution (WELLESSE) 30 milliLiter(s) Enteral Tube daily  naloxegol 25 milliGRAM(s) Oral daily  pantoprazole   Suspension 40 milliGRAM(s) Enteral Tube every 12 hours  polyethylene glycol 3350 17 Gram(s) Oral daily  senna Syrup 10 milliLiter(s) Oral at bedtime  ursodiol Tablet 500 milliGRAM(s) Oral two times a day      Vital Signs Last 24 Hrs  T(C): 37.2 (22 Sep 2021 12:00), Max: 38.2 (22 Sep 2021 00:00)  T(F): 99 (22 Sep 2021 12:00), Max: 100.8 (22 Sep 2021 00:00)  HR: 87 (22 Sep 2021 12:00) (81 - 96)  BP: 115/65 (22 Sep 2021 12:00) (77/48 - 115/65)  BP(mean): 84 (22 Sep 2021 12:00) (58 - 84)  RR: 16 (22 Sep 2021 12:00) (15 - 20)  SpO2: 92% (22 Sep 2021 12:00) (90% - 100%)    Physical Exam:  General: sedated intubated   Head: atraumatic, normocephalic  ENT: ET and OG tubes   Cardio: regular rate   Respiratory: mechanically ventilated, grossly clear bilaterally   abd: soft, bowel sounds present, no tenderness  Musculoskeletal: anasarca. no focal joint swelling. passive ROM intact to arms and legs   vascular: peripheral IVs, no phlebitis   Skin: no rash                          7.9    14.17 )-----------( 273      ( 22 Sep 2021 11:45 )             26.6           141  |  100  |  51<H>  ----------------------------<  157<H>  4.9   |  30  |  1.13    Ca    10.0      22 Sep 2021 00:46  Phos  4.2       Mg     2.8         TPro  6.4  /  Alb  2.6<L>  /  TBili  3.0<H>  /  DBili  x   /  AST  189<H>  /  ALT  136<H>  /  AlkPhos  2421<H>        Urinalysis Basic - ( 21 Sep 2021 16:11 )    Color: Dark Yellow / Appearance: Turbid / S.019 / pH: x  Gluc: x / Ketone: Negative  / Bili: Small / Urobili: 4 mg/dL   Blood: x / Protein: 30 mg/dL / Nitrite: Negative   Leuk Esterase: Negative / RBC: 29 /hpf / WBC 19 /HPF   Sq Epi: x / Non Sq Epi: 5 /hpf / Bacteria: Negative        MICROBIOLOGY:  Vancomycin Level, Trough: 20.8 ug/mL (21 @ 11:45)    Culture - Sputum (collected 21 @ 08:59)  Source: .Sputum Sputum  Gram Stain (21 @ 13:13):    Few polymorphonuclear leukocytes per low power field    No Squamous epithelial cells per low power field    Rare Yeast like cells seen per oil power field  Final Report (21 @ 11:08):    Normal Respiratory Liane present    Culture - Blood (collected 21 @ 03:38)  Source: .Blood Blood-Peripheral  Gram Stain (21 @ 11:12):    Growth in anaerobic bottle: Gram Positive Cocci in Clusters  Final Report (21 @ 07:51):    Growth in anaerobic bottle: Staphylococcus epidermidis  Organism: Staphylococcus epidermidis (21 @ 07:51)  Organism: Staphylococcus epidermidis (21 @ 07:51)      -  Ampicillin/Sulbactam: R <=8/4      -  Cefazolin: R <=4      -  Clindamycin: R >4      -  Erythromycin: R >4      -  Gentamicin: R >8 Should not be used as monotherapy      -  Oxacillin: R >2      -  Penicillin: R >8      -  RIF- Rifampin: S <=1 Should not be used as monotherapy      -  Tetra/Doxy: S <=1      -  Trimethoprim/Sulfamethoxazole: R >2/38      -  Vancomycin: S 2      Method Type: GEETA    Culture - Blood (collected 21 @ 03:38)  Source: .Blood Blood-Peripheral  Preliminary Report (21 @ 04:01):    No growth to date.    Culture - Sputum (collected 21 @ 04:10)  Source: .Sputum Sputum  Gram Stain (21 @ 07:10):    Moderate polymorphonuclear leukocytes per low power field    Rare Squamous epithelial cells per low power field    No organisms seen per oil power field  Final Report (21 @ 09:27):    Normal Respiratory Liane present    Culture - Blood (collected 21 @ 20:18)  Source: .Blood Blood-Peripheral  Final Report (21 @ 21:01):    No Growth Final    Culture - Blood (collected 21 @ 20:18)  Source: .Blood Blood-Venous  Final Report (21 @ 21:01):    No Growth Final    RADIOLOGY:  Images below reviewed personally  Xray Chest 1 View- PORTABLE-Urgent (Xray Chest 1 View- PORTABLE-Urgent .) (21 @ 04:45)   The heart is normal in size. Diffuse airspace opacity seen throughout both lungs which appears to be slightly improved when compared to previous study done on 2021 at 5:57 AM. Endotracheal tube and NG tube are in good position. No pleural effusion. No pneumothorax.

## 2021-09-22 NOTE — PROGRESS NOTE ADULT - SUBJECTIVE AND OBJECTIVE BOX
INTERVAL HPI/OVERNIGHT EVENTS:    SUBJECTIVE: Patient seen and examined at bedside.     CONSTITUTIONAL: No weakness, fevers or chills  EYES/ENT: No visual changes;  No vertigo or throat pain   NECK: No pain or stiffness  RESPIRATORY: No cough, wheezing, hemoptysis; No shortness of breath  CARDIOVASCULAR: No chest pain or palpitations  GASTROINTESTINAL: No abdominal or epigastric pain. No nausea, vomiting, or hematemesis; No diarrhea or constipation. No melena or hematochezia.  GENITOURINARY: No dysuria, frequency or hematuria  NEUROLOGICAL: No numbness or weakness  SKIN: No itching, rashes    OBJECTIVE:    VITAL SIGNS:  ICU Vital Signs Last 24 Hrs  T(C): 37.5 (22 Sep 2021 04:00), Max: 38.2 (22 Sep 2021 00:00)  T(F): 99.5 (22 Sep 2021 04:00), Max: 100.8 (22 Sep 2021 00:00)  HR: 91 (22 Sep 2021 06:00) (86 - 98)  BP: 95/64 (22 Sep 2021 06:00) (77/48 - 115/62)  BP(mean): 75 (22 Sep 2021 06:00) (58 - 81)  ABP: --  ABP(mean): --  RR: 16 (22 Sep 2021 06:00) (15 - 20)  SpO2: 93% (22 Sep 2021 06:00) (90% - 100%)    Mode: AC/ CMV (Assist Control/ Continuous Mandatory Ventilation), RR (machine): 16, FiO2: 50, PEEP: 8, ITime: 0.6, MAP: 13, PC: 24, PIP: 35    - @ 07:01  -  - @ 07:00  --------------------------------------------------------  IN: 1800 mL / OUT: 600 mL / NET: 1200 mL      CAPILLARY BLOOD GLUCOSE      POCT Blood Glucose.: 147 mg/dL (22 Sep 2021 05:19)      PHYSICAL EXAM:    General: NAD  HEENT: NC/AT; PERRL, clear conjunctiva  Neck: supple  Respiratory: CTA b/l  Cardiovascular: +S1/S2; RRR  Abdomen: soft, NT/ND; +BS x4  Extremities: WWP, 2+ peripheral pulses b/l; no LE edema  Skin: normal color and turgor; no rash  Neurological:    MEDICATIONS:  MEDICATIONS  (STANDING):  ALBUTerol    90 MICROgram(s) HFA Inhaler 2 Puff(s) Inhalation every 8 hours  artificial tears (preservative free) Ophthalmic Solution 1 Drop(s) Both EYES every 8 hours  budesonide 160 MICROgram(s)/formoterol 4.5 MICROgram(s) Inhaler 2 Puff(s) Inhalation two times a day  chlorhexidine 0.12% Liquid 15 milliLiter(s) Oral Mucosa every 12 hours  chlorhexidine 4% Liquid 1 Application(s) Topical <User Schedule>  cholecalciferol 2000 Unit(s) Oral daily  dextrose 40% Gel 15 Gram(s) Oral once  dextrose 5%. 1000 milliLiter(s) (50 mL/Hr) IV Continuous <Continuous>  dextrose 5%. 1000 milliLiter(s) (100 mL/Hr) IV Continuous <Continuous>  dextrose 50% Injectable 25 Gram(s) IV Push once  dextrose 50% Injectable 12.5 Gram(s) IV Push once  dextrose 50% Injectable 25 Gram(s) IV Push once  glucagon  Injectable 1 milliGRAM(s) IntraMuscular once  heparin   Injectable 5000 Unit(s) SubCutaneous every 12 hours  insulin lispro (ADMELOG) corrective regimen sliding scale   SubCutaneous every 6 hours  insulin NPH human recombinant 10 Unit(s) SubCutaneous every 6 hours  LORazepam     Tablet 2 milliGRAM(s) Oral every 12 hours  methadone   Solution 10 milliGRAM(s) Oral every 8 hours  midodrine 40 milliGRAM(s) Oral every 8 hours  multivitamin/minerals Oral Solution (WELLESSE) 30 milliLiter(s) Enteral Tube daily  naloxegol 25 milliGRAM(s) Oral daily  pantoprazole   Suspension 40 milliGRAM(s) Enteral Tube every 12 hours  polyethylene glycol 3350 17 Gram(s) Oral daily  senna Syrup 10 milliLiter(s) Oral at bedtime  ursodiol Tablet 500 milliGRAM(s) Oral two times a day  vancomycin  IVPB 1000 milliGRAM(s) IV Intermittent every 24 hours    MEDICATIONS  (PRN):      ALLERGIES:  Allergies    No Known Allergies    Intolerances        LABS:                        6.8    16.56 )-----------( 315      ( 22 Sep 2021 00:46 )             23.5         141  |  100  |  51<H>  ----------------------------<  157<H>  4.9   |  30  |  1.13    Ca    10.0      22 Sep 2021 00:46  Phos  4.2       Mg     2.8         TPro  6.4  /  Alb  2.6<L>  /  TBili  3.0<H>  /  DBili  x   /  AST  189<H>  /  ALT  136<H>  /  AlkPhos  2421<H>      PT/INR - ( 22 Sep 2021 00:46 )   PT: 12.4 sec;   INR: 1.04 ratio         PTT - ( 22 Sep 2021 00:46 )  PTT:30.7 sec  Urinalysis Basic - ( 21 Sep 2021 16:11 )    Color: Dark Yellow / Appearance: Turbid / S.019 / pH: x  Gluc: x / Ketone: Negative  / Bili: Small / Urobili: 4 mg/dL   Blood: x / Protein: 30 mg/dL / Nitrite: Negative   Leuk Esterase: Negative / RBC: 29 /hpf / WBC 19 /HPF   Sq Epi: x / Non Sq Epi: 5 /hpf / Bacteria: Negative        RADIOLOGY & ADDITIONAL TESTS: Reviewed.     INTERVAL HPI/OVERNIGHT EVENTS: Hgb 6.8; patient receiving 1 unit over 3hours.     SUBJECTIVE: Patient seen and examined at bedside.     CONSTITUTIONAL: No weakness, fevers or chills  EYES/ENT: No visual changes;  No vertigo or throat pain   NECK: No pain or stiffness  RESPIRATORY: No cough, wheezing, hemoptysis; No shortness of breath  CARDIOVASCULAR: No chest pain or palpitations  GASTROINTESTINAL: No abdominal or epigastric pain. No nausea, vomiting, or hematemesis; No diarrhea or constipation. No melena or hematochezia.  GENITOURINARY: No dysuria, frequency or hematuria  NEUROLOGICAL: No numbness or weakness  SKIN: No itching, rashes    OBJECTIVE:    VITAL SIGNS:  ICU Vital Signs Last 24 Hrs  T(C): 37.5 (22 Sep 2021 04:00), Max: 38.2 (22 Sep 2021 00:00)  T(F): 99.5 (22 Sep 2021 04:00), Max: 100.8 (22 Sep 2021 00:00)  HR: 91 (22 Sep 2021 06:00) (86 - 98)  BP: 95/64 (22 Sep 2021 06:00) (77/48 - 115/62)  BP(mean): 75 (22 Sep 2021 06:00) (58 - 81)  ABP: --  ABP(mean): --  RR: 16 (22 Sep 2021 06:00) (15 - 20)  SpO2: 93% (22 Sep 2021 06:00) (90% - 100%)    Mode: AC/ CMV (Assist Control/ Continuous Mandatory Ventilation), RR (machine): 16, FiO2: 50, PEEP: 8, ITime: 0.6, MAP: 13, PC: 24, PIP: 35    - @ 07:01  -   @ 07:00  --------------------------------------------------------  IN: 1800 mL / OUT: 600 mL / NET: 1200 mL      CAPILLARY BLOOD GLUCOSE      POCT Blood Glucose.: 147 mg/dL (22 Sep 2021 05:19)      PHYSICAL EXAM:    General: NAD  HEENT: NC/AT; PERRL, clear conjunctiva  Neck: supple  Respiratory: CTA b/l  Cardiovascular: +S1/S2; RRR  Abdomen: soft, NT/ND; +BS x4  Extremities: WWP, 2+ peripheral pulses b/l; no LE edema  Skin: normal color and turgor; no rash  Neurological:    MEDICATIONS:  MEDICATIONS  (STANDING):  ALBUTerol    90 MICROgram(s) HFA Inhaler 2 Puff(s) Inhalation every 8 hours  artificial tears (preservative free) Ophthalmic Solution 1 Drop(s) Both EYES every 8 hours  budesonide 160 MICROgram(s)/formoterol 4.5 MICROgram(s) Inhaler 2 Puff(s) Inhalation two times a day  chlorhexidine 0.12% Liquid 15 milliLiter(s) Oral Mucosa every 12 hours  chlorhexidine 4% Liquid 1 Application(s) Topical <User Schedule>  cholecalciferol 2000 Unit(s) Oral daily  dextrose 40% Gel 15 Gram(s) Oral once  dextrose 5%. 1000 milliLiter(s) (50 mL/Hr) IV Continuous <Continuous>  dextrose 5%. 1000 milliLiter(s) (100 mL/Hr) IV Continuous <Continuous>  dextrose 50% Injectable 25 Gram(s) IV Push once  dextrose 50% Injectable 12.5 Gram(s) IV Push once  dextrose 50% Injectable 25 Gram(s) IV Push once  glucagon  Injectable 1 milliGRAM(s) IntraMuscular once  heparin   Injectable 5000 Unit(s) SubCutaneous every 12 hours  insulin lispro (ADMELOG) corrective regimen sliding scale   SubCutaneous every 6 hours  insulin NPH human recombinant 10 Unit(s) SubCutaneous every 6 hours  LORazepam     Tablet 2 milliGRAM(s) Oral every 12 hours  methadone   Solution 10 milliGRAM(s) Oral every 8 hours  midodrine 40 milliGRAM(s) Oral every 8 hours  multivitamin/minerals Oral Solution (WELLESSE) 30 milliLiter(s) Enteral Tube daily  naloxegol 25 milliGRAM(s) Oral daily  pantoprazole   Suspension 40 milliGRAM(s) Enteral Tube every 12 hours  polyethylene glycol 3350 17 Gram(s) Oral daily  senna Syrup 10 milliLiter(s) Oral at bedtime  ursodiol Tablet 500 milliGRAM(s) Oral two times a day  vancomycin  IVPB 1000 milliGRAM(s) IV Intermittent every 24 hours    MEDICATIONS  (PRN):      ALLERGIES:  Allergies    No Known Allergies    Intolerances        LABS:                        6.8    16.56 )-----------( 315      ( 22 Sep 2021 00:46 )             23.5         141  |  100  |  51<H>  ----------------------------<  157<H>  4.9   |  30  |  1.13    Ca    10.0      22 Sep 2021 00:46  Phos  4.2       Mg     2.8         TPro  6.4  /  Alb  2.6<L>  /  TBili  3.0<H>  /  DBili  x   /  AST  189<H>  /  ALT  136<H>  /  AlkPhos  2421<H>      PT/INR - ( 22 Sep 2021 00:46 )   PT: 12.4 sec;   INR: 1.04 ratio         PTT - ( 22 Sep 2021 00:46 )  PTT:30.7 sec  Urinalysis Basic - ( 21 Sep 2021 16:11 )    Color: Dark Yellow / Appearance: Turbid / S.019 / pH: x  Gluc: x / Ketone: Negative  / Bili: Small / Urobili: 4 mg/dL   Blood: x / Protein: 30 mg/dL / Nitrite: Negative   Leuk Esterase: Negative / RBC: 29 /hpf / WBC 19 /HPF   Sq Epi: x / Non Sq Epi: 5 /hpf / Bacteria: Negative        RADIOLOGY & ADDITIONAL TESTS: Reviewed.     INTERVAL HPI/OVERNIGHT EVENTS: Hgb 6.8; patient receiving 1 unit over 3hours.     SUBJECTIVE: Patient seen and examined at bedside.     ROS: unable to assess; patient unresponsive.       VITAL SIGNS:  ICU Vital Signs Last 24 Hrs  T(C): 37.5 (22 Sep 2021 04:00), Max: 38.2 (22 Sep 2021 00:00)  T(F): 99.5 (22 Sep 2021 04:00), Max: 100.8 (22 Sep 2021 00:00)  HR: 91 (22 Sep 2021 06:00) (86 - 98)  BP: 95/64 (22 Sep 2021 06:00) (77/48 - 115/62)  BP(mean): 75 (22 Sep 2021 06:00) (58 - 81)  ABP: --  ABP(mean): --  RR: 16 (22 Sep 2021 06:00) (15 - 20)  SpO2: 93% (22 Sep 2021 06:00) (90% - 100%)    Mode: AC/ CMV (Assist Control/ Continuous Mandatory Ventilation), RR (machine): 16, FiO2: 50, PEEP: 8, ITime: 0.6, MAP: 13, PC: 24, PIP: 35    09-21 @ 07:01  -  09-22 @ 07:00  --------------------------------------------------------  IN: 1800 mL / OUT: 600 mL / NET: 1200 mL      CAPILLARY BLOOD GLUCOSE      POCT Blood Glucose.: 147 mg/dL (22 Sep 2021 05:19)      PHYSICAL EXAM:    General: intubated, off sedation  HEENT: NCAT, PERRL, clear conjunctiva, sclera icteric  Neck: supple, no JVD  Respiratory: CTAB  Cardiovascular: RRR, S1S2, no m/r/g  Abdomen: soft, nontender, nondistended, normal bowel sounds  Extremities: no edema, no cyanosis  Skin: warm, perfused, b/l erythema on buttocks  Neurological: nonfocal, no response to pain, sternal rub    MEDICATIONS:  MEDICATIONS  (STANDING):  ALBUTerol    90 MICROgram(s) HFA Inhaler 2 Puff(s) Inhalation every 8 hours  artificial tears (preservative free) Ophthalmic Solution 1 Drop(s) Both EYES every 8 hours  budesonide 160 MICROgram(s)/formoterol 4.5 MICROgram(s) Inhaler 2 Puff(s) Inhalation two times a day  chlorhexidine 0.12% Liquid 15 milliLiter(s) Oral Mucosa every 12 hours  chlorhexidine 4% Liquid 1 Application(s) Topical <User Schedule>  cholecalciferol 2000 Unit(s) Oral daily  dextrose 40% Gel 15 Gram(s) Oral once  dextrose 5%. 1000 milliLiter(s) (50 mL/Hr) IV Continuous <Continuous>  dextrose 5%. 1000 milliLiter(s) (100 mL/Hr) IV Continuous <Continuous>  dextrose 50% Injectable 25 Gram(s) IV Push once  dextrose 50% Injectable 12.5 Gram(s) IV Push once  dextrose 50% Injectable 25 Gram(s) IV Push once  glucagon  Injectable 1 milliGRAM(s) IntraMuscular once  heparin   Injectable 5000 Unit(s) SubCutaneous every 12 hours  insulin lispro (ADMELOG) corrective regimen sliding scale   SubCutaneous every 6 hours  insulin NPH human recombinant 10 Unit(s) SubCutaneous every 6 hours  LORazepam     Tablet 2 milliGRAM(s) Oral every 12 hours  methadone   Solution 10 milliGRAM(s) Oral every 8 hours  midodrine 40 milliGRAM(s) Oral every 8 hours  multivitamin/minerals Oral Solution (WELLESSE) 30 milliLiter(s) Enteral Tube daily  naloxegol 25 milliGRAM(s) Oral daily  pantoprazole   Suspension 40 milliGRAM(s) Enteral Tube every 12 hours  polyethylene glycol 3350 17 Gram(s) Oral daily  senna Syrup 10 milliLiter(s) Oral at bedtime  ursodiol Tablet 500 milliGRAM(s) Oral two times a day  vancomycin  IVPB 1000 milliGRAM(s) IV Intermittent every 24 hours    MEDICATIONS  (PRN):      ALLERGIES:  Allergies    No Known Allergies    Intolerances        LABS:                        6.8    16.56 )-----------( 315      ( 22 Sep 2021 00:46 )             23.5     09-22    141  |  100  |  51<H>  ----------------------------<  157<H>  4.9   |  30  |  1.13    Ca    10.0      22 Sep 2021 00:46  Phos  4.2     09-22  Mg     2.8         TPro  6.4  /  Alb  2.6<L>  /  TBili  3.0<H>  /  DBili  x   /  AST  189<H>  /  ALT  136<H>  /  AlkPhos  2421<H>      PT/INR - ( 22 Sep 2021 00:46 )   PT: 12.4 sec;   INR: 1.04 ratio         PTT - ( 22 Sep 2021 00:46 )  PTT:30.7 sec  Urinalysis Basic - ( 21 Sep 2021 16:11 )    Color: Dark Yellow / Appearance: Turbid / S.019 / pH: x  Gluc: x / Ketone: Negative  / Bili: Small / Urobili: 4 mg/dL   Blood: x / Protein: 30 mg/dL / Nitrite: Negative   Leuk Esterase: Negative / RBC: 29 /hpf / WBC 19 /HPF   Sq Epi: x / Non Sq Epi: 5 /hpf / Bacteria: Negative        RADIOLOGY & ADDITIONAL TESTS: Reviewed.

## 2021-09-22 NOTE — CHART NOTE - NSCHARTNOTEFT_GEN_A_CORE
Informed by radiology that patient's daughter is refusing his CT Chest, Abdomen, Pelvis which was recommended by ID. I called and spoke with patient's daughter. Explained risks of delaying CT scan but she still did not want CT scan this evening. She does not want her father on portable vent and asking for portable CT. She is ok with portable vent if CT scan can be done tomorrow when she is in hospital. CT scan to be delayed until 9/23 as consent was not given by family.    Kiran Landin  PGY1

## 2021-09-22 NOTE — PROGRESS NOTE ADULT - ASSESSMENT
71 year old man w/pmhx of HTN, DM2, BPH, and Asthma here for acute hypoxic respiratory failure and ARDS secondary to viral pneumonia from COVID-19. Hospital course complicated by VT arrest, DVT and MRSE bacteremia.    #Neuro   1) sedated  - decreased methadone increased to 10TID and Ativan 2mg q12h  - noncont CT 9/13 appears unchanged 8/12     2) r/o seizure  -had rhythmic R arm movement  - 24h EEG with generalized slowing; no evidence of seizure (per report 9/20 AM)    #ENT   -oropharyngeal packing removed on 9/10  -ENT said they will pack if patient's h/h drops  -will need FiO2 at 50% or less and PaO2 at 60 for qualify for tach    #CV   1)Cardiac Arrest VT  - S/p Amiodarone and electrolyte supplementation   - No further VT noted   - Echo reveals Right hrt strain   -holding diuretics, will revisit when bicarb at 30    #Pulm   1)Acute hypoxic resp failure 2/2 cardiac arrest  - vent Pressure AC 30/60/50/8 wean as tolerated    2) Asthma  - c/w Proventil q8h and Symbicort BID    #GI   1)Nutrition   - maintain Glucerna tube feeds as tolerated   - c/w senna/ miralax     2)Transaminitis - likely 2/2 cholestasis of sepsis vs SSC CIP (secondary sclerosing cholangitis in critically ill patients), Covid cholangiopathy, drug-induced liver injury  - c/w ursodiol 500 mg BID            - supportive treatment per hepatology  -alk phos, ast/alt downtrending    #Renal  1)Hypervolemia   - Strict I/O   - Diuresis as above  - Monitor electrolytes     2) Hypernatremia   -  Na 141 and free water stopped       #ID   1)COVID - 19   - S/p RDV Dexa and TOCI   - will continue with supportive care     2)MRSE Bacteremia   - S/P 14 day course of Vanc from 8/20-9/3 therapy, and intermittent gram-&anaerobe coverage   - Ua neg  - c/w Vanc 1g q24h  - last postive Bcx 9/19 - will need 14d treatment beyond that  - continue surveillance cultures q48-72h   - f/u TTE to r/o endocarditis as source of bacteremia    3) Vent associated PNA vs colonization  - Sputum cx revealed klebsiella and enterobact  - dc'd IV Zosyn x5-7d (9/12 -9/13 )  - d/c meropenem (9/13-9/14)  -began levaquin on 9/14 for total 4 days including meropenem dates as bacteria are sensitive per Dr. Paul  - levaquin was started on 9/16 for total duration of 7d txt(9/19) and will end today    #Endo   1)DM   - c/w NPH 10u q6h  - c/w ISS   - goal Blood Sugar 100-180     #Heme  1)b/l DVTs above knee on left and below knee on R  - off Lovenox since 9/19 given Hgb drop to 6.0 requiring 1u PRBC; without signs of bleeding  - Hgb drop 7.9 --> 7.1 on 9/21; holding full AC    #Prophylaxis  - Hep 5000 SubQ q8h    #GOC  -palliative consult following  -full code at this time  -family updated daily 71 year old man w/pmhx of HTN, DM2, BPH, and Asthma here for acute hypoxic respiratory failure and ARDS secondary to viral pneumonia from COVID-19. Hospital course complicated by VT arrest, DVT and MRSE bacteremia.    #Neuro   1) off sedation  - decreased methadone increased to 10TID and Ativan 2mg q12h as of 9/22; patient still unresponsive  - noncont CT 9/13 appears unchanged 8/12     2) r/o seizure  -had rhythmic R arm movement  - 24h EEG with generalized slowing; no evidence of seizure (per report 9/21 AM)    #ENT   -oropharyngeal packing removed on 9/10  -ENT said they will pack if patient's h/h drops  -will need FiO2 at 50% or less and PaO2 at 60 for qualify for tach    #CV   1)Cardiac Arrest VT  - S/p Amiodarone and electrolyte supplementation   - No further VT noted   - Echo reveals Right hrt strain   -holding diuretics, will revisit when bicarb at 30    #Pulm   1)Acute hypoxic resp failure 2/2 cardiac arrest  - vent Pressure AC 30/60/50/8 wean as tolerated    2) Asthma  - c/w Proventil q8h and Symbicort BID    #GI   1)Nutrition   - maintain Glucerna tube feeds as tolerated   - c/w senna/ miralax     2)Transaminitis - likely 2/2 cholestasis of sepsis vs SSC CIP (secondary sclerosing cholangitis in critically ill patients), Covid cholangiopathy, drug-induced liver injury  - c/w ursodiol 500 mg BID            - supportive treatment per hepatology  -alk phos, ast/alt downtrending    #Renal  1)Hypervolemia   - Strict I/O   - Diuresis as above  - Monitor electrolytes     2) Hypernatremia   -  Na 141 and free water stopped       #ID   1)COVID - 19   - S/p RDV Dexa and TOCI   - will continue with supportive care     2)MRSE Bacteremia   - S/P 14 day course of Vanc from 8/20-9/3 therapy, and intermittent gram-&anaerobe coverage   - Ua neg  - c/w Vanc 1g q24h  - last postive Bcx 9/19 - will need 14d treatment beyond that  - continue surveillance cultures q48-72h   - TTE w/o evidence of vegetations    3) Vent associated PNA vs colonization  - Sputum cx revealed klebsiella and enterobact  - dc'd IV Zosyn x5-7d (9/12 -9/13 )  - d/c meropenem (9/13-9/14)  -began levaquin on 9/14 for total 4 days including meropenem dates as bacteria are sensitive per Dr. Paul  - levaquin was started on 9/16 for total duration of 7d (ended 9/20)     #Endo   1)DM   - c/w NPH 10u q6h  - c/w ISS   - goal Blood Sugar 100-180     #Heme  1)b/l DVTs above knee on left and below knee on R  - off Lovenox since 9/19 given Hgb drop to 6.0 requiring 1u PRBC; without signs of bleeding  - Hgb drop 7.9 --> 7.1 on 9/21; holding full AC  - restarted DVT ppx as of 9/22    #Prophylaxis  - Hep 5000 SubQ q8h    #GOC  -palliative consult following; family meeting Thurs 9/23 2:30pm with palliative, may require ethics consult  -full code at this time  -family updated daily 71 year old man w/pmhx of HTN, DM2, BPH, and Asthma here for acute hypoxic respiratory failure and ARDS secondary to viral pneumonia from COVID-19. Hospital course complicated by VT arrest, DVT and MRSE bacteremia.    #Neuro   1) off sedation  - decreased methadone increased to 10TID and Ativan 2mg q12h as of 9/22; patient still unresponsive  - noncont CT 9/13 appears unchanged 8/12     2) r/o seizure  -had rhythmic R arm movement  - 24h EEG with generalized slowing; no evidence of seizure (per report 9/21 AM)    #ENT   -oropharyngeal packing removed on 9/10  -ENT said they will pack if patient's h/h drops  -will need FiO2 at 50% or less and PaO2 at 60 for qualify for trach; though still bacteremic     #CV   1)Cardiac Arrest VT  - S/p Amiodarone and electrolyte supplementation   - No further VT noted   - Echo reveals Right hrt strain   -holding diuretics, will revisit when bicarb at 30    #Pulm   1)Acute hypoxemic resp failure 2/2 cardiac arrest  - vent Pressure AC 30/60/50/8; wean as tolerated    2) Asthma  - c/w Proventil q8h and Symbicort BID    #GI   1)Nutrition   - maintain Glucerna tube feeds as tolerated   - c/w senna/ miralax     2)Transaminitis - likely 2/2 cholestasis of sepsis vs SSC CIP (secondary sclerosing cholangitis in critically ill patients), Covid cholangiopathy, drug-induced liver injury  - c/w ursodiol 500 mg BID            - supportive treatment per hepatology  -alk phos, ast/alt downtrending    #Renal  1)Hypervolemia   - Strict I/O   - Diuresis as above  - Monitor electrolytes     2) Hypernatremia   -  Na 141 and free water stopped       #ID   1)COVID - 19   - S/p RDV Dexa and TOCI   - will continue with supportive care     2)MRSE Bacteremia   - S/P 14 day course of Vanc from 8/20-9/3 therapy, and intermittent gram-&anaerobe coverage   - Ua neg  - c/w Vanc 1g q24h  - last postive Bcx 9/19 - will need 14d treatment beyond that  - continue surveillance cultures q48-72h; last sent 9/21 currently pending  - TTE w/o evidence of vegetations; will consult cardiology for SEJAL  - f/u ID for abx recommendation, clarify source    3) Vent associated PNA vs colonization  - Sputum cx revealed klebsiella and enterobact  - dc'd IV Zosyn x5-7d (9/12 -9/13 )  - d/c meropenem (9/13-9/14)  -began levaquin on 9/14 for total 4 days including meropenem dates as bacteria are sensitive per Dr. Paul  - levaquin was started on 9/16 for total duration of 7d (ended 9/20)     #Endo   1)DM   - c/w NPH 10u q6h  - c/w ISS   - goal Blood Sugar 100-180     #Heme  1)b/l DVTs above knee on left and below knee on R  - off Lovenox since 9/19 given Hgb drop to 6.0 requiring 1u PRBC; drop again 9/22 requiring additional unit  - restarted DVT ppx as of 9/22    #Prophylaxis  - Hep 5000 SubQ q8h    #GOC  -palliative consult following; family meeting Thurs 9/23 2:30pm with palliative, may require ethics consult  -full code at this time  -family updated daily

## 2021-09-23 NOTE — PROGRESS NOTE ADULT - SUBJECTIVE AND OBJECTIVE BOX
INTERVAL HPI/OVERNIGHT EVENTS:    SUBJECTIVE: Patient seen and examined at bedside.     ROS: unable to assess - unresponsive    OBJECTIVE:    VITAL SIGNS:  ICU Vital Signs Last 24 Hrs  T(C): 37.3 (24 Sep 2021 04:00), Max: 38.1 (23 Sep 2021 20:00)  T(F): 99.1 (24 Sep 2021 04:00), Max: 100.6 (23 Sep 2021 20:00)  HR: 97 (24 Sep 2021 06:06) (83 - 107)  BP: 146/83 (24 Sep 2021 06:00) (91/55 - 158/89)  BP(mean): 109 (24 Sep 2021 06:00) (67 - 114)  ABP: --  ABP(mean): --  RR: 17 (24 Sep 2021 06:00) (16 - 35)  SpO2: 95% (24 Sep 2021 06:06) (91% - 100%)    Mode: AC/ CMV (Assist Control/ Continuous Mandatory Ventilation), RR (machine): 16, FiO2: 60, PEEP: 5, ITime: 0.6, MAP: 10, PC: 24, PIP: 30    09-23 @ 07:01  -  09-24 @ 07:00  --------------------------------------------------------  IN: 1460 mL / OUT: 900 mL / NET: 560 mL      CAPILLARY BLOOD GLUCOSE      POCT Blood Glucose.: 162 mg/dL (24 Sep 2021 05:49)      PHYSICAL EXAM:    General: intubated, off sedation  HEENT: NCAT, PERRL, clear conjunctiva, sclera icteric  Neck: supple, no JVD  Respiratory: CTAB  Cardiovascular: RRR, S1S2, no m/r/g  Abdomen: soft, nontender, nondistended, normal bowel sounds  Extremities: no edema, no cyanosis  Skin: warm, perfused, b/l erythema on buttocks  Neurological: nonfocal, no response to pain, sternal rub      MEDICATIONS:  MEDICATIONS  (STANDING):  ALBUTerol    90 MICROgram(s) HFA Inhaler 2 Puff(s) Inhalation every 8 hours  budesonide 160 MICROgram(s)/formoterol 4.5 MICROgram(s) Inhaler 2 Puff(s) Inhalation two times a day  chlorhexidine 0.12% Liquid 15 milliLiter(s) Oral Mucosa every 12 hours  chlorhexidine 4% Liquid 1 Application(s) Topical <User Schedule>  cholecalciferol 2000 Unit(s) Oral daily  dextrose 40% Gel 15 Gram(s) Oral once  dextrose 5%. 1000 milliLiter(s) (50 mL/Hr) IV Continuous <Continuous>  dextrose 5%. 1000 milliLiter(s) (100 mL/Hr) IV Continuous <Continuous>  dextrose 50% Injectable 25 Gram(s) IV Push once  dextrose 50% Injectable 12.5 Gram(s) IV Push once  dextrose 50% Injectable 25 Gram(s) IV Push once  glucagon  Injectable 1 milliGRAM(s) IntraMuscular once  insulin lispro (ADMELOG) corrective regimen sliding scale   SubCutaneous every 6 hours  insulin NPH human recombinant 10 Unit(s) SubCutaneous every 6 hours  LORazepam     Tablet 2 milliGRAM(s) Oral every 12 hours  methadone   Solution 10 milliGRAM(s) Oral every 8 hours  midodrine 40 milliGRAM(s) Oral every 8 hours  multivitamin/minerals Oral Solution (WELLESSE) 30 milliLiter(s) Enteral Tube daily  naloxegol 25 milliGRAM(s) Oral daily  pantoprazole   Suspension 40 milliGRAM(s) Enteral Tube every 12 hours  polyethylene glycol 3350 17 Gram(s) Oral daily  senna Syrup 10 milliLiter(s) Oral at bedtime  ursodiol Tablet 500 milliGRAM(s) Oral two times a day  vancomycin  IVPB 750 milliGRAM(s) IV Intermittent <User Schedule>    MEDICATIONS  (PRN):  acetaminophen    Suspension .. 650 milliGRAM(s) Oral every 6 hours PRN Temp greater or equal to 38C (100.4F), Mild Pain (1 - 3), Moderate Pain (4 - 6)      ALLERGIES:  Allergies    No Known Allergies    Intolerances        LABS:                        7.8    15.61 )-----------( 291      ( 24 Sep 2021 00:18 )             27.1     09-24    143  |  100  |  54<H>  ----------------------------<  194<H>  3.8   |  30  |  1.05    Ca    9.8      24 Sep 2021 00:18  Phos  3.1     09-24  Mg     2.5     09-24    TPro  6.4  /  Alb  2.5<L>  /  TBili  4.1<H>  /  DBili  x   /  AST  145<H>  /  ALT  107<H>  /  AlkPhos  2219<H>  09-24    PT/INR - ( 24 Sep 2021 00:18 )   PT: 12.6 sec;   INR: 1.05 ratio         PTT - ( 24 Sep 2021 00:18 )  PTT:24.0 sec      RADIOLOGY & ADDITIONAL TESTS: Reviewed.

## 2021-09-23 NOTE — PROGRESS NOTE ADULT - ASSESSMENT
71 year old man w/pmhx of HTN, DM2, BPH, and Asthma here for acute hypoxic respiratory failure and ARDS secondary to viral pneumonia from COVID-19. Hospital course complicated by VT arrest, DVT and MRSE bacteremia.    #Neuro   1) off sedation  - decreased methadone increased to 10TID and Ativan 2mg q12h as of 9/22; patient still unresponsive  - noncont CT 9/13 appears unchanged 8/12     2) r/o seizure  -had rhythmic R arm movement  - 24h EEG with generalized slowing; no evidence of seizure (per report 9/21 AM)    #ENT   -oropharyngeal packing removed on 9/10  -ENT said they will pack if patient's h/h drops  -will need FiO2 at 50% or less and PaO2 at 60 for qualify for trach; though still bacteremic     #CV   1)Cardiac Arrest VT  - S/p Amiodarone and electrolyte supplementation   - No further VT noted   - Echo reveals Right hrt strain   -holding diuretics, will revisit when bicarb at 30    #Pulm   1)Acute hypoxemic resp failure 2/2 cardiac arrest  - vent Pressure AC 30/60/50/8; wean as tolerated    2) Asthma  - c/w Proventil q8h and Symbicort BID    #GI   1)Nutrition   - maintain Glucerna tube feeds as tolerated   - c/w senna/ miralax     2)Transaminitis - likely 2/2 cholestasis of sepsis vs SSC CIP (secondary sclerosing cholangitis in critically ill patients), Covid cholangiopathy, drug-induced liver injury  - c/w ursodiol 500 mg BID            - supportive treatment per hepatology  -alk phos, ast/alt downtrending    #Renal  1)Hypervolemia   - Strict I/O   - Diuresis as above  - Monitor electrolytes     2) Hypernatremia   -  Na 141 and free water stopped       #ID   1)COVID - 19   - S/p RDV Dexa and TOCI   - will continue with supportive care     2)MRSE Bacteremia   - S/P 14 day course of Vanc from 8/20-9/3 therapy, and intermittent gram-&anaerobe coverage   - Ua neg  - c/w Vanc 1g q24h  - last postive Bcx 9/19 - will need 14d treatment beyond that  - continue surveillance cultures q48-72h; last sent 9/21 currently pending  - TTE w/o evidence of vegetations; will consult cardiology for SEJAL  - f/u ID for abx recommendation, clarify source    3) Vent associated PNA vs colonization  - Sputum cx revealed klebsiella and enterobact  - dc'd IV Zosyn x5-7d (9/12 -9/13 )  - d/c meropenem (9/13-9/14)  -began levaquin on 9/14 for total 4 days including meropenem dates as bacteria are sensitive per Dr. aPul  - levaquin was started on 9/16 for total duration of 7d (ended 9/20)     #Endo   1)DM   - c/w NPH 10u q6h  - c/w ISS   - goal Blood Sugar 100-180     #Heme  1)b/l DVTs above knee on left and below knee on R  - off Lovenox since 9/19 given Hgb drop to 6.0 requiring 1u PRBC; drop again 9/22 requiring additional unit  - restarted DVT ppx as of 9/22    #Prophylaxis  - Hep 5000 SubQ q8h    #GOC  -palliative consult following; family meeting Thurs 9/23 2:30pm with palliative, may require ethics consult  -full code at this time  -family updated daily

## 2021-09-23 NOTE — PROGRESS NOTE ADULT - ASSESSMENT
71 year old man w/pmhx of HTN, DM2, BPH, and Asthma here for acute hypoxic respiratory failure and ARDS secondary to viral pneumonia from COVID-19. Hospital course complicated by VT arrest, DVT and MRSE bacteremia.    #Neuro   1) off sedation  - decreased methadone increased to 10TID and Ativan 2mg q12h as of 9/22; patient still unresponsive  - noncont CT 9/13 appears unchanged 8/12     2) r/o seizure  -had rhythmic R arm movement  - 24h EEG with generalized slowing; no evidence of seizure (per report 9/21 AM)    #ENT   -oropharyngeal packing removed on 9/10  -ENT said they will pack if patient's h/h drops  -will need FiO2 at 50% or less and PaO2 at 60 for qualify for trach; though still bacteremic     #CV   1)Cardiac Arrest VT  - S/p Amiodarone and electrolyte supplementation   - No further VT noted   - Echo reveals Right hrt strain   -holding diuretics, will revisit when bicarb at 30    #Pulm   1)Acute hypoxemic resp failure 2/2 cardiac arrest  - vent Pressure AC 30/60/50/8; wean as tolerated    2) Asthma  - c/w Proventil q8h and Symbicort BID    #GI   1)Nutrition   - maintain Glucerna tube feeds as tolerated   - c/w senna/ miralax     2)Transaminitis - likely 2/2 cholestasis of sepsis vs SSC CIP (secondary sclerosing cholangitis in critically ill patients), Covid cholangiopathy, drug-induced liver injury  - c/w ursodiol 500 mg BID            - supportive treatment per hepatology  -alk phos, ast/alt downtrending    #Renal  1)Hypervolemia   - Strict I/O   - Diuresis as above  - Monitor electrolytes     2) Hypernatremia   -  Na 141 and free water stopped       #ID   1)COVID - 19   - S/p RDV Dexa and TOCI   - will continue with supportive care     2)MRSE Bacteremia   - S/P 14 day course of Vanc from 8/20-9/3 therapy, and intermittent gram-&anaerobe coverage   - Ua neg  - c/w Vanc 1g q24h  - last postive Bcx 9/19 - will need 14d treatment beyond that  - continue surveillance cultures q48-72h; last sent 9/21 currently pending  - TTE w/o evidence of vegetations; will consult cardiology for SEJAL  - f/u ID for abx recommendation, clarify source    3) Vent associated PNA vs colonization  - Sputum cx revealed klebsiella and enterobact  - dc'd IV Zosyn x5-7d (9/12 -9/13 )  - d/c meropenem (9/13-9/14)  -began levaquin on 9/14 for total 4 days including meropenem dates as bacteria are sensitive per Dr. Paul  - levaquin was started on 9/16 for total duration of 7d (ended 9/20)     #Endo   1)DM   - c/w NPH 10u q6h  - c/w ISS   - goal Blood Sugar 100-180     #Heme  1)b/l DVTs above knee on left and below knee on R  - off Lovenox since 9/19 given Hgb drop to 6.0 requiring 1u PRBC; drop again 9/22 requiring additional unit  - restarted DVT ppx as of 9/22    #Prophylaxis  - Hep 5000 SubQ q8h    #GOC  -palliative consult following; family meeting Thurs 9/23 2:30pm with palliative, may require ethics consult  -full code at this time  -family updated daily   71 year old man w/pmhx of HTN, DM2, BPH, and Asthma here for acute hypoxic respiratory failure and ARDS secondary to viral pneumonia from COVID-19. Hospital course complicated by VT arrest, DVT and MRSE bacteremia.    #Neuro   1) off sedation  - decreased methadone increased to 10TID and Ativan 2mg q12h as of 9/22; patient still unresponsive  - noncont CT 9/13 appears unchanged 8/12     2) r/o seizure  -had rhythmic R arm movement  - 24h EEG with generalized slowing; no evidence of seizure (per report 9/21 AM)    #ENT   -oropharyngeal packing removed on 9/10  -ENT said they will pack if patient's h/h drops  -will need FiO2 at 50% or less and PaO2 at 60 for qualify for trach; though still bacteremic     #CV   1)Cardiac Arrest VT  - S/p Amiodarone and electrolyte supplementation   - No further VT noted   - Echo reveals Right hrt strain   -holding diuretics, will revisit when bicarb at 30    #Pulm   1)Acute hypoxemic resp failure 2/2 cardiac arrest  - vent Pressure AC 30/60/50/8; wean as tolerated    2) Asthma  - c/w Proventil q8h and Symbicort BID    #GI   1)Nutrition   - maintain Glucerna tube feeds as tolerated   - c/w senna/ miralax     2)Transaminitis - likely 2/2 cholestasis of sepsis vs SSC CIP (secondary sclerosing cholangitis in critically ill patients), Covid cholangiopathy, drug-induced liver injury  - c/w ursodiol 500 mg BID            - supportive treatment per hepatology  -alk phos, ast/alt downtrending    #Renal  1)Hypervolemia   - Strict I/O   - Diuresis as above  - Monitor electrolytes     2) Hypernatremia   -  Na 141 and free water stopped       #ID   1)COVID - 19   - S/p RDV Dexa and TOCI   - will continue with supportive care     2)MRSE Bacteremia   - S/P 14 day course of Vanc from 8/20-9/3 therapy, and intermittent gram-&anaerobe coverage   - Ua neg  - c/w Vanc 1g q24h  - last postive Bcx 9/19 - will need 14d treatment beyond that  - continue surveillance cultures q48-72h; last sent 9/21 currently pending  - TTE w/o evidence of vegetations; will consult cardiology for SEJAL  - f/u ID for abx recommendation, clarify source    3) Vent associated PNA vs colonization  - Sputum cx revealed klebsiella and enterobact  - dc'd IV Zosyn x5-7d (9/12 -9/13 )  - d/c meropenem (9/13-9/14)  -began levaquin on 9/14 for total 4 days including meropenem dates as bacteria are sensitive per Dr. Paul  - levaquin was started on 9/16 for total duration of 7d (ended 9/20)     #Endo   1)DM   - c/w NPH 10u q6h  - c/w ISS   - goal Blood Sugar 100-180     #Heme  1)b/l DVTs above knee on left and below knee on R  - off Lovenox since 9/19 given Hgb drop to 6.0 requiring 1u PRBC; drop again 9/22 requiring additional unit  - restarted DVT ppx as of 9/22    #Prophylaxis  - Hep 5000 SubQ q8h    #GOC  -palliative consult following; family meeting Thurs 9/23 2:30pm with palliative was cancelled by family, may require ethics consult  -full code at this time  -family updated daily

## 2021-09-23 NOTE — PROGRESS NOTE ADULT - ATTENDING COMMENTS
Agree with above  Intubated >30 days, 50–60% PEEP 5  Remains intermittently febrile on vancomycin for persistent MRSE bacteremia  Source remains unclear. Sacral decub is less likely source  TTE negative for vegetation. Will need SEJAL to truly R/O endocarditis  Will get CT pan-scan with contrast as well to look for other potential sources  Had long discussion with family. I mentioned that he needs a trache, as he has been intubated for over one month, which is not standard of care. Family states that they will not consent to trache until "his fevers stop and his infection is cured." They are adamantly opposed to considering it before then. I told them that remaining orotracheally intubated has many additional risks, including potentially VAP. They stated that one of their other relatives was trached and  shortly thereafter, and that they feel that this was the etiology of his demise.  I discussed the patient's poor mental status with them as well. They stated that they were unaware of this before now (although other team members have mentioned this in the past). I told them that now that he has been off sedation for an extended amount of time, there is a significant possibility that this may be his new baseline (largely unresponsive). I also mentioned that he is still on some benzos and methadone, and that this is getting slowly tapered off. As a result, I told them that this diminished mental status may, in fact, improve after all of these are tapered off, but that we do not know what the new baseline will be.  Lastly, the family expressed worry over his edematous extremities. They wanted him to be given a "water pill." I told them that we would diurese on an as-needed basis, but that his urine output is good at this time, and as a result we would only diurese him when we felt there was a clinical utility to it (especially since his oxygen requirements have been steadily decreasing).  Family expressed understanding with the entire conversation. These discussions will remain ongoing throughout his stay.    I have personally provided 60 minutes of critical care time.

## 2021-09-24 NOTE — PROGRESS NOTE ADULT - SUBJECTIVE AND OBJECTIVE BOX
Follow Up: COVID, fevers    Interval History/ROS: Still febrile. Remains intubated.     Allergies  No Known Allergies        ANTIMICROBIALS:  ampicillin/sulbactam  IVPB    ampicillin/sulbactam  IVPB 3 every 6 hours  vancomycin  IVPB 750 <User Schedule>      OTHER MEDS:  acetaminophen    Suspension .. 650 milliGRAM(s) Oral every 6 hours PRN  ALBUTerol    90 MICROgram(s) HFA Inhaler 2 Puff(s) Inhalation every 8 hours  budesonide 160 MICROgram(s)/formoterol 4.5 MICROgram(s) Inhaler 2 Puff(s) Inhalation two times a day  chlorhexidine 0.12% Liquid 15 milliLiter(s) Oral Mucosa every 12 hours  chlorhexidine 4% Liquid 1 Application(s) Topical <User Schedule>  cholecalciferol 2000 Unit(s) Oral daily  dextrose 40% Gel 15 Gram(s) Oral once  dextrose 5%. 1000 milliLiter(s) IV Continuous <Continuous>  dextrose 5%. 1000 milliLiter(s) IV Continuous <Continuous>  dextrose 50% Injectable 25 Gram(s) IV Push once  dextrose 50% Injectable 12.5 Gram(s) IV Push once  dextrose 50% Injectable 25 Gram(s) IV Push once  glucagon  Injectable 1 milliGRAM(s) IntraMuscular once  insulin lispro (ADMELOG) corrective regimen sliding scale   SubCutaneous every 6 hours  insulin NPH human recombinant 10 Unit(s) SubCutaneous every 6 hours  LORazepam     Tablet 2 milliGRAM(s) Oral every 12 hours  methadone   Solution 10 milliGRAM(s) Oral every 8 hours  midodrine 40 milliGRAM(s) Oral every 8 hours  multivitamin/minerals Oral Solution (WELLESSE) 30 milliLiter(s) Enteral Tube daily  naloxegol 25 milliGRAM(s) Oral daily  pantoprazole   Suspension 40 milliGRAM(s) Enteral Tube every 12 hours  polyethylene glycol 3350 17 Gram(s) Oral daily  senna Syrup 10 milliLiter(s) Oral at bedtime  ursodiol Tablet 500 milliGRAM(s) Oral two times a day      Vital Signs Last 24 Hrs  T(C): 36.9 (24 Sep 2021 12:00), Max: 38.1 (23 Sep 2021 20:00)  T(F): 98.4 (24 Sep 2021 12:00), Max: 100.6 (23 Sep 2021 20:00)  HR: 99 (24 Sep 2021 15:00) (72 - 107)  BP: 144/69 (24 Sep 2021 15:00) (99/59 - 189/123)  BP(mean): 99 (24 Sep 2021 15:00) (73 - 144)  RR: 23 (24 Sep 2021 15:00) (0 - 35)  SpO2: 93% (24 Sep 2021 15:00) (91% - 100%)    Physical Exam:  General: intubated sedated   Head: atraumatic, normocephalic  ENT: ET and OG tubes   Cardio: regular rate   Respiratory: mechanically ventilated, fairly clear bilaterally   abd: soft, bowel sounds present, no tenderness  Musculoskeletal: no focal joint swelling. anasarca   vascular: left arm peripheral IVs, no phlebitis. no central lines   Skin: no rash                          7.8    15.61 )-----------( 291      ( 24 Sep 2021 00:18 )             27.1       09-24    143  |  100  |  54<H>  ----------------------------<  194<H>  3.8   |  30  |  1.05    Ca    9.8      24 Sep 2021 00:18  Phos  3.1     09-24  Mg     2.5     09-24    TPro  6.4  /  Alb  2.5<L>  /  TBili  4.1<H>  /  DBili  3.5<H>  /  AST  145<H>  /  ALT  107<H>  /  AlkPhos  2219<H>  09-24          MICROBIOLOGY:  Vancomycin Level, Trough: 19.8 ug/mL (09-24-21 @ 00:18)    Culture - Sputum (collected 09-24-21 @ 03:32)  Source: .Sputum Sputum  Gram Stain (09-24-21 @ 07:39):    Few polymorphonuclear leukocytes per low power field    Rare Squamous epithelial cells per low power field    Rare Gram Negative Rods seen per oil power field    Rare Gram positive cocci in pairs seen per oil power field    Rare Yeast like cells seen per oil power field    Culture - Blood (collected 09-21-21 @ 17:20)  Source: .Blood Blood  Preliminary Report (09-22-21 @ 18:01):    No growth to date.    Culture - Blood (collected 09-21-21 @ 17:20)  Source: .Blood Blood  Preliminary Report (09-22-21 @ 18:01):    No growth to date.    Culture - Sputum (collected 09-19-21 @ 08:59)  Source: .Sputum Sputum  Gram Stain (09-19-21 @ 13:13):    Few polymorphonuclear leukocytes per low power field    No Squamous epithelial cells per low power field    Rare Yeast like cells seen per oil power field  Final Report (09-21-21 @ 11:08):    Normal Respiratory Liane present    Culture - Blood (collected 09-19-21 @ 03:38)  Source: .Blood Blood-Peripheral  Gram Stain (09-20-21 @ 11:12):    Growth in anaerobic bottle: Gram Positive Cocci in Clusters  Final Report (09-22-21 @ 07:51):    Growth in anaerobic bottle: Staphylococcus epidermidis  Organism: Staphylococcus epidermidis (09-22-21 @ 07:51)  Organism: Staphylococcus epidermidis (09-22-21 @ 07:51)      -  Ampicillin/Sulbactam: R <=8/4      -  Cefazolin: R <=4      -  Clindamycin: R >4      -  Erythromycin: R >4      -  Gentamicin: R >8 Should not be used as monotherapy      -  Oxacillin: R >2      -  Penicillin: R >8      -  RIF- Rifampin: S <=1 Should not be used as monotherapy      -  Tetra/Doxy: S <=1      -  Trimethoprim/Sulfamethoxazole: R >2/38      -  Vancomycin: S 2      Method Type: GEETA    Culture - Blood (collected 09-19-21 @ 03:38)  Source: .Blood Blood-Peripheral  Final Report (09-24-21 @ 04:00):    No Growth Final      RADIOLOGY:  Images below reviewed personally  CT Chest Abdomen w/ IV Cont (09.23.21 @ 18:04)   1. Moderate to large pneumomediastinum, new since chest radiograph from 9/14/2021.  2. Bilateral groundglass and consolidative lung opacities.  3. Nonspecific gallbladder wall thickening.  4. Proctitis.  5. Bilateral anterolateral sixth and seventh rib fractures.

## 2021-09-24 NOTE — PROGRESS NOTE ADULT - ATTENDING COMMENTS
Agree with above  Intermittently febrile, although low-grade  Blood cultures are clearing (last positive on 19th with subsequent cultures negative)  Remains on vancomycin for MRSE bacteremia  Underwent CT c/a/p today: lungs show GGO with areas fibrosis in lower lung fields and early traction bronchiectasis c/w COVID-induced damage  Also noted: gallbladder wall thickening (non-specific) and proctitis. Added empiric Unasyn  Pneumomediastinum with tracheomegaly likely transmitted pressures from ventilation in the setting of decreased lung compliance with fibrosis  Remains intubated >30 days 60%/+5  Had discussion with family, at bedside today. We re-iterated that patient needs either trache or palliative wean. Family meeting will take place on Tuesday, where the final decision will be made.  Overall prognosis remains very poor. Mental status remains non-existent off sedation. Continue taper of benzos and methadone.    I have personally provided 50 minutes of critical care time.

## 2021-09-24 NOTE — PROGRESS NOTE ADULT - SUBJECTIVE AND OBJECTIVE BOX
INTERVAL HPI/OVERNIGHT EVENTS:    SUBJECTIVE: Patient seen and examined at bedside.     CONSTITUTIONAL: No weakness, fevers or chills  EYES/ENT: No visual changes;  No vertigo or throat pain   NECK: No pain or stiffness  RESPIRATORY: No cough, wheezing, hemoptysis; No shortness of breath  CARDIOVASCULAR: No chest pain or palpitations  GASTROINTESTINAL: No abdominal or epigastric pain. No nausea, vomiting, or hematemesis; No diarrhea or constipation. No melena or hematochezia.  GENITOURINARY: No dysuria, frequency or hematuria  NEUROLOGICAL: No numbness or weakness  SKIN: No itching, rashes    OBJECTIVE:    VITAL SIGNS:  ICU Vital Signs Last 24 Hrs  T(C): 37.3 (24 Sep 2021 04:00), Max: 38.1 (23 Sep 2021 20:00)  T(F): 99.1 (24 Sep 2021 04:00), Max: 100.6 (23 Sep 2021 20:00)  HR: 97 (24 Sep 2021 06:06) (83 - 107)  BP: 146/83 (24 Sep 2021 06:00) (91/55 - 158/89)  BP(mean): 109 (24 Sep 2021 06:00) (67 - 114)  ABP: --  ABP(mean): --  RR: 17 (24 Sep 2021 06:00) (16 - 35)  SpO2: 95% (24 Sep 2021 06:06) (91% - 100%)    Mode: AC/ CMV (Assist Control/ Continuous Mandatory Ventilation), RR (machine): 16, FiO2: 60, PEEP: 5, ITime: 0.6, MAP: 10, PC: 24, PIP: 30    09-23 @ 07:01  -  09-24 @ 07:00  --------------------------------------------------------  IN: 1460 mL / OUT: 900 mL / NET: 560 mL      CAPILLARY BLOOD GLUCOSE      POCT Blood Glucose.: 162 mg/dL (24 Sep 2021 05:49)      PHYSICAL EXAM:    General: intubated, off sedation  HEENT: NCAT, PERRL, clear conjunctiva, sclera icteric  Neck: supple, no JVD  Respiratory: CTAB  Cardiovascular: RRR, S1S2, no m/r/g  Abdomen: soft, nontender, nondistended, normal bowel sounds  Extremities: no edema, no cyanosis  Skin: warm, perfused, b/l erythema on buttocks  Neurological: nonfocal, no response to pain, sternal rub    MEDICATIONS:  MEDICATIONS  (STANDING):  ALBUTerol    90 MICROgram(s) HFA Inhaler 2 Puff(s) Inhalation every 8 hours  budesonide 160 MICROgram(s)/formoterol 4.5 MICROgram(s) Inhaler 2 Puff(s) Inhalation two times a day  chlorhexidine 0.12% Liquid 15 milliLiter(s) Oral Mucosa every 12 hours  chlorhexidine 4% Liquid 1 Application(s) Topical <User Schedule>  cholecalciferol 2000 Unit(s) Oral daily  dextrose 40% Gel 15 Gram(s) Oral once  dextrose 5%. 1000 milliLiter(s) (50 mL/Hr) IV Continuous <Continuous>  dextrose 5%. 1000 milliLiter(s) (100 mL/Hr) IV Continuous <Continuous>  dextrose 50% Injectable 25 Gram(s) IV Push once  dextrose 50% Injectable 12.5 Gram(s) IV Push once  dextrose 50% Injectable 25 Gram(s) IV Push once  glucagon  Injectable 1 milliGRAM(s) IntraMuscular once  insulin lispro (ADMELOG) corrective regimen sliding scale   SubCutaneous every 6 hours  insulin NPH human recombinant 10 Unit(s) SubCutaneous every 6 hours  LORazepam     Tablet 2 milliGRAM(s) Oral every 12 hours  methadone   Solution 10 milliGRAM(s) Oral every 8 hours  midodrine 40 milliGRAM(s) Oral every 8 hours  multivitamin/minerals Oral Solution (WELLESSE) 30 milliLiter(s) Enteral Tube daily  naloxegol 25 milliGRAM(s) Oral daily  pantoprazole   Suspension 40 milliGRAM(s) Enteral Tube every 12 hours  polyethylene glycol 3350 17 Gram(s) Oral daily  senna Syrup 10 milliLiter(s) Oral at bedtime  ursodiol Tablet 500 milliGRAM(s) Oral two times a day  vancomycin  IVPB 750 milliGRAM(s) IV Intermittent <User Schedule>    MEDICATIONS  (PRN):  acetaminophen    Suspension .. 650 milliGRAM(s) Oral every 6 hours PRN Temp greater or equal to 38C (100.4F), Mild Pain (1 - 3), Moderate Pain (4 - 6)      ALLERGIES:  Allergies    No Known Allergies    Intolerances        LABS:                        7.8    15.61 )-----------( 291      ( 24 Sep 2021 00:18 )             27.1     09-24    143  |  100  |  54<H>  ----------------------------<  194<H>  3.8   |  30  |  1.05    Ca    9.8      24 Sep 2021 00:18  Phos  3.1     09-24  Mg     2.5     09-24    TPro  6.4  /  Alb  2.5<L>  /  TBili  4.1<H>  /  DBili  x   /  AST  145<H>  /  ALT  107<H>  /  AlkPhos  2219<H>  09-24    PT/INR - ( 24 Sep 2021 00:18 )   PT: 12.6 sec;   INR: 1.05 ratio         PTT - ( 24 Sep 2021 00:18 )  PTT:24.0 sec      RADIOLOGY & ADDITIONAL TESTS: Reviewed.     INTERVAL HPI/OVERNIGHT EVENTS: Per family, not amenable to trach until bacteremia clears.     SUBJECTIVE: Patient seen and examined at bedside.     ROS: unresponsive    OBJECTIVE:    VITAL SIGNS:  ICU Vital Signs Last 24 Hrs  T(C): 37.3 (24 Sep 2021 04:00), Max: 38.1 (23 Sep 2021 20:00)  T(F): 99.1 (24 Sep 2021 04:00), Max: 100.6 (23 Sep 2021 20:00)  HR: 97 (24 Sep 2021 06:06) (83 - 107)  BP: 146/83 (24 Sep 2021 06:00) (91/55 - 158/89)  BP(mean): 109 (24 Sep 2021 06:00) (67 - 114)  ABP: --  ABP(mean): --  RR: 17 (24 Sep 2021 06:00) (16 - 35)  SpO2: 95% (24 Sep 2021 06:06) (91% - 100%)    Mode: AC/ CMV (Assist Control/ Continuous Mandatory Ventilation), RR (machine): 16, FiO2: 60, PEEP: 5, ITime: 0.6, MAP: 10, PC: 24, PIP: 30    09-23 @ 07:01  -  09-24 @ 07:00  --------------------------------------------------------  IN: 1460 mL / OUT: 900 mL / NET: 560 mL      CAPILLARY BLOOD GLUCOSE      POCT Blood Glucose.: 162 mg/dL (24 Sep 2021 05:49)      PHYSICAL EXAM:    General: intubated, off sedation  HEENT: NCAT, PERRL, clear conjunctiva, sclera icteric  Neck: supple, no JVD  Respiratory: CTAB  Cardiovascular: RRR, S1S2, no m/r/g  Abdomen: soft, nontender, nondistended, normal bowel sounds  Extremities: no edema, no cyanosis  Skin: warm, perfused, b/l erythema on buttocks  Neurological: nonfocal, no response to pain, sternal rub    MEDICATIONS:  MEDICATIONS  (STANDING):  ALBUTerol    90 MICROgram(s) HFA Inhaler 2 Puff(s) Inhalation every 8 hours  budesonide 160 MICROgram(s)/formoterol 4.5 MICROgram(s) Inhaler 2 Puff(s) Inhalation two times a day  chlorhexidine 0.12% Liquid 15 milliLiter(s) Oral Mucosa every 12 hours  chlorhexidine 4% Liquid 1 Application(s) Topical <User Schedule>  cholecalciferol 2000 Unit(s) Oral daily  dextrose 40% Gel 15 Gram(s) Oral once  dextrose 5%. 1000 milliLiter(s) (50 mL/Hr) IV Continuous <Continuous>  dextrose 5%. 1000 milliLiter(s) (100 mL/Hr) IV Continuous <Continuous>  dextrose 50% Injectable 25 Gram(s) IV Push once  dextrose 50% Injectable 12.5 Gram(s) IV Push once  dextrose 50% Injectable 25 Gram(s) IV Push once  glucagon  Injectable 1 milliGRAM(s) IntraMuscular once  insulin lispro (ADMELOG) corrective regimen sliding scale   SubCutaneous every 6 hours  insulin NPH human recombinant 10 Unit(s) SubCutaneous every 6 hours  LORazepam     Tablet 2 milliGRAM(s) Oral every 12 hours  methadone   Solution 10 milliGRAM(s) Oral every 8 hours  midodrine 40 milliGRAM(s) Oral every 8 hours  multivitamin/minerals Oral Solution (WELLESSE) 30 milliLiter(s) Enteral Tube daily  naloxegol 25 milliGRAM(s) Oral daily  pantoprazole   Suspension 40 milliGRAM(s) Enteral Tube every 12 hours  polyethylene glycol 3350 17 Gram(s) Oral daily  senna Syrup 10 milliLiter(s) Oral at bedtime  ursodiol Tablet 500 milliGRAM(s) Oral two times a day  vancomycin  IVPB 750 milliGRAM(s) IV Intermittent <User Schedule>    MEDICATIONS  (PRN):  acetaminophen    Suspension .. 650 milliGRAM(s) Oral every 6 hours PRN Temp greater or equal to 38C (100.4F), Mild Pain (1 - 3), Moderate Pain (4 - 6)      ALLERGIES:  Allergies    No Known Allergies    Intolerances        LABS:                        7.8    15.61 )-----------( 291      ( 24 Sep 2021 00:18 )             27.1     09-24    143  |  100  |  54<H>  ----------------------------<  194<H>  3.8   |  30  |  1.05    Ca    9.8      24 Sep 2021 00:18  Phos  3.1     09-24  Mg     2.5     09-24    TPro  6.4  /  Alb  2.5<L>  /  TBili  4.1<H>  /  DBili  x   /  AST  145<H>  /  ALT  107<H>  /  AlkPhos  2219<H>  09-24    PT/INR - ( 24 Sep 2021 00:18 )   PT: 12.6 sec;   INR: 1.05 ratio         PTT - ( 24 Sep 2021 00:18 )  PTT:24.0 sec      CT Chest/A/P: reviewed.

## 2021-09-24 NOTE — PROGRESS NOTE ADULT - ASSESSMENT
Unvaccinated COVID pneumonia s/p tocilizumab, remdesivir and two courses of decadron.     Cardiac arrest and intubated 8/20.     Fevers almost daily since 9/8.   Unclear cause:  -Staph epidermidis growing intermittently on blood cultures (8/24, 8/30, 9/10, 9/12, 9/19). Unclear significance. Has been on Vancomycin for about a month already which suggests either contaminant or persistent endovascular focus. Central lines removed. Uncommon cause of NVE and TTE without vegetations (9/8, 9/21). Evolving sacral eschar without acute infection.   -Treated possible VAP - Klebsiella and Enterobacter 9/11 with negative subsequent sputum cultures.   -Proctitis suggested on yesterday's CT. Risk from immunosuppressive therapy. Appears more pathologic than simply reactive to the rectal thermometer per radiology.     Poor prognosis. Family meeting pending.     Suggest  -lower Vancomycin to 500mg IV q24h - monitor troughs and renal function   -favor SEJAL in the setting of persistent fevers and gram positive blood cultures   -add empiric Unasyn 3GM IV q6h for proctitis   -would avoid further steroids/immunosuppression   -supportive care     Discussed with ICU   I will be away until Tues 9/28. If follow up is needed please call the office [phone].     Patrick Paul MD   Infectious Disease   Pager [phone]   After 5PM and on weekends please page fellow on call or call [phone]

## 2021-09-24 NOTE — PROGRESS NOTE ADULT - ASSESSMENT
`71 year old man w/pmhx of HTN, DM2, BPH, and Asthma here for acute hypoxic respiratory failure and ARDS secondary to viral pneumonia from COVID-19. Hospital course complicated by VT arrest, DVT and MRSE bacteremia.    #Neuro   1) off sedation  - decreased methadone increased to 10TID and Ativan 2mg q12h as of 9/22; patient still unresponsive  - noncont CT 9/13 appears unchanged 8/12     2) r/o seizure  -had rhythmic R arm movement  - 24h EEG with generalized slowing; no evidence of seizure (per report 9/21 AM)    #ENT   -oropharyngeal packing removed on 9/10  -ENT said they will pack if patient's h/h drops  -will need FiO2 at 50% or less and PaO2 at 60 for qualify for trach; though still bacteremic     #CV   1)Cardiac Arrest VT  - S/p Amiodarone and electrolyte supplementation   - No further VT noted   - Echo reveals Right hrt strain   -holding diuretics, will revisit when bicarb at 30    #Pulm   1)Acute hypoxemic resp failure 2/2 cardiac arrest  - vent Pressure AC 30/60/50/8; wean as tolerated    2) Asthma  - c/w Proventil q8h and Symbicort BID    #GI   1)Nutrition   - maintain Glucerna tube feeds as tolerated   - c/w senna/ miralax     2)Transaminitis - likely 2/2 cholestasis of sepsis vs SSC CIP (secondary sclerosing cholangitis in critically ill patients), Covid cholangiopathy, drug-induced liver injury  - c/w ursodiol 500 mg BID            - supportive treatment per hepatology  -alk phos, ast/alt downtrending    #Renal  1)Hypervolemia   - Strict I/O   - Diuresis as above  - Monitor electrolytes     2) Hypernatremia   -  Na 141 and free water stopped       #ID   1)COVID - 19   - S/p RDV Dexa and TOCI   - will continue with supportive care     2)MRSE Bacteremia   - S/P 14 day course of Vanc from 8/20-9/3 therapy, and intermittent gram-&anaerobe coverage   - Ua neg  - c/w Vanc 1g q24h  - last postive Bcx 9/19 - will need 14d treatment beyond that  - continue surveillance cultures q48-72h; last sent 9/21 currently pending  - TTE w/o evidence of vegetations; will consult cardiology for SEJAL  - f/u ID for abx recommendation, clarify source    3) Vent associated PNA vs colonization  - Sputum cx revealed klebsiella and enterobact  - dc'd IV Zosyn x5-7d (9/12 -9/13 )  - d/c meropenem (9/13-9/14)  -began levaquin on 9/14 for total 4 days including meropenem dates as bacteria are sensitive per Dr. Paul  - levaquin was started on 9/16 for total duration of 7d (ended 9/20)     #Endo   1)DM   - c/w NPH 10u q6h  - c/w ISS   - goal Blood Sugar 100-180     #Heme  1)b/l DVTs above knee on left and below knee on R  - off Lovenox since 9/19 given Hgb drop to 6.0 requiring 1u PRBC; drop again 9/22 requiring additional unit  - restarted DVT ppx as of 9/22    #Prophylaxis  - Hep 5000 SubQ q8h    #GOC  -palliative consult following; family meeting Thurs 9/23 2:30pm with palliative, may require ethics consult  -full code at this time  -family updated daily `71 year old man w/pmhx of HTN, DM2, BPH, and Asthma here for acute hypoxic respiratory failure and ARDS secondary to viral pneumonia from COVID-19. Hospital course complicated by VT arrest, DVT and MRSE bacteremia.    #Neuro   1) off sedation  - decreased methadone increased to 10TID and Ativan 2mg q12h as of 9/22; patient still unresponsive  - noncont CT 9/13 appears unchanged 8/12     2) r/o seizure  -had rhythmic R arm movement  - 24h EEG with generalized slowing; no evidence of seizure (per report 9/21 AM)    #ENT   -oropharyngeal packing removed on 9/10  -ENT said they will pack if patient's h/h drops  -will need FiO2 at 50% or less and PaO2 at 60 for qualify for trach    #CV   1)Cardiac Arrest VT  - S/p Amiodarone and electrolyte supplementation   - No further VT noted   - Echo reveals Right hrt strain   -holding diuretics; no indication, no evidence of pleural effusion, pulm edema on chest CT (9/24)    #Pulm   1)Acute hypoxemic resp failure 2/2 cardiac arrest  - vent Pressure AC 30/60/50/8; wean as tolerated    2) Asthma  - c/w Proventil q8h and Symbicort BID    3) Post-covid sequelae  - extensive fibrosis and consolidations at bases b/l  - patient with PO2 75 despite intubation with Fi02 60%, pressure 24 and PEEP 5  - GGO on CT chest; unlikely to improve with 3rd course of steroids, also with persistent fevers and bactermia despite appropriate abx therapy     #GI   1)Nutrition   - maintain Glucerna tube feeds as tolerated   - c/w senna/ miralax     2)Transaminitis - likely 2/2 cholestasis of sepsis vs SSC CIP (secondary sclerosing cholangitis in critically ill patients), Covid cholangiopathy, drug-induced liver injury  - c/w ursodiol 500 mg BID            - supportive treatment per hepatology  -alk phos, ast/alt downtrending    #Renal  1)Hypervolemia   - Strict I/O   - Monitor electrolytes     2) Hypernatremia   -  Na 141-143 and free water stopped       #ID   1)COVID - 19   - S/p RDV Dexa and TOCI   - will continue with supportive care     2)MRSE Bacteremia   - S/P 14 day course of Vanc from 8/20-9/3 therapy, and intermittent gram-&anaerobe coverage   - last postive Bcx 9/19 - will need 14d treatment beyond that   - continue surveillance cultures q48-72h; last sent 9/21 negative  - TTE w/o evidence of vegetations; will consult cardiology for SEJAL  - CT A/P with evidence of proctitis: unasyn added per ID  - Abx as below     Vanc 750mg qd (trough with midnight labs)         - Unasyn 3g q6h (9/24 - )    #Endo   1)DM   - c/w NPH 10u q6h  - c/w ISS   - goal Blood Sugar 100-180     #Heme  1)b/l DVTs above knee on left and below knee on R  - off Lovenox since 9/19 given Hgb drop to 6.0 requiring 1u PRBC; drop again 9/22 requiring additional unit  - off DVT ppx in s/o persistent Hgb drops     #GOC  -palliative consult following; family meeting next week   -full code at this time  -family updated daily

## 2021-09-24 NOTE — CHART NOTE - NSCHARTNOTEFT_GEN_A_CORE
Attempted outreach to patients daughter to discuss time for meeting Monday/Tuesday.  Daughter unable to provide a date/time for meeting.  Encourage primary team to discuss with family about removing ET tube as at this time, likely causing harm.  Options include compassionate extubation vs. tracheostomy. Recommend inclusion of Ethics to balance patient autonomy, beneficence and justice.  discussed with MICU team.   [phone]

## 2021-09-25 NOTE — PROGRESS NOTE ADULT - ATTENDING COMMENTS
Agree with above except as noted. 71 year old man p/w acute hypoxic respiratory failure and ARDS secondary to viral pneumonia from COVID-19. Hospital course complicated by VT arrest, DVT and MRSE bacteremia. Pt off iv sedation. Cont oral regimen for vent synchrony and taper off as tolerated. Monitor qtc. Cont to wean fio2/peep as tolerated to maintain sat>90%. Cont abx for bacteremia and proctitis w/ vanco and unasyn. A/c held for bleeding concerns given drop in Hb. No gross bleeding noted Family deciding on trach.  Prognosis remains very poor for functional recovery

## 2021-09-25 NOTE — PROGRESS NOTE ADULT - ASSESSMENT
71 year old man w/pmhx of HTN, DM2, BPH, and Asthma here for acute hypoxic respiratory failure and ARDS secondary to viral pneumonia from COVID-19. Hospital course complicated by VT arrest, DVT and MRSE bacteremia.    #Neuro   1) off sedation  - decreased methadone increased to 10TID and Ativan 2mg q12h as of 9/22; patient still unresponsive  - noncont CT 9/13 appears unchanged 8/12     2) r/o seizure  -had rhythmic R arm movement  - 24h EEG with generalized slowing; no evidence of seizure (per report 9/21 AM)    #ENT   -oropharyngeal packing removed on 9/10  -ENT said they will pack if patient's h/h drops  -will need FiO2 at 50% or less and PaO2 at 60 for qualify for trach    #CV   1)Cardiac Arrest VT  - S/p Amiodarone and electrolyte supplementation   - No further VT noted   - Echo reveals Right hrt strain   -holding diuretics; no indication, no evidence of pleural effusion, pulm edema on chest CT (9/24)    #Pulm   1)Acute hypoxemic resp failure 2/2 cardiac arrest  - vent Pressure AC 30/60/50/8; wean as tolerated    2) Asthma  - c/w Proventil q8h and Symbicort BID    3) Post-covid sequelae  - extensive fibrosis and consolidations at bases b/l  - patient with PO2 75 despite intubation with Fi02 60%, pressure 24 and PEEP 5  - GGO on CT chest; unlikely to improve with 3rd course of steroids, also with persistent fevers and bactermia despite appropriate abx therapy     #GI   1)Nutrition   - maintain Glucerna tube feeds as tolerated   - c/w senna/ miralax     2)Transaminitis - likely 2/2 cholestasis of sepsis vs SSC CIP (secondary sclerosing cholangitis in critically ill patients), Covid cholangiopathy, drug-induced liver injury  - c/w ursodiol 500 mg BID            - supportive treatment per hepatology  -alk phos, ast/alt downtrending    #Renal  1)Hypervolemia   - Strict I/O   - Monitor electrolytes     2) Hypernatremia   -  Na 141-143 and free water stopped       #ID   1)COVID - 19   - S/p RDV Dexa and TOCI   - will continue with supportive care     2)MRSE Bacteremia   - S/P 14 day course of Vanc from 8/20-9/3 therapy, and intermittent gram-&anaerobe coverage   - last postive Bcx 9/19 - will need 14d treatment beyond that   - continue surveillance cultures q48-72h; last sent 9/21 negative  - TTE w/o evidence of vegetations; will consult cardiology for SEJAL  - CT A/P with evidence of proctitis: unasyn added per ID  - Abx as below     Vanc 750mg qd (trough with midnight labs)         - Unasyn 3g q6h (9/24 - )    #Endo   1)DM   - c/w NPH 10u q6h  - c/w ISS   - goal Blood Sugar 100-180     #Heme  1)b/l DVTs above knee on left and below knee on R  - off Lovenox since 9/19 given Hgb drop to 6.0 requiring 1u PRBC; drop again 9/22 requiring additional unit  - off DVT ppx in s/o persistent Hgb drops     #GOC  -palliative consult following; family meeting next week   -full code at this time  -family updated daily 71 year old man w/pmhx of HTN, DM2, BPH, and Asthma here for acute hypoxic respiratory failure and ARDS secondary to viral pneumonia from COVID-19. Hospital course complicated by VT arrest, DVT and MRSE bacteremia.    #Neuro   1) off sedation  - decreased methadone increased to 10TID and Ativan 2mg q12h as of 9/22; patient still unresponsive  - noncont CT 9/13 appears unchanged 8/12     2) r/o seizure  -had rhythmic R arm movement  - 24h EEG with generalized slowing; no evidence of seizure (per report 9/21 AM)    #ENT   -oropharyngeal packing removed on 9/10  -ENT said they will pack if patient's h/h drops  -will need FiO2 at 50% or less and PaO2 at 60 for qualify for trach    #CV   1)Cardiac Arrest VT  - S/p Amiodarone and electrolyte supplementation   - No further VT noted   - Echo reveals Right hrt strain   -holding diuretics; no indication, no evidence of pleural effusion, pulm edema on chest CT (9/24)    #Pulm   1)Acute hypoxemic resp failure 2/2 cardiac arrest  - vent Pressure AC 30/60/50/5; wean as tolerated    2) Asthma  - c/w Proventil q8h and Symbicort BID    3) Post-covid sequelae  - extensive fibrosis and consolidations at bases b/l  - patient with PO2 75 despite intubation with Fi02 60%, pressure 24 and PEEP 5  - GGO on CT chest; unlikely to improve with 3rd course of steroids, also with persistent fevers and bactermia despite appropriate abx therapy     #GI   1)Nutrition   - maintain Glucerna tube feeds as tolerated   - c/w senna/ miralax     2)Transaminitis - likely 2/2 cholestasis of sepsis vs SSC CIP (secondary sclerosing cholangitis in critically ill patients), Covid cholangiopathy, drug-induced liver injury  - c/w ursodiol 500 mg BID            - supportive treatment per hepatology  -alk phos, ast/alt downtrending    #Renal  1)Hypervolemia   - Strict I/O   - Monitor electrolytes     2) Hypernatremia   -  Na 141-143 and free water stopped   - up to 147 9/25, will restart free water      #ID   1)COVID - 19   - S/p RDV Dexa and TOCI   - will continue with supportive care     2)MRSE Bacteremia   - S/P 14 day course of Vanc from 8/20-9/3 therapy, and intermittent gram-&anaerobe coverage   - last postive Bcx 9/19 - will need 14d treatment beyond that   - continue surveillance cultures q48-72h; last sent 9/21 negative  - TTE w/o evidence of vegetations; will consult cardiology for SEJAL  - CT A/P with evidence of proctitis: unasyn added per ID  - Abx as below     Vanc 500mg qd (trough with midnight labs) - changed from 750mg        - Unasyn 3g q6h (9/24 - )    #Endo   1)DM   - c/w NPH 10u q6h  - c/w ISS   - goal Blood Sugar 100-180     #Heme  1)b/l DVTs above knee on left and below knee on R  - off Lovenox since 9/19 given Hgb drop to 6.0 requiring 1u PRBC; drop again 9/22 requiring additional unit  - off DVT ppx in s/o persistent Hgb drops     #GOC  -palliative consult following; family meeting next week   -full code at this time  -family updated daily

## 2021-09-25 NOTE — PROGRESS NOTE ADULT - SUBJECTIVE AND OBJECTIVE BOX
COVERING RESIDENT: SABA BURNETT (PGY-1) | [facility] [phone] | [facility] [phone]    INTERVAL HPI/OVERNIGHT EVENTS: Temp up to 100.4    SUBJECTIVE: Patient seen and examined at bedside.     ROS: unable to assess    OBJECTIVE:    VITAL SIGNS:  ICU Vital Signs Last 24 Hrs  T(C): 37 (25 Sep 2021 04:00), Max: 37.7 (24 Sep 2021 20:00)  T(F): 98.6 (25 Sep 2021 04:00), Max: 99.9 (24 Sep 2021 20:00)  HR: 96 (25 Sep 2021 06:00) (72 - 99)  BP: 135/79 (25 Sep 2021 06:00) (85/50 - 189/123)  BP(mean): 102 (25 Sep 2021 06:00) (62 - 144)  ABP: --  ABP(mean): --  RR: 31 (25 Sep 2021 06:00) (0 - 31)  SpO2: 100% (25 Sep 2021 06:00) (92% - 100%)    Mode: AC/ CMV (Assist Control/ Continuous Mandatory Ventilation), RR (machine): 16, FiO2: 50, PEEP: 5, ITime: 0.7, MAP: 12, PC: 24, PIP: 30    09-24 @ 07:01  -  09-25 @ 07:00  --------------------------------------------------------  IN: 2090 mL / OUT: 1100 mL / NET: 990 mL      CAPILLARY BLOOD GLUCOSE      POCT Blood Glucose.: 136 mg/dL (25 Sep 2021 05:32)      PHYSICAL EXAM:    General: intubated, off sedation  HEENT: NCAT, clear conjunctiva, sclera icteric, pupils sluggish  Neck: supple, no JVD  Respiratory: CTAB  Cardiovascular: RRR, S1S2, no m/r/g  Abdomen: soft, nontender, nondistended, normal bowel sounds  Extremities: no edema, no cyanosis  Skin: warm, perfused, b/l erythema on buttocks  Neurological: nonfocal, no response to pain, sternal rub      MEDICATIONS:  MEDICATIONS  (STANDING):  ALBUTerol    90 MICROgram(s) HFA Inhaler 2 Puff(s) Inhalation every 8 hours  ampicillin/sulbactam  IVPB      ampicillin/sulbactam  IVPB 3 Gram(s) IV Intermittent every 6 hours  budesonide 160 MICROgram(s)/formoterol 4.5 MICROgram(s) Inhaler 2 Puff(s) Inhalation two times a day  chlorhexidine 0.12% Liquid 15 milliLiter(s) Oral Mucosa every 12 hours  chlorhexidine 4% Liquid 1 Application(s) Topical <User Schedule>  cholecalciferol 2000 Unit(s) Oral daily  dextrose 40% Gel 15 Gram(s) Oral once  dextrose 5%. 1000 milliLiter(s) (50 mL/Hr) IV Continuous <Continuous>  dextrose 5%. 1000 milliLiter(s) (100 mL/Hr) IV Continuous <Continuous>  dextrose 50% Injectable 25 Gram(s) IV Push once  dextrose 50% Injectable 12.5 Gram(s) IV Push once  dextrose 50% Injectable 25 Gram(s) IV Push once  glucagon  Injectable 1 milliGRAM(s) IntraMuscular once  insulin lispro (ADMELOG) corrective regimen sliding scale   SubCutaneous every 6 hours  insulin NPH human recombinant 10 Unit(s) SubCutaneous every 6 hours  LORazepam     Tablet 2 milliGRAM(s) Oral every 12 hours  methadone   Solution 10 milliGRAM(s) Oral every 8 hours  midodrine 30 milliGRAM(s) Oral every 8 hours  multivitamin/minerals Oral Solution (WELLESSE) 30 milliLiter(s) Enteral Tube daily  naloxegol 25 milliGRAM(s) Oral daily  pantoprazole   Suspension 40 milliGRAM(s) Enteral Tube every 12 hours  polyethylene glycol 3350 17 Gram(s) Oral daily  senna Syrup 10 milliLiter(s) Oral at bedtime  ursodiol Tablet 500 milliGRAM(s) Oral two times a day  vancomycin  IVPB 500 milliGRAM(s) IV Intermittent <User Schedule>    MEDICATIONS  (PRN):  acetaminophen    Suspension .. 650 milliGRAM(s) Oral every 6 hours PRN Temp greater or equal to 38C (100.4F), Mild Pain (1 - 3), Moderate Pain (4 - 6)      ALLERGIES:  Allergies    No Known Allergies    Intolerances        LABS:                        8.1    13.69 )-----------( 269      ( 25 Sep 2021 00:07 )             28.3     09-25    147<H>  |  104  |  54<H>  ----------------------------<  156<H>  3.5   |  31  |  1.00    Ca    9.7      25 Sep 2021 00:07  Phos  3.4     09-25  Mg     2.5     09-25    TPro  6.1  /  Alb  2.3<L>  /  TBili  4.0<H>  /  DBili  x   /  AST  142<H>  /  ALT  102<H>  /  AlkPhos  1943<H>  09-25    PT/INR - ( 24 Sep 2021 00:18 )   PT: 12.6 sec;   INR: 1.05 ratio         PTT - ( 24 Sep 2021 00:18 )  PTT:24.0 sec      RADIOLOGY & ADDITIONAL TESTS: Reviewed.     INTERVAL HPI/OVERNIGHT EVENTS: Temp up to 100.4    SUBJECTIVE: Patient seen and examined at bedside.     ROS: unable to assess    OBJECTIVE:    VITAL SIGNS:  ICU Vital Signs Last 24 Hrs  T(C): 37 (25 Sep 2021 04:00), Max: 37.7 (24 Sep 2021 20:00)  T(F): 98.6 (25 Sep 2021 04:00), Max: 99.9 (24 Sep 2021 20:00)  HR: 96 (25 Sep 2021 06:00) (72 - 99)  BP: 135/79 (25 Sep 2021 06:00) (85/50 - 189/123)  BP(mean): 102 (25 Sep 2021 06:00) (62 - 144)  ABP: --  ABP(mean): --  RR: 31 (25 Sep 2021 06:00) (0 - 31)  SpO2: 100% (25 Sep 2021 06:00) (92% - 100%)    Mode: AC/ CMV (Assist Control/ Continuous Mandatory Ventilation), RR (machine): 16, FiO2: 50, PEEP: 5, ITime: 0.7, MAP: 12, PC: 24, PIP: 30    09-24 @ 07:01  -  09-25 @ 07:00  --------------------------------------------------------  IN: 2090 mL / OUT: 1100 mL / NET: 990 mL      CAPILLARY BLOOD GLUCOSE      POCT Blood Glucose.: 136 mg/dL (25 Sep 2021 05:32)      PHYSICAL EXAM:    General: intubated, off sedation  HEENT: NCAT, clear conjunctiva, sclera icteric, pupils sluggish  Neck: supple, no JVD  Respiratory: CTAB  Cardiovascular: RRR, S1S2, no m/r/g  Abdomen: soft, nontender, nondistended, normal bowel sounds  Extremities: no edema, no cyanosis  Skin: warm, perfused, b/l erythema on buttocks  Neurological: nonfocal, no response to pain, sternal rub      MEDICATIONS:  MEDICATIONS  (STANDING):  ALBUTerol    90 MICROgram(s) HFA Inhaler 2 Puff(s) Inhalation every 8 hours  ampicillin/sulbactam  IVPB      ampicillin/sulbactam  IVPB 3 Gram(s) IV Intermittent every 6 hours  budesonide 160 MICROgram(s)/formoterol 4.5 MICROgram(s) Inhaler 2 Puff(s) Inhalation two times a day  chlorhexidine 0.12% Liquid 15 milliLiter(s) Oral Mucosa every 12 hours  chlorhexidine 4% Liquid 1 Application(s) Topical <User Schedule>  cholecalciferol 2000 Unit(s) Oral daily  dextrose 40% Gel 15 Gram(s) Oral once  dextrose 5%. 1000 milliLiter(s) (50 mL/Hr) IV Continuous <Continuous>  dextrose 5%. 1000 milliLiter(s) (100 mL/Hr) IV Continuous <Continuous>  dextrose 50% Injectable 25 Gram(s) IV Push once  dextrose 50% Injectable 12.5 Gram(s) IV Push once  dextrose 50% Injectable 25 Gram(s) IV Push once  glucagon  Injectable 1 milliGRAM(s) IntraMuscular once  insulin lispro (ADMELOG) corrective regimen sliding scale   SubCutaneous every 6 hours  insulin NPH human recombinant 10 Unit(s) SubCutaneous every 6 hours  LORazepam     Tablet 2 milliGRAM(s) Oral every 12 hours  methadone   Solution 10 milliGRAM(s) Oral every 8 hours  midodrine 30 milliGRAM(s) Oral every 8 hours  multivitamin/minerals Oral Solution (WELLESSE) 30 milliLiter(s) Enteral Tube daily  naloxegol 25 milliGRAM(s) Oral daily  pantoprazole   Suspension 40 milliGRAM(s) Enteral Tube every 12 hours  polyethylene glycol 3350 17 Gram(s) Oral daily  senna Syrup 10 milliLiter(s) Oral at bedtime  ursodiol Tablet 500 milliGRAM(s) Oral two times a day  vancomycin  IVPB 500 milliGRAM(s) IV Intermittent <User Schedule>    MEDICATIONS  (PRN):  acetaminophen    Suspension .. 650 milliGRAM(s) Oral every 6 hours PRN Temp greater or equal to 38C (100.4F), Mild Pain (1 - 3), Moderate Pain (4 - 6)      ALLERGIES:  Allergies    No Known Allergies    Intolerances        LABS:                        8.1    13.69 )-----------( 269      ( 25 Sep 2021 00:07 )             28.3     09-25    147<H>  |  104  |  54<H>  ----------------------------<  156<H>  3.5   |  31  |  1.00    Ca    9.7      25 Sep 2021 00:07  Phos  3.4     09-25  Mg     2.5     09-25    TPro  6.1  /  Alb  2.3<L>  /  TBili  4.0<H>  /  DBili  x   /  AST  142<H>  /  ALT  102<H>  /  AlkPhos  1943<H>  09-25    PT/INR - ( 24 Sep 2021 00:18 )   PT: 12.6 sec;   INR: 1.05 ratio         PTT - ( 24 Sep 2021 00:18 )  PTT:24.0 sec      RADIOLOGY & ADDITIONAL TESTS: Reviewed.

## 2021-09-26 NOTE — PROGRESS NOTE ADULT - SUBJECTIVE AND OBJECTIVE BOX
*************************************  Cade Jorge M.D.| PGY-1  Department of Internal Medicine  *************************************      Patient is a 71y old  Male who presents with a chief complaint of SOB (25 Sep 2021 07:14)      SUBJECTIVE / OVERNIGHT EVENTS:    MEDICATIONS  (STANDING):  ALBUTerol    90 MICROgram(s) HFA Inhaler 2 Puff(s) Inhalation every 8 hours  ampicillin/sulbactam  IVPB      ampicillin/sulbactam  IVPB 3 Gram(s) IV Intermittent every 6 hours  budesonide 160 MICROgram(s)/formoterol 4.5 MICROgram(s) Inhaler 2 Puff(s) Inhalation two times a day  chlorhexidine 0.12% Liquid 15 milliLiter(s) Oral Mucosa every 12 hours  chlorhexidine 4% Liquid 1 Application(s) Topical <User Schedule>  cholecalciferol 2000 Unit(s) Oral daily  dextrose 40% Gel 15 Gram(s) Oral once  dextrose 5%. 1000 milliLiter(s) (50 mL/Hr) IV Continuous <Continuous>  dextrose 5%. 1000 milliLiter(s) (100 mL/Hr) IV Continuous <Continuous>  dextrose 50% Injectable 25 Gram(s) IV Push once  dextrose 50% Injectable 12.5 Gram(s) IV Push once  dextrose 50% Injectable 25 Gram(s) IV Push once  glucagon  Injectable 1 milliGRAM(s) IntraMuscular once  insulin lispro (ADMELOG) corrective regimen sliding scale   SubCutaneous every 6 hours  insulin NPH human recombinant 10 Unit(s) SubCutaneous every 6 hours  LORazepam     Tablet 2 milliGRAM(s) Oral every 12 hours  methadone   Solution 10 milliGRAM(s) Oral every 8 hours  midodrine 20 milliGRAM(s) Oral every 8 hours  multivitamin/minerals Oral Solution (WELLESSE) 30 milliLiter(s) Enteral Tube daily  naloxegol 25 milliGRAM(s) Oral daily  pantoprazole  Injectable 40 milliGRAM(s) IV Push every 12 hours  polyethylene glycol 3350 17 Gram(s) Oral daily  senna Syrup 10 milliLiter(s) Oral at bedtime  ursodiol Tablet 500 milliGRAM(s) Oral two times a day  vancomycin  IVPB 500 milliGRAM(s) IV Intermittent <User Schedule>    MEDICATIONS  (PRN):  acetaminophen    Suspension .. 650 milliGRAM(s) Oral every 6 hours PRN Temp greater or equal to 38C (100.4F), Mild Pain (1 - 3), Moderate Pain (4 - 6)      CAPILLARY BLOOD GLUCOSE      POCT Blood Glucose.: 183 mg/dL (26 Sep 2021 06:42)  POCT Blood Glucose.: 244 mg/dL (25 Sep 2021 17:50)  POCT Blood Glucose.: 117 mg/dL (25 Sep 2021 11:13)    I&O's Summary    25 Sep 2021 07:01  -  26 Sep 2021 07:00  --------------------------------------------------------  IN: 1355 mL / OUT: 700 mL / NET: 655 mL        Vital Signs Last 24 Hrs  T(C): 37.7 (26 Sep 2021 01:00), Max: 38.3 (25 Sep 2021 23:00)  T(F): 99.9 (26 Sep 2021 01:00), Max: 100.9 (25 Sep 2021 23:00)  HR: 93 (26 Sep 2021 07:00) (59 - 122)  BP: 153/80 (26 Sep 2021 07:00) (133/80 - 168/93)  BP(mean): 109 (26 Sep 2021 07:00) (100 - 125)  RR: 25 (26 Sep 2021 07:00) (12 - 44)  SpO2: 98% (26 Sep 2021 07:00) (88% - 99%)    PHYSICAL EXAM:  GENERAL: NAD, well-developed, well-nourished  HEAD: Atraumatic, Normocephalic  EYES: EOMI, PERRLA, conjunctiva and sclera clear  NECK: Supple, No JVD  CHEST/LUNG: Clear to auscultation bilaterally; No wheezes or crackles  HEART: Normal S1/S2; Regular rate and rhythm; No murmurs, rubs, or gallops  ABDOMEN: Soft, Nontender, Nondistended; Bowel sounds present  EXTREMITIES: 2+ Peripheral Pulses; No clubbing, cyanosis, or edema  PSYCH: A&Ox3  NEUROLOGY: no focal neurologic deficit  SKIN: No rashes or lesions    LABS:                        8.1    10.99 )-----------( 281      ( 26 Sep 2021 00:37 )             27.4      09-26    149<H>  |  107  |  55<H>  ----------------------------<  216<H>  3.5   |  28  |  0.99    Ca    9.7      26 Sep 2021 00:21  Phos  2.7     09-26  Mg     2.7     09-26    TPro  6.4  /  Alb  2.4<L>  /  TBili  4.2<H>  /  DBili  x   /  AST  156<H>  /  ALT  107<H>  /  AlkPhos  2004<H>  09-26    PT/INR - ( 26 Sep 2021 00:21 )   PT: 12.2 sec;   INR: 1.02 ratio         PTT - ( 26 Sep 2021 00:21 )  PTT:29.4 sec          RADIOLOGY & ADDITIONAL TESTS:    Imaging Personally Reviewed:    Consultant(s) Notes Reviewed:      Care Discussed with Consultants/Other Providers:   *************************************  Cade Jorge M.D.| PGY-1  Department of Internal Medicine  *************************************      Patient is a 71y old  Male who presents with a chief complaint of SOB (25 Sep 2021 07:14)      SUBJECTIVE / OVERNIGHT EVENTS: CT scan for suspected Right thigh hematoma. Laryngeal edema noted, sputum culture pending.     MEDICATIONS  (STANDING):  ALBUTerol    90 MICROgram(s) HFA Inhaler 2 Puff(s) Inhalation every 8 hours  ampicillin/sulbactam  IVPB      ampicillin/sulbactam  IVPB 3 Gram(s) IV Intermittent every 6 hours  budesonide 160 MICROgram(s)/formoterol 4.5 MICROgram(s) Inhaler 2 Puff(s) Inhalation two times a day  chlorhexidine 0.12% Liquid 15 milliLiter(s) Oral Mucosa every 12 hours  chlorhexidine 4% Liquid 1 Application(s) Topical <User Schedule>  cholecalciferol 2000 Unit(s) Oral daily  dextrose 40% Gel 15 Gram(s) Oral once  dextrose 5%. 1000 milliLiter(s) (50 mL/Hr) IV Continuous <Continuous>  dextrose 5%. 1000 milliLiter(s) (100 mL/Hr) IV Continuous <Continuous>  dextrose 50% Injectable 25 Gram(s) IV Push once  dextrose 50% Injectable 12.5 Gram(s) IV Push once  dextrose 50% Injectable 25 Gram(s) IV Push once  glucagon  Injectable 1 milliGRAM(s) IntraMuscular once  insulin lispro (ADMELOG) corrective regimen sliding scale   SubCutaneous every 6 hours  insulin NPH human recombinant 10 Unit(s) SubCutaneous every 6 hours  LORazepam     Tablet 2 milliGRAM(s) Oral every 12 hours  methadone   Solution 10 milliGRAM(s) Oral every 8 hours  midodrine 20 milliGRAM(s) Oral every 8 hours  multivitamin/minerals Oral Solution (WELLESSE) 30 milliLiter(s) Enteral Tube daily  naloxegol 25 milliGRAM(s) Oral daily  pantoprazole  Injectable 40 milliGRAM(s) IV Push every 12 hours  polyethylene glycol 3350 17 Gram(s) Oral daily  senna Syrup 10 milliLiter(s) Oral at bedtime  ursodiol Tablet 500 milliGRAM(s) Oral two times a day  vancomycin  IVPB 500 milliGRAM(s) IV Intermittent <User Schedule>    MEDICATIONS  (PRN):  acetaminophen    Suspension .. 650 milliGRAM(s) Oral every 6 hours PRN Temp greater or equal to 38C (100.4F), Mild Pain (1 - 3), Moderate Pain (4 - 6)      CAPILLARY BLOOD GLUCOSE      POCT Blood Glucose.: 183 mg/dL (26 Sep 2021 06:42)  POCT Blood Glucose.: 244 mg/dL (25 Sep 2021 17:50)  POCT Blood Glucose.: 117 mg/dL (25 Sep 2021 11:13)    I&O's Summary    25 Sep 2021 07:01  -  26 Sep 2021 07:00  --------------------------------------------------------  IN: 1355 mL / OUT: 700 mL / NET: 655 mL        Vital Signs Last 24 Hrs  T(C): 37.7 (26 Sep 2021 01:00), Max: 38.3 (25 Sep 2021 23:00)  T(F): 99.9 (26 Sep 2021 01:00), Max: 100.9 (25 Sep 2021 23:00)  HR: 93 (26 Sep 2021 07:00) (59 - 122)  BP: 153/80 (26 Sep 2021 07:00) (133/80 - 168/93)  BP(mean): 109 (26 Sep 2021 07:00) (100 - 125)  RR: 25 (26 Sep 2021 07:00) (12 - 44)  SpO2: 98% (26 Sep 2021 07:00) (88% - 99%)    PHYSICAL EXAM:  General: intubated, off sedation  HEENT: NCAT, clear conjunctiva, sclera icteric, pupils sluggish  Neck: supple, no JVD  Respiratory: CTAB  Cardiovascular: RRR, S1S2, no m/r/g  Abdomen: soft, nontender, nondistended, normal bowel sounds  Extremities: no edema, no cyanosis  Skin: warm, perfused, b/l erythema on buttocks, shingles rash noted  Neurological: nonfocal, no response to pain, sternal rub    LABS:                        8.1    10.99 )-----------( 281      ( 26 Sep 2021 00:37 )             27.4      09-26    149<H>  |  107  |  55<H>  ----------------------------<  216<H>  3.5   |  28  |  0.99    Ca    9.7      26 Sep 2021 00:21  Phos  2.7     09-26  Mg     2.7     09-26    TPro  6.4  /  Alb  2.4<L>  /  TBili  4.2<H>  /  DBili  x   /  AST  156<H>  /  ALT  107<H>  /  AlkPhos  2004<H>  09-26    PT/INR - ( 26 Sep 2021 00:21 )   PT: 12.2 sec;   INR: 1.02 ratio         PTT - ( 26 Sep 2021 00:21 )  PTT:29.4 sec   *************************************  Cade Jorge M.D.| PGY-1  Department of Internal Medicine  *************************************      Patient is a 71y old  Male who presents with a chief complaint of SOB (25 Sep 2021 07:14)      SUBJECTIVE / OVERNIGHT EVENTS: CT scan for suspected Right thigh hematoma. Laryngeal edema noted, sputum culture pending.     MEDICATIONS  (STANDING):  ALBUTerol    90 MICROgram(s) HFA Inhaler 2 Puff(s) Inhalation every 8 hours  ampicillin/sulbactam  IVPB      ampicillin/sulbactam  IVPB 3 Gram(s) IV Intermittent every 6 hours  budesonide 160 MICROgram(s)/formoterol 4.5 MICROgram(s) Inhaler 2 Puff(s) Inhalation two times a day  chlorhexidine 0.12% Liquid 15 milliLiter(s) Oral Mucosa every 12 hours  chlorhexidine 4% Liquid 1 Application(s) Topical <User Schedule>  cholecalciferol 2000 Unit(s) Oral daily  dextrose 40% Gel 15 Gram(s) Oral once  dextrose 5%. 1000 milliLiter(s) (50 mL/Hr) IV Continuous <Continuous>  dextrose 5%. 1000 milliLiter(s) (100 mL/Hr) IV Continuous <Continuous>  dextrose 50% Injectable 25 Gram(s) IV Push once  dextrose 50% Injectable 12.5 Gram(s) IV Push once  dextrose 50% Injectable 25 Gram(s) IV Push once  glucagon  Injectable 1 milliGRAM(s) IntraMuscular once  insulin lispro (ADMELOG) corrective regimen sliding scale   SubCutaneous every 6 hours  insulin NPH human recombinant 10 Unit(s) SubCutaneous every 6 hours  LORazepam     Tablet 2 milliGRAM(s) Oral every 12 hours  methadone   Solution 10 milliGRAM(s) Oral every 8 hours  midodrine 20 milliGRAM(s) Oral every 8 hours  multivitamin/minerals Oral Solution (WELLESSE) 30 milliLiter(s) Enteral Tube daily  naloxegol 25 milliGRAM(s) Oral daily  pantoprazole  Injectable 40 milliGRAM(s) IV Push every 12 hours  polyethylene glycol 3350 17 Gram(s) Oral daily  senna Syrup 10 milliLiter(s) Oral at bedtime  ursodiol Tablet 500 milliGRAM(s) Oral two times a day  vancomycin  IVPB 500 milliGRAM(s) IV Intermittent <User Schedule>    MEDICATIONS  (PRN):  acetaminophen    Suspension .. 650 milliGRAM(s) Oral every 6 hours PRN Temp greater or equal to 38C (100.4F), Mild Pain (1 - 3), Moderate Pain (4 - 6)      CAPILLARY BLOOD GLUCOSE      POCT Blood Glucose.: 183 mg/dL (26 Sep 2021 06:42)  POCT Blood Glucose.: 244 mg/dL (25 Sep 2021 17:50)  POCT Blood Glucose.: 117 mg/dL (25 Sep 2021 11:13)    I&O's Summary    25 Sep 2021 07:01  -  26 Sep 2021 07:00  --------------------------------------------------------  IN: 1355 mL / OUT: 700 mL / NET: 655 mL        Vital Signs Last 24 Hrs  T(C): 37.7 (26 Sep 2021 01:00), Max: 38.3 (25 Sep 2021 23:00)  T(F): 99.9 (26 Sep 2021 01:00), Max: 100.9 (25 Sep 2021 23:00)  HR: 93 (26 Sep 2021 07:00) (59 - 122)  BP: 153/80 (26 Sep 2021 07:00) (133/80 - 168/93)  BP(mean): 109 (26 Sep 2021 07:00) (100 - 125)  RR: 25 (26 Sep 2021 07:00) (12 - 44)  SpO2: 98% (26 Sep 2021 07:00) (88% - 99%)    PHYSICAL EXAM:  General: intubated, off sedation  HEENT: NCAT, clear conjunctiva, sclera icteric, pupils sluggish  Neck: supple, no JVD  Respiratory: CTAB  Cardiovascular: RRR, S1S2, no m/r/g  Abdomen: soft, nontender, nondistended, normal bowel sounds  Extremities: no edema, no cyanosis  Skin: warm, perfused, b/l erythema on buttocks  Neurological: nonfocal, no response to pain, sternal rub    LABS:                        8.1    10.99 )-----------( 281      ( 26 Sep 2021 00:37 )             27.4      09-26    149<H>  |  107  |  55<H>  ----------------------------<  216<H>  3.5   |  28  |  0.99    Ca    9.7      26 Sep 2021 00:21  Phos  2.7     09-26  Mg     2.7     09-26    TPro  6.4  /  Alb  2.4<L>  /  TBili  4.2<H>  /  DBili  x   /  AST  156<H>  /  ALT  107<H>  /  AlkPhos  2004<H>  09-26    PT/INR - ( 26 Sep 2021 00:21 )   PT: 12.2 sec;   INR: 1.02 ratio         PTT - ( 26 Sep 2021 00:21 )  PTT:29.4 sec   *************************************  Cade Jorge M.D.| PGY-1  Department of Internal Medicine  *************************************      Patient is a 71y old  Male who presents with a chief complaint of SOB (25 Sep 2021 07:14)      SUBJECTIVE / OVERNIGHT EVENTS:  Laryngeal edema noted, sputum culture pending.     MEDICATIONS  (STANDING):  ALBUTerol    90 MICROgram(s) HFA Inhaler 2 Puff(s) Inhalation every 8 hours  ampicillin/sulbactam  IVPB      ampicillin/sulbactam  IVPB 3 Gram(s) IV Intermittent every 6 hours  budesonide 160 MICROgram(s)/formoterol 4.5 MICROgram(s) Inhaler 2 Puff(s) Inhalation two times a day  chlorhexidine 0.12% Liquid 15 milliLiter(s) Oral Mucosa every 12 hours  chlorhexidine 4% Liquid 1 Application(s) Topical <User Schedule>  cholecalciferol 2000 Unit(s) Oral daily  dextrose 40% Gel 15 Gram(s) Oral once  dextrose 5%. 1000 milliLiter(s) (50 mL/Hr) IV Continuous <Continuous>  dextrose 5%. 1000 milliLiter(s) (100 mL/Hr) IV Continuous <Continuous>  dextrose 50% Injectable 25 Gram(s) IV Push once  dextrose 50% Injectable 12.5 Gram(s) IV Push once  dextrose 50% Injectable 25 Gram(s) IV Push once  glucagon  Injectable 1 milliGRAM(s) IntraMuscular once  insulin lispro (ADMELOG) corrective regimen sliding scale   SubCutaneous every 6 hours  insulin NPH human recombinant 10 Unit(s) SubCutaneous every 6 hours  LORazepam     Tablet 2 milliGRAM(s) Oral every 12 hours  methadone   Solution 10 milliGRAM(s) Oral every 8 hours  midodrine 20 milliGRAM(s) Oral every 8 hours  multivitamin/minerals Oral Solution (WELLESSE) 30 milliLiter(s) Enteral Tube daily  naloxegol 25 milliGRAM(s) Oral daily  pantoprazole  Injectable 40 milliGRAM(s) IV Push every 12 hours  polyethylene glycol 3350 17 Gram(s) Oral daily  senna Syrup 10 milliLiter(s) Oral at bedtime  ursodiol Tablet 500 milliGRAM(s) Oral two times a day  vancomycin  IVPB 500 milliGRAM(s) IV Intermittent <User Schedule>    MEDICATIONS  (PRN):  acetaminophen    Suspension .. 650 milliGRAM(s) Oral every 6 hours PRN Temp greater or equal to 38C (100.4F), Mild Pain (1 - 3), Moderate Pain (4 - 6)      CAPILLARY BLOOD GLUCOSE      POCT Blood Glucose.: 183 mg/dL (26 Sep 2021 06:42)  POCT Blood Glucose.: 244 mg/dL (25 Sep 2021 17:50)  POCT Blood Glucose.: 117 mg/dL (25 Sep 2021 11:13)    I&O's Summary    25 Sep 2021 07:01  -  26 Sep 2021 07:00  --------------------------------------------------------  IN: 1355 mL / OUT: 700 mL / NET: 655 mL        Vital Signs Last 24 Hrs  T(C): 37.7 (26 Sep 2021 01:00), Max: 38.3 (25 Sep 2021 23:00)  T(F): 99.9 (26 Sep 2021 01:00), Max: 100.9 (25 Sep 2021 23:00)  HR: 93 (26 Sep 2021 07:00) (59 - 122)  BP: 153/80 (26 Sep 2021 07:00) (133/80 - 168/93)  BP(mean): 109 (26 Sep 2021 07:00) (100 - 125)  RR: 25 (26 Sep 2021 07:00) (12 - 44)  SpO2: 98% (26 Sep 2021 07:00) (88% - 99%)    PHYSICAL EXAM:  General: intubated, off sedation  HEENT: NCAT, clear conjunctiva, sclera icteric, pupils sluggish  Neck: supple, no JVD  Respiratory: CTAB  Cardiovascular: RRR, S1S2, no m/r/g  Abdomen: soft, nontender, nondistended, normal bowel sounds  Extremities: no edema, no cyanosis  Skin: warm, perfused, b/l erythema on buttocks  Neurological: nonfocal, no response to pain, sternal rub    LABS:                        8.1    10.99 )-----------( 281      ( 26 Sep 2021 00:37 )             27.4      09-26    149<H>  |  107  |  55<H>  ----------------------------<  216<H>  3.5   |  28  |  0.99    Ca    9.7      26 Sep 2021 00:21  Phos  2.7     09-26  Mg     2.7     09-26    TPro  6.4  /  Alb  2.4<L>  /  TBili  4.2<H>  /  DBili  x   /  AST  156<H>  /  ALT  107<H>  /  AlkPhos  2004<H>  09-26    PT/INR - ( 26 Sep 2021 00:21 )   PT: 12.2 sec;   INR: 1.02 ratio         PTT - ( 26 Sep 2021 00:21 )  PTT:29.4 sec

## 2021-09-26 NOTE — PROGRESS NOTE ADULT - ASSESSMENT
71 year old man w/pmhx of HTN, DM2, BPH, and Asthma here for acute hypoxic respiratory failure and ARDS secondary to viral pneumonia from COVID-19. Hospital course complicated by VT arrest, DVT and MRSE bacteremia.    #Neuro   1) off sedation  - decreased methadone increased to 10TID and Ativan 2mg q12h as of 9/22; patient still unresponsive  - noncont CT 9/13 appears unchanged 8/12     2) r/o seizure  -had rhythmic R arm movement  - 24h EEG with generalized slowing; no evidence of seizure (per report 9/21 AM)    #ENT   -oropharyngeal packing removed on 9/10  -ENT said they will pack if patient's h/h drops  -will need FiO2 at 50% or less and PaO2 at 60 for qualify for trach    #CV   1)Cardiac Arrest VT  - S/p Amiodarone and electrolyte supplementation   - No further VT noted   - Echo reveals Right hrt strain   -holding diuretics; no indication, no evidence of pleural effusion, pulm edema on chest CT (9/24)    #Pulm   1)Acute hypoxemic resp failure 2/2 cardiac arrest  - vent Pressure AC 30/60/50/5; wean as tolerated    2) Asthma  - c/w Proventil q8h and Symbicort BID    3) Post-covid sequelae  - extensive fibrosis and consolidations at bases b/l  - patient with PO2 75 despite intubation with Fi02 60%, pressure 24 and PEEP 5  - GGO on CT chest; unlikely to improve with 3rd course of steroids, also with persistent fevers and bactermia despite appropriate abx therapy     #GI   1)Nutrition   - maintain Glucerna tube feeds as tolerated   - c/w senna/ miralax     2)Transaminitis - likely 2/2 cholestasis of sepsis vs SSC CIP (secondary sclerosing cholangitis in critically ill patients), Covid cholangiopathy, drug-induced liver injury  - c/w ursodiol 500 mg BID            - supportive treatment per hepatology  -alk phos, ast/alt downtrending    #Renal  1)Hypervolemia   - Strict I/O   - Monitor electrolytes     2) Hypernatremia   -  Na 141-143 and free water stopped   - up to 147 9/25, will restart free water      #ID   1)COVID - 19   - S/p RDV Dexa and TOCI   - will continue with supportive care     2)MRSE Bacteremia   - S/P 14 day course of Vanc from 8/20-9/3 therapy, and intermittent gram-&anaerobe coverage   - last postive Bcx 9/19 - will need 14d treatment beyond that   - continue surveillance cultures q48-72h; last sent 9/21 negative  - TTE w/o evidence of vegetations; will consult cardiology for SEJAL  - CT A/P with evidence of proctitis: unasyn added per ID  - Abx as below     Vanc 500mg qd (trough with midnight labs) - changed from 750mg        - Unasyn 3g q6h (9/24 - )    #Endo   1)DM   - c/w NPH 10u q6h  - c/w ISS   - goal Blood Sugar 100-180     #Heme  1)b/l DVTs above knee on left and below knee on R  - off Lovenox since 9/19 given Hgb drop to 6.0 requiring 1u PRBC; drop again 9/22 requiring additional unit  - off DVT ppx in s/o persistent Hgb drops     #GOC  -palliative consult following; family meeting next week   -full code at this time  -family updated daily 71 year old man w/pmhx of HTN, DM2, BPH, and Asthma here for acute hypoxic respiratory failure and ARDS secondary to viral pneumonia from COVID-19. Hospital course complicated by VT arrest, DVT and MRSE bacteremia.    #Neuro   1) off sedation  - decreased methadone increased to 5TID (9/26) and Ativan 2mg q12h as of 9/22; patient still unresponsive  - noncont CT 9/13 appears unchanged 8/12     2) r/o seizure  -had rhythmic R arm movement  - 24h EEG with generalized slowing; no evidence of seizure (per report 9/21 AM)    #ENT   -oropharyngeal packing removed on 9/10  -ENT said they will pack if patient's h/h drops  -will need FiO2 at 50% or less and PaO2 at 60 for qualify for trach    #CV   1)Cardiac Arrest VT  - S/p Amiodarone and electrolyte supplementation   - No further VT noted   - Echo reveals Right hrt strain   -holding diuretics; no indication, no evidence of pleural effusion, pulm edema on chest CT (9/24)    #Pulm   1)Acute hypoxemic resp failure 2/2 cardiac arrest  - vent Pressure AC 30/60/50/5; wean as tolerated    2) Asthma  - c/w Proventil q8h and Symbicort BID    3) Post-covid sequelae  - extensive fibrosis and consolidations at bases b/l  - patient with PO2 75 despite intubation with Fi02 60%, pressure 24 and PEEP 5  - GGO on CT chest; unlikely to improve with 3rd course of steroids, also with persistent fevers and bactermia despite appropriate abx therapy     #GI   1)Nutrition   - maintain Glucerna tube feeds as tolerated   - c/w senna/ miralax     2)Transaminitis - likely 2/2 cholestasis of sepsis vs SSC CIP (secondary sclerosing cholangitis in critically ill patients), Covid cholangiopathy, drug-induced liver injury  - c/w ursodiol 500 mg BID            - supportive treatment per hepatology  -alk phos, ast/alt downtrending    #Renal  1)Hypervolemia   - Strict I/O   - Monitor electrolytes     2) Hypernatremia   -  Na 141-143 and free water stopped   - up to 147 9/25, will restart free water      #ID   1)COVID - 19   - S/p RDV Dexa and TOCI   - will continue with supportive care     2)MRSE Bacteremia   - S/P 14 day course of Vanc from 8/20-9/3 therapy, and intermittent gram-&anaerobe coverage   - last postive Bcx 9/19 - will need 14d treatment beyond that   - continue surveillance cultures q48-72h; last sent 9/21 negative  - TTE w/o evidence of vegetations; will consult cardiology for SEJAL  - CT A/P with evidence of proctitis: unasyn added per ID  - Abx as below     Vanc 500mg qd (trough with midnight labs) - changed from 750mg        - Unasyn 3g q6h (9/24 - )    #Endo   1)DM   - c/w NPH 10u q6h  - c/w ISS   - goal Blood Sugar 100-180     #Heme  1)b/l DVTs above knee on left and below knee on R  - off Lovenox since 9/19 given Hgb drop to 6.0 requiring 1u PRBC; drop again 9/22 requiring additional unit  - off DVT ppx in s/o persistent Hgb drops     #GOC  -palliative consult following; family meeting next week   -full code at this time  -family updated daily

## 2021-09-26 NOTE — PROGRESS NOTE ADULT - ATTENDING COMMENTS
Agree with above except as noted. 71 year old man p/w acute hypoxic respiratory failure and ARDS secondary to viral pneumonia from COVID-19. Hospital course complicated by VT arrest, DVT and MRSE bacteremia. Pt off iv sedation. Cont oral regimen for vent synchrony and taper off as tolerated. Monitor qtc. Cont to wean fio2/peep as tolerated to maintain sat>90%. Cont abx for bacteremia and proctitis w/ vanco and unasyn. A/c held for bleeding concerns given drop in Hb. No gross bleeding noted and Hb remains stable w/o need for transfusion. R thigh noted for hematoma  on US. Negative for DVT on POCUS. a/c held but once reinitiated will need monitoring of hematoma. SCD to arm for dvt ppx. LFT abnormalities stable, cont to f/u GI reccs.    Family deciding on trach. Prognosis remains very poor for functional recovery .

## 2021-09-27 NOTE — PROGRESS NOTE ADULT - ASSESSMENT
Unvaccinated COVID pneumonia s/p tocilizumab, remdesivir and two courses of decadron.     Cardiac arrest and intubated 8/20.     Fevers almost daily since 9/8.   Unclear cause:  -Staph epidermidis growing intermittently on blood cultures (8/24, 8/30, 9/10, 9/12, 9/19). Unclear significance. Has been on Vancomycin for about a month already which suggests either contaminant or persistent endovascular focus. Central lines removed. Uncommon cause of NVE and TTE without vegetations (9/8, 9/21). Evolving sacral eschar without acute infection.   -Treated possible VAP - Klebsiella and Enterobacter 9/11 with negative subsequent sputum cultures.   -Proctitis suggested on yesterday's CT. Risk from immunosuppressive therapy. Appears more pathologic than simply reactive to the rectal thermometer per radiology.     Poor prognosis.    Suggest  -C/w Vancomycin to 500mg IV q24h - monitor troughs and renal function   -C/w unasyn 3 grams IV q 6 hrs for proctitis  -favor SEJAL in the setting of persistent fevers and gram positive blood cultures  -would avoid further steroids/immunosuppression   -supportive care   -From an ID perspective, no objection for trach placement  -F/U blood cultures from 9/26 - so far no growth    Paul Galarza MD  Pager [phone]  After 5pm/weekends call [phone]    Discussed plan with primary team, family at bedside.

## 2021-09-27 NOTE — PROGRESS NOTE ADULT - SUBJECTIVE AND OBJECTIVE BOX
INTERVAL HPI/OVERNIGHT EVENTS:    SUBJECTIVE: Patient seen and examined at bedside.     CONSTITUTIONAL: No weakness, fevers or chills  EYES/ENT: No visual changes;  No vertigo or throat pain   NECK: No pain or stiffness  RESPIRATORY: No cough, wheezing, hemoptysis; No shortness of breath  CARDIOVASCULAR: No chest pain or palpitations  GASTROINTESTINAL: No abdominal or epigastric pain. No nausea, vomiting, or hematemesis; No diarrhea or constipation. No melena or hematochezia.  GENITOURINARY: No dysuria, frequency or hematuria  NEUROLOGICAL: No numbness or weakness  SKIN: No itching, rashes    OBJECTIVE:    VITAL SIGNS:  ICU Vital Signs Last 24 Hrs  T(C): 37.5 (27 Sep 2021 04:00), Max: 38.8 (26 Sep 2021 23:00)  T(F): 99.5 (27 Sep 2021 04:00), Max: 101.8 (26 Sep 2021 23:00)  HR: 87 (27 Sep 2021 06:00) (83 - 108)  BP: 112/65 (27 Sep 2021 06:00) (104/67 - 172/96)  BP(mean): 83 (27 Sep 2021 06:00) (80 - 127)  ABP: --  ABP(mean): --  RR: 18 (27 Sep 2021 06:00) (15 - 31)  SpO2: 98% (27 Sep 2021 06:00) (94% - 100%)    Mode: AC/ CMV (Assist Control/ Continuous Mandatory Ventilation), RR (machine): 12, FiO2: 60, PEEP: 5, ITime: 1, MAP: 13, PC: 24, PIP: 32    09-25 @ 07:01 - 09-26 @ 07:00  --------------------------------------------------------  IN: 2030 mL / OUT: 1400 mL / NET: 630 mL    09-26 @ 07:01 - 09-27 @ 06:58  --------------------------------------------------------  IN: 3140 mL / OUT: 500 mL / NET: 2640 mL      CAPILLARY BLOOD GLUCOSE      POCT Blood Glucose.: 113 mg/dL (27 Sep 2021 04:56)      PHYSICAL EXAM:    General: intubated, off sedation  HEENT: NCAT, clear conjunctiva, sclera icteric, pupils sluggish  Neck: supple, no JVD  Respiratory: CTAB  Cardiovascular: RRR, S1S2, no m/r/g  Abdomen: soft, nontender, nondistended, normal bowel sounds  Extremities: no edema, no cyanosis  Skin: warm, perfused, b/l erythema on buttocks  Neurological: nonfocal, no response to pain, sternal rub    MEDICATIONS:  MEDICATIONS  (STANDING):  ALBUTerol    90 MICROgram(s) HFA Inhaler 2 Puff(s) Inhalation every 8 hours  ampicillin/sulbactam  IVPB      ampicillin/sulbactam  IVPB 3 Gram(s) IV Intermittent every 6 hours  budesonide 160 MICROgram(s)/formoterol 4.5 MICROgram(s) Inhaler 2 Puff(s) Inhalation two times a day  chlorhexidine 0.12% Liquid 15 milliLiter(s) Oral Mucosa every 12 hours  chlorhexidine 4% Liquid 1 Application(s) Topical <User Schedule>  cholecalciferol 2000 Unit(s) Oral daily  dextrose 40% Gel 15 Gram(s) Oral once  dextrose 5%. 1000 milliLiter(s) (50 mL/Hr) IV Continuous <Continuous>  dextrose 5%. 1000 milliLiter(s) (100 mL/Hr) IV Continuous <Continuous>  dextrose 50% Injectable 25 Gram(s) IV Push once  dextrose 50% Injectable 12.5 Gram(s) IV Push once  dextrose 50% Injectable 25 Gram(s) IV Push once  glucagon  Injectable 1 milliGRAM(s) IntraMuscular once  insulin lispro (ADMELOG) corrective regimen sliding scale   SubCutaneous every 6 hours  insulin NPH human recombinant 10 Unit(s) SubCutaneous every 6 hours  LORazepam     Tablet 2 milliGRAM(s) Oral every 12 hours  methadone   Solution 5 milliGRAM(s) Oral every 8 hours  multivitamin/minerals Oral Solution (WELLESSE) 30 milliLiter(s) Enteral Tube daily  naloxegol 25 milliGRAM(s) Oral daily  pantoprazole  Injectable 40 milliGRAM(s) IV Push every 12 hours  polyethylene glycol 3350 17 Gram(s) Oral daily  senna Syrup 10 milliLiter(s) Oral at bedtime  ursodiol Tablet 500 milliGRAM(s) Oral two times a day  vancomycin  IVPB 500 milliGRAM(s) IV Intermittent <User Schedule>    MEDICATIONS  (PRN):  acetaminophen    Suspension .. 650 milliGRAM(s) Oral every 6 hours PRN Temp greater or equal to 38C (100.4F), Mild Pain (1 - 3), Moderate Pain (4 - 6)      ALLERGIES:  Allergies    No Known Allergies    Intolerances        LABS:                        8.2    11.76 )-----------( 294      ( 27 Sep 2021 00:44 )             28.6     09-27    151<H>  |  108  |  54<H>  ----------------------------<  185<H>  3.8   |  29  |  0.97    Ca    9.7      27 Sep 2021 00:44  Phos  3.4     09-27  Mg     2.8     09-27    TPro  6.4  /  Alb  2.5<L>  /  TBili  4.0<H>  /  DBili  x   /  AST  169<H>  /  ALT  116<H>  /  AlkPhos  1917<H>  09-27    PT/INR - ( 27 Sep 2021 00:44 )   PT: 12.2 sec;   INR: 1.02 ratio         PTT - ( 27 Sep 2021 00:44 )  PTT:28.0 sec      RADIOLOGY & ADDITIONAL TESTS: Reviewed.     INTERVAL HPI/OVERNIGHT EVENTS: Febrile overnight to 101.8. Vanc level 15 and dosed at 1g; though ok to continue at 500mg qd in s/o MRSE and does not require 15-20.     SUBJECTIVE: Patient seen and examined at bedside.     ROS: unable to assess    OBJECTIVE:    VITAL SIGNS:  ICU Vital Signs Last 24 Hrs  T(C): 37.5 (27 Sep 2021 04:00), Max: 38.8 (26 Sep 2021 23:00)  T(F): 99.5 (27 Sep 2021 04:00), Max: 101.8 (26 Sep 2021 23:00)  HR: 87 (27 Sep 2021 06:00) (83 - 108)  BP: 112/65 (27 Sep 2021 06:00) (104/67 - 172/96)  BP(mean): 83 (27 Sep 2021 06:00) (80 - 127)  ABP: --  ABP(mean): --  RR: 18 (27 Sep 2021 06:00) (15 - 31)  SpO2: 98% (27 Sep 2021 06:00) (94% - 100%)    Mode: AC/ CMV (Assist Control/ Continuous Mandatory Ventilation), RR (machine): 12, FiO2: 60, PEEP: 5, ITime: 1, MAP: 13, PC: 24, PIP: 32    09-25 @ 07:01 - 09-26 @ 07:00  --------------------------------------------------------  IN: 2030 mL / OUT: 1400 mL / NET: 630 mL    09-26 @ 07:01 - 09-27 @ 06:58  --------------------------------------------------------  IN: 3140 mL / OUT: 500 mL / NET: 2640 mL      CAPILLARY BLOOD GLUCOSE      POCT Blood Glucose.: 113 mg/dL (27 Sep 2021 04:56)      PHYSICAL EXAM:    General: intubated, off sedation  HEENT: NCAT, clear conjunctiva, sclera icteric, pupils sluggish  Neck: supple, no JVD  Respiratory: CTAB  Cardiovascular: RRR, S1S2, no m/r/g  Abdomen: soft, nontender, nondistended, normal bowel sounds  Extremities: no edema, no cyanosis  Skin: warm, perfused, b/l erythema on buttocks  Neurological: nonfocal, no response to pain, sternal rub    MEDICATIONS:  MEDICATIONS  (STANDING):  ALBUTerol    90 MICROgram(s) HFA Inhaler 2 Puff(s) Inhalation every 8 hours  ampicillin/sulbactam  IVPB      ampicillin/sulbactam  IVPB 3 Gram(s) IV Intermittent every 6 hours  budesonide 160 MICROgram(s)/formoterol 4.5 MICROgram(s) Inhaler 2 Puff(s) Inhalation two times a day  chlorhexidine 0.12% Liquid 15 milliLiter(s) Oral Mucosa every 12 hours  chlorhexidine 4% Liquid 1 Application(s) Topical <User Schedule>  cholecalciferol 2000 Unit(s) Oral daily  dextrose 40% Gel 15 Gram(s) Oral once  dextrose 5%. 1000 milliLiter(s) (50 mL/Hr) IV Continuous <Continuous>  dextrose 5%. 1000 milliLiter(s) (100 mL/Hr) IV Continuous <Continuous>  dextrose 50% Injectable 25 Gram(s) IV Push once  dextrose 50% Injectable 12.5 Gram(s) IV Push once  dextrose 50% Injectable 25 Gram(s) IV Push once  glucagon  Injectable 1 milliGRAM(s) IntraMuscular once  insulin lispro (ADMELOG) corrective regimen sliding scale   SubCutaneous every 6 hours  insulin NPH human recombinant 10 Unit(s) SubCutaneous every 6 hours  LORazepam     Tablet 2 milliGRAM(s) Oral every 12 hours  methadone   Solution 5 milliGRAM(s) Oral every 8 hours  multivitamin/minerals Oral Solution (WELLESSE) 30 milliLiter(s) Enteral Tube daily  naloxegol 25 milliGRAM(s) Oral daily  pantoprazole  Injectable 40 milliGRAM(s) IV Push every 12 hours  polyethylene glycol 3350 17 Gram(s) Oral daily  senna Syrup 10 milliLiter(s) Oral at bedtime  ursodiol Tablet 500 milliGRAM(s) Oral two times a day  vancomycin  IVPB 500 milliGRAM(s) IV Intermittent <User Schedule>    MEDICATIONS  (PRN):  acetaminophen    Suspension .. 650 milliGRAM(s) Oral every 6 hours PRN Temp greater or equal to 38C (100.4F), Mild Pain (1 - 3), Moderate Pain (4 - 6)      ALLERGIES:  Allergies    No Known Allergies    Intolerances        LABS:                        8.2    11.76 )-----------( 294      ( 27 Sep 2021 00:44 )             28.6     09-27    151<H>  |  108  |  54<H>  ----------------------------<  185<H>  3.8   |  29  |  0.97    Ca    9.7      27 Sep 2021 00:44  Phos  3.4     09-27  Mg     2.8     09-27    TPro  6.4  /  Alb  2.5<L>  /  TBili  4.0<H>  /  DBili  x   /  AST  169<H>  /  ALT  116<H>  /  AlkPhos  1917<H>  09-27    PT/INR - ( 27 Sep 2021 00:44 )   PT: 12.2 sec;   INR: 1.02 ratio         PTT - ( 27 Sep 2021 00:44 )  PTT:28.0 sec      RADIOLOGY & ADDITIONAL TESTS: Reviewed.

## 2021-09-27 NOTE — PROGRESS NOTE ADULT - SUBJECTIVE AND OBJECTIVE BOX
CC: Patient is a 71y old  Male who presents with a chief complaint of SOB (27 Sep 2021 06:57)    ID following for fever    Interval History/ROS: Patient remains intubated. Febrile overnight.    Rest of ROS negative.    Allergies  No Known Allergies    ANTIMICROBIALS:  ampicillin/sulbactam  IVPB    ampicillin/sulbactam  IVPB 3 every 6 hours  vancomycin  IVPB 500 <User Schedule>    OTHER MEDS:  acetaminophen    Suspension .. 650 milliGRAM(s) Oral every 6 hours PRN  ALBUTerol    90 MICROgram(s) HFA Inhaler 2 Puff(s) Inhalation every 8 hours  budesonide 160 MICROgram(s)/formoterol 4.5 MICROgram(s) Inhaler 2 Puff(s) Inhalation two times a day  chlorhexidine 0.12% Liquid 15 milliLiter(s) Oral Mucosa every 12 hours  chlorhexidine 4% Liquid 1 Application(s) Topical <User Schedule>  cholecalciferol 2000 Unit(s) Oral daily  dextrose 40% Gel 15 Gram(s) Oral once  dextrose 5%. 1000 milliLiter(s) IV Continuous <Continuous>  dextrose 5%. 1000 milliLiter(s) IV Continuous <Continuous>  dextrose 50% Injectable 25 Gram(s) IV Push once  dextrose 50% Injectable 12.5 Gram(s) IV Push once  dextrose 50% Injectable 25 Gram(s) IV Push once  glucagon  Injectable 1 milliGRAM(s) IntraMuscular once  insulin lispro (ADMELOG) corrective regimen sliding scale   SubCutaneous every 6 hours  insulin NPH human recombinant 10 Unit(s) SubCutaneous every 6 hours  LORazepam     Tablet 1 milliGRAM(s) Oral every 12 hours  methadone   Solution 5 milliGRAM(s) Oral every 8 hours  metoprolol tartrate 12.5 milliGRAM(s) Oral two times a day  multivitamin/minerals Oral Solution (WELLESSE) 30 milliLiter(s) Enteral Tube daily  naloxegol 25 milliGRAM(s) Oral daily  polyethylene glycol 3350 17 Gram(s) Oral daily  senna Syrup 10 milliLiter(s) Oral at bedtime  ursodiol Tablet 500 milliGRAM(s) Oral two times a day    PE:    Vital Signs Last 24 Hrs  T(C): 36.8 (27 Sep 2021 12:00), Max: 38.8 (26 Sep 2021 23:00)  T(F): 98.2 (27 Sep 2021 12:00), Max: 101.8 (26 Sep 2021 23:00)  HR: 108 (27 Sep 2021 16:00) (84 - 108)  BP: 152/91 (27 Sep 2021 16:00) (104/67 - 157/86)  BP(mean): 113 (27 Sep 2021 16:00) (80 - 115)  RR: 43 (27 Sep 2021 16:00) (15 - 43)  SpO2: 92% (27 Sep 2021 16:00) (91% - 100%)    Gen: Intubated, NAD  CV: S1+S2 normal, no murmurs  Resp: Clear bilat, no resp distress  Abd: Soft, nontender, +BS  Ext: extremity swelling  : No Ramos  IV/Skin: No thrombophlebitis  Neuro: not awake, not following commands    LABS:                          8.2    11.76 )-----------( 294      ( 27 Sep 2021 00:44 )             28.6       09-27    151<H>  |  108  |  54<H>  ----------------------------<  185<H>  3.8   |  29  |  0.97    Ca    9.7      27 Sep 2021 00:44  Phos  3.4     09-27  Mg     2.8     09-27    TPro  6.4  /  Alb  2.5<L>  /  TBili  4.0<H>  /  DBili  x   /  AST  169<H>  /  ALT  116<H>  /  AlkPhos  1917<H>  09-27          MICROBIOLOGY:  Vancomycin Level, Trough: 15.7 ug/mL (09-27-21 @ 00:44)  v  .Blood Blood-Peripheral  09-26-21   No growth to date.  --  --      .Sputum Sputum  09-25-21   Normal Respiratory Liane present  --    Moderate polymorphonuclear leukocytes per low power field  No Squamous epithelial cells per low power field  No organisms seen per oil power field      .Sputum Sputum  09-24-21   Normal Respiratory Liane present  --    Few polymorphonuclear leukocytes per low power field  Rare Squamous epithelial cells per low power field  Rare Gram Negative Rods seen per oil power field  Rare Gram positive cocci in pairs seen per oil power field  Rare Yeast like cells seen per oil power field      .Blood Blood-Peripheral  09-24-21   No growth to date.  --  --      .Blood Blood  09-21-21   No Growth Final  --  --      .Sputum Sputum  09-19-21   Normal Respiratory Liane present  --    Few polymorphonuclear leukocytes per low power field  No Squamous epithelial cells per low power field  Rare Yeast like cells seen per oil power field      .Blood Blood-Peripheral  09-19-21   No Growth Final  --  Staphylococcus epidermidis      .Sputum Sputum  09-17-21   Normal Respiratory Liane present  --    Moderate polymorphonuclear leukocytes per low power field  Rare Squamous epithelial cells per low power field  No organisms seen per oil power field      .Blood Blood-Venous  09-16-21   No Growth Final  --  --      .Blood Blood-Peripheral  09-14-21   No Growth Final  --  --      .Blood Blood-Venous  09-12-21   No Growth Final  --  --      .Blood Blood-Peripheral  09-12-21   Growth in anaerobic bottle: Staphylococcus epidermidis  See previous culture [id number]  --    Growth in anaerobic bottle: Gram Positive Cocci in Clusters      .Sputum Sputum  09-11-21   Numerous Klebsiella pneumoniae  Moderate Enterobacter cloacae complex  --  Klebsiella pneumoniae  Enterobacter cloacae complex      .Blood Blood  09-10-21   Growth in aerobic bottle: Staphylococcus epidermidis  --  Staphylococcus epidermidis      .Blood Blood-Peripheral  09-01-21   No Growth Final  --  --      .Sputum Sputum  08-31-21   Normal Respiratory Liane present  --    Rare polymorphonuclear leukocytes per low power field  No Squamous epithelial cells per low power field  Rare Yeast like cells seen per oil power field      .Blood Blood  08-30-21   No Growth Final  --  --      .Blood Blood-Peripheral  08-30-21   Growth in aerobic bottle: Staphylococcus epidermidis  Growth in aerobic bottle: Staphylococcus haemolyticus  Coag Negative Staphylococcus  Single set isolate, possible contaminant. Contact  Microbiology if susceptibility testing clinically  indicated.  --  Blood Culture PCR    RADIOLOGY:    < from: Xray Chest 1 View- PORTABLE-Urgent (Xray Chest 1 View- PORTABLE-Urgent .) (09.25.21 @ 19:49) >  IMPRESSION:  ET tube terminates above the level ofthe evette. NG tube side port within the stomach. Distal tip is not visualized on this study.    Increased left lung opacities compared to prior study. Pneumomediastinum is decreased.    < end of copied text >

## 2021-09-27 NOTE — PROGRESS NOTE ADULT - ASSESSMENT
71 year old man w/pmhx of HTN, DM2, BPH, and Asthma here for acute hypoxic respiratory failure and ARDS secondary to viral pneumonia from COVID-19. Hospital course complicated by VT arrest, DVT and MRSE bacteremia.    #Neuro   1) off sedation  - decreased methadone increased to 10TID and Ativan 2mg q12h as of 9/22; patient still unresponsive  - noncont CT 9/13 appears unchanged 8/12     2) r/o seizure  -had rhythmic R arm movement  - 24h EEG with generalized slowing; no evidence of seizure (per report 9/21 AM)    #ENT   -oropharyngeal packing removed on 9/10  -ENT said they will pack if patient's h/h drops  -will need FiO2 at 50% or less and PaO2 at 60 for qualify for trach    #CV   1)Cardiac Arrest VT  - S/p Amiodarone and electrolyte supplementation   - No further VT noted   - Echo reveals Right hrt strain   -holding diuretics; no indication, no evidence of pleural effusion, pulm edema on chest CT (9/24)    #Pulm   1)Acute hypoxemic resp failure 2/2 cardiac arrest  - vent Pressure AC 30/60/50/5; wean as tolerated    2) Asthma  - c/w Proventil q8h and Symbicort BID    3) Post-covid sequelae  - extensive fibrosis and consolidations at bases b/l  - patient with PO2 75 despite intubation with Fi02 60%, pressure 24 and PEEP 5  - GGO on CT chest; unlikely to improve with 3rd course of steroids, also with persistent fevers and bactermia despite appropriate abx therapy     #GI   1)Nutrition   - maintain Glucerna tube feeds as tolerated   - c/w senna/ miralax     2)Transaminitis - likely 2/2 cholestasis of sepsis vs SSC CIP (secondary sclerosing cholangitis in critically ill patients), Covid cholangiopathy, drug-induced liver injury  - c/w ursodiol 500 mg BID            - supportive treatment per hepatology  -alk phos, ast/alt downtrending    #Renal  1)Hypervolemia   - Strict I/O   - Monitor electrolytes     2) Hypernatremia   -  Na 141-143 and free water stopped   - up to 147 9/25, will restart free water      #ID   1)COVID - 19   - S/p RDV Dexa and TOCI   - will continue with supportive care     2)MRSE Bacteremia   - S/P 14 day course of Vanc from 8/20-9/3 therapy, and intermittent gram-&anaerobe coverage   - last postive Bcx 9/19 - will need 14d treatment beyond that   - continue surveillance cultures q48-72h; last sent 9/21 negative  - TTE w/o evidence of vegetations; will consult cardiology for SEJAL  - CT A/P with evidence of proctitis: unasyn added per ID  - Abx as below     Vanc 500mg qd (trough with midnight labs) - changed from 750mg        - Unasyn 3g q6h (9/24 - )    #Endo   1)DM   - c/w NPH 10u q6h  - c/w ISS   - goal Blood Sugar 100-180     #Heme  1)b/l DVTs above knee on left and below knee on R  - off Lovenox since 9/19 given Hgb drop to 6.0 requiring 1u PRBC; drop again 9/22 requiring additional unit  - off DVT ppx in s/o persistent Hgb drops     #GOC  -palliative consult following; family meeting next week   -full code at this time  -family updated daily 71 year old man w/pmhx of HTN, DM2, BPH, and Asthma here for acute hypoxic respiratory failure and ARDS secondary to viral pneumonia from COVID-19. Hospital course complicated by VT arrest, DVT and MRSE bacteremia.    #Neuro   1) off sedation  - decreased methadone increased to 10TID and Ativan 2mg q12h as of 9/22; patient still unresponsive  - noncont CT 9/13 appears unchanged 8/12     2) r/o seizure  -had rhythmic R arm movement  - 24h EEG with generalized slowing; no evidence of seizure (per report 9/21 AM)    #ENT   -oropharyngeal packing removed on 9/10  -ENT said they will pack if patient's h/h drops  -will need FiO2 at 50% or less and PaO2 at 60 for qualify for trach    #CV   1)Cardiac Arrest VT  - S/p Amiodarone and electrolyte supplementation   - No further VT noted   - Echo reveals Right hrt strain   -holding diuretics; no indication, no evidence of pleural effusion, pulm edema on chest CT (9/24)    #Pulm   1)Acute hypoxemic resp failure 2/2 cardiac arrest  - vent Pressure AC 30/60/50/5; wean as tolerated    2) Asthma  - c/w Proventil q8h and Symbicort BID    3) Post-covid sequelae  - extensive fibrosis and consolidations at bases b/l  - patient with PO2 75 despite intubation with Fi02 60%, pressure 24 and PEEP 5  - GGO on CT chest; unlikely to improve with 3rd course of steroids, also with persistent fevers and bactermia despite appropriate abx therapy     #GI   1)Nutrition   - maintain Glucerna tube feeds as tolerated   - c/w senna/ miralax     2)Transaminitis - likely 2/2 cholestasis of sepsis vs SSC CIP (secondary sclerosing cholangitis in critically ill patients), Covid cholangiopathy, drug-induced liver injury  - c/w ursodiol 500 mg BID            - supportive treatment per hepatology  -alk phos, ast/alt downtrending    #Renal  1)Hypervolemia   - Strict I/O; decreasing UO as of 9/27, will continue to follow  - Monitor electrolytes     2) Hypernatremia   -  Na 141-143 and free water stopped   - up to 147 9/25, continuing to rise despite resuming free water boluses, increased to 500 q8 9/26      #ID   1)COVID - 19   - S/p RDV Dexa and TOCI   - will continue with supportive care     2)MRSE Bacteremia   - S/P 14 day course of Vanc from 8/20-9/3 therapy, and intermittent gram-&anaerobe coverage   - last postive Bcx 9/19 - will need 14d treatment beyond that   - continue surveillance cultures q48-72h; last sent 9/21 negative  - TTE w/o evidence of vegetations; will consult cardiology for SEJAL  - CT A/P with evidence of proctitis: unasyn added per ID  - Abx as below     Vanc 500mg qd (trough with midnight labs) - changed from 750mg        - Unasyn 3g q6h (9/24 - ) per ID for gram negative coverage in s/o proctitis    #Endo   1)DM   - c/w NPH 10u q6h  - c/w ISS   - goal Blood Sugar 100-180     #Heme  1)b/l DVTs above knee on left and below knee on R  - off Lovenox since 9/19 given Hgb drop to 6.0 requiring 1u PRBC; drop again 9/22 requiring additional unit  - off DVT ppx in s/o persistent Hgb drops     #GOC  -palliative consult following; family meeting next week   -full code at this time  -family updated daily 71 year old man w/pmhx of HTN, DM2, BPH, and Asthma here for acute hypoxic respiratory failure and ARDS secondary to viral pneumonia from COVID-19. Hospital course complicated by VT arrest, DVT and MRSE bacteremia.    #Neuro   1) off sedation  - decreased methadone increased to 10TID and Ativan 2mg q12h as of 9/22; patient still unresponsive  - noncont CT 9/13 appears unchanged 8/12     2) r/o seizure  -had rhythmic R arm movement  - 24h EEG with generalized slowing; no evidence of seizure (per report 9/21 AM)    #ENT   -oropharyngeal packing removed on 9/10  -ENT said they will pack if patient's h/h drops  -will need FiO2 at 50% or less and PaO2 at 60 for qualify for trach    #CV   1)Cardiac Arrest VT  - S/p Amiodarone and electrolyte supplementation   - No further VT noted   - Echo reveals Right hrt strain   -holding diuretics; no indication, no evidence of pleural effusion, pulm edema on chest CT (9/24)    #Pulm   1)Acute hypoxemic resp failure 2/2 cardiac arrest  - vent Pressure AC 30/60/50/5; wean as tolerated  - increase purulent secretions as of 9/27; will assess need for pseudomonal coverage    2) Asthma  - c/w Proventil q8h and Symbicort BID    3) Post-covid sequelae  - extensive fibrosis and consolidations at bases b/l  - patient with PO2 75 despite intubation with Fi02 60%, pressure 24 and PEEP 5  - GGO on CT chest; unlikely to improve with 3rd course of steroids, also with persistent fevers and bactermia despite appropriate abx therapy     #GI   1)Nutrition   - maintain Glucerna tube feeds as tolerated   - c/w senna/ miralax     2)Transaminitis - likely 2/2 cholestasis of sepsis vs SSC CIP (secondary sclerosing cholangitis in critically ill patients), Covid cholangiopathy, drug-induced liver injury  - c/w ursodiol 500 mg BID            - supportive treatment per hepatology  -alk phos, ast/alt downtrending    #Renal  1)Hypervolemia   - Strict I/O; decreasing UO as of 9/27, will continue to follow  - Monitor electrolytes     2) Hypernatremia   -  Na 141-143 and free water stopped   - up to 147 9/25, continuing to rise despite resuming free water boluses, increased to 500 q8 9/26      #ID   1)COVID - 19   - S/p RDV Dexa and TOCI   - will continue with supportive care     2)MRSE Bacteremia   - S/P 14 day course of Vanc from 8/20-9/3 therapy, and intermittent gram-&anaerobe coverage   - last postive Bcx 9/19 - will need 14d treatment beyond that   - continue surveillance cultures q48-72h; last sent 9/21 negative  - TTE w/o evidence of vegetations; will consult cardiology for SEJAL  - CT A/P with evidence of proctitis: unasyn added per ID  - Abx as below     Vanc 500mg qd (trough with midnight labs)        - Unasyn 3g q6h (9/24 - ) per ID for gram negative coverage in s/o proctitis    #Endo   1)DM   - c/w NPH 10u q6h  - c/w ISS   - goal Blood Sugar 100-180     #Heme  1)b/l DVTs above knee on left and below knee on R  - off Lovenox since 9/19 given Hgb drop to 6.0 requiring 1u PRBC; drop again 9/22 requiring additional unit  - off DVT ppx in s/o persistent Hgb drops     2) R thigh hematoma  - patient with expanding hematoma; potential source of dropping Hgb    #GOC  -palliative consult following  -full code at this time  -family updated daily; given until Tues 12:30pm to decide trach vs palliative extubation  71 year old man w/pmhx of HTN, DM2, BPH, and Asthma here for acute hypoxic respiratory failure and ARDS secondary to viral pneumonia from COVID-19. Hospital course complicated by VT arrest, DVT and MRSE bacteremia.    #Neuro   1) off sedation  - decreased methadone increased to 5 q8h and Ativan 1mg q12h as of 9/27; patient still unresponsive  - noncont CT 9/13 appears unchanged since 8/12     2) r/o seizure  -had rhythmic R arm movement  - 24h EEG with generalized slowing; no evidence of seizure (per report 9/21 AM)    #ENT   -oropharyngeal packing removed on 9/10  -ENT said they will pack if patient's h/h drops  -will need FiO2 at 50% or less and PaO2 at 60 for qualify for trach    #CV   1)Cardiac Arrest VT  - S/p Amiodarone and electrolyte supplementation   - No further VT noted   - Echo reveals Right hrt strain   -holding diuretics; no indication, no evidence of pleural effusion, pulm edema on chest CT (9/24)    #Pulm   1)Acute hypoxemic resp failure 2/2 cardiac arrest  - vent Pressure AC 30/60/50/5; wean as tolerated  - increase purulent secretions as of 9/27; will assess need for pseudomonal coverage    2) Asthma  - c/w Proventil q8h and Symbicort BID    3) Post-covid sequelae  - extensive fibrosis and consolidations at bases b/l  - patient with PO2 75 despite intubation with Fi02 60%, pressure 24 and PEEP 5  - GGO on CT chest; unlikely to improve with 3rd course of steroids, also with persistent fevers and bactermia despite appropriate abx therapy     #GI   1)Nutrition   - maintain Glucerna tube feeds as tolerated   - c/w senna/ miralax     2)Transaminitis - likely 2/2 cholestasis of sepsis vs SSC CIP (secondary sclerosing cholangitis in critically ill patients), Covid cholangiopathy, drug-induced liver injury  - c/w ursodiol 500 mg BID            - supportive treatment per hepatology  -alk phos, ast/alt downtrending    #Renal  1)Hypervolemia   - Strict I/O; decreasing UO as of 9/27, will continue to follow  - Monitor electrolytes     2) Hypernatremia   -  Na 141-143 and free water stopped   - up to 147 9/25, continuing to rise despite resuming free water boluses, increased to 500 q8 9/26      #ID   1)COVID - 19   - S/p RDV Dexa and TOCI   - will continue with supportive care     2)MRSE Bacteremia   - S/P 14 day course of Vanc from 8/20-9/3 therapy, and intermittent gram-&anaerobe coverage   - last postive Bcx 9/19 - will need 14d treatment beyond that   - continue surveillance cultures q48-72h; last sent 9/21 negative  - TTE w/o evidence of vegetations; will consult cardiology for SEJAL  - CT A/P with evidence of proctitis: unasyn added per ID  - Abx as below     Vanc 500mg qd (trough with midnight labs)        - Unasyn 3g q6h (9/24 - ) per ID for gram negative coverage in s/o proctitis    #Endo   1)DM   - c/w NPH 10u q6h  - c/w ISS   - goal Blood Sugar 100-180     #Heme  1)b/l DVTs above knee on left and below knee on R  - off Lovenox since 9/19 given Hgb drop to 6.0 requiring 1u PRBC; drop again 9/22 requiring additional unit  - off DVT ppx in s/o persistent Hgb drops     2) R thigh hematoma  - patient with expanding hematoma; potential source of dropping Hgb    #GOC  -palliative consult following  -full code at this time  -family updated daily; given until Tues 12:30pm to decide trach vs palliative extubation

## 2021-09-27 NOTE — PROGRESS NOTE ADULT - ATTENDING COMMENTS
1. Acute hypoxemic respiratory failure from Covid pneumonia and ARDS. Still requiring ~60% FIO2. Pt vented for > 30 days. Family avoiding making decision about tracheostomy  vs. comfort care.  2. Hypernatremia: Increase free water to 500mls q 6 hrs.  3. S/P 6 min cardiac arrest. ? anoxic encephelopathy vs. metabolic encephalopathy from covid.  4.Transaminiitis likely from covid and or sepsis. Slowly decreasing. No evidence of biliary obstruction.  5. DVT Right side , below knee on L . Of AC due to hematoma R thigh requiring PRBC.  6 GOC: Family needs to decide about tracheostomy vs comfort care. 1. Acute hypoxemic respiratory failure from Covid pneumonia and ARDS. Still requiring ~60% FIO2. Pt vented for > 30 days. Family avoiding making decision about tracheostomy  vs. comfort care.  2. Hypernatremia: Increase free water to 500mls q 6 hrs.  3. S/P 6 min cardiac arrest. ? anoxic encephelopathy vs. metabolic encephalopathy from covid.  4.Transaminiitis likely from covid and or sepsis. Slowly decreasing. No evidence of biliary obstruction.  5. DVT Right side , below knee on L . Of AC due to hematoma R thigh requiring PRBC.  6. ID MRSE bacteremia. Continue  Vancomycin.  7. Proctitis seen on CT abd. On Unasyn.  8.GOC: Family needs to decide about tracheostomy vs comfort care.

## 2021-09-28 NOTE — CONSULT NOTE ADULT - SUBJECTIVE AND OBJECTIVE BOX
Consult requested by: Abilio Khanna	Role: Consultant	Service: Palliative Care	Contact#(s): [phone]  Attending: Gunner Washington MD			Other MD: Jose Antonio Card MD  Consultant: Shawn Simpson DO (EM Resident), Nicole Parrish D.Min, RN, [facility] (Ethicist), Rayo Vazquez DrPH, [facility]  Contact #s: [phone]  Consult purpose: To assist the healthcare team in the ethical dilemma of a 71-year-old male who is admitted for acute hypoxic respiratory failure and acute respiratory distress syndrome secondary to COVID-19 pneumonia with a prolonged extubating with poor prognosis in deciding between compassionate extubation versus tracheostomy.    Clinical summary:   71-year-old male with a past medical history of hypertension, type 2 diabetes, asthma, and benign prostatic hyperplasia initially presented to the hospital on  after approximately 2 weeks of progressively worsening shortness of breath and cough, found to be COVID+ with chest x-ray notable for bilateral patchy opacities. The patient was initially hypoxic to 87% with increased work of breathing which was improved by starting the patient on high-flow nasal cannula in the emergency department. The patient’s medical floor stay was complicated by multiple rapid responses for hypoxia, tachypnea, and increased work of breathing. On , the patient was intubated for progression of the patient’s disease course. The patient’s ICU course was complicated by bacteremia from -9/3 status post antibiotic regimen and was found again to be bacteremic on 9/10 and is still currently on intravenous antibiotic as well as bilateral lower extremity deep vein thromboses and suspected pulmonary embolism. The patient has now been weaned off sedation medication and intravenous vasopressors. On  palliative care was consulted to discuss goals of care as the patient has been unable to be extubated and would now require a tracheostomy given his prolonged intubation. The family expressed concerns about tracheostomy as the patient’s brother had a tracheostomy and felt that he  as a consequence of the tracheostomy due to living in a nursing home and suffered frequent infections. They felt that the patient would not want a tracheostomy and said the patient previously stated that if he ever became sick that he would not want to have a tracheostomy. They also expressed concern with the option of compassionate extubation as the patient would ultimately die. The family ultimately opted to wait for tracheostomy and see if patient would recover due to a previous experience of friends and family being on life support and being able to recover. On  the patient had an EEG for arm rhythmic shaking to evaluate for seizure activity which showed moderate to severe nonspecific diffuse or multifocal cerebral dysfunction but no epileptiform patterns. On , Dr. Washington mentioned to the wife and daughter that the patient would need a tracheostomy as the patient’s prolonged intubation carries additional risks and is not standard of care. The family is requesting to hold off on tracheostomy until patient’s bacteremia clears and are adamantly opposing consideration of tracheostomy until then. Dr. Washington also discussed the patient’s poor mental status despite being off sedation for an extended amount of time and that the unresponsiveness may be the patient’s new baseline, although there may be some influence as the patient is still being tapered off methadone and benzodiazepines. On , , and  the palliative care team made attempts to coordinate a family meeting to discuss the decision about tracheostomy or compassionate extubation with the family without success, at which point ethics was consulted.   	  Prognosis Estimate (survival in hrs, days, wks, mos, yrs): poor  Patient Decision-Making Capacity: Has Capacity Lacks capacity   Patient Aware of:  Diagnosis:   Yes    No   Unknown    Prognosis:   Yes    No   Unknown       Name of medical decision-maker should patient lack capacity (relationship): Christy (wife)Janeen (daughter)  Role:   Health Care Agent      Legal Surrogate   Contact #(s): [phone]  Other Decision-Maker (i.e., HCA or Surrogate) Aware of:  Diagnosis: Yes   No      Prognosis:   Yes     No  Other Stakeholders:   Evidence of Patient’s Preference of Life-Sustaining Treatment (Written or Oral):   Resuscitation status:   DNR:  Yes   No      DNI:  Yes   No

## 2021-09-28 NOTE — PROGRESS NOTE ADULT - ASSESSMENT
72yo M w/ PMHx of HTN, DM2, BPH, asthma presents with shortness of breath. Patient endorses that he has been feeling SOB with associated cough for the last 2 weeks and his symptoms slowly worsened over time. Patient admitted with respiratory failure secondary to COVID, now with prolonged hospitalization and remains intubated on vent. Palliative consulted for [facility]

## 2021-09-28 NOTE — PROGRESS NOTE ADULT - SUBJECTIVE AND OBJECTIVE BOX
INTERVAL HPI/OVERNIGHT EVENTS:    SUBJECTIVE: Patient seen and examined at bedside.     CONSTITUTIONAL: No weakness, fevers or chills  EYES/ENT: No visual changes;  No vertigo or throat pain   NECK: No pain or stiffness  RESPIRATORY: No cough, wheezing, hemoptysis; No shortness of breath  CARDIOVASCULAR: No chest pain or palpitations  GASTROINTESTINAL: No abdominal or epigastric pain. No nausea, vomiting, or hematemesis; No diarrhea or constipation. No melena or hematochezia.  GENITOURINARY: No dysuria, frequency or hematuria  NEUROLOGICAL: No numbness or weakness  SKIN: No itching, rashes    OBJECTIVE:    VITAL SIGNS:  ICU Vital Signs Last 24 Hrs  T(C): 37.5 (28 Sep 2021 04:00), Max: 39.3 (27 Sep 2021 22:00)  T(F): 99.5 (28 Sep 2021 04:00), Max: 102.7 (27 Sep 2021 22:00)  HR: 81 (28 Sep 2021 06:00) (81 - 112)  BP: 162/90 (28 Sep 2021 06:00) (65/44 - 195/100)  BP(mean): 121 (28 Sep 2021 06:00) (50 - 137)  ABP: --  ABP(mean): --  RR: 23 (28 Sep 2021 06:00) (12 - 52)  SpO2: 88% (28 Sep 2021 06:00) (88% - 100%)    Mode: AC/ CMV (Assist Control/ Continuous Mandatory Ventilation), RR (machine): 12, FiO2: 50, PEEP: 5, PS: 24, ITime: 0.7, MAP: 17, PC: 24, PIP: 32    09-27 @ 07:01  -  09-28 @ 07:00  --------------------------------------------------------  IN: 2926.7 mL / OUT: 1100 mL / NET: 1826.7 mL      CAPILLARY BLOOD GLUCOSE      POCT Blood Glucose.: 142 mg/dL (28 Sep 2021 06:04)      PHYSICAL EXAM:    General: NAD  HEENT: NC/AT; PERRL, clear conjunctiva  Neck: supple  Respiratory: CTA b/l  Cardiovascular: +S1/S2; RRR  Abdomen: soft, NT/ND; +BS x4  Extremities: WWP, 2+ peripheral pulses b/l; no LE edema  Skin: normal color and turgor; no rash  Neurological:    MEDICATIONS:  MEDICATIONS  (STANDING):  ALBUTerol    90 MICROgram(s) HFA Inhaler 2 Puff(s) Inhalation every 8 hours  ampicillin/sulbactam  IVPB      ampicillin/sulbactam  IVPB 3 Gram(s) IV Intermittent every 6 hours  budesonide 160 MICROgram(s)/formoterol 4.5 MICROgram(s) Inhaler 2 Puff(s) Inhalation two times a day  chlorhexidine 0.12% Liquid 15 milliLiter(s) Oral Mucosa every 12 hours  chlorhexidine 4% Liquid 1 Application(s) Topical <User Schedule>  cholecalciferol 2000 Unit(s) Oral daily  dextrose 40% Gel 15 Gram(s) Oral once  dextrose 5%. 1000 milliLiter(s) (50 mL/Hr) IV Continuous <Continuous>  dextrose 5%. 1000 milliLiter(s) (100 mL/Hr) IV Continuous <Continuous>  dextrose 50% Injectable 25 Gram(s) IV Push once  dextrose 50% Injectable 12.5 Gram(s) IV Push once  dextrose 50% Injectable 25 Gram(s) IV Push once  glucagon  Injectable 1 milliGRAM(s) IntraMuscular once  insulin lispro (ADMELOG) corrective regimen sliding scale   SubCutaneous every 6 hours  insulin NPH human recombinant 10 Unit(s) SubCutaneous every 6 hours  LORazepam     Tablet 1 milliGRAM(s) Oral every 12 hours  methadone   Solution 5 milliGRAM(s) Oral every 8 hours  multivitamin/minerals Oral Solution (WELLESSE) 30 milliLiter(s) Enteral Tube daily  naloxegol 25 milliGRAM(s) Oral daily  phenylephrine    Infusion 0.5 MICROgram(s)/kG/Min (15.3 mL/Hr) IV Continuous <Continuous>  polyethylene glycol 3350 17 Gram(s) Oral daily  senna Syrup 10 milliLiter(s) Oral at bedtime  ursodiol Tablet 500 milliGRAM(s) Oral two times a day  vancomycin  IVPB 500 milliGRAM(s) IV Intermittent <User Schedule>    MEDICATIONS  (PRN):  acetaminophen    Suspension .. 650 milliGRAM(s) Oral every 6 hours PRN Temp greater or equal to 38C (100.4F), Mild Pain (1 - 3), Moderate Pain (4 - 6)      ALLERGIES:  Allergies    No Known Allergies    Intolerances        LABS:                        8.2    13.69 )-----------( 337      ( 28 Sep 2021 00:24 )             28.5     09-28    149<H>  |  108  |  64<H>  ----------------------------<  198<H>  3.4<L>   |  26  |  1.11    Ca    9.7      28 Sep 2021 00:24  Phos  4.8     09-28  Mg     2.9     09-28    TPro  6.3  /  Alb  2.3<L>  /  TBili  4.5<H>  /  DBili  x   /  AST  161<H>  /  ALT  119<H>  /  AlkPhos  1877<H>  09-28    PT/INR - ( 28 Sep 2021 00:24 )   PT: 12.7 sec;   INR: 1.06 ratio         PTT - ( 28 Sep 2021 00:24 )  PTT:28.2 sec      RADIOLOGY & ADDITIONAL TESTS: Reviewed.     INTERVAL HPI/OVERNIGHT EVENTS: Agitated and tachypneic overnight to 40s; given Fent 25, then 50. Metoprolol stopped due to MAP 51, given lyndsey briefly.     SUBJECTIVE: Patient seen and examined at bedside.     General: intubated, off sedation  HEENT: NCAT, clear conjunctiva, sclera icteric, pupils sluggish  Neck: supple, no JVD  Respiratory: CTAB  Cardiovascular: RRR, S1S2, no m/r/g  Abdomen: soft, nontender, nondistended, normal bowel sounds  Extremities: no edema, no cyanosis  Skin: warm, perfused, b/l erythema on buttocks  Neurological: nonfocal, no response to pain, sternal rub    OBJECTIVE:    VITAL SIGNS:  ICU Vital Signs Last 24 Hrs  T(C): 37.5 (28 Sep 2021 04:00), Max: 39.3 (27 Sep 2021 22:00)  T(F): 99.5 (28 Sep 2021 04:00), Max: 102.7 (27 Sep 2021 22:00)  HR: 81 (28 Sep 2021 06:00) (81 - 112)  BP: 162/90 (28 Sep 2021 06:00) (65/44 - 195/100)  BP(mean): 121 (28 Sep 2021 06:00) (50 - 137)  ABP: --  ABP(mean): --  RR: 23 (28 Sep 2021 06:00) (12 - 52)  SpO2: 88% (28 Sep 2021 06:00) (88% - 100%)    Mode: AC/ CMV (Assist Control/ Continuous Mandatory Ventilation), RR (machine): 12, FiO2: 50, PEEP: 5, PS: 24, ITime: 0.7, MAP: 17, PC: 24, PIP: 32    09-27 @ 07:01  -  09-28 @ 07:00  --------------------------------------------------------  IN: 2926.7 mL / OUT: 1100 mL / NET: 1826.7 mL      CAPILLARY BLOOD GLUCOSE      POCT Blood Glucose.: 142 mg/dL (28 Sep 2021 06:04)      PHYSICAL EXAM:    General: NAD  HEENT: NC/AT; PERRL, clear conjunctiva  Neck: supple  Respiratory: CTA b/l  Cardiovascular: +S1/S2; RRR  Abdomen: soft, NT/ND; +BS x4  Extremities: WWP, 2+ peripheral pulses b/l; no LE edema  Skin: normal color and turgor; no rash  Neurological:    MEDICATIONS:  MEDICATIONS  (STANDING):  ALBUTerol    90 MICROgram(s) HFA Inhaler 2 Puff(s) Inhalation every 8 hours  ampicillin/sulbactam  IVPB      ampicillin/sulbactam  IVPB 3 Gram(s) IV Intermittent every 6 hours  budesonide 160 MICROgram(s)/formoterol 4.5 MICROgram(s) Inhaler 2 Puff(s) Inhalation two times a day  chlorhexidine 0.12% Liquid 15 milliLiter(s) Oral Mucosa every 12 hours  chlorhexidine 4% Liquid 1 Application(s) Topical <User Schedule>  cholecalciferol 2000 Unit(s) Oral daily  dextrose 40% Gel 15 Gram(s) Oral once  dextrose 5%. 1000 milliLiter(s) (50 mL/Hr) IV Continuous <Continuous>  dextrose 5%. 1000 milliLiter(s) (100 mL/Hr) IV Continuous <Continuous>  dextrose 50% Injectable 25 Gram(s) IV Push once  dextrose 50% Injectable 12.5 Gram(s) IV Push once  dextrose 50% Injectable 25 Gram(s) IV Push once  glucagon  Injectable 1 milliGRAM(s) IntraMuscular once  insulin lispro (ADMELOG) corrective regimen sliding scale   SubCutaneous every 6 hours  insulin NPH human recombinant 10 Unit(s) SubCutaneous every 6 hours  LORazepam     Tablet 1 milliGRAM(s) Oral every 12 hours  methadone   Solution 5 milliGRAM(s) Oral every 8 hours  multivitamin/minerals Oral Solution (WELLESSE) 30 milliLiter(s) Enteral Tube daily  naloxegol 25 milliGRAM(s) Oral daily  phenylephrine    Infusion 0.5 MICROgram(s)/kG/Min (15.3 mL/Hr) IV Continuous <Continuous>  polyethylene glycol 3350 17 Gram(s) Oral daily  senna Syrup 10 milliLiter(s) Oral at bedtime  ursodiol Tablet 500 milliGRAM(s) Oral two times a day  vancomycin  IVPB 500 milliGRAM(s) IV Intermittent <User Schedule>    MEDICATIONS  (PRN):  acetaminophen    Suspension .. 650 milliGRAM(s) Oral every 6 hours PRN Temp greater or equal to 38C (100.4F), Mild Pain (1 - 3), Moderate Pain (4 - 6)      ALLERGIES:  Allergies    No Known Allergies    Intolerances        LABS:                        8.2    13.69 )-----------( 337      ( 28 Sep 2021 00:24 )             28.5     09-28    149<H>  |  108  |  64<H>  ----------------------------<  198<H>  3.4<L>   |  26  |  1.11    Ca    9.7      28 Sep 2021 00:24  Phos  4.8     09-28  Mg     2.9     09-28    TPro  6.3  /  Alb  2.3<L>  /  TBili  4.5<H>  /  DBili  x   /  AST  161<H>  /  ALT  119<H>  /  AlkPhos  1877<H>  09-28    PT/INR - ( 28 Sep 2021 00:24 )   PT: 12.7 sec;   INR: 1.06 ratio         PTT - ( 28 Sep 2021 00:24 )  PTT:28.2 sec      RADIOLOGY & ADDITIONAL TESTS: Reviewed.     INTERVAL HPI/OVERNIGHT EVENTS: Agitated and tachypneic overnight to 40s; given Fent 25, then 50. Metoprolol stopped due to MAP 51, given lyndsey briefly.     SUBJECTIVE: Patient seen and examined at bedside. Tachypneic and w/ abdominal breathing - given 2mg Versed.     General: intubated, off sedation  HEENT: NCAT, clear conjunctiva, sclera icteric, pupils sluggish  Neck: supple, no JVD  Respiratory: CTAB  Cardiovascular: RRR, S1S2, no m/r/g  Abdomen: soft, nontender, nondistended, normal bowel sounds  Extremities: no edema, no cyanosis  Skin: warm, perfused, b/l erythema on buttocks  Neurological: nonfocal, no response to pain, sternal rub    OBJECTIVE:    VITAL SIGNS:  ICU Vital Signs Last 24 Hrs  T(C): 37.5 (28 Sep 2021 04:00), Max: 39.3 (27 Sep 2021 22:00)  T(F): 99.5 (28 Sep 2021 04:00), Max: 102.7 (27 Sep 2021 22:00)  HR: 81 (28 Sep 2021 06:00) (81 - 112)  BP: 162/90 (28 Sep 2021 06:00) (65/44 - 195/100)  BP(mean): 121 (28 Sep 2021 06:00) (50 - 137)  ABP: --  ABP(mean): --  RR: 23 (28 Sep 2021 06:00) (12 - 52)  SpO2: 88% (28 Sep 2021 06:00) (88% - 100%)    Mode: AC/ CMV (Assist Control/ Continuous Mandatory Ventilation), RR (machine): 12, FiO2: 50, PEEP: 5, PS: 24, ITime: 0.7, MAP: 17, PC: 24, PIP: 32    09-27 @ 07:01  -  09-28 @ 07:00  --------------------------------------------------------  IN: 2926.7 mL / OUT: 1100 mL / NET: 1826.7 mL      CAPILLARY BLOOD GLUCOSE      POCT Blood Glucose.: 142 mg/dL (28 Sep 2021 06:04)      PHYSICAL EXAM:    General: NAD  HEENT: NC/AT; PERRL, clear conjunctiva  Neck: supple  Respiratory: CTA b/l  Cardiovascular: +S1/S2; RRR  Abdomen: soft, NT/ND; +BS x4  Extremities: WWP, 2+ peripheral pulses b/l; no LE edema  Skin: normal color and turgor; no rash  Neurological:    MEDICATIONS:  MEDICATIONS  (STANDING):  ALBUTerol    90 MICROgram(s) HFA Inhaler 2 Puff(s) Inhalation every 8 hours  ampicillin/sulbactam  IVPB      ampicillin/sulbactam  IVPB 3 Gram(s) IV Intermittent every 6 hours  budesonide 160 MICROgram(s)/formoterol 4.5 MICROgram(s) Inhaler 2 Puff(s) Inhalation two times a day  chlorhexidine 0.12% Liquid 15 milliLiter(s) Oral Mucosa every 12 hours  chlorhexidine 4% Liquid 1 Application(s) Topical <User Schedule>  cholecalciferol 2000 Unit(s) Oral daily  dextrose 40% Gel 15 Gram(s) Oral once  dextrose 5%. 1000 milliLiter(s) (50 mL/Hr) IV Continuous <Continuous>  dextrose 5%. 1000 milliLiter(s) (100 mL/Hr) IV Continuous <Continuous>  dextrose 50% Injectable 25 Gram(s) IV Push once  dextrose 50% Injectable 12.5 Gram(s) IV Push once  dextrose 50% Injectable 25 Gram(s) IV Push once  glucagon  Injectable 1 milliGRAM(s) IntraMuscular once  insulin lispro (ADMELOG) corrective regimen sliding scale   SubCutaneous every 6 hours  insulin NPH human recombinant 10 Unit(s) SubCutaneous every 6 hours  LORazepam     Tablet 1 milliGRAM(s) Oral every 12 hours  methadone   Solution 5 milliGRAM(s) Oral every 8 hours  multivitamin/minerals Oral Solution (WELLESSE) 30 milliLiter(s) Enteral Tube daily  naloxegol 25 milliGRAM(s) Oral daily  phenylephrine    Infusion 0.5 MICROgram(s)/kG/Min (15.3 mL/Hr) IV Continuous <Continuous>  polyethylene glycol 3350 17 Gram(s) Oral daily  senna Syrup 10 milliLiter(s) Oral at bedtime  ursodiol Tablet 500 milliGRAM(s) Oral two times a day  vancomycin  IVPB 500 milliGRAM(s) IV Intermittent <User Schedule>    MEDICATIONS  (PRN):  acetaminophen    Suspension .. 650 milliGRAM(s) Oral every 6 hours PRN Temp greater or equal to 38C (100.4F), Mild Pain (1 - 3), Moderate Pain (4 - 6)      ALLERGIES:  Allergies    No Known Allergies    Intolerances        LABS:                        8.2    13.69 )-----------( 337      ( 28 Sep 2021 00:24 )             28.5     09-28    149<H>  |  108  |  64<H>  ----------------------------<  198<H>  3.4<L>   |  26  |  1.11    Ca    9.7      28 Sep 2021 00:24  Phos  4.8     09-28  Mg     2.9     09-28    TPro  6.3  /  Alb  2.3<L>  /  TBili  4.5<H>  /  DBili  x   /  AST  161<H>  /  ALT  119<H>  /  AlkPhos  1877<H>  09-28    PT/INR - ( 28 Sep 2021 00:24 )   PT: 12.7 sec;   INR: 1.06 ratio         PTT - ( 28 Sep 2021 00:24 )  PTT:28.2 sec      RADIOLOGY & ADDITIONAL TESTS: Reviewed.

## 2021-09-28 NOTE — PROGRESS NOTE ADULT - SUBJECTIVE AND OBJECTIVE BOX
Follow Up:  COVID, fevers    Interval History/ROS: Still with fevers. Remains on a ventilator.     Allergies  No Known Allergies        ANTIMICROBIALS:  ampicillin/sulbactam  IVPB    ampicillin/sulbactam  IVPB 3 every 6 hours  vancomycin  IVPB 500 <User Schedule>      OTHER MEDS:  acetaminophen    Suspension .. 650 milliGRAM(s) Oral every 6 hours PRN  ALBUTerol    90 MICROgram(s) HFA Inhaler 2 Puff(s) Inhalation every 8 hours  budesonide 160 MICROgram(s)/formoterol 4.5 MICROgram(s) Inhaler 2 Puff(s) Inhalation two times a day  chlorhexidine 0.12% Liquid 15 milliLiter(s) Oral Mucosa every 12 hours  chlorhexidine 4% Liquid 1 Application(s) Topical <User Schedule>  cholecalciferol 2000 Unit(s) Oral daily  dextrose 40% Gel 15 Gram(s) Oral once  dextrose 5%. 1000 milliLiter(s) IV Continuous <Continuous>  dextrose 5%. 1000 milliLiter(s) IV Continuous <Continuous>  dextrose 50% Injectable 25 Gram(s) IV Push once  dextrose 50% Injectable 12.5 Gram(s) IV Push once  dextrose 50% Injectable 25 Gram(s) IV Push once  glucagon  Injectable 1 milliGRAM(s) IntraMuscular once  insulin lispro (ADMELOG) corrective regimen sliding scale   SubCutaneous every 6 hours  insulin NPH human recombinant 10 Unit(s) SubCutaneous every 6 hours  LORazepam     Tablet 1 milliGRAM(s) Oral every 12 hours  methadone   Solution 5 milliGRAM(s) Oral every 8 hours  multivitamin/minerals Oral Solution (WELLESSE) 30 milliLiter(s) Enteral Tube daily  naloxegol 25 milliGRAM(s) Oral daily  phenylephrine    Infusion 0.5 MICROgram(s)/kG/Min IV Continuous <Continuous>  polyethylene glycol 3350 17 Gram(s) Oral daily  senna Syrup 10 milliLiter(s) Oral at bedtime  ursodiol Tablet 500 milliGRAM(s) Oral two times a day      Vital Signs Last 24 Hrs  T(C): 36.6 (28 Sep 2021 16:00), Max: 39.3 (27 Sep 2021 22:00)  T(F): 97.9 (28 Sep 2021 16:00), Max: 102.7 (27 Sep 2021 22:00)  HR: 85 (28 Sep 2021 17:26) (81 - 103)  BP: 115/70 (28 Sep 2021 17:00) (65/44 - 195/100)  BP(mean): 87 (28 Sep 2021 17:00) (50 - 137)  RR: 31 (28 Sep 2021 17:00) (12 - 52)  SpO2: 95% (28 Sep 2021 17:26) (87% - 97%)    Physical Exam:  General: not awake. chronically ill   Head: atraumatic, normocephalic  Eye: normal sclera and conjunctiva  ENT: ET and OG tubes   Cardio: regular rate   Respiratory: mechanically ventilated, fairly clear bilaterally, no wheezing  abd: soft, bowel sounds present, no tenderness  Musculoskeletal: anasarca   vascular: left arm peripheral IVs, no phlebitis                           8.2    13.69 )-----------( 337      ( 28 Sep 2021 00:24 )             28.5       09-28    149<H>  |  108  |  64<H>  ----------------------------<  198<H>  3.4<L>   |  26  |  1.11    Ca    9.7      28 Sep 2021 00:24  Phos  4.8     09-28  Mg     2.9     09-28    TPro  6.3  /  Alb  2.3<L>  /  TBili  4.5<H>  /  DBili  x   /  AST  161<H>  /  ALT  119<H>  /  AlkPhos  1877<H>  09-28          MICROBIOLOGY:  Vancomycin Level, Trough: 19.7 ug/mL (09-28-21 @ 00:24)    Culture - Blood (collected 09-26-21 @ 03:25)  Source: .Blood Blood-Venous  Gram Stain (09-28-21 @ 06:34):    Growth in anaerobic bottle: Gram Positive Cocci in Clusters  Preliminary Report (09-28-21 @ 06:34):    Growth in anaerobic bottle: Gram Positive Cocci in Clusters    Culture - Blood (collected 09-26-21 @ 03:25)  Source: .Blood Blood-Peripheral  Preliminary Report (09-27-21 @ 04:01):    No growth to date.    Culture - Sputum (collected 09-25-21 @ 20:46)  Source: .Sputum Sputum  Gram Stain (09-25-21 @ 23:44):    Moderate polymorphonuclear leukocytes per low power field    No Squamous epithelial cells per low power field    No organisms seen per oil power field  Final Report (09-27-21 @ 19:26):    Normal Respiratory Liane present    Culture - Sputum (collected 09-24-21 @ 03:32)  Source: .Sputum Sputum  Gram Stain (09-24-21 @ 07:39):    Few polymorphonuclear leukocytes per low power field    Rare Squamous epithelial cells per low power field    Rare Gram Negative Rods seen per oil power field    Rare Gram positive cocci in pairs seen per oil power field    Rare Yeast like cells seen per oil power field  Final Report (09-26-21 @ 11:10):    Normal Respiratory Liane present    Culture - Blood (collected 09-24-21 @ 01:34)  Source: .Blood Blood-Venous  Preliminary Report (09-25-21 @ 02:01):    No growth to date.    Culture - Blood (collected 09-24-21 @ 01:34)  Source: .Blood Blood-Peripheral  Preliminary Report (09-25-21 @ 02:01):    No growth to date.    RADIOLOGY:  Images below reviewed personally  MR Head No Cont (09.28.21 @ 14:49)   There is no acute infarction, acute hemorrhage, cerebral edema, or intracranial mass effect.    Xray Chest 1 View- PORTABLE-Urgent (Xray Chest 1 View- PORTABLE-Urgent .) (09.25.21 @ 19:49)   ET tube terminates above the level ofthe evette. NG tube side port within the stomach. Distal tip is not visualized on this study.  Increased left lung opacities compared to prior study. Pneumomediastinum is decreased.    CT Chest Abdomen w/ IV Cont (09.23.21 @ 18:04)   1. Moderate to large pneumomediastinum, new since chest radiograph from 9/14/2021.  2. Bilateral groundglass and consolidative lung opacities.  3. Nonspecific gallbladder wall thickening.  4. Proctitis.  5. Bilateral anterolateral sixth and seventh rib fractures.

## 2021-09-28 NOTE — ED ADULT NURSE NOTE - PERIPHERAL VASCULAR WDL
Information: Selecting Yes will display possible errors in your note based on the variables you have selected. This validation is only offered as a suggestion for you. PLEASE NOTE THAT THE VALIDATION TEXT WILL BE REMOVED WHEN YOU FINALIZE YOUR NOTE. IF YOU WANT TO FAX A PRELIMINARY NOTE YOU WILL NEED TO TOGGLE THIS TO 'NO' IF YOU DO NOT WANT IT IN YOUR FAXED NOTE. Pulses equal bilaterally, no edema present.

## 2021-09-28 NOTE — CHART NOTE - NSCHARTNOTEFT_GEN_A_CORE
Nutrition Follow Up Note  Patient seen for: Malnutrition Follow Up     Chart reviewed, events noted. Pt continues to be intubated and sedated. Noted GOC continues with family, plan for MRI to assess for anoxic brain injury as per family wishes.     Source: [] Patient       [x] EMR        [x] RN        [] Family at bedside       [] Other:    -If unable to interview patient: [x] Trach/Vent/BiPAP  [] Disoriented/confused/inappropriate to interview    Nutrition-Related Events:   - Pressors:  [] Yes    [x] No   - Propofol:  [] Yes    [x] No         - Rate: __mL/hr. If maintained x 24 hours, propofol will provide:     Diet Order:   Diet, NPO with Tube Feed:   Tube Feeding Modality: Orogastric  Vital AF (VITALAFRTH)  Total Volume for 24 Hours (mL): 1350  Continuous  Starting Tube Feed Rate {mL per Hour}: 10  Increase Tube Feed Rate by (mL): 10     Every 6 hours  Until Goal Tube Feed Rate (mL per Hour): 75  Tube Feed Duration (in Hours): 18  Tube Feed Start Time: 16:00 (09-21-21)      EN Order Provides: 1620cal, 101 Gm Prot; ~20cal/kg and 1.2 Gm Prot/kg based on dosing wt of 81.6kg    Is current diet order appropriate/adequate? [x] Yes  []  No:     Nutrition-related concerns:  -pt continues to be at goal rate since 9/22  -micronutrient coverage continues  -continues to be on NPH for glycemic control     GI:  Last BM 9/28.   Bowel Regimen? [x] Yes   [] No    No new wt     MEDICATIONS  (STANDING):  ampicillin/sulbactam  IVPB  ampicillin/sulbactam  IVPB  cholecalciferol  dextrose 40% Gel  dextrose 5%.  dextrose 5%.  dextrose 50% Injectable  dextrose 50% Injectable  dextrose 50% Injectable  glucagon  Injectable  insulin lispro (ADMELOG) corrective regimen sliding scale  insulin NPH human recombinant  multivitamin/minerals Oral Solution (WELLESSE)  naloxegol  phenylephrine    Infusion  polyethylene glycol 3350  senna Syrup  ursodiol Tablet  vancomycin  IVPB    Pertinent Labs: 09-28 @ 00:24: Na 149<H>, BUN 64<H>, Cr 1.11, <H>, K+ 3.4<L>, Phos 4.8<H>, Mg 2.9<H>, Alk Phos 1877<H>, ALT/SGPT 119<H>, AST/SGOT 161<H>, HbA1c --    A1C with Estimated Average Glucose Result: 7.9 % (08-21-21 @ 16:09)  A1C with Estimated Average Glucose Result: 7.3 % (08-14-21 @ 14:36)    Finger Sticks:  POCT Blood Glucose.: 125 mg/dL (09-28 @ 11:05)  POCT Blood Glucose.: 142 mg/dL (09-28 @ 06:04)  POCT Blood Glucose.: 204 mg/dL (09-28 @ 00:25)  POCT Blood Glucose.: 208 mg/dL (09-27 @ 18:15)    Triglycerides, Serum: 258 mg/dL (09-24-21 @ 03:56)  Triglycerides, Serum: 168 mg/dL (09-12-21 @ 23:03)  Triglycerides, Serum: 292 mg/dL (09-11-21 @ 00:50)  Triglycerides, Serum: 234 mg/dL (09-10-21 @ 00:40)  Triglycerides, Serum: 194 mg/dL (09-06-21 @ 00:42)  Triglycerides, Serum: 216 mg/dL (09-05-21 @ 01:08)      Skin per nursing documentation: suspected DTI on sacrum and stage 2 on right cheek; per Wound Care: evolving emili. buttocks DTI, possibly stage 2  Edema: +2 generalized, +3 emili. ankle, leg    Estimated Nutritional needs based on 81.6 kg:  Estimated Energy Needs: (20-25 aida/kg): 7470-0080 aida based on dosing weight of 81.6 kg  Estimated Protein Needs: (1.2-1.6 Gm/kg):  Gm based on dosing weight of 81.6 kg  Defer fluid needs to team    Previous Nutrition Diagnosis: Severe, acute malnutrition  Nutrition Diagnosis is: [x] ongoing  [] resolved [] not applicable     New Nutrition Diagnosis: increased nutrient needs related to increased physiological demand for nutrients as evidenced by suspected DTI and right cheek stage II pressure injury     Nutrition Care Plan:  [x] In Progress  [] Achieved  [] Not applicable       Recommendations:      1. Continue c Vital AF at 75ml/hr x18 hours as tolerated.   2. Monitor BMs.   3. Consider vitamin C supplementation to help meet increased nutrient needs.   4. RD remains available for further nutritional interventions as needed.     Monitoring and Evaluation:   Continue to monitor nutritional intake, tolerance to diet prescription, weights, labs, skin integrity      RD remains available upon request and will follow up per protocol  Ayala Javier MS RD CDN [facility], Pager #[phone] Nutrition Follow Up Note  Patient seen for: Malnutrition Follow Up     Chart reviewed, events noted. Pt continues to be intubated and sedated. Noted GOC continues with family, plan for MRI to assess for anoxic brain injury as per family wishes.     Source: [] Patient       [x] EMR        [x] RN        [] Family at bedside       [] Other:    -If unable to interview patient: [x] Trach/Vent/BiPAP  [] Disoriented/confused/inappropriate to interview    Nutrition-Related Events:   - Pressors:  [] Yes    [x] No   - Propofol:  [] Yes    [x] No         - Rate: __mL/hr. If maintained x 24 hours, propofol will provide:     Diet Order:   Diet, NPO with Tube Feed:   Tube Feeding Modality: Orogastric  Vital AF (VITALAFRTH)  Total Volume for 24 Hours (mL): 1350  Continuous  Starting Tube Feed Rate {mL per Hour}: 10  Increase Tube Feed Rate by (mL): 10     Every 6 hours  Until Goal Tube Feed Rate (mL per Hour): 75  Tube Feed Duration (in Hours): 18  Tube Feed Start Time: 16:00 (09-21-21)      EN Order Provides: 1620cal, 101 Gm Prot; ~20cal/kg and 1.2 Gm Prot/kg based on dosing wt of 81.6kg    Is current diet order appropriate/adequate? [x] Yes  []  No:     Nutrition-related concerns:  -pt continues to be at goal rate since 9/22  -micronutrient coverage continues  -continues to be on NPH for glycemic control   -as per RN, pt continues to have some loose stools however does seem to be less gassy than before     GI:  Last BM 9/28.   Bowel Regimen? [x] Yes   [] No    No new wt     MEDICATIONS  (STANDING):  ampicillin/sulbactam  IVPB  ampicillin/sulbactam  IVPB  cholecalciferol  dextrose 40% Gel  dextrose 5%.  dextrose 5%.  dextrose 50% Injectable  dextrose 50% Injectable  dextrose 50% Injectable  glucagon  Injectable  insulin lispro (ADMELOG) corrective regimen sliding scale  insulin NPH human recombinant  multivitamin/minerals Oral Solution (WELLESSE)  naloxegol  phenylephrine    Infusion  polyethylene glycol 3350  senna Syrup  ursodiol Tablet  vancomycin  IVPB    Pertinent Labs: 09-28 @ 00:24: Na 149<H>, BUN 64<H>, Cr 1.11, <H>, K+ 3.4<L>, Phos 4.8<H>, Mg 2.9<H>, Alk Phos 1877<H>, ALT/SGPT 119<H>, AST/SGOT 161<H>, HbA1c --    A1C with Estimated Average Glucose Result: 7.9 % (08-21-21 @ 16:09)  A1C with Estimated Average Glucose Result: 7.3 % (08-14-21 @ 14:36)    Finger Sticks:  POCT Blood Glucose.: 125 mg/dL (09-28 @ 11:05)  POCT Blood Glucose.: 142 mg/dL (09-28 @ 06:04)  POCT Blood Glucose.: 204 mg/dL (09-28 @ 00:25)  POCT Blood Glucose.: 208 mg/dL (09-27 @ 18:15)    Triglycerides, Serum: 258 mg/dL (09-24-21 @ 03:56)  Triglycerides, Serum: 168 mg/dL (09-12-21 @ 23:03)  Triglycerides, Serum: 292 mg/dL (09-11-21 @ 00:50)  Triglycerides, Serum: 234 mg/dL (09-10-21 @ 00:40)  Triglycerides, Serum: 194 mg/dL (09-06-21 @ 00:42)  Triglycerides, Serum: 216 mg/dL (09-05-21 @ 01:08)      Skin per nursing documentation: suspected DTI on sacrum and stage 2 on right cheek; per Wound Care: evolving emili. buttocks DTI, possibly stage 2  Edema: +2 generalized, +3 emili. ankle, leg    Estimated Nutritional needs based on 81.6 kg:  Estimated Energy Needs: (20-25 aida/kg): 4698-9225 aida based on dosing weight of 81.6 kg  Estimated Protein Needs: (1.2-1.6 Gm/kg):  Gm based on dosing weight of 81.6 kg  Defer fluid needs to team    Previous Nutrition Diagnosis: Severe, acute malnutrition  Nutrition Diagnosis is: [x] ongoing  [] resolved [] not applicable     New Nutrition Diagnosis: increased nutrient needs related to increased physiological demand for nutrients as evidenced by suspected DTI and right cheek stage II pressure injury     Nutrition Care Plan:  [x] In Progress  [] Achieved  [] Not applicable       Recommendations:      1. Continue c Vital AF at 75ml/hr x18 hours as tolerated.   2. Monitor BMs.   3. Consider vitamin C supplementation to help meet increased nutrient needs.   4. RD remains available for further nutritional interventions as needed.     Monitoring and Evaluation:   Continue to monitor nutritional intake, tolerance to diet prescription, weights, labs, skin integrity      RD remains available upon request and will follow up per protocol  Ayala Javier MS RD CDN [facility], Pager #[phone]

## 2021-09-28 NOTE — PROGRESS NOTE ADULT - ASSESSMENT
71 year old man w/pmhx of HTN, DM2, BPH, and Asthma here for acute hypoxic respiratory failure and ARDS secondary to viral pneumonia from COVID-19. Hospital course complicated by VT arrest, DVT and MRSE bacteremia.    #Neuro   1) off sedation  - decreased methadone increased to 5 q8h and Ativan 1mg q12h as of 9/27; patient still unresponsive  - intermittently agitated with tachypnea requiring Fentanyl pushes; given Versed 2mg 9/28 am  - noncont CT 9/13 appears unchanged since 8/12  - f/u brain MRI; family will decide trach vs palliate    2) r/o seizure  -had rhythmic R arm movement  - 24h EEG with generalized slowing; no evidence of seizure (per report 9/21 AM)    #ENT   -oropharyngeal packing removed on 9/10  -ENT said they will pack if patient's h/h drops  -will need FiO2 at 50% or less and PaO2 at 60 for qualify for trach    #CV   1)Cardiac Arrest VT  - S/p Amiodarone and electrolyte supplementation   - Recurrent runs of Vtach 9/26; started on metoprolol but stopped in s/o hypertension  - Echo reveals Right hrt strain   -holding diuretics; no indication, no evidence of pleural effusion, pulm edema on chest CT (9/24)    #Pulm   1)Acute hypoxemic resp failure 2/2 cardiac arrest  - vent Pressure AC 30/60/55/5; wean as tolerated  - increase purulent secretions as of 9/27; will assess need for pseudomonal coverage    2) Asthma  - c/w Proventil q8h and Symbicort BID    3) Post-covid sequelae  - extensive fibrosis and consolidations at bases b/l  - patient with PO2 75 despite intubation with Fi02 60%, pressure 24 and PEEP 5  - GGO on CT chest; unlikely to improve with 3rd course of steroids, also with persistent fevers and bactermia despite appropriate abx therapy     #GI   1)Nutrition   - maintain Glucerna tube feeds as tolerated   - c/w senna/ miralax     2)Transaminitis - likely 2/2 cholestasis of sepsis vs SSC CIP (secondary sclerosing cholangitis in critically ill patients), Covid cholangiopathy, drug-induced liver injury  - c/w ursodiol 500 mg BID            - supportive treatment per hepatology  -alk phos, ast/alt downtrending    #Renal  1)Hypervolemia   - Strict I/O; decreasing UO as of 9/27, will continue to follow  - Monitor electrolytes     2) Hypernatremia   -  Na 141-143 and free water stopped   - up to 147 9/25, continuing to rise despite resuming free water boluses, increased to 500 q8 9/26      #ID   1)COVID - 19   - S/p RDV Dexa and TOCI   - will continue with supportive care     2)MRSE Bacteremia   - S/P 14 day course of Vanc from 8/20-9/3 therapy, and intermittent gram-&anaerobe coverage   - last postive Bcx 9/26 - will need 14d treatment beyond that   - continue surveillance cultures q48-72h; last sent 9/21 negative  - TTE w/o evidence of vegetations; will need SEJAL to definitively rule out endocarditis  - CT A/P with evidence of proctitis: unasyn added per ID  - Abx as below     Vanc 500mg qd (trough with midnight labs)        - Unasyn 3g q6h (9/24 - ) per ID for gram negative coverage in s/o proctitis    #Endo   1)DM   - c/w NPH 10u q6h  - c/w ISS   - goal Blood Sugar 100-180     #Heme  1)b/l DVTs above knee on left and below knee on R  - off Lovenox since 9/19 given Hgb drop to 6.0 requiring 1u PRBC; drop again 9/22 requiring additional unit  - off DVT ppx in s/o persistent Hgb drops     2) R thigh hematoma  - patient with expanding hematoma; potential source of dropping Hgb    #GOC  -palliative consult following  -full code at this time  -family updated daily; given until Tues 12:30pm to decide trach vs palliative extubation

## 2021-09-28 NOTE — PROGRESS NOTE ADULT - CONVERSATION DETAILS
Met with family today  rediscussed trach/peg vs. compassionate extubation as basis for meeting given prolonged intubation time and concern for patient harm.  family expresses concern over compassionate extubation stating that is euthanasia. Discussed the difference between elective removal of life sustaining treatments and euthanasia, which is not legal nor an option being presented.  Discussed quality of life and what family feels matters most and if considering giving more time, then trach would be next step.    MICU team also discussed issue with mental status, no improvement despite time- family with many questions whether this would be due to cardiac arrest or ongoing infection as they have spoken with outside physicians who state that bacteremia can cause the same issues.    Dr. Card informed family that MRI is currently pending and if there is incidence of anoxic brain injury, they can reevaluate trach/peg. Family wants to awaiting MRI before deciding definitely regarding trach/peg. All questions answered.

## 2021-09-28 NOTE — PROGRESS NOTE ADULT - ASSESSMENT
Unvaccinated COVID pneumonia s/p tocilizumab, remdesivir and two courses of decadron.   Cardiac arrest 8/20.   Fevers almost daily since 9/8 despite treating potential infections: Staph epidermidis on blood cultures (unclear significance, intermittent since end of Aug), possible VAP (Klebsiella and Enterobacter 9/11), proctitis (CT 9/23). Maybe noninfectious at this point but it's hard to stop antibiotics.   Poor prognosis. Goals of care being discussed.     Suggest  -Vancomycin 500mg IV q24h - monitor troughs and renal function   -Unasyn 3GM IV q6h for proctitis  -favor SEJAL in the setting of persistent fevers and gram positive blood cultures   -would avoid further steroids/immunosuppression   -supportive care     Discussed with MICU     Patrick Paul MD   Infectious Disease   Pager [phone]   After 5PM and on weekends please page fellow on call or call [phone]

## 2021-09-28 NOTE — CONSULT NOTE ADULT - ASSESSMENT
Discussions:   9/26 - Discussion with MICU – During my conversation with MAICO Polanco and KODY Devine, the patient has been off sedation and currently does not have any purposeful movement or withdrawal to painful stimuli. KODY Devine stated on their assessment the patient’s vocal cords were edematous, an adverse effect of prolonged endotracheal intubation. The family had been at bedside during the day but had left prior to my arrival to the ICU.    9/ 28 - Attempted to contact patient’s wife, Christy, and daughter, Janeen, via [email or web address],  [place] [phone], but was unable to reach either one.    Bioethics analysis: The central issues in this case pertain to the patient’s autonomy as well as the practitioner’s beneficence and non-maleficence.     Patient autonomy acknowledges that a patient holds their owns principles and beliefs and take actions based on these. These principles and beliefs influence a patient’s medical decision making, especially in terms of LSTs, and how it would affect their quality of life. As clinicians, we should respect and preserve the patient’s sense of personhood and dignity. Essential to the notion of autonomy is that of capacity, since in order to protect one’s autonomy one must first have capacity. However, in this case the patient does not have capacity as he is currently intubated and is unable to make decisions and must rely on his healthcare surrogates to help make decisions for him. In the past, the patient had stated if he ever became extremely ill he would not want to have a tracheostomy. The patient made this statement after seeing his family member suffer from potential complications of having a tracheostomy.    Provider beneficence speaks to the physician’s duty to provide care that is not only medically beneficial to the patient, but also imposes the least medical risk and harm (non-maleficence). In this case the patient has a prolonged intubation which poses risks of harming the patient if he does not have a tracheostomy performed. The team must consider whether the tracheostomy would be beneficial to the patient and if they would be introducing any risk or harm to the patient. It appears that a chance for a meaningful recovery in this instance is not likely as the patient is grossly unresponsive despite being off sedation and the patient remains intubated. The risks of prolonged re-intubation (> 7 days) include pressure ulcer around adhesive tapes, vocal cord paralysis, ventilator associated pneumonia, sinusitis, tracheomalacia, laryngotracheal stenosis, tracheoesophageal fistula, tracheoarterial fistula, obstructive fibrinous tracheal pseudomembrane formation. Some of these can occur while the patient is still on mechanical ventilation or they can occur days to weeks after extubation.1    Non-maleficence guides healthcare providers to "do no harm" by the treatments we propose. Although in this case, the patient may benefit from having a tracheostomy placed to minimize the harm form having a prolonged endotracheal tube, we may be causing harm in the sense that the patient expressed to the family that he would never want a tracheostomy due to concerns of complications, and by performing a tracheostomy we place this burden on the patient. The use of tracheostomy serves three functions- 1- an assistance in maintaining an airway to clear pooled secretions. 2- a method of weaning from the ventilator. 3- as a vehicle to facilitate discharge planning.    As the patient is unable to be successfully weaned from the ventilator, the other alternative, which is in place is palliative/hospice approach which has determined that the patient’s respiratory status will not improve. From a beneficence/ non-maleficence perspective this situation can be morally distressing to any prudent observer since the patient’s family is struggling with making a life-or-death decision.2     The challenge presented in this case involves the role of indecisiveness in medical decision-making. In this case, the family members unanimously agree that the patient would not want to receive a tracheostomy, utilizing substituted judgement based on past experience with another family member. The family is struggling with the simple, yet emotionally complicated, truth that the patient will not recover from his disease and that further aggressive treatment would be considered futile, or worse, causing harm. Physician duty toward beneficence and non-maleficence, in such respects, obliges the clinical team to abstain from medical care where harms outweigh the benefits. In this case, the endotracheal tube has begun to show signs of harm related to its prolonged placement. Therefore, the tube’s placement is no longer therapeutic.    Conclusion  Thank you for consulting Medical Ethics. The team is commended for their virtue in the care and support of Mr. Nick and his family.     The ethical analysis of this case is complicated as there is no ideal outcome for this patient. The patient’s family have repeatedly and consistently reported that the patient would not want to receive a tracheostomy for long-term ventilator support. Therefore, the decision to forego with tracheostomy is consistent with the patient’s known wishes, otherwise known as substituted judgement. The prolonged endotracheal tube placement has begun to show signs of direct harm, which calls upon the clinical team to assess the risk and benefit of leaving the endotracheal tube in place or removing the tube. Physicians are under no obligation to administer or continue treatments where the risk of harm and suffering far outweigh its benefits.    Ethics will remain available. Please call with questions or concerns.    Case discussed with Medical Ethicist RADHA Sauceda BSN, [facility]- and Rayo Vazquez DrPH, KOL[facility], [facility]    Case report written by Shawn Simpson DO    More than 50% of the time of this consultation was spent in coordination of Care of Patient    References    1 DIDIER Leon., & Alan, GFermin K. (2018). Long-term complications of tracheal intubation. Tracheal Intubation. [place], [place]: [place], .  2 Aquiles [employer] Th[employer] Moment: Ethical Tensions Surrounding Decision Making on Tracheostomy for patients in the ICU—Journal of Clinical Ethics 24 (2): 135-143 (2013)

## 2021-09-28 NOTE — PROGRESS NOTE ADULT - ASSESSMENT
Impression:    Evolving bilateral buttock DTI   Incontinence Dermatitis  Incontinent of stool & urine    Recommend:  1.) Topical therapy: Sacrum/Bilateral buttocks- Cleanse with NS, pat dry, apply medihoney, apply cavilon skin protectant to periwound skin,cover with DSD, then secure with tegederm daily  2.) Nutritional optimization  3.) Maintain on an alternating air with low air loss surface  4.) Turn and reposition q 2 hours  5.) Incontinence management - incontinence pads, cleanser, pericare BID  6.) Offload heels/feet with complete cAir air fluidized boots  7.) Glycemic control    Care as per MICU. will follow w/ you  Upon discharge f/u as outpatient at [facility] [address] [facility] [phone]  Seen and discussed with clinical nurse  DULCE StoryC, CWOCN [id number]

## 2021-09-28 NOTE — PROGRESS NOTE ADULT - SUBJECTIVE AND OBJECTIVE BOX
SUBJECTIVE AND OBJECTIVE: Patient seen and evaluated, clinically unchanged, remains on vent, no mental status at time of eval.    INTERVAL HPI/OVERNIGHT EVENTS: no acute overnight events    DNR on chart:   Allergies    No Known Allergies    Intolerances    MEDICATIONS  (STANDING):  ALBUTerol    90 MICROgram(s) HFA Inhaler 2 Puff(s) Inhalation every 8 hours  ampicillin/sulbactam  IVPB      ampicillin/sulbactam  IVPB 3 Gram(s) IV Intermittent every 6 hours  budesonide 160 MICROgram(s)/formoterol 4.5 MICROgram(s) Inhaler 2 Puff(s) Inhalation two times a day  chlorhexidine 0.12% Liquid 15 milliLiter(s) Oral Mucosa every 12 hours  chlorhexidine 4% Liquid 1 Application(s) Topical <User Schedule>  cholecalciferol 2000 Unit(s) Oral daily  dextrose 40% Gel 15 Gram(s) Oral once  dextrose 5%. 1000 milliLiter(s) (50 mL/Hr) IV Continuous <Continuous>  dextrose 5%. 1000 milliLiter(s) (100 mL/Hr) IV Continuous <Continuous>  dextrose 50% Injectable 25 Gram(s) IV Push once  dextrose 50% Injectable 12.5 Gram(s) IV Push once  dextrose 50% Injectable 25 Gram(s) IV Push once  fentaNYL    Injectable 100 MICROGram(s) IV Push once  glucagon  Injectable 1 milliGRAM(s) IntraMuscular once  insulin lispro (ADMELOG) corrective regimen sliding scale   SubCutaneous every 6 hours  insulin NPH human recombinant 10 Unit(s) SubCutaneous every 6 hours  LORazepam     Tablet 1 milliGRAM(s) Oral every 12 hours  methadone   Solution 5 milliGRAM(s) Oral every 8 hours  midazolam Injectable 2 milliGRAM(s) IV Push once  multivitamin/minerals Oral Solution (WELLESSE) 30 milliLiter(s) Enteral Tube daily  naloxegol 25 milliGRAM(s) Oral daily  phenylephrine    Infusion 0.5 MICROgram(s)/kG/Min (15.3 mL/Hr) IV Continuous <Continuous>  polyethylene glycol 3350 17 Gram(s) Oral daily  senna Syrup 10 milliLiter(s) Oral at bedtime  ursodiol Tablet 500 milliGRAM(s) Oral two times a day  vancomycin  IVPB 500 milliGRAM(s) IV Intermittent <User Schedule>    MEDICATIONS  (PRN):  acetaminophen    Suspension .. 650 milliGRAM(s) Oral every 6 hours PRN Temp greater or equal to 38C (100.4F), Mild Pain (1 - 3), Moderate Pain (4 - 6)      ITEMS UNCHECKED ARE NOT PRESENT    PRESENT SYMPTOMS: [x ]Unable to obtain due to poor mentation   Source if other than patient:  [ ]Family   [ ]Team     Pain:  [ ]yes [ ]no  QOL impact -   Location -                    Aggravating factors -  Quality -  Radiation -  Timing-  Severity (0-10 scale):  Minimal acceptable level (0-10 scale):     Dyspnea:                           [ ]Mild [ ]Moderate [ ]Severe  Anxiety:                             [ ]Mild [ ]Moderate [ ]Severe  Fatigue:                             [ ]Mild [ ]Moderate [ ]Severe  Nausea:                             [ ]Mild [ ]Moderate [ ]Severe  Loss of appetite:              [ ]Mild [ ]Moderate [ ]Severe  Constipation:                    [ ]Mild [ ]Moderate [ ]Severe    CPOT:    https://www.[facility].org/getattachment/[email or web address]/Critical-Care-Pain-Observation-Tool-(CPOT)    PAIN AD Score:	0  http://geriatrictoolkit.[facility]/cog/painad.pdf (Ctrl + left click to view)    Other Symptoms:  [ ]All other review of systems negative     Palliative Performance Status Version 2:     10    %      http://npcrc.org/files/news/palliative_performance_scale_ppsv2.pdf  PHYSICAL EXAM:  Vital Signs Last 24 Hrs  T(C): 36.9 (28 Sep 2021 08:00), Max: 39.3 (27 Sep 2021 22:00)  T(F): 98.4 (28 Sep 2021 08:00), Max: 102.7 (27 Sep 2021 22:00)  HR: 88 (28 Sep 2021 13:11) (81 - 112)  BP: 152/79 (28 Sep 2021 10:00) (65/44 - 195/100)  BP(mean): 106 (28 Sep 2021 10:00) (50 - 137)  RR: 28 (28 Sep 2021 10:00) (12 - 52)  SpO2: 96% (28 Sep 2021 13:11) (87% - 100%) I&O's Summary    27 Sep 2021 07:01  -  28 Sep 2021 07:00  --------------------------------------------------------  IN: 2926.7 mL / OUT: 1100 mL / NET: 1826.7 mL    28 Sep 2021 07:01  -  28 Sep 2021 13:47  --------------------------------------------------------  IN: 125 mL / OUT: 0 mL / NET: 125 mL       GENERAL:  [ ]Alert  [ ]Oriented x   [ ]Lethargic  [ ]Cachexia  [x ]Unarousable  [ ]Verbal  [ ]Non-Verbal  Behavioral:   [ ]Anxiety  [ ]Delirium [ ]Agitation [ ]Other  HEENT:  [ ]Normal   [ ]Dry mouth   [x ]ET Tube/Trach  [ ]Oral lesions  PULMONARY:   [x ]Clear [ ]Tachypnea  [ ]Audible excessive secretions   [ ]Rhonchi        [ ]Right [ ]Left [ ]Bilateral  [ ]Crackles        [ ]Right [ ]Left [ ]Bilateral  [ ]Wheezing     [ ]Right [ ]Left [ ]Bilateral  [ ]Diminished BS [ ] Right [ ]Left [ ]Bilateral  CARDIOVASCULAR:    [x ]Regular [ ]Irregular [ ]Tachy  [ ]Lei [ ]Murmur [ ]Other  GASTROINTESTINAL:  [x ]Soft  [ ]Distended   [ x]+BS  [x ]Non tender [ ]Tender  [ ]PEG [ x]OGT/ NGT   Last BM:    GENITOURINARY:  [ ]Normal [ ]Incontinent   [ ]Oliguria/Anuria   [x ]Ramos  MUSCULOSKELETAL:   [ ]Normal   [ ]Weakness  [x ]Bed/Wheelchair bound [ ]Edema  NEUROLOGIC: not responsive  [ ]No focal deficits  [ ] Cognitive impairment  [ ] Dysphagia [ ]Dysarthria [ ] Paresis [x ]Other   SKIN:   [ ]Normal  [ ]Rash   [ x]Pressure ulcer(s) [ ]y [ ]n present on admission    CRITICAL CARE:  [ ]Shock Present  [ ]Septic [ ]Cardiogenic [ ]Neurologic [ ]Hypovolemic  [ ]Vasopressors [ ]Inotropes  [ x]Respiratory failure present [x ]Mechanical Ventilation [ ]Non-invasive ventilatory support [ ]High-Flow Mode: AC/ CMV (Assist Control/ Continuous Mandatory Ventilation), RR (machine): 12, FiO2: 55, PEEP: 5, PS: 24, ITime: 0.7, MAP: 15, PC: 24, PIP: 31  [x ]Acute  [ ]Chronic [x ]Hypoxic  [ ]Hypercarbic [ ]Other  [ ]Other organ failure     LABS:                        8.2    13.69 )-----------( 337      ( 28 Sep 2021 00:24 )             28.5   09-28    149<H>  |  108  |  64<H>  ----------------------------<  198<H>  3.4<L>   |  26  |  1.11    Ca    9.7      28 Sep 2021 00:24  Phos  4.8     09-28  Mg     2.9     09-28    TPro  6.3  /  Alb  2.3<L>  /  TBili  4.5<H>  /  DBili  x   /  AST  161<H>  /  ALT  119<H>  /  AlkPhos  1877<H>  09-28  PT/INR - ( 28 Sep 2021 00:24 )   PT: 12.7 sec;   INR: 1.06 ratio         PTT - ( 28 Sep 2021 00:24 )  PTT:28.2 sec      RADIOLOGY & ADDITIONAL STUDIES: all recent imaging reviewed    Protein Calorie Malnutrition Present: [ ]mild [ ]moderate [ ]severe [ ]underweight [ ]morbid obesity  https://www.andeal.org/vault/2440/web/files/ONC/Table_Clinical%20Characteristics%20to%20Document%20Malnutrition-White%20JV%20et%20al%202012.pdf    Height (cm): 172.7 (08-12-21 @ 13:34)  Weight (kg): 81.6 (08-12-21 @ 13:34)  BMI (kg/m2): 27.4 (08-12-21 @ 13:34)    [ ]PPSV2 < or = 30%  [ ]significant weight loss [ x]poor nutritional intake [ ]anasarca    [ ]Artificial Nutrition    REFERRALS:   [ ]Chaplaincy  [ ]Hospice  [ ]Child Life  [x ]Social Work  [ ]Case management [ ]Holistic Therapy     Goals of Care Document:

## 2021-09-28 NOTE — PROGRESS NOTE ADULT - ATTENDING COMMENTS
1. Acute hypoxemic respiratory failure from Covid pneumonia and ARDS. Still requiring ~60% FIO2. Pt vented for > 30 days. Family avoiding making decision about tracheostomy  vs. comfort care.  2. Hypernatremia: Increase free water to 500mls q 6 hrs.  3. S/P 6 min cardiac arrest. ? anoxic encephelopathy vs. metabolic encephalopathy from covid.  4.Transaminiitis likely from covid and or sepsis. Slowly decreasing. No evidence of biliary obstruction.  5. DVT Right side , below knee on L . Of AC due to hematoma R thigh requiring PRBC.  6. ID MRSE bacteremia. Continue  Vancomycin. Cultures still positive. If aggressive care to be continued will need SEJAL r/o endocarditis.  7. Proctitis seen on CT abd. On Unasyn.  8.GOC: Family needs to decide about tracheostomy vs comfort care.

## 2021-09-28 NOTE — PROGRESS NOTE ADULT - SUBJECTIVE AND OBJECTIVE BOX
Wound Surgery Progress Note:    HPI:  70yo M w/ PMHx of HTN, DM2, BPH, asthma presents with shortness of breath. Patient endorses that he has been feeling SOB with associated cough for the last 2 weeks and his symptoms slowly worsened over time. He did not receive the COVID vaccine. He reports that his family members whom he lives with likely are also COVID positive but he does not know for sure. He did not try taking anything for his symptoms other than his usual medications/inhalers. There were no obvious aggravating or alleviating factors. Currently with supplemental oxygen on, he feels well and has no complaints, however he presented to [facility] since his symptoms were worsening. In the ED, he was hemodynamically stable, afebrile, but he was encephalopathic and hypoxic on room air requiring high flow nasal canula. Labs were significant for mild hyperkalemia and COVID positive. CXR prelim read showed bilateral patchy opacities. CT head showed no acute findings. MICU was consulted in the ED, patient was deemed not a MICU candidate. Patient was given doxycycline and dexamethasone x1 and admitted to general medicine for further management. At time of interview, patient was A/Ox3 with .  (13 Aug 2021 04:21)    Follow up visit to patient for sacral wound management. Sacral wound deep tissue injury continues to evolve now with adherent black moist, eschar over 75% of wound bed. Mr. Nick was encountered on an alternating air with low air loss surface in a Left turned position. He is grossly incontinent of stool and urine. He was unable turn and reposition self in bed. His extreme immobility, inactivity, gross incontinence of urine and stool as well as poor nutritional status all contribute to his high risk for pressure injury development and hinder healing.    PAST MEDICAL & SURGICAL HISTORY:  BPH (benign prostatic hyperplasia)  Hyperlipemia  HTN (hypertension)  Hypercholesteremia  Asthma  Diabetes  Kidney stone  Bladder stone  H/O lithotripsy, x 2  History of kidney stones  H/O umbilical hernia repair    MEDICATIONS  (STANDING):  ALBUTerol    90 MICROgram(s) HFA Inhaler 2 Puff(s) Inhalation every 8 hours  ampicillin/sulbactam  IVPB      ampicillin/sulbactam  IVPB 3 Gram(s) IV Intermittent every 6 hours  budesonide 160 MICROgram(s)/formoterol 4.5 MICROgram(s) Inhaler 2 Puff(s) Inhalation two times a day  chlorhexidine 0.12% Liquid 15 milliLiter(s) Oral Mucosa every 12 hours  chlorhexidine 4% Liquid 1 Application(s) Topical <User Schedule>  cholecalciferol 2000 Unit(s) Oral daily  dextrose 40% Gel 15 Gram(s) Oral once  dextrose 5%. 1000 milliLiter(s) (50 mL/Hr) IV Continuous <Continuous>  dextrose 5%. 1000 milliLiter(s) (100 mL/Hr) IV Continuous <Continuous>  dextrose 50% Injectable 25 Gram(s) IV Push once  dextrose 50% Injectable 12.5 Gram(s) IV Push once  dextrose 50% Injectable 25 Gram(s) IV Push once  glucagon  Injectable 1 milliGRAM(s) IntraMuscular once  insulin lispro (ADMELOG) corrective regimen sliding scale   SubCutaneous every 6 hours  insulin NPH human recombinant 10 Unit(s) SubCutaneous every 6 hours  LORazepam     Tablet 1 milliGRAM(s) Oral every 12 hours  methadone   Solution 5 milliGRAM(s) Oral every 8 hours  multivitamin/minerals Oral Solution (WELLESSE) 30 milliLiter(s) Enteral Tube daily  naloxegol 25 milliGRAM(s) Oral daily  phenylephrine    Infusion 0.5 MICROgram(s)/kG/Min (15.3 mL/Hr) IV Continuous <Continuous>  polyethylene glycol 3350 17 Gram(s) Oral daily  senna Syrup 10 milliLiter(s) Oral at bedtime  ursodiol Tablet 500 milliGRAM(s) Oral two times a day  vancomycin  IVPB 500 milliGRAM(s) IV Intermittent <User Schedule>    MEDICATIONS  (PRN):  acetaminophen    Suspension .. 650 milliGRAM(s) Oral every 6 hours PRN Temp greater or equal to 38C (100.4F), Mild Pain (1 - 3), Moderate Pain (4 - 6)    Allergies    No Known Allergies    Intolerances    Vital Signs Last 24 Hrs  T(C): 36.9 (28 Sep 2021 08:00), Max: 39.3 (27 Sep 2021 22:00)  T(F): 98.4 (28 Sep 2021 08:00), Max: 102.7 (27 Sep 2021 22:00)  HR: 89 (28 Sep 2021 10:00) (81 - 112)  BP: 152/79 (28 Sep 2021 10:00) (65/44 - 195/100)  BP(mean): 106 (28 Sep 2021 10:00) (50 - 137)  RR: 28 (28 Sep 2021 10:00) (12 - 52)  SpO2: 95% (28 Sep 2021 10:00) (87% - 100%)    Physical Exam:  General: nonresponsive, ill appearing   Respiratory: vented  Gastrointestinal: soft NT/ND  Neurology: nonverbal, not following commands  Musculoskeletal: no contractures  Vascular: BLE edema equal  Skin:  Sacral/bilateral buttocks with evolving central eschar covering 75% of wound bed and peripheral superficially denuded tissue, L 10cm x W 8cm x D unknown - scant serosanguinous drainage, No odor, erythema, increased warmth, tenderness, induration, fluctuance    LABS:  09-28    149<H>  |  108  |  64<H>  ----------------------------<  198<H>  3.4<L>   |  26  |  1.11    Ca    9.7      28 Sep 2021 00:24  Phos  4.8     09-28  Mg     2.9     09-28    TPro  6.3  /  Alb  2.3<L>  /  TBili  4.5<H>  /  DBili  x   /  AST  161<H>  /  ALT  119<H>  /  AlkPhos  1877<H>  09-28                          8.2    13.69 )-----------( 337      ( 28 Sep 2021 00:24 )             28.5     PT/INR - ( 28 Sep 2021 00:24 )   PT: 12.7 sec;   INR: 1.06 ratio         PTT - ( 28 Sep 2021 00:24 )  PTT:28.2 sec

## 2021-09-28 NOTE — PROGRESS NOTE ADULT - PROBLEM SELECTOR PLAN 4
in the event family decides against trach and agrees to compassionate extubation, please reconsult.  MICU team present during family meeting. no further role for palliative care identified at this time.  [phone]

## 2021-09-28 NOTE — PROGRESS NOTE ADULT - PROBLEM SELECTOR PLAN 3
C narrative as above  family awaiting MRI  in the event they decide against trach and are interested in palliative means, please let our team know

## 2021-09-29 NOTE — CHART NOTE - NSCHARTNOTEFT_GEN_A_CORE
:  Jose Antonio Card MD  INDICATION:  Respiratory failure.  PROCEDURE:  [x ] LIMITED ECHO  [x ] LIMITED CHEST  [ ] LIMITED RETROPERITONEAL  [ ] LIMITED ABDOMINAL  [ ] LIMITED DVT  [ ] NEEDLE GUIDANCE VASCULAR  [ ] NEEDLE GUIDANCE THORACENTESIS  [ ] NEEDLE GUIDANCE PARACENTESIS  [ ] NEEDLE GUIDANCE PERICARDIOCENTESIS  [ ] OTHER    FINDINGS: CHEST: Few focal B lines with irregular pleura bilaterally. No pleural effusions.                  ECHO. Normal LVF.      INTERPRETATION:  Few focal B lines bilaterally likely representing residual covid  19 pneumonia.                               Normal LVF.

## 2021-09-29 NOTE — PROGRESS NOTE ADULT - SUBJECTIVE AND OBJECTIVE BOX
INTERVAL HPI/OVERNIGHT EVENTS: Hgb 6.7, repeat 7.1; no transfusion given. Persistent hypernatremia but NPO for trach; D5 at 50cc/hr added.    SUBJECTIVE: Patient seen and examined at bedside.     ROS: unable to assess    OBJECTIVE:    VITAL SIGNS:  ICU Vital Signs Last 24 Hrs  T(C): 37.1 (29 Sep 2021 04:00), Max: 37.5 (28 Sep 2021 22:00)  T(F): 98.8 (29 Sep 2021 04:00), Max: 99.5 (28 Sep 2021 22:00)  HR: 86 (29 Sep 2021 06:10) (81 - 103)  BP: 161/99 (29 Sep 2021 06:00) (85/50 - 163/90)  BP(mean): 125 (29 Sep 2021 06:00) (61 - 125)  ABP: --  ABP(mean): --  RR: 26 (29 Sep 2021 06:00) (18 - 46)  SpO2: 93% (29 Sep 2021 06:10) (90% - 100%)    Mode: AC/ CMV (Assist Control/ Continuous Mandatory Ventilation), RR (machine): 12, FiO2: 55, PEEP: 5, PS: , ITime: 0.7, MAP: 13, PC: 24, PIP: 32    09-28 @ 07:01  -  09-29 @ 07:00  --------------------------------------------------------  IN: 2650 mL / OUT: 1250 mL / NET: 1400 mL      CAPILLARY BLOOD GLUCOSE      POCT Blood Glucose.: 138 mg/dL (29 Sep 2021 05:12)      PHYSICAL EXAM:    General: intubated, off sedation  HEENT: NCAT, clear conjunctiva, sclera icteric, pupils sluggish  Neck: supple, no JVD  Respiratory: CTAB  Cardiovascular: RRR, S1S2, no m/r/g  Abdomen: soft, nontender, nondistended, normal bowel sounds  Extremities: no edema, no cyanosis  Skin: warm, perfused, b/l erythema on buttocks  Neurological: nonfocal, no response to pain, sternal rub    MEDICATIONS:  MEDICATIONS  (STANDING):  ALBUTerol    90 MICROgram(s) HFA Inhaler 2 Puff(s) Inhalation every 8 hours  ampicillin/sulbactam  IVPB      ampicillin/sulbactam  IVPB 3 Gram(s) IV Intermittent every 6 hours  budesonide 160 MICROgram(s)/formoterol 4.5 MICROgram(s) Inhaler 2 Puff(s) Inhalation two times a day  chlorhexidine 0.12% Liquid 15 milliLiter(s) Oral Mucosa every 12 hours  chlorhexidine 4% Liquid 1 Application(s) Topical <User Schedule>  cholecalciferol 2000 Unit(s) Oral daily  dextrose 40% Gel 15 Gram(s) Oral once  dextrose 5%. 1000 milliLiter(s) (50 mL/Hr) IV Continuous <Continuous>  dextrose 5%. 1000 milliLiter(s) (50 mL/Hr) IV Continuous <Continuous>  dextrose 5%. 1000 milliLiter(s) (100 mL/Hr) IV Continuous <Continuous>  dextrose 50% Injectable 25 Gram(s) IV Push once  dextrose 50% Injectable 12.5 Gram(s) IV Push once  dextrose 50% Injectable 25 Gram(s) IV Push once  glucagon  Injectable 1 milliGRAM(s) IntraMuscular once  insulin lispro (ADMELOG) corrective regimen sliding scale   SubCutaneous every 6 hours  insulin NPH human recombinant 10 Unit(s) SubCutaneous every 6 hours  LORazepam     Tablet 1 milliGRAM(s) Oral every 12 hours  methadone   Solution 5 milliGRAM(s) Oral every 8 hours  multivitamin/minerals Oral Solution (WELLESSE) 30 milliLiter(s) Enteral Tube daily  naloxegol 25 milliGRAM(s) Oral daily  phenylephrine    Infusion 0.5 MICROgram(s)/kG/Min (15.3 mL/Hr) IV Continuous <Continuous>  polyethylene glycol 3350 17 Gram(s) Oral daily  senna Syrup 10 milliLiter(s) Oral at bedtime  ursodiol Tablet 500 milliGRAM(s) Oral two times a day  vancomycin  IVPB 500 milliGRAM(s) IV Intermittent <User Schedule>    MEDICATIONS  (PRN):  acetaminophen    Suspension .. 650 milliGRAM(s) Oral every 6 hours PRN Temp greater or equal to 38C (100.4F), Mild Pain (1 - 3), Moderate Pain (4 - 6)      ALLERGIES:  Allergies    No Known Allergies    Intolerances        LABS:                        7.1    13.39 )-----------( 295      ( 29 Sep 2021 02:10 )             23.7     09-29    153<H>  |  111<H>  |  66<H>  ----------------------------<  193<H>  3.0<L>   |  30  |  1.08    Ca    9.2      29 Sep 2021 00:10  Phos  3.9     09-29  Mg     2.8     09-29    TPro  5.4<L>  /  Alb  2.3<L>  /  TBili  4.1<H>  /  DBili  x   /  AST  181<H>  /  ALT  121<H>  /  AlkPhos  1517<H>  09-29    PT/INR - ( 29 Sep 2021 00:10 )   PT: 11.9 sec;   INR: 0.99 ratio         PTT - ( 29 Sep 2021 00:10 )  PTT:27.1 sec      RADIOLOGY & ADDITIONAL TESTS: Reviewed.     INTERVAL HPI/OVERNIGHT EVENTS: Hgb 6.7, repeat 7.1; no transfusion given. Persistent hypernatremia but NPO for trach; D5 at 50cc/hr added.    SUBJECTIVE: Patient seen and examined at bedside. Pending decision on trach vs extubation from family; informed th    ROS: unable to assess    OBJECTIVE:    VITAL SIGNS:  ICU Vital Signs Last 24 Hrs  T(C): 37.1 (29 Sep 2021 04:00), Max: 37.5 (28 Sep 2021 22:00)  T(F): 98.8 (29 Sep 2021 04:00), Max: 99.5 (28 Sep 2021 22:00)  HR: 86 (29 Sep 2021 06:10) (81 - 103)  BP: 161/99 (29 Sep 2021 06:00) (85/50 - 163/90)  BP(mean): 125 (29 Sep 2021 06:00) (61 - 125)  ABP: --  ABP(mean): --  RR: 26 (29 Sep 2021 06:00) (18 - 46)  SpO2: 93% (29 Sep 2021 06:10) (90% - 100%)    Mode: AC/ CMV (Assist Control/ Continuous Mandatory Ventilation), RR (machine): 12, FiO2: 55, PEEP: 5, PS: , ITime: 0.7, MAP: 13, PC: 24, PIP: 32    09-28 @ 07:01  -  09-29 @ 07:00  --------------------------------------------------------  IN: 2650 mL / OUT: 1250 mL / NET: 1400 mL      CAPILLARY BLOOD GLUCOSE      POCT Blood Glucose.: 138 mg/dL (29 Sep 2021 05:12)      PHYSICAL EXAM:    General: intubated, off sedation  HEENT: NCAT, clear conjunctiva, sclera icteric, pupils sluggish  Neck: supple, no JVD  Respiratory: CTAB  Cardiovascular: RRR, S1S2, no m/r/g  Abdomen: soft, nontender, nondistended, normal bowel sounds  Extremities: no edema, no cyanosis  Skin: warm, perfused, b/l erythema on buttocks  Neurological: nonfocal, no response to pain, sternal rub    MEDICATIONS:  MEDICATIONS  (STANDING):  ALBUTerol    90 MICROgram(s) HFA Inhaler 2 Puff(s) Inhalation every 8 hours  ampicillin/sulbactam  IVPB      ampicillin/sulbactam  IVPB 3 Gram(s) IV Intermittent every 6 hours  budesonide 160 MICROgram(s)/formoterol 4.5 MICROgram(s) Inhaler 2 Puff(s) Inhalation two times a day  chlorhexidine 0.12% Liquid 15 milliLiter(s) Oral Mucosa every 12 hours  chlorhexidine 4% Liquid 1 Application(s) Topical <User Schedule>  cholecalciferol 2000 Unit(s) Oral daily  dextrose 40% Gel 15 Gram(s) Oral once  dextrose 5%. 1000 milliLiter(s) (50 mL/Hr) IV Continuous <Continuous>  dextrose 5%. 1000 milliLiter(s) (50 mL/Hr) IV Continuous <Continuous>  dextrose 5%. 1000 milliLiter(s) (100 mL/Hr) IV Continuous <Continuous>  dextrose 50% Injectable 25 Gram(s) IV Push once  dextrose 50% Injectable 12.5 Gram(s) IV Push once  dextrose 50% Injectable 25 Gram(s) IV Push once  glucagon  Injectable 1 milliGRAM(s) IntraMuscular once  insulin lispro (ADMELOG) corrective regimen sliding scale   SubCutaneous every 6 hours  insulin NPH human recombinant 10 Unit(s) SubCutaneous every 6 hours  LORazepam     Tablet 1 milliGRAM(s) Oral every 12 hours  methadone   Solution 5 milliGRAM(s) Oral every 8 hours  multivitamin/minerals Oral Solution (WELLESSE) 30 milliLiter(s) Enteral Tube daily  naloxegol 25 milliGRAM(s) Oral daily  phenylephrine    Infusion 0.5 MICROgram(s)/kG/Min (15.3 mL/Hr) IV Continuous <Continuous>  polyethylene glycol 3350 17 Gram(s) Oral daily  senna Syrup 10 milliLiter(s) Oral at bedtime  ursodiol Tablet 500 milliGRAM(s) Oral two times a day  vancomycin  IVPB 500 milliGRAM(s) IV Intermittent <User Schedule>    MEDICATIONS  (PRN):  acetaminophen    Suspension .. 650 milliGRAM(s) Oral every 6 hours PRN Temp greater or equal to 38C (100.4F), Mild Pain (1 - 3), Moderate Pain (4 - 6)      ALLERGIES:  Allergies    No Known Allergies    Intolerances        LABS:                        7.1    13.39 )-----------( 295      ( 29 Sep 2021 02:10 )             23.7     09-29    153<H>  |  111<H>  |  66<H>  ----------------------------<  193<H>  3.0<L>   |  30  |  1.08    Ca    9.2      29 Sep 2021 00:10  Phos  3.9     09-29  Mg     2.8     09-29    TPro  5.4<L>  /  Alb  2.3<L>  /  TBili  4.1<H>  /  DBili  x   /  AST  181<H>  /  ALT  121<H>  /  AlkPhos  1517<H>  09-29    PT/INR - ( 29 Sep 2021 00:10 )   PT: 11.9 sec;   INR: 0.99 ratio         PTT - ( 29 Sep 2021 00:10 )  PTT:27.1 sec      RADIOLOGY & ADDITIONAL TESTS: Reviewed.     INTERVAL HPI/OVERNIGHT EVENTS: Hgb 6.7, repeat 7.1; no transfusion given. Persistent hypernatremia but NPO for trach; D5 at 50cc/hr added.    SUBJECTIVE: Patient seen and examined at bedside. Pending decision on trach vs extubation from family; informed them about results of brain MRI w/o evidence of anoxic brain injury. This AM, patient blinking, but not tracking.    ROS: unable to assess    OBJECTIVE:    VITAL SIGNS:  ICU Vital Signs Last 24 Hrs  T(C): 37.1 (29 Sep 2021 04:00), Max: 37.5 (28 Sep 2021 22:00)  T(F): 98.8 (29 Sep 2021 04:00), Max: 99.5 (28 Sep 2021 22:00)  HR: 86 (29 Sep 2021 06:10) (81 - 103)  BP: 161/99 (29 Sep 2021 06:00) (85/50 - 163/90)  BP(mean): 125 (29 Sep 2021 06:00) (61 - 125)  ABP: --  ABP(mean): --  RR: 26 (29 Sep 2021 06:00) (18 - 46)  SpO2: 93% (29 Sep 2021 06:10) (90% - 100%)    Mode: AC/ CMV (Assist Control/ Continuous Mandatory Ventilation), RR (machine): 12, FiO2: 55, PEEP: 5, PS: , ITime: 0.7, MAP: 13, PC: 24, PIP: 32    09-28 @ 07:01  -  09-29 @ 07:00  --------------------------------------------------------  IN: 2650 mL / OUT: 1250 mL / NET: 1400 mL      CAPILLARY BLOOD GLUCOSE      POCT Blood Glucose.: 138 mg/dL (29 Sep 2021 05:12)      PHYSICAL EXAM:    General: intubated, off sedation  HEENT: NCAT, clear conjunctiva, sclera icteric, pupils sluggish, blinks spontaneously  Neck: supple, no JVD  Respiratory: CTAB  Cardiovascular: RRR, S1S2, no m/r/g  Abdomen: soft, nontender, nondistended, normal bowel sounds  Extremities: no edema, no cyanosis  Skin: warm, perfused, b/l erythema on buttocks  Neurological: nonfocal, no response to pain, sternal rub    MEDICATIONS:  MEDICATIONS  (STANDING):  ALBUTerol    90 MICROgram(s) HFA Inhaler 2 Puff(s) Inhalation every 8 hours  ampicillin/sulbactam  IVPB      ampicillin/sulbactam  IVPB 3 Gram(s) IV Intermittent every 6 hours  budesonide 160 MICROgram(s)/formoterol 4.5 MICROgram(s) Inhaler 2 Puff(s) Inhalation two times a day  chlorhexidine 0.12% Liquid 15 milliLiter(s) Oral Mucosa every 12 hours  chlorhexidine 4% Liquid 1 Application(s) Topical <User Schedule>  cholecalciferol 2000 Unit(s) Oral daily  dextrose 40% Gel 15 Gram(s) Oral once  dextrose 5%. 1000 milliLiter(s) (50 mL/Hr) IV Continuous <Continuous>  dextrose 5%. 1000 milliLiter(s) (50 mL/Hr) IV Continuous <Continuous>  dextrose 5%. 1000 milliLiter(s) (100 mL/Hr) IV Continuous <Continuous>  dextrose 50% Injectable 25 Gram(s) IV Push once  dextrose 50% Injectable 12.5 Gram(s) IV Push once  dextrose 50% Injectable 25 Gram(s) IV Push once  glucagon  Injectable 1 milliGRAM(s) IntraMuscular once  insulin lispro (ADMELOG) corrective regimen sliding scale   SubCutaneous every 6 hours  insulin NPH human recombinant 10 Unit(s) SubCutaneous every 6 hours  LORazepam     Tablet 1 milliGRAM(s) Oral every 12 hours  methadone   Solution 5 milliGRAM(s) Oral every 8 hours  multivitamin/minerals Oral Solution (WELLESSE) 30 milliLiter(s) Enteral Tube daily  naloxegol 25 milliGRAM(s) Oral daily  phenylephrine    Infusion 0.5 MICROgram(s)/kG/Min (15.3 mL/Hr) IV Continuous <Continuous>  polyethylene glycol 3350 17 Gram(s) Oral daily  senna Syrup 10 milliLiter(s) Oral at bedtime  ursodiol Tablet 500 milliGRAM(s) Oral two times a day  vancomycin  IVPB 500 milliGRAM(s) IV Intermittent <User Schedule>    MEDICATIONS  (PRN):  acetaminophen    Suspension .. 650 milliGRAM(s) Oral every 6 hours PRN Temp greater or equal to 38C (100.4F), Mild Pain (1 - 3), Moderate Pain (4 - 6)      ALLERGIES:  Allergies    No Known Allergies    Intolerances        LABS:                        7.1    13.39 )-----------( 295      ( 29 Sep 2021 02:10 )             23.7     09-29    153<H>  |  111<H>  |  66<H>  ----------------------------<  193<H>  3.0<L>   |  30  |  1.08    Ca    9.2      29 Sep 2021 00:10  Phos  3.9     09-29  Mg     2.8     09-29    TPro  5.4<L>  /  Alb  2.3<L>  /  TBili  4.1<H>  /  DBili  x   /  AST  181<H>  /  ALT  121<H>  /  AlkPhos  1517<H>  09-29    PT/INR - ( 29 Sep 2021 00:10 )   PT: 11.9 sec;   INR: 0.99 ratio         PTT - ( 29 Sep 2021 00:10 )  PTT:27.1 sec      RADIOLOGY & ADDITIONAL TESTS: Reviewed.

## 2021-09-29 NOTE — PROGRESS NOTE ADULT - ASSESSMENT
71 year old man w/pmhx of HTN, DM2, BPH, and Asthma here for acute hypoxic respiratory failure and ARDS secondary to viral pneumonia from COVID-19. Hospital course complicated by VT arrest, DVT and MRSE bacteremia.    #Neuro   1) off sedation  - decreased methadone increased to 5 q8h and Ativan 1mg q12h as of 9/27; patient still unresponsive  - intermittently agitated with tachypnea requiring Fentanyl pushes; given Versed 2mg 9/28 am  - noncont CT 9/13 appears unchanged since 8/12  - brain MRI 9/28 w/o evidence of anoxic injury, shows microvascular ischemic changes    2) r/o seizure  -had rhythmic R arm movement  - 24h EEG with generalized slowing; no evidence of seizure (per report 9/21 AM)    #ENT   -oropharyngeal packing removed on 9/10  -ENT said they will pack if patient's h/h drops  -will need FiO2 at 50% or less and PaO2 at 60 for qualify for trach    #CV   1)Cardiac Arrest VT  - S/p Amiodarone and electrolyte supplementation   - Recurrent runs of Vtach 9/26; started on metoprolol but stopped in s/o hypertension  - Echo reveals Right hrt strain   -holding diuretics; no indication, no evidence of pleural effusion, pulm edema on chest CT (9/24)    #Pulm   1)Acute hypoxemic resp failure 2/2 cardiac arrest  - vent Pressure AC 30/60/55/5; wean as tolerated  - increased purulent secretions as of 9/27; will assess need for pseudomonal coverage    2) Asthma  - c/w Proventil q8h and Symbicort BID    3) Post-covid sequelae  - extensive fibrosis and consolidations at bases b/l  - patient with PO2 75 despite intubation with Fi02 60%, pressure 24 and PEEP 5  - GGO on CT chest; unlikely to improve with 3rd course of steroids, also with persistent fevers and bacteremia despite appropriate abx therapy     #GI   1)Nutrition   - maintain Glucerna tube feeds as tolerated   - c/w senna/ miralax     2)Transaminitis - likely 2/2 cholestasis of sepsis vs SSC CIP (secondary sclerosing cholangitis in critically ill patients), Covid cholangiopathy, drug-induced liver injury  - c/w ursodiol 500 mg BID            - supportive treatment per hepatology  -alk phos, ast/alt downtrending    #Renal  - strict I/Os, monitor lytes; replete PRN    2) Hypernatremia  - up to 153 9/29; switched to D5 50cc/hr from free water boluses    #ID   1)MRSE Bacteremia   - S/P 14 day course of Vanc from 8/20-9/3 therapy, and intermittent gram-&anaerobe coverage   - last postive Bcx 9/26 - will need 14d treatment beyond that   - continue surveillance cultures q48-72h  - TTE w/o evidence of vegetations; will need SEJAL to definitively rule out endocarditis, and GI eval prior to procedure  - CT A/P with evidence of proctitis: unasyn added per ID  - Abx as below     Vanc 500mg qd (level with midnight labs)        - Unasyn 3g q6h (9/24 - ) per ID for gram negative coverage in s/o proctitis    #Endo   1)DM   - c/w NPH 10u q6h  - c/w ISS   - goal Blood Sugar 100-180     #Heme  1)b/l DVTs above knee on left and below knee on R  - off Lovenox since 9/19 given Hgb drop to 6.0 requiring 1u PRBC; drop again 9/22 requiring additional unit  - off DVT ppx in s/o persistent Hgb drops, but will need AC due to known DVT. If family purses trach/PEG, will need IVC filter    2) R thigh hematoma  - patient with stable hematoma; potential source of dropping Hgb    #GOC  -palliative consult following  - FULL CODE  -family updated daily; will decide trach vs extubation

## 2021-09-29 NOTE — PROGRESS NOTE ADULT - ATTENDING COMMENTS
1. Acute hypoxemic respiratory failure from Covid pneumonia and ARDS. Still requiring ~60% FIO2. Pt vented for > 30 days. Family avoiding making decision about tracheostomy  vs. comfort care.  2. Hypernatremia: Increase free water to 500mls q 6 hrs.  3. S/P 6 min cardiac arrest. ? anoxic encephelopathy vs. metabolic encephalopathy from covid.  4.Transaminiitis likely from covid and or sepsis. Slowly decreasing. No evidence of biliary obstruction.  5. DVT Right side , below knee on L . Of AC due to hematoma R thigh requiring PRBC. Will restart tomorrow at low dose IV heparin 45-60 PTT goal.  6. ID MRSE bacteremia. Continue  Vancomycin. Cultures still positive. If aggressive care to be continued will need SEJAL r/o endocarditis.  7. Proctitis seen on CT abd. On Unasyn.  8.GOC: Family has agreed to tracheostomy . Scheduled for this Friday.

## 2021-09-29 NOTE — PROGRESS NOTE ADULT - ASSESSMENT
70yo male with H/O covid pneumonia failed weaning trials. Pt is now in respiratory failure as per primary team and needs a tracheostomy. Family on board.

## 2021-09-29 NOTE — PROGRESS NOTE ADULT - SUBJECTIVE AND OBJECTIVE BOX
ENT ISSUE/POD: respiratory failure    HPI: 70yo M w/ PMHx of HTN, DM2, BPH, asthma admitted with shortness of breath. Pt was found to be Covid+ on 8/12 and intubated on 8/20 for respiratory distress. ENT was consulted for oral bleed. Pt is on therapeutic lovenox. S/P cauterization with silver nitrate and oral packing ( 1 kerlix ) which was removed. ENT was consulted again today      PAST MEDICAL & SURGICAL HISTORY:  BPH (benign prostatic hyperplasia)    Hyperlipemia    HTN (hypertension)    Hypercholesteremia    Asthma    Diabetes    Kidney stone    Bladder stone    H/O lithotripsy  x 2    History of kidney stones    H/O umbilical hernia repair      Allergies    No Known Allergies    Intolerances      MEDICATIONS  (STANDING):  ALBUTerol    90 MICROgram(s) HFA Inhaler 2 Puff(s) Inhalation every 8 hours  ampicillin/sulbactam  IVPB      ampicillin/sulbactam  IVPB 3 Gram(s) IV Intermittent every 6 hours  budesonide 160 MICROgram(s)/formoterol 4.5 MICROgram(s) Inhaler 2 Puff(s) Inhalation two times a day  chlorhexidine 0.12% Liquid 15 milliLiter(s) Oral Mucosa every 12 hours  chlorhexidine 4% Liquid 1 Application(s) Topical <User Schedule>  cholecalciferol 2000 Unit(s) Oral daily  dextrose 40% Gel 15 Gram(s) Oral once  dextrose 5%. 1000 milliLiter(s) (50 mL/Hr) IV Continuous <Continuous>  dextrose 5%. 1000 milliLiter(s) (100 mL/Hr) IV Continuous <Continuous>  dextrose 5%. 1000 milliLiter(s) (50 mL/Hr) IV Continuous <Continuous>  dextrose 50% Injectable 25 Gram(s) IV Push once  dextrose 50% Injectable 12.5 Gram(s) IV Push once  dextrose 50% Injectable 25 Gram(s) IV Push once  glucagon  Injectable 1 milliGRAM(s) IntraMuscular once  insulin lispro (ADMELOG) corrective regimen sliding scale   SubCutaneous every 6 hours  insulin NPH human recombinant 10 Unit(s) SubCutaneous every 6 hours  LORazepam     Tablet 1 milliGRAM(s) Oral every 12 hours  methadone   Solution 5 milliGRAM(s) Oral every 8 hours  multivitamin/minerals Oral Solution (WELLESSE) 30 milliLiter(s) Enteral Tube daily  naloxegol 25 milliGRAM(s) Oral daily  phenylephrine    Infusion 0.5 MICROgram(s)/kG/Min (15.3 mL/Hr) IV Continuous <Continuous>  polyethylene glycol 3350 17 Gram(s) Oral daily  senna Syrup 10 milliLiter(s) Oral at bedtime  ursodiol Tablet 500 milliGRAM(s) Oral two times a day  vancomycin  IVPB 500 milliGRAM(s) IV Intermittent <User Schedule>    MEDICATIONS  (PRN):  acetaminophen    Suspension .. 650 milliGRAM(s) Oral every 6 hours PRN Temp greater or equal to 38C (100.4F), Mild Pain (1 - 3), Moderate Pain (4 - 6)      Social History: see consult    Family history: see consult    ROS:   ENT: all negative except as noted in HPI   Pulm: denies SOB, cough, hemoptysis  Neuro: denies numbness/tingling, loss of sensation  Endo: denies heat/cold intolerance, excessive sweating      Vital Signs Last 24 Hrs  T(C): 36.4 (29 Sep 2021 08:00), Max: 37.5 (28 Sep 2021 22:00)  T(F): 97.5 (29 Sep 2021 08:00), Max: 99.5 (28 Sep 2021 22:00)  HR: 100 (29 Sep 2021 13:01) (84 - 102)  BP: 126/72 (29 Sep 2021 13:00) (85/50 - 168/84)  BP(mean): 93 (29 Sep 2021 13:00) (61 - 125)  RR: 36 (29 Sep 2021 13:00) (17 - 36)  SpO2: 93% (29 Sep 2021 13:01) (90% - 100%)                          7.1    13.39 )-----------( 295      ( 29 Sep 2021 02:10 )             23.7    09-29    151<H>  |  111<H>  |  59<H>  ----------------------------<  164<H>  3.6   |  28  |  1.01    Ca    9.1      29 Sep 2021 11:47  Phos  3.6     09-29  Mg     2.7     09-29    TPro  5.4<L>  /  Alb  2.3<L>  /  TBili  4.1<H>  /  DBili  x   /  AST  181<H>  /  ALT  121<H>  /  AlkPhos  1517<H>  09-29   PT/INR - ( 29 Sep 2021 00:10 )   PT: 11.9 sec;   INR: 0.99 ratio         PTT - ( 29 Sep 2021 00:10 )  PTT:27.1 sec    PHYSICAL EXAM:  Gen: NAD  Skin: No rashes, bruises, or lesions  Head: Normocephalic, Atraumatic  Face: no edema, erythema, or fluctuance. Parotid glands soft without mass  Eyes: no scleral injection  Ears: Right - ear canal clear, TM intact without effusion or erythema. No evidence of any fluid drainage. No mastoid tenderness, erythema, or ear bulging            Left - ear canal clear, TM intact without effusion or erythema. No evidence of any fluid drainage. No mastoid tenderness, erythema, or ear bulging  Nose: Nares bilaterally patent, no discharge  Mouth: No Stridor / Drooling / Trismus.  Mucosa moist, tongue/uvula midline, oropharynx clear  Neck: Flat, supple, no lymphadenopathy, trachea midline, no masses  Lymphatic: No lymphadenopathy  Resp: breathing easily, no stridor  Neuro: facial nerve intact, no facial droop         ENT ISSUE/POD: respiratory failure    HPI: 72yo M w/ PMHx of HTN, DM2, BPH, asthma admitted with shortness of breath. Pt was found to be Covid+ on 8/12 and intubated on 8/20 for respiratory distress. ENT was consulted for oral bleed. Pt is on therapeutic lovenox. S/P cauterization with silver nitrate and oral packing ( 1 kerlix ) which was removed. ENT was consulted again today for trach. Pt has been intubated for over a month and failed weaning trials. As per primary team, pt is not a candidate for extubation and needs a tracheostomy. Family on board. No modifying factors.      PAST MEDICAL & SURGICAL HISTORY:  BPH (benign prostatic hyperplasia)    Hyperlipemia    HTN (hypertension)    Hypercholesteremia    Asthma    Diabetes    Kidney stone    Bladder stone    H/O lithotripsy  x 2    History of kidney stones    H/O umbilical hernia repair      Allergies    No Known Allergies    Intolerances      MEDICATIONS  (STANDING):  ALBUTerol    90 MICROgram(s) HFA Inhaler 2 Puff(s) Inhalation every 8 hours  ampicillin/sulbactam  IVPB      ampicillin/sulbactam  IVPB 3 Gram(s) IV Intermittent every 6 hours  budesonide 160 MICROgram(s)/formoterol 4.5 MICROgram(s) Inhaler 2 Puff(s) Inhalation two times a day  chlorhexidine 0.12% Liquid 15 milliLiter(s) Oral Mucosa every 12 hours  chlorhexidine 4% Liquid 1 Application(s) Topical <User Schedule>  cholecalciferol 2000 Unit(s) Oral daily  dextrose 40% Gel 15 Gram(s) Oral once  dextrose 5%. 1000 milliLiter(s) (50 mL/Hr) IV Continuous <Continuous>  dextrose 5%. 1000 milliLiter(s) (100 mL/Hr) IV Continuous <Continuous>  dextrose 5%. 1000 milliLiter(s) (50 mL/Hr) IV Continuous <Continuous>  dextrose 50% Injectable 25 Gram(s) IV Push once  dextrose 50% Injectable 12.5 Gram(s) IV Push once  dextrose 50% Injectable 25 Gram(s) IV Push once  glucagon  Injectable 1 milliGRAM(s) IntraMuscular once  insulin lispro (ADMELOG) corrective regimen sliding scale   SubCutaneous every 6 hours  insulin NPH human recombinant 10 Unit(s) SubCutaneous every 6 hours  LORazepam     Tablet 1 milliGRAM(s) Oral every 12 hours  methadone   Solution 5 milliGRAM(s) Oral every 8 hours  multivitamin/minerals Oral Solution (WELLESSE) 30 milliLiter(s) Enteral Tube daily  naloxegol 25 milliGRAM(s) Oral daily  phenylephrine    Infusion 0.5 MICROgram(s)/kG/Min (15.3 mL/Hr) IV Continuous <Continuous>  polyethylene glycol 3350 17 Gram(s) Oral daily  senna Syrup 10 milliLiter(s) Oral at bedtime  ursodiol Tablet 500 milliGRAM(s) Oral two times a day  vancomycin  IVPB 500 milliGRAM(s) IV Intermittent <User Schedule>    MEDICATIONS  (PRN):  acetaminophen    Suspension .. 650 milliGRAM(s) Oral every 6 hours PRN Temp greater or equal to 38C (100.4F), Mild Pain (1 - 3), Moderate Pain (4 - 6)      Social History: see consult    Family history: see consult    ROS:   ENT: all negative except as noted in HPI   Pulm: denies SOB, cough, hemoptysis  Neuro: denies numbness/tingling, loss of sensation  Endo: denies heat/cold intolerance, excessive sweating      Vital Signs Last 24 Hrs  T(C): 36.4 (29 Sep 2021 08:00), Max: 37.5 (28 Sep 2021 22:00)  T(F): 97.5 (29 Sep 2021 08:00), Max: 99.5 (28 Sep 2021 22:00)  HR: 100 (29 Sep 2021 13:01) (84 - 102)  BP: 126/72 (29 Sep 2021 13:00) (85/50 - 168/84)  BP(mean): 93 (29 Sep 2021 13:00) (61 - 125)  RR: 36 (29 Sep 2021 13:00) (17 - 36)  SpO2: 93% (29 Sep 2021 13:01) (90% - 100%)                          7.1    13.39 )-----------( 295      ( 29 Sep 2021 02:10 )             23.7    09-29    151<H>  |  111<H>  |  59<H>  ----------------------------<  164<H>  3.6   |  28  |  1.01    Ca    9.1      29 Sep 2021 11:47  Phos  3.6     09-29  Mg     2.7     09-29    TPro  5.4<L>  /  Alb  2.3<L>  /  TBili  4.1<H>  /  DBili  x   /  AST  181<H>  /  ALT  121<H>  /  AlkPhos  1517<H>  09-29   PT/INR - ( 29 Sep 2021 00:10 )   PT: 11.9 sec;   INR: 0.99 ratio         PTT - ( 29 Sep 2021 00:10 )  PTT:27.1 sec    PHYSICAL EXAM:  Gen: NAD  Skin: No rashes, bruises, or lesions  Head: Normocephalic, Atraumatic  Face: no edema, erythema, or fluctuance. Parotid glands soft without mass  Eyes: no scleral injection  Nose: Nares bilaterally patent, no discharge  Mouth: ET tube in place, no tongue swelling. No Stridor / Drooling / Trismus.  Mucosa moist, tongue/uvula midline, oropharynx clear  Neck: Flat, supple, no lymphadenopathy, trachea midline, no masses  Lymphatic: No lymphadenopathy  Resp: breathing easily, no stridor  Neuro: facial nerve intact, no facial droop

## 2021-09-30 NOTE — PRE-ANESTHESIA EVALUATION ADULT - ANESTHESIA, PREVIOUS REACTION, PROFILE
Bilateral pulmonary infiltrates on CXR Aortic stenosis Congestive heart failure Congestive heart failure none

## 2021-09-30 NOTE — CONSULT NOTE ADULT - SUBJECTIVE AND OBJECTIVE BOX
HPI:  72yo M w/ PMHx of HTN, DM2, BPH, asthma presents with shortness of breath. Patient endorses that he has been feeling SOB with associated cough for the last 2 weeks and his symptoms slowly worsened over time. He did not receive the COVID vaccine. He reports that his family members whom he lives with likely are also COVID positive but he does not know for sure. He did not try taking anything for his symptoms other than his usual medications/inhalers. There were no obvious aggravating or alleviating factors. Currently with supplemental oxygen on, he feels well and has no complaints, however he presented to [facility] since his symptoms were worsening.     Hospital course complicated by VT arrest, DVT and MRSE bacteremia.  GI consult was called for PEG placement         No Known Allergies    Home Medications:  amlodipine-valsartan 5 mg-160 mg oral tablet: 1 tab(s) orally once a day AM (13 Aug 2021 04:47)  atorvastatin 20 mg oral tablet: 1 tab(s) orally once a day PM (13 Aug 2021 04:47)  desipramine 10 mg oral tablet: 1 tab(s) orally 3 times a day (13 Aug 2021 04:47)  finasteride 5 mg oral tablet: 1 tab(s) orally once a day (13 Aug 2021 04:47)  Flomax 0.4 mg oral capsule: 1 cap(s) orally 2 times a day (13 Aug 2021 04:47)  metFORMIN 500 mg oral tablet: 1 tab(s) orally 2 times a day (13 Aug 2021 04:47)  montelukast 10 mg oral tablet: 1 tab(s) orally once a day (13 Aug 2021 04:47)  oxybutynin 15 mg/24 hr oral tablet, extended release: 1 tab(s) orally once a day (13 Aug 2021 04:47)  predniSONE 5 mg oral tablet: 1 tab(s) orally once a day (13 Aug 2021 04:47)  Symbicort 160 mcg-4.5 mcg/inh inhalation aerosol: 2 puff(s) inhaled 2 times a day (13 Aug 2021 04:47)      Hospital Medications:  acetaminophen    Suspension .. 650 milliGRAM(s) Oral every 6 hours PRN  ALBUTerol    90 MICROgram(s) HFA Inhaler 2 Puff(s) Inhalation every 8 hours  ampicillin/sulbactam  IVPB      ampicillin/sulbactam  IVPB 3 Gram(s) IV Intermittent every 6 hours  budesonide 160 MICROgram(s)/formoterol 4.5 MICROgram(s) Inhaler 2 Puff(s) Inhalation two times a day  chlorhexidine 0.12% Liquid 15 milliLiter(s) Oral Mucosa every 12 hours  chlorhexidine 4% Liquid 1 Application(s) Topical <User Schedule>  cholecalciferol 2000 Unit(s) Oral daily  dextrose 40% Gel 15 Gram(s) Oral once  dextrose 5%. 1000 milliLiter(s) IV Continuous <Continuous>  dextrose 5%. 1000 milliLiter(s) IV Continuous <Continuous>  dextrose 50% Injectable 25 Gram(s) IV Push once  dextrose 50% Injectable 12.5 Gram(s) IV Push once  dextrose 50% Injectable 25 Gram(s) IV Push once  glucagon  Injectable 1 milliGRAM(s) IntraMuscular once  insulin lispro (ADMELOG) corrective regimen sliding scale   SubCutaneous every 6 hours  insulin NPH human recombinant 10 Unit(s) SubCutaneous every 6 hours  LORazepam     Tablet 1 milliGRAM(s) Oral every 12 hours  methadone   Solution 5 milliGRAM(s) Oral every 8 hours  multivitamin/minerals Oral Solution (WELLESSE) 30 milliLiter(s) Enteral Tube daily  naloxegol 25 milliGRAM(s) Oral daily  phenylephrine    Infusion 0.5 MICROgram(s)/kG/Min IV Continuous <Continuous>  polyethylene glycol 3350 17 Gram(s) Oral daily  senna Syrup 10 milliLiter(s) Oral at bedtime  ursodiol Tablet 500 milliGRAM(s) Oral two times a day  vancomycin  IVPB 500 milliGRAM(s) IV Intermittent <User Schedule>      PMHX/PSHX:  BPH (benign prostatic hyperplasia)    Hyperlipemia    HTN (hypertension)    HTN (hypertension)    Hypercholesteremia    Asthma    Diabetes    Kidney stone    Bladder stone    H/O lithotripsy    History of kidney stones    H/O umbilical hernia repair        Family history:  Family history of diabetes mellitus (Father, Sibling)    Family history of cancer (Mother)        Social History: no smoking    ROS: intubated     PHYSICAL EXAM:   General: intubated, off sedation  HEENT: NCAT, clear conjunctiva, sclera icteric, pupils sluggish, blinks spontaneously  Neck: supple, no JVD  Respiratory: CTAB  Cardiovascular: RRR, S1S2  Abdomen: soft, nontender, nondistended, normal bowel sounds  Extremities: no edema, no cyanosis  Skin: warm  Neurological: nonfocal, no response to pain, sternal rub    Vital Signs:  Vital Signs Last 24 Hrs  T(C): 37.5 (30 Sep 2021 04:00), Max: 37.8 (30 Sep 2021 00:00)  T(F): 99.5 (30 Sep 2021 04:00), Max: 100 (30 Sep 2021 00:00)  HR: 85 (30 Sep 2021 07:00) (84 - 104)  BP: 150/82 (30 Sep 2021 07:00) (82/48 - 168/84)  BP(mean): 109 (30 Sep 2021 07:00) (60 - 121)  RR: 23 (30 Sep 2021 07:00) (2 - 48)  SpO2: 94% (30 Sep 2021 07:00) (90% - 100%)  Daily     Daily     LABS:                        7.1    12.87 )-----------( 291      ( 29 Sep 2021 23:59 )             24.8     Mean Cell Volume: 108.3 fl (09-29-21 @ 23:59)    09-29    151<H>  |  111<H>  |  63<H>  ----------------------------<  198<H>  4.1   |  28  |  1.06    Ca    9.0      29 Sep 2021 23:59  Phos  4.6     09-29  Mg     2.7     09-29    TPro  5.9<L>  /  Alb  2.2<L>  /  TBili  4.4<H>  /  DBili  x   /  AST  160<H>  /  ALT  131<H>  /  AlkPhos  1689<H>  09-29    LIVER FUNCTIONS - ( 29 Sep 2021 23:59 )  Alb: 2.2 g/dL / Pro: 5.9 g/dL / ALK PHOS: 1689 U/L / ALT: 131 U/L / AST: 160 U/L / GGT: x           PT/INR - ( 29 Sep 2021 23:59 )   PT: 11.8 sec;   INR: 0.98 ratio         PTT - ( 29 Sep 2021 23:59 )  PTT:27.1 sec                            7.1    12.87 )-----------( 291      ( 29 Sep 2021 23:59 )             24.8                         7.1    13.39 )-----------( 295      ( 29 Sep 2021 02:10 )             23.7                         6.7    12.26 )-----------( 263      ( 29 Sep 2021 00:10 )             23.7                         8.2    13.69 )-----------( 337      ( 28 Sep 2021 00:24 )             28.5     Imaging:  < from: CT Abdomen and Pelvis w/ IV Cont (09.23.21 @ 18:01) >  EXAM:  CT ABDOMEN AND PELVIS IC                          EXAM:  CT CHEST IC                            PROCEDURE DATE:  09/23/2021            INTERPRETATION:  CLINICAL INFORMATION: Persistent bacteremia    COMPARISON: Chest radiograph dated 9/14/2021. Abdominal CT dated 11/9/2019.    CONTRAST/COMPLICATIONS:  IV Contrast: Omnipaque 350 (accession [id number]), Omnipaque 350  (accession [id number])  90 cc administered   10 cc discarded  Oral Contrast: NONE  Complications: None reported at time of study completion    PROCEDURE:  CT of the Chest, Abdomen and Pelvis was performed.  Sagittal and coronal reformats were performed.    FINDINGS:  CHEST:  LUNGS AND LARGE AIRWAYS: Endotracheal tube in place with tip above the evette. Bilateral reticular, groundglass and consolidative opacities.  PLEURA: Trace right pleural effusion.  VESSELS: Mild atherosclerotic changes.  HEART: Heart size is normal. No pericardial effusion.  MEDIASTINUM AND JJ: No lymphadenopathy. Moderate to large pneumomediastinum.  CHEST WALL AND LOWER NECK: Within normal limits.    ABDOMEN AND PELVIS:  LIVER: Within normal limits.  BILE DUCTS: Normal caliber.  GALLBLADDER: Partially contracted. Mild wall thickening.  SPLEEN: Within normal limits.  PANCREAS: Within normal limits.  ADRENALS: Within normal limits.  KIDNEYS/URETERS: Subcentimeter renal hypodensities, too small to further characterize. No hydronephrosis.    BLADDER: Wall thickening.  REPRODUCTIVE ORGANS: Prostate within normal limits.    BOWEL: No bowel obstruction. Appendix is within normal limits. Rectal temperature probe. Rectal wall thickening. Colonic diverticulosis.  PERITONEUM: Trace ascites.  VESSELS: Atherosclerotic changes.  RETROPERITONEUM/LYMPH NODES: No lymphadenopathy.  ABDOMINAL WALL: Smallfat-containing umbilical hernia. Left inguinal hernia containing fat and fluid. Subcutaneous soft tissue edema.  BONES: Degenerative changes. Bilateral anterolateral sixth and seventh rib fractures.    IMPRESSION:    1. Moderate to large pneumomediastinum, new since chest radiograph from 9/14/2021.  2. Bilateral groundglass and consolidative lung opacities.  3. Nonspecific gallbladder wall thickening.  4. Proctitis.  5. Bilateral anterolateral sixth and seventh rib fractures.        --- End of Report ---    < end of copied text >

## 2021-09-30 NOTE — CONSULT NOTE ADULT - ASSESSMENT
71 year old man w/pmhx of HTN, DM2, BPH, and Asthma here for acute hypoxic respiratory failure and ARDS secondary to viral pneumonia from COVID-19. Hospital course complicated by VT arrest, DVT and MRSE bacteremia.    Impression:  # Dysphagia in the setting of respiratory failure    Recommendations:  - Will plan for EGD + PEG if family is agreeable.  - Keep NPO for now     Mari Rivera MD  Gastroenterology Fellow  Pager: [phone]  Please call answering service [phone] / on-call GI fellow after 5pm and before 8am, and on weekends.   71 year old man w/pmhx of HTN, DM2, BPH, and Asthma here for acute hypoxic respiratory failure and ARDS secondary to viral pneumonia from COVID-19. Hospital course complicated by VT arrest, DVT and MRSE bacteremia.   GI consult was called for PEG placement     Impression:  # Dysphagia in the setting of respiratory failure    Recommendations:  - Repeat COVID testing  - Will plan for EGD + PEG if family is agreeable.  - Keep NPO for now     Mari Rivera MD  Gastroenterology Fellow  Pager: [phone]  Please call answering service [phone] / on-call GI fellow after 5pm and before 8am, and on weekends.

## 2021-09-30 NOTE — PROGRESS NOTE ADULT - SUBJECTIVE AND OBJECTIVE BOX
PreOp Dx: Respiratory failure PreOp Dx: Respiratory failure  Surgeon: Dr. Mejias  Procedure: Left vocal cord injection    ICU Vital Signs Last 24 Hrs  T(C): 37.1 (30 Sep 2021 20:00), Max: 37.8 (30 Sep 2021 00:00)  T(F): 98.8 (30 Sep 2021 20:00), Max: 100 (30 Sep 2021 00:00)  HR: 87 (30 Sep 2021 22:00) (83 - 100)  BP: 155/82 (30 Sep 2021 22:00) (106/65 - 186/94)  BP(mean): 108 (30 Sep 2021 22:00) (81 - 130)  ABP: --  ABP(mean): --  RR: 28 (30 Sep 2021 22:00) (15 - 34)  SpO2: 93% (30 Sep 2021 22:00) (93% - 100%)                          7.1    12.87 )-----------( 291      ( 29 Sep 2021 23:59 )             24.8   09-29    151<H>  |  111<H>  |  63<H>  ----------------------------<  198<H>  4.1   |  28  |  1.06    Ca    9.0      29 Sep 2021 23:59  Phos  4.6     09-29  Mg     2.7     09-29    TPro  5.9<L>  /  Alb  2.2<L>  /  TBili  4.4<H>  /  DBili  x   /  AST  160<H>  /  ALT  131<H>  /  AlkPhos  1689<H>  09-29    CXR: Increased left lung opacity compared to prior study. Pneumomediastinum decreased  EKG: Left sinus tachycardia, ST/T wave abnormality  Type/Screen: Blood available  Consent in chart

## 2021-09-30 NOTE — PROGRESS NOTE ADULT - ATTENDING COMMENTS
1. Acute hypoxemic respiratory failure from Covid pneumonia and ARDS. Still requiring ~60% FIO2. Pt vented for > 30 days. Family avoiding making decision about tracheostomy  vs. comfort care.  2. Hypernatremia: Increase free water to 500mls q 6 hrs.  3. S/P 6 min cardiac arrest. ? anoxic encephelopathy vs. metabolic encephalopathy from covid.  4.Transaminiitis likely from covid and or sepsis. Slowly decreasing. No evidence of biliary obstruction.  5. DVT Right side , below knee on L . Of AC due to hematoma R thigh requiring PRBC. Will restart tomorrow after tracheostomy. at low dose IV heparin 45-60 PTT goal. For PEG today.  6. ID MRSE bacteremia. Continue  Vancomycin. Cultures still positive. If aggressive care to be continued will need SEJAL r/o endocarditis.  7. Proctitis seen on CT abd. On Unasyn.  8.GOC: Family has agreed to tracheostomy . Scheduled for this Friday.

## 2021-09-30 NOTE — PROGRESS NOTE ADULT - SUBJECTIVE AND OBJECTIVE BOX
INTERVAL HPI/OVERNIGHT EVENTS: No events overnight. Bedside trach planned for Friday 12:30p with ENT.    SUBJECTIVE: Patient seen and examined at bedside.     ROS: unable to assess.    OBJECTIVE:    VITAL SIGNS:  ICU Vital Signs Last 24 Hrs  T(C): 37.5 (30 Sep 2021 04:00), Max: 37.8 (30 Sep 2021 00:00)  T(F): 99.5 (30 Sep 2021 04:00), Max: 100 (30 Sep 2021 00:00)  HR: 85 (30 Sep 2021 07:00) (84 - 104)  BP: 150/82 (30 Sep 2021 07:00) (82/48 - 168/84)  BP(mean): 109 (30 Sep 2021 07:00) (60 - 121)  ABP: --  ABP(mean): --  RR: 23 (30 Sep 2021 07:00) (2 - 48)  SpO2: 94% (30 Sep 2021 07:00) (90% - 100%)    Mode: AC/ CMV (Assist Control/ Continuous Mandatory Ventilation), RR (machine): 12, FiO2: 65, PEEP: 5, ITime: 0.7, MAP: 12, PC: 24, PIP: 31    09-29 @ 07:01  -  09-30 @ 07:00  --------------------------------------------------------  IN: 3290 mL / OUT: 1150 mL / NET: 2140 mL      CAPILLARY BLOOD GLUCOSE      POCT Blood Glucose.: 170 mg/dL (30 Sep 2021 05:41)      PHYSICAL EXAM:    `General: intubated, off sedation  HEENT: NCAT, clear conjunctiva, sclera icteric, pupils sluggish, blinks spontaneously  Neck: supple, no JVD  Respiratory: CTAB  Cardiovascular: RRR, S1S2, no m/r/g  Abdomen: soft, nontender, nondistended, normal bowel sounds  Extremities: no edema, no cyanosis  Skin: warm, perfused, b/l erythema on buttocks  Neurological: nonfocal, no response to pain, sternal rub    MEDICATIONS:  MEDICATIONS  (STANDING):  ALBUTerol    90 MICROgram(s) HFA Inhaler 2 Puff(s) Inhalation every 8 hours  ampicillin/sulbactam  IVPB      ampicillin/sulbactam  IVPB 3 Gram(s) IV Intermittent every 6 hours  budesonide 160 MICROgram(s)/formoterol 4.5 MICROgram(s) Inhaler 2 Puff(s) Inhalation two times a day  chlorhexidine 0.12% Liquid 15 milliLiter(s) Oral Mucosa every 12 hours  chlorhexidine 4% Liquid 1 Application(s) Topical <User Schedule>  cholecalciferol 2000 Unit(s) Oral daily  dextrose 40% Gel 15 Gram(s) Oral once  dextrose 5%. 1000 milliLiter(s) (50 mL/Hr) IV Continuous <Continuous>  dextrose 5%. 1000 milliLiter(s) (100 mL/Hr) IV Continuous <Continuous>  dextrose 50% Injectable 25 Gram(s) IV Push once  dextrose 50% Injectable 12.5 Gram(s) IV Push once  dextrose 50% Injectable 25 Gram(s) IV Push once  glucagon  Injectable 1 milliGRAM(s) IntraMuscular once  insulin lispro (ADMELOG) corrective regimen sliding scale   SubCutaneous every 6 hours  insulin NPH human recombinant 10 Unit(s) SubCutaneous every 6 hours  LORazepam     Tablet 1 milliGRAM(s) Oral every 12 hours  methadone   Solution 5 milliGRAM(s) Oral every 8 hours  multivitamin/minerals Oral Solution (WELLESSE) 30 milliLiter(s) Enteral Tube daily  naloxegol 25 milliGRAM(s) Oral daily  phenylephrine    Infusion 0.5 MICROgram(s)/kG/Min (15.3 mL/Hr) IV Continuous <Continuous>  polyethylene glycol 3350 17 Gram(s) Oral daily  senna Syrup 10 milliLiter(s) Oral at bedtime  ursodiol Tablet 500 milliGRAM(s) Oral two times a day  vancomycin  IVPB 500 milliGRAM(s) IV Intermittent <User Schedule>    MEDICATIONS  (PRN):  acetaminophen    Suspension .. 650 milliGRAM(s) Oral every 6 hours PRN Temp greater or equal to 38C (100.4F), Mild Pain (1 - 3), Moderate Pain (4 - 6)      ALLERGIES:  Allergies    No Known Allergies    Intolerances        LABS:                        7.1    12.87 )-----------( 291      ( 29 Sep 2021 23:59 )             24.8     09-29    151<H>  |  111<H>  |  63<H>  ----------------------------<  198<H>  4.1   |  28  |  1.06    Ca    9.0      29 Sep 2021 23:59  Phos  4.6     09-29  Mg     2.7     09-29    TPro  5.9<L>  /  Alb  2.2<L>  /  TBili  4.4<H>  /  DBili  x   /  AST  160<H>  /  ALT  131<H>  /  AlkPhos  1689<H>  09-29    PT/INR - ( 29 Sep 2021 23:59 )   PT: 11.8 sec;   INR: 0.98 ratio         PTT - ( 29 Sep 2021 23:59 )  PTT:27.1 sec      RADIOLOGY & ADDITIONAL TESTS: Reviewed.     INTERVAL HPI/OVERNIGHT EVENTS: No events overnight. Bedside trach planned for Friday 12:30p with ENT. D10 stopped in s/o hyperglycemia to 250s, and free water resumed for persistent hypernatremia.     SUBJECTIVE: Patient seen and examined at bedside. EGD/PEG planned for later today; covid PCR pending.     ROS: unable to assess.    OBJECTIVE:    VITAL SIGNS:  ICU Vital Signs Last 24 Hrs  T(C): 37.5 (30 Sep 2021 04:00), Max: 37.8 (30 Sep 2021 00:00)  T(F): 99.5 (30 Sep 2021 04:00), Max: 100 (30 Sep 2021 00:00)  HR: 85 (30 Sep 2021 07:00) (84 - 104)  BP: 150/82 (30 Sep 2021 07:00) (82/48 - 168/84)  BP(mean): 109 (30 Sep 2021 07:00) (60 - 121)  ABP: --  ABP(mean): --  RR: 23 (30 Sep 2021 07:00) (2 - 48)  SpO2: 94% (30 Sep 2021 07:00) (90% - 100%)    Mode: AC/ CMV (Assist Control/ Continuous Mandatory Ventilation), RR (machine): 12, FiO2: 65, PEEP: 5, ITime: 0.7, MAP: 12, PC: 24, PIP: 31    09-29 @ 07:01  -  09-30 @ 07:00  --------------------------------------------------------  IN: 3290 mL / OUT: 1150 mL / NET: 2140 mL      CAPILLARY BLOOD GLUCOSE      POCT Blood Glucose.: 170 mg/dL (30 Sep 2021 05:41)      PHYSICAL EXAM:    `General: intubated, off sedation  HEENT: NCAT, clear conjunctiva, sclera icteric, pupils sluggish, blinks spontaneously  Neck: supple, no JVD  Respiratory: CTAB  Cardiovascular: RRR, S1S2, no m/r/g  Abdomen: soft, nontender, nondistended, normal bowel sounds  Extremities: no edema, no cyanosis  Skin: warm, perfused, b/l erythema on buttocks  Neurological: nonfocal, no response to pain, sternal rub    MEDICATIONS:  MEDICATIONS  (STANDING):  ALBUTerol    90 MICROgram(s) HFA Inhaler 2 Puff(s) Inhalation every 8 hours  ampicillin/sulbactam  IVPB      ampicillin/sulbactam  IVPB 3 Gram(s) IV Intermittent every 6 hours  budesonide 160 MICROgram(s)/formoterol 4.5 MICROgram(s) Inhaler 2 Puff(s) Inhalation two times a day  chlorhexidine 0.12% Liquid 15 milliLiter(s) Oral Mucosa every 12 hours  chlorhexidine 4% Liquid 1 Application(s) Topical <User Schedule>  cholecalciferol 2000 Unit(s) Oral daily  dextrose 40% Gel 15 Gram(s) Oral once  dextrose 5%. 1000 milliLiter(s) (50 mL/Hr) IV Continuous <Continuous>  dextrose 5%. 1000 milliLiter(s) (100 mL/Hr) IV Continuous <Continuous>  dextrose 50% Injectable 25 Gram(s) IV Push once  dextrose 50% Injectable 12.5 Gram(s) IV Push once  dextrose 50% Injectable 25 Gram(s) IV Push once  glucagon  Injectable 1 milliGRAM(s) IntraMuscular once  insulin lispro (ADMELOG) corrective regimen sliding scale   SubCutaneous every 6 hours  insulin NPH human recombinant 10 Unit(s) SubCutaneous every 6 hours  LORazepam     Tablet 1 milliGRAM(s) Oral every 12 hours  methadone   Solution 5 milliGRAM(s) Oral every 8 hours  multivitamin/minerals Oral Solution (WELLESSE) 30 milliLiter(s) Enteral Tube daily  naloxegol 25 milliGRAM(s) Oral daily  phenylephrine    Infusion 0.5 MICROgram(s)/kG/Min (15.3 mL/Hr) IV Continuous <Continuous>  polyethylene glycol 3350 17 Gram(s) Oral daily  senna Syrup 10 milliLiter(s) Oral at bedtime  ursodiol Tablet 500 milliGRAM(s) Oral two times a day  vancomycin  IVPB 500 milliGRAM(s) IV Intermittent <User Schedule>    MEDICATIONS  (PRN):  acetaminophen    Suspension .. 650 milliGRAM(s) Oral every 6 hours PRN Temp greater or equal to 38C (100.4F), Mild Pain (1 - 3), Moderate Pain (4 - 6)      ALLERGIES:  Allergies    No Known Allergies    Intolerances        LABS:                        7.1    12.87 )-----------( 291      ( 29 Sep 2021 23:59 )             24.8     09-29    151<H>  |  111<H>  |  63<H>  ----------------------------<  198<H>  4.1   |  28  |  1.06    Ca    9.0      29 Sep 2021 23:59  Phos  4.6     09-29  Mg     2.7     09-29    TPro  5.9<L>  /  Alb  2.2<L>  /  TBili  4.4<H>  /  DBili  x   /  AST  160<H>  /  ALT  131<H>  /  AlkPhos  1689<H>  09-29    PT/INR - ( 29 Sep 2021 23:59 )   PT: 11.8 sec;   INR: 0.98 ratio         PTT - ( 29 Sep 2021 23:59 )  PTT:27.1 sec      RADIOLOGY & ADDITIONAL TESTS: Reviewed.

## 2021-09-30 NOTE — PRE-ANESTHESIA EVALUATION ADULT - BSA (M2)
Reason For Visit  NICOLE QUINTERO is here today for. Patient seen in the wound clinic with chronic left leg ulcers. A chaperone is not applicable.      Referred By  Referred By:   Dr TASNEEM Morse      History of Present Illness  HPI: Patient seen by Dr Mcelroy who had patient have an I & D of posterior leg yesterday for an abscess. It was found on Monday and Tuesday he was sent to surgery at [facility]. Dr Christopher performed the I & D. Patient will be undergoing more testing and surgery for blockages found.      Physical Exam  Patient presents today with dressing around his I & D site on posterior left leg.  Removed Iodoform packing and assessed wound.  Wound is fernando red with a small amount of bloody drainage.  It is very tender to patient.  Induration noted around open site and patient states the pain is much better now.    The wound on lateral leg is smaller now and pink and granulating with also a small amount of bloody drainage.        Procedure  Removed dressings and washed leg with soap and water  Cleansed wounds with saline  Applied Promogran to both wounds  Applied Aquacel Aq  Applied Unna boot  Applied CoFlex  Covered with stockinet      Assessment   1. Cellulitis of left leg (682.6) (L03.116)   2. Leg swelling (729.81) (M79.89)   3. Thrombophlebitis of deep veins of left lower extremity (451.19) (I80.202)   4. Venous ulcer (707.9) (I87.8)    Will compress patient with short stretch bandages and address both wounds with a collagen and alginate.     Plan  Patient Instructions:.   Patient will be coming in twice a week until wound is smaller.      Signatures   Electronically signed by : MONICA [email or web address].,CWS,,; Sep 21 2017  9:20AM CST     1.95

## 2021-09-30 NOTE — PROGRESS NOTE ADULT - ASSESSMENT
71 year old man w/pmhx of HTN, DM2, BPH, and Asthma here for acute hypoxic respiratory failure and ARDS secondary to viral pneumonia from COVID-19. Hospital course complicated by VT arrest, DVT and MRSE bacteremia.    #Neuro   1) off sedation  - decreased methadone increased to 5 q8h and Ativan 1mg q12h as of 9/27; patient still unresponsive  - intermittently agitated with tachypnea requiring Fentanyl pushes; given Versed 2mg 9/28 am  - noncont CT 9/13 appears unchanged since 8/12  - brain MRI 9/28 w/o evidence of anoxic injury, shows microvascular ischemic changes    2) r/o seizure  -had rhythmic R arm movement  - 24h EEG with generalized slowing; no evidence of seizure (per report 9/21 AM)    #ENT   -oropharyngeal packing removed on 9/10  -ENT said they will pack if patient's h/h drops  -will need FiO2 at 50% or less and PaO2 at 60 for qualify for trach    #CV   1)Cardiac Arrest VT  - S/p Amiodarone and electrolyte supplementation   - Recurrent runs of Vtach 9/26; started on metoprolol but stopped in s/o hypertension  - Echo reveals Right hrt strain   -holding diuretics; no indication, no evidence of pleural effusion, pulm edema on chest CT (9/24)    #Pulm   1)Acute hypoxemic resp failure 2/2 cardiac arrest  - vent Pressure AC 30/60/55/5; wean as tolerated  - increased purulent secretions as of 9/27; will assess need for pseudomonal coverage    2) Asthma  - c/w Proventil q8h and Symbicort BID    3) Post-covid sequelae  - extensive fibrosis and consolidations at bases b/l  - patient with PO2 75 despite intubation with Fi02 60%, pressure 24 and PEEP 5  - GGO on CT chest; unlikely to improve with 3rd course of steroids, also with persistent fevers and bacteremia despite appropriate abx therapy     #GI   1)Nutrition   - maintain Glucerna tube feeds as tolerated   - c/w senna/ miralax     2)Transaminitis - likely 2/2 cholestasis of sepsis vs SSC CIP (secondary sclerosing cholangitis in critically ill patients), Covid cholangiopathy, drug-induced liver injury  - c/w ursodiol 500 mg BID            - supportive treatment per hepatology  -alk phos, ast/alt downtrending    #Renal  - strict I/Os, monitor lytes; replete PRN    2) Hypernatremia  - up to 153 9/29; switched to D5 50cc/hr from free water boluses    #ID   1)MRSE Bacteremia   - S/P 14 day course of Vanc from 8/20-9/3 therapy, and intermittent gram-&anaerobe coverage   - last postive Bcx 9/26 - will need 14d treatment beyond that   - continue surveillance cultures q48-72h  - TTE w/o evidence of vegetations; will need SEJAL to definitively rule out endocarditis, and GI eval prior to procedure  - CT A/P with evidence of proctitis: unasyn added per ID  - Abx as below     Vanc 500mg qd (level with midnight labs)        - Unasyn 3g q6h (9/24 - ) per ID for gram negative coverage in s/o proctitis    #Endo   1)DM   - c/w NPH 10u q6h  - c/w ISS   - goal Blood Sugar 100-180     #Heme  1)b/l DVTs above knee on left and below knee on R  - off Lovenox since 9/19 given Hgb drop to 6.0 requiring 1u PRBC; drop again 9/22 requiring additional unit  - off DVT ppx in s/o persistent Hgb drops, but will need AC due to known DVT. If family purses trach/PEG, will need IVC filter    2) R thigh hematoma  - patient with stable hematoma; potential source of dropping Hgb    #GOC  -palliative consult following  - FULL CODE  -family updated daily; will decide trach vs extubation  71 year old man w/pmhx of HTN, DM2, BPH, and Asthma here for acute hypoxic respiratory failure and ARDS secondary to viral pneumonia from COVID-19. Hospital course complicated by VT arrest, DVT and MRSE bacteremia.    #Neuro   1) off sedation  - decreased methadone increased to 5 q8h and Ativan 1mg q12h as of 9/27; patient still unresponsive  - intermittently agitated with tachypnea requiring Fentanyl pushes; given Versed 2mg 9/28 am  - noncont CT 9/13 appears unchanged since 8/12  - brain MRI 9/28 w/o evidence of anoxic injury, shows microvascular ischemic changes    2) r/o seizure  -had rhythmic R arm movement  - 24h EEG with generalized slowing; no evidence of seizure (per report 9/21 AM)    #ENT   -oropharyngeal packing removed on 9/10  -ENT said they will pack if patient's h/h drops  -will need FiO2 at 50% or less and PaO2 at 60 for qualify for trach    #CV   1)Cardiac Arrest VT  - S/p Amiodarone and electrolyte supplementation   - Recurrent runs of Vtach 9/26; started on metoprolol but stopped in s/o hypertension  - Echo reveals Right hrt strain   -holding diuretics; no indication, no evidence of pleural effusion, pulm edema on chest CT (9/24)    #Pulm   1)Acute hypoxemic resp failure 2/2 cardiac arrest  - vent Pressure AC 30/60/55/5; wean as tolerated  - increased purulent secretions as of 9/27; will assess need for pseudomonal coverage    2) Asthma  - c/w Proventil q8h and Symbicort BID    3) Post-covid sequelae  - extensive fibrosis and consolidations at bases b/l  - patient with PO2 75 despite intubation with Fi02 60%, pressure 24 and PEEP 5  - GGO on CT chest; unlikely to improve with 3rd course of steroids, also with persistent fevers and bacteremia despite appropriate abx therapy     #GI   1)Nutrition   - maintain Glucerna tube feeds as tolerated   - c/w senna/ miralax     2)Transaminitis - likely 2/2 cholestasis of sepsis vs SSC CIP (secondary sclerosing cholangitis in critically ill patients), Covid cholangiopathy, drug-induced liver injury  - c/w ursodiol 500 mg BID            - supportive treatment per hepatology  -alk phos, ast/alt downtrending    #Renal  - strict I/Os, monitor lytes; replete PRN    2) Hypernatremia  - up to 153 9/29; switched to D5 50cc/hr from free water boluses    #ID   1)MRSE Bacteremia   - S/P 14 day course of Vanc from 8/20-9/3 therapy, and intermittent gram-&anaerobe coverage   - last postive Bcx 9/26 - will need 14d treatment beyond that   - continue surveillance cultures q48-72h  - TTE w/o evidence of vegetations; will need SEJAL to definitively rule out endocarditis, and GI eval prior to procedure  - CT A/P with evidence of proctitis: unasyn added per ID  - Abx as below     Vanc 500mg qd (level with midnight labs)        - Unasyn 3g q6h (9/24 - ) per ID for gram negative coverage in s/o proctitis    #Endo   1)DM   - c/w NPH 10u q6h  - c/w ISS   - goal Blood Sugar 100-180     #Heme  1)b/l DVTs above knee on left and below knee on R  - off Lovenox since 9/19 given Hgb drop to 6.0 requiring 1u PRBC; drop again 9/22 requiring additional unit  - off DVT ppx in s/o persistent Hgb drops, but will need AC due to known DVT. If family purses trach/PEG, will need IVC filter if fails challenge with Heparin gtt    2) R thigh hematoma  - patient with stable hematoma; potential source of dropping Hgb    #GOC  -palliative consult following  - FULL CODE  -family updated daily; will decide trach vs extubation

## 2021-09-30 NOTE — CONSULT NOTE ADULT - CONSULT REASON
Elevated liver enzymes
ICU LOS >7 days, COVID, remains intubated
Tachypnea
hypoxic respiratory failure 2/2 COVID
COVID
hypoxia
PEG tube placement
CONSULT PURPOSE: To assist the healthcare team in the ethical dilemma of a 71-year-old male who is admitted for acute hypoxic respiratory failure and acute respiratory distress syndrome secondary to COVID-19 pneumonia with a prolonged extubating with poor prognosis in deciding between compassionate extubation versus tracheostomy.
S/P Proning
Oral bleed

## 2021-09-30 NOTE — PRE-ANESTHESIA EVALUATION ADULT - NSANTHPMHFT_GEN_ALL_CORE
71 year old man w/pmhx of HTN, DM2, BPH, and Asthma here for acute hypoxic respiratory failure and ARDS secondary to viral pneumonia from COVID-19. Hospital course complicated by VT arrest, RV dysfunction and severe pHTN, AMS, DVT off AC currently, and MRSE bacteremia on vanc/unasyn, right thigh hematoma and anemia.

## 2021-09-30 NOTE — CONSULT NOTE ADULT - ATTENDING COMMENTS
71M Chinese Speaker, T2DM, BPH p/w ED SOB x 2 days with whole family testing positive for COVID19 about 9 days ago. He was not vaccinated for COVID19. He was initially hypoxic and encephalopathic in ED and started on HFNC.  - A&O x 3 conversing well via    - HFO2 40Li/40% SpO2 95%    - NPO overnight and maintenance IV fluid   - Agreeable for Remdesivir and Dexamethasone     Patient seen and examined with ICU Resident/Fellow at bedside after lab data, medical records and radiology reports reviewed. I have read and agreeable in general with resident's Documented Note, Assessment and Management Plan which reflected my opinions from bedside round and discussion.
Patient seen and examined. Agree with above. 70 yo s/p hypoxic failure/ARDS due to Covid infection. Tracheostomy planned for tomorrow. GI consulted for EGD/PEG placement. Risks/benefits explained to family.
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 He was not vaccinated for COVID19. He was initially hypoxic and encephalopathic in ED and started on HFNC, now with worsening oxygen requirements on HFNC. Seen and examined at the bedside along with fellow and resident. Already being treated with remdisivir and dexamethasone. Consider Tocilizumab in conjuction with ID for worsening hypoxemia despite current therapy. Currently work of breathing is stable. May need transfer to COVID ICU if hypoxemia not responding to HFNC and mechanical ventilation is warranted. Continue VTE px. Trend inflammatory markers.
Pt seen and examined on 8/18/21. 71 M with medical hx as noted above, now with acute hypoxic resp failure 2/2 COVID19 PNA. Currently appears stable on BiPAP. Completed a course of Remdesivir, remains on Decadron and also received Tocilizumab. Follow inflammatory markers. Given rising d-dimer would suggest checking b/l LE dopplers to r/o DVT. Serum procal only 0.05 which does not support a superimposed bacterial infection, would cont to monitor off antibacterials. No indication for MICU admission at present, please address GOC with family and reconsult MICU if his condition changes.
COVID pneumonia  Not vaccinated   His whole family has COVID too   Requiring high flow nasal cannula but ferritin is normal and he feels better today compared to yesterday. I don't think benefits outweigh risks of tocilizumab currently.   Agree with Remdesivir day 1 and steroids day 2.   Renal function and liver enzymes normal - continue to monitor.   Rest of care per protocol.     Will follow as needed, call back sooner if change in status     Patrick Paul MD   Infectious Disease   Pager [phone]   After 5PM and on weekends please page fellow on call or call [phone]
denture in place, removed due to planned proning.  Bleeding cauterized but only incompletely controlled with silver nitrate.  Packing placed as extra measure as cautery will not be possible in prone position.  Will remove in 48-72 hours if no further bleeding.
liver enzyme elevation with ALP > 1000 - likely drug-induced liver injury, may also be due to COVID/COVID cholangiopathy.   US normal. Possible causes of DILI: statin, ketamin, haldol - avoid these.    - can send CMV, EBV tests as above, BETSY, smooth muscle Ab, IgG, AMA

## 2021-09-30 NOTE — PRE-ANESTHESIA EVALUATION ADULT - NSRADCARDRESULTSFT_GEN_ALL_CORE
TTE 9/2021  Conclusions:  1. Calcified trileaflet aortic valve with normal opening.  Minimal aortic regurgitation.  2. Normal left ventricular systolic function. No segmental  wall motion abnormalities. Endocardial visualization  enhanced with intravenous injection of Ultrasonic Enhancing  Agent (Definity). No left ventricular thrombus. Flattening  of the interventricular septum in both systole and diastole  is  consistent with right ventricular pressure overload.  3.  Right ventricular systolic dysfunction with preserved  apical wall motion is seen consistent with Matthews's  sign.  4. Estimated pulmonary artery systolic pressure equals 66  mm Hg, assuming right atrial pressure equals > 15 mm Hg,  consistent with severe pulmonary pressures.  5. No evidence of valvular vegetation is seen.  *** Compared with echocardiogram of 9/8/2021, no  significant changes noted.

## 2021-10-01 NOTE — PROGRESS NOTE ADULT - PROBLEM SELECTOR PLAN 1
- continue vent   - suction prn   - continue with trach site care, keep dry and clean   - call ENT with any issues

## 2021-10-01 NOTE — PROGRESS NOTE ADULT - ASSESSMENT
Unvaccinated COVID pneumonia s/p tocilizumab, remdesivir and two courses of decadron.     Cardiac arrest 8/20.     Prolonged fevers since 9/8 despite treating potential secondary infections.     Staph epidermidis on blood cultures, intermittently since end of Aug. Unclear significance. The probability of repeated contamination seems low. Uncommon cause of endocarditis and TTE negative. No central lines and I don't think it's from his sacral wound. Has been on Vancomycin for about 5 weeks at this point but latest cultures 9/29 still with GPC.     VAP - Klebsiella and Enterobacter 9/11 with subsequent negative sputum.     Proctitis - CT 9/23.     Now afebrile for the past few days.     Poor long term prognosis. PEG 9/30. Trach 10/1.     Suggest  -repeat two sets of blood cultures tomorrow   -repeat TTE if SEJAL is not planned   -change Vancomycin to Daptomycin 700mg IV q24h to see if it has any effect on blood cultures  -weekly CPK while on Daptomycin   -complete Unasyn today for proctitis, day 7   -supportive care   -goals of care per primary team and palliative     Discussed with MICU   I will be rotating to [facility]. ID service will follow     Patrick Paul MD   Infectious Disease   Pager [phone]   After 5PM and on weekends please page fellow on call or call [phone] Unvaccinated COVID pneumonia 8/12 s/p tocilizumab, remdesivir and two courses of decadron.     Cardiac arrest 8/20.     Prolonged fevers since 9/8 despite treating potential secondary infections.     Staph epidermidis on blood cultures, intermittently since end of Aug. Unclear significance. The probability of repeated contamination seems low. Uncommon cause of endocarditis and TTE negative. No central lines and I don't think it's from his sacral wound. Has been on Vancomycin for about 5 weeks at this point but latest cultures 9/29 still with GPC.     VAP - Klebsiella and Enterobacter 9/11 with subsequent negative sputum.     Proctitis - CT 9/23.     Now afebrile for the past few days.     Poor long term prognosis. PEG 9/30. Trach 10/1.     Suggest  -repeat two sets of blood cultures tomorrow   -repeat TTE if SEJAL is not planned   -change Vancomycin to Daptomycin 700mg IV q24h to see if it has any effect on blood cultures  -weekly CPK while on Daptomycin   -complete Unasyn today for proctitis, day 7   -supportive care   -goals of care per primary team and palliative     Discussed with MICU   I will be rotating to [facility]. ID service will follow     Patrick Paul MD   Infectious Disease   Pager [phone]   After 5PM and on weekends please page fellow on call or call [phone]

## 2021-10-01 NOTE — PROGRESS NOTE ADULT - ASSESSMENT
71 year old man w/pmhx of HTN, DM2, BPH, and Asthma here for acute hypoxic respiratory failure and ARDS secondary to viral pneumonia from COVID-19. Hospital course complicated by VT arrest, DVT and MRSE bacteremia.    #Neuro   1) off sedation  - decreased methadone increased to 5 q8h and Ativan 1mg q12h as of 9/27; patient still unresponsive  - intermittently agitated with tachypnea requiring Fentanyl pushes; given Versed 2mg 9/28 am  - noncont CT 9/13 appears unchanged since 8/12  - brain MRI 9/28 w/o evidence of anoxic injury, shows microvascular ischemic changes    2) r/o seizure  -had rhythmic R arm movement  - 24h EEG with generalized slowing; no evidence of seizure (per report 9/21 AM)    #ENT   -oropharyngeal packing removed on 9/10  -ENT said they will pack if patient's h/h drops  -will need FiO2 at 50% or less and PaO2 at 60 for qualify for trach; to be done 10/1 12p    #CV   1)Cardiac Arrest VT  - S/p Amiodarone and electrolyte supplementation   - Recurrent runs of Vtach 9/26; started on metoprolol but stopped in s/o hypertension  - Echo reveals Right hrt strain   -holding diuretics; no indication, no evidence of pleural effusion, pulm edema on chest CT (9/24)    #Pulm   1)Acute hypoxemic resp failure 2/2 cardiac arrest  - increased purulent secretions as of 9/27; will assess need for pseudomonal coverage  - vent pressure AC     2) Asthma  - c/w Proventil q8h and Symbicort BID    3) Post-covid sequelae  - extensive fibrosis and consolidations at bases b/l  - patient with PO2 75 despite intubation with Fi02 60%, pressure 24 and PEEP 5  - GGO on CT chest; unlikely to improve with 3rd course of steroids, also with persistent fevers and bacteremia despite appropriate abx therapy     #GI   1)Nutrition   - maintain Glucerna tube feeds as tolerated   - c/w senna/ miralax     2)Transaminitis - likely 2/2 cholestasis of sepsis vs SSC CIP (secondary sclerosing cholangitis in critically ill patients), Covid cholangiopathy, drug-induced liver injury  - c/w ursodiol 500 mg BID            - supportive treatment per hepatology  -alk phos, ast/alt downtrending    #Renal  - strict I/Os, monitor lytes; replete PRN    2) Hypernatremia  - up to 154 10/1; free water 300 q4h     #ID   1)MRSE Bacteremia   - S/P 14 day course of Vanc from 8/20-9/3 therapy, and intermittent gram-&anaerobe coverage   - last postive Bcx 9/29 - will need 14d treatment beyond that   - continue surveillance cultures q48-72h  - TTE w/o evidence of vegetations; will need SEJAL to definitively rule out endocarditis; GI clearance as of 9/30      - f/u with ID re necessity of SEJAL vs empiric treatment for 6w as surgical intervention unlikely to be offered  - CT A/P with evidence of proctitis: unasyn added per ID (9/24-10/1)  - continue Vanc 500mg qd (level with midnight labs)          #Endo   1)DM   - c/w NPH 10u q6h  - c/w ISS   - goal Blood Sugar 100-180     #Heme  1)b/l DVTs above knee on left and below knee on R  - off Lovenox since 9/19 given Hgb drop to 6.0 requiring 1u PRBC; drop again 9/22 requiring additional unit  - off DVT ppx in s/o persistent Hgb drops, but will need AC due to known DVT. Will attempt heparin challenge after trach, otherwise will need IVC filter    2) R thigh hematoma  - patient with stable hematoma; potential source of dropping Hgb    #GOC  -palliative consult following  - FULL CODE  -family updated daily

## 2021-10-01 NOTE — PROGRESS NOTE ADULT - ASSESSMENT
70 y/o male POD #0 for tracheostomy 2/2 respiratory failure. Pt is doing well, #8 shiley cuffed with surgicel in place, on the vent, with minimal serosanguinous oozing from stoma, no active bleed. sutures x4 and umbilical tie in place.

## 2021-10-01 NOTE — PROGRESS NOTE ADULT - ASSESSMENT
A/P 71M w/ PMH/o HTN, DM2, BPH, Asthma admit 8/12 Covid-19 PNA and cardiac arrest.    Wound consult requested to assist w/ management   Evolving bilateral buttock DTI  Incontinence Dermatitis  Incontinent of stool & urine  prophylaxis measures    Recommend:  1.) Bilateral buttocks- medihoney dressing         CAVILON  w/ pericare as per protocol  2.) Nutritional optimization in pt w/ Severe, acute malnutrition         Vital AF TF, high quality protein, and MVI/ vit c to assist w/ wound healing  3.) Maintain on an alternating air with low air loss surface  4.) Turn and reposition q 2 hours  5.) Incontinence management - incontinence pads, cleanser, pericare BID         Loose BM -improving w/senna, consider Banatrol         unasyn for proctitis improving  6.) Offload heels/feet with complete cAir air fluidized boots  7.) Hyperglycemia improving w/ NPH, ISS w/ q6h FS  Care as per MICU. will follow w/ you  Upon discharge f/u as outpatient at [facility] [address] [facility] [phone]  D/w RN and team  Holly Rubio PA-C, CWS [id number]  I spent 25minutes face to face w/ this pt of which more than 50% of the time was spent counseling & coordinating care of this pt.

## 2021-10-01 NOTE — PROGRESS NOTE ADULT - ATTENDING COMMENTS
1. Acute hypoxemic respiratory failure from Covid pneumonia and ARDS. Still requiring ~60% FIO2. Pt vented for > 30 days. Fotr trach today.  2. Hypernatremia: continue free water to 500mls q 6 hrs.  3. S/P 6 min cardiac arrest. ? anoxic encephelopathy vs. metabolic encephalopathy from covid.  4.Transaminiitis likely from covid and or sepsis. Slowly decreasing. No evidence of biliary obstruction.  5. DVT Right side , below knee on L . Of AC due to hematoma R thigh requiring PRBC. Will restart today  after tracheostomy. at low dose IV heparin 45-60 PTT goal.If cannot tolerate  heparin  will need IVC filter..  6. ID MRSE bacteremia. Continue  Vancomycin. Cultures still positive.  SEJAL r/o endocarditis next week..  7. Proctitis seen on CT abd. On Unasyn.  8.GOC: Family has agreed to tracheostomy . Scheduled for this today.

## 2021-10-01 NOTE — CHART NOTE - NSCHARTNOTEFT_GEN_A_CORE
MICU Transfer Note    Transfer from: MICU  Transfer to:  (  ) Medicine    (  ) Telemetry    ( x ) RCU    (  ) Palliative    (  ) Stroke Unit    (  ) _______________  Accepting physician:      MICU COURSE:  Patient initially covid-19 positive s/p two courses decadron, intubated for acute hypoxemic respiratory failure c/b Vtach arrest with ROSC after 6 min.         ASSESSMENT & PLAN:         For Follow-Up:  [ ] repeat TTE to r/o vegetations  [ ] BMP - persistent hypernatremia  [ ] possible IVC filter placement by IR  [ ] ID recs        Vital Signs Last 24 Hrs  T(C): 36.8 (01 Oct 2021 12:00), Max: 37.3 (01 Oct 2021 00:00)  T(F): 98.2 (01 Oct 2021 12:00), Max: 99.1 (01 Oct 2021 00:00)  HR: 82 (01 Oct 2021 13:05) (82 - 100)  BP: 125/64 (01 Oct 2021 12:00) (103/62 - 186/94)  BP(mean): 89 (01 Oct 2021 12:00) (77 - 130)  RR: 32 (01 Oct 2021 12:00) (14 - 36)  SpO2: 100% (01 Oct 2021 13:05) (92% - 100%)  I&O's Summary    30 Sep 2021 07:01  -  01 Oct 2021 07:00  --------------------------------------------------------  IN: 750 mL / OUT: 750 mL / NET: 0 mL    01 Oct 2021 07:01  -  01 Oct 2021 13:30  --------------------------------------------------------  IN: 350 mL / OUT: 600 mL / NET: -250 mL          MEDICATIONS  (STANDING):  ALBUTerol    90 MICROgram(s) HFA Inhaler 2 Puff(s) Inhalation every 8 hours  budesonide 160 MICROgram(s)/formoterol 4.5 MICROgram(s) Inhaler 2 Puff(s) Inhalation two times a day  chlorhexidine 0.12% Liquid 15 milliLiter(s) Oral Mucosa every 12 hours  chlorhexidine 4% Liquid 1 Application(s) Topical <User Schedule>  cholecalciferol 2000 Unit(s) Oral daily  DAPTOmycin IVPB 700 milliGRAM(s) IV Intermittent once  DAPTOmycin IVPB      dextrose 40% Gel 15 Gram(s) Oral once  dextrose 5%. 1000 milliLiter(s) (50 mL/Hr) IV Continuous <Continuous>  dextrose 5%. 1000 milliLiter(s) (100 mL/Hr) IV Continuous <Continuous>  dextrose 5%. 1000 milliLiter(s) (50 mL/Hr) IV Continuous <Continuous>  dextrose 50% Injectable 25 Gram(s) IV Push once  dextrose 50% Injectable 12.5 Gram(s) IV Push once  dextrose 50% Injectable 25 Gram(s) IV Push once  glucagon  Injectable 1 milliGRAM(s) IntraMuscular once  insulin lispro (ADMELOG) corrective regimen sliding scale   SubCutaneous every 6 hours  insulin NPH human recombinant 10 Unit(s) SubCutaneous every 6 hours  LORazepam     Tablet 1 milliGRAM(s) Oral every 12 hours  methadone   Solution 5 milliGRAM(s) Oral every 8 hours  multivitamin/minerals Oral Solution (WELLESSE) 30 milliLiter(s) Enteral Tube daily  naloxegol 25 milliGRAM(s) Oral daily  phenylephrine    Infusion 0.5 MICROgram(s)/kG/Min (15.3 mL/Hr) IV Continuous <Continuous>  polyethylene glycol 3350 17 Gram(s) Oral daily  senna Syrup 10 milliLiter(s) Oral at bedtime  ursodiol Tablet 500 milliGRAM(s) Oral two times a day    MEDICATIONS  (PRN):  acetaminophen    Suspension .. 650 milliGRAM(s) Oral every 6 hours PRN Temp greater or equal to 38C (100.4F), Mild Pain (1 - 3), Moderate Pain (4 - 6)        LABS                                            7.0                   Neurophils% (auto):   71.9   (10-01 @ 00:20):    12.67)-----------(297          Lymphocytes% (auto):  17.0                                          25.3                   Eosinphils% (auto):   5.8      Manual%: Neutrophils x    ; Lymphocytes x    ; Eosinophils x    ; Bands%: x    ; Blasts x                                    154    |  113    |  54                  Calcium: 9.2   / iCa: x      (10-01 @ 00:18)    ----------------------------<  79        Magnesium: 2.7                              3.4     |  29     |  1.01             Phosphorous: 4.1      TPro  5.9    /  Alb  2.3    /  TBili  4.5    /  DBili  x      /  AST  101    /  ALT  110    /  AlkPhos  1624   01 Oct 2021 00:18    ( 10-01 @ 00:20 )   PT: 11.8 sec;   INR: 0.98 ratio  aPTT: 28.4 sec MICU Transfer Note    Transfer from: MICU  Transfer to:  (  ) Medicine    (  ) Telemetry    ( x ) RCU    (  ) Palliative    (  ) Stroke Unit    (  ) _______________  Accepting physician:      MICU COURSE:  Patient initially covid-19 positive s/p two courses decadron, intubated for acute hypoxemic respiratory failure c/b Vtach arrest with ROSC after 6 min. Patient remained intubated for duration of MICU course. All sedation off as of 9/16; patient still without mental status, unresponsive to painful stimuli, unable to follow commands. He developed bacteremia with MRSE and was started on Vancomycin 8/25; with surveillance every 48-72h. He was maintained on vancomycin until 10/1, when ID transitioned him to Daptomycin for the next 48d. Infectious work-up conducted with TTE, which was negative for vegetations. CT A/P done, which showed proctitis, for which he was on Unasyn x7d (finished 10/1). SEJAL deferred by cardiology and decision was made with IR to treat empirically for at least 6 weeks. Extensive GOC discussions held with family throughout, with palliative care input. Decision made to opt for tracheostomy; patient received PEG on 9/30 and trach 10/1. He was restarted on AC with heparin gtt challenge on 10/1; multiple attempts made to restart AC in the s/o known DVTs above the knee, but patient with recurrent Hgb drops below 7 requiring blood transfusions and stopping AC. Patient became intermittently tachypneic, overbreathing vent, and tachycardic, requiring pushes of fentanyl; since transitioned to morphine 2mg q4h PRN.         ASSESSMENT & PLAN:         For Follow-Up:  [ ] repeat TTE to r/o vegetations  [ ] BMP - persistent hypernatremia  [ ] possible IVC filter placement by IR if patient fails heparin gtt challenge  [ ] ID recs        Vital Signs Last 24 Hrs  T(C): 36.8 (01 Oct 2021 12:00), Max: 37.3 (01 Oct 2021 00:00)  T(F): 98.2 (01 Oct 2021 12:00), Max: 99.1 (01 Oct 2021 00:00)  HR: 82 (01 Oct 2021 13:05) (82 - 100)  BP: 125/64 (01 Oct 2021 12:00) (103/62 - 186/94)  BP(mean): 89 (01 Oct 2021 12:00) (77 - 130)  RR: 32 (01 Oct 2021 12:00) (14 - 36)  SpO2: 100% (01 Oct 2021 13:05) (92% - 100%)  I&O's Summary    30 Sep 2021 07:01  -  01 Oct 2021 07:00  --------------------------------------------------------  IN: 750 mL / OUT: 750 mL / NET: 0 mL    01 Oct 2021 07:01  -  01 Oct 2021 13:30  --------------------------------------------------------  IN: 350 mL / OUT: 600 mL / NET: -250 mL          MEDICATIONS  (STANDING):  ALBUTerol    90 MICROgram(s) HFA Inhaler 2 Puff(s) Inhalation every 8 hours  budesonide 160 MICROgram(s)/formoterol 4.5 MICROgram(s) Inhaler 2 Puff(s) Inhalation two times a day  chlorhexidine 0.12% Liquid 15 milliLiter(s) Oral Mucosa every 12 hours  chlorhexidine 4% Liquid 1 Application(s) Topical <User Schedule>  cholecalciferol 2000 Unit(s) Oral daily  DAPTOmycin IVPB 700 milliGRAM(s) IV Intermittent once  DAPTOmycin IVPB      dextrose 40% Gel 15 Gram(s) Oral once  dextrose 5%. 1000 milliLiter(s) (50 mL/Hr) IV Continuous <Continuous>  dextrose 5%. 1000 milliLiter(s) (100 mL/Hr) IV Continuous <Continuous>  dextrose 5%. 1000 milliLiter(s) (50 mL/Hr) IV Continuous <Continuous>  dextrose 50% Injectable 25 Gram(s) IV Push once  dextrose 50% Injectable 12.5 Gram(s) IV Push once  dextrose 50% Injectable 25 Gram(s) IV Push once  glucagon  Injectable 1 milliGRAM(s) IntraMuscular once  insulin lispro (ADMELOG) corrective regimen sliding scale   SubCutaneous every 6 hours  insulin NPH human recombinant 10 Unit(s) SubCutaneous every 6 hours  LORazepam     Tablet 1 milliGRAM(s) Oral every 12 hours  methadone   Solution 5 milliGRAM(s) Oral every 8 hours  multivitamin/minerals Oral Solution (WELLESSE) 30 milliLiter(s) Enteral Tube daily  naloxegol 25 milliGRAM(s) Oral daily  phenylephrine    Infusion 0.5 MICROgram(s)/kG/Min (15.3 mL/Hr) IV Continuous <Continuous>  polyethylene glycol 3350 17 Gram(s) Oral daily  senna Syrup 10 milliLiter(s) Oral at bedtime  ursodiol Tablet 500 milliGRAM(s) Oral two times a day    MEDICATIONS  (PRN):  acetaminophen    Suspension .. 650 milliGRAM(s) Oral every 6 hours PRN Temp greater or equal to 38C (100.4F), Mild Pain (1 - 3), Moderate Pain (4 - 6)        LABS                                            7.0                   Neurophils% (auto):   71.9   (10-01 @ 00:20):    12.67)-----------(297          Lymphocytes% (auto):  17.0                                          25.3                   Eosinphils% (auto):   5.8      Manual%: Neutrophils x    ; Lymphocytes x    ; Eosinophils x    ; Bands%: x    ; Blasts x                                    154    |  113    |  54                  Calcium: 9.2   / iCa: x      (10-01 @ 00:18)    ----------------------------<  79        Magnesium: 2.7                              3.4     |  29     |  1.01             Phosphorous: 4.1      TPro  5.9    /  Alb  2.3    /  TBili  4.5    /  DBili  x      /  AST  101    /  ALT  110    /  AlkPhos  1624   01 Oct 2021 00:18    ( 10-01 @ 00:20 )   PT: 11.8 sec;   INR: 0.98 ratio  aPTT: 28.4 sec

## 2021-10-01 NOTE — PROGRESS NOTE ADULT - SUBJECTIVE AND OBJECTIVE BOX
INTERVAL HPI/OVERNIGHT EVENTS:     SUBJECTIVE: Patient seen and examined at bedside.     ROS: unable to assess    OBJECTIVE:    VITAL SIGNS:  ICU Vital Signs Last 24 Hrs  T(C): 37 (01 Oct 2021 07:00), Max: 37.3 (01 Oct 2021 00:00)  T(F): 98.6 (01 Oct 2021 07:00), Max: 99.1 (01 Oct 2021 00:00)  HR: 87 (01 Oct 2021 07:00) (83 - 100)  BP: 164/87 (01 Oct 2021 07:00) (123/60 - 186/94)  BP(mean): 119 (01 Oct 2021 07:00) (85 - 130)  ABP: --  ABP(mean): --  RR: 27 (01 Oct 2021 07:00) (15 - 36)  SpO2: 95% (01 Oct 2021 07:00) (92% - 99%)    Mode: AC/ CMV (Assist Control/ Continuous Mandatory Ventilation), RR (machine): 12, FiO2: 50, PEEP: 5, ITime: 1, MAP: 13, PC: 24, PIP: 32    09-30 @ 07:01  -  10-01 @ 07:00  --------------------------------------------------------  IN: 750 mL / OUT: 750 mL / NET: 0 mL    10-01 @ 07:01  -  10-01 @ 07:34  --------------------------------------------------------  IN: 0 mL / OUT: 400 mL / NET: -400 mL      CAPILLARY BLOOD GLUCOSE      POCT Blood Glucose.: 101 mg/dL (01 Oct 2021 04:45)      PHYSICAL EXAM:    General: intubated, off sedation  HEENT: NCAT, clear conjunctiva, sclera icteric, pupils sluggish, blinks spontaneously  Neck: supple, no JVD  Respiratory: CTAB  Cardiovascular: RRR, S1S2, no m/r/g  Abdomen: PEG in place; soft, nontender, nondistended, normal bowel sounds  Extremities: no edema, no cyanosis  Skin: warm, perfused, b/l erythema on buttocks  Neurological: nonfocal, no response to pain, sternal rub    MEDICATIONS:  MEDICATIONS  (STANDING):  ALBUTerol    90 MICROgram(s) HFA Inhaler 2 Puff(s) Inhalation every 8 hours  ampicillin/sulbactam  IVPB      ampicillin/sulbactam  IVPB 3 Gram(s) IV Intermittent every 6 hours  budesonide 160 MICROgram(s)/formoterol 4.5 MICROgram(s) Inhaler 2 Puff(s) Inhalation two times a day  chlorhexidine 0.12% Liquid 15 milliLiter(s) Oral Mucosa every 12 hours  chlorhexidine 4% Liquid 1 Application(s) Topical <User Schedule>  cholecalciferol 2000 Unit(s) Oral daily  dextrose 40% Gel 15 Gram(s) Oral once  dextrose 5%. 1000 milliLiter(s) (50 mL/Hr) IV Continuous <Continuous>  dextrose 5%. 1000 milliLiter(s) (100 mL/Hr) IV Continuous <Continuous>  dextrose 5%. 1000 milliLiter(s) (50 mL/Hr) IV Continuous <Continuous>  dextrose 50% Injectable 25 Gram(s) IV Push once  dextrose 50% Injectable 12.5 Gram(s) IV Push once  dextrose 50% Injectable 25 Gram(s) IV Push once  glucagon  Injectable 1 milliGRAM(s) IntraMuscular once  insulin lispro (ADMELOG) corrective regimen sliding scale   SubCutaneous every 6 hours  insulin NPH human recombinant 10 Unit(s) SubCutaneous every 6 hours  LORazepam     Tablet 1 milliGRAM(s) Oral every 12 hours  methadone   Solution 5 milliGRAM(s) Oral every 8 hours  multivitamin/minerals Oral Solution (WELLESSE) 30 milliLiter(s) Enteral Tube daily  naloxegol 25 milliGRAM(s) Oral daily  phenylephrine    Infusion 0.5 MICROgram(s)/kG/Min (15.3 mL/Hr) IV Continuous <Continuous>  polyethylene glycol 3350 17 Gram(s) Oral daily  senna Syrup 10 milliLiter(s) Oral at bedtime  ursodiol Tablet 500 milliGRAM(s) Oral two times a day  vancomycin  IVPB 500 milliGRAM(s) IV Intermittent <User Schedule>    MEDICATIONS  (PRN):  acetaminophen    Suspension .. 650 milliGRAM(s) Oral every 6 hours PRN Temp greater or equal to 38C (100.4F), Mild Pain (1 - 3), Moderate Pain (4 - 6)      ALLERGIES:  Allergies    No Known Allergies    Intolerances        LABS:                        7.0    12.67 )-----------( 297      ( 01 Oct 2021 00:20 )             25.3     10-01    154<H>  |  113<H>  |  54<H>  ----------------------------<  79  3.4<L>   |  29  |  1.01    Ca    9.2      01 Oct 2021 00:18  Phos  4.1     10-01  Mg     2.7     10-01    TPro  5.9<L>  /  Alb  2.3<L>  /  TBili  4.5<H>  /  DBili  x   /  AST  101<H>  /  ALT  110<H>  /  AlkPhos  1624<H>  10-01    PT/INR - ( 01 Oct 2021 00:20 )   PT: 11.8 sec;   INR: 0.98 ratio         PTT - ( 01 Oct 2021 00:20 )  PTT:28.4 sec      RADIOLOGY & ADDITIONAL TESTS: Reviewed.     INTERVAL HPI/OVERNIGHT EVENTS: No events overnight.     SUBJECTIVE: Patient seen and examined at bedside. Trach with ENT planned for 12:30p.    ROS: unable to assess    OBJECTIVE:    VITAL SIGNS:  ICU Vital Signs Last 24 Hrs  T(C): 37 (01 Oct 2021 07:00), Max: 37.3 (01 Oct 2021 00:00)  T(F): 98.6 (01 Oct 2021 07:00), Max: 99.1 (01 Oct 2021 00:00)  HR: 87 (01 Oct 2021 07:00) (83 - 100)  BP: 164/87 (01 Oct 2021 07:00) (123/60 - 186/94)  BP(mean): 119 (01 Oct 2021 07:00) (85 - 130)  ABP: --  ABP(mean): --  RR: 27 (01 Oct 2021 07:00) (15 - 36)  SpO2: 95% (01 Oct 2021 07:00) (92% - 99%)    Mode: AC/ CMV (Assist Control/ Continuous Mandatory Ventilation), RR (machine): 12, FiO2: 50, PEEP: 5, ITime: 1, MAP: 13, PC: 24, PIP: 32    09-30 @ 07:01  -  10-01 @ 07:00  --------------------------------------------------------  IN: 750 mL / OUT: 750 mL / NET: 0 mL    10-01 @ 07:01  -  10-01 @ 07:34  --------------------------------------------------------  IN: 0 mL / OUT: 400 mL / NET: -400 mL      CAPILLARY BLOOD GLUCOSE      POCT Blood Glucose.: 101 mg/dL (01 Oct 2021 04:45)      PHYSICAL EXAM:    General: intubated, off sedation  HEENT: NCAT, clear conjunctiva, sclera icteric, pupils sluggish, blinks spontaneously  Neck: supple, no JVD  Respiratory: CTAB  Cardiovascular: RRR, S1S2, no m/r/g  Abdomen: PEG in place; soft, nontender, nondistended, normal bowel sounds  Extremities: no edema, no cyanosis  Skin: warm, perfused, b/l erythema on buttocks  Neurological: nonfocal, no response to pain, sternal rub    MEDICATIONS:  MEDICATIONS  (STANDING):  ALBUTerol    90 MICROgram(s) HFA Inhaler 2 Puff(s) Inhalation every 8 hours  ampicillin/sulbactam  IVPB      ampicillin/sulbactam  IVPB 3 Gram(s) IV Intermittent every 6 hours  budesonide 160 MICROgram(s)/formoterol 4.5 MICROgram(s) Inhaler 2 Puff(s) Inhalation two times a day  chlorhexidine 0.12% Liquid 15 milliLiter(s) Oral Mucosa every 12 hours  chlorhexidine 4% Liquid 1 Application(s) Topical <User Schedule>  cholecalciferol 2000 Unit(s) Oral daily  dextrose 40% Gel 15 Gram(s) Oral once  dextrose 5%. 1000 milliLiter(s) (50 mL/Hr) IV Continuous <Continuous>  dextrose 5%. 1000 milliLiter(s) (100 mL/Hr) IV Continuous <Continuous>  dextrose 5%. 1000 milliLiter(s) (50 mL/Hr) IV Continuous <Continuous>  dextrose 50% Injectable 25 Gram(s) IV Push once  dextrose 50% Injectable 12.5 Gram(s) IV Push once  dextrose 50% Injectable 25 Gram(s) IV Push once  glucagon  Injectable 1 milliGRAM(s) IntraMuscular once  insulin lispro (ADMELOG) corrective regimen sliding scale   SubCutaneous every 6 hours  insulin NPH human recombinant 10 Unit(s) SubCutaneous every 6 hours  LORazepam     Tablet 1 milliGRAM(s) Oral every 12 hours  methadone   Solution 5 milliGRAM(s) Oral every 8 hours  multivitamin/minerals Oral Solution (WELLESSE) 30 milliLiter(s) Enteral Tube daily  naloxegol 25 milliGRAM(s) Oral daily  phenylephrine    Infusion 0.5 MICROgram(s)/kG/Min (15.3 mL/Hr) IV Continuous <Continuous>  polyethylene glycol 3350 17 Gram(s) Oral daily  senna Syrup 10 milliLiter(s) Oral at bedtime  ursodiol Tablet 500 milliGRAM(s) Oral two times a day  vancomycin  IVPB 500 milliGRAM(s) IV Intermittent <User Schedule>    MEDICATIONS  (PRN):  acetaminophen    Suspension .. 650 milliGRAM(s) Oral every 6 hours PRN Temp greater or equal to 38C (100.4F), Mild Pain (1 - 3), Moderate Pain (4 - 6)      ALLERGIES:  Allergies    No Known Allergies    Intolerances        LABS:                        7.0    12.67 )-----------( 297      ( 01 Oct 2021 00:20 )             25.3     10-01    154<H>  |  113<H>  |  54<H>  ----------------------------<  79  3.4<L>   |  29  |  1.01    Ca    9.2      01 Oct 2021 00:18  Phos  4.1     10-01  Mg     2.7     10-01    TPro  5.9<L>  /  Alb  2.3<L>  /  TBili  4.5<H>  /  DBili  x   /  AST  101<H>  /  ALT  110<H>  /  AlkPhos  1624<H>  10-01    PT/INR - ( 01 Oct 2021 00:20 )   PT: 11.8 sec;   INR: 0.98 ratio         PTT - ( 01 Oct 2021 00:20 )  PTT:28.4 sec      RADIOLOGY & ADDITIONAL TESTS: Reviewed.

## 2021-10-01 NOTE — PROGRESS NOTE ADULT - SUBJECTIVE AND OBJECTIVE BOX
[facility]-- WOUND TEAM -- FOLLOW UP NOTE  --------------------------------------------------------------------------------    24 hour events/subjective:    s/p trach today  s/p PEG  currently afebrile  (+)cultures  incontinent   ongoing goc - full code as per family wishes    Diet:  Diet, NPO with Tube Feed:   Tube Feeding Modality: Orogastric  Vital AF (VITALAFRTH)  Total Volume for 24 Hours (mL): 1350  Continuous  Starting Tube Feed Rate mL per Hour: 10  Increase Tube Feed Rate by (mL): 10     Every 6 hours  Until Goal Tube Feed Rate (mL per Hour): 75  Tube Feed Duration (in Hours): 18  Tube Feed Start Time: 16:00 (09-21-21 @ 15:45)      ROS: pt unable to offer    ALLERGIES & MEDICATIONS  --------------------------------------------------------------------------------    No Known Allergies      STANDING INPATIENT MEDICATIONS  ALBUTerol    90 MICROgram(s) HFA Inhaler 2 Puff(s) Inhalation every 8 hours  budesonide 160 MICROgram(s)/formoterol 4.5 MICROgram(s) Inhaler 2 Puff(s) Inhalation two times a day  chlorhexidine 0.12% Liquid 15 milliLiter(s) Oral Mucosa every 12 hours  chlorhexidine 4% Liquid 1 Application(s) Topical <User Schedule>  cholecalciferol 2000 Unit(s) Oral daily  DAPTOmycin IVPB      dextrose 40% Gel 15 Gram(s) Oral once  dextrose 5%. 1000 milliLiter(s) IV Continuous <Continuous>  dextrose 5%. 1000 milliLiter(s) IV Continuous <Continuous>  dextrose 5%. 1000 milliLiter(s) IV Continuous <Continuous>  dextrose 50% Injectable 25 Gram(s) IV Push once  dextrose 50% Injectable 12.5 Gram(s) IV Push once  dextrose 50% Injectable 25 Gram(s) IV Push once  glucagon  Injectable 1 milliGRAM(s) IntraMuscular once  heparin  Infusion 4 Unit(s)/Hr IV Continuous <Continuous>  insulin lispro (ADMELOG) corrective regimen sliding scale   SubCutaneous every 6 hours  insulin NPH human recombinant 10 Unit(s) SubCutaneous every 6 hours  LORazepam     Tablet 1 milliGRAM(s) Oral every 12 hours  methadone   Solution 5 milliGRAM(s) Oral every 8 hours  multivitamin/minerals Oral Solution (WELLESSE) 30 milliLiter(s) Enteral Tube daily  naloxegol 25 milliGRAM(s) Oral daily  phenylephrine    Infusion 0.5 MICROgram(s)/kG/Min IV Continuous <Continuous>  polyethylene glycol 3350 17 Gram(s) Oral daily  senna Syrup 10 milliLiter(s) Oral at bedtime  ursodiol Tablet 500 milliGRAM(s) Oral two times a day      PRN INPATIENT MEDICATION  acetaminophen    Suspension .. 650 milliGRAM(s) Oral every 6 hours PRN        VITALS/PHYSICAL EXAM  --------------------------------------------------------------------------------  T(C): 36.8 (10-01-21 @ 12:00), Max: 37.3 (10-01-21 @ 00:00)  HR: 80 (10-01-21 @ 15:00) (76 - 100)  BP: 138/68 (10-01-21 @ 15:00) (103/62 - 186/94)  RR: 21 (10-01-21 @ 15:00) (14 - 36)  SpO2: 97% (10-01-21 @ 15:00) (92% - 100%)  Wt(kg): --  Height (cm): 172.7 (09-30-21 @ 22:29)  Weight (kg): 81.6 (09-30-21 @ 22:29)  BMI (kg/m2): 27.4 (09-30-21 @ 22:29)  BSA (m2): 1.95 (09-30-21 @ 22:29)      09-30-21 @ 07:01  -  10-01-21 @ 07:00  --------------------------------------------------------  IN: 750 mL / OUT: 750 mL / NET: 0 mL    10-01-21 @ 07:01  -  10-01-21 @ 15:45  --------------------------------------------------------  IN: 675 mL / OUT: 600 mL / NET: 75 mL    Physical Exam:  General: guarded but stable, intubated, obese   HEENT: nc/at, (+)ngt, ET tube. mucosa moist, trachea midline  Psych: sedated  Neurology: nonverbal, not following commands  Musculoskeletal: no contractures  Vascular: BLE edema equal & warm no acute ischemia  Skin:  skin intact, moist w/ good turgor  Bilateral buttocks into gluteal cleft evolving deep tissue injury   in  10cm x 8cm x 0.1cm area w/ denuded and partial thickness skin loss & granular along edge       evolving eschar most of wound bed beginning to lift.   No drainage, odor, erythema, increased warmth, tenderness, induration, fluctuance    LABS/ CULTURES/ RADIOLOGY:              7.0    12.67 >-----------<  297      [10-01-21 @ 00:20]              25.3     154  |  113  |  54  ----------------------------<  79      [10-01-21 @ 00:18]  3.4   |  29  |  1.01        Ca     9.2     [10-01-21 @ 00:18]      Mg     2.7     [10-01-21 @ 00:18]      Phos  4.1     [10-01-21 @ 00:18]    TPro  5.9  /  Alb  2.3  /  TBili  4.5  /  DBili  x   /  AST  101  /  ALT  110  /  AlkPhos  1624  [10-01-21 @ 00:18]    PT/INR: PT 11.8 , INR 0.98       [10-01-21 @ 00:20]  PTT: 28.4       [10-01-21 @ 00:20]    CAPILLARY BLOOD GLUCOSE  POCT Blood Glucose.: 143 mg/dL (01 Oct 2021 12:17)  POCT Blood Glucose.: 101 mg/dL (01 Oct 2021 04:45)  POCT Blood Glucose.: 76 mg/dL (30 Sep 2021 23:48)  POCT Blood Glucose.: 90 mg/dL (30 Sep 2021 17:30)      Culture - Blood (collected 09-29-21 @ 14:47)  Source: .Blood Blood-Peripheral  Gram Stain (10-01-21 @ 09:50):    Growth in aerobic bottle: Gram Positive Cocci in Clusters  Preliminary Report (10-01-21 @ 09:51):    Growth in aerobic bottle: Gram Positive Cocci in Clusters    Culture - Blood (collected 09-29-21 @ 14:45)  Source: .Blood Blood-Venous  Preliminary Report (09-30-21 @ 15:02):    No growth to date.    Culture - Blood (collected 09-26-21 @ 03:25)  Source: .Blood Blood-Venous  Gram Stain (09-28-21 @ 06:34):    Growth in anaerobic bottle: Gram Positive Cocci in Clusters  Final Report (09-29-21 @ 09:36):    Growth in anaerobic bottle: Staphylococcus epidermidis    Coag Negative Staphylococcus    Single set isolate, possible contaminant. Contact    Microbiology if susceptibility testing clinically    indicated.          A1C with Estimated Average Glucose Result: 7.9 % (08-21-21 @ 16:09)  A1C with Estimated Average Glucose Result: 7.3 % (08-14-21 @ 14:36)

## 2021-10-01 NOTE — PROGRESS NOTE ADULT - SUBJECTIVE AND OBJECTIVE BOX
Follow Up:  COVID, fevers    Interval History/ROS: Cultures still positive. Afebrile. s/p PEG and trach. Daughter and wife at bedside.     Allergies  No Known Allergies        ANTIMICROBIALS:  DAPTOmycin IVPB        OTHER MEDS:  acetaminophen    Suspension .. 650 milliGRAM(s) Oral every 6 hours PRN  ALBUTerol    90 MICROgram(s) HFA Inhaler 2 Puff(s) Inhalation every 8 hours  budesonide 160 MICROgram(s)/formoterol 4.5 MICROgram(s) Inhaler 2 Puff(s) Inhalation two times a day  chlorhexidine 0.12% Liquid 15 milliLiter(s) Oral Mucosa every 12 hours  chlorhexidine 4% Liquid 1 Application(s) Topical <User Schedule>  cholecalciferol 2000 Unit(s) Oral daily  dextrose 40% Gel 15 Gram(s) Oral once  dextrose 5%. 1000 milliLiter(s) IV Continuous <Continuous>  dextrose 5%. 1000 milliLiter(s) IV Continuous <Continuous>  dextrose 5%. 1000 milliLiter(s) IV Continuous <Continuous>  dextrose 50% Injectable 25 Gram(s) IV Push once  dextrose 50% Injectable 12.5 Gram(s) IV Push once  dextrose 50% Injectable 25 Gram(s) IV Push once  glucagon  Injectable 1 milliGRAM(s) IntraMuscular once  heparin  Infusion 400 Unit(s)/Hr IV Continuous <Continuous>  insulin lispro (ADMELOG) corrective regimen sliding scale   SubCutaneous every 6 hours  insulin NPH human recombinant 10 Unit(s) SubCutaneous every 6 hours  LORazepam     Tablet 1 milliGRAM(s) Oral every 12 hours  methadone   Solution 5 milliGRAM(s) Oral every 8 hours  multivitamin/minerals Oral Solution (WELLESSE) 30 milliLiter(s) Enteral Tube daily  naloxegol 25 milliGRAM(s) Oral daily  phenylephrine    Infusion 0.5 MICROgram(s)/kG/Min IV Continuous <Continuous>  polyethylene glycol 3350 17 Gram(s) Oral daily  senna Syrup 10 milliLiter(s) Oral at bedtime  ursodiol Tablet 500 milliGRAM(s) Oral two times a day      Vital Signs Last 24 Hrs  T(C): 37.1 (01 Oct 2021 16:00), Max: 37.3 (01 Oct 2021 00:00)  T(F): 98.8 (01 Oct 2021 16:00), Max: 99.1 (01 Oct 2021 00:00)  HR: 82 (01 Oct 2021 16:00) (76 - 100)  BP: 120/75 (01 Oct 2021 16:00) (103/62 - 186/94)  BP(mean): 93 (01 Oct 2021 16:00) (77 - 130)  RR: 24 (01 Oct 2021 16:00) (14 - 36)  SpO2: 95% (01 Oct 2021 16:00) (92% - 100%)    Physical Exam:  General: not awake or interactive. chronically ill appearing   Head: atraumatic, normocephalic  ENT: trach   Cardio: regular rate   Respiratory: mechanically ventilated grossly clear bilaterally, no wheezing  abd: PEG. soft, bowel sounds present   Musculoskeletal: anasarca   vascular: left arm peripheral IVs, no phlebitis                           7.0    12.67 )-----------( 297      ( 01 Oct 2021 00:20 )             25.3       10-01    154<H>  |  113<H>  |  54<H>  ----------------------------<  79  3.4<L>   |  29  |  1.01    Ca    9.2      01 Oct 2021 00:18  Phos  4.1     10-01  Mg     2.7     10-01    TPro  5.9<L>  /  Alb  2.3<L>  /  TBili  4.5<H>  /  DBili  x   /  AST  101<H>  /  ALT  110<H>  /  AlkPhos  1624<H>  10-01          MICROBIOLOGY:  Vancomycin Level, Trough: 16.0 ug/mL (10-01-21 @ 00:19)    Culture - Blood (collected 09-29-21 @ 14:47)  Source: .Blood Blood-Peripheral  Gram Stain (10-01-21 @ 09:50):    Growth in aerobic bottle: Gram Positive Cocci in Clusters  Preliminary Report (10-01-21 @ 09:51):    Growth in aerobic bottle: Gram Positive Cocci in Clusters    Culture - Blood (collected 09-29-21 @ 14:45)  Source: .Blood Blood-Venous  Preliminary Report (09-30-21 @ 15:02):    No growth to date.    Culture - Blood (collected 09-26-21 @ 03:25)  Source: .Blood Blood-Venous  Gram Stain (09-28-21 @ 06:34):    Growth in anaerobic bottle: Gram Positive Cocci in Clusters  Final Report (09-29-21 @ 09:36):    Growth in anaerobic bottle: Staphylococcus epidermidis    Coag Negative Staphylococcus    Single set isolate, possible contaminant. Contact    Microbiology if susceptibility testing clinically    indicated.    Culture - Blood (collected 09-26-21 @ 03:25)  Source: .Blood Blood-Peripheral  Final Report (10-01-21 @ 04:00):    No Growth Final    Culture - Sputum (collected 09-25-21 @ 20:46)  Source: .Sputum Sputum  Gram Stain (09-25-21 @ 23:44):    Moderate polymorphonuclear leukocytes per low power field    No Squamous epithelial cells per low power field    No organisms seen per oil power field  Final Report (09-27-21 @ 19:26):    Normal Respiratory Liane present    RADIOLOGY:  Images below reviewed personally  MR Head No Cont (09.28.21 @ 14:49)   There is no acute infarction, acute hemorrhage, cerebral edema, or intracranial mass effect.    Xray Chest 1 View- PORTABLE-Urgent (Xray Chest 1 View- PORTABLE-Urgent .) (09.25.21 @ 19:49)   ET tube terminates above the level ofthe evette. NG tube side port within the stomach. Distal tip is not visualized on this study.  Increased left lung opacities compared to prior study. Pneumomediastinum is decreased.    CT Chest Abdomen w/ IV Cont (09.23.21 @ 18:04)   1. Moderate to large pneumomediastinum, new since chest radiograph from 9/14/2021.  2. Bilateral groundglass and consolidative lung opacities.  3. Nonspecific gallbladder wall thickening.  4. Proctitis.  5. Bilateral anterolateral sixth and seventh rib fractures.

## 2021-10-01 NOTE — PROGRESS NOTE ADULT - SUBJECTIVE AND OBJECTIVE BOX
Interval Events:   s/p PEG    Hospital Medications:  acetaminophen    Suspension .. 650 milliGRAM(s) Oral every 6 hours PRN  ALBUTerol    90 MICROgram(s) HFA Inhaler 2 Puff(s) Inhalation every 8 hours  ampicillin/sulbactam  IVPB      ampicillin/sulbactam  IVPB 3 Gram(s) IV Intermittent every 6 hours  budesonide 160 MICROgram(s)/formoterol 4.5 MICROgram(s) Inhaler 2 Puff(s) Inhalation two times a day  chlorhexidine 0.12% Liquid 15 milliLiter(s) Oral Mucosa every 12 hours  chlorhexidine 4% Liquid 1 Application(s) Topical <User Schedule>  cholecalciferol 2000 Unit(s) Oral daily  dextrose 40% Gel 15 Gram(s) Oral once  dextrose 5%. 1000 milliLiter(s) IV Continuous <Continuous>  dextrose 5%. 1000 milliLiter(s) IV Continuous <Continuous>  dextrose 5%. 1000 milliLiter(s) IV Continuous <Continuous>  dextrose 50% Injectable 25 Gram(s) IV Push once  dextrose 50% Injectable 12.5 Gram(s) IV Push once  dextrose 50% Injectable 25 Gram(s) IV Push once  glucagon  Injectable 1 milliGRAM(s) IntraMuscular once  insulin lispro (ADMELOG) corrective regimen sliding scale   SubCutaneous every 6 hours  insulin NPH human recombinant 10 Unit(s) SubCutaneous every 6 hours  LORazepam     Tablet 1 milliGRAM(s) Oral every 12 hours  methadone   Solution 5 milliGRAM(s) Oral every 8 hours  multivitamin/minerals Oral Solution (WELLESSE) 30 milliLiter(s) Enteral Tube daily  naloxegol 25 milliGRAM(s) Oral daily  phenylephrine    Infusion 0.5 MICROgram(s)/kG/Min IV Continuous <Continuous>  polyethylene glycol 3350 17 Gram(s) Oral daily  senna Syrup 10 milliLiter(s) Oral at bedtime  ursodiol Tablet 500 milliGRAM(s) Oral two times a day  vancomycin  IVPB 500 milliGRAM(s) IV Intermittent <User Schedule>        ROS: unable to be obtained       PHYSICAL EXAM:   Vital Signs:  Vital Signs Last 24 Hrs  T(C): 37.1 (01 Oct 2021 04:00), Max: 37.3 (01 Oct 2021 00:00)  T(F): 98.8 (01 Oct 2021 04:00), Max: 99.1 (01 Oct 2021 00:00)  HR: 87 (01 Oct 2021 07:00) (83 - 100)  BP: 164/87 (01 Oct 2021 07:00) (123/60 - 186/94)  BP(mean): 119 (01 Oct 2021 07:00) (85 - 130)  RR: 27 (01 Oct 2021 07:00) (15 - 36)  SpO2: 95% (01 Oct 2021 07:00) (92% - 99%)  Daily Height in cm: 172.72 (30 Sep 2021 22:29)    Daily     PHYSICAL EXAM:   General: intubated, off sedation  HEENT: NCAT, clear conjunctiva, sclera icteric, pupils sluggish, blinks spontaneously  Neck: supple, no JVD  Respiratory: CTAB  Cardiovascular: RRR, S1S2  Abdomen: soft, nontender, + PEG  Extremities: no edema, no cyanosis  Skin: warm  Neurological: nonfocal, no response to pain, sternal rub    LABS:                        7.0    12.67 )-----------( 297      ( 01 Oct 2021 00:20 )             25.3     Mean Cell Volume: 110.0 fl (10-01-21 @ 00:20)    10-01    154<H>  |  113<H>  |  54<H>  ----------------------------<  79  3.4<L>   |  29  |  1.01    Ca    9.2      01 Oct 2021 00:18  Phos  4.1     10-01  Mg     2.7     10-01    TPro  5.9<L>  /  Alb  2.3<L>  /  TBili  4.5<H>  /  DBili  x   /  AST  101<H>  /  ALT  110<H>  /  AlkPhos  1624<H>  10-01    LIVER FUNCTIONS - ( 01 Oct 2021 00:18 )  Alb: 2.3 g/dL / Pro: 5.9 g/dL / ALK PHOS: 1624 U/L / ALT: 110 U/L / AST: 101 U/L / GGT: x           PT/INR - ( 01 Oct 2021 00:20 )   PT: 11.8 sec;   INR: 0.98 ratio         PTT - ( 01 Oct 2021 00:20 )  PTT:28.4 sec                            7.0    12.67 )-----------( 297      ( 01 Oct 2021 00:20 )             25.3                         7.1    12.87 )-----------( 291      ( 29 Sep 2021 23:59 )             24.8                         7.1    13.39 )-----------( 295      ( 29 Sep 2021 02:10 )             23.7                         6.7    12.26 )-----------( 263      ( 29 Sep 2021 00:10 )             23.7       Imaging:  < from: Upper Endoscopy (09.30.21 @ 14:09) >  Findings:       A small hiatal hernia was present.       The exam of the esophagus was otherwise normal.       Multiple dispersed, non-bleeding erosions were found in the gastric body. There were no        stigmata of recent bleeding. The patient was placed in the supine position for PEG placement.        The stomach was insufflated to appose gastric and abdominal walls. A site was located with        excellent transillumination for placement. The abdominal wall was marked and prepped in a        sterile manner. The area was anesthetized with 3 mL of 0.5% lidocaine. The trocar needle was        introduced through the abdominal wall and into the stomach under direct endoscopic view. A        snare was introduced through the endoscope and opened in the gastric lumen. The guide wire        was passed through the trocar and into the open snare. The snare was closed around the guide        wire. The endoscope and snare were removed, pulling the wire out through the mouth. A skin        incision was made at the site of needle insertion. The externally removable 20 Fr Bard        gastrostomy tube was lubricated. The G-tube was tied to the guide wire and pulled through the        mouth and into the stomach. The trocar needle was removed, and the gastrostomy tube was        pulled out from the stomach through the skin. The external bumper was attached to the        gastrostomy tube, and the tube was cut to remove the guide wire. The final position of the        gastrostomy tube was confirmed by relook endoscopy, and skin marking noted to be 4 cm at the        external bumper. The final tension and compression of the abdominal wall by the PEG tube and        external bumper were checked and revealed that the bumper was loose and lightly touching the        skin. The feeding tube was capped, and the tube site cleaned and dressed.       The exam of the stomach was otherwise normal.       The examined duodenum was normal.                                                                                                        Impression:          - Small hiatal hernia.                       - Gastric erosions likely related to OG trauma.                       - An externally removable PEG placement was successfully completed.  Recommendation:      - Return patient to [facility] for ongoing care.                       - Please follow the post-PEG recommendations including: may use PEG today for                        meds and water and may use PEG tomorrow for feedings after GI fellow check on                        site.    < end of copied text >

## 2021-10-01 NOTE — PROGRESS NOTE ADULT - ASSESSMENT
71 year old man w/pmhx of HTN, DM2, BPH, and Asthma here for acute hypoxic respiratory failure and ARDS secondary to viral pneumonia from COVID-19. Hospital course complicated by VT arrest, DVT and MRSE bacteremia.     Impression:  # Dysphagia in the setting of respiratory failure s/p PEG    Recommendations:  - okay to start using PEG today from GI for feeding  - nutrition consult for type and rate of feeding   - keep bumper clean and dry, recommend NO gauze underneath the bumper   - aspiration precautions, elevate head end of the bed  - close observation to prevent dislodgement, can use a binder for protection to prevent the patient from pulling out the PEG  - eventual Speech and Swallow evaluation as an outpatient to evaluate for swallowing, if it improves, can remove the PEG as an outpatient at least 6-8 weeks post placement   - supportive care as per primary team.     Please call us back as needed.     Mari Rivera MD  Gastroenterology Fellow  Pager: [phone]  Please call answering service [phone] / on-call GI fellow after 5pm and before 8am, and on weekends.

## 2021-10-01 NOTE — PROGRESS NOTE ADULT - ATTENDING COMMENTS
Agree w above. Clinically stable post PEG placement. No bleeding noted. OK to use PEG for feedings and meds. Local wound care.

## 2021-10-01 NOTE — PROGRESS NOTE ADULT - SUBJECTIVE AND OBJECTIVE BOX
POD/STATUS POST/ENT ISSUE: POD #0 for trach     INTERVAL HPI:70 y/o male POD #0 for tracheostomy #8 shiley cuffed with surgicel in place 2/2 respiratory failure. Pt is doing well on the vent. no complaints or issues.     ICU Vital Signs Last 24 Hrs  T(C): 37.1 (01 Oct 2021 16:00), Max: 37.3 (01 Oct 2021 00:00)  T(F): 98.8 (01 Oct 2021 16:00), Max: 99.1 (01 Oct 2021 00:00)  HR: 84 (01 Oct 2021 18:00) (76 - 100)  BP: 160/81 (01 Oct 2021 18:00) (103/62 - 186/94)  BP(mean): 111 (01 Oct 2021 18:00) (77 - 130)  ABP: --  ABP(mean): --  RR: 34 (01 Oct 2021 18:00) (14 - 36)  SpO2: 91% (01 Oct 2021 18:00) (91% - 100%)                          7.0    12.67 )-----------( 297      ( 01 Oct 2021 00:20 )             25.3     10-01    154<H>  |  113<H>  |  54<H>  ----------------------------<  79  3.4<L>   |  29  |  1.01    Ca    9.2      01 Oct 2021 00:18  Phos  4.1     10-01  Mg     2.7     10-01    TPro  5.9<L>  /  Alb  2.3<L>  /  TBili  4.5<H>  /  DBili  x   /  AST  101<H>  /  ALT  110<H>  /  AlkPhos  1624<H>  10-01      PHYSICAL EXAM:  Gen: NAD, well-developed  Head: Normocephalic, Atraumatic  Face: no edema/erythema/fluctuance  Eyes:  no scleral injection  Nose: Nares bilaterally patent, no discharge  Mouth: No Stridor / Drooling / Trismus.  Mucosa moist, tongue/uvula midline, oropharynx clear  Neck: #8 shiley inflated cuff and surgicel in place, with minimal serosanguinous oozing from stoma, no active bleeding, sutures x4 and umbilical tie in place.  No lymphadenopathy, trachea midline, no masses  Resp: ventilating well, satting   CV: no peripheral edema/cyanosis            - continue vent   - aggressive suction prn   - continue with trach site care, keep dry and clean   - call ENT with any issues

## 2021-10-02 NOTE — PROGRESS NOTE ADULT - ASSESSMENT
71 year old man w/pmhx of HTN, DM2, BPH, and Asthma here for acute hypoxic respiratory failure and ARDS secondary to viral pneumonia from COVID-19. Hospital course complicated by VT arrest, DVT and MRSE bacteremia.    #Neuro   1) off sedation  - decreased methadone increased to 5 q8h and Ativan 1mg q12h as of 9/27; patient still unresponsive  - intermittently agitated with tachypnea requiring Fentanyl pushes; given Versed 2mg 9/28 am  - noncont CT 9/13 appears unchanged since 8/12  - brain MRI 9/28 w/o evidence of anoxic injury, shows microvascular ischemic changes    2) r/o seizure  -had rhythmic R arm movement  - 24h EEG with generalized slowing; no evidence of seizure (per report 9/21 AM)    #ENT   -oropharyngeal packing removed on 9/10  -ENT said they will pack if patient's h/h drops  -will need FiO2 at 50% or less and PaO2 at 60 for qualify for trach; to be done 10/1 12p    #CV   1)Cardiac Arrest VT  - S/p Amiodarone and electrolyte supplementation   - Recurrent runs of Vtach 9/26; started on metoprolol but stopped in s/o hypertension  - Echo reveals Right hrt strain   -holding diuretics; no indication, no evidence of pleural effusion, pulm edema on chest CT (9/24)    #Pulm   1)Acute hypoxemic resp failure 2/2 cardiac arrest  - increased purulent secretions as of 9/27; will assess need for pseudomonal coverage  - vent pressure AC     2) Asthma  - c/w Proventil q8h and Symbicort BID    3) Post-covid sequelae  - extensive fibrosis and consolidations at bases b/l  - patient with PO2 75 despite intubation with Fi02 60%, pressure 24 and PEEP 5  - GGO on CT chest; unlikely to improve with 3rd course of steroids, also with persistent fevers and bacteremia despite appropriate abx therapy     #GI   1)Nutrition   - maintain Glucerna tube feeds as tolerated   - c/w senna/ miralax     2)Transaminitis - likely 2/2 cholestasis of sepsis vs SSC CIP (secondary sclerosing cholangitis in critically ill patients), Covid cholangiopathy, drug-induced liver injury  - c/w ursodiol 500 mg BID            - supportive treatment per hepatology  -alk phos, ast/alt downtrending    #Renal  - strict I/Os, monitor lytes; replete PRN    2) Hypernatremia  - up to 154 10/1; free water 300 q4h     #ID   1)MRSE Bacteremia   - S/P 14 day course of Vanc from 8/20-9/3 therapy, and intermittent gram-&anaerobe coverage   - last postive Bcx 9/29 - will need 14d treatment beyond that   - continue surveillance cultures q48-72h  - TTE w/o evidence of vegetations; will need SEJAL to definitively rule out endocarditis; GI clearance as of 9/30      - f/u with ID re necessity of SEJAL vs empiric treatment for 6w as surgical intervention unlikely to be offered  - CT A/P with evidence of proctitis: unasyn added per ID (9/24-10/1)  - continue Vanc 500mg qd (level with midnight labs)          #Endo   1)DM   - c/w NPH 10u q6h  - c/w ISS   - goal Blood Sugar 100-180     #Heme  1)b/l DVTs above knee on left and below knee on R  - off Lovenox since 9/19 given Hgb drop to 6.0 requiring 1u PRBC; drop again 9/22 requiring additional unit  - off DVT ppx in s/o persistent Hgb drops, but will need AC due to known DVT. Will attempt heparin challenge after trach, otherwise will need IVC filter    2) R thigh hematoma  - patient with stable hematoma; potential source of dropping Hgb    #GOC  -palliative consult following  - FULL CODE  -family updated daily 71 year old man w/pmhx of HTN, DM2, BPH, and Asthma here for acute hypoxic respiratory failure and ARDS secondary to viral pneumonia from COVID-19. Hospital course complicated by VT arrest, DVT and MRSE bacteremia.    #Neuro   1) off sedation  - methadone increased to 5 q12h and Ativan 1mg q12h as of 10/2; patient still unresponsive  - intermittently agitated with tachypnea requiring Fentanyl pushes; switched to Morphine 2mg q4h   - noncont CT 9/13 appears unchanged since 8/12  - brain MRI 9/28 w/o evidence of anoxic injury, shows microvascular ischemic changes    2) r/o seizure  -had rhythmic R arm movement  - 24h EEG with generalized slowing; no evidence of seizure (per report 9/21 AM)    #CV   1)Cardiac Arrest VT  - S/p Amiodarone and electrolyte supplementation   - Recurrent runs of Vtach 9/26; started on metoprolol but stopped in s/o hypertension  - Echo reveals Right hrt strain   - holding diuretics; no indication, no evidence of pleural effusion, pulm edema on chest CT (9/24)    #Pulm   1)Acute hypoxemic resp failure 2/2 cardiac arrest  - vent pressure AC 20/ RR 12/28 Tv 400, FiO2 50, PEEP 5  - trach done by ENT 10/1; air leak and bleeding noted, ENT to see patient 10/2    2) Asthma  - c/w Proventil q8h and Symbicort BID    3) Post-covid sequelae  - extensive fibrosis and consolidations at bases b/l  - GGO on CT chest; unlikely to improve with 3rd course of steroids, also with persistent fevers and bacteremia despite appropriate abx therapy     #GI   1)Nutrition   - maintain Glucerna tube feeds as tolerated   - c/w senna/ miralax     2)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 No SEJAL indicated as pt unlikely to be surgical candidate, and will receive empiric treatment.   - CT A/P with evidence of proctitis: unasyn added per ID (9/24-10/1)  - Patient completed 5 weeks of vanc ending 10/1. Switched to Daptomycin 700mg q24h for 48d course     - check weekly CK          #Endo   1)DM   - c/w NPH 10u q6h  - c/w ISS   - goal Blood Sugar 100-180     #Heme  1)b/l DVTs above knee on left and below knee on R  - Hep gtt challenge restarted 10/1; goal 45-60  - has required frequent pauses in AC in s/o Hgb drops >7; will need IVC filter if he fails challenge    2) R thigh hematoma  - patient with stable hematoma; potential source of dropping Hgb    #GOC  -palliative consult following  - FULL CODE  -family updated daily

## 2021-10-02 NOTE — PROGRESS NOTE ADULT - ASSESSMENT
72 y/o male POD #1 for tracheostomy 2/2 respiratory failure. Pt is doing well, #8 shiley cuffed with surgicel in place, on the vent, with minimal serosanguinous oozing from stoma, no active bleed. sutures x4 and umbilical tie in place.

## 2021-10-02 NOTE — PROGRESS NOTE ADULT - SUBJECTIVE AND OBJECTIVE BOX
INTERVAL HPI/OVERNIGHT EVENTS: PTT 29-30 overnight; increased hep gtt to 8u. Given 1u PRBC for hgb drop to 6.9. Air leak noted around trach site in addition to minimal bleeding; ENT called and will come see the patient.     SUBJECTIVE: Patient seen and examined at bedside.     ROS: unable to assess    OBJECTIVE:    VITAL SIGNS:  ICU Vital Signs Last 24 Hrs  T(C): 37 (02 Oct 2021 04:00), Max: 38.1 (01 Oct 2021 22:40)  T(F): 98.6 (02 Oct 2021 04:00), Max: 100.6 (01 Oct 2021 22:40)  HR: 88 (02 Oct 2021 07:00) (76 - 94)  BP: 156/90 (02 Oct 2021 07:00) (103/62 - 160/90)  BP(mean): 117 (02 Oct 2021 07:00) (77 - 117)  ABP: --  ABP(mean): --  RR: 29 (02 Oct 2021 07:00) (14 - 41)  SpO2: 100% (02 Oct 2021 07:00) (90% - 100%)    Mode: AC/ CMV (Assist Control/ Continuous Mandatory Ventilation), RR (machine): 12, FiO2: 60, PEEP: 5, ITime: 0.7, MAP: 11, PC: 20, PIP: 28    10-01 @ 07:01  -  10-02 @ 07:00  --------------------------------------------------------  IN: 2916 mL / OUT: 1450 mL / NET: 1466 mL      CAPILLARY BLOOD GLUCOSE      POCT Blood Glucose.: 186 mg/dL (02 Oct 2021 05:20)      PHYSICAL EXAM:    General: NAD  HEENT: NC/AT; PERRL, clear conjunctiva  Neck: supple  Respiratory: CTA b/l  Cardiovascular: +S1/S2; RRR  Abdomen: soft, NT/ND; +BS x4  Extremities: WWP, 2+ peripheral pulses b/l; no LE edema  Skin: normal color and turgor; no rash  Neurological:    MEDICATIONS:  MEDICATIONS  (STANDING):  ALBUTerol    90 MICROgram(s) HFA Inhaler 2 Puff(s) Inhalation every 8 hours  budesonide 160 MICROgram(s)/formoterol 4.5 MICROgram(s) Inhaler 2 Puff(s) Inhalation two times a day  chlorhexidine 0.12% Liquid 15 milliLiter(s) Oral Mucosa every 12 hours  chlorhexidine 4% Liquid 1 Application(s) Topical <User Schedule>  cholecalciferol 2000 Unit(s) Oral daily  DAPTOmycin IVPB 700 milliGRAM(s) IV Intermittent every 24 hours  DAPTOmycin IVPB      dextrose 40% Gel 15 Gram(s) Oral once  dextrose 5%. 1000 milliLiter(s) (50 mL/Hr) IV Continuous <Continuous>  dextrose 5%. 1000 milliLiter(s) (100 mL/Hr) IV Continuous <Continuous>  dextrose 50% Injectable 25 Gram(s) IV Push once  dextrose 50% Injectable 12.5 Gram(s) IV Push once  dextrose 50% Injectable 25 Gram(s) IV Push once  glucagon  Injectable 1 milliGRAM(s) IntraMuscular once  heparin  Infusion 400 Unit(s)/Hr (6 mL/Hr) IV Continuous <Continuous>  insulin lispro (ADMELOG) corrective regimen sliding scale   SubCutaneous every 6 hours  insulin NPH human recombinant 10 Unit(s) SubCutaneous every 6 hours  LORazepam     Tablet 1 milliGRAM(s) Oral every 12 hours  methadone   Solution 5 milliGRAM(s) Oral every 8 hours  multivitamin/minerals Oral Solution (WELLESSE) 30 milliLiter(s) Enteral Tube daily  naloxegol 25 milliGRAM(s) Oral daily  phenylephrine    Infusion 0.5 MICROgram(s)/kG/Min (15.3 mL/Hr) IV Continuous <Continuous>  polyethylene glycol 3350 17 Gram(s) Oral daily  senna Syrup 10 milliLiter(s) Oral at bedtime  ursodiol Tablet 500 milliGRAM(s) Oral two times a day    MEDICATIONS  (PRN):  acetaminophen    Suspension .. 650 milliGRAM(s) Oral every 6 hours PRN Temp greater or equal to 38C (100.4F), Mild Pain (1 - 3), Moderate Pain (4 - 6)  fentaNYL    Injectable 25 MICROGram(s) IV Push every 4 hours PRN Moderate Pain (4 - 6)      ALLERGIES:  Allergies    No Known Allergies    Intolerances        LABS:                        8.0    11.12 )-----------( 312      ( 02 Oct 2021 04:11 )             27.6     10-02    149<H>  |  110<H>  |  53<H>  ----------------------------<  224<H>  3.4<L>   |  28  |  0.97    Ca    9.2      02 Oct 2021 04:11  Phos  3.6     10-02  Mg     2.7     10-02    TPro  5.9<L>  /  Alb  2.2<L>  /  TBili  4.6<H>  /  DBili  x   /  AST  95<H>  /  ALT  97<H>  /  AlkPhos  1663<H>  10-02    PT/INR - ( 01 Oct 2021 00:20 )   PT: 11.8 sec;   INR: 0.98 ratio         PTT - ( 02 Oct 2021 04:11 )  PTT:30.2 sec      RADIOLOGY & ADDITIONAL TESTS: Reviewed.     INTERVAL HPI/OVERNIGHT EVENTS: PTT 29-30 overnight; increased hep gtt to 8u. Given 1u PRBC for hgb drop to 6.9. Air leak noted around trach site in addition to minimal bleeding; ENT called and will come see the patient.     SUBJECTIVE: Patient seen and examined at bedside. Trach/PEG in place.    ROS: unable to assess    OBJECTIVE:    VITAL SIGNS:  ICU Vital Signs Last 24 Hrs  T(C): 37 (02 Oct 2021 04:00), Max: 38.1 (01 Oct 2021 22:40)  T(F): 98.6 (02 Oct 2021 04:00), Max: 100.6 (01 Oct 2021 22:40)  HR: 88 (02 Oct 2021 07:00) (76 - 94)  BP: 156/90 (02 Oct 2021 07:00) (103/62 - 160/90)  BP(mean): 117 (02 Oct 2021 07:00) (77 - 117)  ABP: --  ABP(mean): --  RR: 29 (02 Oct 2021 07:00) (14 - 41)  SpO2: 100% (02 Oct 2021 07:00) (90% - 100%)    Mode: AC/ CMV (Assist Control/ Continuous Mandatory Ventilation), RR (machine): 12, FiO2: 60, PEEP: 5, ITime: 0.7, MAP: 11, PC: 20, PIP: 28    10-01 @ 07:01  -  10-02 @ 07:00  --------------------------------------------------------  IN: 2916 mL / OUT: 1450 mL / NET: 1466 mL      CAPILLARY BLOOD GLUCOSE      POCT Blood Glucose.: 186 mg/dL (02 Oct 2021 05:20)      PHYSICAL EXAM:    General: intubated, off sedation  HEENT: NCAT, clear conjunctiva, sclera icteric, pupils sluggish, blinks spontaneously  Neck: supple, no JVD  Respiratory: trach in place, bleeding around site; coarse breath sounds b/l  Cardiovascular: RRR, S1S2, no m/r/g  Abdomen: PEG in place; soft, nontender, nondistended, normal bowel sounds  Extremities: no edema, no cyanosis  Skin: warm, perfused, b/l erythema on buttocks  Neurological: nonfocal, no response to pain, sternal rub    MEDICATIONS:  MEDICATIONS  (STANDING):  ALBUTerol    90 MICROgram(s) HFA Inhaler 2 Puff(s) Inhalation every 8 hours  budesonide 160 MICROgram(s)/formoterol 4.5 MICROgram(s) Inhaler 2 Puff(s) Inhalation two times a day  chlorhexidine 0.12% Liquid 15 milliLiter(s) Oral Mucosa every 12 hours  chlorhexidine 4% Liquid 1 Application(s) Topical <User Schedule>  cholecalciferol 2000 Unit(s) Oral daily  DAPTOmycin IVPB 700 milliGRAM(s) IV Intermittent every 24 hours  DAPTOmycin IVPB      dextrose 40% Gel 15 Gram(s) Oral once  dextrose 5%. 1000 milliLiter(s) (50 mL/Hr) IV Continuous <Continuous>  dextrose 5%. 1000 milliLiter(s) (100 mL/Hr) IV Continuous <Continuous>  dextrose 50% Injectable 25 Gram(s) IV Push once  dextrose 50% Injectable 12.5 Gram(s) IV Push once  dextrose 50% Injectable 25 Gram(s) IV Push once  glucagon  Injectable 1 milliGRAM(s) IntraMuscular once  heparin  Infusion 400 Unit(s)/Hr (6 mL/Hr) IV Continuous <Continuous>  insulin lispro (ADMELOG) corrective regimen sliding scale   SubCutaneous every 6 hours  insulin NPH human recombinant 10 Unit(s) SubCutaneous every 6 hours  LORazepam     Tablet 1 milliGRAM(s) Oral every 12 hours  methadone   Solution 5 milliGRAM(s) Oral every 8 hours  multivitamin/minerals Oral Solution (WELLESSE) 30 milliLiter(s) Enteral Tube daily  naloxegol 25 milliGRAM(s) Oral daily  phenylephrine    Infusion 0.5 MICROgram(s)/kG/Min (15.3 mL/Hr) IV Continuous <Continuous>  polyethylene glycol 3350 17 Gram(s) Oral daily  senna Syrup 10 milliLiter(s) Oral at bedtime  ursodiol Tablet 500 milliGRAM(s) Oral two times a day    MEDICATIONS  (PRN):  acetaminophen    Suspension .. 650 milliGRAM(s) Oral every 6 hours PRN Temp greater or equal to 38C (100.4F), Mild Pain (1 - 3), Moderate Pain (4 - 6)  fentaNYL    Injectable 25 MICROGram(s) IV Push every 4 hours PRN Moderate Pain (4 - 6)      ALLERGIES:  Allergies    No Known Allergies    Intolerances        LABS:                        8.0    11.12 )-----------( 312      ( 02 Oct 2021 04:11 )             27.6     10-02    149<H>  |  110<H>  |  53<H>  ----------------------------<  224<H>  3.4<L>   |  28  |  0.97    Ca    9.2      02 Oct 2021 04:11  Phos  3.6     10-02  Mg     2.7     10-02    TPro  5.9<L>  /  Alb  2.2<L>  /  TBili  4.6<H>  /  DBili  x   /  AST  95<H>  /  ALT  97<H>  /  AlkPhos  1663<H>  10-02    PT/INR - ( 01 Oct 2021 00:20 )   PT: 11.8 sec;   INR: 0.98 ratio         PTT - ( 02 Oct 2021 04:11 )  PTT:30.2 sec      RADIOLOGY & ADDITIONAL TESTS: Reviewed.

## 2021-10-02 NOTE — PROGRESS NOTE ADULT - SUBJECTIVE AND OBJECTIVE BOX
ENT ISSUE/POD: POD #1 for trach       HPI: 72 y/o male POD #1 for tracheostomy #8 shiley cuffed with surgicel in place 2/2 respiratory failure. Pt is doing well on the vent. no complaints or issues.             PAST MEDICAL & SURGICAL HISTORY:  BPH (benign prostatic hyperplasia)    Hyperlipemia    HTN (hypertension)    Hypercholesteremia    Asthma    Diabetes    Kidney stone    Bladder stone    H/O lithotripsy  x 2    History of kidney stones    H/O umbilical hernia repair      Allergies    No Known Allergies    Intolerances      MEDICATIONS  (STANDING):  ALBUTerol    90 MICROgram(s) HFA Inhaler 2 Puff(s) Inhalation every 8 hours  budesonide 160 MICROgram(s)/formoterol 4.5 MICROgram(s) Inhaler 2 Puff(s) Inhalation two times a day  chlorhexidine 0.12% Liquid 15 milliLiter(s) Oral Mucosa every 12 hours  chlorhexidine 4% Liquid 1 Application(s) Topical <User Schedule>  cholecalciferol 2000 Unit(s) Oral daily  DAPTOmycin IVPB 700 milliGRAM(s) IV Intermittent every 24 hours  DAPTOmycin IVPB      dextrose 40% Gel 15 Gram(s) Oral once  dextrose 5%. 1000 milliLiter(s) (50 mL/Hr) IV Continuous <Continuous>  dextrose 5%. 1000 milliLiter(s) (100 mL/Hr) IV Continuous <Continuous>  dextrose 50% Injectable 25 Gram(s) IV Push once  dextrose 50% Injectable 12.5 Gram(s) IV Push once  dextrose 50% Injectable 25 Gram(s) IV Push once  glucagon  Injectable 1 milliGRAM(s) IntraMuscular once  heparin  Infusion 400 Unit(s)/Hr (8 mL/Hr) IV Continuous <Continuous>  insulin lispro (ADMELOG) corrective regimen sliding scale   SubCutaneous every 6 hours  insulin NPH human recombinant 10 Unit(s) SubCutaneous every 6 hours  LORazepam     Tablet 1 milliGRAM(s) Oral every 12 hours  methadone   Solution 5 milliGRAM(s) Oral every 8 hours  multivitamin/minerals Oral Solution (WELLESSE) 30 milliLiter(s) Enteral Tube daily  naloxegol 25 milliGRAM(s) Oral daily  phenylephrine    Infusion 0.5 MICROgram(s)/kG/Min (15.3 mL/Hr) IV Continuous <Continuous>  polyethylene glycol 3350 17 Gram(s) Oral daily  senna Syrup 10 milliLiter(s) Oral at bedtime  ursodiol Tablet 500 milliGRAM(s) Oral two times a day    MEDICATIONS  (PRN):  acetaminophen    Suspension .. 650 milliGRAM(s) Oral every 6 hours PRN Temp greater or equal to 38C (100.4F), Mild Pain (1 - 3), Moderate Pain (4 - 6)  HYDROmorphone  Injectable 0.5 milliGRAM(s) IV Push every 4 hours PRN Moderate Pain (4 - 6)      Social History: see consult    Family history: see consult    ROS:   ENT: all negative except as noted in HPI   Pulm: denies SOB, cough, hemoptysis  Neuro: denies numbness/tingling, loss of sensation  Endo: denies heat/cold intolerance, excessive sweating      Vital Signs Last 24 Hrs  T(C): 37 (02 Oct 2021 12:00), Max: 38.1 (01 Oct 2021 22:40)  T(F): 98.6 (02 Oct 2021 12:00), Max: 100.6 (01 Oct 2021 22:40)  HR: 91 (02 Oct 2021 15:16) (80 - 94)  BP: 156/90 (02 Oct 2021 13:00) (118/72 - 167/91)  BP(mean): 117 (02 Oct 2021 13:00) (90 - 123)  RR: 33 (02 Oct 2021 13:00) (17 - 41)  SpO2: 91% (02 Oct 2021 15:16) (90% - 100%)                          8.3    12.72 )-----------( 299      ( 02 Oct 2021 13:13 )             28.3    10-02    149<H>  |  110<H>  |  53<H>  ----------------------------<  224<H>  3.4<L>   |  28  |  0.97    Ca    9.2      02 Oct 2021 04:11  Phos  3.6     10-02  Mg     2.7     10-02    TPro  5.9<L>  /  Alb  2.2<L>  /  TBili  4.6<H>  /  DBili  x   /  AST  95<H>  /  ALT  97<H>  /  AlkPhos  1663<H>  10-02   PT/INR - ( 01 Oct 2021 00:20 )   PT: 11.8 sec;   INR: 0.98 ratio         PTT - ( 02 Oct 2021 13:13 )  PTT:47.2 sec    PHYSICAL EXAM:  Gen: NAD, well-developed  Head: Normocephalic, Atraumatic  Face: no edema/erythema/fluctuance  Eyes:  no scleral injection  Nose: Nares bilaterally patent, no discharge  Mouth: No Stridor / Drooling / Trismus.  Mucosa moist, tongue/uvula midline, oropharynx clear  Neck: #8 shiley inflated cuff and surgicel in place, with minimal serosanguinous oozing from stoma, no active bleeding, sutures x4 and umbilical tie in place.  No lymphadenopathy, trachea midline, no masses  Resp: ventilating well, satting   CV: no peripheral edema/cyanosis

## 2021-10-02 NOTE — PROGRESS NOTE ADULT - PROBLEM SELECTOR PLAN 1
- continue vent   - suction prn   - continue with trach site care, keep dry and clean   - call ENT with any issues.

## 2021-10-02 NOTE — PROGRESS NOTE ADULT - ATTENDING COMMENTS
Patient seen and examined. Patient is critically ill requiring frequent bedside visits with therapy change. Patient is a 71M with  COVID pneumonia and ARDS who has had persistent respiratory failure. He had a cardiac arrest on the day of his intubation. He remains encephalopathic    1. Acute Hypoxemic Respiratory Failure - continue mechanical ventilation and wean as tolerated. Patient now is s/p trach by ENT 10/1. Maintain O2 sat > 90%. Trach care. He has had some tracheal bleeding overnight with a concern for cuff leak but appears better this AM. ENT followup.  2. Oropharyngeal dysphagia - PEG placed. Continue feeds and monitor BM  3. Cardiovascular - shock state resolved and patient maintaining MAP > 65  - Patient had a cardiac arrest initially - thought due to hypoxia  4. Transaminitis - thought due to COVID cholangiopathy vs sepsis. No signs of biliary obstruction  5. Venous Thromboembolism - patient with R DVT. Was on Hep gtt but then developed a thigh hematoma last week. He was restarted on Hep gtt yesterday. Monitor Hb and transfuse as needed.  - If he cannot tolerate Hep gtt will need IVC filter  6. Infectious Disease - patient with a persistent MRSE bacteremia. Patient was on Vancomycin but switched to Daptomycin. Followup CPK weekly.  - Was being considered for SEJAL to evaluate but now deferred by Cardiology. 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 He is deemed not to be a surgical candidate given his clinical condition  - Had a CT abd/pelvis with evidence of proctitis  - Sacral wound - wound care followup  7. Patient remains full code. Family remains hopeful  - Once stable patient will be transferred to [facility] for further management and care. Family to be informed regarding pending transfer.    Prognosis poor

## 2021-10-03 NOTE — PROGRESS NOTE ADULT - SUBJECTIVE AND OBJECTIVE BOX
Kiran Landin PGY1    INTERVAL HPI/OVERNIGHT EVENTS:    SUBJECTIVE: Patient seen and examined at bedside.     CONSTITUTIONAL: No weakness, fevers or chills  EYES/ENT: No visual changes;  No vertigo or throat pain   NECK: No pain or stiffness  RESPIRATORY: No cough, wheezing, hemoptysis; No shortness of breath  CARDIOVASCULAR: No chest pain or palpitations  GASTROINTESTINAL: No abdominal or epigastric pain. No nausea, vomiting, or hematemesis; No diarrhea or constipation. No melena or hematochezia.  GENITOURINARY: No dysuria, frequency or hematuria  NEUROLOGICAL: No numbness or weakness  SKIN: No itching, rashes    OBJECTIVE:    VITAL SIGNS:  ICU Vital Signs Last 24 Hrs  T(C): 37.1 (03 Oct 2021 04:00), Max: 37.6 (02 Oct 2021 16:00)  T(F): 98.8 (03 Oct 2021 04:00), Max: 99.7 (02 Oct 2021 16:00)  HR: 85 (03 Oct 2021 07:00) (80 - 102)  BP: 131/72 (03 Oct 2021 07:00) (102/55 - 167/91)  BP(mean): 95 (03 Oct 2021 07:00) (72 - 123)  ABP: --  ABP(mean): --  RR: 21 (03 Oct 2021 07:00) (16 - 40)  SpO2: 96% (03 Oct 2021 07:00) (89% - 98%)    Mode: AC/ CMV (Assist Control/ Continuous Mandatory Ventilation), RR (machine): 12, FiO2: 60, PEEP: 5, ITime: 0.8, MAP: 13, PC: 20, PIP: 29    10-02 @ 07:01  -  10-03 @ 07:00  --------------------------------------------------------  IN: 3009 mL / OUT: 1200 mL / NET: 1809 mL      CAPILLARY BLOOD GLUCOSE      POCT Blood Glucose.: 179 mg/dL (03 Oct 2021 05:03)      PHYSICAL EXAM:    General: intubated, off sedation  HEENT: NCAT, clear conjunctiva, sclera icteric, pupils sluggish, blinks spontaneously  Neck: supple, no JVD  Respiratory: trach in place, bleeding around site; coarse breath sounds b/l  Cardiovascular: RRR, S1S2, no m/r/g  Abdomen: PEG in place; soft, nontender, nondistended, normal bowel sounds  Extremities: no edema, no cyanosis  Skin: warm, perfused, b/l erythema on buttocks  Neurological: nonfocal, no response to pain, sternal rub    MEDICATIONS:  MEDICATIONS  (STANDING):  ALBUTerol    90 MICROgram(s) HFA Inhaler 2 Puff(s) Inhalation every 8 hours  budesonide 160 MICROgram(s)/formoterol 4.5 MICROgram(s) Inhaler 2 Puff(s) Inhalation two times a day  chlorhexidine 0.12% Liquid 15 milliLiter(s) Oral Mucosa every 12 hours  chlorhexidine 4% Liquid 1 Application(s) Topical <User Schedule>  cholecalciferol 2000 Unit(s) Oral daily  DAPTOmycin IVPB 700 milliGRAM(s) IV Intermittent every 24 hours  DAPTOmycin IVPB      dextrose 40% Gel 15 Gram(s) Oral once  dextrose 5%. 1000 milliLiter(s) (50 mL/Hr) IV Continuous <Continuous>  dextrose 5%. 1000 milliLiter(s) (100 mL/Hr) IV Continuous <Continuous>  dextrose 50% Injectable 25 Gram(s) IV Push once  dextrose 50% Injectable 12.5 Gram(s) IV Push once  dextrose 50% Injectable 25 Gram(s) IV Push once  glucagon  Injectable 1 milliGRAM(s) IntraMuscular once  heparin  Infusion 400 Unit(s)/Hr (8 mL/Hr) IV Continuous <Continuous>  insulin lispro (ADMELOG) corrective regimen sliding scale   SubCutaneous every 6 hours  insulin NPH human recombinant 10 Unit(s) SubCutaneous every 6 hours  LORazepam     Tablet 1 milliGRAM(s) Oral every 12 hours  methadone   Solution 5 milliGRAM(s) Oral every 12 hours  multivitamin/minerals Oral Solution (WELLESSE) 30 milliLiter(s) Enteral Tube daily  naloxegol 25 milliGRAM(s) Oral daily  phenylephrine    Infusion 0.5 MICROgram(s)/kG/Min (15.3 mL/Hr) IV Continuous <Continuous>  polyethylene glycol 3350 17 Gram(s) Oral daily  senna Syrup 10 milliLiter(s) Oral at bedtime  ursodiol Tablet 500 milliGRAM(s) Oral two times a day    MEDICATIONS  (PRN):  acetaminophen    Suspension .. 650 milliGRAM(s) Oral every 6 hours PRN Temp greater or equal to 38C (100.4F), Mild Pain (1 - 3), Moderate Pain (4 - 6)  HYDROmorphone  Injectable 0.5 milliGRAM(s) IV Push every 4 hours PRN Moderate Pain (4 - 6)      ALLERGIES:  Allergies    No Known Allergies    Intolerances        LABS:                        8.0    12.56 )-----------( 280      ( 03 Oct 2021 00:30 )             27.5     10-03    148<H>  |  108  |  44<H>  ----------------------------<  194<H>  3.7   |  28  |  0.86    Ca    8.8      03 Oct 2021 00:30  Phos  3.5     10-03  Mg     2.5     10-03    TPro  6.0  /  Alb  2.5<L>  /  TBili  4.9<H>  /  DBili  x   /  AST  94<H>  /  ALT  95<H>  /  AlkPhos  1660<H>  10-03    PTT - ( 03 Oct 2021 00:30 )  PTT:50.4 sec      RADIOLOGY & ADDITIONAL TESTS: Reviewed. Kiran Landin PGY1    INTERVAL HPI/OVERNIGHT EVENTS:    SUBJECTIVE: Patient seen and examined at bedside.     ROS unable to asses due to patient's condition.    OBJECTIVE:    VITAL SIGNS:  ICU Vital Signs Last 24 Hrs  T(C): 37.1 (03 Oct 2021 04:00), Max: 37.6 (02 Oct 2021 16:00)  T(F): 98.8 (03 Oct 2021 04:00), Max: 99.7 (02 Oct 2021 16:00)  HR: 85 (03 Oct 2021 07:00) (80 - 102)  BP: 131/72 (03 Oct 2021 07:00) (102/55 - 167/91)  BP(mean): 95 (03 Oct 2021 07:00) (72 - 123)  ABP: --  ABP(mean): --  RR: 21 (03 Oct 2021 07:00) (16 - 40)  SpO2: 96% (03 Oct 2021 07:00) (89% - 98%)    Mode: AC/ CMV (Assist Control/ Continuous Mandatory Ventilation), RR (machine): 12, FiO2: 60, PEEP: 5, ITime: 0.8, MAP: 13, PC: 20, PIP: 29    10-02 @ 07:01  -  10-03 @ 07:00  --------------------------------------------------------  IN: 3009 mL / OUT: 1200 mL / NET: 1809 mL      CAPILLARY BLOOD GLUCOSE      POCT Blood Glucose.: 179 mg/dL (03 Oct 2021 05:03)      PHYSICAL EXAM:    General: intubated, off sedation  HEENT: NCAT, clear conjunctiva, sclera icteric, pupils sluggish, blinks spontaneously  Neck: supple, no JVD  Respiratory: trach in place, bleeding around site; coarse breath sounds b/l  Cardiovascular: RRR, S1S2, no m/r/g  Abdomen: PEG in place; soft, nontender, nondistended, normal bowel sounds  Extremities: no edema, no cyanosis  Skin: warm, perfused, b/l erythema on buttocks  Neurological: nonfocal, no response to pain, sternal rub    MEDICATIONS:  MEDICATIONS  (STANDING):  ALBUTerol    90 MICROgram(s) HFA Inhaler 2 Puff(s) Inhalation every 8 hours  budesonide 160 MICROgram(s)/formoterol 4.5 MICROgram(s) Inhaler 2 Puff(s) Inhalation two times a day  chlorhexidine 0.12% Liquid 15 milliLiter(s) Oral Mucosa every 12 hours  chlorhexidine 4% Liquid 1 Application(s) Topical <User Schedule>  cholecalciferol 2000 Unit(s) Oral daily  DAPTOmycin IVPB 700 milliGRAM(s) IV Intermittent every 24 hours  DAPTOmycin IVPB      dextrose 40% Gel 15 Gram(s) Oral once  dextrose 5%. 1000 milliLiter(s) (50 mL/Hr) IV Continuous <Continuous>  dextrose 5%. 1000 milliLiter(s) (100 mL/Hr) IV Continuous <Continuous>  dextrose 50% Injectable 25 Gram(s) IV Push once  dextrose 50% Injectable 12.5 Gram(s) IV Push once  dextrose 50% Injectable 25 Gram(s) IV Push once  glucagon  Injectable 1 milliGRAM(s) IntraMuscular once  heparin  Infusion 400 Unit(s)/Hr (8 mL/Hr) IV Continuous <Continuous>  insulin lispro (ADMELOG) corrective regimen sliding scale   SubCutaneous every 6 hours  insulin NPH human recombinant 10 Unit(s) SubCutaneous every 6 hours  LORazepam     Tablet 1 milliGRAM(s) Oral every 12 hours  methadone   Solution 5 milliGRAM(s) Oral every 12 hours  multivitamin/minerals Oral Solution (WELLESSE) 30 milliLiter(s) Enteral Tube daily  naloxegol 25 milliGRAM(s) Oral daily  phenylephrine    Infusion 0.5 MICROgram(s)/kG/Min (15.3 mL/Hr) IV Continuous <Continuous>  polyethylene glycol 3350 17 Gram(s) Oral daily  senna Syrup 10 milliLiter(s) Oral at bedtime  ursodiol Tablet 500 milliGRAM(s) Oral two times a day    MEDICATIONS  (PRN):  acetaminophen    Suspension .. 650 milliGRAM(s) Oral every 6 hours PRN Temp greater or equal to 38C (100.4F), Mild Pain (1 - 3), Moderate Pain (4 - 6)  HYDROmorphone  Injectable 0.5 milliGRAM(s) IV Push every 4 hours PRN Moderate Pain (4 - 6)      ALLERGIES:  Allergies    No Known Allergies    Intolerances        LABS:                        8.0    12.56 )-----------( 280      ( 03 Oct 2021 00:30 )             27.5     10-03    148<H>  |  108  |  44<H>  ----------------------------<  194<H>  3.7   |  28  |  0.86    Ca    8.8      03 Oct 2021 00:30  Phos  3.5     10-03  Mg     2.5     10-03    TPro  6.0  /  Alb  2.5<L>  /  TBili  4.9<H>  /  DBili  x   /  AST  94<H>  /  ALT  95<H>  /  AlkPhos  1660<H>  10-03    PTT - ( 03 Oct 2021 00:30 )  PTT:50.4 sec      RADIOLOGY & ADDITIONAL TESTS: Reviewed. Kiran Landin PGY1    INTERVAL HPI/OVERNIGHT EVENTS: No acute events o/n.    SUBJECTIVE: Patient seen and examined at bedside.     ROS unable to asses due to patient's condition.    OBJECTIVE:    VITAL SIGNS:  ICU Vital Signs Last 24 Hrs  T(C): 37.1 (03 Oct 2021 04:00), Max: 37.6 (02 Oct 2021 16:00)  T(F): 98.8 (03 Oct 2021 04:00), Max: 99.7 (02 Oct 2021 16:00)  HR: 85 (03 Oct 2021 07:00) (80 - 102)  BP: 131/72 (03 Oct 2021 07:00) (102/55 - 167/91)  BP(mean): 95 (03 Oct 2021 07:00) (72 - 123)  ABP: --  ABP(mean): --  RR: 21 (03 Oct 2021 07:00) (16 - 40)  SpO2: 96% (03 Oct 2021 07:00) (89% - 98%)    Mode: AC/ CMV (Assist Control/ Continuous Mandatory Ventilation), RR (machine): 12, FiO2: 60, PEEP: 5, ITime: 0.8, MAP: 13, PC: 20, PIP: 29    10-02 @ 07:01  -  10-03 @ 07:00  --------------------------------------------------------  IN: 3009 mL / OUT: 1200 mL / NET: 1809 mL      CAPILLARY BLOOD GLUCOSE      POCT Blood Glucose.: 179 mg/dL (03 Oct 2021 05:03)      PHYSICAL EXAM:    General: intubated, off sedation  HEENT: NCAT, clear conjunctiva, sclera icteric, pupils sluggish, blinks spontaneously  Neck: supple, no JVD  Respiratory: trach in place, slight bleeding around site; coarse breath sounds b/l  Cardiovascular: RRR, S1S2, no m/r/g  Abdomen: PEG in place; soft, nontender, nondistended, normal bowel sounds  Extremities: no edema, no cyanosis  Skin: warm, perfused, b/l erythema on buttocks  Neurological: nonfocal, no response to pain, sternal rub    MEDICATIONS:  MEDICATIONS  (STANDING):  ALBUTerol    90 MICROgram(s) HFA Inhaler 2 Puff(s) Inhalation every 8 hours  budesonide 160 MICROgram(s)/formoterol 4.5 MICROgram(s) Inhaler 2 Puff(s) Inhalation two times a day  chlorhexidine 0.12% Liquid 15 milliLiter(s) Oral Mucosa every 12 hours  chlorhexidine 4% Liquid 1 Application(s) Topical <User Schedule>  cholecalciferol 2000 Unit(s) Oral daily  DAPTOmycin IVPB 700 milliGRAM(s) IV Intermittent every 24 hours  DAPTOmycin IVPB      dextrose 40% Gel 15 Gram(s) Oral once  dextrose 5%. 1000 milliLiter(s) (50 mL/Hr) IV Continuous <Continuous>  dextrose 5%. 1000 milliLiter(s) (100 mL/Hr) IV Continuous <Continuous>  dextrose 50% Injectable 25 Gram(s) IV Push once  dextrose 50% Injectable 12.5 Gram(s) IV Push once  dextrose 50% Injectable 25 Gram(s) IV Push once  glucagon  Injectable 1 milliGRAM(s) IntraMuscular once  heparin  Infusion 400 Unit(s)/Hr (8 mL/Hr) IV Continuous <Continuous>  insulin lispro (ADMELOG) corrective regimen sliding scale   SubCutaneous every 6 hours  insulin NPH human recombinant 10 Unit(s) SubCutaneous every 6 hours  LORazepam     Tablet 1 milliGRAM(s) Oral every 12 hours  methadone   Solution 5 milliGRAM(s) Oral every 12 hours  multivitamin/minerals Oral Solution (WELLESSE) 30 milliLiter(s) Enteral Tube daily  naloxegol 25 milliGRAM(s) Oral daily  phenylephrine    Infusion 0.5 MICROgram(s)/kG/Min (15.3 mL/Hr) IV Continuous <Continuous>  polyethylene glycol 3350 17 Gram(s) Oral daily  senna Syrup 10 milliLiter(s) Oral at bedtime  ursodiol Tablet 500 milliGRAM(s) Oral two times a day    MEDICATIONS  (PRN):  acetaminophen    Suspension .. 650 milliGRAM(s) Oral every 6 hours PRN Temp greater or equal to 38C (100.4F), Mild Pain (1 - 3), Moderate Pain (4 - 6)  HYDROmorphone  Injectable 0.5 milliGRAM(s) IV Push every 4 hours PRN Moderate Pain (4 - 6)      ALLERGIES:  Allergies    No Known Allergies    Intolerances        LABS:                        8.0    12.56 )-----------( 280      ( 03 Oct 2021 00:30 )             27.5     10-03    148<H>  |  108  |  44<H>  ----------------------------<  194<H>  3.7   |  28  |  0.86    Ca    8.8      03 Oct 2021 00:30  Phos  3.5     10-03  Mg     2.5     10-03    TPro  6.0  /  Alb  2.5<L>  /  TBili  4.9<H>  /  DBili  x   /  AST  94<H>  /  ALT  95<H>  /  AlkPhos  1660<H>  10-03    PTT - ( 03 Oct 2021 00:30 )  PTT:50.4 sec      RADIOLOGY & ADDITIONAL TESTS: Reviewed.

## 2021-10-03 NOTE — PROGRESS NOTE ADULT - CRITICAL CARE SERVICES PROVIDED
Critical care services provided

## 2021-10-03 NOTE — PROGRESS NOTE ADULT - ASSESSMENT
71 year old man w/pmhx of HTN, DM2, BPH, and Asthma here for acute hypoxic respiratory failure and ARDS secondary to viral pneumonia from COVID-19. Hospital course complicated by VT arrest, DVT and MRSE bacteremia.    #Neuro   1) off sedation  - methadone increased to 5 q12h and Ativan 1mg q12h as of 10/2; patient still unresponsive  - intermittently agitated with tachypnea requiring Fentanyl pushes; switched to Morphine 2mg q4h   - noncont CT 9/13 appears unchanged since 8/12  - brain MRI 9/28 w/o evidence of anoxic injury, shows microvascular ischemic changes    2) r/o seizure  -had rhythmic R arm movement  - 24h EEG with generalized slowing; no evidence of seizure (per report 9/21 AM)    #CV   1)Cardiac Arrest VT  - S/p Amiodarone and electrolyte supplementation   - Recurrent runs of Vtach 9/26; started on metoprolol but stopped in s/o hypertension  - Echo reveals Right hrt strain   - holding diuretics; no indication, no evidence of pleural effusion, pulm edema on chest CT (9/24)    #Pulm   1)Acute hypoxemic resp failure 2/2 cardiac arrest  - vent pressure AC 20/ RR 12/28 Tv 400, FiO2 50, PEEP 5  - trach done by ENT 10/1; air leak and bleeding noted, ENT to see patient 10/2    2) Asthma  - c/w Proventil q8h and Symbicort BID    3) Post-covid sequelae  - extensive fibrosis and consolidations at bases b/l  - GGO on CT chest; unlikely to improve with 3rd course of steroids, also with persistent fevers and bacteremia despite appropriate abx therapy     #GI   1)Nutrition   - maintain Glucerna tube feeds as tolerated   - c/w senna/ miralax     2)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 No SEJAL indicated as pt unlikely to be surgical candidate, and will receive empiric treatment.   - CT A/P with evidence of proctitis: unasyn added per ID (9/24-10/1)  - Patient completed 5 weeks of vanc ending 10/1. Switched to Daptomycin 700mg q24h for 48d course     - check weekly CK          #Endo   1)DM   - c/w NPH 10u q6h  - c/w ISS   - goal Blood Sugar 100-180     #Heme  1)b/l DVTs above knee on left and below knee on R  - Hep gtt challenge restarted 10/1; goal 45-60  - has required frequent pauses in AC in s/o Hgb drops >7; will need IVC filter if he fails challenge    2) R thigh hematoma  - patient with stable hematoma; potential source of dropping Hgb    #GOC  -palliative consult following  - FULL CODE  -family updated daily 71 year old man w/pmhx of HTN, DM2, BPH, and Asthma here for acute hypoxic respiratory failure and ARDS secondary to viral pneumonia from COVID-19. Hospital course complicated by VT arrest, DVT and MRSE bacteremia.    #Neuro   1) off sedation  - methadone increased to 5 q12h and Ativan 1mg q12h as of 10/2; patient still unresponsive  - intermittently agitated with tachypnea requiring Fentanyl pushes; switched to Morphine 2mg q4h   - noncont CT 9/13 appears unchanged since 8/12  - brain MRI 9/28 w/o evidence of anoxic injury, shows microvascular ischemic changes    2) r/o seizure  -had rhythmic R arm movement  - 24h EEG with generalized slowing; no evidence of seizure (per report 9/21 AM)    #CV   1)Cardiac Arrest VT  - S/p Amiodarone and electrolyte supplementation   - Recurrent runs of Vtach 9/26; started on metoprolol but stopped in s/o hypertension  - Echo reveals Right hrt strain   - holding diuretics; no indication, no evidence of pleural effusion, pulm edema on chest CT (9/24)    #Pulm   1)Acute hypoxemic resp failure 2/2 cardiac arrest  - vent pressure AC 20/ RR 12/28 Tv 400, FiO2 50, PEEP 5  - trach done by ENT 10/1; air leak and bleeding noted, ENT notes trach is functioning well    2) Asthma  - c/w Proventil q8h and Symbicort BID    3) Post-covid sequelae  - extensive fibrosis and consolidations at bases b/l  - GGO on CT chest; unlikely to improve with 3rd course of steroids, also with persistent fevers and bacteremia despite appropriate abx therapy     #GI   1)Nutrition   - maintain Glucerna tube feeds as tolerated   - c/w senna/ miralax     2)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 No SEJAL indicated as pt unlikely to be surgical candidate, and will receive empiric treatment.   - CT A/P with evidence of proctitis: unasyn added per ID (9/24-10/1)  - Patient completed 5 weeks of vanc ending 10/1. Switched to Daptomycin 700mg q24h for 48d course     - check weekly CK  - will repeat TTE          #Endo   1)DM   - c/w NPH 10u q6h  - c/w ISS   - goal Blood Sugar 100-180     #Heme  1)b/l DVTs above knee on left and below knee on R  - Hep gtt challenge restarted 10/1; goal 45-60  - has required frequent pauses in AC in s/o Hgb drops >7; will need IVC filter if he fails challenge    2) R thigh hematoma  - patient with stable hematoma; potential source of dropping Hgb    #GOC  -palliative consult following  - FULL CODE  -family updated daily 71 year old man w/pmhx of HTN, DM2, BPH, and Asthma here for acute hypoxic respiratory failure and ARDS secondary to viral pneumonia from COVID-19. Hospital course complicated by VT arrest, DVT and MRSE bacteremia.    #Neuro   1) off sedation  - methadone increased to 5 q12h and Ativan 1mg q12h as of 10/2; patient still unresponsive  - intermittently agitated with tachypnea requiring Fentanyl pushes; switched to Morphine 2mg q4h   - noncont CT 9/13 appears unchanged since 8/12  - brain MRI 9/28 w/o evidence of anoxic injury, shows microvascular ischemic changes    2) r/o seizure  -had rhythmic R arm movement  - 24h EEG with generalized slowing; no evidence of seizure (per report 9/21 AM)    #CV   1)Cardiac Arrest VT  - S/p Amiodarone and electrolyte supplementation   - Recurrent runs of Vtach 9/26; started on metoprolol but stopped in s/o hypertension  - Echo reveals Right hrt strain   - holding diuretics; no indication, no evidence of pleural effusion, pulm edema on chest CT (9/24)    #Pulm   1)Acute hypoxemic resp failure 2/2 cardiac arrest  - vent pressure AC 20/ RR 12/28 Tv 400, FiO2 50, PEEP 5  - trach done by ENT 10/1; air leak and bleeding noted, ENT notes trach is functioning well    2) Asthma  - c/w Proventil q8h and Symbicort BID    3) Post-covid sequelae  - extensive fibrosis and consolidations at bases b/l  - GGO on CT chest; unlikely to improve with 3rd course of steroids, also with persistent fevers and bacteremia despite appropriate abx therapy     #GI   1)Nutrition   - maintain Glucerna tube feeds as tolerated   - c/w senna/ miralax     2)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 No SEJAL indicated as pt unlikely to be surgical candidate, and will receive empiric treatment.   - CT A/P with evidence of proctitis: unasyn added per ID (9/24-10/1)  - Patient completed 5 weeks of vanc ending 10/1. Switched to Daptomycin 700mg q24h for 48d course     - check weekly CK  - will repeat TTE          #Endo   1)DM   - c/w NPH 10u q6h  - c/w ISS   - goal Blood Sugar 100-180     #Heme  1)b/l DVTs above knee on left and below knee on R  - Hep gtt challenge restarted 10/1; goal 45-60  - has required frequent pauses in AC in s/o Hgb drops >7; will need IVC filter if he fails challenge    2) R thigh hematoma  - patient with stable hematoma; potential source of dropping Hgb    #GOC  -palliative consult following  - FULL CODE  -family updated daily

## 2021-10-03 NOTE — PROGRESS NOTE ADULT - PROBLEM SELECTOR PLAN 1
·  Plan: - continue vent   - suction prn   - continue with trach site care, keep dry and clean   - call ENT with any issues.

## 2021-10-03 NOTE — CHART NOTE - NSCHARTNOTEFT_GEN_A_CORE
MICU Transfer Note  ---------------------------    Transfer from: MICU  Transfer to:  (  ) Medicine    (  ) Telemetry    (X ) RCU    (  ) Palliative    (  ) Stroke Unit    (  ) _______________  Accepting Physician:      MICU COURSE  Patient initially covid-19 positive s/p two courses decadron, intubated for acute hypoxemic respiratory failure c/b Vtach arrest with ROSC after 6 min. Patient remained intubated for duration of MICU course. All sedation off as of 9/16; patient still without mental status, unresponsive to painful stimuli, unable to follow commands. He developed bacteremia with MRSE and was started on Vancomycin 8/25; with surveillance every 48-72h. He was maintained on vancomycin until 10/1, when ID transitioned him to Daptomycin for the next 48d. Infectious work-up conducted with TTE, which was negative for vegetations. CT A/P done, which showed proctitis, for which he was on Unasyn x7d (finished 10/1). SJEAL deferred by cardiology and decision was made with IR to treat empirically for at least 6 weeks. Extensive GOC discussions held with family throughout, with palliative care input. Decision made to opt for tracheostomy; patient received PEG on 9/30 and trach 10/1. He was restarted on AC with heparin gtt challenge on 10/1; multiple attempts made to restart AC in the s/o known DVTs above the knee, but patient with recurrent Hgb drops below 7 requiring blood transfusions and stopping AC. Patient became intermittently tachypneic, overbreathing vent, and tachycardic, requiring pushes of fentanyl; since transitioned to morphine 2mg q4h PRN. Patient's hgb has remained stable on heparin gtt. Patient is stable for transfer to RCU.       OBJECTIVE --  Vital Signs Last 24 Hrs  T(C): 37.3 (03 Oct 2021 08:00), Max: 37.6 (02 Oct 2021 16:00)  T(F): 99.1 (03 Oct 2021 08:00), Max: 99.7 (02 Oct 2021 16:00)  HR: 85 (03 Oct 2021 11:00) (81 - 102)  BP: 156/86 (03 Oct 2021 11:00) (102/55 - 169/82)  BP(mean): 113 (03 Oct 2021 11:00) (72 - 118)  RR: 24 (03 Oct 2021 11:00) (16 - 40)  SpO2: 90% (03 Oct 2021 11:00) (89% - 98%)    I&O's Summary    02 Oct 2021 07:01  -  03 Oct 2021 07:00  --------------------------------------------------------  IN: 3009 mL / OUT: 1200 mL / NET: 1809 mL    03 Oct 2021 07:01  -  03 Oct 2021 12:16  --------------------------------------------------------  IN: 324 mL / OUT: 0 mL / NET: 324 mL        MEDICATIONS  (STANDING):  ALBUTerol    90 MICROgram(s) HFA Inhaler 2 Puff(s) Inhalation every 8 hours  budesonide 160 MICROgram(s)/formoterol 4.5 MICROgram(s) Inhaler 2 Puff(s) Inhalation two times a day  chlorhexidine 0.12% Liquid 15 milliLiter(s) Oral Mucosa every 12 hours  chlorhexidine 4% Liquid 1 Application(s) Topical <User Schedule>  cholecalciferol 2000 Unit(s) Oral daily  DAPTOmycin IVPB 700 milliGRAM(s) IV Intermittent every 24 hours  DAPTOmycin IVPB      dextrose 40% Gel 15 Gram(s) Oral once  dextrose 5%. 1000 milliLiter(s) (50 mL/Hr) IV Continuous <Continuous>  dextrose 5%. 1000 milliLiter(s) (100 mL/Hr) IV Continuous <Continuous>  dextrose 50% Injectable 25 Gram(s) IV Push once  dextrose 50% Injectable 12.5 Gram(s) IV Push once  dextrose 50% Injectable 25 Gram(s) IV Push once  glucagon  Injectable 1 milliGRAM(s) IntraMuscular once  heparin  Infusion 400 Unit(s)/Hr (8 mL/Hr) IV Continuous <Continuous>  insulin lispro (ADMELOG) corrective regimen sliding scale   SubCutaneous every 6 hours  insulin NPH human recombinant 10 Unit(s) SubCutaneous every 6 hours  LORazepam     Tablet 1 milliGRAM(s) Oral every 12 hours  methadone   Solution 5 milliGRAM(s) Oral every 12 hours  multivitamin/minerals Oral Solution (WELLESSE) 30 milliLiter(s) Enteral Tube daily  naloxegol 25 milliGRAM(s) Oral daily  polyethylene glycol 3350 17 Gram(s) Oral daily  senna Syrup 10 milliLiter(s) Oral at bedtime  ursodiol Tablet 500 milliGRAM(s) Oral two times a day    MEDICATIONS  (PRN):  acetaminophen    Suspension .. 650 milliGRAM(s) Oral every 6 hours PRN Temp greater or equal to 38C (100.4F), Mild Pain (1 - 3), Moderate Pain (4 - 6)  HYDROmorphone  Injectable 0.5 milliGRAM(s) IV Push every 4 hours PRN Moderate Pain (4 - 6)        LABS                                            8.0                   Neurophils% (auto):   x      (10-03 @ 00:30):    12.56)-----------(280          Lymphocytes% (auto):  x                                             27.5                   Eosinphils% (auto):   x        Manual%: Neutrophils x    ; Lymphocytes x    ; Eosinophils x    ; Bands%: x    ; Blasts x                                    148    |  108    |  44                  Calcium: 8.8   / iCa: x      (10-03 @ 00:30)    ----------------------------<  194       Magnesium: 2.5                              3.7     |  28     |  0.86             Phosphorous: 3.5      TPro  6.0    /  Alb  2.5    /  TBili  4.9    /  DBili  x      /  AST  94     /  ALT  95     /  AlkPhos  1660   03 Oct 2021 00:30    ( 10-03 @ 00:30 )   PT: x    ;   INR: x      aPTT: 50.4 sec          ASSESSMENT & PLAN:   71 year old man w/pmhx of HTN, DM2, BPH, and Asthma here for acute hypoxic respiratory failure and ARDS secondary to viral pneumonia from COVID-19. Hospital course complicated by VT arrest, DVT and MRSE bacteremia.    #Neuro   1) off sedation  - methadone increased to 5 q12h and Ativan 1mg q12h as of 10/2; patient still unresponsive  - intermittently agitated with tachypnea requiring Fentanyl pushes; switched to Morphine 2mg q4h   - noncont CT 9/13 appears unchanged since 8/12  - brain MRI 9/28 w/o evidence of anoxic injury, shows microvascular ischemic changes    2) r/o seizure  -had rhythmic R arm movement  - 24h EEG with generalized slowing; no evidence of seizure (per report 9/21 AM)    #CV   1)Cardiac Arrest VT  - S/p Amiodarone and electrolyte supplementation   - Recurrent runs of Vtach 9/26; started on metoprolol but stopped in s/o hypertension  - Echo reveals Right hrt strain   - holding diuretics; no indication, no evidence of pleural effusion, pulm edema on chest CT (9/24)    #Pulm   1)Acute hypoxemic resp failure 2/2 cardiac arrest  - vent pressure AC 20/ RR 12/28 Tv 400, FiO2 50, PEEP 5  - trach done by ENT 10/1; air leak and bleeding noted, ENT notes trach is functioning well    2) Asthma  - c/w Proventil q8h and Symbicort BID    3) Post-covid sequelae  - extensive fibrosis and consolidations at bases b/l  - GGO on CT chest; unlikely to improve with 3rd course of steroids, also with persistent fevers and bacteremia despite appropriate abx therapy     #GI   1)Nutrition   - maintain Glucerna tube feeds as tolerated   - c/w senna/ miralax     2)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 No SEJAL indicated as pt unlikely to be surgical candidate, and will receive empiric treatment.   - CT A/P with evidence of proctitis: unasyn added per ID (9/24-10/1)  - Patient completed 5 weeks of vanc ending 10/1. Switched to Daptomycin 700mg q24h for 48d course     - check weekly CK  - will repeat TTE          #Endo   1)DM   - c/w NPH 10u q6h  - c/w ISS   - goal Blood Sugar 100-180     #Heme  1)b/l DVTs above knee on left and below knee on R  - Hep gtt challenge restarted 10/1; goal 45-60  - has required frequent pauses in AC in s/o Hgb drops >7; will need IVC filter if he fails challenge    2) R thigh hematoma  - patient with stable hematoma; potential source of dropping Hgb    #GOC  - palliative consult following  - FULL CODE  - family updated daily        For Follow-Up:  [ ] monitor for signs of bleeding  [ ] check CK weekly while on daptomycin  [ ] F/u ID recommendations  [ ] f/u blood cultures  [ ] wean methadone and ativan as tolerated  [ ] repeat TTE to ensure no vegetation; SEJAL not recommended by cardiology given not surgical intervention  [ ] patient is COVID + but not actively infective, shedding from prior covid infection MICU Transfer Note  ---------------------------    Transfer from: MICU  Transfer to:  (  ) Medicine    (  ) Telemetry    (X ) RCU    (  ) Palliative    (  ) Stroke Unit    (  ) _______________  Accepting Physician: Dr. Manzanares      MICU COURSE  Patient initially covid-19 positive s/p two courses decadron, intubated for acute hypoxemic respiratory failure c/b Vtach arrest with ROSC after 6 min. Patient remained intubated for duration of MICU course. All sedation off as of 9/16; patient still without mental status, unresponsive to painful stimuli, unable to follow commands. He developed bacteremia with MRSE and was started on Vancomycin 8/25; with surveillance every 48-72h. He was maintained on vancomycin until 10/1, when ID transitioned him to Daptomycin for the next 48d. Infectious work-up conducted with TTE, which was negative for vegetations. CT A/P done, which showed proctitis, for which he was on Unasyn x7d (finished 10/1). SEJAL deferred by cardiology and decision was made with IR to treat empirically for at least 6 weeks. Extensive GOC discussions held with family throughout, with palliative care input. Decision made to opt for tracheostomy; patient received PEG on 9/30 and trach 10/1. He was restarted on AC with heparin gtt challenge on 10/1; multiple attempts made to restart AC in the s/o known DVTs above the knee, but patient with recurrent Hgb drops below 7 requiring blood transfusions and stopping AC. Patient became intermittently tachypneic, overbreathing vent, and tachycardic, requiring pushes of fentanyl; since transitioned to morphine 2mg q4h PRN. Patient's hgb has remained stable on heparin gtt. Patient is stable for transfer to RCU.       OBJECTIVE --  Vital Signs Last 24 Hrs  T(C): 37.3 (03 Oct 2021 08:00), Max: 37.6 (02 Oct 2021 16:00)  T(F): 99.1 (03 Oct 2021 08:00), Max: 99.7 (02 Oct 2021 16:00)  HR: 85 (03 Oct 2021 11:00) (81 - 102)  BP: 156/86 (03 Oct 2021 11:00) (102/55 - 169/82)  BP(mean): 113 (03 Oct 2021 11:00) (72 - 118)  RR: 24 (03 Oct 2021 11:00) (16 - 40)  SpO2: 90% (03 Oct 2021 11:00) (89% - 98%)    I&O's Summary    02 Oct 2021 07:01  -  03 Oct 2021 07:00  --------------------------------------------------------  IN: 3009 mL / OUT: 1200 mL / NET: 1809 mL    03 Oct 2021 07:01  -  03 Oct 2021 12:16  --------------------------------------------------------  IN: 324 mL / OUT: 0 mL / NET: 324 mL        MEDICATIONS  (STANDING):  ALBUTerol    90 MICROgram(s) HFA Inhaler 2 Puff(s) Inhalation every 8 hours  budesonide 160 MICROgram(s)/formoterol 4.5 MICROgram(s) Inhaler 2 Puff(s) Inhalation two times a day  chlorhexidine 0.12% Liquid 15 milliLiter(s) Oral Mucosa every 12 hours  chlorhexidine 4% Liquid 1 Application(s) Topical <User Schedule>  cholecalciferol 2000 Unit(s) Oral daily  DAPTOmycin IVPB 700 milliGRAM(s) IV Intermittent every 24 hours  DAPTOmycin IVPB      dextrose 40% Gel 15 Gram(s) Oral once  dextrose 5%. 1000 milliLiter(s) (50 mL/Hr) IV Continuous <Continuous>  dextrose 5%. 1000 milliLiter(s) (100 mL/Hr) IV Continuous <Continuous>  dextrose 50% Injectable 25 Gram(s) IV Push once  dextrose 50% Injectable 12.5 Gram(s) IV Push once  dextrose 50% Injectable 25 Gram(s) IV Push once  glucagon  Injectable 1 milliGRAM(s) IntraMuscular once  heparin  Infusion 400 Unit(s)/Hr (8 mL/Hr) IV Continuous <Continuous>  insulin lispro (ADMELOG) corrective regimen sliding scale   SubCutaneous every 6 hours  insulin NPH human recombinant 10 Unit(s) SubCutaneous every 6 hours  LORazepam     Tablet 1 milliGRAM(s) Oral every 12 hours  methadone   Solution 5 milliGRAM(s) Oral every 12 hours  multivitamin/minerals Oral Solution (WELLESSE) 30 milliLiter(s) Enteral Tube daily  naloxegol 25 milliGRAM(s) Oral daily  polyethylene glycol 3350 17 Gram(s) Oral daily  senna Syrup 10 milliLiter(s) Oral at bedtime  ursodiol Tablet 500 milliGRAM(s) Oral two times a day    MEDICATIONS  (PRN):  acetaminophen    Suspension .. 650 milliGRAM(s) Oral every 6 hours PRN Temp greater or equal to 38C (100.4F), Mild Pain (1 - 3), Moderate Pain (4 - 6)  HYDROmorphone  Injectable 0.5 milliGRAM(s) IV Push every 4 hours PRN Moderate Pain (4 - 6)        LABS                                            8.0                   Neurophils% (auto):   x      (10-03 @ 00:30):    12.56)-----------(280          Lymphocytes% (auto):  x                                             27.5                   Eosinphils% (auto):   x        Manual%: Neutrophils x    ; Lymphocytes x    ; Eosinophils x    ; Bands%: x    ; Blasts x                                    148    |  108    |  44                  Calcium: 8.8   / iCa: x      (10-03 @ 00:30)    ----------------------------<  194       Magnesium: 2.5                              3.7     |  28     |  0.86             Phosphorous: 3.5      TPro  6.0    /  Alb  2.5    /  TBili  4.9    /  DBili  x      /  AST  94     /  ALT  95     /  AlkPhos  1660   03 Oct 2021 00:30    ( 10-03 @ 00:30 )   PT: x    ;   INR: x      aPTT: 50.4 sec          ASSESSMENT & PLAN:   71 year old man w/pmhx of HTN, DM2, BPH, and Asthma here for acute hypoxic respiratory failure and ARDS secondary to viral pneumonia from COVID-19. Hospital course complicated by VT arrest, DVT and MRSE bacteremia.    #Neuro   1) off sedation  - methadone increased to 5 q12h and Ativan 1mg q12h as of 10/2; patient still unresponsive  - intermittently agitated with tachypnea requiring Fentanyl pushes; switched to Morphine 2mg q4h   - noncont CT 9/13 appears unchanged since 8/12  - brain MRI 9/28 w/o evidence of anoxic injury, shows microvascular ischemic changes    2) r/o seizure  -had rhythmic R arm movement  - 24h EEG with generalized slowing; no evidence of seizure (per report 9/21 AM)    #CV   1)Cardiac Arrest VT  - S/p Amiodarone and electrolyte supplementation   - Recurrent runs of Vtach 9/26; started on metoprolol but stopped in s/o hypertension  - Echo reveals Right hrt strain   - holding diuretics; no indication, no evidence of pleural effusion, pulm edema on chest CT (9/24)    #Pulm   1)Acute hypoxemic resp failure 2/2 cardiac arrest  - vent pressure AC 20/ RR 12/28 Tv 400, FiO2 50, PEEP 5  - trach done by ENT 10/1; air leak and bleeding noted, ENT notes trach is functioning well    2) Asthma  - c/w Proventil q8h and Symbicort BID    3) Post-covid sequelae  - extensive fibrosis and consolidations at bases b/l  - GGO on CT chest; unlikely to improve with 3rd course of steroids, also with persistent fevers and bacteremia despite appropriate abx therapy     #GI   1)Nutrition   - maintain Glucerna tube feeds as tolerated   - c/w senna/ miralax     2)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 No SEJAL indicated as pt unlikely to be surgical candidate, and will receive empiric treatment.   - CT A/P with evidence of proctitis: unasyn added per ID (9/24-10/1)  - Patient completed 5 weeks of vanc ending 10/1. Switched to Daptomycin 700mg q24h for 48d course     - check weekly CK  - will repeat TTE          #Endo   1)DM   - c/w NPH 10u q6h  - c/w ISS   - goal Blood Sugar 100-180     #Heme  1)b/l DVTs above knee on left and below knee on R  - Hep gtt challenge restarted 10/1; goal 45-60  - has required frequent pauses in AC in s/o Hgb drops >7; will need IVC filter if he fails challenge    2) R thigh hematoma  - patient with stable hematoma; potential source of dropping Hgb    #GOC  - palliative consult following  - FULL CODE  - family updated daily        For Follow-Up:  [ ] monitor for signs of bleeding  [ ] check CK weekly while on daptomycin  [ ] F/u ID recommendations  [ ] f/u blood cultures  [ ] wean methadone and ativan as tolerated  [ ] repeat TTE to ensure no vegetation; SEJAL not recommended by cardiology given not surgical intervention  [ ] patient is COVID + but not actively infective, shedding from prior covid infection

## 2021-10-03 NOTE — PROGRESS NOTE ADULT - SUBJECTIVE AND OBJECTIVE BOX
ENT ISSUE/POD:  POD #2 for trach    HPI: 70 y/o male POD #2 for tracheostomy #8 shiley cuffed 2/2 respiratory failure. Pt is doing well on the vent. no complaints or issues. No tracheal bleed overnight.          PAST MEDICAL & SURGICAL HISTORY:  BPH (benign prostatic hyperplasia)    Hyperlipemia    HTN (hypertension)    Hypercholesteremia    Asthma    Diabetes    Kidney stone    Bladder stone    H/O lithotripsy  x 2    History of kidney stones    H/O umbilical hernia repair      Allergies    No Known Allergies    Intolerances      MEDICATIONS  (STANDING):  ALBUTerol    90 MICROgram(s) HFA Inhaler 2 Puff(s) Inhalation every 8 hours  budesonide 160 MICROgram(s)/formoterol 4.5 MICROgram(s) Inhaler 2 Puff(s) Inhalation two times a day  chlorhexidine 0.12% Liquid 15 milliLiter(s) Oral Mucosa every 12 hours  chlorhexidine 4% Liquid 1 Application(s) Topical <User Schedule>  cholecalciferol 2000 Unit(s) Oral daily  DAPTOmycin IVPB 700 milliGRAM(s) IV Intermittent every 24 hours  DAPTOmycin IVPB      dextrose 40% Gel 15 Gram(s) Oral once  dextrose 5%. 1000 milliLiter(s) (50 mL/Hr) IV Continuous <Continuous>  dextrose 5%. 1000 milliLiter(s) (100 mL/Hr) IV Continuous <Continuous>  dextrose 50% Injectable 25 Gram(s) IV Push once  dextrose 50% Injectable 12.5 Gram(s) IV Push once  dextrose 50% Injectable 25 Gram(s) IV Push once  glucagon  Injectable 1 milliGRAM(s) IntraMuscular once  heparin  Infusion 400 Unit(s)/Hr (8 mL/Hr) IV Continuous <Continuous>  HYDROmorphone  Injectable 0.5 milliGRAM(s) IV Push once  insulin lispro (ADMELOG) corrective regimen sliding scale   SubCutaneous every 6 hours  insulin NPH human recombinant 10 Unit(s) SubCutaneous every 6 hours  LORazepam     Tablet 1 milliGRAM(s) Oral every 12 hours  methadone   Solution 5 milliGRAM(s) Oral every 12 hours  multivitamin/minerals Oral Solution (WELLESSE) 30 milliLiter(s) Enteral Tube daily  naloxegol 25 milliGRAM(s) Oral daily  polyethylene glycol 3350 17 Gram(s) Oral daily  senna Syrup 10 milliLiter(s) Oral at bedtime  ursodiol Tablet 500 milliGRAM(s) Oral two times a day    MEDICATIONS  (PRN):  acetaminophen    Suspension .. 650 milliGRAM(s) Oral every 6 hours PRN Temp greater or equal to 38C (100.4F), Mild Pain (1 - 3), Moderate Pain (4 - 6)  HYDROmorphone  Injectable 0.5 milliGRAM(s) IV Push every 4 hours PRN Moderate Pain (4 - 6)      Social History: see consult    Family history: see consult    ROS:   ENT: all negative except as noted in HPI   Pulm: denies SOB, cough, hemoptysis  Neuro: denies numbness/tingling, loss of sensation  Endo: denies heat/cold intolerance, excessive sweating      Vital Signs Last 24 Hrs  T(C): 37.4 (03 Oct 2021 12:00), Max: 37.6 (02 Oct 2021 16:00)  T(F): 99.3 (03 Oct 2021 12:00), Max: 99.7 (02 Oct 2021 16:00)  HR: 87 (03 Oct 2021 14:00) (81 - 102)  BP: 147/84 (03 Oct 2021 14:00) (102/55 - 169/82)  BP(mean): 110 (03 Oct 2021 14:00) (72 - 121)  RR: 25 (03 Oct 2021 14:00) (16 - 40)  SpO2: 90% (03 Oct 2021 14:00) (89% - 98%)                          8.0    12.56 )-----------( 280      ( 03 Oct 2021 00:30 )             27.5    10-03    148<H>  |  108  |  44<H>  ----------------------------<  194<H>  3.7   |  28  |  0.86    Ca    8.8      03 Oct 2021 00:30  Phos  3.5     10-03  Mg     2.5     10-03    TPro  6.0  /  Alb  2.5<L>  /  TBili  4.9<H>  /  DBili  x   /  AST  94<H>  /  ALT  95<H>  /  AlkPhos  1660<H>  10-03   PTT - ( 03 Oct 2021 00:30 )  PTT:50.4 sec    PHYSICAL EXAM:  Gen: NAD, well-developed  Head: Normocephalic, Atraumatic  Face: no edema/erythema/fluctuance  Eyes:  no scleral injection  Nose: Nares bilaterally patent, no discharge  Mouth: No Stridor / Drooling / Trismus.  Mucosa moist, tongue/uvula midline, oropharynx clear  Neck: #8 shiley inflated cuff and surgicel removed, with minimal serosanguinous oozing from   stoma, no active bleeding, sutures x4 and umbilical tie in place.  No lymphadenopathy, trachea midline, no masses  Resp: ventilating well, satting   CV: no peripheral edema/cyanosis

## 2021-10-03 NOTE — PROGRESS NOTE ADULT - ATTENDING COMMENTS
Patient seen and examined. Patient is critically ill requiring frequent bedside visits with therapy change. Patient is a 71M with  COVID pneumonia and ARDS who has had persistent respiratory failure. He had a cardiac arrest on the day of his intubation. He remains encephalopathic    1. Acute Hypoxemic Respiratory Failure - continue mechanical ventilation and wean as tolerated. Patient now is s/p trach by ENT 10/1. Maintain O2 sat > 90%. Trach care. He has had some tracheal bleeding 10/1 with a concern for cuff leak but appears better this AM. ENT followup. His trach is positional but he receives volumes  2. Oropharyngeal dysphagia - PEG placed. Continue feeds and monitor BM  3. Cardiovascular - shock state resolved and patient maintaining MAP > 65  - Patient had a cardiac arrest initially - thought due to hypoxia  4. Transaminitis - thought due to COVID cholangiopathy vs sepsis. No signs of biliary obstruction  5. Venous Thromboembolism - patient with R DVT. Was on Hep gtt but then developed a thigh hematoma last week. He was restarted on Hep gtt yesterday. Monitor Hb and transfuse as needed.  - If he cannot tolerate Hep gtt will need IVC filter  6. Infectious Disease - patient with a persistent MRSE bacteremia. Patient was on Vancomycin but switched to Daptomycin. Followup CPK weekly.  - Was being considered for SEJAL to evaluate but now deferred by Cardiology. 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 He is deemed not to be a surgical candidate given his clinical condition  - Had a CT abd/pelvis with evidence of proctitis  - Sacral wound - wound care followup  7. Patient remains full code. Family remains hopeful  - Once stable patient will be transferred to RCU for further management and care.     Prognosis poor

## 2021-10-03 NOTE — PROGRESS NOTE ADULT - ASSESSMENT
72 y/o male POD #2 for tracheostomy 2/2 respiratory failure. Pt is doing well, #8 shiley cuffed, surgicel removed, on the vent, with minimal serosanguinous oozing from stoma, no active bleeding. sutures x4 and umbilical tie in place.

## 2021-10-03 NOTE — CHART NOTE - NSCHARTNOTEFT_GEN_A_CORE
CCU Accept Note    Transfer from: ( x ) MICU    Accepting physician: Alon    HPI: HPI:  70yo M w/ PMHx of HTN, DM2, BPH, asthma presents with shortness of breath. Patient endorses that he has been feeling SOB with associated cough for the last 2 weeks and his symptoms slowly worsened over time. He did not receive the COVID vaccine. He reports that his family members whom he lives with likely are also COVID positive but he does not know for sure. He did not try taking anything for his symptoms other than his usual medications/inhalers. There were no obvious aggravating or alleviating factors. Currently with supplemental oxygen on, he feels well and has no complaints, however he presented to [facility] since his symptoms were worsening. In the ED, he was hemodynamically stable, afebrile, but he was encephalopathic and hypoxic on room air requiring high flow nasal canula. Labs were significant for mild hyperkalemia and COVID positive. CXR prelim read showed bilateral patchy opacities. CT head showed no acute findings. MICU was consulted in the ED, patient was deemed not a MICU candidate. Patient was given doxycycline and dexamethasone x1 and admitted to general medicine for further management. At time of interview, patient was A/Ox3 with .  (13 Aug 2021 04:21)        Vital Signs Last 24 Hrs  T(C): 37.3 (03 Oct 2021 16:00), Max: 37.4 (03 Oct 2021 12:00)  T(F): 99.1 (03 Oct 2021 16:00), Max: 99.3 (03 Oct 2021 12:00)  HR: 84 (03 Oct 2021 16:14) (81 - 102)  BP: 114/64 (03 Oct 2021 16:00) (102/55 - 169/82)  BP(mean): 84 (03 Oct 2021 16:00) (72 - 121)  RR: 21 (03 Oct 2021 16:00) (16 - 40)  SpO2: 96% (03 Oct 2021 16:14) (89% - 98%)  I&O's Summary    02 Oct 2021 07:01  -  03 Oct 2021 07:00  --------------------------------------------------------  IN: 3009 mL / OUT: 1200 mL / NET: 1809 mL    03 Oct 2021 07:01  -  03 Oct 2021 17:25  --------------------------------------------------------  IN: 1255 mL / OUT: 0 mL / NET: 1255 mL    Allergies: NKDA      MEDICATIONS  (STANDING):  ALBUTerol    90 MICROgram(s) HFA Inhaler 2 Puff(s) Inhalation every 8 hours  budesonide 160 MICROgram(s)/formoterol 4.5 MICROgram(s) Inhaler 2 Puff(s) Inhalation two times a day  chlorhexidine 0.12% Liquid 15 milliLiter(s) Oral Mucosa every 12 hours  chlorhexidine 4% Liquid 1 Application(s) Topical <User Schedule>  cholecalciferol 2000 Unit(s) Oral daily  DAPTOmycin IVPB 700 milliGRAM(s) IV Intermittent every 24 hours  DAPTOmycin IVPB      dextrose 40% Gel 15 Gram(s) Oral once  dextrose 5%. 1000 milliLiter(s) (50 mL/Hr) IV Continuous <Continuous>  dextrose 5%. 1000 milliLiter(s) (100 mL/Hr) IV Continuous <Continuous>  dextrose 50% Injectable 25 Gram(s) IV Push once  dextrose 50% Injectable 12.5 Gram(s) IV Push once  dextrose 50% Injectable 25 Gram(s) IV Push once  glucagon  Injectable 1 milliGRAM(s) IntraMuscular once  heparin  Infusion 400 Unit(s)/Hr (8 mL/Hr) IV Continuous <Continuous>  insulin lispro (ADMELOG) corrective regimen sliding scale   SubCutaneous every 6 hours  insulin NPH human recombinant 10 Unit(s) SubCutaneous every 6 hours  LORazepam     Tablet 1 milliGRAM(s) Oral every 12 hours  methadone   Solution 5 milliGRAM(s) Oral every 12 hours  multivitamin/minerals Oral Solution (WELLESSE) 30 milliLiter(s) Enteral Tube daily  naloxegol 25 milliGRAM(s) Oral daily  polyethylene glycol 3350 17 Gram(s) Oral daily  senna Syrup 10 milliLiter(s) Oral at bedtime  ursodiol Tablet 500 milliGRAM(s) Oral two times a day    MEDICATIONS  (PRN):  acetaminophen    Suspension .. 650 milliGRAM(s) Oral every 6 hours PRN Temp greater or equal to 38C (100.4F), Mild Pain (1 - 3), Moderate Pain (4 - 6)  HYDROmorphone  Injectable 0.5 milliGRAM(s) IV Push every 4 hours PRN Moderate Pain (4 - 6)      LABS                                          8.0                   Neurophils% (auto):   x      (10-03 @ 00:30):    12.56)-----------(280          Lymphocytes% (auto):  x                                             27.5                   Eosinphils% (auto):   x        Manual%: Neutrophils x    ; Lymphocytes x    ; Eosinophils x    ; Bands%: x    ; Blasts x                                  148    |  108    |  44                  Calcium: 8.8   / iCa: x      (10-03 @ 00:30)    ----------------------------<  194       Magnesium: 2.5                              3.7     |  28     |  0.86             Phosphorous: 3.5      TPro  6.0    /  Alb  2.5    /  TBili  4.9    /  DBili  x      /  AST  94     /  ALT  95     /  AlkPhos  1660   03 Oct 2021 00:30    ( 10-03 @ 00:30 )   PT: x    ;   INR: x      aPTT: 50.4 sec        ASSESSMENT: Unvaccinated, 71 year old man, PMHX: HTN, DM2, BPH, and Asthma admitted for acute hypoxic respiratory failure and ARDS secondary to viral pneumonia from COVID-19. s/p tocilizumab, remdesivir and two courses of decadron.  Hospital course complicated by VT arrest on day of intubation, encephalopathy, DVT and MRSE bacteremia.      PLAN:   1. Covid Encephalopathy   continue methadone 5 q12h and Ativan 1mg q12h  continue Morphine 2mg q4h   MRI Brain on 9/28 w/o evidence of anoxic injury    2. Acute hypoxemic respiratory failure 2/2 COVID pneumonia  Extensive fibrosis and consolidations on CT chest  Continue /12/50%/+5 and wean as per RCU Protocol  trached by ENT 10/1  continue Proventil q8h and Symbicort BID    3. VT Arrest  S/p Amiodarone infusion  Metoprolol held in setting of hypotension, consider restarting    4. 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 Dysphagia  continue Glucerna tube enteral feeds   continue senna/ Miralax for bowel regimen  continue free water 300 q4h        Cheng Calloway, AG-ACNP  [id number] CCU Accept Note    Transfer from: ( x ) MICU    Accepting physician: Alon    HPI: HPI:  70yo M w/ PMHx of HTN, DM2, BPH, asthma presents with shortness of breath. Patient endorses that he has been feeling SOB with associated cough for the last 2 weeks and his symptoms slowly worsened over time. He did not receive the COVID vaccine. He reports that his family members whom he lives with likely are also COVID positive but he does not know for sure. He did not try taking anything for his symptoms other than his usual medications/inhalers. There were no obvious aggravating or alleviating factors. Currently with supplemental oxygen on, he feels well and has no complaints, however he presented to [facility] since his symptoms were worsening. In the ED, he was hemodynamically stable, afebrile, but he was encephalopathic and hypoxic on room air requiring high flow nasal canula. Labs were significant for mild hyperkalemia and COVID positive. CXR prelim read showed bilateral patchy opacities. CT head showed no acute findings. MICU was consulted in the ED, patient was deemed not a MICU candidate. Patient was given doxycycline and dexamethasone x1 and admitted to general medicine for further management. At time of interview, patient was A/Ox3 with .  (13 Aug 2021 04:21)    Vital Signs Last 24 Hrs  T(C): 37.3 (03 Oct 2021 16:00), Max: 37.4 (03 Oct 2021 12:00)  T(F): 99.1 (03 Oct 2021 16:00), Max: 99.3 (03 Oct 2021 12:00)  HR: 84 (03 Oct 2021 16:14) (81 - 102)  BP: 114/64 (03 Oct 2021 16:00) (102/55 - 169/82)  BP(mean): 84 (03 Oct 2021 16:00) (72 - 121)  RR: 21 (03 Oct 2021 16:00) (16 - 40)  SpO2: 96% (03 Oct 2021 16:14) (89% - 98%)  I&O's Summary    02 Oct 2021 07:01  -  03 Oct 2021 07:00  --------------------------------------------------------  IN: 3009 mL / OUT: 1200 mL / NET: 1809 mL    03 Oct 2021 07:01  -  03 Oct 2021 17:25  --------------------------------------------------------  IN: 1255 mL / OUT: 0 mL / NET: 1255 mL    Allergies: NKDA      MEDICATIONS  (STANDING):  ALBUTerol    90 MICROgram(s) HFA Inhaler 2 Puff(s) Inhalation every 8 hours  budesonide 160 MICROgram(s)/formoterol 4.5 MICROgram(s) Inhaler 2 Puff(s) Inhalation two times a day  chlorhexidine 0.12% Liquid 15 milliLiter(s) Oral Mucosa every 12 hours  chlorhexidine 4% Liquid 1 Application(s) Topical <User Schedule>  cholecalciferol 2000 Unit(s) Oral daily  DAPTOmycin IVPB 700 milliGRAM(s) IV Intermittent every 24 hours  DAPTOmycin IVPB      dextrose 40% Gel 15 Gram(s) Oral once  dextrose 5%. 1000 milliLiter(s) (50 mL/Hr) IV Continuous <Continuous>  dextrose 5%. 1000 milliLiter(s) (100 mL/Hr) IV Continuous <Continuous>  dextrose 50% Injectable 25 Gram(s) IV Push once  dextrose 50% Injectable 12.5 Gram(s) IV Push once  dextrose 50% Injectable 25 Gram(s) IV Push once  glucagon  Injectable 1 milliGRAM(s) IntraMuscular once  heparin  Infusion 400 Unit(s)/Hr (8 mL/Hr) IV Continuous <Continuous>  insulin lispro (ADMELOG) corrective regimen sliding scale   SubCutaneous every 6 hours  insulin NPH human recombinant 10 Unit(s) SubCutaneous every 6 hours  LORazepam     Tablet 1 milliGRAM(s) Oral every 12 hours  methadone   Solution 5 milliGRAM(s) Oral every 12 hours  multivitamin/minerals Oral Solution (WELLESSE) 30 milliLiter(s) Enteral Tube daily  naloxegol 25 milliGRAM(s) Oral daily  polyethylene glycol 3350 17 Gram(s) Oral daily  senna Syrup 10 milliLiter(s) Oral at bedtime  ursodiol Tablet 500 milliGRAM(s) Oral two times a day    MEDICATIONS  (PRN):  acetaminophen    Suspension .. 650 milliGRAM(s) Oral every 6 hours PRN Temp greater or equal to 38C (100.4F), Mild Pain (1 - 3), Moderate Pain (4 - 6)  HYDROmorphone  Injectable 0.5 milliGRAM(s) IV Push every 4 hours PRN Moderate Pain (4 - 6)      LABS                                          8.0                   Neurophils% (auto):   x      (10-03 @ 00:30):    12.56)-----------(280          Lymphocytes% (auto):  x                                             27.5                   Eosinphils% (auto):   x        Manual%: Neutrophils x    ; Lymphocytes x    ; Eosinophils x    ; Bands%: x    ; Blasts x                                  148    |  108    |  44                  Calcium: 8.8   / iCa: x      (10-03 @ 00:30)    ----------------------------<  194       Magnesium: 2.5                              3.7     |  28     |  0.86             Phosphorous: 3.5      TPro  6.0    /  Alb  2.5    /  TBili  4.9    /  DBili  x      /  AST  94     /  ALT  95     /  AlkPhos  1660   03 Oct 2021 00:30    ( 10-03 @ 00:30 )   PT: x    ;   INR: x      aPTT: 50.4 sec        ASSESSMENT: Unvaccinated, 71 year old man, PMHX: HTN, DM2, BPH, and Asthma admitted for acute hypoxic respiratory failure and ARDS secondary to viral pneumonia from COVID-19. s/p tocilizumab, remdesivir and two courses of decadron.  Hospital course complicated by VT arrest on day of intubation, encephalopathy, DVT and MRSE bacteremia.      PLAN:   1. Covid Encephalopathy   continue methadone 5 q12h and Ativan 1mg q12h  continue Morphine 2mg q4h   MRI Brain on 9/28 w/o evidence of anoxic injury    2. Acute hypoxemic respiratory failure 2/2 COVID pneumonia  Extensive fibrosis and consolidations on CT chest  Continue /12/50%/+5 and wean as per RCU Protocol  trached by ENT 10/1  continue Proventil q8h and Symbicort BID    3. MRSE Bacteremia   TTE with no evidence of vegetations  completed 5 weeks of vancomycin ending 10/1.   Switched to Daptomycin 700mg q24h for 48d course  will require 4-6 week treatment overall   surveillance cultures q48-72h  blood cx 10/2 NGTD  CT A/P with evidence of proctitis, s/p Unasyn (9/24-10/1)  check weekly CK  repeat TTE    4. VT Arrest  S/p Amiodarone infusion  Metoprolol held in setting of hypotension, consider restarting    5. Transaminitis 2/2 probable cholangiopathy  RUQ done 9/13 showing mild hepatomegaly, sludge           hepatology recommend supportive treatment  continue ursodiol 500 mg BID     6. DVT  Above and below the knee DVT involving the LEFT popliteal, peroneal, gastrocnemius, and soleal veins.  Hep gtt challenge restarted 10/1; goal 45-60  required frequent pauses in AC, Hgb drops <7  will need IVC filter if he fails challenge  Right thigh hematoma stable hematoma    7. Dysphagia  continue Glucerna tube enteral feeds   continue senna/ Miralax for bowel regimen  continue free water 300 q4h      8. Diabetes  continue NPH 10u q6h  continue ISS       Cheng Calloway, AG-ACNP  [id number]

## 2021-10-04 NOTE — PROGRESS NOTE ADULT - PROBLEM SELECTOR PLAN 4
S/p Tocilizumab 8/15 and Two courses of Dexamethasone   S/p Remdesivir ( 8/13- 8/17)   Reamains with COVID + PCR as of 9/30

## 2021-10-04 NOTE — PROGRESS NOTE ADULT - PROBLEM SELECTOR PLAN 8
Patient with transaminitis likely in setting of COVID  RUQ Sono 9/13: C/w Hepatomegaly and sludge   Continue Urisidol   Continue to monitor LFTS

## 2021-10-04 NOTE — PROGRESS NOTE ADULT - ATTENDING COMMENTS
71M with COVID pneumonia, ARDS with persistent respiratory failure s/p tracheostomy on 10/1. He had a cardiac arrest on the day of his intubation. His hospital course was complicated by high grade MRSE bacteremia, transaminitis, encephalopathy, bilateral LE DVTs and right thigh hematoma.     1. Pulm: Acute Hypoxemic Respiratory Failure - Tracheostomy placed by ENT 10/1, currently on Pressure control. Maintain O2 sat > 90% Trach care.   Occasional leak, however positional but he receives volumes  2. GI: Oropharyngeal dysphagia - PEG placed. Continue feeds, PPI  Transaminitis - thought due to COVID cholangiopathy vs sepsis. No signs of biliary obstruction  3. Cardiovascular - shock state resolved and patient maintaining MAP > 65  - Patient had a cardiac arrest initially - thought due to hypoxia  4. Heme: Venous Thromboembolism - patient with R DVT. Was on Hep gtt however developed a thigh hematoma last week. He was restarted on Hep gtt 10/2 and tolerating thusfar. Monitor Hb and transfuse as needed.  - If he cannot tolerate Hep gtt will need IVC filter  5. Infectious Disease - patient with a persistent MRSE bacteremia since Aug 24, treated with Vancomycin, however switched to Daptomycin 10/1. Followup CPK weekly. Appreciate ID follow up.  - Was being considered for SEJAL to evaluate but now deferred by Cardiology. 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 He is deemed not to be a surgical candidate given his clinical condition.   - Had a CT abd/pelvis with evidence of proctitis  - Sacral wound - wound care followup  6. Neuro - remains on Methadone and Klonopin, weaning down. Patient remains encephalopathic. CTHead on 9/13 with no acute changes.  Patient remains full code. Family remains hopeful  Prognosis poor.

## 2021-10-04 NOTE — PROGRESS NOTE ADULT - PROBLEM SELECTOR PLAN 1
Patient with ARDS in setting of COVID PNA   Patient S/p Tracheostomy on 10/1  (Umbilical tie and Sutures Remain Place)   Continue Mechanical Ventilation / Currently not tolerating weaning attempts   CT Chest with Bilateral Ground Glass Opacities C/w COVID PNA  Continue Chest PT / Suctioning PRN

## 2021-10-04 NOTE — PROGRESS NOTE ADULT - SUBJECTIVE AND OBJECTIVE BOX
Follow Up:      Inverval History/ROS:Patient is a 71y old  Male who presents with a chief complaint of SOB (04 Oct 2021 08:05)    Afebrile for 24 hours  Off pressors  On vent      Allergies    No Known Allergies    Intolerances        ANTIMICROBIALS:  DAPTOmycin IVPB 700 every 24 hours  DAPTOmycin IVPB        OTHER MEDS:  acetaminophen    Suspension .. 650 milliGRAM(s) Oral every 6 hours PRN  ALBUTerol    90 MICROgram(s) HFA Inhaler 2 Puff(s) Inhalation every 8 hours  budesonide 160 MICROgram(s)/formoterol 4.5 MICROgram(s) Inhaler 2 Puff(s) Inhalation two times a day  chlorhexidine 0.12% Liquid 15 milliLiter(s) Oral Mucosa every 12 hours  chlorhexidine 4% Liquid 1 Application(s) Topical <User Schedule>  cholecalciferol 2000 Unit(s) Oral daily  dextrose 5%. 1000 milliLiter(s) IV Continuous <Continuous>  dextrose 5%. 1000 milliLiter(s) IV Continuous <Continuous>  dextrose 50% Injectable 25 Gram(s) IV Push once  glucagon  Injectable 1 milliGRAM(s) IntraMuscular once  heparin  Infusion 400 Unit(s)/Hr IV Continuous <Continuous>  HYDROmorphone  Injectable 0.5 milliGRAM(s) IV Push every 4 hours PRN  insulin lispro (ADMELOG) corrective regimen sliding scale   SubCutaneous every 6 hours  insulin NPH human recombinant 10 Unit(s) SubCutaneous every 6 hours  LORazepam     Tablet 1 milliGRAM(s) Oral every 12 hours  methadone   Solution 5 milliGRAM(s) Oral every 12 hours  multivitamin/minerals Oral Solution (WELLESSE) 30 milliLiter(s) Enteral Tube daily  naloxegol 25 milliGRAM(s) Oral daily  polyethylene glycol 3350 17 Gram(s) Oral daily  senna Syrup 10 milliLiter(s) Oral at bedtime  ursodiol Tablet 500 milliGRAM(s) Oral two times a day      Vital Signs Last 24 Hrs  T(C): 37 (04 Oct 2021 03:00), Max: 37.3 (03 Oct 2021 16:00)  T(F): 98.6 (04 Oct 2021 03:00), Max: 99.1 (03 Oct 2021 16:00)  HR: 88 (04 Oct 2021 09:20) (82 - 98)  BP: 128/69 (04 Oct 2021 06:00) (105/59 - 147/84)  BP(mean): 84 (03 Oct 2021 16:00) (77 - 110)  RR: 23 (04 Oct 2021 09:20) (21 - 28)  SpO2: 98% (04 Oct 2021 09:20) (89% - 100%)    PHYSICAL EXAM:  General: [ ] non-toxic  HEAD/EYES: [ ] PERRL [x ] white sclera [ ] icterus  ENT:  [ ] normal [x ] supple [ ] thrush [ ] pharyngeal exudate  Cardiovascular:   [ ] murmur [x ] normal [ ] PPM/AICD  Respiratory:  [x ] clear to ausculation bilaterally  GI:  [ x] soft, non-tender, normal bowel sounds  :  [ ] mace [ ] no CVA tenderness   Musculoskeletal:  [ ] no synovitis  Neurologic:  [ ] non-focal exam   Skin:  [x ] no rash  Lymph: [ x] no lymphadenopathy  Psychiatric:  [ ] appropriate affect [ ] alert & oriented  Lines:  [ x] no phlebitis [ ] central line                                7.5    10.31 )-----------( 238      ( 04 Oct 2021 07:23 )             25.9       10-04    144  |  107  |  41<H>  ----------------------------<  113<H>  3.6   |  29  |  0.89    Ca    9.1      04 Oct 2021 07:23  Phos  3.8     10-04  Mg     2.5     10-04    TPro  5.6<L>  /  Alb  2.2<L>  /  TBili  4.5<H>  /  DBili  x   /  AST  107<H>  /  ALT  93<H>  /  AlkPhos  1534<H>  10-04          MICROBIOLOGY:Culture Results:   No growth to date. (10-02-21 @ 06:24)  Culture Results:   No growth to date. (10-02-21 @ 06:24)  Culture Results:   Growth in aerobic bottle: Staphylococcus epidermidis (09-29-21 @ 14:47)  Culture Results:   No growth to date. (09-29-21 @ 14:45)      RADIOLOGY:

## 2021-10-04 NOTE — PROGRESS NOTE ADULT - PROBLEM SELECTOR PLAN 1
- Do not remove umbilical trach tie  - Plan to remove sutures POD #7  - HOB elevation  - Suction PRN  - Continue trach care  - Care per primary team

## 2021-10-04 NOTE — PROGRESS NOTE ADULT - SUBJECTIVE AND OBJECTIVE BOX
Patient is a 71y old  Male who presents with a chief complaint of SOB (03 Oct 2021 08:18)      Interval Events: Patient transferred from ICU to RCU Yesterday     REVIEW OF SYSTEMS:  [ ] Positive  [ ] All other systems negative  [ ] Unable to assess ROS because ________    Vital Signs Last 24 Hrs  T(C): 37 (10-04-21 @ 03:00), Max: 37.4 (10-03-21 @ 12:00)  T(F): 98.6 (10-04-21 @ 03:00), Max: 99.3 (10-03-21 @ 12:00)  HR: 92 (10-04-21 @ 06:03) (81 - 98)  BP: 128/69 (10-04-21 @ 06:00) (105/59 - 169/82)  RR: 22 (10-04-21 @ 06:00) (19 - 28)  SpO2: 100% (10-04-21 @ 06:03) (89% - 100%)    PHYSICAL EXAM:  HEENT:   [ ]Tracheostomy:  [ ]Pupils equal  [ ]No oral lesions  [ ]Abnormal    SKIN  [ ]No Rash  [ ] Abnormal  [ ] pressure    CARDIAC  [ ]Regular  [ ]Abnormal    PULMONARY  [ ]Bilateral Clear Breath Sounds  [ ]Normal Excursion  [ ]Abnormal    GI  [ ]PEG      [ ] +BS		              [ ]Soft, nondistended, nontender	  [ ]Abnormal    MUSCULOSKELETAL                                   [ ]Bedbound                 [ ]Abnormal    [ ]Ambulatory/OOB to chair                           EXTREMITIES                                         [ ]Normal  [ ]Edema                           NEUROLOGIC  [ ] Normal, non focal  [ ] Focal findings:    PSYCHIATRIC  [ ]Alert and appropriate  [ ] Sedated	 [ ]Agitated    :  Richard: [ ] Yes, if yes: Date of Placement:                   [  ] No    LINES: Central Lines [ ] Yes, if yes: Date of Placement                                     [  ] No    HOSPITAL MEDICATIONS:  MEDICATIONS  (STANDING):  ALBUTerol    90 MICROgram(s) HFA Inhaler 2 Puff(s) Inhalation every 8 hours  budesonide 160 MICROgram(s)/formoterol 4.5 MICROgram(s) Inhaler 2 Puff(s) Inhalation two times a day  chlorhexidine 0.12% Liquid 15 milliLiter(s) Oral Mucosa every 12 hours  chlorhexidine 4% Liquid 1 Application(s) Topical <User Schedule>  cholecalciferol 2000 Unit(s) Oral daily  DAPTOmycin IVPB 700 milliGRAM(s) IV Intermittent every 24 hours  DAPTOmycin IVPB      dextrose 5%. 1000 milliLiter(s) (50 mL/Hr) IV Continuous <Continuous>  dextrose 5%. 1000 milliLiter(s) (100 mL/Hr) IV Continuous <Continuous>  dextrose 50% Injectable 25 Gram(s) IV Push once  glucagon  Injectable 1 milliGRAM(s) IntraMuscular once  heparin  Infusion 400 Unit(s)/Hr (8 mL/Hr) IV Continuous <Continuous>  insulin lispro (ADMELOG) corrective regimen sliding scale   SubCutaneous every 6 hours  insulin NPH human recombinant 10 Unit(s) SubCutaneous every 6 hours  LORazepam     Tablet 1 milliGRAM(s) Oral every 12 hours  methadone   Solution 5 milliGRAM(s) Oral every 12 hours  multivitamin/minerals Oral Solution (WELLESSE) 30 milliLiter(s) Enteral Tube daily  naloxegol 25 milliGRAM(s) Oral daily  polyethylene glycol 3350 17 Gram(s) Oral daily  senna Syrup 10 milliLiter(s) Oral at bedtime  ursodiol Tablet 500 milliGRAM(s) Oral two times a day    MEDICATIONS  (PRN):  acetaminophen    Suspension .. 650 milliGRAM(s) Oral every 6 hours PRN Temp greater or equal to 38C (100.4F), Mild Pain (1 - 3), Moderate Pain (4 - 6)  HYDROmorphone  Injectable 0.5 milliGRAM(s) IV Push every 4 hours PRN Moderate Pain (4 - 6)      LABS:                        8.0    12.56 )-----------( 280      ( 03 Oct 2021 00:30 )             27.5     10-03    148<H>  |  108  |  44<H>  ----------------------------<  194<H>  3.7   |  28  |  0.86    Ca    8.8      03 Oct 2021 00:30  Phos  3.5     10-03  Mg     2.5     10-03    TPro  6.0  /  Alb  2.5<L>  /  TBili  4.9<H>  /  DBili  x   /  AST  94<H>  /  ALT  95<H>  /  AlkPhos  1660<H>  10-03    PTT - ( 03 Oct 2021 00:30 )  PTT:50.4 sec        CAPILLARY BLOOD GLUCOSE    MICROBIOLOGY:     RADIOLOGY:  [ ] Reviewed and interpreted by me    Mode: AC/ CMV (Assist Control/ Continuous Mandatory Ventilation)  RR (machine): 12  FiO2: 70  PEEP: 5  ITime: 1  MAP: 12  PC: 20  PIP: 28   Patient is a 71y old  Male who presents with a chief complaint of SOB (03 Oct 2021 08:18)      Interval Events: Patient transferred from ICU to RCU Yesterday     REVIEW OF SYSTEMS:  [ ] Positive  [ ] All other systems negative  [x] Unable to assess ROS because patient is unresponsive to Verbal Stimuli     Vital Signs Last 24 Hrs  T(C): 37 (10-04-21 @ 03:00), Max: 37.4 (10-03-21 @ 12:00)  T(F): 98.6 (10-04-21 @ 03:00), Max: 99.3 (10-03-21 @ 12:00)  HR: 92 (10-04-21 @ 06:03) (81 - 98)  BP: 128/69 (10-04-21 @ 06:00) (105/59 - 169/82)  RR: 22 (10-04-21 @ 06:00) (19 - 28)  SpO2: 100% (10-04-21 @ 06:03) (89% - 100%)    PHYSICAL EXAM:  HEENT:   [x]Tracheostomy: # 8 Cuffed tracheostomy ( Umbilical tie and sutures remain intact ) Dried blood around stoma   [ ]Pupils:   [ ]No oral lesions  [x]Abnormal: Scleral Icterus     SKIN  [x]No Rash  [x] Abnormal: Bilateral Buttock Wounds   [ ] pressure    CARDIAC  [x]Regular  [ ]Abnormal    PULMONARY  [ ]Bilateral Clear Breath Sounds  [ ]Normal Excursion  [x]Abnormal: Bilateral Coarse Breath sounds, decreased at bases     GI  [x]PEG      [x] +BS		              [x]Soft, nondistended, nontender	  [ ]Abnormal    MUSCULOSKELETAL                                   [x]Bedbound                 [ ]Abnormal    [ ]Ambulatory/OOB to chair                           EXTREMITIES                                         [ ]Normal  [x]Edema: Upper and lower extremity edema bilaterally                            NEUROLOGIC  [ ] Normal, non focal  [x] Focal findings: Sedated / Not following Commands / Withdraws to Pain in all 4 extremities     PSYCHIATRIC  [ ]Alert and appropriate  [x] Sedated	 [ ]Agitated    TAMMI:  Richard: [ ] Yes, if yes: Date of Placement:                   [x] No    LINES: Central Lines [ ] Yes, if yes: Date of Placement                                     [x] No    HOSPITAL MEDICATIONS:  MEDICATIONS  (STANDING):  ALBUTerol    90 MICROgram(s) HFA Inhaler 2 Puff(s) Inhalation every 8 hours  budesonide 160 MICROgram(s)/formoterol 4.5 MICROgram(s) Inhaler 2 Puff(s) Inhalation two times a day  chlorhexidine 0.12% Liquid 15 milliLiter(s) Oral Mucosa every 12 hours  chlorhexidine 4% Liquid 1 Application(s) Topical <User Schedule>  cholecalciferol 2000 Unit(s) Oral daily  DAPTOmycin IVPB 700 milliGRAM(s) IV Intermittent every 24 hours  DAPTOmycin IVPB      dextrose 5%. 1000 milliLiter(s) (50 mL/Hr) IV Continuous <Continuous>  dextrose 5%. 1000 milliLiter(s) (100 mL/Hr) IV Continuous <Continuous>  dextrose 50% Injectable 25 Gram(s) IV Push once  glucagon  Injectable 1 milliGRAM(s) IntraMuscular once  heparin  Infusion 400 Unit(s)/Hr (8 mL/Hr) IV Continuous <Continuous>  insulin lispro (ADMELOG) corrective regimen sliding scale   SubCutaneous every 6 hours  insulin NPH human recombinant 10 Unit(s) SubCutaneous every 6 hours  LORazepam     Tablet 1 milliGRAM(s) Oral every 12 hours  methadone   Solution 5 milliGRAM(s) Oral every 12 hours  multivitamin/minerals Oral Solution (WELLESSE) 30 milliLiter(s) Enteral Tube daily  naloxegol 25 milliGRAM(s) Oral daily  polyethylene glycol 3350 17 Gram(s) Oral daily  senna Syrup 10 milliLiter(s) Oral at bedtime  ursodiol Tablet 500 milliGRAM(s) Oral two times a day    MEDICATIONS  (PRN):  acetaminophen    Suspension .. 650 milliGRAM(s) Oral every 6 hours PRN Temp greater or equal to 38C (100.4F), Mild Pain (1 - 3), Moderate Pain (4 - 6)  HYDROmorphone  Injectable 0.5 milliGRAM(s) IV Push every 4 hours PRN Moderate Pain (4 - 6)      LABS:                        8.0    12.56 )-----------( 280      ( 03 Oct 2021 00:30 )             27.5     10-03    148<H>  |  108  |  44<H>  ----------------------------<  194<H>  3.7   |  28  |  0.86    Ca    8.8      03 Oct 2021 00:30  Phos  3.5     10-03  Mg     2.5     10-03    TPro  6.0  /  Alb  2.5<L>  /  TBili  4.9<H>  /  DBili  x   /  AST  94<H>  /  ALT  95<H>  /  AlkPhos  1660<H>  10-03    PTT - ( 03 Oct 2021 00:30 )  PTT:50.4 sec        CAPILLARY BLOOD GLUCOSE    MICROBIOLOGY:     RADIOLOGY:  [ ] Reviewed and interpreted by me    Mode: AC/ CMV (Assist Control/ Continuous Mandatory Ventilation)  RR (machine): 12  FiO2: 70  PEEP: 5  ITime: 1  MAP: 12  PC: 20  PIP: 28   Patient is a 71y old  Male who presents with a chief complaint of SOB (03 Oct 2021 08:18)      Interval Events: Patient transferred from ICU to RCU Yesterday     REVIEW OF SYSTEMS:  [ ] Positive  [ ] All other systems negative  [x] Unable to assess ROS because patient is unresponsive to Verbal Stimuli     Vital Signs Last 24 Hrs  T(C): 37 (10-04-21 @ 03:00), Max: 37.4 (10-03-21 @ 12:00)  T(F): 98.6 (10-04-21 @ 03:00), Max: 99.3 (10-03-21 @ 12:00)  HR: 92 (10-04-21 @ 06:03) (81 - 98)  BP: 128/69 (10-04-21 @ 06:00) (105/59 - 169/82)  RR: 22 (10-04-21 @ 06:00) (19 - 28)  SpO2: 100% (10-04-21 @ 06:03) (89% - 100%)    PHYSICAL EXAM:  HEENT:   [x]Tracheostomy: # 8 Cuffed tracheostomy ( Umbilical tie and sutures remain intact ) Dried blood around stoma   [ ]Pupils:   [ ]No oral lesions  [x]Abnormal: Scleral Icterus     SKIN  [x] + Jaundice   [x] Abnormal: Bilateral Buttock Wounds   [ ] pressure    CARDIAC  [x]Regular  [ ]Abnormal    PULMONARY  [ ]Bilateral Clear Breath Sounds  [ ]Normal Excursion  [x]Abnormal: Bilateral Coarse Breath sounds, decreased at bases     GI  [x]PEG      [x] +BS		              [x]Soft, nondistended, nontender	  [ ]Abnormal    MUSCULOSKELETAL                                   [x]Bedbound                 [ ]Abnormal    [ ]Ambulatory/OOB to chair                           EXTREMITIES                                         [ ]Normal  [x]Edema: Upper and lower extremity edema bilaterally                            NEUROLOGIC  [ ] Normal, non focal  [x] Focal findings: Sedated / Not following Commands / Withdraws to Pain in all 4 extremities     PSYCHIATRIC  [ ]Alert and appropriate  [x] Sedated	 [ ]Agitated    TAMMI:  Richard: [ ] Yes, if yes: Date of Placement:                   [x] No    LINES: Central Lines [ ] Yes, if yes: Date of Placement                                     [x] No    HOSPITAL MEDICATIONS:  MEDICATIONS  (STANDING):  ALBUTerol    90 MICROgram(s) HFA Inhaler 2 Puff(s) Inhalation every 8 hours  budesonide 160 MICROgram(s)/formoterol 4.5 MICROgram(s) Inhaler 2 Puff(s) Inhalation two times a day  chlorhexidine 0.12% Liquid 15 milliLiter(s) Oral Mucosa every 12 hours  chlorhexidine 4% Liquid 1 Application(s) Topical <User Schedule>  cholecalciferol 2000 Unit(s) Oral daily  DAPTOmycin IVPB 700 milliGRAM(s) IV Intermittent every 24 hours  DAPTOmycin IVPB      dextrose 5%. 1000 milliLiter(s) (50 mL/Hr) IV Continuous <Continuous>  dextrose 5%. 1000 milliLiter(s) (100 mL/Hr) IV Continuous <Continuous>  dextrose 50% Injectable 25 Gram(s) IV Push once  glucagon  Injectable 1 milliGRAM(s) IntraMuscular once  heparin  Infusion 400 Unit(s)/Hr (8 mL/Hr) IV Continuous <Continuous>  insulin lispro (ADMELOG) corrective regimen sliding scale   SubCutaneous every 6 hours  insulin NPH human recombinant 10 Unit(s) SubCutaneous every 6 hours  LORazepam     Tablet 1 milliGRAM(s) Oral every 12 hours  methadone   Solution 5 milliGRAM(s) Oral every 12 hours  multivitamin/minerals Oral Solution (WELLESSE) 30 milliLiter(s) Enteral Tube daily  naloxegol 25 milliGRAM(s) Oral daily  polyethylene glycol 3350 17 Gram(s) Oral daily  senna Syrup 10 milliLiter(s) Oral at bedtime  ursodiol Tablet 500 milliGRAM(s) Oral two times a day    MEDICATIONS  (PRN):  acetaminophen    Suspension .. 650 milliGRAM(s) Oral every 6 hours PRN Temp greater or equal to 38C (100.4F), Mild Pain (1 - 3), Moderate Pain (4 - 6)  HYDROmorphone  Injectable 0.5 milliGRAM(s) IV Push every 4 hours PRN Moderate Pain (4 - 6)      LABS:                        8.0    12.56 )-----------( 280      ( 03 Oct 2021 00:30 )             27.5     10-03    148<H>  |  108  |  44<H>  ----------------------------<  194<H>  3.7   |  28  |  0.86    Ca    8.8      03 Oct 2021 00:30  Phos  3.5     10-03  Mg     2.5     10-03    TPro  6.0  /  Alb  2.5<L>  /  TBili  4.9<H>  /  DBili  x   /  AST  94<H>  /  ALT  95<H>  /  AlkPhos  1660<H>  10-03    PTT - ( 03 Oct 2021 00:30 )  PTT:50.4 sec        CAPILLARY BLOOD GLUCOSE    MICROBIOLOGY:     RADIOLOGY:  [ ] Reviewed and interpreted by me    Mode: AC/ CMV (Assist Control/ Continuous Mandatory Ventilation)  RR (machine): 12  FiO2: 70  PEEP: 5  ITime: 1  MAP: 12  PC: 20  PIP: 28

## 2021-10-04 NOTE — PROGRESS NOTE ADULT - PROBLEM SELECTOR PLAN 5
Patient remains with Vent Dyssynchrony   Continue Methadone 5 mg q 12hrs   Continue Ativan 1 mg q 12hrs And Dilaudid 0.5 mg IVP PRN Pain / Dyspnea

## 2021-10-04 NOTE — CHART NOTE - NSCHARTNOTEFT_GEN_A_CORE
Called to see pt for air leak. Pt is S/P tracheostomy POD #3 with an 8 shiley cuffed secured with sutures and umbilical trach tie. As per Rt, pt is not able to maintain adequate volume. After overinflating the trach cuff, pt was able to pull about 400-500. Bedside tracheoscopy showed that the trach tube was in good position. No active bleeding or blood clots, no tracheomalacia, granulation tissue was noted in the right mainstem bronchi with ball valving effect, non obstructing, L maistem bronchi open. Plan to change trach tube on POD #5 to either a #8 shiley cuffed or a 9 portex uncuffed. Please call ENT with questions.

## 2021-10-04 NOTE — PROGRESS NOTE ADULT - ASSESSMENT
Patient 71 year old Male with PMH of HTN, DM2, BPH, and Asthma admitted with COVID-19 ( Unvaccinated) with subsequent ARDS 2/2 Viral Pneumonia. Patient received Tocilizumab Remdemsivir and Two courses of Decadron. Patients hospital course complicated MRSE Bacteremia; initially txd with Vanco and later transitioned to Daptomycin in setting of persistent bacteremia. Infectious work-up conducted with TTE, which was negative for vegetations. CT A/P done, which showed proctitis, for which he was txd with Unasyn x7d (finished 10/1). SEJAL deferred by cardiology and decision was made with IR to treat empirically for at least 6 weeks. Patients Hospital course also complicated by VT Arrest and Bilateral Lower extremity DVTS. Extensive GOC discussions held with family throughout, with palliative care input. Decision made to opt for tracheostomy; patient received PEG on 9/30 and trach 10/1.     10/4: Patent transferred to RCU Yesterday from MICU. No events reported overnight. Repeat Trans Thoracic ordered (Pending)

## 2021-10-04 NOTE — PROGRESS NOTE ADULT - PROBLEM SELECTOR PLAN 3
Hospital course complicated by Staph Epi Bacteremia  Patient initially txd with Vanco but changed to Daptomycin in setting of persistent Bacteremia   Repeat Blood cxs 10/2: No growth to date   Continue to Monitor CPK Weekly   Repeat TTE Ordered ( Pending)   ID Continues to Follow

## 2021-10-04 NOTE — PROGRESS NOTE ADULT - SUBJECTIVE AND OBJECTIVE BOX
ENT ISSUE/POD:  POD #3 for trach    HPI: 70 y/o male POD #3 for tracheostomy #8 shiley cuffed 2/2 respiratory failure. Pt is doing well on the vent. no complaints or issues. No tracheal bleed overnight.        PAST MEDICAL & SURGICAL HISTORY:  BPH (benign prostatic hyperplasia)    Hyperlipemia    HTN (hypertension)    Hypercholesteremia    Asthma    Diabetes    Kidney stone    Bladder stone    H/O lithotripsy  x 2    History of kidney stones    H/O umbilical hernia repair      Allergies    No Known Allergies    Intolerances      MEDICATIONS  (STANDING):  ALBUTerol    90 MICROgram(s) HFA Inhaler 2 Puff(s) Inhalation every 8 hours  budesonide 160 MICROgram(s)/formoterol 4.5 MICROgram(s) Inhaler 2 Puff(s) Inhalation two times a day  chlorhexidine 0.12% Liquid 15 milliLiter(s) Oral Mucosa every 12 hours  chlorhexidine 4% Liquid 1 Application(s) Topical <User Schedule>  cholecalciferol 2000 Unit(s) Oral daily  DAPTOmycin IVPB 700 milliGRAM(s) IV Intermittent every 24 hours  DAPTOmycin IVPB      dextrose 5%. 1000 milliLiter(s) (50 mL/Hr) IV Continuous <Continuous>  dextrose 5%. 1000 milliLiter(s) (100 mL/Hr) IV Continuous <Continuous>  dextrose 50% Injectable 25 Gram(s) IV Push once  glucagon  Injectable 1 milliGRAM(s) IntraMuscular once  heparin  Infusion 400 Unit(s)/Hr (8 mL/Hr) IV Continuous <Continuous>  insulin lispro (ADMELOG) corrective regimen sliding scale   SubCutaneous every 6 hours  insulin NPH human recombinant 10 Unit(s) SubCutaneous every 6 hours  LORazepam     Tablet 1 milliGRAM(s) Oral every 12 hours  methadone   Solution 5 milliGRAM(s) Oral every 12 hours  multivitamin/minerals Oral Solution (WELLESSE) 30 milliLiter(s) Enteral Tube daily  naloxegol 25 milliGRAM(s) Oral daily  polyethylene glycol 3350 17 Gram(s) Oral daily  senna Syrup 10 milliLiter(s) Oral at bedtime  ursodiol Tablet 500 milliGRAM(s) Oral two times a day    MEDICATIONS  (PRN):  acetaminophen    Suspension .. 650 milliGRAM(s) Oral every 6 hours PRN Temp greater or equal to 38C (100.4F), Mild Pain (1 - 3), Moderate Pain (4 - 6)  HYDROmorphone  Injectable 0.5 milliGRAM(s) IV Push every 4 hours PRN Moderate Pain (4 - 6)      Social History: see consult    Family history: see consult    ROS:   uto      Vital Signs Last 24 Hrs  T(C): 37 (04 Oct 2021 03:00), Max: 37.4 (03 Oct 2021 12:00)  T(F): 98.6 (04 Oct 2021 03:00), Max: 99.3 (03 Oct 2021 12:00)  HR: 92 (04 Oct 2021 06:03) (81 - 98)  BP: 128/69 (04 Oct 2021 06:00) (105/59 - 169/82)  BP(mean): 84 (03 Oct 2021 16:00) (77 - 121)  RR: 22 (04 Oct 2021 06:00) (19 - 28)  SpO2: 100% (04 Oct 2021 06:03) (89% - 100%)                          7.5    10.31 )-----------( 238      ( 04 Oct 2021 07:23 )             25.9    10-03    148<H>  |  108  |  44<H>  ----------------------------<  194<H>  3.7   |  28  |  0.86    Ca    8.8      03 Oct 2021 00:30  Phos  3.5     10-03  Mg     2.5     10-03    TPro  6.0  /  Alb  2.5<L>  /  TBili  4.9<H>  /  DBili  x   /  AST  94<H>  /  ALT  95<H>  /  AlkPhos  1660<H>  10-03   PTT - ( 04 Oct 2021 07:23 )  PTT:52.3 sec    PHYSICAL EXAM:  Gen: NAD, well-developed  Head: Normocephalic, Atraumatic  Face: no edema/erythema/fluctuance  Eyes:  no scleral injection  Nose: Nares bilaterally patent, no discharge  Mouth: No Stridor / Drooling / Trismus.  Mucosa moist, tongue/uvula midline, oropharynx clear  Neck: #8 shiley inflated cuff, with minimal serosanguinous oozing from stoma, no active bleeding, sutures x4 and umbilical tie in place.  No lymphadenopathy, trachea midline, no masses  Resp: ventilating well, satting   CV: no peripheral edema/cyanosis         Case d/w Dr. Steward regarding ASA And Plavix and Enlarging Hematoma on CT  As per D/w  will repeat CBC this afternoon if remains stable will cont ASA and Plavix   Continue Simvastatin   Continue Vasotec and Lopressor for HTN  Continue to monitor BP and HR

## 2021-10-04 NOTE — PROGRESS NOTE ADULT - ASSESSMENT
70 y/o male POD #3 for tracheostomy 2/2 respiratory failure. Pt is doing well, #8 shiley cuffed on the vent, with minimal serosanguinous oozing from stoma, no active bleeding. sutures x4 and umbilical tie in place.

## 2021-10-04 NOTE — PROGRESS NOTE ADULT - PROBLEM SELECTOR PLAN 7
Patient with B/L Lower extremity DVTs; above knee on left and below knee on R  Currently remains on Heparin Drip but with recent RT Thigh Hematoma   Hep gtt challenge restarted 10/1; goal 45-60  Will monitor for stability in H+H prior to starting NOAC

## 2021-10-05 NOTE — PROGRESS NOTE ADULT - PROBLEM SELECTOR PLAN 7
Patient with B/L Lower extremity DVTs; above knee on left and below knee on R  Currently remains on Heparin Drip but with recent RT Thigh Hematoma   Hep gtt challenge restarted 10/1; goal 45-60  Will monitor for stability in H+H prior to starting NOAC ( Possibly tomorrow)

## 2021-10-05 NOTE — PROGRESS NOTE ADULT - PROBLEM SELECTOR PLAN 5
Patient remains with Vent Dyssynchrony   Ativan increased to 1 mg q 8 hrs   Seroquel 25 mg q 8 hrs added   Continue Methadone 5 mg q 12hrs   Continue Dilaudid 0.5 mg IVP PRN Pain / Dyspnea  EKG 10/5;

## 2021-10-05 NOTE — PROGRESS NOTE ADULT - ATTENDING COMMENTS
71M with COVID pneumonia, ARDS with persistent respiratory failure s/p tracheostomy on 10/1. He had a cardiac arrest on the day of his intubation. His hospital course was complicated by high grade MRSE bacteremia, transaminitis, encephalopathy, bilateral LE DVTs and right thigh hematoma.     This morning very tachypneic on vent and hypoxemic to the low 80s, requiring ativan and dilaudid to control respiratory rate.  1. Pulm: Acute Hypoxemic Respiratory Failure - Tracheostomy placed by ENT 10/1, currently on Pressure control. Maintain O2 sat > 90% Trach care.   Occasional leak, however positional but he receives volumes  2. GI: Oropharyngeal dysphagia - PEG placed. Continue feeds, PPI  Transaminitis - thought due to COVID cholangiopathy vs sepsis. No signs of biliary obstruction  3. Cardiovascular - shock state resolved and patient maintaining MAP > 65  - Patient had a cardiac arrest initially - thought due to hypoxia  4. Heme: Venous Thromboembolism - patient with R DVT. Was on Hep gtt however developed a thigh hematoma last week. He was restarted on Hep gtt 10/2 and tolerating thusfar. Monitor Hb and transfuse as needed. Eventual switch to DOAC.   5. Infectious Disease - patient with a persistent MRSE bacteremia since Aug 24, treated with Vancomycin, however switched to Daptomycin 10/1.  Blood cultures form 10/2 have been NGTD, Followup CPK weekly. Appreciate ID follow up.  - Was being considered for SEJAL to evaluate but now deferred by Cardiology. 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 He is deemed not to be a surgical candidate given his clinical condition.   - Had a CT abd/pelvis with evidence of proctitis  - Sacral wound - wound care followup  6. Neuro - remains on Methadone and Klonopin, very tachypneic this morning - started on Seroquel. Patient remains encephalopathic. CT Head on 9/13 with no acute changes.  Patient remains full code. Family remains hopeful  eventual d/c to SNF  Prognosis poor.

## 2021-10-05 NOTE — PROGRESS NOTE ADULT - ASSESSMENT
71 year old with COVID pneumonia 8/12 s/p tocilizumab, remdesivir and two courses of decadron.     Cardiac arrest 8/20.     Prolonged fevers since 9/8 despite treating potential secondary infections.     Staph epidermidis on blood cultures, intermittently since end of Aug.  Uncommon cause of endocarditis and TTE negative. No central lines and I don't think it's from his sacral wound. Has been on Vancomycin for about 5 weeks at this point but latest cultures 9/29 still with GPC.     VAP - Klebsiella and Enterobacter 9/11 with subsequent negative sputum.     Proctitis - CT 9/23- completed course of unasyn    Now afebrile for the past few days.     Poor long term prognosis. PEG 9/30. Trach 10/1.     Suggest  -repeat t blood cultures without growth  -repeat TTE if SEJAL is not planned   - Daptomycin 700mg IV q24h to see if it has any effect on blood cultures  -weekly CPK while on Daptomycin     -supportive care   -goals of care per primary team and palliative

## 2021-10-05 NOTE — PROGRESS NOTE ADULT - ASSESSMENT
A/P 71M w/ PMH/o HTN, DM2, BPH, Asthma admit 8/12 Covid-19 PNA and cardiac arrest.    Wound consult requested to assist w/ management   Evolving bilateral buttock DTI  Incontinence Dermatitis  Incontinent of stool & urine  prophylaxis measures    Recommend:  1.) Bilateral buttocks- medihoney dressing         CAVILON  w/ pericare as per protocol  2.) Nutritional optimization in pt w/ Severe, acute malnutrition         Vital AF TF, high quality protein, and MVI/ vit c to assist w/ wound healing  3.) Maintain on an alternating air with low air loss surface  4.) Turn and reposition q 2 hours  5.) Incontinence management - incontinence pads, cleanser, pericare BID         Loose BM -improving w/senna, consider Banatrol         unasyn for proctitis improving  6.) Offload heels/feet with complete cAir air fluidized boots  7.) Hyperglycemia improving w/ NPH, ISS w/ q6h FS  Care as per MICU. will follow w/ you  Upon discharge f/u as outpatient at [facility] [address] [facility] [phone]  D/w RN and team  Holly Rubio [place], [place] [id number]  I spent 25minutes face to face w/ this pt of which more than 50% of the time was spent counseling & coordinating care of this pt.  A/P 71M w/ PMH/o HTN, DM2, BPH, Asthma admit 8/12 Covid-19 PNA and cardiac arrest.    Wound consult requested to assist w/ management   Evolving bilateral buttock DTI w/ eschar of unstageable pressure injury  Incontinence Dermatitis  Incontinent of stool & urine  prophylaxis measures    Recommend:  1.) Bilateral buttocks- medihoney dressing         CAVILON  w/ pericare as per protocol  2.) Nutritional optimization in pt w/ Severe, acute malnutrition         Vital AF TF, high quality protein, and MVI/ vit c to assist w/ wound healing  3.) Maintain on an alternating air with low air loss surface  4.) Turn and reposition q 2 hours  5.) Incontinence management - incontinence pads, cleanser, pericare BID         Loose BM -improving w/senna, consider Banatrol         unasyn for proctitis improving  6.) Offload heels/feet with complete cAir air fluidized boots  7.) Hyperglycemia improving w/ NPH, ISS w/ q6h FS  Care as per MICU. will follow w/ you  Upon discharge f/u as outpatient at [facility] [address] [facility] [phone]  D/w RN and team  Holly Rubio [place]-C, C[place] [id number]  I spent 25minutes face to face w/ this pt of which more than 50% of the time was spent counseling & coordinating care of this pt.

## 2021-10-05 NOTE — PROGRESS NOTE ADULT - PROBLEM SELECTOR PLAN 1
Patient with ARDS in setting of COVID PNA   Patient S/p Tracheostomy on 10/1  (Umbilical tie and Sutures Remain Place)   Continue Mechanical Ventilation / Currently not tolerating weaning attempts   CT Chest with Bilateral Ground Glass Opacities C/w COVID PNA  Patient remains with Significant Vent Dyssynchrony   Continue Dilaudid IVP PRN Dyspnea / Tachypnea   Standing Sedative timing spaced out to provided longer sustained coverage   Continue Chest PT / Suctioning PRN

## 2021-10-05 NOTE — PROGRESS NOTE ADULT - SUBJECTIVE AND OBJECTIVE BOX
ENT ISSUE/POD: POD#4 for Trach    HPI:  72 y/o male POD #4 for tracheostomy #8 shiley cuffed 2/2 respiratory failure. Pt is doing well on the vent. no complaints or issues. No tracheal bleed overnight.            PAST MEDICAL & SURGICAL HISTORY:  BPH (benign prostatic hyperplasia)    Hyperlipemia    HTN (hypertension)    Hypercholesteremia    Asthma    Diabetes    Kidney stone    Bladder stone    H/O lithotripsy  x 2    History of kidney stones    H/O umbilical hernia repair      Allergies    No Known Allergies    Intolerances      MEDICATIONS  (STANDING):  ALBUTerol    90 MICROgram(s) HFA Inhaler 2 Puff(s) Inhalation every 8 hours  budesonide 160 MICROgram(s)/formoterol 4.5 MICROgram(s) Inhaler 2 Puff(s) Inhalation two times a day  chlorhexidine 0.12% Liquid 15 milliLiter(s) Oral Mucosa every 12 hours  chlorhexidine 4% Liquid 1 Application(s) Topical <User Schedule>  cholecalciferol 2000 Unit(s) Oral daily  DAPTOmycin IVPB 700 milliGRAM(s) IV Intermittent every 24 hours  DAPTOmycin IVPB      dextrose 5%. 1000 milliLiter(s) (50 mL/Hr) IV Continuous <Continuous>  dextrose 5%. 1000 milliLiter(s) (100 mL/Hr) IV Continuous <Continuous>  dextrose 50% Injectable 25 Gram(s) IV Push once  glucagon  Injectable 1 milliGRAM(s) IntraMuscular once  heparin  Infusion 400 Unit(s)/Hr (8 mL/Hr) IV Continuous <Continuous>  insulin lispro (ADMELOG) corrective regimen sliding scale   SubCutaneous every 6 hours  insulin NPH human recombinant 10 Unit(s) SubCutaneous every 6 hours  LORazepam     Tablet 1 milliGRAM(s) Oral every 8 hours  methadone   Solution 5 milliGRAM(s) Oral <User Schedule>  multivitamin/minerals/iron Oral Solution (CENTRUM) 15 milliLiter(s) Enteral Tube daily  naloxegol 25 milliGRAM(s) Oral daily  pantoprazole  Injectable 40 milliGRAM(s) IV Push daily  polyethylene glycol 3350 17 Gram(s) Oral daily  senna Syrup 10 milliLiter(s) Oral at bedtime  ursodiol Tablet 500 milliGRAM(s) Oral two times a day    MEDICATIONS  (PRN):  acetaminophen    Suspension .. 650 milliGRAM(s) Oral every 6 hours PRN Temp greater or equal to 38C (100.4F), Mild Pain (1 - 3), Moderate Pain (4 - 6)  HYDROmorphone  Injectable 0.5 milliGRAM(s) IV Push every 4 hours PRN Severe Pain (7 - 10) / Dyspnea      Social History: see consult note    Family history: see consult note    ROS:   ENT: all negative except as noted in HPI   Pulm: denies SOB, cough, hemoptysis  Neuro: denies numbness/tingling, loss of sensation  Endo: denies heat/cold intolerance, excessive sweating      Vital Signs Last 24 Hrs  T(C): 37.4 (05 Oct 2021 04:18), Max: 37.7 (04 Oct 2021 23:12)  T(F): 99.4 (05 Oct 2021 04:18), Max: 99.9 (04 Oct 2021 23:12)  HR: 88 (05 Oct 2021 06:25) (85 - 100)  BP: 121/75 (05 Oct 2021 04:18) (103/63 - 134/74)  BP(mean): --  RR: 31 (05 Oct 2021 06:25) (19 - 41)  SpO2: 94% (05 Oct 2021 06:25) (91% - 100%)                          8.3    12.29 )-----------( 272      ( 05 Oct 2021 06:50 )             28.6    10-05    139  |  103  |  45<H>  ----------------------------<  90  4.0   |  26  |  0.85    Ca    9.0      05 Oct 2021 06:47  Phos  3.0     10-05  Mg     2.5     10-05    TPro  5.8<L>  /  Alb  2.1<L>  /  TBili  4.9<H>  /  DBili  x   /  AST  112<H>  /  ALT  102<H>  /  AlkPhos  1575<H>  10-05   PTT - ( 05 Oct 2021 07:05 )  PTT:52.9 sec    PHYSICAL EXAM:  Gen: NAD  Skin: No rashes, bruises, or lesions  Head: Normocephalic, Atraumatic  Face: no edema, erythema, or fluctuance. Parotid glands soft without mass  Eyes: no scleral injection  Nose: Nares bilaterally patent, no discharge  Mouth: No Stridor / Drooling / Trismus.  Mucosa moist, tongue/uvula midline, oropharynx clear  Neck: #8 shiley inflated cuff, with minimal serosanguinous oozing from stoma, no active bleeding, sutures x4 and umbilical tie in place.  No lymphadenopathy, trachea midline, no masses  Resp: ventilating well, satting   Lymphatic: No lymphadenopathy  Resp: breathing easily, no stridor  Neuro: facial nerve intact, no facial droop         ENT ISSUE/POD: POD#4 for Trach    HPI:  70 y/o male POD #4 for tracheostomy #8 shiley cuffed 2/2 respiratory failure. Pt still with leaking from trach. No tracheal bleed overnight. Per NP, the pt has desaturations when off sedation. They feel related to the trach and requesting change to resolve the leak.      PAST MEDICAL & SURGICAL HISTORY:  BPH (benign prostatic hyperplasia)    Hyperlipemia    HTN (hypertension)    Hypercholesteremia    Asthma    Diabetes    Kidney stone    Bladder stone    H/O lithotripsy  x 2    History of kidney stones    H/O umbilical hernia repair      Allergies    No Known Allergies    Intolerances      MEDICATIONS  (STANDING):  ALBUTerol    90 MICROgram(s) HFA Inhaler 2 Puff(s) Inhalation every 8 hours  budesonide 160 MICROgram(s)/formoterol 4.5 MICROgram(s) Inhaler 2 Puff(s) Inhalation two times a day  chlorhexidine 0.12% Liquid 15 milliLiter(s) Oral Mucosa every 12 hours  chlorhexidine 4% Liquid 1 Application(s) Topical <User Schedule>  cholecalciferol 2000 Unit(s) Oral daily  DAPTOmycin IVPB 700 milliGRAM(s) IV Intermittent every 24 hours  DAPTOmycin IVPB      dextrose 5%. 1000 milliLiter(s) (50 mL/Hr) IV Continuous <Continuous>  dextrose 5%. 1000 milliLiter(s) (100 mL/Hr) IV Continuous <Continuous>  dextrose 50% Injectable 25 Gram(s) IV Push once  glucagon  Injectable 1 milliGRAM(s) IntraMuscular once  heparin  Infusion 400 Unit(s)/Hr (8 mL/Hr) IV Continuous <Continuous>  insulin lispro (ADMELOG) corrective regimen sliding scale   SubCutaneous every 6 hours  insulin NPH human recombinant 10 Unit(s) SubCutaneous every 6 hours  LORazepam     Tablet 1 milliGRAM(s) Oral every 8 hours  methadone   Solution 5 milliGRAM(s) Oral <User Schedule>  multivitamin/minerals/iron Oral Solution (CENTRUM) 15 milliLiter(s) Enteral Tube daily  naloxegol 25 milliGRAM(s) Oral daily  pantoprazole  Injectable 40 milliGRAM(s) IV Push daily  polyethylene glycol 3350 17 Gram(s) Oral daily  senna Syrup 10 milliLiter(s) Oral at bedtime  ursodiol Tablet 500 milliGRAM(s) Oral two times a day    MEDICATIONS  (PRN):  acetaminophen    Suspension .. 650 milliGRAM(s) Oral every 6 hours PRN Temp greater or equal to 38C (100.4F), Mild Pain (1 - 3), Moderate Pain (4 - 6)  HYDROmorphone  Injectable 0.5 milliGRAM(s) IV Push every 4 hours PRN Severe Pain (7 - 10) / Dyspnea      Social History: see consult note    Family history: see consult note    ROS:   ENT: all negative except as noted in HPI   Pulm: denies SOB, cough, hemoptysis  Neuro: denies numbness/tingling, loss of sensation  Endo: denies heat/cold intolerance, excessive sweating      Vital Signs Last 24 Hrs  T(C): 37.4 (05 Oct 2021 04:18), Max: 37.7 (04 Oct 2021 23:12)  T(F): 99.4 (05 Oct 2021 04:18), Max: 99.9 (04 Oct 2021 23:12)  HR: 88 (05 Oct 2021 06:25) (85 - 100)  BP: 121/75 (05 Oct 2021 04:18) (103/63 - 134/74)  BP(mean): --  RR: 31 (05 Oct 2021 06:25) (19 - 41)  SpO2: 94% (05 Oct 2021 06:25) (91% - 100%)                          8.3    12.29 )-----------( 272      ( 05 Oct 2021 06:50 )             28.6    10-05    139  |  103  |  45<H>  ----------------------------<  90  4.0   |  26  |  0.85    Ca    9.0      05 Oct 2021 06:47  Phos  3.0     10-05  Mg     2.5     10-05    TPro  5.8<L>  /  Alb  2.1<L>  /  TBili  4.9<H>  /  DBili  x   /  AST  112<H>  /  ALT  102<H>  /  AlkPhos  1575<H>  10-05   PTT - ( 05 Oct 2021 07:05 )  PTT:52.9 sec    PHYSICAL EXAM:  Gen: NAD  Skin: No rashes, bruises, or lesions  Head: Normocephalic, Atraumatic  Face: no edema, erythema, or fluctuance. Parotid glands soft without mass  Eyes: no scleral injection  Nose: Nares bilaterally patent, no discharge  Mouth: No Stridor / Drooling / Trismus.  Mucosa moist, tongue/uvula midline, oropharynx clear  Neck: #8 shiley inflated cuff, with minimal serosanguinous oozing from stoma, no active bleeding, sutures x4 and umbilical tie in place.  No lymphadenopathy, trachea midline, no masses  Resp: ventilating well, satting 93%  Lymphatic: No lymphadenopathy  Resp: breathing easily, no stridor  Neuro: facial nerve intact, no facial droop    Procedure: trach sutures cut and trach was removed. replaced with a 9 portex cuffed trach. cuff inflated and appears to be holding well with no audible leak. pt tolerated procedure well.

## 2021-10-05 NOTE — PROGRESS NOTE ADULT - SUBJECTIVE AND OBJECTIVE BOX
[facility]-- WOUND TEAM -- FOLLOW UP NOTE  --------------------------------------------------------------------------------  pt now in RCU  s/p trach  & PEG  currently afebrile  incontinent   ongoing goc - full code as per family [id number] hour events/subjective:      Diet:  Diet, NPO with Tube Feed:   Tube Feeding Modality: Orogastric  Vital AF (VITALAFRTH)  Total Volume for 24 Hours (mL): 1350  Continuous  Starting Tube Feed Rate mL per Hour: 10  Increase Tube Feed Rate by (mL): 10     Every 6 hours  Until Goal Tube Feed Rate (mL per Hour): 75  Tube Feed Duration (in Hours): 18  Tube Feed Start Time: 16:00  No Carb Prosource TF     Qty per Day:  1 (10-05-21 @ 15:17)      ROS: pt unable to offer    ALLERGIES & MEDICATIONS  --------------------------------------------------------------------------------    No Known Allergies    STANDING INPATIENT MEDICATIONS  ALBUTerol    90 MICROgram(s) HFA Inhaler 2 Puff(s) Inhalation every 8 hours  ascorbic acid 500 milliGRAM(s) Oral daily  budesonide 160 MICROgram(s)/formoterol 4.5 MICROgram(s) Inhaler 2 Puff(s) Inhalation two times a day  chlorhexidine 0.12% Liquid 15 milliLiter(s) Oral Mucosa every 12 hours  chlorhexidine 4% Liquid 1 Application(s) Topical <User Schedule>  cholecalciferol 2000 Unit(s) Oral daily  DAPTOmycin IVPB 700 milliGRAM(s) IV Intermittent every 24 hours   dextrose 5%. 1000 milliLiter(s) IV Continuous <Continuous>  dextrose 5%. 1000 milliLiter(s) IV Continuous <Continuous>  dextrose 50% Injectable 25 Gram(s) IV Push once  glucagon  Injectable 1 milliGRAM(s) IntraMuscular once  heparin  Infusion 400 Unit(s)/Hr IV Continuous <Continuous>  insulin lispro (ADMELOG) corrective regimen sliding scale   SubCutaneous every 6 hours  insulin NPH human recombinant 10 Unit(s) SubCutaneous every 6 hours  LORazepam     Tablet 1 milliGRAM(s) Oral every 8 hours  methadone   Solution 5 milliGRAM(s) Oral <User Schedule>  multivitamin/minerals/iron Oral Solution (CENTRUM) 15 milliLiter(s) Enteral Tube daily  naloxegol 25 milliGRAM(s) Oral daily  pantoprazole  Injectable 40 milliGRAM(s) IV Push daily  polyethylene glycol 3350 17 Gram(s) Oral daily  QUEtiapine 25 milliGRAM(s) Oral <User Schedule>  senna Syrup 10 milliLiter(s) Oral at bedtime  ursodiol Tablet 500 milliGRAM(s) Oral two times a day      PRN INPATIENT MEDICATION  acetaminophen    Suspension .. 650 milliGRAM(s) Oral every 6 hours PRN  HYDROmorphone  Injectable 0.5 milliGRAM(s) IV Push every 4 hours PRN        VITALS/PHYSICAL EXAM  --------------------------------------------------------------------------------  T(C): 37 (10-05-21 @ 14:02), Max: 37.7 (10-04-21 @ 23:12)  HR: 84 (10-05-21 @ 18:37) (84 - 109)  BP: 153/86 (10-05-21 @ 18:15) (109/66 - 153/86)  RR: 34 (10-05-21 @ 18:20) (19 - 44)  SpO2: 98% (10-05-21 @ 18:37) (91% - 100%)  Wt(kg): --        10-04-21 @ 07:01  -  10-05-21 @ 07:00  --------------------------------------------------------  IN: 3324 mL / OUT: 201 mL / NET: 3123 mL    10-05-21 @ 07:01  -  10-05-21 @ 19:03  --------------------------------------------------------  IN: 1196 mL / OUT: 350 mL / NET: 846 mL      Physical Exam:  General: NAD, sedated,  obese   HEENT: nc/at, mucosa moist, trachea midline, tach on vent  GI: soft nt/ nd (+)BS (+)PEG  Neurology: nonverbal, not following commands  Musculoskeletal: no contractures  Vascular: BLE edema equal & warm no acute ischemia  Skin:  skin intact, moist w/ good turgor  Bilateral buttocks into gluteal cleft      10cm x 8cm x 0.1cm area w/ denuded and partial thickness skin loss & granular along edge       evolving DTI w/ eschar on wound bed beginning to lift but still adherent   No drainage, odor, erythema, increased warmth, tenderness, induration, fluctuance      LABS/ CULTURES/ RADIOLOGY:              8.3    12.29 >-----------<  272      [10-05-21 @ 06:50]              28.6     139  |  103  |  45  ----------------------------<  90      [10-05-21 @ 06:47]  4.0   |  26  |  0.85        Ca     9.0     [10-05-21 @ 06:47]      Mg     2.5     [10-05-21 @ 06:47]      Phos  3.0     [10-05-21 @ 06:47]    TPro  5.8  /  Alb  2.1  /  TBili  4.9  /  DBili  x   /  AST  112  /  ALT  102  /  AlkPhos  1575  [10-05-21 @ 06:47]      CAPILLARY BLOOD GLUCOSE  POCT Blood Glucose.: 160 mg/dL (05 Oct 2021 17:55)  POCT Blood Glucose.: 186 mg/dL (05 Oct 2021 12:21)  POCT Blood Glucose.: 139 mg/dL (05 Oct 2021 05:12)  POCT Blood Glucose.: 175 mg/dL (04 Oct 2021 23:43)      Culture - Blood (collected 10-02-21 @ 06:24)  Source: .Blood Blood  Preliminary Report (10-03-21 @ 07:01):    No growth to date.    Culture - Blood (collected 10-02-21 @ 06:24)  Source: .Blood Blood  Preliminary Report (10-03-21 @ 07:01):    No growth to date.    Culture - Blood (collected 09-29-21 @ 14:47)  Source: .Blood Blood-Peripheral  Gram Stain (10-01-21 @ 09:50):    Growth in aerobic bottle: Gram Positive Cocci in Clusters  Final Report (10-03-21 @ 08:34):    Growth in aerobic bottle: Staphylococcus epidermidis  Organism: Staphylococcus epidermidis (10-03-21 @ 08:34)  Organism: Staphylococcus epidermidis (10-03-21 @ 08:34)      -  Ampicillin/Sulbactam: R <=8/4      -  Cefazolin: R <=4      -  Clindamycin: R >4      -  Erythromycin: R >4      -  Gentamicin: R >8 Should not be used as monotherapy      -  Oxacillin: R >2      -  Penicillin: R >8      -  RIF- Rifampin: S <=1 Should not be used as monotherapy      -  Tetra/Doxy: S <=1      -  Trimethoprim/Sulfamethoxazole: R >2/38      -  Vancomycin: S 2      Method Type: GEETA          A1C with Estimated Average Glucose Result: 7.9 % (08-21-21 @ 16:09)  A1C with Estimated Average Glucose Result: 7.3 % (08-14-21 @ 14:36)

## 2021-10-05 NOTE — CHART NOTE - NSCHARTNOTEFT_GEN_A_CORE
Nutrition Follow Up Note  Patient seen for: Malnutrition Follow Up     Chart reviewed, events noted. Pt remains vented; status post PEG placement 9/30 and s/p trach placement 10/1.    Source: [] Patient       [x] EMR        [x] RN        [] Family at bedside       [] Other:    -If unable to interview patient: [x] Trach/Vent/BiPAP  [] Disoriented/confused/inappropriate to interview    Nutrition-Related Events:   - Pressors:  [] Yes    [x] No   - Propofol:  [] Yes    [x] No    Diet, NPO with Tube Feed:   Tube Feeding Modality: Orogastric  Vital AF (VITALAFRTH)  Total Volume for 24 Hours (mL): 1350  Continuous  Starting Tube Feed Rate {mL per Hour}: 10  Increase Tube Feed Rate by (mL): 10     Every 6 hours  Until Goal Tube Feed Rate (mL per Hour): 75  Tube Feed Duration (in Hours): 18  Tube Feed Start Time: 16:00 (09-21-21 @ 15:45) [Active]    EN Order Provides: 1350 ml total volume, 1620cal, 101 Gm Prot; ~20cal/kg and 1.2 Gm Prot/kg based on dosing wt of 81.6 kg    Is current diet order appropriate/adequate? [x] Yes  []  No:     Nutrition-related concerns:  -pt continues to be at goal rate since 9/22 except when pt was NPO 9/28  -micronutrient coverage continues of multivitamin with minerals, cholecalciferol  -continues to be ordered for NPH and ISS admelog for glycemic control   -Per RN, pt tolerating feeds well, no GI distress noted at this time    GI:  Last BM 10/4.   Bowel Regimen? [x] Yes- senna & miralax    No daily weights noted at this time    MEDICATIONS  (STANDING):  cholecalciferol  DAPTOmycin IVPB  DAPTOmycin IVPB  dextrose 5%.  dextrose 5%.  dextrose 50% Injectable  glucagon  Injectable  insulin lispro (ADMELOG) corrective regimen sliding scale  insulin NPH human recombinant  multivitamin/minerals/iron Oral Solution (CENTRUM)  naloxegol  pantoprazole  Injectable  polyethylene glycol 3350  senna Syrup  ursodiol Tablet    Pertinent Labs: 10-05 @ 06:47: Na 139, BUN 45<H>, Cr 0.85, BG 90, K+ 4.0, Phos 3.0, Mg 2.5, Alk Phos 1575<H>, ALT/SGPT 102<H>, AST/SGOT 112<H>, HbA1c --    A1C with Estimated Average Glucose Result: 7.9 % (08-21-21 @ 16:09)  A1C with Estimated Average Glucose Result: 7.3 % (08-14-21 @ 14:36)    Finger Sticks:  POCT Blood Glucose.: 186 mg/dL (10-05 @ 12:21)  POCT Blood Glucose.: 139 mg/dL (10-05 @ 05:12)  POCT Blood Glucose.: 175 mg/dL (10-04 @ 23:43)  POCT Blood Glucose.: 215 mg/dL (10-04 @ 17:50)    Triglycerides, Serum: 258 mg/dL (09-24-21 @ 03:56)  Triglycerides, Serum: 168 mg/dL (09-12-21 @ 23:03)    Skin per nursing documentation: pt with Evolving bilateral buttock deep tissue injury per 10/1 wound care note; also per nursing flow sheets, pt with right cheek stage 2 pressure injury  Edema: +2 generalized per flow sheets    Estimated Nutritional needs based on 81.6 kg:  Estimated Energy Needs: (20-25 aida/kg): 9920-9455 aida based on dosing weight of 81.6 kg  Estimated Protein Needs: (1.2-1.6 Gm/kg):  Gm based on dosing weight of 81.6 kg  Defer fluid needs to team    Previous Nutrition Diagnoses: Severe, acute malnutrition, Increased nutrient needs  Nutrition Diagnosis is: [x] ongoing, being addressed with EN feeds, micronutrient supplementation    Nutrition Care Plan:  [x] In Progress    Recommendations:      1. Continue with Vital AF at 75ml/hr x18 hours as tolerated.   2. Monitor BMs.   3. Consider vitamin C supplementation to help meet increased nutrient needs as well as adding 1 No Carb Prosource/day  (40 kcal, 11gm protein per packet)  4. RD remains available for further nutritional interventions as needed.     Monitoring and Evaluation:   Continue to monitor nutritional intake, tolerance to diet prescription, weights, labs, skin integrity    RD remains available upon request and will follow up per protocol  Alyson Hernandez MS, RD, CDN, [facility] pgr #[phone] Nutrition Follow Up Note  Patient seen for: Malnutrition Follow Up     Chart reviewed, events noted. Pt remains vented; status post PEG placement 9/30 and s/p trach placement 10/1.    Source: [] Patient       [x] EMR        [x] RN        [] Family at bedside       [] Other:    -If unable to interview patient: [x] Trach/Vent/BiPAP  [] Disoriented/confused/inappropriate to interview    Nutrition-Related Events:   - Pressors:  [] Yes    [x] No   - Propofol:  [] Yes    [x] No    Diet, NPO with Tube Feed:   Tube Feeding Modality: Orogastric  Vital AF (VITALAFRTH)  Total Volume for 24 Hours (mL): 1350  Continuous  Starting Tube Feed Rate {mL per Hour}: 10  Increase Tube Feed Rate by (mL): 10     Every 6 hours  Until Goal Tube Feed Rate (mL per Hour): 75  Tube Feed Duration (in Hours): 18  Tube Feed Start Time: 16:00 (09-21-21 @ 15:45) [Active]    EN Order Provides: 1350 ml total volume, 1620cal, 101 Gm Prot; ~20cal/kg and 1.2 Gm Prot/kg based on dosing wt of 81.6 kg    Is current diet order appropriate/adequate? [x] Yes  []  No:     Nutrition-related concerns:  -pt continues to be at goal rate since 9/22 except when pt was NPO 9/28  -micronutrient coverage continues of multivitamin with minerals, cholecalciferol  -continues to be ordered for NPH and ISS admelog for glycemic control   -Per RN, pt tolerating feeds well, no GI distress noted at this time  -Pt ordered for 150 ml free water flushes every 12 hours    GI:  Last BM 10/4.   Bowel Regimen? [x] Yes- senna & miralax    No daily weights noted at this time    MEDICATIONS  (STANDING):  cholecalciferol  DAPTOmycin IVPB  DAPTOmycin IVPB  dextrose 5%.  dextrose 5%.  dextrose 50% Injectable  glucagon  Injectable  insulin lispro (ADMELOG) corrective regimen sliding scale  insulin NPH human recombinant  multivitamin/minerals/iron Oral Solution (CENTRUM)  naloxegol  pantoprazole  Injectable  polyethylene glycol 3350  senna Syrup  ursodiol Tablet    Pertinent Labs: 10-05 @ 06:47: Na 139, BUN 45<H>, Cr 0.85, BG 90, K+ 4.0, Phos 3.0, Mg 2.5, Alk Phos 1575<H>, ALT/SGPT 102<H>, AST/SGOT 112<H>, HbA1c --    A1C with Estimated Average Glucose Result: 7.9 % (08-21-21 @ 16:09)  A1C with Estimated Average Glucose Result: 7.3 % (08-14-21 @ 14:36)    Finger Sticks:  POCT Blood Glucose.: 186 mg/dL (10-05 @ 12:21)  POCT Blood Glucose.: 139 mg/dL (10-05 @ 05:12)  POCT Blood Glucose.: 175 mg/dL (10-04 @ 23:43)  POCT Blood Glucose.: 215 mg/dL (10-04 @ 17:50)    Triglycerides, Serum: 258 mg/dL (09-24-21 @ 03:56)  Triglycerides, Serum: 168 mg/dL (09-12-21 @ 23:03)    Skin per nursing documentation: pt with Evolving bilateral buttock deep tissue injury per 10/1 wound care note; also per nursing flow sheets, pt with right cheek stage 2 pressure injury  Edema: +2 generalized per flow sheets    Estimated Nutritional needs based on 81.6 kg:  Estimated Energy Needs: (20-25 aida/kg): 1771-7922 aida based on dosing weight of 81.6 kg  Estimated Protein Needs: (1.2-1.6 Gm/kg):  Gm based on dosing weight of 81.6 kg  Defer fluid needs to team    Previous Nutrition Diagnoses: Severe, acute malnutrition, Increased nutrient needs  Nutrition Diagnosis is: [x] ongoing, being addressed with EN feeds, micronutrient supplementation    Nutrition Care Plan:  [x] In Progress    Recommendations:      1. Continue with Vital AF at 75ml/hr x18 hours as tolerated.   2. Consider vitamin C supplementation to help meet increased nutrient needs as well as adding 1 No Carb Prosource daily  (40 kcal, 11gm protein per packet)  3. Monitor BMs.  4. RD remains available for further nutritional interventions as needed.     Monitoring and Evaluation:   Continue to monitor nutritional intake, tolerance to diet prescription, weights, labs, skin integrity    RD remains available upon request and will follow up per protocol  Alyson Hernandez, MS, RD, CDN, [facility] pgr #[phone]

## 2021-10-05 NOTE — PROGRESS NOTE ADULT - ASSESSMENT
72 y/o male POD #4 for tracheostomy 2/2 respiratory failure. Pt is doing well, #8 shiley cuffed on the vent, with minimal serosanguinous oozing from stoma, no active bleeding. sutures x4 and umbilical tie in place.        72 y/o male POD #4 for tracheostomy 2/2 respiratory failure. Pt with persistent air leak so changed to 9 portex cuffed trach with resolution of air leak.

## 2021-10-05 NOTE — PROGRESS NOTE ADULT - PROBLEM SELECTOR PLAN 1
Tracheostomy in place.   ·  Plan: - Do not remove umbilical trach tie  - Plan to remove sutures POD #7  - HOB elevation  - Suction PRN  - Continue trach care  - Care per primary team. - changed to 9 portex on 10/5 secondary to persistent air leak with volume loss/desaturation  - appears to be doing well now with current trach  - will recheck tomorrow

## 2021-10-05 NOTE — PROGRESS NOTE ADULT - SUBJECTIVE AND OBJECTIVE BOX
Follow Up:      Interval History/ROS: Patient is a 71y old  Male who presents with a chief complaint of SOB (05 Oct 2021 08:54)    Trach/ On vent      Allergies    No Known Allergies    Intolerances        ANTIMICROBIALS:  DAPTOmycin IVPB 700 every 24 hours  DAPTOmycin IVPB        OTHER MEDS:  acetaminophen    Suspension .. 650 milliGRAM(s) Oral every 6 hours PRN  ALBUTerol    90 MICROgram(s) HFA Inhaler 2 Puff(s) Inhalation every 8 hours  budesonide 160 MICROgram(s)/formoterol 4.5 MICROgram(s) Inhaler 2 Puff(s) Inhalation two times a day  chlorhexidine 0.12% Liquid 15 milliLiter(s) Oral Mucosa every 12 hours  chlorhexidine 4% Liquid 1 Application(s) Topical <User Schedule>  cholecalciferol 2000 Unit(s) Oral daily  dextrose 5%. 1000 milliLiter(s) IV Continuous <Continuous>  dextrose 5%. 1000 milliLiter(s) IV Continuous <Continuous>  dextrose 50% Injectable 25 Gram(s) IV Push once  glucagon  Injectable 1 milliGRAM(s) IntraMuscular once  heparin  Infusion 400 Unit(s)/Hr IV Continuous <Continuous>  HYDROmorphone  Injectable 0.5 milliGRAM(s) IV Push every 4 hours PRN  insulin lispro (ADMELOG) corrective regimen sliding scale   SubCutaneous every 6 hours  insulin NPH human recombinant 10 Unit(s) SubCutaneous every 6 hours  LORazepam     Tablet 1 milliGRAM(s) Oral every 8 hours  methadone   Solution 5 milliGRAM(s) Oral <User Schedule>  multivitamin/minerals/iron Oral Solution (CENTRUM) 15 milliLiter(s) Enteral Tube daily  naloxegol 25 milliGRAM(s) Oral daily  pantoprazole  Injectable 40 milliGRAM(s) IV Push daily  polyethylene glycol 3350 17 Gram(s) Oral daily  senna Syrup 10 milliLiter(s) Oral at bedtime  ursodiol Tablet 500 milliGRAM(s) Oral two times a day      Vital Signs Last 24 Hrs  T(C): 37.6 (05 Oct 2021 09:02), Max: 37.7 (04 Oct 2021 23:12)  T(F): 99.6 (05 Oct 2021 09:02), Max: 99.9 (04 Oct 2021 23:12)  HR: 91 (05 Oct 2021 09:26) (85 - 109)  BP: 124/74 (05 Oct 2021 09:02) (103/63 - 134/74)  BP(mean): --  RR: 38 (05 Oct 2021 09:25) (19 - 44)  SpO2: 95% (05 Oct 2021 09:26) (91% - 100%)    PHYSICAL EXAM:  General: [x ] trach/ vent  HEAD/EYES: [ ] PERRL [ x] white sclera [ ] icterus  ENT:  [ ] normal [ x] supple [ ] thrush [ ] pharyngeal exudate  Cardiovascular:   [ ] murmur [x ] normal [ ] PPM/AICD  Respiratory:  [x ] course BS  GI:  [x ] soft, non-tender, normal bowel sounds  :  [ ] mace [x ] no CVA tenderness   Musculoskeletal:  [ ] no synovitis  Neurologic:  [ ] non-focal exam   Skin:  [x ] no rash  Lymph: [x ] no lymphadenopathy  Psychiatric:  [ ] appropriate affect [ ] alert & oriented  Lines:  [x ] no phlebitis [ ] central line                                8.3    12.29 )-----------( 272      ( 05 Oct 2021 06:50 )             28.6       10-05    139  |  103  |  45<H>  ----------------------------<  90  4.0   |  26  |  0.85    Ca    9.0      05 Oct 2021 06:47  Phos  3.0     10-05  Mg     2.5     10-05    TPro  5.8<L>  /  Alb  2.1<L>  /  TBili  4.9<H>  /  DBili  x   /  AST  112<H>  /  ALT  102<H>  /  AlkPhos  1575<H>  10-05          MICROBIOLOGY:Culture Results:   No growth to date. (10-02-21 @ 06:24)  Culture Results:   No growth to date. (10-02-21 @ 06:24)  Culture Results:   Growth in aerobic bottle: Staphylococcus epidermidis (09-29-21 @ 14:47)  Culture Results:   No Growth Final (09-29-21 @ 14:45)      RADIOLOGY:

## 2021-10-05 NOTE — PROGRESS NOTE ADULT - ATTENDING COMMENTS
persistent air leak so changed to 9 portex cuffed trach. improved with resolution of air leak. will f/u tomorrow to ensure no further issues

## 2021-10-05 NOTE — PROGRESS NOTE ADULT - ASSESSMENT
Patient 71 year old Male with PMH of HTN, DM2, BPH, and Asthma admitted with COVID-19 ( Unvaccinated) with subsequent ARDS 2/2 Viral Pneumonia. Patient received Tocilizumab Remdemsivir and Two courses of Decadron. Patients hospital course complicated MRSE Bacteremia; initially txd with Vanco and later transitioned to Daptomycin in setting of persistent bacteremia. Infectious work-up conducted with TTE, which was negative for vegetations.  CT A/P done, which showed proctitis, for which he was txd with Unasyn x7d (finished 10/1). SEJAL deferred by cardiology and decision was made to treat empirically for at least 6 weeks. Patients Hospital course also complicated by VT Arrest and Bilateral Lower extremity DVTS and RT thigh hematoma in setting of AC. Extensive GOC discussions held with family throughout, with palliative care input. Decision made to opt for tracheostomy; patient received PEG on 9/30 and trach 10/1.     10/5: Patient remains with Vent Dyssynchrony; when patient becomes tachypneic /dyspneic patient noted to have worsened air leak from tracheostomy. Patient seen by ENT planned for Trach exchange on POD # 5 or sooner if persistently problematic despite adequate sedation. Patient had repeat ECHO Yesterday still consistent with RT heart strain; Remains with severely elevated Pulmonary pressures / Severe RT Ventricular enlargement with moderately decreased Rt Systolic dysfxn. Patient had CT chest iv contrast on 9/23 no evidence of PE. ECHO findings d/w  likely in setting of prolonged Hypoxemia and underlying lung fibrosis in setting of COVID, will hold on repeat CT imaging at this time. Patient remains on therapeutic Heparin gtt; if remains will stable H+H will trial NOAC tomorrow.  Patients sedation regimen adjusted in setting of vent Dyssynchrony; Ativan increased to q 8 hrs and Seroquel TID Added. Timing of sedatives spaced out as well to provide longer coverage over span of the day.

## 2021-10-05 NOTE — PROGRESS NOTE ADULT - SUBJECTIVE AND OBJECTIVE BOX
Patient is a 71y old  Male who presents with a chief complaint of SOB (04 Oct 2021 12:02)      Interval Events:    REVIEW OF SYSTEMS:  [ ] Positive  [ ] All other systems negative  [ ] Unable to assess ROS because ________    Vital Signs Last 24 Hrs  T(C): 37.4 (10-05-21 @ 04:18), Max: 37.7 (10-04-21 @ 23:12)  T(F): 99.4 (10-05-21 @ 04:18), Max: 99.9 (10-04-21 @ 23:12)  HR: 88 (10-05-21 @ 06:25) (85 - 100)  BP: 121/75 (10-05-21 @ 04:18) (103/63 - 134/74)  RR: 31 (10-05-21 @ 06:25) (19 - 41)  SpO2: 94% (10-05-21 @ 06:25) (91% - 100%)    PHYSICAL EXAM:  HEENT:   [ ]Tracheostomy:  [ ]Pupils equal  [ ]No oral lesions  [ ]Abnormal    SKIN  [ ]No Rash  [ ] Abnormal  [ ] pressure    CARDIAC  [ ]Regular  [ ]Abnormal    PULMONARY  [ ]Bilateral Clear Breath Sounds  [ ]Normal Excursion  [ ]Abnormal    GI  [ ]PEG      [ ] +BS		              [ ]Soft, nondistended, nontender	  [ ]Abnormal    MUSCULOSKELETAL                                   [ ]Bedbound                 [ ]Abnormal    [ ]Ambulatory/OOB to chair                           EXTREMITIES                                         [ ]Normal  [ ]Edema                           NEUROLOGIC  [ ] Normal, non focal  [ ] Focal findings:    PSYCHIATRIC  [ ]Alert and appropriate  [ ] Sedated	 [ ]Agitated    :  Ramos: [ ] Yes, if yes: Date of Placement:                   [  ] No    LINES: Central Lines [ ] Yes, if yes: Date of Placement                                     [  ] No    HOSPITAL MEDICATIONS:  MEDICATIONS  (STANDING):  ALBUTerol    90 MICROgram(s) HFA Inhaler 2 Puff(s) Inhalation every 8 hours  budesonide 160 MICROgram(s)/formoterol 4.5 MICROgram(s) Inhaler 2 Puff(s) Inhalation two times a day  chlorhexidine 0.12% Liquid 15 milliLiter(s) Oral Mucosa every 12 hours  chlorhexidine 4% Liquid 1 Application(s) Topical <User Schedule>  cholecalciferol 2000 Unit(s) Oral daily  DAPTOmycin IVPB 700 milliGRAM(s) IV Intermittent every 24 hours  DAPTOmycin IVPB      dextrose 5%. 1000 milliLiter(s) (50 mL/Hr) IV Continuous <Continuous>  dextrose 5%. 1000 milliLiter(s) (100 mL/Hr) IV Continuous <Continuous>  dextrose 50% Injectable 25 Gram(s) IV Push once  glucagon  Injectable 1 milliGRAM(s) IntraMuscular once  heparin  Infusion 400 Unit(s)/Hr (8 mL/Hr) IV Continuous <Continuous>  insulin lispro (ADMELOG) corrective regimen sliding scale   SubCutaneous every 6 hours  insulin NPH human recombinant 10 Unit(s) SubCutaneous every 6 hours  LORazepam     Tablet 1 milliGRAM(s) Oral every 12 hours  methadone   Solution 5 milliGRAM(s) Oral every 12 hours  multivitamin/minerals/iron Oral Solution (CENTRUM) 15 milliLiter(s) Enteral Tube daily  naloxegol 25 milliGRAM(s) Oral daily  pantoprazole  Injectable 40 milliGRAM(s) IV Push daily  polyethylene glycol 3350 17 Gram(s) Oral daily  senna Syrup 10 milliLiter(s) Oral at bedtime  ursodiol Tablet 500 milliGRAM(s) Oral two times a day    MEDICATIONS  (PRN):  acetaminophen    Suspension .. 650 milliGRAM(s) Oral every 6 hours PRN Temp greater or equal to 38C (100.4F), Mild Pain (1 - 3), Moderate Pain (4 - 6)  HYDROmorphone  Injectable 0.5 milliGRAM(s) IV Push every 4 hours PRN Severe Pain (7 - 10) / Dyspnea      LABS:                        7.5    10.31 )-----------( 238      ( 04 Oct 2021 07:23 )             25.9     10-04    144  |  107  |  41<H>  ----------------------------<  113<H>  3.6   |  29  |  0.89    Ca    9.1      04 Oct 2021 07:23  Phos  3.8     10-04  Mg     2.5     10-04    TPro  5.6<L>  /  Alb  2.2<L>  /  TBili  4.5<H>  /  DBili  x   /  AST  107<H>  /  ALT  93<H>  /  AlkPhos  1534<H>  10-04    PTT - ( 04 Oct 2021 07:23 )  PTT:52.3 sec        CAPILLARY BLOOD GLUCOSE    MICROBIOLOGY:     RADIOLOGY:  [ ] Reviewed and interpreted by me    Mode: AC/ CMV (Assist Control/ Continuous Mandatory Ventilation)  RR (machine): 12  FiO2: 60  PEEP: 5  ITime: 1  MAP: 14  PC: 20  PIP: 28   Patient is a 71y old  Male who presents with a chief complaint of SOB (04 Oct 2021 12:02)      Interval Events: Patient with Air Leak yesterday evaluated by ENT Scheduled for Trach change on POD # 5     REVIEW OF SYSTEMS:  [ ] Positive  [ ] All other systems negative  [x] Unable to assess ROS because patient is Not responsive to Verbal Stimuli     Vital Signs Last 24 Hrs  T(C): 37.4 (10-05-21 @ 04:18), Max: 37.7 (10-04-21 @ 23:12)  T(F): 99.4 (10-05-21 @ 04:18), Max: 99.9 (10-04-21 @ 23:12)  HR: 88 (10-05-21 @ 06:25) (85 - 100)  BP: 121/75 (10-05-21 @ 04:18) (103/63 - 134/74)  RR: 31 (10-05-21 @ 06:25) (19 - 41)  SpO2: 94% (10-05-21 @ 06:25) (91% - 100%)    PHYSICAL EXAM:  HEENT:   [x]Tracheostomy: # 8 Cuffed tracheostomy ( Umbilical tie and sutures remain intact ) Dried blood around stoma   [ ]Pupils:   [ ]No oral lesions  [x]Abnormal: Scleral Icterus     SKIN  [x] + Jaundice   [x] Abnormal: Bilateral Buttock Wounds   [ ] pressure    CARDIAC  [x]Regular  [ ]Abnormal    PULMONARY  [ ]Bilateral Clear Breath Sounds  [ ]Normal Excursion  [x]Abnormal: Bilateral Coarse Breath sounds, decreased at bases     GI  [x]PEG      [x] +BS		              [x]Soft, nondistended, nontender	  [ ]Abnormal    MUSCULOSKELETAL                                   [x]Bedbound                 [ ]Abnormal    [ ]Ambulatory/OOB to chair                           EXTREMITIES                                         [ ]Normal  [x]Edema: Upper and lower extremity edema bilaterally                            NEUROLOGIC  [ ] Normal, non focal  [x] Focal findings: Sedated / Not following Commands / Withdraws to Pain in all 4 extremities     PSYCHIATRIC  [ ]Alert and appropriate  [x] Sedated	 [ ]Agitated    :  Richard: [ ] Yes, if yes: Date of Placement:                   [x] No    LINES: Central Lines [ ] Yes, if yes: Date of Placement                                     [x] No    HOSPITAL MEDICATIONS:  MEDICATIONS  (STANDING):  ALBUTerol    90 MICROgram(s) HFA Inhaler 2 Puff(s) Inhalation every 8 hours  budesonide 160 MICROgram(s)/formoterol 4.5 MICROgram(s) Inhaler 2 Puff(s) Inhalation two times a day  chlorhexidine 0.12% Liquid 15 milliLiter(s) Oral Mucosa every 12 hours  chlorhexidine 4% Liquid 1 Application(s) Topical <User Schedule>  cholecalciferol 2000 Unit(s) Oral daily  DAPTOmycin IVPB 700 milliGRAM(s) IV Intermittent every 24 hours  DAPTOmycin IVPB      dextrose 5%. 1000 milliLiter(s) (50 mL/Hr) IV Continuous <Continuous>  dextrose 5%. 1000 milliLiter(s) (100 mL/Hr) IV Continuous <Continuous>  dextrose 50% Injectable 25 Gram(s) IV Push once  glucagon  Injectable 1 milliGRAM(s) IntraMuscular once  heparin  Infusion 400 Unit(s)/Hr (8 mL/Hr) IV Continuous <Continuous>  insulin lispro (ADMELOG) corrective regimen sliding scale   SubCutaneous every 6 hours  insulin NPH human recombinant 10 Unit(s) SubCutaneous every 6 hours  LORazepam     Tablet 1 milliGRAM(s) Oral every 12 hours  methadone   Solution 5 milliGRAM(s) Oral every 12 hours  multivitamin/minerals/iron Oral Solution (CENTRUM) 15 milliLiter(s) Enteral Tube daily  naloxegol 25 milliGRAM(s) Oral daily  pantoprazole  Injectable 40 milliGRAM(s) IV Push daily  polyethylene glycol 3350 17 Gram(s) Oral daily  senna Syrup 10 milliLiter(s) Oral at bedtime  ursodiol Tablet 500 milliGRAM(s) Oral two times a day    MEDICATIONS  (PRN):  acetaminophen    Suspension .. 650 milliGRAM(s) Oral every 6 hours PRN Temp greater or equal to 38C (100.4F), Mild Pain (1 - 3), Moderate Pain (4 - 6)  HYDROmorphone  Injectable 0.5 milliGRAM(s) IV Push every 4 hours PRN Severe Pain (7 - 10) / Dyspnea      LABS:                        7.5    10.31 )-----------( 238      ( 04 Oct 2021 07:23 )             25.9     10-04    144  |  107  |  41<H>  ----------------------------<  113<H>  3.6   |  29  |  0.89    Ca    9.1      04 Oct 2021 07:23  Phos  3.8     10-04  Mg     2.5     10-04    TPro  5.6<L>  /  Alb  2.2<L>  /  TBili  4.5<H>  /  DBili  x   /  AST  107<H>  /  ALT  93<H>  /  AlkPhos  1534<H>  10-04    PTT - ( 04 Oct 2021 07:23 )  PTT:52.3 sec        CAPILLARY BLOOD GLUCOSE    MICROBIOLOGY:     RADIOLOGY:  [ ] Reviewed and interpreted by me    Mode: AC/ CMV (Assist Control/ Continuous Mandatory Ventilation)  RR (machine): 12  FiO2: 60  PEEP: 5  ITime: 1  MAP: 14  PC: 20  PIP: 28   Patient is a 71y old  Male who presents with a chief complaint of SOB (04 Oct 2021 12:02)      Interval Events: Patient with Air Leak from tracheostomy worse during episodes of Vent Dyssynchrony     REVIEW OF SYSTEMS:  [ ] Positive  [ ] All other systems negative  [x] Unable to assess ROS because patient is Not responsive to Verbal Stimuli     Vital Signs Last 24 Hrs  T(C): 37.4 (10-05-21 @ 04:18), Max: 37.7 (10-04-21 @ 23:12)  T(F): 99.4 (10-05-21 @ 04:18), Max: 99.9 (10-04-21 @ 23:12)  HR: 88 (10-05-21 @ 06:25) (85 - 100)  BP: 121/75 (10-05-21 @ 04:18) (103/63 - 134/74)  RR: 31 (10-05-21 @ 06:25) (19 - 41)  SpO2: 94% (10-05-21 @ 06:25) (91% - 100%)    PHYSICAL EXAM:  HEENT:   [x]Tracheostomy: # 8 Cuffed tracheostomy ( Umbilical tie and sutures remain intact ) Dried blood around stoma   [ ]Pupils:   [ ]No oral lesions  [x]Abnormal: Scleral Icterus     SKIN  [x] + Jaundice   [x] Abnormal: Bilateral Buttock Wounds   [ ] pressure    CARDIAC  [x]Regular  [ ]Abnormal    PULMONARY  [ ]Bilateral Clear Breath Sounds  [ ]Normal Excursion  [x]Abnormal: Bilateral Coarse Breath sounds, decreased at bases     GI  [x]PEG      [x] +BS		              [x]Soft, nondistended, nontender	  [ ]Abnormal    MUSCULOSKELETAL                                   [x]Bedbound                 [ ]Abnormal    [ ]Ambulatory/OOB to chair                           EXTREMITIES                                         [ ]Normal  [x]Edema: Upper and lower extremity edema bilaterally                            NEUROLOGIC  [ ] Normal, non focal  [x] Focal findings: Sedated / Not following Commands / Withdraws to Pain in all 4 extremities     PSYCHIATRIC  [ ]Alert and appropriate  [x] Sedated	 [ ]Agitated    :  Richard: [ ] Yes, if yes: Date of Placement:                   [x] No    LINES: Central Lines [ ] Yes, if yes: Date of Placement                                     [x] No    HOSPITAL MEDICATIONS:  MEDICATIONS  (STANDING):  ALBUTerol    90 MICROgram(s) HFA Inhaler 2 Puff(s) Inhalation every 8 hours  budesonide 160 MICROgram(s)/formoterol 4.5 MICROgram(s) Inhaler 2 Puff(s) Inhalation two times a day  chlorhexidine 0.12% Liquid 15 milliLiter(s) Oral Mucosa every 12 hours  chlorhexidine 4% Liquid 1 Application(s) Topical <User Schedule>  cholecalciferol 2000 Unit(s) Oral daily  DAPTOmycin IVPB 700 milliGRAM(s) IV Intermittent every 24 hours  DAPTOmycin IVPB      dextrose 5%. 1000 milliLiter(s) (50 mL/Hr) IV Continuous <Continuous>  dextrose 5%. 1000 milliLiter(s) (100 mL/Hr) IV Continuous <Continuous>  dextrose 50% Injectable 25 Gram(s) IV Push once  glucagon  Injectable 1 milliGRAM(s) IntraMuscular once  heparin  Infusion 400 Unit(s)/Hr (8 mL/Hr) IV Continuous <Continuous>  insulin lispro (ADMELOG) corrective regimen sliding scale   SubCutaneous every 6 hours  insulin NPH human recombinant 10 Unit(s) SubCutaneous every 6 hours  LORazepam     Tablet 1 milliGRAM(s) Oral every 12 hours  methadone   Solution 5 milliGRAM(s) Oral every 12 hours  multivitamin/minerals/iron Oral Solution (CENTRUM) 15 milliLiter(s) Enteral Tube daily  naloxegol 25 milliGRAM(s) Oral daily  pantoprazole  Injectable 40 milliGRAM(s) IV Push daily  polyethylene glycol 3350 17 Gram(s) Oral daily  senna Syrup 10 milliLiter(s) Oral at bedtime  ursodiol Tablet 500 milliGRAM(s) Oral two times a day    MEDICATIONS  (PRN):  acetaminophen    Suspension .. 650 milliGRAM(s) Oral every 6 hours PRN Temp greater or equal to 38C (100.4F), Mild Pain (1 - 3), Moderate Pain (4 - 6)  HYDROmorphone  Injectable 0.5 milliGRAM(s) IV Push every 4 hours PRN Severe Pain (7 - 10) / Dyspnea      LABS:                        7.5    10.31 )-----------( 238      ( 04 Oct 2021 07:23 )             25.9     10-04    144  |  107  |  41<H>  ----------------------------<  113<H>  3.6   |  29  |  0.89    Ca    9.1      04 Oct 2021 07:23  Phos  3.8     10-04  Mg     2.5     10-04    TPro  5.6<L>  /  Alb  2.2<L>  /  TBili  4.5<H>  /  DBili  x   /  AST  107<H>  /  ALT  93<H>  /  AlkPhos  1534<H>  10-04    PTT - ( 04 Oct 2021 07:23 )  PTT:52.3 sec        CAPILLARY BLOOD GLUCOSE    MICROBIOLOGY:     RADIOLOGY:  [ ] Reviewed and interpreted by me    Mode: AC/ CMV (Assist Control/ Continuous Mandatory Ventilation)  RR (machine): 12  FiO2: 60  PEEP: 5  ITime: 1  MAP: 14  PC: 20  PIP: 28

## 2021-10-05 NOTE — PROGRESS NOTE ADULT - PROBLEM SELECTOR PLAN 3
Hospital course complicated by Staph Epi Bacteremia  Patient initially txd with Vanco but changed to Daptomycin in setting of persistent Bacteremia   Repeat Blood cxs 10/2: No growth to date   Continue to Monitor CPK Weekly   Repeat TTE 10/4: No evidence of Obvious Vegetation   ID Continues to Follow

## 2021-10-06 NOTE — PROGRESS NOTE ADULT - ASSESSMENT
71 year old with COVID pneumonia 8/12 s/p tocilizumab, remdesivir and two courses of decadron.     Cardiac arrest 8/20.     Prolonged fevers since 9/8 despite treating potential secondary infections.     Recurrent staph epi bacteremia- ? focus  ---presumed endovascular      VAP - Klebsiella and Enterobacter 9/11 with subsequent negative sputum.     Proctitis - CT 9/23- completed course of unasyn    Now afebrile for the past few days.     Poor long term prognosis. PEG 9/30. Trach 10/1.     1) Staph epi bacteremia  -repeat t blood cultures without growth  -repeat TTE if SEJAL is not planned   - Daptomycin 700mg IV q24h to see if it has any effect on blood cultures  -weekly CPK while on Daptomycin   - plan 6 weeks of antibiotics from time of negative cultures    2) Fever  -Typically would get SEJAL, but he is high risk for procedure and unlikely to tolerate aggressive itnerventions  -repeat TTE in two weeks  -Could consider CT CAP and Tagged wbc.   This testing would be part of work up of bacteremia of unknown focus.  But again not clear that he could tolerate aggressive interventions.  Not clear they would change his course    Overall, prognosis is poor.  He has secondary infection.  Given his comorbidity anf functional status, he remains at high risk for recurrent infection.      -supportive care   -goals of care per primary team and palliative

## 2021-10-06 NOTE — PROGRESS NOTE ADULT - ASSESSMENT
70 y/o male POD #5 for tracheostomy 2/2 respiratory failure. Pt with persistent air leak so changed to 9 portex cuffed trach with resolution of air leak.

## 2021-10-06 NOTE — PROGRESS NOTE ADULT - PROBLEM SELECTOR PLAN 4
S/p Tocilizumab 8/15 and Two courses of Dexamethasone   S/p Remdesivir ( 8/13- 8/17)   Reamains with COVID + PCR as of 9/30 S/p Tocilizumab 8/15 and Two courses of Dexamethasone   S/p Remdesivir ( 8/13- 8/17)   Remains with COVID + PCR as of 9/30

## 2021-10-06 NOTE — PROGRESS NOTE ADULT - PROBLEM SELECTOR PLAN 1
- HOB elevation  - Suction PRN  - Continue trach care  - Care per primary team  - ENT will continue to follow

## 2021-10-06 NOTE — PROGRESS NOTE ADULT - PROBLEM SELECTOR PLAN 5
Patient remains with Vent Dyssynchrony   Ativan increased to 1 mg q 8 hrs   Seroquel 25 mg q 8 hrs added   Continue Methadone 5 mg q 12hrs   Continue Dilaudid 0.5 mg IVP PRN Pain / Dyspnea  EKG 10/5;  Patient remains with Vent Dyssynchrony   Ativan increased to 1 mg q 8 hrs   Seroquel 25 mg q 8 hrs added   Continue Methadone 10 mg q 12hrs   Continue Dilaudid 0.5 mg IVP PRN Pain / Dyspnea  EKG 10/5;

## 2021-10-06 NOTE — PROGRESS NOTE ADULT - SUBJECTIVE AND OBJECTIVE BOX
ENT ISSUE/POD: POD#5 for Trach    HPI: 70 y/o male POD #5 for tracheostomy s/p #8 shiley cuffed 2/2 respiratory failure. ENT initially called for air leak from the trach, additional air was inflated into the cuff at that time. Now s/p trach change on 10/5 to #9 portex cuffed. Pt doing well, no issues reported since.         PAST MEDICAL & SURGICAL HISTORY:  BPH (benign prostatic hyperplasia)    Hyperlipemia    HTN (hypertension)    Hypercholesteremia    Asthma    Diabetes    Kidney stone    Bladder stone    H/O lithotripsy  x 2    History of kidney stones    H/O umbilical hernia repair      Allergies    No Known Allergies    Intolerances      MEDICATIONS  (STANDING):  ALBUTerol    90 MICROgram(s) HFA Inhaler 2 Puff(s) Inhalation every 8 hours  ascorbic acid 500 milliGRAM(s) Oral daily  budesonide 160 MICROgram(s)/formoterol 4.5 MICROgram(s) Inhaler 2 Puff(s) Inhalation two times a day  chlorhexidine 0.12% Liquid 15 milliLiter(s) Oral Mucosa every 12 hours  chlorhexidine 4% Liquid 1 Application(s) Topical <User Schedule>  cholecalciferol 2000 Unit(s) Oral daily  DAPTOmycin IVPB 700 milliGRAM(s) IV Intermittent every 24 hours  DAPTOmycin IVPB      dextrose 5%. 1000 milliLiter(s) (50 mL/Hr) IV Continuous <Continuous>  dextrose 5%. 1000 milliLiter(s) (100 mL/Hr) IV Continuous <Continuous>  dextrose 50% Injectable 25 Gram(s) IV Push once  glucagon  Injectable 1 milliGRAM(s) IntraMuscular once  heparin  Infusion 400 Unit(s)/Hr (8 mL/Hr) IV Continuous <Continuous>  insulin lispro (ADMELOG) corrective regimen sliding scale   SubCutaneous every 6 hours  insulin NPH human recombinant 10 Unit(s) SubCutaneous every 6 hours  LORazepam     Tablet 1 milliGRAM(s) Oral every 8 hours  methadone   Solution 5 milliGRAM(s) Oral <User Schedule>  multivitamin/minerals/iron Oral Solution (CENTRUM) 15 milliLiter(s) Enteral Tube daily  naloxegol 25 milliGRAM(s) Oral daily  pantoprazole  Injectable 40 milliGRAM(s) IV Push daily  polyethylene glycol 3350 17 Gram(s) Oral daily  potassium chloride   Solution 40 milliEquivalent(s) Oral once  potassium phosphate / sodium phosphate Powder (PHOS-NaK) 2 Packet(s) Oral once  QUEtiapine 25 milliGRAM(s) Oral <User Schedule>  senna Syrup 10 milliLiter(s) Oral at bedtime  ursodiol Tablet 500 milliGRAM(s) Oral two times a day    MEDICATIONS  (PRN):  acetaminophen    Suspension .. 650 milliGRAM(s) Oral every 6 hours PRN Temp greater or equal to 38C (100.4F), Mild Pain (1 - 3), Moderate Pain (4 - 6)  HYDROmorphone  Injectable 1 milliGRAM(s) IV Push every 4 hours PRN Respiratory distress      Social History: see consult    Family history: see consult    ROS:   uto      Vital Signs Last 24 Hrs  T(C): 37.7 (06 Oct 2021 04:36), Max: 37.7 (06 Oct 2021 04:36)  T(F): 99.8 (06 Oct 2021 04:36), Max: 99.8 (06 Oct 2021 04:36)  HR: 95 (06 Oct 2021 06:15) (84 - 102)  BP: 133/74 (06 Oct 2021 04:36) (109/66 - 153/86)  BP(mean): --  RR: 30 (06 Oct 2021 04:36) (30 - 42)  SpO2: 95% (06 Oct 2021 06:15) (91% - 98%)                          7.6    11.76 )-----------( 239      ( 06 Oct 2021 07:07 )             25.8    10-06    143  |  103  |  42<H>  ----------------------------<  72  3.5   |  28  |  0.77    Ca    9.0      06 Oct 2021 07:07  Phos  2.5     10-06  Mg     2.4     10-06    TPro  6.2  /  Alb  2.4<L>  /  TBili  5.1<H>  /  DBili  x   /  AST  115<H>  /  ALT  112<H>  /  AlkPhos  1592<H>  10-06   PT/INR - ( 06 Oct 2021 07:07 )   PT: 12.1 sec;   INR: 1.01 ratio         PTT - ( 06 Oct 2021 07:07 )  PTT:44.3 sec    PHYSICAL EXAM:  Gen: NAD  Skin: No rashes, bruises, or lesions  Head: Normocephalic, Atraumatic  Face: no edema, erythema, or fluctuance. Parotid glands soft without mass  Eyes: no scleral injection  Nose: Nares bilaterally patent, no discharge  Mouth: No Stridor / Drooling / Trismus.  Mucosa moist, tongue/uvula midline, oropharynx clear  Neck: #9 portex inflated cuff, with minimal serosanguinous oozing from stoma, no active bleeding. No lymphadenopathy, trachea midline, no masses  Resp: ventilating well  Lymphatic: No lymphadenopathy  Resp: breathing easily, no stridor  Neuro: facial nerve intact, no facial droop

## 2021-10-06 NOTE — PROGRESS NOTE ADULT - PROBLEM SELECTOR PLAN 8
Patient with transaminitis likely in setting of COVID  RUQ Sono 9/13: C/w Hepatomegaly and sludge   Continue Urisidol   Continue to monitor LFTS Patient with transaminitis likely in setting of COVID  RUQ Sono 9/13: C/w Hepatomegaly and sludge   Continue Ursodiol   Continue to monitor LFTS

## 2021-10-06 NOTE — PROGRESS NOTE ADULT - SUBJECTIVE AND OBJECTIVE BOX
Patient is a 71y old  Male who presents with a chief complaint of SOB (05 Oct 2021 19:03)    HPI:  70yo M w/ PMHx of HTN, DM2, BPH, asthma presents with shortness of breath. Patient endorses that he has been feeling SOB with associated cough for the last 2 weeks and his symptoms slowly worsened over time. He did not receive the COVID vaccine. He reports that his family members whom he lives with likely are also COVID positive but he does not know for sure. He did not try taking anything for his symptoms other than his usual medications/inhalers. There were no obvious aggravating or alleviating factors. Currently with supplemental oxygen on, he feels well and has no complaints, however he presented to [facility] since his symptoms were worsening. In the ED, he was hemodynamically stable, afebrile, but he was encephalopathic and hypoxic on room air requiring high flow nasal canula. Labs were significant for mild hyperkalemia and COVID positive. CXR prelim read showed bilateral patchy opacities. CT head showed no acute findings. MICU was consulted in the ED, patient was deemed not a MICU candidate. Patient was given doxycycline and dexamethasone x1 and admitted to general medicine for further management. At time of interview, patient was A/Ox3 with .  (13 Aug 2021 04:21)    Interval Events:    REVIEW OF SYSTEMS:  [ ] Positive  [ ] All other systems negative  [ ] Unable to assess ROS because ________    Vital Signs Last 24 Hrs  T(C): 37.7 (10-06-21 @ 04:36), Max: 37.7 (10-06-21 @ 04:36)  T(F): 99.8 (10-06-21 @ 04:36), Max: 99.8 (10-06-21 @ 04:36)  HR: 95 (10-06-21 @ 06:15) (84 - 109)  BP: 133/74 (10-06-21 @ 04:36) (109/66 - 153/86)  RR: 30 (10-06-21 @ 04:36) (30 - 44)  SpO2: 95% (10-06-21 @ 06:15) (91% - 98%)    PHYSICAL EXAM:  HEENT:   [ ]Tracheostomy:  [ ]Pupils equal  [ ]No oral lesions  [ ]Abnormal    SKIN  [ ] No Rash  [ ] Abnormal  [ ] pressure    CARDIAC  [ ]Regular  [ ]Abnormal    PULMONARY  [ ]Bilateral Clear Breath Sounds  [ ]Normal Excursion  [ ]Abnormal    GI  [ ]PEG      [ ] +BS		              [ ]Soft, nondistended, nontender	  [ ]Abnormal    MUSCULOSKELETAL                                   [ ]Bedbound                 [ ]Abnormal    [ ]Ambulatory/OOB to chair                           EXTREMITIES                                         [ ]Normal  [ ]Edema                           NEUROLOGIC  [ ] Normal, non focal  [ ] Focal findings:    PSYCHIATRIC  [ ]Alert and appropriate  [ ] Sedated	 [ ]Agitated    :  Ramos: [ ] Yes, if yes: Date of Placement:                   [  ] No    LINES: Central Lines [ ] Yes, if yes: Date of Placement                                     [  ] No    HOSPITAL MEDICATIONS:  MEDICATIONS  (STANDING):  ALBUTerol    90 MICROgram(s) HFA Inhaler 2 Puff(s) Inhalation every 8 hours  ascorbic acid 500 milliGRAM(s) Oral daily  budesonide 160 MICROgram(s)/formoterol 4.5 MICROgram(s) Inhaler 2 Puff(s) Inhalation two times a day  chlorhexidine 0.12% Liquid 15 milliLiter(s) Oral Mucosa every 12 hours  chlorhexidine 4% Liquid 1 Application(s) Topical <User Schedule>  cholecalciferol 2000 Unit(s) Oral daily  DAPTOmycin IVPB 700 milliGRAM(s) IV Intermittent every 24 hours  DAPTOmycin IVPB      dextrose 5%. 1000 milliLiter(s) (50 mL/Hr) IV Continuous <Continuous>  dextrose 5%. 1000 milliLiter(s) (100 mL/Hr) IV Continuous <Continuous>  dextrose 50% Injectable 25 Gram(s) IV Push once  glucagon  Injectable 1 milliGRAM(s) IntraMuscular once  heparin  Infusion 400 Unit(s)/Hr (8 mL/Hr) IV Continuous <Continuous>  insulin lispro (ADMELOG) corrective regimen sliding scale   SubCutaneous every 6 hours  insulin NPH human recombinant 10 Unit(s) SubCutaneous every 6 hours  LORazepam     Tablet 1 milliGRAM(s) Oral every 8 hours  methadone   Solution 5 milliGRAM(s) Oral <User Schedule>  multivitamin/minerals/iron Oral Solution (CENTRUM) 15 milliLiter(s) Enteral Tube daily  naloxegol 25 milliGRAM(s) Oral daily  pantoprazole  Injectable 40 milliGRAM(s) IV Push daily  polyethylene glycol 3350 17 Gram(s) Oral daily  QUEtiapine 25 milliGRAM(s) Oral <User Schedule>  senna Syrup 10 milliLiter(s) Oral at bedtime  ursodiol Tablet 500 milliGRAM(s) Oral two times a day    MEDICATIONS  (PRN):  acetaminophen    Suspension .. 650 milliGRAM(s) Oral every 6 hours PRN Temp greater or equal to 38C (100.4F), Mild Pain (1 - 3), Moderate Pain (4 - 6)  HYDROmorphone  Injectable 1 milliGRAM(s) IV Push every 4 hours PRN Respiratory distress      LABS:                        8.3    12.29 )-----------( 272      ( 05 Oct 2021 06:50 )             28.6     10-05    139  |  103  |  45<H>  ----------------------------<  90  4.0   |  26  |  0.85    Ca    9.0      05 Oct 2021 06:47  Phos  3.0     10-05  Mg     2.5     10-05    TPro  5.8<L>  /  Alb  2.1<L>  /  TBili  4.9<H>  /  DBili  x   /  AST  112<H>  /  ALT  102<H>  /  AlkPhos  1575<H>  10-05    PTT - ( 05 Oct 2021 07:05 )  PTT:52.9 sec      Mode: AC/ CMV (Assist Control/ Continuous Mandatory Ventilation)  RR (machine): 30  FiO2: 65  PEEP: 5  ITime: 1  MAP: 15  PC: 20  PIP: 29          Cheng Calloway, -[facility]  [id number] Patient is a 71y old  Male who presents with a chief complaint of SOB (05 Oct 2021 19:03)    HPI:  70yo M w/ PMHx of HTN, DM2, BPH, asthma presents with shortness of breath. Patient endorses that he has been feeling SOB with associated cough for the last 2 weeks and his symptoms slowly worsened over time. He did not receive the COVID vaccine. He reports that his family members whom he lives with likely are also COVID positive but he does not know for sure. He did not try taking anything for his symptoms other than his usual medications/inhalers. There were no obvious aggravating or alleviating factors. Currently with supplemental oxygen on, he feels well and has no complaints, however he presented to [facility] since his symptoms were worsening. In the ED, he was hemodynamically stable, afebrile, but he was encephalopathic and hypoxic on room air requiring high flow nasal canula. Labs were significant for mild hyperkalemia and COVID positive. CXR prelim read showed bilateral patchy opacities. CT head showed no acute findings. MICU was consulted in the ED, patient was deemed not a MICU candidate. Patient was given doxycycline and dexamethasone x1 and admitted to general medicine for further management. At time of interview, patient was A/Ox3 with .  (13 Aug 2021 04:21)    Interval Events: No issues overnight    REVIEW OF SYSTEMS:  [ ] Positive  [ ] All other systems negative  [x ] Unable to assess ROS because patient is obtuded    Vital Signs Last 24 Hrs  T(C): 37.7 (10-06-21 @ 04:36), Max: 37.7 (10-06-21 @ 04:36)  T(F): 99.8 (10-06-21 @ 04:36), Max: 99.8 (10-06-21 @ 04:36)  HR: 95 (10-06-21 @ 06:15) (84 - 109)  BP: 133/74 (10-06-21 @ 04:36) (109/66 - 153/86)  RR: 30 (10-06-21 @ 04:36) (30 - 44)  SpO2: 95% (10-06-21 @ 06:15) (91% - 98%)    PHYSICAL EXAM:  HEENT:   [x]Tracheostomy: # 9 Cuffed [place]  [ ]Pupils:   [ ]No oral lesions  [x]Abnormal: Scleral Icterus     SKIN  [x] + Jaundice   [x] Abnormal: Bilateral Buttock Wounds   [ ] pressure    CARDIAC  [x]Regular  [ ]Abnormal    PULMONARY  [ ]Bilateral Clear Breath Sounds  [ ]Normal Excursion  [x]Abnormal: Bilateral Coarse Breath sounds, decreased at bases     GI  [x]PEG      [x] +BS		              [x]Soft, nondistended, nontender	  [ ]Abnormal    MUSCULOSKELETAL                                   [x]Bedbound                 [ ]Abnormal    [ ]Ambulatory/OOB to chair                           EXTREMITIES                                         [ ]Normal  [x]Edema: Upper and lower extremity edema bilaterally                            NEUROLOGIC  [ ] Normal, non focal  [x] Focal findings: Sedated / Not following Commands / Withdraws to Pain in all 4 extremities     PSYCHIATRIC  [ ]Alert and appropriate  [x] Sedated	 [ ]Agitated    :  Ramos: [ ] Yes, if yes: Date of Placement:                   [x] No    LINES: Central Lines [ ] Yes, if yes: Date of Placement                                     [x] No    HOSPITAL MEDICATIONS:  MEDICATIONS  (STANDING):  ALBUTerol    90 MICROgram(s) HFA Inhaler 2 Puff(s) Inhalation every 8 hours  ascorbic acid 500 milliGRAM(s) Oral daily  budesonide 160 MICROgram(s)/formoterol 4.5 MICROgram(s) Inhaler 2 Puff(s) Inhalation two times a day  chlorhexidine 0.12% Liquid 15 milliLiter(s) Oral Mucosa every 12 hours  chlorhexidine 4% Liquid 1 Application(s) Topical <User Schedule>  cholecalciferol 2000 Unit(s) Oral daily  DAPTOmycin IVPB 700 milliGRAM(s) IV Intermittent every 24 hours  DAPTOmycin IVPB      dextrose 5%. 1000 milliLiter(s) (50 mL/Hr) IV Continuous <Continuous>  dextrose 5%. 1000 milliLiter(s) (100 mL/Hr) IV Continuous <Continuous>  dextrose 50% Injectable 25 Gram(s) IV Push once  glucagon  Injectable 1 milliGRAM(s) IntraMuscular once  heparin  Infusion 400 Unit(s)/Hr (8 mL/Hr) IV Continuous <Continuous>  insulin lispro (ADMELOG) corrective regimen sliding scale   SubCutaneous every 6 hours  insulin NPH human recombinant 10 Unit(s) SubCutaneous every 6 hours  LORazepam     Tablet 1 milliGRAM(s) Oral every 8 hours  methadone   Solution 5 milliGRAM(s) Oral <User Schedule>  multivitamin/minerals/iron Oral Solution (CENTRUM) 15 milliLiter(s) Enteral Tube daily  naloxegol 25 milliGRAM(s) Oral daily  pantoprazole  Injectable 40 milliGRAM(s) IV Push daily  polyethylene glycol 3350 17 Gram(s) Oral daily  QUEtiapine 25 milliGRAM(s) Oral <User Schedule>  senna Syrup 10 milliLiter(s) Oral at bedtime  ursodiol Tablet 500 milliGRAM(s) Oral two times a day    MEDICATIONS  (PRN):  acetaminophen    Suspension .. 650 milliGRAM(s) Oral every 6 hours PRN Temp greater or equal to 38C (100.4F), Mild Pain (1 - 3), Moderate Pain (4 - 6)  HYDROmorphone  Injectable 1 milliGRAM(s) IV Push every 4 hours PRN Respiratory distress      LABS:                        8.3    12.29 )-----------( 272      ( 05 Oct 2021 06:50 )             28.6     10-05    139  |  103  |  45<H>  ----------------------------<  90  4.0   |  26  |  0.85    Ca    9.0      05 Oct 2021 06:47  Phos  3.0     10-05  Mg     2.5     10-05    TPro  5.8<L>  /  Alb  2.1<L>  /  TBili  4.9<H>  /  DBili  x   /  AST  112<H>  /  ALT  102<H>  /  AlkPhos  1575<H>  10-05  PTT - ( 05 Oct 2021 07:05 )  PTT:52.9 sec      Mode: AC/ CMV (Assist Control/ Continuous Mandatory Ventilation)  RR (machine): 30  FiO2: 65  PEEP: 5  ITime: 1  MAP: 15  PC: 20  PIP: 29          Cheng Calloway, -ACNP  [id number]

## 2021-10-06 NOTE — PROGRESS NOTE ADULT - ATTENDING COMMENTS
71M with COVID pneumonia, ARDS with persistent respiratory failure s/p tracheostomy on 10/1. He had a cardiac arrest on the day of his intubation. His hospital course was complicated by high grade MRSE bacteremia, transaminitis, encephalopathy, bilateral LE DVTs and right thigh hematoma.     This morning very tachypneic on vent and hypoxemic to the low 80s, requiring ativan and dilaudid to control respiratory rate.  1. Pulm: Acute Hypoxemic Respiratory Failure - Tracheostomy placed by ENT 10/1, upsized due to leak, currently on Pressure control. Maintain O2 sat > 90% Trach care.   2. GI: Oropharyngeal dysphagia - PEG placed. Continue feeds, PPI  Transaminitis - due to COVID cholangiopathy vs sepsis. No signs of biliary obstruction  3. Cardiovascular - shock state resolved and patient maintaining MAP > 65  - Patient had a cardiac arrest initially - thought due to hypoxia  Peripheral Edema - start Lasix 40 IV and monitor output   4. Heme: Venous Thromboembolism - patient with R DVT. Was on Hep gtt however developed a thigh hematoma last week. He was restarted on Hep gtt 10/2 and tolerating thusfar. Monitor Hb and transfuse as needed. Eventual switch to DOAC.   5. Infectious Disease - patient with a persistent MRSE bacteremia since Aug 24, treated with Vancomycin, however switched to Daptomycin 10/1.  Blood cultures form 10/2 have been NGTD, Followup CPK weekly. Appreciate ID follow up.  - Was being considered for SEJAL to evaluate but now deferred by Cardiology. 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 He is deemed not to be a surgical candidate given his clinical condition.   - Had a CT abd/pelvis with evidence of proctitis  - Sacral wound - wound care followup  6. Neuro - remains on Methadone and Klonopin, Seroquel. Patient remains encephalopathic, episodes of tachypnea to the 40s - will uptitrate Methadone.    CT Head on 9/13 with no acute changes.  Patient remains full code. Family remains hopeful  eventual d/c to SNF  Prognosis poor.      Time-based billing (NON-critical care).     35 minutes spent on total encounter; more than 50% of the visit was spent counseling and / or coordinating care by the attending physician.  The necessity of the time spent during the encounter on this date of service was due to:     patient care.    Attending addendum   Daughter and wife at bedside - requested meeting with team for updates. Patient's medical status and management plan reviewed at length, all questions answered. Also provided again with vent facility list as patient is nearing discharge. 71M with COVID pneumonia, ARDS with persistent respiratory failure s/p tracheostomy on 10/1. He had a cardiac arrest on the day of his intubation. His hospital course was complicated by high grade MRSE bacteremia, transaminitis, encephalopathy, bilateral LE DVTs and right thigh hematoma.     This morning very tachypneic on vent and hypoxemic to the low 80s, requiring ativan and dilaudid to control respiratory rate.  1. Pulm: Acute Hypoxemic Respiratory Failure - Tracheostomy placed by ENT 10/1, upsized due to leak, currently on Pressure control. Maintain O2 sat > 90% Trach care.   2. GI: Oropharyngeal dysphagia - PEG placed. Continue feeds, PPI  Transaminitis - due to COVID cholangiopathy vs sepsis. No signs of biliary obstruction  3. Cardiovascular - shock state resolved and patient maintaining MAP > 65  - Patient had a cardiac arrest initially - thought due to hypoxia  Peripheral Edema - start Lasix 40 IV and monitor output   4. Heme: Venous Thromboembolism - patient with R DVT. Was on Hep gtt however developed a thigh hematoma last week. He was restarted on Hep gtt 10/2 and tolerating thus far. Monitor Hb and transfuse as needed. Eventual switch to DOAC.   5. Infectious Disease - patient with a persistent MRSE bacteremia since Aug 24, treated with Vancomycin, however switched to Daptomycin 10/1.  Blood cultures form 10/2 have been NGTD, Followup CPK weekly. Appreciate ID follow up.  - Was being considered for SEJAL to evaluate but now deferred by Cardiology. 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 He is deemed not to be a surgical candidate given his clinical condition.   - Had a CT abd/pelvis with evidence of proctitis  - Sacral wound - wound care followup  6. Neuro - remains on Methadone and Klonopin, Seroquel. Patient remains encephalopathic, episodes of tachypnea to the 40s - will uptitrate Methadone.    CT Head on 9/13 with no acute changes.  Patient remains full code. Family remains hopeful  eventual d/c to SNF  Prognosis poor.      Time-based billing (NON-critical care).     35 minutes spent on total encounter; more than 50% of the visit was spent counseling and / or coordinating care by the attending physician.  The necessity of the time spent during the encounter on this date of service was due to:     patient care.    Attending addendum   Daughter and wife at bedside - requested meeting with team for updates. Patient's medical status and management plan reviewed at length, all questions answered. Also provided again with vent facility list as patient is nearing discharge.

## 2021-10-06 NOTE — PROGRESS NOTE ADULT - SUBJECTIVE AND OBJECTIVE BOX
Follow Up:      Inverval History/ROS:Patient is a 71y old  Male who presents with a chief complaint of SOB (06 Oct 2021 09:19)    + Fever  Allergies    No Known Allergies    Intolerances        ANTIMICROBIALS:  DAPTOmycin IVPB 700 every 24 hours  DAPTOmycin IVPB        OTHER MEDS:  acetaminophen    Suspension .. 650 milliGRAM(s) Oral every 6 hours PRN  ALBUTerol    90 MICROgram(s) HFA Inhaler 2 Puff(s) Inhalation every 8 hours  ascorbic acid 500 milliGRAM(s) Oral daily  budesonide 160 MICROgram(s)/formoterol 4.5 MICROgram(s) Inhaler 2 Puff(s) Inhalation two times a day  chlorhexidine 4% Liquid 1 Application(s) Topical <User Schedule>  cholecalciferol 2000 Unit(s) Oral daily  dextrose 5%. 1000 milliLiter(s) IV Continuous <Continuous>  dextrose 5%. 1000 milliLiter(s) IV Continuous <Continuous>  dextrose 50% Injectable 25 Gram(s) IV Push once  glucagon  Injectable 1 milliGRAM(s) IntraMuscular once  heparin  Infusion 400 Unit(s)/Hr IV Continuous <Continuous>  HYDROmorphone  Injectable 1 milliGRAM(s) IV Push once  HYDROmorphone  Injectable 1 milliGRAM(s) IV Push every 4 hours PRN  insulin lispro (ADMELOG) corrective regimen sliding scale   SubCutaneous every 6 hours  insulin NPH human recombinant 10 Unit(s) SubCutaneous every 6 hours  LORazepam     Tablet 1 milliGRAM(s) Oral every 8 hours  multivitamin/minerals/iron Oral Solution (CENTRUM) 15 milliLiter(s) Enteral Tube daily  naloxegol 25 milliGRAM(s) Oral daily  pantoprazole  Injectable 40 milliGRAM(s) IV Push daily  polyethylene glycol 3350 17 Gram(s) Oral daily PRN  QUEtiapine 25 milliGRAM(s) Oral <User Schedule>  senna Syrup 10 milliLiter(s) Oral at bedtime  ursodiol Tablet 500 milliGRAM(s) Oral two times a day      Vital Signs Last 24 Hrs  T(C): 38.1 (06 Oct 2021 16:30), Max: 38.1 (06 Oct 2021 16:30)  T(F): 100.6 (06 Oct 2021 16:30), Max: 100.6 (06 Oct 2021 16:30)  HR: 90 (06 Oct 2021 15:21) (84 - 103)  BP: 111/68 (06 Oct 2021 14:26) (111/68 - 153/86)  BP(mean): --  RR: 18 (06 Oct 2021 14:26) (18 - 42)  SpO2: 93% (06 Oct 2021 15:21) (90% - 98%)    PHYSICAL EXAM:  General: [ ] non-toxic  HEAD/EYES: [ ] PERRL [x ] white sclera [ ] icterus  ENT:  [ ] normal [x] supple [ ] thrush [ ] pharyngeal exudate  Cardiovascular:   [ ] murmur [ x] normal [ ] PPM/AICD  Respiratory:  [x ] clear to ausculation bilaterally  GI:  [x ] soft, non-tender, normal bowel sounds  :  [ ] mace [x ] no CVA tenderness   Musculoskeletal:  [ ] no synovitis  Neurologic:  [ ] non-focal exam   Skin:  [ x] no rash  Lymph: [ ]x no lymphadenopathy  Psychiatric:  [ ] appropriate affect [ ] alert & oriented  Lines:  [x ] no phlebitis [ ] central line                                7.6    11.76 )-----------( 239      ( 06 Oct 2021 07:07 )             25.8       10-06    143  |  103  |  42<H>  ----------------------------<  72  3.5   |  28  |  0.77    Ca    9.0      06 Oct 2021 07:07  Phos  2.5     10-06  Mg     2.4     10-06    TPro  6.2  /  Alb  2.4<L>  /  TBili  5.1<H>  /  DBili  x   /  AST  115<H>  /  ALT  112<H>  /  AlkPhos  1592<H>  10-06          MICROBIOLOGY:Culture Results:   No growth to date. (10-02-21 @ 06:24)  Culture Results:   No growth to date. (10-02-21 @ 06:24)      RADIOLOGY:    om

## 2021-10-07 NOTE — PROGRESS NOTE ADULT - ASSESSMENT
Patient 71 year old Male with PMH of HTN, DM2, BPH, and Asthma admitted with COVID-19 ( Unvaccinated) with subsequent ARDS 2/2 Viral Pneumonia. Patient received Tocilizumab Remdemsivir and Two courses of Decadron. Patients hospital course complicated MRSE Bacteremia; initially txd with Vanco and later transitioned to Daptomycin in setting of persistent bacteremia. Infectious work-up conducted with TTE, which was negative for vegetations.  CT A/P done, which showed proctitis, for which he was txd with Unasyn x7d (finished 10/1). SEJAL deferred by cardiology and decision was made to treat empirically for at least 6 weeks. Patients Hospital course also complicated by VT Arrest and Bilateral Lower extremity DVTS and RT thigh hematoma in setting of AC. Extensive GOC discussions held with family throughout, with palliative care input. Decision made to opt for tracheostomy; patient received PEG on 9/30 and trach 10/1.     10/5: Patient remains with Vent Dyssynchrony; when patient becomes tachypneic /dyspneic patient noted to have worsened air leak from tracheostomy. Patient seen by ENT planned for Trach exchange on POD # 5 or sooner if persistently problematic despite adequate sedation. Patient had repeat ECHO Yesterday still consistent with RT heart strain; Remains with severely elevated Pulmonary pressures / Severe RT Ventricular enlargement with moderately decreased Rt Systolic dysfxn. Patient had CT chest iv contrast on 9/23 no evidence of PE. ECHO findings d/w  likely in setting of prolonged Hypoxemia and underlying lung fibrosis in setting of COVID, will hold on repeat CT imaging at this time. Patient remains on therapeutic Heparin gtt; if remains will stable H+H will trial NOAC tomorrow.  Patients sedation regimen adjusted in setting of vent Dyssynchrony; Ativan increased to q 8 hrs and Seroquel TID Added. Timing of sedatives spaced out as well to provide longer coverage over span of the day. Patient 71 year old Male with PMH of HTN, DM2, BPH, and Asthma admitted with COVID-19 ( Unvaccinated) with subsequent ARDS 2/2 Viral Pneumonia. Patient received Tocilizumab Remdemsivir and Two courses of Decadron. Patients hospital course complicated MRSE Bacteremia; initially txd with Vanco and later transitioned to Daptomycin in setting of persistent bacteremia. Infectious work-up conducted with TTE, which was negative for vegetations.  CT A/P done, which showed proctitis, for which he was txd with Unasyn x7d (finished 10/1). SEJAL deferred by cardiology and decision was made to treat empirically for at least 6 weeks. Patients Hospital course also complicated by VT Arrest and Bilateral Lower extremity DVTS and RT thigh hematoma in setting of AC. Extensive GOC discussions held with family throughout, with palliative care input. Decision made to opt for tracheostomy; patient received PEG on 9/30 and trach 10/1.     10/5: Patient remains with Vent Dyssynchrony; when patient becomes tachypneic /dyspneic.  Requires dilaudid frequently, adjust standing sedatives accordingly. Patient needs PICC for 6 weeks of dapto from negative blood cultures. Would like to start Eliquis and stop heparin.

## 2021-10-07 NOTE — PROGRESS NOTE ADULT - ASSESSMENT
71 year old with COVID pneumonia 8/12 s/p tocilizumab, remdesivir and two courses of decadron.     Cardiac arrest 8/20.     Prolonged fevers since 9/8 despite treating potential secondary infections.     Recurrent staph epi bacteremia- ? focus  ---presumed endovascular      VAP - Klebsiella and Enterobacter 9/11 with subsequent negative sputum.     Proctitis - CT 9/23- completed course of unasyn    Now afebrile for the past few days.     Poor long term prognosis. PEG 9/30. Trach 10/1.     1) Staph epi bacteremia  -repeat t blood cultures without growth  -repeat TTE if SEJAL is not planned   - Daptomycin 700mg IV q24h to see if it has any effect on blood cultures  -weekly CPK while on Daptomycin   - plan 6 weeks of antibiotics from time of negative cultures    2) Fever  -Typically would get SEJAL, but he is high risk for procedure and unlikely to tolerate aggressive interventions  -repeat TTE in two weeks  -Could consider CT CAP and Tagged wbc.   This testing would be part of work up of bacteremia of unknown focus.  But again not clear that he could tolerate aggressive interventions.  Not clear they would change his course    3) Elevated alk phos/ elevated total bili  Though to be secondary to COVID/ ? COVID cholangiopathy  Consider repeat RUQ sonogram    Overall, prognosis is poor.  He has secondary infection.  Given his comorbidity and functional status, he remains at high risk for recurrent infection.  -supportive care   -goals of care per primary team and palliative

## 2021-10-07 NOTE — PROGRESS NOTE ADULT - PROBLEM SELECTOR PLAN 8
Patient with transaminitis likely in setting of COVID  RUQ Sono 9/13: C/w Hepatomegaly and sludge   Continue Ursodiol   Continue to monitor LFTS

## 2021-10-07 NOTE — PROGRESS NOTE ADULT - PROBLEM SELECTOR PLAN 3
Hospital course complicated by Staph Epi Bacteremia  Patient initially txd with Vanco but changed to Daptomycin in setting of persistent Bacteremia   Repeat Blood cxs 10/2: No growth to date   Continue to Monitor CPK Weekly   Repeat TTE 10/4: No evidence of Obvious Vegetation   ID Continues to Follow Hospital course complicated by Staph Epi Bacteremia  Patient initially txd with Vanco but changed to Daptomycin in setting of persistent Bacteremia   Repeat Blood cxs 10/2: No growth to date   Continue to Monitor CPK Weekly   Repeat TTE 10/4: No evidence of Obvious Vegetation   Will need 6 weeks of Dapto from negative blood cultures  ID Continues to Follow

## 2021-10-07 NOTE — PROGRESS NOTE ADULT - PROBLEM SELECTOR PLAN 4
S/p Tocilizumab 8/15 and Two courses of Dexamethasone   S/p Remdesivir ( 8/13- 8/17)   Remains with COVID + PCR as of 9/30

## 2021-10-07 NOTE — PROGRESS NOTE ADULT - SUBJECTIVE AND OBJECTIVE BOX
Follow Up:      Inverval History/ROS:Patient is a 71y old  Male who presents with a chief complaint of SOB (07 Oct 2021 08:11)    Low grade fever yesterday  Trach- on vent    Allergies    No Known Allergies    Intolerances        ANTIMICROBIALS:  DAPTOmycin IVPB 700 every 24 hours  DAPTOmycin IVPB        OTHER MEDS:  acetaminophen    Suspension .. 650 milliGRAM(s) Oral every 6 hours PRN  ALBUTerol    90 MICROgram(s) HFA Inhaler 2 Puff(s) Inhalation every 8 hours  ascorbic acid 500 milliGRAM(s) Oral daily  budesonide 160 MICROgram(s)/formoterol 4.5 MICROgram(s) Inhaler 2 Puff(s) Inhalation two times a day  chlorhexidine 4% Liquid 1 Application(s) Topical <User Schedule>  cholecalciferol 2000 Unit(s) Oral daily  dextrose 5%. 1000 milliLiter(s) IV Continuous <Continuous>  dextrose 5%. 1000 milliLiter(s) IV Continuous <Continuous>  dextrose 50% Injectable 25 Gram(s) IV Push once  furosemide   Injectable 40 milliGRAM(s) IV Push daily  glucagon  Injectable 1 milliGRAM(s) IntraMuscular once  heparin  Infusion 400 Unit(s)/Hr IV Continuous <Continuous>  HYDROmorphone  Injectable 1 milliGRAM(s) IV Push every 4 hours PRN  insulin lispro (ADMELOG) corrective regimen sliding scale   SubCutaneous every 6 hours  insulin NPH human recombinant 10 Unit(s) SubCutaneous every 6 hours  LORazepam     Tablet 1 milliGRAM(s) Oral every 8 hours  methadone   Solution 10 milliGRAM(s) Oral <User Schedule>  multivitamin/minerals/iron Oral Solution (CENTRUM) 15 milliLiter(s) Enteral Tube daily  naloxegol 25 milliGRAM(s) Oral daily  pantoprazole  Injectable 40 milliGRAM(s) IV Push daily  polyethylene glycol 3350 17 Gram(s) Oral daily PRN  QUEtiapine 25 milliGRAM(s) Oral <User Schedule>  senna Syrup 10 milliLiter(s) Oral at bedtime  ursodiol Tablet 500 milliGRAM(s) Oral two times a day      Vital Signs Last 24 Hrs  T(C): 36.8 (07 Oct 2021 07:57), Max: 38.1 (06 Oct 2021 16:30)  T(F): 98.3 (07 Oct 2021 07:57), Max: 100.6 (06 Oct 2021 16:30)  HR: 89 (07 Oct 2021 07:57) (87 - 104)  BP: 98/61 (07 Oct 2021 07:57) (91/56 - 117/65)  BP(mean): --  RR: 25 (07 Oct 2021 07:57) (18 - 52)  SpO2: 99% (07 Oct 2021 07:57) (86% - 100%)    PHYSICAL EXAM:  General: [ ] non-toxic  HEAD/EYES: [ ] PERRL [ ] white sclera [ ] icterus  ENT:  [ ] normal [ ] supple [ ] thrush [ ] pharyngeal exudate  Cardiovascular:   [ ] murmur [ ] normal [ ] PPM/AICD  Respiratory:  [ ] clear to ausculation bilaterally  GI:  [ ] soft, non-tender, normal bowel sounds  :  [ ] mace [ ] no CVA tenderness   Musculoskeletal:  [ ] no synovitis  Neurologic:  [ ] non-focal exam   Skin:  [ ] no rash  Lymph: [ ] no lymphadenopathy  Psychiatric:  [ ] appropriate affect [ ] alert & oriented  Lines:  [ ] no phlebitis [ ] central line                                7.1    11.32 )-----------( 223      ( 07 Oct 2021 07:15 )             25.6       10-    142  |  102  |  47<H>  ----------------------------<  99  4.2   |  29  |  0.88    Ca    9.0      07 Oct 2021 07:13  Phos  4.0     10  Mg     2.3     10-07    TPro  6.1  /  Alb  2.2<L>  /  TBili  6.4<H>  /  DBili  x   /  AST  90<H>  /  ALT  103<H>  /  AlkPhos  1516<H>  10-07      Urinalysis Basic - ( 06 Oct 2021 22:59 )    Color: Dark Yellow / Appearance: Clear / S.013 / pH: x  Gluc: x / Ketone: Negative  / Bili: Moderate / Urobili: 4 mg/dL   Blood: x / Protein: Trace / Nitrite: Negative   Leuk Esterase: Negative / RBC: 15 /hpf / WBC 1 /HPF   Sq Epi: x / Non Sq Epi: 1 /hpf / Bacteria: 0.0        MICROBIOLOGY:Culture Results:   No Growth Final (10-02-21 @ 06:24)  Culture Results:   No Growth Final (10-02-21 @ 06:24)      RADIOLOGY:

## 2021-10-07 NOTE — PROGRESS NOTE ADULT - SUBJECTIVE AND OBJECTIVE BOX
Patient is a 71y old  Male who presents with a chief complaint of SOB (06 Oct 2021 17:17)    HPI:  70yo M w/ PMHx of HTN, DM2, BPH, asthma presents with shortness of breath. Patient endorses that he has been feeling SOB with associated cough for the last 2 weeks and his symptoms slowly worsened over time. He did not receive the COVID vaccine. He reports that his family members whom he lives with likely are also COVID positive but he does not know for sure. He did not try taking anything for his symptoms other than his usual medications/inhalers. There were no obvious aggravating or alleviating factors. Currently with supplemental oxygen on, he feels well and has no complaints, however he presented to [facility] since his symptoms were worsening. In the ED, he was hemodynamically stable, afebrile, but he was encephalopathic and hypoxic on room air requiring high flow nasal canula. Labs were significant for mild hyperkalemia and COVID positive. CXR prelim read showed bilateral patchy opacities. CT head showed no acute findings. MICU was consulted in the ED, patient was deemed not a MICU candidate. Patient was given doxycycline and dexamethasone x1 and admitted to general medicine for further management. At time of interview, patient was A/Ox3 with .  (13 Aug 2021 04:21)    Interval Events:    REVIEW OF SYSTEMS:  [ ] Positive  [ ] All other systems negative  [ ] Unable to assess ROS because ________    Vital Signs Last 24 Hrs  T(C): 36.8 (10-07-21 @ 07:57), Max: 38.1 (10-06-21 @ 16:30)  T(F): 98.3 (10-07-21 @ 07:57), Max: 100.6 (10-06-21 @ 16:30)  HR: 89 (10-07-21 @ 07:57) (87 - 104)  BP: 98/61 (10-07-21 @ 07:57) (91/56 - 117/65)  RR: 25 (10-07-21 @ 07:57) (18 - 52)  SpO2: 99% (10-07-21 @ 07:57) (86% - 100%)    PHYSICAL EXAM:  HEENT:   [ ]Tracheostomy:  [ ]Pupils equal  [ ]No oral lesions  [ ]Abnormal    SKIN  [ ] No Rash  [ ] Abnormal  [ ] pressure    CARDIAC  [ ]Regular  [ ]Abnormal    PULMONARY  [ ]Bilateral Clear Breath Sounds  [ ]Normal Excursion  [ ]Abnormal    GI  [ ]PEG      [ ] +BS		              [ ]Soft, nondistended, nontender	  [ ]Abnormal    MUSCULOSKELETAL                                   [ ]Bedbound                 [ ]Abnormal    [ ]Ambulatory/OOB to chair                           EXTREMITIES                                         [ ]Normal  [ ]Edema                           NEUROLOGIC  [ ] Normal, non focal  [ ] Focal findings:    PSYCHIATRIC  [ ]Alert and appropriate  [ ] Sedated	 [ ]Agitated    :  Ramos: [ ] Yes, if yes: Date of Placement:                   [  ] No    LINES: Central Lines [ ] Yes, if yes: Date of Placement                                     [  ] No    HOSPITAL MEDICATIONS:  MEDICATIONS  (STANDING):  ALBUTerol    90 MICROgram(s) HFA Inhaler 2 Puff(s) Inhalation every 8 hours  ascorbic acid 500 milliGRAM(s) Oral daily  budesonide 160 MICROgram(s)/formoterol 4.5 MICROgram(s) Inhaler 2 Puff(s) Inhalation two times a day  chlorhexidine 4% Liquid 1 Application(s) Topical <User Schedule>  cholecalciferol 2000 Unit(s) Oral daily  DAPTOmycin IVPB 700 milliGRAM(s) IV Intermittent every 24 hours  DAPTOmycin IVPB      dextrose 5%. 1000 milliLiter(s) (50 mL/Hr) IV Continuous <Continuous>  dextrose 5%. 1000 milliLiter(s) (100 mL/Hr) IV Continuous <Continuous>  dextrose 50% Injectable 25 Gram(s) IV Push once  furosemide   Injectable 40 milliGRAM(s) IV Push daily  glucagon  Injectable 1 milliGRAM(s) IntraMuscular once  heparin  Infusion 400 Unit(s)/Hr (8 mL/Hr) IV Continuous <Continuous>  insulin lispro (ADMELOG) corrective regimen sliding scale   SubCutaneous every 6 hours  insulin NPH human recombinant 10 Unit(s) SubCutaneous every 6 hours  LORazepam     Tablet 1 milliGRAM(s) Oral every 8 hours  methadone   Solution 10 milliGRAM(s) Oral <User Schedule>  multivitamin/minerals/iron Oral Solution (CENTRUM) 15 milliLiter(s) Enteral Tube daily  naloxegol 25 milliGRAM(s) Oral daily  pantoprazole  Injectable 40 milliGRAM(s) IV Push daily  QUEtiapine 25 milliGRAM(s) Oral <User Schedule>  senna Syrup 10 milliLiter(s) Oral at bedtime  ursodiol Tablet 500 milliGRAM(s) Oral two times a day    MEDICATIONS  (PRN):  acetaminophen    Suspension .. 650 milliGRAM(s) Oral every 6 hours PRN Temp greater or equal to 38C (100.4F), Mild Pain (1 - 3), Moderate Pain (4 - 6)  HYDROmorphone  Injectable 1 milliGRAM(s) IV Push every 4 hours PRN Respiratory distress  polyethylene glycol 3350 17 Gram(s) Oral daily PRN Constipation      LABS:                        7.5    14.34 )-----------( 220      ( 06 Oct 2021 21:57 )             26.2     10-    142  |  102  |  47<H>  ----------------------------<  99  4.2   |  29  |  0.88    Ca    9.0      07 Oct 2021 07:13  Phos  4.0     10  Mg     2.3     10-07    TPro  6.1  /  Alb  2.2<L>  /  TBili  6.4<H>  /  DBili  x   /  AST  90<H>  /  ALT  103<H>  /  AlkPhos  1516<H>  10-07  PT/INR - ( 06 Oct 2021 07:07 )   PT: 12.1 sec;   INR: 1.01 ratio    PTT - ( 06 Oct 2021 07:07 )  PTT:44.3 sec  Urinalysis Basic - ( 06 Oct 2021 22:59 )    Color: Dark Yellow / Appearance: Clear / S.013 / pH: x  Gluc: x / Ketone: Negative  / Bili: Moderate / Urobili: 4 mg/dL   Blood: x / Protein: Trace / Nitrite: Negative   Leuk Esterase: Negative / RBC: 15 /hpf / WBC 1 /HPF   Sq Epi: x / Non Sq Epi: 1 /hpf / Bacteria: 0.0      Mode: AC/ CMV (Assist Control/ Continuous Mandatory Ventilation)  RR (machine): 25  FiO2: 80  PEEP: 5  ITime: 1  MAP: 18  PC: 20  PIP: 29            Cheng Calloway, [facility]  [id number] Patient is a 71y old  Male who presents with a chief complaint of SOB (06 Oct 2021 17:17)    HPI:  70yo M w/ PMHx of HTN, DM2, BPH, asthma presents with shortness of breath. Patient endorses that he has been feeling SOB with associated cough for the last 2 weeks and his symptoms slowly worsened over time. He did not receive the COVID vaccine. He reports that his family members whom he lives with likely are also COVID positive but he does not know for sure. He did not try taking anything for his symptoms other than his usual medications/inhalers. There were no obvious aggravating or alleviating factors. Currently with supplemental oxygen on, he feels well and has no complaints, however he presented to [facility] since his symptoms were worsening. In the ED, he was hemodynamically stable, afebrile, but he was encephalopathic and hypoxic on room air requiring high flow nasal canula. Labs were significant for mild hyperkalemia and COVID positive. CXR prelim read showed bilateral patchy opacities. CT head showed no acute findings. MICU was consulted in the ED, patient was deemed not a MICU candidate. Patient was given doxycycline and dexamethasone x1 and admitted to general medicine for further management. At time of interview, patient was A/Ox3 with .  (13 Aug 2021 04:21)    Interval Events: No issues overnight    REVIEW OF SYSTEMS:  [ ] Positive  [ ] All other systems negative  [x ] Unable to assess ROS because patient is obtunded    Vital Signs Last 24 Hrs  T(C): 36.8 (10-07-21 @ 07:57), Max: 38.1 (10-06-21 @ 16:30)  T(F): 98.3 (10-07-21 @ 07:57), Max: 100.6 (10-06-21 @ 16:30)  HR: 89 (10-07-21 @ 07:57) (87 - 104)  BP: 98/61 (10-07-21 @ 07:57) (91/56 - 117/65)  RR: 25 (10-07-21 @ 07:57) (18 - 52)  SpO2: 99% (10-07-21 @ 07:57) (86% - 100%)    PHYSICAL EXAM:  HEENT:   [x]Tracheostomy: # 9 Cuffed [place]  [ ]Pupils:   [ ]No oral lesions  [x]Abnormal: Scleral Icterus     SKIN  [x] + Jaundice   [x] Abnormal: Bilateral Buttock Wounds   [ ] pressure    CARDIAC  [x]Regular  [ ]Abnormal    PULMONARY  [ ]Bilateral Clear Breath Sounds  [ ]Normal Excursion  [x]Abnormal: Bilateral Coarse Breath sounds, decreased at bases     GI  [x]PEG      [x] +BS		              [x]Soft, nondistended, nontender	  [ ]Abnormal    MUSCULOSKELETAL                                   [x]Bedbound                 [ ]Abnormal    [ ]Ambulatory/OOB to chair                           EXTREMITIES                                         [ ]Normal  [x]Edema: Upper and lower extremity edema bilaterally                            NEUROLOGIC  [ ] Normal, non focal  [x] Focal findings: Sedated / Not following Commands / Withdraws to Pain in all 4 extremities     PSYCHIATRIC  [ ]Alert and appropriate  [x] Sedated	 [ ]Agitated    :  Ramos: [ ] Yes, if yes: Date of Placement:                   [x] No    LINES: Central Lines [ ] Yes, if yes: Date of Placement                                     [x] No    HOSPITAL MEDICATIONS:  MEDICATIONS  (STANDING):  ALBUTerol    90 MICROgram(s) HFA Inhaler 2 Puff(s) Inhalation every 8 hours  ascorbic acid 500 milliGRAM(s) Oral daily  budesonide 160 MICROgram(s)/formoterol 4.5 MICROgram(s) Inhaler 2 Puff(s) Inhalation two times a day  chlorhexidine 4% Liquid 1 Application(s) Topical <User Schedule>  cholecalciferol 2000 Unit(s) Oral daily  DAPTOmycin IVPB 700 milliGRAM(s) IV Intermittent every 24 hours  DAPTOmycin IVPB      dextrose 5%. 1000 milliLiter(s) (50 mL/Hr) IV Continuous <Continuous>  dextrose 5%. 1000 milliLiter(s) (100 mL/Hr) IV Continuous <Continuous>  dextrose 50% Injectable 25 Gram(s) IV Push once  furosemide   Injectable 40 milliGRAM(s) IV Push daily  glucagon  Injectable 1 milliGRAM(s) IntraMuscular once  heparin  Infusion 400 Unit(s)/Hr (8 mL/Hr) IV Continuous <Continuous>  insulin lispro (ADMELOG) corrective regimen sliding scale   SubCutaneous every 6 hours  insulin NPH human recombinant 10 Unit(s) SubCutaneous every 6 hours  LORazepam     Tablet 1 milliGRAM(s) Oral every 8 hours  methadone   Solution 10 milliGRAM(s) Oral <User Schedule>  multivitamin/minerals/iron Oral Solution (CENTRUM) 15 milliLiter(s) Enteral Tube daily  naloxegol 25 milliGRAM(s) Oral daily  pantoprazole  Injectable 40 milliGRAM(s) IV Push daily  QUEtiapine 25 milliGRAM(s) Oral <User Schedule>  senna Syrup 10 milliLiter(s) Oral at bedtime  ursodiol Tablet 500 milliGRAM(s) Oral two times a day    MEDICATIONS  (PRN):  acetaminophen    Suspension .. 650 milliGRAM(s) Oral every 6 hours PRN Temp greater or equal to 38C (100.4F), Mild Pain (1 - 3), Moderate Pain (4 - 6)  HYDROmorphone  Injectable 1 milliGRAM(s) IV Push every 4 hours PRN Respiratory distress  polyethylene glycol 3350 17 Gram(s) Oral daily PRN Constipation      LABS:                        7.5    14.34 )-----------( 220      ( 06 Oct 2021 21:57 )             26.2     10-07    142  |  102  |  47<H>  ----------------------------<  99  4.2   |  29  |  0.88    Ca    9.0      07 Oct 2021 07:13  Phos  4.0     10-  Mg     2.3     10-    TPro  6.1  /  Alb  2.2<L>  /  TBili  6.4<H>  /  DBili  x   /  AST  90<H>  /  ALT  103<H>  /  AlkPhos  1516<H>  10-07  PT/INR - ( 06 Oct 2021 07:07 )   PT: 12.1 sec;   INR: 1.01 ratio    PTT - ( 06 Oct 2021 07:07 )  PTT:44.3 sec  Urinalysis Basic - ( 06 Oct 2021 22:59 )    Color: Dark Yellow / Appearance: Clear / S.013 / pH: x  Gluc: x / Ketone: Negative  / Bili: Moderate / Urobili: 4 mg/dL   Blood: x / Protein: Trace / Nitrite: Negative   Leuk Esterase: Negative / RBC: 15 /hpf / WBC 1 /HPF   Sq Epi: x / Non Sq Epi: 1 /hpf / Bacteria: 0.0      Mode: AC/ CMV (Assist Control/ Continuous Mandatory Ventilation)  RR (machine): 25  FiO2: 80  PEEP: 5  ITime: 1  MAP: 18  PC: 20  PIP: 29            Cheng Calloway, [facility]  [id number]

## 2021-10-07 NOTE — PROGRESS NOTE ADULT - PROBLEM SELECTOR PLAN 5
Patient remains with Vent Dyssynchrony   Ativan increased to 1 mg q 8 hrs   Seroquel 25 mg q 8 hrs added   Continue Methadone 10 mg q 12hrs   Continue Dilaudid 0.5 mg IVP PRN Pain / Dyspnea  EKG 10/5;  Patient remains with Vent Dyssynchrony   Ativan increased to 1 mg q 8 hrs   Seroquel 25 mg q 8 hrs added   Continue Methadone 10 mg q 12hrs   Continue Dilaudid 0.5 mg IVP PRN Pain / Dyspnea

## 2021-10-07 NOTE — PROGRESS NOTE ADULT - ATTENDING COMMENTS
71M with COVID pneumonia, ARDS with persistent respiratory failure s/p tracheostomy on 10/1. He had a cardiac arrest on the day of his intubation. His hospital course was complicated by high grade MRSE bacteremia, transaminitis, encephalopathy, bilateral LE DVTs and right thigh hematoma.     Continues to have episodes of tachypnea on vent, requiring ativan and dilaudid to control respiratory rate.  Improved somewhat with uptitration of his Methadone and seroquel.  1. Pulm: Acute Hypoxemic Respiratory Failure - Tracheostomy placed by ENT 10/1, upsized 10/5 due to leak, currently on Pressure control, worsening hypoxemia with increased FiO2 requirements to 80%, Repeat CXR with worsening bilateral opacities. Maintain O2 sat > 90% Trach care. Airway clearance therapy - bronchodilators, chest pt and suctioning.   2. GI: Oropharyngeal dysphagia - c/w PEG placed feeds, PPI  Transaminitis - due to COVID cholangiopathy vs sepsis. No signs of biliary obstruction  3. Cardiovascular - shock state resolved and patient maintaining MAP > 65  - Patient had a cardiac arrest initially - thought due to hypoxia  Pulmonary hypertension - c/w Lasix 40 IV daily and monitor ins/out   4. Heme: Venous Thromboembolism - patient with R DVT. Was on Hep gtt however developed a thigh hematoma last week. He was restarted on Hep gtt 10/2 and tolerating thus far. Monitor Hb and transfuse as needed. Eventual switch to DOAC.   5. Infectious Disease - patient with a persistent MRSE bacteremia since Aug 24, treated with Vancomycin, however switched to Daptomycin 10/1.  Blood cultures form 10/2 have been NGTD, repeat cultures pending from 10/6. Followup CPK weekly. Appreciate ID follow up.  - Repeat TTE 10/4 with no e/o endocarditis though limited study. He is deemed not to be a surgical candidate given his clinical condition.   - Had a CT abd/pelvis with evidence of proctitis  - Sacral wound - wound care followup  6. Neuro - remains on Methadone, Ativan, Seroquel. Dilaudid prn.   CT Head on 9/13 with no acute changes.    Patient remains full code. Family remains hopeful  Prognosis poor.      Time-based billing (NON-critical care).     35 minutes spent on total encounter; more than 50% of the visit was spent counseling and / or coordinating care by the attending physician.  The necessity of the time spent during the encounter on this date of service was due to:     patient care.

## 2021-10-08 NOTE — PROGRESS NOTE ADULT - ASSESSMENT
Patient 71 year old Male with PMH of HTN, DM2, BPH, and Asthma admitted with COVID-19 ( Unvaccinated) with subsequent ARDS 2/2 Viral Pneumonia. Patient received Tocilizumab Remdemsivir and Two courses of Decadron. Patients hospital course complicated MRSE Bacteremia; initially txd with Vanco and later transitioned to Daptomycin in setting of persistent bacteremia. Infectious work-up conducted with TTE, which was negative for vegetations.  CT A/P done, which showed proctitis, for which he was txd with Unasyn x7d (finished 10/1). SEJAL deferred by cardiology and decision was made to treat empirically for at least 6 weeks. Patients Hospital course also complicated by VT Arrest and Bilateral Lower extremity DVTS and RT thigh hematoma in setting of AC. Extensive GOC discussions held with family throughout, with palliative care input. Decision made to opt for tracheostomy; patient received PEG on 9/30 and trach 10/1.     10/5: Patient remains with Vent Dyssynchrony; when patient becomes tachypneic /dyspneic.  Requires dilaudid frequently, adjust standing sedatives accordingly. Patient needs PICC for 6 weeks of dapto from negative blood cultures. Would like to start Eliquis and stop heparin.  10/8: Tachycardic-sinus, does not appear to be pain mediated. Volume given in PRBC no change in HR. Lopressor 25 via enteral tube given. H+H dropped to <7 given 1 unit PRBC. Will add baclofen to regimen and increase methadone to 10 tid, add saline nebs to aid in airway clearance.

## 2021-10-08 NOTE — PROGRESS NOTE ADULT - PROBLEM SELECTOR PLAN 5
Patient remains with Vent Dyssynchrony   Ativan increased to 1 mg q 8 hrs   Seroquel 25 mg q 8 hrs added   Continue Methadone 10 mg q 12hrs   Continue Dilaudid 0.5 mg IVP PRN Pain / Dyspnea

## 2021-10-08 NOTE — PROGRESS NOTE ADULT - SUBJECTIVE AND OBJECTIVE BOX
Patient is a 71y old  Male who presents with a chief complaint of SOB (07 Oct 2021 09:22)      Interval Events:    REVIEW OF SYSTEMS:  [ ] Positive  [ ] All other systems negative  [x ] Unable to assess ROS because __non verbal______    Vital Signs Last 24 Hrs  T(C): 36.8 (10-08-21 @ 08:18), Max: 37.5 (10-08-21 @ 04:08)  T(F): 98.2 (10-08-21 @ 08:18), Max: 99.5 (10-08-21 @ 04:08)  HR: 132 (10-08-21 @ 11:35) (89 - 134)  BP: 108/52 (10-08-21 @ 11:35) (100/61 - 120/73)  RR: 38 (10-08-21 @ 11:35) (20 - 51)  SpO2: 97% (10-08-21 @ 11:35) (96% - 98%)    PHYSICAL EXAM:  [x]Tracheostomy: # 9 Cuffed Portex  [ ]Pupils:   [ ]No oral lesions  [x]Abnormal: Scleral Icterus     SKIN  [x] + Jaundice   [x] Abnormal: Bilateral Buttock Wounds   [ ] pressure    CARDIAC  [x]Regular  [ ]Abnormal    PULMONARY  [ ]Bilateral Clear Breath Sounds  [ ]Normal Excursion  [x]Abnormal: Bilateral Coarse Breath sounds, decreased at bases     GI  [x]PEG      [x] +BS		              [x]Soft, nondistended, nontender	  [ ]Abnormal    MUSCULOSKELETAL                                   [x]Bedbound                 [ ]Abnormal    [ ]Ambulatory/OOB to chair                           EXTREMITIES                                         [ ]Normal  [x]Edema: Upper and lower extremity edema bilaterally                            NEUROLOGIC  [ ] Normal, non focal  [x] Focal findings: Sedated / Not following Commands / Withdraws to Pain in all 4 extremities     PSYCHIATRIC  [ ]Alert and appropriate  [x] Sedated	 [ ]Agitated    :  Ramos: [ ] Yes, if yes: Date of Placement:                   [x] No    LINES: Central Lines [ ] Yes, if yes: Date of Placement                                     [x] No  HOSPITAL MEDICATIONS:  MEDICATIONS  (STANDING):  albuterol/ipratropium for Nebulization 3 milliLiter(s) Nebulizer every 6 hours  ascorbic acid 500 milliGRAM(s) Oral daily  budesonide 160 MICROgram(s)/formoterol 4.5 MICROgram(s) Inhaler 2 Puff(s) Inhalation two times a day  chlorhexidine 4% Liquid 1 Application(s) Topical <User Schedule>  cholecalciferol 2000 Unit(s) Oral daily  DAPTOmycin IVPB 700 milliGRAM(s) IV Intermittent every 24 hours  DAPTOmycin IVPB      dextrose 5%. 1000 milliLiter(s) (50 mL/Hr) IV Continuous <Continuous>  dextrose 5%. 1000 milliLiter(s) (100 mL/Hr) IV Continuous <Continuous>  dextrose 50% Injectable 25 Gram(s) IV Push once  furosemide   Injectable 40 milliGRAM(s) IV Push daily  furosemide   Injectable 20 milliGRAM(s) IV Push once  glucagon  Injectable 1 milliGRAM(s) IntraMuscular once  heparin  Infusion 400 Unit(s)/Hr (9 mL/Hr) IV Continuous <Continuous>  insulin lispro (ADMELOG) corrective regimen sliding scale   SubCutaneous every 6 hours  insulin NPH human recombinant 10 Unit(s) SubCutaneous every 6 hours  LORazepam     Tablet 1 milliGRAM(s) Oral every 8 hours  methadone   Solution 10 milliGRAM(s) Oral <User Schedule>  multivitamin/minerals/iron Oral Solution (CENTRUM) 15 milliLiter(s) Enteral Tube daily  naloxegol 25 milliGRAM(s) Oral daily  pantoprazole  Injectable 40 milliGRAM(s) IV Push daily  QUEtiapine 25 milliGRAM(s) Oral <User Schedule>  senna Syrup 10 milliLiter(s) Oral at bedtime  ursodiol Tablet 500 milliGRAM(s) Oral two times a day    MEDICATIONS  (PRN):  acetaminophen    Suspension .. 650 milliGRAM(s) Oral every 6 hours PRN Temp greater or equal to 38C (100.4F), Mild Pain (1 - 3), Moderate Pain (4 - 6)  HYDROmorphone  Injectable 1 milliGRAM(s) IV Push every 4 hours PRN Respiratory distress  polyethylene glycol 3350 17 Gram(s) Oral daily PRN Constipation      LABS:                        6.8    12.21 )-----------( 217      ( 08 Oct 2021 06:45 )             23.8     10-08    142  |  101  |  51<H>  ----------------------------<  119<H>  3.6   |  27  |  0.89    Ca    8.7      08 Oct 2021 06:45  Phos  3.6     10-08  Mg     2.3     10-08    TPro  6.2  /  Alb  2.1<L>  /  TBili  6.8<H>  /  DBili  x   /  AST  70<H>  /  ALT  90<H>  /  AlkPhos  1367<H>  10-08    PT/INR - ( 08 Oct 2021 06:45 )   PT: 12.6 sec;   INR: 1.05 ratio         PTT - ( 08 Oct 2021 06:45 )  PTT:31.3 sec  Urinalysis Basic - ( 06 Oct 2021 22:59 )    Color: Dark Yellow / Appearance: Clear / S.013 / pH: x  Gluc: x / Ketone: Negative  / Bili: Moderate / Urobili: 4 mg/dL   Blood: x / Protein: Trace / Nitrite: Negative   Leuk Esterase: Negative / RBC: 15 /hpf / WBC 1 /HPF   Sq Epi: x / Non Sq Epi: 1 /hpf / Bacteria: 0.0          CAPILLARY BLOOD GLUCOSE    MICROBIOLOGY:     RADIOLOGY:  [ ] Reviewed and interpreted by me    Mode: AC/ CMV (Assist Control/ Continuous Mandatory Ventilation)  RR (machine): 25  FiO2: 80  PEEP: 5  ITime: 0.72  MAP: 13  PC: 20  PIP: 28

## 2021-10-08 NOTE — PROGRESS NOTE ADULT - ATTENDING COMMENTS
71M with COVID pneumonia, ARDS with persistent respiratory failure s/p tracheostomy on 10/1. He had a cardiac arrest on the day of his intubation. His hospital course was complicated by high grade MRSE bacteremia, transaminitis, encephalopathy, bilateral LE DVTs and right thigh hematoma.     Continues to have episodes of tachypnea on vent, requiring ativan and dilaudid to control respiratory rate.  Improved somewhat with uptitration of his Methadone and seroquel.    1. Pulm: Acute Hypoxemic Respiratory Failure - Tracheostomy placed by ENT 10/1, upsized 10/5 due to leak, currently on Pressure control, worsening hypoxemia with increased FiO2 requirements to 80%, Repeat CXR with worsening bilateral opacities. Maintain O2 sat > 90% Trach care. Airway clearance therapy - bronchodilators, chest pt and suctioning.   2. GI: Oropharyngeal dysphagia - c/w PEG placed feeds, PPI  Transaminitis - due to COVID cholangiopathy vs sepsis. No signs of biliary obstruction  3. Cardiovascular - shock state resolved and patient maintaining MAP > 65  - Patient had a cardiac arrest initially - thought due to hypoxia  Pulmonary hypertension - c/w Lasix 40 IV daily and monitor ins/out   4. Heme: Venous Thromboembolism - patient with R DVT. Was on Hep gtt however developed a thigh hematoma last week. He was restarted on Hep gtt 10/2 and tolerating thus far. Monitor Hb and transfuse as needed. Eventual switch to DOAC.   5. Infectious Disease - patient with a persistent MRSE bacteremia since Aug 24, treated with Vancomycin, however switched to Daptomycin 10/1.  Blood cultures form 10/2 have been NGTD, repeat cultures pending from 10/6. Followup CPK weekly. Appreciate ID follow up.  - Repeat TTE 10/4 with no e/o endocarditis though limited study. He is deemed not to be a surgical candidate given his clinical condition.   - Sacral wound - wound care followup  6. Neuro - remains on Methadone, Ativan, Seroquel. Dilaudid prn.   CT Head on 9/13 with no acute changes.    Patient remains full code. Family remains hopeful  Prognosis poor.      Time-based billing (NON-critical care).     35 minutes spent on total encounter; more than 50% of the visit was spent counseling and / or coordinating care by the attending physician.  The necessity of the time spent during the encounter on this date of service was due to:     patient care.

## 2021-10-08 NOTE — PROVIDER CONTACT NOTE (OTHER) - ACTION/TREATMENT ORDERED:
Obtain an EKG, and administer Dilaudid 1mg IVP. Reassess in 15 minutes. Obtain an EKG, and administer Dilaudid 0.5mg IVP. Reassess in 15 minutes.

## 2021-10-08 NOTE — PROGRESS NOTE ADULT - SUBJECTIVE AND OBJECTIVE BOX
Follow Up:      Inverval History/ROS:Patient is a 71y old  Male who presents with a chief complaint of SOB (08 Oct 2021 12:04)    Trach. On vent  FiO2 @ 80%    Allergies    No Known Allergies    Intolerances        ANTIMICROBIALS:  DAPTOmycin IVPB 700 every 24 hours  DAPTOmycin IVPB        OTHER MEDS:  acetaminophen    Suspension .. 650 milliGRAM(s) Oral every 6 hours PRN  albuterol/ipratropium for Nebulization 3 milliLiter(s) Nebulizer every 6 hours  ascorbic acid 500 milliGRAM(s) Oral daily  baclofen 10 milliGRAM(s) Oral every 8 hours  budesonide 160 MICROgram(s)/formoterol 4.5 MICROgram(s) Inhaler 2 Puff(s) Inhalation two times a day  chlorhexidine 4% Liquid 1 Application(s) Topical <User Schedule>  cholecalciferol 2000 Unit(s) Oral daily  dextrose 5%. 1000 milliLiter(s) IV Continuous <Continuous>  dextrose 5%. 1000 milliLiter(s) IV Continuous <Continuous>  dextrose 50% Injectable 25 Gram(s) IV Push once  furosemide   Injectable 40 milliGRAM(s) IV Push daily  furosemide   Injectable 20 milliGRAM(s) IV Push once  glucagon  Injectable 1 milliGRAM(s) IntraMuscular once  heparin  Infusion 400 Unit(s)/Hr IV Continuous <Continuous>  HYDROmorphone  Injectable 1 milliGRAM(s) IV Push every 4 hours PRN  insulin lispro (ADMELOG) corrective regimen sliding scale   SubCutaneous every 6 hours  insulin NPH human recombinant 10 Unit(s) SubCutaneous every 6 hours  LORazepam     Tablet 1 milliGRAM(s) Oral every 8 hours  methadone   Solution 10 milliGRAM(s) Oral every 8 hours  multivitamin/minerals/iron Oral Solution (CENTRUM) 15 milliLiter(s) Enteral Tube daily  naloxegol 25 milliGRAM(s) Oral daily  pantoprazole  Injectable 40 milliGRAM(s) IV Push daily  polyethylene glycol 3350 17 Gram(s) Oral daily PRN  QUEtiapine 25 milliGRAM(s) Oral <User Schedule>  senna Syrup 10 milliLiter(s) Oral at bedtime  sodium chloride 3%  Inhalation 3 milliLiter(s) Inhalation every 6 hours  ursodiol Tablet 500 milliGRAM(s) Oral two times a day      Vital Signs Last 24 Hrs  T(C): 37.4 (08 Oct 2021 13:00), Max: 37.5 (08 Oct 2021 04:08)  T(F): 99.4 (08 Oct 2021 13:00), Max: 99.5 (08 Oct 2021 04:08)  HR: 116 (08 Oct 2021 13:00) (89 - 134)  BP: 98/52 (08 Oct 2021 13:00) (98/52 - 117/66)  BP(mean): --  RR: 36 (08 Oct 2021 13:00) (20 - 51)  SpO2: 92% (08 Oct 2021 13:00) (92% - 98%)    PHYSICAL EXAM:  General: [x ] trach  HEAD/EYES: [ ] PERRL [x ] white sclera [ ] icterus  ENT:  [ ] normal [x ] supple [ ] thrush [ ] pharyngeal exudate  Cardiovascular:   [ ] murmur [ x] normal [ ] PPM/AICD  Respiratory:  [x ] course BS  GI:  [x ] soft, non-tender, normal bowel sounds  :  [ ] mace [ ] no CVA tenderness   Musculoskeletal:  [x ] no synovitis  Neurologic:  [ ] non-focal exam   Skin:  [x ] no rash  Lymph: x] no lymphadenopathy  Psychiatric:  [ ] appropriate affect [ ] alert & oriented  Lines:  [x ] no phlebitis [ ] central line                                6.8    12.21 )-----------( 217      ( 08 Oct 2021 06:45 )             23.8       10-08    142  |  101  |  51<H>  ----------------------------<  119<H>  3.6   |  27  |  0.89    Ca    8.7      08 Oct 2021 06:45  Phos  3.6     10-08  Mg     2.3     10-08    TPro  6.2  /  Alb  2.1<L>  /  TBili  6.8<H>  /  DBili  x   /  AST  70<H>  /  ALT  90<H>  /  AlkPhos  1367<H>  10-08      Urinalysis Basic - ( 06 Oct 2021 22:59 )    Color: Dark Yellow / Appearance: Clear / S.013 / pH: x  Gluc: x / Ketone: Negative  / Bili: Moderate / Urobili: 4 mg/dL   Blood: x / Protein: Trace / Nitrite: Negative   Leuk Esterase: Negative / RBC: 15 /hpf / WBC 1 /HPF   Sq Epi: x / Non Sq Epi: 1 /hpf / Bacteria: 0.0        MICROBIOLOGY:Culture Results:   No growth to date. (10-06-21 @ 22:01)  Culture Results:   No growth to date. (10-06-21 @ 22:01)  Culture Results:   No Growth Final (10-02-21 @ 06:24)  Culture Results:   No Growth Final (10-02-21 @ 06:24)      RADIOLOGY:

## 2021-10-08 NOTE — PROGRESS NOTE ADULT - PROBLEM SELECTOR PLAN 3
Hospital course complicated by Staph Epi Bacteremia  Patient initially txd with Vanco but changed to Daptomycin in setting of persistent Bacteremia   Repeat Blood cxs 10/2: No growth to date   Continue to Monitor CPK Weekly   Repeat TTE 10/4: No evidence of Obvious Vegetation   Will need 6 weeks of Dapto from negative blood cultures  ID Continues to Follow

## 2021-10-09 NOTE — PROGRESS NOTE ADULT - PROBLEM SELECTOR PLAN 10
Continue Protonix IVP  / Continue Heparin Drip
Continue Protonix IVP  / Continue Heparin Drip
Protonix IVP Added / Continue Heparin Drip
Continue Protonix IVP  / Continue Heparin Drip

## 2021-10-09 NOTE — PROVIDER CONTACT NOTE (OTHER) - ASSESSMENT
No bladder distension noted
Patient AxOx2, on bipap. No chest pain, dizziness, pain or palpitations noted.
Pt  RR 41.
Pt desatting to 80%. RR elevated 30s-40s on HFNC 60L 100%
patient is a/ox2, VSS, except O2 saturation. Once Bipap is placed back on, patient's O2 goes to 90%.
Pt A+Ox0. Pt is on bipap and trying to remove the mask off his face, anxious. Pt recently received ativan iv
Pt had run of PAT 2.2 seconds up to 160  /84 HR 74 T 36.4 RR40 Sp02 97% BIPAP  denies cp dizziness discomfort or palpiations
pt is anxious, RR 38-45.
Pt heart rate ranging 110s post 25mg lopressor and respiratory rate ranging from 34-38 after a IVP PRN 1.5 mg Dilaudid. Bp stable at 98/52
Pt heart rate ranging 120s-130s and respiratory rate ranging from 34-48 after a IVP PRN 1 mg Dilaudid at 08:18. Bp stable at 100/61
Pt states that he has no sob, cp, symptomatic arrhythmias; however assessed to be tachypneic. Pt VSS.

## 2021-10-09 NOTE — RAPID RESPONSE TEAM SUMMARY - NSSITUATIONBACKGROUNDRRT_GEN_ALL_CORE
71M w/ HTN, DM2, BPH, asthma, admitted for COVID PNA s/p remdesevir, toci, on decadron, on bipap 2/ FiO2 100%. RRT called for tachypnea w/ RR in the 50s-60s. Upon arrival to room, pt was satting 99% w/ respiratory rate in the 40s, afebrile. CXR performed, no edema noted or changes from prior. ABG unremarkable. Tachypnea thought to be possibly attributed to anxiety, pt responded well to ativan 1mg pushes x 2 15 minutes apart w/ RR improving to high 20s to low 30s. No indication for intubation at this time. Repeat ABG done 30 minutes after second ativan push, to be followed up by primary team. 
72yo M w/ PMHx of HTN, DM2, BPH, asthma presents with COVID pneumonia. Patient on BiPAP for multiple days without significant improvement. Multiple prior RRTs for tachypnea without hypoxia, including multiple MICU evals. RRT called for hypoxia to 80s. On arrival, patient lethargic, VS: afeb, 85, 122/79, RR 30s, O2 86%. . BiPAP settings adjusted to increase PEEP to 12 however without significant improvement in O2 sat. Called family to confirm full code, then anesthesia called for intubation. Intubation difficult with significant secretions in airway requiring suctioning. Afterwards, patient difficult to sedate, requiring IVP propofol, max propofol gtt, and IVP fentanyl. Levophed started with improvement in BP however subsequently patient lost pulse, and CODE BLUE called. Monitor with VT s/p shock, then VF s/p shock. ROSC obtained after shock x2 and epi x 2. See code sheet for details. Patient accepted to 5 ICU. 
72yo M w/ PMHx of HTN, DM2, BPH, asthma presents with COVID pneumonia. RRT called for tachypnea to 60s as reported by primary RN although 40s noted on arrival. Pt on BiPAP for past 24h without significant improvement in WOB. On arrival, VSS, pt afebrile and O2 sat > 95%. Mental status intact and pt protecting airway. Based on report from primary team, GOC ongoing, however currently full code. RR improved to 30s spontaneously. Decision was made to monitor, continue GOC, consider MICU re-consult. 
72 yo M with COVID PNA, ARDS and persistent respiratory failure now s/p tracheostomy 10/1 with course c/b cardiac arrest earlier in stay, MRSE bacteremia, encephalopathy, b/l LE DVTS and R thigh hematoma, and transaminitis. Had previous RRT on 10/9 at 0100 for same reason, found to be febrile, received IVF and antibiotics. RRT called this evening for hypotension. 
72 yo M with COVID PNA, ARDS and persistent respiratory failure now s/p tracheostomy 10/1 with course c/b cardiac arrest earlier in stay, MRSE bacteremia, encephalopathy, b/l LE DVTS and R thigh hematoma, and transaminitis. RRT called this evening for hypotension.

## 2021-10-09 NOTE — CHART NOTE - NSCHARTNOTESELECT_GEN_ALL_CORE
Event Note
Isolation D/C/Event Note
MICU POCUS
Nutrition Services
POCUS/Event Note
Palliative/Event Note
Palliative/Event Note
RRT, increased WOB, tachypnea/Event Note
Rapid Response
Transfer Note
ACCEPT NOTE/Event Note
Accept Note/Transfer Note
Brief follow up note/Event Note
ENT/Event Note
Event Note
Expiration note/Event Note
Family Discussion/Event Note
Hyperglycemia/Event Note
Ivermectin
MICU notified consult/Event Note
MICU transfer note/Transfer Note
Nutrition Services
POCUS/Event Note
Palliative Care/Event Note
RRT/Post RRT note/Event Note
Transfer Note
eeg preliminary
tachypnea, agitation with BIPAP/Event Note

## 2021-10-09 NOTE — PROVIDER CONTACT NOTE (OTHER) - ACTION/TREATMENT ORDERED:
Assessment complete.  A&O x 4. Patient calls appropriately.  Patient ambulates with standby assist. Bed alarm off.   Patient has 5/10 pain. Pain managed with prescribed medications.  Denies N&V. Tolerating reg diet.  Hematoma to scalp, no drainage.  + void, + flatus, - BM.  Patient denies SOB.  SCD's off.    Review plan with of care with patient. Call light and personal belongings with in reach. Hourly rounding in place. All needs met at this time.   Bladder scanned &  Straight catheterized for residual urine.

## 2021-10-09 NOTE — GOALS OF CARE CONVERSATION - ADVANCED CARE PLANNING - CONVERSATION DETAILS
A 71-year-old man with COVID ARDS now with persistent respiratory failure status post trach (10/1), MRSHUBER bacteremia, and bilateral DVTs now with severe hypotension and shock with multiple organ system failure, high risk of expiring in minutes to hours.     I spoke with Terence Vernon, and Christy (wife, who was identified as the health care surrogate) and we had an extensive conversation about Mr. Nick's care and current condition.   Discussed overall goals of care including advanced directives with the family. During this discussion we reviewed the current diagnosis, treatment plan, and grave prognosis. An explanation of advanced directives and MOLST was provided. Janeen and Christy identify that Mr. Nick has been through so much during this hospital stay, that he's "so weak now." We discussed potential options in end of life and when asking about CPR/resuscitation Christy replied "he wouldn't want any of that," agreed to change status to DNR. We further discussed what they current goals for his care were and agreed to fully transition to comfort focused care. No lab draws/sticks or vasopressors. PRNs for Dilaudid and Ativan placed if required. MOLST form completed and placed in chart.     Above reviewed with primary team.   Russ Song NP

## 2021-10-09 NOTE — PROGRESS NOTE ADULT - PROBLEM SELECTOR PROBLEM 6
DM2 (diabetes mellitus, type 2)

## 2021-10-09 NOTE — PROGRESS NOTE ADULT - PROBLEM SELECTOR PROBLEM 1
Oral bleeding
Pneumonia due to COVID-19 virus
Pneumonia due to COVID-19 virus
Functional quadriplegia
Oral bleeding
Pneumonia due to COVID-19 virus
Tracheostomy in place
Oral bleeding
Tracheostomy in place
Acute respiratory failure due to COVID-19
Acute respiratory failure with hypoxia
Pneumonia due to COVID-19 virus
Pneumonia due to COVID-19 virus
Acute respiratory failure due to COVID-19
Pneumonia due to COVID-19 virus
Acute respiratory failure due to COVID-19
Acute respiratory failure due to COVID-19
Pneumonia due to COVID-19 virus
Acute respiratory failure due to COVID-19
Acute respiratory failure due to COVID-19

## 2021-10-09 NOTE — PROGRESS NOTE ADULT - ATTENDING SUPERVISION STATEMENT
Fellow
ACP
ACP
Resident
Fellow
Resident
ACP
Fellow
Resident
Resident
ACP
Fellow
Resident
ACP
ACP
Fellow
Resident
ACP
Resident
ACP
ACP
Resident
ACP
Resident
ACP

## 2021-10-09 NOTE — PROGRESS NOTE ADULT - PROBLEM SELECTOR PLAN 2
-hold home metformin   -Start lantus 15 units qHS  -insulin sliding scale  -monitor glucose closely as will likely worsen in setting of steroids  -c/w atorvastatin
-hold home metformin   -insulin sliding scale  -monitor glucose closely as will likely worsen in setting of steroids  -c/w atorvastatin
-hold home metformin   -Increase lantus 20 units qHS  -Start premeal admelog 5 units TID with meals  -insulin sliding scale  -monitor glucose closely as will likely worsen in setting of steroids  -c/w atorvastatin
Hospital Course c/b runs of VTACH; txd initially with Metoprolol but d/cd in setting of Hypotension   S/p Amiodarone Drip   ECHO 10/4; Consistent RT Heart Strain: Severely elevated Pulmonary pressures / Severe RT Ventricular enlargement with moderately decreased Rt Systolic dysfxn  CT Chest w/ IV Contrast 9/23: Bilateral GGO / Mod- Large Pneumomediastinum   Holding diuretics; no indication, no evidence of pleural effusion, pulm edema on chest CT (9/23)  Brain MRI 9/28 w/o evidence of anoxic injury c/w microvascular ischemic changes
Hospital Course c/b runs of VTACH; txd initially with Metoprolol but d/cd in setting of Hypotension   S/p Amiodarone Drip   ECHO 10/4; Consistent RT Heart Strain: Severely elevated Pulmonary pressures / Severe RT Ventricular enlargement with moderately decreased Rt Systolic dysfxn  CT Chest w/ IV Contrast 9/23: Bilateral GGO / Mod- Large Pneumomediastinum   Holding diuretics; no indication, no evidence of pleural effusion, pulm edema on chest CT (9/23)  Brain MRI 9/28 w/o evidence of anoxic injury c/w microvascular ischemic changes
remains intubated  family awaiting MRI to decide on trach/peg  MICU to follow up
Hospital Course c/b runs of VTACH; txd initially with Metoprolol but d/cd in setting of Hypotension   S/p Amiodarone Drip   ECHO 10/4; Consistent RT Heart Strain: Severely elevated Pulmonary pressures / Severe RT Ventricular enlargement with moderately decreased Rt Systolic dysfxn  CT Chest w/ IV Contrast 9/23: Bilateral GGO / Mod- Large Pneumomediastinum   Holding diuretics; no indication, no evidence of pleural effusion, pulm edema on chest CT (9/23)  Brain MRI 9/28 w/o evidence of anoxic injury c/w microvascular ischemic changes
-hold home metformin   -insulin sliding scale  -monitor glucose closely as will likely worsen in setting of steroids  -c/w atorvastatin
-hold home metformin   -Increase lantus 20 units qHS  -Start premeal admelog 5 units TID with meals  -insulin sliding scale  -monitor glucose closely as will likely worsen in setting of steroids  -c/w atorvastatin
-hold home metformin   -Continue Lantus 20 units qHS  -holding premeal admelog - patient is NPO/ on BiPAP  -insulin sliding scale  -monitor glucose closely as will likely worsen in setting of steroids  -c/w atorvastatin
-hold home metformin   -Continue lantus 20 units qHS  - holding premeal admelog - patient is NPO/ on bipap  -insulin sliding scale  -monitor glucose closely as will likely worsen in setting of steroids  -c/w atorvastatin
Hospital Course c/b runs of VTACH; txd initially with Metoprolol but d/cd in setting of Hypotension   S/p Amiodarone Drip   ECHO 10/4; Consistent RT Heart Strain: Severely elevated Pulmonary pressures / Severe RT Ventricular enlargement with moderately decreased Rt Systolic dysfxn  CT Chest w/ IV Contrast 9/23: Bilateral GGO / Mod- Large Pneumomediastinum   Holding diuretics; no indication, no evidence of pleural effusion, pulm edema on chest CT (9/23)  Brain MRI 9/28 w/o evidence of anoxic injury c/w microvascular ischemic changes
Hospital Course c/b runs of VTACH; txd initially with Metoprolol but d/cd in setting of Hypotension   S/p Amiodarone Drip   ECHO 10/4; Consistent RT Heart Strain: Severely elevated Pulmonary pressures / Severe RT Ventricular enlargement with moderately decreased Rt Systolic dysfxn  CT Chest w/ IV Contrast 9/23: Bilateral GGO / Mod- Large Pneumomediastinum   Holding diuretics; no indication, no evidence of pleural effusion, pulm edema on chest CT (9/23)  Brain MRI 9/28 w/o evidence of anoxic injury c/w microvascular ischemic changes
S/p Amiodarone and electrolyte supplementation   Patient with Recurrent runs of Vtach 9/26; was started on metoprolol but stopped in s/o hypertension  Echo reveals Right hrt strain   Holding diuretics; no indication, no evidence of pleural effusion, pulm edema on chest CT (9/24)  Non- Contrast CT 9/13; appears unchanged since 8/12  Brain MRI 9/28 w/o evidence of anoxic injury, shows microvascular ischemic changes

## 2021-10-09 NOTE — PROVIDER CONTACT NOTE (OTHER) - ACTION/TREATMENT ORDERED:
RRT called PT given fluid bolus albumin and labs sent no further change in treatment will continue to assess

## 2021-10-09 NOTE — PROGRESS NOTE ADULT - REASON FOR ADMISSION
SOB

## 2021-10-09 NOTE — PROGRESS NOTE ADULT - PROBLEM SELECTOR PLAN 9
Patient S/p Multiple PRBCs during admission   Continue to monitor cbc

## 2021-10-09 NOTE — PROGRESS NOTE ADULT - PROBLEM SELECTOR PROBLEM 3
Stable asthma
Bacteremia
Stable asthma
Advance care planning
Stable asthma
Bacteremia
Stable asthma
Stable asthma
Bacteremia
Bacteremia

## 2021-10-09 NOTE — PROVIDER CONTACT NOTE (OTHER) - DATE AND TIME:
08-Oct-2021 20:51
08-Oct-2021 23:30
08-Sep-2021 10:51
15-Aug-2021 09:30
19-Aug-2021 05:02
09-Oct-2021 12:55
09-Oct-2021 15:30
19-Aug-2021 15:30
08-Oct-2021 09:22
15-Aug-2021 14:35
04-Oct-2021 22:20
20-Aug-2021 11:05
08-Oct-2021 13:22
16-Aug-2021 03:45

## 2021-10-09 NOTE — PROVIDER CONTACT NOTE (OTHER) - NAME OF MD/NP/PA/DO NOTIFIED:
Yan PA
Connor Chavez, NP
Rosana Orozco NP
THIERNO Ospina
THIERNO Pettit
THIERNO ogdfrey
Dr Sparrow
PA Isa
PA Isa
Unit leadership and bed board.
[facility]
Connor Chavez, NP
RRT team

## 2021-10-09 NOTE — PROGRESS NOTE ADULT - NUTRITIONAL ASSESSMENT
This patient has been assessed with a concern for Malnutrition and has been determined to have a diagnosis/diagnoses of Moderate protein-calorie malnutrition.    This patient is being managed with:   Diet NPO with Tube Feed-  Tube Feeding Modality: Nasogastric  Glucerna 1.2 Stephen (GLUCERNARTH)  Total Volume for 24 Hours (mL): 1440  Continuous  Increase Tube Feed Rate by (mL): 10     Every 3 hours  Until Goal Tube Feed Rate (mL per Hour): 60  Tube Feed Duration (in Hours): 24  Tube Feed Start Time: 13:24  No Carb Prosource TF     Qty per Day:  2  Entered: Aug 27 2021  3:28PM    
This patient has been assessed with a concern for Malnutrition and has been determined to have a diagnosis/diagnoses of Severe protein-calorie malnutrition.    This patient is being managed with:   Diet NPO with Tube Feed-  Tube Feeding Modality: Nasogastric  Glucerna 1.2 Stephen (GLUCERNARTH)  Total Volume for 24 Hours (mL): 720  Continuous  Starting Tube Feed Rate {mL per Hour}: 30  Until Goal Tube Feed Rate (mL per Hour): 30  Tube Feed Duration (in Hours): 24  Tube Feed Start Time: 13:24  No Carb Prosource TF     Qty per Day:  2  Entered: Sep  3 2021  5:54PM    
This patient has been assessed with a concern for Malnutrition and has been determined to have a diagnosis/diagnoses of Severe protein-calorie malnutrition.    This patient is being managed with:   Diet NPO with Tube Feed-  Tube Feeding Modality: Nasogastric  Glucerna 1.5 Stephen (GLUCERNA1.5RTH)  Total Volume for 24 Hours (mL): 1080  Continuous  Starting Tube Feed Rate {mL per Hour}: 30  Increase Tube Feed Rate by (mL): 10     Every 2 hours  Until Goal Tube Feed Rate (mL per Hour): 60  Tube Feed Duration (in Hours): 18  Tube Feed Start Time: 11:00  Entered: Sep 11 2021  8:15AM    
This patient has been assessed with a concern for Malnutrition and has been determined to have a diagnosis/diagnoses of Severe protein-calorie malnutrition.    This patient is being managed with:   Diet NPO with Tube Feed-  Tube Feeding Modality: Orogastric  Vital AF (VITALAFRTH)  Total Volume for 24 Hours (mL): 1350  Continuous  Starting Tube Feed Rate {mL per Hour}: 10  Increase Tube Feed Rate by (mL): 10     Every 6 hours  Until Goal Tube Feed Rate (mL per Hour): 75  Tube Feed Duration (in Hours): 18  Tube Feed Start Time: 16:00  No Carb Prosource TF     Qty per Day:  1  Entered: Oct  5 2021  3:17PM    
This patient has been assessed with a concern for Malnutrition and has been determined to have a diagnosis/diagnoses of Severe protein-calorie malnutrition.    This patient is being managed with:   Diet NPO with Tube Feed-  Tube Feeding Modality: Nasogastric  Glucerna 1.2 Stephen (GLUCERNARTH)  Total Volume for 24 Hours (mL): 720  Continuous  Starting Tube Feed Rate {mL per Hour}: 30  Until Goal Tube Feed Rate (mL per Hour): 30  Tube Feed Duration (in Hours): 24  Tube Feed Start Time: 13:24  No Carb Prosource TF     Qty per Day:  2  Entered: Sep  3 2021  5:54PM    
This patient has been assessed with a concern for Malnutrition and has been determined to have a diagnosis/diagnoses of Severe protein-calorie malnutrition.    This patient is being managed with:   Diet NPO with Tube Feed-  Tube Feeding Modality: Nasogastric  Glucerna 1.2 Stephen (GLUCERNARTH)  Total Volume for 24 Hours (mL): 720  Continuous  Starting Tube Feed Rate {mL per Hour}: 30  Until Goal Tube Feed Rate (mL per Hour): 30  Tube Feed Duration (in Hours): 24  Tube Feed Start Time: 13:24  No Carb Prosource TF     Qty per Day:  2  Entered: Sep  3 2021  5:54PM    
This patient has been assessed with a concern for Malnutrition and has been determined to have a diagnosis/diagnoses of Severe protein-calorie malnutrition.    This patient is being managed with:   Diet NPO with Tube Feed-  Tube Feeding Modality: Nasogastric  Glucerna 1.5 Stephen (GLUCERNA1.5RTH)  Total Volume for 24 Hours (mL): 1080  Continuous  Starting Tube Feed Rate {mL per Hour}: 30  Increase Tube Feed Rate by (mL): 10     Every 2 hours  Until Goal Tube Feed Rate (mL per Hour): 60  Tube Feed Duration (in Hours): 18  Tube Feed Start Time: 11:00  Entered: Sep 11 2021  8:15AM    
This patient has been assessed with a concern for Malnutrition and has been determined to have a diagnosis/diagnoses of Severe protein-calorie malnutrition.    This patient is being managed with:   Diet NPO with Tube Feed-  Tube Feeding Modality: Nasogastric  Glucerna 1.5 Stephen (GLUCERNA1.5RTH)  Total Volume for 24 Hours (mL): 1200  Continuous  Starting Tube Feed Rate {mL per Hour}: 30  Increase Tube Feed Rate by (mL): 10     Every 2 hours  Until Goal Tube Feed Rate (mL per Hour): 50  Tube Feed Duration (in Hours): 24  Tube Feed Start Time: 13:24  Entered: Sep  7 2021  3:29PM    
This patient has been assessed with a concern for Malnutrition and has been determined to have a diagnosis/diagnoses of Severe protein-calorie malnutrition.    This patient is being managed with:   Diet NPO with Tube Feed-  Tube Feeding Modality: Nasogastric  Glucerna 1.5 Stephen (GLUCERNA1.5RTH)  Total Volume for 24 Hours (mL): 1440  Continuous  Starting Tube Feed Rate {mL per Hour}: 30  Increase Tube Feed Rate by (mL): 10     Every 2 hours  Until Goal Tube Feed Rate (mL per Hour): 60  Tube Feed Duration (in Hours): 24  Tube Feed Start Time: 13:24  Entered: Sep  8 2021  8:05AM    
This patient has been assessed with a concern for Malnutrition and has been determined to have a diagnosis/diagnoses of Severe protein-calorie malnutrition.    This patient is being managed with:   Diet NPO with Tube Feed-  Tube Feeding Modality: Orogastric  Vital AF (VITALAFRTH)  Total Volume for 24 Hours (mL): 1350  Continuous  Starting Tube Feed Rate {mL per Hour}: 10  Increase Tube Feed Rate by (mL): 10     Every 6 hours  Until Goal Tube Feed Rate (mL per Hour): 75  Tube Feed Duration (in Hours): 18  Tube Feed Start Time: 16:00  Entered: Sep 21 2021  3:44PM    
This patient has been assessed with a concern for Malnutrition and has been determined to have a diagnosis/diagnoses of Moderate protein-calorie malnutrition.    This patient is being managed with:   Diet NPO with Tube Feed-  Tube Feeding Modality: Nasogastric  Glucerna 1.2 Stephen (GLUCERNARTH)  Total Volume for 24 Hours (mL): 1440  Continuous  Increase Tube Feed Rate by (mL): 10     Every 3 hours  Until Goal Tube Feed Rate (mL per Hour): 60  Tube Feed Duration (in Hours): 24  Tube Feed Start Time: 13:24  No Carb Prosource TF     Qty per Day:  2  Entered: Aug 27 2021  3:28PM    
This patient has been assessed with a concern for Malnutrition and has been determined to have a diagnosis/diagnoses of Severe protein-calorie malnutrition.    This patient is being managed with:   Diet NPO after Midnight-     NPO Start Date: 28-Sep-2021   NPO Start Time: 23:59  Except Medications  Entered: Sep 28 2021 10:24PM    Diet NPO with Tube Feed-  Tube Feeding Modality: Orogastric  Vital AF (VITALAFRTH)  Total Volume for 24 Hours (mL): 1350  Continuous  Starting Tube Feed Rate {mL per Hour}: 10  Increase Tube Feed Rate by (mL): 10     Every 6 hours  Until Goal Tube Feed Rate (mL per Hour): 75  Tube Feed Duration (in Hours): 18  Tube Feed Start Time: 16:00  Entered: Sep 21 2021  3:44PM    
This patient has been assessed with a concern for Malnutrition and has been determined to have a diagnosis/diagnoses of Severe protein-calorie malnutrition.    This patient is being managed with:   Diet NPO after Midnight-     NPO Start Date: 30-Sep-2021   NPO Start Time: 23:59  Except Medications  Entered: Sep 30 2021  4:29PM    Diet NPO with Tube Feed-  Tube Feeding Modality: Orogastric  Vital AF (VITALAFRTH)  Total Volume for 24 Hours (mL): 1350  Continuous  Starting Tube Feed Rate {mL per Hour}: 10  Increase Tube Feed Rate by (mL): 10     Every 6 hours  Until Goal Tube Feed Rate (mL per Hour): 75  Tube Feed Duration (in Hours): 18  Tube Feed Start Time: 16:00  Entered: Sep 21 2021  3:44PM    
This patient has been assessed with a concern for Malnutrition and has been determined to have a diagnosis/diagnoses of Severe protein-calorie malnutrition.    This patient is being managed with:   Diet NPO with Tube Feed-  Tube Feeding Modality: Nasogastric  Glucerna 1.5 Stephen (GLUCERNA1.5RTH)  Total Volume for 24 Hours (mL): 1080  Continuous  Starting Tube Feed Rate {mL per Hour}: 30  Increase Tube Feed Rate by (mL): 10     Every 2 hours  Until Goal Tube Feed Rate (mL per Hour): 60  Tube Feed Duration (in Hours): 18  Tube Feed Start Time: 11:00  Entered: Sep 11 2021  8:15AM    
This patient has been assessed with a concern for Malnutrition and has been determined to have a diagnosis/diagnoses of Severe protein-calorie malnutrition.    This patient is being managed with:   Diet NPO with Tube Feed-  Tube Feeding Modality: Nasogastric  Glucerna 1.5 Stephen (GLUCERNA1.5RTH)  Total Volume for 24 Hours (mL): 1080  Continuous  Starting Tube Feed Rate {mL per Hour}: 30  Increase Tube Feed Rate by (mL): 10     Every 2 hours  Until Goal Tube Feed Rate (mL per Hour): 60  Tube Feed Duration (in Hours): 18  Tube Feed Start Time: 11:00  Entered: Sep 11 2021  8:15AM    
This patient has been assessed with a concern for Malnutrition and has been determined to have a diagnosis/diagnoses of Severe protein-calorie malnutrition.    This patient is being managed with:   Diet NPO with Tube Feed-  Tube Feeding Modality: Nasogastric  Glucerna 1.5 Stephen (GLUCERNA1.5RTH)  Total Volume for 24 Hours (mL): 1440  Continuous  Starting Tube Feed Rate {mL per Hour}: 30  Increase Tube Feed Rate by (mL): 10     Every 2 hours  Until Goal Tube Feed Rate (mL per Hour): 60  Tube Feed Duration (in Hours): 24  Tube Feed Start Time: 13:24  Entered: Sep  8 2021  8:05AM    
This patient has been assessed with a concern for Malnutrition and has been determined to have a diagnosis/diagnoses of Severe protein-calorie malnutrition.    This patient is being managed with:   Diet NPO with Tube Feed-  Tube Feeding Modality: Orogastric  Vital AF (VITALAFRTH)  Total Volume for 24 Hours (mL): 1350  Continuous  Starting Tube Feed Rate {mL per Hour}: 10  Increase Tube Feed Rate by (mL): 10     Every 6 hours  Until Goal Tube Feed Rate (mL per Hour): 75  Tube Feed Duration (in Hours): 18  Tube Feed Start Time: 16:00  Entered: Sep 21 2021  3:44PM    
This patient has been assessed with a concern for Malnutrition and has been determined to have a diagnosis/diagnoses of Severe protein-calorie malnutrition.    This patient is being managed with:   Diet NPO with Tube Feed-  Tube Feeding Modality: Orogastric  Vital AF (VITALAFRTH)  Total Volume for 24 Hours (mL): 1350  Continuous  Starting Tube Feed Rate {mL per Hour}: 10  Increase Tube Feed Rate by (mL): 10     Every 6 hours  Until Goal Tube Feed Rate (mL per Hour): 75  Tube Feed Duration (in Hours): 18  Tube Feed Start Time: 16:00  Entered: Sep 21 2021  3:44PM      This patient has been assessed with a concern for Malnutrition and has been determined to have a diagnosis/diagnoses of Severe protein-calorie malnutrition.    This patient is being managed with:   Diet NPO with Tube Feed-  Tube Feeding Modality: Orogastric  Vital AF (VITALAFRTH)  Total Volume for 24 Hours (mL): 1350  Continuous  Starting Tube Feed Rate {mL per Hour}: 10  Increase Tube Feed Rate by (mL): 10     Every 6 hours  Until Goal Tube Feed Rate (mL per Hour): 75  Tube Feed Duration (in Hours): 18  Tube Feed Start Time: 16:00  Entered: Sep 21 2021  3:44PM    
This patient has been assessed with a concern for Malnutrition and has been determined to have a diagnosis/diagnoses of Moderate protein-calorie malnutrition.    This patient is being managed with:   Diet NPO with Tube Feed-  Tube Feeding Modality: Nasogastric  Glucerna 1.2 Stephen (GLUCERNARTH)  Total Volume for 24 Hours (mL): 1440  Continuous  Increase Tube Feed Rate by (mL): 10     Every 3 hours  Until Goal Tube Feed Rate (mL per Hour): 60  Tube Feed Duration (in Hours): 24  Tube Feed Start Time: 13:24  No Carb Prosource TF     Qty per Day:  2  Entered: Aug 27 2021  3:28PM    
This patient has been assessed with a concern for Malnutrition and has been determined to have a diagnosis/diagnoses of Moderate protein-calorie malnutrition.    This patient is being managed with:   Diet NPO with Tube Feed-  Tube Feeding Modality: Nasogastric  Glucerna 1.2 Stephen (GLUCERNARTH)  Total Volume for 24 Hours (mL): 1440  Continuous  Increase Tube Feed Rate by (mL): 10     Every 3 hours  Until Goal Tube Feed Rate (mL per Hour): 60  Tube Feed Duration (in Hours): 24  Tube Feed Start Time: 13:24  No Carb Prosource TF     Qty per Day:  2  Entered: Aug 27 2021  3:28PM    
This patient has been assessed with a concern for Malnutrition and has been determined to have a diagnosis/diagnoses of Severe protein-calorie malnutrition.    This patient is being managed with:   Diet NPO with Tube Feed-  Tube Feeding Modality: Nasogastric  Glucerna 1.5 Stephen (GLUCERNA1.5RTH)  Total Volume for 24 Hours (mL): 1080  Continuous  Starting Tube Feed Rate {mL per Hour}: 30  Increase Tube Feed Rate by (mL): 10     Every 2 hours  Until Goal Tube Feed Rate (mL per Hour): 60  Tube Feed Duration (in Hours): 18  Tube Feed Start Time: 11:00  Entered: Sep 11 2021  8:15AM    
This patient has been assessed with a concern for Malnutrition and has been determined to have a diagnosis/diagnoses of Severe protein-calorie malnutrition.    This patient is being managed with:   Diet NPO with Tube Feed-  Tube Feeding Modality: Nasogastric  Glucerna 1.5 Stephen (GLUCERNA1.5RTH)  Total Volume for 24 Hours (mL): 1080  Continuous  Starting Tube Feed Rate {mL per Hour}: 30  Increase Tube Feed Rate by (mL): 10     Every 2 hours  Until Goal Tube Feed Rate (mL per Hour): 60  Tube Feed Duration (in Hours): 18  Tube Feed Start Time: 11:00  Entered: Sep 11 2021  8:15AM    
This patient has been assessed with a concern for Malnutrition and has been determined to have a diagnosis/diagnoses of Severe protein-calorie malnutrition.    This patient is being managed with:   Diet NPO with Tube Feed-  Tube Feeding Modality: Nasogastric  Glucerna 1.5 Stephen (GLUCERNA1.5RTH)  Total Volume for 24 Hours (mL): 1440  Continuous  Starting Tube Feed Rate {mL per Hour}: 30  Increase Tube Feed Rate by (mL): 10     Every 2 hours  Until Goal Tube Feed Rate (mL per Hour): 60  Tube Feed Duration (in Hours): 24  Tube Feed Start Time: 13:24  Entered: Sep  8 2021  8:05AM    
This patient has been assessed with a concern for Malnutrition and has been determined to have a diagnosis/diagnoses of Severe protein-calorie malnutrition.    This patient is being managed with:   Diet NPO with Tube Feed-  Tube Feeding Modality: Orogastric  Vital AF (VITALAFRTH)  Total Volume for 24 Hours (mL): 1350  Continuous  Starting Tube Feed Rate {mL per Hour}: 10  Increase Tube Feed Rate by (mL): 10     Every 6 hours  Until Goal Tube Feed Rate (mL per Hour): 75  Tube Feed Duration (in Hours): 18  Tube Feed Start Time: 16:00  Entered: Sep 21 2021  3:44PM    
This patient has been assessed with a concern for Malnutrition and has been determined to have a diagnosis/diagnoses of Moderate protein-calorie malnutrition.    This patient is being managed with:   Diet NPO with Tube Feed-  Tube Feeding Modality: Nasogastric  Glucerna 1.2 Stephen (GLUCERNARTH)  Total Volume for 24 Hours (mL): 1440  Continuous  Increase Tube Feed Rate by (mL): 10     Every 3 hours  Until Goal Tube Feed Rate (mL per Hour): 60  Tube Feed Duration (in Hours): 24  Tube Feed Start Time: 13:24  Entered: Aug 25 2021  3:26PM    
This patient has been assessed with a concern for Malnutrition and has been determined to have a diagnosis/diagnoses of Moderate protein-calorie malnutrition.    This patient is being managed with:   Diet NPO with Tube Feed-  Tube Feeding Modality: Nasogastric  Glucerna 1.2 Stephen (GLUCERNARTH)  Total Volume for 24 Hours (mL): 1440  Continuous  Increase Tube Feed Rate by (mL): 10     Every 3 hours  Until Goal Tube Feed Rate (mL per Hour): 60  Tube Feed Duration (in Hours): 24  Tube Feed Start Time: 13:24  Entered: Aug 25 2021  3:26PM    
This patient has been assessed with a concern for Malnutrition and has been determined to have a diagnosis/diagnoses of Moderate protein-calorie malnutrition.    This patient is being managed with:   Diet NPO with Tube Feed-  Tube Feeding Modality: Nasogastric  Glucerna 1.2 Stephen (GLUCERNARTH)  Total Volume for 24 Hours (mL): 1440  Continuous  Increase Tube Feed Rate by (mL): 10     Every 3 hours  Until Goal Tube Feed Rate (mL per Hour): 60  Tube Feed Duration (in Hours): 24  Tube Feed Start Time: 13:24  No Carb Prosource TF     Qty per Day:  2  Entered: Aug 27 2021  3:28PM    
This patient has been assessed with a concern for Malnutrition and has been determined to have a diagnosis/diagnoses of Severe protein-calorie malnutrition.    This patient is being managed with:   Diet NPO after Midnight-     NPO Start Date: 28-Sep-2021   NPO Start Time: 23:59  Except Medications  Entered: Sep 28 2021 10:24PM    Diet NPO with Tube Feed-  Tube Feeding Modality: Orogastric  Vital AF (VITALAFRTH)  Total Volume for 24 Hours (mL): 1350  Continuous  Starting Tube Feed Rate {mL per Hour}: 10  Increase Tube Feed Rate by (mL): 10     Every 6 hours  Until Goal Tube Feed Rate (mL per Hour): 75  Tube Feed Duration (in Hours): 18  Tube Feed Start Time: 16:00  Entered: Sep 21 2021  3:44PM    
This patient has been assessed with a concern for Malnutrition and has been determined to have a diagnosis/diagnoses of Severe protein-calorie malnutrition.    This patient is being managed with:   Diet NPO with Tube Feed-  Tube Feeding Modality: Nasogastric  Glucerna 1.5 Stephen (GLUCERNA1.5RTH)  Total Volume for 24 Hours (mL): 1080  Continuous  Starting Tube Feed Rate {mL per Hour}: 30  Increase Tube Feed Rate by (mL): 10     Every 2 hours  Until Goal Tube Feed Rate (mL per Hour): 60  Tube Feed Duration (in Hours): 18  Tube Feed Start Time: 11:00  Entered: Sep 11 2021  8:15AM    
This patient has been assessed with a concern for Malnutrition and has been determined to have a diagnosis/diagnoses of Severe protein-calorie malnutrition.    This patient is being managed with:   Diet NPO with Tube Feed-  Tube Feeding Modality: Orogastric  Vital AF (VITALAFRTH)  Total Volume for 24 Hours (mL): 1350  Continuous  Starting Tube Feed Rate {mL per Hour}: 10  Increase Tube Feed Rate by (mL): 10     Every 6 hours  Until Goal Tube Feed Rate (mL per Hour): 75  Tube Feed Duration (in Hours): 18  Tube Feed Start Time: 16:00  Entered: Sep 21 2021  3:44PM    
This patient has been assessed with a concern for Malnutrition and has been determined to have a diagnosis/diagnoses of Severe protein-calorie malnutrition.    This patient is being managed with:   Diet NPO with Tube Feed-  Tube Feeding Modality: Nasogastric  Glucerna 1.5 Stephen (GLUCERNA1.5RTH)  Total Volume for 24 Hours (mL): 1080  Continuous  Starting Tube Feed Rate {mL per Hour}: 30  Increase Tube Feed Rate by (mL): 10     Every 2 hours  Until Goal Tube Feed Rate (mL per Hour): 60  Tube Feed Duration (in Hours): 18  Tube Feed Start Time: 11:00  Entered: Sep 11 2021  8:15AM    
This patient has been assessed with a concern for Malnutrition and has been determined to have a diagnosis/diagnoses of Severe protein-calorie malnutrition.    This patient is being managed with:   Diet NPO with Tube Feed-  Tube Feeding Modality: Orogastric  Vital AF (VITALAFRTH)  Total Volume for 24 Hours (mL): 1350  Continuous  Starting Tube Feed Rate {mL per Hour}: 10  Increase Tube Feed Rate by (mL): 10     Every 6 hours  Until Goal Tube Feed Rate (mL per Hour): 75  Tube Feed Duration (in Hours): 18  Tube Feed Start Time: 16:00  No Carb Prosource TF     Qty per Day:  1  Entered: Oct  5 2021  3:17PM    

## 2021-10-09 NOTE — CHART NOTE - NSCHARTNOTEFT_GEN_A_CORE
Medicine NP Expiration note    Call received from RN to pronouce the patient  Patient on trach to vent, DNR, Comfort care molst form in the chart. Medicine NP Expiration note    Called by RN to pronounce the patient with cardiopulmonary arrest secondary to ARDFS r/t Covid PNA  Patient found in bed lying, Heart sounds absent, no spontaneous respirations, pupils fixed and dilated. Prounced at 21:26 PM Patient's family at bedside. patient not a ME case.   Call placed for Pulmonary Fellow, call back awaited.  Emotional support provided to the family.    Noni Ordaz [facility]  [id number].

## 2021-10-09 NOTE — DISCHARGE NOTE FOR THE EXPIRED PATIENT - NS PATIENT DEATH CRITERIA
Past Medical History:   Diagnosis Date    CHF (congestive heart failure)     Chronic back pain     Constipation due to pain medication     Diabetes mellitus     Epilepsy     MAICOL (generalized anxiety disorder)     Gout     Pulmonary embolus        Past Surgical History:   Procedure Laterality Date    BRAIN SURGERY      Doubel heart valve replacement      JOINT REPLACEMENT      lumbar back surgery 2001      TOTAL KNEE ARTHROPLASTY      Left       Review of patient's allergies indicates:   Allergen Reactions    Acetaminophen Swelling       No current facility-administered medications on file prior to encounter.      No current outpatient medications on file prior to encounter.     Family History     Problem Relation (Age of Onset)    Hypertension Mother        Tobacco Use    Smoking status: Former Smoker   Substance and Sexual Activity    Alcohol use: Not Currently     Frequency: Never    Drug use: Never    Sexual activity: Not Currently     Review of Systems   Unable to perform ROS: Intubated     Objective:     Vital Signs (Most Recent):  Temp: 99 °F (37.2 °C) (05/22/19 1320)  Pulse: 105 (05/22/19 1643)  Resp: (!) 30 (05/22/19 1643)  BP: 99/67 (05/22/19 1619)  SpO2: (!) (P) 81 % (05/22/19 1643) Vital Signs (24h Range):  Temp:  [99 °F (37.2 °C)] 99 °F (37.2 °C)  Pulse:  [] 105  Resp:  [16-37] 30  SpO2:  [59 %-98 %] (P) 81 %  BP: ()/(35-89) 99/67     Weight: 89.6 kg (197 lb 9.6 oz)  Body mass index is 31.89 kg/m².    Physical Exam   Constitutional: She appears well-developed and well-nourished. She is intubated.   HENT:   Head: Normocephalic and atraumatic.   Eyes: Pupils are equal, round, and reactive to light. EOM are normal.   Neck: Normal range of motion. Neck supple. No JVD present.   Cardiovascular: Regular rhythm and intact distal pulses. Tachycardia present.   Murmur heard.   Systolic murmur is present with a grade of 3/6.  Pulmonary/Chest: Effort normal. She is intubated. No  Patient is dead based on Cardiopulmonary criteria including absent breath sounds, pulselessness and/or asystole respiratory distress. She has decreased breath sounds. She has wheezes in the right middle field, the right lower field and the left middle field.   Abdominal: Soft. Bowel sounds are normal. She exhibits no distension.   Musculoskeletal: Normal range of motion. She exhibits no edema or tenderness.   Neurological: She has normal reflexes. She is unresponsive. No cranial nerve deficit.   Skin: Skin is warm, dry and intact. Capillary refill takes more than 3 seconds. No erythema. There is pallor.   Psychiatric: Cognition and memory are impaired. She is noncommunicative.   Nursing note and vitals reviewed.        CRANIAL NERVES     CN III, IV, VI   Pupils are equal, round, and reactive to light.  Extraocular motions are normal.   Right pupil: Reactivity: sluggish.   Left pupil: Reactivity: sluggish.        Significant Labs:   ABGs:   Recent Labs   Lab 05/22/19  1445   PH 6.918*   PCO2 45.0   HCO3 9.2*   POCSATURATED 97   BE -23     Blood Culture: No results for input(s): LABBLOO in the last 48 hours.  BMP:   Recent Labs   Lab 05/22/19  1255   *   *   K 4.0   CL 87*   CO2 8*   BUN 19   CREATININE 2.1*   CALCIUM 9.1   MG 1.6     CBC:   Recent Labs   Lab 05/22/19  1255 05/22/19  1328   WBC 11.39  --    HGB 14.2  --    HCT 47.5 45     --      CMP:   Recent Labs   Lab 05/22/19  1255   *   K 4.0   CL 87*   CO2 8*   *   BUN 19   CREATININE 2.1*   CALCIUM 9.1   PROT 7.1   ALBUMIN 3.2*   BILITOT 1.4*   ALKPHOS 234*   *   *   ANIONGAP 36*   EGFRNONAA 26*     Lactic Acid:   Recent Labs   Lab 05/22/19  1255 05/22/19  1528   LACTATE >12.0* >12.0*     Lipase: No results for input(s): LIPASE in the last 48 hours.  Troponin:   Recent Labs   Lab 05/22/19  1255   TROPONINI 0.071*     Urine Studies:   Recent Labs   Lab 05/22/19  1253   COLORU Yellow   APPEARANCEUA Clear   PHUR 6.0   SPECGRAV >=1.030*   PROTEINUA 1+*   GLUCUA Negative   KETONESU Trace*   BILIRUBINUA Negative   OCCULTUA  Negative   NITRITE Negative   UROBILINOGEN 4.0-6.0*   LEUKOCYTESUR Negative   RBCUA 8*   WBCUA 1   BACTERIA Moderate*   HYALINECASTS 0     All pertinent labs within the past 24 hours have been reviewed.    Significant Imaging: I have reviewed all pertinent imaging results/findings within the past 24 hours.

## 2021-10-09 NOTE — PROGRESS NOTE ADULT - ASSESSMENT
Patient 71 year old Male with PMH of HTN, DM2, BPH, and Asthma admitted with COVID-19 ( Unvaccinated) with subsequent ARDS 2/2 Viral Pneumonia. Patient received Tocilizumab Remdemsivir and Two courses of Decadron. Patients hospital course complicated MRSE Bacteremia; initially txd with Vanco and later transitioned to Daptomycin in setting of persistent bacteremia. Infectious work-up conducted with TTE, which was negative for vegetations.  CT A/P done, which showed proctitis, for which he was txd with Unasyn x7d (finished 10/1). SEJAL deferred by cardiology and decision was made to treat empirically for at least 6 weeks. Patients Hospital course also complicated by VT Arrest and Bilateral Lower extremity DVTS and RT thigh hematoma in setting of AC. Extensive GOC discussions held with family throughout, with palliative care input. Decision made to opt for tracheostomy; patient received PEG on 9/30 and trach 10/1.     10/5: Patient remains with Vent Dyssynchrony; when patient becomes tachypneic /dyspneic.  Requires dilaudid frequently, adjust standing sedatives accordingly. Patient needs PICC for 6 weeks of dapto from negative blood cultures. Would like to start Eliquis and stop heparin.  10/8: Tachycardic-sinus, does not appear to be pain mediated. Volume given in PRBC no change in HR. Lopressor 25 via enteral tube given. H+H dropped to <7 given 1 unit PRBC. Will add baclofen to regimen and increase methadone to 10 tid, add saline nebs to aid in airway clearance. Patient 71 year old Male with PMH of HTN, DM2, BPH, and Asthma admitted with COVID-19 ( Unvaccinated) with subsequent ARDS 2/2 Viral Pneumonia. Patient received Tocilizumab Remdemsivir and Two courses of Decadron. Patients hospital course complicated MRSE Bacteremia; initially txd with Vanco and later transitioned to Daptomycin in setting of persistent bacteremia. Infectious work-up conducted with TTE, which was negative for vegetations.  CT A/P done, which showed proctitis, for which he was txd with Unasyn x7d (finished 10/1). SEJAL deferred by cardiology and decision was made to treat empirically for at least 6 weeks. Patients Hospital course also complicated by VT Arrest and Bilateral Lower extremity DVTS and RT thigh hematoma in setting of AC. Extensive GOC discussions held with family throughout, with palliative care input. Decision made to opt for tracheostomy; patient received PEG on 9/30 and trach 10/1.     10/5: Patient remains with Vent Dyssynchrony; when patient becomes tachypneic /dyspneic.  Requires dilaudid frequently, adjust standing sedatives accordingly. Patient needs PICC for 6 weeks of dapto from negative blood cultures. Would like to start Eliquis and stop heparin.  10/8: Tachycardic-sinus, does not appear to be pain mediated. Volume given in PRBC no change in HR. Lopressor 25 via enteral tube given. H+H dropped to <7 given 1 unit PRBC. Will add baclofen to regimen and increase methadone to 10 tid, add saline nebs to aid in airway clearance.  10/9: Hgb responded appropriately post transfusion yesterday. Over night patient was hypotensive with systolic BP in 60s and fever of 102F. RRT called and patient given fluids and albumin. Given fever in presence of daptomycin will add meropenem to cover gram negative bacteria. Will give 1L LR over 10hrs. Free water increased for further volume expansion post IVF. Family updated at bedside and questions concerns answered.

## 2021-10-09 NOTE — PROGRESS NOTE ADULT - PROBLEM SELECTOR PROBLEM 5
BPH (benign prostatic hyperplasia)
BPH (benign prostatic hyperplasia)
Agitation
BPH (benign prostatic hyperplasia)
BPH (benign prostatic hyperplasia)
Agitation
BPH (benign prostatic hyperplasia)
Agitation

## 2021-10-09 NOTE — PROGRESS NOTE ADULT - ATTENDING COMMENTS
seen and examined with acp, agree with above  RRT noted, with hypotension and fever. bp responding to fluids  rahul added  cultures sent  cont adrian as well

## 2021-10-09 NOTE — PROGRESS NOTE ADULT - PROBLEM SELECTOR PROBLEM 2
Acute respiratory failure with hypoxia
Cardiac arrest with successful resuscitation
DM2 (diabetes mellitus, type 2)
DM2 (diabetes mellitus, type 2)
Cardiac arrest with successful resuscitation
DM2 (diabetes mellitus, type 2)
DM2 (diabetes mellitus, type 2)
Cardiac arrest with successful resuscitation
Cardiac arrest with successful resuscitation
DM2 (diabetes mellitus, type 2)
Acute respiratory failure with hypoxia
Cardiac arrest with successful resuscitation
Cardiac arrest with successful resuscitation

## 2021-10-09 NOTE — PROGRESS NOTE ADULT - PROBLEM SELECTOR PLAN 3
Hospital course complicated by Staph Epi Bacteremia  Patient initially txd with Vanco but changed to Daptomycin in setting of persistent Bacteremia   Repeat Blood cxs 10/2: No growth to date   Continue to Monitor CPK Weekly   Repeat TTE 10/4: No evidence of Obvious Vegetation   Will need 6 weeks of Dapto from negative blood cultures  ID Continues to Follow Hospital course complicated by Staph Epi Bacteremia  Patient initially txd with Vanco but changed to Daptomycin in setting of persistent Bacteremia   Repeat Blood cxs 10/2: No growth to date   Continue to Monitor CPK Weekly   Repeat TTE 10/4: No evidence of Obvious Vegetation   Will need 6 weeks of Dapto from negative blood cultures  10/8: Fever of 102F and hypotensive, Added Meropenem 1GM Q8H from gram negative coverage 10/9 --> ?  ID Continues to Follow

## 2021-10-09 NOTE — PROVIDER CONTACT NOTE (OTHER) - RECOMMENDATIONS
Notify NP continue to monitor
RRT called, see RRT sheet
Notify NP, continue to monitor, bipap?
Applied
Bladder scan
Notify provider.
give PRN dose of Dilaudid 0.5 mg ivp
Notify MD
make PA aware, reconsult MICU, continue to monitor.

## 2021-10-09 NOTE — RAPID RESPONSE TEAM SUMMARY - NSADDTLFINDINGSRRT_GEN_ALL_CORE
On arrival to RRT pt's HR 100s, SpO2 95% on mechanical ventilator 80% and PEEP 5, tachypneic to 30s, BP 64/47, . As per primary team, pt is intermittently tachypneic however more so today with associated tachycardia to 130s. When taking vital signs at midnight pt was found with undetectable BP, manual read 60s/40s at which time RRT called. He received dilaudid as thought to be attributed to pain and soon after Lopressor. He was found to have fever earlier this evening to 102.1F for which he received Tylenol and was cultured as well as bronchial lavage culture. He remains on Daptomycin for persistent staph epi bacteremia and is followed by ID. He is on a Heparin gtt for LE DVT with recent drop in Hgb to 6.8 10/8 AM for which he received 1u PRBC with good response in Hgb to 8.2 and no signs of bleeding. Pt AAOx0 and unresponsive at baseline, with diffuse anasarca, tachypneic on pressure control ventilation with TV low 300s. On arrival to RRT pt's HR 100s, SpO2 95% on mechanical ventilator 80% and PEEP 5, tachypneic to 30s, BP 64/47, . As per primary team, when taking vital signs at midnight pt was found with undetectable BP on bedside monitor, manual reading 60s/40s at which time RRT called. As per primary team, pt is intermittently tachypneic however more so today with associated tachycardia to 130s. He received dilaudid as thought to be attributed to pain and soon after Lopressor. He was found to have fever earlier this evening to 102.1F for which he received Tylenol and was cultured as well as bronchial lavage culture. He remains on Daptomycin for persistent staph epi bacteremia and is followed by ID. He is on a Heparin gtt for LE DVT with recent drop in Hgb to 6.8 10/8 AM for which he received 1u PRBC with good response in Hgb to 8.2 and no signs of bleeding. Pt AAOx0 and unresponsive at baseline, with diffuse anasarca, tachypneic on pressure control ventilation with TV low 300s.

## 2021-10-09 NOTE — PROGRESS NOTE ADULT - PROBLEM SELECTOR PLAN 7
Patient with B/L Lower extremity DVTs; above knee on left and below knee on R  Currently remains on Heparin Drip but with recent RT Thigh Hematoma   Hep gtt challenge restarted 10/1; goal 45-60  Will monitor for stability in H+H prior to starting NOAC ( Possibly tomorrow) Patient with B/L Lower extremity DVTs; above knee on left and below knee on R  Currently remains on Heparin Drip but with recent RT Thigh Hematoma   Hep gtt challenge restarted 10/1; goal 45-60  Will monitor for stability in H+H prior to starting NOAC

## 2021-10-09 NOTE — PROGRESS NOTE ADULT - PROBLEM SELECTOR PLAN 6
Continue Insulin Sliding scale and Humulin N  Monitor BGMs

## 2021-10-09 NOTE — PROGRESS NOTE ADULT - SUBJECTIVE AND OBJECTIVE BOX
Patient is a 71y old  Male who presents with a chief complaint of SOB (08 Oct 2021 13:47)      Interval Events:    REVIEW OF SYSTEMS:  [ ] Positive  [ ] All other systems negative  [ ] Unable to assess ROS because ________    Vital Signs Last 24 Hrs  T(C): 37.2 (10-09-21 @ 04:08), Max: 38.9 (10-08-21 @ 20:30)  T(F): 98.9 (10-09-21 @ 04:08), Max: 102.1 (10-08-21 @ 20:30)  HR: 102 (10-09-21 @ 05:15) (92 - 134)  BP: 94/60 (10-09-21 @ 04:08) (64/48 - 113/68)  RR: 30 (10-09-21 @ 04:08) (22 - 51)  SpO2: 96% (10-09-21 @ 05:15) (92% - 97%)    PHYSICAL EXAM:  HEENT:   [ ]Tracheostomy:  [ ]Pupils equal  [ ]No oral lesions  [ ]Abnormal    SKIN  [ ]No Rash  [ ] Abnormal  [ ] pressure    CARDIAC  [ ]Regular  [ ]Abnormal    PULMONARY  [ ]Bilateral Clear Breath Sounds  [ ]Normal Excursion  [ ]Abnormal    GI  [ ]PEG      [ ] +BS		              [ ]Soft, nondistended, nontender	  [ ]Abnormal    MUSCULOSKELETAL                                   [ ]Bedbound                 [ ]Abnormal    [ ]Ambulatory/OOB to chair                           EXTREMITIES                                         [ ]Normal  [ ]Edema                           NEUROLOGIC  [ ] Normal, non focal  [ ] Focal findings:    PSYCHIATRIC  [ ]Alert and appropriate  [ ] Sedated	 [ ]Agitated    :  Ramos: [ ] Yes, if yes: Date of Placement:                   [  ] No    LINES: Central Lines [ ] Yes, if yes: Date of Placement                                     [  ] No    HOSPITAL MEDICATIONS:  MEDICATIONS  (STANDING):  albumin human 25% IVPB 100 milliLiter(s) IV Intermittent once  albuterol/ipratropium for Nebulization 3 milliLiter(s) Nebulizer every 6 hours  ascorbic acid 500 milliGRAM(s) Oral daily  baclofen 10 milliGRAM(s) Oral every 8 hours  budesonide 160 MICROgram(s)/formoterol 4.5 MICROgram(s) Inhaler 2 Puff(s) Inhalation two times a day  chlorhexidine 4% Liquid 1 Application(s) Topical <User Schedule>  cholecalciferol 2000 Unit(s) Oral daily  DAPTOmycin IVPB 700 milliGRAM(s) IV Intermittent every 24 hours  DAPTOmycin IVPB      dextrose 5%. 1000 milliLiter(s) (50 mL/Hr) IV Continuous <Continuous>  dextrose 5%. 1000 milliLiter(s) (100 mL/Hr) IV Continuous <Continuous>  dextrose 50% Injectable 25 Gram(s) IV Push once  glucagon  Injectable 1 milliGRAM(s) IntraMuscular once  heparin  Infusion 950 Unit(s)/Hr (9.5 mL/Hr) IV Continuous <Continuous>  insulin lispro (ADMELOG) corrective regimen sliding scale   SubCutaneous every 6 hours  insulin NPH human recombinant 10 Unit(s) SubCutaneous every 6 hours  LORazepam     Tablet 1 milliGRAM(s) Oral every 8 hours  methadone   Solution 10 milliGRAM(s) Oral every 8 hours  multivitamin/minerals/iron Oral Solution (CENTRUM) 15 milliLiter(s) Enteral Tube daily  naloxegol 25 milliGRAM(s) Oral daily  pantoprazole  Injectable 40 milliGRAM(s) IV Push daily  QUEtiapine 25 milliGRAM(s) Oral <User Schedule>  senna Syrup 10 milliLiter(s) Oral at bedtime  sodium chloride 3%  Inhalation 3 milliLiter(s) Inhalation every 6 hours  ursodiol Tablet 500 milliGRAM(s) Oral two times a day    MEDICATIONS  (PRN):  acetaminophen    Suspension .. 650 milliGRAM(s) Oral every 6 hours PRN Temp greater or equal to 38C (100.4F), Mild Pain (1 - 3), Moderate Pain (4 - 6)  HYDROmorphone  Injectable 1 milliGRAM(s) IV Push every 4 hours PRN Respiratory distress  polyethylene glycol 3350 17 Gram(s) Oral daily PRN Constipation      LABS:                        8.0    12.00 )-----------( 200      ( 09 Oct 2021 00:32 )             27.6     10-09    139  |  99  |  61<H>  ----------------------------<  275<H>  3.8   |  24  |  1.11    Ca    8.6      09 Oct 2021 00:32  Phos  4.8     10-09  Mg     2.4     10-09    TPro  6.4  /  Alb  2.3<L>  /  TBili  7.8<H>  /  DBili  x   /  AST  133<H>  /  ALT  122<H>  /  AlkPhos  1298<H>  10-09    PT/INR - ( 08 Oct 2021 06:45 )   PT: 12.6 sec;   INR: 1.05 ratio         PTT - ( 08 Oct 2021 22:20 )  PTT:42.1 sec    Arterial Blood Gas:  10-09 @ 00:43  7.31/57/59/29/91.3/1.9  ABG lactate: --      CAPILLARY BLOOD GLUCOSE    MICROBIOLOGY:     RADIOLOGY:  [ ] Reviewed and interpreted by me    Mode: AC/ CMV (Assist Control/ Continuous Mandatory Ventilation)  RR (machine): 25  FiO2: 80  PEEP: 2  PS: 20  ITime: 0.72  MAP: 14  PC: 20  PIP: 28   Patient is a 71y old  Male who presents with a chief complaint of SOB (08 Oct 2021 13:47)      Interval Events: RRT called over night, Fever and hypotension.    REVIEW OF SYSTEMS:  [ ] Positive  [ ] All other systems negative  [X] Unable to assess ROS because ____Obtunded    Vital Signs Last 24 Hrs  T(C): 37.2 (10-09-21 @ 04:08), Max: 38.9 (10-08-21 @ 20:30)  T(F): 98.9 (10-09-21 @ 04:08), Max: 102.1 (10-08-21 @ 20:30)  HR: 102 (10-09-21 @ 05:15) (92 - 134)  BP: 94/60 (10-09-21 @ 04:08) (64/48 - 113/68)  RR: 30 (10-09-21 @ 04:08) (22 - 51)  SpO2: 96% (10-09-21 @ 05:15) (92% - 97%)    PHYSICAL EXAM:  HEENT:   [X] Tracheostomy:  #8 DCT [place]  [X]  Eyes closed; PERRL B/L  [ ] No oral lesions  [ ]Abnormal    SKIN  [ ] No Rash  [X] Abnormal: DTI of gluteal cleft  [ ] pressure    CARDIAC  [X] Regular  [ ] Abnormal    PULMONARY  [ ]Bilateral Clear Breath Sounds  [ ]Normal Excursion  [X] Abnormal: Mild ronchi B/L    GI  [X] PEG      [X] +BS		              [X] Soft, nondistended, nontender	  [ ] Abnormal    MUSCULOSKELETAL                                   [X] Bedbound                 [ ] Abnormal    [ ] Ambulatory/OOB to chair                           EXTREMITIES                                         [ ] Normal  [X] Edema   2+ pedal edema B/L    NEUROLOGIC  [ ] Normal, non focal  [X] Focal findings:  Obtunded; grimaces with sternal rub; not following commands or opening eyes; trigger withdrawal to noxious stimuli in B/L feet    PSYCHIATRIC  [X] Unable to assess  [ ] Sedated	 [ ]Agitated    :  Ramos: [ ] Yes, if yes: Date of Placement:                   [X] No    LINES: Central Lines [ ] Yes, if yes: Date of Placement                                     [X] No    HOSPITAL MEDICATIONS:  MEDICATIONS  (STANDING):  albumin human 25% IVPB 100 milliLiter(s) IV Intermittent once  albuterol/ipratropium for Nebulization 3 milliLiter(s) Nebulizer every 6 hours  ascorbic acid 500 milliGRAM(s) Oral daily  baclofen 10 milliGRAM(s) Oral every 8 hours  budesonide 160 MICROgram(s)/formoterol 4.5 MICROgram(s) Inhaler 2 Puff(s) Inhalation two times a day  chlorhexidine 4% Liquid 1 Application(s) Topical <User Schedule>  cholecalciferol 2000 Unit(s) Oral daily  DAPTOmycin IVPB 700 milliGRAM(s) IV Intermittent every 24 hours  DAPTOmycin IVPB      dextrose 5%. 1000 milliLiter(s) (50 mL/Hr) IV Continuous <Continuous>  dextrose 5%. 1000 milliLiter(s) (100 mL/Hr) IV Continuous <Continuous>  dextrose 50% Injectable 25 Gram(s) IV Push once  glucagon  Injectable 1 milliGRAM(s) IntraMuscular once  heparin  Infusion 950 Unit(s)/Hr (9.5 mL/Hr) IV Continuous <Continuous>  insulin lispro (ADMELOG) corrective regimen sliding scale   SubCutaneous every 6 hours  insulin NPH human recombinant 10 Unit(s) SubCutaneous every 6 hours  LORazepam     Tablet 1 milliGRAM(s) Oral every 8 hours  methadone   Solution 10 milliGRAM(s) Oral every 8 hours  multivitamin/minerals/iron Oral Solution (CENTRUM) 15 milliLiter(s) Enteral Tube daily  naloxegol 25 milliGRAM(s) Oral daily  pantoprazole  Injectable 40 milliGRAM(s) IV Push daily  QUEtiapine 25 milliGRAM(s) Oral <User Schedule>  senna Syrup 10 milliLiter(s) Oral at bedtime  sodium chloride 3%  Inhalation 3 milliLiter(s) Inhalation every 6 hours  ursodiol Tablet 500 milliGRAM(s) Oral two times a day    MEDICATIONS  (PRN):  acetaminophen    Suspension .. 650 milliGRAM(s) Oral every 6 hours PRN Temp greater or equal to 38C (100.4F), Mild Pain (1 - 3), Moderate Pain (4 - 6)  HYDROmorphone  Injectable 1 milliGRAM(s) IV Push every 4 hours PRN Respiratory distress  polyethylene glycol 3350 17 Gram(s) Oral daily PRN Constipation      LABS:                        8.0    12.00 )-----------( 200      ( 09 Oct 2021 00:32 )             27.6     10-09    139  |  99  |  61<H>  ----------------------------<  275<H>  3.8   |  24  |  1.11    Ca    8.6      09 Oct 2021 00:32  Phos  4.8     10-09  Mg     2.4     10-09    TPro  6.4  /  Alb  2.3<L>  /  TBili  7.8<H>  /  DBili  x   /  AST  133<H>  /  ALT  122<H>  /  AlkPhos  1298<H>  10-09    PT/INR - ( 09 Oct 2021 07:28 )   PT: 13.1 sec;   INR: 1.10 ratio         PTT - ( 09 Oct 2021 12:10 )  PTT:51.2 sec    Arterial Blood Gas:  10-09 @ 00:43  7.31/57/59/29/91.3/1.9  ABG lactate: --      CAPILLARY BLOOD GLUCOSE    MICROBIOLOGY:     RADIOLOGY:  [ ] Reviewed and interpreted by me    Mode: AC/ CMV (Assist Control/ Continuous Mandatory Ventilation)  RR (machine): 25  FiO2: 80  PEEP: 2  PS: 20  ITime: 0.72  MAP: 14  PC: 20  PIP: 28

## 2021-10-09 NOTE — PROVIDER CONTACT NOTE (OTHER) - BACKGROUND
ADM DX: SARS-COV-2
PT given 1 U PRBC was febrile during tour cultures sent. PT is currently on Heparin gtt
PT has been spiking fevers last cultured 10.6 no growth to date
Pt s/p COVID  PNA, Admitting dx: Infection due to severe acute respiratory syndrome covid virus. PMH: DM, Asthma, BPH, HTN,
Acute resp failure 2/2 COVID.
admitted with ARDS due to covid, trached on 10/1, on the vent
Pt is admitted for covid.
Pt s/p COVID PNA, Admitting dx: Infection due to severe acute respiratory syndrome covid virus. PMH: DM, Asthma, BPH, HTN,
patient admitted for covid-19

## 2021-10-09 NOTE — DISCHARGE NOTE FOR THE EXPIRED PATIENT - HOSPITAL COURSE
Patient 71 year old Male with PMH of HTN, DM2, BPH, and Asthma admitted with COVID-19 ( Unvaccinated) with subsequent ARDS 2/2 Viral Pneumonia. Patient received Tocilizumab Remdesivir and Two courses of Decadron. Patients hospital course complicated MRSE Bacteremia; initially txd with Vanco and later transitioned to Daptomycin in setting of persistent bacteremia. Infectious work-up conducted with TTE, which was negative for vegetations.  CT A/P done, which showed proctitis, for which he was txd with Unasyn x7d (finished 10/1). SEJAL deferred by cardiology and decision was made to treat empirically for at least 6 weeks. Patients Hospital course also complicated by VT Arrest and Bilateral Lower extremity DVTS and RT thigh hematoma in setting of AC. Extensive GOC discussions held with family throughout, with palliative care input. Decision made to opt for tracheostomy; patient received PEG on 9/30 and trach 10/1.     Patient remains with Vent Dyssynchrony; when patient becomes tachypneic /dyspneic.  on s dilaudid, S/P s PICC for 6 weeks of dapto from negative blood cultures. .  s/p PRBC transfusion for anemia, Hgb responded appropriately post transfusion yesterday. S/P RRT on 10/9 for hypotension, s/p iv fluids and albumin    Patient s/p 2nd RRT on 10/9 for hypotension and hypoxia, on Vent, Received inv fluids and Pressor during RRT, patient Made DNR, Comfort care by RRT team per patient's spouses' request.  Patient pronounced at 21: 26 PM   Patient's spouse and daughter at bedside, Declined for Autopsy, Emotional support provided. Call placed for Pulmonary Fellow on call.     Patient 71 year old Male with PMH of HTN, DM2, BPH, and Asthma admitted with COVID-19 ( Unvaccinated) with subsequent ARDS 2/2 Viral Pneumonia. Patient received Tocilizumab Remdesivir and Two courses of Decadron. Patients hospital course complicated MRSE Bacteremia; initially txd with Vanco and later transitioned to Daptomycin in setting of persistent bacteremia. Infectious work-up conducted with TTE, which was negative for vegetations.  CT A/P done, which showed proctitis, for which he was txd with Unasyn x7d (finished 10/1). SEJAL deferred by cardiology and decision was made to treat empirically for at least 6 weeks. Patients Hospital course also complicated by VT Arrest and Bilateral Lower extremity DVTS and RT thigh hematoma in setting of AC. Extensive GOC discussions held with family throughout, with palliative care input. Decision made to opt for tracheostomy; patient received PEG on 9/30 and trach 10/1.     Patient remains with Vent Dyssynchrony; when patient becomes tachypneic /dyspneic.  on s dilaudid, S/P s PICC for 6 weeks of dapto from negative blood cultures. .  s/p PRBC transfusion for anemia, Hgb responded appropriately post transfusion yesterday. S/P RRT on 10/9 for hypotension, s/p iv fluids and albumin    Patient s/p 2nd RRT on 10/9 for hypotension,  on Vent, Received iv fluids, albumin,  and Pressor during RRT, patient Made DNR, Comfort care by RRT team per patient's spouses' request.  Patient pronounced at 21: 26 PM   Patient's spouse and daughter at bedside, Declined for Autopsy, Emotional support provided. Call placed for Pulmonary Fellow on call.Call back awaited.     Patient 71 year old Male with PMH of HTN, DM2, BPH, and Asthma admitted with COVID-19 ( Unvaccinated) with subsequent ARDS 2/2 Viral Pneumonia. Patient received Tocilizumab Remdesivir and Two courses of Decadron. Patients hospital course complicated MRSE Bacteremia; initially txd with Vanco and later transitioned to Daptomycin in setting of persistent bacteremia. Infectious work-up conducted with TTE, which was negative for vegetations.  CT A/P done, which showed proctitis, for which he was txd with Unasyn x7d (finished 10/1). SEJAL deferred by cardiology and decision was made to treat empirically for at least 6 weeks. Patients Hospital course also complicated by VT Arrest and Bilateral Lower extremity DVTS and RT thigh hematoma in setting of AC. Extensive GOC discussions held with family throughout, with palliative care input. Decision made to opt for tracheostomy; patient received PEG on 9/30 and trach 10/1.     Patient remains with Vent Dyssynchrony; when patient becomes tachypneic /dyspneic.  on s dilaudid, S/P s PICC for 6 weeks of dapto from negative blood cultures. .  s/p PRBC transfusion for anemia, Hgb responded appropriately post transfusion yesterday. S/P RRT on 10/9 for hypotension, s/p iv fluids and albumin    Patient s/p 2nd RRT on 10/9 for hypotension,  on Vent, Received iv fluids, albumin,  and Pressor during RRT, patient Made DNR, Comfort care by RRT team per patient's spouses' request.  Patient pronounced at 21: 26 PM   Patient's spouse and daughter at bedside, Declined for Autopsy, Emotional support provided. Discussed with Pulmonary Fellow.     Patient 71 year old Male with PMH of HTN, DM2, BPH, and Asthma admitted with COVID-19 ( Unvaccinated) with subsequent ARDS 2/2 Viral Pneumonia. Patient received Tocilizumab Remdesivir and Two courses of Decadron. Patients hospital course complicated MRSE Bacteremia; initially txd with Vanco and later transitioned to Daptomycin in setting of persistent bacteremia. Infectious work-up conducted with TTE, which was negative for vegetations.  CT A/P done, which showed proctitis, for which he was txd with Unasyn x7d (finished 10/1). SEJAL deferred by cardiology and decision was made to treat empirically for at least 6 weeks. Patients Hospital course also complicated by VT Arrest and Bilateral Lower extremity DVTS and RT thigh hematoma in setting of AC. Extensive GOC discussions held with family throughout, with palliative care input. Decision made to opt for tracheostomy; patient received PEG on 9/30 and trach 10/1.     Patient remains with Vent Dyssynchrony; when patient becomes tachypneic /dyspneic.  on s dilaudid, S/P s PICC for 6 weeks of dapto from negative blood cultures. .  s/p PRBC transfusion for anemia, Hgb responded appropriately post transfusion yesterday. S/P RRT on 10/9 for hypotension, s/p iv fluids and albumin    Patient s/p 2nd RRT on 10/9 for hypotension,  on Vent, Received iv fluids, albumin,  and Pressor during RRT, patient Made DNR, Comfort care by RRT team per patient's spouses' request.  Patient pronounced at 21: 26 PM   Patient's spouse and daughter at bedside,  Emotional support provided. Discussed with Pulmonary Fellow.

## 2021-10-09 NOTE — RAPID RESPONSE TEAM SUMMARY - NSOTHERINTERVENTIONSRRT_GEN_ALL_CORE
71-year-old man with COVID ARDS now with persistent respiratory failure status post trach (10/1), MRSE bacteremia, and bilateral DVTs.    71-year-old man with COVID ARDS now with persistent respiratory failure status post trach (10/1), MRSE bacteremia, and bilateral DVTs now with severe hypotension and shock.   During RRT was found to have hypotension refractory to IVF, bedside POCUS with grossly preserved LV function, RV strain (previously known), IVC 1.5 cm, found to have severe metabolic/lactic acidosis likely from multiple organ system failure. Started on Levophed with sodium bicarb pushes with only mild improvement. Had extensive goals of care conversation with patient's wife, son, and daughter (see goals of care note), who agreed that Mr. Nick was in the dying process. Status changed to DNR, given Dilaudid and Ativan for agonal respirations and tachypnea, vasopressors discontinued, and MOLST form completed.     Plan for comfort focused care, discussed with medicine NP Noni.  71-year-old man with COVID ARDS now with persistent respiratory failure status post trach (10/1), MRSE bacteremia, and bilateral DVTs now with severe hypotension and shock.   During RRT was found to have hypotension refractory to IVF, bedside POCUS with grossly preserved LV function, RV strain (previously known), IVC 1.5 cm, found to have severe metabolic/lactic acidosis likely from multiple organ system failure. Started on Levophed with sodium bicarb pushes with only mild improvement. Had extensive goals of care conversation with patient's wife, son, and daughter (see goals of care note), who agreed that Mr. Nick was in the dying process. Status changed to DNR, given Dilaudid and Ativan for agonal respirations and tachypnea, vasopressors discontinued, and MOLST form completed.     Plan for comfort focused care, discussed with medicine NP DINORA Cheney completed and in chart.

## 2021-10-09 NOTE — PROVIDER CONTACT NOTE (OTHER) - SITUATION
Pt tachycardic and hyperventilating
Pt tachycardic and hyperventilating
Pt. respiration rate 55
Patient diagnosed with Covid >22 days ago (+ 8/12). Discussed with physician. No further isolation required.
Patient persistently removes bipap mask and desat to 80s%
Patient sustaining 84-85% on HFNC
Pt desatting,. RR elevated
PT BP is 64/48 manual
PT has a rectal temp of 102.1
Pt had run of PAT 2.2 seconds up to 160
RR 41
Tachypnea, Resp. 60's
Patient not void, no episode of incontinent noted.
pt is breathing at a rate of 38-45,

## 2021-10-09 NOTE — RAPID RESPONSE TEAM SUMMARY - NSOTHERINTERVENTIONSRRT_GEN_ALL_CORE
Hypotension likely 2/2 sepsis vs respiratory acidosis. 1L IVF hung and pt received 25% Albumin 100cc IVPB with improvement in BP to 90s/60s for MAP >65. His cultures were just sent and are pending, he remains on antibiotics and followed by ID. Recommend primary team to f/u cultures, procalcitonin, WBC and treat for fever. F/u ABG and adjust vent if acidemic, TV improved to 400s after in-line suctioning. Hold on further lopressor and antihypertensives at this time. Discussed with primary team at bedside.

## 2021-10-09 NOTE — PROVIDER CONTACT NOTE (OTHER) - REASON
Discontinuation of Isolation for COVID patient
High RR 38-45
Pt had run of PAT 2.2 seconds up to 160
Patient persistently removes bipap mask and desat to 80s%
Patient sustaining 84-85% on HFNC
Pt tachycardic and hyperventilating
Fever 102.1
Hypotension BP 64/48
Tachypnea, Respirations. 60's
Not void
RR 41
Pt desatting,. RR elevated
Pt tachycardic and hyperventilating
Pt.s Respiration rate 55

## 2021-10-09 NOTE — PROGRESS NOTE ADULT - PROBLEM SELECTOR PROBLEM 4
COVID-19
Essential hypertension
COVID-19
Essential hypertension
Palliative care encounter
COVID-19
Essential hypertension
Essential hypertension
COVID-19
Essential hypertension

## 2021-10-09 NOTE — RAPID RESPONSE TEAM SUMMARY - NSMEDICATIONSRRT_GEN_ALL_CORE
mL x1.   Albumin 25% 100 mL x1.   Levophed infusion.  Sodium Bicarb x3.   Dilaudid 1mg x1  Ativan 1mg x1.   mL x1.   Albumin 25% 100 mL x1.   Ramos placement  Levophed infusion.  Sodium Bicarb x3.   Dilaudid 1mg x1  Ativan 1mg x1.

## 2021-10-09 NOTE — CHART NOTE - NSCHARTNOTEFT_GEN_A_CORE
RRT called for hypotension to 64/47 manually, tachycardia to the 140s, and tachypnea to the 30s. Please see RRT note for additional details. Will update family in AM. Will continue to monitor patient overnight.     Hodan Aguirre [facility]  [id number] RRT called for hypotension to 64/47 manually, tachycardia to the 140s, and tachypnea to the 30s. Patient given 1L normal saline along with albumin 25% 100cc IVPB. CBC, CMP, Mg, Phos, lactate, procal, ABG ordered. Patient remains on daptomycin for bacteremia. Additional BCx set sent as patient was febrile earlier in evening. By end of RRT, BP improved to 90/60s with a MAP >65 and HR improved to 100s. Please see RRT note for additional details. Will update family in AM. Will continue to monitor patient overnight.     Hodan Aguirre [facility]  [id number] RRT called for hypotension to 64/47 manually, tachycardia to the 140s, and tachypnea to the 30s. Patient given 1L normal saline along with albumin 25% 100cc IVPB. CBC, CMP, Mg, Phos, lactate, procal, ABG ordered. Patient remains on daptomycin for bacteremia. Additional BCx set sent as patient was febrile earlier in evening. By end of RRT, BP improved to 90/60s with a MAP >65. Please see RRT note for additional details. Will update family in AM. Will continue to monitor patient overnight.     Hodan Aguirre [facility]  [id number]

## 2021-10-10 LAB
RAPID RVP RESULT: SIGNIFICANT CHANGE UP
SARS-COV-2 RNA SPEC QL NAA+PROBE: SIGNIFICANT CHANGE UP

## 2021-10-11 LAB
-  AMIKACIN: SIGNIFICANT CHANGE UP
-  AMOXICILLIN/CLAVULANIC ACID: SIGNIFICANT CHANGE UP
-  AMPICILLIN/SULBACTAM: SIGNIFICANT CHANGE UP
-  AMPICILLIN: SIGNIFICANT CHANGE UP
-  AZTREONAM: SIGNIFICANT CHANGE UP
-  CEFAZOLIN: SIGNIFICANT CHANGE UP
-  CEFEPIME: SIGNIFICANT CHANGE UP
-  CEFOXITIN: SIGNIFICANT CHANGE UP
-  CEFTRIAXONE: SIGNIFICANT CHANGE UP
-  CIPROFLOXACIN: SIGNIFICANT CHANGE UP
-  ERTAPENEM: SIGNIFICANT CHANGE UP
-  GENTAMICIN: SIGNIFICANT CHANGE UP
-  IMIPENEM: SIGNIFICANT CHANGE UP
-  LEVOFLOXACIN: SIGNIFICANT CHANGE UP
-  MEROPENEM: SIGNIFICANT CHANGE UP
-  PIPERACILLIN/TAZOBACTAM: SIGNIFICANT CHANGE UP
-  TOBRAMYCIN: SIGNIFICANT CHANGE UP
-  TRIMETHOPRIM/SULFAMETHOXAZOLE: SIGNIFICANT CHANGE UP
CULTURE RESULTS: SIGNIFICANT CHANGE UP
METHOD TYPE: SIGNIFICANT CHANGE UP
ORGANISM # SPEC MICROSCOPIC CNT: SIGNIFICANT CHANGE UP
ORGANISM # SPEC MICROSCOPIC CNT: SIGNIFICANT CHANGE UP
SPECIMEN SOURCE: SIGNIFICANT CHANGE UP

## 2021-10-14 LAB
CULTURE RESULTS: SIGNIFICANT CHANGE UP
CULTURE RESULTS: SIGNIFICANT CHANGE UP
SPECIMEN SOURCE: SIGNIFICANT CHANGE UP
SPECIMEN SOURCE: SIGNIFICANT CHANGE UP

## 2021-10-26 PROCEDURE — 94660 CPAP INITIATION&MGMT: CPT

## 2021-10-26 PROCEDURE — 85520 HEPARIN ASSAY: CPT

## 2021-10-26 PROCEDURE — 84080 ASSAY ALKALINE PHOSPHATASES: CPT

## 2021-10-26 PROCEDURE — 86850 RBC ANTIBODY SCREEN: CPT

## 2021-10-26 PROCEDURE — 85730 THROMBOPLASTIN TIME PARTIAL: CPT

## 2021-10-26 PROCEDURE — 82947 ASSAY GLUCOSE BLOOD QUANT: CPT

## 2021-10-26 PROCEDURE — 70450 CT HEAD/BRAIN W/O DYE: CPT

## 2021-10-26 PROCEDURE — C8924: CPT

## 2021-10-26 PROCEDURE — 82248 BILIRUBIN DIRECT: CPT

## 2021-10-26 PROCEDURE — C1889: CPT

## 2021-10-26 PROCEDURE — 84146 ASSAY OF PROLACTIN: CPT

## 2021-10-26 PROCEDURE — 93925 LOWER EXTREMITY STUDY: CPT

## 2021-10-26 PROCEDURE — 85027 COMPLETE CBC AUTOMATED: CPT

## 2021-10-26 PROCEDURE — 96374 THER/PROPH/DIAG INJ IV PUSH: CPT

## 2021-10-26 PROCEDURE — 86769 SARS-COV-2 COVID-19 ANTIBODY: CPT

## 2021-10-26 PROCEDURE — 87640 STAPH A DNA AMP PROBE: CPT

## 2021-10-26 PROCEDURE — 87641 MR-STAPH DNA AMP PROBE: CPT

## 2021-10-26 PROCEDURE — 87449 NOS EACH ORGANISM AG IA: CPT

## 2021-10-26 PROCEDURE — 86790 VIRUS ANTIBODY NOS: CPT

## 2021-10-26 PROCEDURE — 93975 VASCULAR STUDY: CPT

## 2021-10-26 PROCEDURE — 85652 RBC SED RATE AUTOMATED: CPT

## 2021-10-26 PROCEDURE — 83550 IRON BINDING TEST: CPT

## 2021-10-26 PROCEDURE — 94003 VENT MGMT INPAT SUBQ DAY: CPT

## 2021-10-26 PROCEDURE — 82272 OCCULT BLD FECES 1-3 TESTS: CPT

## 2021-10-26 PROCEDURE — 82150 ASSAY OF AMYLASE: CPT

## 2021-10-26 PROCEDURE — 86803 HEPATITIS C AB TEST: CPT

## 2021-10-26 PROCEDURE — 36430 TRANSFUSION BLD/BLD COMPNT: CPT

## 2021-10-26 PROCEDURE — 95711 VEEG 2-12 HR UNMONITORED: CPT

## 2021-10-26 PROCEDURE — 82140 ASSAY OF AMMONIA: CPT

## 2021-10-26 PROCEDURE — 82435 ASSAY OF BLOOD CHLORIDE: CPT

## 2021-10-26 PROCEDURE — P9016: CPT

## 2021-10-26 PROCEDURE — 84145 PROCALCITONIN (PCT): CPT

## 2021-10-26 PROCEDURE — 85025 COMPLETE CBC W/AUTO DIFF WBC: CPT

## 2021-10-26 PROCEDURE — 87186 SC STD MICRODIL/AGAR DIL: CPT

## 2021-10-26 PROCEDURE — 82553 CREATINE MB FRACTION: CPT

## 2021-10-26 PROCEDURE — 84295 ASSAY OF SERUM SODIUM: CPT

## 2021-10-26 PROCEDURE — 80053 COMPREHEN METABOLIC PANEL: CPT

## 2021-10-26 PROCEDURE — 80076 HEPATIC FUNCTION PANEL: CPT

## 2021-10-26 PROCEDURE — U0003: CPT

## 2021-10-26 PROCEDURE — 94002 VENT MGMT INPAT INIT DAY: CPT

## 2021-10-26 PROCEDURE — 86901 BLOOD TYPING SEROLOGIC RH(D): CPT

## 2021-10-26 PROCEDURE — 86923 COMPATIBILITY TEST ELECTRIC: CPT

## 2021-10-26 PROCEDURE — 82728 ASSAY OF FERRITIN: CPT

## 2021-10-26 PROCEDURE — 93998 UNLISTD NONINVAS VASC DX STD: CPT

## 2021-10-26 PROCEDURE — 74177 CT ABD & PELVIS W/CONTRAST: CPT

## 2021-10-26 PROCEDURE — 87070 CULTURE OTHR SPECIMN AEROBIC: CPT

## 2021-10-26 PROCEDURE — 86663 EPSTEIN-BARR ANTIBODY: CPT

## 2021-10-26 PROCEDURE — 85045 AUTOMATED RETICULOCYTE COUNT: CPT

## 2021-10-26 PROCEDURE — 80202 ASSAY OF VANCOMYCIN: CPT

## 2021-10-26 PROCEDURE — 84478 ASSAY OF TRIGLYCERIDES: CPT

## 2021-10-26 PROCEDURE — 36600 WITHDRAWAL OF ARTERIAL BLOOD: CPT

## 2021-10-26 PROCEDURE — 83605 ASSAY OF LACTIC ACID: CPT

## 2021-10-26 PROCEDURE — 93005 ELECTROCARDIOGRAM TRACING: CPT

## 2021-10-26 PROCEDURE — U0005: CPT

## 2021-10-26 PROCEDURE — 80074 ACUTE HEPATITIS PANEL: CPT

## 2021-10-26 PROCEDURE — 95700 EEG CONT REC W/VID EEG TECH: CPT

## 2021-10-26 PROCEDURE — 93970 EXTREMITY STUDY: CPT

## 2021-10-26 PROCEDURE — 87040 BLOOD CULTURE FOR BACTERIA: CPT

## 2021-10-26 PROCEDURE — 83690 ASSAY OF LIPASE: CPT

## 2021-10-26 PROCEDURE — 87150 DNA/RNA AMPLIFIED PROBE: CPT

## 2021-10-26 PROCEDURE — 80061 LIPID PANEL: CPT

## 2021-10-26 PROCEDURE — 84100 ASSAY OF PHOSPHORUS: CPT

## 2021-10-26 PROCEDURE — 71260 CT THORAX DX C+: CPT

## 2021-10-26 PROCEDURE — 87086 URINE CULTURE/COLONY COUNT: CPT

## 2021-10-26 PROCEDURE — 86645 CMV ANTIBODY IGM: CPT

## 2021-10-26 PROCEDURE — 82550 ASSAY OF CK (CPK): CPT

## 2021-10-26 PROCEDURE — 85610 PROTHROMBIN TIME: CPT

## 2021-10-26 PROCEDURE — 93306 TTE W/DOPPLER COMPLETE: CPT

## 2021-10-26 PROCEDURE — 76705 ECHO EXAM OF ABDOMEN: CPT

## 2021-10-26 PROCEDURE — 36415 COLL VENOUS BLD VENIPUNCTURE: CPT

## 2021-10-26 PROCEDURE — 80048 BASIC METABOLIC PNL TOTAL CA: CPT

## 2021-10-26 PROCEDURE — 85379 FIBRIN DEGRADATION QUANT: CPT

## 2021-10-26 PROCEDURE — 82803 BLOOD GASES ANY COMBINATION: CPT

## 2021-10-26 PROCEDURE — L8699: CPT

## 2021-10-26 PROCEDURE — 83880 ASSAY OF NATRIURETIC PEPTIDE: CPT

## 2021-10-26 PROCEDURE — 84484 ASSAY OF TROPONIN QUANT: CPT

## 2021-10-26 PROCEDURE — 82962 GLUCOSE BLOOD TEST: CPT

## 2021-10-26 PROCEDURE — 70551 MRI BRAIN STEM W/O DYE: CPT

## 2021-10-26 PROCEDURE — 86665 EPSTEIN-BARR CAPSID VCA: CPT

## 2021-10-26 PROCEDURE — 83735 ASSAY OF MAGNESIUM: CPT

## 2021-10-26 PROCEDURE — 82330 ASSAY OF CALCIUM: CPT

## 2021-10-26 PROCEDURE — 83615 LACTATE (LD) (LDH) ENZYME: CPT

## 2021-10-26 PROCEDURE — 93321 DOPPLER ECHO F-UP/LMTD STD: CPT

## 2021-10-26 PROCEDURE — 84132 ASSAY OF SERUM POTASSIUM: CPT

## 2021-10-26 PROCEDURE — 83036 HEMOGLOBIN GLYCOSYLATED A1C: CPT

## 2021-10-26 PROCEDURE — 87077 CULTURE AEROBIC IDENTIFY: CPT

## 2021-10-26 PROCEDURE — 82565 ASSAY OF CREATININE: CPT

## 2021-10-26 PROCEDURE — 99285 EMERGENCY DEPT VISIT HI MDM: CPT

## 2021-10-26 PROCEDURE — 83540 ASSAY OF IRON: CPT

## 2021-10-26 PROCEDURE — 95714 VEEG EA 12-26 HR UNMNTR: CPT

## 2021-10-26 PROCEDURE — 85014 HEMATOCRIT: CPT

## 2021-10-26 PROCEDURE — 94640 AIRWAY INHALATION TREATMENT: CPT

## 2021-10-26 PROCEDURE — 86900 BLOOD TYPING SEROLOGIC ABO: CPT

## 2021-10-26 PROCEDURE — 86140 C-REACTIVE PROTEIN: CPT

## 2021-10-26 PROCEDURE — 71045 X-RAY EXAM CHEST 1 VIEW: CPT

## 2021-10-26 PROCEDURE — 0225U NFCT DS DNA&RNA 21 SARSCOV2: CPT

## 2021-10-26 PROCEDURE — 86664 EPSTEIN-BARR NUCLEAR ANTIGEN: CPT

## 2021-10-26 PROCEDURE — P9045: CPT

## 2021-10-26 PROCEDURE — 81001 URINALYSIS AUTO W/SCOPE: CPT

## 2021-10-26 PROCEDURE — 87635 SARS-COV-2 COVID-19 AMP PRB: CPT

## 2021-10-26 PROCEDURE — 85018 HEMOGLOBIN: CPT

## 2022-02-04 NOTE — PROGRESS NOTE ADULT - ASSESSMENT
"REFRACTIVE CATARACT SURGERY: Refractive error / Presbyopic Correction Counseling: Refractive Error - a problem focusing light accurately onto the retina due to the shape of the eye. Presbyopia is a gradual, age-related loss of the eye's ability to focus actively on nearby objects. The most common types of refractive error are near-sightedness; far-sightedness, astigmatism, and presbyopia. I have discussed the options for refractive surgery to decrease dependency on glasses and/or contact lenses after surgery. Multifocal, Trifocal and Extended-depth-of-focus lens (EDOF) - It was explained that multifocal/trifocal and extended depth of focus lenses split light and this can be associated with a decrease in contrast sensitivity, depth of focus, a double image and ghosting which may or may not resolve over time. Multifocal/trifocal lenses are lighting dependent. In low or dim lighting, glasses may be needed for optimal near vision. It was explained that rings, halos, starbursts or spider webs around point sources of light (headlights, tail lights, street lights, neon signs, the moon, stars, etc.) will be present indefinitely after multifocal/trifocal/EDOF lens implantation (with the exception of the Viviity MF IOL) While the majority of patients do not find this limit's their night time activities to include driving, in rare instances, it can.  It was explained that these lenses are designed to satisfy a defined area of near vision, or ""sweet spot"" 71M with diabetes and asthma, not COVID vaccinated admitted 8/12/21 with COVID pneumonia.   Tocilizumab 8/15. Completed Remdesivir 8/17 and Decadron 8/22.   Decompensated, intubated 8/20.   Suspect PE given bilateral leg DVTs.   Sepsis 8/23 with MRSA bacteremia. Maybe CLABSI although it seems pretty fast (left IJ plcced 8/20). No wound. No phlebitis. No PNA.   Guarded prognosis.     Suggest  -stop Zosyn and start Vancomycin 1GM IV q12h, monitor troughs and creatinine   -change central lines   -repeat blood cultures tomorrow, two sets   -TTE   -supportive care and anticoagulation per protocol     Discussed with ICU     Patrick Paul MD   Infectious Disease   Pager [phone]   After 5PM and on weekends please page fellow on call or call [phone] 71M with diabetes and asthma, not COVID vaccinated admitted 8/12/21 with COVID pneumonia.   Tocilizumab 8/15. Completed Remdesivir 8/17 and Decadron 8/22.   Decompensated, intubated 8/20.   Suspect PE given bilateral leg DVTs.   Sepsis 8/23 with MRSE bacteremia. Maybe CLABSI although it seems pretty fast (left IJ plcced 8/20). No wound. No phlebitis. No PNA.   Guarded prognosis.     Suggest  -stop Zosyn and start Vancomycin 1GM IV q12h, monitor troughs and creatinine   -change central lines   -repeat blood cultures tomorrow, two sets   -TTE   -supportive care and anticoagulation per protocol     Discussed with ICU     Patrick Paul MD   Infectious Disease   Pager [phone]   After 5PM and on weekends please page fellow on call or call [phone]

## 2022-08-22 NOTE — CONSULT NOTE ADULT - CONSULT REQUESTED DATE/TIME
12-Aug-2021 21:12
14-Aug-2021 14:20
14-Sep-2021
03-Sep-2021 11:33
18-Aug-2021 11:16
27-Sep-2021 08:22
10-Sep-2021 15:50
23-Aug-2021 14:08
Detail Level: Generalized
13-Aug-2021 12:00
30-Sep-2021 07:54

## 2022-08-30 NOTE — ADDENDUM
[FreeTextEntry1] : This note was authored by Jazzmine Mcginnis working as a scribe for Dr.Gary Lay. I, Dr. Nolan Lay have reviewed the content of this note and confirm it is true and accurate. I personally performed the history and physical examination and made all the decisions 03/09/2021. 
(3) adequate

## 2022-11-16 NOTE — BH CONSULTATION LIAISON ASSESSMENT NOTE - NSBHMSEAFFQUAL_PSY_A_CORE
From: Gokul Layton  To: Paul Herrmann  Sent: 11/15/2022 5:34 PM CST  Subject: MRI    So what do you see and what do you think?   Unable to assess

## 2023-03-14 NOTE — HISTORY OF PRESENT ILLNESS
[Initial Evaluation] : an initial evaluation for [FreeTextEntry1] : Mr. Nick is a 69 year old male presented with microscopic hematuria, BPH, and history of kidney stones. Complained of incomplete bladder emptying. Drinks 2 quarts of liquid daily, heavy coffee drinker.  Currently taking Tamsulosin 0.4 mg once daily. Patient has diabetes. Has a history of kidney stones. \par 6/14/18: The patient returned and reported incomplete bladder emptying, urinary urgency, and urge incontinence. Currently taking tamsulosin, twice daily. PSA from 4/26/18 was 0.49 ng/mL. CBC from 5/31/18 shows slight anemia, I advised the patient to evaluate with his PCP. Patient and daughter understood.\par 7/5/18: The patient returned and reported he is currently taking Oxybutynin ER 15 mg, and his urination has significantly improved. Notes he is seeing an hematologist for his anemia. \par 10/30/18: The patient returned and reported gross hematuria twice in August 2018. He has a history of microscopic hematuria. He also noted dysuria with the episodes of hematuria. UA in 7/5/18 demonstrates no blood in the urine. I offered the patient a , but he declined. The patient was referred to my office by Dr. Ricky Mendez for a cystoscopy. The patient had bronchitis for 1-2 months in the past, which has resolved. Lab results under Dr. Mendez showed iron was low 43 ug/dL on 6/23/18. The patient also has urinary frequency and urgency. Wakes up 4 times during the night to urinate. The patient noted that Tamsulosin worked well for him in the past. He noted his diabetes is well-controlled.\par 11/05/18: The patient returned for a cystoscopy. Aside from cystoscopy, patient was brought back to the office to discuss further evaluation management. CT urogram of the abdomen and pelvis from 11/5/18 findings showed there is a 5 and 4 mm nonobstructing calculi in the mid and lower pole of the left kidney respectively. There was a 1 cm simple appearing cyst in the upper pole of the left kidney. There was a 1.6 nonobstructing calculus within the urinary bladder. Otherwise unremarkable CT urogram. Currently taking Tamsulosin twice daily and Oxybutynin 10 mg. \par 11/21/18: The patient returned and reported urination is adequate. Complained of urgency and urge incontinence. Denied gross hematuria and dysuria. Denied UTI.  Cystoscopy with lithotripsy of bladder stone is scheduled for 12/14/18.\par 12/18/18 The patient returned for a catheter removal. He reported since his last visit he underwent laser lithotripsy of bladder stone. He went to the ER because he couldn't pass urine and so a catheter was placed.\par 1/7/19: The patient returned and reported his urination has improved since his catheter removal. Patient denied dysuria and gross hematuria. Complained of occasional urinary urgency, but noted it has improved. Currently takes Finasteride.\par 4/8/19: The patient returned today for an US. Renal US findings showed again non obstructing stones in the left kidney 7.0 x 5.4 mm lower and 5.1 x 4.3 mm mid, previously measured 5 & 4 mm. Both kidneys are normal in size and echogenicity without obvious hydronephrosis or solid masses visualized. No bladder calculi. Notes that he hasn't been drinking an adequate amount of water, will aim for 2 quarts daily. Wakes up 2-3 times during the night to urinate. Translation assistance of Brandon ([id number]) at [employer].\par 5/14/19: Patient presents today for a follow up. Translation assistance of Allegra ([id number]) at [employer]. He reports consuming about 2 liters of water everyday. Previously the citrate in his urine was low. \par \par 7/8/19: Patient presents today for a follow up. ( [id number]) Since his last visit he has been drinking lots of water with lemon juice as directed. Potassium Citrate ER 10 mg was prescribed as his last visit as well. He was unable to fill the prescription because it was too expensive. A 24 hour urine collection was completed as directed. Regarding his urination it is reported that he will occasionally have to rush to the bathroom, and that minimal incontinence can be experienced. Most of the time his urination is satisfactory. Tamsulosin 0.4 mg Finasteride 5 mg and Oxybutynin 10 mg are all taken as directed.  [FreeTextEntry1] : PND

## 2023-07-27 NOTE — ASU PATIENT PROFILE, ADULT - NS TRANSFER HEARING AID
Remaining air removed from right radial vasc band. Quik clot and tegaderm placed at site.  No bleeding or hematoma noted none

## 2023-08-15 NOTE — H&P PST ADULT - DOCUMENT STATUS
Propranolol Counseling:  I discussed with the patient the risks of propranolol including but not limited to low heart rate, low blood pressure, low blood sugar, restlessness and increased cold sensitivity. They should call the office if they experience any of these side effects. Authored by Resident/PA/NP

## 2023-10-31 NOTE — PROGRESS NOTE ADULT - SUBJECTIVE AND OBJECTIVE BOX
CHIEF COMPLAINT:    Interval Events:    REVIEW OF SYSTEMS:  Constitutional: [ ] negative [ ] fevers [ ] chills [ ] weight loss [ ] weight gain  HEENT: [ ] negative [ ] dry eyes [ ] eye irritation [ ] postnasal drip [ ] nasal congestion  CV: [ ] negative  [ ] chest pain [ ] orthopnea [ ] palpitations [ ] murmur  Resp: [ ] negative [ ] cough [ ] shortness of breath [ ] dyspnea [ ] wheezing [ ] sputum [ ] hemoptysis  GI: [ ] negative [ ] nausea [ ] vomiting [ ] diarrhea [ ] constipation [ ] abd pain [ ] dysphagia   : [ ] negative [ ] dysuria [ ] nocturia [ ] hematuria [ ] increased urinary frequency  Musculoskeletal: [ ] negative [ ] back pain [ ] myalgias [ ] arthralgias [ ] fracture  Skin: [ ] negative [ ] rash [ ] itch  Neurological: [ ] negative [ ] headache [ ] dizziness [ ] syncope [ ] weakness [ ] numbness  Psychiatric: [ ] negative [ ] anxiety [ ] depression  Endocrine: [ ] negative [ ] diabetes [ ] thyroid problem  Hematologic/Lymphatic: [ ] negative [ ] anemia [ ] bleeding problem  Allergic/Immunologic: [ ] negative [ ] itchy eyes [ ] nasal discharge [ ] hives [ ] angioedema  [ ] All other systems negative  [ ] Unable to assess ROS because ________    OBJECTIVE:  ICU Vital Signs Last 24 Hrs  T(C): 37.4 (06 Sep 2021 03:00), Max: 37.4 (06 Sep 2021 03:00)  T(F): 99.3 (06 Sep 2021 03:00), Max: 99.3 (06 Sep 2021 03:00)  HR: 83 (06 Sep 2021 07:00) (72 - 97)  BP: --  BP(mean): --  ABP: 141/58 (06 Sep 2021 07:00) (88/39 - 177/71)  ABP(mean): 85 (06 Sep 2021 07:00) (55 - 113)  RR: 34 (06 Sep 2021 07:00) (9 - 37)  SpO2: 90% (06 Sep 2021 07:00) (86% - 98%)    Mode: AC/ CMV (Assist Control/ Continuous Mandatory Ventilation), RR (machine): 34, FiO2: 80, PEEP: 10, ITime: 0.6, MAP: 19, PC: 26, PIP: 37    09-05 @ 07:01  -  09-06 @ 07:00  --------------------------------------------------------  IN: 2635.1 mL / OUT: 4790 mL / NET: -2154.9 mL      CAPILLARY BLOOD GLUCOSE      POCT Blood Glucose.: 123 mg/dL (06 Sep 2021 05:26)      PHYSICAL EXAM:  General:   HEENT:   Lymph Nodes:  Neck:   Respiratory:   Cardiovascular:   Abdomen:   Extremities:   Skin:   Neurological:  Psychiatry:    LINES:    HOSPITAL MEDICATIONS:  enoxaparin Injectable 80 milliGRAM(s) SubCutaneous every 12 hours    vancomycin  IVPB      vancomycin  IVPB 750 milliGRAM(s) IV Intermittent every 12 hours    buMETAnide Injectable 1 milliGRAM(s) IV Push two times a day  midodrine 10 milliGRAM(s) Oral every 8 hours  norepinephrine Infusion 0.05 MICROgram(s)/kG/Min IV Continuous <Continuous>    dexAMETHasone  Injectable 6 milliGRAM(s) IV Push daily  insulin lispro (ADMELOG) corrective regimen sliding scale   SubCutaneous every 6 hours  insulin NPH human recombinant 2 Unit(s) SubCutaneous every 6 hours    ALBUTerol    90 MICROgram(s) HFA Inhaler 2 Puff(s) Inhalation every 8 hours  budesonide 160 MICROgram(s)/formoterol 4.5 MICROgram(s) Inhaler 2 Puff(s) Inhalation two times a day    diazepam    Tablet 15 milliGRAM(s) Oral every 8 hours  fentaNYL   Infusion... 2 MICROgram(s)/kG/Hr IV Continuous <Continuous>  ketamine Infusion. 1.998 mG/kG/Hr IV Continuous <Continuous>  methadone   Solution 10 milliGRAM(s) Enteral Tube every 8 hours    naloxegol 25 milliGRAM(s) Oral daily  pantoprazole  Injectable 40 milliGRAM(s) IV Push daily  polyethylene glycol 3350 17 Gram(s) Oral daily  senna Syrup 10 milliLiter(s) Oral at bedtime        cholecalciferol 2000 Unit(s) Oral daily  multivitamin/minerals Oral Solution (WELLESSE) 30 milliLiter(s) Enteral Tube daily      artificial tears (preservative free) Ophthalmic Solution 1 Drop(s) Both EYES every 8 hours  chlorhexidine 0.12% Liquid 15 milliLiter(s) Oral Mucosa every 12 hours  chlorhexidine 4% Liquid 1 Application(s) Topical <User Schedule>        LABS:                        8.2    7.56  )-----------( 214      ( 06 Sep 2021 00:42 )             26.4     Hgb Trend: 8.2<--, 7.4<--, 8.2<--, 8.6<--, 6.8<--  09-06    144  |  105  |  16  ----------------------------<  158<H>  3.8   |  29  |  0.55    Ca    8.5      06 Sep 2021 00:42  Phos  2.2     09-06  Mg     2.1     09-06    TPro  5.5<L>  /  Alb  2.2<L>  /  TBili  0.8  /  DBili  x   /  AST  41<H>  /  ALT  97<H>  /  AlkPhos  743<H>  09-06    Creatinine Trend: 0.55<--, 0.60<--, 0.59<--, 0.59<--, 0.60<--, 0.72<--  PT/INR - ( 06 Sep 2021 00:42 )   PT: 12.7 sec;   INR: 1.06 ratio         PTT - ( 06 Sep 2021 00:42 )  PTT:39.7 sec    Arterial Blood Gas:  09-06 @ 01:38  7.44/57/80/39/98.7/13.0  ABG lactate: --  Arterial Blood Gas:  09-05 @ 11:11  7.35/59/66/33/94.6/5.9  ABG lactate: --  Arterial Blood Gas:  09-05 @ 00:59  7.49/43/169/33/99.8/8.6  ABG lactate: --  Arterial Blood Gas:  09-04 @ 14:07  7.38/58/95/34/99.6/8.0  ABG lactate: --  Arterial Blood Gas:  09-04 @ 10:57  7.44/48/100/33/99.4/7.6  ABG lactate: --        MICROBIOLOGY:     RADIOLOGY:  [ ] Reviewed and interpreted by me    ALONZOG: CHIEF COMPLAINT: SOV, COVID ARDS    Interval Events:    REVIEW OF SYSTEMS:  Constitutional: [ ] negative [ ] fevers [ ] chills [ ] weight loss [ ] weight gain  HEENT: [ ] negative [ ] dry eyes [ ] eye irritation [ ] postnasal drip [ ] nasal congestion  CV: [ ] negative  [ ] chest pain [ ] orthopnea [ ] palpitations [ ] murmur  Resp: [ ] negative [ ] cough [ ] shortness of breath [ ] dyspnea [ ] wheezing [ ] sputum [ ] hemoptysis  GI: [ ] negative [ ] nausea [ ] vomiting [ ] diarrhea [ ] constipation [ ] abd pain [ ] dysphagia   : [ ] negative [ ] dysuria [ ] nocturia [ ] hematuria [ ] increased urinary frequency  Musculoskeletal: [ ] negative [ ] back pain [ ] myalgias [ ] arthralgias [ ] fracture  Skin: [ ] negative [ ] rash [ ] itch  Neurological: [ ] negative [ ] headache [ ] dizziness [ ] syncope [ ] weakness [ ] numbness  Psychiatric: [ ] negative [ ] anxiety [ ] depression  Endocrine: [ ] negative [ ] diabetes [ ] thyroid problem  Hematologic/Lymphatic: [ ] negative [ ] anemia [ ] bleeding problem  Allergic/Immunologic: [ ] negative [ ] itchy eyes [ ] nasal discharge [ ] hives [ ] angioedema  [ ] All other systems negative  [ ] Unable to assess ROS because ________    OBJECTIVE:  ICU Vital Signs Last 24 Hrs  T(C): 37.4 (06 Sep 2021 03:00), Max: 37.4 (06 Sep 2021 03:00)  T(F): 99.3 (06 Sep 2021 03:00), Max: 99.3 (06 Sep 2021 03:00)  HR: 83 (06 Sep 2021 07:00) (72 - 97)  BP: --  BP(mean): --  ABP: 141/58 (06 Sep 2021 07:00) (88/39 - 177/71)  ABP(mean): 85 (06 Sep 2021 07:00) (55 - 113)  RR: 34 (06 Sep 2021 07:00) (9 - 37)  SpO2: 90% (06 Sep 2021 07:00) (86% - 98%)    Mode: AC/ CMV (Assist Control/ Continuous Mandatory Ventilation), RR (machine): 34, FiO2: 80, PEEP: 10, ITime: 0.6, MAP: 19, PC: 26, PIP: 37    09-05 @ 07:01  -  09-06 @ 07:00  --------------------------------------------------------  IN: 2635.1 mL / OUT: 4790 mL / NET: -2154.9 mL      CAPILLARY BLOOD GLUCOSE      POCT Blood Glucose.: 123 mg/dL (06 Sep 2021 05:26)      PHYSICAL EXAM:  General:   HEENT:   Lymph Nodes:  Neck:   Respiratory:   Cardiovascular:   Abdomen:   Extremities:   Skin:   Neurological:  Psychiatry:    LINES:    HOSPITAL MEDICATIONS:  enoxaparin Injectable 80 milliGRAM(s) SubCutaneous every 12 hours    vancomycin  IVPB      vancomycin  IVPB 750 milliGRAM(s) IV Intermittent every 12 hours    buMETAnide Injectable 1 milliGRAM(s) IV Push two times a day  midodrine 10 milliGRAM(s) Oral every 8 hours  norepinephrine Infusion 0.05 MICROgram(s)/kG/Min IV Continuous <Continuous>    dexAMETHasone  Injectable 6 milliGRAM(s) IV Push daily  insulin lispro (ADMELOG) corrective regimen sliding scale   SubCutaneous every 6 hours  insulin NPH human recombinant 2 Unit(s) SubCutaneous every 6 hours    ALBUTerol    90 MICROgram(s) HFA Inhaler 2 Puff(s) Inhalation every 8 hours  budesonide 160 MICROgram(s)/formoterol 4.5 MICROgram(s) Inhaler 2 Puff(s) Inhalation two times a day    diazepam    Tablet 15 milliGRAM(s) Oral every 8 hours  fentaNYL   Infusion... 2 MICROgram(s)/kG/Hr IV Continuous <Continuous>  ketamine Infusion. 1.998 mG/kG/Hr IV Continuous <Continuous>  methadone   Solution 10 milliGRAM(s) Enteral Tube every 8 hours    naloxegol 25 milliGRAM(s) Oral daily  pantoprazole  Injectable 40 milliGRAM(s) IV Push daily  polyethylene glycol 3350 17 Gram(s) Oral daily  senna Syrup 10 milliLiter(s) Oral at bedtime        cholecalciferol 2000 Unit(s) Oral daily  multivitamin/minerals Oral Solution (WELLESSE) 30 milliLiter(s) Enteral Tube daily      artificial tears (preservative free) Ophthalmic Solution 1 Drop(s) Both EYES every 8 hours  chlorhexidine 0.12% Liquid 15 milliLiter(s) Oral Mucosa every 12 hours  chlorhexidine 4% Liquid 1 Application(s) Topical <User Schedule>        LABS:                        8.2    7.56  )-----------( 214      ( 06 Sep 2021 00:42 )             26.4     Hgb Trend: 8.2<--, 7.4<--, 8.2<--, 8.6<--, 6.8<--  09-06    144  |  105  |  16  ----------------------------<  158<H>  3.8   |  29  |  0.55    Ca    8.5      06 Sep 2021 00:42  Phos  2.2     09-06  Mg     2.1     09-06    TPro  5.5<L>  /  Alb  2.2<L>  /  TBili  0.8  /  DBili  x   /  AST  41<H>  /  ALT  97<H>  /  AlkPhos  743<H>  09-06    Creatinine Trend: 0.55<--, 0.60<--, 0.59<--, 0.59<--, 0.60<--, 0.72<--  PT/INR - ( 06 Sep 2021 00:42 )   PT: 12.7 sec;   INR: 1.06 ratio         PTT - ( 06 Sep 2021 00:42 )  PTT:39.7 sec    Arterial Blood Gas:  09-06 @ 01:38  7.44/57/80/39/98.7/13.0  ABG lactate: --  Arterial Blood Gas:  09-05 @ 11:11  7.35/59/66/33/94.6/5.9  ABG lactate: --  Arterial Blood Gas:  09-05 @ 00:59  7.49/43/169/33/99.8/8.6  ABG lactate: --  Arterial Blood Gas:  09-04 @ 14:07  7.38/58/95/34/99.6/8.0  ABG lactate: --  Arterial Blood Gas:  09-04 @ 10:57  7.44/48/100/33/99.4/7.6  ABG lactate: --        MICROBIOLOGY:     RADIOLOGY:  [ ] Reviewed and interpreted by me    EKG: CHIEF COMPLAINT: SOV, COVID ARDS    Interval Events:  Was supined in the last 24 hours did improve PaO2 significantly. Is now also now off NImbex since yesterday.     On pocus RV is enlarged, severe pHTN with RVSP in the mid 60's.     REVIEW OF SYSTEMS:  Constitutional: [ ] negative [ ] fevers [ ] chills [ ] weight loss [ ] weight gain  HEENT: [ ] negative [ ] dry eyes [ ] eye irritation [ ] postnasal drip [ ] nasal congestion  CV: [ ] negative  [ ] chest pain [ ] orthopnea [ ] palpitations [ ] murmur  Resp: [ ] negative [ ] cough [ ] shortness of breath [ ] dyspnea [ ] wheezing [ ] sputum [ ] hemoptysis  GI: [ ] negative [ ] nausea [ ] vomiting [ ] diarrhea [ ] constipation [ ] abd pain [ ] dysphagia   : [ ] negative [ ] dysuria [ ] nocturia [ ] hematuria [ ] increased urinary frequency  Musculoskeletal: [ ] negative [ ] back pain [ ] myalgias [ ] arthralgias [ ] fracture  Skin: [ ] negative [ ] rash [ ] itch  Neurological: [ ] negative [ ] headache [ ] dizziness [ ] syncope [ ] weakness [ ] numbness  Psychiatric: [ ] negative [ ] anxiety [ ] depression  Endocrine: [ ] negative [ ] diabetes [ ] thyroid problem  Hematologic/Lymphatic: [ ] negative [ ] anemia [ ] bleeding problem  Allergic/Immunologic: [ ] negative [ ] itchy eyes [ ] nasal discharge [ ] hives [ ] angioedema  [ ] All other systems negative  [ x] Unable to assess ROS because ________    OBJECTIVE:  ICU Vital Signs Last 24 Hrs  T(C): 37.4 (06 Sep 2021 03:00), Max: 37.4 (06 Sep 2021 03:00)  T(F): 99.3 (06 Sep 2021 03:00), Max: 99.3 (06 Sep 2021 03:00)  HR: 83 (06 Sep 2021 07:00) (72 - 97)  BP: --  BP(mean): --  ABP: 141/58 (06 Sep 2021 07:00) (88/39 - 177/71)  ABP(mean): 85 (06 Sep 2021 07:00) (55 - 113)  RR: 34 (06 Sep 2021 07:00) (9 - 37)  SpO2: 90% (06 Sep 2021 07:00) (86% - 98%)    Mode: AC/ CMV (Assist Control/ Continuous Mandatory Ventilation), RR (machine): 34, FiO2: 80, PEEP: 10, ITime: 0.6, MAP: 19, PC: 26, PIP: 37    09-05 @ 07:01  -  09-06 @ 07:00  --------------------------------------------------------  IN: 2635.1 mL / OUT: 4790 mL / NET: -2154.9 mL      CAPILLARY BLOOD GLUCOSE      POCT Blood Glucose.: 123 mg/dL (06 Sep 2021 05:26)    PHYSICAL EXAM:  GENERAL: NAD, sedated and intubated  HEENT:  Atraumatic, Normocephalic  EYES: PERRLA, conjunctiva and sclera clear  NECK: Supple, No JVD, Left IJ central line  CHEST/LUNG: diminished bilaterally; No wheezes, rales, or rhonchi  HEART: Regular rate and rhythm; No murmurs, rubs, or gallops  ABDOMEN: Soft, Nondistended; Bowel sounds present  EXTREMITIES:  2+ Peripheral Pulses, No clubbing, cyanosis, or edema  PSYCH: unable as pt is sedated  NEUROLOGY: sedated, paralyzed  SKIN: No rashes or lesions    LINES:    HOSPITAL MEDICATIONS:  enoxaparin Injectable 80 milliGRAM(s) SubCutaneous every 12 hours    vancomycin  IVPB      vancomycin  IVPB 750 milliGRAM(s) IV Intermittent every 12 hours    buMETAnide Injectable 1 milliGRAM(s) IV Push two times a day  midodrine 10 milliGRAM(s) Oral every 8 hours  norepinephrine Infusion 0.05 MICROgram(s)/kG/Min IV Continuous <Continuous>    dexAMETHasone  Injectable 6 milliGRAM(s) IV Push daily  insulin lispro (ADMELOG) corrective regimen sliding scale   SubCutaneous every 6 hours  insulin NPH human recombinant 2 Unit(s) SubCutaneous every 6 hours    ALBUTerol    90 MICROgram(s) HFA Inhaler 2 Puff(s) Inhalation every 8 hours  budesonide 160 MICROgram(s)/formoterol 4.5 MICROgram(s) Inhaler 2 Puff(s) Inhalation two times a day    diazepam    Tablet 15 milliGRAM(s) Oral every 8 hours  fentaNYL   Infusion... 2 MICROgram(s)/kG/Hr IV Continuous <Continuous>  ketamine Infusion. 1.998 mG/kG/Hr IV Continuous <Continuous>  methadone   Solution 10 milliGRAM(s) Enteral Tube every 8 hours    naloxegol 25 milliGRAM(s) Oral daily  pantoprazole  Injectable 40 milliGRAM(s) IV Push daily  polyethylene glycol 3350 17 Gram(s) Oral daily  senna Syrup 10 milliLiter(s) Oral at bedtime        cholecalciferol 2000 Unit(s) Oral daily  multivitamin/minerals Oral Solution (WELLESSE) 30 milliLiter(s) Enteral Tube daily      artificial tears (preservative free) Ophthalmic Solution 1 Drop(s) Both EYES every 8 hours  chlorhexidine 0.12% Liquid 15 milliLiter(s) Oral Mucosa every 12 hours  chlorhexidine 4% Liquid 1 Application(s) Topical <User Schedule>        LABS:                        8.2    7.56  )-----------( 214      ( 06 Sep 2021 00:42 )             26.4     Hgb Trend: 8.2<--, 7.4<--, 8.2<--, 8.6<--, 6.8<--  09-06    144  |  105  |  16  ----------------------------<  158<H>  3.8   |  29  |  0.55    Ca    8.5      06 Sep 2021 00:42  Phos  2.2     09-06  Mg     2.1     09-06    TPro  5.5<L>  /  Alb  2.2<L>  /  TBili  0.8  /  DBili  x   /  AST  41<H>  /  ALT  97<H>  /  AlkPhos  743<H>  09-06    Creatinine Trend: 0.55<--, 0.60<--, 0.59<--, 0.59<--, 0.60<--, 0.72<--  PT/INR - ( 06 Sep 2021 00:42 )   PT: 12.7 sec;   INR: 1.06 ratio         PTT - ( 06 Sep 2021 00:42 )  PTT:39.7 sec    Arterial Blood Gas:  09-06 @ 01:38  7.44/57/80/39/98.7/13.0  ABG lactate: --  Arterial Blood Gas:  09-05 @ 11:11  7.35/59/66/33/94.6/5.9  ABG lactate: --  Arterial Blood Gas:  09-05 @ 00:59  7.49/43/169/33/99.8/8.6  ABG lactate: --  Arterial Blood Gas:  09-04 @ 14:07  7.38/58/95/34/99.6/8.0  ABG lactate: --  Arterial Blood Gas:  09-04 @ 10:57  7.44/48/100/33/99.4/7.6  ABG lactate: --        MICROBIOLOGY:     RADIOLOGY:  [ ] Reviewed and interpreted by me    EKG: yes

## 2024-01-15 NOTE — ED ADULT NURSE NOTE - CAS EDN DISCHARGE ASSESSMENT
Alert and oriented to person, place and time/Patient baseline mental status
Spontaneous, unlabored and symmetrical

## 2024-03-23 NOTE — CONSULT NOTE ADULT - SUBJECTIVE AND OBJECTIVE BOX
CC:     HPI: 70yo M w/ PMHx of HTN, DM2, BPH, asthma presents with shortness of breath. Patient endorses that he has been feeling SOB with associated cough for the last 2 weeks and his symptoms slowly worsened over time. He did not receive the COVID vaccine. Pt was found to be Covid+ on 8/12 and intubated on 8/20 for respiratory distress. ENT was consulted for oral bleed. Pt is on therapeutic lovenox, currently on hold. No modifying factors.        PAST MEDICAL & SURGICAL HISTORY:  BPH (benign prostatic hyperplasia)    Hyperlipemia    HTN (hypertension)    Hypercholesteremia    Asthma    Diabetes    Kidney stone    Bladder stone    H/O lithotripsy  x 2    History of kidney stones    H/O umbilical hernia repair      Allergies    No Known Allergies    Intolerances      MEDICATIONS  (STANDING):  ALBUTerol    90 MICROgram(s) HFA Inhaler 2 Puff(s) Inhalation every 8 hours  budesonide 160 MICROgram(s)/formoterol 4.5 MICROgram(s) Inhaler 2 Puff(s) Inhalation two times a day  chlorhexidine 0.12% Liquid 15 milliLiter(s) Oral Mucosa every 12 hours  chlorhexidine 4% Liquid 1 Application(s) Topical <User Schedule>  cholecalciferol 2000 Unit(s) Oral daily  cisatracurium Infusion 3 MICROgram(s)/kG/Min (14.7 mL/Hr) IV Continuous <Continuous>  diazepam    Tablet 10 milliGRAM(s) Oral every 8 hours  enoxaparin Injectable 80 milliGRAM(s) SubCutaneous every 12 hours  fentaNYL   Infusion... 2 MICROgram(s)/kG/Hr (8.16 mL/Hr) IV Continuous <Continuous>  insulin lispro (ADMELOG) corrective regimen sliding scale   SubCutaneous every 6 hours  insulin NPH human recombinant 8 Unit(s) SubCutaneous every 6 hours  ketamine Infusion. 1.998 mG/kG/Hr (16.3 mL/Hr) IV Continuous <Continuous>  lactulose Syrup 10 Gram(s) Enteral Tube every 6 hours  methadone    Tablet 20 milliGRAM(s) Oral every 8 hours  midodrine 10 milliGRAM(s) Oral every 8 hours  multivitamin/minerals Oral Solution (WELLESSE) 30 milliLiter(s) Enteral Tube daily  naloxegol 25 milliGRAM(s) Oral daily  pantoprazole  Injectable 40 milliGRAM(s) IV Push daily  petrolatum Ophthalmic Ointment 1 Application(s) Both EYES every 8 hours  polyethylene glycol 3350 17 Gram(s) Oral daily  senna Syrup 20 milliLiter(s) Oral at bedtime  vancomycin  IVPB      vancomycin  IVPB 750 milliGRAM(s) IV Intermittent every 12 hours  vasopressin Infusion 0.04 Unit(s)/Min (2.4 mL/Hr) IV Continuous <Continuous>    MEDICATIONS  (PRN):  fentaNYL    Injectable 50 MICROGram(s) IV Push every 4 hours PRN pain      Social History: no tobacco use    Family history: no pertinent Fhx    ROS:   ENT: all negative except as noted in HPI   CV: denies palpitations  Pulm: denies SOB, cough, hemoptysis  GI: denies change in apetite, indigestion, n/v  : denies pertinent urinary symptoms, urgency  Neuro: denies numbness/tingling, loss of sensation  Psych: denies anxiety  MS: denies muscle weakness, instability  Heme: denies easy bruising or bleeding  Endo: denies heat/cold intolerance, excessive sweating  Vascular: denies LE edema    Vital Signs Last 24 Hrs  T(C): 36.9 (03 Sep 2021 10:00), Max: 37.1 (02 Sep 2021 23:00)  T(F): 98.4 (03 Sep 2021 10:00), Max: 98.8 (02 Sep 2021 23:00)  HR: 85 (03 Sep 2021 11:30) (75 - 89)  BP: --  BP(mean): --  RR: 34 (03 Sep 2021 11:30) (33 - 34)  SpO2: 93% (03 Sep 2021 11:30) (90% - 100%)                          6.8    7.56  )-----------( 153      ( 03 Sep 2021 01:44 )             21.6    09-03    140  |  102  |  25<H>  ----------------------------<  73  3.4<L>   |  28  |  0.60    Ca    8.2<L>      03 Sep 2021 01:44  Phos  1.8     09-03  Mg     1.9     09-03    TPro  4.9<L>  /  Alb  2.4<L>  /  TBili  1.5<H>  /  DBili  x   /  AST  65<H>  /  ALT  166<H>  /  AlkPhos  567<H>  09-03   PT/INR - ( 03 Sep 2021 01:44 )   PT: 12.9 sec;   INR: 1.08 ratio         PTT - ( 03 Sep 2021 01:44 )  PTT:38.0 sec    PHYSICAL EXAM:  Gen: NAD  Skin: No rashes, bruises, or lesions  Head: Normocephalic, Atraumatic  Face: no edema, erythema, or fluctuance. Parotid glands soft without mass  Eyes: no scleral injection  Nose: Nares bilaterally patent, no discharge  Mouth: ET tube and NGT in place, ozzing slightly from a small ulcer on the anterior inferior aspect of the tongue and left soft palate. Cauterization performed with silver nitrate. Oral packing ( 1 kerlix ) used to prevent further bleeding. No Stridor / Drooling / Trismus.  Mucosa moist, tongue/uvula midline, oropharynx clear  Neck: Flat, supple, no lymphadenopathy, trachea midline, no masses  Lymphatic: No lymphadenopathy  Resp: no stridor  CV: no peripheral edema/cyanosis  GI: nondistended   Peripheral vascular: no JVD or edema  Neuro: facial nerve intact, no facial droop       CC:     HPI: 72yo M w/ PMHx of HTN, DM2, BPH, asthma presents with shortness of breath. Patient endorses that he has been feeling SOB with associated cough for the last 2 weeks and his symptoms slowly worsened over time. He did not receive the COVID vaccine. Pt was found to be Covid+ on 8/12 and intubated on 8/20 for respiratory distress. ENT was consulted for oral bleed. Pt is on therapeutic lovenox, currently on hold. No modifying factors.        PAST MEDICAL & SURGICAL HISTORY:  BPH (benign prostatic hyperplasia)    Hyperlipemia    HTN (hypertension)    Hypercholesteremia    Asthma    Diabetes    Kidney stone    Bladder stone    H/O lithotripsy  x 2    History of kidney stones    H/O umbilical hernia repair      Allergies    No Known Allergies    Intolerances      MEDICATIONS  (STANDING):  ALBUTerol    90 MICROgram(s) HFA Inhaler 2 Puff(s) Inhalation every 8 hours  budesonide 160 MICROgram(s)/formoterol 4.5 MICROgram(s) Inhaler 2 Puff(s) Inhalation two times a day  chlorhexidine 0.12% Liquid 15 milliLiter(s) Oral Mucosa every 12 hours  chlorhexidine 4% Liquid 1 Application(s) Topical <User Schedule>  cholecalciferol 2000 Unit(s) Oral daily  cisatracurium Infusion 3 MICROgram(s)/kG/Min (14.7 mL/Hr) IV Continuous <Continuous>  diazepam    Tablet 10 milliGRAM(s) Oral every 8 hours  enoxaparin Injectable 80 milliGRAM(s) SubCutaneous every 12 hours  fentaNYL   Infusion... 2 MICROgram(s)/kG/Hr (8.16 mL/Hr) IV Continuous <Continuous>  insulin lispro (ADMELOG) corrective regimen sliding scale   SubCutaneous every 6 hours  insulin NPH human recombinant 8 Unit(s) SubCutaneous every 6 hours  ketamine Infusion. 1.998 mG/kG/Hr (16.3 mL/Hr) IV Continuous <Continuous>  lactulose Syrup 10 Gram(s) Enteral Tube every 6 hours  methadone    Tablet 20 milliGRAM(s) Oral every 8 hours  midodrine 10 milliGRAM(s) Oral every 8 hours  multivitamin/minerals Oral Solution (WELLESSE) 30 milliLiter(s) Enteral Tube daily  naloxegol 25 milliGRAM(s) Oral daily  pantoprazole  Injectable 40 milliGRAM(s) IV Push daily  petrolatum Ophthalmic Ointment 1 Application(s) Both EYES every 8 hours  polyethylene glycol 3350 17 Gram(s) Oral daily  senna Syrup 20 milliLiter(s) Oral at bedtime  vancomycin  IVPB      vancomycin  IVPB 750 milliGRAM(s) IV Intermittent every 12 hours  vasopressin Infusion 0.04 Unit(s)/Min (2.4 mL/Hr) IV Continuous <Continuous>    MEDICATIONS  (PRN):  fentaNYL    Injectable 50 MICROGram(s) IV Push every 4 hours PRN pain      Social History: no tobacco use    Family history: no pertinent Fhx    ROS:   ENT: all negative except as noted in HPI   CV: denies palpitations  Pulm: denies SOB, cough, hemoptysis  GI: denies change in apetite, indigestion, n/v  : denies pertinent urinary symptoms, urgency  Neuro: denies numbness/tingling, loss of sensation  Psych: denies anxiety  MS: denies muscle weakness, instability  Heme: denies easy bruising or bleeding  Endo: denies heat/cold intolerance, excessive sweating  Vascular: denies LE edema    Vital Signs Last 24 Hrs  T(C): 36.9 (03 Sep 2021 10:00), Max: 37.1 (02 Sep 2021 23:00)  T(F): 98.4 (03 Sep 2021 10:00), Max: 98.8 (02 Sep 2021 23:00)  HR: 85 (03 Sep 2021 11:30) (75 - 89)  BP: --  BP(mean): --  RR: 34 (03 Sep 2021 11:30) (33 - 34)  SpO2: 93% (03 Sep 2021 11:30) (90% - 100%)                          6.8    7.56  )-----------( 153      ( 03 Sep 2021 01:44 )             21.6    09-03    140  |  102  |  25<H>  ----------------------------<  73  3.4<L>   |  28  |  0.60    Ca    8.2<L>      03 Sep 2021 01:44  Phos  1.8     09-03  Mg     1.9     09-03    TPro  4.9<L>  /  Alb  2.4<L>  /  TBili  1.5<H>  /  DBili  x   /  AST  65<H>  /  ALT  166<H>  /  AlkPhos  567<H>  09-03   PT/INR - ( 03 Sep 2021 01:44 )   PT: 12.9 sec;   INR: 1.08 ratio         PTT - ( 03 Sep 2021 01:44 )  PTT:38.0 sec    PHYSICAL EXAM:  Gen: NAD  Skin: No rashes, bruises, or lesions  Head: Normocephalic, Atraumatic  Face: no edema, erythema, or fluctuance. Parotid glands soft without mass  Eyes: no scleral injection  Nose: Nares bilaterally patent, no discharge  Mouth: ET tube and NGT in place,  Denture seen, removed out of the mouth and placed to the side oozing slightly from a small ulcer on the anterior inferior aspect of the tongue and left soft palate, near retromolar trigone. Cauterization performed with silver nitrate. Oral packing ( 1 kerlix ) used to prevent further bleeding. No Stridor / Drooling / Trismus.  Mucosa moist, tongue/uvula midline, oropharynx clear  Neck: Flat, supple, no lymphadenopathy, trachea midline, no masses  Lymphatic: No lymphadenopathy  Resp: no stridor  CV: no peripheral edema/cyanosis  GI: nondistended   Peripheral vascular: no JVD or edema  Neuro: facial nerve intact, no facial droop       DISPLAY PLAN FREE TEXT

## 2024-06-26 NOTE — DIETITIAN INITIAL EVALUATION ADULT. - WEIGHT IN KG
PRE-PROCEDURE NOTE     Reason for procedure: bright red blood per rectum, screening    History and Physical Reviewed: Reviewed, no changes.    Pre-sedation assessment:    General: alert, appears stated age, and cooperative  Airway: normal  Heart: regular rate and rhythm  Lungs: clear to auscultation bilaterally    Sedation Plan based on assessment: Monitored anesthesia care    Mallampati score: Class II (visualization of the soft palate, fauces, and uvula)          ASA Classification: ASA 2 - Patient with mild systemic disease with no functional limitations    Impression: Patient deemed adequate candidate for MAC sedation    Risks, benefits and alternatives were discussed with the patient and informed consent was obtained.    Plan: colonoscopy      Martín Awan MD 6/26/2024 10:06 AM      Socrates Awan MD, FACP   [facility]  [email or web address]       Thank you for the opportunity to participate in the care of this patient.   Please feel free to call with any questions or concerns.  [phone].       CC: [facility], Sheila Hutchins, Leah Garcia          76.6

## 2024-08-15 NOTE — H&P PST ADULT - BACK
[FreeTextEntry1] : Health Maintenance His BMI is good and no weight loss is currently recommended. Daily aerobic exercises strongly recommended. No STD risk or substance abuse per patient report. Occasional gender specific self-examination is suggested. No depression. Competent with ADLs. Follow-up colonoscopy is recommended in 2 years (due to finding of 12 mm polyp in recent colonoscopy) Completed primary COVID-vaccine series and received 2 boosters but did not receive updated variant specific vaccine. Patient advised to pursue RSV vaccine shingles vaccine series at pharmacy where it is covered by his insurance.  Prediabetes REVIEWED serial hemoglobin A1c results (6.0 in 2023 and 6.2 in 2024.) Explained that these results represent pre-diabetes and reviewed the rationale (to reduce vascular and other complications) as well as the goal (hemoglobin A1c under 5.7) for management of this condition. Reduction in high sugar foods (cookies, candy, soda, etc.) strongly recommended but overall calorie restriction is not recommended given his BMI at 21.3. Note that patient has been prescribed Januvia 50 mg.  To be determined whether he is actually taking it when he returns with his medications in a few weeks.  PCA stenosis (left side) Extended discussion regarding preventative treatment. Patient urged to continue ASA 81 mg and to remain on rosuvastatin 20 mg in order to help reduce chance of progression of this condition.  Cognitive deficit Based on brief Mini-Mental exam in office today his cognitive deficit appears to be clearly advanced compared to last year, now representing early dementia.  Patient still feels that there has been any significant debilitating impact on his lifestyle. Patient had already been started on Aricept 5 mg several months ago.  At this point, will advance dosage.  Prescription submitted to pharmacy for donepezil 10 mg with instructions for use. In addition, patient urged to follow-up with his neurologist Recommend home evaluation by visiting nurse service but patient adamantly refuses.  Medication reviewed Patient will return in a few weeks for blood work and will bring in his medications for review at that time.  Dermatillomania Repeated picking at scalp (without underlying scabs or lesions). This is likely a manifestation of progressive dementia (v anxiety disorder which he denies). Patient urged to discontinue.  Explained risk for infection, bleeding, and other complications. Recommend that current excoriations be cleaned on a daily basis with warm water with week Betadine/H2O to solution until healed.  Excoriations are too shallow (at least for now) to require dressing but patient was not strongly advised to wear a hat at all times to reduce risk that he will start picking again. He indicates understanding and agreement. No deformity or limitation of movement

## 2024-11-30 NOTE — CHART NOTE - NSCHARTNOTEFT_GEN_A_CORE
Medicine NP note    Received the patient while RRT in progress  Per RN/NP RRT called for Hypotension, patient on trach to vent,   received IV fluids, albumin, and pressor during RRT  Patient was made DNR/Comfort care during RRT by RRT team per pt's spouse's and family request    71-year-old man with COVID ARDS now with persistent respiratory failure status post trach (10/1), MRSE bacteremia, and bilateral DVTs, on meroa nd dapto.  Now patient s/p RRT DNR/comfort care  Please see RRT note for more details.  Family at bedside, discussed with pt's spouse and family in detail,  Will continue comfort care  call placed for pulmonary fellow, awaiting call back    Noni Ordaz [facility] BC'  [id number] 10/09/2021 2030 PM    Medicine NP note    Received the patient while RRT in progress  Per RN/NP RRT called for Hypotension, patient on trach to vent,   received IV fluids, albumin, and pressor during RRT  Patient was made DNR/Comfort care during RRT by RRT team per pt's spouse's and family request    71-year-old man with COVID ARDS now with persistent respiratory failure status post trach (10/1), MRSE bacteremia, and bilateral DVTs, on meroa nd dapto.  Now patient s/p RRT DNR/comfort care  Please see RRT note for more details.  Family at bedside, discussed with pt's spouse and family in detail,  Will continue comfort care  call placed for pulmonary fellow, awaiting call back    Noni Ordaz [facility]'  [id number] Statement Selected

## 2024-11-30 NOTE — PROGRESS NOTE ADULT - SUBJECTIVE AND OBJECTIVE BOX
Problem: Chronic Conditions and Co-morbidities  Goal: Patient's chronic conditions and co-morbidity symptoms are monitored and maintained or improved  11/30/2024 1304 by Raina Collins RN  Outcome: Progressing  11/29/2024 2330 by Star Norris RN  Outcome: Progressing     Problem: Discharge Planning  Goal: Discharge to home or other facility with appropriate resources  11/30/2024 1304 by Raina Collins RN  Outcome: Progressing  11/29/2024 2330 by Star Norris RN  Outcome: Progressing     Problem: Pain  Goal: Verbalizes/displays adequate comfort level or baseline comfort level  11/30/2024 1304 by Raina Collins RN  Outcome: Progressing  11/29/2024 2330 by Star Norris RN  Outcome: Progressing     Problem: Safety - Adult  Goal: Free from fall injury  11/30/2024 1304 by Raina Collins RN  Outcome: Progressing  11/29/2024 2330 by Star Norris RN  Outcome: Progressing      COVERING RESIDENT: SABA BURNETT (PGY-1) | NS PAGER [phone] | [place] [phone]    INTERVAL HPI/OVERNIGHT EVENTS:    SUBJECTIVE: Patient seen and examined at bedside.     ROS: unable to assess    OBJECTIVE:    VITAL SIGNS:  ICU Vital Signs Last 24 Hrs  T(C): 37.7 (23 Sep 2021 04:00), Max: 38.3 (22 Sep 2021 21:00)  T(F): 99.9 (23 Sep 2021 04:00), Max: 100.9 (22 Sep 2021 21:00)  HR: 91 (23 Sep 2021 06:00) (81 - 110)  BP: 117/70 (23 Sep 2021 06:00) (86/53 - 149/83)  BP(mean): 88 (23 Sep 2021 06:00) (64 - 111)  ABP: --  ABP(mean): --  RR: 19 (23 Sep 2021 06:00) (16 - 30)  SpO2: 96% (23 Sep 2021 06:00) (88% - 97%)    Mode: AC/ CMV (Assist Control/ Continuous Mandatory Ventilation), RR (machine): 16, FiO2: 50, PEEP: 5, ITime: 0.6, MAP: 11, PC: 24, PIP: 32    - @ 07:01  -  - @ 07:00  --------------------------------------------------------  IN: 1795 mL / OUT: 1200 mL / NET: 595 mL      CAPILLARY BLOOD GLUCOSE      POCT Blood Glucose.: 221 mg/dL (23 Sep 2021 05:25)      PHYSICAL EXAM:    General: intubated, off sedation  HEENT: NCAT, PERRL, clear conjunctiva, sclera icteric  Neck: supple, no JVD  Respiratory: CTAB  Cardiovascular: RRR, S1S2, no m/r/g  Abdomen: soft, nontender, nondistended, normal bowel sounds  Extremities: no edema, no cyanosis  Skin: warm, perfused, b/l erythema on buttocks  Neurological: nonfocal, no response to pain, sternal rub    MEDICATIONS:  MEDICATIONS  (STANDING):  ALBUTerol    90 MICROgram(s) HFA Inhaler 2 Puff(s) Inhalation every 8 hours  artificial tears (preservative free) Ophthalmic Solution 1 Drop(s) Both EYES every 8 hours  budesonide 160 MICROgram(s)/formoterol 4.5 MICROgram(s) Inhaler 2 Puff(s) Inhalation two times a day  chlorhexidine 0.12% Liquid 15 milliLiter(s) Oral Mucosa every 12 hours  chlorhexidine 4% Liquid 1 Application(s) Topical <User Schedule>  cholecalciferol 2000 Unit(s) Oral daily  dextrose 40% Gel 15 Gram(s) Oral once  dextrose 5%. 1000 milliLiter(s) (50 mL/Hr) IV Continuous <Continuous>  dextrose 5%. 1000 milliLiter(s) (100 mL/Hr) IV Continuous <Continuous>  dextrose 50% Injectable 25 Gram(s) IV Push once  dextrose 50% Injectable 12.5 Gram(s) IV Push once  dextrose 50% Injectable 25 Gram(s) IV Push once  glucagon  Injectable 1 milliGRAM(s) IntraMuscular once  insulin lispro (ADMELOG) corrective regimen sliding scale   SubCutaneous every 6 hours  insulin NPH human recombinant 10 Unit(s) SubCutaneous every 6 hours  LORazepam     Tablet 2 milliGRAM(s) Oral every 12 hours  methadone   Solution 10 milliGRAM(s) Oral every 8 hours  midodrine 40 milliGRAM(s) Oral every 8 hours  multivitamin/minerals Oral Solution (WELLESSE) 30 milliLiter(s) Enteral Tube daily  naloxegol 25 milliGRAM(s) Oral daily  pantoprazole   Suspension 40 milliGRAM(s) Enteral Tube every 12 hours  polyethylene glycol 3350 17 Gram(s) Oral daily  senna Syrup 10 milliLiter(s) Oral at bedtime  ursodiol Tablet 500 milliGRAM(s) Oral two times a day    MEDICATIONS  (PRN):  acetaminophen    Suspension .. 650 milliGRAM(s) Oral every 6 hours PRN Temp greater or equal to 38C (100.4F), Mild Pain (1 - 3), Moderate Pain (4 - 6)      ALLERGIES:  Allergies    No Known Allergies    Intolerances        LABS:                        8.8    16.49 )-----------( 325      ( 23 Sep 2021 00:15 )             28.5     09-23    140  |  98  |  60<H>  ----------------------------<  298<H>  4.5   |  27  |  1.19    Ca    10.1      23 Sep 2021 00:15  Phos  4.1       Mg     2.7         TPro  6.5  /  Alb  2.4<L>  /  TBili  4.2<H>  /  DBili  x   /  AST  147<H>  /  ALT  124<H>  /  AlkPhos  2591<H>      PT/INR - ( 23 Sep 2021 00:15 )   PT: 12.6 sec;   INR: 1.05 ratio         PTT - ( 23 Sep 2021 00:15 )  PTT:28.6 sec  Urinalysis Basic - ( 21 Sep 2021 16:11 )    Color: Dark Yellow / Appearance: Turbid / S.019 / pH: x  Gluc: x / Ketone: Negative  / Bili: Small / Urobili: 4 mg/dL   Blood: x / Protein: 30 mg/dL / Nitrite: Negative   Leuk Esterase: Negative / RBC: 29 /hpf / WBC 19 /HPF   Sq Epi: x / Non Sq Epi: 5 /hpf / Bacteria: Negative        RADIOLOGY & ADDITIONAL TESTS: Reviewed.